# Patient Record
Sex: FEMALE | Race: WHITE | Employment: OTHER | URBAN - METROPOLITAN AREA
[De-identification: names, ages, dates, MRNs, and addresses within clinical notes are randomized per-mention and may not be internally consistent; named-entity substitution may affect disease eponyms.]

---

## 2017-01-06 DIAGNOSIS — M51.369 DDD (DEGENERATIVE DISC DISEASE), LUMBAR: ICD-10-CM

## 2017-01-06 DIAGNOSIS — M53.3 SI (SACROILIAC) JOINT DYSFUNCTION: ICD-10-CM

## 2017-01-06 DIAGNOSIS — G43.009 MIGRAINE WITHOUT AURA AND WITHOUT STATUS MIGRAINOSUS, NOT INTRACTABLE: ICD-10-CM

## 2017-01-06 DIAGNOSIS — M70.62 GREATER TROCHANTERIC BURSITIS OF BOTH HIPS: ICD-10-CM

## 2017-01-06 DIAGNOSIS — M79.7 FIBROMYALGIA: ICD-10-CM

## 2017-01-06 DIAGNOSIS — M70.61 GREATER TROCHANTERIC BURSITIS OF BOTH HIPS: ICD-10-CM

## 2017-01-06 DIAGNOSIS — M47.817 LUMBOSACRAL SPONDYLOSIS WITHOUT MYELOPATHY: Primary | ICD-10-CM

## 2017-01-06 DIAGNOSIS — M79.18 MYOFASCIAL PAIN: ICD-10-CM

## 2017-01-06 RX ORDER — SUMATRIPTAN 100 MG/1
100 TABLET, FILM COATED ORAL
Qty: 9 TABLET | Refills: 2 | Status: SHIPPED | OUTPATIENT
Start: 2017-01-06 | End: 2017-05-12

## 2017-01-06 RX ORDER — TOPIRAMATE 25 MG/1
TABLET, FILM COATED ORAL
Qty: 60 TABLET | Refills: 3 | Status: CANCELLED | OUTPATIENT
Start: 2017-01-06

## 2017-01-06 NOTE — TELEPHONE ENCOUNTER
Signed Prescriptions:                        Disp   Refills    SUMAtriptan (IMITREX) 100 MG tablet        9 tabl*2        Sig: Take 1 tablet (100 mg) by mouth at onset of headache           for migraine May repeat in 2 hours if needed. Max           of 200mg in 24 hours. Use sparingly.  Authorizing Provider: BRIANNA HANSON, RN CNP, FNP  [facility]

## 2017-01-06 NOTE — TELEPHONE ENCOUNTER
Received fax request from [facility] pharmacy requesting refill(s) for Sumatriptan    Last refilled on 07/31/16    Pt last seen on 11/01/16  Next appt scheduled for 01/17/16    Will facilitate refill.

## 2017-01-25 ENCOUNTER — TRANSFERRED RECORDS (OUTPATIENT)
Dept: HEALTH INFORMATION MANAGEMENT | Facility: CLINIC | Age: 57
End: 2017-01-25

## 2017-01-27 ENCOUNTER — MYC MEDICAL ADVICE (OUTPATIENT)
Dept: PALLIATIVE MEDICINE | Facility: CLINIC | Age: 57
End: 2017-01-27

## 2017-01-27 DIAGNOSIS — M79.645 BILATERAL THUMB PAIN: ICD-10-CM

## 2017-01-27 DIAGNOSIS — M70.62 GREATER TROCHANTERIC BURSITIS OF BOTH HIPS: Primary | ICD-10-CM

## 2017-01-27 DIAGNOSIS — M70.61 GREATER TROCHANTERIC BURSITIS OF BOTH HIPS: Primary | ICD-10-CM

## 2017-01-27 DIAGNOSIS — M79.644 BILATERAL THUMB PAIN: ICD-10-CM

## 2017-01-30 NOTE — TELEPHONE ENCOUNTER
My chart message from pt:    Ciaran Childers, could I get a bunch of Volteren gel for pain. See you on Feb. 10th. I use [facility] in [place], [phone] Thank you Jacquie      Last refilled on 7/12/2016 with 2 refills    Pt last seen on 11/1/2016  Next appt scheduled for 2/10/2017    Will facilitate refill.    Michell Ritchie RN, [facility]  Pain Clinic Care Coordinator

## 2017-01-31 NOTE — TELEPHONE ENCOUNTER
Signed Prescriptions:                        Disp   Refills    diclofenac (VOLTAREN) 1 % GEL topical gel  9 Tube 3        Sig: Apply 2 grams to hands and 4 grams to hips up to 4           times daily. T  Authorizing Provider: BRIANNA HANSON, RN CNP, FNP   [facility] [place]

## 2017-02-06 ENCOUNTER — MYC MEDICAL ADVICE (OUTPATIENT)
Dept: PALLIATIVE MEDICINE | Facility: CLINIC | Age: 57
End: 2017-02-06

## 2017-02-06 NOTE — TELEPHONE ENCOUNTER
Jorge message from pt at 1507:    Ciaran Childers,   I sent a message a week ago this past Friday for a refill on Volteren. I haven't gotten any for quite awhile and needed some. I never heard back from anyone. I will be in Friday the 10th but really could have used it last week.     Thanks   Jacquie   ----------  Message sent to pt:    Bhargav Dhillon,     I reviewed your chart and see that Liseth did process your refill request on 1/30/17 and it was sent to [facility] pharmacy in [place]. For refills that are sent directly to the pharmacy, we do not typically send a message back to the patient because the pharmacy usually notifies the patient when the prescription is ready.      I called [facility] pharmacy and they received the electronic prescription on 1/30. They told me that they needed to order the medication because they did not have any in stock and it is now ready to be picked up.      Take care,     Laisha Sanchez, MARVAN, RN-BC  Patient Care Supervisor/Care Coordinator  [facility]

## 2017-02-07 ENCOUNTER — MYC MEDICAL ADVICE (OUTPATIENT)
Dept: FAMILY MEDICINE | Facility: CLINIC | Age: 57
End: 2017-02-07

## 2017-02-10 ENCOUNTER — OFFICE VISIT (OUTPATIENT)
Dept: PALLIATIVE MEDICINE | Facility: CLINIC | Age: 57
End: 2017-02-10
Payer: COMMERCIAL

## 2017-02-10 VITALS — DIASTOLIC BLOOD PRESSURE: 88 MMHG | SYSTOLIC BLOOD PRESSURE: 131 MMHG | HEART RATE: 115 BPM

## 2017-02-10 DIAGNOSIS — M47.817 LUMBOSACRAL SPONDYLOSIS WITHOUT MYELOPATHY: ICD-10-CM

## 2017-02-10 DIAGNOSIS — M53.3 SI (SACROILIAC) JOINT DYSFUNCTION: ICD-10-CM

## 2017-02-10 DIAGNOSIS — M79.7 FIBROMYALGIA: ICD-10-CM

## 2017-02-10 DIAGNOSIS — M76.892 ENTHESOPATHY OF HIP REGION ON BOTH SIDES: ICD-10-CM

## 2017-02-10 DIAGNOSIS — G43.009 MIGRAINE WITHOUT AURA AND WITHOUT STATUS MIGRAINOSUS, NOT INTRACTABLE: Primary | ICD-10-CM

## 2017-02-10 DIAGNOSIS — M76.891 ENTHESOPATHY OF HIP REGION ON BOTH SIDES: ICD-10-CM

## 2017-02-10 DIAGNOSIS — M51.369 DDD (DEGENERATIVE DISC DISEASE), LUMBAR: ICD-10-CM

## 2017-02-10 DIAGNOSIS — M54.50 CHRONIC BILATERAL LOW BACK PAIN WITHOUT SCIATICA: ICD-10-CM

## 2017-02-10 DIAGNOSIS — M79.18 MYOFASCIAL PAIN: ICD-10-CM

## 2017-02-10 DIAGNOSIS — G89.29 CHRONIC BILATERAL LOW BACK PAIN WITHOUT SCIATICA: ICD-10-CM

## 2017-02-10 DIAGNOSIS — M70.62 GREATER TROCHANTERIC BURSITIS OF BOTH HIPS: ICD-10-CM

## 2017-02-10 DIAGNOSIS — M70.61 GREATER TROCHANTERIC BURSITIS OF BOTH HIPS: ICD-10-CM

## 2017-02-10 PROCEDURE — 99214 OFFICE O/P EST MOD 30 MIN: CPT | Performed by: NURSE PRACTITIONER

## 2017-02-10 RX ORDER — METOPROLOL SUCCINATE 25 MG/1
25 TABLET, EXTENDED RELEASE ORAL DAILY
Qty: 30 TABLET | Refills: 0 | Status: SHIPPED | OUTPATIENT
Start: 2017-02-10 | End: 2017-02-17

## 2017-02-10 RX ORDER — HYDROCODONE BITARTRATE AND ACETAMINOPHEN 5; 325 MG/1; MG/1
TABLET ORAL
Qty: 100 TABLET | Refills: 0 | Status: SHIPPED | OUTPATIENT
Start: 2017-02-10 | End: 2017-03-27

## 2017-02-10 ASSESSMENT — PAIN SCALES - GENERAL: PAINLEVEL: MODERATE PAIN (5)

## 2017-02-10 NOTE — NURSING NOTE
"    Chief Complaint   Patient presents with     Pain       Initial There were no vitals taken for this visit. Estimated body mass index is 33.54 kg/(m^2) as calculated from the following:    Height as of 12/23/16: 1.665 m (5' 5.55\").    Weight as of 12/22/16: 92.987 kg (205 lb).  Medication Reconciliation: denis Singleton CMA (AAMA)      "

## 2017-02-10 NOTE — PATIENT INSTRUCTIONS
PLAN:  1. I recommend seeing a counselor re: your recent trauma experience  2. Return to [facility] in PHYSICAL THERAPY   3. Medications:   1. Start metoprolol 25mg per day for headache prevention  2. Continue Norco  3. Agree with no Topamax due to side effects  4. Procedures: none  5. Follow-up with me in 8 weeks.           Nurse Triage line:  [phone]   Call this number with any questions or concerns. You may leave a detailed message anytime. Calls are typically returned Monday through Friday between 8 AM and 4:30 PM. We usually get back to you within 2 business days depending on the issue/request.       Medication refills:    For non-narcotic medications, call your pharmacy directly to request a refill. The pharmacy will contact the [facility] Management Center for authorization. Please allow 3-4 days for these refills to be processed.     For narcotic refills, call the nurse triage line or send a [employer] message. Please contact us 7-10 days before your refill is due. The message MUST include the name of the specific medication(s) requested and how you would like to receive the prescription(s). The options are as follows:    Pain Clinic staff can mail the prescription to your pharmacy. Please tell us the name of the pharmacy.    You may pick the prescription up at the Pain Clinic (tell us the location) or during a clinic visit with your pain provider    Pain Clinic staff can deliver the prescription to the [facility] pharmacy in the clinic building. Please tell us the location.      Scheduling number: [phone].  Call this number to schedule or change appointments.    We believe regular attendance is key to your success in our program.    Any time you are unable to keep your appointment we ask that you call us at least 24 hours in advance to let us know. This will allow us to offer the appointment time to another patient.

## 2017-02-10 NOTE — MR AVS SNAPSHOT
After Visit Summary   2/10/2017    Jacquie Amador    MRN: [id number]           Patient Information     Date Of Birth          1960        Visit Information        Provider Department      2/10/2017 2:00 PM Liseth Caldera APRN [facility]        Today's Diagnoses     Migraine without aura and without status migrainosus, not intractable    -  1     Enthesopathy of hip region on both sides         Chronic bilateral low back pain without sciatica         Myofascial pain           Care Instructions    PLAN:  1. I recommend seeing a counselor re: your recent trauma experience  2. Return to [facility] in PHYSICAL THERAPY   3. Medications:   1. Start metoprolol 25mg per day for headache prevention  2. Continue Norco  3. Agree with no Topamax due to side effects  4. Procedures: none  5. Follow-up with me in 8 weeks.           Nurse Triage line:  [phone]   Call this number with any questions or concerns. You may leave a detailed message anytime. Calls are typically returned Monday through Friday between 8 AM and 4:30 PM. We usually get back to you within 2 business days depending on the issue/request.       Medication refills:    For non-narcotic medications, call your pharmacy directly to request a refill. The pharmacy will contact the Pain Management Center for authorization. Please allow 3-4 days for these refills to be processed.     For narcotic refills, call the nurse triage line or send a RegisterPatient message. Please contact us 7-10 days before your refill is due. The message MUST include the name of the specific medication(s) requested and how you would like to receive the prescription(s). The options are as follows:    Pain Clinic staff can mail the prescription to your pharmacy. Please tell us the name of the pharmacy.    You may pick the prescription up at the Pain Clinic (tell us the location) or during a clinic visit with your pain provider    Pain Clinic staff can deliver  the prescription to the [facility] pharmacy in the clinic building. Please tell us the location.      Scheduling number: [phone].  Call this number to schedule or change appointments.    We believe regular attendance is key to your success in our program.    Any time you are unable to keep your appointment we ask that you call us at least 24 hours in advance to let us know. This will allow us to offer the appointment time to another patient.             Follow-ups after your visit        Who to contact     If you have questions or need follow up information about today's clinic visit or your schedule please contact [facility] directly at [phone].  Normal or non-critical lab and imaging results will be communicated to you by MyChart, letter or phone within 4 business days after the clinic has received the results. If you do not hear from us within 7 days, please contact the clinic through Tap[employer] or phone. If you have a critical or abnormal lab result, we will notify you by phone as soon as possible.  Submit refill requests through [employer] or call your pharmacy and they will forward the refill request to us. Please allow 3 business days for your refill to be completed.          Additional Information About Your Visit        [employer] Information     [employer] gives you secure access to your electronic health record. If you see a primary care provider, you can also send messages to your care team and make appointments. If you have questions, please call your primary care clinic.  If you do not have a primary care provider, please call [phone] and they will assist you.        Care EveryWhere ID     This is your Care EveryWhere ID. This could be used by other organizations to access your [facility] medical records  [id number]        Your Vitals Were     Pulse                   115            Blood Pressure from Last 3 Encounters:   02/10/17 131/88   12/23/16 122/84   12/19/16 118/88    Weight from  Last 3 Encounters:   12/22/16 92.987 kg (205 lb)   07/27/16 94.348 kg (208 lb)   06/22/16 94.394 kg (208 lb 1.6 oz)              Today, you had the following     No orders found for display         Today's Medication Changes          These changes are accurate as of: 2/10/17  2:44 PM.  If you have any questions, ask your nurse or doctor.               Start taking these medicines.        Dose/Directions    metoprolol 25 MG 24 hr tablet   Commonly known as:  TOPROL-XL   Used for:  Migraine without aura and without status migrainosus, not intractable        Dose:  25 mg   Take 1 tablet (25 mg) by mouth daily   Quantity:  30 tablet   Refills:  0         These medicines have changed or have updated prescriptions.        Dose/Directions    buPROPion 200 MG 12 hr tablet   Commonly known as:  WELLBUTRIN SR   This may have changed:  Another medication with the same name was removed. Continue taking this medication, and follow the directions you see here.   Used for:  MARIZOL (generalized anxiety disorder)        TAKE 1 TABLET (200 MG) BY ORAL ROUTE 2 TIMES PER DAY   Quantity:  120 tablet   Refills:  0         Stop taking these medicines if you haven't already. Please contact your care team if you have questions.     atorvastatin 20 MG tablet   Commonly known as:  LIPITOR           glycopyrrolate 1 MG tablet   Commonly known as:  ROBINUL           topiramate 25 MG tablet   Commonly known as:  TOPAMAX                Where to get your medicines      These medications were sent to [id number] - [place] - [address]  [address], [place] [address]     Phone:  [phone]    - metoprolol 25 MG 24 hr tablet             Primary Care Provider Office Phone # Fax #    Liz Peterson [phone]        [place] [facility] [address]  [place] [address]        Thank you!     Thank you for choosing [facility]  for your care. Our goal is always to provide you with excellent care. Hearing  back from our patients is one way we can continue to improve our services. Please take a few minutes to complete the written survey that you may receive in the mail after your visit with us. Thank you!             Your Updated Medication List - Protect others around you: Learn how to safely use, store and throw away your medicines at www.disposemymeds.org.          This list is accurate as of: 2/10/17  2:44 PM.  Always use your most recent med list.                   Brand Name Dispense Instructions for use    acidophilus Caps     60 capsule    Take 1 capsule by mouth daily Take two hours before or after antibiotic dose.  Continue for 1 week after antibiotic therapy completed       adapalene 0.1 % gel    DIFFERIN    45 g    Apply topically At Bedtime       albuterol 108 (90 BASE) MCG/ACT Inhaler    VENTOLIN HFA    54 g    INHALE 2 PUFFS INTO THE LUNGS EVERY 6 HOURS AS NEEDED FOR SHORTNESS OF BREATH / DYSPNEA       ALPRAZolam 0.25 MG tablet    XANAX    90 tablet    TAKE 1 TABLET EVERY DAY AS NEEDED FOR ANXIETY       buPROPion 200 MG 12 hr tablet    WELLBUTRIN SR    120 tablet    TAKE 1 TABLET (200 MG) BY ORAL ROUTE 2 TIMES PER DAY       calcium carbonate-vitamin D 500-400 MG-UNIT Tabs per tablet     180 tablet    Take 1 tablet by mouth 2 times daily       celecoxib 200 MG capsule    celeBREX    180 capsule    Take 1 capsule twice daily as needed for pain this is a 3 month script       clindamycin 1 % lotion    CLEOCIN T     Apply topically 2 times daily       clobetasol 0.05 % cream    TEMOVATE    30 g    APPLY SPARINGLY TO AFFECTED AREA TWICE DAILY FOR 14 DAYS.  DO NOT APPLY TO FACE.       diclofenac 1 % Gel topical gel    VOLTAREN    9 Tube    Apply 2 grams to hands and 4 grams to hips up to 4 times daily. T       DULoxetine 60 MG EC capsule    CYMBALTA    180 capsule    Take 2 capsules (120 mg) by mouth daily       fluconazole 150 MG tablet    DIFLUCAN    4 tablet    Take 1 tablet (150 mg) by mouth every 3 days        fluticasone-salmeterol 500-50 MCG/DOSE diskus inhaler    ADVAIR    3 Inhaler    Inhale 1 puff into the lungs 2 times daily       Glycopyrrolate Powd     5 g    Apply to the face where involved qdaily       HYDROcodone-acetaminophen 5-325 MG per tablet    NORCO    100 tablet    May take 1 tablet every 4-6 hours as needed for pain. Max of 4 tabs per day but cannot take 4 tablets every day. OK to fill 11/1/2016 and start tomorrow 11/2/2016 to last 30 days until 12/2/2016.       hydrocortisone 2.5 % cream     30 g    APPLY TOPICALLY 2 TIMES DAILY AS NEEDED       losartan-hydrochlorothiazide 100-12.5 MG per tablet    HYZAAR    90 tablet    TAKE 1 TABLET BY MOUTH DAILY       metoprolol 25 MG 24 hr tablet    TOPROL-XL    30 tablet    Take 1 tablet (25 mg) by mouth daily       montelukast 10 MG tablet    SINGULAIR    90 tablet    TAKE 1 TABLET (10 MG) BY ORAL ROUTE ONCE DAILY IN THE EVENING       mupirocin 2 % cream    BACTROBAN    30 g    Apply topically 3 times daily       nystatin 894350 UNIT/ML suspension    MYCOSTATIN    380 mL    Take by mouth 4 times daily       nystatin cream    MYCOSTATIN    45 g    Apply topically 3 times daily       ranitidine 150 MG tablet    ZANTAC    0    ONE TABLET TWICE DAILY       rOPINIRole 1 MG tablet    REQUIP    180 tablet    TAKE 2 TABLETS (2 MG) BY MOUTH AT BEDTIME       SUMAtriptan 100 MG tablet    IMITREX    9 tablet    Take 1 tablet (100 mg) by mouth at onset of headache for migraine May repeat in 2 hours if needed. Max of 200mg in 24 hours. Use sparingly.       traZODone 50 MG tablet    DESYREL    270 tablet    TAKE 2-3 TABLETS (100-150 MG) BY MOUTH AT BEDTIME       tretinoin 0.025 % cream    RETIN-A    45 g    Spread a pea size amount into affected area topically at bedtime.  Use sunscreen SPF>20.       ZYRTEC 10 MG tablet   Generic drug:  cetirizine     90    1 TABLET DAILY
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
## 2017-02-16 ENCOUNTER — MYC MEDICAL ADVICE (OUTPATIENT)
Dept: PALLIATIVE MEDICINE | Facility: CLINIC | Age: 57
End: 2017-02-16

## 2017-02-16 NOTE — TELEPHONE ENCOUNTER
Per patient Zo message:      Created: 2/16/2017 12:03 PM        Hi Liseth,   I am thinking that this new medication is not to good for me.  The last couple days I seem to be super tired (super). I am on the couch most of the day, I have felt really ill like the flu in the afternoons. My wheezing is worse and coughing more. I also have severe left eye pain at times. My headache has gotten a little better but not totally. I don't think I can take this one either. I need to kick this wheezing out of me. I have not been able to enjoy anything. My husbands birthday was yesterday and I did nothing which is totally not me. I am hoping there is something else but I can't handle how I feel.     Thank you,  Jacquie Amador        Routed to iLseth Caldera NP to review.    Denisse Maldonado RN-BSN  [facility]

## 2017-02-17 NOTE — TELEPHONE ENCOUNTER
Ciaran Dhillon-    Stop taking the metoprolol due to side effects. I think you need to fully kick this virus first before we try any more new medications.     Thanks.  Liseth LOPEZ RN CNP, SARAHP  [facility]    I sent the above Podcast Readyt message to the patient.   Liseth LOPEZ RN CNP, SARAHP  [facility]

## 2017-02-20 ENCOUNTER — MYC MEDICAL ADVICE (OUTPATIENT)
Dept: PALLIATIVE MEDICINE | Facility: CLINIC | Age: 57
End: 2017-02-20

## 2017-02-20 ENCOUNTER — MYC MEDICAL ADVICE (OUTPATIENT)
Dept: FAMILY MEDICINE | Facility: CLINIC | Age: 57
End: 2017-02-20

## 2017-02-20 DIAGNOSIS — G43.009 MIGRAINE WITHOUT AURA AND WITHOUT STATUS MIGRAINOSUS, NOT INTRACTABLE: Primary | ICD-10-CM

## 2017-02-20 NOTE — TELEPHONE ENCOUNTER
I called and spoke to Jacquie and she was on the way to the ER related to breathing and heart rate problems. Please see question below. She is wondering why she would stop taking her Adorvastatin when starting the metoprolol and wanted you to know she never did that. She is only on the Advorstatin and Losartan right now.     MARVA TrotterN, RN  Care Coordinator  [facility]

## 2017-02-20 NOTE — TELEPHONE ENCOUNTER
Orion from Patient 2/20/17    Hello,    I have a question regarding the Metoprolol 25 mg that I started and then stopped due to extreme fatigue. I was just reading on my summary sheet that I was supposed to have stopped my Adorvastatin to take Metoprolol. I didn't hear her say that so I am wondering if that effected the way I felt by taking both. I am only on Advorvastin right now. My pulse numbers have been bouncing from . I am also on Lorsartan for BP.    Thank you,  Jacquie Schmitz, MARVAN, RN  Care Coordinator  [facility]

## 2017-02-20 NOTE — TELEPHONE ENCOUNTER
"Jacquie Amador is a 56 year old female who calls with heart rate concerns.    NURSING ASSESSMENT:  Description:  States not feeling well. Blood pressure readings are \"fine\" per patient. Heart rate currently 84. Fluctuating from 84-120s. Feels her heart fluttering, skipping beats. \"feels like a butterfly in my heart\". Migraine present for a few days, pain behind eyes also. Cough, productive with clear sputum present for \"weeks\". Wheezing/sob present. Dizziness present, worse with laying down. Using lavender essential oil, brought heart rate down; tylenol for headache. Was on metoprolol starting 2/10, stopped on her own on 2/15 due to feeling tired while on it.    Onset/duration:  1-2 weeks  Precip. factors:  n/a  Associated symptoms:  See above  Improves/worsens symptoms:  n/a  Pain scale (0-10)   7/10  LMP/preg/breast feeding:  n/a  Last exam/Treatment:  12/23/2016  Allergies:   Allergies   Allergen Reactions     Cats      and rabbits/wheezing     Dogs      wheezing     Lyrica [Pregabalin] Other (See Comments)     Rash, mouth sores, itching and burning     No Known Drug Allergies      Seasonal Allergies      rhinits       MEDICATIONS:   Taking medication(s) as prescribed? N/A  Taking over the counter medication(s?) N/A  Any medication side effects? Not Applicable    Any barriers to taking medication(s) as prescribed?  N/A  Medication(s) improving/managing symptoms?  N/A  Medication reconciliation completed: N/A      NURSING PLAN: Nursing advice to patient ED    RECOMMENDED DISPOSITION:  To ED, another person to drive - due to symptoms and no current clinic openings today  Will comply with recommendation: Yes  If further questions/concerns or if symptoms do not improve, worsen or new symptoms develop, call your PCP or [facility] as soon as possible.      Guideline used:  Telephone Triage Protocols for Nurses, Fourth Edition, Charline Mackay  Heart rate problems  NOTES:  Disposition was determined by the " first positive assessment question, therefore all previous assessment questions were negative    Hanh Perez RN
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
## 2017-02-22 ENCOUNTER — MYC MEDICAL ADVICE (OUTPATIENT)
Dept: FAMILY MEDICINE | Facility: CLINIC | Age: 57
End: 2017-02-22

## 2017-02-22 RX ORDER — ATORVASTATIN CALCIUM 20 MG/1
20 TABLET, FILM COATED ORAL DAILY
Qty: 30 TABLET | Refills: 0 | COMMUNITY
Start: 2017-02-22 | End: 2017-02-27

## 2017-02-22 NOTE — TELEPHONE ENCOUNTER
Called and spoke to pt. Rvwd her AVS with her. Under the medication section the Lipitor and the Robinul were discontinued which should not have been. Pt stated that she is still taking her Lipitor so will place this back on her medication list.     Pt stated that she took the metoprolol for a few days but it made her extremely lethargic and she could not function (see encounter from 2/16/2017). She stated that she went to the ER for her migraine as it was very bad. She stated she was not sure what else she could try. She is using the medical marijuana. Pt asked if provider had any other ideas about her pain.     Will send to provider.     Michell Ritchie RN, [facility]  Pain Clinic Care Coordinator

## 2017-02-22 NOTE — TELEPHONE ENCOUNTER
I did n ot tell her to stop the Lipitor (atorvastatin). I think I updated her EMR medication list and I might have crossed it out but I did not tell her to stop taking it.     What did she find out at the hospital?    Liseth LOPEZ RN CNP, FNP  [facility]

## 2017-02-22 NOTE — PROGRESS NOTES
"  SUBJECTIVE:                                                    Jacquie Amador is a 56 year old female who presents to clinic today for the following health issues:      Hyperlipidemia Follow-Up      Rate your low fat/cholesterol diet?: not monitoring fat    Taking statin?  No    Other lipid medications/supplements?:  none       Depression and Anxiety Follow-Up    Status since last visit: Improved - Wellbutrin is making her \"not feel well\". She notices that she feels \"not good\" about 2 hours after her afternoon dose. She is taking the SR version 200 mg in the morning and 150 mg evening.  She has been on this for many years. She is wondering why this would change now.     Other associated symptoms:None    Complicating factors:     Significant life event: No     Current substance abuse: None    PHQ-9 SCORE 4/26/2016 6/22/2016 2/24/2017   Total Score - - -   Total Score MyChart 5 (Mild depression) - -   Total Score - - -   Total Score - 3 8     MARIZOL-7 SCORE 6/22/2016 12/19/2016 2/24/2017   Total Score - - -   Total Score - - -   Total Score 3 12 5        PHQ-9  English      PHQ-9   Any Language     GAD7     Asthma Follow-Up    Was ACT completed today?    Yes    She currently has a cold/sinus issue.   See below.   ACT Total Scores 2/24/2017   ACT TOTAL SCORE (Goal Greater than or Equal to 20) 19   In the past 12 months, how many times did you visit the emergency room for your asthma without being admitted to the hospital? 0   In the past 12 months, how many times were you hospitalized overnight because of your asthma? 0       Chronic Pain Follow-Up       Type / Location of Pain: In hands, thumb, hips, and back.   Analgesia/pain control:       Recent changes:  worse      Overall control: Comfortably manageable  Activity level/function:      Daily activities:  Able to do light housework, cooking    Work:  not applicable  Adverse effects:  No  Adherance    Taking medication as directed?  Yes    Participating in other " "treatments: yes  Risk Factors:    Sleep:  Good - wakes up tired    Mood/anxiety:  worsened    Recent family or social stressors:  none noted    Other aggravating factors: prolonged standing     She is under the care of pain management with [facility].   She has started on medical marijuana. She feels this helps control her pain better at night so that she can sleep.     PHQ-9 SCORE 4/26/2016 6/22/2016 2/24/2017   Total Score - - -   Total Score MyChart 5 (Mild depression) - -   Total Score - - -   Total Score - 3 8     MARIZOL-7 SCORE 6/22/2016 12/19/2016 2/24/2017   Total Score - - -   Total Score - - -   Total Score 3 12 5     Encounter-Level CSA - 09/06/2016:                 Controlled Substance Agreement - Scan on 9/29/2016  3:40 PM : CONTROLLED SUBSTANCE AGREEMENT (below)                 Amount of exercise or physical activity: None    Problems taking medications regularly: No    Medication side effects: none  Diet: regular (no restrictions)      ED/UC Followup:    Facility:  [facility]  Date of visit: Monday 2/20/17  Reason for visit: BP/Pulse  Current Status: Pt still feels the affects. Not as strong, but today she is because readings are elevated.      Continues to have palpitations - \"butterfly feeling\"  When first noted 5 days ago, pulse was 120.   Has been feeling dizzy on occasion. More when having the palpitations.    Has been having headaches - [facility].   She stopped the metoprolol due to her having significant fatigue. Metoprolol was started by Pain for headaches.   She denies any changes in medications otherwise - metoprolol which is stopped and the marijuana.   Notices feeling fatigued. History of iron deficiency.       URI  Headaches, productive cough and feeling wheezy.   Post nasal drainage.   Feels sometimes shortness of breath with going up stairs.   No fevers. No vomiting.   She is not taking any sinus medications.       CONSTIPATION  \"Even after " "trying everything\"   Metamucil, Beneful, Oat Bran, Raisins.   Chronic issue.   Has tried increasing fluids without much improvement.   No blood in the stools.           Problem list and histories reviewed & adjusted, as indicated.  Additional history: as documented    Problem list, Medication list, Allergies, and Medical/Social/Surgical histories reviewed in [facility] and updated as appropriate.    ROS:  C: NEGATIVE for fever, chills, change in weight  E: NEGATIVE for vision changes or irritation  ENT/MOUTH: as above  RESP:as above.   CV: as above. No chest pain.   GI: NEGATIVE for nausea, abdominal pain, heartburn, or change in bowel habits  : NEGATIVE for frequency, dysuria, or hematuria  MUSCULOSKELETAL:chronic pain as above. No new concerns.   NEURO: as above. No weakness.     OBJECTIVE:                                                    /88  Pulse 109  Temp 97  F (36.1  C) (Temporal)  Ht 5' 7\" (1.702 m)  SpO2 100%  There is no height or weight on file to calculate BMI.  GENERAL APPEARANCE: alert and no distress  HENT: ear canals and TM's normal, rhinorrhea yellow, oral mucous membranes moist, oropharynx clear and maxillary sinus tenderness bilateral  NECK: no adenopathy, no asymmetry, masses, or scars and thyroid normal to palpation  RESP: lungs clear to auscultation - no rales, rhonchi or wheezes  CV: regular rates and rhythm, normal S1 S2, no S3 or S4 and no murmur, click or rub  ABDOMEN: soft, nontender, without hepatosplenomegaly or masses and bowel sounds normal  MS: extremities normal- no gross deformities noted  SKIN: she has multiple healing areas noted on forearms and face due to picking.   MENTAL STATUS EXAM:  Appearance/Behavior: No apparent distress and Casually groomed  Speech: Normal  Mood/Affect: anxiety  Insight: Fair    Diagnostic Test Results:  pending     ASSESSMENT/PLAN:                                                          BMI:   Estimated body mass index is 32.11 kg/(m^2) as " "calculated from the following:    Height as of 12/22/16: 5' 7\" (1.702 m).    Weight as of 12/22/16: 205 lb (93 kg).   Weight management plan: Discussed healthy diet and exercise guidelines and patient will follow up in 6 months in clinic to re-evaluate.      1. Palpitations  Patient with palpitations.   Evaluated in the ED at [place].   Normal EKG and monitored there.  Her pulse has been periodically higher than 100 but without arrhythmia.  She had been given metoprolol for headaches but only took briefly and stopped it.   She has no chest pain or shortness of breath associated.   Recommend further evaluation with zio patch.   If patient experiences chest pain, worsening or concerns, should proceed to the ED.   - Zio Patch Holter; Future    2. Hypertension goal BP (blood pressure) < 140/90  Under control at this time. Check labs as noted.   Adjust therapy if needed.   - Basic metabolic panel  (Ca, Cl, CO2, Creat, Gluc, K, Na, BUN)    3. Moderate persistent asthma without complication  Patient has ACT < 20 but has sinusitis at this time.   No change in plan.   - Asthma Action Plan (AAP)    4. Moderate recurrent major depression (H)  Under fair control.   She reports concerns about the wellbutrin and would like to stop this.   Discussed trial of wellbutrin XL and then can wean that down if needed.   She is interested in trying this and will start with her on wellbutrin  mg daily.   Recheck in 4 weeks.   - DEPRESSION ACTION PLAN (DAP)  - buPROPion (WELLBUTRIN XL) 300 MG 24 hr tablet; Take 1 tablet (300 mg) by mouth every morning  Dispense: 90 tablet; Refill: 1    5. Acute sinusitis with symptoms > 10 days  Patient with sinusitis and history of this.   Place on augmentin as ordered.   Recheck if fails to improve or if worsening in any way.   - amoxicillin-clavulanate (AUGMENTIN) 875-125 MG per tablet; Take 1 tablet by mouth 2 times daily  Dispense: 20 tablet; Refill: 0    6. Hyperlipidemia LDL goal <130  Due " for labs. Will notify of results. Adjust as needed.   - Lipid Profile (Chol, Trig, HDL, LDL calc)    7. Other fatigue  Requesting vitamin D testing. Done.   - Vitamin D Deficiency    8. Iron deficiency anemia, unspecified iron deficiency anemia type  History of. Will notify of results.   - Iron and iron binding capacity  - Ferritin    9. Chronic pain syndrome  Under the care of Pain clinid.       10. Slow transit constipation  Patient reports continued issues.   Increase fluid intake. Recommend a trial of Miralax. Continue high fiber diet.     11. Generalized anxiety disorder  See above.       FUTURE APPOINTMENTS:       - Follow-up visit in 4 weeks.     All questions invited, asked and answered to the patient's apparent satisfaction.  Patient agrees to plan.      >50% of this 40 minute visit spent in counseling and plan of care for the above diagnoses     ARVIN MARIN MD, MD  [facility]

## 2017-02-22 NOTE — TELEPHONE ENCOUNTER
"Jacquie Amador is a 56 year old female    S-(situation): Jacquie is calling today with f/u from recent ED visit    B-(background): elevated blood pressures and pulse. Fluttering of heart. Dizziness. Cough/wheezing. Was sent to ED due to late in the day & symptoms presented. Went on Sunday. States all tests came back \"normal\".  BP was 101 diastolic, not sure of systolic. Pulse was 117 in ED.     A-(assessment): still dizzy. Headache, feeling run down, \"like something just isn't right\". Coughing, wheezing. Depression-states when takes afternoon dose of wellbutrin, she feels sick. Pulse today 99, /94    Pain scale (1-10): 6/10   Denies: Chest pain, sob, dehydration, urination or bowel problems   Meds/MeasuresTried: n/a   Improves/Worsens: N/a       R-(recommendations): See in 72 hours - schedule follow up from ED visit. Placed on schedule 2/24/17. Notified patient of reasons to return to ED prior to appointment.   Will comply with recommendation: YES     If further questions/concerns or if Sxs do not improve, worsen or new Sxs develop, call your PCP or [facility] as soon as possible.    Guideline used:  Telephone Triage Protocols for Nurses, Fourth Edition, Charline Mackay  Hypertension  Heart rate problems  Depression   Dizziness  Cough  Common cold symptoms    NOTES:  Disposition was determined by the first positive assessment question, therefore all previous assessment questions were negative      Hanh Perez, RN, BSN      "

## 2017-02-24 ENCOUNTER — OFFICE VISIT (OUTPATIENT)
Dept: FAMILY MEDICINE | Facility: CLINIC | Age: 57
End: 2017-02-24
Payer: COMMERCIAL

## 2017-02-24 ENCOUNTER — MYC MEDICAL ADVICE (OUTPATIENT)
Dept: FAMILY MEDICINE | Facility: CLINIC | Age: 57
End: 2017-02-24

## 2017-02-24 DIAGNOSIS — F41.1 GENERALIZED ANXIETY DISORDER: ICD-10-CM

## 2017-02-24 DIAGNOSIS — I10 HYPERTENSION GOAL BP (BLOOD PRESSURE) < 140/90: ICD-10-CM

## 2017-02-24 DIAGNOSIS — R00.2 PALPITATIONS: Primary | ICD-10-CM

## 2017-02-24 DIAGNOSIS — R53.83 OTHER FATIGUE: ICD-10-CM

## 2017-02-24 DIAGNOSIS — F33.1 MODERATE RECURRENT MAJOR DEPRESSION (H): ICD-10-CM

## 2017-02-24 DIAGNOSIS — G89.4 CHRONIC PAIN SYNDROME: ICD-10-CM

## 2017-02-24 DIAGNOSIS — E78.5 HYPERLIPIDEMIA LDL GOAL <130: ICD-10-CM

## 2017-02-24 DIAGNOSIS — D50.9 IRON DEFICIENCY ANEMIA, UNSPECIFIED IRON DEFICIENCY ANEMIA TYPE: ICD-10-CM

## 2017-02-24 DIAGNOSIS — J01.90 ACUTE SINUSITIS WITH SYMPTOMS > 10 DAYS: ICD-10-CM

## 2017-02-24 DIAGNOSIS — K59.01 SLOW TRANSIT CONSTIPATION: ICD-10-CM

## 2017-02-24 DIAGNOSIS — J45.40 MODERATE PERSISTENT ASTHMA WITHOUT COMPLICATION: ICD-10-CM

## 2017-02-24 LAB
ANION GAP SERPL CALCULATED.3IONS-SCNC: 11 MMOL/L (ref 3–14)
BUN SERPL-MCNC: 17 MG/DL (ref 7–30)
CALCIUM SERPL-MCNC: 9.4 MG/DL (ref 8.5–10.1)
CHLORIDE SERPL-SCNC: 104 MMOL/L (ref 94–109)
CHOLEST SERPL-MCNC: 191 MG/DL
CO2 SERPL-SCNC: 27 MMOL/L (ref 20–32)
CREAT SERPL-MCNC: 0.8 MG/DL (ref 0.52–1.04)
FERRITIN SERPL-MCNC: 21 NG/ML (ref 8–252)
GFR SERPL CREATININE-BSD FRML MDRD: 75 ML/MIN/1.7M2
GLUCOSE SERPL-MCNC: 112 MG/DL (ref 70–99)
HDLC SERPL-MCNC: 70 MG/DL
IRON SATN MFR SERPL: 20 % (ref 15–46)
IRON SERPL-MCNC: 71 UG/DL (ref 35–180)
LDLC SERPL CALC-MCNC: 100 MG/DL
NONHDLC SERPL-MCNC: 121 MG/DL
POTASSIUM SERPL-SCNC: 3.3 MMOL/L (ref 3.4–5.3)
SODIUM SERPL-SCNC: 142 MMOL/L (ref 133–144)
TIBC SERPL-MCNC: 363 UG/DL (ref 240–430)
TRIGL SERPL-MCNC: 103 MG/DL

## 2017-02-24 PROCEDURE — 80061 LIPID PANEL: CPT | Performed by: FAMILY MEDICINE

## 2017-02-24 PROCEDURE — 82306 VITAMIN D 25 HYDROXY: CPT | Performed by: FAMILY MEDICINE

## 2017-02-24 PROCEDURE — 80048 BASIC METABOLIC PNL TOTAL CA: CPT | Performed by: FAMILY MEDICINE

## 2017-02-24 PROCEDURE — 36415 COLL VENOUS BLD VENIPUNCTURE: CPT | Performed by: FAMILY MEDICINE

## 2017-02-24 PROCEDURE — 82728 ASSAY OF FERRITIN: CPT | Performed by: FAMILY MEDICINE

## 2017-02-24 PROCEDURE — 99215 OFFICE O/P EST HI 40 MIN: CPT | Performed by: FAMILY MEDICINE

## 2017-02-24 PROCEDURE — 83550 IRON BINDING TEST: CPT | Performed by: FAMILY MEDICINE

## 2017-02-24 PROCEDURE — 83540 ASSAY OF IRON: CPT | Performed by: FAMILY MEDICINE

## 2017-02-24 RX ORDER — BUPROPION HYDROCHLORIDE 300 MG/1
300 TABLET ORAL EVERY MORNING
Qty: 90 TABLET | Refills: 1 | Status: SHIPPED | OUTPATIENT
Start: 2017-02-24 | End: 2017-03-14 | Stop reason: ALTCHOICE

## 2017-02-24 ASSESSMENT — ANXIETY QUESTIONNAIRES
GAD7 TOTAL SCORE: 5
1. FEELING NERVOUS, ANXIOUS, OR ON EDGE: SEVERAL DAYS
IF YOU CHECKED OFF ANY PROBLEMS ON THIS QUESTIONNAIRE, HOW DIFFICULT HAVE THESE PROBLEMS MADE IT FOR YOU TO DO YOUR WORK, TAKE CARE OF THINGS AT HOME, OR GET ALONG WITH OTHER PEOPLE: SOMEWHAT DIFFICULT
6. BECOMING EASILY ANNOYED OR IRRITABLE: SEVERAL DAYS
2. NOT BEING ABLE TO STOP OR CONTROL WORRYING: SEVERAL DAYS
5. BEING SO RESTLESS THAT IT IS HARD TO SIT STILL: NOT AT ALL
3. WORRYING TOO MUCH ABOUT DIFFERENT THINGS: SEVERAL DAYS
7. FEELING AFRAID AS IF SOMETHING AWFUL MIGHT HAPPEN: SEVERAL DAYS

## 2017-02-24 ASSESSMENT — PATIENT HEALTH QUESTIONNAIRE - PHQ9: 5. POOR APPETITE OR OVEREATING: NOT AT ALL

## 2017-02-24 ASSESSMENT — PAIN SCALES - GENERAL: PAINLEVEL: SEVERE PAIN (7)

## 2017-02-24 NOTE — TELEPHONE ENCOUNTER
Provider sent 2 prescriptions to pharmacy today - Provider, please review if any more may be needed for patient.

## 2017-02-24 NOTE — MR AVS SNAPSHOT
After Visit Summary   2/24/2017    Jacquie Amador    MRN: [id number]           Patient Information     Date Of Birth          1960        Visit Information        Provider Department      2/24/2017 1:40 PM Liz Peterson MD [place] [facility]        Today's Diagnoses     Moderate persistent asthma without complication    -  1    Moderate recurrent major depression (H)        Hyperlipidemia LDL goal <130        Hypertension goal BP (blood pressure) < 140/90        Palpitations        Acute sinusitis with symptoms > 10 days        Other fatigue        Iron deficiency anemia, unspecified iron deficiency anemia type          Care Instructions        Miralax 1 capful with 6-8 ounce of liquid once daily for constipation.     Change Wellbutrin to  mg daily. Start tomorrow.     Zio patch for 3 days to check heart rhythm.         Follow-ups after your visit        Your next 10 appointments already scheduled     Apr 04, 2017  2:30 PM CDT   Return Visit with JESSICA Guillory CNP   [facility])    [address] [address] [phone]              Future tests that were ordered for you today     Open Future Orders        Priority Expected Expires Ordered    Zio Patch Holter Routine  4/10/2017 2/24/2017            Who to contact     If you have questions or need follow up information about today's clinic visit or your schedule please contact [facility] directly at [phone].  Normal or non-critical lab and imaging results will be communicated to you by MyChart, letter or phone within 4 business days after the clinic has received the results. If you do not hear from us within 7 days, please contact the clinic through MyChart or phone. If you have a critical or abnormal lab result, we will notify you by phone as soon as possible.  Submit refill requests through [employer] or call your pharmacy and they  "will forward the refill request to us. Please allow 3 business days for your refill to be completed.          Additional Information About Your Visit        [employer] Information     [employer] gives you secure access to your electronic health record. If you see a primary care provider, you can also send messages to your care team and make appointments. If you have questions, please call your primary care clinic.  If you do not have a primary care provider, please call [phone] and they will assist you.        Care EveryWhere ID     This is your Care EveryWhere ID. This could be used by other organizations to access your [facility] medical records  [id number]        Your Vitals Were     Pulse Temperature Height Pulse Oximetry          109 97  F (36.1  C) (Temporal) 5' 7\" (1.702 m) 100%         Blood Pressure from Last 3 Encounters:   02/24/17 120/90   02/10/17 131/88   12/23/16 122/84    Weight from Last 3 Encounters:   12/22/16 205 lb (93 kg)   07/27/16 208 lb (94.3 kg)   06/22/16 208 lb 1.6 oz (94.4 kg)              We Performed the Following     Asthma Action Plan (AAP)     Basic metabolic panel  (Ca, Cl, CO2, Creat, Gluc, K, Na, BUN)     DEPRESSION ACTION PLAN (DAP)     Ferritin     Iron and iron binding capacity     Lipid Profile (Chol, Trig, HDL, LDL calc)     Vitamin D Deficiency          Today's Medication Changes          These changes are accurate as of: 2/24/17  2:26 PM.  If you have any questions, ask your nurse or doctor.               Start taking these medicines.        Dose/Directions    amoxicillin-clavulanate 875-125 MG per tablet   Commonly known as:  AUGMENTIN   Used for:  Acute sinusitis with symptoms > 10 days   Started by:  Liz Peterson MD        Dose:  1 tablet   Take 1 tablet by mouth 2 times daily   Quantity:  20 tablet   Refills:  0       buPROPion 300 MG 24 hr tablet   Commonly known as:  WELLBUTRIN XL   Used for:  Moderate recurrent major depression (H)   Replaces:  buPROPion 200 MG " 12 hr tablet   Started by:  Liz Peterson MD        Dose:  300 mg   Take 1 tablet (300 mg) by mouth every morning   Quantity:  90 tablet   Refills:  1         Stop taking these medicines if you haven't already. Please contact your care team if you have questions.     buPROPion 200 MG 12 hr tablet   Commonly known as:  WELLBUTRIN SR   Replaced by:  buPROPion 300 MG 24 hr tablet   Stopped by:  Liz Peterson MD                Where to get your medicines      These medications were sent to [id number] - [place] - [address]  [address] [address]     Phone:  [phone]     amoxicillin-clavulanate 875-125 MG per tablet    buPROPion 300 MG 24 hr tablet                Primary Care Provider Office Phone # Fax #    Liz Peterson [phone]       [facility] [address] [place] [address]  [place] [address]        Thank you!     Thank you for choosing [facility]  for your care. Our goal is always to provide you with excellent care. Hearing back from our patients is one way we can continue to improve our services. Please take a few minutes to complete the written survey that you may receive in the mail after your visit with us. Thank you!             Your Updated Medication List - Protect others around you: Learn how to safely use, store and throw away your medicines at www.disposemymeds.org.          This list is accurate as of: 2/24/17  2:26 PM.  Always use your most recent med list.                   Brand Name Dispense Instructions for use    acidophilus Caps     60 capsule    Take 1 capsule by mouth daily Take two hours before or after antibiotic dose.  Continue for 1 week after antibiotic therapy completed       adapalene 0.1 % gel    DIFFERIN    45 g    Apply topically At Bedtime       albuterol 108 (90 BASE) MCG/ACT Inhaler    VENTOLIN HFA    54 g    INHALE 2 PUFFS INTO THE LUNGS EVERY 6 HOURS AS NEEDED FOR SHORTNESS OF BREATH / DYSPNEA        ALPRAZolam 0.25 MG tablet    XANAX    90 tablet    TAKE 1 TABLET EVERY DAY AS NEEDED FOR ANXIETY       amoxicillin-clavulanate 875-125 MG per tablet    AUGMENTIN    20 tablet    Take 1 tablet by mouth 2 times daily       buPROPion 300 MG 24 hr tablet    WELLBUTRIN XL    90 tablet    Take 1 tablet (300 mg) by mouth every morning       calcium carbonate-vitamin D 500-400 MG-UNIT Tabs per tablet     180 tablet    Take 1 tablet by mouth 2 times daily       celecoxib 200 MG capsule    celeBREX    180 capsule    Take 1 capsule twice daily as needed for pain this is a 3 month script       clindamycin 1 % lotion    CLEOCIN T     Apply topically 2 times daily       clobetasol 0.05 % cream    TEMOVATE    30 g    APPLY SPARINGLY TO AFFECTED AREA TWICE DAILY FOR 14 DAYS.  DO NOT APPLY TO FACE.       diclofenac 1 % Gel topical gel    VOLTAREN    9 Tube    Apply 2 grams to hands and 4 grams to hips up to 4 times daily. T       DULoxetine 60 MG EC capsule    CYMBALTA    180 capsule    Take 2 capsules (120 mg) by mouth daily       fluconazole 150 MG tablet    DIFLUCAN    4 tablet    Take 1 tablet (150 mg) by mouth every 3 days       fluticasone-salmeterol 500-50 MCG/DOSE diskus inhaler    ADVAIR    3 Inhaler    Inhale 1 puff into the lungs 2 times daily       Glycopyrrolate Powd     5 g    Apply to the face where involved qdaily       HYDROcodone-acetaminophen 5-325 MG per tablet    NORCO    100 tablet    May take 1 tablet every 4-6 hours as needed for pain. Max of 4 tabs per day but cannot take 4 tablets every day.  OK to fill on 2/10/2017 and to last 30 days until 3/12/2017       hydrocortisone 2.5 % cream     30 g    APPLY TOPICALLY 2 TIMES DAILY AS NEEDED       LIPITOR 20 MG tablet   Generic drug:  atorvastatin     30 tablet    Take 1 tablet (20 mg) by mouth daily       losartan-hydrochlorothiazide 100-12.5 MG per tablet    HYZAAR    90 tablet    TAKE 1 TABLET BY MOUTH DAILY       montelukast 10 MG tablet    SINGULAIR    90  tablet    TAKE 1 TABLET (10 MG) BY ORAL ROUTE ONCE DAILY IN THE EVENING       mupirocin 2 % cream    BACTROBAN    30 g    Apply topically 3 times daily       nystatin [id number] UNIT/ML suspension    MYCOSTATIN    380 mL    Take by mouth 4 times daily       nystatin cream    MYCOSTATIN    45 g    Apply topically 3 times daily       ranitidine 150 MG tablet    ZANTAC    0    ONE TABLET TWICE DAILY       rOPINIRole 1 MG tablet    REQUIP    180 tablet    TAKE 2 TABLETS (2 MG) BY MOUTH AT BEDTIME       SUMAtriptan 100 MG tablet    IMITREX    9 tablet    Take 1 tablet (100 mg) by mouth at onset of headache for migraine May repeat in 2 hours if needed. Max of 200mg in 24 hours. Use sparingly.       traZODone 50 MG tablet    DESYREL    270 tablet    TAKE 2-3 TABLETS (100-150 MG) BY MOUTH AT BEDTIME       tretinoin 0.025 % cream    RETIN-A    45 g    Spread a pea size amount into affected area topically at bedtime.  Use sunscreen SPF>20.       ZYRTEC 10 MG tablet   Generic drug:  cetirizine     90    1 TABLET DAILY

## 2017-02-24 NOTE — NURSING NOTE
"Chief Complaint   Patient presents with     Hospital F/U     Panel Management     Flu Shot, Hep C, Lipid, DAP, BMP, AAP, PHQ, MARIZOL        Initial /90 (BP Location: Left arm, Patient Position: Chair, Cuff Size: Adult Regular)  Pulse 109  Temp 97  F (36.1  C) (Temporal)  Ht 5' 7\" (1.702 m)  SpO2 100% Estimated body mass index is 32.11 kg/(m^2) as calculated from the following:    Height as of 12/22/16: 5' 7\" (1.702 m).    Weight as of 12/22/16: 205 lb (93 kg).  Medication Reconciliation: complete   Dario Clinton MA  February 24, 2017      "

## 2017-02-24 NOTE — LETTER
My Depression Action Plan  Name: Jacquie Amador   Date of Birth 1960  Date: 2/22/2017    My doctor: Liz Peterson   My clinic: [facility]  [address] [place] [address], [address]  [place] [id number] [phone]          GREEN    ZONE   Good Control    What it looks like:     Things are going generally well. You have normal up s and down s. You may even feel depressed from time to time, but bad moods usually last less than a day.   What you need to do:  1. Continue to care for yourself (see self care plan)  2. Check your depression survival kit and update it as needed  3. Follow your physician s recommendations including any medication.  4. Do not stop taking medication unless you consult with your physician first.           YELLOW         ZONE Getting Worse    What it looks like:     Depression is starting to interfere with your life.     It may be hard to get out of bed; you may be starting to isolate yourself from others.    Symptoms of depression are starting to last most all day and this has happened for several days.     You may have suicidal thoughts but they are not constant.   What you need to do:     1. Call your care team, your response to treatment will improve if you keep your care team informed of your progress. Yellow periods are signs an adjustment may need to be made.     2. Continue your self-care, even if you have to fake it!    3. Talk to someone in your support network    4. Open up your depression survival kit           RED    ZONE Medical Alert - Get Help    What it looks like:     Depression is seriously interfering with your life.     You may experience these or other symptoms: You can t get out of bed most days, can t work or engage in other necessary activities, you have trouble taking care of basic hygiene, or basic responsibilities, thoughts of suicide or death that will not go away, self-injurious behavior.     What you need to do:  1. Call your care  team and request a same-day appointment. If they are not available (weekends or after hours) call your local crisis line, emergency room or 911.      Electronically signed by: Jacqueline Clay, February 22, 2017    Depression Self Care Plan / Survival Kit    Self-Care for Depression  Here s the deal. Your body and mind are really not as separate as most people think.  What you do and think affects how you feel and how you feel influences what you do and think. This means if you do things that people who feel good do, it will help you feel better.  Sometimes this is all it takes.  There is also a place for medication and therapy depending on how severe your depression is, so be sure to consult with your medical provider and/ or Behavioral Health Consultant if your symptoms are worsening or not improving.     In order to better manage my stress, I will:    Exercise  Get some form of exercise, every day. This will help reduce pain and release endorphins, the  feel good  chemicals in your brain. This is almost as good as taking antidepressants!  This is not the same as joining a gym and then never going! (they count on that by the way ) It can be as simple as just going for a walk or doing some gardening, anything that will get you moving.      Hygiene   Maintain good hygiene (Get out of bed in the morning, Make your bed, Brush your teeth, Take a shower, and Get dressed like you were going to work, even if you are unemployed).  If your clothes don't fit try to get ones that do.    Diet  I will strive to eat foods that are good for me, drink plenty of water, and avoid excessive sugar, caffeine, alcohol, and other mood-altering substances.  Some foods that are helpful in depression are: complex carbohydrates, B vitamins, flaxseed, fish or fish oil, fresh fruits and vegetables.    Psychotherapy  I agree to participate in Individual Therapy (if recommended).    Medication  If prescribed medications, I agree to take them.   Missing doses can result in serious side effects.  I understand that drinking alcohol, or other illicit drug use, may cause potential side effects.  I will not stop my medication abruptly without first discussing it with my provider.    Staying Connected With Others  I will stay in touch with my friends, family members, and my primary care provider/team.    Use your imagination  Be creative.  We all have a creative side; it doesn t matter if it s oil painting, sand castles, or mud pies! This will also kick up the endorphins.    Witness Beauty  (AKA stop and smell the roses) Take a look outside, even in mid-winter. Notice colors, textures. Watch the squirrels and birds.     Service to others  Be of service to others.  There is always someone else in need.  By helping others we can  get out of ourselves  and remember the really important things.  This also provides opportunities for practicing all the other parts of the program.    Humor  Laugh and be silly!  Adjust your TV habits for less news and crime-drama and more comedy.    Control your stress  Try breathing deep, massage therapy, biofeedback, and meditation. Find time to relax each day.     My support system    Clinic Contact:  Phone number:    Contact 1:  Phone number:    Contact 2:  Phone number:    Christianity/:  Phone number:    Therapist:  Phone number:    Local crisis center:    Phone number:    Other community support:  Phone number:

## 2017-02-24 NOTE — PATIENT INSTRUCTIONS
Miralax 1 capful with 6-8 ounce of liquid once daily for constipation.     Change Wellbutrin to  mg daily. Start tomorrow.     Zio patch for 3 days to check heart rhythm.

## 2017-02-25 ENCOUNTER — MYC MEDICAL ADVICE (OUTPATIENT)
Dept: FAMILY MEDICINE | Facility: CLINIC | Age: 57
End: 2017-02-25

## 2017-02-25 VITALS
DIASTOLIC BLOOD PRESSURE: 88 MMHG | OXYGEN SATURATION: 100 % | HEART RATE: 109 BPM | SYSTOLIC BLOOD PRESSURE: 120 MMHG | HEIGHT: 67 IN | TEMPERATURE: 97 F

## 2017-02-25 LAB — DEPRECATED CALCIDIOL+CALCIFEROL SERPL-MC: 37 UG/L (ref 20–75)

## 2017-02-25 ASSESSMENT — PATIENT HEALTH QUESTIONNAIRE - PHQ9: SUM OF ALL RESPONSES TO PHQ QUESTIONS 1-9: 8

## 2017-02-25 ASSESSMENT — ASTHMA QUESTIONNAIRES: ACT_TOTALSCORE: 19

## 2017-02-25 ASSESSMENT — ANXIETY QUESTIONNAIRES: GAD7 TOTAL SCORE: 5

## 2017-02-27 ENCOUNTER — MYC MEDICAL ADVICE (OUTPATIENT)
Dept: FAMILY MEDICINE | Facility: CLINIC | Age: 57
End: 2017-02-27

## 2017-02-27 ENCOUNTER — ALLIED HEALTH/NURSE VISIT (OUTPATIENT)
Dept: CARDIOLOGY | Facility: CLINIC | Age: 57
End: 2017-02-27
Attending: FAMILY MEDICINE
Payer: COMMERCIAL

## 2017-02-27 DIAGNOSIS — G25.9 MOVEMENT DISORDER: ICD-10-CM

## 2017-02-27 DIAGNOSIS — R00.2 PALPITATIONS: ICD-10-CM

## 2017-02-27 DIAGNOSIS — L30.9 DERMATITIS: ICD-10-CM

## 2017-02-27 DIAGNOSIS — E87.6 HYPOKALEMIA: Primary | ICD-10-CM

## 2017-02-27 DIAGNOSIS — M25.50 MULTIPLE JOINT PAIN: ICD-10-CM

## 2017-02-27 DIAGNOSIS — E78.5 HYPERLIPIDEMIA LDL GOAL <130: ICD-10-CM

## 2017-02-27 DIAGNOSIS — G47.00 INSOMNIA, UNSPECIFIED: ICD-10-CM

## 2017-02-27 DIAGNOSIS — I10 ESSENTIAL HYPERTENSION WITH GOAL BLOOD PRESSURE LESS THAN 140/90: ICD-10-CM

## 2017-02-27 PROCEDURE — 0298T ZZC EXT ECG > 48HR TO 21 DAY REVIEW AND INTERPRETATN: CPT

## 2017-02-27 PROCEDURE — 0296T ZIO PATCH HOLTER: CPT

## 2017-02-27 RX ORDER — POTASSIUM CHLORIDE 1500 MG/1
20 TABLET, EXTENDED RELEASE ORAL DAILY
Qty: 4 TABLET | Refills: 0 | Status: SHIPPED | OUTPATIENT
Start: 2017-02-27 | End: 2017-03-03

## 2017-02-27 RX ORDER — ROPINIROLE 1 MG/1
TABLET, FILM COATED ORAL
Qty: 180 TABLET | Refills: 3 | Status: SHIPPED | OUTPATIENT
Start: 2017-02-27 | End: 2018-01-26

## 2017-02-27 RX ORDER — LOSARTAN POTASSIUM AND HYDROCHLOROTHIAZIDE 12.5; 1 MG/1; MG/1
1 TABLET ORAL DAILY
Qty: 90 TABLET | Refills: 3 | Status: CANCELLED | OUTPATIENT
Start: 2017-02-27

## 2017-02-27 RX ORDER — TRAZODONE HYDROCHLORIDE 50 MG/1
TABLET, FILM COATED ORAL
Qty: 270 TABLET | Refills: 3 | Status: SHIPPED | OUTPATIENT
Start: 2017-02-27 | End: 2018-03-09

## 2017-02-27 RX ORDER — CLOBETASOL PROPIONATE 0.5 MG/G
CREAM TOPICAL
Qty: 30 G | Refills: 0 | Status: SHIPPED | OUTPATIENT
Start: 2017-02-27 | End: 2017-06-30

## 2017-02-27 RX ORDER — LOSARTAN POTASSIUM AND HYDROCHLOROTHIAZIDE 12.5; 1 MG/1; MG/1
1 TABLET ORAL DAILY
Qty: 90 TABLET | Refills: 3 | Status: SHIPPED | OUTPATIENT
Start: 2017-02-27 | End: 2018-02-24

## 2017-02-27 RX ORDER — ATORVASTATIN CALCIUM 20 MG/1
20 TABLET, FILM COATED ORAL DAILY
Qty: 90 TABLET | Refills: 3 | Status: SHIPPED | OUTPATIENT
Start: 2017-02-27 | End: 2017-05-12

## 2017-02-27 RX ORDER — DULOXETIN HYDROCHLORIDE 60 MG/1
120 CAPSULE, DELAYED RELEASE ORAL DAILY
Qty: 180 CAPSULE | Refills: 3 | Status: SHIPPED | OUTPATIENT
Start: 2017-02-27 | End: 2018-03-23

## 2017-02-27 NOTE — TELEPHONE ENCOUNTER
losartan-hydrochlorothiazide (HYZAAR) 100-12.5 MG per tablet      Last Written Prescription Date: 11/16/2016  Last Fill Quantity: 90, # refills: 0  Last Office Visit with [facility], [facility] or [facility] prescribing provider: 2/24/2017  Next 5 appointments (look out 90 days)     Apr 04, 2017  2:30 PM CDT   Return Visit with JESSICA Guillory [facility] [place] ([facility] [place])    [address]  [place] [address] [phone]                   Potassium   Date Value Ref Range Status   02/24/2017 3.3 (L) 3.4 - 5.3 mmol/L Final     Creatinine   Date Value Ref Range Status   02/24/2017 0.80 0.52 - 1.04 mg/dL Final     BP Readings from Last 3 Encounters:   02/24/17 120/88   02/10/17 131/88   12/23/16 122/84     \

## 2017-02-27 NOTE — TELEPHONE ENCOUNTER
clobetasol (TEMOVATE) 0.05 % cream      Last Written Prescription Date: 9/12/2016  Last Fill Quantity: 30g,  # refills: 0   Last Office Visit with [facility], [facility] or [facility] prescribing provider: 2/24/2017                                         Next 5 appointments (look out 90 days)     Apr 04, 2017  2:30 PM CDT   Return Visit with JESSICA Guillory CNP   [facility] [place] ([facility] [place])    [address]  [place] [address] [phone]

## 2017-02-27 NOTE — TELEPHONE ENCOUNTER
Routing refill request to provider for review/approval because:  Labs out of range:  K+    Hanh Perez RN, BSN

## 2017-03-01 ENCOUNTER — MYC MEDICAL ADVICE (OUTPATIENT)
Dept: FAMILY MEDICINE | Facility: CLINIC | Age: 57
End: 2017-03-01

## 2017-03-02 ENCOUNTER — MYC MEDICAL ADVICE (OUTPATIENT)
Dept: FAMILY MEDICINE | Facility: CLINIC | Age: 57
End: 2017-03-02

## 2017-03-03 ENCOUNTER — MYC MEDICAL ADVICE (OUTPATIENT)
Dept: PALLIATIVE MEDICINE | Facility: CLINIC | Age: 57
End: 2017-03-03

## 2017-03-06 DIAGNOSIS — F41.1 GENERALIZED ANXIETY DISORDER: ICD-10-CM

## 2017-03-06 NOTE — TELEPHONE ENCOUNTER
Xanax       Last Written Prescription Date: 06/30/2016  Last Fill Quantity: 90; # refills: 0  Last Office Visit with [facility], [facility] or  Health prescribing provider:  02/24/2017   Next 5 appointments (look out 90 days)     Apr 04, 2017  2:30 PM CDT   Return Visit with JESSICA Guillory [facility] ([facility])    [address] [id number] [phone]                   Last PHQ-9 score on record=   PHQ-9 SCORE 2/24/2017   Total Score MyChart -   Total Score 8       Lab Results   Component Value Date    AST 44 08/29/2014     Lab Results   Component Value Date    ALT 28 10/20/2015     Dario Clinotn MA  March 6, 2017

## 2017-03-07 ENCOUNTER — MYC MEDICAL ADVICE (OUTPATIENT)
Dept: FAMILY MEDICINE | Facility: CLINIC | Age: 57
End: 2017-03-07

## 2017-03-07 DIAGNOSIS — Z11.59 NEED FOR HEPATITIS C SCREENING TEST: ICD-10-CM

## 2017-03-07 DIAGNOSIS — R05.9 COUGH: Primary | ICD-10-CM

## 2017-03-07 RX ORDER — ALPRAZOLAM 0.25 MG
TABLET ORAL
Qty: 90 TABLET | Refills: 0 | Status: SHIPPED | OUTPATIENT
Start: 2017-03-07 | End: 2017-08-02

## 2017-03-07 RX ORDER — PREDNISONE 50 MG/1
TABLET ORAL
Qty: 5 TABLET | Refills: 0 | Status: SHIPPED | OUTPATIENT
Start: 2017-03-07 | End: 2017-03-14

## 2017-03-07 NOTE — TELEPHONE ENCOUNTER
Pt sent a my chart message on 3/3/2017:    Maybe I should start the Metopohl(?) and be tired    Sending to provider to determine next plan.     Michell Ritchie RN, [facility]  Pain Clinic Care Coordinator

## 2017-03-07 NOTE — TELEPHONE ENCOUNTER
Placed this question in already open encounter.     Michell Ritchie RN, [facility]  Pain Clinic Care Coordinator

## 2017-03-08 NOTE — TELEPHONE ENCOUNTER
Jacquie-    I do NOT think that you should begin the metoprolol again, given how fatigued you felt and you stated you had issues with breathing.     How have the headaches been? Migraines? Where are they located? How many days per month are you having a headache? How many days per month are you having a migraine? How long does each headache last? Any associated symptoms like nausea, vomiting, light or sound sensitivity?    Thanks.  Liseth LOPEZ RN CNP, SARAHP  [place] [facility]      I sent the above [employer]per[employer] message to the patient.     Liseth LOPEZ RN CNP, SARAHP  [place] [facility]

## 2017-03-09 ENCOUNTER — MYC MEDICAL ADVICE (OUTPATIENT)
Dept: ORTHOPEDICS | Facility: CLINIC | Age: 57
End: 2017-03-09

## 2017-03-09 ENCOUNTER — MYC MEDICAL ADVICE (OUTPATIENT)
Dept: FAMILY MEDICINE | Facility: CLINIC | Age: 57
End: 2017-03-09
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
## 2017-03-10 RX ORDER — RIZATRIPTAN BENZOATE 10 MG/1
5-10 TABLET ORAL
Qty: 15 TABLET | Refills: 1 | Status: SHIPPED
Start: 2017-03-10 | End: 2017-05-12
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
## 2017-03-13 ENCOUNTER — MYC MEDICAL ADVICE (OUTPATIENT)
Dept: FAMILY MEDICINE | Facility: CLINIC | Age: 57
End: 2017-03-13

## 2017-03-14 ENCOUNTER — MYC MEDICAL ADVICE (OUTPATIENT)
Dept: PALLIATIVE MEDICINE | Facility: CLINIC | Age: 57
End: 2017-03-14

## 2017-03-14 ENCOUNTER — OFFICE VISIT (OUTPATIENT)
Dept: FAMILY MEDICINE | Facility: CLINIC | Age: 57
End: 2017-03-14
Payer: COMMERCIAL

## 2017-03-14 ENCOUNTER — MYC MEDICAL ADVICE (OUTPATIENT)
Dept: FAMILY MEDICINE | Facility: CLINIC | Age: 57
End: 2017-03-14

## 2017-03-14 VITALS
HEART RATE: 125 BPM | TEMPERATURE: 96 F | DIASTOLIC BLOOD PRESSURE: 90 MMHG | OXYGEN SATURATION: 99 % | HEIGHT: 67 IN | RESPIRATION RATE: 16 BRPM | SYSTOLIC BLOOD PRESSURE: 128 MMHG

## 2017-03-14 DIAGNOSIS — M70.62 GREATER TROCHANTERIC BURSITIS OF BOTH HIPS: ICD-10-CM

## 2017-03-14 DIAGNOSIS — R00.0 SINUS TACHYCARDIA: ICD-10-CM

## 2017-03-14 DIAGNOSIS — F41.1 GENERALIZED ANXIETY DISORDER: ICD-10-CM

## 2017-03-14 DIAGNOSIS — M70.61 GREATER TROCHANTERIC BURSITIS OF BOTH HIPS: ICD-10-CM

## 2017-03-14 DIAGNOSIS — R00.2 PALPITATIONS: Primary | ICD-10-CM

## 2017-03-14 DIAGNOSIS — M79.7 FIBROMYALGIA: ICD-10-CM

## 2017-03-14 DIAGNOSIS — E87.6 HYPOKALEMIA: ICD-10-CM

## 2017-03-14 DIAGNOSIS — M51.369 DDD (DEGENERATIVE DISC DISEASE), LUMBAR: ICD-10-CM

## 2017-03-14 DIAGNOSIS — R53.83 FATIGUE, UNSPECIFIED TYPE: ICD-10-CM

## 2017-03-14 DIAGNOSIS — Z11.59 NEED FOR HEPATITIS C SCREENING TEST: ICD-10-CM

## 2017-03-14 DIAGNOSIS — M47.817 LUMBOSACRAL SPONDYLOSIS WITHOUT MYELOPATHY: ICD-10-CM

## 2017-03-14 DIAGNOSIS — M53.3 SI (SACROILIAC) JOINT DYSFUNCTION: ICD-10-CM

## 2017-03-14 DIAGNOSIS — M79.18 MYOFASCIAL PAIN: ICD-10-CM

## 2017-03-14 LAB
ALBUMIN SERPL-MCNC: 4 G/DL (ref 3.4–5)
ALP SERPL-CCNC: 125 U/L (ref 40–150)
ALT SERPL W P-5'-P-CCNC: 29 U/L (ref 0–50)
ANION GAP SERPL CALCULATED.3IONS-SCNC: 9 MMOL/L (ref 3–14)
AST SERPL W P-5'-P-CCNC: 15 U/L (ref 0–45)
BILIRUB SERPL-MCNC: 0.3 MG/DL (ref 0.2–1.3)
BUN SERPL-MCNC: 19 MG/DL (ref 7–30)
CALCIUM SERPL-MCNC: 9.3 MG/DL (ref 8.5–10.1)
CHLORIDE SERPL-SCNC: 102 MMOL/L (ref 94–109)
CO2 SERPL-SCNC: 28 MMOL/L (ref 20–32)
CREAT SERPL-MCNC: 0.95 MG/DL (ref 0.52–1.04)
GFR SERPL CREATININE-BSD FRML MDRD: 61 ML/MIN/1.7M2
GLUCOSE SERPL-MCNC: 111 MG/DL (ref 70–99)
POTASSIUM SERPL-SCNC: 3.3 MMOL/L (ref 3.4–5.3)
PROT SERPL-MCNC: 7.7 G/DL (ref 6.8–8.8)
SODIUM SERPL-SCNC: 139 MMOL/L (ref 133–144)

## 2017-03-14 PROCEDURE — 93000 ELECTROCARDIOGRAM COMPLETE: CPT | Performed by: FAMILY MEDICINE

## 2017-03-14 PROCEDURE — 80053 COMPREHEN METABOLIC PANEL: CPT | Performed by: FAMILY MEDICINE

## 2017-03-14 PROCEDURE — 36415 COLL VENOUS BLD VENIPUNCTURE: CPT | Performed by: FAMILY MEDICINE

## 2017-03-14 PROCEDURE — 99214 OFFICE O/P EST MOD 30 MIN: CPT | Performed by: FAMILY MEDICINE

## 2017-03-14 PROCEDURE — 86803 HEPATITIS C AB TEST: CPT | Performed by: FAMILY MEDICINE

## 2017-03-14 RX ORDER — BUPROPION HYDROCHLORIDE 200 MG/1
200 TABLET, EXTENDED RELEASE ORAL EVERY MORNING
Qty: 120 TABLET | Refills: 0 | Status: SHIPPED | OUTPATIENT
Start: 2017-03-14 | End: 2017-07-14

## 2017-03-14 RX ORDER — METOPROLOL SUCCINATE 25 MG/1
25 TABLET, EXTENDED RELEASE ORAL DAILY
Qty: 30 TABLET | Refills: 1 | Status: SHIPPED | OUTPATIENT
Start: 2017-03-14 | End: 2017-05-12

## 2017-03-14 RX ORDER — BUPROPION HYDROCHLORIDE 150 MG/1
150 TABLET, EXTENDED RELEASE ORAL EVERY EVENING
Qty: 90 TABLET | Refills: 1 | Status: SHIPPED | OUTPATIENT
Start: 2017-03-14 | End: 2017-07-18

## 2017-03-14 ASSESSMENT — ANXIETY QUESTIONNAIRES
1. FEELING NERVOUS, ANXIOUS, OR ON EDGE: NEARLY EVERY DAY
6. BECOMING EASILY ANNOYED OR IRRITABLE: NOT AT ALL
5. BEING SO RESTLESS THAT IT IS HARD TO SIT STILL: NOT AT ALL
3. WORRYING TOO MUCH ABOUT DIFFERENT THINGS: SEVERAL DAYS
GAD7 TOTAL SCORE: 6
2. NOT BEING ABLE TO STOP OR CONTROL WORRYING: SEVERAL DAYS
7. FEELING AFRAID AS IF SOMETHING AWFUL MIGHT HAPPEN: SEVERAL DAYS

## 2017-03-14 ASSESSMENT — PATIENT HEALTH QUESTIONNAIRE - PHQ9: 5. POOR APPETITE OR OVEREATING: NOT AT ALL

## 2017-03-14 ASSESSMENT — PAIN SCALES - GENERAL: PAINLEVEL: MODERATE PAIN (5)

## 2017-03-14 NOTE — PROGRESS NOTES
"  SUBJECTIVE:                                                    Jacquie Amador is a 56 year old female who presents to clinic today for the following health issues:      Hypertension Follow-up      Outpatient blood pressures are being checked at home.  Results are 115/90's.    Low Salt Diet: no added salt       Depression and Anxiety Follow-Up    Status since last visit: different    Other associated symptoms:wants to discuss    Complicating factors:     Significant life event: No     Current substance abuse: None    wellbutrin recently changed to 300 mg in XL version. She has not felt good with this either.   She would like to return to the SR version of the wellbutrin - 200 mg in the am and 150 mg in the afternoon.     PHQ-9 SCORE 4/26/2016 6/22/2016 2/24/2017   Total Score - - -   Total Score MyChart 5 (Mild depression) - -   Total Score - - -   Total Score - 3 8     MARIZOL-7 SCORE 6/22/2016 12/19/2016 2/24/2017   Total Score - - -   Total Score - - -   Total Score 3 12 5        PHQ-9  English      PHQ-9   Any Language     GAD7     Asthma Follow-Up    Was ACT completed today?  No      Respiratory symptoms:   Cough: Yes-  improved   Wheezing: Yes-  Seems to be better from the augmentin   Shortness of breath: Yes-  When going upstairs    Use of short- acting(rescue) inhaler: not so much    Taking controlled (daily) meds as prescribed: Yes    ER/UC visits or hospital admissions since last visit: none and ER - a month or so ago breathing issues     Recent asthma triggers that patient is dealing with: None       Amount of exercise or physical activity: None    Problems taking medications regularly: No    Medication side effects: none  Diet: regular (no restrictions)  Concern - Elevated Heart Rate     Onset: The end of January 2017    Description:   Been running 110-126bpm    Patient will have some dizziness/lightheadedness.   \"I don't feel good at all\".   She has no chest pain.     She had been started on metoprolol " "XL by pain management for headaches but couldn't tolerate - she felt fatigued.              Problem list and histories reviewed & adjusted, as indicated.  Additional history: as documented        Reviewed and updated as needed this visit by clinical staff       Reviewed and updated as needed this visit by Provider         ROS:  C: NEGATIVE for fever, chills, change in weight  E: NEGATIVE for vision changes or irritation  E/M: NEGATIVE for ear, mouth and throat problems  R: NEGATIVE for significant cough or SOB  CV: as above.   GI: NEGATIVE for nausea, abdominal pain, heartburn, or change in bowel habits  : NEGATIVE for frequency, dysuria, or hematuria  N: NEGATIVE for weakness, dizziness or paresthesias  PSYCHIATRIC: as above.     OBJECTIVE:                                                    /90 (BP Location: Left arm, Patient Position: Supine, Cuff Size: Adult Large)  Pulse 125  Temp 96  F (35.6  C) (Temporal)  Resp 16  Ht 5' 7.32\" (1.71 m)  SpO2 99%  There is no height or weight on file to calculate BMI.  GENERAL APPEARANCE: alert and no distress  HENT: throat is clear, Mucous membranes are moist.   NECK: no adenopathy, no asymmetry, masses, or scars and thyroid normal to palpation  RESP: lungs clear to auscultation - no rales, rhonchi or wheezes  CV: tachycardic and regular.   MS: extremities normal- no gross deformities noted  MENTAL STATUS EXAM:  Appearance/Behavior: Casually groomed  Speech: Normal  Mood/Affect: anxiety  Insight: Fair    Diagnostic Test Results:  Last Basic Metabolic Panel:  Lab Results   Component Value Date     03/14/2017      Lab Results   Component Value Date    POTASSIUM 3.3 03/14/2017     Lab Results   Component Value Date    CHLORIDE 102 03/14/2017     Lab Results   Component Value Date    JAIRO 9.3 03/14/2017     Lab Results   Component Value Date    CO2 28 03/14/2017     Lab Results   Component Value Date    BUN 19 03/14/2017     Lab Results   Component Value Date    CR " 0.95 03/14/2017     Lab Results   Component Value Date     03/14/2017              ASSESSMENT/PLAN:                                                            1. Palpitations  Patient with continued palpitations.   EKG shows sinus tach  She has Zio patch pending.   Recommend trial of toprol XL at 25 mg daily.   Discussed potential side effects and understands this may not help all her symptoms resolve.   She is willing to try this at this point.   Also recommend cardiology evaluation.   - EKG 12-lead complete w/read - Clinics  - metoprolol (TOPROL-XL) 25 MG 24 hr tablet; Take 1 tablet (25 mg) by mouth daily  Dispense: 30 tablet; Refill: 1  - CARDIOLOGY EVAL ADULT REFERRAL    2. Sinus tachycardia  As above.   - metoprolol (TOPROL-XL) 25 MG 24 hr tablet; Take 1 tablet (25 mg) by mouth daily  Dispense: 30 tablet; Refill: 1  - CARDIOLOGY EVAL ADULT REFERRAL    3. Generalized anxiety disorder  Patient is not doing well with the change in wellbutrin to the XL version.   Return to prior dosing of the  mg qam 150 mg qhs  - buPROPion (WELLBUTRIN SR) 150 MG 12 hr tablet; Take 1 tablet (150 mg) by mouth every evening  Dispense: 90 tablet; Refill: 1  - buPROPion (WELLBUTRIN SR) 200 MG 12 hr tablet; Take 1 tablet (200 mg) by mouth every morning  Dispense: 120 tablet; Refill: 0    4. Fatigue, unspecified type  Check cmp.   See above.   - CARDIOLOGY EVAL ADULT REFERRAL    5. Hypokalemia  Patient is noted to have low potassium  Replace with kdur  Recheck in 1-2 weeks.   - Comprehensive metabolic panel    6. Need for hepatitis C screening test  Discussed. Would like to proceed.   Will notify of results.   - Hepatitis C antibody        ARVIN MARIN MD, MD  [facility]

## 2017-03-14 NOTE — NURSING NOTE
"Chief Complaint   Patient presents with     Depression     Asthma     Palpitations     Panel Management     Flu Shot, Hep C Screening, PHQ-9/MARIZOL, ACT       Initial /90 (BP Location: Left arm, Patient Position: Supine, Cuff Size: Adult Large)  Pulse 125  Temp 96  F (35.6  C) (Temporal)  Resp 16  Ht 5' 7.32\" (1.71 m)  SpO2 99% Estimated body mass index is 32.11 kg/(m^2) as calculated from the following:    Height as of 12/22/16: 5' 7\" (1.702 m).    Weight as of 12/22/16: 205 lb (93 kg).  Medication Reconciliation: complete  Laila Nickerson CMA    "

## 2017-03-14 NOTE — TELEPHONE ENCOUNTER
Please contact Jacquie. I would like her to come in and see me for evaluation for continued elevated heart rate. Can use the RN spot or other opening today.

## 2017-03-14 NOTE — MR AVS SNAPSHOT
After Visit Summary   3/14/2017    Jacquie Amador    MRN: [id number]           Patient Information     Date Of Birth          1960        Visit Information        Provider Department      3/14/2017 1:40 PM Liz Peterson MD [facility]        Today's Diagnoses     Palpitations    -  1    Generalized anxiety disorder        MARIZOL (generalized anxiety disorder)        Need for hepatitis C screening test        Sinus tachycardia        Fatigue, unspecified type        Hypokalemia           Follow-ups after your visit        Additional Services     CARDIOLOGY EVAL ADULT REFERRAL       Your provider has referred you to:  [facility]: [facility] [phone]   [email or web address]    Please be aware that coverage of these services is subject to the terms and limitations of your health insurance plan.  Call member services at your health plan with any benefit or coverage questions.      Type of Referral:  New Cardiology Consult    Timeframe requested:  1-3 Days    Please bring the following to your appointment:  >>   Any x-rays, CTs or MRIs which have been performed.  Contact the facility where they were done to arrange for  prior to your scheduled appointment.    >>   List of current medications  >>   This referral request   >>   Any documents/labs given to you for this referral                  Your next 10 appointments already scheduled     Mar 16, 2017  9:30 AM CDT   Return Visit with Ishmael Cárdenas MD   [facility] ([facility])    [address]  [facility] [address]   [phone]            Apr 04, 2017  1:30 PM CDT   [facility]    [address] [place] [address]   [phone]            Apr 04, 2017   "2:30 PM CDT   Return Visit with JESSICA Guillory CNP   [facility] [place] [facility]    [address]  [place] [address] [phone]              Who to contact     If you have questions or need follow up information about today's clinic visit or your schedule please contact [facility] directly at [phone].  Normal or non-critical lab and imaging results will be communicated to you by [employer], letter or phone within 4 business days after the clinic has received the results. If you do not hear from us within 7 days, please contact the clinic through [employer] or phone. If you have a critical or abnormal lab result, we will notify you by phone as soon as possible.  Submit refill requests through [email or web address] or call your pharmacy and they will forward the refill request to us. Please allow 3 business days for your refill to be completed.          Additional Information About Your Visit        [email or web address] Information     [email or web address] gives you secure access to your electronic health record. If you see a primary care provider, you can also send messages to your care team and make appointments. If you have questions, please call your primary care clinic.  If you do not have a primary care provider, please call [phone] and they will assist you.        Care EveryWhere ID     This is your Care EveryWhere ID. This could be used by other organizations to access your [place] medical records  [id number]        Your Vitals Were     Pulse Temperature Respirations Height Pulse Oximetry       125 96  F (35.6  C) (Temporal) 16 5' 7.32\" (1.71 m) 99%        Blood Pressure from Last 3 Encounters:   03/14/17 128/90   02/24/17 120/88   02/10/17 131/88    Weight from Last 3 Encounters:   12/22/16 205 lb (93 kg)   07/27/16 208 lb (94.3 kg)   06/22/16 208 lb 1.6 oz (94.4 kg)              We Performed the Following     CARDIOLOGY EVAL ADULT REFERRAL     Comprehensive metabolic " panel     EKG 12-lead complete w/read - Clinics     Hepatitis C antibody          Today's Medication Changes          These changes are accurate as of: 3/14/17  2:52 PM.  If you have any questions, ask your nurse or doctor.               Start taking these medicines.        Dose/Directions    * buPROPion 150 MG 12 hr tablet   Commonly known as:  WELLBUTRIN SR   Used for:  Generalized anxiety disorder   Replaces:  buPROPion 300 MG 24 hr tablet   Started by:  Liz Peterson MD        Dose:  150 mg   Take 1 tablet (150 mg) by mouth every evening   Quantity:  90 tablet   Refills:  1       * buPROPion 200 MG 12 hr tablet   Commonly known as:  WELLBUTRIN SR   Used for:  MARIZOL (generalized anxiety disorder)   Started by:  Liz Peterson MD        Dose:  200 mg   Take 1 tablet (200 mg) by mouth every morning   Quantity:  120 tablet   Refills:  0       metoprolol 25 MG 24 hr tablet   Commonly known as:  TOPROL-XL   Used for:  Palpitations, Sinus tachycardia   Started by:  Liz Peterson MD        Dose:  25 mg   Take 1 tablet (25 mg) by mouth daily   Quantity:  30 tablet   Refills:  1       * Notice:  This list has 2 medication(s) that are the same as other medications prescribed for you. Read the directions carefully, and ask your doctor or other care provider to review them with you.      Stop taking these medicines if you haven't already. Please contact your care team if you have questions.     buPROPion 300 MG 24 hr tablet   Commonly known as:  WELLBUTRIN XL   Replaced by:  buPROPion 150 MG 12 hr tablet   Stopped by:  Liz Peterson MD           fluconazole 150 MG tablet   Commonly known as:  DIFLUCAN   Stopped by:  Liz Peterson MD                Where to get your medicines      These medications were sent to [id number] - [place] - [address]  [address] [place] [address]     Phone:  [phone]     buPROPion 150 MG 12 hr tablet    buPROPion 200 MG 12 hr tablet    metoprolol 25  MG 24 hr tablet                Primary Care Provider Office Phone # Fax #    Liz JEFF MD Nicholas [phone]       [facility] [address] [address]        Thank you!     Thank you for choosing [facility]  for your care. Our goal is always to provide you with excellent care. Hearing back from our patients is one way we can continue to improve our services. Please take a few minutes to complete the written survey that you may receive in the mail after your visit with us. Thank you!             Your Updated Medication List - Protect others around you: Learn how to safely use, store and throw away your medicines at www.disposemymeds.org.          This list is accurate as of: 3/14/17  2:52 PM.  Always use your most recent med list.                   Brand Name Dispense Instructions for use    acidophilus Caps     60 capsule    Take 1 capsule by mouth daily Take two hours before or after antibiotic dose.  Continue for 1 week after antibiotic therapy completed       adapalene 0.1 % gel    DIFFERIN    45 g    Apply topically At Bedtime       ALPRAZolam 0.25 MG tablet    XANAX    90 tablet    TAKE 1 TABLET DAILY AS NEEDED FOR ANXIETY       atorvastatin 20 MG tablet    LIPITOR    90 tablet    Take 1 tablet (20 mg) by mouth daily       * buPROPion 150 MG 12 hr tablet    WELLBUTRIN SR    90 tablet    Take 1 tablet (150 mg) by mouth every evening       * buPROPion 200 MG 12 hr tablet    WELLBUTRIN SR    120 tablet    Take 1 tablet (200 mg) by mouth every morning       calcium carbonate-vitamin D 500-400 MG-UNIT Tabs per tablet     180 tablet    Take 1 tablet by mouth 2 times daily       celecoxib 200 MG capsule    celeBREX    180 capsule    Take 1 capsule twice daily as needed for pain this is a 3 month script       clindamycin 1 % lotion    CLEOCIN T     Apply topically 2 times daily       clobetasol 0.05 % cream    TEMOVATE    30 g    APPLY SPARINGLY TO AFFECTED AREA TWICE  DAILY FOR 14 DAYS.  DO NOT APPLY TO FACE.       diclofenac 1 % Gel topical gel    VOLTAREN    9 Tube    Apply 2 grams to hands and 4 grams to hips up to 4 times daily. T       DULoxetine 60 MG EC capsule    CYMBALTA    180 capsule    Take 2 capsules (120 mg) by mouth daily       fluticasone-salmeterol 500-50 MCG/DOSE diskus inhaler    ADVAIR    3 Inhaler    Inhale 1 puff into the lungs 2 times daily       Glycopyrrolate Powd     5 g    Apply to the face where involved qdaily       HYDROcodone-acetaminophen 5-325 MG per tablet    NORCO    100 tablet    May take 1 tablet every 4-6 hours as needed for pain. Max of 4 tabs per day but cannot take 4 tablets every day.  OK to fill on 2/10/2017 and to last 30 days until 3/12/2017       hydrocortisone 2.5 % cream     30 g    APPLY TOPICALLY 2 TIMES DAILY AS NEEDED       losartan-hydrochlorothiazide 100-12.5 MG per tablet    HYZAAR    90 tablet    Take 1 tablet by mouth daily       metoprolol 25 MG 24 hr tablet    TOPROL-XL    30 tablet    Take 1 tablet (25 mg) by mouth daily       montelukast 10 MG tablet    SINGULAIR    90 tablet    TAKE 1 TABLET (10 MG) BY ORAL ROUTE ONCE DAILY IN THE EVENING       mupirocin 2 % cream    BACTROBAN    30 g    Apply topically 3 times daily       nystatin 732606 UNIT/ML suspension    MYCOSTATIN    380 mL    Take by mouth 4 times daily       nystatin cream    MYCOSTATIN    45 g    Apply topically 3 times daily       ranitidine 150 MG tablet    ZANTAC    0    ONE TABLET TWICE DAILY       rizatriptan 10 MG tablet    MAXALT    15 tablet    Take 0.5-1 tablets (5-10 mg) by mouth at onset of headache for migraine May repeat dose every 2 hours as needed up to max of 30mg in 24 hours. Do not take more than 3 times per week as overuse can contribute to rebound migraine headaches. Do NOT use with sumatriptan.       rOPINIRole 1 MG tablet    REQUIP    180 tablet    TAKE 2 TABLETS (2 MG) BY MOUTH AT BEDTIME       SUMAtriptan 100 MG tablet    IMITREX    9  tablet    Take 1 tablet (100 mg) by mouth at onset of headache for migraine May repeat in 2 hours if needed. Max of 200mg in 24 hours. Use sparingly.       traZODone 50 MG tablet    DESYREL    270 tablet    TAKE 2-3 TABLETS (100-150 MG) BY MOUTH AT BEDTIME       tretinoin 0.025 % cream    RETIN-A    45 g    Spread a pea size amount into affected area topically at bedtime.  Use sunscreen SPF>20.       VENTOLIN  (90 BASE) MCG/ACT Inhaler   Generic drug:  albuterol     54 g    INHALE 2 PUFFS INTO THE LUNGS EVERY 6 HOURS AS NEEDED FOR SHORTNESS OF BREATH / DYSPNEA       ZYRTEC 10 MG tablet   Generic drug:  cetirizine     90    1 TABLET DAILY       * Notice:  This list has 2 medication(s) that are the same as other medications prescribed for you. Read the directions carefully, and ask your doctor or other care provider to review them with you.

## 2017-03-15 ENCOUNTER — MYC MEDICAL ADVICE (OUTPATIENT)
Dept: FAMILY MEDICINE | Facility: CLINIC | Age: 57
End: 2017-03-15

## 2017-03-15 DIAGNOSIS — E87.6 HYPOKALEMIA: Primary | ICD-10-CM

## 2017-03-15 LAB — HCV AB SERPL QL IA: NORMAL

## 2017-03-15 RX ORDER — HYDROCODONE BITARTRATE AND ACETAMINOPHEN 5; 325 MG/1; MG/1
TABLET ORAL
Qty: 120 TABLET | Refills: 0 | Status: SHIPPED | OUTPATIENT
Start: 2017-03-15 | End: 2017-05-12

## 2017-03-15 RX ORDER — POTASSIUM CHLORIDE 1500 MG/1
20 TABLET, EXTENDED RELEASE ORAL DAILY
Qty: 5 TABLET | Refills: 0 | Status: SHIPPED | OUTPATIENT
Start: 2017-03-15 | End: 2017-03-20

## 2017-03-15 ASSESSMENT — ASTHMA QUESTIONNAIRES: ACT_TOTALSCORE: 14

## 2017-03-15 ASSESSMENT — ANXIETY QUESTIONNAIRES: GAD7 TOTAL SCORE: 6

## 2017-03-15 ASSESSMENT — PATIENT HEALTH QUESTIONNAIRE - PHQ9: SUM OF ALL RESPONSES TO PHQ QUESTIONS 1-9: 7

## 2017-03-15 NOTE — TELEPHONE ENCOUNTER
Routed to the nursing pool and to the MA pool to process refill(s).    Denisse Maldonado, RN-BSN  [facility]

## 2017-03-15 NOTE — TELEPHONE ENCOUNTER
Medication refill information reviewed. Last  date does not coincide with last Rx on file. Called [facility] pharmacy and verified that the last  was on 1/21 as listed below.     Called pt and asked where she got the last norco Rx filled that was dated 2/10.  Pt recalled that she still has that Rx in her possession and confirmed that her last fill was on 1/21/17.  Pt is not taking as much of this medication but is a little concerned that with increased activity when in [place] from 4/12 to 4/27, she may need to use the Norco a bit more frequently and is not sure if #100 tabs will last from next week (when she would fill the Rx) through the end of April (about 6 weeks).  Pt is wondering if Liseth would be willing to write for more tablets this time to help her get through until she returns from [place].  She would bring in the Rx she has and exchange it for a new one ([facility] location)    Reviewed directions of current norco Rx:  May take 1 tablet every 4-6 hours as needed for pain. Max of 4 tabs per day but cannot take 4 tablets every day., #100 tabs     Due date for Norco is anytime after February 20, 2017---pt has been able to use fewer pill over the last 4-6 weeks     Prescription prepped for review.  Pt reported that she will call to make another appointment for when she returns from [place]. Pt had to cancel the 4/4 appt because of a conflict with an appt with the cardiologist.     Will route to provider.     Laisha Sanchez, MARVAN, RN-BC  Patient Care Supervisor/Care Coordinator  [facility]

## 2017-03-15 NOTE — TELEPHONE ENCOUNTER
Received call from patient requesting refill(s) of HYDROcodone-acetaminophen (NORCO) 5-325 MG per tablet     Last picked up from pharmacy on 1/21/17     Pt last seen by prescribing provider on 2/10/17  Next appt scheduled for No future appointment.     Last urine drug screen date 9/6/16  Current opioid agreement on file (completed within the last year) Yes Date of opioid agreement: 9/29/16  Processing (pick one)     Please call patient when Rx is ready and ask where she wants to pick Rx?    Will route to nursing pool for review and preparation of prescription(s).

## 2017-03-15 NOTE — TELEPHONE ENCOUNTER
Signed Prescriptions:                        Disp   Refills    HYDROcodone-acetaminophen (NORCO) 5-325 MG*120 ta*0        Sig: May take 1 tablet every 4-6 hours as needed for pain.           Max of 4 tabs per day, next script will reduce           back to #100 per script. Allowing #120 tabs since           she is going on vacation and likely will be more           active. OK to fill on 3/17 and start on 3/20/2017           to last 30 days until 4/19/2017.  Authorizing Provider: LISETH HANSON    Signed script placed in locked in top drawer of trigger point cart at [facility]    Liseth LOPEZ, RN CNP, FNP  [facility]

## 2017-03-16 NOTE — TELEPHONE ENCOUNTER
[employer] sent to pt:  Ciaran Dhillon.  The norco script is ready for .  ti is for # 120 tabs.   A nurse appointment is needed in order for you to give us the other norco script in exchange for the new one.  Please call the appointment line to do this.    The norco script is in trigger point injection cart.    Keep encounter open.  Nurse appointment needed.    Denisse Maldonado, RN-BSN  [facility]

## 2017-03-22 ENCOUNTER — MYC MEDICAL ADVICE (OUTPATIENT)
Dept: FAMILY MEDICINE | Facility: CLINIC | Age: 57
End: 2017-03-22

## 2017-03-23 ENCOUNTER — TELEPHONE (OUTPATIENT)
Dept: SURGERY | Facility: CLINIC | Age: 57
End: 2017-03-23

## 2017-03-23 ENCOUNTER — TELEPHONE (OUTPATIENT)
Dept: ORTHOPEDICS | Facility: CLINIC | Age: 57
End: 2017-03-23

## 2017-03-23 NOTE — TELEPHONE ENCOUNTER
Patient scheduled for April 4, 2017 at 4:00pm with Dr. Morales for botox hyperhydrosis scalp.    Evy Lynn LPN

## 2017-03-23 NOTE — TELEPHONE ENCOUNTER
[facility] Call Center    Phone Message    Name of Caller: Jacquie    Phone Number:  Telephone Information:   Mobile [phone]       Best time to return call: Any    May a detailed message be left on voicemail: yes    Relation to patient: Self    Reason for Call: Other: Patient is calling requesting to set up an appointment for botox injections. Please call to advise.      Action Taken: Message routed to:  Adult Clinics: Dermatology p [phone]

## 2017-03-23 NOTE — TELEPHONE ENCOUNTER
Message sent to pt:    Bhargav Dhillon,     I see that you have not made a nurse appointment yet to exchange norco prescriptions.  Please call the main clinic number at [phone] to schedule this appointment.  As Denisse mentioned in her previous [employer] message, Liseth has prescribed a larger supply for your vacation.     Take Laisha bourgeois BSN, RN-BC  Patient Care Supervisor/Care Coordinator  [facility]

## 2017-03-24 ENCOUNTER — MYC MEDICAL ADVICE (OUTPATIENT)
Dept: PALLIATIVE MEDICINE | Facility: CLINIC | Age: 57
End: 2017-03-24

## 2017-03-27 ENCOUNTER — OFFICE VISIT (OUTPATIENT)
Dept: ORTHOPEDICS | Facility: CLINIC | Age: 57
End: 2017-03-27
Payer: COMMERCIAL

## 2017-03-27 ENCOUNTER — RADIANT APPOINTMENT (OUTPATIENT)
Dept: GENERAL RADIOLOGY | Facility: CLINIC | Age: 57
End: 2017-03-27
Attending: PEDIATRICS
Payer: COMMERCIAL

## 2017-03-27 ENCOUNTER — ALLIED HEALTH/NURSE VISIT (OUTPATIENT)
Dept: PALLIATIVE MEDICINE | Facility: CLINIC | Age: 57
End: 2017-03-27
Payer: COMMERCIAL

## 2017-03-27 VITALS — DIASTOLIC BLOOD PRESSURE: 82 MMHG | HEIGHT: 67 IN | SYSTOLIC BLOOD PRESSURE: 118 MMHG

## 2017-03-27 DIAGNOSIS — M70.61 TROCHANTERIC BURSITIS OF BOTH HIPS: ICD-10-CM

## 2017-03-27 DIAGNOSIS — M70.62 TROCHANTERIC BURSITIS OF BOTH HIPS: ICD-10-CM

## 2017-03-27 DIAGNOSIS — M25.552 BILATERAL HIP PAIN: ICD-10-CM

## 2017-03-27 DIAGNOSIS — Z79.891 ENCOUNTER FOR LONG-TERM OPIATE ANALGESIC USE: Primary | ICD-10-CM

## 2017-03-27 DIAGNOSIS — M25.551 BILATERAL HIP PAIN: ICD-10-CM

## 2017-03-27 DIAGNOSIS — M25.552 BILATERAL HIP PAIN: Primary | ICD-10-CM

## 2017-03-27 DIAGNOSIS — M25.551 BILATERAL HIP PAIN: Primary | ICD-10-CM

## 2017-03-27 PROCEDURE — 73523 X-RAY EXAM HIPS BI 5/> VIEWS: CPT

## 2017-03-27 PROCEDURE — 99207 ZZC NO CHARGE NURSE ONLY: CPT

## 2017-03-27 PROCEDURE — 99207 ZZC NO BILLABLE SERVICE THIS VISIT: CPT | Performed by: PEDIATRICS

## 2017-03-27 NOTE — MR AVS SNAPSHOT
After Visit Summary   3/27/2017    Jacquie Amador    MRN: [id number]           Patient Information     Date Of Birth          1960        Visit Information        Provider Department      3/27/2017 10:40 AM José Miguel Duncan, DO [facility] [place]        Today's Diagnoses     Bilateral hip pain    -  1    Trochanteric bursitis of both hips           Follow-ups after your visit        Follow-up notes from your care team     Return if symptoms worsen or fail to improve.      Your next 10 appointments already scheduled     Mar 30, 2017 10:30 AM CDT   Return Visit with Ishmael Cárdenas MD   [facility] ([facility]    [address]  [facility] [address] [phone]            Mar 31, 2017  1:40 PM CDT   Return Visit with José Miguel Linder DO   [facility] [place] ([facility] [place])    [address]  [place] [address]   [phone]            Apr 04, 2017  1:30 PM CDT   New Visit with Fabian Randall MD   [facility] ([facility])    [address] [place] [address] [phone]            Apr 04, 2017  4:00 PM CDT   PROCEDURE with SAMUEL Morales MD   [facility] ([facility])    [address] [place] [address]   [phone]            Apr 05, 2017 11:40 AM CDT   Return Visit with José Miguel Linder DO   [facility] [place] ([facility] [place])    [address]  [address]  [place] [address]   [phone]              Who to contact     If you have questions or need follow up information about today's clinic visit or your schedule please contact [facility] [place] directly at [phone].  Normal or non-critical lab and imaging results will be communicated to you by  "MyChart, letter or phone within 4 business days after the clinic has received the results. If you do not hear from us within 7 days, please contact the clinic through [facility] or phone. If you have a critical or abnormal lab result, we will notify you by phone as soon as possible.  Submit refill requests through [employer] or call your pharmacy and they will forward the refill request to us. Please allow 3 business days for your refill to be completed.          Additional Information About Your Visit        [facility] Information     [employer] gives you secure access to your electronic health record. If you see a primary care provider, you can also send messages to your care team and make appointments. If you have questions, please call your primary care clinic.  If you do not have a primary care provider, please call [phone] and they will assist you.        Care EveryWhere ID     This is your Care EveryWhere ID. This could be used by other organizations to access your [facility] medical records  [id number]        Your Vitals Were     Height                   5' 7\" (1.702 m)            Blood Pressure from Last 3 Encounters:   03/27/17 118/82   03/14/17 128/90   02/24/17 120/88    Weight from Last 3 Encounters:   12/22/16 205 lb (93 kg)   07/27/16 208 lb (94.3 kg)   06/22/16 208 lb 1.6 oz (94.4 kg)               Primary Care Provider Office Phone # Fax #    Lizmary grace Peterosn [phone]       [facility] [address] [address] [facility] [address]        Thank you!     Thank you for choosing [facility]  for your care. Our goal is always to provide you with excellent care. Hearing back from our patients is one way we can continue to improve our services. Please take a few minutes to complete the written survey that you may receive in the mail after your visit with us. Thank you!             Your Updated Medication List - Protect others around you: Learn how to safely use, " store and throw away your medicines at www.disposemymeds.org.          This list is accurate as of: 3/27/17 11:58 AM.  Always use your most recent med list.                   Brand Name Dispense Instructions for use    acidophilus Caps     60 capsule    Take 1 capsule by mouth daily Take two hours before or after antibiotic dose.  Continue for 1 week after antibiotic therapy completed       adapalene 0.1 % gel    DIFFERIN    45 g    Apply topically At Bedtime       ALPRAZolam 0.25 MG tablet    XANAX    90 tablet    TAKE 1 TABLET DAILY AS NEEDED FOR ANXIETY       atorvastatin 20 MG tablet    LIPITOR    90 tablet    Take 1 tablet (20 mg) by mouth daily       * buPROPion 150 MG 12 hr tablet    WELLBUTRIN SR    90 tablet    Take 1 tablet (150 mg) by mouth every evening       * buPROPion 200 MG 12 hr tablet    WELLBUTRIN SR    120 tablet    Take 1 tablet (200 mg) by mouth every morning       calcium carbonate-vitamin D 500-400 MG-UNIT Tabs per tablet     180 tablet    Take 1 tablet by mouth 2 times daily       celecoxib 200 MG capsule    celeBREX    180 capsule    Take 1 capsule twice daily as needed for pain this is a 3 month script       clindamycin 1 % lotion    CLEOCIN T     Apply topically 2 times daily       clobetasol 0.05 % cream    TEMOVATE    30 g    APPLY SPARINGLY TO AFFECTED AREA TWICE DAILY FOR 14 DAYS.  DO NOT APPLY TO FACE.       diclofenac 1 % Gel topical gel    VOLTAREN    9 Tube    Apply 2 grams to hands and 4 grams to hips up to 4 times daily. T       DULoxetine 60 MG EC capsule    CYMBALTA    180 capsule    Take 2 capsules (120 mg) by mouth daily       fluticasone-salmeterol 500-50 MCG/DOSE diskus inhaler    ADVAIR    3 Inhaler    Inhale 1 puff into the lungs 2 times daily       Glycopyrrolate Powd     5 g    Apply to the face where involved qdaily       HYDROcodone-acetaminophen 5-325 MG per tablet    NORCO    120 tablet    May take 1 tablet every 4-6 hours as needed for pain. Max of 4 tabs per day,  next script will reduce back to #100 per script. Allowing #120 tabs since she is going on vacation and likely will be more active. OK to fill on 3/17 and start on 3/20/2017 to last 30 days until 4/19/2017.       hydrocortisone 2.5 % cream     30 g    APPLY TOPICALLY 2 TIMES DAILY AS NEEDED       losartan-hydrochlorothiazide 100-12.5 MG per tablet    HYZAAR    90 tablet    Take 1 tablet by mouth daily       metoprolol 25 MG 24 hr tablet    TOPROL-XL    30 tablet    Take 1 tablet (25 mg) by mouth daily       montelukast 10 MG tablet    SINGULAIR    90 tablet    TAKE 1 TABLET (10 MG) BY ORAL ROUTE ONCE DAILY IN THE EVENING       mupirocin 2 % cream    BACTROBAN    30 g    Apply topically 3 times daily       nystatin 911638 UNIT/ML suspension    MYCOSTATIN    380 mL    Take by mouth 4 times daily       nystatin cream    MYCOSTATIN    45 g    Apply topically 3 times daily       ranitidine 150 MG tablet    ZANTAC    0    ONE TABLET TWICE DAILY       rizatriptan 10 MG tablet    MAXALT    15 tablet    Take 0.5-1 tablets (5-10 mg) by mouth at onset of headache for migraine May repeat dose every 2 hours as needed up to max of 30mg in 24 hours. Do not take more than 3 times per week as overuse can contribute to rebound migraine headaches. Do NOT use with sumatriptan.       rOPINIRole 1 MG tablet    REQUIP    180 tablet    TAKE 2 TABLETS (2 MG) BY MOUTH AT BEDTIME       SUMAtriptan 100 MG tablet    IMITREX    9 tablet    Take 1 tablet (100 mg) by mouth at onset of headache for migraine May repeat in 2 hours if needed. Max of 200mg in 24 hours. Use sparingly.       traZODone 50 MG tablet    DESYREL    270 tablet    TAKE 2-3 TABLETS (100-150 MG) BY MOUTH AT BEDTIME       tretinoin 0.025 % cream    RETIN-A    45 g    Spread a pea size amount into affected area topically at bedtime.  Use sunscreen SPF>20.       VENTOLIN  (90 BASE) MCG/ACT Inhaler   Generic drug:  albuterol     54 g    INHALE 2 PUFFS INTO THE LUNGS EVERY 6  HOURS AS NEEDED FOR SHORTNESS OF BREATH / DYSPNEA       ZYRTEC 10 MG tablet   Generic drug:  cetirizine     90    1 TABLET DAILY       * Notice:  This list has 2 medication(s) that are the same as other medications prescribed for you. Read the directions carefully, and ask your doctor or other care provider to review them with you.

## 2017-03-27 NOTE — Clinical Note
KATARINA, she will be seeing you end of the week. See my note about her total number of hip area injections.

## 2017-03-27 NOTE — PROGRESS NOTES
"Sports Medicine Clinic Visit    PCP: Liz Peterson    Jacquie Amador is a 56 year old female who is seen  as a self referral presenting with bilateral lateral hip pain.  Patient has had multiple injections for lateral hip pain, right done most recently by Dr. Cárdenas back in December.  She is not sure how many injections total she has had done, but states it is \"a lot\".  No known injury.    Has done PT in the past.   Believes injections last about 3-4 months.  Has been getting injections for about 10 years, [facility]  Denies any groin or radicular pain  **  Lateral hip area injection (all bilateral, except right on most recent date) on 12/22/16, 9/15/16, 6/9/16, 5/3/16, 1/25/16, 12/4/15, 8/27/15, 2/3/15, and 12/15/14, noted in chart.    Injury: gradual onset    Location of Pain: bilateral lateral hip   Duration of Pain: 10 year(s)  Rating of Pain at worst: 10/10  Rating of Pain Currently: 7/10  Symptoms are better with: steroid injection  Symptoms are worse with: prolonged walking, standing  Additional Features:   Positive:    Negative: swelling, bruising, popping, grinding, catching, locking, instability, paresthesias, numbness, weakness, pain in other joints and systemic symptoms  Other evaluation and/or treatments so far consists of: Nothing  Prior History of related problems: ongoing injections     Social History: stay at home     Review of Systems  Musculoskeletal: as above  Remainder of review of systems is negative including constitutional, CV, pulmonary, GI, Skin and Neurologic except as noted in HPI or medical history.      Past Medical History:   Diagnosis Date     ASTHMA - MODERATE PERSISTENT 9/21/2005     Cancer (H)      Chronic pain      Coronary artery disease      CVA (cerebral infarction)      Depressive disorder, not elsewhere classified      Diabetes (H)      Elevated serum alkaline phosphatase level     Liver source     Fibromyalgia      HYPERLIPIDEMIA NEC/NOS " 2006     Hypertriglyceridemia      OA (osteoarthritis)      Thyroid disease      Tobacco use disorder      Tobacco use disorder complicating pregnancy, childbirth, or the puerperium, antepartum condition or complication      Trochanteric bursitis      Unspecified essential hypertension      Past Surgical History:   Procedure Laterality Date     C  DELIVERY ONLY      , Low Cervical     INJECT EPIDURAL TRANSFORAMINAL  2014    [facility]     INJECT JOINT SACROILIAC  2014    [facility]     LAMINECTOMY LUMBAR ONE LEVEL Left 2014    [facility]     Family History   Problem Relation Age of Onset     Thyroid Disease Mother      Arthritis Mother      Colon Cancer Mother      Thyroid Disease Sister      Arthritis Sister      Lipids Sister      Hypertension Father      DIABETES Father      C.A.D. Father      MI age 75     Cardiovascular Father      heart attack     CEREBROVASCULAR DISEASE Father      CANCER Father      renal cancer     Arthritis Father      HEART DISEASE Father      heart attack     GASTROINTESTINAL DISEASE Father      liver     Genitourinary Problems Father      kidney     Asthma Daughter      GASTROINTESTINAL DISEASE Daughter       Other      Breast Cancer No family hx of      Cancer - colorectal No family hx of      Alzheimer Disease No family hx of      Blood Disease No family hx of      Circulatory No family hx of      Eye Disorder No family hx of      Musculoskeletal Disorder No family hx of      Neurologic Disorder No family hx of      Respiratory No family hx of      Social History     Social History     Marital status:      Spouse name: N/A     Number of children: N/A     Years of education: N/A     Occupational History     Not on file.     Social History Main Topics     Smoking status: Former Smoker     Packs/day: 1.00     Types: Cigarettes     Smokeless tobacco: Never Used      Comment: since       "Alcohol use No     Drug use: No     Sexual activity: No     Other Topics Concern     Parent/Sibling W/ Cabg, Mi Or Angioplasty Before 65f 55m? No      Service No     Blood Transfusions No     Caffeine Concern No     Sleep Concern No     Stress Concern No     Weight Concern Yes     Special Diet No     Exercise No     Bike Helmet No     Doesn't ride a bike     Seat Belt Yes     Self-Exams Yes     she does self breast exams every other month     Social History Narrative       Objective  /82  Ht 5' 7\" (1.702 m)      ** ** ** **  Above was populated by ATC rooming pt. Pt obtained plain films of the hips at my direction, after chart review, and before being seen.  Pt was roomed early, before her appointment. I was delayed entering the room, and Jacquie elected to leave after being roomed and having plain films, but before being seen by me.      Will close without pt being seen, no charge for patient being seen.  staff indicated pt would like to see Dr Linder again for this issue (she will be seeing him later this week).      José Miguel Duncan DO, [facility]        The information in this document, created by the medical scribe for me, accurately reflects the services I personally performed and the decisions made by me. I have reviewed and approved this document for accuracy.   José Miguel Duncan DO, RADHA    Disclaimer: This note consists of symbols derived from keyboarding, dictation and/or voice recognition software. As a result, there may be errors in the script that have gone undetected. Please consider this when interpreting information found in this chart.      "

## 2017-03-27 NOTE — TELEPHONE ENCOUNTER
Pt brought in the percocet script dated 3/15/17 for #100 tabs  for exchange for the updated 3/15/17 script for #120 tabs.     The #120 tabs script was given to pt.  The #100 tab script was destroyed.    Denisse Maldonado, RN-BSN  [facility]

## 2017-03-30 ENCOUNTER — MYC MEDICAL ADVICE (OUTPATIENT)
Dept: FAMILY MEDICINE | Facility: CLINIC | Age: 57
End: 2017-03-30

## 2017-03-31 ENCOUNTER — OFFICE VISIT (OUTPATIENT)
Dept: ORTHOPEDICS | Facility: CLINIC | Age: 57
End: 2017-03-31
Payer: COMMERCIAL

## 2017-03-31 VITALS
WEIGHT: 204 LBS | BODY MASS INDEX: 32.02 KG/M2 | DIASTOLIC BLOOD PRESSURE: 80 MMHG | HEIGHT: 67 IN | SYSTOLIC BLOOD PRESSURE: 121 MMHG

## 2017-03-31 DIAGNOSIS — M79.645 BILATERAL THUMB PAIN: ICD-10-CM

## 2017-03-31 DIAGNOSIS — M70.61 TROCHANTERIC BURSITIS OF BOTH HIPS: Primary | ICD-10-CM

## 2017-03-31 DIAGNOSIS — M18.0 PRIMARY OSTEOARTHRITIS OF BOTH FIRST CARPOMETACARPAL JOINTS: ICD-10-CM

## 2017-03-31 DIAGNOSIS — M70.62 TROCHANTERIC BURSITIS OF BOTH HIPS: Primary | ICD-10-CM

## 2017-03-31 DIAGNOSIS — M79.644 BILATERAL THUMB PAIN: ICD-10-CM

## 2017-03-31 DIAGNOSIS — M25.551 BILATERAL HIP PAIN: ICD-10-CM

## 2017-03-31 DIAGNOSIS — M25.552 BILATERAL HIP PAIN: ICD-10-CM

## 2017-03-31 PROCEDURE — 20610 DRAIN/INJ JOINT/BURSA W/O US: CPT | Mod: 50 | Performed by: FAMILY MEDICINE

## 2017-03-31 PROCEDURE — 99214 OFFICE O/P EST MOD 30 MIN: CPT | Mod: 25 | Performed by: FAMILY MEDICINE

## 2017-03-31 RX ORDER — TRIAMCINOLONE ACETONIDE 40 MG/ML
80 INJECTION, SUSPENSION INTRA-ARTICULAR; INTRAMUSCULAR ONCE
Qty: 2 ML | Refills: 0 | OUTPATIENT
Start: 2017-03-31 | End: 2017-03-31

## 2017-03-31 NOTE — NURSING NOTE
"Chief Complaint   Patient presents with     Musculoskeletal Problem     f/u bilateral hip pain       Initial /80  Ht 5' 7\" (1.702 m)  Wt 204 lb (92.5 kg)  BMI 31.95 kg/m2 Estimated body mass index is 31.95 kg/(m^2) as calculated from the following:    Height as of this encounter: 5' 7\" (1.702 m).    Weight as of this encounter: 204 lb (92.5 kg).  Medication Reconciliation: complete     Anoop Berman ATC  "

## 2017-03-31 NOTE — MR AVS SNAPSHOT
After Visit Summary   3/31/2017    Jacquie Amador    MRN: [id number]           Patient Information     Date Of Birth          1960        Visit Information        Provider Department      3/31/2017 1:40 PM José Miguel Linder DO [facility] [place]        Today's Diagnoses     Trochanteric bursitis of both hips    -  1    Primary osteoarthritis of both first carpometacarpal joints        Bilateral thumb pain        Bilateral hip pain           Follow-ups after your visit        Your next 10 appointments already scheduled     Apr 04, 2017  1:30 PM CDT   [facility] Visit with Fabian Randall MD   [facility]    [address] [place] [address]   [phone]            Apr 04, 2017  4:00 PM CDT   PROCEDURE with SAMUEL Morales MD   [facility]    [address] [place] [address]   [phone]            Apr 05, 2017 11:40 AM CDT   Return Visit with José Miguel Linder DO   [facility] [place] ([facility] [place])    [address]  [place] [address] [phone]              Who to contact     If you have questions or need follow up information about today's clinic visit or your schedule please contact [facility] [place] directly at [phone].  Normal or non-critical lab and imaging results will be communicated to you by MyChart, letter or phone within 4 business days after the clinic has received the results. If you do not hear from us within 7 days, please contact the clinic through MyChart or phone. If you have a critical or abnormal lab result, we will notify you by phone as soon as possible.  Submit refill requests through [employer] or call your pharmacy and they will forward the refill request to us. Please allow 3 business days for your refill to be  "completed.          Additional Information About Your Visit        [employer] Information     [facility] gives you secure access to your electronic health record. If you see a primary care provider, you can also send messages to your care team and make appointments. If you have questions, please call your primary care clinic.  If you do not have a primary care provider, please call [phone] and they will assist you.        Care EveryWhere ID     This is your Care EveryWhere ID. This could be used by other organizations to access your [facility] medical records  [id number]        Your Vitals Were     Height BMI (Body Mass Index)                5' 7\" (1.702 m) 31.95 kg/m2           Blood Pressure from Last 3 Encounters:   03/31/17 121/80   03/27/17 118/82   03/14/17 128/90    Weight from Last 3 Encounters:   03/31/17 204 lb (92.5 kg)   12/22/16 205 lb (93 kg)   07/27/16 208 lb (94.3 kg)              We Performed the Following     DRAIN/INJECT LARGE JOINT/BURSA     TRIAMCINOLONE ACET INJ NOS          Today's Medication Changes          These changes are accurate as of: 3/31/17  2:26 PM.  If you have any questions, ask your nurse or doctor.               Start taking these medicines.        Dose/Directions    triamcinolone acetonide 40 MG/ML injection   Commonly known as:  KENALOG-40   Used for:  Trochanteric bursitis of both hips   Started by:  José Miguel Linder DO        Dose:  80 mg   2 mLs (80 mg) by INTRA-ARTICULAR route once for 1 dose   Quantity:  2 mL   Refills:  0            Where to get your medicines      Some of these will need a paper prescription and others can be bought over the counter.  Ask your nurse if you have questions.     You don't need a prescription for these medications     triamcinolone acetonide 40 MG/ML injection                Primary Care Provider Office Phone # Fax #    Liz Peterson [phone]       [facility] [address] [address] [facility] [address]      "   Thank you!     Thank you for choosing [facility]  for your care. Our goal is always to provide you with excellent care. Hearing back from our patients is one way we can continue to improve our services. Please take a few minutes to complete the written survey that you may receive in the mail after your visit with us. Thank you!             Your Updated Medication List - Protect others around you: Learn how to safely use, store and throw away your medicines at www.disposemymeds.org.          This list is accurate as of: 3/31/17  2:26 PM.  Always use your most recent med list.                   Brand Name Dispense Instructions for use    acidophilus Caps     60 capsule    Take 1 capsule by mouth daily Take two hours before or after antibiotic dose.  Continue for 1 week after antibiotic therapy completed       adapalene 0.1 % gel    DIFFERIN    45 g    Apply topically At Bedtime       ALPRAZolam 0.25 MG tablet    XANAX    90 tablet    TAKE 1 TABLET DAILY AS NEEDED FOR ANXIETY       atorvastatin 20 MG tablet    LIPITOR    90 tablet    Take 1 tablet (20 mg) by mouth daily       * buPROPion 150 MG 12 hr tablet    WELLBUTRIN SR    90 tablet    Take 1 tablet (150 mg) by mouth every evening       * buPROPion 200 MG 12 hr tablet    WELLBUTRIN SR    120 tablet    Take 1 tablet (200 mg) by mouth every morning       calcium carbonate-vitamin D 500-400 MG-UNIT Tabs per tablet     180 tablet    Take 1 tablet by mouth 2 times daily       celecoxib 200 MG capsule    celeBREX    180 capsule    Take 1 capsule twice daily as needed for pain this is a 3 month script       clindamycin 1 % lotion    CLEOCIN T     Apply topically 2 times daily       clobetasol 0.05 % cream    TEMOVATE    30 g    APPLY SPARINGLY TO AFFECTED AREA TWICE DAILY FOR 14 DAYS.  DO NOT APPLY TO FACE.       diclofenac 1 % Gel topical gel    VOLTAREN    9 Tube    Apply 2 grams to hands and 4 grams to hips up to 4 times daily. T        DULoxetine 60 MG EC capsule    CYMBALTA    180 capsule    Take 2 capsules (120 mg) by mouth daily       fluticasone-salmeterol 500-50 MCG/DOSE diskus inhaler    ADVAIR    3 Inhaler    Inhale 1 puff into the lungs 2 times daily       Glycopyrrolate Powd     5 g    Apply to the face where involved qdaily       HYDROcodone-acetaminophen 5-325 MG per tablet    NORCO    120 tablet    May take 1 tablet every 4-6 hours as needed for pain. Max of 4 tabs per day, next script will reduce back to #100 per script. Allowing #120 tabs since she is going on vacation and likely will be more active. OK to fill on 3/17 and start on 3/20/2017 to last 30 days until 4/19/2017.       hydrocortisone 2.5 % cream     30 g    APPLY TOPICALLY 2 TIMES DAILY AS NEEDED       losartan-hydrochlorothiazide 100-12.5 MG per tablet    HYZAAR    90 tablet    Take 1 tablet by mouth daily       metoprolol 25 MG 24 hr tablet    TOPROL-XL    30 tablet    Take 1 tablet (25 mg) by mouth daily       montelukast 10 MG tablet    SINGULAIR    90 tablet    TAKE 1 TABLET (10 MG) BY ORAL ROUTE ONCE DAILY IN THE EVENING       mupirocin 2 % cream    BACTROBAN    30 g    Apply topically 3 times daily       nystatin [id number] UNIT/ML suspension    MYCOSTATIN    380 mL    Take by mouth 4 times daily       nystatin cream    MYCOSTATIN    45 g    Apply topically 3 times daily       ranitidine 150 MG tablet    ZANTAC    0    ONE TABLET TWICE DAILY       rizatriptan 10 MG tablet    MAXALT    15 tablet    Take 0.5-1 tablets (5-10 mg) by mouth at onset of headache for migraine May repeat dose every 2 hours as needed up to max of 30mg in 24 hours. Do not take more than 3 times per week as overuse can contribute to rebound migraine headaches. Do NOT use with sumatriptan.       rOPINIRole 1 MG tablet    REQUIP    180 tablet    TAKE 2 TABLETS (2 MG) BY MOUTH AT BEDTIME       SUMAtriptan 100 MG tablet    IMITREX    9 tablet    Take 1 tablet (100 mg) by mouth at onset of headache for  migraine May repeat in 2 hours if needed. Max of 200mg in 24 hours. Use sparingly.       traZODone 50 MG tablet    DESYREL    270 tablet    TAKE 2-3 TABLETS (100-150 MG) BY MOUTH AT BEDTIME       tretinoin 0.025 % cream    RETIN-A    45 g    Spread a pea size amount into affected area topically at bedtime.  Use sunscreen SPF>20.       triamcinolone acetonide 40 MG/ML injection    KENALOG-40    2 mL    2 mLs (80 mg) by INTRA-ARTICULAR route once for 1 dose       VENTOLIN  (90 BASE) MCG/ACT Inhaler   Generic drug:  albuterol     54 g    INHALE 2 PUFFS INTO THE LUNGS EVERY 6 HOURS AS NEEDED FOR SHORTNESS OF BREATH / DYSPNEA       ZYRTEC 10 MG tablet   Generic drug:  cetirizine     90    1 TABLET DAILY       * Notice:  This list has 2 medication(s) that are the same as other medications prescribed for you. Read the directions carefully, and ask your doctor or other care provider to review them with you.

## 2017-03-31 NOTE — PROGRESS NOTES
Jacquie Amador  :  1960  DOS: 17  MRN: [id number]    Sports Medicine Clinic Visit    PCP: Liz Peterson    Jacquie Amador is a 55 year old Right hand dominant female who is seen in consultation at the request of   presenting with bilateral thumb pain.    Injury: about 5  year(s).  Pain located over bilateral CMC joitns, nonradiating.  Reports constant and with intermittent increases radiating, weakness to pinching.  Additional Features:  Positive: swelling and clicking.  Symptoms are better with injections.  Symptoms are worse with: gripping.  Other evaluation and/or treatments so far consists of: injections, no physical therapy.  Recent imaging completed: X-rays completed 2014 Prior History of related problems: None    Social History: crafting    Interim History - 2016  Since last visit on 2015 patient has mild-moderate discomfort over last 2 weeks.  B/l CMC joint steroid injection completed on 10/12 provided good relief for 5.5 months.  No new injury in the interim.    Interim History - 2016  Since last visit on 3/11/2016 patient has moderate - severe discomfort, that has been worsening over last 1 month.  Pt reports that she had minimal discomfort until she fell on concrete ~ 1 month ago, catching herself with both hands out in front of her.  Denies any severe pain or new swelling immediately following fall, pain gradually worsened since that time.  B/l CMC joint steroid injection completed on 3/11 provided good relief for 3.5 months.  No new injury in the interim.    Interim History - 2017  Since last visit on 16 patient has moderate bilateral thumb pain.  Bilateral thumb CMC steroid injections completed on  provided excellent relief for ~ 3.5 months.  Desires repeat injections today prior to leaving for her trip.  No new injury in the interim.    Patient is also being seen in f/u for chronic bilateral lateral hip pain,  "right>>>left .  Patient was most recently treated for this condition by Dr Cárdenas in 2016.  Right trochanteric bursa injection completed at that time provided her with good relief for ~ 2.5 months.  Desires repeat injection today prior to leaving for vacation.    Review of Systems  Musculoskeletal: as above  Remainder of review of systems is negative including constitutional, CV, pulmonary, GI, Skin and Neurologic except as noted in HPI or medical history.    Past Medical History:   Diagnosis Date     ASTHMA - MODERATE PERSISTENT 2005     Cancer (H)      Chronic pain      Coronary artery disease      CVA (cerebral infarction)      Depressive disorder, not elsewhere classified      Diabetes (H)      Elevated serum alkaline phosphatase level     Liver source     Fibromyalgia      HYPERLIPIDEMIA NEC/NOS 2006     Hypertriglyceridemia      OA (osteoarthritis)      Thyroid disease      Tobacco use disorder      Tobacco use disorder complicating pregnancy, childbirth, or the puerperium, antepartum condition or complication      Trochanteric bursitis      Unspecified essential hypertension      Past Surgical History:   Procedure Laterality Date     C  DELIVERY ONLY      , Low Cervical     INJECT EPIDURAL TRANSFORAMINAL  2014    [facility]     INJECT JOINT SACROILIAC  2014    [facility]     LAMINECTOMY LUMBAR ONE LEVEL Left 2014    [facility]     Objective  /80  Ht 5' 7\" (1.702 m)  Wt 204 lb (92.5 kg)  BMI 31.95 kg/m2    GENERAL APPEARANCE: healthy, alert and no distress   GAIT: NORMAL  SKIN: no suspicious lesions or rashes  NEURO: Normal strength and tone, mentation intact and speech normal  PSYCH:  mentation appears normal and affect normal/bright    Bilateral hip exam    Inspection:        no edema or ecchymosis in hip area    ROM:       Full active and passive ROM       Pain with active adduction over lateral " hip and active ER    Strength:        flexion 5/5       extension 5/5       abduction 5/5       adduction 5/5    Tender:        greater trochanter       SI joint mild       Piriformis, external rotators mildly    Non Tender:        remainder of hip area       illiac crest       ASIS       pubis    Sensation:        grossly intact in hip and thigh    Skin:       well perfused       capillary refill brisk    Special Tests:        neg (-) LORI       neg (-) FADIR       neg (-) scour       positive (+) Brooks    Bilateral Hand Exam  Inspection:Carpals: normal, Thumb: normal  Tender: Carpals: triquetrum, Metacarpals: 1st metacarpal, Thumb: CMC, R>L  Non-tender: Remainder of hand  Range of Motion Thumb: opposition Decreased R>L, flexion MCP decreased, painful, extension MCP decreased, flexion IP decreased, extension IP decreased  Strength: mild decrease in thumb  strength bilaterally  Special tests: Positive scour of CMC, negative snuffbox tenderness     Full wrist and elbow range of motion without pain     Skin:  well perfused  capillary refill brisk    Procedure  After risks, benefits and complications of steroid injection were discussed with the patient, a consent form was signed and the patient elected to proceed.  Using sterile technique, the area was first prepped with betadyne and an alcohol swab.  A 25 gauge  needle was used to inject a mixture of 40 mg Kenalog, 2 ml of 0.5% marcaine and 2 ml of 1% lidocaine into the greater trochanteric bursa on the right and left.  The patient tolerated the procedure well without complications.      Radiology:  FINGER LEFT TWO OR MORE VIEWS 5/13/2014 11:29 AM   HISTORY: Chronic pain.  COMPARISON: None.   FINDINGS: No fracture or malalignment is identified. There is near  complete joint space loss of the first carpal metacarpal joint with  adjacent subchondral cyst formation in the base of the first  metacarpal. Small osteophytes are also noted. Mild degenerative  changes of  the first metacarpal phalangeal joint also noted.  IMPRESSION  IMPRESSION: Degenerative changes of the first carpal metacarpal and of  the first metacarpal phalangeal joints. No fracture.    Assessment:  1. Trochanteric bursitis of both hips    2. Primary osteoarthritis of both first carpometacarpal joints    3. Bilateral thumb pain    4. Bilateral hip pain        Plan:  Discussed the assessment with the patient.  Follow up: next week for CMC injections  Successful injection of bilateral trochanteric bursae today  Discussed consideration of orthopedic referral for possible discussion of arthroplasty based on effectiveness of conservative care for CMC issues, available any time  XR images independently visualized and reviewed with patient today in clinic  No significant radiographic or clinical hip joint irritation signs  Reviewed number of steroid injection in the last 2 yrs and importance of limiting overall number as much as possible  Risks, potential SE reviewed in detail  Expectations and goals of CSI reviewed  Often 2-3 days for steroid effect, and can take up to two weeks for maximum effect  We discussed modified progressive pain-free activity as tolerated  Do not overuse in first two weeks if feeling better due to concern for vulnerability while steroid is working  Supportive care reviewed  All questions were answered today  Contact us with additional questions or concerns  Signs and sx of concern reviewed      José Miguel Linder DO, RADHA  Primary Care Sports Medicine  [facility]         Disclaimer: This note consists of symbols derived from keyboarding, dictation and/or voice recognition software. As a result, there may be errors in the script that have gone undetected. Please consider this when interpreting information found in this chart.

## 2017-04-03 ENCOUNTER — PRE VISIT (OUTPATIENT)
Dept: NURSING | Facility: CLINIC | Age: 57
End: 2017-04-03

## 2017-04-04 ENCOUNTER — OFFICE VISIT (OUTPATIENT)
Dept: CARDIOLOGY | Facility: CLINIC | Age: 57
End: 2017-04-04
Attending: FAMILY MEDICINE
Payer: COMMERCIAL

## 2017-04-04 VITALS — DIASTOLIC BLOOD PRESSURE: 84 MMHG | SYSTOLIC BLOOD PRESSURE: 127 MMHG | OXYGEN SATURATION: 99 % | HEART RATE: 77 BPM

## 2017-04-04 DIAGNOSIS — R00.0 TACHYCARDIA: Primary | ICD-10-CM

## 2017-04-04 PROCEDURE — 99214 OFFICE O/P EST MOD 30 MIN: CPT | Mod: 25 | Performed by: INTERNAL MEDICINE

## 2017-04-04 PROCEDURE — 93000 ELECTROCARDIOGRAM COMPLETE: CPT | Performed by: INTERNAL MEDICINE

## 2017-04-04 ASSESSMENT — PAIN SCALES - GENERAL: PAINLEVEL: NO PAIN (0)

## 2017-04-04 NOTE — NURSING NOTE
"Jacquie Amador's goals for this visit include:   Chief Complaint   Patient presents with     Consult     Sinus tachycardia       She requests these members of her care team be copied on today's visit information: PCP    PCP: Liz Peterson    Referring Provider:  Liz Peterson MD  [facility]  [address] [place] [address]    Chief Complaint   Patient presents with     Consult     Sinus tachycardia       Initial /84 (BP Location: Left arm, Patient Position: Chair, Cuff Size: Adult Regular)  Pulse 77  SpO2 99% Estimated body mass index is 31.95 kg/(m^2) as calculated from the following:    Height as of 3/31/17: 1.702 m (5' 7\").    Weight as of 3/31/17: 92.5 kg (204 lb).  Medication Reconciliation: complete       Medication Refills: none      Vera Castaneda CMA      "

## 2017-04-04 NOTE — PROGRESS NOTES
"2017               Liz Peterson MD   [facility]   [address] [place] [address]         RE:  Jacquie Amador   MRN:  [id number]   :  1960      Dear Dr. Peterson:        It was a pleasure participating care of your patient, Ms. Jacquie Amador.  As you know, she is a 56-year-old lady who I see today for \"palpitations.\"      Her past medical history is significant for the followin.  Hypertension.   2.  Hyperlipidemia.   3.  Chronic pain.   4.  Lumbar disk problems.   5.  Depression/anxiety.   6.  Gastroesophageal reflux disease.   7.  Moderate asthma.   8.  Fibromyalgia.   9.  Osteoarthritis.      She denies having a significant history of cardiac disease.      In terms of her present symptom complex, she is severely limited by musculoskeletal discomfort and can walk about 100 yards before she has to stop.        She presents because since 2017 through 2017 she has noted higher heart rates on occasion.      Both she and her  note that these \"higher heart rates\" are noted to be associated with anxiety.  When she watches the news or she sees terrorist attacks or if she is worried about a shooting then her heart rate will be significantly elevated over a period of time.  She will feel tired after the episode but these heart rates are noted to be in the 110-120 beat per minute range.  She has not had a syncopal episode.      She otherwise denies gross shortness of breath, PND, orthopnea, edema, syncope or near-syncope.      In terms of her cardiac risk factors, no history of diabetes.  She does have a history of hypertension.  She quit smoking 4 years ago, smoked a half pack a day for 25 years.  Denies alcohol use.  Denies family history of heart disease.  She does have hyperlipidemia.        She is a homemaker.  Her  is an .      CURRENT MEDICATIONS:  Tylenol 3, bupropion, metoprolol succinate 25 mg a day, rizatriptan, alprazolam, " "inhalers, Losartan/hydrochlorothiazide 100/12.5 a day, Lipitor 20 mg a day, Trazodone.      PHYSICAL EXAMINATION:     VITAL SIGNS:  Blood pressure is 127/84 with a pulse 77.  Her weight was refused.   NECK:  Exam reveals no obvious jugular venous distention.   LUNGS:  Clear to auscultation.  Respiratory effort is normal.   CARDIAC:  Reveals a regular rate and rhythm.  No significant murmur or gallop is appreciated.   ABDOMEN:  Her belly is soft, nontender.   EXTREMITIES:  Without gross edema.      EKG today reveals normal sinus rhythm at a rate of 73 beats per minute, nonspecific T-wave changes.  03/14/2017 potassium 3.3, GFR normal.        IMPRESSION:      Jacquie is a 56-year-old lady without a prior documented history of cardiac disease who presents with \"increased heart rates\", possibly secondary to anxiety.  She does personally admit that anxiety is likely playing a role and her  confirms that she gets anxious when she watches TV or gets worried about terrorist attacks or shootings.  She has not noticed higher heart rates or symptoms when she is not anxious.      From a more objective standpoint, her physical exam today is normal and her EKG reveals normal sinus rhythm without gross abnormalities.  I do not believe that further invasive or noninvasive evaluation would currently be indicated.        PLAN:     1.  She will continue to monitor her clinical progress.  She was reassured regarding her current clinical status and feels much better regarding her overall health situation.      Obviously if symptoms should worsen, continue or change in character, we can also consider further noninvasive evaluation at that time with an echocardiogram, lab work and possible monitoring.      Once again, it was a pleasure participating in the care of your patient, Ms. Jacquie Amador.  Please feel free to contact me anytime if you have questions regarding her care in the future.         Sincerely,      CRISTOBAL OSEI, " MD             D: 2017 14:14   T: 2017 14:43   MT:       Name:     RISSA ZAYAS   MRN:      [id number]        Account:      [id number]   :      1960      Document: [id number]       cc: Liz Peterson MD

## 2017-04-04 NOTE — PATIENT INSTRUCTIONS
The following is a summary of your office visit:    Medications started today:none    Medications stopped today:none    Medication dose change: none    Nurse contact information: Jeaneth Muñoz RN  Cardiology Care Coordinator  [phone] Phone  [phone] Fax    Appointments made today: none    Patient instructions:none      If you have had any blood work, imaging or other testing completed we will be in touch within 1-2 weeks regarding the results. If you have any questions, concerns or need to schedule a follow up, please contact us at [phone]. If you are needing refills please contact your pharmacy. For urgent after hour care please call the [facility] at [phone] or the [facility] at [phone] and ask to speak to the cardiologist on call.    It was a pleasure meeting with you today. Please let us know if there is anything else we can do for you so that we can be sure you are leaving completely satisfied with your care experience.     Your Cardiology Team at [facility]  RN Care Coordinator: Jeaneth  CMA: Vera

## 2017-04-04 NOTE — MR AVS SNAPSHOT
After Visit Summary   4/4/2017    Jacquie Amador    MRN: [id number]           Patient Information     Date Of Birth          1960        Visit Information        Provider Department      4/4/2017 1:30 PM Fabian Randall MD [facility]        Care Instructions      The following is a summary of your office visit:    Medications started today:none    Medications stopped today:none    Medication dose change: none    Nurse contact information: Jeaneth Muñoz RN  Cardiology Care Coordinator  [phone] Phone  [phone] Fax    Appointments made today: none    Patient instructions:none      If you have had any blood work, imaging or other testing completed we will be in touch within 1-2 weeks regarding the results. If you have any questions, concerns or need to schedule a follow up, please contact us at [phone]. If you are needing refills please contact your pharmacy. For urgent after hour care please call the [facility] at [phone] or the [facility] at [phone] and ask to speak to the cardiologist on call.    It was a pleasure meeting with you today. Please let us know if there is anything else we can do for you so that we can be sure you are leaving completely satisfied with your care experience.     Your Cardiology Team at [facility]  RN Care Coordinator: Jeaneth Gamez                  Follow-ups after your visit        Your next 10 appointments already scheduled     Apr 05, 2017 11:40 AM CDT   Return Visit with José Miguel Linder,    [facility] [place] ([facility])    [address]  [place] [id number] [phone]              Who to contact     If you have questions or need follow up information about today's clinic visit or your schedule please contact [facility] directly at [phone].  Normal or  non-critical lab and imaging results will be communicated to you by [employer], letter or phone within 4 business days after the clinic has received the results. If you do not hear from us within 7 days, please contact the clinic through [employer] or phone. If you have a critical or abnormal lab result, we will notify you by phone as soon as possible.  Submit refill requests through [employer] or call your pharmacy and they will forward the refill request to us. Please allow 3 business days for your refill to be completed.          Additional Information About Your Visit        [employer] Information     [employer] gives you secure access to your electronic health record. If you see a primary care provider, you can also send messages to your care team and make appointments. If you have questions, please call your primary care clinic.  If you do not have a primary care provider, please call [phone] and they will assist you.      [employer] is an electronic gateway that provides easy, online access to your medical records. With [employer], you can request a clinic appointment, read your test results, renew a prescription or communicate with your care team.     To access your existing account, please contact your [facility] or call [phone] for assistance.        Care EveryWhere ID     This is your Care EveryWhere ID. This could be used by other organizations to access your [facility] medical records  [id number]        Your Vitals Were     Pulse Pulse Oximetry                77 99%           Blood Pressure from Last 3 Encounters:   04/04/17 127/84   03/31/17 121/80   03/27/17 118/82    Weight from Last 3 Encounters:   03/31/17 92.5 kg (204 lb)   12/22/16 93 kg (205 lb)   07/27/16 94.3 kg (208 lb)              Today, you had the following     No orders found for display       Primary Care Provider Office Phone # Fax #    Liz Peterson [phone]       [facility] [address]  [facility] [address]        Thank you!     Thank you for choosing [facility]  for your care. Our goal is always to provide you with excellent care. Hearing back from our patients is one way we can continue to improve our services. Please take a few minutes to complete the written survey that you may receive in the mail after your visit with us. Thank you!             Your Updated Medication List - Protect others around you: Learn how to safely use, store and throw away your medicines at www.disposemymeds.org.          This list is accurate as of: 4/4/17  2:07 PM.  Always use your most recent med list.                   Brand Name Dispense Instructions for use    acidophilus Caps     60 capsule    Take 1 capsule by mouth daily Take two hours before or after antibiotic dose.  Continue for 1 week after antibiotic therapy completed       adapalene 0.1 % gel    DIFFERIN    45 g    Apply topically At Bedtime       ALPRAZolam 0.25 MG tablet    XANAX    90 tablet    TAKE 1 TABLET DAILY AS NEEDED FOR ANXIETY       atorvastatin 20 MG tablet    LIPITOR    90 tablet    Take 1 tablet (20 mg) by mouth daily       * buPROPion 150 MG 12 hr tablet    WELLBUTRIN SR    90 tablet    Take 1 tablet (150 mg) by mouth every evening       * buPROPion 200 MG 12 hr tablet    WELLBUTRIN SR    120 tablet    Take 1 tablet (200 mg) by mouth every morning       calcium carbonate-vitamin D 500-400 MG-UNIT Tabs per tablet     180 tablet    Take 1 tablet by mouth 2 times daily       celecoxib 200 MG capsule    celeBREX    180 capsule    Take 1 capsule twice daily as needed for pain this is a 3 month script       clindamycin 1 % lotion    CLEOCIN T     Apply topically 2 times daily       clobetasol 0.05 % cream    TEMOVATE    30 g    APPLY SPARINGLY TO AFFECTED AREA TWICE DAILY FOR 14 DAYS.  DO NOT APPLY TO FACE.       diclofenac 1 % Gel topical gel    VOLTAREN    9 Tube    Apply 2 grams to hands and 4 grams to hips up to  4 times daily. T       DULoxetine 60 MG EC capsule    CYMBALTA    180 capsule    Take 2 capsules (120 mg) by mouth daily       fluticasone-salmeterol 500-50 MCG/DOSE diskus inhaler    ADVAIR    3 Inhaler    Inhale 1 puff into the lungs 2 times daily       Glycopyrrolate Powd     5 g    Apply to the face where involved qdaily       HYDROcodone-acetaminophen 5-325 MG per tablet    NORCO    120 tablet    May take 1 tablet every 4-6 hours as needed for pain. Max of 4 tabs per day, next script will reduce back to #100 per script. Allowing #120 tabs since she is going on vacation and likely will be more active. OK to fill on 3/17 and start on 3/20/2017 to last 30 days until 4/19/2017.       hydrocortisone 2.5 % cream     30 g    APPLY TOPICALLY 2 TIMES DAILY AS NEEDED       losartan-hydrochlorothiazide 100-12.5 MG per tablet    HYZAAR    90 tablet    Take 1 tablet by mouth daily       metoprolol 25 MG 24 hr tablet    TOPROL-XL    30 tablet    Take 1 tablet (25 mg) by mouth daily       montelukast 10 MG tablet    SINGULAIR    90 tablet    TAKE 1 TABLET (10 MG) BY ORAL ROUTE ONCE DAILY IN THE EVENING       mupirocin 2 % cream    BACTROBAN    30 g    Apply topically 3 times daily       nystatin 751011 UNIT/ML suspension    MYCOSTATIN    380 mL    Take by mouth 4 times daily       nystatin cream    MYCOSTATIN    45 g    Apply topically 3 times daily       ranitidine 150 MG tablet    ZANTAC    0    ONE TABLET TWICE DAILY       rizatriptan 10 MG tablet    MAXALT    15 tablet    Take 0.5-1 tablets (5-10 mg) by mouth at onset of headache for migraine May repeat dose every 2 hours as needed up to max of 30mg in 24 hours. Do not take more than 3 times per week as overuse can contribute to rebound migraine headaches. Do NOT use with sumatriptan.       rOPINIRole 1 MG tablet    REQUIP    180 tablet    TAKE 2 TABLETS (2 MG) BY MOUTH AT BEDTIME       SUMAtriptan 100 MG tablet    IMITREX    9 tablet    Take 1 tablet (100 mg) by mouth at  onset of headache for migraine May repeat in 2 hours if needed. Max of 200mg in 24 hours. Use sparingly.       traZODone 50 MG tablet    DESYREL    270 tablet    TAKE 2-3 TABLETS (100-150 MG) BY MOUTH AT BEDTIME       tretinoin 0.025 % cream    RETIN-A    45 g    Spread a pea size amount into affected area topically at bedtime.  Use sunscreen SPF>20.       VENTOLIN  (90 BASE) MCG/ACT Inhaler   Generic drug:  albuterol     54 g    INHALE 2 PUFFS INTO THE LUNGS EVERY 6 HOURS AS NEEDED FOR SHORTNESS OF BREATH / DYSPNEA       ZYRTEC 10 MG tablet   Generic drug:  cetirizine     90    1 TABLET DAILY       * Notice:  This list has 2 medication(s) that are the same as other medications prescribed for you. Read the directions carefully, and ask your doctor or other care provider to review them with you.

## 2017-04-05 ENCOUNTER — TELEPHONE (OUTPATIENT)
Dept: FAMILY MEDICINE | Facility: CLINIC | Age: 57
End: 2017-04-05

## 2017-04-05 DIAGNOSIS — Z12.11 SPECIAL SCREENING FOR MALIGNANT NEOPLASMS, COLON: Primary | ICD-10-CM

## 2017-04-05 NOTE — TELEPHONE ENCOUNTER
Summary:    Patient is due/failing the following:   Potassium, BP CHECK and FIT    Action needed:   Patient needs non-fasting lab only appointment and Patient needs nurse only appointment. Complete FIT test     Type of outreach:    Phone, spoke to patient.  patient will stop in for a BP check, Lab and  FIT test     Questions for provider review:    None                                                                                                                                    Funmi Stahl       Chart routed to Care Team .        Panel Management Review      Patient has the following on her problem list:     Depression / Dysthymia review  PHQ-9 SCORE 6/22/2016 2/24/2017 3/14/2017   Total Score - - -   Total Score MyChart - - -   Total Score - - -   Total Score 3 8 7      Patient is due for:  None    Asthma review     ACT Total Scores 3/14/2017   ACT TOTAL SCORE (Goal Greater than or Equal to 20) 14   In the past 12 months, how many times did you visit the emergency room for your asthma without being admitted to the hospital? 0   In the past 12 months, how many times were you hospitalized overnight because of your asthma? 0      1. Is Asthma diagnosis on the Problem List? Yes    2. Is Asthma listed on Health Maintenance? Yes    3. Patient is due for:  ACT    Hypertension   Last three blood pressure readings:  BP Readings from Last 3 Encounters:   04/04/17 127/84   03/31/17 121/80   03/27/17 118/82     Blood pressure: FAILED    HTN Guidelines:  Age 18-59 BP range:  Less than 140/90  Age 60-85 with Diabetes:  Less than 140/90  Age 60-85 without Diabetes:  less than 150/90      Composite cancer screening  Chart review shows that this patient is due/due soon for the following Fecal Colorectal (FIT)

## 2017-04-06 ENCOUNTER — MYC MEDICAL ADVICE (OUTPATIENT)
Dept: PALLIATIVE MEDICINE | Facility: CLINIC | Age: 57
End: 2017-04-06

## 2017-04-07 ENCOUNTER — MYC MEDICAL ADVICE (OUTPATIENT)
Dept: FAMILY MEDICINE | Facility: CLINIC | Age: 57
End: 2017-04-07

## 2017-04-07 ENCOUNTER — OFFICE VISIT (OUTPATIENT)
Dept: ORTHOPEDICS | Facility: CLINIC | Age: 57
End: 2017-04-07
Payer: COMMERCIAL

## 2017-04-07 DIAGNOSIS — M79.644 BILATERAL THUMB PAIN: ICD-10-CM

## 2017-04-07 DIAGNOSIS — M18.0 PRIMARY OSTEOARTHRITIS OF BOTH FIRST CARPOMETACARPAL JOINTS: Primary | ICD-10-CM

## 2017-04-07 DIAGNOSIS — M79.645 BILATERAL THUMB PAIN: ICD-10-CM

## 2017-04-07 PROCEDURE — 20606 DRAIN/INJ JOINT/BURSA W/US: CPT | Mod: 50 | Performed by: FAMILY MEDICINE

## 2017-04-07 RX ORDER — TRIAMCINOLONE ACETONIDE 40 MG/ML
80 INJECTION, SUSPENSION INTRA-ARTICULAR; INTRAMUSCULAR ONCE
Qty: 2 ML | Refills: 0 | OUTPATIENT
Start: 2017-04-07 | End: 2017-04-07

## 2017-04-07 NOTE — TELEPHONE ENCOUNTER
Per patient Zo message:    Created: 4/6/2017 4:22 PM      Hi Liseth,  I have been having more headaches again. Can I go up on the Metoprolol? I am worried about the [place] heat and sun causing them too.    Thanks,  Jacquie

## 2017-04-07 NOTE — TELEPHONE ENCOUNTER
Jorge bang pt:      Created: 4/7/2017 1:57 PM      Bebe Dhillon.   My mistake here.  Liseth will not be back in clinic until Tuesday.  A covering provider isn't going to make this decision.  I will make this urgent so she sees it when she comes back on Tuesday.      Denisse Maldonado, RN-BSN  [facility]

## 2017-04-07 NOTE — MR AVS SNAPSHOT
After Visit Summary   4/7/2017    Jacquie Amador    MRN: [id number]           Patient Information     Date Of Birth          1960        Visit Information        Provider Department      4/7/2017 2:40 PM José Miguel Linder DO [facility]        Today's Diagnoses     Primary osteoarthritis of both first carpometacarpal joints    -  1    Bilateral thumb pain           Follow-ups after your visit        Who to contact     If you have questions or need follow up information about today's clinic visit or your schedule please contact [facility] directly at [phone].  Normal or non-critical lab and imaging results will be communicated to you by Clctinhart, letter or phone within 4 business days after the clinic has received the results. If you do not hear from us within 7 days, please contact the clinic through Clctinhart or phone. If you have a critical or abnormal lab result, we will notify you by phone as soon as possible.  Submit refill requests through Tellybean or call your pharmacy and they will forward the refill request to us. Please allow 3 business days for your refill to be completed.          Additional Information About Your Visit        MyChart Information     Tellybean gives you secure access to your electronic health record. If you see a primary care provider, you can also send messages to your care team and make appointments. If you have questions, please call your primary care clinic.  If you do not have a primary care provider, please call [phone] and they will assist you.        Care EveryWhere ID     This is your Care EveryWhere ID. This could be used by other organizations to access your [facility] medical records  [id number]         Blood Pressure from Last 3 Encounters:   04/04/17 127/84   03/31/17 121/80   03/27/17 118/82    Weight from Last 3 Encounters:   03/31/17 204 lb (92.5 kg)   12/22/16 205 lb (93 kg)    07/27/16 208 lb (94.3 kg)              We Performed the Following     HC ARTHROCENTESIS ASPIR&/INJ INTERM JT/BURS W/US     TRIAMCINOLONE ACET INJ NOS          Today's Medication Changes          These changes are accurate as of: 4/7/17  3:52 PM.  If you have any questions, ask your nurse or doctor.               Start taking these medicines.        Dose/Directions    triamcinolone acetonide 40 MG/ML injection   Commonly known as:  KENALOG   Used for:  Primary osteoarthritis of both first carpometacarpal joints, Bilateral thumb pain   Started by:  José Miguel Linder DO        Dose:  80 mg   2 mLs (80 mg) by INTRA-ARTICULAR route once for 1 dose   Quantity:  2 mL   Refills:  0            Where to get your medicines      Some of these will need a paper prescription and others can be bought over the counter.  Ask your nurse if you have questions.     You don't need a prescription for these medications     triamcinolone acetonide 40 MG/ML injection                Primary Care Provider Office Phone # Fax #    Liz Peterson [phone]       [facility] [id number] [place] [id number]        Thank you!     Thank you for choosing [facility]  for your care. Our goal is always to provide you with excellent care. Hearing back from our patients is one way we can continue to improve our services. Please take a few minutes to complete the written survey that you may receive in the mail after your visit with us. Thank you!             Your Updated Medication List - Protect others around you: Learn how to safely use, store and throw away your medicines at www.disposemymeds.org.          This list is accurate as of: 4/7/17  3:52 PM.  Always use your most recent med list.                   Brand Name Dispense Instructions for use    acidophilus Caps     60 capsule    Take 1 capsule by mouth daily Take two hours before or after antibiotic dose.  Continue for 1  week after antibiotic therapy completed       adapalene 0.1 % gel    DIFFERIN    45 g    Apply topically At Bedtime       ALPRAZolam 0.25 MG tablet    XANAX    90 tablet    TAKE 1 TABLET DAILY AS NEEDED FOR ANXIETY       atorvastatin 20 MG tablet    LIPITOR    90 tablet    Take 1 tablet (20 mg) by mouth daily       * buPROPion 150 MG 12 hr tablet    WELLBUTRIN SR    90 tablet    Take 1 tablet (150 mg) by mouth every evening       * buPROPion 200 MG 12 hr tablet    WELLBUTRIN SR    120 tablet    Take 1 tablet (200 mg) by mouth every morning       calcium carbonate-vitamin D 500-400 MG-UNIT Tabs per tablet     180 tablet    Take 1 tablet by mouth 2 times daily       celecoxib 200 MG capsule    celeBREX    180 capsule    Take 1 capsule twice daily as needed for pain this is a 3 month script       clindamycin 1 % lotion    CLEOCIN T     Apply topically 2 times daily       clobetasol 0.05 % cream    TEMOVATE    30 g    APPLY SPARINGLY TO AFFECTED AREA TWICE DAILY FOR 14 DAYS.  DO NOT APPLY TO FACE.       diclofenac 1 % Gel topical gel    VOLTAREN    9 Tube    Apply 2 grams to hands and 4 grams to hips up to 4 times daily. T       DULoxetine 60 MG EC capsule    CYMBALTA    180 capsule    Take 2 capsules (120 mg) by mouth daily       fluticasone-salmeterol 500-50 MCG/DOSE diskus inhaler    ADVAIR    3 Inhaler    Inhale 1 puff into the lungs 2 times daily       Glycopyrrolate Powd     5 g    Apply to the face where involved qdaily       HYDROcodone-acetaminophen 5-325 MG per tablet    NORCO    120 tablet    May take 1 tablet every 4-6 hours as needed for pain. Max of 4 tabs per day, next script will reduce back to #100 per script. Allowing #120 tabs since she is going on vacation and likely will be more active. OK to fill on 3/17 and start on 3/20/2017 to last 30 days until 4/19/2017.       hydrocortisone 2.5 % cream     30 g    APPLY TOPICALLY 2 TIMES DAILY AS NEEDED       losartan-hydrochlorothiazide 100-12.5 MG per  tablet    HYZAAR    90 tablet    Take 1 tablet by mouth daily       metoprolol 25 MG 24 hr tablet    TOPROL-XL    30 tablet    Take 1 tablet (25 mg) by mouth daily       montelukast 10 MG tablet    SINGULAIR    90 tablet    TAKE 1 TABLET (10 MG) BY ORAL ROUTE ONCE DAILY IN THE EVENING       mupirocin 2 % cream    BACTROBAN    30 g    Apply topically 3 times daily       nystatin 250408 UNIT/ML suspension    MYCOSTATIN    380 mL    Take by mouth 4 times daily       nystatin cream    MYCOSTATIN    45 g    Apply topically 3 times daily       ranitidine 150 MG tablet    ZANTAC    0    ONE TABLET TWICE DAILY       rizatriptan 10 MG tablet    MAXALT    15 tablet    Take 0.5-1 tablets (5-10 mg) by mouth at onset of headache for migraine May repeat dose every 2 hours as needed up to max of 30mg in 24 hours. Do not take more than 3 times per week as overuse can contribute to rebound migraine headaches. Do NOT use with sumatriptan.       rOPINIRole 1 MG tablet    REQUIP    180 tablet    TAKE 2 TABLETS (2 MG) BY MOUTH AT BEDTIME       SUMAtriptan 100 MG tablet    IMITREX    9 tablet    Take 1 tablet (100 mg) by mouth at onset of headache for migraine May repeat in 2 hours if needed. Max of 200mg in 24 hours. Use sparingly.       traZODone 50 MG tablet    DESYREL    270 tablet    TAKE 2-3 TABLETS (100-150 MG) BY MOUTH AT BEDTIME       tretinoin 0.025 % cream    RETIN-A    45 g    Spread a pea size amount into affected area topically at bedtime.  Use sunscreen SPF>20.       triamcinolone acetonide 40 MG/ML injection    KENALOG    2 mL    2 mLs (80 mg) by INTRA-ARTICULAR route once for 1 dose       VENTOLIN  (90 BASE) MCG/ACT Inhaler   Generic drug:  albuterol     54 g    INHALE 2 PUFFS INTO THE LUNGS EVERY 6 HOURS AS NEEDED FOR SHORTNESS OF BREATH / DYSPNEA       ZYRTEC 10 MG tablet   Generic drug:  cetirizine     90    1 TABLET DAILY       * Notice:  This list has 2 medication(s) that are the same as other medications  prescribed for you. Read the directions carefully, and ask your doctor or other care provider to review them with you.

## 2017-04-07 NOTE — TELEPHONE ENCOUNTER
Per patient Zo message:  Sent: 4/7/2017 12:50 PM CDT       To: Liseth Caldera, JESSICA CNP  Subject: RE: Headaches    She knows I am not coming in until after our return from [place] on April 27. I really needed an answer as we leave Tuesday morning    Thanks anyways   Jacquie

## 2017-04-07 NOTE — PROGRESS NOTES
Jacquie Amador  :  1960  DOS: 17  MRN: [id number]    Sports Medicine Clinic Visit    PCP: Liz Peterson    Jacquie Amador is a 55 year old Right hand dominant female who is seen in consultation at the request of   presenting with bilateral thumb pain.    Injury: about 5  year(s).  Pain located over bilateral CMC joitns, nonradiating.  Reports constant and with intermittent increases radiating, weakness to pinching.  Additional Features:  Positive: swelling and clicking.  Symptoms are better with injections.  Symptoms are worse with: gripping.  Other evaluation and/or treatments so far consists of: injections, no physical therapy.  Recent imaging completed: X-rays completed 2014 Prior History of related problems: None    Social History: crafting    Interim History - 2016  Since last visit on 2015 patient has mild-moderate discomfort over last 2 weeks.  B/l CMC joint steroid injection completed on 10/12 provided good relief for 5.5 months.  No new injury in the interim.    Interim History - 2016  Since last visit on 3/11/2016 patient has moderate - severe discomfort, that has been worsening over last 1 month.  Pt reports that she had minimal discomfort until she fell on concrete ~ 1 month ago, catching herself with both hands out in front of her.  Denies any severe pain or new swelling immediately following fall, pain gradually worsened since that time.  B/l CMC joint steroid injection completed on 3/11 provided good relief for 3.5 months.  No new injury in the interim.    Interim History - 2017  Since last visit on 16 patient has moderate bilateral thumb pain.  Bilateral thumb CMC steroid injections completed on  provided excellent relief for ~ 3.5 months.  Desires repeat injections today prior to leaving for her trip.  No new injury in the interim.    Patient is also being seen in f/u for chronic bilateral lateral hip pain, right>>>left  .  Patient was most recently treated for this condition by Dr Cárdenas in 2016.  Right trochanteric bursa injection completed at that time provided her with good relief for ~ 2.5 months.  Desires repeat injection today prior to leaving for vacation.    Interim History - 2017  Patient presents today for bilateral thumb CMC joint injections.  She notes second complaint of left forefoot pain over last 1 week.  Pain located in base of 1st and 2nd toe, metatarsal arch region.  Pain worse with prolonged walking, bending toes.  Currently treating with supportive footwear.  She was previously seen for this condition by Dr Cárdenas on16.  Great toe injection completed at that time provided her with good relief for ~ 3 months.  She is desiring repeat injection today d/t leaving on vacation on 4/10.      Review of Systems  Musculoskeletal: as above  Remainder of review of systems is negative including constitutional, CV, pulmonary, GI, Skin and Neurologic except as noted in HPI or medical history.    Past Medical History:   Diagnosis Date     ASTHMA - MODERATE PERSISTENT 2005     Cancer (H)      Chronic pain      Coronary artery disease      CVA (cerebral infarction)      Depressive disorder, not elsewhere classified      Diabetes (H)      Elevated serum alkaline phosphatase level     Liver source     Fibromyalgia      HYPERLIPIDEMIA NEC/NOS 2006     Hypertriglyceridemia      OA (osteoarthritis)      Thyroid disease      Tobacco use disorder      Tobacco use disorder complicating pregnancy, childbirth, or the puerperium, antepartum condition or complication      Trochanteric bursitis      Unspecified essential hypertension      Past Surgical History:   Procedure Laterality Date     C  DELIVERY ONLY      , Low Cervical     INJECT EPIDURAL TRANSFORAMINAL  2014    [facility]     INJECT JOINT SACROILIAC  2014    [facility]      LAMINECTOMY LUMBAR ONE LEVEL Left 04/08/2014    [facility]     Objective  There were no vitals taken for this visit.    GENERAL APPEARANCE: healthy, alert and no distress   GAIT: NORMAL  SKIN: no suspicious lesions or rashes  NEURO: Normal strength and tone, mentation intact and speech normal  PSYCH:  mentation appears normal and affect normal/bright    Bilateral Hand Exam  Inspection:Carpals: normal, Thumb: normal  Tender: Carpals: triquetrum, Metacarpals: 1st metacarpal, Thumb: CMC, R>L  Non-tender: Remainder of hand  Range of Motion Thumb: opposition Decreased R>L, flexion MCP decreased, painful, extension MCP decreased, flexion IP decreased, extension IP decreased  Strength: mild decrease in thumb  strength bilaterally  Special tests: Positive scour of CMC, negative snuffbox tenderness     Full wrist and elbow range of motion without pain     Skin:  well perfused  capillary refill brisk    Sports Medicine Clinic Procedure  Ultrasound Guided Bilateral Intra-Articular Thumb CMC Joint Injection  Technique: The risks of the procedure were explained to the patient.  A consent was signed for the CMC joint injection.  The patient was evaluated with a Thrill ultrasound machine using a 12 MHz linear probe.     The Bilateral CMC area was prepped and draped in a sterile manner.  Ultrasound identification of the CMC joint in both long and short axis. The probe was placed in long axis to the first metacarpal.  A 1.5 inch 25 gauge needle was placed under ultrasound guidance into the CMC joint.  A mixture of 1 ml's 1% lidocaine and 1 ml's kenalog (40mg/ml) was injected without difficulty.  The needle was removed and there was good hemostasis without complications.  There was ultrasound documentation of needle placement and injection. Pre-procedural pain 7/10 on R, 6/10 on L.  Post-procedural pain 2/10 b/l.     Radiology:  FINGER LEFT TWO OR MORE VIEWS 5/13/2014 11:29 AM   HISTORY: Chronic pain.  COMPARISON:  None.   FINDINGS: No fracture or malalignment is identified. There is near  complete joint space loss of the first carpal metacarpal joint with  adjacent subchondral cyst formation in the base of the first  metacarpal. Small osteophytes are also noted. Mild degenerative  changes of the first metacarpal phalangeal joint also noted.  IMPRESSION  IMPRESSION: Degenerative changes of the first carpal metacarpal and of  the first metacarpal phalangeal joints. No fracture.    Assessment:  1. Primary osteoarthritis of both first carpometacarpal joints    2. Bilateral thumb pain        Plan:  Discussed the assessment with the patient.  Follow up: prn  Successful injection of bilateral 1st CMC joints today  Discussed consideration of orthopedic referral for possible discussion of arthroplasty based on effectiveness of conservative care for CMC issues, available any time  XR images independently visualized and reviewed with patient today in clinic  Reviewed number of steroid injection in the last 2 yrs and importance of limiting overall number as much as possible  Defer foot injection for now, at least one month before considering again  Risks, potential SE reviewed in detail  Expectations and goals of CSI reviewed  Often 2-3 days for steroid effect, and can take up to two weeks for maximum effect  We discussed modified progressive pain-free activity as tolerated  Do not overuse in first two weeks if feeling better due to concern for vulnerability while steroid is working  Supportive care reviewed  All questions were answered today  Contact us with additional questions or concerns  Signs and sx of concern reviewed      José Miguel Linder DO, RADHA  Primary Care Sports Medicine  [facility]         Disclaimer: This note consists of symbols derived from keyboarding, dictation and/or voice recognition software. As a result, there may be errors in the script that have gone undetected. Please consider this when  interpreting information found in this chart.

## 2017-04-07 NOTE — TELEPHONE ENCOUNTER
Per the 2/10/17 clinic notes:  2. Start metoprolol for migraine prevention    She needs to schedule a clinic visit also.    mychart sent to Liseth Caldera NP to review.    Denisse Maldonado RN-BSN  [facility]

## 2017-04-12 NOTE — TELEPHONE ENCOUNTER
As of the mychart and telephone encounter on 2/16/2017, I had recommended that she cease use of the metoprolol. I did not know she was using it.  Furthermore, Dr. Liz Peterson has taken over this script as of 3/14/2017, so she needs to follow up with Dr. Peterson with this question.    Thanks.  Liseth LOPEZ, RN CNP, FNP  [facility]

## 2017-04-12 NOTE — TELEPHONE ENCOUNTER
Pt's message on 4/6:    Ciaran Childers,  I have been having more headaches again. Can I go up on the Metoprolol? I am worried about the [place] heat and sun causing them too.    Thanks,  Jacquie  -------------  Based on another message below, pt was leaving for [place] on Tuesday 4/11 and needed an answer re: the metoprolol dose.  Since pt uses Mychart, she will likely be able to check any further correspondence but it is unclear if she will need another prescription. Will have Liseth review.     Laisha Sanchez, MARVAN, RN-BC  Patient Care Supervisor/Care Coordinator  [facility]

## 2017-04-13 NOTE — TELEPHONE ENCOUNTER
LABOMAR sent to patient 4/13/17        Liseth Santos reminded us that on 2/16/17 she recommended that you cease using the Metoprolol related to some significant side effects you reported to her via Dovetailt. She has been since under the impression that you have not been using that medication. We see now that Dr. Liz Peterson has taken over this script as of 3/14/2017. It would be best for you to discuss the dose and usage with her at this point but please keep us in the loop. I hope you have a nice trip and safe travels and we will see you for an office visit when you return from [place]. I recommend making your appt with Liseth as soon as possible as her schedule tends to fill up quickly.Our scheduling phone number is [phone].    Thank You,  MARVA TrotterN, RN  Care Coordinator  [facility]

## 2017-04-14 NOTE — TELEPHONE ENCOUNTER
Patient has addressed medication request with PCP.    MARVA DyerN, RN-BC  Patient Care Supervisor/Care Coordinator  [facility]

## 2017-05-03 DIAGNOSIS — E87.6 HYPOKALEMIA: ICD-10-CM

## 2017-05-03 PROCEDURE — 36415 COLL VENOUS BLD VENIPUNCTURE: CPT | Performed by: FAMILY MEDICINE

## 2017-05-03 PROCEDURE — 84132 ASSAY OF SERUM POTASSIUM: CPT | Performed by: FAMILY MEDICINE

## 2017-05-04 LAB — POTASSIUM SERPL-SCNC: 3.8 MMOL/L (ref 3.4–5.3)

## 2017-05-12 ENCOUNTER — OFFICE VISIT (OUTPATIENT)
Dept: PALLIATIVE MEDICINE | Facility: CLINIC | Age: 57
End: 2017-05-12
Payer: COMMERCIAL

## 2017-05-12 VITALS — HEART RATE: 80 BPM | SYSTOLIC BLOOD PRESSURE: 123 MMHG | DIASTOLIC BLOOD PRESSURE: 85 MMHG

## 2017-05-12 DIAGNOSIS — M53.3 SI (SACROILIAC) JOINT DYSFUNCTION: ICD-10-CM

## 2017-05-12 DIAGNOSIS — M79.645 BILATERAL THUMB PAIN: ICD-10-CM

## 2017-05-12 DIAGNOSIS — M51.369 DDD (DEGENERATIVE DISC DISEASE), LUMBAR: ICD-10-CM

## 2017-05-12 DIAGNOSIS — M47.817 LUMBOSACRAL SPONDYLOSIS WITHOUT MYELOPATHY: ICD-10-CM

## 2017-05-12 DIAGNOSIS — M70.61 GREATER TROCHANTERIC BURSITIS OF BOTH HIPS: ICD-10-CM

## 2017-05-12 DIAGNOSIS — R00.0 SINUS TACHYCARDIA: ICD-10-CM

## 2017-05-12 DIAGNOSIS — M70.62 GREATER TROCHANTERIC BURSITIS OF BOTH HIPS: ICD-10-CM

## 2017-05-12 DIAGNOSIS — M70.60 GREATER TROCHANTERIC BURSITIS, UNSPECIFIED LATERALITY: ICD-10-CM

## 2017-05-12 DIAGNOSIS — R00.2 PALPITATIONS: ICD-10-CM

## 2017-05-12 DIAGNOSIS — M19.049 HAND ARTHRITIS: ICD-10-CM

## 2017-05-12 DIAGNOSIS — M79.7 FIBROMYALGIA: ICD-10-CM

## 2017-05-12 DIAGNOSIS — M79.644 BILATERAL THUMB PAIN: ICD-10-CM

## 2017-05-12 DIAGNOSIS — G43.009 MIGRAINE WITHOUT AURA AND WITHOUT STATUS MIGRAINOSUS, NOT INTRACTABLE: ICD-10-CM

## 2017-05-12 DIAGNOSIS — M79.18 MYOFASCIAL PAIN: ICD-10-CM

## 2017-05-12 PROCEDURE — 99214 OFFICE O/P EST MOD 30 MIN: CPT | Performed by: NURSE PRACTITIONER

## 2017-05-12 RX ORDER — CELECOXIB 200 MG/1
CAPSULE ORAL
Qty: 180 CAPSULE | Refills: 1 | Status: SHIPPED | OUTPATIENT
Start: 2017-05-12 | End: 2017-11-07

## 2017-05-12 RX ORDER — HYDROCODONE BITARTRATE AND ACETAMINOPHEN 5; 325 MG/1; MG/1
TABLET ORAL
Qty: 100 TABLET | Refills: 0 | Status: SHIPPED | OUTPATIENT
Start: 2017-05-12 | End: 2017-06-22

## 2017-05-12 RX ORDER — RIZATRIPTAN BENZOATE 10 MG/1
5-10 TABLET ORAL
Qty: 15 TABLET | Refills: 1 | Status: SHIPPED | OUTPATIENT
Start: 2017-05-12 | End: 2017-07-26

## 2017-05-12 RX ORDER — METOPROLOL SUCCINATE 25 MG/1
TABLET, EXTENDED RELEASE ORAL
Qty: 45 TABLET | Refills: 1 | Status: SHIPPED | OUTPATIENT
Start: 2017-05-12 | End: 2017-07-10

## 2017-05-12 ASSESSMENT — PAIN SCALES - GENERAL: PAINLEVEL: SEVERE PAIN (6)

## 2017-05-12 NOTE — MR AVS SNAPSHOT
After Visit Summary   5/12/2017    Jacquie Amador    MRN: [id number]           Patient Information     Date Of Birth          1960        Visit Information        Provider Department      5/12/2017 1:30 PM Liseth Caldera APRN [facility]        Today's Diagnoses     Lumbosacral spondylosis without myelopathy        DDD (degenerative disc disease), lumbar        SI (sacroiliac) joint dysfunction        Greater trochanteric bursitis of both hips        Myofascial pain        Fibromyalgia        Greater trochanteric bursitis, unspecified laterality        Hand arthritis        Palpitations        Sinus tachycardia        Migraine without aura and without status migrainosus, not intractable        Bilateral thumb pain          Care Instructions    PLAN:.  1. Schedule with Lorena Gillespie in PHYSICAL THERAPY, let me know once you have finished 3-4 visits and I can then begin the PA process for repeat Lumbar RFA  2. Medications:   1. No changes  3. Procedures: none at present  4. Follow-up with me in 8-10 weeks.    Nurse Triage line: [phone]   Call this number with any questions or concerns. You may leave a detailed message anytime. Calls are typically returned Monday through Friday between 8 AM and 4:30 PM. We usually get back to you within 2 business days depending on the issue/request.   Medication refills:     For non-narcotic medications, call your pharmacy directly to request a refill. The pharmacy will contact the [facility] for authorization. Please allow 3-4 days for these refills to be processed.     For narcotic refills, call the nurse triage line or send a [employer] message. Please contact us 7-10 days before your refill is due. The message MUST include the name of the specific medication(s) requested and how you would like to receive the prescription(s). The options are as follows:     Pain Clinic staff can mail the prescription to your pharmacy. Please  tell us the name of the pharmacy.     You may pick the prescription up at the Pain Clinic (tell us the location) or during a clinic visit with your pain provider     Pain Clinic staff can deliver the prescription to the [facility] pharmacy in the clinic building. Please tell us the location.   Scheduling number: [phone]. Call this number to schedule or change appointments.   We believe regular attendance is key to your success in our program.   Any time you are unable to keep your appointment we ask that you call us at least 24 hours in advance to let us know. This will allow us to offer the appointment time to another patient.               Follow-ups after your visit        Additional Services     PAIN PT EVAL AND TREAT                 Who to contact     If you have questions or need follow up information about today's clinic visit or your schedule please contact [facility] directly at [phone].  Normal or non-critical lab and imaging results will be communicated to you by [email or web address], letter or phone within 4 business days after the clinic has received the results. If you do not hear from us within 7 days, please contact the clinic through [email or web address] or phone. If you have a critical or abnormal lab result, we will notify you by phone as soon as possible.  Submit refill requests through [employer] or call your pharmacy and they will forward the refill request to us. Please allow 3 business days for your refill to be completed.          Additional Information About Your Visit        [employer] Information     [employer] gives you secure access to your electronic health record. If you see a primary care provider, you can also send messages to your care team and make appointments. If you have questions, please call your primary care clinic.  If you do not have a primary care provider, please call [phone] and they will assist you.        Care EveryWhere ID     This is your Care EveryWhere ID. This could be  used by other organizations to access your [place] medical records  [id number]        Your Vitals Were     Pulse                   80            Blood Pressure from Last 3 Encounters:   05/12/17 123/85   04/04/17 127/84   03/31/17 121/80    Weight from Last 3 Encounters:   03/31/17 92.5 kg (204 lb)   12/22/16 93 kg (205 lb)   07/27/16 94.3 kg (208 lb)              We Performed the Following     PAIN PT EVAL AND TREAT          Today's Medication Changes          These changes are accurate as of: 5/12/17  2:23 PM.  If you have any questions, ask your nurse or doctor.               These medicines have changed or have updated prescriptions.        Dose/Directions    HYDROcodone-acetaminophen 5-325 MG per tablet   Commonly known as:  NORCO   This may have changed:  additional instructions   Used for:  Lumbosacral spondylosis without myelopathy, DDD (degenerative disc disease), lumbar, SI (sacroiliac) joint dysfunction, Greater trochanteric bursitis of both hips, Myofascial pain, Fibromyalgia        May take 1 tablet every 4-6 hours as needed for pain. Max of 4 tabs per day, next script will reduce back to #100 per script. Allowing #120 tabs since she is going on vacation and likely will be more active. OK to fill and start on 5/12/2017 to last until 6/11/2017   Quantity:  100 tablet   Refills:  0       metoprolol 25 MG 24 hr tablet   Commonly known as:  TOPROL-XL   This may have changed:    - how much to take  - how to take this  - when to take this  - additional instructions   Used for:  Palpitations, Sinus tachycardia        Take 1.5 tablets per day   Quantity:  45 tablet   Refills:  1            Where to get your medicines      These medications were sent to [id number] - [place] - [address]  [address], [place] [address]     Phone:  [phone]     celecoxib 200 MG capsule    diclofenac 1 % Gel topical gel    metoprolol 25 MG 24 hr tablet         Some of these will need a paper prescription  and others can be bought over the counter.  Ask your nurse if you have questions.     Bring a paper prescription for each of these medications     HYDROcodone-acetaminophen 5-325 MG per tablet    rizatriptan 10 MG tablet                Primary Care Provider Office Phone # Fax #    Liz Peterson [phone]       [facility] [address] [facility] [address]        Thank you!     Thank you for choosing [facility]  for your care. Our goal is always to provide you with excellent care. Hearing back from our patients is one way we can continue to improve our services. Please take a few minutes to complete the written survey that you may receive in the mail after your visit with us. Thank you!             Your Updated Medication List - Protect others around you: Learn how to safely use, store and throw away your medicines at www.disposemymeds.org.          This list is accurate as of: 5/12/17  2:23 PM.  Always use your most recent med list.                   Brand Name Dispense Instructions for use    acidophilus Caps     60 capsule    Take 1 capsule by mouth daily Take two hours before or after antibiotic dose.  Continue for 1 week after antibiotic therapy completed       adapalene 0.1 % gel    DIFFERIN    45 g    Apply topically At Bedtime       ALPRAZolam 0.25 MG tablet    XANAX    90 tablet    TAKE 1 TABLET DAILY AS NEEDED FOR ANXIETY       * buPROPion 150 MG 12 hr tablet    WELLBUTRIN SR    90 tablet    Take 1 tablet (150 mg) by mouth every evening       * buPROPion 200 MG 12 hr tablet    WELLBUTRIN SR    120 tablet    Take 1 tablet (200 mg) by mouth every morning       calcium carbonate-vitamin D 500-400 MG-UNIT Tabs per tablet     180 tablet    Take 1 tablet by mouth 2 times daily       celecoxib 200 MG capsule    celeBREX    180 capsule    Take 1 capsule twice daily as needed for pain this is a 3 month script       clindamycin 1 % lotion    CLEOCIN T     Apply topically 2  times daily       clobetasol 0.05 % cream    TEMOVATE    30 g    APPLY SPARINGLY TO AFFECTED AREA TWICE DAILY FOR 14 DAYS.  DO NOT APPLY TO FACE.       diclofenac 1 % Gel topical gel    VOLTAREN    9 Tube    Apply 2 grams to hands and 4 grams to hips up to 4 times daily. T       DULoxetine 60 MG EC capsule    CYMBALTA    180 capsule    Take 2 capsules (120 mg) by mouth daily       fluticasone-salmeterol 500-50 MCG/DOSE diskus inhaler    ADVAIR    3 Inhaler    Inhale 1 puff into the lungs 2 times daily       HYDROcodone-acetaminophen 5-325 MG per tablet    NORCO    100 tablet    May take 1 tablet every 4-6 hours as needed for pain. Max of 4 tabs per day, next script will reduce back to #100 per script. Allowing #120 tabs since she is going on vacation and likely will be more active. OK to fill and start on 5/12/2017 to last until 6/11/2017       hydrocortisone 2.5 % cream     30 g    APPLY TOPICALLY 2 TIMES DAILY AS NEEDED       losartan-hydrochlorothiazide 100-12.5 MG per tablet    HYZAAR    90 tablet    Take 1 tablet by mouth daily       metoprolol 25 MG 24 hr tablet    TOPROL-XL    45 tablet    Take 1.5 tablets per day       montelukast 10 MG tablet    SINGULAIR    90 tablet    TAKE 1 TABLET (10 MG) BY ORAL ROUTE ONCE DAILY IN THE EVENING       mupirocin 2 % cream    BACTROBAN    30 g    Apply topically 3 times daily       nystatin 137801 UNIT/ML suspension    MYCOSTATIN    380 mL    Take by mouth 4 times daily       nystatin cream    MYCOSTATIN    45 g    Apply topically 3 times daily       ranitidine 150 MG tablet    ZANTAC    0    ONE TABLET TWICE DAILY       rizatriptan 10 MG tablet    MAXALT    15 tablet    Take 0.5-1 tablets (5-10 mg) by mouth at onset of headache for migraine May repeat dose every 2 hours as needed up to max of 30mg in 24 hours. Do not take more than 3 times per week as overuse can contribute to rebound migraine headaches. Do NOT use with sumatriptan.       rOPINIRole 1 MG tablet    REQUIP     180 tablet    TAKE 2 TABLETS (2 MG) BY MOUTH AT BEDTIME       traZODone 50 MG tablet    DESYREL    270 tablet    TAKE 2-3 TABLETS (100-150 MG) BY MOUTH AT BEDTIME       tretinoin 0.025 % cream    RETIN-A    45 g    Spread a pea size amount into affected area topically at bedtime.  Use sunscreen SPF>20.       VENTOLIN  (90 BASE) MCG/ACT Inhaler   Generic drug:  albuterol     54 g    INHALE 2 PUFFS INTO THE LUNGS EVERY 6 HOURS AS NEEDED FOR SHORTNESS OF BREATH / DYSPNEA       ZYRTEC 10 MG tablet   Generic drug:  cetirizine     90    1 TABLET DAILY       * Notice:  This list has 2 medication(s) that are the same as other medications prescribed for you. Read the directions carefully, and ask your doctor or other care provider to review them with you.
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
## 2017-05-12 NOTE — NURSING NOTE
"Chief Complaint   Patient presents with     Pain       Initial /85  Pulse 80 Estimated body mass index is 31.95 kg/(m^2) as calculated from the following:    Height as of 3/31/17: 1.702 m (5' 7\").    Weight as of 3/31/17: 92.5 kg (204 lb).  Medication Reconciliation: complete     Mike Carreno MA      "

## 2017-05-12 NOTE — PATIENT INSTRUCTIONS
PLAN:.  1. Schedule with Lorena Gillespie in PHYSICAL THERAPY, let me know once you have finished 3-4 visits and I can then begin the PA process for repeat Lumbar RFA  2. Medications:   1. No changes  3. Procedures: none at present  4. Follow-up with me in 8-10 weeks.    Nurse Triage line: [phone]   Call this number with any questions or concerns. You may leave a detailed message anytime. Calls are typically returned Monday through Friday between 8 AM and 4:30 PM. We usually get back to you within 2 business days depending on the issue/request.   Medication refills:     For non-narcotic medications, call your pharmacy directly to request a refill. The pharmacy will contact the [facility] for authorization. Please allow 3-4 days for these refills to be processed.     For narcotic refills, call the nurse triage line or send a [employer] message. Please contact us 7-10 days before your refill is due. The message MUST include the name of the specific medication(s) requested and how you would like to receive the prescription(s). The options are as follows:     Pain Clinic staff can mail the prescription to your pharmacy. Please tell us the name of the pharmacy.     You may pick the prescription up at the Pain Clinic (tell us the location) or during a clinic visit with your pain provider     Pain Clinic staff can deliver the prescription to the [facility] pharmacy in the clinic building. Please tell us the location.   Scheduling number: [phone]. Call this number to schedule or change appointments.   We believe regular attendance is key to your success in our program.   Any time you are unable to keep your appointment we ask that you call us at least 24 hours in advance to let us know. This will allow us to offer the appointment time to another patient.

## 2017-05-23 ENCOUNTER — OFFICE VISIT (OUTPATIENT)
Dept: PALLIATIVE MEDICINE | Facility: CLINIC | Age: 57
End: 2017-05-23
Payer: COMMERCIAL

## 2017-05-23 DIAGNOSIS — M70.62 TROCHANTERIC BURSITIS OF BOTH HIPS: ICD-10-CM

## 2017-05-23 DIAGNOSIS — M70.61 TROCHANTERIC BURSITIS OF BOTH HIPS: ICD-10-CM

## 2017-05-23 DIAGNOSIS — M51.369 DDD (DEGENERATIVE DISC DISEASE), LUMBAR: ICD-10-CM

## 2017-05-23 DIAGNOSIS — M79.18 MYOFASCIAL PAIN: ICD-10-CM

## 2017-05-23 DIAGNOSIS — M79.7 FIBROMYALGIA: Primary | ICD-10-CM

## 2017-05-23 PROCEDURE — 97110 THERAPEUTIC EXERCISES: CPT | Mod: GP | Performed by: PHYSICAL THERAPIST

## 2017-05-23 PROCEDURE — 97164 PT RE-EVAL EST PLAN CARE: CPT | Mod: GP | Performed by: PHYSICAL THERAPIST

## 2017-05-23 NOTE — MR AVS SNAPSHOT
After Visit Summary   5/23/2017    Jacquie Amador    MRN: [id number]           Patient Information     Date Of Birth          1960        Visit Information        Provider Department      5/23/2017 10:15 AM Lauren Gillespie, PT [facility]        Today's Diagnoses     Fibromyalgia    -  1    DDD (degenerative disc disease), lumbar        Myofascial pain        Trochanteric bursitis of both hips           Follow-ups after your visit        Your next 10 appointments already scheduled     May 31, 2017  1:30 PM CDT   Return Visit with Lauren Gillespie PT   [facility] ([facility])    [address] [address]   [phone]            Jun 07, 2017 10:30 AM CDT   Return Visit with Lauren Gillespie PT   [facility] ([facility])    [address] [address]   [phone]            Jul 28, 2017 11:00 AM CDT   Return Visit with JESSICA Guillory CNP   [facility] ([facility])    [address] [address]   [phone]              Who to contact     If you have questions or need follow up information about today's clinic visit or your schedule please contact [facility] directly at [phone].  Normal or non-critical lab and imaging results will be communicated to you by MyChart, letter or phone within 4 business days after the clinic has received the results. If you do not hear from us within 7 days, please contact the clinic through MyChart or phone. If you have a critical or abnormal lab result, we will notify you by phone as soon as possible.  Submit refill requests through [employer] or call your pharmacy and they will forward the refill request to us. Please allow 3 business days for your refill to be completed.          Additional Information About Your Visit        MyChart Information      [email or web address] gives you secure access to your electronic health record. If you see a primary care provider, you can also send messages to your care team and make appointments. If you have questions, please call your primary care clinic.  If you do not have a primary care provider, please call [phone] and they will assist you.        Care EveryWhere ID     This is your Care EveryWhere ID. This could be used by other organizations to access your [facility] medical records  [id number]         Blood Pressure from Last 3 Encounters:   05/12/17 123/85   04/04/17 127/84   03/31/17 121/80    Weight from Last 3 Encounters:   03/31/17 92.5 kg (204 lb)   12/22/16 93 kg (205 lb)   07/27/16 94.3 kg (208 lb)              We Performed the Following     HC PT RE-EVAL     THERAPEUTIC EXERCISES        Primary Care Provider Office Phone # Fax #    Liz Peterson [phone]       [facility] [address] [address] [address]        Thank you!     Thank you for choosing [facility]  for your care. Our goal is always to provide you with excellent care. Hearing back from our patients is one way we can continue to improve our services. Please take a few minutes to complete the written survey that you may receive in the mail after your visit with us. Thank you!             Your Updated Medication List - Protect others around you: Learn how to safely use, store and throw away your medicines at www.disposemymeds.org.          This list is accurate as of: 5/23/17 11:35 AM.  Always use your most recent med list.                   Brand Name Dispense Instructions for use    acidophilus Caps     60 capsule    Take 1 capsule by mouth daily Take two hours before or after antibiotic dose.  Continue for 1 week after antibiotic therapy completed       adapalene 0.1 % gel    DIFFERIN    45 g    Apply topically At Bedtime       ALPRAZolam 0.25 MG tablet    XANAX    90 tablet    TAKE 1 TABLET DAILY AS NEEDED FOR  ANXIETY       * buPROPion 150 MG 12 hr tablet    WELLBUTRIN SR    90 tablet    Take 1 tablet (150 mg) by mouth every evening       * buPROPion 200 MG 12 hr tablet    WELLBUTRIN SR    120 tablet    Take 1 tablet (200 mg) by mouth every morning       calcium carbonate-vitamin D 500-400 MG-UNIT Tabs per tablet     180 tablet    Take 1 tablet by mouth 2 times daily       celecoxib 200 MG capsule    celeBREX    180 capsule    Take 1 capsule twice daily as needed for pain this is a 3 month script       clindamycin 1 % lotion    CLEOCIN T     Apply topically 2 times daily       clobetasol 0.05 % cream    TEMOVATE    30 g    APPLY SPARINGLY TO AFFECTED AREA TWICE DAILY FOR 14 DAYS.  DO NOT APPLY TO FACE.       diclofenac 1 % Gel topical gel    VOLTAREN    9 Tube    Apply 2 grams to hands and 4 grams to hips up to 4 times daily. T       DULoxetine 60 MG EC capsule    CYMBALTA    180 capsule    Take 2 capsules (120 mg) by mouth daily       fluticasone-salmeterol 500-50 MCG/DOSE diskus inhaler    ADVAIR    3 Inhaler    Inhale 1 puff into the lungs 2 times daily       HYDROcodone-acetaminophen 5-325 MG per tablet    NORCO    100 tablet    May take 1 tablet every 4-6 hours as needed for pain. Max of 4 tabs per day, next script will reduce back to #100 per script. Allowing #120 tabs since she is going on vacation and likely will be more active. OK to fill and start on 5/12/2017 to last until 6/11/2017       hydrocortisone 2.5 % cream     30 g    APPLY TOPICALLY 2 TIMES DAILY AS NEEDED       losartan-hydrochlorothiazide 100-12.5 MG per tablet    HYZAAR    90 tablet    Take 1 tablet by mouth daily       metoprolol 25 MG 24 hr tablet    TOPROL-XL    45 tablet    Take 1.5 tablets per day       montelukast 10 MG tablet    SINGULAIR    90 tablet    TAKE 1 TABLET (10 MG) BY ORAL ROUTE ONCE DAILY IN THE EVENING       mupirocin 2 % cream    BACTROBAN    30 g    Apply topically 3 times daily       nystatin 154582 UNIT/ML suspension     MYCOSTATIN    380 mL    Take by mouth 4 times daily       nystatin cream    MYCOSTATIN    45 g    Apply topically 3 times daily       ranitidine 150 MG tablet    ZANTAC    0    ONE TABLET TWICE DAILY       rizatriptan 10 MG tablet    MAXALT    15 tablet    Take 0.5-1 tablets (5-10 mg) by mouth at onset of headache for migraine May repeat dose every 2 hours as needed up to max of 30mg in 24 hours. Do not take more than 3 times per week as overuse can contribute to rebound migraine headaches. Do NOT use with sumatriptan.       rOPINIRole 1 MG tablet    REQUIP    180 tablet    TAKE 2 TABLETS (2 MG) BY MOUTH AT BEDTIME       traZODone 50 MG tablet    DESYREL    270 tablet    TAKE 2-3 TABLETS (100-150 MG) BY MOUTH AT BEDTIME       tretinoin 0.025 % cream    RETIN-A    45 g    Spread a pea size amount into affected area topically at bedtime.  Use sunscreen SPF>20.       VENTOLIN  (90 BASE) MCG/ACT Inhaler   Generic drug:  albuterol     54 g    INHALE 2 PUFFS INTO THE LUNGS EVERY 6 HOURS AS NEEDED FOR SHORTNESS OF BREATH / DYSPNEA       ZYRTEC 10 MG tablet   Generic drug:  cetirizine     90    1 TABLET DAILY       * Notice:  This list has 2 medication(s) that are the same as other medications prescribed for you. Read the directions carefully, and ask your doctor or other care provider to review them with you.
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
## 2017-06-01 ENCOUNTER — MYC MEDICAL ADVICE (OUTPATIENT)
Dept: PALLIATIVE MEDICINE | Facility: CLINIC | Age: 57
End: 2017-06-01

## 2017-06-02 NOTE — TELEPHONE ENCOUNTER
Jacquie-    I am sorry that you are struggling with your pain. Using the Voltaren gel consistently and trying ice and heat can be beneficial. Gentle stretching and a warm bath or shower can also be beneficial. You could alternate using the Voltaren gel with Aspercreme with 4% lidocaine cream; this is found over-the-counter and may be used according to package instructions for topical pain relief.   I could offer you a referral to hand therapy, but this can't be done until you are done with seeing Lorena for several visits so that we can get going on the prior authorization for the repeat lumbar RFA.   Looks like you have been seeing Dr. Linder for steroidal injections into the thumbs and the lateral hips (hip bursitis) but that he stressed we need to limit the number of steroidal injections per year due to issues with possible bone thinning with repeating these too often.   I could have you work with Thanh Mcfarland in our HEALTH PSYCHOLOGY department on relaxation, meditation and other techniques to help you with having more coping skills at your disposal when you are experiencing pain and especially pain flares.   Let me know your thoughts.     Thanks-  Liseth LOPEZ RN CNP, P  [facility]    I sent the above [employer] message to the patient.  Liseth LOPEZ RN CNP, [facility]

## 2017-06-02 NOTE — TELEPHONE ENCOUNTER
Per patient Zo message:  Created: 6/1/2017 2:35 PM      Hi Liseth,    My pain has been out of control since [place], my hips and thighs are super painful. My butt, back, knees and feet on top and big toe. My hands are worse than normal too, I really couldn't even think about driving to therapy this week. I don't know why all are so painful my hips and thighs (outside) wake me every morning about 2 and 4:00 am. I use the Volteran but that wears off by then also. I am having a big problem with the amount of stairs we have going up. I am hoping you can suggest something.    Thank you,  Jacquie Amador        Routed to the nursing pool to review.    Denisse Maldonado, RN-BSN  [facility]

## 2017-06-02 NOTE — TELEPHONE ENCOUNTER
Pt was last seen on 5/12/2017:    PLAN:.  1. Schedule with Lorena Gillespie in PHYSICAL THERAPY, let me know once you have finished 3-4 visits and I can then begin the PA process for repeat Lumbar RFA  2. Medications:   1. No changes  3. Procedures: none at present  4. Follow-up with me in 8-10 weeks.    Ciaran Dhillon    I will send your message to Liseth. Can you please call to schedule another appointment with Lorena in PT ASAP as well. Since you missed the one on 5/31/2017 that will push out the time frame that we can begin the process for the repeat lumbar RFA. I will have her respond to you regarding any other suggestions.     Michell Ritchie RN, [facility]  Pain Clinic Care Coordinator

## 2017-06-02 NOTE — TELEPHONE ENCOUNTER
"My chart response from pt:    I already had an appt. jeanine Middleton that I made last time.  I am concerned  cause the RFA isn't going to cover some of the pain I have. I also thought the message would have been sent to Liseth before now.     Thanks,   Jacquie Dhillon    Per your last instructions with liseth on 5/12/2017: \"Schedule with Lorena Gillespie in PHYSICAL THERAPY, let me know once you have finished 3-4 visits and I can then begin the PA process for repeat Lumbar RFA\"    So I was simply stating that your next visit would only give us 2 visits and we need 3-4. As I indicated Liseth will be the one to direct you regarding further suggestions.     Michell Ritchie RN, [facility]  Pain Clinic Care Coordinator      "

## 2017-06-05 NOTE — TELEPHONE ENCOUNTER
My chart message from pt:    Okay thanks. Michell keeps telling me I need to get in with Emi. I have one and had one but I wasn't really feeling comfortable driving as my hands were so sore and I had nasty cough still. I had already made an appt for next week two weeks ago. So I am seeing Emi I understand I need 3-4 more per Michell, I asked Emi if I could come twice a week but she said no and I would like if possible to get done by July 31. It will be another month to get all in doing once a week.      Honestly I didn't need Michell to quote you about PT I am totally aware. I will be in next week.     Thanks for replying it's just frustrating. I wasn't going before due to many issues and doubt Michell knew that before her note to me. She could have looked at the appts and saw I already had one so I didn't need to make one ASAP per Michell.   Thanks     See you soon.   Jacquie    Sending to provider to respond.     Michell Ritchie, RN, [facility]  Pain Clinic Care Coordinator

## 2017-06-06 ENCOUNTER — MYC MEDICAL ADVICE (OUTPATIENT)
Dept: PALLIATIVE MEDICINE | Facility: CLINIC | Age: 57
End: 2017-06-06

## 2017-06-06 NOTE — TELEPHONE ENCOUNTER
See the 6/1/17 mychart encounter for more information.    Denisse Maldonado RN-BSN  [facility]

## 2017-06-06 NOTE — TELEPHONE ENCOUNTER
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 I am bone on bone it clicks I went 5 months last time for injections in hands. The injections are my life saver which is saving me from two big surgeries . My hands get awful and painful on days where I use them more than usual and therapy would make them worse. I think I am having major pain breakthrough or my Fibromyalgia is helping w pain. I just was writing to you because I feel that nobody totally listens to the pain I am. It's not just my hands and hips it's everywhere now (well almost) . I was wondering about switching Vicodin to ??. I tried another pain med a little no time ago.  The Vicodin helps when things are settled down and takes the edge off. However I feel my body is slowly getting worse and I have noticed it a ton this year.    Dr. Linder did mention I should try Ativan as it lasts longer than Xanax and my DrRohini Didn't seem to want to change it even though I don't take Xanax often.  I have therapy tomorrow I will make an appt for the next week and that is 3 or 4.    Thanks Liseth Amador

## 2017-06-07 ENCOUNTER — OFFICE VISIT (OUTPATIENT)
Dept: PALLIATIVE MEDICINE | Facility: CLINIC | Age: 57
End: 2017-06-07
Payer: COMMERCIAL

## 2017-06-07 DIAGNOSIS — M79.7 FIBROMYALGIA: Primary | ICD-10-CM

## 2017-06-07 DIAGNOSIS — M70.61 TROCHANTERIC BURSITIS OF BOTH HIPS: ICD-10-CM

## 2017-06-07 DIAGNOSIS — M79.18 MYOFASCIAL PAIN: ICD-10-CM

## 2017-06-07 DIAGNOSIS — M51.369 DDD (DEGENERATIVE DISC DISEASE), LUMBAR: ICD-10-CM

## 2017-06-07 DIAGNOSIS — M70.62 TROCHANTERIC BURSITIS OF BOTH HIPS: ICD-10-CM

## 2017-06-07 PROCEDURE — 97112 NEUROMUSCULAR REEDUCATION: CPT | Mod: GP | Performed by: PHYSICAL THERAPIST

## 2017-06-07 PROCEDURE — 97110 THERAPEUTIC EXERCISES: CPT | Mod: GP | Performed by: PHYSICAL THERAPIST

## 2017-06-07 NOTE — PROGRESS NOTES
"Pain Physical Therapy Progress Note    Patient Name: Jacquie Amador     YOB: 1960     Medical Record Number: [id number]    Visits: 4/8    Diagnosis:    Fibromyalgia  DDD (degenerative disc disease), lumbar  Myofascial pain  Trochanteric bursitis of both hips    Subjective: Patient reports that she has been painful recently, issues with pain all over.  Feet more painful today, having issues with arthritis.    Discussed hand therapy; may benefit from order    Pain with stairs    Self Care  HEP: helps \"a tad\"  Walking/Aerobic Activity: none  Heat/Ice: ice sometimes  Posture: NA  Mini Breaks: NA  Breathing/Relaxation: NA  Pacing: NA      Objective Findings:   OBSERVATION: Rounded shoulders, forward flexed in sitting, slightly in standing, slight forward head, sitting with legs/arms crossed--uncrosses with cues about posture, poor core engagement in sitting; demo upper chest breathing  GAIT & LOCOMOTION: antalgic gait; decreased heel to toe bilaterally; wide base of support, moves slowly  MUSCLE PERFORMANCE: needs cues for relaxation of upper chest/shoulder with diaphragmatic breathing    Treatment Interventions:  Therapeutic Procedures/Exercises: reviewed stretches of trunk rotation and IT band, working on crossing at ankles still; instructed in SKTC and piriformis stretch; gave handouts.  discussed basic walking/exercise program, working on increasing activity slowly, starting with goal of 5 mins a day of focused movement, can break up into small parts to begin with; discussed that if feet/hips sore--may not want to do walking--can try marching in place--sitting/standing, etc; can do arm movements; talked about walking in neighborhood-limited by motivation; recommend walking in yard with gardens--doing something more enjoyable     Neuromuscular Reeducation:   Therapeutic Neuroscience Education: When Lions Attack: Sympathetic Nervous System: Patient was educated regarding sympathetic nervous system " topics as they pertain to pain, including stress biology, fight or flight response, the role of adrenaline and cortisol in pain, the body's immune response and multiple output mechanisms.  Metaphors were used to promote deep learning, and the role of self-care techniques useful in calming the sympathetic nervous system were addressed. Gave handout.  Diaphragmatic breathing exercise: Patient instructed in diaphragmatic breathing in order to help calm the nervous system and reduce stress.  This will also help to decrease use of accessory muscles, promote control of the diaphragm muscle, increase trunk mobility, and to increase oxygen/gas intake. Gave Handout.  Supine decompression x 10 mins with focus on breathing  __________________________________________________________________    Instruction on home program: HEP, breathing    Assessment:  Ongoing Functional Limitations Include:  Patient tolerated/responded well to treatment  Impression: slowly working on self care/relaxation    Recommendations: continue    Intensity Level: 2 (1=low intensity; 5=high intensity)    Demonstrates/Verbalizes Technique: 2 (1= poor technique-difficulty performing exercises,significant cues required; 5= good technique-performs exercises without cues)    Body Awareness: 2 (1=low awareness; 5=high awareness)    Posture/Stability: 2 (1= poor posture, stability; 5= good posture, stability)    Motivational Level: Cooperative and Distracted, in pain    Response to Teaching: cooperative    Factors that affect learning: None    _______________________________________________________________________  Plan of Care   Continue PT to support reactivation and integration of self regulation pain management skills;  Focus of next session will be on: HEP, self care, stairs?     Present:  DUSTY     Total Visit Time:  45 minutes   Neuro-Re Ed: 20 mins; TE: 25 mins      Therapist: Lauren Gillespie PT             Date: 6/7/2017

## 2017-06-07 NOTE — MR AVS SNAPSHOT
After Visit Summary   6/7/2017    Jacquie Amador    MRN: [id number]           Patient Information     Date Of Birth          1960        Visit Information        Provider Department      6/7/2017 10:30 AM Lauren Gillespie, PT [facility]        Today's Diagnoses     Fibromyalgia    -  1    DDD (degenerative disc disease), lumbar        Myofascial pain        Trochanteric bursitis of both hips           Follow-ups after your visit        Your next 10 appointments already scheduled     Jun 12, 2017 11:00 AM CDT   Return Visit with Lauren Gillespie PT   [facility] ([facility])    [address] [address]   [phone]            Mannie 15, 2017 11:00 AM CDT   Return Visit with Lauren Gillespie PT   [facility] ([facility])    [address] [address]   [phone]            Jul 28, 2017 11:00 AM CDT   Return Visit with JESSICA Guillory CNP   [facility] ([facility])    [address] [id number] [phone]              Who to contact     If you have questions or need follow up information about today's clinic visit or your schedule please contact [facility] directly at [phone].  Normal or non-critical lab and imaging results will be communicated to you by MyChart, letter or phone within 4 business days after the clinic has received the results. If you do not hear from us within 7 days, please contact the clinic through MyChart or phone. If you have a critical or abnormal lab result, we will notify you by phone as soon as possible.  Submit refill requests through [employer] or call your pharmacy and they will forward the refill request to us. Please allow 3 business days for your refill to be completed.          Additional Information About Your Visit        MyChart Information      [employer] gives you secure access to your electronic health record. If you see a primary care provider, you can also send messages to your care team and make appointments. If you have questions, please call your primary care clinic.  If you do not have a primary care provider, please call [phone] and they will assist you.        Care EveryWhere ID     This is your Care EveryWhere ID. This could be used by other organizations to access your [place] medical records  [id number]         Blood Pressure from Last 3 Encounters:   05/12/17 123/85   04/04/17 127/84   03/31/17 121/80    Weight from Last 3 Encounters:   03/31/17 92.5 kg (204 lb)   12/22/16 93 kg (205 lb)   07/27/16 94.3 kg (208 lb)              We Performed the Following     NEUROMUSCULAR RE-EDUCATION     THERAPEUTIC EXERCISES        Primary Care Provider Office Phone # Fax #    Liz Peterson [phone]       [facility] [address] [address] [address]        Thank you!     Thank you for choosing [facility]  for your care. Our goal is always to provide you with excellent care. Hearing back from our patients is one way we can continue to improve our services. Please take a few minutes to complete the written survey that you may receive in the mail after your visit with us. Thank you!             Your Updated Medication List - Protect others around you: Learn how to safely use, store and throw away your medicines at www.disposemymeds.org.          This list is accurate as of: 6/7/17 12:26 PM.  Always use your most recent med list.                   Brand Name Dispense Instructions for use    acidophilus Caps     60 capsule    Take 1 capsule by mouth daily Take two hours before or after antibiotic dose.  Continue for 1 week after antibiotic therapy completed       adapalene 0.1 % gel    DIFFERIN    45 g    Apply topically At Bedtime       ADVAIR DISKUS 500-50 MCG/DOSE diskus inhaler   Generic drug:   fluticasone-salmeterol     1 Inhaler    INHALE 1 PUFF BY INHALATION ROUTE 2 TIMES PER DAY IN THE MORNING AND EVENING APPROXIMATELY 12 HOURS APART       ALPRAZolam 0.25 MG tablet    XANAX    90 tablet    TAKE 1 TABLET DAILY AS NEEDED FOR ANXIETY       * buPROPion 150 MG 12 hr tablet    WELLBUTRIN SR    90 tablet    Take 1 tablet (150 mg) by mouth every evening       * buPROPion 200 MG 12 hr tablet    WELLBUTRIN SR    120 tablet    Take 1 tablet (200 mg) by mouth every morning       calcium carbonate-vitamin D 500-400 MG-UNIT Tabs per tablet     180 tablet    Take 1 tablet by mouth 2 times daily       celecoxib 200 MG capsule    celeBREX    180 capsule    Take 1 capsule twice daily as needed for pain this is a 3 month script       clindamycin 1 % lotion    CLEOCIN T     Apply topically 2 times daily       clobetasol 0.05 % cream    TEMOVATE    30 g    APPLY SPARINGLY TO AFFECTED AREA TWICE DAILY FOR 14 DAYS.  DO NOT APPLY TO FACE.       diclofenac 1 % Gel topical gel    VOLTAREN    9 Tube    Apply 2 grams to hands and 4 grams to hips up to 4 times daily. T       DULoxetine 60 MG EC capsule    CYMBALTA    180 capsule    Take 2 capsules (120 mg) by mouth daily       HYDROcodone-acetaminophen 5-325 MG per tablet    NORCO    100 tablet    May take 1 tablet every 4-6 hours as needed for pain. Max of 4 tabs per day, next script will reduce back to #100 per script. Allowing #120 tabs since she is going on vacation and likely will be more active. OK to fill and start on 5/12/2017 to last until 6/11/2017       hydrocortisone 2.5 % cream     30 g    APPLY TOPICALLY 2 TIMES DAILY AS NEEDED       losartan-hydrochlorothiazide 100-12.5 MG per tablet    HYZAAR    90 tablet    Take 1 tablet by mouth daily       metoprolol 25 MG 24 hr tablet    TOPROL-XL    45 tablet    Take 1.5 tablets per day       montelukast 10 MG tablet    SINGULAIR    90 tablet    TAKE 1 TABLET (10 MG) BY ORAL ROUTE ONCE DAILY IN THE EVENING       mupirocin 2 %  cream    BACTROBAN    30 g    Apply topically 3 times daily       nystatin 851255 UNIT/ML suspension    MYCOSTATIN    380 mL    Take by mouth 4 times daily       nystatin cream    MYCOSTATIN    45 g    Apply topically 3 times daily       ranitidine 150 MG tablet    ZANTAC    0    ONE TABLET TWICE DAILY       rizatriptan 10 MG tablet    MAXALT    15 tablet    Take 0.5-1 tablets (5-10 mg) by mouth at onset of headache for migraine May repeat dose every 2 hours as needed up to max of 30mg in 24 hours. Do not take more than 3 times per week as overuse can contribute to rebound migraine headaches. Do NOT use with sumatriptan.       rOPINIRole 1 MG tablet    REQUIP    180 tablet    TAKE 2 TABLETS (2 MG) BY MOUTH AT BEDTIME       traZODone 50 MG tablet    DESYREL    270 tablet    TAKE 2-3 TABLETS (100-150 MG) BY MOUTH AT BEDTIME       tretinoin 0.025 % cream    RETIN-A    45 g    Spread a pea size amount into affected area topically at bedtime.  Use sunscreen SPF>20.       VENTOLIN  (90 BASE) MCG/ACT Inhaler   Generic drug:  albuterol     54 g    INHALE 2 PUFFS INTO THE LUNGS EVERY 6 HOURS AS NEEDED FOR SHORTNESS OF BREATH / DYSPNEA       ZYRTEC 10 MG tablet   Generic drug:  cetirizine     90    1 TABLET DAILY       * Notice:  This list has 2 medication(s) that are the same as other medications prescribed for you. Read the directions carefully, and ask your doctor or other care provider to review them with you.

## 2017-06-09 ENCOUNTER — MYC MEDICAL ADVICE (OUTPATIENT)
Dept: FAMILY MEDICINE | Facility: CLINIC | Age: 57
End: 2017-06-09
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
## 2017-06-12 ENCOUNTER — OFFICE VISIT (OUTPATIENT)
Dept: PALLIATIVE MEDICINE | Facility: CLINIC | Age: 57
End: 2017-06-12
Payer: COMMERCIAL

## 2017-06-12 DIAGNOSIS — M51.369 DDD (DEGENERATIVE DISC DISEASE), LUMBAR: ICD-10-CM

## 2017-06-12 DIAGNOSIS — M79.18 MYOFASCIAL PAIN: ICD-10-CM

## 2017-06-12 DIAGNOSIS — M70.62 TROCHANTERIC BURSITIS OF BOTH HIPS: ICD-10-CM

## 2017-06-12 DIAGNOSIS — M79.7 FIBROMYALGIA: Primary | ICD-10-CM

## 2017-06-12 DIAGNOSIS — M70.61 TROCHANTERIC BURSITIS OF BOTH HIPS: ICD-10-CM

## 2017-06-12 PROCEDURE — 97530 THERAPEUTIC ACTIVITIES: CPT | Mod: GP | Performed by: PHYSICAL THERAPIST

## 2017-06-12 PROCEDURE — 97112 NEUROMUSCULAR REEDUCATION: CPT | Mod: GP | Performed by: PHYSICAL THERAPIST

## 2017-06-12 PROCEDURE — 97110 THERAPEUTIC EXERCISES: CPT | Mod: GP | Performed by: PHYSICAL THERAPIST
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
## 2017-06-12 NOTE — MR AVS SNAPSHOT
After Visit Summary   6/12/2017    Jacquie Amador    MRN: [id number]           Patient Information     Date Of Birth          1960        Visit Information        Provider Department      6/12/2017 11:00 AM Lauren Gillespie, PT [facility]        Today's Diagnoses     Fibromyalgia    -  1    DDD (degenerative disc disease), lumbar        Myofascial pain        Trochanteric bursitis of both hips           Follow-ups after your visit        Your next 10 appointments already scheduled     Mannie 15, 2017 11:00 AM CDT   Return Visit with Lauren Gillespie PT   [facility] ([facility])    [address] [address]   [phone]            Jul 28, 2017 11:00 AM CDT   Return Visit with JESSICA Guillory CNP   [facility] ([facility])    [address] [id number] [phone]              Who to contact     If you have questions or need follow up information about today's clinic visit or your schedule please contact [facility] directly at [phone].  Normal or non-critical lab and imaging results will be communicated to you by MyChart, letter or phone within 4 business days after the clinic has received the results. If you do not hear from us within 7 days, please contact the clinic through [employer] or phone. If you have a critical or abnormal lab result, we will notify you by phone as soon as possible.  Submit refill requests through [employer] or call your pharmacy and they will forward the refill request to us. Please allow 3 business days for your refill to be completed.          Additional Information About Your Visit        MyChart Information     [employer] gives you secure access to your electronic health record. If you see a primary care provider, you can also send messages to your care team and make appointments. If you have questions, please call your  primary care clinic.  If you do not have a primary care provider, please call [phone] and they will assist you.        Care EveryWhere ID     This is your Care EveryWhere ID. This could be used by other organizations to access your [place] medical records  [id number]         Blood Pressure from Last 3 Encounters:   05/12/17 123/85   04/04/17 127/84   03/31/17 121/80    Weight from Last 3 Encounters:   03/31/17 92.5 kg (204 lb)   12/22/16 93 kg (205 lb)   07/27/16 94.3 kg (208 lb)              We Performed the Following     NEUROMUSCULAR RE-EDUCATION     THERAPEUTIC ACTIVITIES     THERAPEUTIC EXERCISES        Primary Care Provider Office Phone # Fax #    Liz Peterson [phone]       [facility] [address] [address] [address]        Thank you!     Thank you for choosing [facility]  for your care. Our goal is always to provide you with excellent care. Hearing back from our patients is one way we can continue to improve our services. Please take a few minutes to complete the written survey that you may receive in the mail after your visit with us. Thank you!             Your Updated Medication List - Protect others around you: Learn how to safely use, store and throw away your medicines at www.disposemymeds.org.          This list is accurate as of: 6/12/17 11:59 PM.  Always use your most recent med list.                   Brand Name Dispense Instructions for use    acidophilus Caps     60 capsule    Take 1 capsule by mouth daily Take two hours before or after antibiotic dose.  Continue for 1 week after antibiotic therapy completed       adapalene 0.1 % gel    DIFFERIN    45 g    Apply topically At Bedtime       ADVAIR DISKUS 500-50 MCG/DOSE diskus inhaler   Generic drug:  fluticasone-salmeterol     1 Inhaler    INHALE 1 PUFF BY INHALATION ROUTE 2 TIMES PER DAY IN THE MORNING AND EVENING APPROXIMATELY 12 HOURS APART       ALPRAZolam 0.25 MG tablet    XANAX    90  tablet    TAKE 1 TABLET DAILY AS NEEDED FOR ANXIETY       * buPROPion 150 MG 12 hr tablet    WELLBUTRIN SR    90 tablet    Take 1 tablet (150 mg) by mouth every evening       * buPROPion 200 MG 12 hr tablet    WELLBUTRIN SR    120 tablet    Take 1 tablet (200 mg) by mouth every morning       calcium carbonate-vitamin D 500-400 MG-UNIT Tabs per tablet     180 tablet    Take 1 tablet by mouth 2 times daily       celecoxib 200 MG capsule    celeBREX    180 capsule    Take 1 capsule twice daily as needed for pain this is a 3 month script       clindamycin 1 % lotion    CLEOCIN T     Apply topically 2 times daily       clobetasol 0.05 % cream    TEMOVATE    30 g    APPLY SPARINGLY TO AFFECTED AREA TWICE DAILY FOR 14 DAYS.  DO NOT APPLY TO FACE.       diclofenac 1 % Gel topical gel    VOLTAREN    9 Tube    Apply 2 grams to hands and 4 grams to hips up to 4 times daily. T       DULoxetine 60 MG EC capsule    CYMBALTA    180 capsule    Take 2 capsules (120 mg) by mouth daily       HYDROcodone-acetaminophen 5-325 MG per tablet    NORCO    100 tablet    May take 1 tablet every 4-6 hours as needed for pain. Max of 4 tabs per day, next script will reduce back to #100 per script. Allowing #120 tabs since she is going on vacation and likely will be more active. OK to fill and start on 5/12/2017 to last until 6/11/2017       hydrocortisone 2.5 % cream     30 g    APPLY TOPICALLY 2 TIMES DAILY AS NEEDED       losartan-hydrochlorothiazide 100-12.5 MG per tablet    HYZAAR    90 tablet    Take 1 tablet by mouth daily       metoprolol 25 MG 24 hr tablet    TOPROL-XL    45 tablet    Take 1.5 tablets per day       montelukast 10 MG tablet    SINGULAIR    90 tablet    TAKE 1 TABLET (10 MG) BY ORAL ROUTE ONCE DAILY IN THE EVENING       mupirocin 2 % cream    BACTROBAN    30 g    Apply topically 3 times daily       nystatin [id number] UNIT/ML suspension    MYCOSTATIN    380 mL    Take by mouth 4 times daily       nystatin cream    MYCOSTATIN     45 g    Apply topically 3 times daily       ranitidine 150 MG tablet    ZANTAC    0    ONE TABLET TWICE DAILY       rizatriptan 10 MG tablet    MAXALT    15 tablet    Take 0.5-1 tablets (5-10 mg) by mouth at onset of headache for migraine May repeat dose every 2 hours as needed up to max of 30mg in 24 hours. Do not take more than 3 times per week as overuse can contribute to rebound migraine headaches. Do NOT use with sumatriptan.       rOPINIRole 1 MG tablet    REQUIP    180 tablet    TAKE 2 TABLETS (2 MG) BY MOUTH AT BEDTIME       traZODone 50 MG tablet    DESYREL    270 tablet    TAKE 2-3 TABLETS (100-150 MG) BY MOUTH AT BEDTIME       tretinoin 0.025 % cream    RETIN-A    45 g    Spread a pea size amount into affected area topically at bedtime.  Use sunscreen SPF>20.       VENTOLIN  (90 BASE) MCG/ACT Inhaler   Generic drug:  albuterol     54 g    INHALE 2 PUFFS INTO THE LUNGS EVERY 6 HOURS AS NEEDED FOR SHORTNESS OF BREATH / DYSPNEA       ZYRTEC 10 MG tablet   Generic drug:  cetirizine     90    1 TABLET DAILY       * Notice:  This list has 2 medication(s) that are the same as other medications prescribed for you. Read the directions carefully, and ask your doctor or other care provider to review them with you.

## 2017-06-13 DIAGNOSIS — M79.641 BILATERAL HAND PAIN: ICD-10-CM

## 2017-06-13 DIAGNOSIS — M19.042 PRIMARY OSTEOARTHRITIS OF BOTH HANDS: Primary | ICD-10-CM

## 2017-06-13 DIAGNOSIS — M19.041 PRIMARY OSTEOARTHRITIS OF BOTH HANDS: Primary | ICD-10-CM

## 2017-06-13 DIAGNOSIS — M79.642 BILATERAL HAND PAIN: ICD-10-CM
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
## 2017-06-13 NOTE — PROGRESS NOTES
Signed order for hand therapy.  Liseth LOPEZ RN CNP, FNP  [facility] [place]

## 2017-06-15 ENCOUNTER — OFFICE VISIT (OUTPATIENT)
Dept: PALLIATIVE MEDICINE | Facility: CLINIC | Age: 57
End: 2017-06-15
Payer: COMMERCIAL

## 2017-06-15 DIAGNOSIS — M51.369 DDD (DEGENERATIVE DISC DISEASE), LUMBAR: ICD-10-CM

## 2017-06-15 DIAGNOSIS — M79.18 MYOFASCIAL PAIN: ICD-10-CM

## 2017-06-15 DIAGNOSIS — M79.7 FIBROMYALGIA: Primary | ICD-10-CM

## 2017-06-15 DIAGNOSIS — M70.61 TROCHANTERIC BURSITIS OF BOTH HIPS: ICD-10-CM

## 2017-06-15 DIAGNOSIS — M70.62 TROCHANTERIC BURSITIS OF BOTH HIPS: ICD-10-CM

## 2017-06-15 PROCEDURE — 97112 NEUROMUSCULAR REEDUCATION: CPT | Mod: GP | Performed by: PHYSICAL THERAPIST

## 2017-06-15 NOTE — PROGRESS NOTES
"Pain Physical Therapy Progress Note    Patient Name: Jacquie Amador     YOB: 1960     Medical Record Number: [id number]    Visits: 6/8    Diagnosis:    Fibromyalgia  DDD (degenerative disc disease), lumbar  Myofascial pain  Trochanteric bursitis of both hips    Subjective: Patient reports that tops of feet are sore.    Thinks that I did too much with the tennis ball--felt good at the start, sore later.    Feels like feet got worse after doing balance things; feels swollen; hasn't tried icing    Feels like having more memory issues; since starting HR meds    States that hands feel a little better; using creams; wondering if \"flare is starting to go down\"  Does have appointment set up with Hand therapy in [place]--wants to change to here with PAVEL Zuniga    Self Care  HEP: doing better with the couple stretches, thinking the balance one is increasing foot pain  Walking/Aerobic Activity: working on doing a little in the garden  Heat/Ice: forgets about the ice; likes hot tub--daughter has one  Posture: thinks about it at times  Mini Breaks: NA  Breathing/Relaxation: needs to do more of, think more of  Pacing: has a hard time doing    Objective Findings:    OBSERVATION: slightly rounded shoulders, forward flexed in sitting, slightly in standing, slight forward head, sitting with legs/arms crossed--uncrosses with cues about posture, poor core engagement in sitting; demo upper chest breathing  GAIT & LOCOMOTION: antalgic gait; decreased heel to toe bilaterally; wide base of support, slight forward flexed  MUSCLE PERFORMANCE: needs cues for small movements with ab sets and chin retraction    Treatment Interventions:  Neuromuscular Reeducation:   Posture :  went over basic posture cues in sitting and standing, and walking, used mirror to assist with visual feedback and body awareness.  Discussed with patient that it does take time to learn; it is important to start working on it and gradually will become new " habit; patient may have some muscles that are more sore as they are starting to be used in different ways, and that is OK; gave handout.  Discussed how different chairs can affect posture; working on basic ears/shoulders/hips lining up; discussed that may not be able to do all at once, will not be perfect--that's OK.  __________________________________________________________________    Instruction on home program: posture    Assessment:  Ongoing Functional Limitations Include:  Patient tolerated/responded well to treatment  Impression: slowly working on pain PT principles    Recommendations: continue, follow up with hand therapy    Intensity Level: 2 (1=low intensity; 5=high intensity)    Demonstrates/Verbalizes Technique: 3 (1= poor technique-difficulty performing exercises,significant cues required; 5= good technique-performs exercises without cues)    Body Awareness: 2, 3 (1=low awareness; 5=high awareness)    Posture/Stability: 2 (1= poor posture, stability; 5= good posture, stability)    Motivational Level: Cooperative    Response to Teaching: cooperative    Factors that affect learning: None    _______________________________________________________________________  Plan of Care   Continue PT to support reactivation and integration of self regulation pain management skills;  Focus of next session will be on: pacing handouts; HEP as able     Present:  NA     Total Visit Time:  40 minutes   Neuro-Re Ed: 40 mins;      Therapist: Lauren Gillespie PT             Date: 6/15/2017

## 2017-06-15 NOTE — MR AVS SNAPSHOT
After Visit Summary   6/15/2017    Jacquie Amador    MRN: [id number]           Patient Information     Date Of Birth          1960        Visit Information        Provider Department      6/15/2017 11:00 AM Lauren Gillespie, PT [facility]        Today's Diagnoses     Fibromyalgia    -  1    DDD (degenerative disc disease), lumbar        Myofascial pain        Trochanteric bursitis of both hips           Follow-ups after your visit        Your next 10 appointments already scheduled     Jun 26, 2017  9:30 AM CDT   [facility] Hand with Nadia Dong   [facility] [place] ([facility] [place] [facility])    [address]  [place] [address]   [phone]            Jun 26, 2017 10:15 AM CDT   Return Visit with Lauren Gillespie PT   [facility] ([facility] [place])    [address]  [place] [address]   [phone]            Jul 28, 2017 11:00 AM CDT   Return Visit with JESSICA Guillory CNP   [facility] ([facility])    [address] [address]   [phone]              Who to contact     If you have questions or need follow up information about today's clinic visit or your schedule please contact [facility] directly at [phone].  Normal or non-critical lab and imaging results will be communicated to you by MyChart, letter or phone within 4 business days after the clinic has received the results. If you do not hear from us within 7 days, please contact the clinic through [employer] or phone. If you have a critical or abnormal lab result, we will notify you by phone as soon as possible.  Submit refill requests through [employer] or call your pharmacy and they will forward the refill request to us. Please allow 3 business days for your refill to be completed.          Additional Information About Your Visit        [facility]  Information     [employer] gives you secure access to your electronic health record. If you see a primary care provider, you can also send messages to your care team and make appointments. If you have questions, please call your primary care clinic.  If you do not have a primary care provider, please call [phone] and they will assist you.        Care EveryWhere ID     This is your Care EveryWhere ID. This could be used by other organizations to access your [place] medical records  [id number]         Blood Pressure from Last 3 Encounters:   05/12/17 123/85   04/04/17 127/84   03/31/17 121/80    Weight from Last 3 Encounters:   03/31/17 92.5 kg (204 lb)   12/22/16 93 kg (205 lb)   07/27/16 94.3 kg (208 lb)              We Performed the Following     NEUROMUSCULAR RE-EDUCATION        Primary Care Provider Office Phone # Fax #    Liz Peterson [phone]       [facility] [address] [address]        Thank you!     Thank you for choosing [facility]  for your care. Our goal is always to provide you with excellent care. Hearing back from our patients is one way we can continue to improve our services. Please take a few minutes to complete the written survey that you may receive in the mail after your visit with us. Thank you!             Your Updated Medication List - Protect others around you: Learn how to safely use, store and throw away your medicines at www.disposemymeds.org.          This list is accurate as of: 6/15/17 12:07 PM.  Always use your most recent med list.                   Brand Name Dispense Instructions for use    acidophilus Caps     60 capsule    Take 1 capsule by mouth daily Take two hours before or after antibiotic dose.  Continue for 1 week after antibiotic therapy completed       adapalene 0.1 % gel    DIFFERIN    45 g    Apply topically At Bedtime       ADVAIR DISKUS 500-50 MCG/DOSE diskus inhaler   Generic drug:   fluticasone-salmeterol     1 Inhaler    INHALE 1 PUFF BY INHALATION ROUTE 2 TIMES PER DAY IN THE MORNING AND EVENING APPROXIMATELY 12 HOURS APART       ALPRAZolam 0.25 MG tablet    XANAX    90 tablet    TAKE 1 TABLET DAILY AS NEEDED FOR ANXIETY       * buPROPion 150 MG 12 hr tablet    WELLBUTRIN SR    90 tablet    Take 1 tablet (150 mg) by mouth every evening       * buPROPion 200 MG 12 hr tablet    WELLBUTRIN SR    120 tablet    Take 1 tablet (200 mg) by mouth every morning       calcium carbonate-vitamin D 500-400 MG-UNIT Tabs per tablet     180 tablet    Take 1 tablet by mouth 2 times daily       celecoxib 200 MG capsule    celeBREX    180 capsule    Take 1 capsule twice daily as needed for pain this is a 3 month script       clindamycin 1 % lotion    CLEOCIN T     Apply topically 2 times daily       clobetasol 0.05 % cream    TEMOVATE    30 g    APPLY SPARINGLY TO AFFECTED AREA TWICE DAILY FOR 14 DAYS.  DO NOT APPLY TO FACE.       diclofenac 1 % Gel topical gel    VOLTAREN    9 Tube    Apply 2 grams to hands and 4 grams to hips up to 4 times daily. T       DULoxetine 60 MG EC capsule    CYMBALTA    180 capsule    Take 2 capsules (120 mg) by mouth daily       HYDROcodone-acetaminophen 5-325 MG per tablet    NORCO    100 tablet    May take 1 tablet every 4-6 hours as needed for pain. Max of 4 tabs per day, next script will reduce back to #100 per script. Allowing #120 tabs since she is going on vacation and likely will be more active. OK to fill and start on 5/12/2017 to last until 6/11/2017       hydrocortisone 2.5 % cream     30 g    APPLY TOPICALLY 2 TIMES DAILY AS NEEDED       losartan-hydrochlorothiazide 100-12.5 MG per tablet    HYZAAR    90 tablet    Take 1 tablet by mouth daily       metoprolol 25 MG 24 hr tablet    TOPROL-XL    45 tablet    Take 1.5 tablets per day       montelukast 10 MG tablet    SINGULAIR    90 tablet    TAKE 1 TABLET (10 MG) BY ORAL ROUTE ONCE DAILY IN THE EVENING       mupirocin 2 %  cream    BACTROBAN    30 g    Apply topically 3 times daily       nystatin 169516 UNIT/ML suspension    MYCOSTATIN    380 mL    Take by mouth 4 times daily       nystatin cream    MYCOSTATIN    45 g    Apply topically 3 times daily       ranitidine 150 MG tablet    ZANTAC    0    ONE TABLET TWICE DAILY       rizatriptan 10 MG tablet    MAXALT    15 tablet    Take 0.5-1 tablets (5-10 mg) by mouth at onset of headache for migraine May repeat dose every 2 hours as needed up to max of 30mg in 24 hours. Do not take more than 3 times per week as overuse can contribute to rebound migraine headaches. Do NOT use with sumatriptan.       rOPINIRole 1 MG tablet    REQUIP    180 tablet    TAKE 2 TABLETS (2 MG) BY MOUTH AT BEDTIME       traZODone 50 MG tablet    DESYREL    270 tablet    TAKE 2-3 TABLETS (100-150 MG) BY MOUTH AT BEDTIME       tretinoin 0.025 % cream    RETIN-A    45 g    Spread a pea size amount into affected area topically at bedtime.  Use sunscreen SPF>20.       VENTOLIN  (90 BASE) MCG/ACT Inhaler   Generic drug:  albuterol     54 g    INHALE 2 PUFFS INTO THE LUNGS EVERY 6 HOURS AS NEEDED FOR SHORTNESS OF BREATH / DYSPNEA       ZYRTEC 10 MG tablet   Generic drug:  cetirizine     90    1 TABLET DAILY       * Notice:  This list has 2 medication(s) that are the same as other medications prescribed for you. Read the directions carefully, and ask your doctor or other care provider to review them with you.

## 2017-06-19 ENCOUNTER — MYC MEDICAL ADVICE (OUTPATIENT)
Dept: PALLIATIVE MEDICINE | Facility: CLINIC | Age: 57
End: 2017-06-19

## 2017-06-20 ENCOUNTER — MYC MEDICAL ADVICE (OUTPATIENT)
Dept: PALLIATIVE MEDICINE | Facility: CLINIC | Age: 57
End: 2017-06-20

## 2017-06-20 DIAGNOSIS — M70.62 GREATER TROCHANTERIC BURSITIS OF BOTH HIPS: ICD-10-CM

## 2017-06-20 DIAGNOSIS — M51.369 DDD (DEGENERATIVE DISC DISEASE), LUMBAR: ICD-10-CM

## 2017-06-20 DIAGNOSIS — M70.61 GREATER TROCHANTERIC BURSITIS OF BOTH HIPS: ICD-10-CM

## 2017-06-20 DIAGNOSIS — M79.7 FIBROMYALGIA: ICD-10-CM

## 2017-06-20 DIAGNOSIS — M53.3 SI (SACROILIAC) JOINT DYSFUNCTION: ICD-10-CM

## 2017-06-20 DIAGNOSIS — M79.18 MYOFASCIAL PAIN: ICD-10-CM

## 2017-06-20 DIAGNOSIS — M47.817 LUMBOSACRAL SPONDYLOSIS WITHOUT MYELOPATHY: ICD-10-CM

## 2017-06-20 NOTE — TELEPHONE ENCOUNTER
Per patient Hansjuliolashawn message:  Created: 6/20/2017 12:46 PM      Hi again please pass this on to Liseth too.  Regarding the top of my feet I have bad arthritis from X-ray. Painful constantly so I that the pain clinic would help. I assume then I should see my Dr for the pain on the side of the thigh. I can do that. I also forget to let you know since I have been on Metoprolol my short term memory has been horrible. My  is concerned and also extreme fatigue. I mean really fatigued and not able to do a lot.    Thanks  Jacquie Maldonado, RN-BSN  [facility]

## 2017-06-20 NOTE — TELEPHONE ENCOUNTER
Per patient Zo message:  Created: 6/19/2017 6:02 PM      Ciaran Childers,    I will be needing a refill of Vicodin this week please.  I will be over there on June 26 to see Emi [facility]. I would rather run over there this week. I'm sure Emi told you I have done 4 PT's . I'm debating if I should come see you before July 28 to discuss my new pain. My top of my feet are constantly swollen. Emi saw that I even have a dark spot on one foot. My left outer thigh is my worst when I sleep on it and wake up the pain and it's excruciating! So what do think?     Thanks,  Jacquie Amador

## 2017-06-20 NOTE — TELEPHONE ENCOUNTER
See the mychart encounter from today for more information on refill.  ---------  mychart sent to pt:  Ciaran Dhillon.   I will have Liseth Caldera NP review your message and we will process your refill.  I recommend connecting w/ your primary care provider about your swollen foot.  Since this is new we would not be able to manage this for you.  Liseth's next available appointment is 9/5/17.  Looks like you see her on 7/28/17 which is appropriate.    Take care,    Denisse Maldonado, RN-BSN  [facility]

## 2017-06-20 NOTE — TELEPHONE ENCOUNTER
Pt needs a refill of norco.   in [place]    Routed to the nursing pool and to the MA pool to process refill(s).    Denisse Maldonado, RN-BSN  [facility]

## 2017-06-22 ENCOUNTER — MYC MEDICAL ADVICE (OUTPATIENT)
Dept: PALLIATIVE MEDICINE | Facility: CLINIC | Age: 57
End: 2017-06-22

## 2017-06-22 DIAGNOSIS — M47.819 FACET ARTHROPATHY: Primary | ICD-10-CM

## 2017-06-22 NOTE — TELEPHONE ENCOUNTER
Received call from patient requesting refill(s) of norco     Last picked up from pharmacy on 5/15/17  Last due:  OK to fill and start on 5/12/2017 to last until 6/11/2017  Due date anytime  Weaning instructions:  Max of 4 tabs per day, next script will reduce back to #100 per script.     Pt last seen on 5/12/17  Next appt scheduled for 7/28/17    Last urine drug screen 9/6/16  Current opioid agreement on file Yes Date: 9/2016    Processing (pick one):      Pt will  in clinic at [place] location    Denisse Maldonado, RN-BSN  [facility]

## 2017-06-23 DIAGNOSIS — L70.0 ACNE VULGARIS: ICD-10-CM

## 2017-06-23 RX ORDER — HYDROCODONE BITARTRATE AND ACETAMINOPHEN 5; 325 MG/1; MG/1
TABLET ORAL
Qty: 100 TABLET | Refills: 0 | Status: SHIPPED | OUTPATIENT
Start: 2017-06-23 | End: 2017-07-17

## 2017-06-23 NOTE — TELEPHONE ENCOUNTER
Jacquie-    Regarding the new pain and swelling in your feet, I think that this should first go to your primary care provider since it is a pain that I have not dealt with before with you. You may or may not need to see podiatry for this.     Regarding the pain when you lie on your left side in the thigh area may or may not be bursitis pain. I would begin by trying ice or heat application and trial Aspercreme with 4% lidocaine cream; this is found over-the-counter and may be used according to package instructions for topical pain relief    Also, has your memory loss gotten worse as you have gone up on the metoprolol? Are you currently taking 1.5 tablets of the 25mg strength?    Have you seen a therapist re: the stress you have been under [place]? Extreme stress can also effect our memory as well.     Thanks.  Liseth LOPEZ RN CNP, SARAHP  [place] [facility]    I sent the above [employer] message to the patient.    Liseth LOPEZ RN CNP, SARAHP  [place] [facility]

## 2017-06-23 NOTE — TELEPHONE ENCOUNTER
Please begin process to prior authorize repeat lumbar RFA. Patient has completed 4 visits of PHYSICAL THERAPY with Lorena lainez and has a 5th visit scheduled next week.    Sending this to Chantal Gan to begin PA process.   It can take up to 30 business days for insurance companies to authorize repeat RFA.    Thanks.  Liseth LOPEZ, RN CNP, FNP  [facility] [place]

## 2017-06-23 NOTE — TELEPHONE ENCOUNTER
In regard to pt's request about the ablation, reviewed chart and see the following plan made at the 5/12 visit:    1. Procedures: none at present, will do repeat lumbar RFA once has done some more PHYSICAL THERAPY  -----------------  Patient has had 4 visit with Vilma Gillespie, PT and another scheduled for 6/26.   ----------------  Message sent to pt:    Bhargav Dhillon,     I see that your prescription was processed this morning and I believe that you received a message letting you know that it was ready to be picked up. In regard to the ablation, I see that you needed to do some PT before doing another lumbar RFA. I will have Liseth review the physical therapy visits to determine next steps.     Take care,     Laisha Sanchez, MARVAN, RN-BC  Patient Care Supervisor/Care Coordinator  [facility]

## 2017-06-23 NOTE — TELEPHONE ENCOUNTER
Message from pt on 6/22 at 1816:    I had requested refill on Vicodin on Monday and am wondering if it was taken care of since I haven't heard anything.  I also am wondering the status of back ablation I am so in need of it. I have an appt jeanine Middleton on Monday,     Thanks,   Jacquie Amador   -------------  Reviewed chart. rx was processed this morning. Per JORGE Singleton.     Signed Rx placed in  folder, , 2nd floor, [place]. Patient was informed.    Laisha Sanchez, MARVAN, RN-BC  Patient Care Supervisor/Care Coordinator  [facility]

## 2017-06-23 NOTE — TELEPHONE ENCOUNTER
Signed Prescriptions:                        Disp   Refills    HYDROcodone-acetaminophen (NORCO) 5-325 MG*100 ta*0        Sig: May take 1 tablet every 4-6 hours as needed for pain.           Max of 4 tabs per day.  Must last 30 days. OK to           fill and begin on 6/23/2017  Authorizing Provider: LISETH HANSON    Signed script placed in touchdown bin at [facility].    Liseth Hanson APRN CNP FNP RN  [facility]

## 2017-06-24 ENCOUNTER — MYC MEDICAL ADVICE (OUTPATIENT)
Dept: FAMILY MEDICINE | Facility: CLINIC | Age: 57
End: 2017-06-24

## 2017-06-26 ENCOUNTER — MYC MEDICAL ADVICE (OUTPATIENT)
Dept: FAMILY MEDICINE | Facility: CLINIC | Age: 57
End: 2017-06-26

## 2017-06-26 ENCOUNTER — THERAPY VISIT (OUTPATIENT)
Dept: OCCUPATIONAL THERAPY | Facility: CLINIC | Age: 57
End: 2017-06-26
Payer: COMMERCIAL

## 2017-06-26 ENCOUNTER — OFFICE VISIT (OUTPATIENT)
Dept: PALLIATIVE MEDICINE | Facility: CLINIC | Age: 57
End: 2017-06-26
Payer: COMMERCIAL

## 2017-06-26 DIAGNOSIS — M51.369 DDD (DEGENERATIVE DISC DISEASE), LUMBAR: ICD-10-CM

## 2017-06-26 DIAGNOSIS — M70.62 TROCHANTERIC BURSITIS OF BOTH HIPS: ICD-10-CM

## 2017-06-26 DIAGNOSIS — M79.641 BILATERAL HAND PAIN: Primary | ICD-10-CM

## 2017-06-26 DIAGNOSIS — M70.61 TROCHANTERIC BURSITIS OF BOTH HIPS: ICD-10-CM

## 2017-06-26 DIAGNOSIS — M79.18 MYOFASCIAL PAIN: ICD-10-CM

## 2017-06-26 DIAGNOSIS — M79.7 FIBROMYALGIA: Primary | ICD-10-CM

## 2017-06-26 DIAGNOSIS — M79.642 BILATERAL HAND PAIN: Primary | ICD-10-CM

## 2017-06-26 PROCEDURE — 97535 SELF CARE MNGMENT TRAINING: CPT | Mod: GO | Performed by: OCCUPATIONAL THERAPIST

## 2017-06-26 PROCEDURE — 97112 NEUROMUSCULAR REEDUCATION: CPT | Mod: GP | Performed by: PHYSICAL THERAPIST

## 2017-06-26 PROCEDURE — 97140 MANUAL THERAPY 1/> REGIONS: CPT | Mod: GO | Performed by: OCCUPATIONAL THERAPIST

## 2017-06-26 PROCEDURE — 97165 OT EVAL LOW COMPLEX 30 MIN: CPT | Mod: GO | Performed by: OCCUPATIONAL THERAPIST

## 2017-06-26 PROCEDURE — 97530 THERAPEUTIC ACTIVITIES: CPT | Mod: GP | Performed by: PHYSICAL THERAPIST

## 2017-06-26 PROCEDURE — 29130 APPL FINGER SPLINT STATIC: CPT | Mod: GO | Performed by: OCCUPATIONAL THERAPIST

## 2017-06-26 RX ORDER — ADAPALENE 45 G/G
GEL TOPICAL
Qty: 45 G | Refills: 10 | Status: SHIPPED | OUTPATIENT
Start: 2017-06-26 | End: 2018-04-19

## 2017-06-26 NOTE — MR AVS SNAPSHOT
After Visit Summary   6/26/2017    Jacquie Amador    MRN: [id number]           Patient Information     Date Of Birth          1960        Visit Information        Provider Department      6/26/2017 9:30 AM Nadia Dong [facility]        Today's Diagnoses     Bilateral hand pain    -  1       Follow-ups after your visit        Your next 10 appointments already scheduled     Jul 11, 2017  9:30 AM CDT   DEE DEE Hand with Nadia Dong   [facility] ([facility] [place] [facility])    [address]  [place] [address]   [phone]            Jul 11, 2017 10:15 AM CDT   Return Visit with Lauren Gillespie, PT   [facility] ([facility])    [address] [place] [address]   [phone]            Jul 28, 2017 11:00 AM CDT   Return Visit with JESSICA Guillory CNP   [facility] ([facility])    [address] [address]   [phone]              Who to contact     If you have questions or need follow up information about today's clinic visit or your schedule please contact [facility] directly at [phone].  Normal or non-critical lab and imaging results will be communicated to you by MyChart, letter or phone within 4 business days after the clinic has received the results. If you do not hear from us within 7 days, please contact the clinic through MyChart or phone. If you have a critical or abnormal lab result, we will notify you by phone as soon as possible.  Submit refill requests through [employer] or call your pharmacy and they will forward the refill request to us. Please allow 3 business days for your refill to be completed.          Additional Information About Your Visit        [employer] Information     [employer] gives you secure access to your  electronic health record. If you see a primary care provider, you can also send messages to your care team and make appointments. If you have questions, please call your primary care clinic.  If you do not have a primary care provider, please call [phone] and they will assist you.        Care EveryWhere ID     This is your Care EveryWhere ID. This could be used by other organizations to access your [place] medical records  [id number]         Blood Pressure from Last 3 Encounters:   05/12/17 123/85   04/04/17 127/84   03/31/17 121/80    Weight from Last 3 Encounters:   03/31/17 92.5 kg (204 lb)   12/22/16 93 kg (205 lb)   07/27/16 94.3 kg (208 lb)              We Performed the Following     APPLY FINGER SPLINT STATIC     HC OT EVAL, LOW COMPLEXITY     DEE DEE INITIAL EVAL REPORT     MANUAL THER TECH,1+REGIONS,EA 15 MIN     SELF CARE MNGMENT TRAINING        Primary Care Provider Office Phone # Fax #    Liz Peterson [phone]       [facility] [address] [facility] [address]        Equal Access to Services     JAMARI SUMMERS : Hadii aad ku hadasho Soomaali, waaxda luqadaha, qaybta kaalmada adeegyada, yao fuller haycheln louis chandler . So [facility] [phone].    ATENCIÓN: Si habla español, tiene a kimball disposición servicios gratuitos de asistencia lingüística. Llame al [phone].    We comply with applicable federal civil rights laws and [place] laws. We do not discriminate on the basis of race, color, national origin, age, disability sex, sexual orientation or gender identity.            Thank you!     Thank you for choosing [facility] [facility]  for your care. Our goal is always to provide you with excellent care. Hearing back from our patients is one way we can continue to improve our services. Please take a few minutes to complete the written survey that you may receive in the mail after your visit with us. Thank you!             Your  Updated Medication List - Protect others around you: Learn how to safely use, store and throw away your medicines at www.disposemymeds.org.          This list is accurate as of: 6/26/17 11:24 AM.  Always use your most recent med list.                   Brand Name Dispense Instructions for use Diagnosis    acidophilus Caps     60 capsule    Take 1 capsule by mouth daily Take two hours before or after antibiotic dose.  Continue for 1 week after antibiotic therapy completed    Diarrhea       adapalene 0.1 % gel    DIFFERIN    45 g    Apply topically At Bedtime    Acne vulgaris       ADVAIR DISKUS 500-50 MCG/DOSE diskus inhaler   Generic drug:  fluticasone-salmeterol     1 Inhaler    INHALE 1 PUFF BY INHALATION ROUTE 2 TIMES PER DAY IN THE MORNING AND EVENING APPROXIMATELY 12 HOURS APART    Moderate persistent asthma without complication       ALPRAZolam 0.25 MG tablet    XANAX    90 tablet    TAKE 1 TABLET DAILY AS NEEDED FOR ANXIETY    Generalized anxiety disorder       * buPROPion 150 MG 12 hr tablet    WELLBUTRIN SR    90 tablet    Take 1 tablet (150 mg) by mouth every evening    Generalized anxiety disorder       * buPROPion 200 MG 12 hr tablet    WELLBUTRIN SR    120 tablet    Take 1 tablet (200 mg) by mouth every morning    Generalized anxiety disorder       calcium carbonate-vitamin D 500-400 MG-UNIT Tabs per tablet     180 tablet    Take 1 tablet by mouth 2 times daily    Absence of menstruation       celecoxib 200 MG capsule    celeBREX    180 capsule    Take 1 capsule twice daily as needed for pain this is a 3 month script    Greater trochanteric bursitis, unspecified laterality, Hand arthritis       clindamycin 1 % lotion    CLEOCIN T     Apply topically 2 times daily        clobetasol 0.05 % cream    TEMOVATE    30 g    APPLY SPARINGLY TO AFFECTED AREA TWICE DAILY FOR 14 DAYS.  DO NOT APPLY TO FACE.    Dermatitis       diclofenac 1 % Gel topical gel    VOLTAREN    9 Tube    Apply 2 grams to hands and 4  grams to hips up to 4 times daily. T    Greater trochanteric bursitis of both hips, Bilateral thumb pain       DULoxetine 60 MG EC capsule    CYMBALTA    180 capsule    Take 2 capsules (120 mg) by mouth daily    Multiple joint pain       HYDROcodone-acetaminophen 5-325 MG per tablet    NORCO    100 tablet    May take 1 tablet every 4-6 hours as needed for pain. Max of 4 tabs per day.  Must last 30 days. OK to fill and begin on 6/23/2017    Lumbosacral spondylosis without myelopathy, DDD (degenerative disc disease), lumbar, SI (sacroiliac) joint dysfunction, Greater trochanteric bursitis of both hips, Myofascial pain, Fibromyalgia       hydrocortisone 2.5 % cream     30 g    APPLY TOPICALLY 2 TIMES DAILY AS NEEDED    Dermatitis       losartan-hydrochlorothiazide 100-12.5 MG per tablet    HYZAAR    90 tablet    Take 1 tablet by mouth daily    Essential hypertension with goal blood pressure less than 140/90       metoprolol 25 MG 24 hr tablet    TOPROL-XL    45 tablet    Take 1.5 tablets per day    Palpitations, Sinus tachycardia       montelukast 10 MG tablet    SINGULAIR    90 tablet    TAKE 1 TABLET (10 MG) BY ORAL ROUTE ONCE DAILY IN THE EVENING    Moderate persistent asthma without complication       mupirocin 2 % cream    BACTROBAN    30 g    Apply topically 3 times daily        nystatin 505761 UNIT/ML suspension    MYCOSTATIN    380 mL    Take by mouth 4 times daily        nystatin cream    MYCOSTATIN    45 g    Apply topically 3 times daily    Dermatitis       ranitidine 150 MG tablet    ZANTAC    0    ONE TABLET TWICE DAILY        rizatriptan 10 MG tablet    MAXALT    15 tablet    Take 0.5-1 tablets (5-10 mg) by mouth at onset of headache for migraine May repeat dose every 2 hours as needed up to max of 30mg in 24 hours. Do not take more than 3 times per week as overuse can contribute to rebound migraine headaches. Do NOT use with sumatriptan.    Migraine without aura and without status migrainosus, not  intractable       rOPINIRole 1 MG tablet    REQUIP    180 tablet    TAKE 2 TABLETS (2 MG) BY MOUTH AT BEDTIME    Movement disorder       traZODone 50 MG tablet    DESYREL    270 tablet    TAKE 2-3 TABLETS (100-150 MG) BY MOUTH AT BEDTIME    Insomnia, unspecified       tretinoin 0.025 % cream    RETIN-A    45 g    Spread a pea size amount into affected area topically at bedtime.  Use sunscreen SPF>20.    Age-related facial wrinkles       VENTOLIN  (90 BASE) MCG/ACT Inhaler   Generic drug:  albuterol     54 g    INHALE 2 PUFFS INTO THE LUNGS EVERY 6 HOURS AS NEEDED FOR SHORTNESS OF BREATH / DYSPNEA    Moderate persistent asthma without complication       ZYRTEC 10 MG tablet   Generic drug:  cetirizine     90    1 TABLET DAILY    Allergic rhinitis, cause unspecified       * Notice:  This list has 2 medication(s) that are the same as other medications prescribed for you. Read the directions carefully, and ask your doctor or other care provider to review them with you.

## 2017-06-26 NOTE — MR AVS SNAPSHOT
After Visit Summary   6/26/2017    Jacquie Amador    MRN: [id number]           Patient Information     Date Of Birth          1960        Visit Information        Provider Department      6/26/2017 10:15 AM Lauren Gillespie, [facility]        Today's Diagnoses     Fibromyalgia    -  1    DDD (degenerative disc disease), lumbar        Myofascial pain        Trochanteric bursitis of both hips           Follow-ups after your visit        Your next 10 appointments already scheduled     Jul 11, 2017  9:30 AM CDT   DEE DEE Hand with Nadia Dong   [facility] [place] ([facility] [place] [facility])    [address]  [place] [address]   [phone]            Jul 11, 2017 10:15 AM CDT   Return Visit with Lauren Gillespie PT   [facility] ([facility] [place])    [address]  [place] [address]   [phone]            Jul 28, 2017 11:00 AM CDT   Return Visit with JESSICA Guillory CNP   [facility] ([facility])    [address] [address]   [phone]              Who to contact     If you have questions or need follow up information about today's clinic visit or your schedule please contact [facility] directly at [phone].  Normal or non-critical lab and imaging results will be communicated to you by MyChart, letter or phone within 4 business days after the clinic has received the results. If you do not hear from us within 7 days, please contact the clinic through [employer] or phone. If you have a critical or abnormal lab result, we will notify you by phone as soon as possible.  Submit refill requests through [employer] or call your pharmacy and they will forward the refill request to us. Please allow 3 business days for your refill to be completed.          Additional Information About Your Visit        [facility]  Information     [employer] gives you secure access to your electronic health record. If you see a primary care provider, you can also send messages to your care team and make appointments. If you have questions, please call your primary care clinic.  If you do not have a primary care provider, please call [phone] and they will assist you.        Care EveryWhere ID     This is your Care EveryWhere ID. This could be used by other organizations to access your [place] medical records  [id number]         Blood Pressure from Last 3 Encounters:   05/12/17 123/85   04/04/17 127/84   03/31/17 121/80    Weight from Last 3 Encounters:   03/31/17 92.5 kg (204 lb)   12/22/16 93 kg (205 lb)   07/27/16 94.3 kg (208 lb)              We Performed the Following     NEUROMUSCULAR RE-EDUCATION     THERAPEUTIC ACTIVITIES        Primary Care Provider Office Phone # Fax #    Liz Peterson [phone]       [facility] [address] [address]        Equal Access to Services     JAMARI SUMMERS : Hadii aad ku hadasho Soomaali, waaxda luqadaha, qaybta kaalmada adeegyada, waxay idiin hayaan louis chandler . So [facility] [phone].    ATENCIÓN: Si habla español, tiene a kimball disposición servicios gratuitos de asistencia lingüística. Llame al [phone].    We comply with applicable federal civil rights laws and [place] laws. We do not discriminate on the basis of race, color, national origin, age, disability sex, sexual orientation or gender identity.            Thank you!     Thank you for choosing [facility]  for your care. Our goal is always to provide you with excellent care. Hearing back from our patients is one way we can continue to improve our services. Please take a few minutes to complete the written survey that you may receive in the mail after your visit with us. Thank you!             Your Updated Medication List - Protect others around you: Learn how to safely use,  store and throw away your medicines at www.disposemymeds.org.          This list is accurate as of: 6/26/17 12:07 PM.  Always use your most recent med list.                   Brand Name Dispense Instructions for use Diagnosis    acidophilus Caps     60 capsule    Take 1 capsule by mouth daily Take two hours before or after antibiotic dose.  Continue for 1 week after antibiotic therapy completed    Diarrhea       adapalene 0.1 % gel    DIFFERIN    45 g    Apply topically At Bedtime    Acne vulgaris       ADVAIR DISKUS 500-50 MCG/DOSE diskus inhaler   Generic drug:  fluticasone-salmeterol     1 Inhaler    INHALE 1 PUFF BY INHALATION ROUTE 2 TIMES PER DAY IN THE MORNING AND EVENING APPROXIMATELY 12 HOURS APART    Moderate persistent asthma without complication       ALPRAZolam 0.25 MG tablet    XANAX    90 tablet    TAKE 1 TABLET DAILY AS NEEDED FOR ANXIETY    Generalized anxiety disorder       * buPROPion 150 MG 12 hr tablet    WELLBUTRIN SR    90 tablet    Take 1 tablet (150 mg) by mouth every evening    Generalized anxiety disorder       * buPROPion 200 MG 12 hr tablet    WELLBUTRIN SR    120 tablet    Take 1 tablet (200 mg) by mouth every morning    Generalized anxiety disorder       calcium carbonate-vitamin D 500-400 MG-UNIT Tabs per tablet     180 tablet    Take 1 tablet by mouth 2 times daily    Absence of menstruation       celecoxib 200 MG capsule    celeBREX    180 capsule    Take 1 capsule twice daily as needed for pain this is a 3 month script    Greater trochanteric bursitis, unspecified laterality, Hand arthritis       clindamycin 1 % lotion    CLEOCIN T     Apply topically 2 times daily        clobetasol 0.05 % cream    TEMOVATE    30 g    APPLY SPARINGLY TO AFFECTED AREA TWICE DAILY FOR 14 DAYS.  DO NOT APPLY TO FACE.    Dermatitis       diclofenac 1 % Gel topical gel    VOLTAREN    9 Tube    Apply 2 grams to hands and 4 grams to hips up to 4 times daily. T    Greater trochanteric bursitis of both  hips, Bilateral thumb pain       DULoxetine 60 MG EC capsule    CYMBALTA    180 capsule    Take 2 capsules (120 mg) by mouth daily    Multiple joint pain       HYDROcodone-acetaminophen 5-325 MG per tablet    NORCO    100 tablet    May take 1 tablet every 4-6 hours as needed for pain. Max of 4 tabs per day.  Must last 30 days. OK to fill and begin on 6/23/2017    Lumbosacral spondylosis without myelopathy, DDD (degenerative disc disease), lumbar, SI (sacroiliac) joint dysfunction, Greater trochanteric bursitis of both hips, Myofascial pain, Fibromyalgia       hydrocortisone 2.5 % cream     30 g    APPLY TOPICALLY 2 TIMES DAILY AS NEEDED    Dermatitis       losartan-hydrochlorothiazide 100-12.5 MG per tablet    HYZAAR    90 tablet    Take 1 tablet by mouth daily    Essential hypertension with goal blood pressure less than 140/90       metoprolol 25 MG 24 hr tablet    TOPROL-XL    45 tablet    Take 1.5 tablets per day    Palpitations, Sinus tachycardia       montelukast 10 MG tablet    SINGULAIR    90 tablet    TAKE 1 TABLET (10 MG) BY ORAL ROUTE ONCE DAILY IN THE EVENING    Moderate persistent asthma without complication       mupirocin 2 % cream    BACTROBAN    30 g    Apply topically 3 times daily        nystatin [id number] UNIT/ML suspension    MYCOSTATIN    380 mL    Take by mouth 4 times daily        nystatin cream    MYCOSTATIN    45 g    Apply topically 3 times daily    Dermatitis       ranitidine 150 MG tablet    ZANTAC    0    ONE TABLET TWICE DAILY        rizatriptan 10 MG tablet    MAXALT    15 tablet    Take 0.5-1 tablets (5-10 mg) by mouth at onset of headache for migraine May repeat dose every 2 hours as needed up to max of 30mg in 24 hours. Do not take more than 3 times per week as overuse can contribute to rebound migraine headaches. Do NOT use with sumatriptan.    Migraine without aura and without status migrainosus, not intractable       rOPINIRole 1 MG tablet    REQUIP    180 tablet    TAKE 2 TABLETS  (2 MG) BY MOUTH AT BEDTIME    Movement disorder       traZODone 50 MG tablet    DESYREL    270 tablet    TAKE 2-3 TABLETS (100-150 MG) BY MOUTH AT BEDTIME    Insomnia, unspecified       tretinoin 0.025 % cream    RETIN-A    45 g    Spread a pea size amount into affected area topically at bedtime.  Use sunscreen SPF>20.    Age-related facial wrinkles       VENTOLIN  (90 BASE) MCG/ACT Inhaler   Generic drug:  albuterol     54 g    INHALE 2 PUFFS INTO THE LUNGS EVERY 6 HOURS AS NEEDED FOR SHORTNESS OF BREATH / DYSPNEA    Moderate persistent asthma without complication       ZYRTEC 10 MG tablet   Generic drug:  cetirizine     90    1 TABLET DAILY    Allergic rhinitis, cause unspecified       * Notice:  This list has 2 medication(s) that are the same as other medications prescribed for you. Read the directions carefully, and ask your doctor or other care provider to review them with you.

## 2017-06-26 NOTE — TELEPHONE ENCOUNTER
Adapalene      Last Written Prescription Date: 3-15-16  Last Fill Quantity: 45g,  # refills: 11   Last Office Visit with [facility], [facility] or [facility] prescribing provider: 3-14-17                                         Next 5 appointments (look out 90 days)     Jul 28, 2017 11:00 AM CDT   Return Visit with JESSICA Guillory CNP   [facility] [place] ([facility])    [address]  [place] [address] [phone]

## 2017-06-26 NOTE — Clinical Note
"Hello,   Just a little FYI...  Jacquie has been having concerns with memory since being on the metoprolol.  I am not sure who is prescribing it.    We did talk about how stress/pain can affect memory as well, and she thinks that has some affect; but she feels like her memory loss increased after starting this.  She would like some clarification with the medication.  Overall, she feels like she is doing better with the feet and hips; back pain \"still there\".  If you have any questions or concerns, let me know.  Thanks,  Lorena, PT"

## 2017-06-26 NOTE — PROGRESS NOTES
Hand Therapy Initial Evaluation    Current Date:  6/26/2017    Subjective:  Jacquie Amador is a 56 year old right hand dominant female.    Diagnosis:  Bilateral hand/thumb pain (right > left)  DOI:  6/13/17 (therapy referral)    Patient reports symptoms of pain, stiffness/loss of motion, weakness/loss of strength and edema of bilateral thumbs and hands which occurred due to gradual onset with unknown etiology over the past 10 years. Since onset symptoms are gradually getting worse in the past year.  Special tests:  x-ray.  Previous treatment: Prefab splints but no longer using due to being uncomfortable also cortisone injections to bilateral thumbs several times over the past few years, last injections in April 2017.  General health as reported by patient is good.  Pertinent medical history includes:osteoarthritis, overweight, high blood pressure, depression , fibromyalgia, asthma, migraines/headaches, smoking (quit 5 years ago), pain at rest/night, dizziness, anxiety, RLS, DDD, heart rate.  Medical allergies:see list in EMR.  Surgical history: orthopedic: bulging disc surgery, other: c-sections x 2.  Medication history: sleep, anti-inflammatory, pain, anti-depressants, high blood pressure, RLS, anxiety, heart rate.    Current occupation is None   Barriers include:none  Prior functional level:  no limitations    Additional Occupational Profile Information (patterns of daily living, interests, values and needs): enjoys gardening    Functional Outcome Measure:  Upper Extremity Functional Index  SCORE:   Column Totals: 34/80  (A lower score indicates greater disability.)    ROM:  Thumb 6/26/17   AROM(PROM) Right   PAbd 35   RAbd 25     Thumb 6/26/17   AROM(PROM) Left   PAbd 35   RAbd 35     Strength:   (Measured in pounds)   6/26/17    Trials Right Left   1 44+ 27-     Lat Pinch 6/26/17    Trials Right Left   1 5+ 7-     3 Pt Pinch 6/26/17    Trials Right Left   1 4+ 4-     Tenderness:   6/26/17   CMC joint of  Thumb R:++  L:+   FCR R:-  L:-   1st DC R:-  L:-   Thumb web space R:++  L:+     Appearance:   6/26/17   Shoulder deformity at CMC R:+++  L:++   Edema over the CMC joint R:-  L:-   Noted collapse of MP into hyperextension during pinch R:+  L:+     Special Tests:   6/26/17   Grind R:+  L:+   Passive Retroposition R:+++  L:++     Pain Report:  VAS(0-10) 6/26/17   At Rest: 0/10   With Use: 7-8/10   Location:  Thumb CMC joints bilaterally  Pain Quality:  Aching and Throbbing  Frequency: intermittent    Pain is worst:  End of day, after use  Exacerbated by:  Holding Ipad, gardening, grasping, pulling, pinching  Relieved by:  Pain meds  Progression:  Gradually worsening  Assessment:  Patient presents with symptoms consistent with diagnosis of bilateral CMC thumb arthritis, with conservative intervention.    Patient s limitations or Problem List includes: Pain, Decreased ROM/motion, weakness, decreased stability of the CMC joint, decreased  and pinch strength of the thumb which interferes with patients ability to perform  Self Care Tasks (dressing, eating, bathing), Work Tasks, Sleep Patterns, Recreational Activities, Household Chores and Driving  as compared to previous level of function.    Patient will benefit from skilled Occupational Therapy to increase ROM, flexibility, pinch strength, and stability of the thumb and decrease pain to return to previous activity level and resume normal daily tasks and to reach their rehab potential.    Rehab Potential:  Good - Return to full activity, some limitations    Barriers to Learning:  No barrier    Communication Issues:  Patient appears to be able to clearly communicate and understand verbal and written communication and follow directions correctly.    Assessment of Occupational Performance:  5 or more Performance Deficits  Identified Performance Deficits: bathing/showering, dressing, feeding, communication management, driving and community mobility, home establishment and  management, meal preparation and cleanup, shopping, sleep and leisure activities      Clinical Decision Making (Complexity): Low complexity    Treatment Explanation:  The following has been discussed with the patient:  RX ordered/plan of care  Anticipated outcomes  Possible risks and side effects    Plan:  Frequency:  2 X a month, once daily  Duration:  for 2 months    Treatment Plan:  Modalities:  Paraffin  Therapeutic Exercise: AROM, Isometrics, and Stabilization exercises of the Thumb CMC, including active and resisted abduction, 1st DI strengthening  Manual Techniques: Joint Mobilization or reseating of the trapezium, self MFR to thumb adductor with clip  Orthotic Fabrication:  Hand based Thumb Spica orthosis, Custom neoprene orthosis  Education: Anatomy of CMC, joint protection principles, adaptive equipment as needed    Discharge Plan:  Achieve all LTG  [place] in home treatment program.  Reach maximal therapeutic benefit.    Home Program:  Warmth for stiffness  Ice/cold after activity for pain or swelling  1st web release with clip or marble/ball massage  Self CMC mobilization on chest  Right CMC neoprene cool comfort orthosis night/sleeping and per symptoms during the day  Incorporate joint protection into daily functional activities    Next Visit:  See in 2 weeks  Trial Paraffin  Add Thumb Stabilization Program with hard  C  and index abd  Fit with left CMC neoprene cool comfort orthosis and bilateral orthoplast hand based Thumb Spica orthosis  Issue resources for adaptive equipment
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
## 2017-06-26 NOTE — TELEPHONE ENCOUNTER
Jorge sent to pt:   Ciaran Dhillon.   I will have Liseth Caldera NP review your message.  The information for the radiofrequency ablation has been sent on to Chantal to check coverage.  When we hear you will be called.  Sometimes it can take up to 30 days to hear but most time it is sooner.  Liseth doesn't make any medication changes over the phone.  She can address your oxycontin question when you see her next in clinic.    ----  Routed to Liseth Caldera NP to review.    Denisse Maldonado RN-BSN  [facility]

## 2017-06-26 NOTE — PROGRESS NOTES
"Pain Physical Therapy Progress Note    Patient Name: Jacquie Amador     YOB: 1960     Medical Record Number: [id number]    Visits: 7/8    Diagnosis:    Fibromyalgia  DDD (degenerative disc disease), lumbar  Myofascial pain  Trochanteric bursitis of both hips    Subjective: Patient reports that she is doing OK, feels like things are starting to do a little bit better.    Hips are settling down; feet are doing better    Back still bothering    Saw Nadia, OT for hand therapy prior to PT session--felt like she got some good information; has new thumb splint    Did see a therapist; didn't feel like it was a good fit, plans to try someone else    Hoping to go up to [place] for 4th of July weekend, hasn't been for a couple years, looking forward to it    Self Care  HEP: stretches going good; need to work on consistency--not quite daily, working it  Walking/Aerobic Activity: hasn't happened, has been raining; hasn't been out in the garden  Heat/Ice: has been using hot tub and ice  Posture: has been thinking about it; takes practice   Mini Breaks: has been using mini breaks, more aware  Breathing/Relaxation: need to do more of  Pacing: Need to work on    Objective Findings:  OBSERVATION: slightly rounded shoulders; slight forward flexed in sitting/standing; slight forward head, crossing legs in sitting, able to self correct with moderate cues; \"it feels good to cross legs--doesn't hurt now\"  GAIT & LOCOMOTION: slight antalgic gait and forward flexed, wide base of support    Treatment Interventions:  Therapeutic Activity:    Pacing and Energy Conservation: instructed patient in basics of pacing activity and importance of pacing and energy conservation; discussed basic EC principles.  Discussed looking at priorities, looking at doing activities that are \"needs vs wants\"; discussed how overdoing things can cause increase in pain; how spacing out activities may be more helpful, less painful in the long run.  " Sometimes need to change old habits or look back on why one is doing things a certain way. Gave Handout.   Discussed planning ahead with activities at cabin--may need to take breaks on dock, careful with boat rides; in and out of boat; someone assist with bringing things down to dock--long set of steps; slowly packing; taking breaks with drive up    Neuromuscular Reeducation:   Reviewed posture basics; reviewed working on slow breaths, how can help to slowly decrease pain/body tension; Reviewed how stress, anxiety, and pain are intertwined with each other--encouraged patient to follow up with therapist that she feels comfortable with.    __________________________________________________________________    Instruction on home program: pacing    Assessment:  Ongoing Functional Limitations Include:  Patient tolerated/responded well to treatment  Impression: slowly working on pain PT principles    Recommendations: continue, may need to have additional visits added, will follow up with Pain Provider    Intensity Level: 2 (1=low intensity; 5=high intensity)    Demonstrates/Verbalizes Technique: 2 (1= poor technique-difficulty performing exercises,significant cues required; 5= good technique-performs exercises without cues)    Body Awareness: 2, 3 (1=low awareness; 5=high awareness)    Posture/Stability: 2, 3 (1= poor posture, stability; 5= good posture, stability)    Motivational Level: Cooperative    Response to Teaching: cooperative    Factors that affect learning: None    _______________________________________________________________________  Plan of Care   Continue PT to support reactivation and integration of self regulation pain management skills;  Focus of next session will be on: HEP as able, flare management plan     Present:  NA     Total Visit Time:  35 minutes; with other provider  TA: 20 mins; Neuro-Re Ed: 15 mins;      Therapist: Lauren Gillespie PT             Date: 6/26/2017

## 2017-06-26 NOTE — TELEPHONE ENCOUNTER
Prescription approved per [facility] Refill Protocol.  Dedrick Noe, RN, BSN

## 2017-06-26 NOTE — TELEPHONE ENCOUNTER
Jorge sent to pt:  Created: 6/26/2017 2:16 PM        Unfortunately our injection schedule is really booked out-next available for a single injection is the end of July-at this time.  Due to this the radiofrequency ablation we be scheduled for August/September depending on when we hear from your insurance.  We will let you know when we hear from your insurance.      Denisse Maldonado RN-BSN  [facility]

## 2017-06-27 NOTE — TELEPHONE ENCOUNTER
Per the 6/16/17 mychart encounter:     Patient experienced 100% relief for about 4 months and lesser relief until this spring, so received 6-9 months of relief.    Denisse Maldonado RN-BSN  [facility]
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
## 2017-06-28 DIAGNOSIS — J45.40 MODERATE PERSISTENT ASTHMA WITHOUT COMPLICATION: ICD-10-CM

## 2017-06-28 ASSESSMENT — ANXIETY QUESTIONNAIRES
GAD7 TOTAL SCORE: 3
1. FEELING NERVOUS, ANXIOUS, OR ON EDGE: SEVERAL DAYS
2. NOT BEING ABLE TO STOP OR CONTROL WORRYING: SEVERAL DAYS
6. BECOMING EASILY ANNOYED OR IRRITABLE: NOT AT ALL
5. BEING SO RESTLESS THAT IT IS HARD TO SIT STILL: NOT AT ALL
7. FEELING AFRAID AS IF SOMETHING AWFUL MIGHT HAPPEN: NOT AT ALL
GAD7 TOTAL SCORE: 3
3. WORRYING TOO MUCH ABOUT DIFFERENT THINGS: SEVERAL DAYS
4. TROUBLE RELAXING: NOT AT ALL
GAD7 TOTAL SCORE: 3
7. FEELING AFRAID AS IF SOMETHING AWFUL MIGHT HAPPEN: NOT AT ALL

## 2017-06-28 ASSESSMENT — PATIENT HEALTH QUESTIONNAIRE - PHQ9
SUM OF ALL RESPONSES TO PHQ QUESTIONS 1-9: 6
SUM OF ALL RESPONSES TO PHQ QUESTIONS 1-9: 6

## 2017-06-28 NOTE — TELEPHONE ENCOUNTER
singulair      Last Written Prescription Date: 06/30/16  Last Fill Quantity: 90,  # refills: 3   Last Office Visit with [facility], [facility] or  Health prescribing provider: 03/14/17                                         Next 5 appointments (look out 90 days)     Jun 30, 2017  4:00 PM CDT   Office Visit with Liz Peterson MD   [facility] ([facility])    [address], [address]  [place] [address]   [phone]            Jul 11, 2017 10:15 AM CDT   Return Visit with Lauren Gillespie, PT   [facility] ([facility])    [address] [address]   [phone]            Jul 28, 2017 11:00 AM CDT   [facility] ([facility])    [address] [address]   [phone]
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
## 2017-06-28 NOTE — TELEPHONE ENCOUNTER
Faxed completed PA form and supporting documentation for HP to repeat LRFA. Right fax confirmation 6/28 at 8:54 am.    Did call patient and informed her that the PA was submitted        Chantal RIGGS    [facility]

## 2017-06-29 ASSESSMENT — ANXIETY QUESTIONNAIRES: GAD7 TOTAL SCORE: 3

## 2017-06-29 ASSESSMENT — PATIENT HEALTH QUESTIONNAIRE - PHQ9: SUM OF ALL RESPONSES TO PHQ QUESTIONS 1-9: 6

## 2017-06-30 ENCOUNTER — OFFICE VISIT (OUTPATIENT)
Dept: FAMILY MEDICINE | Facility: CLINIC | Age: 57
End: 2017-06-30
Payer: COMMERCIAL

## 2017-06-30 DIAGNOSIS — R53.83 FATIGUE, UNSPECIFIED TYPE: Primary | ICD-10-CM

## 2017-06-30 DIAGNOSIS — G89.4 CHRONIC PAIN SYNDROME: ICD-10-CM

## 2017-06-30 DIAGNOSIS — F33.1 MODERATE RECURRENT MAJOR DEPRESSION (H): ICD-10-CM

## 2017-06-30 DIAGNOSIS — Z79.899 MEDICAL MARIJUANA USE: ICD-10-CM

## 2017-06-30 DIAGNOSIS — M25.50 MULTIPLE JOINT PAIN: ICD-10-CM

## 2017-06-30 DIAGNOSIS — B37.0 THRUSH: ICD-10-CM

## 2017-06-30 DIAGNOSIS — J01.01 ACUTE RECURRENT MAXILLARY SINUSITIS: ICD-10-CM

## 2017-06-30 DIAGNOSIS — I10 HYPERTENSION GOAL BP (BLOOD PRESSURE) < 140/90: ICD-10-CM

## 2017-06-30 DIAGNOSIS — F41.1 GENERALIZED ANXIETY DISORDER: ICD-10-CM

## 2017-06-30 LAB
BASOPHILS # BLD AUTO: 0.1 10E9/L (ref 0–0.2)
BASOPHILS NFR BLD AUTO: 0.8 %
DIFFERENTIAL METHOD BLD: NORMAL
EOSINOPHIL # BLD AUTO: 0.4 10E9/L (ref 0–0.7)
EOSINOPHIL NFR BLD AUTO: 4 %
ERYTHROCYTE [DISTWIDTH] IN BLOOD BY AUTOMATED COUNT: 13.8 % (ref 10–15)
HCT VFR BLD AUTO: 41.1 % (ref 35–47)
HGB BLD-MCNC: 13.6 G/DL (ref 11.7–15.7)
LYMPHOCYTES # BLD AUTO: 2.3 10E9/L (ref 0.8–5.3)
LYMPHOCYTES NFR BLD AUTO: 23.2 %
MCH RBC QN AUTO: 29.6 PG (ref 26.5–33)
MCHC RBC AUTO-ENTMCNC: 33.1 G/DL (ref 31.5–36.5)
MCV RBC AUTO: 90 FL (ref 78–100)
MONOCYTES # BLD AUTO: 0.7 10E9/L (ref 0–1.3)
MONOCYTES NFR BLD AUTO: 7.4 %
NEUTROPHILS # BLD AUTO: 6.3 10E9/L (ref 1.6–8.3)
NEUTROPHILS NFR BLD AUTO: 64.6 %
PLATELET # BLD AUTO: 320 10E9/L (ref 150–450)
RBC # BLD AUTO: 4.59 10E12/L (ref 3.8–5.2)
WBC # BLD AUTO: 9.8 10E9/L (ref 4–11)

## 2017-06-30 PROCEDURE — 80050 GENERAL HEALTH PANEL: CPT | Performed by: FAMILY MEDICINE

## 2017-06-30 PROCEDURE — 36415 COLL VENOUS BLD VENIPUNCTURE: CPT | Performed by: FAMILY MEDICINE

## 2017-06-30 PROCEDURE — 99215 OFFICE O/P EST HI 40 MIN: CPT | Performed by: FAMILY MEDICINE

## 2017-06-30 RX ORDER — ATORVASTATIN CALCIUM 20 MG/1
TABLET, FILM COATED ORAL
Refills: 3 | COMMUNITY
Start: 2017-05-29 | End: 2019-07-19

## 2017-06-30 RX ORDER — NYSTATIN 100000/ML
SUSPENSION, ORAL (FINAL DOSE FORM) ORAL 4 TIMES DAILY
Qty: 380 ML | Refills: 1 | Status: SHIPPED | OUTPATIENT
Start: 2017-06-30 | End: 2019-04-26

## 2017-06-30 ASSESSMENT — PAIN SCALES - GENERAL: PAINLEVEL: MODERATE PAIN (5)

## 2017-06-30 NOTE — MR AVS SNAPSHOT
After Visit Summary   6/30/2017    Jacquie Amador    MRN: [id number]           Patient Information     Date Of Birth          1960        Visit Information        Provider Department      6/30/2017 4:00 PM Liz Peterson MD [facility]        Today's Diagnoses     Fatigue, unspecified type    -  1    Moderate recurrent major depression (H)        Multiple joint pain        Hypertension goal BP (blood pressure) < 140/90        Generalized anxiety disorder        Medical marijuana use        Chronic pain syndrome        Acute recurrent maxillary sinusitis        Thrush          Care Instructions      Decrease metoprolol XL to 12.5 mg daily for 2 weeks. Then stop.     Augmentin prescription to pharmacy.           Follow-ups after your visit        Your next 10 appointments already scheduled     Jul 11, 2017  9:30 AM CDT   [facility] Hand with Nadia Dong   [facility] [place] ([facility] [place] [facility])    [address]  [place] [address]   [phone]            Jul 11, 2017 10:15 AM CDT   Return Visit with Lauren Gillespie PT   [facility] ([facility] [place])    [address]  [place] [address]   [phone]            Jul 28, 2017 11:00 AM CDT   Return Visit with JESSICA Guillory CNP   [facility] ([facility] [place])    [address] [address]   [phone]              Who to contact     If you have questions or need follow up information about today's clinic visit or your schedule please contact [facility] [place] directly at [phone].  Normal or non-critical lab and imaging results will be communicated to you by MyChart, letter or phone within 4 business days after the clinic has received the results. If you do not hear from us within 7 days, please contact the clinic through MyChart or phone. If you have a critical  "or abnormal lab result, we will notify you by phone as soon as possible.  Submit refill requests through [employer] or call your pharmacy and they will forward the refill request to us. Please allow 3 business days for your refill to be completed.          Additional Information About Your Visit        Lieferheldhart Information     [employer] gives you secure access to your electronic health record. If you see a primary care provider, you can also send messages to your care team and make appointments. If you have questions, please call your primary care clinic.  If you do not have a primary care provider, please call [phone] and they will assist you.        Care EveryWhere ID     This is your Care EveryWhere ID. This could be used by other organizations to access your [facility] medical records  [id number]        Your Vitals Were     Pulse Temperature Respirations Height Last Period       96 96.1  F (35.6  C) (Temporal) 16 5' 6\" (1.676 m) 07/17/2009        Blood Pressure from Last 3 Encounters:   06/30/17 (!) 140/100   05/12/17 123/85   04/04/17 127/84    Weight from Last 3 Encounters:   03/31/17 204 lb (92.5 kg)   12/22/16 205 lb (93 kg)   07/27/16 208 lb (94.3 kg)              We Performed the Following     CBC with platelets and differential     Comprehensive metabolic panel (BMP + Alb, Alk Phos, ALT, AST, Total. Bili, TP)     TSH with free T4 reflex          Today's Medication Changes          These changes are accurate as of: 6/30/17  5:21 PM.  If you have any questions, ask your nurse or doctor.               Start taking these medicines.        Dose/Directions    amoxicillin-clavulanate 875-125 MG per tablet   Commonly known as:  AUGMENTIN   Used for:  Acute recurrent maxillary sinusitis   Started by:  Liz Peterson MD        Dose:  1 tablet   Take 1 tablet by mouth 2 times daily   Quantity:  20 tablet   Refills:  0         Stop taking these medicines if you haven't already. Please contact your care team if you " have questions.     clobetasol 0.05 % cream   Commonly known as:  TEMOVATE   Stopped by:  Liz Peterson MD           mupirocin 2 % cream   Commonly known as:  BACTROBAN   Stopped by:  Liz Peterson MD                Where to get your medicines      These medications were sent to [id number] - [place] - [address]  [address], [place] [address]     Phone:  [phone]     amoxicillin-clavulanate 875-125 MG per tablet    nystatin 187329 UNIT/ML suspension                Primary Care Provider Office Phone # Fax #    Liz Peterson [phone]       [facility] [id number] [place] [address]  [place] [address]        Equal Access to Services     [facility]: Hadii aad ku hadasho Soomaali, waaxda luqadaha, qaybta kaalmada adeegyada, waxay idiin hayaan louis chandler . So [facility] [phone].    ATENCIÓN: Si habla español, tiene a kimball disposición servicios gratuitos de asistencia lingüística. Llame al [phone].    We comply with applicable federal civil rights laws and [place] laws. We do not discriminate on the basis of race, color, national origin, age, disability sex, sexual orientation or gender identity.            Thank you!     Thank you for choosing [facility]  for your care. Our goal is always to provide you with excellent care. Hearing back from our patients is one way we can continue to improve our services. Please take a few minutes to complete the written survey that you may receive in the mail after your visit with us. Thank you!             Your Updated Medication List - Protect others around you: Learn how to safely use, store and throw away your medicines at www.disposemymeds.org.          This list is accurate as of: 6/30/17  5:21 PM.  Always use your most recent med list.                   Brand Name Dispense Instructions for use Diagnosis    acidophilus Caps     60 capsule    Take 1 capsule by mouth daily Take two hours before or  after antibiotic dose.  Continue for 1 week after antibiotic therapy completed    Diarrhea       adapalene 0.1 % gel    DIFFERIN    45 g    APPLY A THIN LAYER TO THE AFFECTED AREA(S) BY TOPICAL ROUTE ONCE DAILY BEFORE BEDTIME    Acne vulgaris       ADVAIR DISKUS 500-50 MCG/DOSE diskus inhaler   Generic drug:  fluticasone-salmeterol     1 Inhaler    INHALE 1 PUFF BY INHALATION ROUTE 2 TIMES PER DAY IN THE MORNING AND EVENING APPROXIMATELY 12 HOURS APART    Moderate persistent asthma without complication       ALPRAZolam 0.25 MG tablet    XANAX    90 tablet    TAKE 1 TABLET DAILY AS NEEDED FOR ANXIETY    Generalized anxiety disorder       amoxicillin-clavulanate 875-125 MG per tablet    AUGMENTIN    20 tablet    Take 1 tablet by mouth 2 times daily    Acute recurrent maxillary sinusitis       atorvastatin 20 MG tablet    LIPITOR          * buPROPion 150 MG 12 hr tablet    WELLBUTRIN SR    90 tablet    Take 1 tablet (150 mg) by mouth every evening    Generalized anxiety disorder       * buPROPion 200 MG 12 hr tablet    WELLBUTRIN SR    120 tablet    Take 1 tablet (200 mg) by mouth every morning    Generalized anxiety disorder       calcium carbonate-vitamin D 500-400 MG-UNIT Tabs per tablet     180 tablet    Take 1 tablet by mouth 2 times daily    Absence of menstruation       celecoxib 200 MG capsule    celeBREX    180 capsule    Take 1 capsule twice daily as needed for pain this is a 3 month script    Greater trochanteric bursitis, unspecified laterality, Hand arthritis       clindamycin 1 % lotion    CLEOCIN T     Apply topically 2 times daily        diclofenac 1 % Gel topical gel    VOLTAREN    9 Tube    Apply 2 grams to hands and 4 grams to hips up to 4 times daily. T    Greater trochanteric bursitis of both hips, Bilateral thumb pain       DULoxetine 60 MG EC capsule    CYMBALTA    180 capsule    Take 2 capsules (120 mg) by mouth daily    Multiple joint pain       HYDROcodone-acetaminophen 5-325 MG per tablet     NORCO    100 tablet    May take 1 tablet every 4-6 hours as needed for pain. Max of 4 tabs per day.  Must last 30 days. OK to fill and begin on 6/23/2017    Lumbosacral spondylosis without myelopathy, DDD (degenerative disc disease), lumbar, SI (sacroiliac) joint dysfunction, Greater trochanteric bursitis of both hips, Myofascial pain, Fibromyalgia       hydrocortisone 2.5 % cream     30 g    APPLY TOPICALLY 2 TIMES DAILY AS NEEDED    Dermatitis       losartan-hydrochlorothiazide 100-12.5 MG per tablet    HYZAAR    90 tablet    Take 1 tablet by mouth daily    Essential hypertension with goal blood pressure less than 140/90       metoprolol 25 MG 24 hr tablet    TOPROL-XL    45 tablet    Take 1.5 tablets per day    Palpitations, Sinus tachycardia       montelukast 10 MG tablet    SINGULAIR    90 tablet    TAKE 1 TABLET (10 MG) BY ORAL ROUTE ONCE DAILY IN THE EVENING    Moderate persistent asthma without complication       nystatin 726887 UNIT/ML suspension    MYCOSTATIN    380 mL    Take by mouth 4 times daily    Thrush       nystatin cream    MYCOSTATIN    45 g    Apply topically 3 times daily    Dermatitis       ranitidine 150 MG tablet    ZANTAC    0    ONE TABLET TWICE DAILY        rizatriptan 10 MG tablet    MAXALT    15 tablet    Take 0.5-1 tablets (5-10 mg) by mouth at onset of headache for migraine May repeat dose every 2 hours as needed up to max of 30mg in 24 hours. Do not take more than 3 times per week as overuse can contribute to rebound migraine headaches. Do NOT use with sumatriptan.    Migraine without aura and without status migrainosus, not intractable       rOPINIRole 1 MG tablet    REQUIP    180 tablet    TAKE 2 TABLETS (2 MG) BY MOUTH AT BEDTIME    Movement disorder       traZODone 50 MG tablet    DESYREL    270 tablet    TAKE 2-3 TABLETS (100-150 MG) BY MOUTH AT BEDTIME    Insomnia, unspecified       tretinoin 0.025 % cream    RETIN-A    45 g    Spread a pea size amount into affected area  topically at bedtime.  Use sunscreen SPF>20.    Age-related facial wrinkles       VENTOLIN  (90 BASE) MCG/ACT Inhaler   Generic drug:  albuterol     54 g    INHALE 2 PUFFS INTO THE LUNGS EVERY 6 HOURS AS NEEDED FOR SHORTNESS OF BREATH / DYSPNEA    Moderate persistent asthma without complication       ZYRTEC 10 MG tablet   Generic drug:  cetirizine     90    1 TABLET DAILY    Allergic rhinitis, cause unspecified       * Notice:  This list has 2 medication(s) that are the same as other medications prescribed for you. Read the directions carefully, and ask your doctor or other care provider to review them with you.

## 2017-06-30 NOTE — NURSING NOTE
"Chief Complaint   Patient presents with     other     Panel Management     FIT, PHQ-9/MARIZOL, ACT       Initial BP (!) 140/100  Pulse 96  Temp 96.1  F (35.6  C) (Temporal)  Resp 16  Ht 5' 6\" (1.676 m)  LMP 07/17/2009 Estimated body mass index is 31.95 kg/(m^2) as calculated from the following:    Height as of 3/31/17: 5' 7\" (1.702 m).    Weight as of 3/31/17: 204 lb (92.5 kg).  Medication Reconciliation: complete     Xiang Solano MA    "

## 2017-06-30 NOTE — PATIENT INSTRUCTIONS
Decrease metoprolol XL to 12.5 mg daily for 2 weeks. Then stop.     Augmentin prescription to pharmacy.

## 2017-07-01 VITALS
TEMPERATURE: 96.1 F | HEART RATE: 96 BPM | SYSTOLIC BLOOD PRESSURE: 138 MMHG | RESPIRATION RATE: 16 BRPM | DIASTOLIC BLOOD PRESSURE: 88 MMHG | HEIGHT: 66 IN

## 2017-07-01 ASSESSMENT — ASTHMA QUESTIONNAIRES: ACT_TOTALSCORE: 15

## 2017-07-03 LAB
ALBUMIN SERPL-MCNC: 4 G/DL (ref 3.4–5)
ALP SERPL-CCNC: 138 U/L (ref 40–150)
ALT SERPL W P-5'-P-CCNC: 25 U/L (ref 0–50)
ANION GAP SERPL CALCULATED.3IONS-SCNC: 10 MMOL/L (ref 3–14)
AST SERPL W P-5'-P-CCNC: 19 U/L (ref 0–45)
BILIRUB SERPL-MCNC: 0.4 MG/DL (ref 0.2–1.3)
BUN SERPL-MCNC: 16 MG/DL (ref 7–30)
CALCIUM SERPL-MCNC: 9.3 MG/DL (ref 8.5–10.1)
CHLORIDE SERPL-SCNC: 105 MMOL/L (ref 94–109)
CO2 SERPL-SCNC: 24 MMOL/L (ref 20–32)
CREAT SERPL-MCNC: 0.84 MG/DL (ref 0.52–1.04)
GFR SERPL CREATININE-BSD FRML MDRD: 69 ML/MIN/1.7M2
GLUCOSE SERPL-MCNC: 100 MG/DL (ref 70–99)
POTASSIUM SERPL-SCNC: 4 MMOL/L (ref 3.4–5.3)
PROT SERPL-MCNC: 8 G/DL (ref 6.8–8.8)
SODIUM SERPL-SCNC: 139 MMOL/L (ref 133–144)
TSH SERPL DL<=0.005 MIU/L-ACNC: 1.57 MU/L (ref 0.4–4)

## 2017-07-03 RX ORDER — MONTELUKAST SODIUM 10 MG/1
TABLET ORAL
Qty: 90 TABLET | Refills: 1 | Status: SHIPPED | OUTPATIENT
Start: 2017-07-03 | End: 2017-12-28

## 2017-07-03 NOTE — TELEPHONE ENCOUNTER
Prescription approved per [facility] Refill Protocol.  Dedrick Noe, RN, BSN

## 2017-07-06 ENCOUNTER — MYC MEDICAL ADVICE (OUTPATIENT)
Dept: PALLIATIVE MEDICINE | Facility: CLINIC | Age: 57
End: 2017-07-06

## 2017-07-06 DIAGNOSIS — M70.61 GREATER TROCHANTERIC BURSITIS OF BOTH HIPS: Primary | ICD-10-CM

## 2017-07-06 DIAGNOSIS — M70.62 GREATER TROCHANTERIC BURSITIS OF BOTH HIPS: Primary | ICD-10-CM

## 2017-07-06 NOTE — TELEPHONE ENCOUNTER
The below was last read by Jacquie Amador at 6:49 PM on 6/28/2017. She has an appointment scheduled with Liseth Caldera on 7/28/17.

## 2017-07-06 NOTE — TELEPHONE ENCOUNTER
My chart message from pt:    Hi Liseth,   I wanted to let you know that I have gone down to 1/2 tablet of Metoprolol. I have been sooo fatiqued and my memory was awful!! Those are two side effects that really got worse on Metoprolol. Per Dr. Peterson I have gone to 1/2 tab, I seem to have some more headaches but not as tired. My pain in my thigh is better I think that my Fibromyalgia was full blown and causing a lot of issues. If I can't get in for back ablation til August or Sept I am going to need hip injections they are bad right now.     I also am not going to pursue Counseling regarding [place] I have moved on and can handle talking about it now without crying. Time heals. Well I guess I will see you July 28 and was really hoping to get back ablation before August or September. My lower back is awful and has gotten worse.     Jacquie Amador     Sending to provider to respond.     Michell Ritchie, RN, [facility]  Pain Clinic Care Coordinator

## 2017-07-06 NOTE — TELEPHONE ENCOUNTER
Closing this encounter as pt has sent a new my chart message to provider.     Michell Ritchie RN, [facility]  Pain Clinic Care Coordinator

## 2017-07-07 ENCOUNTER — MYC MEDICAL ADVICE (OUTPATIENT)
Dept: FAMILY MEDICINE | Facility: CLINIC | Age: 57
End: 2017-07-07

## 2017-07-07 NOTE — TELEPHONE ENCOUNTER
Patient sending an update from LOV 06/30/2017. Provider to review. RN responded via Face++.. Eva Pace RN, BSN

## 2017-07-08 ENCOUNTER — MYC MEDICAL ADVICE (OUTPATIENT)
Dept: PALLIATIVE MEDICINE | Facility: CLINIC | Age: 57
End: 2017-07-08

## 2017-07-10 DIAGNOSIS — R00.2 PALPITATIONS: ICD-10-CM

## 2017-07-10 DIAGNOSIS — R00.0 SINUS TACHYCARDIA: ICD-10-CM

## 2017-07-10 NOTE — TELEPHONE ENCOUNTER
PA approved.  Effective date: 6/28/17-12/27/17  PA reference #: [id number]  Pt. notified:   Yes   Pt. informed directly.        Chantal RIGGS    [facility]

## 2017-07-10 NOTE — TELEPHONE ENCOUNTER
Flagging for RN for follow prior to closing UGOBE message. Message was read but pt never called to speak with nurse.    Marie Laura CMA ([facility])

## 2017-07-10 NOTE — TELEPHONE ENCOUNTER
Pre-screening Questions for RFA Procedure      Procedure ordered? Lumbar RFA    What insurance are we billing for this procedure?  HP    Has patient had this injection before? Yes:   Any chance of pregnancy? NO   If YES, do NOT schedule and route to RN pool    Is  Needed?: No  Will patient have a ?  Yes   If pt is given sedation meds, no driving for 24 hours.  Is pt taking a cab or transportation service? NO        If so will need to be accompanied by an adult too (friend/family member) in order for IV sedation to be given.      Per [place] [facility] Policy:  Outpatients are to have responsible adult or family member to accompany them at discharge and drive them home. A service providing medically trained drivers or attendants would be acceptable. Public transportation would not be acceptable unless the patient is accompanied by a responsible adult or family member.    Is patient taking any blood thinners (plavix, coumadin, jantoven, warfarin, heparin, pradaxa or dabigatran )? No   If YES, do NOT schedule, and route to RN pool    Is patient taking any aspirin products? No     If more than 325mg/day do NOT schedule; route to RN pool     For CERVICAL procedures, hold all aspirin products for 6 days.     Does the patient have a bleeding or clotting disorder? No     If YES, it it OKAY to schedule AND route to RN nurse pool    **For any patients with platelet count <100, must be forwarded to provider**    Does patient have an active infection or treated for one within the past week? No   If YES, do NOT schedule and route to RN     Is patient currently taking any antibiotics?  Yes - Augmentin - finish date 7/11    For patients on chronic, preventative, or prophylactic antibiotics, procedures may be scheduled.     For patients on antibiotics for active or recent infection:    Chema Gibson [place], [place]-antibiotic course must have been completed for 4 days    Rosanne Montero-antibiotic course must  have been completed for 7 days    Is patient currently taking any steroid medications? (i.e. Prednisone, Medrol)  No     For patients on steroid medications:    Chema Gibson [place], [place]-steroid course must have been completed for 4 days    Rosanne Montero-steroid course must have been completed for 7 days    Reviewed with patient:  If you are started on any steroids or antibiotics between now and your appointment, you must contact us because it may affect our ability to perform your procedure.  Yes    Is patient actively being treated for cancer or immunocompromised, including the spleen having been removed? No    If YES, do NOT schedule and route to RN pool     **For Dr. Dobbs patients without spleens should have the chart sent to her**    Any history of complications with sedation medications?  NO   If YES, OK to schedule AND route to RN pool     Any history of sleep apnea?  NO   If YES, OK to schedule AND route to RN pool     Any cardiac history?  NO   If YES, OK to schedule AND route to RN pool     Does patient have an allergy to contrast dye, iodine or shellfish?  No   If YES, OK to schedule. Route to RN pool AND add allergy information to appointment notes    Are you able to get on and off an exam table with minimal or no assistance? YES   If NO, do NOT schedule and route to RN pool    Are you able to roll over and lay on your stomach with minimal or no assistance? YES   If NO, do NOT schedule and route to RN pool    Informed patient that s/he needs to be NPO for 6 hours before procedure?  YES   Informed patient that it is OK to take normal medications with sips of water, especially blood pressure medications, before the procedure and must hold blood thinners as instructed.  Yes  Informed patient to arrive 30 minutes before procedure time to have an IV inserted.  reviewed   Do NOT schedule at 0745, 0815 or 1245.  reviewed   All radiofrequency ablations are in a 90 minute time slot except  genicular radiofrequency ablation those are 60 minute time slot.  reviewed     When you schedule an RFA for [place], e-mail Jason Tony the date, time, and who they are seeing. Also Cc Raya Garces, Cierra Blake, Funmi Marino, and the provider that they are scheduled with. N/A    In [place] there are only 6 probes for each area (lumbar and cervical) with a 72 hour autoclave. Make sure that there are at least 3 days between LRFA s and 3 days between CRFA s. N/A    Wendy Bailey    [facility]

## 2017-07-10 NOTE — TELEPHONE ENCOUNTER
Received fax request from [facility] pharmacy requesting refill(s) for metoprolol (TOPROL-XL) 25 MG 24 hr tablet    Last refilled on 06/10/17    Pt last seen on 05/12/17  Next appt scheduled for 07/28/17    Will facilitate refill.

## 2017-07-10 NOTE — TELEPHONE ENCOUNTER
My chart message for pt.     Ablation approved!! If possible I could get before July 31 as our deductible starts over Aug. 1   $6,000 deductible.     Thanks,   Jacquie Dhillon    Please call scheduling at [phone]. They will need to ensure we received our approval as well.     Michell Ritchie RN, [facility]  Pain Clinic Care Coordinator

## 2017-07-11 ENCOUNTER — MYC MEDICAL ADVICE (OUTPATIENT)
Dept: PALLIATIVE MEDICINE | Facility: CLINIC | Age: 57
End: 2017-07-11

## 2017-07-11 RX ORDER — METOPROLOL SUCCINATE 25 MG/1
TABLET, EXTENDED RELEASE ORAL
Qty: 45 TABLET | Refills: 1 | Status: SHIPPED | OUTPATIENT
Start: 2017-07-11 | End: 2018-02-19

## 2017-07-11 NOTE — TELEPHONE ENCOUNTER
Ciaran Dhillon-    Thank you for the updates on your health. Sorry to hear that the headaches are a bit more frequent but that you feeling less fatigued is a good thing.     I have placed an order for you to have repeat hip bursal injections. Our office will contact you re: this.     I understand that you have decided against counseling for your traumatic [place] experience. I wish you would reconsider. If you do, I recommend reaching out to your 's EAP (Employee Assistance Program) at this is generally short term counseling and is not a part of your permanent medical record.     Thanks.  Liseth LOPEZ RN CNP, LYDIA  [place] [facility]    I sent the above [employer] message to the patient.   Liseth LOPEZ RN CNP, LYDIA  [facility]

## 2017-07-11 NOTE — TELEPHONE ENCOUNTER
[place] message read with no response.  Will close encounter at this time.    Dedrick Noe, RN, BSN

## 2017-07-11 NOTE — TELEPHONE ENCOUNTER
Signed Prescriptions:                        Disp   Refills    metoprolol (TOPROL-XL) 25 MG 24 hr tablet  45 tab*1        Sig: Take 1.5 tablets per day  Authorizing Provider: BRIANNA HANSON RN CNP, FNP  [place] [facility]

## 2017-07-11 NOTE — TELEPHONE ENCOUNTER
Pt calling requesting a RFA with [facility]. Pt would also like her med records tranferred there as well. Please adv

## 2017-07-11 NOTE — TELEPHONE ENCOUNTER
Routing to Liseth Caldera to review and comment on patient's concerns.    Cierra Blake, MSN, RN-BC  Care Coordinator  [facility] [place]

## 2017-07-11 NOTE — TELEPHONE ENCOUNTER
Jacquie-    I am sorry to hear that you are displeased with your care. If you would like, you can discuss your concerns with our clinic manager, Di Pace.     I also saw that you placed a call and would like to have an order to have the lumbar RFA done at [facility]. Which location would you like and I can place that order for you tomorrow.   Also, you will need to contact our medical records department to sign a release of information form for [place] to release your medical records from our clinic to [facility]. I don't know if [facility] has a centralized office to receive records or it they prefer to have them sent to the site you plan on using. It may make things smoother if you check in with [facility] re: their requirements/preferences.  I cannot use a request to release your records to [facility] from a [employer] message.     Thank you.  Liseth LOPEZ RN CNP, LYDIA  [facility]    I sent the patient the above [employer] message.    Liseth LOPEZ RN CNP, LYDIA  [facility] [place]

## 2017-07-12 ENCOUNTER — MYC MEDICAL ADVICE (OUTPATIENT)
Dept: PALLIATIVE MEDICINE | Facility: CLINIC | Age: 57
End: 2017-07-12

## 2017-07-12 NOTE — TELEPHONE ENCOUNTER
RFA scheduled with Dr. Richardson on 8/7. Pt had wanted to get in before 7/31 since her insurance will be changing on 8/1.  Please see the Mychart encounter dated 7/11 where pt requested to get records sent to [facility] so she can get the procedure done there prior to 7/31.  Procedure with Dr. Richardson has NOT been cancelled yet.     Laisha Sanchez, MARVAN, RN-BC  Patient Care Supervisor/Care Coordinator  [facility] [place]

## 2017-07-12 NOTE — TELEPHONE ENCOUNTER
My chart message from pt:    [facility] so what you are saying is I have to take a Morning to come and sign when there is a [facility] 1 mile up the road. Why can't I sign there and have them fax it.? Liseth I need the RFA done I'm tired of pain. No one is willing to work w me on cancellations and the  I talked to told me to go somewhere else for RFA.     This is why I get frustrated and think I Need  a change of care where I feel welcome and cared for. It is not happening at your clinic. [facility] was more than welcoming and called me back in 20 minutes. I don't know where I will go but Need someone that can help me more and make me more comfortable.     Please let me know about signing in [place].   Jacquie     Second my chart message from pt:    Do you have a number for records? I have no idea where to call. Also can I  Vicodin so I don't have to run back again. I have some but for convenience. Going thru My Chart is an all day process going back and forth. This is exactly what I mean I can't ever talk to a human. If so it's a week later. Anyways thanks.     Jacquie

## 2017-07-12 NOTE — TELEPHONE ENCOUNTER
This message was added to already open encounter.     Michell Ritchie RN, [facility]  Pain Clinic Care Coordinator

## 2017-07-12 NOTE — TELEPHONE ENCOUNTER
Pt is still on the schedule for an RFA with Dr. Richardson on 8/7.  If pt obtains an appt at [facility], will need to cancel this appointment.     MARVA DyerN, RN-BC  Patient Care Supervisor/Care Coordinator  [facility] [place]

## 2017-07-13 ENCOUNTER — MYC MEDICAL ADVICE (OUTPATIENT)
Dept: PALLIATIVE MEDICINE | Facility: CLINIC | Age: 57
End: 2017-07-13

## 2017-07-13 ENCOUNTER — MYC MEDICAL ADVICE (OUTPATIENT)
Dept: FAMILY MEDICINE | Facility: CLINIC | Age: 57
End: 2017-07-13

## 2017-07-13 NOTE — TELEPHONE ENCOUNTER
Received call from Eva at [facility] re: a procedure order for this patient. Explained the situation in that the patient is looking to complete an RFA prior to 8/1 due to insurance. Let her know that we could provide the order for the RFA but suspect that they would also need the notes about the test doses. Let her know that we told the patient that would would be happy to send those notes to [facility] but that she would have to come in and sign an REED. Let Eva know that the patient was not willing to come in to do that.  Eva said that she will connect with the person who has been speaking with the patient to verify what was said. Eva stated that they typically need to do the test dose before the RFA.  Eva said that she will call back tomorrow and let us know if we should still send over an order.     Laisha Sanchez, MARVAN, RN-BC  Patient Care Supervisor/Care Coordinator  [facility]

## 2017-07-13 NOTE — TELEPHONE ENCOUNTER
Received call from Eva at [facility] re: a procedure order for this patient. Explained the situation in that the patient is looking to complete an RFA prior to 8/1 due to insurance. Let her know that we could provide the order for the RFA but suspect that they would also need the notes about the test doses. Let her know that we told the patient that would would be happy to send those notes to [facility] but that she would have to come in and sign an REED. Let Eva know that the patient was not willing to come in to do that.  Eva said that she will connect with the person who has been speaking with the patient to verify what was said. Eva stated that they typically need to do the test dose before the RFA.  Eva said that she will call back tomorrow and let us know if we should still send over an order.     Laisha Sanchez, MARVAN, RN-BC  Patient Care Supervisor/Care Coordinator  [facility]
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
## 2017-07-13 NOTE — TELEPHONE ENCOUNTER
Jacquie,  I'm sorry about your frustrations.  I will make sure to forward this to the supervising staff for the .  They may be better able to discuss with your the setting for which our schedules have to review the chart (for example, they do need to review the chart in order to determine where insurance approval is for an radiofrequency ablation).    I wish you luck with your radiofrequency ablation.    It isn't clear from your note if you are planning on sending Liseth a separate Mychart that will be routed to her.  That would probably be best, and include information on what you are experiencing and what changes you want to make.  That may help her with decisions.    MD [facility]      I have sent a separate note to Di.  I will send this to Liseth Caldera NP-- not sure if she will send a new mychart vs expecting an answer with this one.    MD [facility]

## 2017-07-14 ENCOUNTER — MYC MEDICAL ADVICE (OUTPATIENT)
Dept: PALLIATIVE MEDICINE | Facility: CLINIC | Age: 57
End: 2017-07-14

## 2017-07-14 DIAGNOSIS — M70.62 GREATER TROCHANTERIC BURSITIS OF BOTH HIPS: ICD-10-CM

## 2017-07-14 DIAGNOSIS — M79.18 MYOFASCIAL PAIN: ICD-10-CM

## 2017-07-14 DIAGNOSIS — M47.817 LUMBOSACRAL SPONDYLOSIS WITHOUT MYELOPATHY: ICD-10-CM

## 2017-07-14 DIAGNOSIS — M53.3 SI (SACROILIAC) JOINT DYSFUNCTION: ICD-10-CM

## 2017-07-14 DIAGNOSIS — F41.1 GENERALIZED ANXIETY DISORDER: ICD-10-CM

## 2017-07-14 DIAGNOSIS — M70.61 GREATER TROCHANTERIC BURSITIS OF BOTH HIPS: ICD-10-CM

## 2017-07-14 DIAGNOSIS — M51.369 DDD (DEGENERATIVE DISC DISEASE), LUMBAR: ICD-10-CM

## 2017-07-14 DIAGNOSIS — M79.7 FIBROMYALGIA: ICD-10-CM

## 2017-07-14 NOTE — TELEPHONE ENCOUNTER
Information added to previous message related to the same topic. Encounter dated 7/11/17    BADNAR Dyer, RN-BC  Patient Care Supervisor/Care Coordinator  [facility] [place]

## 2017-07-14 NOTE — TELEPHONE ENCOUNTER
My chart message from pt:    Also can I  Vicodin so I don't have to run back again.    MA's please prep a refill for [place].     Michell Ritchie RN, [facility]  Pain Clinic Care Coordinator

## 2017-07-14 NOTE — TELEPHONE ENCOUNTER
Ciaran Moeller    I have signed the order for RFA to be done at [facility]. Michell is faxing the order to [facility] now.    Good luck with your radiofrequency.     Thanks.  Liseth LOPEZ RN CNP, SARAHP  [facility]      I sent the above myChart message to the patient.    Lsieth LOPEZ RN CNP, FNP  [facility]

## 2017-07-14 NOTE — TELEPHONE ENCOUNTER
This issue is being taken care of by Di Pace and writer is samm breaux.     Michell Ritchie RN, [facility]  Pain Clinic Care Coordinator

## 2017-07-14 NOTE — TELEPHONE ENCOUNTER
Phone patient and discussed concerns.  Patient is not sure if she will continue care with our clinic, but at this time will keep upcoming appointments.  Patient will most likely look to have her RFA completed at [facility] due to wanting it done before the end of the month for insurance reasons.  She would prefer to have Dr. Richardson do the procedure, so do NOT cancel appointment in August.  Michell Ritchie RN, is assisting with getting the proper order signed by Liseth barahona for [facility] and I am looking to assist with records.  [facility] needs an REED which Jacquie signed yesterday at her [facility].  REED is not yet scanned into chart.  There may be an option for the patient to  records requested by [facility] as they are small in number.  She would need to  from a [facility] location.  Will reach out to REED group to confirm patient can physically  her needed reports.  I will follow up with patient by Tuesday of next week.

## 2017-07-14 NOTE — TELEPHONE ENCOUNTER
[employer] message from pt:    Ciaran Childers,     Would you please send a order for MBB/Rhizo to [facility] today if possible. Fax [phone]     Thank you,   Jacquie Amador

## 2017-07-14 NOTE — TELEPHONE ENCOUNTER
Closing this encounter as all issues resolved and starting a new encounter for a opiod refill.     Michell Ritchie RN, [facility]  Pain Clinic Care Coordinator

## 2017-07-17 ENCOUNTER — MYC MEDICAL ADVICE (OUTPATIENT)
Dept: PALLIATIVE MEDICINE | Facility: CLINIC | Age: 57
End: 2017-07-17

## 2017-07-17 NOTE — TELEPHONE ENCOUNTER
My chart message from pt:    Ciaran Childers,   I just spoke with [facility] regarding my migraines. I am weaning off Metoprolol because of the bad side effects I have with it for Migraines. I also have two medications I have tried and they don't work so well. I questioned them about the six month record of migraines you mentioned I would need. [facility] told me I only need a pre-authorization from my doctor sent to them. I am really hoping you could do this for me of course to see if I can get authorization before July 31. I do see Dr. Morales ([place]) for my hypohydrosis Botox. I'm not sure if he would be the one . My migraines go way back and assuming you have documentation.     The form is on [place] [employer] and fax is [phone]     Thank you so much   Jacquie     Sending to provider to respond.     Michell Ritchie RN, [facility]  Pain Clinic Care Coordinator

## 2017-07-17 NOTE — TELEPHONE ENCOUNTER
Received call from patient requesting refill(s) of HYDROcodone-acetaminophen (NORCO) 5-325 MG per tablet    Last picked up from pharmacy on 06/30/17    Pt last seen by prescribing provider on 05/12/17  Next appt scheduled for 07/28/17    Last urine drug screen date 09/06/16  Current opioid agreement on file (completed within the last year) Yes Date of opioid agreement: 09/06/16    Processing (pick one and delete the others):      Mail to [facility] pharmacy ATTN: GUEVARA  [address] [place] [address]    Will route to nursing Roby for review and preparation of prescription(s).

## 2017-07-17 NOTE — TELEPHONE ENCOUNTER
buPROPion (WELLBUTRIN SR) 200 MG 12 hr tablet     Last Written Prescription Date: 3/14/17  Last Fill Quantity: 120, # refills: 0  Last Office Visit with [facility], [facility] or [facility] prescribing provider: 6/30/17 TOMAS   Next 5 appointments (look out 90 days)     Jul 28, 2017 11:00 AM CDT   Return Visit with JESSICA Guillory CNP   [facility] [place] ([facility] [place])    [address]  [place] [address] [phone]                   BP Readings from Last 3 Encounters:   06/30/17 138/88   05/12/17 123/85   04/04/17 127/84

## 2017-07-17 NOTE — TELEPHONE ENCOUNTER
Medication refill information reviewed.     Due date for Norco is 7/30/17 based on last  date    Prescription prepped for review.     Will route to provider.     MARVA DyerN, RN-BC  Patient Care Supervisor/Care Coordinator  [facility] [place]

## 2017-07-18 ENCOUNTER — MYC MEDICAL ADVICE (OUTPATIENT)
Dept: FAMILY MEDICINE | Facility: CLINIC | Age: 57
End: 2017-07-18

## 2017-07-18 ENCOUNTER — MYC MEDICAL ADVICE (OUTPATIENT)
Dept: PALLIATIVE MEDICINE | Facility: CLINIC | Age: 57
End: 2017-07-18

## 2017-07-18 DIAGNOSIS — F41.1 GENERALIZED ANXIETY DISORDER: ICD-10-CM

## 2017-07-18 RX ORDER — BUPROPION HYDROCHLORIDE 150 MG/1
150 TABLET, EXTENDED RELEASE ORAL EVERY EVENING
Qty: 90 TABLET | Refills: 1 | Status: SHIPPED | OUTPATIENT
Start: 2017-07-18 | End: 2017-07-19 | Stop reason: DRUGHIGH

## 2017-07-18 NOTE — TELEPHONE ENCOUNTER
Signed Prescriptions:                        Disp   Refills    buPROPion (WELLBUTRIN SR) 150 MG 12 hr tab*90 tab*1        Sig: Take 1 tablet (150 mg) by mouth every evening  Authorizing Provider: ARVIN MARIN  Ordering User: TOBIN ESQUIVEL    Last ov 06/30/2017    PHQ-9 score:    PHQ-9 SCORE 6/28/2017   Total Score MyChart 6 (Mild depression)   Total Score 6           Prescription approved per [facility] Refill Protocol.        Tobin Esquivel, RN, BSN

## 2017-07-18 NOTE — TELEPHONE ENCOUNTER
Orion from patient Created: 7/18/2017 2:23 PM    Liseth Wagoner I will be at July 28 appointment with you. Also, Di said to keep my Aug 7, 2017 RFA appt when I last spoke to her and I did. I am going to have my RFA done with you (well not you) at Pain Clinic. Just let me know what to do for it.    Thank you  Jacquie Amador

## 2017-07-19 ENCOUNTER — MYC MEDICAL ADVICE (OUTPATIENT)
Dept: FAMILY MEDICINE | Facility: CLINIC | Age: 57
End: 2017-07-19

## 2017-07-19 ENCOUNTER — MYC MEDICAL ADVICE (OUTPATIENT)
Dept: PALLIATIVE MEDICINE | Facility: CLINIC | Age: 57
End: 2017-07-19

## 2017-07-19 DIAGNOSIS — F41.1 GENERALIZED ANXIETY DISORDER: ICD-10-CM

## 2017-07-19 RX ORDER — HYDROCODONE BITARTRATE AND ACETAMINOPHEN 5; 325 MG/1; MG/1
TABLET ORAL
Qty: 100 TABLET | Refills: 0 | Status: SHIPPED | OUTPATIENT
Start: 2017-07-19 | End: 2017-08-01

## 2017-07-19 RX ORDER — BUPROPION HYDROCHLORIDE 200 MG/1
TABLET, EXTENDED RELEASE ORAL
Qty: 30 TABLET | Refills: 4 | Status: SHIPPED | OUTPATIENT
Start: 2017-07-19 | End: 2017-07-20

## 2017-07-19 NOTE — TELEPHONE ENCOUNTER
Prescription approved per [facility] Refill Protocol.    Madison Anderson RN

## 2017-07-19 NOTE — TELEPHONE ENCOUNTER
My chart response from pt:    Hi, sorry about the back and forth but only way to contact you. I received a note from [place] office saying Dr. Richardson should be able to see Dr. Morales's information from my last visit in June 2016 on whatever you two look at assuming my notes from appointments. If she can't see let me know and I will go to [place] to sign REED.     Thank you,   Jacquie Amador     Sending to provider.     Michell Ritchie RN, [facility]  Pain Clinic Care Coordinator

## 2017-07-19 NOTE — TELEPHONE ENCOUNTER
Jacquie Maderaa wanted me to let you know she needs to discuss the Botox with Dr. Richardson who does the injections and then she will get back to you.       Michell Ritchie RN, [facility]  Pain Clinic Care Coordinator

## 2017-07-19 NOTE — TELEPHONE ENCOUNTER
Placed this in already open encounter.     Michell Ritchie RN, [facility]  Pain Clinic Care Coordinator

## 2017-07-19 NOTE — TELEPHONE ENCOUNTER
Ciaran Dhillon-    I have spoken with Dr. Richardson. There are a few things she and I need clarification on.    First, I need to know if you meet the following criteria.   1. Do you have 15 more more headache days per month for at least 3 months?   2. Do at least 8 of those headaches meet criteria for migraines including headache MUST last at least 4 hours, you have severe pain associated with light sensitivity, sound sensitivity, nausea, vomiting?     Please indicate your responses to these above. If you do not meet these criteria, then the Botox is not likely to work well for you.     ALSO, Dr. Richardson needs verification about the number of units you receive for the hyperhydrosis from Dr. Morales. Could you sign a REED to have his procedure notes released to us for Dr. Richardson to review? Then she needs to look into whether or not Botox for migraines will be an option for you. We have to look into this for safety reasons.     Thanks.  Liseth LOPEZ RN CNP, SARAHP  [facility]          I sent the above Managed by Q message to the patient.    Liseth LOPEZ RN CNP, SARAHP  [facility]

## 2017-07-19 NOTE — TELEPHONE ENCOUNTER
Ciaran Dhillon-    Thank you for the update. Our office will contact you prior to the appt with any specific instructions.    Liseth LOPEZ RN CNP, SARAHP  [place] [facility]    I sent the above [employer] message.   Liseth LOPEZ RN CNP, SARAHP  [place] [facility] [place]

## 2017-07-19 NOTE — TELEPHONE ENCOUNTER
Signed Prescriptions:                        Disp   Refills    HYDROcodone-acetaminophen (NORCO) 5-325 MG*100 ta*0        Sig: May take 1 tablet every 4-6 hours as needed for pain.           Max of 4 tabs per day.  Must last 30 days. OK to           fill on/after 7/28 and begin on 7/30/2017  Authorizing Provider: LISETH HANSON    Signed script placed in touchdown bin at [facility].    Liseth Hanson APRN CNP FNP RN  [facility]

## 2017-07-19 NOTE — TELEPHONE ENCOUNTER
Script for Norco was signed and will be mailing out on 7/20/17 from [facility]. Patient notified.      [facility] pharmacy ATTN: GUEVARA  [address] [place] [address]      Mike Carreno MA

## 2017-07-19 NOTE — TELEPHONE ENCOUNTER
Ciaran Dhillon-    Thanks. Yes, I did not realize that Dr. Morales was with [facility]. We are able to see his notes. Just to clarify, you have not had any Botox since June of 2016, correct?    Also, I need you to answer the following questions about your headaches for me to proceed.    First, I need to know if you meet the following criteria.   1. Do you have 15 more more headache days per month for at least 3 months?   2. Do at least 8 of those headaches meet criteria for migraines including headache MUST last at least 4 hours, you have severe pain associated with light sensitivity, sound sensitivity, nausea, vomiting?      Please indicate your responses to these above. If you do not meet these criteria, then the Botox is not likely to work well for you.     Thanks.  Liseth LOPEZ RN CNP, LYDIA  [facility]    I sent the above [employer] message to the patient.  Liseth LOPEZ RN CNP, LYDIA  [facility]

## 2017-07-20 RX ORDER — BUPROPION HYDROCHLORIDE 200 MG/1
TABLET, EXTENDED RELEASE ORAL
Qty: 90 TABLET | Refills: 3 | Status: SHIPPED | OUTPATIENT
Start: 2017-07-20 | End: 2017-08-17

## 2017-07-20 RX ORDER — BUPROPION HYDROCHLORIDE 150 MG/1
150 TABLET, EXTENDED RELEASE ORAL EVERY EVENING
Qty: 90 TABLET | Refills: 1
Start: 2017-07-20 | End: 2017-08-17

## 2017-07-20 NOTE — TELEPHONE ENCOUNTER
My chart response from pt:    Yes I have migraines for at least 3 months but more like many years. When I have a migraine I have major light sensitivity, sound sensitivity I need to go to a dark room and lay still. The pain is very extreme there are many times I am nauseated and sometimes I get the Saran Wrap feeling on my eyes. It feels like I'm looking out of Saran Wrap. My migraines last all day without much relief. Usually get late morning and still have going to bed . So yes they last more than 4 hrs. and have at least 20 headaches a month w approx 15 days that are migraines.     I hope I answered everything. Yes I have not had Hypohydrosis Botox since June of 2016.     Thank you,   Jacquie     Sending to provider.     Mcihell Ritchie RN, [facility]  Pain Clinic Care Coordinator

## 2017-07-20 NOTE — TELEPHONE ENCOUNTER
Placed response in already open encounter.     Michell Ritchie RN, [facility]  Pain Clinic Care Coordinator

## 2017-07-20 NOTE — TELEPHONE ENCOUNTER
Closing this encounter as pt is getting procedure done with [facility].     Michell Ritchie RN, [facility]  Pain Clinic Care Coordinator

## 2017-07-21 ENCOUNTER — MYC MEDICAL ADVICE (OUTPATIENT)
Dept: FAMILY MEDICINE | Facility: CLINIC | Age: 57
End: 2017-07-21

## 2017-07-21 NOTE — TELEPHONE ENCOUNTER
Routing encounter to Chantal Gan for review.     Karina ZEPEDA    [facility] [place]

## 2017-07-21 NOTE — TELEPHONE ENCOUNTER
Sunni    I have requested that our  begin the process for insurance coverage for Botox for migraines.     Thanks.  Liseth LOPEZ RN CNP, SARAHP  [place] [facility]    I sent the above [employer] message to the patient  Liseth LOPEZ RN CNP, SARAHP  [place] [facility]

## 2017-07-21 NOTE — TELEPHONE ENCOUNTER
Please check insurance coverage for botox.    Diagnosis: chronic migraines  How many days per month does the patient have migraine headaches? 15 migraine days/month and 20 headache days per month  Severe light sensitivity, sound sensitivity, nausea and vision distortion.   How many hours per day do the migraines last? More than 4 hours, usually most of the day  What other treatments have been tried?   Trazodone, aleve, ibuprofen, Celebrex, Sumatriptan, gabapentin, metoprolol, Tylenol    Liseth LOPEZ RN CNP, FNP  [place] [facility] [facility] [place]

## 2017-07-21 NOTE — TELEPHONE ENCOUNTER
SF: Patient would like to know if her lingering symptoms (cough, wheezing, headaches, nausea) could be attributed to Metoprolol withdrawals. Patient would like to know if she needs to change inhalers, or consider a replacement medication for Metoprolol. Feels like albuterol is not working as well as it use to. Discussed following up in clinic today if she felt like symptoms were worsening and would prefer to hear SF thoughts. Did schedule OV for 07/25/2017 to follow up with SF. Please review and advise.     Jacquie Amador is a 57 year old female who calls with breathing concerns after stopping her metoprolol.    NURSING ASSESSMENT:  Description: Still having nausea, feels winded like unable to get a deep breath, and having headaches. Ongoing wheezing and coughing. Heart rate has been over 100 around 111-113. Headaches are a 9-10/10, taking medication for it that sometimes works. Having a productive cough, mucous that is thick and clear for a few months - worse in the morning. Stopped Metoprolol 3-4 days ago. Has known asthma. Has been staying inside in a controled environment. Taking albuterol, Advair, singular and zyrtec daily. Denies sore throat, difficulty breathing, congestion, ear ache, fevers.  Onset/duration:  Within the last couple of days  Pain scale (0-10)   9-10/10 headaches decrease with medication   LMP/preg/breast feeding:  Patient's last menstrual period was 07/17/2009.  Last exam/Treatment:  06/30/2017  Allergies:   Allergies   Allergen Reactions     Cats      and rabbits/wheezing     Dogs      wheezing     Lyrica [Pregabalin] Other (See Comments)     Rash, mouth sores, itching and burning     No Known Drug Allergies      Seasonal Allergies      rhinits     NURSING PLAN: Routed to provider Yes and Nursing advice to patient to follow up in clinic for symptoms - declined. Advised ED over the weekend if symptoms worsen    RECOMMENDED DISPOSITION:  See in 4 hours, another person to drive - for ongoing  symptoms that are not improving   Will comply with recommendation: No- Barriers to comply with plan of care would like to hear SF thoughts, scheduled appointment for 07/25/2017 with SF.  If further questions/concerns or if symptoms do not improve, worsen or new symptoms develop, call your PCP or [facility] as soon as possible.    NOTES:  Disposition was determined by the first positive assessment question, therefore all previous assessment questions were negative    Guideline used:  Telephone Triage Protocols for Nurses, Fifth Edition, Charline Mackay  Cough  Headache  Clinical References UpToDate  Nursing Judgment  Routing to SF    Eva Pace, RN, BSN

## 2017-07-24 ENCOUNTER — TELEPHONE (OUTPATIENT)
Dept: ORTHOPEDICS | Facility: CLINIC | Age: 57
End: 2017-07-24

## 2017-07-24 NOTE — TELEPHONE ENCOUNTER
Patient LVM requesting to get injections in her hands on Friday. She will be in the area around 11a. Hoping she can be seen since her deductible starts over the first of August. Would like a call back.

## 2017-07-24 NOTE — TELEPHONE ENCOUNTER
Faxed completed PA form and supporting documentation for Botox to [facility]. Right fax confirmation 7/24 at  9:06 am.        Chantal RIGGS    [facility]

## 2017-07-25 ENCOUNTER — TELEPHONE (OUTPATIENT)
Dept: FAMILY MEDICINE | Facility: CLINIC | Age: 57
End: 2017-07-25
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
## 2017-07-26 ENCOUNTER — MYC MEDICAL ADVICE (OUTPATIENT)
Dept: FAMILY MEDICINE | Facility: CLINIC | Age: 57
End: 2017-07-26

## 2017-07-26 ENCOUNTER — MYC MEDICAL ADVICE (OUTPATIENT)
Dept: PALLIATIVE MEDICINE | Facility: CLINIC | Age: 57
End: 2017-07-26

## 2017-07-26 ENCOUNTER — VIRTUAL VISIT (OUTPATIENT)
Dept: FAMILY MEDICINE | Facility: CLINIC | Age: 57
End: 2017-07-26
Payer: COMMERCIAL

## 2017-07-26 DIAGNOSIS — J45.41 MODERATE PERSISTENT ASTHMA WITH ACUTE EXACERBATION: ICD-10-CM

## 2017-07-26 DIAGNOSIS — J45.41 MODERATE PERSISTENT ASTHMA WITH ACUTE EXACERBATION: Primary | ICD-10-CM

## 2017-07-26 DIAGNOSIS — J45.40 MODERATE PERSISTENT ASTHMA WITHOUT COMPLICATION: ICD-10-CM

## 2017-07-26 DIAGNOSIS — G43.009 MIGRAINE WITHOUT AURA AND WITHOUT STATUS MIGRAINOSUS, NOT INTRACTABLE: ICD-10-CM

## 2017-07-26 DIAGNOSIS — R53.83 FATIGUE, UNSPECIFIED TYPE: ICD-10-CM

## 2017-07-26 DIAGNOSIS — L30.9 DERMATITIS: ICD-10-CM

## 2017-07-26 DIAGNOSIS — B37.0 THRUSH: ICD-10-CM

## 2017-07-26 PROCEDURE — 99442 ZZC PHYSICIAN TELEPHONE EVALUATION 11-20 MIN: CPT | Performed by: FAMILY MEDICINE

## 2017-07-26 RX ORDER — ALBUTEROL SULFATE 90 UG/1
AEROSOL, METERED RESPIRATORY (INHALATION)
Qty: 54 G | Refills: 1 | Status: SHIPPED | OUTPATIENT
Start: 2017-07-26 | End: 2018-05-29

## 2017-07-26 RX ORDER — SUMATRIPTAN 100 MG/1
100 TABLET, FILM COATED ORAL
Qty: 18 TABLET | Refills: 1 | Status: SHIPPED | OUTPATIENT
Start: 2017-07-26 | End: 2018-07-31

## 2017-07-26 RX ORDER — PREDNISONE 20 MG/1
40 TABLET ORAL DAILY
Qty: 10 TABLET | Refills: 0 | Status: SHIPPED | OUTPATIENT
Start: 2017-07-26 | End: 2017-08-24

## 2017-07-26 NOTE — TELEPHONE ENCOUNTER
nystatin (MYCOSTATIN) cream      Last Written Prescription Date: 6/22/2017  Last Fill Quantity: 45g,  # refills: 1   Last Office Visit with [facility], [facility] or [facility] prescribing provider: 6/30/2017                                         Next 5 appointments (look out 90 days)     Jul 27, 2017 11:00 AM CDT   [facility]    [address]  [address]  [place] [address]   [phone]            Jul 28, 2017 11:00 AM CDT   Return Visit with JESSICA Guillory CNP   [facility] [place])    [address] [id number] [phone]                  mupirocin (BACTROBAN) 2 % cream (Discontinued)      Last Written Prescription Date: 12/23/2016  Last Fill Quantity: 30g,  # refills: 1   Last Office Visit with [facility], [facility] or  [facility] prescribing provider: 6/30/2017                                         Next 5 appointments (look out 90 days)     Jul 27, 2017 11:00 AM CDT   Return Visit with Ishmael Cárdenas MD   [facility] [place] ([facility]    [address]  [address]  [place] [address]   [phone]            Jul 28, 2017 11:00 AM CDT   [facility] [place])    [address] [id number] [phone]

## 2017-07-26 NOTE — MR AVS SNAPSHOT
After Visit Summary   7/26/2017    Jacquie Amador    MRN: [id number]           Patient Information     Date Of Birth          1960        Visit Information        Provider Department      7/26/2017 12:20 PM Liz [facility] MD [facility]        Today's Diagnoses     Moderate persistent asthma with acute exacerbation    -  1    Migraine without aura and without status migrainosus, not intractable        Fatigue, unspecified type           Follow-ups after your visit        Your next 10 appointments already scheduled     Jul 27, 2017 11:00 AM CDT   Return Visit with Ishmael Cárdenas MD   [facility] ([facility]    [address]  [address]  [place] [address]   [phone]            Jul 28, 2017 11:00 AM CDT   Return Visit with JESSICA Guillory CNP   [facility] ([facility])    [address] [address]   [phone]            Jul 31, 2017  9:30 AM CDT   PROCEDURE with Tammi Richardson MD   [facility] ([facility])    [address] [address]   [phone]            Aug 07, 2017  1:15 PM CDT   Radiology Injections with Tammi Richardson MD   [facility] ([facility])    [address] [address]   [phone]              Who to contact     If you have questions or need follow up information about today's clinic visit or your schedule please contact [facility] directly at [phone].  Normal or non-critical lab and imaging results will be communicated to you by MyChart, letter or phone within 4 business days after the clinic has received the results. If you do not hear from us within 7 days, please contact the clinic through MyChart or phone. If you have a critical or abnormal lab result, we will notify you by phone as soon as  possible.  Submit refill requests through Your.MD or call your pharmacy and they will forward the refill request to us. Please allow 3 business days for your refill to be completed.          Additional Information About Your Visit        SecondbrainharFarmainstant Information     Your.MD gives you secure access to your electronic health record. If you see a primary care provider, you can also send messages to your care team and make appointments. If you have questions, please call your primary care clinic.  If you do not have a primary care provider, please call [phone] and they will assist you.        Care EveryWhere ID     This is your Care EveryWhere ID. This could be used by other organizations to access your [place] medical records  [id number]        Your Vitals Were     Last Period                   07/17/2009            Blood Pressure from Last 3 Encounters:   06/30/17 138/88   05/12/17 123/85   04/04/17 127/84    Weight from Last 3 Encounters:   03/31/17 204 lb (92.5 kg)   12/22/16 205 lb (93 kg)   07/27/16 208 lb (94.3 kg)              Today, you had the following     No orders found for display         Today's Medication Changes          These changes are accurate as of: 7/26/17 11:59 PM.  If you have any questions, ask your nurse or doctor.               Start taking these medicines.        Dose/Directions    predniSONE 20 MG tablet   Commonly known as:  DELTASONE   Used for:  Moderate persistent asthma with acute exacerbation   Started by:  Liz Peterson MD        Dose:  40 mg   Take 2 tablets (40 mg) by mouth daily for 5 days   Quantity:  10 tablet   Refills:  0       SUMAtriptan 100 MG tablet   Commonly known as:  IMITREX   Used for:  Migraine without aura and without status migrainosus, not intractable   Started by:  Liz Peterson MD        Dose:  100 mg   Take 1 tablet (100 mg) by mouth at onset of headache for migraine May repeat in 2 hours. Max 2 tablets/24 hours.   Quantity:  18 tablet   Refills:   1         These medicines have changed or have updated prescriptions.        Dose/Directions    albuterol 108 (90 BASE) MCG/ACT Inhaler   Commonly known as:  VENTOLIN HFA   This may have changed:  See the new instructions.   Used for:  Moderate persistent asthma without complication   Changed by:  Liz Peterson MD        INHALE 2 PUFFS INTO THE LUNGS EVERY 6 HOURS AS NEEDED FOR SHORTNESS OF BREATH / DYSPNEA   Quantity:  54 g   Refills:  1       metoprolol 25 MG 24 hr tablet   Commonly known as:  TOPROL-XL   This may have changed:  additional instructions   Used for:  Palpitations, Sinus tachycardia        Take 1.5 tablets per day   Quantity:  45 tablet   Refills:  1         Stop taking these medicines if you haven't already. Please contact your care team if you have questions.     rizatriptan 10 MG tablet   Commonly known as:  MAXALT   Stopped by:  Liz Peterson MD                Where to get your medicines      These medications were sent to [id number] - [place] - [address]  [address], [place] [address]     Phone:  [phone]     albuterol 108 (90 BASE) MCG/ACT Inhaler    predniSONE 20 MG tablet    SUMAtriptan 100 MG tablet                Primary Care Provider Office Phone # Fax #    Liz Peterson [phone]        [facility] [id number] [place] [address]  [place] [address]        Equal Access to Services     [facility] AH: Hadii aad ku hadasho Soomaali, waaxda luqadaha, qaybta kaalmada adeegyada, waxay idiin hayaan adesarah kharasalena labeba . So [facility] [phone].    ATENCIÓN: Si habla español, tiene a kimball disposición servicios gratuitos de asistencia lingüística. Llame al [phone].    We comply with applicable federal civil rights laws and [place] laws. We do not discriminate on the basis of race, color, national origin, age, disability sex, sexual orientation or gender identity.            Thank you!     Thank you for choosing [facility] [place]  for your  care. Our goal is always to provide you with excellent care. Hearing back from our patients is one way we can continue to improve our services. Please take a few minutes to complete the written survey that you may receive in the mail after your visit with us. Thank you!             Your Updated Medication List - Protect others around you: Learn how to safely use, store and throw away your medicines at www.disposemymeds.org.          This list is accurate as of: 7/26/17 11:59 PM.  Always use your most recent med list.                   Brand Name Dispense Instructions for use Diagnosis    acidophilus Caps     60 capsule    Take 1 capsule by mouth daily Take two hours before or after antibiotic dose.  Continue for 1 week after antibiotic therapy completed    Diarrhea       adapalene 0.1 % gel    DIFFERIN    45 g    APPLY A THIN LAYER TO THE AFFECTED AREA(S) BY TOPICAL ROUTE ONCE DAILY BEFORE BEDTIME    Acne vulgaris       ADVAIR DISKUS 500-50 MCG/DOSE diskus inhaler   Generic drug:  fluticasone-salmeterol     1 Inhaler    INHALE 1 PUFF BY INHALATION ROUTE 2 TIMES PER DAY IN THE MORNING AND EVENING APPROXIMATELY 12 HOURS APART    Moderate persistent asthma without complication       albuterol 108 (90 BASE) MCG/ACT Inhaler    VENTOLIN HFA    54 g    INHALE 2 PUFFS INTO THE LUNGS EVERY 6 HOURS AS NEEDED FOR SHORTNESS OF BREATH / DYSPNEA    Moderate persistent asthma without complication       ALPRAZolam 0.25 MG tablet    XANAX    90 tablet    TAKE 1 TABLET DAILY AS NEEDED FOR ANXIETY    Generalized anxiety disorder       atorvastatin 20 MG tablet    LIPITOR          * buPROPion 150 MG 12 hr tablet    WELLBUTRIN SR    90 tablet    Take 1 tablet (150 mg) by mouth every evening    Generalized anxiety disorder       * buPROPion 200 MG 12 hr tablet    WELLBUTRIN SR    90 tablet    TAKE ONE TABLET BY MOUTH IN THE MORNING    Generalized anxiety disorder       celecoxib 200 MG capsule    celeBREX    180 capsule    Take 1 capsule  twice daily as needed for pain this is a 3 month script    Greater trochanteric bursitis, unspecified laterality, Hand arthritis       clindamycin 1 % lotion    CLEOCIN T     Apply topically 2 times daily        diclofenac 1 % Gel topical gel    VOLTAREN    9 Tube    Apply 2 grams to hands and 4 grams to hips up to 4 times daily. T    Greater trochanteric bursitis of both hips, Bilateral thumb pain       DULoxetine 60 MG EC capsule    CYMBALTA    180 capsule    Take 2 capsules (120 mg) by mouth daily    Multiple joint pain       HYDROcodone-acetaminophen 5-325 MG per tablet    NORCO    100 tablet    May take 1 tablet every 4-6 hours as needed for pain. Max of 4 tabs per day.  Must last 30 days. OK to fill on/after 7/28 and begin on 7/30/2017    Lumbosacral spondylosis without myelopathy, DDD (degenerative disc disease), lumbar, SI (sacroiliac) joint dysfunction, Greater trochanteric bursitis of both hips, Myofascial pain, Fibromyalgia       hydrocortisone 2.5 % cream     30 g    APPLY TOPICALLY 2 TIMES DAILY AS NEEDED    Dermatitis       losartan-hydrochlorothiazide 100-12.5 MG per tablet    HYZAAR    90 tablet    Take 1 tablet by mouth daily    Essential hypertension with goal blood pressure less than 140/90       medical cannabis (Patient's own supply.  Not a prescription)     0 Information only    2 mg morning and noon. 7 mg at bedtime. (This is NOT a prescription, and does not certify that the patient has a qualifying medical condition for medical cannabis.  The purpose of this order is  to document that the patient reports taking medical cannabis.)        metoprolol 25 MG 24 hr tablet    TOPROL-XL    45 tablet    Take 1.5 tablets per day    Palpitations, Sinus tachycardia       montelukast 10 MG tablet    SINGULAIR    90 tablet    TAKE 1 TABLET (10 MG) BY ORAL ROUTE ONCE DAILY IN THE EVENING    Moderate persistent asthma without complication       nystatin 227441 UNIT/ML suspension    MYCOSTATIN    380 mL     Take by mouth 4 times daily    Thrush       nystatin cream    MYCOSTATIN    45 g    Apply topically 3 times daily    Dermatitis       predniSONE 20 MG tablet    DELTASONE    10 tablet    Take 2 tablets (40 mg) by mouth daily for 5 days    Moderate persistent asthma with acute exacerbation       ranitidine 150 MG tablet    ZANTAC    0    ONE TABLET TWICE DAILY        rOPINIRole 1 MG tablet    REQUIP    180 tablet    TAKE 2 TABLETS (2 MG) BY MOUTH AT BEDTIME    Movement disorder       SUMAtriptan 100 MG tablet    IMITREX    18 tablet    Take 1 tablet (100 mg) by mouth at onset of headache for migraine May repeat in 2 hours. Max 2 tablets/24 hours.    Migraine without aura and without status migrainosus, not intractable       traZODone 50 MG tablet    DESYREL    270 tablet    TAKE 2-3 TABLETS (100-150 MG) BY MOUTH AT BEDTIME    Insomnia, unspecified       tretinoin 0.025 % cream    RETIN-A    45 g    Spread a pea size amount into affected area topically at bedtime.  Use sunscreen SPF>20.    Age-related facial wrinkles       ZYRTEC 10 MG tablet   Generic drug:  cetirizine     90    1 TABLET DAILY    Allergic rhinitis, cause unspecified       * Notice:  This list has 2 medication(s) that are the same as other medications prescribed for you. Read the directions carefully, and ask your doctor or other care provider to review them with you.

## 2017-07-26 NOTE — TELEPHONE ENCOUNTER
Pt is scheduled for b/l thumb injections tomorrow with Dr. Cárdenas and bursa injections with Dr. Richardson on 7/31/17.  Dr. Richardson is this okay due the amount of steroid given?    Denisse Maldonado RN-BSN  [facility]

## 2017-07-26 NOTE — TELEPHONE ENCOUNTER
If using 80mg of kenalog (which I think they will with bilateral), then she should wait 2 weeks.    Renetta Richardson MD  [facility]

## 2017-07-26 NOTE — PROGRESS NOTES
"Jacquie Amador is a 57 year old female who is being evaluated via a billable telephone visit.      The patient has been notified of following:     \"This telephone visit will be conducted via a call between you and your physician/provider. We have found that certain health care needs can be provided without the need for a physical exam.  This service lets us provide the care you need with a short phone conversation.  If a prescription is necessary we can send it directly to your pharmacy.  If lab work is needed we can place an order for that and you can then stop by our lab to have the test done at a later time.    We will bill your insurance company for this service.  Please check with your medical insurance if this type of visit is covered. You may be responsible for the cost of this type of visit if insurance coverage is denied.  The typical cost is $30 (10min), $59 (11-20min) and $85 (21-30min).  Most often these visits are shorter than 10 minutes.    If during the course of the call the physician/provider feels a telephone visit is not appropriate, you will not be charged for this service.\"       Consent has been obtained for this service by 2 care team members: yes. See the scanned image in the medical record.    Jacquie Amador complains of    Chief Complaint   Patient presents with     Recheck Medication     metoprolol     Cough     and wheezing for a month         I have reviewed and updated the patient's Past Medical History, Social History, Family History and Medication List.    ALLERGIES  Cats; Dogs; Lyrica [pregabalin]; No known drug allergies; and Seasonal allergies    Renetta Disla CMA (AAMA)       Additional provider notes: she is having issues since being off the metoprolol. So she restarted the metoprolol.  She is taking 1/2 tablet of the 25 mg of the metoprolol XL.  She had headaches without it, felt dizzy and nauseated. Since she has been back on this, the headaches are better.     She is still " having cough since last visit. Noticing wheezing - improved but not resolved. Coughing up scant green mucous. Very rare. Feels the augmentin helped significantly.   No fevers. Feels better.     Energy level is improved. She is feels like the reduction of metoprolol has helped. Has reduced her trazodone as well.     Assessment/Plan:  (J45.41) Moderate persistent asthma with acute exacerbation  (primary encounter diagnosis)  Comment: recommend a trial of prednisone. May continue with albuterol.   Recommend recheck for fever, shortness of breath or any worsening.   Plan: predniSONE (DELTASONE) 20 MG tablet    (G43.009) Migraine without aura and without status migrainosus, not intractable  Comment: reports that she is doing fair with the migraines. Did better when on metoprolol.   Refill of imitrex. Restart on metoprolol 25 mg 1/2 daily  Plan: SUMAtriptan (IMITREX) 100 MG tablet        Recheck if fails to improve or if worsening in any way.     (R53.83) Fatigue, unspecified type  Comment: doing better. Will watch with the resumption of metoprolol if fatigue will increase.       Total time of call between patient and provider was 13 minutes     ARVIN MARIN MD, MD  [facility]

## 2017-07-26 NOTE — TELEPHONE ENCOUNTER
Provider, do you need me to clarify with the patient which medication she is requesting or is it clear to you based on the telephone visit conversation earlier?

## 2017-07-26 NOTE — TELEPHONE ENCOUNTER
Per patient Hanshart message:    Created: 7/26/2017 2:05 PM      Ciaran Childers,    I am checking if I get injections in thumbs tomorrow can I please still get hip injections on Monday w Dr. Richardson?  I had planned on hips but my hands are awful and painful so made an appt for tomorrow in [place]. I am just filled w pain almost everywhere. I am also hoping to do by end of month. I know it's frowned upon but would appreciate if I can. Just let me know.     I do still have both appts. Thank you.    Jacquie Amador

## 2017-07-27 ENCOUNTER — TELEPHONE (OUTPATIENT)
Dept: ORTHOPEDICS | Facility: CLINIC | Age: 57
End: 2017-07-27

## 2017-07-27 RX ORDER — ALBUTEROL SULFATE 0.83 MG/ML
1 SOLUTION RESPIRATORY (INHALATION) EVERY 6 HOURS PRN
Qty: 1 BOX | Refills: 1 | Status: SHIPPED | OUTPATIENT
Start: 2017-07-27 | End: 2018-05-29

## 2017-07-27 NOTE — TELEPHONE ENCOUNTER
Called pt on cell phone and left message informing her that she needs to wait 2 weeks in between injections so she needs to decide whether to have the thumbs today or the scheduled bursa injections.  Advised her to call the nurse line or "InvierteMe,SL"hart w/ her decision.    A wiMAN message was also sent to her.    Keep encounter open. Waiting for pt response.    Denisse Maldonado RN-BSN  [facility]

## 2017-07-27 NOTE — TELEPHONE ENCOUNTER
Reason for Call:  Other     Detailed comments: Patient needs to cancel her 10 or 11 appointment per call from Dr Richardson this morning. Patient needs to decide hands or hips so she is doing hips on Monday with Perla. Patient will call to reschedule in two weeks.   Phone Number Patient can be reached at: Cell number on file:    Telephone Information:   Mobile [phone]       Best Time: Anytime     Can we leave a detailed message on this number? YES    Call taken on 7/27/2017 at 9:30 AM by Caren Caldera

## 2017-07-27 NOTE — TELEPHONE ENCOUNTER
Per patient Zo message:  Created: 7/27/2017 9:37 AM      I cancelled my hand injections today. I will be there on Monday for hip injections and tomorrow for Liseth also.  Thank you  Jacquie Amador

## 2017-07-27 NOTE — TELEPHONE ENCOUNTER
Routing to provider.  Albuterol for nebulizer is no longer on pt's med list.    [place] Andreson RN

## 2017-07-28 ENCOUNTER — MYC MEDICAL ADVICE (OUTPATIENT)
Dept: PALLIATIVE MEDICINE | Facility: CLINIC | Age: 57
End: 2017-07-28

## 2017-07-28 RX ORDER — NYSTATIN 100000 U/G
CREAM TOPICAL 3 TIMES DAILY
Qty: 60 G | Refills: 1 | Status: SHIPPED | OUTPATIENT
Start: 2017-07-28 | End: 2018-04-20

## 2017-07-28 RX ORDER — MUPIROCIN CALCIUM 20 MG/G
CREAM TOPICAL 3 TIMES DAILY
Qty: 60 G | Refills: 1 | Status: SHIPPED | OUTPATIENT
Start: 2017-07-28 | End: 2018-07-09

## 2017-07-28 NOTE — TELEPHONE ENCOUNTER
Routing refill request to provider for review/approval because:  Drug not active on patient's medication list (bactroban cream)  Script from 6/2016 (nystatin cream)    Renetta Massey, RN, BSN

## 2017-07-28 NOTE — TELEPHONE ENCOUNTER
Jacquie-  So sorry to hear you are feeling under the weather. If that cough continues, you should be seen in urgent care it sounds like.  Keep calling every few days to see if there is a cancellation.    Thanks for the update.    Liseth LOPEZ RN CNP, SARAHP  [facility]    I sent the above SoftoCouponhart message to the patient    Liseth LOPEZ RN CNP, SARAHP  [facility] [place]

## 2017-07-28 NOTE — TELEPHONE ENCOUNTER
PA approved.  Effective date: 7/24/17-7/24/18  PA reference #: [id number]  Pt. notified:   Yes   Pt. informed directly.    Max dosage 300 units per visit.      Chantal RIGGS    [facility]

## 2017-07-28 NOTE — TELEPHONE ENCOUNTER
Per patient Zo message:  Created: 7/28/2017 9:22 AM      Hi Liseth,    I had to cancel this morning I'm really sorry but I have a terrible cough and almost had to call back when talking to  due to coughing and coughing so hard I see stars. I had to schedule out to Oct 20 which is forever away. I told them to put me on a waiting list. So sorry again I really needed to talk to you. I am still planning on Monday.    Thank you  Jacquie connor sent to pt:    Created: 7/28/2017 10:39 AM          Hi Jacquie.  I am sorry you are not feeling well.  I will make sure Liseth gets your message.            Routed to Liseth Caldera NP as an FYI.    Denisse Maldonado, RN-BSN  [facility]

## 2017-07-31 ENCOUNTER — OFFICE VISIT (OUTPATIENT)
Dept: PALLIATIVE MEDICINE | Facility: CLINIC | Age: 57
End: 2017-07-31
Payer: COMMERCIAL

## 2017-07-31 VITALS — DIASTOLIC BLOOD PRESSURE: 76 MMHG | SYSTOLIC BLOOD PRESSURE: 140 MMHG | HEART RATE: 92 BPM

## 2017-07-31 DIAGNOSIS — M76.892 ENTHESOPATHY OF HIP REGION ON BOTH SIDES: Primary | ICD-10-CM

## 2017-07-31 DIAGNOSIS — M76.891 ENTHESOPATHY OF HIP REGION ON BOTH SIDES: Primary | ICD-10-CM

## 2017-07-31 PROCEDURE — 20610 DRAIN/INJ JOINT/BURSA W/O US: CPT | Mod: 50 | Performed by: PSYCHIATRY & NEUROLOGY

## 2017-07-31 ASSESSMENT — PAIN SCALES - GENERAL: PAINLEVEL: EXTREME PAIN (8)

## 2017-07-31 NOTE — MR AVS SNAPSHOT
After Visit Summary   7/31/2017    Jacquie Amador    MRN: [id number]           Patient Information     Date Of Birth          1960        Visit Information        Provider Department      7/31/2017 9:30 AM Tammi Richardson MD [facility]        Care Instructions    [facility]   Post Procedure Instructions    Today you had:   bursa injection      Medications used:  lidocaine   bupivicaine   kenolog        Go to the emergency room if you develop any shortness of breath    Monitor the injection sites for signs and symptoms of infection-fever, chills, redness, swelling, warmth, or drainage to areas.    You may have soreness at injection sites for up to 24 hours.    You may apply ice to the painful areas to help minimize the discomfort of the needle pokes.    Do not apply heat to sites for at least 12 hours.    You may use anti-inflammatory medications or Tylenol for pain control if necessary  Nurse line: [phone]  After hours provider line: [phone]  Appointment line: [phone]            Follow-ups after your visit        Your next 10 appointments already scheduled     Aug 01, 2017  1:00 PM CDT   Return Visit with JESSICA Guillory CNP   [facility] ([facility] [place])    [address]  [place] [address]   [phone]            Aug 07, 2017  1:15 PM CDT   Radiology Injections with Tammi Richardson MD   [facility] [place] ([facility] [place])    [address]  [place] [address]   [phone]            Aug 17, 2017  1:30 PM CDT   PROCEDURE with Tammi Richardson MD   [facility] ([facility] [place])    [address]  [place] [address]   [phone]              Who to contact     If you have questions or need follow up information about today's clinic visit or your schedule please contact [facility]  [facility] directly at [phone].  Normal or non-critical lab and imaging results will be communicated to you by F[employer]io[employer], letter or phone within 4 business days after the clinic has received the results. If you do not hear from us within 7 days, please contact the clinic through Frodiohart or phone. If you have a critical or abnormal lab result, we will notify you by phone as soon as possible.  Submit refill requests through [employer] or call your pharmacy and they will forward the refill request to us. Please allow 3 business days for your refill to be completed.          Additional Information About Your Visit        Frodiohart Information     [employer] gives you secure access to your electronic health record. If you see a primary care provider, you can also send messages to your care team and make appointments. If you have questions, please call your primary care clinic.  If you do not have a primary care provider, please call [phone] and they will assist you.        Care EveryWhere ID     This is your Care EveryWhere ID. This could be used by other organizations to access your [facility] medical records  [id number]        Your Vitals Were     Pulse Last Period                92 07/17/2009           Blood Pressure from Last 3 Encounters:   07/31/17 140/76   06/30/17 138/88   05/12/17 123/85    Weight from Last 3 Encounters:   03/31/17 92.5 kg (204 lb)   12/22/16 93 kg (205 lb)   07/27/16 94.3 kg (208 lb)              Today, you had the following     No orders found for display         Today's Medication Changes          These changes are accurate as of: 7/31/17  9:45 AM.  If you have any questions, ask your nurse or doctor.               These medicines have changed or have updated prescriptions.        Dose/Directions    metoprolol 25 MG 24 hr tablet   Commonly known as:  TOPROL-XL   This may have changed:  additional instructions   Used for:  Palpitations, Sinus tachycardia        Take 1.5 tablets per day   Quantity:   45 tablet   Refills:  1                Primary Care Provider Office Phone # Fax #    Liz RANDEE Peterson [phone]       [facility] [id number] [place] [address]        Equal Access to Services     JAMARI SUMMERS : Hadii rivera ku haddeandreo Soomaali, waaxda luqadaha, qaybta kaalmada adeegyada, yao gonzalesn roxanasarah ford laAlyshajac hernandez. So [facility] [phone].    ATENCIÓN: Si habla español, tiene a kimball disposición servicios gratuitos de asistencia lingüística. Llame al [phone].    We comply with applicable federal civil rights laws and [place] laws. We do not discriminate on the basis of race, color, national origin, age, disability sex, sexual orientation or gender identity.            Thank you!     Thank you for choosing [facility]  for your care. Our goal is always to provide you with excellent care. Hearing back from our patients is one way we can continue to improve our services. Please take a few minutes to complete the written survey that you may receive in the mail after your visit with us. Thank you!             Your Updated Medication List - Protect others around you: Learn how to safely use, store and throw away your medicines at www.disposemymeds.org.          This list is accurate as of: 7/31/17  9:45 AM.  Always use your most recent med list.                   Brand Name Dispense Instructions for use Diagnosis    acidophilus Caps     60 capsule    Take 1 capsule by mouth daily Take two hours before or after antibiotic dose.  Continue for 1 week after antibiotic therapy completed    Diarrhea       adapalene 0.1 % gel    DIFFERIN    45 g    APPLY A THIN LAYER TO THE AFFECTED AREA(S) BY TOPICAL ROUTE ONCE DAILY BEFORE BEDTIME    Acne vulgaris       ADVAIR DISKUS 500-50 MCG/DOSE diskus inhaler   Generic drug:  fluticasone-salmeterol     1 Inhaler    INHALE 1 PUFF BY INHALATION ROUTE 2 TIMES PER DAY IN THE MORNING AND EVENING APPROXIMATELY 12 HOURS APART    Moderate  persistent asthma without complication       * albuterol 108 (90 BASE) MCG/ACT Inhaler    VENTOLIN HFA    54 g    INHALE 2 PUFFS INTO THE LUNGS EVERY 6 HOURS AS NEEDED FOR SHORTNESS OF BREATH / DYSPNEA    Moderate persistent asthma without complication       * albuterol (2.5 MG/3ML) 0.083% neb solution     1 Box    Take 1 vial (2.5 mg) by nebulization every 6 hours as needed    Moderate persistent asthma with acute exacerbation       ALPRAZolam 0.25 MG tablet    XANAX    90 tablet    TAKE 1 TABLET DAILY AS NEEDED FOR ANXIETY    Generalized anxiety disorder       atorvastatin 20 MG tablet    LIPITOR          * buPROPion 150 MG 12 hr tablet    WELLBUTRIN SR    90 tablet    Take 1 tablet (150 mg) by mouth every evening    Generalized anxiety disorder       * buPROPion 200 MG 12 hr tablet    WELLBUTRIN SR    90 tablet    TAKE ONE TABLET BY MOUTH IN THE MORNING    Generalized anxiety disorder       celecoxib 200 MG capsule    celeBREX    180 capsule    Take 1 capsule twice daily as needed for pain this is a 3 month script    Greater trochanteric bursitis, unspecified laterality, Hand arthritis       clindamycin 1 % lotion    CLEOCIN T     Apply topically 2 times daily        diclofenac 1 % Gel topical gel    VOLTAREN    9 Tube    Apply 2 grams to hands and 4 grams to hips up to 4 times daily. T    Greater trochanteric bursitis of both hips, Bilateral thumb pain       DULoxetine 60 MG EC capsule    CYMBALTA    180 capsule    Take 2 capsules (120 mg) by mouth daily    Multiple joint pain       HYDROcodone-acetaminophen 5-325 MG per tablet    NORCO    100 tablet    May take 1 tablet every 4-6 hours as needed for pain. Max of 4 tabs per day.  Must last 30 days. OK to fill on/after 7/28 and begin on 7/30/2017    Lumbosacral spondylosis without myelopathy, DDD (degenerative disc disease), lumbar, SI (sacroiliac) joint dysfunction, Greater trochanteric bursitis of both hips, Myofascial pain, Fibromyalgia       hydrocortisone  2.5 % cream     30 g    APPLY TOPICALLY 2 TIMES DAILY AS NEEDED    Dermatitis       losartan-hydrochlorothiazide 100-12.5 MG per tablet    HYZAAR    90 tablet    Take 1 tablet by mouth daily    Essential hypertension with goal blood pressure less than 140/90       medical cannabis (Patient's own supply.  Not a prescription)     0 Information only    2 mg morning and noon. 7 mg at bedtime. (This is NOT a prescription, and does not certify that the patient has a qualifying medical condition for medical cannabis.  The purpose of this order is  to document that the patient reports taking medical cannabis.)        metoprolol 25 MG 24 hr tablet    TOPROL-XL    45 tablet    Take 1.5 tablets per day    Palpitations, Sinus tachycardia       montelukast 10 MG tablet    SINGULAIR    90 tablet    TAKE 1 TABLET (10 MG) BY ORAL ROUTE ONCE DAILY IN THE EVENING    Moderate persistent asthma without complication       mupirocin 2 % cream    BACTROBAN    60 g    Apply topically 3 times daily    Dermatitis       nystatin 261128 UNIT/ML suspension    MYCOSTATIN    380 mL    Take by mouth 4 times daily    Thrush       nystatin cream    MYCOSTATIN    60 g    Apply topically 3 times daily    Dermatitis       predniSONE 20 MG tablet    DELTASONE    10 tablet    Take 2 tablets (40 mg) by mouth daily for 5 days    Moderate persistent asthma with acute exacerbation       ranitidine 150 MG tablet    ZANTAC    0    ONE TABLET TWICE DAILY        rOPINIRole 1 MG tablet    REQUIP    180 tablet    TAKE 2 TABLETS (2 MG) BY MOUTH AT BEDTIME    Movement disorder       SUMAtriptan 100 MG tablet    IMITREX    18 tablet    Take 1 tablet (100 mg) by mouth at onset of headache for migraine May repeat in 2 hours. Max 2 tablets/24 hours.    Migraine without aura and without status migrainosus, not intractable       traZODone 50 MG tablet    DESYREL    270 tablet    TAKE 2-3 TABLETS (100-150 MG) BY MOUTH AT BEDTIME    Insomnia, unspecified       tretinoin  0.025 % cream    RETIN-A    45 g    Spread a pea size amount into affected area topically at bedtime.  Use sunscreen SPF>20.    Age-related facial wrinkles       ZYRTEC 10 MG tablet   Generic drug:  cetirizine     90    1 TABLET DAILY    Allergic rhinitis, cause unspecified       * Notice:  This list has 4 medication(s) that are the same as other medications prescribed for you. Read the directions carefully, and ask your doctor or other care provider to review them with you.

## 2017-07-31 NOTE — PROGRESS NOTES
Pre procedure Diagnosis: bilateral trochanteric bursitis  Post procedure Diagnosis: Same  Procedure performed: bilateral trochanteric bursa injections  Anesthesia: none  Complications: none  Operators: Renetta Richardson MD     Indications:   Jacquie Amador is a 57 year old female was sent by Liseth Caldera NP for trochanteric bursa injection.  They have a history of bilateral hip pain, previous injections helpful.  Exam shows bilateral trochanteric bursa tenderness and they have tried conservative treatment including medications, PT.    Options/alternatives, benefits and risks were discussed with the patient including bleeding, infection, tissue trauma including tendons and muscles, and weakness.  Questions were answered to her satisfaction and she agrees to proceed. Voluntary informed consent was obtained and signed.     Vitals were reviewed: Yes  Allergies were reviewed:  Yes   Medications were reviewed:  Yes   Pre-procedure pain score: 8/10    Procedure:  After getting informed consent, patient was brought into the procedure suite and was placed in a prone position on the procedure table.   A Pause for the Cause was performed.  Patient was prepped and draped in sterile fashion.     The area over the right trochanter was palpated, and the tender area was identified, corresponding to the area of the trochanteric bursa.  The area was cleaned with Chloroprep.   A 25 G 3.5 inch needle was inserted, aiming towards the trochanter.  When bone was encountered, the needle was slightly drawn.  A solution containing local anesthesic and steroid was injected.  The needle was removed.  Hemostasis was achieved. Bandaids were placed as appropriate.    The procedure was repeated on the opposite side.    In total, 4ml of 0.5% bupivacaine, 4ml of 1% lidocaine, and 40mg of kenalog was injected.    Hemostasis was achieved, the area was cleaned, and bandaids were placed when appropriate.  The patient tolerated the procedure  well.    Post-procedure pain score: not asked  Follow-up includes:   -f/u with referring provider    Renetta Richardosn MD  [facility]

## 2017-07-31 NOTE — NURSING NOTE
"Chief Complaint   Patient presents with     Pain       Initial /76  Pulse 92  LMP 07/17/2009 Estimated body mass index is 31.95 kg/(m^2) as calculated from the following:    Height as of 3/31/17: 1.702 m (5' 7\").    Weight as of 3/31/17: 92.5 kg (204 lb).  Medication Reconciliation: complete     Mike Carreno MA      "

## 2017-07-31 NOTE — PATIENT INSTRUCTIONS
[facility]   Post Procedure Instructions    Today you had:   bursa injection      Medications used:  lidocaine   bupivicaine   kenolog        Go to the emergency room if you develop any shortness of breath    Monitor the injection sites for signs and symptoms of infection-fever, chills, redness, swelling, warmth, or drainage to areas.    You may have soreness at injection sites for up to 24 hours.    You may apply ice to the painful areas to help minimize the discomfort of the needle pokes.    Do not apply heat to sites for at least 12 hours.    You may use anti-inflammatory medications or Tylenol for pain control if necessary  Nurse line: [phone]  After hours provider line: [phone]  Appointment line: [phone]

## 2017-08-01 ENCOUNTER — OFFICE VISIT (OUTPATIENT)
Dept: PALLIATIVE MEDICINE | Facility: CLINIC | Age: 57
End: 2017-08-01
Payer: COMMERCIAL

## 2017-08-01 ENCOUNTER — MYC MEDICAL ADVICE (OUTPATIENT)
Dept: FAMILY MEDICINE | Facility: CLINIC | Age: 57
End: 2017-08-01

## 2017-08-01 VITALS — HEART RATE: 98 BPM | SYSTOLIC BLOOD PRESSURE: 125 MMHG | DIASTOLIC BLOOD PRESSURE: 85 MMHG

## 2017-08-01 DIAGNOSIS — M79.18 MYOFASCIAL PAIN: ICD-10-CM

## 2017-08-01 DIAGNOSIS — M79.7 FIBROMYALGIA: ICD-10-CM

## 2017-08-01 DIAGNOSIS — M70.62 GREATER TROCHANTERIC BURSITIS OF BOTH HIPS: ICD-10-CM

## 2017-08-01 DIAGNOSIS — G43.719 INTRACTABLE CHRONIC MIGRAINE WITHOUT AURA AND WITHOUT STATUS MIGRAINOSUS: ICD-10-CM

## 2017-08-01 DIAGNOSIS — M53.3 SI (SACROILIAC) JOINT DYSFUNCTION: ICD-10-CM

## 2017-08-01 DIAGNOSIS — M70.61 GREATER TROCHANTERIC BURSITIS OF BOTH HIPS: ICD-10-CM

## 2017-08-01 DIAGNOSIS — M47.817 LUMBOSACRAL SPONDYLOSIS WITHOUT MYELOPATHY: Primary | ICD-10-CM

## 2017-08-01 DIAGNOSIS — M51.369 DDD (DEGENERATIVE DISC DISEASE), LUMBAR: ICD-10-CM

## 2017-08-01 PROCEDURE — 99214 OFFICE O/P EST MOD 30 MIN: CPT | Performed by: NURSE PRACTITIONER

## 2017-08-01 RX ORDER — HYDROCODONE BITARTRATE AND ACETAMINOPHEN 5; 325 MG/1; MG/1
TABLET ORAL
Qty: 100 TABLET | Refills: 0 | Status: SHIPPED | OUTPATIENT
Start: 2017-08-01 | End: 2017-10-19

## 2017-08-01 RX ORDER — PROCHLORPERAZINE MALEATE 10 MG
5-10 TABLET ORAL EVERY 8 HOURS PRN
Qty: 15 TABLET | Refills: 1 | Status: SHIPPED | OUTPATIENT
Start: 2017-08-01 | End: 2017-12-11

## 2017-08-01 ASSESSMENT — PAIN SCALES - GENERAL: PAINLEVEL: NO PAIN (1)

## 2017-08-01 NOTE — NURSING NOTE
"Chief Complaint   Patient presents with     Pain       Initial /85  Pulse 98  LMP 07/17/2009 Estimated body mass index is 31.95 kg/(m^2) as calculated from the following:    Height as of 3/31/17: 1.702 m (5' 7\").    Weight as of 3/31/17: 92.5 kg (204 lb).  Medication Reconciliation: complete     Mike Carreno MA      "

## 2017-08-01 NOTE — PATIENT INSTRUCTIONS
PLAN:  1. Continue home activities  2. Medications:   1. Continue Norco  2. Start compazine as directed for migraine headaches, caution sedation, start with lower dose and don't drive until you know how you feel  3. Procedures: keep upcoming RFA devante on 8/7/2017  4. Follow-up with me in 10-12 weeks.       ----------------------------------------------------------------  Nurse Triage line:  [phone]   Call this number with any questions or concerns. You may leave a detailed message anytime. Calls are typically returned Monday through Friday between 8 AM and 4:30 PM. We usually get back to you within 2 business days depending on the issue/request.       Medication refills:    For non-narcotic medications, call your pharmacy directly to request a refill. The pharmacy will contact the [facility] for authorization. Please allow 3-4 days for these refills to be processed.     For narcotic refills, call the nurse triage line or send a [employer] message. Please contact us 7-10 days before your refill is due. The message MUST include the name of the specific medication(s) requested and how you would like to receive the prescription(s). The options are as follows:    Pain Clinic staff can mail the prescription to your pharmacy. Please tell us the name of the pharmacy.    You may pick the prescription up at the Pain Clinic (tell us the location) or during a clinic visit with your pain provider    Pain Clinic staff can deliver the prescription to the [facility] pharmacy in the clinic building. Please tell us the location.      Scheduling number: [phone].  Call this number to schedule or change appointments.    We believe regular attendance is key to your success in our program.    Any time you are unable to keep your appointment we ask that you call us at least 24 hours in advance to let us know. This will allow us to offer the appointment time to another patient.

## 2017-08-01 NOTE — MR AVS SNAPSHOT
After Visit Summary   8/1/2017    Jacquie Amador    MRN: [id number]           Patient Information     Date Of Birth          1960        Visit Information        Provider Department      8/1/2017 1:00 PM Liseth Caldera APRN [facility]        Today's Diagnoses     Lumbosacral spondylosis without myelopathy    -  1    Intractable chronic migraine without aura and without status migrainosus        DDD (degenerative disc disease), lumbar        SI (sacroiliac) joint dysfunction        Greater trochanteric bursitis of both hips        Myofascial pain        Fibromyalgia          Care Instructions    PLAN:  1. Continue home activities  2. Medications:   1. Continue Norco  2. Start compazine as directed for migraine headaches, caution sedation, start with lower dose and don't drive until you know how you feel  3. Procedures: keep upcoming RFA devante on 8/7/2017  4. Follow-up with me in 10-12 weeks.       ----------------------------------------------------------------  Nurse Triage line:  [phone]   Call this number with any questions or concerns. You may leave a detailed message anytime. Calls are typically returned Monday through Friday between 8 AM and 4:30 PM. We usually get back to you within 2 business days depending on the issue/request.       Medication refills:    For non-narcotic medications, call your pharmacy directly to request a refill. The pharmacy will contact the [facility] for authorization. Please allow 3-4 days for these refills to be processed.     For narcotic refills, call the nurse triage line or send a [employer] message. Please contact us 7-10 days before your refill is due. The message MUST include the name of the specific medication(s) requested and how you would like to receive the prescription(s). The options are as follows:    Pain Clinic staff can mail the prescription to your pharmacy. Please tell us the name of the pharmacy.    You may  pick the prescription up at the Pain Clinic (tell us the location) or during a clinic visit with your pain provider    Pain Clinic staff can deliver the prescription to the [place] pharmacy in the clinic building. Please tell us the location.      Scheduling number: [phone].  Call this number to schedule or change appointments.    We believe regular attendance is key to your success in our program.    Any time you are unable to keep your appointment we ask that you call us at least 24 hours in advance to let us know. This will allow us to offer the appointment time to another patient.               Follow-ups after your visit        Your next 10 appointments already scheduled     Aug 07, 2017  1:15 PM CDT   Radiology Injections with Tammi Richardson MD   [facility] ([facility])    [address]  [place] [address]   [phone]            Aug 17, 2017  1:30 PM CDT   PROCEDURE with Tammi Richardson MD   [facility] ([facility])    [address]  [place] [address]   [phone]              Who to contact     If you have questions or need follow up information about today's clinic visit or your schedule please contact [facility] directly at [phone].  Normal or non-critical lab and imaging results will be communicated to you by MyChart, letter or phone within 4 business days after the clinic has received the results. If you do not hear from us within 7 days, please contact the clinic through MyChart or phone. If you have a critical or abnormal lab result, we will notify you by phone as soon as possible.  Submit refill requests through [employer] or call your pharmacy and they will forward the refill request to us. Please allow 3 business days for your refill to be completed.          Additional Information About Your Visit        [employer] Information     [employer] gives you secure access  to your electronic health record. If you see a primary care provider, you can also send messages to your care team and make appointments. If you have questions, please call your primary care clinic.  If you do not have a primary care provider, please call [phone] and they will assist you.        Care EveryWhere ID     This is your Care EveryWhere ID. This could be used by other organizations to access your [place] medical records  [id number]        Your Vitals Were     Pulse Last Period                98 07/17/2009           Blood Pressure from Last 3 Encounters:   08/01/17 125/85   07/31/17 140/76   06/30/17 138/88    Weight from Last 3 Encounters:   03/31/17 92.5 kg (204 lb)   12/22/16 93 kg (205 lb)   07/27/16 94.3 kg (208 lb)              Today, you had the following     No orders found for display         Today's Medication Changes          These changes are accurate as of: 8/1/17  1:31 PM.  If you have any questions, ask your nurse or doctor.               Start taking these medicines.        Dose/Directions    prochlorperazine 10 MG tablet   Commonly known as:  COMPAZINE   Used for:  Intractable chronic migraine without aura and without status migrainosus   Started by:  Liseth Caldera APRN CNP        Dose:  5-10 mg   Take 0.5-1 tablets (5-10 mg) by mouth every 8 hours as needed for nausea or vomiting Or migraine headache onset. Caution sedation   Quantity:  15 tablet   Refills:  1         These medicines have changed or have updated prescriptions.        Dose/Directions    HYDROcodone-acetaminophen 5-325 MG per tablet   Commonly known as:  NORCO   This may have changed:  additional instructions   Used for:  Lumbosacral spondylosis without myelopathy, DDD (degenerative disc disease), lumbar, SI (sacroiliac) joint dysfunction, Greater trochanteric bursitis of both hips, Myofascial pain, Fibromyalgia   Changed by:  Liseth Caldera APRN CNP        May take 1 tablet every 4-6 hours as needed for  pain. Max of 4 tabs per day.  Must last 30 days. OK to fill on/after 8/28 and begin on 8/29/2017   Quantity:  100 tablet   Refills:  0       metoprolol 25 MG 24 hr tablet   Commonly known as:  TOPROL-XL   This may have changed:  additional instructions   Used for:  Palpitations, Sinus tachycardia        Take 1.5 tablets per day   Quantity:  45 tablet   Refills:  1            Where to get your medicines      These medications were sent to [id number] - [place] - [address]  [address], [place] [address]     Phone:  [phone]     prochlorperazine 10 MG tablet         Some of these will need a paper prescription and others can be bought over the counter.  Ask your nurse if you have questions.     Bring a paper prescription for each of these medications     HYDROcodone-acetaminophen 5-325 MG per tablet                Primary Care Provider Office Phone # Fax #    Liz Peterson [phone]        [place] [facility] [id number] [place] [address]  [place] [address]        Equal Access to Services     EDGARDO SUMMERS AH: Hadii aad ku hadasho Soomaali, waaxda luqadaha, qaybta kaalmada adeegyada, waxay idiin hayaan louis chandler . So [facility] [phone].    ATENCIÓN: Si habla español, tiene a kimball disposición servicios gratuitos de asistencia lingüística. [facility] [phone].    We comply with applicable federal civil rights laws and [place] laws. We do not discriminate on the basis of race, color, national origin, age, disability sex, sexual orientation or gender identity.            Thank you!     Thank you for choosing [facility]  for your care. Our goal is always to provide you with excellent care. Hearing back from our patients is one way we can continue to improve our services. Please take a few minutes to complete the written survey that you may receive in the mail after your visit with us. Thank you!             Your Updated Medication List - Protect others around you:  Learn how to safely use, store and throw away your medicines at www.disposemymeds.org.          This list is accurate as of: 8/1/17  1:31 PM.  Always use your most recent med list.                   Brand Name Dispense Instructions for use Diagnosis    acidophilus Caps     60 capsule    Take 1 capsule by mouth daily Take two hours before or after antibiotic dose.  Continue for 1 week after antibiotic therapy completed    Diarrhea       adapalene 0.1 % gel    DIFFERIN    45 g    APPLY A THIN LAYER TO THE AFFECTED AREA(S) BY TOPICAL ROUTE ONCE DAILY BEFORE BEDTIME    Acne vulgaris       ADVAIR DISKUS 500-50 MCG/DOSE diskus inhaler   Generic drug:  fluticasone-salmeterol     1 Inhaler    INHALE 1 PUFF BY INHALATION ROUTE 2 TIMES PER DAY IN THE MORNING AND EVENING APPROXIMATELY 12 HOURS APART    Moderate persistent asthma without complication       * albuterol 108 (90 BASE) MCG/ACT Inhaler    VENTOLIN HFA    54 g    INHALE 2 PUFFS INTO THE LUNGS EVERY 6 HOURS AS NEEDED FOR SHORTNESS OF BREATH / DYSPNEA    Moderate persistent asthma without complication       * albuterol (2.5 MG/3ML) 0.083% neb solution     1 Box    Take 1 vial (2.5 mg) by nebulization every 6 hours as needed    Moderate persistent asthma with acute exacerbation       ALPRAZolam 0.25 MG tablet    XANAX    90 tablet    TAKE 1 TABLET DAILY AS NEEDED FOR ANXIETY    Generalized anxiety disorder       atorvastatin 20 MG tablet    LIPITOR          * buPROPion 150 MG 12 hr tablet    WELLBUTRIN SR    90 tablet    Take 1 tablet (150 mg) by mouth every evening    Generalized anxiety disorder       * buPROPion 200 MG 12 hr tablet    WELLBUTRIN SR    90 tablet    TAKE ONE TABLET BY MOUTH IN THE MORNING    Generalized anxiety disorder       celecoxib 200 MG capsule    celeBREX    180 capsule    Take 1 capsule twice daily as needed for pain this is a 3 month script    Greater trochanteric bursitis, unspecified laterality, Hand arthritis       clindamycin 1 % lotion     CLEOCIN T     Apply topically 2 times daily        diclofenac 1 % Gel topical gel    VOLTAREN    9 Tube    Apply 2 grams to hands and 4 grams to hips up to 4 times daily. T    Greater trochanteric bursitis of both hips, Bilateral thumb pain       DULoxetine 60 MG EC capsule    CYMBALTA    180 capsule    Take 2 capsules (120 mg) by mouth daily    Multiple joint pain       HYDROcodone-acetaminophen 5-325 MG per tablet    NORCO    100 tablet    May take 1 tablet every 4-6 hours as needed for pain. Max of 4 tabs per day.  Must last 30 days. OK to fill on/after 8/28 and begin on 8/29/2017    Lumbosacral spondylosis without myelopathy, DDD (degenerative disc disease), lumbar, SI (sacroiliac) joint dysfunction, Greater trochanteric bursitis of both hips, Myofascial pain, Fibromyalgia       hydrocortisone 2.5 % cream     30 g    APPLY TOPICALLY 2 TIMES DAILY AS NEEDED    Dermatitis       losartan-hydrochlorothiazide 100-12.5 MG per tablet    HYZAAR    90 tablet    Take 1 tablet by mouth daily    Essential hypertension with goal blood pressure less than 140/90       medical cannabis (Patient's own supply.  Not a prescription)     0 Information only    2 mg morning and noon. 7 mg at bedtime. (This is NOT a prescription, and does not certify that the patient has a qualifying medical condition for medical cannabis.  The purpose of this order is  to document that the patient reports taking medical cannabis.)        metoprolol 25 MG 24 hr tablet    TOPROL-XL    45 tablet    Take 1.5 tablets per day    Palpitations, Sinus tachycardia       montelukast 10 MG tablet    SINGULAIR    90 tablet    TAKE 1 TABLET (10 MG) BY ORAL ROUTE ONCE DAILY IN THE EVENING    Moderate persistent asthma without complication       mupirocin 2 % cream    BACTROBAN    60 g    Apply topically 3 times daily    Dermatitis       nystatin [id number] UNIT/ML suspension    MYCOSTATIN    380 mL    Take by mouth 4 times daily    Thrush       nystatin cream     MYCOSTATIN    60 g    Apply topically 3 times daily    Dermatitis       prochlorperazine 10 MG tablet    COMPAZINE    15 tablet    Take 0.5-1 tablets (5-10 mg) by mouth every 8 hours as needed for nausea or vomiting Or migraine headache onset. Caution sedation    Intractable chronic migraine without aura and without status migrainosus       ranitidine 150 MG tablet    ZANTAC    0    ONE TABLET TWICE DAILY        rOPINIRole 1 MG tablet    REQUIP    180 tablet    TAKE 2 TABLETS (2 MG) BY MOUTH AT BEDTIME    Movement disorder       SUMAtriptan 100 MG tablet    IMITREX    18 tablet    Take 1 tablet (100 mg) by mouth at onset of headache for migraine May repeat in 2 hours. Max 2 tablets/24 hours.    Migraine without aura and without status migrainosus, not intractable       traZODone 50 MG tablet    DESYREL    270 tablet    TAKE 2-3 TABLETS (100-150 MG) BY MOUTH AT BEDTIME    Insomnia, unspecified       tretinoin 0.025 % cream    RETIN-A    45 g    Spread a pea size amount into affected area topically at bedtime.  Use sunscreen SPF>20.    Age-related facial wrinkles       ZYRTEC 10 MG tablet   Generic drug:  cetirizine     90    1 TABLET DAILY    Allergic rhinitis, cause unspecified       * Notice:  This list has 4 medication(s) that are the same as other medications prescribed for you. Read the directions carefully, and ask your doctor or other care provider to review them with you.
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
## 2017-08-02 DIAGNOSIS — F41.1 GENERALIZED ANXIETY DISORDER: ICD-10-CM

## 2017-08-02 RX ORDER — ALPRAZOLAM 0.25 MG
TABLET ORAL
Qty: 90 TABLET | Refills: 0 | Status: SHIPPED | OUTPATIENT
Start: 2017-08-02 | End: 2018-02-15

## 2017-08-02 NOTE — TELEPHONE ENCOUNTER
ALPRAZolam (XANAX) 0.25 MG tablet      Last Written Prescription Date:  3/7/2017  Last Fill Quantity: 90,   # refills: 0  Last Office Visit with [facility], [facility] or [facility] prescribing provider: 6/30/2017  Future Office visit:    Next 5 appointments (look out 90 days)     Oct 27, 2017 11:00 AM CDT   Return Visit with JESSICA Guillory CNP   [facility] [place] ([facility] [place])    [address]  [place] [id number] [phone]                   Routing refill request to provider for review/approval because:  Drug not on the [facility], [facility] or [facility] refill protocol or controlled substance

## 2017-08-07 ENCOUNTER — RADIOLOGY INJECTION OFFICE VISIT (OUTPATIENT)
Dept: PALLIATIVE MEDICINE | Facility: CLINIC | Age: 57
End: 2017-08-07
Payer: COMMERCIAL

## 2017-08-07 ENCOUNTER — RADIANT APPOINTMENT (OUTPATIENT)
Dept: RADIOLOGY | Facility: CLINIC | Age: 57
End: 2017-08-07
Attending: PSYCHIATRY & NEUROLOGY

## 2017-08-07 VITALS
SYSTOLIC BLOOD PRESSURE: 130 MMHG | HEART RATE: 86 BPM | RESPIRATION RATE: 12 BRPM | OXYGEN SATURATION: 99 % | DIASTOLIC BLOOD PRESSURE: 79 MMHG

## 2017-08-07 DIAGNOSIS — M47.819 FACET ARTHROPATHY: ICD-10-CM

## 2017-08-07 PROCEDURE — 64636 DESTROY L/S FACET JNT ADDL: CPT | Performed by: PSYCHIATRY & NEUROLOGY

## 2017-08-07 PROCEDURE — 64635 DESTROY LUMB/SAC FACET JNT: CPT | Performed by: PSYCHIATRY & NEUROLOGY

## 2017-08-07 ASSESSMENT — PAIN SCALES - GENERAL
PAINLEVEL: EXTREME PAIN (9)
PAINLEVEL: SEVERE PAIN (7)

## 2017-08-07 NOTE — NURSING NOTE
20 gauge Peripheral IV inserted into left anticubital - attempts: 1    Michell Ritchie RN, [facility]  Pain Clinic Care Coordinator

## 2017-08-07 NOTE — PATIENT INSTRUCTIONS
Anticipate pain for up to 2 weeks after this procedure.  Pain medication has been prescribed to you for this.  You will use your Norco 1-2 tabs every 6 hours as needed.  This means your prescription will run out early.  We expect you to use the higher dose for a week or so, and then go back to your normal dose.  Keep track of how much medication you have available, as you will need to call us 7-10 days in advance.  When you call, make sure to say how many tabs you have remaining.  Limit xanax while using the higher doses of opioids.  It may take up to 4-6 weeks to receive relief from the radiofrequency ablation .  Follow up with provider in Oct- [facility] in 1 month to let us know how you are doing.      [facility] Procedure Discharge Instructions   Nurse line: [phone]   Appt line: [phone]   If you have received sedation before/during/after your procedure, for the next 24 hours you shall not:   -Drive   -Operate machinery   -Drink alcohol   -Sign any legal documents   You may resume your normal diet   Avoid strenuous activity for the first 24 hours   Be cautious with walking as numbness and/or weakness in the lower extremities up to 6-8 hours may occur due to effect of local anesthetic   You may resume your regular activities after 24 hours   You may resume your regular medications after the procedure   You may shower, however no swimming or tub baths or hot tubs for 24 hours following your procedure   You may have discomfort for up to 2 weeks after this injection.  You may use ice packs 10-15 minutes three to four times a day at the injection site for comfort   You may use anti-inflammatory medications (such as Ibuprofen or Aleve or Advil) or Tylenol for pain control if necessary   If you experience any of the following, call the pain center nursing line during work hours at [phone] or on call physician after hours at [phone]:  -Fever over 100 degree F   -Swelling, bleeding, redness,  drainage, warmth at the injection site   -Progressive weakness or numbness on your legs  -Loss of bowel or bladder function   -Unusual new onset of pain that is not improving

## 2017-08-07 NOTE — NURSING NOTE
"Chief Complaint   Patient presents with     Pain       Initial /85  Pulse 87  LMP 07/17/2009 Estimated body mass index is 31.95 kg/(m^2) as calculated from the following:    Height as of 3/31/17: 1.702 m (5' 7\").    Weight as of 3/31/17: 92.5 kg (204 lb).  Medication Reconciliation: complete     Injection intake:    If this procedure is requiring IV sedation has patient been NPO for 6  Hours? NA    Is patient on coumadin, plavix or other prescribed blood thinner?   No    If patient is on coumadin was it held for 5 days?   NA    If patient is on plavix was it held for 7 days?    NA     Does patient take aspirin?  No    If this is for a cervical procedure and patient is on aspirin has it been held for 6 days?   Yes    Any allergies to contrast dye, iodine, steroid and/or numbing medications?  NO    Is patient currently taking antibiotics or have an active infection?  NO    Does patient have a ? Yes       Is patient pregnant or breastfeeding?  NO    Are the vital signs normal?  Yes    Ev Singleton CMA ([facility])        "

## 2017-08-07 NOTE — MR AVS SNAPSHOT
After Visit Summary   8/7/2017    Jacquie Amador    MRN: [id number]           Patient Information     Date Of Birth          1960        Visit Information        Provider Department      8/7/2017 1:15 PM Tammi Richardson MD [facility]        Care Instructions    Anticipate pain for up to 2 weeks after this procedure.  Pain medication has been prescribed to you for this.  You will use your Norco 1-2 tabs every 6 hours as needed.  This means your prescription will run out early.  We expect you to use the higher dose for a week or so, and then go back to your normal dose.  Keep track of how much medication you have available, as you will need to call us 7-10 days in advance.  When you call, make sure to say how many tabs you have remaining.  Limit xanax while using the higher doses of opioids.  It may take up to 4-6 weeks to receive relief from the radiofrequency ablation .  Follow up with provider [facility] in 1 month to let us know how you are doing.      [facility] Procedure Discharge Instructions   Nurse line: [phone]   Appt line: [phone]   If you have received sedation before/during/after your procedure, for the next 24 hours you shall not:   -Drive   -Operate machinery   -Drink alcohol   -Sign any legal documents   You may resume your normal diet   Avoid strenuous activity for the first 24 hours   Be cautious with walking as numbness and/or weakness in the lower extremities up to 6-8 hours may occur due to effect of local anesthetic   You may resume your regular activities after 24 hours   You may resume your regular medications after the procedure   You may shower, however no swimming or tub baths or hot tubs for 24 hours following your procedure   You may have discomfort for up to 2 weeks after this injection.  You may use ice packs 10-15 minutes three to four times a day at the injection site for comfort   You may use anti-inflammatory  medications (such as Ibuprofen or Aleve or Advil) or Tylenol for pain control if necessary   If you experience any of the following, call the pain center nursing line during work hours at [phone] or on call physician after hours at [phone]:  -Fever over 100 degree F   -Swelling, bleeding, redness, drainage, warmth at the injection site   -Progressive weakness or numbness on your legs  -Loss of bowel or bladder function   -Unusual new onset of pain that is not improving            Follow-ups after your visit        Your next 10 appointments already scheduled     Aug 17, 2017  1:30 PM CDT   PROCEDURE with Tammi Richardson MD   [facility] ([facility])    [address] [address] [address] [phone]            Oct 27, 2017 11:00 AM CDT   Return Visit with JESSICA Guillory CNP   [facility] ([facility])    [address] [address] [address] [phone]              Who to contact     If you have questions or need follow up information about today's clinic visit or your schedule please contact [facility] directly at [phone].  Normal or non-critical lab and imaging results will be communicated to you by [employer], letter or phone within 4 business days after the clinic has received the results. If you do not hear from us within 7 days, please contact the clinic through [employer] or phone. If you have a critical or abnormal lab result, we will notify you by phone as soon as possible.  Submit refill requests through [employer] or call your pharmacy and they will forward the refill request to us. Please allow 3 business days for your refill to be completed.          Additional Information About Your Visit        [employer] Information     [employer] gives you secure access to your electronic health record. If you see a primary care provider, you can also send messages to your care team and make  appointments. If you have questions, please call your primary care clinic.  If you do not have a primary care provider, please call [phone] and they will assist you.        Care EveryWhere ID     This is your Care EveryWhere ID. This could be used by other organizations to access your [facility] medical records  [id number]        Your Vitals Were     Pulse Respirations Last Period Pulse Oximetry          86 12 07/17/2009 99%         Blood Pressure from Last 3 Encounters:   08/07/17 130/79   08/01/17 125/85   07/31/17 140/76    Weight from Last 3 Encounters:   03/31/17 92.5 kg (204 lb)   12/22/16 93 kg (205 lb)   07/27/16 94.3 kg (208 lb)              Today, you had the following     No orders found for display         Today's Medication Changes          These changes are accurate as of: 8/7/17  2:20 PM.  If you have any questions, ask your nurse or doctor.               These medicines have changed or have updated prescriptions.        Dose/Directions    metoprolol 25 MG 24 hr tablet   Commonly known as:  TOPROL-XL   This may have changed:  additional instructions   Used for:  Palpitations, Sinus tachycardia        Take 1.5 tablets per day   Quantity:  45 tablet   Refills:  1                Primary Care Provider Office Phone # Fax #    Liz Peterson [phone]       [facility] [address] [address] [address]        Equal Access to Services     JAMARI SUMMERS AH: Hadii rivera ku hadasho Soomaali, waaxda luqadaha, qaybta kaalmada louisyada, yao hernandez. So [facility] [phone].    ATENCIÓN: Si habla español, tiene a kimball disposición servicios gratuitos de asistencia lingüística. Terence al [phone].    We comply with applicable federal civil rights laws and [place] laws. We do not discriminate on the basis of race, color, national origin, age, disability sex, sexual orientation or gender identity.            Thank you!     Thank you for choosing [facility]  for your care. Our goal is always to provide you with excellent care. Hearing back from our patients is one way we can continue to improve our services. Please take a few minutes to complete the written survey that you may receive in the mail after your visit with us. Thank you!             Your Updated Medication List - Protect others around you: Learn how to safely use, store and throw away your medicines at www.disposemymeds.org.          This list is accurate as of: 8/7/17  2:20 PM.  Always use your most recent med list.                   Brand Name Dispense Instructions for use Diagnosis    acidophilus Caps     60 capsule    Take 1 capsule by mouth daily Take two hours before or after antibiotic dose.  Continue for 1 week after antibiotic therapy completed    Diarrhea       adapalene 0.1 % gel    DIFFERIN    45 g    APPLY A THIN LAYER TO THE AFFECTED AREA(S) BY TOPICAL ROUTE ONCE DAILY BEFORE BEDTIME    Acne vulgaris       ADVAIR DISKUS 500-50 MCG/DOSE diskus inhaler   Generic drug:  fluticasone-salmeterol     1 Inhaler    INHALE 1 PUFF BY INHALATION ROUTE 2 TIMES PER DAY IN THE MORNING AND EVENING APPROXIMATELY 12 HOURS APART    Moderate persistent asthma without complication       * albuterol 108 (90 BASE) MCG/ACT Inhaler    VENTOLIN HFA    54 g    INHALE 2 PUFFS INTO THE LUNGS EVERY 6 HOURS AS NEEDED FOR SHORTNESS OF BREATH / DYSPNEA    Moderate persistent asthma without complication       * albuterol (2.5 MG/3ML) 0.083% neb solution     1 Box    Take 1 vial (2.5 mg) by nebulization every 6 hours as needed    Moderate persistent asthma with acute exacerbation       ALPRAZolam 0.25 MG tablet    XANAX    90 tablet    TAKE 1 TABLET DAILY AS NEEDED FOR ANXIETY    Generalized anxiety disorder       atorvastatin 20 MG tablet    LIPITOR          * buPROPion 150 MG 12 hr tablet    WELLBUTRIN SR    90 tablet    Take 1 tablet (150 mg) by mouth every evening    Generalized anxiety disorder       *  buPROPion 200 MG 12 hr tablet    WELLBUTRIN SR    90 tablet    TAKE ONE TABLET BY MOUTH IN THE MORNING    Generalized anxiety disorder       celecoxib 200 MG capsule    celeBREX    180 capsule    Take 1 capsule twice daily as needed for pain this is a 3 month script    Greater trochanteric bursitis, unspecified laterality, Hand arthritis       clindamycin 1 % lotion    CLEOCIN T     Apply topically 2 times daily        diclofenac 1 % Gel topical gel    VOLTAREN    9 Tube    Apply 2 grams to hands and 4 grams to hips up to 4 times daily. T    Greater trochanteric bursitis of both hips, Bilateral thumb pain       DULoxetine 60 MG EC capsule    CYMBALTA    180 capsule    Take 2 capsules (120 mg) by mouth daily    Multiple joint pain       HYDROcodone-acetaminophen 5-325 MG per tablet    NORCO    100 tablet    May take 1 tablet every 4-6 hours as needed for pain. Max of 4 tabs per day.  Must last 30 days. OK to fill on/after 8/28 and begin on 8/29/2017    Lumbosacral spondylosis without myelopathy, DDD (degenerative disc disease), lumbar, SI (sacroiliac) joint dysfunction, Greater trochanteric bursitis of both hips, Myofascial pain, Fibromyalgia       hydrocortisone 2.5 % cream     30 g    APPLY TOPICALLY 2 TIMES DAILY AS NEEDED    Dermatitis       losartan-hydrochlorothiazide 100-12.5 MG per tablet    HYZAAR    90 tablet    Take 1 tablet by mouth daily    Essential hypertension with goal blood pressure less than 140/90       medical cannabis (Patient's own supply.  Not a prescription)     0 Information only    2 mg morning and noon. 7 mg at bedtime. (This is NOT a prescription, and does not certify that the patient has a qualifying medical condition for medical cannabis.  The purpose of this order is  to document that the patient reports taking medical cannabis.)        metoprolol 25 MG 24 hr tablet    TOPROL-XL    45 tablet    Take 1.5 tablets per day    Palpitations, Sinus tachycardia       montelukast 10 MG tablet     SINGULAIR    90 tablet    TAKE 1 TABLET (10 MG) BY ORAL ROUTE ONCE DAILY IN THE EVENING    Moderate persistent asthma without complication       mupirocin 2 % cream    BACTROBAN    60 g    Apply topically 3 times daily    Dermatitis       nystatin [id number] UNIT/ML suspension    MYCOSTATIN    380 mL    Take by mouth 4 times daily    Thrush       nystatin cream    MYCOSTATIN    60 g    Apply topically 3 times daily    Dermatitis       prochlorperazine 10 MG tablet    COMPAZINE    15 tablet    Take 0.5-1 tablets (5-10 mg) by mouth every 8 hours as needed for nausea or vomiting Or migraine headache onset. Caution sedation    Intractable chronic migraine without aura and without status migrainosus       ranitidine 150 MG tablet    ZANTAC    0    ONE TABLET TWICE DAILY        rOPINIRole 1 MG tablet    REQUIP    180 tablet    TAKE 2 TABLETS (2 MG) BY MOUTH AT BEDTIME    Movement disorder       SUMAtriptan 100 MG tablet    IMITREX    18 tablet    Take 1 tablet (100 mg) by mouth at onset of headache for migraine May repeat in 2 hours. Max 2 tablets/24 hours.    Migraine without aura and without status migrainosus, not intractable       traZODone 50 MG tablet    DESYREL    270 tablet    TAKE 2-3 TABLETS (100-150 MG) BY MOUTH AT BEDTIME    Insomnia, unspecified       tretinoin 0.025 % cream    RETIN-A    45 g    Spread a pea size amount into affected area topically at bedtime.  Use sunscreen SPF>20.    Age-related facial wrinkles       ZYRTEC 10 MG tablet   Generic drug:  cetirizine     90    1 TABLET DAILY    Allergic rhinitis, cause unspecified       * Notice:  This list has 4 medication(s) that are the same as other medications prescribed for you. Read the directions carefully, and ask your doctor or other care provider to review them with you.

## 2017-08-07 NOTE — NURSING NOTE
MD Time IN: 1333  Sedation start time:  1339  MD Time OUT:  1420    Medications given: fentanyl 37.5 mcg IV; versed 1 mg IV  Intravenous fluids were administered, normal saline 250 cc's.  Sedation Level Achieved:  Moderate (conscious) sedation    Michell Ritchie RN, [facility]  Pain Clinic Care Coordinator

## 2017-08-07 NOTE — PROGRESS NOTES
Pre procedure Diagnosis: facet arthropathy   Post procedure Diagnosis: Same  Procedure performed: lumbar radiofrequency ablation   Anesthesia: moderate sedation with fentanyl 37.5mcg and versed 1mg  Complications: none  Operators: Renetta Richardson MD and Aaron Torres DO    Indications:   Jacquie Amador is a 55 year old female was sent by Liseth Caldera NP for lumbar arthropathy.  They have a history of low back pain. She has had a good response to bilateral L3,4,5 RFA in December of 2015.  Exam shows pain with extension/rotation bilaterally, normal heart and lung exame and they have tried conservative treatment including medications, PT (discharged due to not good candidate), and pain clinic.    She has had a good response to bilateral L3,4,5 RFA in December of 2015 with benefit lasting for more than a year. Initial relief was 100%.  Therefore a repeat radiofrequency ablation will be done.     Options/alternatives, benefits and risks were discussed with the patient including bleeding, infection, no pain relief, tissue trauma, exposure to radiation, reaction to medications including seizure, spinal cord injury,increased pain after the procedure, weakness, numbness or sensory changes and headache. We also discussed risks of sedation, including reaction to medications and cardiovascular collapse. Questions were answered to her satisfaction and she agrees to proceed. Voluntary informed consent was obtained and signed.     Vitals were reviewed: Yes  Allergies were reviewed:  Yes   Medications were reviewed:  Yes   Pre-procedure pain score: 7-8/10    Procedure:  After getting informed consent, patient was brought into the procedure suite and was placed in a prone position on the procedure table. A Pause for the Cause was performed.  Patient was prepped and draped in sterile fashion.     Jacquie Amador had an IV line placed prior the procedure.  The C-arm was positioned in the right oblique view to afford optimal  view of the L4-L5 vertebral bodies. Lidocaine 1% was used to anesthetize the skin at each level.  At each level, a 150cm, 20G curved radiofrequency cannula with a 10mm active tip was positioned, overlying the intersection of the transverse process and pedicle at L4 & L5, and was advanced under intermittent fluoroscopy until it contacted the transverse process and notch, and the tip slightly overran that process, just lateral to the mamillary process.  The position of each cannula was verified and optimized in the oblique view and AP views.    In the AP view, another cannula was placed at the sacral alar notch.      Each position was tested for motor and sensory stimulation, and was positioned so that stimulation was negative for stimuli outside the immediate area of the desired lesion.  Sensory stimulation was completed at 50 Hz, with max stimulation up to 0.8V.  Motor stimulation was completed at 2Hz, up to 2.0V.    Bupivacaine 0.5% mixed 1:1 with lidocaine 1% 1 ml was injected, and a 90 second, 80 degree Centigrade lesion was generated.    The needles were then rotated within the pathway of the medial branch, and locations were evaluated with repeat imaging.  Motor testing was again completed, and showed appropriate stimulation.  A second lesion was then generated at each location.    The procedure was then repeated on the left side, using the same technique as described above.    The needles were flushed with lidocaine as they were withdrawn.  Hemostasis was achieved.      Hemostasis was achieved, the area was cleaned, and bandaids were placed when appropriate.  The patient tolerated the procedure well, and was taken to the recovery room.    Images were saved to PACS.    MD Time IN: 1333   Sedation start time: 1339  MD Time OUT: 1420      Post-procedure pain score: 9/10  Follow-up includes:   -f/u phone call in one week  -post-procedure pain medications: Norco 5-325mg- 1-2 tabs q6hr prn  -f/u with Liseth Caldera  NP in 4 weeks    Renetta Richardson MD  [facility]          [facility] Procedure Note        Sedation:      Performed by: Tammi Richardson MD  Authorized by: Tammi Richardson MD    Pre-Procedure Assessment done at 1300.    Expected Level:  Moderate Sedation    Indication:  Sedation is required to allow for RFA    Consent obtained from patient after discussing the risks, benefits and alternatives.    PO Intake:  Appropriately NPO for procedure    ASA Class:  Class 2 - MILD SYSTEMIC DISEASE, NO ACUTE PROBLEMS, NO FUNCTIONAL LIMITATIONS.    Mallampati:  Grade 2:  Soft palate, base of uvula, tonsillar pillars, and portion of posterior pharyngeal wall visible    Lungs: Lungs Clear with good breath sounds bilaterally.     Heart: Normal heart sounds and rate    History and physical reviewed and no updates needed. I have reviewed the lab findings, diagnostic data, medications, and the plan for sedation. I have determined this patient to be an appropriate candidate for the planned sedation and procedure and have reassessed the patient IMMEDIATELY PRIOR to sedation and procedure.      Sedation Post Procedure Summary:    Prior to the start of the procedure and with procedural staff participation, I verbally confirmed the patient s identity using two indicators, relevant allergies, that the procedure was appropriate and matched the consent or emergent situation, and that the correct equipment/implants were available. Immediately prior to starting the procedure I conducted the Time Out with the procedural staff and re-confirmed the patient s name, procedure, and site/side. (The Joint Commission universal protocol was followed.)  Yes      Sedatives: Fentanyl and Midazolam (Versed)    Vital signs, airway and pulse oximetry were monitored and remained stable throughout the procedure and sedation was maintained until the procedure was complete.  The patient was monitored by staff until sedation discharge  criteria were met.    Patient tolerance: Patient tolerated the procedure well with no immediate complications.    Time of sedation in minutes: 41 minutes from beginning to end of physician one to one monitoring.

## 2017-08-07 NOTE — NURSING NOTE
Discharge Information    IV Discontiued Time:  1428 catheter intact    Amount of Fluid Infused:  NA    Discharge Criteria = When patient returns to baseline or as per MD order    Consciousness:  Pt is fully awake    Circulation:  BP +/- 20% of pre-procedure level    Respiration:  Patient is able to breathe deeply    O2 Sat:  Patient is able to maintain O2 Sat >92% on room air    Activity:  Moves 4 extremities on command    Ambulation:  Patient is able to stand and walk or stand and pivot into wheelchair    Dressing:  Clean/dry or No Dressing    Notes:   Discharge instructions and AVS given to patient    Patient meets criteria for discharge?  YES    Admitted to PCU?  No    Responsible adult present to accompany patient home?  Yes    Signature/Title:    Marta Ritchie RN Care Coordinator  [facility]

## 2017-08-08 ENCOUNTER — MYC MEDICAL ADVICE (OUTPATIENT)
Dept: PALLIATIVE MEDICINE | Facility: CLINIC | Age: 57
End: 2017-08-08

## 2017-08-08 NOTE — TELEPHONE ENCOUNTER
Per patient oZ message:  Created: 8/8/2017 8:21 AM      Hi Liseth,  I completely forgot that I need re-certification of my medical cannabis. I need that done then I will pay my $200 when that is done. It expires Sept 9. Regarding the ablation I am not having any pain right now or during the night so far so good. &#[id number];  Thank you.  Jacquie Amador        Routed to Liseth Caldera NP to review.    Denisse Maldonado RN-BSN  [facility]

## 2017-08-08 NOTE — TELEPHONE ENCOUNTER
Ciaran Dhillon-    Any change in the email or phone number for the re-cert for medical cannabis?    I need answers to the following questions in whole numbers, use a scale from 0-10 with 0 as none and 10 as a great deal    1. In the past 7 days, what is the average number of your pain?  2. In the past 7 days, how much did pain interfere with your general activity?  3. In the past 7 days, how much did pain interfere with your enjoyment of life?    Thanks. Once I have answers to the above, I can recertify you online.   Also, glad to hear you are starting to feel better after the RFA     Liseth LOPEZ RN CNP, SARAHP  [facility]    I sent the patient the above [employer] message.     Liseth LOPEZ RN CNP, SARAHP  [facility]

## 2017-08-09 NOTE — TELEPHONE ENCOUNTER
Jacquie  So are the answers to questions 2 and 3 a 10/10? I don't want to guess.     Thanks.  Liseth    I sent the above [employer] message.   Liseth LOPEZ, RN CNP, FNP  [facility] [place]

## 2017-08-09 NOTE — TELEPHONE ENCOUNTER
Per patient myChart message:  From: Jacquie Amador      Created: 8/8/2017 5:53 PM      Pain is 8 last 7 days  Pain interferes with all my daily activities every day and the last 7 days.  Pain has totally taken out of enjoyment of life.    Hopefully might not need this in the future however still have pain elsewhere.    Thanks,  Jacquie Amador        Routed to Liseth Caldera NP to review.    Denisse Maldonado RN-BSN  [facility]

## 2017-08-10 NOTE — TELEPHONE ENCOUNTER
Per patient myChart message:  From: Jacquie Amador      Created: 8/9/2017 7:22 PM      Sorry I goofed. Both 2 & 3 are at least a 9.    Thank you again,  Jacquie Amador

## 2017-08-15 ENCOUNTER — TELEPHONE (OUTPATIENT)
Dept: FAMILY MEDICINE | Facility: CLINIC | Age: 57
End: 2017-08-15

## 2017-08-15 ENCOUNTER — TELEPHONE (OUTPATIENT)
Dept: PALLIATIVE MEDICINE | Facility: CLINIC | Age: 57
End: 2017-08-15

## 2017-08-15 DIAGNOSIS — F33.1 MODERATE RECURRENT MAJOR DEPRESSION (H): ICD-10-CM

## 2017-08-15 DIAGNOSIS — F41.1 GENERALIZED ANXIETY DISORDER: ICD-10-CM

## 2017-08-15 NOTE — TELEPHONE ENCOUNTER
Bupropion 300 mg   Med is not on        Last Written Prescription Date:   Last Fill Quantity: ; # refills:   Last Office Visit with [facility], [facility] or  Health prescribing provider:  6/30/2017   Next 5 appointments (look out 90 days)     Oct 27, 2017 11:00 AM CDT   Return Visit with JESSICA Guillory CNP   [facility] ([facility])    [address] [address] [id number] [phone]                   Last PHQ-9 score on record=   PHQ-9 SCORE 6/28/2017   Total Score -   Total Score MyChart 6 (Mild depression)   Total Score -   Total Score 6       Lab Results   Component Value Date    AST 19 06/30/2017     Lab Results   Component Value Date    ALT 25 06/30/2017

## 2017-08-15 NOTE — TELEPHONE ENCOUNTER
Patient had a  lumbar radiofrequency ablation on 8/7/17.  Called patient for an update.        Left message that we were calling for an update about how s/he was doing after the procedure and that if she has any questions or concerns to call the nurse line at [phone].

## 2017-08-16 NOTE — TELEPHONE ENCOUNTER
Sunni    I have done the re-certification with the [place] Office of Medical Cannabis. Just an FYI.    Liseth LOPEZ RN CNP, SARAHP  [facility]    I sent the above [employer] message.  Liseth LOPEZ RN CNP, SARAHP  [facility]

## 2017-08-17 ENCOUNTER — OFFICE VISIT (OUTPATIENT)
Dept: PALLIATIVE MEDICINE | Facility: CLINIC | Age: 57
End: 2017-08-17
Payer: COMMERCIAL

## 2017-08-17 VITALS — HEART RATE: 102 BPM | DIASTOLIC BLOOD PRESSURE: 92 MMHG | SYSTOLIC BLOOD PRESSURE: 135 MMHG

## 2017-08-17 DIAGNOSIS — G43.719 INTRACTABLE CHRONIC MIGRAINE WITHOUT AURA AND WITHOUT STATUS MIGRAINOSUS: Primary | ICD-10-CM

## 2017-08-17 PROCEDURE — 64615 CHEMODENERV MUSC MIGRAINE: CPT | Performed by: PSYCHIATRY & NEUROLOGY

## 2017-08-17 RX ORDER — BUPROPION HYDROCHLORIDE 200 MG/1
TABLET, EXTENDED RELEASE ORAL
Qty: 90 TABLET | Refills: 0 | Status: SHIPPED | OUTPATIENT
Start: 2017-08-17 | End: 2017-08-23

## 2017-08-17 RX ORDER — BUPROPION HYDROCHLORIDE 300 MG/1
TABLET ORAL
Qty: 90 TABLET | Refills: 0 | OUTPATIENT
Start: 2017-08-17

## 2017-08-17 RX ORDER — BUPROPION HYDROCHLORIDE 150 MG/1
150 TABLET, EXTENDED RELEASE ORAL EVERY EVENING
Qty: 90 TABLET | Refills: 0 | Status: SHIPPED | OUTPATIENT
Start: 2017-08-17 | End: 2017-08-23 | Stop reason: DRUGHIGH

## 2017-08-17 ASSESSMENT — PAIN SCALES - GENERAL: PAINLEVEL: MILD PAIN (3)

## 2017-08-17 NOTE — PROGRESS NOTES
Pre procedure Diagnosis: chronic migraine    Post procedure Diagnosis: Same  Procedure performed: botox injections  Anesthesia: none  Complications: none  Operators: Renetta Richardson MD     Indications:   Jacquie Amador is a 57 year old female.  They have a history of chronic migraine headaches.  Exam shows increased myofascial pain in shoulder and they have tried conservative treatment including medications (Trazodone, aleve, ibuprofen, Celebrex, Sumatriptan, gabapentin, metoprolol, Tylenol), PT, pain clinic.    This is the first botox.    Options/alternatives, benefits and risks were discussed with the patient including bleeding, infection, pneumothorax, weakness, and headache flare.   Questions were answered to her satisfaction and she agrees to proceed. Voluntary informed consent was obtained and signed.     Vitals were reviewed: Yes  Allergies were reviewed:  Yes   Medications were reviewed:  Yes   Pre-procedure pain score: 3/10    Procedure:  After getting informed consent, a Pause for the Cause was performed.  Patient was prepped and draped with chloroprep.    A 27 gauge needle was used to make the injections.  After negative aspiration, botox was injected bilaterally into the following locations:    Procerus- 5 units (1 site)  Frontals- 20 units (4 sites)   -10 units (2 sites)  Temporalis- 40 units (8 sites)  Occipitis- 30 units (6 sites)  Cervical paraspinals- 20 units (4 sites).  Upper Trapezius- 30 units (6 sites)    Hemostasis was achieved.    Total units used: 155  Total units wasted: 45  Botox lot number: [id number]  Expiration date:  2/2020    Bandaids were placed when appropriate.  The patient tolerated the procedure well.      Post-procedure pain score: 3/10  Follow-up includes:   -f/u phone call in one week  -can be repeated after 3 full months have passed.    Renetta Richardson MD  [facility]

## 2017-08-17 NOTE — TELEPHONE ENCOUNTER
Pt is not taking the 300 mg any more.  She is taking 200 and the 150 mg and she does need some of this called to [facility] in [place].  She needs the 90 supply.

## 2017-08-17 NOTE — MR AVS SNAPSHOT
After Visit Summary   8/17/2017    Jacquie Amador    MRN: [id number]           Patient Information     Date Of Birth          1960        Visit Information        Provider Department      8/17/2017 1:30 PM Tammi Richardson MD [facility] [place]        Care Instructions    [facility]   Botox Injection Discharge Instructions    Monitor the injection sites for signs and symptoms of infection-fever, chills, redness, swelling, warmth, or drainage to areas.    You may have soreness at injection sites for up to 24 hours.    It can take up to 1 month to notice benefit from the 1st treatment    If you do notice relief botox can be done every 3 months.  It may require insurance authorization everytime.      Any questions about insurance coverage, please call the main clinic number and ask to speak with someone about botox coverage.     If you are able to use anti-inflammatory medications or Tylenol for pain control, you can take these as directed.  Nurse line:  [phone]  After hours doctor line:  [phone]  Appointment line:  [phone]              Follow-ups after your visit        Your next 10 appointments already scheduled     Aug 30, 2017 12:00 PM CDT   PROCEDURE with José Miguel Linder DO   [facility] [place] ([facility] [place])    [address] [address]  [place] [address]   [phone]            Oct 27, 2017 11:00 AM CDT   Return Visit with JESSICA Guillory CNP   [facility] [place] ([facility] [place])    [address]  [place] [id number] [phone]              Who to contact     If you have questions or need follow up information about today's clinic visit or your schedule please contact [facility] directly at [phone].  Normal or non-critical lab and imaging results will be communicated to you by MyChart, letter or phone within 4  business days after the clinic has received the results. If you do not hear from us within 7 days, please contact the clinic through [employer] or phone. If you have a critical or abnormal lab result, we will notify you by phone as soon as possible.  Submit refill requests through [employer] or call your pharmacy and they will forward the refill request to us. Please allow 3 business days for your refill to be completed.          Additional Information About Your Visit        [employer] Information     [employer] gives you secure access to your electronic health record. If you see a primary care provider, you can also send messages to your care team and make appointments. If you have questions, please call your primary care clinic.  If you do not have a primary care provider, please call [phone] and they will assist you.        Care EveryWhere ID     This is your Care EveryWhere ID. This could be used by other organizations to access your [place] medical records  [id number]        Your Vitals Were     Pulse Last Period                102 07/17/2009           Blood Pressure from Last 3 Encounters:   08/17/17 (!) 135/92   08/07/17 130/79   08/01/17 125/85    Weight from Last 3 Encounters:   03/31/17 92.5 kg (204 lb)   12/22/16 93 kg (205 lb)   07/27/16 94.3 kg (208 lb)              Today, you had the following     No orders found for display         Today's Medication Changes          These changes are accurate as of: 8/17/17  1:43 PM.  If you have any questions, ask your nurse or doctor.               These medicines have changed or have updated prescriptions.        Dose/Directions    metoprolol 25 MG 24 hr tablet   Commonly known as:  TOPROL-XL   This may have changed:  additional instructions   Used for:  Palpitations, Sinus tachycardia        Take 1.5 tablets per day   Quantity:  45 tablet   Refills:  1                Primary Care Provider Office Phone # Fax #    Liz Peterson [phone]        [address] [address] [address]        Equal Access to Services     KRYSEDGARDO KRISTOPHER : Hadii rivera ku johnny Sokris, waaxda luqadaha, qaybta kaalmada roxanarj, waxdelphine jonny christianalberot myers demetrasalena hernandez. So [facility] [phone].    ATENCIÓN: Si habla español, tiene a kimball disposición servicios gratuitos de asistencia lingüística. [facility] [phone].    We comply with applicable federal civil rights laws and [place] laws. We do not discriminate on the basis of race, color, national origin, age, disability sex, sexual orientation or gender identity.            Thank you!     Thank you for choosing [facility]  for your care. Our goal is always to provide you with excellent care. Hearing back from our patients is one way we can continue to improve our services. Please take a few minutes to complete the written survey that you may receive in the mail after your visit with us. Thank you!             Your Updated Medication List - Protect others around you: Learn how to safely use, store and throw away your medicines at www.disposemymeds.org.          This list is accurate as of: 8/17/17  1:43 PM.  Always use your most recent med list.                   Brand Name Dispense Instructions for use Diagnosis    acidophilus Caps     60 capsule    Take 1 capsule by mouth daily Take two hours before or after antibiotic dose.  Continue for 1 week after antibiotic therapy completed    Diarrhea       adapalene 0.1 % gel    DIFFERIN    45 g    APPLY A THIN LAYER TO THE AFFECTED AREA(S) BY TOPICAL ROUTE ONCE DAILY BEFORE BEDTIME    Acne vulgaris       ADVAIR DISKUS 500-50 MCG/DOSE diskus inhaler   Generic drug:  fluticasone-salmeterol     1 Inhaler    INHALE 1 PUFF BY INHALATION ROUTE 2 TIMES PER DAY IN THE MORNING AND EVENING APPROXIMATELY 12 HOURS APART    Moderate persistent asthma without complication       * albuterol 108 (90 BASE) MCG/ACT Inhaler    VENTOLIN HFA    54 g    INHALE 2 PUFFS INTO THE LUNGS EVERY 6  HOURS AS NEEDED FOR SHORTNESS OF BREATH / DYSPNEA    Moderate persistent asthma without complication       * albuterol (2.5 MG/3ML) 0.083% neb solution     1 Box    Take 1 vial (2.5 mg) by nebulization every 6 hours as needed    Moderate persistent asthma with acute exacerbation       ALPRAZolam 0.25 MG tablet    XANAX    90 tablet    TAKE 1 TABLET DAILY AS NEEDED FOR ANXIETY    Generalized anxiety disorder       atorvastatin 20 MG tablet    LIPITOR          * buPROPion 150 MG 12 hr tablet    WELLBUTRIN SR    90 tablet    Take 1 tablet (150 mg) by mouth every evening    Generalized anxiety disorder       * buPROPion 200 MG 12 hr tablet    WELLBUTRIN SR    90 tablet    TAKE ONE TABLET BY MOUTH IN THE MORNING    Generalized anxiety disorder       celecoxib 200 MG capsule    celeBREX    180 capsule    Take 1 capsule twice daily as needed for pain this is a 3 month script    Greater trochanteric bursitis, unspecified laterality, Hand arthritis       clindamycin 1 % lotion    CLEOCIN T     Apply topically 2 times daily        diclofenac 1 % Gel topical gel    VOLTAREN    9 Tube    Apply 2 grams to hands and 4 grams to hips up to 4 times daily. T    Greater trochanteric bursitis of both hips, Bilateral thumb pain       DULoxetine 60 MG EC capsule    CYMBALTA    180 capsule    Take 2 capsules (120 mg) by mouth daily    Multiple joint pain       HYDROcodone-acetaminophen 5-325 MG per tablet    NORCO    100 tablet    May take 1 tablet every 4-6 hours as needed for pain. Max of 4 tabs per day.  Must last 30 days. OK to fill on/after 8/28 and begin on 8/29/2017    Lumbosacral spondylosis without myelopathy, DDD (degenerative disc disease), lumbar, SI (sacroiliac) joint dysfunction, Greater trochanteric bursitis of both hips, Myofascial pain, Fibromyalgia       hydrocortisone 2.5 % cream     30 g    APPLY TOPICALLY 2 TIMES DAILY AS NEEDED    Dermatitis       losartan-hydrochlorothiazide 100-12.5 MG per tablet    HYZAAR    90  tablet    Take 1 tablet by mouth daily    Essential hypertension with goal blood pressure less than 140/90       medical cannabis (Patient's own supply.  Not a prescription)     0 Information only    2 mg morning and noon. 7 mg at bedtime. (This is NOT a prescription, and does not certify that the patient has a qualifying medical condition for medical cannabis.  The purpose of this order is  to document that the patient reports taking medical cannabis.)        metoprolol 25 MG 24 hr tablet    TOPROL-XL    45 tablet    Take 1.5 tablets per day    Palpitations, Sinus tachycardia       montelukast 10 MG tablet    SINGULAIR    90 tablet    TAKE 1 TABLET (10 MG) BY ORAL ROUTE ONCE DAILY IN THE EVENING    Moderate persistent asthma without complication       mupirocin 2 % cream    BACTROBAN    60 g    Apply topically 3 times daily    Dermatitis       nystatin 192335 UNIT/ML suspension    MYCOSTATIN    380 mL    Take by mouth 4 times daily    Thrush       nystatin cream    MYCOSTATIN    60 g    Apply topically 3 times daily    Dermatitis       prochlorperazine 10 MG tablet    COMPAZINE    15 tablet    Take 0.5-1 tablets (5-10 mg) by mouth every 8 hours as needed for nausea or vomiting Or migraine headache onset. Caution sedation    Intractable chronic migraine without aura and without status migrainosus       ranitidine 150 MG tablet    ZANTAC    0    ONE TABLET TWICE DAILY        rOPINIRole 1 MG tablet    REQUIP    180 tablet    TAKE 2 TABLETS (2 MG) BY MOUTH AT BEDTIME    Movement disorder       SUMAtriptan 100 MG tablet    IMITREX    18 tablet    Take 1 tablet (100 mg) by mouth at onset of headache for migraine May repeat in 2 hours. Max 2 tablets/24 hours.    Migraine without aura and without status migrainosus, not intractable       traZODone 50 MG tablet    DESYREL    270 tablet    TAKE 2-3 TABLETS (100-150 MG) BY MOUTH AT BEDTIME    Insomnia, unspecified       tretinoin 0.025 % cream    RETIN-A    45 g    Spread a  pea size amount into affected area topically at bedtime.  Use sunscreen SPF>20.    Age-related facial wrinkles       ZYRTEC 10 MG tablet   Generic drug:  cetirizine     90    1 TABLET DAILY    Allergic rhinitis, cause unspecified       * Notice:  This list has 4 medication(s) that are the same as other medications prescribed for you. Read the directions carefully, and ask your doctor or other care provider to review them with you.

## 2017-08-17 NOTE — TELEPHONE ENCOUNTER
Routing refill request to provider for review/approval because:  Dose requested (300mg) is not on current med list - appears pt is taking a 200 mg dose and a 150 mg dose.     Renetta Massey, RN, BSN

## 2017-08-17 NOTE — PATIENT INSTRUCTIONS
[facility]   Botox Injection Discharge Instructions    Monitor the injection sites for signs and symptoms of infection-fever, chills, redness, swelling, warmth, or drainage to areas.    You may have soreness at injection sites for up to 24 hours.    It can take up to 1 month to notice benefit from the 1st treatment    If you do notice relief botox can be done every 3 months.  It may require insurance authorization everytime.      Any questions about insurance coverage, please call the main clinic number and ask to speak with someone about botox coverage.     If you are able to use anti-inflammatory medications or Tylenol for pain control, you can take these as directed.  Nurse line:  [phone]  After hours doctor line:  [phone]  Appointment line:  [phone]

## 2017-08-22 ENCOUNTER — MYC MEDICAL ADVICE (OUTPATIENT)
Dept: FAMILY MEDICINE | Facility: CLINIC | Age: 57
End: 2017-08-22

## 2017-08-23 ENCOUNTER — MYC MEDICAL ADVICE (OUTPATIENT)
Dept: FAMILY MEDICINE | Facility: CLINIC | Age: 57
End: 2017-08-23

## 2017-08-23 ENCOUNTER — VIRTUAL VISIT (OUTPATIENT)
Dept: FAMILY MEDICINE | Facility: CLINIC | Age: 57
End: 2017-08-23
Payer: COMMERCIAL

## 2017-08-23 DIAGNOSIS — F41.1 GENERALIZED ANXIETY DISORDER: ICD-10-CM

## 2017-08-23 DIAGNOSIS — J45.41 MODERATE PERSISTENT ASTHMA WITH ACUTE EXACERBATION: ICD-10-CM

## 2017-08-23 PROCEDURE — 99441 ZZC PHYSICIAN TELEPHONE EVALUATION 5-10 MIN: CPT | Performed by: FAMILY MEDICINE

## 2017-08-23 RX ORDER — BUPROPION HYDROCHLORIDE 200 MG/1
200 TABLET, EXTENDED RELEASE ORAL 2 TIMES DAILY
Qty: 180 TABLET | Refills: 0
Start: 2017-08-23 | End: 2018-01-02

## 2017-08-23 ASSESSMENT — ANXIETY QUESTIONNAIRES
5. BEING SO RESTLESS THAT IT IS HARD TO SIT STILL: NOT AT ALL
GAD7 TOTAL SCORE: 14
GAD7 TOTAL SCORE: 14
2. NOT BEING ABLE TO STOP OR CONTROL WORRYING: NEARLY EVERY DAY
7. FEELING AFRAID AS IF SOMETHING AWFUL MIGHT HAPPEN: SEVERAL DAYS
7. FEELING AFRAID AS IF SOMETHING AWFUL MIGHT HAPPEN: SEVERAL DAYS
3. WORRYING TOO MUCH ABOUT DIFFERENT THINGS: MORE THAN HALF THE DAYS
GAD7 TOTAL SCORE: 14
1. FEELING NERVOUS, ANXIOUS, OR ON EDGE: NEARLY EVERY DAY
6. BECOMING EASILY ANNOYED OR IRRITABLE: NEARLY EVERY DAY
4. TROUBLE RELAXING: MORE THAN HALF THE DAYS

## 2017-08-23 ASSESSMENT — PATIENT HEALTH QUESTIONNAIRE - PHQ9
SUM OF ALL RESPONSES TO PHQ QUESTIONS 1-9: 17
SUM OF ALL RESPONSES TO PHQ QUESTIONS 1-9: 17
10. IF YOU CHECKED OFF ANY PROBLEMS, HOW DIFFICULT HAVE THESE PROBLEMS MADE IT FOR YOU TO DO YOUR WORK, TAKE CARE OF THINGS AT HOME, OR GET ALONG WITH OTHER PEOPLE: VERY DIFFICULT

## 2017-08-23 NOTE — PROGRESS NOTES
"Jacquie Amador is a 57 year old female who is being evaluated via a telephone visit.      The patient has been notified of following:     \"This telephone visit will be conducted via a call between you and your physician/provider. We have found that certain health care needs can be provided without the need for a physical exam.  This service lets us provide the care you need with a short phone conversation.  If a prescription is necessary we can send it directly to your pharmacy.  If lab work is needed we can place an order for that and you can then stop by our lab to have the test done at a later time.    We will bill your insurance company for this service.  Please check with your medical insurance if this type of visit is covered. You may be responsible for the cost of this type of visit if insurance coverage is denied.  The typical cost is $30 (10min), $59 (11-20min) and $85 (21-30min).  Most often these visits are shorter than 10 minutes.    If during the course of the call the physician/provider feels a telephone visit is not appropriate, you will not be charged for this service.\"       Consent has been obtained for this service by 2 care team members: yes. See the scanned image in the medical record.    Jacquie Amador complains of  No chief complaint on file.      I have reviewed and updated the patient's Past Medical History, Social History, Family History and Medication List.    ALLERGIES  Cats; Dogs; Lyrica [pregabalin]; and Seasonal allergies    April Adrian, [facility]      Additional provider notes: has noticed worsening depression and anxiety. For the last 3-5 weeks. Tearful. Feels like it is worse with the home remodeling. Feels stressed over that. Had nerve ablation recently. Also had botox for headaches.     Feels that she is hungry constantly.     She is taking wellbutrin  mg in the morning and 150 mg in the evening.   She is also taking Cymbalta 120 mg daily.     She is on medical marijuana for " "chronic pain but reports she doesn't take \"the extra medicine that is supposed to help with anxiety\"    She denies any suicidal ideation.     Assessment/Plan:  (F41.1) Generalized anxiety disorder  Comment: patient with worsening anxiety.   Recommend increase of wellbutrin SR to 200 mg twice daily.   Remain on Cymbalta.  She is instructed to take her medical marijuana as instructed and do take \"the medicine that goes with it for anxiety.\"  She is to recheck in 2-3 weeks with phone visit or office visit. Sooner as needed.  Plan: buPROPion (WELLBUTRIN SR) 200 MG 12 hr tablet            All questions invited, asked and answered to the patient's apparent satisfaction.  Patient agrees to plan.          Total time of call between patient and provider was 10 minutes     "

## 2017-08-23 NOTE — MR AVS SNAPSHOT
After Visit Summary   8/23/2017    Jacquie Amador    MRN: [id number]           Patient Information     Date Of Birth          1960        Visit Information        Provider Department      8/23/2017 12:00 PM Liz Peterson MD [facility]        Today's Diagnoses     Generalized anxiety disorder           Follow-ups after your visit        Your next 10 appointments already scheduled     Aug 30, 2017 12:00 PM CDT   PROCEDURE with José Miguel Linder DO   [facility] [place] ([facility] [place])    [address]  [place] [address]   [phone]            Oct 27, 2017 11:00 AM CDT   Return Visit with JESSICA Guillory CNP   [facility] [place] ([facility] [place])    [address]  [place] [address]   [phone]            Nov 13, 2017 10:30 AM CST   PROCEDURE with Tammi Richardson MD   [facility] [place] ([facility] [place])    [address]  [place] [address]   [phone]              Who to contact     If you have questions or need follow up information about today's clinic visit or your schedule please contact [facility] directly at [phone].  Normal or non-critical lab and imaging results will be communicated to you by Aircrmhart, letter or phone within 4 business days after the clinic has received the results. If you do not hear from us within 7 days, please contact the clinic through MyChart or phone. If you have a critical or abnormal lab result, we will notify you by phone as soon as possible.  Submit refill requests through Karaz or call your pharmacy and they will forward the refill request to us. Please allow 3 business days for your refill to be completed.          Additional Information About Your Visit        Aircrmhart Information     Karaz gives you secure access to your electronic health record. If you  see a primary care provider, you can also send messages to your care team and make appointments. If you have questions, please call your primary care clinic.  If you do not have a primary care provider, please call [phone] and they will assist you.        Care EveryWhere ID     This is your Care EveryWhere ID. This could be used by other organizations to access your [facility] medical records  [id number]        Your Vitals Were     Last Period                   07/17/2009            Blood Pressure from Last 3 Encounters:   08/17/17 (!) 135/92   08/07/17 130/79   08/01/17 125/85    Weight from Last 3 Encounters:   03/31/17 204 lb (92.5 kg)   12/22/16 205 lb (93 kg)   07/27/16 208 lb (94.3 kg)              Today, you had the following     No orders found for display         Today's Medication Changes          These changes are accurate as of: 8/23/17  3:10 PM.  If you have any questions, ask your nurse or doctor.               These medicines have changed or have updated prescriptions.        Dose/Directions    buPROPion 200 MG 12 hr tablet   Commonly known as:  WELLBUTRIN SR   This may have changed:    - how much to take  - how to take this  - when to take this  - additional instructions  - Another medication with the same name was removed. Continue taking this medication, and follow the directions you see here.   Used for:  Generalized anxiety disorder   Changed by:  Liz Peterson MD        Dose:  200 mg   Take 1 tablet (200 mg) by mouth 2 times daily   Quantity:  180 tablet   Refills:  0       metoprolol 25 MG 24 hr tablet   Commonly known as:  TOPROL-XL   This may have changed:  additional instructions   Used for:  Palpitations, Sinus tachycardia        Take 1.5 tablets per day   Quantity:  45 tablet   Refills:  1            Where to get your medicines      Some of these will need a paper prescription and others can be bought over the counter.  Ask your nurse if you have questions.     You don't need a  prescription for these medications     buPROPion 200 MG 12 hr tablet                Primary Care Provider Office Phone # Fax #    Liz JEFF MD Nicholas [phone] [address] [address]        Equal Access to Services     JAMARI SUMMERS : Hadscott rivera guevara haddeandreo Soomaali, waaxda luqadaha, qaybta kaalmada adeegyada, yao gonzalesn adesarah ford laAlyshajac hernandez. So [facility] [phone].    ATENCIÓN: Si habla español, tiene a kimball disposición servicios gratuitos de asistencia lingüística. Llame al [phone].    We comply with applicable federal civil rights laws and [place] laws. We do not discriminate on the basis of race, color, national origin, age, disability sex, sexual orientation or gender identity.            Thank you!     Thank you for choosing [facility]  for your care. Our goal is always to provide you with excellent care. Hearing back from our patients is one way we can continue to improve our services. Please take a few minutes to complete the written survey that you may receive in the mail after your visit with us. Thank you!             Your Updated Medication List - Protect others around you: Learn how to safely use, store and throw away your medicines at www.disposemymeds.org.          This list is accurate as of: 8/23/17  3:10 PM.  Always use your most recent med list.                   Brand Name Dispense Instructions for use Diagnosis    acidophilus Caps     60 capsule    Take 1 capsule by mouth daily Take two hours before or after antibiotic dose.  Continue for 1 week after antibiotic therapy completed    Diarrhea       adapalene 0.1 % gel    DIFFERIN    45 g    APPLY A THIN LAYER TO THE AFFECTED AREA(S) BY TOPICAL ROUTE ONCE DAILY BEFORE BEDTIME    Acne vulgaris       ADVAIR DISKUS 500-50 MCG/DOSE diskus inhaler   Generic drug:  fluticasone-salmeterol     1 Inhaler    INHALE 1 PUFF BY INHALATION ROUTE 2 TIMES PER DAY IN THE MORNING AND EVENING APPROXIMATELY  12 HOURS APART    Moderate persistent asthma without complication       * albuterol 108 (90 BASE) MCG/ACT Inhaler    VENTOLIN HFA    54 g    INHALE 2 PUFFS INTO THE LUNGS EVERY 6 HOURS AS NEEDED FOR SHORTNESS OF BREATH / DYSPNEA    Moderate persistent asthma without complication       * albuterol (2.5 MG/3ML) 0.083% neb solution     1 Box    Take 1 vial (2.5 mg) by nebulization every 6 hours as needed    Moderate persistent asthma with acute exacerbation       ALPRAZolam 0.25 MG tablet    XANAX    90 tablet    TAKE 1 TABLET DAILY AS NEEDED FOR ANXIETY    Generalized anxiety disorder       atorvastatin 20 MG tablet    LIPITOR          buPROPion 200 MG 12 hr tablet    WELLBUTRIN SR    180 tablet    Take 1 tablet (200 mg) by mouth 2 times daily    Generalized anxiety disorder       celecoxib 200 MG capsule    celeBREX    180 capsule    Take 1 capsule twice daily as needed for pain this is a 3 month script    Greater trochanteric bursitis, unspecified laterality, Hand arthritis       clindamycin 1 % lotion    CLEOCIN T     Apply topically 2 times daily        diclofenac 1 % Gel topical gel    VOLTAREN    9 Tube    Apply 2 grams to hands and 4 grams to hips up to 4 times daily. T    Greater trochanteric bursitis of both hips, Bilateral thumb pain       DULoxetine 60 MG EC capsule    CYMBALTA    180 capsule    Take 2 capsules (120 mg) by mouth daily    Multiple joint pain       HYDROcodone-acetaminophen 5-325 MG per tablet    NORCO    100 tablet    May take 1 tablet every 4-6 hours as needed for pain. Max of 4 tabs per day.  Must last 30 days. OK to fill on/after 8/28 and begin on 8/29/2017    Lumbosacral spondylosis without myelopathy, DDD (degenerative disc disease), lumbar, SI (sacroiliac) joint dysfunction, Greater trochanteric bursitis of both hips, Myofascial pain, Fibromyalgia       hydrocortisone 2.5 % cream     30 g    APPLY TOPICALLY 2 TIMES DAILY AS NEEDED    Dermatitis       losartan-hydrochlorothiazide  100-12.5 MG per tablet    HYZAAR    90 tablet    Take 1 tablet by mouth daily    Essential hypertension with goal blood pressure less than 140/90       medical cannabis (Patient's own supply.  Not a prescription)     0 Information only    2 mg morning and noon. 7 mg at bedtime. (This is NOT a prescription, and does not certify that the patient has a qualifying medical condition for medical cannabis.  The purpose of this order is  to document that the patient reports taking medical cannabis.)        metoprolol 25 MG 24 hr tablet    TOPROL-XL    45 tablet    Take 1.5 tablets per day    Palpitations, Sinus tachycardia       montelukast 10 MG tablet    SINGULAIR    90 tablet    TAKE 1 TABLET (10 MG) BY ORAL ROUTE ONCE DAILY IN THE EVENING    Moderate persistent asthma without complication       mupirocin 2 % cream    BACTROBAN    60 g    Apply topically 3 times daily    Dermatitis       nystatin [id number] UNIT/ML suspension    MYCOSTATIN    380 mL    Take by mouth 4 times daily    Thrush       nystatin cream    MYCOSTATIN    60 g    Apply topically 3 times daily    Dermatitis       prochlorperazine 10 MG tablet    COMPAZINE    15 tablet    Take 0.5-1 tablets (5-10 mg) by mouth every 8 hours as needed for nausea or vomiting Or migraine headache onset. Caution sedation    Intractable chronic migraine without aura and without status migrainosus       ranitidine 150 MG tablet    ZANTAC    0    ONE TABLET TWICE DAILY        rOPINIRole 1 MG tablet    REQUIP    180 tablet    TAKE 2 TABLETS (2 MG) BY MOUTH AT BEDTIME    Movement disorder       SUMAtriptan 100 MG tablet    IMITREX    18 tablet    Take 1 tablet (100 mg) by mouth at onset of headache for migraine May repeat in 2 hours. Max 2 tablets/24 hours.    Migraine without aura and without status migrainosus, not intractable       traZODone 50 MG tablet    DESYREL    270 tablet    TAKE 2-3 TABLETS (100-150 MG) BY MOUTH AT BEDTIME    Insomnia, unspecified       tretinoin 0.025 %  cream    RETIN-A    45 g    Spread a pea size amount into affected area topically at bedtime.  Use sunscreen SPF>20.    Age-related facial wrinkles       ZYRTEC 10 MG tablet   Generic drug:  cetirizine     90    1 TABLET DAILY    Allergic rhinitis, cause unspecified       * Notice:  This list has 2 medication(s) that are the same as other medications prescribed for you. Read the directions carefully, and ask your doctor or other care provider to review them with you.

## 2017-08-24 ENCOUNTER — MYC MEDICAL ADVICE (OUTPATIENT)
Dept: FAMILY MEDICINE | Facility: CLINIC | Age: 57
End: 2017-08-24

## 2017-08-24 ENCOUNTER — MYC MEDICAL ADVICE (OUTPATIENT)
Dept: PALLIATIVE MEDICINE | Facility: CLINIC | Age: 57
End: 2017-08-24

## 2017-08-24 RX ORDER — PREDNISONE 20 MG/1
40 TABLET ORAL DAILY
Qty: 10 TABLET | Refills: 0 | Status: SHIPPED | OUTPATIENT
Start: 2017-08-24 | End: 2017-11-29

## 2017-08-24 ASSESSMENT — ANXIETY QUESTIONNAIRES: GAD7 TOTAL SCORE: 14

## 2017-08-24 NOTE — TELEPHONE ENCOUNTER
E-Prescribing Status: Transmission to pharmacy failed (8/24/2017 12:23 PM CDT)    Called pharmacy - gave verbal prescription to pharmacy for prednisone.

## 2017-08-25 NOTE — TELEPHONE ENCOUNTER
Jacquie,    I have not had many patients with severe or concerning symptoms to botox.  I have had a few with some flu-like symptoms.  Given you had mlutiple needles under your skin, we can expect there to be a flare.      I recommend you continue your normal treatments of imitrex, compazine, and Norco.  I typically don't use opioids for headaches, but as that was part of your plan you can use as directed.    It is likely that your prednisone you just got for asthma may help with headache as well.  I often have used steroids to break a prolonged migraine.    Renetta Richardson MD  [facility]

## 2017-08-25 NOTE — TELEPHONE ENCOUNTER
Per patient myChart message:  From: Jacquie Amador      Created: 8/24/2017 3:36 PM      Hi Liseth,   I had Botox for headaches last Thursday. The last couple days I have had migraines that are horrible and make it difficult to do anything. Is that normal? I tried two Vicodin and that is too much at a time. Just wondering if this will hopefully go away it's like in the middle of upper forehead feels like it is pulling and pain. Is there anything I can take temporarily til this passes, however I would need my Vicodin after that.    Thanks,  Jacquie Amador

## 2017-08-29 NOTE — TELEPHONE ENCOUNTER
Agree with plan as laid out by Dr. Richardson.     Liseth LOPEZ, RN CNP, FNP  [facility] [place]

## 2017-08-29 NOTE — TELEPHONE ENCOUNTER
Last read by Jacquie Amador at 7:52 PM on 8/25/2017.     MARVA TrotterN, RN  Care Coordinator  [facility] [place]

## 2017-09-05 ENCOUNTER — TELEPHONE (OUTPATIENT)
Dept: FAMILY MEDICINE | Facility: CLINIC | Age: 57
End: 2017-09-05

## 2017-09-05 NOTE — TELEPHONE ENCOUNTER
Summary:    Patient is due/failing the following:   FIT test     Action needed:   Complete a FIT test     Type of outreach:    Sent letter.    Questions for provider review:    None                                                                                                                                    Funmimiguel Stahl       Chart routed to Care Team .      Panel Management Review      Patient has the following on her problem list:     Depression / Dysthymia review  PHQ-9 SCORE 3/14/2017 6/28/2017 8/23/2017   Total Score - - -   Total Score MyChart - 6 (Mild depression) 17 (Moderately severe depression)   Total Score - - -   Total Score 7 6 17      Patient is due for:  None    Asthma review     ACT Total Scores 6/30/2017   ACT TOTAL SCORE -   ASTHMA ER VISITS -   ASTHMA HOSPITALIZATIONS -   ACT TOTAL SCORE (Goal Greater than or Equal to 20) 15   In the past 12 months, how many times did you visit the emergency room for your asthma without being admitted to the hospital? 0   In the past 12 months, how many times were you hospitalized overnight because of your asthma? 0      1. Is Asthma diagnosis on the Problem List? Yes    2. Is Asthma listed on Health Maintenance? Yes    3. Patient is due for:  none          Composite cancer screening  Chart review shows that this patient is due/due soon for the following Fecal Colorectal (FIT)

## 2017-09-05 NOTE — LETTER
[facility] ([facility]  [address]  [place] [address]  Phone: [phone]  Fax: [phone]  September 5, 2017      Jacquie Amador  [address] [address] [place] [address]      Dear Jacquie,    We care about your health and have reviewed your health plan including your medical conditions, medications, and lab results.  Based on this review, it is recommended that you follow up regarding the following health topic(s):  -Colon Cancer Screening    We recommend you take the following action(s):  -schedule a COLONOSCOPY to look for colon cancer (due every 10 years or 5 years in higher risk situations.)  Colonoscopies can prevent 90-95% of colon cancer deaths.  Problem lesions can be removed before they ever become cancer.  If you do not wish to do a colonoscopy or cannot afford to do one at this time, there is another option called a Fecal Immunochemical Occult Blood Test (FIT) a take home stool sample kit.  It does not replace the colonoscopy for colorectal cancer screening, but it can detect hidden bleeding in the lower colon.  It does need to be repeated every year and if a positive result is obtained, you would be referred for a colonoscopy.  If you have completed either one of these tests at another facility, please have the records sent to our clinic for our records.     Please call us at the [facility] - [phone] (or use [facility]) to address the above recommendations.     Thank you for trusting [facility] and we appreciate the opportunity to serve you.  We look forward to supporting your healthcare needs in the future.    Healthy Regards,    Your Health Care Team  [facility]

## 2017-09-14 ENCOUNTER — MYC MEDICAL ADVICE (OUTPATIENT)
Dept: PALLIATIVE MEDICINE | Facility: CLINIC | Age: 57
End: 2017-09-14

## 2017-09-14 ENCOUNTER — OFFICE VISIT (OUTPATIENT)
Dept: ORTHOPEDICS | Facility: CLINIC | Age: 57
End: 2017-09-14
Payer: COMMERCIAL

## 2017-09-14 VITALS — TEMPERATURE: 98.5 F | RESPIRATION RATE: 18 BRPM | WEIGHT: 204.1 LBS | HEIGHT: 66 IN | BODY MASS INDEX: 32.8 KG/M2

## 2017-09-14 DIAGNOSIS — M18.0 ARTHRITIS OF CARPOMETACARPAL (CMC) JOINTS OF BOTH THUMBS: Primary | ICD-10-CM

## 2017-09-14 PROCEDURE — 20600 DRAIN/INJ JOINT/BURSA W/O US: CPT | Mod: 50 | Performed by: ORTHOPAEDIC SURGERY

## 2017-09-14 RX ORDER — TRIAMCINOLONE ACETONIDE 40 MG/ML
40 INJECTION, SUSPENSION INTRA-ARTICULAR; INTRAMUSCULAR ONCE
Qty: 1 ML | Refills: 0 | OUTPATIENT
Start: 2017-09-14 | End: 2017-09-14

## 2017-09-14 NOTE — NURSING NOTE
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       Initial Temp 98.5  F (36.9  C)  Resp 18  Ht 1.676 m (5' 6\")  Wt 92.6 kg (204 lb 1.6 oz)  LMP 07/17/2009  BMI 32.94 kg/m2 Estimated body mass index is 32.94 kg/(m^2) as calculated from the following:    Height as of this encounter: 1.676 m (5' 6\").    Weight as of this encounter: 92.6 kg (204 lb 1.6 oz).  Medication Reconciliation: complete   Misti Pantoja MA      "

## 2017-09-14 NOTE — MR AVS SNAPSHOT
After Visit Summary   9/14/2017    Jacquie Amador    MRN: [id number]           Patient Information     Date Of Birth          1960        Visit Information        Provider Department      9/14/2017 9:45 AM Ishmael Cárdenas MD [facility]        Today's Diagnoses     Arthritis of carpometacarpal (CMC) joints of both thumbs    -  1      Care Instructions    You have had a steroid injection today.  For the first 2 hours there will likely be some numbing in the joint from the lidocaine.  This is a good sign, indicating that the injection is in the right place.  In 2 hours the lidocaine will wear off, and the joint will hurt like you had a shot.  Each day the cortisone makes it feel better.  It reaches peak effect in 2 weeks.  We expect it to last for 3 months.  You may resume regular activity when you feel ready.  If you are diabetic, your glucoses will be quite high for several days.            Follow-ups after your visit        Your next 10 appointments already scheduled     Sep 20, 2017 11:00 AM CDT   PROCEDURE with José Miguel Linder DO   [facility] [place] ([facility] [place])    [address] [address]  [place] [address]   [phone]            Oct 27, 2017 11:00 AM CDT   Return Visit with JESSICA Guillory CNP   [facility] [place] ([facility] [place])    [address]  [place] [address]   [phone]            Nov 13, 2017 10:30 AM CST   PROCEDURE with Tammi Richardson MD   [facility] ([facility] [place])    [address]  [place] [address]   [phone]              Who to contact     If you have questions or need follow up information about today's clinic visit or your schedule please contact [facility] [place] directly at [phone].  Normal or non-critical lab and imaging results will be communicated to you by  "MyChart, letter or phone within 4 business days after the clinic has received the results. If you do not hear from us within 7 days, please contact the clinic through [employer] or phone. If you have a critical or abnormal lab result, we will notify you by phone as soon as possible.  Submit refill requests through [employer] or call your pharmacy and they will forward the refill request to us. Please allow 3 business days for your refill to be completed.          Additional Information About Your Visit        [employer] Information     [employer] gives you secure access to your electronic health record. If you see a primary care provider, you can also send messages to your care team and make appointments. If you have questions, please call your primary care clinic.  If you do not have a primary care provider, please call [phone] and they will assist you.        Care EveryWhere ID     This is your Care EveryWhere ID. This could be used by other organizations to access your [facility] medical records  [id number]        Your Vitals Were     Temperature Respirations Height Last Period BMI (Body Mass Index)       98.5  F (36.9  C) 18 1.676 m (5' 6\") 07/17/2009 32.94 kg/m2        Blood Pressure from Last 3 Encounters:   08/17/17 (!) 135/92   08/07/17 130/79   08/01/17 125/85    Weight from Last 3 Encounters:   09/14/17 92.6 kg (204 lb 1.6 oz)   03/31/17 92.5 kg (204 lb)   12/22/16 93 kg (205 lb)              We Performed the Following     DRAIN/INJECT SMALL JOINT/BURSA     TRIAMCINOLONE ACET INJ NOS          Today's Medication Changes          These changes are accurate as of: 9/14/17  9:56 AM.  If you have any questions, ask your nurse or doctor.               Start taking these medicines.        Dose/Directions    triamcinolone acetonide 40 MG/ML injection   Commonly known as:  KENALOG   Used for:  Arthritis of carpometacarpal (CMC) joints of both thumbs   Started by:  Ishmael Cárdenas MD        Dose:  40 mg   1 mL (40 " mg) by INTRA-ARTICULAR route once for 1 dose   Quantity:  1 mL   Refills:  0         These medicines have changed or have updated prescriptions.        Dose/Directions    metoprolol 25 MG 24 hr tablet   Commonly known as:  TOPROL-XL   This may have changed:  additional instructions   Used for:  Palpitations, Sinus tachycardia        Take 1.5 tablets per day   Quantity:  45 tablet   Refills:  1            Where to get your medicines      Some of these will need a paper prescription and others can be bought over the counter.  Ask your nurse if you have questions.     You don't need a prescription for these medications     triamcinolone acetonide 40 MG/ML injection                Primary Care Provider Office Phone # Fax #    Lizmary grace Peterson [phone] [address] [address]        Equal Access to Services     JAMARI SUMMERS : Zeke bayo Sokris, waaxda luqadaha, qaybta kaalmada adeegyada, yao hernandez. So [facility] [phone].    ATENCIÓN: Si habla español, tiene a kimball disposición servicios gratuitos de asistencia lingüística. [facility] [phone].    We comply with applicable federal civil rights laws and [place] laws. We do not discriminate on the basis of race, color, national origin, age, disability sex, sexual orientation or gender identity.            Thank you!     Thank you for choosing [facility]  for your care. Our goal is always to provide you with excellent care. Hearing back from our patients is one way we can continue to improve our services. Please take a few minutes to complete the written survey that you may receive in the mail after your visit with us. Thank you!             Your Updated Medication List - Protect others around you: Learn how to safely use, store and throw away your medicines at www.disposemymeds.org.          This list is accurate as of: 9/14/17  9:56 AM.  Always use your most recent med list.                    Brand Name Dispense Instructions for use Diagnosis    acidophilus Caps     60 capsule    Take 1 capsule by mouth daily Take two hours before or after antibiotic dose.  Continue for 1 week after antibiotic therapy completed    Diarrhea       adapalene 0.1 % gel    DIFFERIN    45 g    APPLY A THIN LAYER TO THE AFFECTED AREA(S) BY TOPICAL ROUTE ONCE DAILY BEFORE BEDTIME    Acne vulgaris       ADVAIR DISKUS 500-50 MCG/DOSE diskus inhaler   Generic drug:  fluticasone-salmeterol     1 Inhaler    INHALE 1 PUFF BY INHALATION ROUTE 2 TIMES PER DAY IN THE MORNING AND EVENING APPROXIMATELY 12 HOURS APART    Moderate persistent asthma without complication       * albuterol 108 (90 BASE) MCG/ACT Inhaler    VENTOLIN HFA    54 g    INHALE 2 PUFFS INTO THE LUNGS EVERY 6 HOURS AS NEEDED FOR SHORTNESS OF BREATH / DYSPNEA    Moderate persistent asthma without complication       * albuterol (2.5 MG/3ML) 0.083% neb solution     1 Box    Take 1 vial (2.5 mg) by nebulization every 6 hours as needed    Moderate persistent asthma with acute exacerbation       ALPRAZolam 0.25 MG tablet    XANAX    90 tablet    TAKE 1 TABLET DAILY AS NEEDED FOR ANXIETY    Generalized anxiety disorder       atorvastatin 20 MG tablet    LIPITOR          buPROPion 200 MG 12 hr tablet    WELLBUTRIN SR    180 tablet    Take 1 tablet (200 mg) by mouth 2 times daily    Generalized anxiety disorder       celecoxib 200 MG capsule    celeBREX    180 capsule    Take 1 capsule twice daily as needed for pain this is a 3 month script    Greater trochanteric bursitis, unspecified laterality, Hand arthritis       clindamycin 1 % lotion    CLEOCIN T     Apply topically 2 times daily        diclofenac 1 % Gel topical gel    VOLTAREN    9 Tube    Apply 2 grams to hands and 4 grams to hips up to 4 times daily. T    Greater trochanteric bursitis of both hips, Bilateral thumb pain       DULoxetine 60 MG EC capsule    CYMBALTA    180 capsule    Take 2 capsules  (120 mg) by mouth daily    Multiple joint pain       HYDROcodone-acetaminophen 5-325 MG per tablet    NORCO    100 tablet    May take 1 tablet every 4-6 hours as needed for pain. Max of 4 tabs per day.  Must last 30 days. OK to fill on/after 8/28 and begin on 8/29/2017    Lumbosacral spondylosis without myelopathy, DDD (degenerative disc disease), lumbar, SI (sacroiliac) joint dysfunction, Greater trochanteric bursitis of both hips, Myofascial pain, Fibromyalgia       hydrocortisone 2.5 % cream     30 g    APPLY TOPICALLY 2 TIMES DAILY AS NEEDED    Dermatitis       losartan-hydrochlorothiazide 100-12.5 MG per tablet    HYZAAR    90 tablet    Take 1 tablet by mouth daily    Essential hypertension with goal blood pressure less than 140/90       medical cannabis (Patient's own supply.  Not a prescription)     0 Information only    2 mg morning and noon. 7 mg at bedtime. (This is NOT a prescription, and does not certify that the patient has a qualifying medical condition for medical cannabis.  The purpose of this order is  to document that the patient reports taking medical cannabis.)        metoprolol 25 MG 24 hr tablet    TOPROL-XL    45 tablet    Take 1.5 tablets per day    Palpitations, Sinus tachycardia       montelukast 10 MG tablet    SINGULAIR    90 tablet    TAKE 1 TABLET (10 MG) BY ORAL ROUTE ONCE DAILY IN THE EVENING    Moderate persistent asthma without complication       mupirocin 2 % cream    BACTROBAN    60 g    Apply topically 3 times daily    Dermatitis       nystatin 123715 UNIT/ML suspension    MYCOSTATIN    380 mL    Take by mouth 4 times daily    Thrush       nystatin cream    MYCOSTATIN    60 g    Apply topically 3 times daily    Dermatitis       predniSONE 20 MG tablet    DELTASONE    10 tablet    Take 2 tablets (40 mg) by mouth daily    Moderate persistent asthma with acute exacerbation       prochlorperazine 10 MG tablet    COMPAZINE    15 tablet    Take 0.5-1 tablets (5-10 mg) by mouth every 8  hours as needed for nausea or vomiting Or migraine headache onset. Caution sedation    Intractable chronic migraine without aura and without status migrainosus       ranitidine 150 MG tablet    ZANTAC    0    ONE TABLET TWICE DAILY        rOPINIRole 1 MG tablet    REQUIP    180 tablet    TAKE 2 TABLETS (2 MG) BY MOUTH AT BEDTIME    Movement disorder       SUMAtriptan 100 MG tablet    IMITREX    18 tablet    Take 1 tablet (100 mg) by mouth at onset of headache for migraine May repeat in 2 hours. Max 2 tablets/24 hours.    Migraine without aura and without status migrainosus, not intractable       traZODone 50 MG tablet    DESYREL    270 tablet    TAKE 2-3 TABLETS (100-150 MG) BY MOUTH AT BEDTIME    Insomnia, unspecified       tretinoin 0.025 % cream    RETIN-A    45 g    Spread a pea size amount into affected area topically at bedtime.  Use sunscreen SPF>20.    Age-related facial wrinkles       triamcinolone acetonide 40 MG/ML injection    KENALOG    1 mL    1 mL (40 mg) by INTRA-ARTICULAR route once for 1 dose    Arthritis of carpometacarpal (CMC) joints of both thumbs       ZYRTEC 10 MG tablet   Generic drug:  cetirizine     90    1 TABLET DAILY    Allergic rhinitis, cause unspecified       * Notice:  This list has 2 medication(s) that are the same as other medications prescribed for you. Read the directions carefully, and ask your doctor or other care provider to review them with you.

## 2017-09-14 NOTE — LETTER
9/14/2017         RE: Jacquie Amador  [address]  [place] [address]        Dear Colleague,    Thank you for referring your patient, [facility]quie Amador[facility]. Please see a copy of my visit note below.    The patient's left and right thumb CMC joints were prepped with betadine solution after verification of allergies. Area approximately 10 cm x 10 cm prepped in a sterile fashion. After injection, betadine removed with soap and water and band-aids applied.    0.5ml kenlaog with 1% lidocaine plain injected into each thumb CMC joint (for a total of 1 mL kenalog) by Dr. Ishmael Cárdenas  LOT# [id number]  Exp. 02/2019    Karan Ramos PA-C  Supervising physician: Ishmael Cárdenas MD  Dept. of Orthopedics  [facility]          Follow up bilateral thumb carpometacarpal osteoarthritis.  Injections have helped.  Last injection 9/10/15.  She would like repeat injections.  Risks, benefits, potential complications and alternatives were discussed.  With the patient's consent, we injected the bilateral thumb carpometacarpal joints  with Kenalog 20 mg and lidocaine  after sterile prep.            Again, thank you for allowing me to participate in the care of your patient.        Sincerely,        Ishmael Cárdenas MD

## 2017-09-14 NOTE — PROGRESS NOTES
Follow up bilateral thumb carpometacarpal osteoarthritis.  Injections have helped.  Last injection 9/10/15.  She would like repeat injections.  Risks, benefits, potential complications and alternatives were discussed.  With the patient's consent, we injected the bilateral thumb carpometacarpal joints  with Kenalog 20 mg and lidocaine  after sterile prep.

## 2017-09-14 NOTE — PROGRESS NOTES
The patient's left and right thumb CMC joints were prepped with betadine solution after verification of allergies. Area approximately 10 cm x 10 cm prepped in a sterile fashion. After injection, betadine removed with soap and water and band-aids applied.    0.5ml kenlaog with 1% lidocaine plain injected into each thumb CMC joint (for a total of 1 mL kenalog) by Dr. Ishmael Cárdenas  LOT# [id number]  Exp. 02/2019    ADILIA BeattyC  Supervising physician: Ishmael Cárdenas MD  Dept. of Orthopedics  [facility]

## 2017-09-15 NOTE — TELEPHONE ENCOUNTER
Information noted. Agree with response.   Liseth LPOEZ, MICHELLE CNP, FNP  [facility]

## 2017-09-15 NOTE — TELEPHONE ENCOUNTER
Good morning Jacquie,    I'm sorry you're not having any current relief. Sometimes with the ablation procedure it can take up to 2 months for patients to experience relief. You do have an upcoming appointment with Liseth Caldera on 10/27/17. That will be a good time to assess the effectiveness of the ablation and to reassess your plan of care. Thank you for the update. It has been passed along to both Dr. Richardson and to Liseth Caldera.     BANDAR Trotter, RN  Care Coordinator  [facility]    ===View-only below this line===      ----- Message -----     From: Jacquie Amador     Sent: 9/14/2017  5:41 PM CDT       To: Tammi Richardson MD  Subject: Updates about my health    Hi Dr. Richardson,  It has been over a month since my ablation and I really didn't have success. It was better at first and I was excited but it is back to before. My whole right side is worse my thigh is painful, hip and right side lumbar, my arthritis in both big toes has been bad also. It makes it difficult to walk It never ends.  I really was hoping it would work perfect.    Thank you,  Jacquie Amador

## 2017-09-22 ENCOUNTER — MYC MEDICAL ADVICE (OUTPATIENT)
Dept: PALLIATIVE MEDICINE | Facility: CLINIC | Age: 57
End: 2017-09-22

## 2017-09-22 DIAGNOSIS — Z12.11 SPECIAL SCREENING FOR MALIGNANT NEOPLASMS, COLON: ICD-10-CM

## 2017-09-22 PROCEDURE — 82274 ASSAY TEST FOR BLOOD FECAL: CPT | Performed by: FAMILY MEDICINE

## 2017-09-24 LAB — HEMOCCULT STL QL IA: NEGATIVE

## 2017-09-26 ENCOUNTER — RADIANT APPOINTMENT (OUTPATIENT)
Dept: GENERAL RADIOLOGY | Facility: CLINIC | Age: 57
End: 2017-09-26
Attending: PHYSICIAN ASSISTANT
Payer: COMMERCIAL

## 2017-09-26 ENCOUNTER — OFFICE VISIT (OUTPATIENT)
Dept: ORTHOPEDICS | Facility: CLINIC | Age: 57
End: 2017-09-26
Payer: COMMERCIAL

## 2017-09-26 VITALS — HEIGHT: 66 IN | RESPIRATION RATE: 18 BRPM | BODY MASS INDEX: 32.14 KG/M2 | WEIGHT: 200 LBS | TEMPERATURE: 98.8 F

## 2017-09-26 DIAGNOSIS — M25.571 RIGHT ANKLE PAIN, UNSPECIFIED CHRONICITY: ICD-10-CM

## 2017-09-26 DIAGNOSIS — M18.0 PRIMARY OSTEOARTHRITIS OF BOTH FIRST CARPOMETACARPAL JOINTS: Primary | ICD-10-CM

## 2017-09-26 DIAGNOSIS — M19.079 ARTHRITIS OF METATARSOPHALANGEAL JOINT: ICD-10-CM

## 2017-09-26 PROBLEM — M79.642 BILATERAL HAND PAIN: Status: RESOLVED | Noted: 2017-06-26 | Resolved: 2017-09-26

## 2017-09-26 PROBLEM — M79.641 BILATERAL HAND PAIN: Status: RESOLVED | Noted: 2017-06-26 | Resolved: 2017-09-26

## 2017-09-26 PROCEDURE — 20605 DRAIN/INJ JOINT/BURSA W/O US: CPT | Mod: RT | Performed by: ORTHOPAEDIC SURGERY

## 2017-09-26 PROCEDURE — 20600 DRAIN/INJ JOINT/BURSA W/O US: CPT | Mod: 59 | Performed by: ORTHOPAEDIC SURGERY

## 2017-09-26 PROCEDURE — 99213 OFFICE O/P EST LOW 20 MIN: CPT | Mod: 25 | Performed by: ORTHOPAEDIC SURGERY

## 2017-09-26 PROCEDURE — 73130 X-RAY EXAM OF HAND: CPT | Mod: RT

## 2017-09-26 NOTE — PROGRESS NOTES
The patient's left first metatarsophalangeal joint was prepped with betadine solution after verification of allergies. Area approximately 10 cm x 10 cm prepped in a sterile fashion. After injection, betadine removed with soap and water and band-aids applied.    0.5 ml kenalog with 1% lidocaine plain injected into patient's left first metatarsophalangeal joint  by Dr. Ishmael Cárdenas  LOT# [id number]  Exp. 12/2017

## 2017-09-26 NOTE — PROGRESS NOTES
Discharge Summary - Hand Therapy  Pt cancelled appointment 7/11/17 and has not returned for therapy.  Assume all goals met to pt satisfaction.  D/C Hand Therapy.

## 2017-09-26 NOTE — NURSING NOTE
"Chief Complaint   Patient presents with     RECHECK     Bilateral thumb CMC on 9/14/17.       Initial Temp 98.8  F (37.1  C)  Resp 18  Ht 1.676 m (5' 6\")  Wt 90.7 kg (200 lb)  LMP 07/17/2009  BMI 32.28 kg/m2 Estimated body mass index is 32.28 kg/(m^2) as calculated from the following:    Height as of this encounter: 1.676 m (5' 6\").    Weight as of this encounter: 90.7 kg (200 lb).  Medication Reconciliation: complete   Misti Pantoja MA      "

## 2017-09-26 NOTE — MR AVS SNAPSHOT
After Visit Summary   9/26/2017    Jacquie Amador    MRN: [id number]           Patient Information     Date Of Birth          1960        Visit Information        Provider Department      9/26/2017 10:00 AM Ishmael Cárdenas MD [facility]        Today's Diagnoses     Primary osteoarthritis of both first carpometacarpal joints    -  1       Follow-ups after your visit        Additional Services     ORTHO  REFERRAL       [facility] is referring you to the Orthopedic  Services at [facility].       The  Representative will assist you in the coordination of your Orthopedic and Musculoskeletal Care as prescribed by your physician.    The  Representative will call you within 1 business day to help schedule your appointment, or you may contact the  Representative at:    All areas ~ [phone]     Type of Referral : Surgical / Specialist Hand specialist      Timeframe requested: 1 - 2 days    Coverage of these services is subject to the terms and limitations of your health insurance plan.  Please call member services at your health plan with any benefit or coverage questions.      If X-rays, CT or MRI's have been performed, please contact the facility where they were done to arrange for , prior to your scheduled appointment.  Please bring this referral request to your appointment and present it to your specialist.                  Your next 10 appointments already scheduled     Oct 27, 2017 11:00 AM CDT   Return Visit with JESSICA Guillory CNP   [facility] ([facility])    [address] [address]   [phone]            Nov 13, 2017 10:30 AM CST   PROCEDURE with Tammi Richardson MD   [facility] ([facility])    [address] [address]   [phone]         "      Who to contact     If you have questions or need follow up information about today's clinic visit or your schedule please contact [facility] directly at [phone].  Normal or non-critical lab and imaging results will be communicated to you by MyChart, letter or phone within 4 business days after the clinic has received the results. If you do not hear from us within 7 days, please contact the clinic through [employer] or phone. If you have a critical or abnormal lab result, we will notify you by phone as soon as possible.  Submit refill requests through NXTM or call your pharmacy and they will forward the refill request to us. Please allow 3 business days for your refill to be completed.          Additional Information About Your Visit        [employer] media Information     NXTM gives you secure access to your electronic health record. If you see a primary care provider, you can also send messages to your care team and make appointments. If you have questions, please call your primary care clinic.  If you do not have a primary care provider, please call [phone] and they will assist you.        Care EveryWhere ID     This is your Care EveryWhere ID. This could be used by other organizations to access your [place] medical records  [id number]        Your Vitals Were     Temperature Respirations Height Last Period BMI (Body Mass Index)       98.8  F (37.1  C) 18 1.676 m (5' 6\") 07/17/2009 32.28 kg/m2        Blood Pressure from Last 3 Encounters:   08/17/17 (!) 135/92   08/07/17 130/79   08/01/17 125/85    Weight from Last 3 Encounters:   09/26/17 90.7 kg (200 lb)   09/14/17 92.6 kg (204 lb 1.6 oz)   03/31/17 92.5 kg (204 lb)              We Performed the Following     ORTHO  REFERRAL     XR Hand Bilateral G/E 3 Views          Today's Medication Changes          These changes are accurate as of: 9/26/17 11:04 AM.  If you have any questions, ask your nurse or doctor.             "   These medicines have changed or have updated prescriptions.        Dose/Directions    metoprolol 25 MG 24 hr tablet   Commonly known as:  TOPROL-XL   This may have changed:  additional instructions   Used for:  Palpitations, Sinus tachycardia        Take 1.5 tablets per day   Quantity:  45 tablet   Refills:  1                Primary Care Provider Office Phone # Fax #    Liz JEFF MD Nicholas [phone] [address] [address] [address]        Equal Access to Services     [facility] : Hadii aad ku hadasho Soomaali, waaxda luqadaha, qaybta kaalmada adeegyada, waxay idiin hayaan adeeg kharash la'jac . [facility] [phone].    ATENCIÓN: Si habla español, tiene a kimball disposición servicios gratuitos de asistencia lingüística. [facility] [phone].    We comply with applicable federal civil rights laws and [place] laws. We do not discriminate on the basis of race, color, national origin, age, disability sex, sexual orientation or gender identity.            Thank you!     Thank you for choosing [facility]  for your care. Our goal is always to provide you with excellent care. Hearing back from our patients is one way we can continue to improve our services. Please take a few minutes to complete the written survey that you may receive in the mail after your visit with us. Thank you!             Your Updated Medication List - Protect others around you: Learn how to safely use, store and throw away your medicines at www.disposemymeds.org.          This list is accurate as of: 9/26/17 11:04 AM.  Always use your most recent med list.                   Brand Name Dispense Instructions for use Diagnosis    acidophilus Caps     60 capsule    Take 1 capsule by mouth daily Take two hours before or after antibiotic dose.  Continue for 1 week after antibiotic therapy completed    Diarrhea       adapalene 0.1 % gel    DIFFERIN    45 g    APPLY A THIN LAYER TO THE AFFECTED AREA(S) BY  TOPICAL ROUTE ONCE DAILY BEFORE BEDTIME    Acne vulgaris       ADVAIR DISKUS 500-50 MCG/DOSE diskus inhaler   Generic drug:  fluticasone-salmeterol     1 Inhaler    INHALE 1 PUFF BY INHALATION ROUTE 2 TIMES PER DAY IN THE MORNING AND EVENING APPROXIMATELY 12 HOURS APART    Moderate persistent asthma without complication       * albuterol 108 (90 BASE) MCG/ACT Inhaler    VENTOLIN HFA    54 g    INHALE 2 PUFFS INTO THE LUNGS EVERY 6 HOURS AS NEEDED FOR SHORTNESS OF BREATH / DYSPNEA    Moderate persistent asthma without complication       * albuterol (2.5 MG/3ML) 0.083% neb solution     1 Box    Take 1 vial (2.5 mg) by nebulization every 6 hours as needed    Moderate persistent asthma with acute exacerbation       ALPRAZolam 0.25 MG tablet    XANAX    90 tablet    TAKE 1 TABLET DAILY AS NEEDED FOR ANXIETY    Generalized anxiety disorder       atorvastatin 20 MG tablet    LIPITOR          buPROPion 200 MG 12 hr tablet    WELLBUTRIN SR    180 tablet    Take 1 tablet (200 mg) by mouth 2 times daily    Generalized anxiety disorder       celecoxib 200 MG capsule    celeBREX    180 capsule    Take 1 capsule twice daily as needed for pain this is a 3 month script    Greater trochanteric bursitis, unspecified laterality, Hand arthritis       clindamycin 1 % lotion    CLEOCIN T     Apply topically 2 times daily        diclofenac 1 % Gel topical gel    VOLTAREN    9 Tube    Apply 2 grams to hands and 4 grams to hips up to 4 times daily. T    Greater trochanteric bursitis of both hips, Bilateral thumb pain       DULoxetine 60 MG EC capsule    CYMBALTA    180 capsule    Take 2 capsules (120 mg) by mouth daily    Multiple joint pain       HYDROcodone-acetaminophen 5-325 MG per tablet    NORCO    100 tablet    May take 1 tablet every 4-6 hours as needed for pain. Max of 4 tabs per day.  Must last 30 days. OK to fill on/after 8/28 and begin on 8/29/2017    Lumbosacral spondylosis without myelopathy, DDD (degenerative disc disease),  lumbar, SI (sacroiliac) joint dysfunction, Greater trochanteric bursitis of both hips, Myofascial pain, Fibromyalgia       hydrocortisone 2.5 % cream     30 g    APPLY TOPICALLY 2 TIMES DAILY AS NEEDED    Dermatitis       losartan-hydrochlorothiazide 100-12.5 MG per tablet    HYZAAR    90 tablet    Take 1 tablet by mouth daily    Essential hypertension with goal blood pressure less than 140/90       medical cannabis (Patient's own supply.  Not a prescription)     0 Information only    2 mg morning and noon. 7 mg at bedtime. (This is NOT a prescription, and does not certify that the patient has a qualifying medical condition for medical cannabis.  The purpose of this order is  to document that the patient reports taking medical cannabis.)        metoprolol 25 MG 24 hr tablet    TOPROL-XL    45 tablet    Take 1.5 tablets per day    Palpitations, Sinus tachycardia       montelukast 10 MG tablet    SINGULAIR    90 tablet    TAKE 1 TABLET (10 MG) BY ORAL ROUTE ONCE DAILY IN THE EVENING    Moderate persistent asthma without complication       mupirocin 2 % cream    BACTROBAN    60 g    Apply topically 3 times daily    Dermatitis       nystatin 530131 UNIT/ML suspension    MYCOSTATIN    380 mL    Take by mouth 4 times daily    Thrush       nystatin cream    MYCOSTATIN    60 g    Apply topically 3 times daily    Dermatitis       predniSONE 20 MG tablet    DELTASONE    10 tablet    Take 2 tablets (40 mg) by mouth daily    Moderate persistent asthma with acute exacerbation       prochlorperazine 10 MG tablet    COMPAZINE    15 tablet    Take 0.5-1 tablets (5-10 mg) by mouth every 8 hours as needed for nausea or vomiting Or migraine headache onset. Caution sedation    Intractable chronic migraine without aura and without status migrainosus       ranitidine 150 MG tablet    ZANTAC    0    ONE TABLET TWICE DAILY        rOPINIRole 1 MG tablet    REQUIP    180 tablet    TAKE 2 TABLETS (2 MG) BY MOUTH AT BEDTIME    Movement  disorder       SUMAtriptan 100 MG tablet    IMITREX    18 tablet    Take 1 tablet (100 mg) by mouth at onset of headache for migraine May repeat in 2 hours. Max 2 tablets/24 hours.    Migraine without aura and without status migrainosus, not intractable       traZODone 50 MG tablet    DESYREL    270 tablet    TAKE 2-3 TABLETS (100-150 MG) BY MOUTH AT BEDTIME    Insomnia, unspecified       tretinoin 0.025 % cream    RETIN-A    45 g    Spread a pea size amount into affected area topically at bedtime.  Use sunscreen SPF>20.    Age-related facial wrinkles       ZYRTEC 10 MG tablet   Generic drug:  cetirizine     90    1 TABLET DAILY    Allergic rhinitis, cause unspecified       * Notice:  This list has 2 medication(s) that are the same as other medications prescribed for you. Read the directions carefully, and ask your doctor or other care provider to review them with you.

## 2017-09-26 NOTE — LETTER
2017         RE: Jacquie Amador  [address] [address] [address]        Dear Colleague,    Thank you for referring your patient, Jacquie Amador, to the [facility]. Please see a copy of my visit note below.    The patient's left first metatarsophalangeal joint was prepped with betadine solution after verification of allergies. Area approximately 10 cm x 10 cm prepped in a sterile fashion. After injection, betadine removed with soap and water and band-aids applied.    0.5 ml kenalog with 1% lidocaine plain injected into patient's left first metatarsophalangeal joint  by Dr. Ishmael Cárdenas  LOT# [id number]  Exp. 2017    Jacquie Amador is a 57 year old female who is seen in follow-up for right ankle pain, left great toe pain, bilateral thumb pain.    Past Medical History:   Diagnosis Date     ASTHMA - MODERATE PERSISTENT 2005     Cancer (H)      Chronic pain      Coronary artery disease      CVA (cerebral infarction)      Depressive disorder, not elsewhere classified      Diabetes (H)      Elevated serum alkaline phosphatase level     Liver source     Fibromyalgia      HYPERLIPIDEMIA NEC/NOS 2006     Hypertriglyceridemia      OA (osteoarthritis)      Thyroid disease      Tobacco use disorder      Tobacco use disorder complicating pregnancy, childbirth, or the puerperium, antepartum condition or complication      Trochanteric bursitis      Unspecified essential hypertension        Past Surgical History:   Procedure Laterality Date     C  DELIVERY ONLY      , Low Cervical     INJECT EPIDURAL TRANSFORAMINAL  2014    [facility] [place]     INJECT JOINT SACROILIAC  2014    [facility] [place] [facility]     LAMINECTOMY LUMBAR ONE LEVEL Left 2014    [facility]       Family History   Problem Relation Age of Onset     Thyroid Disease Mother      Arthritis Mother      Colon Cancer Mother      Thyroid Disease Sister       Arthritis Sister      Lipids Sister      Hypertension Father      DIABETES Father      C.A.D. Father      MI age 75     Cardiovascular Father      heart attack     CEREBROVASCULAR DISEASE Father      CANCER Father      renal cancer     Arthritis Father      HEART DISEASE Father      heart attack     GASTROINTESTINAL DISEASE Father      liver     Genitourinary Problems Father      kidney     Asthma Daughter      GASTROINTESTINAL DISEASE Daughter       Other      Breast Cancer No family hx of      Cancer - colorectal No family hx of      Alzheimer Disease No family hx of      Blood Disease No family hx of      Circulatory No family hx of      Eye Disorder No family hx of      Musculoskeletal Disorder No family hx of      Neurologic Disorder No family hx of      Respiratory No family hx of        Social History     Social History     Marital status:      Spouse name: N/A     Number of children: N/A     Years of education: N/A     Occupational History     Not on file.     Social History Main Topics     Smoking status: Former Smoker     Packs/day: 1.00     Types: Cigarettes     Smokeless tobacco: Never Used      Comment: since 1981     Alcohol use No     Drug use: No     Sexual activity: No     Other Topics Concern     Parent/Sibling W/ Cabg, Mi Or Angioplasty Before 65f 55m? No      Service No     Blood Transfusions No     Caffeine Concern No     Sleep Concern No     Stress Concern No     Weight Concern Yes     Special Diet No     Exercise No     Bike Helmet No     Doesn't ride a bike     Seat Belt Yes     Self-Exams Yes     she does self breast exams every other month     Social History Narrative       Current Outpatient Prescriptions   Medication Sig Dispense Refill     methylPREDNISolone acetate (DEPO-MEDROL) 80 MG/ML injection 1 mL (80 mg) by INTRA-ARTICULAR route once for 1 dose 1 mL 0     triamcinolone acetonide (KENALOG) 40 MG/ML injection 0.5 mLs (20 mg) by INTRA-ARTICULAR route once for 1  dose 0.5 mL 0     predniSONE (DELTASONE) 20 MG tablet Take 2 tablets (40 mg) by mouth daily 10 tablet 0     buPROPion (WELLBUTRIN SR) 200 MG 12 hr tablet Take 1 tablet (200 mg) by mouth 2 times daily 180 tablet 0     ALPRAZolam (XANAX) 0.25 MG tablet TAKE 1 TABLET DAILY AS NEEDED FOR ANXIETY 90 tablet 0     prochlorperazine (COMPAZINE) 10 MG tablet Take 0.5-1 tablets (5-10 mg) by mouth every 8 hours as needed for nausea or vomiting Or migraine headache onset. Caution sedation 15 tablet 1     HYDROcodone-acetaminophen (NORCO) 5-325 MG per tablet May take 1 tablet every 4-6 hours as needed for pain. Max of 4 tabs per day.  Must last 30 days. OK to fill on/after 8/28 and begin on 8/29/2017 100 tablet 0     nystatin (MYCOSTATIN) cream Apply topically 3 times daily 60 g 1     mupirocin (BACTROBAN) 2 % cream Apply topically 3 times daily 60 g 1     albuterol (2.5 MG/3ML) 0.083% neb solution Take 1 vial (2.5 mg) by nebulization every 6 hours as needed 1 Box 1     SUMAtriptan (IMITREX) 100 MG tablet Take 1 tablet (100 mg) by mouth at onset of headache for migraine May repeat in 2 hours. Max 2 tablets/24 hours. 18 tablet 1     albuterol (VENTOLIN HFA) 108 (90 BASE) MCG/ACT Inhaler INHALE 2 PUFFS INTO THE LUNGS EVERY 6 HOURS AS NEEDED FOR SHORTNESS OF BREATH / DYSPNEA 54 g 1     metoprolol (TOPROL-XL) 25 MG 24 hr tablet Take 1.5 tablets per day (Patient taking differently: Take 0.5 tablets per day) 45 tablet 1     montelukast (SINGULAIR) 10 MG tablet TAKE 1 TABLET (10 MG) BY ORAL ROUTE ONCE DAILY IN THE EVENING 90 tablet 1     medical cannabis (Patient's own supply.  Not a prescription) 2 mg morning and noon. 7 mg at bedtime. (This is NOT a prescription, and does not certify that the patient has a qualifying medical condition for medical cannabis.  The purpose of this order is  to document that the patient reports taking medical cannabis.) 0 Information only 0     atorvastatin (LIPITOR) 20 MG tablet   3     nystatin  (MYCOSTATIN) [id number] UNIT/ML suspension Take by mouth 4 times daily 380 mL 1     adapalene (DIFFERIN) 0.1 % gel APPLY A THIN LAYER TO THE AFFECTED AREA(S) BY TOPICAL ROUTE ONCE DAILY BEFORE BEDTIME 45 g 10     ADVAIR DISKUS 500-50 MCG/DOSE diskus inhaler INHALE 1 PUFF BY INHALATION ROUTE 2 TIMES PER DAY IN THE MORNING AND EVENING APPROXIMATELY 12 HOURS APART 1 Inhaler 3     celecoxib (CELEBREX) 200 MG capsule Take 1 capsule twice daily as needed for pain this is a 3 month script 180 capsule 1     diclofenac (VOLTAREN) 1 % GEL topical gel Apply 2 grams to hands and 4 grams to hips up to 4 times daily. T 9 Tube 11     losartan-hydrochlorothiazide (HYZAAR) 100-12.5 MG per tablet Take 1 tablet by mouth daily 90 tablet 3     traZODone (DESYREL) 50 MG tablet TAKE 2-3 TABLETS (100-150 MG) BY MOUTH AT BEDTIME 270 tablet 3     DULoxetine (CYMBALTA) 60 MG EC capsule Take 2 capsules (120 mg) by mouth daily 180 capsule 3     rOPINIRole (REQUIP) 1 MG tablet TAKE 2 TABLETS (2 MG) BY MOUTH AT BEDTIME 180 tablet 3     clindamycin (CLEOCIN T) 1 % lotion Apply topically 2 times daily       hydrocortisone 2.5 % cream APPLY TOPICALLY 2 TIMES DAILY AS NEEDED 30 g 3     tretinoin (RETIN-A) 0.025 % cream Spread a pea size amount into affected area topically at bedtime.  Use sunscreen SPF>20. 45 g 3     Lactobacillus (ACIDOPHILUS) CAPS Take 1 capsule by mouth daily Take two hours before or after antibiotic dose.  Continue for 1 week after antibiotic therapy completed 60 capsule 0     RANITIDINE  MG OR TABS ONE TABLET TWICE DAILY 0 0     ZYRTEC 10 MG OR TABS 1 TABLET DAILY 90 3       Allergies   Allergen Reactions     Cats      and rabbits/wheezing     Dogs      wheezing     Lyrica [Pregabalin] Other (See Comments)     Rash, mouth sores, itching and burning     Seasonal Allergies      rhinits       REVIEW OF SYSTEMS:  CONSTITUTIONAL:  NEGATIVE for fever, chills, change in weight, not feeling tired  SKIN:  NEGATIVE for worrisome  "rashes, no skin lumps, no skin ulcers and no non-healing wounds  EYES:  NEGATIVE for vision changes or irritation.  ENT/MOUTH:  NEGATIVE.  No hearing loss, no hoarseness, no difficulty swallowing.  RESP:  NEGATIVE. No cough or shortness of breath.  CV:  NEGATIVE for chest pain, palpitations or peripheral edema  GI:  NEGATIVE for nausea, abdominal pain, heartburn, or change in bowel habits  :  Negative. No dysuria, no hematuria  MUSCULOSKELETAL:  See HPI above  NEURO:  NEGATIVE . No headaches, no dizziness,  no numbness  ENDOCRINE:  NEGATIVE for temperature intolerance, skin/hair changes  HEME/ALLERGY/IMMUNE:  NEGATIVE for bleeding problems  PSYCHIATRIC:  NEGATIVE. no anxiety, no depression.     Exam:  Vitals: Temp 98.8  F (37.1  C)  Resp 18  Ht 1.676 m (5' 6\")  Wt 90.7 kg (200 lb)  LMP 07/17/2009  BMI 32.28 kg/m2  BMI= Body mass index is 32.28 kg/(m^2).  Constitutional:  healthy, alert and no distress  Neuro: Alert and Oriented x 3, Gait normal. Sensation grossly WNL.  Psych: Affect normal   Respiratory: Breathing not labored.  Cardiovascular: normal peripheral pulses  Lymph: no adenopathy  Skin: No rashes,worrisome lesions or skin problems      Again, thank you for allowing me to participate in the care of your patient.        Sincerely,        Ishmael Cárdenas MD    "

## 2017-09-27 ENCOUNTER — MYC MEDICAL ADVICE (OUTPATIENT)
Dept: ORTHOPEDICS | Facility: CLINIC | Age: 57
End: 2017-09-27

## 2017-09-27 NOTE — TELEPHONE ENCOUNTER
Spoke to Jacquie, will give this 2 weeks and she will update me again.  Consider f/u appt at that time based on progress.

## 2017-09-28 PROBLEM — M19.079 ARTHRITIS OF METATARSOPHALANGEAL JOINT: Status: ACTIVE | Noted: 2017-09-28

## 2017-09-28 PROBLEM — M18.0 PRIMARY OSTEOARTHRITIS OF BOTH FIRST CARPOMETACARPAL JOINTS: Status: ACTIVE | Noted: 2017-09-28

## 2017-09-28 RX ORDER — TRIAMCINOLONE ACETONIDE 40 MG/ML
20 INJECTION, SUSPENSION INTRA-ARTICULAR; INTRAMUSCULAR ONCE
Qty: 0.5 ML | Refills: 0 | OUTPATIENT
Start: 2017-09-28 | End: 2017-09-28

## 2017-09-28 RX ORDER — METHYLPREDNISOLONE ACETATE 80 MG/ML
80 INJECTION, SUSPENSION INTRA-ARTICULAR; INTRALESIONAL; INTRAMUSCULAR; SOFT TISSUE ONCE
Qty: 1 ML | Refills: 0 | OUTPATIENT
Start: 2017-09-28 | End: 2017-09-28

## 2017-09-28 NOTE — PATIENT INSTRUCTIONS
You have had a steroid injection today.  For the first 2 hours there will likely be some numbing in the joint from the lidocaine.  This is a good sign, indicating that the injection is in the right place.  In 2 hours the lidocaine will wear off, and the joint will hurt like you had a shot.  Each day the cortisone makes it feel better.  It reaches peak effect in 2 weeks.  We expect it to last for 3 months.  You may resume regular activity when you feel ready.  If you are diabetic, your glucoses will be quite high for several days.    Refer to [facility] hand surgery for thumb treatment.

## 2017-09-28 NOTE — PROGRESS NOTES
DATE OF VISIT:  09/26/2017.      HISTORY OF PRESENT ILLNESS:  Ms. Jacquie Amador is seen in followup of bilateral thumb pain, right ankle pain and left great toe pain.  She has had previous problems with thumb carpometacarpal arthritis.  Injections have helped in the past, but last injection on 09/14/2017 did not significantly help.  She has significant prominence of the CMC joints and has apparent subluxation there.  She also is being bothered by right ankle pain and left great toe pain, has had steroid injections here before and desires this again.      We obtained x-ray of the hands showing subluxation of the thumb CMC joints.  There is significant subluxation here.  I feel the joints are more significantly unstable than I could deal with surgically just with trapezial excision.  It is likely to need some sort of stabilization procedure if she desires surgery.  Clinically, her thumbs do have definite subluxation of the joint.  I can feel them slip out of place with traction applied and released.  She has significant pain with this.      Ankle has pain primarily at the anterolateral aspect of the ankle.  We have previously done steroid injection here for possible impingement syndrome.  She would like this done again.  She also has arthritis of the left great toe and would like to have steroid injection of that.      IMPRESSION:     1.  Bilateral thumb CMC arthritis and subluxation.  Will refer to Hand Surgery for this as I feel a stabilization procedure may be necessary.   2.  Right ankle pain with impingement syndrome.  We will perform steroid injection of this with 80 mg Depo-Medrol and lidocaine today.  It was injected as an anterolateral portal.   3.  Left great toe arthritis.  We have injected this as well with 20 mg Kenalog and lidocaine into the left great toe MTP joint.  We will see back robbin LANDIS MD             D: 09/28/2017 10:32   T: 09/28/2017 10:56   MT: ANKITA      Name:      XENIARISSA RALPH   MRN:      [id number]        Account:      [id number]   :      1960           Visit Date:   2017      Document: [id number]

## 2017-09-28 NOTE — PROGRESS NOTES
Jacquie Amador is a 57 year old female who is seen in follow-up for right ankle pain, left great toe pain, bilateral thumb pain.    Past Medical History:   Diagnosis Date     ASTHMA - MODERATE PERSISTENT 2005     Cancer (H)      Chronic pain      Coronary artery disease      CVA (cerebral infarction)      Depressive disorder, not elsewhere classified      Diabetes (H)      Elevated serum alkaline phosphatase level     Liver source     Fibromyalgia      HYPERLIPIDEMIA NEC/NOS 2006     Hypertriglyceridemia      OA (osteoarthritis)      Thyroid disease      Tobacco use disorder      Tobacco use disorder complicating pregnancy, childbirth, or the puerperium, antepartum condition or complication      Trochanteric bursitis      Unspecified essential hypertension        Past Surgical History:   Procedure Laterality Date     C  DELIVERY ONLY      , Low Cervical     INJECT EPIDURAL TRANSFORAMINAL  2014    [facility]     INJECT JOINT SACROILIAC  2014    [facility]     LAMINECTOMY LUMBAR ONE LEVEL Left 2014    [facility]       Family History   Problem Relation Age of Onset     Thyroid Disease Mother      Arthritis Mother      Colon Cancer Mother      Thyroid Disease Sister      Arthritis Sister      Lipids Sister      Hypertension Father      DIABETES Father      C.A.D. Father      MI age 75     Cardiovascular Father      heart attack     CEREBROVASCULAR DISEASE Father      CANCER Father      renal cancer     Arthritis Father      HEART DISEASE Father      heart attack     GASTROINTESTINAL DISEASE Father      liver     Genitourinary Problems Father      kidney     Asthma Daughter      GASTROINTESTINAL DISEASE Daughter       Other      Breast Cancer No family hx of      Cancer - colorectal No family hx of      Alzheimer Disease No family hx of      Blood Disease No family hx of      Circulatory No family hx of      Eye Disorder No  family hx of      Musculoskeletal Disorder No family hx of      Neurologic Disorder No family hx of      Respiratory No family hx of        Social History     Social History     Marital status:      Spouse name: N/A     Number of children: N/A     Years of education: N/A     Occupational History     Not on file.     Social History Main Topics     Smoking status: Former Smoker     Packs/day: 1.00     Types: Cigarettes     Smokeless tobacco: Never Used      Comment: since 1981     Alcohol use No     Drug use: No     Sexual activity: No     Other Topics Concern     Parent/Sibling W/ Cabg, Mi Or Angioplasty Before 65f 55m? No      Service No     Blood Transfusions No     Caffeine Concern No     Sleep Concern No     Stress Concern No     Weight Concern Yes     Special Diet No     Exercise No     Bike Helmet No     Doesn't ride a bike     Seat Belt Yes     Self-Exams Yes     she does self breast exams every other month     Social History Narrative       Current Outpatient Prescriptions   Medication Sig Dispense Refill     methylPREDNISolone acetate (DEPO-MEDROL) 80 MG/ML injection 1 mL (80 mg) by INTRA-ARTICULAR route once for 1 dose 1 mL 0     triamcinolone acetonide (KENALOG) 40 MG/ML injection 0.5 mLs (20 mg) by INTRA-ARTICULAR route once for 1 dose 0.5 mL 0     predniSONE (DELTASONE) 20 MG tablet Take 2 tablets (40 mg) by mouth daily 10 tablet 0     buPROPion (WELLBUTRIN SR) 200 MG 12 hr tablet Take 1 tablet (200 mg) by mouth 2 times daily 180 tablet 0     ALPRAZolam (XANAX) 0.25 MG tablet TAKE 1 TABLET DAILY AS NEEDED FOR ANXIETY 90 tablet 0     prochlorperazine (COMPAZINE) 10 MG tablet Take 0.5-1 tablets (5-10 mg) by mouth every 8 hours as needed for nausea or vomiting Or migraine headache onset. Caution sedation 15 tablet 1     HYDROcodone-acetaminophen (NORCO) 5-325 MG per tablet May take 1 tablet every 4-6 hours as needed for pain. Max of 4 tabs per day.  Must last 30 days. OK to fill on/after 8/28  and begin on 8/29/2017 100 tablet 0     nystatin (MYCOSTATIN) cream Apply topically 3 times daily 60 g 1     mupirocin (BACTROBAN) 2 % cream Apply topically 3 times daily 60 g 1     albuterol (2.5 MG/3ML) 0.083% neb solution Take 1 vial (2.5 mg) by nebulization every 6 hours as needed 1 Box 1     SUMAtriptan (IMITREX) 100 MG tablet Take 1 tablet (100 mg) by mouth at onset of headache for migraine May repeat in 2 hours. Max 2 tablets/24 hours. 18 tablet 1     albuterol (VENTOLIN HFA) 108 (90 BASE) MCG/ACT Inhaler INHALE 2 PUFFS INTO THE LUNGS EVERY 6 HOURS AS NEEDED FOR SHORTNESS OF BREATH / DYSPNEA 54 g 1     metoprolol (TOPROL-XL) 25 MG 24 hr tablet Take 1.5 tablets per day (Patient taking differently: Take 0.5 tablets per day) 45 tablet 1     montelukast (SINGULAIR) 10 MG tablet TAKE 1 TABLET (10 MG) BY ORAL ROUTE ONCE DAILY IN THE EVENING 90 tablet 1     medical cannabis (Patient's own supply.  Not a prescription) 2 mg morning and noon. 7 mg at bedtime. (This is NOT a prescription, and does not certify that the patient has a qualifying medical condition for medical cannabis.  The purpose of this order is  to document that the patient reports taking medical cannabis.) 0 Information only 0     atorvastatin (LIPITOR) 20 MG tablet   3     nystatin (MYCOSTATIN) 257691 UNIT/ML suspension Take by mouth 4 times daily 380 mL 1     adapalene (DIFFERIN) 0.1 % gel APPLY A THIN LAYER TO THE AFFECTED AREA(S) BY TOPICAL ROUTE ONCE DAILY BEFORE BEDTIME 45 g 10     ADVAIR DISKUS 500-50 MCG/DOSE diskus inhaler INHALE 1 PUFF BY INHALATION ROUTE 2 TIMES PER DAY IN THE MORNING AND EVENING APPROXIMATELY 12 HOURS APART 1 Inhaler 3     celecoxib (CELEBREX) 200 MG capsule Take 1 capsule twice daily as needed for pain this is a 3 month script 180 capsule 1     diclofenac (VOLTAREN) 1 % GEL topical gel Apply 2 grams to hands and 4 grams to hips up to 4 times daily. T 9 Tube 11     losartan-hydrochlorothiazide (HYZAAR) 100-12.5 MG per  tablet Take 1 tablet by mouth daily 90 tablet 3     traZODone (DESYREL) 50 MG tablet TAKE 2-3 TABLETS (100-150 MG) BY MOUTH AT BEDTIME 270 tablet 3     DULoxetine (CYMBALTA) 60 MG EC capsule Take 2 capsules (120 mg) by mouth daily 180 capsule 3     rOPINIRole (REQUIP) 1 MG tablet TAKE 2 TABLETS (2 MG) BY MOUTH AT BEDTIME 180 tablet 3     clindamycin (CLEOCIN T) 1 % lotion Apply topically 2 times daily       hydrocortisone 2.5 % cream APPLY TOPICALLY 2 TIMES DAILY AS NEEDED 30 g 3     tretinoin (RETIN-A) 0.025 % cream Spread a pea size amount into affected area topically at bedtime.  Use sunscreen SPF>20. 45 g 3     Lactobacillus (ACIDOPHILUS) CAPS Take 1 capsule by mouth daily Take two hours before or after antibiotic dose.  Continue for 1 week after antibiotic therapy completed 60 capsule 0     RANITIDINE  MG OR TABS ONE TABLET TWICE DAILY 0 0     ZYRTEC 10 MG OR TABS 1 TABLET DAILY 90 3       Allergies   Allergen Reactions     Cats      and rabbits/wheezing     Dogs      wheezing     Lyrica [Pregabalin] Other (See Comments)     Rash, mouth sores, itching and burning     Seasonal Allergies      rhinits       REVIEW OF SYSTEMS:  CONSTITUTIONAL:  NEGATIVE for fever, chills, change in weight, not feeling tired  SKIN:  NEGATIVE for worrisome rashes, no skin lumps, no skin ulcers and no non-healing wounds  EYES:  NEGATIVE for vision changes or irritation.  ENT/MOUTH:  NEGATIVE.  No hearing loss, no hoarseness, no difficulty swallowing.  RESP:  NEGATIVE. No cough or shortness of breath.  CV:  NEGATIVE for chest pain, palpitations or peripheral edema  GI:  NEGATIVE for nausea, abdominal pain, heartburn, or change in bowel habits  :  Negative. No dysuria, no hematuria  MUSCULOSKELETAL:  See HPI above  NEURO:  NEGATIVE . No headaches, no dizziness,  no numbness  ENDOCRINE:  NEGATIVE for temperature intolerance, skin/hair changes  HEME/ALLERGY/IMMUNE:  NEGATIVE for bleeding problems  PSYCHIATRIC:  NEGATIVE. no  "anxiety, no depression.     Exam:  Vitals: Temp 98.8  F (37.1  C)  Resp 18  Ht 1.676 m (5' 6\")  Wt 90.7 kg (200 lb)  LMP 07/17/2009  BMI 32.28 kg/m2  BMI= Body mass index is 32.28 kg/(m^2).  Constitutional:  healthy, alert and no distress  Neuro: Alert and Oriented x 3, Gait normal. Sensation grossly WNL.  Psych: Affect normal   Respiratory: Breathing not labored.  Cardiovascular: normal peripheral pulses  Lymph: no adenopathy  Skin: No rashes,worrisome lesions or skin problems    "

## 2017-10-17 ENCOUNTER — MYC MEDICAL ADVICE (OUTPATIENT)
Dept: PALLIATIVE MEDICINE | Facility: CLINIC | Age: 57
End: 2017-10-17

## 2017-10-17 DIAGNOSIS — M79.7 FIBROMYALGIA: ICD-10-CM

## 2017-10-17 DIAGNOSIS — M51.369 DDD (DEGENERATIVE DISC DISEASE), LUMBAR: ICD-10-CM

## 2017-10-17 DIAGNOSIS — M47.817 LUMBOSACRAL SPONDYLOSIS WITHOUT MYELOPATHY: ICD-10-CM

## 2017-10-17 DIAGNOSIS — M53.3 SI (SACROILIAC) JOINT DYSFUNCTION: ICD-10-CM

## 2017-10-17 DIAGNOSIS — M70.61 GREATER TROCHANTERIC BURSITIS OF BOTH HIPS: ICD-10-CM

## 2017-10-17 DIAGNOSIS — M79.18 MYOFASCIAL PAIN: ICD-10-CM

## 2017-10-17 DIAGNOSIS — M70.62 GREATER TROCHANTERIC BURSITIS OF BOTH HIPS: ICD-10-CM

## 2017-10-17 NOTE — TELEPHONE ENCOUNTER
Jorge sent to pt:  From: Denisse Maldonado RN        Created: 10/17/2017 2:13 PM       Hi Jacquie.  I am sorry to hear about your mom.  When you call to cancel just go ahead and schedule the next available appointment with Liseth. You can also be added to the waitlist.  We can process your vicodin refill for you.  We will call you when it is ready.  Do you want it mailed to your [facility] or do you want to pick it up her at the clinic?    Denisse Maldonado RN-BSN  [facility]          Will await pt response back.    CRISTA WayBSN  [facility]

## 2017-10-17 NOTE — TELEPHONE ENCOUNTER
Per patient myChart message:  From: Jacquie Amador      Created: 10/17/2017 2:44 PM      Prefer mailing it.  Thank you,  Jacquie        Routed to the nursing pool and to the MA pool to process refill(s).    Denisse Maldonado, RN-BSN  [facility]

## 2017-10-17 NOTE — TELEPHONE ENCOUNTER
Per patient TeachScapehart message:  From: Jacquie Amador      Created: 10/17/2017 12:11 PM      Hi Liseth,  I wanted to let you know I need to change my appt with you on Oct. 27.  My mom s cancer is back and that day is when she is having surgery at [facility] in [place]. Luckily same as before located in one place like before.   Things have been going good although my youngest is moving into an Apartment the end of this month. I m sure that will be sad for me.   I had my cortisone shots in [place] this time at [facility]. The dr pulled so hard on my thumbs it made some bones move out of place. It was extremely painful! I talked to Dr. Linder and he said we could redo if I needed. They are feeling better right now so try to hold off. I haven t cancelled yet but will and Im not sure when I will be able to get in,  I am seeing Dr. Richardson on Nov 13th. Is there anyway I could touch base as I will need a Vicodin refill soon.     Thank You.,  Jacquie Amador

## 2017-10-19 NOTE — TELEPHONE ENCOUNTER
Received call from patient requesting refill(s) of Norco     Last picked up from pharmacy on 10/9/17; the Rx was dated 8/1, #100 tabs from Liseth Caldera CNP   Due date 11/8/17    Pt last seen on 8/1/17  Next appt scheduled for 10/2717    Last urine drug screen 9/6/16  Current opioid agreement on file No Date: 9/6/16    Processing (pick one):   the RX since opioid protocols need to be updated.     Message sent to pt:    Bhargav Dhillon,     In preparation of your prescription, it was noted that you are overdue for an update of the opioid medication protocols for our clinic. Rather than mailing your prescription, we will need for you to come in for a brief nurse visit to complete these requirements which include a review of the controlled substance agreement and urine drug screen. Your next prescription is not due to start until 11/8/17 which gives you some time to coordinate this appointment.  I see in your messages that you are not able to make it to your scheduled appointment with Liseth on 10/27.  As Denisse mentioned, you may call the  at [phone] to reschedule that appointment and to make a nurse visit.     We will give you the prescription at the nurse visit.     Thank you,   -----------------    Will facilitate refill.    Laisha Sanchez, MARVAN, RN-BC  Patient Care Supervisor/Care Coordinator  [facility]

## 2017-10-20 RX ORDER — HYDROCODONE BITARTRATE AND ACETAMINOPHEN 5; 325 MG/1; MG/1
TABLET ORAL
Qty: 100 TABLET | Refills: 0 | Status: SHIPPED | OUTPATIENT
Start: 2017-10-20 | End: 2018-01-09

## 2017-10-20 NOTE — TELEPHONE ENCOUNTER
Signed Prescriptions:                        Disp   Refills    HYDROcodone-acetaminophen (NORCO) 5-325 MG*100 ta*0        Sig: May take 1 tablet every 4-6 hours as needed for pain.           Max of 4 tabs per day. OK to fill on/after 11/6           and begin on 11/8/2017; Must last 30 days.  Authorizing Provider: LISETH HANSON    Signed script placed in touchdown bin at [facility].    Liseth Hanson APRN CNP FNP RN  [facility]

## 2017-10-20 NOTE — TELEPHONE ENCOUNTER
Spoke with patient to schedule a nurse visit at the [facility] to renew her paperwork with us and obtain her prescription. Patient undrstood.  UDS and OA is due. Signed script for Norco kept in [facility] cart. Thanks!      Mike Carreno MA

## 2017-10-31 ENCOUNTER — MYC MEDICAL ADVICE (OUTPATIENT)
Dept: PALLIATIVE MEDICINE | Facility: CLINIC | Age: 57
End: 2017-10-31

## 2017-11-01 NOTE — TELEPHONE ENCOUNTER
From: Jacquie Amador  To: Liseth Caldera, JESSICA CNP  Sent: 10/31/2017 3:00 PM CDT  Subject: Updates about my health    Hi Liseth,  I need to make an appt w you and a nurse apparently before . For Vicodin. I have a procedure w Dr. Richardson on ? Can I do it then? It is going to be hard to get over to [place]. I am spending most days in [place] at [facility]. My mom had surgery last Friday everything went great til  where she had a separate incident. She almost  but the great Drs. Saved her and now has an issue w valve . She is still in hospital not sure how long. I will call desk for appt.jeanine Childers and my life is turned upside down right now.     Thanks,  Jacquie

## 2017-11-01 NOTE — TELEPHONE ENCOUNTER
Sending to provider.     Michell Ritchie RN, [facility]  Pain Clinic Care Coordinator

## 2017-11-03 NOTE — TELEPHONE ENCOUNTER
Jacquie-    Your script is due to begin on 11/8/2017. If you need the script before 11/13/2017, you will need to come to [place] to pick it up and then can just schedule the nurse visit then. IF you can wait to fill your script on the 13th, then let the nurses know and schedule a nurse visit on that day.  Sorry to hear about your mother.     Thanks.  Liseth LOPEZ RN CNP, SARAHP  [facility]    I sent the above [employer] message to the patient.     Liseth LOPEZ RN CNP, SARAHP  [facility] [place]

## 2017-11-07 DIAGNOSIS — M70.60 GREATER TROCHANTERIC BURSITIS, UNSPECIFIED LATERALITY: ICD-10-CM

## 2017-11-07 DIAGNOSIS — M19.049 HAND ARTHRITIS: ICD-10-CM

## 2017-11-07 RX ORDER — CELECOXIB 200 MG/1
CAPSULE ORAL
Qty: 180 CAPSULE | Refills: 1 | Status: SHIPPED | OUTPATIENT
Start: 2017-11-07 | End: 2018-06-29

## 2017-11-07 NOTE — TELEPHONE ENCOUNTER
Routed to [facility] to sign the script. Fax it to [facility] @ [phone] when is ready. Thanks!     Mike Carreno MA

## 2017-11-07 NOTE — TELEPHONE ENCOUNTER
Signed Prescriptions:                        Disp   Refills    celecoxib (CELEBREX) 200 MG capsule        180 ca*1        Sig: Take 1 capsule twice daily as needed for pain this is           a 3 month script  Authorizing Provider: BRIANNA HANSON, RN CNP, FNP  [facility] [place]

## 2017-11-07 NOTE — TELEPHONE ENCOUNTER
Received fax request from [facility] pharmacy requesting refill(s) for celecoxib (CELEBREX) 200 MG capsule    Last refilled on 8/9/17    Pt last seen on 8/1 (WITH PROVIDER)  Next appt scheduled for 11/13 (W/ ZAKIA HINTON)    Will route to [facility] to facilitate refill.    Karina ZEPEDA    [facility] [place]

## 2017-11-13 ENCOUNTER — MYC MEDICAL ADVICE (OUTPATIENT)
Dept: PALLIATIVE MEDICINE | Facility: CLINIC | Age: 57
End: 2017-11-13

## 2017-11-13 NOTE — TELEPHONE ENCOUNTER
My chart message from pt:    Hi,   I had to cancel my appt w Dr. Richardson and nurse this morning. I have a really bad cough and wheezing. I m on an Antibiotic but hasn t kicked in totally. I will need to change my Nurse appt also. I cancelled my nurse appt also and reschedule this week. I also have the Ulcer back on my forehead and mouth. I know I need to sign something for Vicodin and can come do that tomorrow if needed. Or can you fax to [facility]? I just always seem to have an issue on my face and didn t want to put Botox in right now,     Thanks   Jacquie Dhillon    You will need to come in for the nurse visit before we can give you the prescription for your narcotic. You also really need to make a follow up appointment with Liseth as she is booking into January.     Michell Ritchie RN, [facility]  Pain Clinic Care Coordinator

## 2017-11-16 ENCOUNTER — MYC MEDICAL ADVICE (OUTPATIENT)
Dept: PALLIATIVE MEDICINE | Facility: CLINIC | Age: 57
End: 2017-11-16

## 2017-11-17 NOTE — TELEPHONE ENCOUNTER
My chart message from pt:    Hi, I have a nurse appt for 11:30 on Monday the 20th.     Jacquie Laureano, see you then.     Michell Ritchie RN, [facility]  Pain Clinic Care Coordinator

## 2017-11-21 ENCOUNTER — TELEPHONE (OUTPATIENT)
Dept: ORTHOPEDICS | Facility: CLINIC | Age: 57
End: 2017-11-21

## 2017-11-21 ENCOUNTER — ALLIED HEALTH/NURSE VISIT (OUTPATIENT)
Dept: PALLIATIVE MEDICINE | Facility: CLINIC | Age: 57
End: 2017-11-21

## 2017-11-21 DIAGNOSIS — Z79.891 ENCOUNTER FOR LONG-TERM OPIATE ANALGESIC USE: ICD-10-CM

## 2017-11-21 DIAGNOSIS — Z79.891 ENCOUNTER FOR LONG-TERM OPIATE ANALGESIC USE: Primary | ICD-10-CM

## 2017-11-21 PROCEDURE — 80307 DRUG TEST PRSMV CHEM ANLYZR: CPT | Mod: 90 | Performed by: NURSE PRACTITIONER

## 2017-11-21 PROCEDURE — 99000 SPECIMEN HANDLING OFFICE-LAB: CPT | Performed by: NURSE PRACTITIONER

## 2017-11-21 NOTE — TELEPHONE ENCOUNTER
Patient scheduled for f/u appt on 11/24 @ time noted below.  She may contact clinic with further questions or concerns.    Anoop Berman ATC

## 2017-11-21 NOTE — MR AVS SNAPSHOT
After Visit Summary   11/21/2017    Jacquie Amador    MRN: [id number]           Patient Information     Date Of Birth          1960        Visit Information        Provider Department      11/21/2017 11:30 AM NurseBg [facility]        Today's Diagnoses     Encounter for long-term opiate analgesic use    -  1       Follow-ups after your visit        Your next 10 appointments already scheduled     Jan 09, 2018  2:00 PM CST   Return Visit with JESSICA Guillory CNP   [facility] ([facility])    [id number] [facility] [address] [id number] [phone]              Future tests that were ordered for you today     Open Future Orders        Priority Expected Expires Ordered    Drug Screen Comprehensive , Urine with Reported Meds (Pain Care Package) Routine  11/21/2018 11/21/2017            Who to contact     If you have questions or need follow up information about today's clinic visit or your schedule please contact [facility] directly at [phone].  Normal or non-critical lab and imaging results will be communicated to you by [employer], letter or phone within 4 business days after the clinic has received the results. If you do not hear from us within 7 days, please contact the clinic through [employer] or phone. If you have a critical or abnormal lab result, we will notify you by phone as soon as possible.  Submit refill requests through [employer] or call your pharmacy and they will forward the refill request to us. Please allow 3 business days for your refill to be completed.          Additional Information About Your Visit        [employer] Information     [employer] gives you secure access to your electronic health record. If you see a primary care provider, you can also send messages to your care team and make appointments. If you have questions, please call your primary care clinic.  If you do not have a primary care  provider, please call [phone] and they will assist you.        Care EveryWhere ID     This is your Care EveryWhere ID. This could be used by other organizations to access your [facility] medical records  [id number]        Your Vitals Were     Last Period                   07/17/2009            Blood Pressure from Last 3 Encounters:   08/17/17 (!) 135/92   08/07/17 130/79   08/01/17 125/85    Weight from Last 3 Encounters:   09/26/17 90.7 kg (200 lb)   09/14/17 92.6 kg (204 lb 1.6 oz)   03/31/17 92.5 kg (204 lb)                 Today's Medication Changes          These changes are accurate as of: 11/21/17 11:37 AM.  If you have any questions, ask your nurse or doctor.               These medicines have changed or have updated prescriptions.        Dose/Directions    metoprolol 25 MG 24 hr tablet   Commonly known as:  TOPROL-XL   This may have changed:  additional instructions   Used for:  Palpitations, Sinus tachycardia        Take 1.5 tablets per day   Quantity:  45 tablet   Refills:  1                Primary Care Provider Office Phone # Fax #    Liz Peterson [phone] [address] [facility] [address]        Equal Access to Services     JAMARI SUMMERS AH: Hadii rivera guevara hadasho Soomaali, waaxda luqadaha, qaybta kaalmada adeegyada, yao hernandez. So [facility] [phone].    ATENCIÓN: Si habla español, tiene a kimball disposición servicios gratuitos de asistencia lingüística. Llame al [phone].    We comply with applicable federal civil rights laws and [place] laws. We do not discriminate on the basis of race, color, national origin, age, disability, sex, sexual orientation, or gender identity.            Thank you!     Thank you for choosing [facility]  for your care. Our goal is always to provide you with excellent care. Hearing back from our patients is one way we can continue to improve our services. Please take a few minutes to complete the  written survey that you may receive in the mail after your visit with us. Thank you!             Your Updated Medication List - Protect others around you: Learn how to safely use, store and throw away your medicines at www.disposemymeds.org.          This list is accurate as of: 11/21/17 11:37 AM.  Always use your most recent med list.                   Brand Name Dispense Instructions for use Diagnosis    acidophilus Caps     60 capsule    Take 1 capsule by mouth daily Take two hours before or after antibiotic dose.  Continue for 1 week after antibiotic therapy completed    Diarrhea       adapalene 0.1 % gel    DIFFERIN    45 g    APPLY A THIN LAYER TO THE AFFECTED AREA(S) BY TOPICAL ROUTE ONCE DAILY BEFORE BEDTIME    Acne vulgaris       ADVAIR DISKUS 500-50 MCG/DOSE diskus inhaler   Generic drug:  fluticasone-salmeterol     1 Inhaler    INHALE 1 PUFF BY INHALATION ROUTE 2 TIMES PER DAY IN THE MORNING AND EVENING APPROXIMATELY 12 HOURS APART    Moderate persistent asthma without complication       * albuterol 108 (90 BASE) MCG/ACT Inhaler    VENTOLIN HFA    54 g    INHALE 2 PUFFS INTO THE LUNGS EVERY 6 HOURS AS NEEDED FOR SHORTNESS OF BREATH / DYSPNEA    Moderate persistent asthma without complication       * albuterol (2.5 MG/3ML) 0.083% neb solution     1 Box    Take 1 vial (2.5 mg) by nebulization every 6 hours as needed    Moderate persistent asthma with acute exacerbation       ALPRAZolam 0.25 MG tablet    XANAX    90 tablet    TAKE 1 TABLET DAILY AS NEEDED FOR ANXIETY    Generalized anxiety disorder       atorvastatin 20 MG tablet    LIPITOR          buPROPion 200 MG 12 hr tablet    WELLBUTRIN SR    180 tablet    Take 1 tablet (200 mg) by mouth 2 times daily    Generalized anxiety disorder       celecoxib 200 MG capsule    celeBREX    180 capsule    Take 1 capsule twice daily as needed for pain this is a 3 month script    Greater trochanteric bursitis, unspecified laterality, Hand arthritis       clindamycin  1 % lotion    CLEOCIN T     Apply topically 2 times daily        diclofenac 1 % Gel topical gel    VOLTAREN    9 Tube    Apply 2 grams to hands and 4 grams to hips up to 4 times daily. T    Greater trochanteric bursitis of both hips, Bilateral thumb pain       DULoxetine 60 MG EC capsule    CYMBALTA    180 capsule    Take 2 capsules (120 mg) by mouth daily    Multiple joint pain       HYDROcodone-acetaminophen 5-325 MG per tablet    NORCO    100 tablet    May take 1 tablet every 4-6 hours as needed for pain. Max of 4 tabs per day. OK to fill on/after 11/6 and begin on 11/8/2017; Must last 30 days.    Lumbosacral spondylosis without myelopathy, DDD (degenerative disc disease), lumbar, SI (sacroiliac) joint dysfunction, Greater trochanteric bursitis of both hips, Myofascial pain, Fibromyalgia       hydrocortisone 2.5 % cream     30 g    APPLY TOPICALLY 2 TIMES DAILY AS NEEDED    Dermatitis       losartan-hydrochlorothiazide 100-12.5 MG per tablet    HYZAAR    90 tablet    Take 1 tablet by mouth daily    Essential hypertension with goal blood pressure less than 140/90       medical cannabis (Patient's own supply.  Not a prescription)     0 Information only    2 mg morning and noon. 7 mg at bedtime. (This is NOT a prescription, and does not certify that the patient has a qualifying medical condition for medical cannabis.  The purpose of this order is  to document that the patient reports taking medical cannabis.)        metoprolol 25 MG 24 hr tablet    TOPROL-XL    45 tablet    Take 1.5 tablets per day    Palpitations, Sinus tachycardia       montelukast 10 MG tablet    SINGULAIR    90 tablet    TAKE 1 TABLET (10 MG) BY ORAL ROUTE ONCE DAILY IN THE EVENING    Moderate persistent asthma without complication       mupirocin 2 % cream    BACTROBAN    60 g    Apply topically 3 times daily    Dermatitis       nystatin 380595 UNIT/ML suspension    MYCOSTATIN    380 mL    Take by mouth 4 times daily    Thrush       nystatin  cream    MYCOSTATIN    60 g    Apply topically 3 times daily    Dermatitis       predniSONE 20 MG tablet    DELTASONE    10 tablet    Take 2 tablets (40 mg) by mouth daily    Moderate persistent asthma with acute exacerbation       prochlorperazine 10 MG tablet    COMPAZINE    15 tablet    Take 0.5-1 tablets (5-10 mg) by mouth every 8 hours as needed for nausea or vomiting Or migraine headache onset. Caution sedation    Intractable chronic migraine without aura and without status migrainosus       ranitidine 150 MG tablet    ZANTAC    0    ONE TABLET TWICE DAILY        rOPINIRole 1 MG tablet    REQUIP    180 tablet    TAKE 2 TABLETS (2 MG) BY MOUTH AT BEDTIME    Movement disorder       SUMAtriptan 100 MG tablet    IMITREX    18 tablet    Take 1 tablet (100 mg) by mouth at onset of headache for migraine May repeat in 2 hours. Max 2 tablets/24 hours.    Migraine without aura and without status migrainosus, not intractable       traZODone 50 MG tablet    DESYREL    270 tablet    TAKE 2-3 TABLETS (100-150 MG) BY MOUTH AT BEDTIME    Insomnia, unspecified       tretinoin 0.025 % cream    RETIN-A    45 g    Spread a pea size amount into affected area topically at bedtime.  Use sunscreen SPF>20.    Age-related facial wrinkles       ZYRTEC 10 MG tablet   Generic drug:  cetirizine     90    1 TABLET DAILY    Allergic rhinitis, cause unspecified       * Notice:  This list has 2 medication(s) that are the same as other medications prescribed for you. Read the directions carefully, and ask your doctor or other care provider to review them with you.

## 2017-11-21 NOTE — NURSING NOTE
Patient provided urine sample at the lab. Patient reviewed and signed Narcotics agreement. Signed Rx for Frohna handed to patient.

## 2017-11-21 NOTE — LETTER
[facility]    11/21/17    Patient: Jacquie Amador  YOB: 1960  Medical Record Number: [id number]                                                                  Controlled Substance Agreement  I understand that my care provider has prescribed controlled substances (narcotics, tranquilizers, and/or stimulants) to help manage my condition(s).  I am taking this medicine to help me function or work.  I know that this is strong medicine.  It could have serious side effects and even cause a dependency on the drug.  If I stop these medicines suddenly, I could have severe withdrawal symptoms.    The risks, benefits, and side effects of these medication(s) were explained to me.  I agree that:  1. I will take part in other treatments as advised by my provider.  This may be psychiatry or counseling, physical therapy, behavioral therapy, group treatment, or a referral to a pain clinic.  I will reduce or stop my medicine when my provider tells me to do so.   2. I will take my medicines as prescribed.  I will not change the dose or schedule unless my provider tells me to.  There will be no refills if I  run out early.   I may be contacted at any time without warning and asked to complete a drug test or pill count.   3. I will keep all my appointments at the clinic.  If I miss appointments or fail to follow instructions, my provider may stop my medicine.  4. I will not ask other providers to prescribe controlled substances. And I will not accept controlled substances from other people. If I need another prescribed controlled substance for a new reason, I will notify my provider within one business day.  5. If I enroll in the [place] Medical Marijuana program, I will tell my provider.  I will also sign an agreement to share my medical records with my provider.  6. I will use one pharmacy to fill all of my controlled substance prescriptions.  If my prescription is mailed to my pharmacy,  it may take 5 to 7 days for my medicine to be ready.  7. I understand that my provider, clinic care team, and pharmacy can track controlled substance prescriptions from other providers through a central database (prescription monitoring program).  8. I will bring in my list of medications (or my medicine bottles) each time I come to the clinic.  REV- 04/2016                                                                                                                                            Page 1 of 2      [facility]    11/21/17    Patient: Jacquie Amador  YOB: 1960  Medical Record Number: [id number]    9. Refills of controlled substances will be made only during office hours.  It is up to me to make sure that I do not run out of my medicines on weekends or holidays.    10. I am responsible for my prescriptions.  If the medicine is lost or stolen, it will not be replaced.   I also agree not to share these medicines with anyone.  11. I agree to not use ANY illegal or recreational drugs.  This includes marijuana, cocaine, bath salts or other drugs.  I agree not to use alcohol unless my provider says I may.  I agree to give urine samples whenever asked.  If I fail to give a urine sample, the provider may stop my medicine.     12. I will tell my nurse or provider right away if I become pregnant or have a new medical problem treated outside of [facility].  13. I understand that this medicine can affect my thinking and judgment.  It may be unsafe for me to drive, use machinery and do dangerous tasks.  I will not do any of these things until I know how the medicine affects me.  If my dose changes, I will wait to see how it affects me.  I will contact my provider if I have concerns about medicine side effects.  I understand that if I do not follow any of the conditions above, my prescriptions or treatment may be stopped.    I agree that my provider, clinic care team, and  pharmacy may work with any city, state or federal law enforcement agency that investigates the misuse, sale, or other diversion of my controlled medicine. I will allow my provider to discuss my care with or share a copy of this agreement with any other treating provider, pharmacy or emergency room where I receive care.  I agree to give up (waive) any right of privacy or confidentiality with respect to these authorizations.   I have read this agreement and have asked questions about anything I did not understand.   ___________________________________    ___________________________  Patient Signature                                                           Date and Time  ___________________________________     ____________________________  Witness                                                                            Date and Time  ___________________________________  [facility] PA[facility] NURSE  REV-  04/2016                                                                                                                                                                 Page 2 of 2

## 2017-11-21 NOTE — TELEPHONE ENCOUNTER
Pt LVM asking if Dr. Linder could fit her in for hip injection on Friday. Said she is having a lot of hip pain and trouble getting around.

## 2017-11-21 NOTE — TELEPHONE ENCOUNTER
LVM with detailed information regarding that would be able to see patient at 11:00am on Friday 11/24.  Patient may schedule this appt through [facility] .     Anoop Berman ATC

## 2017-11-22 NOTE — TELEPHONE ENCOUNTER
See the nurse visit from yesterday for more information.    Denisse Maldonado RN-BSN  [facility]

## 2017-11-27 LAB — PAIN DRUG SCR UR W RPTD MEDS: NORMAL

## 2017-11-29 ENCOUNTER — OFFICE VISIT (OUTPATIENT)
Dept: ORTHOPEDICS | Facility: CLINIC | Age: 57
End: 2017-11-29
Payer: COMMERCIAL

## 2017-11-29 ENCOUNTER — RADIANT APPOINTMENT (OUTPATIENT)
Dept: GENERAL RADIOLOGY | Facility: CLINIC | Age: 57
End: 2017-11-29
Attending: FAMILY MEDICINE
Payer: COMMERCIAL

## 2017-11-29 VITALS
DIASTOLIC BLOOD PRESSURE: 85 MMHG | SYSTOLIC BLOOD PRESSURE: 125 MMHG | BODY MASS INDEX: 32.3 KG/M2 | HEIGHT: 66 IN | WEIGHT: 201 LBS

## 2017-11-29 DIAGNOSIS — M17.11 PRIMARY OSTEOARTHRITIS OF RIGHT KNEE: ICD-10-CM

## 2017-11-29 DIAGNOSIS — M25.561 ACUTE PAIN OF RIGHT KNEE: Primary | ICD-10-CM

## 2017-11-29 DIAGNOSIS — M70.61 TROCHANTERIC BURSITIS OF BOTH HIPS: ICD-10-CM

## 2017-11-29 DIAGNOSIS — M25.561 RIGHT KNEE PAIN: ICD-10-CM

## 2017-11-29 DIAGNOSIS — M70.62 TROCHANTERIC BURSITIS OF BOTH HIPS: ICD-10-CM

## 2017-11-29 DIAGNOSIS — M18.0 PRIMARY OSTEOARTHRITIS OF BOTH FIRST CARPOMETACARPAL JOINTS: ICD-10-CM

## 2017-11-29 PROCEDURE — 73562 X-RAY EXAM OF KNEE 3: CPT

## 2017-11-29 PROCEDURE — 20610 DRAIN/INJ JOINT/BURSA W/O US: CPT | Mod: 59 | Performed by: FAMILY MEDICINE

## 2017-11-29 PROCEDURE — 20611 DRAIN/INJ JOINT/BURSA W/US: CPT | Mod: RT | Performed by: FAMILY MEDICINE

## 2017-11-29 PROCEDURE — 99213 OFFICE O/P EST LOW 20 MIN: CPT | Mod: 25 | Performed by: FAMILY MEDICINE

## 2017-11-29 RX ORDER — TRIAMCINOLONE ACETONIDE 40 MG/ML
80 INJECTION, SUSPENSION INTRA-ARTICULAR; INTRAMUSCULAR ONCE
Qty: 2 ML | Refills: 0 | OUTPATIENT
Start: 2017-11-29 | End: 2017-11-29

## 2017-11-29 NOTE — PROGRESS NOTES
Jacquie Amador  :  1960  DOS: 17  MRN: [id number]    Sports Medicine Clinic Visit    PCP: Liz Peterson    Jacquie Amador is a 55 year old Right hand dominant female who is seen in consultation at the request of   presenting with bilateral thumb pain.    Injury: about 5  year(s).  Pain located over bilateral CMC joitns, nonradiating.  Reports constant and with intermittent increases radiating, weakness to pinching.  Additional Features:  Positive: swelling and clicking.  Symptoms are better with injections.  Symptoms are worse with: gripping.  Other evaluation and/or treatments so far consists of: injections, no physical therapy.  Recent imaging completed: X-rays completed 2014 Prior History of related problems: None    Social History: crafting    Interim History - 2016  Since last visit on 2015 patient has mild-moderate discomfort over last 2 weeks.  B/l CMC joint steroid injection completed on 10/12 provided good relief for 5.5 months.  No new injury in the interim.    Interim History - 2016  Since last visit on 3/11/2016 patient has moderate - severe discomfort, that has been worsening over last 1 month.  Pt reports that she had minimal discomfort until she fell on concrete ~ 1 month ago, catching herself with both hands out in front of her.  Denies any severe pain or new swelling immediately following fall, pain gradually worsened since that time.  B/l CMC joint steroid injection completed on 3/11 provided good relief for 3.5 months.  No new injury in the interim.    Interim History - 2017  Since last visit on 16 patient has moderate bilateral thumb pain.  Bilateral thumb CMC steroid injections completed on  provided excellent relief for ~ 3.5 months.  Desires repeat injections today prior to leaving for her trip.  No new injury in the interim.    Patient is also being seen in f/u for chronic bilateral lateral hip pain,  right>>>left .  Patient was most recently treated for this condition by Dr Cárdenas in 2016.  Right trochanteric bursa injection completed at that time provided her with good relief for ~ 2.5 months.  Desires repeat injection today prior to leaving for vacation.    Interim History - 2017  Since last visit on 17 patient has moderate bilateral lateral hip pain, right>>>left.  Bilateral hip trochanteric bursa injection completed on 3/31 provided good relief for ~ 5 - 6 months.  Patient reports significantly worsening pain over the last ~ 3 weeks.  No new injury in the interim.    Patient also has secondary complaint of right knee pain over the last ~ 3 months.  Pain located over deep medial knee joint line.  Pain worse with going from sit to stand, walking, and ascending stairs.  Previous h/o similar pain several years ago - cannot recall treatment.    Review of Systems  Musculoskeletal: as above  Remainder of review of systems is negative including constitutional, CV, pulmonary, GI, Skin and Neurologic except as noted in HPI or medical history.    Past Medical History:   Diagnosis Date     ASTHMA - MODERATE PERSISTENT 2005     Cancer (H)      Chronic pain      Coronary artery disease      CVA (cerebral infarction)      Depressive disorder, not elsewhere classified      Diabetes (H)      Elevated serum alkaline phosphatase level     Liver source     Fibromyalgia      HYPERLIPIDEMIA NEC/NOS 2006     Hypertriglyceridemia      OA (osteoarthritis)      Thyroid disease      Tobacco use disorder      Tobacco use disorder complicating pregnancy, childbirth, or the puerperium, antepartum condition or complication      Trochanteric bursitis      Unspecified essential hypertension      Past Surgical History:   Procedure Laterality Date     C  DELIVERY ONLY      , Low Cervical     INJECT EPIDURAL TRANSFORAMINAL  2014    [facility]     INJECT JOINT  "SACROILIAC  09/23/2014    [facility]     LAMINECTOMY LUMBAR ONE LEVEL Left 04/08/2014    [facility]     Objective  /85  Ht 5' 6\" (1.676 m)  Wt 201 lb (91.2 kg)  LMP 07/17/2009  BMI 32.44 kg/m2    GENERAL APPEARANCE: healthy, alert and no distress   GAIT: NORMAL  SKIN: no suspicious lesions or rashes  NEURO: Normal strength and tone, mentation intact and speech normal  PSYCH:  mentation appears normal and affect normal/bright    Bilateral hip exam    Inspection:        no edema or ecchymosis in hip area    ROM:       Full active and passive ROM       Pain with active adduction over lateral hip and active ER    Strength:        flexion 5/5       extension 5/5       abduction 5/5       adduction 5/5    Tender:        greater trochanter r>>L today       SI joint mild       Piriformis, external rotators mildly    Non Tender:        remainder of hip area       illiac crest       ASIS       pubis    Sensation:        grossly intact in hip and thigh    Skin:       well perfused       capillary refill brisk    Special Tests:        neg (-) LORI       neg (-) FADIR       neg (-) scour       positive (+) Brooks    Right Knee exam    ROM:        Flexion 140 degrees       Extension 0 degrees       Range of motion limited by mild pain in terminal flexion    Inspection:       no visible ecchymosis        effusion noted trace    Skin:       no visible deformities       well perfused       capillary refill brisk    Patellar Motion:        Lateral tilt noted in patella       Crepitus noted in the patellofemoral joint    Tender:        lateral patellar border <       medial joint line    Non Tender:         remainder of knee area        medial patellar border        lateral joint line        along MCL        distal IT Band        infrapatellar tendon        tibial tubercle       pes anserine bursa    Special Tests:        neg (-) Adeola       neg (-) Lachmans       neg (-) anterior drawer       neg " (-) posterior drawer       neg (-) pivot shift       neg (-) varus at 0 deg and 30 deg       neg (-) valgus at 0 deg and 30 deg    Evaluation of ipsilateral kinetic chain       normal strength with hip extension and abduction, but somewhat deconditioned b/l       Decreased strength with single leg squat b/l, R>L    Bilateral Hand Exam  Inspection:Carpals: normal, Thumb: normal  Tender: Carpals: triquetrum, Metacarpals: 1st metacarpal, Thumb: CMC, R>L, but still milder overall than we have seen in the past  Non-tender: Remainder of hand  Range of Motion Thumb: opposition Decreased R>L, flexion MCP decreased, painful, extension MCP decreased, flexion IP decreased, extension IP decreased  Strength: mild decrease in thumb  strength bilaterally  Special tests: Positive scour of CMC, negative snuffbox tenderness   Skin:  well perfused  capillary refill brisk    Procedure  After risks, benefits and complications of steroid injection were discussed with the patient, a consent form was signed and the patient elected to proceed.  Using sterile technique, the area was first prepped with betadyne and an alcohol swab.  A 25 gauge  needle was used to inject a mixture of 40 mg Kenalog, 2 ml of 0.5% marcaine and 2 ml of 1% lidocaine into the greater trochanteric bursa on the right.  The patient tolerated the procedure well without complications.    Sports Medicine Clinic Procedure  Ultrasound Guided Right Intra-Articular Knee Aspiration & Injection    Technique: The risks of the procedure were explained to the patient.  A consent was signed for the intra-articular knee procedure.  The patient was evaluated with a Conzoom ultrasound machine using a 12 MHz linear probe.   The Right knee was prepped and draped in a sterile manner.  Ultrasound identification of the patella, suprapatellar pouch, quadriceps tendon and femur in both long and short axis. The probe was placed in short axis to the Right femur.  A 1.5 inch 22 gauge  needle was placed under ultrasound guidance into the superior knee joint.  No fluid was aspirated.   A mixture of 3 ml's of 0.2% ropivacaine and 1 ml kenalog (40mg/ml) was injected without difficulty. The needle was removed and there was good hemostasis without complications.  There was ultrasound documentation of needle placement and injection.  Pre-procedural pain 6/10.  Post procedural pain 3-4/10.    Radiology:  FINGER LEFT TWO OR MORE VIEWS 5/13/2014 11:29 AM   HISTORY: Chronic pain.  COMPARISON: None.   FINDINGS: No fracture or malalignment is identified. There is near  complete joint space loss of the first carpal metacarpal joint with  adjacent subchondral cyst formation in the base of the first  metacarpal. Small osteophytes are also noted. Mild degenerative  changes of the first metacarpal phalangeal joint also noted.  IMPRESSION  IMPRESSION: Degenerative changes of the first carpal metacarpal and of  the first metacarpal phalangeal joints. No fracture.    Assessment:  1. Right knee pain    2. Primary osteoarthritis of right knee    3. Trochanteric bursitis of both hips    4. Primary osteoarthritis of both first carpometacarpal joints        Plan:  Discussed the assessment with the patient.  Follow up: prn  XR images independently visualized and reviewed with patient today in clinic  Small increase in predominantly medial compartment DJD, still mild overall   Reviewed prior hand and hip films today  Defer CMC injections for as long as possible  R troch bursa CSI repeated today  Water therapy and low impact activity options reviewed today  Reviewed again number of steroid injection in the last 2 yrs and importance of limiting overall number as much as possible  Risks, potential SE reviewed in detail  Expectations and goals of CSI reviewed  Often 2-3 days for steroid effect, and can take up to two weeks for maximum effect  We discussed modified progressive pain-free activity as tolerated  Do not overuse in  first two weeks if feeling better due to concern for vulnerability while steroid is working  Supportive care reviewed  All questions were answered today  Contact us with additional questions or concerns  Signs and sx of concern reviewed      José Miguel Linder DO, RADHA  Primary Care Sports Medicine  [facility]         Disclaimer: This note consists of symbols derived from keyboarding, dictation and/or voice recognition software. As a result, there may be errors in the script that have gone undetected. Please consider this when interpreting information found in this chart.

## 2017-11-29 NOTE — MR AVS SNAPSHOT
After Visit Summary   11/29/2017    Jacquie Amador    MRN: [id number]           Patient Information     Date Of Birth          1960        Visit Information        Provider Department      11/29/2017 9:20 AM José Miguel Linder DO [facility]        Today's Diagnoses     Acute pain of right knee    -  1    Primary osteoarthritis of right knee        Trochanteric bursitis of both hips        Primary osteoarthritis of both first carpometacarpal joints           Follow-ups after your visit        Your next 10 appointments already scheduled     Jan 09, 2018  2:00 PM CST   Return Visit with JESSICA Guillory CNP   [facility] ([facility])    [address] [address] [address] [phone]              Who to contact     If you have questions or need follow up information about today's clinic visit or your schedule please contact [facility] directly at [phone].  Normal or non-critical lab and imaging results will be communicated to you by Vivinthart, letter or phone within 4 business days after the clinic has received the results. If you do not hear from us within 7 days, please contact the clinic through [employer] or phone. If you have a critical or abnormal lab result, we will notify you by phone as soon as possible.  Submit refill requests through Brainrack or call your pharmacy and they will forward the refill request to us. Please allow 3 business days for your refill to be completed.          Additional Information About Your Visit        Vivinthart Information     Brainrack gives you secure access to your electronic health record. If you see a primary care provider, you can also send messages to your care team and make appointments. If you have questions, please call your primary care clinic.  If you do not have a primary care provider, please call [phone] and they will  "assist you.        Care EveryWhere ID     This is your Care EveryWhere ID. This could be used by other organizations to access your [place] medical records  [id number]        Your Vitals Were     Height Last Period BMI (Body Mass Index)             5' 6\" (1.676 m) 07/17/2009 32.44 kg/m2          Blood Pressure from Last 3 Encounters:   11/29/17 125/85   08/17/17 (!) 135/92   08/07/17 130/79    Weight from Last 3 Encounters:   11/29/17 201 lb (91.2 kg)   09/26/17 200 lb (90.7 kg)   09/14/17 204 lb 1.6 oz (92.6 kg)              We Performed the Following     DRAIN/INJECT LARGE JOINT/BURSA     HC ARTHROCENTESIS ASPIR&/INJ MAJOR JT/BURSA W/US     TRIAMCINOLONE ACET INJ NOS          Today's Medication Changes          These changes are accurate as of: 11/29/17  1:26 PM.  If you have any questions, ask your nurse or doctor.               Start taking these medicines.        Dose/Directions    triamcinolone acetonide 40 MG/ML injection   Commonly known as:  KENALOG-40   Used for:  Acute pain of right knee, Primary osteoarthritis of right knee, Trochanteric bursitis of both hips   Started by:  José Miguel Linder DO        Dose:  80 mg   2 mLs (80 mg) by INTRA-ARTICULAR route once for 1 dose   Quantity:  2 mL   Refills:  0         These medicines have changed or have updated prescriptions.        Dose/Directions    metoprolol 25 MG 24 hr tablet   Commonly known as:  TOPROL-XL   This may have changed:  additional instructions   Used for:  Palpitations, Sinus tachycardia        Take 1.5 tablets per day   Quantity:  45 tablet   Refills:  1            Where to get your medicines      Some of these will need a paper prescription and others can be bought over the counter.  Ask your nurse if you have questions.     You don't need a prescription for these medications     triamcinolone acetonide 40 MG/ML injection                Primary Care Provider Office Phone # Fax #    Liz Peterson [phone]    "    [address] [facility] [address]        Equal Access to Services     KRYSEDGARDO KRISTOPHER : Hadii rivera ku hadmeryl Sokris, waaxda luqadaha, qaybta kaalmada roxanalaurenbalwinder, waxdelphine jonny christianalberto myers demetrasalena hernandez. So [facility] [phone].    ATENCIÓN: Si habla español, tiene a kimball disposición servicios gratuitos de asistencia lingüística. Ll[facility] [phone].    We comply with applicable federal civil rights laws and [place] laws. We do not discriminate on the basis of race, color, national origin, age, disability, sex, sexual orientation, or gender identity.            Thank you!     Thank you for choosing [facility]  for your care. Our goal is always to provide you with excellent care. Hearing back from our patients is one way we can continue to improve our services. Please take a few minutes to complete the written survey that you may receive in the mail after your visit with us. Thank you!             Your Updated Medication List - Protect others around you: Learn how to safely use, store and throw away your medicines at www.disposemymeds.org.          This list is accurate as of: 11/29/17  1:26 PM.  Always use your most recent med list.                   Brand Name Dispense Instructions for use Diagnosis    acidophilus Caps     60 capsule    Take 1 capsule by mouth daily Take two hours before or after antibiotic dose.  Continue for 1 week after antibiotic therapy completed    Diarrhea       adapalene 0.1 % gel    DIFFERIN    45 g    APPLY A THIN LAYER TO THE AFFECTED AREA(S) BY TOPICAL ROUTE ONCE DAILY BEFORE BEDTIME    Acne vulgaris       ADVAIR DISKUS 500-50 MCG/DOSE diskus inhaler   Generic drug:  fluticasone-salmeterol     1 Inhaler    INHALE 1 PUFF BY INHALATION ROUTE 2 TIMES PER DAY IN THE MORNING AND EVENING APPROXIMATELY 12 HOURS APART    Moderate persistent asthma without complication       * albuterol 108 (90 BASE) MCG/ACT Inhaler    VENTOLIN HFA    54 g    INHALE 2 PUFFS  INTO THE LUNGS EVERY 6 HOURS AS NEEDED FOR SHORTNESS OF BREATH / DYSPNEA    Moderate persistent asthma without complication       * albuterol (2.5 MG/3ML) 0.083% neb solution     1 Box    Take 1 vial (2.5 mg) by nebulization every 6 hours as needed    Moderate persistent asthma with acute exacerbation       ALPRAZolam 0.25 MG tablet    XANAX    90 tablet    TAKE 1 TABLET DAILY AS NEEDED FOR ANXIETY    Generalized anxiety disorder       atorvastatin 20 MG tablet    LIPITOR          buPROPion 200 MG 12 hr tablet    WELLBUTRIN SR    180 tablet    Take 1 tablet (200 mg) by mouth 2 times daily    Generalized anxiety disorder       celecoxib 200 MG capsule    celeBREX    180 capsule    Take 1 capsule twice daily as needed for pain this is a 3 month script    Greater trochanteric bursitis, unspecified laterality, Hand arthritis       clindamycin 1 % lotion    CLEOCIN T     Apply topically 2 times daily        diclofenac 1 % Gel topical gel    VOLTAREN    9 Tube    Apply 2 grams to hands and 4 grams to hips up to 4 times daily. T    Greater trochanteric bursitis of both hips, Bilateral thumb pain       DULoxetine 60 MG EC capsule    CYMBALTA    180 capsule    Take 2 capsules (120 mg) by mouth daily    Multiple joint pain       HYDROcodone-acetaminophen 5-325 MG per tablet    NORCO    100 tablet    May take 1 tablet every 4-6 hours as needed for pain. Max of 4 tabs per day. OK to fill on/after 11/6 and begin on 11/8/2017; Must last 30 days.    Lumbosacral spondylosis without myelopathy, DDD (degenerative disc disease), lumbar, SI (sacroiliac) joint dysfunction, Greater trochanteric bursitis of both hips, Myofascial pain, Fibromyalgia       hydrocortisone 2.5 % cream     30 g    APPLY TOPICALLY 2 TIMES DAILY AS NEEDED    Dermatitis       losartan-hydrochlorothiazide 100-12.5 MG per tablet    HYZAAR    90 tablet    Take 1 tablet by mouth daily    Essential hypertension with goal blood pressure less than 140/90       medical  cannabis (Patient's own supply.  Not a prescription)     0 Information only    2 mg morning and noon. 7 mg at bedtime. (This is NOT a prescription, and does not certify that the patient has a qualifying medical condition for medical cannabis.  The purpose of this order is  to document that the patient reports taking medical cannabis.)        metoprolol 25 MG 24 hr tablet    TOPROL-XL    45 tablet    Take 1.5 tablets per day    Palpitations, Sinus tachycardia       montelukast 10 MG tablet    SINGULAIR    90 tablet    TAKE 1 TABLET (10 MG) BY ORAL ROUTE ONCE DAILY IN THE EVENING    Moderate persistent asthma without complication       mupirocin 2 % cream    BACTROBAN    60 g    Apply topically 3 times daily    Dermatitis       nystatin 650710 UNIT/ML suspension    MYCOSTATIN    380 mL    Take by mouth 4 times daily    Thrush       nystatin cream    MYCOSTATIN    60 g    Apply topically 3 times daily    Dermatitis       prochlorperazine 10 MG tablet    COMPAZINE    15 tablet    Take 0.5-1 tablets (5-10 mg) by mouth every 8 hours as needed for nausea or vomiting Or migraine headache onset. Caution sedation    Intractable chronic migraine without aura and without status migrainosus       ranitidine 150 MG tablet    ZANTAC    0    ONE TABLET TWICE DAILY        rOPINIRole 1 MG tablet    REQUIP    180 tablet    TAKE 2 TABLETS (2 MG) BY MOUTH AT BEDTIME    Movement disorder       SUMAtriptan 100 MG tablet    IMITREX    18 tablet    Take 1 tablet (100 mg) by mouth at onset of headache for migraine May repeat in 2 hours. Max 2 tablets/24 hours.    Migraine without aura and without status migrainosus, not intractable       traZODone 50 MG tablet    DESYREL    270 tablet    TAKE 2-3 TABLETS (100-150 MG) BY MOUTH AT BEDTIME    Insomnia, unspecified       tretinoin 0.025 % cream    RETIN-A    45 g    Spread a pea size amount into affected area topically at bedtime.  Use sunscreen SPF>20.    Age-related facial wrinkles        triamcinolone acetonide 40 MG/ML injection    KENALOG-40    2 mL    2 mLs (80 mg) by INTRA-ARTICULAR route once for 1 dose    Acute pain of right knee, Primary osteoarthritis of right knee, Trochanteric bursitis of both hips       ZYRTEC 10 MG tablet   Generic drug:  cetirizine     90    1 TABLET DAILY    Allergic rhinitis, cause unspecified       * Notice:  This list has 2 medication(s) that are the same as other medications prescribed for you. Read the directions carefully, and ask your doctor or other care provider to review them with you.

## 2017-12-06 NOTE — PROGRESS NOTES
"  SUBJECTIVE:                                                    Jacquie Amador is a 57 year old female who presents to clinic today for the following health issues:      History of Present Illness   Frequency of exercise:  None  Taking medications regularly:  Yes  Medication side effects:  Other      Ulcer face and mouth   Onset: ongoing for at least 6 months   Location: face, lips, and mouth   Character: severely painful (especially when it's wet, such as after she showered), burning, red, and itchy   Itching (Pruritis): YES    Progression of Symptoms:  same    Accompanying Signs & Symptoms:  Fever: no   Body aches or joint pain: YES, joint pain   Sore throat symptoms: no   Recent cold symptoms: no     History:   Previous similar ulcer: YES    Precipitating factors:   New exposures: None   Recent travel: no     Therapies Tried and outcome:   Creams and ointments helping minimally. She has avoided picking at these lesions. Would like face lesion removed.     She never went to a dermatologist because of her busy schedule. Additionally, she is concerned that she would not be able to see a dermatologist before the holidays (she is attending a holiday party where she will see people from high school) and she would like something done about this issue before that.    She also has an ulcer in her mouth, which she has had before. Her dentist has not tried to treat this. She did see a periodontist as well and was put on clindamycin which resolved it (both a topical and oral pill). She ended up going to the ER for the mouth ulcer when no provider was able to give her a clear diagnosis. Her  and her have considered going to the [place] in early 2018.     The lesions on her arms are present but not as bad as they have been in the past.   Nystatin cream and polysporin has seemed to help. Doxycycline seemed to help a bit with her arm \"ulcers\", but not her face. However, she was recently on doxycycline for a sinus " "infection and that seemed to not change her derm symptoms.  She feels that this derm problem has prevented her from going places and she never wants to go out because people stare at her. Additionally, some of her other medical issues (headaches, hyperhidrosis) have been ignored because she does not want to go anywhere to seek treatment if her derm issues are not resolved.    She has history of picking with her anxiety. She denies any picking at this time and denies that any picking started any of the \"ulcers\" that she has at this time.     Hyperlipidemia Follow-Up      Rate your low fat/cholesterol diet?: not monitoring fat    Taking statin?  Yes, possible muscle aches from statin    Other lipid medications/supplements?:  none    Hypertension Follow-up      Outpatient blood pressures are being checked at home.  Results are  130/85     Low Salt Diet: low salt    Depression and Anxiety Follow-Up    Status since last visit: No change, anxiety has been increased. She infrequently takes Xanax, and it doesn't seem to be helping her as much as it used to. She no longer takes medical marijuana, as she thought it made her more anxious. She plans on meeting with a pharmacist soon to discuss strain, CBD, and whether she should switch to pills.     Other associated symptoms:None      Significant life event: Yes-     Current substance abuse: None    PHQ-9 Score and MyChart F/U Questions 6/28/2017 8/23/2017 12/8/2017   Total Score 6 17 6   Q9: Suicide Ideation Not at all Not at all Not at all     MARIZOL-7 SCORE 6/28/2017 8/23/2017 12/8/2017   Total Score - - -   Total Score 3 (minimal anxiety) 14 (moderate anxiety) 6 (mild anxiety)   Total Score 3 14 6       PHQ-9  English  PHQ-9   Any Language  GAD7  Suicide Assessment Five-step Evaluation and Treatment (SAFE-T)      Problem list and histories reviewed & adjusted, as indicated.  Additional history: as documented      BP Readings from Last 3 Encounters:   12/08/17 110/80   11/29/17 " "125/85   08/17/17 (!) 135/92    Wt Readings from Last 3 Encounters:   11/29/17 91.2 kg (201 lb)   09/26/17 90.7 kg (200 lb)   09/14/17 92.6 kg (204 lb 1.6 oz)           ROS:  C: NEGATIVE for fever, chills, change in weight  INTEGUMENTARY/SKIN: See above.   E/M: NEGATIVE for ear, mouth and throat problems  R: NEGATIVE for significant cough or SOB  CV: She often feels her heart beating in her head and neck. She has seen cardiology for this before with a normal work up.   MS: Her hand issues are a problem for her, and she cannot drive because of this. She recently had injections done with Dr. Cárdenas and was not satisfied; the injections only worked a little bit.   NEURO: She feels that she is forgetful a lot because of her metoprolol.   PSYCH: Her anxiety has been increased recently, as above.    This document serves as a record of the services and decisions personally performed and made by Liz Peterson MD. It was created on her behalf by Autumn Irizarry, a trained medical scribe. The creation of this document is based the provider's statements to the medical scribe.  Autumn Irizarry December 8, 2017 3:33 PM    OBJECTIVE:   /80  Pulse 95  Temp 96.3  F (35.7  C) (Temporal)  Resp 14  Ht 1.676 m (5' 6\")  LMP 07/17/2009  SpO2 100%  There is no height or weight on file to calculate BMI.     GENERAL APPEARANCE: healthy, alert and no distress  EYES: Eyes grossly normal to inspection, PERRL and conjunctivae and sclerae normal  HENT: She had what appeared to be an oblong aphthous ulcer on the right upper gum line.  NECK: no adenopathy, no asymmetry, masses, or scars and thyroid normal to palpation  RESP: lungs clear to auscultation - no rales, rhonchi or wheezes  CV: regular rates and rhythm, normal S1 S2, no S3 or S4 and no murmur, click or rub  SKIN: shallow ulcer measuring approximately 2.5 cm x 2.5 cm on the forehead, the base is dry and no discharge or evidence of infection.   MENTAL STATUS " EXAM:  Appearance/Behavior: Casually groomed  Speech: Normal  Mood/Affect: normal affect  Insight: Adequate    Diagnostic Test Results:  none     ASSESSMENT/PLAN:         1. Skin ulcer, limited to breakdown of skin (H)  She has failed several topical and oral treatments.  She would like a new therapy today but it isn't clear what different should be tried at this time.   She denies any picking though this has been an issue in the past. Her facial lesion doesn't appear to have been manipulated.   Recommend dermatology referral and she agrees to proceed.   - DERMATOLOGY REFERRAL    2. Sinus tachycardia  She has seen cardiology twice and they had no concerns. Continue current treatment with her beta-blocker.    3. Chronic pain syndrome  She follows with the [facility] and has her next appointment on January 9th.    4. Hyperlipidemia  No change in plan.   Due for labs in February 2018    5. Hypertension  Doing well. Well controlled. Tolerating medication.  No change in plan.      6. Depression  Patient feels things are going well. Does not desire any changes in management.   No changes made.     All questions invited, asked and answered to the patient's apparent satisfaction.  Patient agrees to plan.     The information in this document, created by the medical scribe for me, accurately reflects the services I personally performed and the decisions made by me. I have reviewed and approved this document for accuracy prior to leaving the patient care area. December 8, 2017 3:47 PM        ARVIN MARIN MD, MD  [facility]    Answers for HPI/ROS submitted by the patient on 12/8/2017   If you checked off any problems, how difficult have these problems made it for you to do your work, take care of things at home, or get along with other people?: Somewhat difficult  PHQ9 TOTAL SCORE: 6  MARIZOL 7 TOTAL SCORE: 6  PHQ-2 Score: 2

## 2017-12-08 ENCOUNTER — OFFICE VISIT (OUTPATIENT)
Dept: FAMILY MEDICINE | Facility: CLINIC | Age: 57
End: 2017-12-08
Payer: COMMERCIAL

## 2017-12-08 VITALS
HEIGHT: 66 IN | DIASTOLIC BLOOD PRESSURE: 80 MMHG | RESPIRATION RATE: 14 BRPM | TEMPERATURE: 96.3 F | OXYGEN SATURATION: 100 % | SYSTOLIC BLOOD PRESSURE: 110 MMHG | HEART RATE: 95 BPM

## 2017-12-08 DIAGNOSIS — R00.0 SINUS TACHYCARDIA: ICD-10-CM

## 2017-12-08 DIAGNOSIS — G89.4 CHRONIC PAIN SYNDROME: ICD-10-CM

## 2017-12-08 DIAGNOSIS — F33.1 MODERATE RECURRENT MAJOR DEPRESSION (H): ICD-10-CM

## 2017-12-08 DIAGNOSIS — I10 HYPERTENSION GOAL BP (BLOOD PRESSURE) < 140/90: ICD-10-CM

## 2017-12-08 DIAGNOSIS — L98.491 SKIN ULCER, LIMITED TO BREAKDOWN OF SKIN (H): Primary | ICD-10-CM

## 2017-12-08 DIAGNOSIS — E78.5 HYPERLIPIDEMIA LDL GOAL <130: ICD-10-CM

## 2017-12-08 PROCEDURE — 99214 OFFICE O/P EST MOD 30 MIN: CPT | Performed by: FAMILY MEDICINE

## 2017-12-08 ASSESSMENT — ANXIETY QUESTIONNAIRES
6. BECOMING EASILY ANNOYED OR IRRITABLE: SEVERAL DAYS
2. NOT BEING ABLE TO STOP OR CONTROL WORRYING: SEVERAL DAYS
4. TROUBLE RELAXING: SEVERAL DAYS
7. FEELING AFRAID AS IF SOMETHING AWFUL MIGHT HAPPEN: SEVERAL DAYS
7. FEELING AFRAID AS IF SOMETHING AWFUL MIGHT HAPPEN: SEVERAL DAYS
GAD7 TOTAL SCORE: 6
GAD7 TOTAL SCORE: 6
3. WORRYING TOO MUCH ABOUT DIFFERENT THINGS: SEVERAL DAYS
5. BEING SO RESTLESS THAT IT IS HARD TO SIT STILL: NOT AT ALL
1. FEELING NERVOUS, ANXIOUS, OR ON EDGE: SEVERAL DAYS
GAD7 TOTAL SCORE: 6

## 2017-12-08 ASSESSMENT — PATIENT HEALTH QUESTIONNAIRE - PHQ9
SUM OF ALL RESPONSES TO PHQ QUESTIONS 1-9: 6
SUM OF ALL RESPONSES TO PHQ QUESTIONS 1-9: 6
10. IF YOU CHECKED OFF ANY PROBLEMS, HOW DIFFICULT HAVE THESE PROBLEMS MADE IT FOR YOU TO DO YOUR WORK, TAKE CARE OF THINGS AT HOME, OR GET ALONG WITH OTHER PEOPLE: SOMEWHAT DIFFICULT

## 2017-12-08 ASSESSMENT — PAIN SCALES - GENERAL: PAINLEVEL: NO PAIN (0)

## 2017-12-08 NOTE — NURSING NOTE
"Chief Complaint   Patient presents with     Ulcer     Panel Management     flu, honoring choices, phq9/veda, act       Initial /80  Pulse 95  Temp 96.3  F (35.7  C) (Temporal)  Resp 14  Ht 5' 6\" (1.676 m)  LMP 07/17/2009  SpO2 100% Estimated body mass index is 32.44 kg/(m^2) as calculated from the following:    Height as of 11/29/17: 5' 6\" (1.676 m).    Weight as of 11/29/17: 201 lb (91.2 kg).  Medication Reconciliation: complete     Karen Saldana MA       "

## 2017-12-08 NOTE — MR AVS SNAPSHOT
After Visit Summary   12/8/2017    Jacquie Amador    MRN: [id number]           Patient Information     Date Of Birth          1960        Visit Information        Provider Department      12/8/2017 3:20 PM Liz Peterson MD [facility]        Today's Diagnoses     Skin ulcer, limited to breakdown of skin (H)    -  1    Sinus tachycardia        Chronic pain syndrome           Follow-ups after your visit        Additional Services     DERMATOLOGY REFERRAL       Your provider has referred you to: [facility]: [facility] [phone]   [email or web address]  [facility] [phone]   [email or web address]    Please be aware that coverage of these services is subject to the terms and limitations of your health insurance plan.  Call member services at your health plan with any benefit or coverage questions.      Please bring the following with you to your appointment:    (1) Any X-Rays, CTs or MRIs which have been performed.  Contact the facility where they were done to arrange for  prior to your scheduled appointment.    (2) List of current medications  (3) This referral request   (4) Any documents/labs given to you for this referral                  Your next 10 appointments already scheduled     Jan 09, 2018  2:00 PM CST   Return Visit with JESSICA Guillory CNP   [facility] ([facility])    [address] [address] [phone]              Who to contact     If you have questions or need follow up information about today's clinic visit or your schedule please contact [facility] directly at [phone].  Normal or non-critical lab and imaging results will be communicated to you by MyChart, letter or phone within 4 business days after the clinic has received the results.  "If you do not hear from us within 7 days, please contact the clinic through [employer] or phone. If you have a critical or abnormal lab result, we will notify you by phone as soon as possible.  Submit refill requests through [employer] or call your pharmacy and they will forward the refill request to us. Please allow 3 business days for your refill to be completed.          Additional Information About Your Visit        [employer] Information     [employer] gives you secure access to your electronic health record. If you see a primary care provider, you can also send messages to your care team and make appointments. If you have questions, please call your primary care clinic.  If you do not have a primary care provider, please call [phone] and they will assist you.        Care EveryWhere ID     This is your Care EveryWhere ID. This could be used by other organizations to access your [place] medical records  [id number]        Your Vitals Were     Pulse Temperature Respirations Height Last Period Pulse Oximetry    95 96.3  F (35.7  C) (Temporal) 14 5' 6\" (1.676 m) 07/17/2009 100%       Blood Pressure from Last 3 Encounters:   12/08/17 110/80   11/29/17 125/85   08/17/17 (!) 135/92    Weight from Last 3 Encounters:   11/29/17 201 lb (91.2 kg)   09/26/17 200 lb (90.7 kg)   09/14/17 204 lb 1.6 oz (92.6 kg)              We Performed the Following     DERMATOLOGY REFERRAL          Today's Medication Changes          These changes are accurate as of: 12/8/17  4:08 PM.  If you have any questions, ask your nurse or doctor.               These medicines have changed or have updated prescriptions.        Dose/Directions    metoprolol 25 MG 24 hr tablet   Commonly known as:  TOPROL-XL   This may have changed:  additional instructions   Used for:  Palpitations, Sinus tachycardia        Take 1.5 tablets per day   Quantity:  45 tablet   Refills:  1                Primary Care Provider Office Phone # Fax #    Liz Peterson MD " [phone]       [address] [address] [address]        Equal Access to Services     JAMARI SUMMERS : Hadii aad ku hadmeryl Gramajo, wajordanda luqjimmie, qaybta kaalmada maryjane, yao mendoza roxanasarah ford laAlyshajac david. So [facility] [phone].    ATENCIÓN: Si habla español, tiene a kimball disposición servicios gratuitos de asistencia lingüística. Llame al [phone].    We comply with applicable federal civil rights laws and [place] laws. We do not discriminate on the basis of race, color, national origin, age, disability, sex, sexual orientation, or gender identity.            Thank you!     Thank you for choosing [facility]  for your care. Our goal is always to provide you with excellent care. Hearing back from our patients is one way we can continue to improve our services. Please take a few minutes to complete the written survey that you may receive in the mail after your visit with us. Thank you!             Your Updated Medication List - Protect others around you: Learn how to safely use, store and throw away your medicines at www.disposemymeds.org.          This list is accurate as of: 12/8/17  4:08 PM.  Always use your most recent med list.                   Brand Name Dispense Instructions for use Diagnosis    acidophilus Caps     60 capsule    Take 1 capsule by mouth daily Take two hours before or after antibiotic dose.  Continue for 1 week after antibiotic therapy completed    Diarrhea       adapalene 0.1 % gel    DIFFERIN    45 g    APPLY A THIN LAYER TO THE AFFECTED AREA(S) BY TOPICAL ROUTE ONCE DAILY BEFORE BEDTIME    Acne vulgaris       ADVAIR DISKUS 500-50 MCG/DOSE diskus inhaler   Generic drug:  fluticasone-salmeterol     1 Inhaler    INHALE 1 PUFF BY INHALATION ROUTE 2 TIMES PER DAY IN THE MORNING AND EVENING APPROXIMATELY 12 HOURS APART    Moderate persistent asthma without complication       * albuterol 108 (90 BASE) MCG/ACT Inhaler    VENTOLIN HFA    54 g    INHALE  2 PUFFS INTO THE LUNGS EVERY 6 HOURS AS NEEDED FOR SHORTNESS OF BREATH / DYSPNEA    Moderate persistent asthma without complication       * albuterol (2.5 MG/3ML) 0.083% neb solution     1 Box    Take 1 vial (2.5 mg) by nebulization every 6 hours as needed    Moderate persistent asthma with acute exacerbation       ALPRAZolam 0.25 MG tablet    XANAX    90 tablet    TAKE 1 TABLET DAILY AS NEEDED FOR ANXIETY    Generalized anxiety disorder       atorvastatin 20 MG tablet    LIPITOR          buPROPion 200 MG 12 hr tablet    WELLBUTRIN SR    180 tablet    Take 1 tablet (200 mg) by mouth 2 times daily    Generalized anxiety disorder       celecoxib 200 MG capsule    celeBREX    180 capsule    Take 1 capsule twice daily as needed for pain this is a 3 month script    Greater trochanteric bursitis, unspecified laterality, Hand arthritis       clindamycin 1 % lotion    CLEOCIN T     Apply topically 2 times daily        diclofenac 1 % Gel topical gel    VOLTAREN    9 Tube    Apply 2 grams to hands and 4 grams to hips up to 4 times daily. T    Greater trochanteric bursitis of both hips, Bilateral thumb pain       DULoxetine 60 MG EC capsule    CYMBALTA    180 capsule    Take 2 capsules (120 mg) by mouth daily    Multiple joint pain       HYDROcodone-acetaminophen 5-325 MG per tablet    NORCO    100 tablet    May take 1 tablet every 4-6 hours as needed for pain. Max of 4 tabs per day. OK to fill on/after 11/6 and begin on 11/8/2017; Must last 30 days.    Lumbosacral spondylosis without myelopathy, DDD (degenerative disc disease), lumbar, SI (sacroiliac) joint dysfunction, Greater trochanteric bursitis of both hips, Myofascial pain, Fibromyalgia       hydrocortisone 2.5 % cream     30 g    APPLY TOPICALLY 2 TIMES DAILY AS NEEDED    Dermatitis       losartan-hydrochlorothiazide 100-12.5 MG per tablet    HYZAAR    90 tablet    Take 1 tablet by mouth daily    Essential hypertension with goal blood pressure less than 140/90        medical cannabis (Patient's own supply.  Not a prescription)     0 Information only    2 mg morning and noon. 7 mg at bedtime. (This is NOT a prescription, and does not certify that the patient has a qualifying medical condition for medical cannabis.  The purpose of this order is  to document that the patient reports taking medical cannabis.)        metoprolol 25 MG 24 hr tablet    TOPROL-XL    45 tablet    Take 1.5 tablets per day    Palpitations, Sinus tachycardia       montelukast 10 MG tablet    SINGULAIR    90 tablet    TAKE 1 TABLET (10 MG) BY ORAL ROUTE ONCE DAILY IN THE EVENING    Moderate persistent asthma without complication       mupirocin 2 % cream    BACTROBAN    60 g    Apply topically 3 times daily    Dermatitis       nystatin [id number] UNIT/ML suspension    MYCOSTATIN    380 mL    Take by mouth 4 times daily    Thrush       nystatin cream    MYCOSTATIN    60 g    Apply topically 3 times daily    Dermatitis       prochlorperazine 10 MG tablet    COMPAZINE    15 tablet    Take 0.5-1 tablets (5-10 mg) by mouth every 8 hours as needed for nausea or vomiting Or migraine headache onset. Caution sedation    Intractable chronic migraine without aura and without status migrainosus       ranitidine 150 MG tablet    ZANTAC    0    ONE TABLET TWICE DAILY        rOPINIRole 1 MG tablet    REQUIP    180 tablet    TAKE 2 TABLETS (2 MG) BY MOUTH AT BEDTIME    Movement disorder       SUMAtriptan 100 MG tablet    IMITREX    18 tablet    Take 1 tablet (100 mg) by mouth at onset of headache for migraine May repeat in 2 hours. Max 2 tablets/24 hours.    Migraine without aura and without status migrainosus, not intractable       traZODone 50 MG tablet    DESYREL    270 tablet    TAKE 2-3 TABLETS (100-150 MG) BY MOUTH AT BEDTIME    Insomnia, unspecified       tretinoin 0.025 % cream    RETIN-A    45 g    Spread a pea size amount into affected area topically at bedtime.  Use sunscreen SPF>20.    Age-related facial wrinkles        ZYRTEC 10 MG tablet   Generic drug:  cetirizine     90    1 TABLET DAILY    Allergic rhinitis, cause unspecified       * Notice:  This list has 2 medication(s) that are the same as other medications prescribed for you. Read the directions carefully, and ask your doctor or other care provider to review them with you.

## 2017-12-09 ASSESSMENT — ANXIETY QUESTIONNAIRES: GAD7 TOTAL SCORE: 6

## 2017-12-09 ASSESSMENT — PATIENT HEALTH QUESTIONNAIRE - PHQ9: SUM OF ALL RESPONSES TO PHQ QUESTIONS 1-9: 6

## 2017-12-09 ASSESSMENT — ASTHMA QUESTIONNAIRES: ACT_TOTALSCORE: 14

## 2017-12-11 ENCOUNTER — TRANSFERRED RECORDS (OUTPATIENT)
Dept: HEALTH INFORMATION MANAGEMENT | Facility: CLINIC | Age: 57
End: 2017-12-11

## 2017-12-22 ENCOUNTER — MYC MEDICAL ADVICE (OUTPATIENT)
Dept: FAMILY MEDICINE | Facility: CLINIC | Age: 57
End: 2017-12-22

## 2017-12-27 ENCOUNTER — MYC MEDICAL ADVICE (OUTPATIENT)
Dept: FAMILY MEDICINE | Facility: CLINIC | Age: 57
End: 2017-12-27

## 2017-12-28 ENCOUNTER — MYC MEDICAL ADVICE (OUTPATIENT)
Dept: FAMILY MEDICINE | Facility: CLINIC | Age: 57
End: 2017-12-28

## 2017-12-28 DIAGNOSIS — F41.1 GENERALIZED ANXIETY DISORDER: ICD-10-CM

## 2017-12-28 DIAGNOSIS — J45.40 MODERATE PERSISTENT ASTHMA WITHOUT COMPLICATION: ICD-10-CM

## 2017-12-29 DIAGNOSIS — F41.1 GENERALIZED ANXIETY DISORDER: ICD-10-CM

## 2017-12-30 RX ORDER — MONTELUKAST SODIUM 10 MG/1
TABLET ORAL
Qty: 90 TABLET | Refills: 0 | Status: SHIPPED | OUTPATIENT
Start: 2017-12-30 | End: 2018-03-23

## 2017-12-30 NOTE — TELEPHONE ENCOUNTER
Requested Prescriptions   Pending Prescriptions Disp Refills     montelukast (SINGULAIR) 10 MG tablet [Pharmacy Med Name: Montelukast Sodium Oral Tablet 10 MG] 90 tablet 0     Sig: TAKE ONE TABLET BY MOUTH IN THE EVENING    Leukotriene Inhibitors Protocol Failed    12/28/2017  7:00 AM       Failed - Asthma control test 20 or greater in past 6 months    Please review ACT score.   ACT Total Scores 12/8/2017   ACT TOTAL SCORE -   ASTHMA ER VISITS -   ASTHMA HOSPITALIZATIONS -   ACT TOTAL SCORE (Goal Greater than or Equal to 20) 14   In the past 12 months, how many times did you visit the emergency room for your asthma without being admitted to the hospital? 0   In the past 12 months, how many times were you hospitalized overnight because of your asthma? 0            Passed - Patient is age 12 or older    If patient is under 16, ok to refill using age based dosing.          Passed - Recent (6 mo) or future visit with authorizing provider's specialty    Patient had office visit in the last 6 months or has a visit in the next 30 days with authorizing provider.  See chart review.     12/08/2017        Prescription approved per G Refill Protocol.  Dedrick Noe, RN, BSN

## 2018-01-02 ENCOUNTER — MYC MEDICAL ADVICE (OUTPATIENT)
Dept: FAMILY MEDICINE | Facility: CLINIC | Age: 58
End: 2018-01-02

## 2018-01-02 DIAGNOSIS — F41.1 GENERALIZED ANXIETY DISORDER: ICD-10-CM

## 2018-01-02 RX ORDER — BUPROPION HYDROCHLORIDE 200 MG/1
TABLET, EXTENDED RELEASE ORAL
Qty: 90 TABLET | Refills: 0 | OUTPATIENT
Start: 2018-01-02

## 2018-01-02 RX ORDER — BUPROPION HYDROCHLORIDE 150 MG/1
TABLET, EXTENDED RELEASE ORAL
Qty: 90 TABLET | Refills: 0 | Status: SHIPPED | OUTPATIENT
Start: 2018-01-02 | End: 2018-03-28

## 2018-01-02 RX ORDER — BUPROPION HYDROCHLORIDE 200 MG/1
200 TABLET, EXTENDED RELEASE ORAL DAILY
Qty: 90 TABLET | Refills: 0 | Status: SHIPPED | OUTPATIENT
Start: 2018-01-02 | End: 2018-03-23

## 2018-01-02 NOTE — TELEPHONE ENCOUNTER
Please clarify the Wellbutrin prescriptions--all that appears to be listed was the 200 mg SR twice daily.     Will be leaving the ordering for blood work to Dr. Peterson for tomorrow when she returns. Please advise concerning the lab orders.

## 2018-01-02 NOTE — TELEPHONE ENCOUNTER
Called patient, pt states that she take the Wellbutrin 200 mg in morning and 150 mg in evening( two separate prescriptions) . Provider please sent the Wellbutrin 200 mg to [facility] pharmacy in [place], pt wanted 90 days supply.     Pt will wait for Dr. Peterson regarding to labs.

## 2018-01-02 NOTE — TELEPHONE ENCOUNTER
Requested Prescriptions   Pending Prescriptions Disp Refills     buPROPion (WELLBUTRIN SR) 150 MG 12 hr tablet [Pharmacy Med Name: BuPROPion HCl ER (SR) Oral Tablet Extended Release 12 Hour 150 MG] 90 tablet 0     Sig: TAKE ONE TABLET BY MOUTH ONE TIME DAILY IN THE EVENING    SSRIs Protocol Failed    12/28/2017  2:25 PM       Failed - PHQ-9 score less than 5 in past 6 months    The PHQ-9 criteria is meant to fail. It requires a PHQ-9 score review         Passed - Recent or future visit with authorizing provider    Patient had office visit in the last year or has a visit in the next 30 days with authorizing provider.  See chart review.     PHQ-9 SCORE 6/28/2017 8/23/2017 12/8/2017   Total Score - - -   Total Score MyChart 6 (Mild depression) 17 (Moderately severe depression) 6 (Mild depression)   Total Score - - -   Total Score 6 17 6            Passed - Medication is Bupropion    If the medication is Bupropion (Wellbutrin), and the patient is taking for smoking cessation; OK to refill.         Passed - Patient is age 18 or older       Passed - No active pregnancy on record       Passed - No positive pregnancy test in last 12 months       Passed - Recent (6 mo) or future visit with authorizing provider's specialty    Patient had office visit in the last 6 months or has a visit in the next 30 days with authorizing provider.  See chart review.             buPROPion (WELLBUTRIN SR) 200 MG 12 hr tablet  Routing refill request to provider for review/approval because:  A break in medication, last Rx was a No Print Out and would have needed refills around 11/2017    Eva Pace, RN, BSN

## 2018-01-05 ENCOUNTER — MYC MEDICAL ADVICE (OUTPATIENT)
Dept: FAMILY MEDICINE | Facility: CLINIC | Age: 58
End: 2018-01-05

## 2018-01-05 DIAGNOSIS — K12.0 APHTHAE, ORAL: ICD-10-CM

## 2018-01-05 DIAGNOSIS — R53.83 FATIGUE, UNSPECIFIED TYPE: Primary | ICD-10-CM

## 2018-01-05 DIAGNOSIS — R23.9 SKIN CHANGE: Primary | ICD-10-CM

## 2018-01-05 NOTE — TELEPHONE ENCOUNTER
Please call Dr. Simpson's office, Dermatology [facility] [phone]      Looking for skin biopsy results.

## 2018-01-08 DIAGNOSIS — R53.83 FATIGUE, UNSPECIFIED TYPE: ICD-10-CM

## 2018-01-08 DIAGNOSIS — K12.0 APHTHAE, ORAL: ICD-10-CM

## 2018-01-08 DIAGNOSIS — R23.9 SKIN CHANGE: ICD-10-CM

## 2018-01-08 LAB
BASOPHILS # BLD AUTO: 0.1 10E9/L (ref 0–0.2)
BASOPHILS NFR BLD AUTO: 0.8 %
DIFFERENTIAL METHOD BLD: NORMAL
EOSINOPHIL # BLD AUTO: 0.3 10E9/L (ref 0–0.7)
EOSINOPHIL NFR BLD AUTO: 5 %
ERYTHROCYTE [DISTWIDTH] IN BLOOD BY AUTOMATED COUNT: 14.7 % (ref 10–15)
ERYTHROCYTE [SEDIMENTATION RATE] IN BLOOD BY WESTERGREN METHOD: 19 MM/H (ref 0–30)
HCT VFR BLD AUTO: 40.1 % (ref 35–47)
HGB BLD-MCNC: 13.5 G/DL (ref 11.7–15.7)
LYMPHOCYTES # BLD AUTO: 1.8 10E9/L (ref 0.8–5.3)
LYMPHOCYTES NFR BLD AUTO: 28.9 %
MCH RBC QN AUTO: 29.8 PG (ref 26.5–33)
MCHC RBC AUTO-ENTMCNC: 33.7 G/DL (ref 31.5–36.5)
MCV RBC AUTO: 89 FL (ref 78–100)
MONOCYTES # BLD AUTO: 0.7 10E9/L (ref 0–1.3)
MONOCYTES NFR BLD AUTO: 11.5 %
NEUTROPHILS # BLD AUTO: 3.3 10E9/L (ref 1.6–8.3)
NEUTROPHILS NFR BLD AUTO: 53.8 %
PLATELET # BLD AUTO: 365 10E9/L (ref 150–450)
RBC # BLD AUTO: 4.53 10E12/L (ref 3.8–5.2)
WBC # BLD AUTO: 6.2 10E9/L (ref 4–11)

## 2018-01-08 PROCEDURE — 86200 CCP ANTIBODY: CPT | Performed by: FAMILY MEDICINE

## 2018-01-08 PROCEDURE — 85652 RBC SED RATE AUTOMATED: CPT | Performed by: FAMILY MEDICINE

## 2018-01-08 PROCEDURE — 86431 RHEUMATOID FACTOR QUANT: CPT | Performed by: FAMILY MEDICINE

## 2018-01-08 PROCEDURE — 84443 ASSAY THYROID STIM HORMONE: CPT | Performed by: FAMILY MEDICINE

## 2018-01-08 PROCEDURE — 86140 C-REACTIVE PROTEIN: CPT | Performed by: FAMILY MEDICINE

## 2018-01-08 PROCEDURE — 86038 ANTINUCLEAR ANTIBODIES: CPT | Performed by: FAMILY MEDICINE

## 2018-01-08 PROCEDURE — 36415 COLL VENOUS BLD VENIPUNCTURE: CPT | Performed by: FAMILY MEDICINE

## 2018-01-08 PROCEDURE — 85025 COMPLETE CBC W/AUTO DIFF WBC: CPT | Performed by: FAMILY MEDICINE

## 2018-01-09 ENCOUNTER — OFFICE VISIT (OUTPATIENT)
Dept: PALLIATIVE MEDICINE | Facility: CLINIC | Age: 58
End: 2018-01-09
Payer: COMMERCIAL

## 2018-01-09 ENCOUNTER — TELEPHONE (OUTPATIENT)
Dept: PALLIATIVE MEDICINE | Facility: CLINIC | Age: 58
End: 2018-01-09

## 2018-01-09 VITALS
DIASTOLIC BLOOD PRESSURE: 82 MMHG | WEIGHT: 203 LBS | BODY MASS INDEX: 32.77 KG/M2 | HEART RATE: 114 BPM | SYSTOLIC BLOOD PRESSURE: 119 MMHG

## 2018-01-09 DIAGNOSIS — M79.7 FIBROMYALGIA: ICD-10-CM

## 2018-01-09 DIAGNOSIS — M70.61 GREATER TROCHANTERIC BURSITIS OF BOTH HIPS: ICD-10-CM

## 2018-01-09 DIAGNOSIS — M51.369 DDD (DEGENERATIVE DISC DISEASE), LUMBAR: ICD-10-CM

## 2018-01-09 DIAGNOSIS — M19.041 PRIMARY OSTEOARTHRITIS OF BOTH HANDS: ICD-10-CM

## 2018-01-09 DIAGNOSIS — M47.817 LUMBOSACRAL SPONDYLOSIS WITHOUT MYELOPATHY: ICD-10-CM

## 2018-01-09 DIAGNOSIS — G43.719 INTRACTABLE CHRONIC MIGRAINE WITHOUT AURA AND WITHOUT STATUS MIGRAINOSUS: Primary | ICD-10-CM

## 2018-01-09 DIAGNOSIS — M79.641 BILATERAL HAND PAIN: ICD-10-CM

## 2018-01-09 DIAGNOSIS — M79.18 MYOFASCIAL PAIN: ICD-10-CM

## 2018-01-09 DIAGNOSIS — M79.642 BILATERAL HAND PAIN: ICD-10-CM

## 2018-01-09 DIAGNOSIS — M53.3 SI (SACROILIAC) JOINT DYSFUNCTION: ICD-10-CM

## 2018-01-09 DIAGNOSIS — M70.62 GREATER TROCHANTERIC BURSITIS OF BOTH HIPS: ICD-10-CM

## 2018-01-09 DIAGNOSIS — M19.042 PRIMARY OSTEOARTHRITIS OF BOTH HANDS: ICD-10-CM

## 2018-01-09 LAB
CRP SERPL-MCNC: 4.7 MG/L (ref 0–8)
RHEUMATOID FACT SER NEPH-ACNC: <20 IU/ML (ref 0–20)
TSH SERPL DL<=0.005 MIU/L-ACNC: 1.97 MU/L (ref 0.4–4)

## 2018-01-09 PROCEDURE — 99214 OFFICE O/P EST MOD 30 MIN: CPT | Performed by: NURSE PRACTITIONER

## 2018-01-09 RX ORDER — HYDROCODONE BITARTRATE AND ACETAMINOPHEN 5; 325 MG/1; MG/1
TABLET ORAL
Qty: 100 TABLET | Refills: 0 | Status: SHIPPED | OUTPATIENT
Start: 2018-01-09 | End: 2018-04-02

## 2018-01-09 ASSESSMENT — PAIN SCALES - GENERAL: PAINLEVEL: SEVERE PAIN (6)

## 2018-01-09 NOTE — PATIENT INSTRUCTIONS
PLAN  1. Medications:   1. Continue Imitrex as prescribed, max of 9 times per month. This is managed by Dr. Peterson  2. Continue hydrocodone/acetaminophen, use sparingly as you are  3. Continue to use Voltaren Gel as needed  4. Start Compazine as prescribed, OK take with Imitrex or rizatriptan. Caution sedation. I would take these an hour apart  2. Purchase a Paraffin wax bath from [employer] for hand pain. Use the Paraffin wax bath 4 times per day until hand injections.  3. Procedures:   1. Schedule a Botox injection appointment with Dr. Renetta Richardson. The office will contact you with further details. We will schedule additional appointments every 3 months.  2. Schedule  hand injections appointment with Dr. Linder. The office will contact you with further details.   4. Follow-up with me in 8-10 weeks.    ----------------------------------------------------------------  Nurse Triage line:  [phone]   Call this number with any questions or concerns. You may leave a detailed message anytime. Calls are typically returned Monday through Friday between 8 AM and 4:30 PM. We usually get back to you within 2 business days depending on the issue/request.       Medication refills:    For non-narcotic medications, call your pharmacy directly to request a refill. The pharmacy will contact the [facility] for authorization. Please allow 3-4 days for these refills to be processed.     For narcotic refills, call the nurse triage line or send a [employer] message. Please contact us 7-10 days before your refill is due. The message MUST include the name of the specific medication(s) requested and how you would like to receive the prescription(s). The options are as follows:    Pain Clinic staff can mail the prescription to your pharmacy. Please tell us the name of the pharmacy.    You may pick the prescription up at the Pain Clinic (tell us the location) or during a clinic visit with your pain provider    Pain  Clinic staff can deliver the prescription to the [facility] in the clinic building. Please tell us the location.      Scheduling number: [phone].  Call this number to schedule or change appointments.    We believe regular attendance is key to your success in our program.    Any time you are unable to keep your appointment we ask that you call us at least 24 hours in advance to let us know. This will allow us to offer the appointment time to another patient.

## 2018-01-09 NOTE — PROGRESS NOTES
[facility]    1/9/2018      Chief complaint:  Hands, feet, knees, hips and back.   And  migraines      Interval history:  Jacquie Amador is a 57 year old female is known to me for chronic low back pain s/p L5-S1 hemilaminectomy and partial discectomy, multilevel lumbar DDD per imaging, extension based pain indicating likely facetogenic cause of chronic low back pain, lumbar spondylosis, SI joint dysfunction, greater trochanteric bursitis bilaterally, myofascial/fibromyalgia pain and chronic opiate use.     Recommendations/plan at the last visit on 8/1/2017 included:  1. Physical Therapy:  To follow up with Lorena Gillespie in physical therapy as scheduled.   2. Continue home activities  3. Clinical Health Psychologist: patient to let me know if she wants to see any of our HEALTH PSYCHOLOGY providers  4. Diagnostic Studies:  None  5. Medication:   1. Continue Norco  2. Start compazine as directed for migraine headaches. Caution sedation. Start with lower dose and don't drive until she knows how she feels on this medication  6. Procedures: keep lumbar RFA appt on 8/7/2017  7. Recommendations to PCP: see above  8. Follow up: in 10-12 weeks      Since her last visit, Jacquie Amador reports:  -She has a cold and has been sweating. The pt reports that her hands have been bothering her and her hands are the worst pain. The hand pain is aggravated by the cold.  -The pt reports frequent migraines over the past week. These migraines occur on a daily basis.  -She had one round of Botox that was not effective. She has not been able to return for a 2nd round as her mother was ill. She would like to repeat Botox for migraine prevention.       At this point, the patient's participation with our multidisciplinary team includes:  The patient has been compliant with the program.  Pain Group - none  PT - has seen Lorena Gillespie for at least 4 recent visits  Health Psych - none      Pain scores: this is  "describing her headache pain for the most part  Pain intensity on average is 6 on a scale of 0-10.  Range is 4-10/10.   Pain right now is 6/10.  Pain is described as \"aching, throbbing\".   Pain is continuous in nature, but is varies in intensity.     Current pain relevant medications:   -hydrocodone/acetaminophen 5/325mg take 1 every 6 hours as needed for pain max of 4/day #100 per month (helpful-using 3 per day, helpful for body pain but not for headaches)  -Voltaren gel 4 grams to lateral hips PRN  (helpful)  -Wellbutrin 200mg in AM and 150mg at night (helpful)  -Cymbalta 120mg QD (helpful)  -Xanax 0.25mg (uses 1/2 tablet very infrequently)  -Requip 2mg at bedtime (helpful)  -Celebrex 200mg BID (helpful)  -Trazodone 100-150mg at bedtime (helpful)  -Imitrex 100mg at migraine onset, may repeat x 1 in 2 hours if needed (helpful)      Previous Medications:  OPIATES: Tramadol (didn't like how she felt), hydrocodone (helpful)  NSAIDS: Aleve (not helpful), ibuprofen (somewhat helpful), Celebrex (helpful)  MUSCLE RELAXANTS: no  ANTI-MIGRAINE MEDS: Sumatriptan (didn't need it anymore)  ANTI-DEPRESSANTS: Zoloft (lost effectiveness), Wellbutrin (helpful), Cymbalta (helpful)  SLEEP AIDS: Xanax (helpful), Trazodone (helpful)  ANTI-CONVULSANTS: gabapentin (didn't like how she felt), Lyrica (somewhat helpful)  TOPICALS: Lidoderm (not helpful for hips), Voltaren gel (helpful for hands)  Other meds: tylenol     Past Pain Treatments:  Jacquie MARKOS Amador has been seen at a pain clinic in the past. [place] and [facility]  PT: tried, not helpful  Acupuncture: no  TENs Unit: yes, not helpful    Injections:  9/23/2014 Right SI joint injection at [facility] (not helpful)  10/22/2014 Bilateral greater trochanteric bursal injections at [facility] (helpful x 2-3 days)  11/25/2014 Left L5-S1 transforaminal LESI done at [facility] (not helpful)  12/31/2014 left L3-4, L4-5 and L5-S1 steroid facet joint injections at [facility] (not " helpful)  -6/24/2015 bilateral LMBBs done with Dr. Richardson  -7/1/2015 bilateral LMBBs done with Dr. Dobbs  -8/17/2015 lumbar RFA with Dr. Richardson  -12/30/2015 bilateral L4-L5 and L5-S1 facet joint injections with Dr. Renetta Richardson (helpful)  -1/25/2016 bilateral greater trochanteric bursal injections with Dr. Renetta Richardson (helpful)  -5/3/2016 bilateral greater trochanteric bursal injections with Dr. Renteta Richardson (helpful)  -9/15/2016 bilateral trochanteric bursal injections with Dr. Renetta Richardson (helpful)  -3/31/2017 bilateral greater trochanteric bursal injections with Dr. José Miguel Linder (helpful)  -7/31/2017 bilateral greater trochanteric bursal injections with Dr. Renetta Richardson (helpful)  -8/7/2017 lumbar RFA with Dr. Richardson (helpful)  -8/17/2017 Botox for migraines #1 with Dr. Renetta Richardson (not helpful)      THE 4 A's OF OPIOID MAINTENANCE ANALGESIA    Analgesia: helpful    Activity: walking,  activities are inhibited without the medication    Adverse effects: constipation, uses raisins and laxatives as needed    Adherence to Rx protocol: yes      Medications:  Current Outpatient Prescriptions   Medication Sig Dispense Refill     HYDROcodone-acetaminophen (NORCO) 5-325 MG per tablet May take 1 tablet every 4-6 hours as needed for pain. Max of 4 tabs per day. OK to fill and begin on/after 1/9/2018 Must last 30 days. 100 tablet 0     buPROPion (WELLBUTRIN SR) 150 MG 12 hr tablet TAKE ONE TABLET BY MOUTH ONE TIME DAILY IN THE EVENING 90 tablet 0     buPROPion (WELLBUTRIN SR) 200 MG 12 hr tablet Take 1 tablet (200 mg) by mouth daily 90 tablet 0     montelukast (SINGULAIR) 10 MG tablet TAKE ONE TABLET BY MOUTH IN THE EVENING  90 tablet 0     celecoxib (CELEBREX) 200 MG capsule Take 1 capsule twice daily as needed for pain this is a 3 month script 180 capsule 1     ALPRAZolam (XANAX) 0.25 MG tablet TAKE 1 TABLET DAILY AS NEEDED FOR ANXIETY 90 tablet 0     nystatin (MYCOSTATIN) cream Apply topically 3 times daily 60  g 1     mupirocin (BACTROBAN) 2 % cream Apply topically 3 times daily 60 g 1     SUMAtriptan (IMITREX) 100 MG tablet Take 1 tablet (100 mg) by mouth at onset of headache for migraine May repeat in 2 hours. Max 2 tablets/24 hours. 18 tablet 1     albuterol (VENTOLIN HFA) 108 (90 BASE) MCG/ACT Inhaler INHALE 2 PUFFS INTO THE LUNGS EVERY 6 HOURS AS NEEDED FOR SHORTNESS OF BREATH / DYSPNEA 54 g 1     metoprolol (TOPROL-XL) 25 MG 24 hr tablet Take 1.5 tablets per day (Patient taking differently: Take 0.5 tablets per day) 45 tablet 1     medical cannabis (Patient's own supply.  Not a prescription) 2 mg morning and noon. 7 mg at bedtime. (This is NOT a prescription, and does not certify that the patient has a qualifying medical condition for medical cannabis.  The purpose of this order is  to document that the patient reports taking medical cannabis.) 0 Information only 0     atorvastatin (LIPITOR) 20 MG tablet   3     nystatin (MYCOSTATIN) 763699 UNIT/ML suspension Take by mouth 4 times daily 380 mL 1     adapalene (DIFFERIN) 0.1 % gel APPLY A THIN LAYER TO THE AFFECTED AREA(S) BY TOPICAL ROUTE ONCE DAILY BEFORE BEDTIME 45 g 10     ADVAIR DISKUS 500-50 MCG/DOSE diskus inhaler INHALE 1 PUFF BY INHALATION ROUTE 2 TIMES PER DAY IN THE MORNING AND EVENING APPROXIMATELY 12 HOURS APART 1 Inhaler 3     diclofenac (VOLTAREN) 1 % GEL topical gel Apply 2 grams to hands and 4 grams to hips up to 4 times daily. T 9 Tube 11     losartan-hydrochlorothiazide (HYZAAR) 100-12.5 MG per tablet Take 1 tablet by mouth daily 90 tablet 3     traZODone (DESYREL) 50 MG tablet TAKE 2-3 TABLETS (100-150 MG) BY MOUTH AT BEDTIME 270 tablet 3     DULoxetine (CYMBALTA) 60 MG EC capsule Take 2 capsules (120 mg) by mouth daily 180 capsule 3     rOPINIRole (REQUIP) 1 MG tablet TAKE 2 TABLETS (2 MG) BY MOUTH AT BEDTIME 180 tablet 3     clindamycin (CLEOCIN T) 1 % lotion Apply topically 2 times daily       hydrocortisone 2.5 % cream APPLY TOPICALLY 2 TIMES  DAILY AS NEEDED 30 g 3     tretinoin (RETIN-A) 0.025 % cream Spread a pea size amount into affected area topically at bedtime.  Use sunscreen SPF>20. 45 g 3     Lactobacillus (ACIDOPHILUS) CAPS Take 1 capsule by mouth daily Take two hours before or after antibiotic dose.  Continue for 1 week after antibiotic therapy completed 60 capsule 0     RANITIDINE  MG OR TABS ONE TABLET TWICE DAILY 0 0     ZYRTEC 10 MG OR TABS 1 TABLET DAILY 90 3     albuterol (2.5 MG/3ML) 0.083% neb solution Take 1 vial (2.5 mg) by nebulization every 6 hours as needed (Patient not taking: Reported on 1/9/2018) 1 Box 1       Medical History: any changes in medical history since they were last seen? No    Social History:   Home situation: , grown children   Occupation/Schooling: does not work outside of the home  Tobacco use: no  Alcohol use: no  Drug use: no  History of chemical dependency treatment: no    Review of Systems:  ROS is positive as per HPI fatigue, weight loss, easy bruising, cough, wheezing, shortness of breath, constipation, joint pain, arthritis, back pain, neck pain, anxiety, depression, stress and is negative for fevers, chills, sweats, or diarrhea.    This document serves as a record of the services and decisions personally performed and made by Liseth LOPEZ. It was created on her behalf by Ishmael Cook, a trained medical scribe. The creation of this document is based on the provider's statements to the medical scribe.  Ishmael Cook 2:10 PM January 9, 2018    Physical Exam:  Vital signs: /82  Pulse 114  Wt 92.1 kg (203 lb)  LMP 07/17/2009  BMI 32.77 kg/m2     Behavioral observations:  Awake, alert, cooperative and pleasant.     Gait:  normal    Musculoskeletal exam:   Moving well today in the exam  Widespread myofascial tenderness      Neuro exam:  SILT all extremities    Skin/vascular/autonomic:  No suspicious lesions on exposed skin.     Other:  NA      [place] Prescription Monitoring  Program:  Reviewed, as expected    DIRE Score for selecting candidates for long term opioid analgesia for chronic pain:  Diagnosis  2  Intractablility  2  Risk    Psychological health  2    Chemical health  2    Reliability  2    Social support  2  Efficacy  2    Total DIRE Score = 14. Note that  7-13 predicts poor outcome (compliance and efficacy) from opioid prescribing; 14-21 predicts good outcome (compliance and efficacy)  from opioid prescribing.      Assessment:   1. Chronic migraine headaches  2. Bilateral hand pain  3. OA of both hands  4. Lumbar spondylosis, pain is predominantly extension and extension/rotation based indicating a facetogenic component for her pain  5. Lumbar DDD  6. Bilateral SI joint dysfunction  7. Greater trochanteric bursitis  8. Myofascial pain  9. Fibromyalgia  10. PMHx includes HTN, depression, asthma, hyperlipidemia, Osteoarthritis, fibromyalgia, trochanteric bursitis, CAD, diabetes, CVA, cancer, thyroid disease, chronic pain syndrome  11. PSHx includes , left lumbar hemilaminectomy ()        Plan:   1. Physical Therapy:  None at present  2. Continue home activities  3. Clinical Health Psychologist: patient to let me know if she wants to see any of our HEALTH PSYCHOLOGY providers  4. Diagnostic Studies:  None  5. Medication:   1. Continue Norco 5-325mg PRN, use sparingly  2. Start Compazine as prescribed, take 1 hour apart from Imitrex or Rizatriptan.  6. Procedures:  1. The patient will schedule her second botox injection with Dr. Renetta Richardson.  2. The patient will schedule hand injections with Dr. Linder.  7. Purchase a Paraffin Wax Bath to treat pain in hands. She will use the Paraffin up to four times daily.  8. Recommendations to PCP: see above  9. Follow up: in 8-10 weeks      Total time spent face to face was 30 minutes and more than 50% of face to face time was spent in counseling and/or coordination of care regarding the diagnosis and recommendations  above.    The information in this document, created by the medical scribe for me, accurately reflects the services I personally performed and the decisions made by me. I have reviewed and approved this document for accuracy prior to leaving the patient care area.  January 9, 2018 6:11 PM      Liseth LOPEZ, RN CNP, FNP  [facility] [place]

## 2018-01-09 NOTE — NURSING NOTE
"Chief Complaint   Patient presents with     Pain       Initial Wt 92.1 kg (203 lb)  LMP 07/17/2009  BMI 32.77 kg/m2 Estimated body mass index is 32.77 kg/(m^2) as calculated from the following:    Height as of 12/8/17: 1.676 m (5' 6\").    Weight as of this encounter: 92.1 kg (203 lb).  Medication Reconciliation: complete     Ev Singleton CMA (AAMA)'    "

## 2018-01-09 NOTE — MR AVS SNAPSHOT
After Visit Summary   1/9/2018    Jacquie Amador    MRN: [id number]           Patient Information     Date Of Birth          1960        Visit Information        Provider Department      1/9/2018 2:00 PM Liseth Caldera APRN [facility]        Today's Diagnoses     Intractable chronic migraine without aura and without status migrainosus    -  1    Bilateral hand pain        Primary osteoarthritis of both hands        Lumbosacral spondylosis without myelopathy        DDD (degenerative disc disease), lumbar        SI (sacroiliac) joint dysfunction        Greater trochanteric bursitis of both hips        Myofascial pain        Fibromyalgia          Care Instructions    PLAN  1. Medications:   1. Continue Imitrex as prescribed, max of 9 times per month. This is managed by Dr. Peterson  2. Continue hydrocodone/acetaminophen, use sparingly as you are  3. Continue to use Voltaren Gel as needed  4. Start Compazine as prescribed, OK take with Imitrex or rizatriptan. Caution sedation. I would take these an hour apart  2. Purchase a Paraffin wax bath from [employer] for hand pain. Use the Paraffin wax bath 4 times per day until hand injections.  3. Procedures:   1. Schedule a Botox injection appointment with Dr. Renetta Richardson. The office will contact you with further details. We will schedule additional appointments every 3 months.  2. Schedule  hand injections appointment with Dr. Linder. The office will contact you with further details.   4. Follow-up with me in 8-10 weeks.    ----------------------------------------------------------------  Nurse Triage line:  [phone]   Call this number with any questions or concerns. You may leave a detailed message anytime. Calls are typically returned Monday through Friday between 8 AM and 4:30 PM. We usually get back to you within 2 business days depending on the issue/request.       Medication refills:    For non-narcotic  medications, call your pharmacy directly to request a refill. The pharmacy will contact the [facility] for authorization. Please allow 3-4 days for these refills to be processed.     For narcotic refills, call the nurse triage line or send a [employer] message. Please contact us 7-10 days before your refill is due. The message MUST include the name of the specific medication(s) requested and how you would like to receive the prescription(s). The options are as follows:    Pain Clinic staff can mail the prescription to your pharmacy. Please tell us the name of the pharmacy.    You may pick the prescription up at the Pain Clinic (tell us the location) or during a clinic visit with your pain provider    Pain Clinic staff can deliver the prescription to the [place] pharmacy in the clinic building. Please tell us the location.      Scheduling number: [phone].  Call this number to schedule or change appointments.    We believe regular attendance is key to your success in our program.    Any time you are unable to keep your appointment we ask that you call us at least 24 hours in advance to let us know. This will allow us to offer the appointment time to another patient.               Follow-ups after your visit        Additional Services     PAIN INJECTION EVAL/TREAT/FOLLOW UP                 Who to contact     If you have questions or need follow up information about today's clinic visit or your schedule please contact [facility] directly at [phone].  Normal or non-critical lab and imaging results will be communicated to you by MyChart, letter or phone within 4 business days after the clinic has received the results. If you do not hear from us within 7 days, please contact the clinic through MyChart or phone. If you have a critical or abnormal lab result, we will notify you by phone as soon as possible.  Submit refill requests through [employer] or call your pharmacy and they will forward  the refill request to us. Please allow 3 business days for your refill to be completed.          Additional Information About Your Visit        [employer]asc[employer]cre[employer]arBloomspot Information     [employer] gives you secure access to your electronic health record. If you see a primary care provider, you can also send messages to your care team and make appointments. If you have questions, please call your primary care clinic.  If you do not have a primary care provider, please call [phone] and they will assist you.        Care EveryWhere ID     This is your Care EveryWhere ID. This could be used by other organizations to access your [place] medical records  [id number]        Your Vitals Were     Pulse Last Period BMI (Body Mass Index)             114 07/17/2009 32.77 kg/m2          Blood Pressure from Last 3 Encounters:   01/09/18 119/82   12/08/17 110/80   11/29/17 125/85    Weight from Last 3 Encounters:   01/09/18 92.1 kg (203 lb)   11/29/17 91.2 kg (201 lb)   09/26/17 90.7 kg (200 lb)              We Performed the Following     PAIN INJECTION EVAL/TREAT/FOLLOW UP          Today's Medication Changes          These changes are accurate as of: 1/9/18  2:31 PM.  If you have any questions, ask your nurse or doctor.               These medicines have changed or have updated prescriptions.        Dose/Directions    HYDROcodone-acetaminophen 5-325 MG per tablet   Commonly known as:  NORCO   This may have changed:  additional instructions   Used for:  Lumbosacral spondylosis without myelopathy, DDD (degenerative disc disease), lumbar, SI (sacroiliac) joint dysfunction, Greater trochanteric bursitis of both hips, Myofascial pain, Fibromyalgia   Changed by:  Liseth Caldera APRN CNP        May take 1 tablet every 4-6 hours as needed for pain. Max of 4 tabs per day. OK to fill and begin on/after 1/9/2018 Must last 30 days.   Quantity:  100 tablet   Refills:  0       metoprolol 25 MG 24 hr tablet   Commonly known as:  TOPROL-XL   This may  have changed:  additional instructions   Used for:  Palpitations, Sinus tachycardia        Take 1.5 tablets per day   Quantity:  45 tablet   Refills:  1            Where to get your medicines      Some of these will need a paper prescription and others can be bought over the counter.  Ask your nurse if you have questions.     Bring a paper prescription for each of these medications     HYDROcodone-acetaminophen 5-325 MG per tablet                Primary Care Provider Office Phone # Fax #    Liz Peterson [phone] [address] [facility] [address]        Equal Access to Services     [facility]: Hadii aad ku hadasho Soomaali, waaxda luqadaha, qaybta kaalmada adeegyada, waxay garciain haycheln louis chandler . So [facility] [phone].    ATENCIÓN: Si habla español, tiene a kimball disposición servicios gratuitos de asistencia lingüística. Ll[facility] [phone].    We comply with applicable federal civil rights laws and [place] laws. We do not discriminate on the basis of race, color, national origin, age, disability, sex, sexual orientation, or gender identity.            Thank you!     Thank you for choosing [facility]  for your care. Our goal is always to provide you with excellent care. Hearing back from our patients is one way we can continue to improve our services. Please take a few minutes to complete the written survey that you may receive in the mail after your visit with us. Thank you!             Your Updated Medication List - Protect others around you: Learn how to safely use, store and throw away your medicines at www.disposemymeds.org.          This list is accurate as of: 1/9/18  2:31 PM.  Always use your most recent med list.                   Brand Name Dispense Instructions for use Diagnosis    acidophilus Caps     60 capsule    Take 1 capsule by mouth daily Take two hours before or after antibiotic dose.  Continue for 1 week after antibiotic therapy  completed    Diarrhea       adapalene 0.1 % gel    DIFFERIN    45 g    APPLY A THIN LAYER TO THE AFFECTED AREA(S) BY TOPICAL ROUTE ONCE DAILY BEFORE BEDTIME    Acne vulgaris       ADVAIR DISKUS 500-50 MCG/DOSE diskus inhaler   Generic drug:  fluticasone-salmeterol     1 Inhaler    INHALE 1 PUFF BY INHALATION ROUTE 2 TIMES PER DAY IN THE MORNING AND EVENING APPROXIMATELY 12 HOURS APART    Moderate persistent asthma without complication       * albuterol 108 (90 BASE) MCG/ACT Inhaler    VENTOLIN HFA    54 g    INHALE 2 PUFFS INTO THE LUNGS EVERY 6 HOURS AS NEEDED FOR SHORTNESS OF BREATH / DYSPNEA    Moderate persistent asthma without complication       * albuterol (2.5 MG/3ML) 0.083% neb solution     1 Box    Take 1 vial (2.5 mg) by nebulization every 6 hours as needed    Moderate persistent asthma with acute exacerbation       ALPRAZolam 0.25 MG tablet    XANAX    90 tablet    TAKE 1 TABLET DAILY AS NEEDED FOR ANXIETY    Generalized anxiety disorder       atorvastatin 20 MG tablet    LIPITOR          * buPROPion 150 MG 12 hr tablet    WELLBUTRIN SR    90 tablet    TAKE ONE TABLET BY MOUTH ONE TIME DAILY IN THE EVENING    Generalized anxiety disorder       * buPROPion 200 MG 12 hr tablet    WELLBUTRIN SR    90 tablet    Take 1 tablet (200 mg) by mouth daily    Generalized anxiety disorder       celecoxib 200 MG capsule    celeBREX    180 capsule    Take 1 capsule twice daily as needed for pain this is a 3 month script    Greater trochanteric bursitis, unspecified laterality, Hand arthritis       clindamycin 1 % lotion    CLEOCIN T     Apply topically 2 times daily        diclofenac 1 % Gel topical gel    VOLTAREN    9 Tube    Apply 2 grams to hands and 4 grams to hips up to 4 times daily. T    Greater trochanteric bursitis of both hips, Bilateral thumb pain       DULoxetine 60 MG EC capsule    CYMBALTA    180 capsule    Take 2 capsules (120 mg) by mouth daily    Multiple joint pain       HYDROcodone-acetaminophen  5-325 MG per tablet    NORCO    100 tablet    May take 1 tablet every 4-6 hours as needed for pain. Max of 4 tabs per day. OK to fill and begin on/after 1/9/2018 Must last 30 days.    Lumbosacral spondylosis without myelopathy, DDD (degenerative disc disease), lumbar, SI (sacroiliac) joint dysfunction, Greater trochanteric bursitis of both hips, Myofascial pain, Fibromyalgia       hydrocortisone 2.5 % cream     30 g    APPLY TOPICALLY 2 TIMES DAILY AS NEEDED    Dermatitis       losartan-hydrochlorothiazide 100-12.5 MG per tablet    HYZAAR    90 tablet    Take 1 tablet by mouth daily    Essential hypertension with goal blood pressure less than 140/90       medical cannabis (Patient's own supply.  Not a prescription)     0 Information only    2 mg morning and noon. 7 mg at bedtime. (This is NOT a prescription, and does not certify that the patient has a qualifying medical condition for medical cannabis.  The purpose of this order is  to document that the patient reports taking medical cannabis.)        metoprolol 25 MG 24 hr tablet    TOPROL-XL    45 tablet    Take 1.5 tablets per day    Palpitations, Sinus tachycardia       montelukast 10 MG tablet    SINGULAIR    90 tablet    TAKE ONE TABLET BY MOUTH IN THE EVENING    Moderate persistent asthma without complication       mupirocin 2 % cream    BACTROBAN    60 g    Apply topically 3 times daily    Dermatitis       nystatin [id number] UNIT/ML suspension    MYCOSTATIN    380 mL    Take by mouth 4 times daily    Thrush       nystatin cream    MYCOSTATIN    60 g    Apply topically 3 times daily    Dermatitis       ranitidine 150 MG tablet    ZANTAC    0    ONE TABLET TWICE DAILY        rOPINIRole 1 MG tablet    REQUIP    180 tablet    TAKE 2 TABLETS (2 MG) BY MOUTH AT BEDTIME    Movement disorder       SUMAtriptan 100 MG tablet    IMITREX    18 tablet    Take 1 tablet (100 mg) by mouth at onset of headache for migraine May repeat in 2 hours. Max 2 tablets/24 hours.     Migraine without aura and without status migrainosus, not intractable       traZODone 50 MG tablet    DESYREL    270 tablet    TAKE 2-3 TABLETS (100-150 MG) BY MOUTH AT BEDTIME    Insomnia, unspecified       tretinoin 0.025 % cream    RETIN-A    45 g    Spread a pea size amount into affected area topically at bedtime.  Use sunscreen SPF>20.    Age-related facial wrinkles       ZYRTEC 10 MG tablet   Generic drug:  cetirizine     90    1 TABLET DAILY    Allergic rhinitis, cause unspecified       * Notice:  This list has 4 medication(s) that are the same as other medications prescribed for you. Read the directions carefully, and ask your doctor or other care provider to review them with you.

## 2018-01-09 NOTE — TELEPHONE ENCOUNTER
LVM for patient to schedule botox in [place] with Dr. Richardson.    PA approved.   Effective date: 7/24/17-7/24/18  PA reference #: [id number]      Karina ZEPEDA    [facility]

## 2018-01-10 LAB
ANA SER QL IF: NEGATIVE
CCP AB SER IA-ACNC: 1 U/ML

## 2018-01-11 ENCOUNTER — MYC MEDICAL ADVICE (OUTPATIENT)
Dept: FAMILY MEDICINE | Facility: CLINIC | Age: 58
End: 2018-01-11

## 2018-01-11 DIAGNOSIS — K12.0 APHTHOUS ULCER OF MOUTH: ICD-10-CM

## 2018-01-11 DIAGNOSIS — R23.9 SKIN CHANGE: Primary | ICD-10-CM

## 2018-01-25 ENCOUNTER — TELEPHONE (OUTPATIENT)
Dept: FAMILY MEDICINE | Facility: CLINIC | Age: 58
End: 2018-01-25

## 2018-01-25 NOTE — LETTER
[facility]  [address]  [place] [address]  [phone]      January 25, 2018    Jacquie Amador                                                                                                                     [address] [address] [place] [id number]        Dear Jacquie,    We received a notice that you are to be scheduled with a specialty clinic. The referral has been placed by your provider and you can call to schedule an appointment directly.     Enclosed, you will find the referral with the phone number to call to schedule an appointment.  If you have already scheduled this, you may disregard this letter.    Please call us if you have any questions or concerns.      Sincerely,     [facility] Support Staff / jarrod

## 2018-01-25 NOTE — TELEPHONE ENCOUNTER
You placed a referral for patient to rheumatology  on 1/11/18 .  Patient has not scheduled as of yet.      Please review and forward to team if follow up with the patient is needed.     Thank you!  Anitha/Clinic Referrals [facility]

## 2018-01-26 ENCOUNTER — MYC MEDICAL ADVICE (OUTPATIENT)
Dept: FAMILY MEDICINE | Facility: CLINIC | Age: 58
End: 2018-01-26

## 2018-01-26 DIAGNOSIS — G25.9 MOVEMENT DISORDER: ICD-10-CM

## 2018-01-26 RX ORDER — ROPINIROLE 1 MG/1
TABLET, FILM COATED ORAL
Qty: 180 TABLET | Refills: 3 | Status: SHIPPED | OUTPATIENT
Start: 2018-01-26 | End: 2018-03-27

## 2018-02-15 DIAGNOSIS — F41.1 GENERALIZED ANXIETY DISORDER: ICD-10-CM

## 2018-02-16 RX ORDER — ALPRAZOLAM 0.25 MG
TABLET ORAL
Qty: 60 TABLET | Refills: 0 | Status: SHIPPED | OUTPATIENT
Start: 2018-02-16 | End: 2018-07-27

## 2018-02-16 NOTE — TELEPHONE ENCOUNTER
ALPRAZolam (XANAX) 0.25 MG tablet      Last Written Prescription Date:  8/2/17  Last Fill Quantity: 90,   # refills: 0  Last Office Visit: 12/8/17  Future Office visit:    Next 5 appointments (look out 90 days)     Mar 27, 2018 11:30 AM CDT   Return Visit with JESSICA Guillory CNP   [facility] [place] ([facility])    [address]  [place] [address] [phone]                   Routing refill request to provider for review/approval because:  Drug not on the [facility], [facility] or  Health refill protocol or controlled substance    Madison Anderson RN

## 2018-02-19 ENCOUNTER — MYC MEDICAL ADVICE (OUTPATIENT)
Dept: PALLIATIVE MEDICINE | Facility: CLINIC | Age: 58
End: 2018-02-19

## 2018-02-19 ENCOUNTER — MYC REFILL (OUTPATIENT)
Dept: PALLIATIVE MEDICINE | Facility: CLINIC | Age: 58
End: 2018-02-19

## 2018-02-19 DIAGNOSIS — R00.2 PALPITATIONS: ICD-10-CM

## 2018-02-19 DIAGNOSIS — G43.009 MIGRAINE WITHOUT AURA AND WITHOUT STATUS MIGRAINOSUS, NOT INTRACTABLE: Primary | ICD-10-CM

## 2018-02-19 DIAGNOSIS — R00.0 SINUS TACHYCARDIA: ICD-10-CM

## 2018-02-19 NOTE — TELEPHONE ENCOUNTER
Received fax request from [facility] pharmacy requesting refill(s) for toprol XL 25 mg with directions of taking 1.5 tabs/day    Last refilled on 11/28/17; Rx date was 7/11/17, #45 with 1 refill    Pt last seen on 1/9/18  Next appt scheduled for 3/27/18    Current associated Dx for this medication is Palpitations and Sinus Tachycardia  -----------------  Called pt. Pt states that she is taking the metoprolol for 2 reasons: headaches and heart rate.     Taking 1/2 tab routinely; sometimes she will take another 1/2 in the evening if her heart rate is off.     Let her know that the directions are for 1.5 tabs daily. States that Liseth Caldera CNP had increased the dose for her headache but then she got too tired. Pt finds that low dose is enough to manage the headache and palpitations for the most part.     Let her know that we will need to update the directions.  Pt states that the higher quantity lasts longer. Let her know that it is important to have current directions on file to prevent confusion of her medication use. Pt stated understanding.     Called [facility] pharmacy to confirm that the XL tab of metoprolol can be split in half.     Updated directions and quantity of medication for review by Liseth Caldera CNP.    BANDAR Dyer, RN-BC  Patient Care Supervisor/Care Coordinator  [facility]

## 2018-02-19 NOTE — TELEPHONE ENCOUNTER
"Pt called to inform Liseth Caldera CNP that she will be doing a botox treatment with Dr. Campos rather than Dr. Richardson this time. States that she has hypohydrosis and gets her \"whole head done annually with botox.\"  Pt stated that she is going to see if this helps her headaches. Pt is going to [place] in a few weeks and doesn't want to be all sweaty.      States that the first botox session with Renetta Richardson MD didn't help and that Liseth let her know that it sometimes takes more than one session to know for sure.      Pt is also going to see about getting additional cortisone injections with Dr. Linder for her thumbs and hips. staes that she has tried to hold off as long as she could.      Will route to Liseth Caldera CNP as KATARINA.     Laisha Sanchez, MARVAN, RN-BC  Patient Care Supervisor/Care Coordinator  [facility]    "

## 2018-02-20 RX ORDER — METOPROLOL SUCCINATE 25 MG/1
TABLET, EXTENDED RELEASE ORAL
Qty: 30 TABLET | Refills: 1 | Status: SHIPPED | OUTPATIENT
Start: 2018-02-20 | End: 2018-05-07

## 2018-02-20 NOTE — TELEPHONE ENCOUNTER
It is important for Dr. Morales to know when and how much Botox she received from Dr. Renetta Richardson.     I will also forward this to Dr. Richardson so she is aware patient will get Botox for hyperhydrosis from another provider as this is also important information for us to have.    Liseth LOPEZ RN CNP, FNP  [facility] [place]

## 2018-02-20 NOTE — TELEPHONE ENCOUNTER
Thanks for your insight, Dr. Richardson.     Liseth LOPEZ, RN CNP, FNP  [facility] [place]

## 2018-02-20 NOTE — TELEPHONE ENCOUNTER
If she is going to schedule for me again, should review before scheduling to make sure 3 months has passed since getting it in the head region for hyperhydrosis.    Renetta Richardson MD  [facility]

## 2018-02-20 NOTE — TELEPHONE ENCOUNTER
Signed Prescriptions:                        Disp   Refills    metoprolol succinate (TOPROL-XL) 25 MG 24 *30 tab*1        Sig: Take 0.5 tablets per day. May add another 1/2 tab in           the evening as needed  Authorizing Provider: BRIANNA HANSON, RN CNP, FNP  [facility] [place]

## 2018-02-21 NOTE — TELEPHONE ENCOUNTER
Jorge sent to pt:  From: Denisse Maldonado RN        Created: 2/21/2018 1:25 PM       Ciaran Dhillon.  Just want to clarify.  Are planning on seeing Dr. Richardson in the future for botox?  If so you need to make sure your last botox and the scheduled botox are 3 weeks apart.    Denisse Maldonado RN-BSN  [facility]

## 2018-02-26 ENCOUNTER — MYC MEDICAL ADVICE (OUTPATIENT)
Dept: SURGERY | Facility: CLINIC | Age: 58
End: 2018-02-26

## 2018-02-28 ENCOUNTER — OFFICE VISIT (OUTPATIENT)
Dept: ORTHOPEDICS | Facility: CLINIC | Age: 58
End: 2018-02-28
Payer: COMMERCIAL

## 2018-02-28 VITALS
HEIGHT: 66 IN | WEIGHT: 203 LBS | DIASTOLIC BLOOD PRESSURE: 91 MMHG | BODY MASS INDEX: 32.62 KG/M2 | SYSTOLIC BLOOD PRESSURE: 130 MMHG

## 2018-02-28 DIAGNOSIS — M70.61 TROCHANTERIC BURSITIS OF BOTH HIPS: Primary | ICD-10-CM

## 2018-02-28 DIAGNOSIS — M18.0 PRIMARY OSTEOARTHRITIS OF BOTH FIRST CARPOMETACARPAL JOINTS: ICD-10-CM

## 2018-02-28 DIAGNOSIS — M70.62 TROCHANTERIC BURSITIS OF BOTH HIPS: Primary | ICD-10-CM

## 2018-02-28 PROCEDURE — 99214 OFFICE O/P EST MOD 30 MIN: CPT | Mod: 25 | Performed by: FAMILY MEDICINE

## 2018-02-28 PROCEDURE — 20610 DRAIN/INJ JOINT/BURSA W/O US: CPT | Mod: 50 | Performed by: FAMILY MEDICINE

## 2018-02-28 RX ORDER — TRIAMCINOLONE ACETONIDE 40 MG/ML
80 INJECTION, SUSPENSION INTRA-ARTICULAR; INTRAMUSCULAR ONCE
Qty: 2 ML | Refills: 0 | OUTPATIENT
Start: 2018-02-28 | End: 2018-02-28

## 2018-02-28 NOTE — MR AVS SNAPSHOT
After Visit Summary   2/28/2018    Jacquie Amador    MRN: [id number]           Patient Information     Date Of Birth          1960        Visit Information        Provider Department      2/28/2018 6:20 PM José Miguel Linder DO [facility] [place]        Today's Diagnoses     Trochanteric bursitis of both hips    -  1    Primary osteoarthritis of both first carpometacarpal joints           Follow-ups after your visit        Your next 10 appointments already scheduled     Mar 09, 2018  3:10 PM CST   Return Visit with SAMUEL Morales MD   [facility] ([facility])    [address] [place] [id number]   [phone]            Mar 14, 2018 12:00 PM CDT   PROCEDURE with José Miguel Linder DO   [facility] [place] ([facility] [place])    [address]  [place] [address]   [phone]            Mar 27, 2018 11:30 AM CDT   Return Visit with JESSICA Guillory CNP   [facility] [place] ([facility] [place])    [address]  [place] [address]   [phone]              Who to contact     If you have questions or need follow up information about today's clinic visit or your schedule please contact [facility] [place] directly at [phone].  Normal or non-critical lab and imaging results will be communicated to you by MyChart, letter or phone within 4 business days after the clinic has received the results. If you do not hear from us within 7 days, please contact the clinic through MyChart or phone. If you have a critical or abnormal lab result, we will notify you by phone as soon as possible.  Submit refill requests through Quid or call your pharmacy and they will forward the refill request to us. Please allow 3 business days for your refill to be completed.          Additional Information  "About Your Visit        Snuppshart Information     MyDocTime gives you secure access to your electronic health record. If you see a primary care provider, you can also send messages to your care team and make appointments. If you have questions, please call your primary care clinic.  If you do not have a primary care provider, please call [phone] and they will assist you.        Care EveryWhere ID     This is your Care EveryWhere ID. This could be used by other organizations to access your [facility] medical records  [id number]        Your Vitals Were     Height Last Period BMI (Body Mass Index)             5' 6\" (1.676 m) 07/17/2009 32.77 kg/m2          Blood Pressure from Last 3 Encounters:   02/28/18 (!) 130/91   01/09/18 119/82   12/08/17 110/80    Weight from Last 3 Encounters:   02/28/18 203 lb (92.1 kg)   01/09/18 203 lb (92.1 kg)   11/29/17 201 lb (91.2 kg)              We Performed the Following     DRAIN/INJECT LARGE JOINT/BURSA     TRIAMCINOLONE ACET INJ NOS          Today's Medication Changes          These changes are accurate as of 2/28/18  7:29 PM.  If you have any questions, ask your nurse or doctor.               Start taking these medicines.        Dose/Directions    triamcinolone acetonide 40 MG/ML injection   Commonly known as:  KENALOG-40   Used for:  Trochanteric bursitis of both hips   Started by:  José Miguel Linder DO        Dose:  80 mg   2 mLs (80 mg) by INTRA-ARTICULAR route once for 1 dose   Quantity:  2 mL   Refills:  0            Where to get your medicines      Some of these will need a paper prescription and others can be bought over the counter.  Ask your nurse if you have questions.     You don't need a prescription for these medications     triamcinolone acetonide 40 MG/ML injection                Primary Care Provider Office Phone # Fax #    Liz Peterson [phone] [address] [address]        Equal Access to Services     [facility] " GAAR : Hadii aad ku johnny Gramajo, waaxda luqadaha, qaybta kaalmada aderj, yao garciain hayaaalberto myers logan geraldine . So [facility] [phone].    ATENCIÓN: Si habla español, tiene a kimball disposición servicios gratuitos de asistencia lingüística. Llame al [phone].    We comply with applicable federal civil rights laws and [place] laws. We do not discriminate on the basis of race, color, national origin, age, disability, sex, sexual orientation, or gender identity.            Thank you!     Thank you for choosing [facility]  for your care. Our goal is always to provide you with excellent care. Hearing back from our patients is one way we can continue to improve our services. Please take a few minutes to complete the written survey that you may receive in the mail after your visit with us. Thank you!             Your Updated Medication List - Protect others around you: Learn how to safely use, store and throw away your medicines at www.disposemymeds.org.          This list is accurate as of 2/28/18  7:29 PM.  Always use your most recent med list.                   Brand Name Dispense Instructions for use Diagnosis    acidophilus Caps     60 capsule    Take 1 capsule by mouth daily Take two hours before or after antibiotic dose.  Continue for 1 week after antibiotic therapy completed    Diarrhea       adapalene 0.1 % gel    DIFFERIN    45 g    APPLY A THIN LAYER TO THE AFFECTED AREA(S) BY TOPICAL ROUTE ONCE DAILY BEFORE BEDTIME    Acne vulgaris       ADVAIR DISKUS 500-50 MCG/DOSE diskus inhaler   Generic drug:  fluticasone-salmeterol     1 Inhaler    INHALE 1 PUFF BY INHALATION ROUTE 2 TIMES PER DAY IN THE MORNING AND EVENING APPROXIMATELY 12 HOURS APART    Moderate persistent asthma without complication       * albuterol 108 (90 BASE) MCG/ACT Inhaler    VENTOLIN HFA    54 g    INHALE 2 PUFFS INTO THE LUNGS EVERY 6 HOURS AS NEEDED FOR SHORTNESS OF BREATH / DYSPNEA    Moderate  persistent asthma without complication       * albuterol (2.5 MG/3ML) 0.083% neb solution     1 Box    Take 1 vial (2.5 mg) by nebulization every 6 hours as needed    Moderate persistent asthma with acute exacerbation       ALPRAZolam 0.25 MG tablet    XANAX    60 tablet    TAKE ONE TABLET BY MOUTH DAILY AS NEEDED FOR ANXIETY    Generalized anxiety disorder       * atorvastatin 20 MG tablet    LIPITOR          * atorvastatin 20 MG tablet    LIPITOR    90 tablet    TAKE 1 TABLET BY MOUTH DAILY    Hyperlipidemia LDL goal <130       * buPROPion 150 MG 12 hr tablet    WELLBUTRIN SR    90 tablet    TAKE ONE TABLET BY MOUTH ONE TIME DAILY IN THE EVENING    Generalized anxiety disorder       * buPROPion 200 MG 12 hr tablet    WELLBUTRIN SR    90 tablet    Take 1 tablet (200 mg) by mouth daily    Generalized anxiety disorder       celecoxib 200 MG capsule    celeBREX    180 capsule    Take 1 capsule twice daily as needed for pain this is a 3 month script    Greater trochanteric bursitis, unspecified laterality, Hand arthritis       clindamycin 1 % lotion    CLEOCIN T     Apply topically 2 times daily        diclofenac 1 % Gel topical gel    VOLTAREN    9 Tube    Apply 2 grams to hands and 4 grams to hips up to 4 times daily. T    Greater trochanteric bursitis of both hips, Bilateral thumb pain       DULoxetine 60 MG EC capsule    CYMBALTA    180 capsule    Take 2 capsules (120 mg) by mouth daily    Multiple joint pain       HYDROcodone-acetaminophen 5-325 MG per tablet    NORCO    100 tablet    May take 1 tablet every 4-6 hours as needed for pain. Max of 4 tabs per day. OK to fill and begin on/after 1/9/2018 Must last 30 days.    Lumbosacral spondylosis without myelopathy, DDD (degenerative disc disease), lumbar, SI (sacroiliac) joint dysfunction, Greater trochanteric bursitis of both hips, Myofascial pain, Fibromyalgia       hydrocortisone 2.5 % cream     30 g    APPLY TOPICALLY 2 TIMES DAILY AS NEEDED    Dermatitis        losartan-hydrochlorothiazide 100-12.5 MG per tablet    HYZAAR    90 tablet    TAKE 1 TABLET BY MOUTH DAILY    Essential hypertension with goal blood pressure less than 140/90       medical cannabis (Patient's own supply.  Not a prescription)     0 Information only    2 mg morning and noon. 7 mg at bedtime. (This is NOT a prescription, and does not certify that the patient has a qualifying medical condition for medical cannabis.  The purpose of this order is  to document that the patient reports taking medical cannabis.)        metoprolol succinate 25 MG 24 hr tablet    TOPROL-XL    30 tablet    Take 0.5 tablets per day. May add another 1/2 tab in the evening as needed    Palpitations, Sinus tachycardia, Migraine without aura and without status migrainosus, not intractable       montelukast 10 MG tablet    SINGULAIR    90 tablet    TAKE ONE TABLET BY MOUTH IN THE EVENING    Moderate persistent asthma without complication       mupirocin 2 % cream    BACTROBAN    60 g    Apply topically 3 times daily    Dermatitis       nystatin [id number] UNIT/ML suspension    MYCOSTATIN    380 mL    Take by mouth 4 times daily    Thrush       nystatin cream    MYCOSTATIN    60 g    Apply topically 3 times daily    Dermatitis       ranitidine 150 MG tablet    ZANTAC    0    ONE TABLET TWICE DAILY        rOPINIRole 1 MG tablet    REQUIP    180 tablet    TAKE 2 TABLETS (2 MG) BY MOUTH AT BEDTIME    Movement disorder       SUMAtriptan 100 MG tablet    IMITREX    18 tablet    Take 1 tablet (100 mg) by mouth at onset of headache for migraine May repeat in 2 hours. Max 2 tablets/24 hours.    Migraine without aura and without status migrainosus, not intractable       traZODone 50 MG tablet    DESYREL    270 tablet    TAKE 2-3 TABLETS (100-150 MG) BY MOUTH AT BEDTIME    Insomnia, unspecified       tretinoin 0.025 % cream    RETIN-A    45 g    Spread a pea size amount into affected area topically at bedtime.  Use sunscreen SPF>20.    Age-related  facial wrinkles       triamcinolone acetonide 40 MG/ML injection    KENALOG-40    2 mL    2 mLs (80 mg) by INTRA-ARTICULAR route once for 1 dose    Trochanteric bursitis of both hips       ZYRTEC 10 MG tablet   Generic drug:  cetirizine     90    1 TABLET DAILY    Allergic rhinitis, cause unspecified       * Notice:  This list has 6 medication(s) that are the same as other medications prescribed for you. Read the directions carefully, and ask your doctor or other care provider to review them with you.

## 2018-02-28 NOTE — LETTER
2018         RE: Jacquie Amador  [address]  [place] [address]        Dear Colleague,    Thank you for referring your patient, Jacquie Amador, to the [facility]. Please see a copy of my visit note below.    Jacquie Amador  :  1960  DOS: 17  MRN: [id number]    Sports Medicine Clinic Visit    PCP: Liz Peterson    Jacquie Amador is a 55 year old Right hand dominant female who is seen in consultation at the request of   presenting with bilateral thumb pain.    Injury: about 5  year(s).  Pain located over bilateral CMC joitns, nonradiating.  Reports constant and with intermittent increases radiating, weakness to pinching.  Additional Features:  Positive: swelling and clicking.  Symptoms are better with injections.  Symptoms are worse with: gripping.  Other evaluation and/or treatments so far consists of: injections, no physical therapy.  Recent imaging completed: X-rays completed 2014 Prior History of related problems: None    Social History: crafting    Interim History - 2016  Since last visit on 2015 patient has mild-moderate discomfort over last 2 weeks.  B/l CMC joint steroid injection completed on 10/12 provided good relief for 5.5 months.  No new injury in the interim.    Interim History - 2016  Since last visit on 3/11/2016 patient has moderate - severe discomfort, that has been worsening over last 1 month.  Pt reports that she had minimal discomfort until she fell on concrete ~ 1 month ago, catching herself with both hands out in front of her.  Denies any severe pain or new swelling immediately following fall, pain gradually worsened since that time.  B/l CMC joint steroid injection completed on 3/11 provided good relief for 3.5 months.  No new injury in the interim.    Interim History - 2017  Since last visit on 16 patient has moderate bilateral thumb pain.  Bilateral thumb CMC  steroid injections completed on 11/11 provided excellent relief for ~ 3.5 months.  Desires repeat injections today prior to leaving for her trip.  No new injury in the interim.    Patient is also being seen in f/u for chronic bilateral lateral hip pain, right>>>left .  Patient was most recently treated for this condition by Dr Cárdenas in 12/2016.  Right trochanteric bursa injection completed at that time provided her with good relief for ~ 2.5 months.  Desires repeat injection today prior to leaving for vacation.    Interim History - November 29, 2017  Since last visit on 03/31/17 patient has moderate bilateral lateral hip pain, right>>>left.  Bilateral hip trochanteric bursa injection completed on 3/31 provided good relief for ~ 5 - 6 months.  Patient reports significantly worsening pain over the last ~ 3 weeks.  No new injury in the interim.    Patient also has secondary complaint of right knee pain over the last ~ 3 months.  Pain located over deep medial knee joint line.  Pain worse with going from sit to stand, walking, and ascending stairs.  Previous h/o similar pain several years ago - cannot recall treatment.    Interim History - February 28, 2018  Since last visit on 11/29/2017 patient has moderate-severe right hip pain.  Right hip trochanteric bursa injection completed on 11/29 provided good relief for ~ 2 - 2.5 months.  Pain located over lateral hip again today.  No new injury in the interim.    Patient is also requesting to be seen in follow up for her chronic bilateral thumb/CMC joint pain.  Bilateral CMC joint steroid injections last completed by Dr Cárdenas on 9/14/17 provided good relief for ~ 4.5 months.  She had significant discomfort for ~ 1 - 2 days following those injections.      Review of Systems  Musculoskeletal: as above  Remainder of review of systems is negative including constitutional, CV, pulmonary, GI, Skin and Neurologic except as noted in HPI or medical history.    Past Medical  "History:   Diagnosis Date     ASTHMA - MODERATE PERSISTENT 2005     Cancer (H)      Chronic pain      Coronary artery disease      CVA (cerebral infarction)      Depressive disorder, not elsewhere classified      Diabetes (H)      Elevated serum alkaline phosphatase level     Liver source     Fibromyalgia      HYPERLIPIDEMIA NEC/NOS 2006     Hypertriglyceridemia      OA (osteoarthritis)      Thyroid disease      Tobacco use disorder      Tobacco use disorder complicating pregnancy, childbirth, or the puerperium, antepartum condition or complication      Trochanteric bursitis      Unspecified essential hypertension      Past Surgical History:   Procedure Laterality Date     C  DELIVERY ONLY      , Low Cervical     INJECT EPIDURAL TRANSFORAMINAL  2014    [facility]     INJECT JOINT SACROILIAC  2014    [facility]     LAMINECTOMY LUMBAR ONE LEVEL Left 2014    [facility]     Objective  BP (!) 130/91  Ht 5' 6\" (1.676 m)  Wt 203 lb (92.1 kg)  LMP 2009  BMI 32.77 kg/m2    GENERAL APPEARANCE: healthy, alert and no distress   GAIT: NORMAL  SKIN: no suspicious lesions or rashes  NEURO: Normal strength and tone, mentation intact and speech normal  PSYCH:  mentation appears normal and affect normal/bright    Bilateral hip exam    Inspection:        no edema or ecchymosis in hip area    ROM:       Full active and passive ROM       Pain with active adduction over lateral hip and active ER    Strength:        flexion 5/5       extension 5/5       abduction 5/5       adduction 5/5    Tender:        greater trochanter r>>L today       SI joint mild       Piriformis, external rotators mildly    Non Tender:        remainder of hip area       illiac crest       ASIS       pubis    Sensation:        grossly intact in hip and thigh    Skin:       well perfused       capillary refill brisk    Special Tests:        neg (-) LORI       " neg (-) FADIR       neg (-) scour       positive (+) Brooks    Bilateral Hand Exam  Inspection:Carpals: normal, Thumb: normal  Tender: Carpals: triquetrum, Metacarpals: 1st metacarpal, Thumb: CMC, R>L, but still milder overall than we have seen in the past  Non-tender: Remainder of hand  Range of Motion Thumb: opposition Decreased R>L, flexion MCP decreased, painful, extension MCP decreased, flexion IP decreased, extension IP decreased  Strength: mild decrease in thumb  strength bilaterally  Special tests: Positive scour of CMC, negative snuffbox tenderness   Skin:  well perfused  capillary refill brisk    Procedure  After risks, benefits and complications of steroid injection were discussed with the patient, a consent form was signed and the patient elected to proceed.  Using sterile technique, the area was first prepped with betadyne and an alcohol swab.  A 25 gauge  needle was used to inject a mixture of 40 mg Kenalog, 2 ml of 0.5% marcaine and 2 ml of 1% lidocaine into the greater trochanteric bursa on the right and left.  The patient tolerated the procedure well without complications.    Radiology:  FINGER LEFT TWO OR MORE VIEWS 5/13/2014 11:29 AM   HISTORY: Chronic pain.  COMPARISON: None.   FINDINGS: No fracture or malalignment is identified. There is near  complete joint space loss of the first carpal metacarpal joint with  adjacent subchondral cyst formation in the base of the first  metacarpal. Small osteophytes are also noted. Mild degenerative  changes of the first metacarpal phalangeal joint also noted.  IMPRESSION  IMPRESSION: Degenerative changes of the first carpal metacarpal and of  the first metacarpal phalangeal joints. No fracture.    Assessment:  1. Trochanteric bursitis of both hips    2. Primary osteoarthritis of both first carpometacarpal joints        Plan:  Discussed the assessment with the patient.  Follow up: prn  XR images independently visualized and reviewed with patient again today  in clinic  Reviewed prior hand and hip films today  Defer CMC injections for as long as possible, reviewed PRP option in detail today  B/l troch bursa CSI repeated today  Water therapy and low impact activity options reviewed   Reviewed again number of steroid injection in the last 2 yrs and importance of limiting overall number as much as possible  Risks, potential SE reviewed in detail  F/u in minimum of 2 weeks for b/l CMC CSI  Expectations and goals of CSI reviewed  Often 2-3 days for steroid effect, and can take up to two weeks for maximum effect  We discussed modified progressive pain-free activity as tolerated  Do not overuse in first two weeks if feeling better due to concern for vulnerability while steroid is working  Supportive care reviewed  All questions were answered today  Contact us with additional questions or concerns  Signs and sx of concern reviewed      José Miguel Linder DO, RADHA  Primary Care Sports Medicine  [facility]         Disclaimer: This note consists of symbols derived from keyboarding, dictation and/or voice recognition software. As a result, there may be errors in the script that have gone undetected. Please consider this when interpreting information found in this chart.      Again, thank you for allowing me to participate in the care of your patient.        Sincerely,        José Miguel Linder DO

## 2018-03-01 NOTE — PROGRESS NOTES
Jacquie Amador  :  1960  DOS: 17  MRN: [id number]    Sports Medicine Clinic Visit    PCP: Liz Peterson    Jacquie Amador is a 55 year old Right hand dominant female who is seen in consultation at the request of   presenting with bilateral thumb pain.    Injury: about 5  year(s).  Pain located over bilateral CMC joitns, nonradiating.  Reports constant and with intermittent increases radiating, weakness to pinching.  Additional Features:  Positive: swelling and clicking.  Symptoms are better with injections.  Symptoms are worse with: gripping.  Other evaluation and/or treatments so far consists of: injections, no physical therapy.  Recent imaging completed: X-rays completed 2014 Prior History of related problems: None    Social History: crafting    Interim History - 2016  Since last visit on 2015 patient has mild-moderate discomfort over last 2 weeks.  B/l CMC joint steroid injection completed on 10/12 provided good relief for 5.5 months.  No new injury in the interim.    Interim History - 2016  Since last visit on 3/11/2016 patient has moderate - severe discomfort, that has been worsening over last 1 month.  Pt reports that she had minimal discomfort until she fell on concrete ~ 1 month ago, catching herself with both hands out in front of her.  Denies any severe pain or new swelling immediately following fall, pain gradually worsened since that time.  B/l CMC joint steroid injection completed on 3/11 provided good relief for 3.5 months.  No new injury in the interim.    Interim History - 2017  Since last visit on 16 patient has moderate bilateral thumb pain.  Bilateral thumb CMC steroid injections completed on  provided excellent relief for ~ 3.5 months.  Desires repeat injections today prior to leaving for her trip.  No new injury in the interim.    Patient is also being seen in f/u for chronic bilateral lateral hip pain,  right>>>left .  Patient was most recently treated for this condition by Dr Cárdenas in 12/2016.  Right trochanteric bursa injection completed at that time provided her with good relief for ~ 2.5 months.  Desires repeat injection today prior to leaving for vacation.    Interim History - November 29, 2017  Since last visit on 03/31/17 patient has moderate bilateral lateral hip pain, right>>>left.  Bilateral hip trochanteric bursa injection completed on 3/31 provided good relief for ~ 5 - 6 months.  Patient reports significantly worsening pain over the last ~ 3 weeks.  No new injury in the interim.    Patient also has secondary complaint of right knee pain over the last ~ 3 months.  Pain located over deep medial knee joint line.  Pain worse with going from sit to stand, walking, and ascending stairs.  Previous h/o similar pain several years ago - cannot recall treatment.    Interim History - February 28, 2018  Since last visit on 11/29/2017 patient has moderate-severe right hip pain.  Right hip trochanteric bursa injection completed on 11/29 provided good relief for ~ 2 - 2.5 months.  Pain located over lateral hip again today.  No new injury in the interim.    Patient is also requesting to be seen in follow up for her chronic bilateral thumb/CMC joint pain.  Bilateral CMC joint steroid injections last completed by Dr Cárdenas on 9/14/17 provided good relief for ~ 4.5 months.  She had significant discomfort for ~ 1 - 2 days following those injections.      Review of Systems  Musculoskeletal: as above  Remainder of review of systems is negative including constitutional, CV, pulmonary, GI, Skin and Neurologic except as noted in HPI or medical history.    Past Medical History:   Diagnosis Date     ASTHMA - MODERATE PERSISTENT 9/21/2005     Cancer (H)      Chronic pain      Coronary artery disease      CVA (cerebral infarction)      Depressive disorder, not elsewhere classified      Diabetes (H)      Elevated serum alkaline  "phosphatase level     Liver source     Fibromyalgia      HYPERLIPIDEMIA NEC/NOS 2006     Hypertriglyceridemia      OA (osteoarthritis)      Thyroid disease      Tobacco use disorder      Tobacco use disorder complicating pregnancy, childbirth, or the puerperium, antepartum condition or complication      Trochanteric bursitis      Unspecified essential hypertension      Past Surgical History:   Procedure Laterality Date     C  DELIVERY ONLY      , Low Cervical     INJECT EPIDURAL TRANSFORAMINAL  2014    [facility]     INJECT JOINT SACROILIAC  2014    [facility]     LAMINECTOMY LUMBAR ONE LEVEL Left 2014    [facility]     Objective  BP (!) 130/91  Ht 5' 6\" (1.676 m)  Wt 203 lb (92.1 kg)  LMP 2009  BMI 32.77 kg/m2    GENERAL APPEARANCE: healthy, alert and no distress   GAIT: NORMAL  SKIN: no suspicious lesions or rashes  NEURO: Normal strength and tone, mentation intact and speech normal  PSYCH:  mentation appears normal and affect normal/bright    Bilateral hip exam    Inspection:        no edema or ecchymosis in hip area    ROM:       Full active and passive ROM       Pain with active adduction over lateral hip and active ER    Strength:        flexion 5/5       extension 5/5       abduction 5/5       adduction 5/5    Tender:        greater trochanter r>>L today       SI joint mild       Piriformis, external rotators mildly    Non Tender:        remainder of hip area       illiac crest       ASIS       pubis    Sensation:        grossly intact in hip and thigh    Skin:       well perfused       capillary refill brisk    Special Tests:        neg (-) LORI       neg (-) FADIR       neg (-) scour       positive (+) Brooks    Bilateral Hand Exam  Inspection:Carpals: normal, Thumb: normal  Tender: Carpals: triquetrum, Metacarpals: 1st metacarpal, Thumb: CMC, R>L, but still milder overall than we have seen in the " past  Non-tender: Remainder of hand  Range of Motion Thumb: opposition Decreased R>L, flexion MCP decreased, painful, extension MCP decreased, flexion IP decreased, extension IP decreased  Strength: mild decrease in thumb  strength bilaterally  Special tests: Positive scour of CMC, negative snuffbox tenderness   Skin:  well perfused  capillary refill brisk    Procedure  After risks, benefits and complications of steroid injection were discussed with the patient, a consent form was signed and the patient elected to proceed.  Using sterile technique, the area was first prepped with betadyne and an alcohol swab.  A 25 gauge  needle was used to inject a mixture of 40 mg Kenalog, 2 ml of 0.5% marcaine and 2 ml of 1% lidocaine into the greater trochanteric bursa on the right and left.  The patient tolerated the procedure well without complications.    Radiology:  FINGER LEFT TWO OR MORE VIEWS 5/13/2014 11:29 AM   HISTORY: Chronic pain.  COMPARISON: None.   FINDINGS: No fracture or malalignment is identified. There is near  complete joint space loss of the first carpal metacarpal joint with  adjacent subchondral cyst formation in the base of the first  metacarpal. Small osteophytes are also noted. Mild degenerative  changes of the first metacarpal phalangeal joint also noted.  IMPRESSION  IMPRESSION: Degenerative changes of the first carpal metacarpal and of  the first metacarpal phalangeal joints. No fracture.    Assessment:  1. Trochanteric bursitis of both hips    2. Primary osteoarthritis of both first carpometacarpal joints        Plan:  Discussed the assessment with the patient.  Follow up: prn  XR images independently visualized and reviewed with patient again today in clinic  Reviewed prior hand and hip films today  Defer CMC injections for as long as possible, reviewed PRP option in detail today  B/l troch bursa CSI repeated today  Water therapy and low impact activity options reviewed   Reviewed again number  of steroid injection in the last 2 yrs and importance of limiting overall number as much as possible  Risks, potential SE reviewed in detail  F/u in minimum of 2 weeks for b/l CMC CSI  Expectations and goals of CSI reviewed  Often 2-3 days for steroid effect, and can take up to two weeks for maximum effect  We discussed modified progressive pain-free activity as tolerated  Do not overuse in first two weeks if feeling better due to concern for vulnerability while steroid is working  Supportive care reviewed  All questions were answered today  Contact us with additional questions or concerns  Signs and sx of concern reviewed      José Miguel Linder DO, RADHA  Primary Care Sports Medicine  [facility]         Disclaimer: This note consists of symbols derived from keyboarding, dictation and/or voice recognition software. As a result, there may be errors in the script that have gone undetected. Please consider this when interpreting information found in this chart.

## 2018-03-02 NOTE — TELEPHONE ENCOUNTER
Message sent to pt:    Bhargav Dhillon,     I noticed that the information last communicated about the timing between botox appointments was incorrect. You need to have 3 months between appointments, not 3 weeks.  Please let us know if you have any other questions.     Thank you,     Laisha Sanchez, MARVAN, RN-BC  Patient Care Supervisor/Care Coordinator  [facility]

## 2018-03-06 NOTE — TELEPHONE ENCOUNTER
"Bhargav Dhillon,     I am sorry for the delay in this response and for the confusion.  I do not always work directly with patients so when you send [employer] messages directly to me rather than to Liseth Caldera NP, it doesn't show up in the messages that are routinely viewed by the RNs so we were not aware that you had sent additional messages.  Here is the exact message that Dr. Richardson sent regarding the timing of your botox:     \"If she is going to schedule for me again, she should review before scheduling to make sure 3 months has passed since getting it (botox) in the head region for hyperhydrosis.\"    Sounds like your are good on the timing for now. Not many of our patients get botox for more than one reason from more than one provider which is why Dr. Richardson needs to be sure that you are keeping track of the appropriate intervals.     I hope this helps to clarify information for your upcoming appointment.     Thank you,     MARVA DyerN, RN-BC  Patient Care Supervisor/Care Coordinator  [facility]    "

## 2018-03-06 NOTE — TELEPHONE ENCOUNTER
Messages from 3/3:    I am very confused! I did not have any Botox within the last years. I m not sure what you see that I got 3 weeks ago. I do get cortisone injections in my hips and hand. Please check your records again.     Thank you   Jacquie Amador   -----------    The last Botox was March 9 2017.   ---------  Wrong picture sorry my last Botox was June 6 of 2016. Please let me know what s up.     Thanks   Jacquie   Message sent to pt:

## 2018-03-07 NOTE — TELEPHONE ENCOUNTER
Received patient message:    Laisha  My Botox is with Dr. Morales for my  hyperhydrosis at [facility].  Are you from Dr. Morales s office? Please give me a call today  [phone]. It has been quite awhile since I had them w Dr. Richardson.  --------------  Called pt. Explained the series of events that transpired since 2/19. Pt adamant that she did not call us about the botox treatment with Dr. Campos. Let her know that there is a message in her chart with an update about her plan to see Dr. Campos and going to [place]. Pt states that she asked Dr. Campos about this and is concerned that we are looking at her messages to other providers. Let her know that I was not aware of anyone specifically looking at messages to Dr. Campos but since it is part of her [place] chart, that would be OK to do. Pt very upset saying that she didn't give permission for Liseth to look at information she sends to Dr. Campos. Reiterated that written permission is not needed within the same system.     Reiterated the basics of the events that transpired and asked if she understood the interval between getting botox. Pt states that she knew about that interval back in August after talking with Dr. Richardson. States that she is 57 years old and can remember details. Let her know that after that length of time, people at any age may require a reminder of information related to medication treatments. Let her know that it appears that she has accurate information about the timing for future botox treatments. Pt continued to have issue with why this clinic got involved and wanted me to let Liseth Caldera CNP know she was upset.    -------------    Laisha Sanchez, MARVAN, RN-BC  Patient Care Supervisor/Care Coordinator  [facility]

## 2018-03-07 NOTE — TELEPHONE ENCOUNTER
"Further review of the chart brought clarification regarding the conversation with the pt on 2/19. This nurse had called the patient to clarify a medication dose and it was at that time that the patient shared information about her appointment with Dr. Campos and other details.     Called pt to let her know that she was right about not calling the clinic.  Reminded her that I had spoken with her about the metoprolol dosing and that's when she proceeded to share details about the botox with Dr. Campos and other information.  I let her know that since this was about her medical care, I had passed that on to Liseth Caldera CNP who wanted to make sure that Dr. Richardson knew it as well. Dr. Richardson's clarification was the impetus for the communication with her about the botox interval.     Pt states that she is very upset and asked \"do you have a recording of that call?\"  I said that I did not think there was a recording. Pt states that she recalled the conversation about the medication and that the rest was just small talk. Let her know that I felt it had been important to relay to her pain care team. Pt states that she has called her  who has a friend who is a  and he is going to look into all of this. She has the Mychart messages with the correspondence.     Will update providers of patient's frustration.     BANDAR Dyer, RN-BC  Patient Care Supervisor/Care Coordinator  [facility]    "

## 2018-03-07 NOTE — TELEPHONE ENCOUNTER
Information noted.   Thanks.   Our goal, as always, is to provide safe and appropriate care for our patients and we must take in to account their care with other providers.    Liseth LOPEZ RN CNP, FNP  [facility] [place]

## 2018-03-09 DIAGNOSIS — G47.00 INSOMNIA: ICD-10-CM

## 2018-03-09 RX ORDER — TRAZODONE HYDROCHLORIDE 50 MG/1
TABLET, FILM COATED ORAL
Qty: 270 TABLET | Refills: 2 | Status: SHIPPED | OUTPATIENT
Start: 2018-03-09 | End: 2018-05-29

## 2018-03-09 NOTE — TELEPHONE ENCOUNTER
Trazodone:  Prescription approved per [facility] Refill Protocol.  Renetta Massey, RN, BSN

## 2018-03-13 ENCOUNTER — MYC MEDICAL ADVICE (OUTPATIENT)
Dept: PALLIATIVE MEDICINE | Facility: CLINIC | Age: 58
End: 2018-03-13

## 2018-03-13 NOTE — TELEPHONE ENCOUNTER
My chart message from pt:    Liseth, if you could I would appreciate a short phone call.     Thank you,   Jacquie    Jacquie Childers is out of the office until 3/20/2018. I will have her review your message upon her return.     Michell Ritchie RN, [facility]  Pain Clinic Care Coordinator

## 2018-03-20 NOTE — TELEPHONE ENCOUNTER
Please call patient to get a feel for what we need to discuss. This is how we generally process patient calls.   Thanks.  Liseth LOPEZ, MICHELLE CNP, SARAHP  [facility] [place]

## 2018-03-21 ENCOUNTER — MYC MEDICAL ADVICE (OUTPATIENT)
Dept: ORTHOPEDICS | Facility: CLINIC | Age: 58
End: 2018-03-21

## 2018-03-21 NOTE — TELEPHONE ENCOUNTER
Discussed with Dr Linder, steroid injection not advisable while taking oral abx.  She will need to off of abx for minimum of 1 week prior to steroid injection.      Spoke to patient discussed above recommendation.  I discussed her concerns that would be best to get her skin lesions cleared up prior to steroid injection.  Patient was rescheduled for 4/18 after she returns from her vacation.  Patient expressed understanding and had no further questions.    Anoop Berman ATC

## 2018-03-23 ENCOUNTER — MYC MEDICAL ADVICE (OUTPATIENT)
Dept: FAMILY MEDICINE | Facility: CLINIC | Age: 58
End: 2018-03-23

## 2018-03-23 ENCOUNTER — MYC MEDICAL ADVICE (OUTPATIENT)
Dept: PALLIATIVE MEDICINE | Facility: CLINIC | Age: 58
End: 2018-03-23

## 2018-03-23 DIAGNOSIS — F41.1 GENERALIZED ANXIETY DISORDER: ICD-10-CM

## 2018-03-23 RX ORDER — BUPROPION HYDROCHLORIDE 200 MG/1
TABLET, EXTENDED RELEASE ORAL
Qty: 90 TABLET | Refills: 0 | Status: SHIPPED | OUTPATIENT
Start: 2018-03-23 | End: 2018-06-22

## 2018-03-23 NOTE — TELEPHONE ENCOUNTER
Bupropion    Prescription approved per [facility] Refill Protocol.    Laney Medrano, RN, BSN

## 2018-03-23 NOTE — TELEPHONE ENCOUNTER
Information noted.   Thanks  Liseth LOPEZ RN CNP, FNP  [facility]

## 2018-03-23 NOTE — TELEPHONE ENCOUNTER
Outreach to patient. Patient is upset re: interactions she has had with Laisha Sanchez RN at [facility]. She feels that Laisha intercepted a message the patient sent to Dr. Morales.   Please reference [facility] message on 2/19/2018 where it was documented that the patient contacted us to tell us she will be getting Botox from Dr. Morales vs Dr. Richardson.   Patient is adamant that she did not initiate contact with our clinic re: Botox. She feels Laisha treated her in a rude manner on the phone.   Also reference [facility] message dated 3/13/2018 for further information.   I discussed with patient that it is a patient safety issue re: knowing when she gets Botox re: of who is the clinician providing the Botox service.   Patient again reiterates that patient did not initiate contact with our office re: Botox.   I have asked the patient to contact Di Pace, our clinic manager to discuss her concerns, gave her our [facility] clinic number, [phone] to contact Di.   Also reference [facility] refill dated 2/19/2018 for further information.   Upon further review, it appears that patient told Laisha that she was getting Botox from Dr. Morales during a conversation re: metoprolol dosing. Patient did not initiate call re: botox, but it apparently came up in conversation. At any rate, I did try to explain that it is important that any providers involved with her Botox treatments be aware of others and dosing and timing of the Botox.     Liseth LOPEZ, RN CNP, FNP  [facility]

## 2018-03-23 NOTE — TELEPHONE ENCOUNTER
Jacquie    Do you want me to cancel your appointment on 3/27/2018 with Liseth?    Michell Ritchie RN, [facility]  Pain Clinic Care Coordinator

## 2018-03-23 NOTE — TELEPHONE ENCOUNTER
Per patient [facility] message:  From: Jacquie Amador        Created: 3/23/2018 1:06 PM       FYI I will be leaving your clinic as of today. I don t appreciate the way this last issue was handled. I have attached my incoming and outgoing phone calls on Feb 19, 2018. One incoming from Pain Clinic.     Thanks,  Jacquie        Routed to provider.    Denisse Maldonado RN-BSN  [facility]

## 2018-03-26 NOTE — TELEPHONE ENCOUNTER
Routed to Dr. Richardson.    Denisse Maldonado, RN-BSN  [facility]

## 2018-03-26 NOTE — TELEPHONE ENCOUNTER
Per patient myChart message:  From: Jacquie Amador      Created: 3/23/2018 4:49 PM      Hello,   I want you to be aware that I am leaving the Pain Clinic I m not happy with Liseth or some administrative employees.     Thanks for all you have done.  Jacquie

## 2018-03-27 ENCOUNTER — MYC MEDICAL ADVICE (OUTPATIENT)
Dept: FAMILY MEDICINE | Facility: CLINIC | Age: 58
End: 2018-03-27

## 2018-03-27 DIAGNOSIS — G25.9 MOVEMENT DISORDER: ICD-10-CM

## 2018-03-27 RX ORDER — ROPINIROLE 1 MG/1
TABLET, FILM COATED ORAL
Qty: 270 TABLET | Refills: 1 | Status: SHIPPED | OUTPATIENT
Start: 2018-03-27 | End: 2018-09-28

## 2018-03-28 DIAGNOSIS — F41.1 GENERALIZED ANXIETY DISORDER: ICD-10-CM

## 2018-03-30 ENCOUNTER — MYC MEDICAL ADVICE (OUTPATIENT)
Dept: FAMILY MEDICINE | Facility: CLINIC | Age: 58
End: 2018-03-30

## 2018-03-30 RX ORDER — BUPROPION HYDROCHLORIDE 150 MG/1
TABLET, EXTENDED RELEASE ORAL
Qty: 90 TABLET | Refills: 0 | Status: SHIPPED | OUTPATIENT
Start: 2018-03-30 | End: 2018-06-22

## 2018-03-30 NOTE — TELEPHONE ENCOUNTER
rOPINIRole (REQUIP) 1 MG tablet  Rx was sent 03/27/2018 for 270 tabs and 1 refills [facility] in [place].     buPROPion (WELLBUTRIN SR) 150 MG 12 hr tablet  Rx was sent 03/30/2018 for 90 tabs and 0 refills [facility] in [place].    RN called pharmacy and informed this was received.  Eva Pace RN, BSN

## 2018-04-02 DIAGNOSIS — M47.817 LUMBOSACRAL SPONDYLOSIS WITHOUT MYELOPATHY: ICD-10-CM

## 2018-04-02 DIAGNOSIS — M70.61 GREATER TROCHANTERIC BURSITIS OF BOTH HIPS: ICD-10-CM

## 2018-04-02 DIAGNOSIS — M79.18 MYOFASCIAL PAIN: ICD-10-CM

## 2018-04-02 DIAGNOSIS — M53.3 SI (SACROILIAC) JOINT DYSFUNCTION: ICD-10-CM

## 2018-04-02 DIAGNOSIS — M70.62 GREATER TROCHANTERIC BURSITIS OF BOTH HIPS: ICD-10-CM

## 2018-04-02 DIAGNOSIS — M79.7 FIBROMYALGIA: ICD-10-CM

## 2018-04-02 DIAGNOSIS — M51.369 DDD (DEGENERATIVE DISC DISEASE), LUMBAR: ICD-10-CM

## 2018-04-02 NOTE — TELEPHONE ENCOUNTER
Patient has worked with Madison Ni, my dyad lead and has requested to continue to be seen by myself.   I am fine with continuing care for Jacquie Amador.  Ms. Amador is going to [place] on Wednesday and is requesting a refill of her Norco.  Checked [place] , last script written by me on 1/9/2018 was filled on 2/24/2018. Due for refill now.     I have prepped script and will sign this when I am in [place] on 4/3/2018 and patient can  once signed in [facility].     Liseth LOPEZ, RN CNP, FNP  [facility]

## 2018-04-03 ENCOUNTER — MYC MEDICAL ADVICE (OUTPATIENT)
Dept: PALLIATIVE MEDICINE | Facility: CLINIC | Age: 58
End: 2018-04-03

## 2018-04-03 RX ORDER — HYDROCODONE BITARTRATE AND ACETAMINOPHEN 5; 325 MG/1; MG/1
TABLET ORAL
Qty: 100 TABLET | Refills: 0 | Status: SHIPPED | OUTPATIENT
Start: 2018-04-03 | End: 2018-05-29

## 2018-04-03 NOTE — TELEPHONE ENCOUNTER
Signed Prescriptions:                        Disp   Refills    HYDROcodone-acetaminophen (NORCO) 5-325 MG*100 ta*0        Sig: May take 1 tablet every 4-6 hours as needed for pain.           Max of 4 tabs per day. OK to fill and begin           on/after 4/3/2018.  Must last 30 days.  Authorizing Provider: LISETH HANSON    Signed script placed in touchdown bin at [facility].    Liseth Hanson APRN CNP FNP RN  [facility]

## 2018-04-03 NOTE — TELEPHONE ENCOUNTER
12:55 voice mail message left regarding incident listed below.    I have called the patient and discussed concerns.  Patient is schedule for an appointment with Liseth in May.  Patient is not requesting any further follow up.  Di Pace

## 2018-04-03 NOTE — TELEPHONE ENCOUNTER
Spoke with patient, and she would like signed Rx mailed to [facility], in [place]. Mailing from [facility] on 04/04/18.

## 2018-04-18 ENCOUNTER — MYC MEDICAL ADVICE (OUTPATIENT)
Dept: FAMILY MEDICINE | Facility: CLINIC | Age: 58
End: 2018-04-18

## 2018-04-18 DIAGNOSIS — L70.0 ACNE VULGARIS: ICD-10-CM

## 2018-04-18 DIAGNOSIS — L30.9 DERMATITIS: ICD-10-CM

## 2018-04-19 RX ORDER — CLOBETASOL PROPIONATE 0.5 MG/G
CREAM TOPICAL
Qty: 30 G | Refills: 3 | Status: SHIPPED | OUTPATIENT
Start: 2018-04-19 | End: 2018-05-29

## 2018-04-19 RX ORDER — ADAPALENE 45 G/G
GEL TOPICAL
Qty: 45 G | Refills: 10 | Status: SHIPPED | OUTPATIENT
Start: 2018-04-19 | End: 2022-04-20

## 2018-04-20 DIAGNOSIS — L30.9 DERMATITIS: ICD-10-CM

## 2018-04-20 RX ORDER — NYSTATIN 100000 U/G
CREAM TOPICAL
Qty: 60 G | Refills: 1 | Status: SHIPPED | OUTPATIENT
Start: 2018-04-20 | End: 2020-10-14

## 2018-04-20 NOTE — TELEPHONE ENCOUNTER
nystatin (MYCOSTATIN) [id number] UNIT/ML suspension  Prescription approved per [facility] Refill Protocol.    Eva Pace, RN, BSN

## 2018-05-03 ENCOUNTER — MYC MEDICAL ADVICE (OUTPATIENT)
Dept: FAMILY MEDICINE | Facility: CLINIC | Age: 58
End: 2018-05-03

## 2018-05-03 ENCOUNTER — E-VISIT (OUTPATIENT)
Dept: FAMILY MEDICINE | Facility: CLINIC | Age: 58
End: 2018-05-03
Payer: COMMERCIAL

## 2018-05-03 DIAGNOSIS — R53.83 OTHER FATIGUE: Primary | ICD-10-CM

## 2018-05-03 PROCEDURE — 99444 ZZC PHYSICIAN ONLINE EVALUATION & MANAGEMENT SERVICE: CPT | Performed by: FAMILY MEDICINE

## 2018-05-07 DIAGNOSIS — G43.009 MIGRAINE WITHOUT AURA AND WITHOUT STATUS MIGRAINOSUS, NOT INTRACTABLE: ICD-10-CM

## 2018-05-07 DIAGNOSIS — R00.2 PALPITATIONS: ICD-10-CM

## 2018-05-07 DIAGNOSIS — R00.0 SINUS TACHYCARDIA: ICD-10-CM

## 2018-05-08 ENCOUNTER — MYC MEDICAL ADVICE (OUTPATIENT)
Dept: FAMILY MEDICINE | Facility: CLINIC | Age: 58
End: 2018-05-08

## 2018-05-08 ENCOUNTER — MYC MEDICAL ADVICE (OUTPATIENT)
Dept: PALLIATIVE MEDICINE | Facility: CLINIC | Age: 58
End: 2018-05-08

## 2018-05-08 ENCOUNTER — TELEPHONE (OUTPATIENT)
Dept: PALLIATIVE MEDICINE | Facility: CLINIC | Age: 58
End: 2018-05-08

## 2018-05-08 DIAGNOSIS — E78.5 HYPERLIPIDEMIA LDL GOAL <130: ICD-10-CM

## 2018-05-08 DIAGNOSIS — R73.01 IMPAIRED FASTING GLUCOSE: ICD-10-CM

## 2018-05-08 DIAGNOSIS — R53.83 OTHER FATIGUE: ICD-10-CM

## 2018-05-08 LAB
ALBUMIN SERPL-MCNC: 3.9 G/DL (ref 3.4–5)
ALP SERPL-CCNC: 122 U/L (ref 40–150)
ALT SERPL W P-5'-P-CCNC: 26 U/L (ref 0–50)
ANION GAP SERPL CALCULATED.3IONS-SCNC: 8 MMOL/L (ref 3–14)
AST SERPL W P-5'-P-CCNC: 21 U/L (ref 0–45)
BILIRUB SERPL-MCNC: 0.3 MG/DL (ref 0.2–1.3)
BUN SERPL-MCNC: 17 MG/DL (ref 7–30)
CALCIUM SERPL-MCNC: 9.2 MG/DL (ref 8.5–10.1)
CHLORIDE SERPL-SCNC: 106 MMOL/L (ref 94–109)
CHOLEST SERPL-MCNC: 161 MG/DL
CO2 SERPL-SCNC: 27 MMOL/L (ref 20–32)
CREAT SERPL-MCNC: 0.98 MG/DL (ref 0.52–1.04)
ERYTHROCYTE [DISTWIDTH] IN BLOOD BY AUTOMATED COUNT: 14.8 % (ref 10–15)
GFR SERPL CREATININE-BSD FRML MDRD: 58 ML/MIN/1.7M2
GLUCOSE SERPL-MCNC: 158 MG/DL (ref 70–99)
HCT VFR BLD AUTO: 40.5 % (ref 35–47)
HDLC SERPL-MCNC: 56 MG/DL
HGB BLD-MCNC: 13.3 G/DL (ref 11.7–15.7)
LDLC SERPL CALC-MCNC: 73 MG/DL
MCH RBC QN AUTO: 28.5 PG (ref 26.5–33)
MCHC RBC AUTO-ENTMCNC: 32.8 G/DL (ref 31.5–36.5)
MCV RBC AUTO: 87 FL (ref 78–100)
NONHDLC SERPL-MCNC: 105 MG/DL
PLATELET # BLD AUTO: 423 10E9/L (ref 150–450)
POTASSIUM SERPL-SCNC: 3.6 MMOL/L (ref 3.4–5.3)
PROT SERPL-MCNC: 7.8 G/DL (ref 6.8–8.8)
RBC # BLD AUTO: 4.67 10E12/L (ref 3.8–5.2)
SODIUM SERPL-SCNC: 141 MMOL/L (ref 133–144)
TRIGL SERPL-MCNC: 162 MG/DL
TSH SERPL DL<=0.005 MIU/L-ACNC: 1.13 MU/L (ref 0.4–4)
WBC # BLD AUTO: 7.2 10E9/L (ref 4–11)

## 2018-05-08 PROCEDURE — 80061 LIPID PANEL: CPT | Performed by: FAMILY MEDICINE

## 2018-05-08 PROCEDURE — 83036 HEMOGLOBIN GLYCOSYLATED A1C: CPT | Performed by: FAMILY MEDICINE

## 2018-05-08 PROCEDURE — 84443 ASSAY THYROID STIM HORMONE: CPT | Performed by: FAMILY MEDICINE

## 2018-05-08 PROCEDURE — 80053 COMPREHEN METABOLIC PANEL: CPT | Performed by: FAMILY MEDICINE

## 2018-05-08 PROCEDURE — 85027 COMPLETE CBC AUTOMATED: CPT | Performed by: FAMILY MEDICINE

## 2018-05-08 PROCEDURE — 36415 COLL VENOUS BLD VENIPUNCTURE: CPT | Performed by: FAMILY MEDICINE

## 2018-05-08 NOTE — TELEPHONE ENCOUNTER
Pt has cancelled her upcoming appt and states she will not be returning to the clinic.     Routing as FYI.         .Karina ZEPEDA    [facility] [place]

## 2018-05-08 NOTE — TELEPHONE ENCOUNTER
I think so... Let's run it by Dr. Richardson, as the patient had contacted our clinic asking if she could return to work with me and I had agreed.     Thanks.  Liseth LOPEZ RN CNP, FNP  [facility] [place]

## 2018-05-08 NOTE — TELEPHONE ENCOUNTER
Liseth Caldera, APRN CNP can we add the 'dismissal' feature?    Denisse Maldonado RN-BSN  [facility]

## 2018-05-09 ENCOUNTER — MYC MEDICAL ADVICE (OUTPATIENT)
Dept: FAMILY MEDICINE | Facility: CLINIC | Age: 58
End: 2018-05-09

## 2018-05-09 DIAGNOSIS — R73.01 IMPAIRED FASTING GLUCOSE: Primary | ICD-10-CM

## 2018-05-09 LAB — HBA1C MFR BLD: 5.8 % (ref 0–5.6)

## 2018-05-09 RX ORDER — METOPROLOL SUCCINATE 25 MG/1
TABLET, EXTENDED RELEASE ORAL
Qty: 30 TABLET | Refills: 1 | Status: SHIPPED | OUTPATIENT
Start: 2018-05-09 | End: 2018-06-22

## 2018-05-09 NOTE — TELEPHONE ENCOUNTER
"Requested Prescriptions   Pending Prescriptions Disp Refills     metoprolol succinate (TOPROL-XL) 25 MG 24 hr tablet 30 tablet 1     Sig: Take 0.5 tablets per day. May add another 1/2 tab in the evening as needed    Beta-Blockers Protocol Failed    5/7/2018  1:25 PM       Failed - Blood pressure under 140/90 in past 12 months    BP Readings from Last 3 Encounters:   02/28/18 (!) 130/91   01/09/18 119/82   12/08/17 110/80                Passed - Patient is age 6 or older       Passed - Recent (12 mo) or future (30 days) visit within the authorizing provider's specialty    Patient had office visit in the last 12 months or has a visit in the next 30 days with authorizing provider or within the authorizing provider's specialty.  See \"Patient Info\" tab in inbasket, or \"Choose Columns\" in Meds & Orders section of the refill encounter.            Routing refill request to provider for review/approval because:  BP >140/90      Madison Anderson RN          "

## 2018-05-14 NOTE — TELEPHONE ENCOUNTER
Patient is discharged from the pain clinic due to Self-Discharge.  If the patient requests to return, the situation will be reviewed prior to scheduling and the patient may need a new referral. This patient requires a new referral before any appointment is made.    BANDAR Dyer, RN-BC  Patient Care Supervisor/Care Coordinator  [facility] [place]

## 2018-05-14 NOTE — TELEPHONE ENCOUNTER
Reviewed notes and discussed with staff.  Ok for dismissal feature.    Renetta Richardson MD  [facility]

## 2018-05-23 NOTE — PROGRESS NOTES
"  SUBJECTIVE:   Jacquie Amador is a 57 year old female who presents to clinic today for the following health issues:      HPI  Hyperlipidemia Follow-Up      Rate your low fat/cholesterol diet?: fair    Taking statin?  Yes, muscle aches, possibly from other cause    Other lipid medications/supplements?:  none    Hypertension Follow-up      Outpatient blood pressures are not being checked.    Low Salt Diet: low salt    Chronic Pain Follow-Up       Type / Location of Pain: feet , legs , hands , hips , right ankle   Analgesia/pain control:       Recent changes:  worse      Overall control: Inadequate pain control  Activity level/function:      Daily activities:  Able to do light housework, cooking    Work:  Patient does not work   Adverse effects:  Fatigue   Adherance    Taking medication as directed?  Yes    Participating in other treatments: [facility] in [place],  Does not go there anymore would like to seek out other options   Risk Factors:    Sleep:  \"okay\"     Mood/anxiety:  Anxiety worsened     Recent family or social stressors:   is finding new job     Other aggravating factors: prolonged standing  PHQ-9 SCORE 6/28/2017 8/23/2017 12/8/2017   Total Score - - -   Total Score MyChart 6 (Mild depression) 17 (Moderately severe depression) 6 (Mild depression)   Total Score - - -   Total Score 6 17 6     MARIZOL-7 SCORE 6/28/2017 8/23/2017 12/8/2017   Total Score - - -   Total Score 3 (minimal anxiety) 14 (moderate anxiety) 6 (mild anxiety)   Total Score 3 14 6     Encounter-Level CSA - 11/21/2017:          Controlled Substance Agreement - Scan on 12/8/2017  6:40 AM : CONTROLLED SUBSTANCE AGREEMENT - 11/21/2017 (below)            Encounter-Level CSA - 11/21/2017:          Controlled Substance Agreement - Scan on 9/29/2016  3:40 PM : CONTROLLED SUBSTANCE AGREEMENT (below)                 Patient reports that she discontinued the [facility]. She states she felt her privacy was \"breeched\". " "Over the last month, she has been having pains \"all over\" - knees, hips, and legs. She also states feeling unsteady. She had restarted medical cannabis \"in a different way\". She has not had any injections over the last 8 months.      Thrush:  Patient reports of possible thrush in her mouth. She states she has been taking Advair once in a while due to her asthma which she relates to what might be causing the thrush. She is washing her mouth regularly. Patient also states of dry mouth and was given a mouth wash from her dentist to use.     Cough:  Patient reports of coughing a lot especially in the morning and night. The cough is productive and she develops white phlegm afterward.      Problem list and histories reviewed & adjusted, as indicated.  Additional history: as documented  Patient Active Problem List   Diagnosis     Moderate persistent asthma     Allergic rhinitis due to other allergen     Esophageal reflux     Moderate recurrent major depression (H)     Multiple joint pain     HYPERLIPIDEMIA LDL GOAL <130     Hypertension goal BP (blood pressure) < 140/90     Generalized anxiety disorder     Myalgia     Chronic rhinitis     Constipation     Lumbar disc disease with radiculopathy     Iron deficiency anemia     Osteoarthritis of carpometacarpal joint of thumb - bilateral     Trochanteric bursitis     Right ankle instability     Primary focal hyperhidrosis     Trochanteric bursitis of both hips     Overweight     History of iron deficiency     Scratching     Advanced directives, counseling/discussion     Chronic pain syndrome     Medical marijuana use     Primary osteoarthritis of both first carpometacarpal joints     Arthritis of metatarsophalangeal joint     Sinus tachycardia     Past Surgical History:   Procedure Laterality Date     C  DELIVERY ONLY      , Low Cervical     INJECT EPIDURAL TRANSFORAMINAL  2014    [facility]     INJECT JOINT SACROILIAC  " 09/23/2014    [facility]     LAMINECTOMY LUMBAR ONE LEVEL Left 04/08/2014    [facility]       Social History   Substance Use Topics     Smoking status: Former Smoker     Packs/day: 1.00     Types: Cigarettes     Smokeless tobacco: Never Used      Comment: since 1981     Alcohol use No     Family History   Problem Relation Age of Onset     Thyroid Disease Mother      Arthritis Mother      Colon Cancer Mother      Thyroid Disease Sister      Arthritis Sister      Lipids Sister      Hypertension Father      DIABETES Father      C.A.D. Father      MI age 75     Cardiovascular Father      heart attack     CEREBROVASCULAR DISEASE Father      CANCER Father      renal cancer     Arthritis Father      HEART DISEASE Father      heart attack     GASTROINTESTINAL DISEASE Father      liver     Genitourinary Problems Father      kidney     Asthma Daughter      GASTROINTESTINAL DISEASE Daughter       Other      Breast Cancer No family hx of      Cancer - colorectal No family hx of      Alzheimer Disease No family hx of      Blood Disease No family hx of      Circulatory No family hx of      Eye Disorder No family hx of      Musculoskeletal Disorder No family hx of      Neurologic Disorder No family hx of      Respiratory No family hx of          Current Outpatient Prescriptions   Medication Sig Dispense Refill     adapalene (DIFFERIN) 0.1 % gel APPLY A THIN LAYER TO THE AFFECTED AREA(S) BY TOPICAL ROUTE ONCE DAILY BEFORE BEDTIME 45 g 10     ADVAIR DISKUS 500-50 MCG/DOSE diskus inhaler INHALE 1 PUFF BY INHALATION ROUTE 2 TIMES PER DAY IN THE MORNING AND EVENING APPROXIMATELY 12 HOURS APART 1 Inhaler 3     albuterol (2.5 MG/3ML) 0.083% neb solution Take 1 vial (2.5 mg) by nebulization every 6 hours as needed 1 Box 1     albuterol (VENTOLIN HFA) 108 (90 BASE) MCG/ACT Inhaler INHALE 2 PUFFS INTO THE LUNGS EVERY 6 HOURS AS NEEDED FOR SHORTNESS OF BREATH / DYSPNEA 54 g 1     ALPRAZolam (XANAX) 0.25 MG tablet  TAKE ONE TABLET BY MOUTH DAILY AS NEEDED FOR ANXIETY  60 tablet 0     atorvastatin (LIPITOR) 20 MG tablet TAKE 1 TABLET BY MOUTH DAILY 90 tablet 0     atorvastatin (LIPITOR) 20 MG tablet   3     buPROPion (WELLBUTRIN SR) 150 MG 12 hr tablet TAKE ONE TABLET BY MOUTH ONE TIME DAILY IN THE EVENING 90 tablet 0     buPROPion (WELLBUTRIN SR) 200 MG 12 hr tablet Take 1 tablet (200 mg) by mouth daily 90 tablet 0     celecoxib (CELEBREX) 200 MG capsule Take 1 capsule twice daily as needed for pain this is a 3 month script 180 capsule 1     clindamycin (CLEOCIN T) 1 % lotion Apply topically 2 times daily       clobetasol (TEMOVATE) 0.05 % cream APPLY SPARINGLY TO AFFECTED AREA TWICE DAILY FOR 14 DAYS.  DO NOT APPLY TO FACE. 30 g 3     diclofenac (VOLTAREN) 1 % GEL topical gel Apply 2 grams to hands and 4 grams to hips up to 4 times daily. T 9 Tube 11     DULoxetine (CYMBALTA) 60 MG EC capsule TAKE 2 CAPSULES (120 MG) BY MOUTH DAILY 180 capsule 2     HYDROcodone-acetaminophen (NORCO) 5-325 MG per tablet May take 1 tablet every 4-6 hours as needed for pain. Max of 4 tabs per day. OK to fill and begin on/after 4/3/2018.  Must last 30 days. 100 tablet 0     hydrocortisone 2.5 % cream APPLY TOPICALLY 2 TIMES DAILY AS NEEDED 30 g 3     Lactobacillus (ACIDOPHILUS) CAPS Take 1 capsule by mouth daily Take two hours before or after antibiotic dose.  Continue for 1 week after antibiotic therapy completed (Patient not taking: Reported on 5/29/2018) 60 capsule 0     losartan-hydrochlorothiazide (HYZAAR) 100-12.5 MG per tablet TAKE 1 TABLET BY MOUTH DAILY 90 tablet 2     medical cannabis (Patient's own supply.  Not a prescription) 2 mg morning and noon. 7 mg at bedtime. (This is NOT a prescription, and does not certify that the patient has a qualifying medical condition for medical cannabis.  The purpose of this order is  to document that the patient reports taking medical cannabis.) 0 Information only 0     metoprolol succinate  (TOPROL-XL) 25 MG 24 hr tablet Take 0.5 tablets per day. May add another 1/2 tab in the evening as needed 30 tablet 1     montelukast (SINGULAIR) 10 MG tablet TAKE ONE TABLET BY MOUTH IN THE EVENING  90 tablet 0     mupirocin (BACTROBAN) 2 % cream Apply topically 3 times daily (Patient not taking: Reported on 5/29/2018) 60 g 1     nystatin (MYCOSTATIN) 960633 UNIT/ML suspension Take by mouth 4 times daily 380 mL 1     nystatin (MYCOSTATIN) cream Apply to affected area three times daily 60 g 1     RANITIDINE  MG OR TABS ONE TABLET TWICE DAILY 0 0     rOPINIRole (REQUIP) 1 MG tablet TAKE 2-3 TABLETS (2-3 MG) BY MOUTH AT BEDTIME 270 tablet 1     SUMAtriptan (IMITREX) 100 MG tablet Take 1 tablet (100 mg) by mouth at onset of headache for migraine May repeat in 2 hours. Max 2 tablets/24 hours. 18 tablet 1     traZODone (DESYREL) 50 MG tablet TAKE 2-3 TABLETS BY MOUTH AT BEDTIME 270 tablet 2     tretinoin (RETIN-A) 0.025 % cream Spread a pea size amount into affected area topically at bedtime.  Use sunscreen SPF>20. 45 g 3     ZYRTEC 10 MG OR TABS 1 TABLET DAILY 90 3     Allergies   Allergen Reactions     Cats      and rabbits/wheezing     Dogs      wheezing     Lyrica [Pregabalin] Other (See Comments)     Rash, mouth sores, itching and burning     Seasonal Allergies      rhinits     Recent Labs   Lab Test  05/08/18   1400  05/08/18   1136  01/08/18   1536  06/30/17   1720   03/14/17   1454  02/24/17   1430  03/15/16   1516   A1C  5.8*   --    --    --    --    --    --    --    LDL   --   73   --    --    --    --   100*  78   HDL   --   56   --    --    --    --   70  63   TRIG   --   162*   --    --    --    --   103  188*   ALT   --   26   --   25   --   29   --    --    CR   --   0.98   --   0.84   --   0.95  0.80   --    GFRESTIMATED   --   58*   --   69   --   61  75   --    GFRESTBLACK   --   70   --   84   --   74  >90   GFR Calc     --    POTASSIUM   --   3.6   --   4.0   < >  3.3*  3.3*    "--    TSH   --   1.13  1.97  1.57   --    --    --   1.72    < > = values in this interval not displayed.      BP Readings from Last 3 Encounters:   05/29/18 108/70   02/28/18 (!) 130/91   01/09/18 119/82    Wt Readings from Last 3 Encounters:   02/28/18 203 lb (92.1 kg)   01/09/18 203 lb (92.1 kg)   11/29/17 201 lb (91.2 kg)           ROS:  CONSTITUTIONAL: NEGATIVE for fever, chills, change in weight  INTEGUMENTARY/SKIN: NEGATIVE for worrisome rashes, moles or lesions  ENT/MOUTH: As above.  RESP:As above.  CV: NEGATIVE for chest pain, palpitations or peripheral edema  GI: NEGATIVE for nausea, abdominal pain, heartburn, or change in bowel habits  MUSCULOSKELETAL: NEGATIVE for significant arthralgias or myalgia  NEURO: NEGATIVE for weakness, dizziness or paresthesias  PSYCHIATRIC: NEGATIVE for changes in mood or affect    This document serves as a record of the services and decisions personally performed and made by Liz Peterson MD. It was created on her behalf by Kenyatta Grant, a trained medical scribe. The creation of this document is based the provider's statements to the medical scribe.    Kenyatta Grant May 29, 2018 12:11 PM    OBJECTIVE:   /70  Pulse 114  Temp 97.1  F (36.2  C) (Temporal)  Resp 16  Ht 5' 6\" (1.676 m)  LMP 07/17/2009  SpO2 98%  There is no height or weight on file to calculate BMI.  GENERAL: healthy, alert and no distress  NECK: no adenopathy, no asymmetry, masses, or scars and thyroid normal to palpation  RESP: lungs clear to auscultation - no rales, rhonchi or wheezes  CV: regular rate and rhythm, normal S1 S2, no S3 or S4, no murmur, click or rub, no peripheral edema and peripheral pulses strong  MS: no gross musculoskeletal defects noted, no edema  SKIN: no suspicious lesions or rashes. 4x5cm skin breakdown on the forehead, improved from last exam, no evidence of infection     Diagnostic Test Results:  none     ASSESSMENT/PLAN:   1. Chronic pain syndrome  2. Multiple joint " "pain  Per patient's request, she would like a referral to a new pain clinic.   She \"quit\" her last pain clinic.   She would like to try [facility] in [place]  Referral provided today. She will schedule.   Will refill her pain medicine for the next 2 months to give her time to schedule.     - PAIN MANAGEMENT REFERRAL    3. Medical marijuana use  Patient continues with this and is certified elsewhere.       4. Hypertension goal BP (blood pressure) < 140/90  Doing well. Well controlled. Tolerating medication.  No change in plan.     5. Moderate persistent asthma without complication  Uncontrolled. Discussed restarting asthma medications   Discussed the importance of improving her control.   - fluticasone-salmeterol (ADVAIR DISKUS) 500-50 MCG/DOSE diskus inhaler; INHALE 1 PUFF BY INHALATION ROUTE 2 TIMES PER DAY IN THE MORNING AND EVENING APPROXIMATELY 12 HOURS APART  Dispense: 3 Inhaler; Refill: 3  - albuterol (VENTOLIN HFA) 108 (90 Base) MCG/ACT Inhaler; INHALE 2 PUFFS INTO THE LUNGS EVERY 6 HOURS AS NEEDED FOR SHORTNESS OF BREATH / DYSPNEA  Dispense: 54 g; Refill: 1    6. Impaired fasting glucose  Discussed starting on Metformin.   Reviewed medication side effects at length.  - metFORMIN (GLUCOPHAGE) 500 MG tablet; Take 1 tablet (500 mg) by mouth daily (with dinner)  Dispense: 90 tablet; Refill: 1    7. Sinus tachycardia  Stable. Patient is asymptomatic today.  Continue current treatments.    8. Skin ulcer, limited to breakdown of skin (H)  Healing some, no evidence of infection seen on exam today.  Continue cares and Doxycycline was refilled.   - doxycycline (VIBRA-TABS) 100 MG tablet; Take 1 tablet (100 mg) by mouth 2 times daily  Dispense: 20 tablet; Refill: 0    9. Lumbosacral spondylosis without myelopathy  - HYDROcodone-acetaminophen (NORCO) 5-325 MG per tablet; May take 1 tablet every 4-6 hours as needed for pain. Max of 4 tabs per day. OK to fill and begin on/after 4/3/2018.  Must last 30 days.  Dispense: 100 " tablet; Refill: 0    10. DDD (degenerative disc disease), lumbar  - HYDROcodone-acetaminophen (NORCO) 5-325 MG per tablet; May take 1 tablet every 4-6 hours as needed for pain. Max of 4 tabs per day. OK to fill and begin on/after 4/3/2018.  Must last 30 days.  Dispense: 100 tablet; Refill: 0    11. SI (sacroiliac) joint dysfunction  12. Greater trochanteric bursitis of both hips  13. Myofascial pain  See above under chronic pain.   - HYDROcodone-acetaminophen (NORCO) 5-325 MG per tablet; May take 1 tablet every 4-6 hours as needed for pain. Max of 4 tabs per day. OK to fill and begin on/after 4/3/2018.  Must last 30 days.  Dispense: 100 tablet; Refill: 0    14. Psychophysiological insomnia  Stable. Medication refills provided today.  - traZODone (DESYREL) 50 MG tablet; TAKE 2-3 TABLETS BY MOUTH AT BEDTIME  Dispense: 270 tablet; Refill: 2    15. Dermatitis  Stable. Clobetasol refill requested.   - clobetasol (TEMOVATE) 0.05 % cream; APPLY SPARINGLY TO AFFECTED AREA TWICE DAILY FOR 14 DAYS.  DO NOT APPLY TO FACE.  Dispense: 30 g; Refill: 3    16. MARIZOL (generalized anxiety disorder)  Doing well. Well controlled. Tolerating medication.  No change in plan.     - hydrOXYzine (ATARAX) 25 MG tablet; Take 1 tablet (25 mg) by mouth every 8 hours as needed for anxiety  Dispense: 40 tablet; Refill: 1    17. Chronic migraine without aura without status migrainosus, not intractable  Doing well. Well controlled. Tolerating medication.  No change in plan.     - rizatriptan (MAXALT) 10 MG tablet; Take 1 tablet (10 mg) by mouth at onset of headache for migraine May repeat in 2 hours. Max 3 tablets/24 hours.  Dispense: 18 tablet; Refill: 3    18. Visit for screening mammogram  Due. Order placed.     All questions invited, asked and answered to the patient's apparent satisfaction.  Patient agrees to plan.      >50% of this 40 minute visit spent in counseling and plan of care for the above diagnoses      The information in this document,  created by the medical scribe for me, accurately reflects the services I personally performed and the decisions made by me. I have reviewed and approved this document for accuracy prior to leaving the patient care area.    ARVIN MARIN MD  [facility]

## 2018-05-29 ENCOUNTER — TELEPHONE (OUTPATIENT)
Dept: FAMILY MEDICINE | Facility: CLINIC | Age: 58
End: 2018-05-29

## 2018-05-29 ENCOUNTER — OFFICE VISIT (OUTPATIENT)
Dept: FAMILY MEDICINE | Facility: CLINIC | Age: 58
End: 2018-05-29
Payer: COMMERCIAL

## 2018-05-29 VITALS
DIASTOLIC BLOOD PRESSURE: 70 MMHG | SYSTOLIC BLOOD PRESSURE: 108 MMHG | HEIGHT: 66 IN | RESPIRATION RATE: 16 BRPM | OXYGEN SATURATION: 98 % | TEMPERATURE: 97.1 F | HEART RATE: 114 BPM

## 2018-05-29 DIAGNOSIS — Z12.31 VISIT FOR SCREENING MAMMOGRAM: ICD-10-CM

## 2018-05-29 DIAGNOSIS — I10 HYPERTENSION GOAL BP (BLOOD PRESSURE) < 140/90: ICD-10-CM

## 2018-05-29 DIAGNOSIS — M25.50 MULTIPLE JOINT PAIN: ICD-10-CM

## 2018-05-29 DIAGNOSIS — L30.9 DERMATITIS: ICD-10-CM

## 2018-05-29 DIAGNOSIS — M70.61 GREATER TROCHANTERIC BURSITIS OF BOTH HIPS: ICD-10-CM

## 2018-05-29 DIAGNOSIS — F41.1 GAD (GENERALIZED ANXIETY DISORDER): ICD-10-CM

## 2018-05-29 DIAGNOSIS — M51.369 DDD (DEGENERATIVE DISC DISEASE), LUMBAR: ICD-10-CM

## 2018-05-29 DIAGNOSIS — F51.04 PSYCHOPHYSIOLOGICAL INSOMNIA: ICD-10-CM

## 2018-05-29 DIAGNOSIS — J45.40 MODERATE PERSISTENT ASTHMA WITHOUT COMPLICATION: ICD-10-CM

## 2018-05-29 DIAGNOSIS — M70.62 GREATER TROCHANTERIC BURSITIS OF BOTH HIPS: ICD-10-CM

## 2018-05-29 DIAGNOSIS — M53.3 SI (SACROILIAC) JOINT DYSFUNCTION: ICD-10-CM

## 2018-05-29 DIAGNOSIS — M79.18 MYOFASCIAL PAIN: ICD-10-CM

## 2018-05-29 DIAGNOSIS — M47.817 LUMBOSACRAL SPONDYLOSIS WITHOUT MYELOPATHY: ICD-10-CM

## 2018-05-29 DIAGNOSIS — L98.491 SKIN ULCER, LIMITED TO BREAKDOWN OF SKIN (H): ICD-10-CM

## 2018-05-29 DIAGNOSIS — R73.01 IMPAIRED FASTING GLUCOSE: ICD-10-CM

## 2018-05-29 DIAGNOSIS — G89.4 CHRONIC PAIN SYNDROME: Primary | ICD-10-CM

## 2018-05-29 DIAGNOSIS — G43.709 CHRONIC MIGRAINE WITHOUT AURA WITHOUT STATUS MIGRAINOSUS, NOT INTRACTABLE: ICD-10-CM

## 2018-05-29 DIAGNOSIS — R00.0 SINUS TACHYCARDIA: ICD-10-CM

## 2018-05-29 DIAGNOSIS — Z79.899 MEDICAL MARIJUANA USE: ICD-10-CM

## 2018-05-29 PROBLEM — G43.719 INTRACTABLE CHRONIC MIGRAINE WITHOUT AURA AND WITHOUT STATUS MIGRAINOSUS: Status: ACTIVE | Noted: 2018-05-29

## 2018-05-29 PROCEDURE — 99215 OFFICE O/P EST HI 40 MIN: CPT | Performed by: FAMILY MEDICINE

## 2018-05-29 RX ORDER — CLOBETASOL PROPIONATE 0.5 MG/G
CREAM TOPICAL
Qty: 30 G | Refills: 3 | Status: SHIPPED | OUTPATIENT
Start: 2018-05-29 | End: 2019-07-19

## 2018-05-29 RX ORDER — DOXYCYCLINE HYCLATE 100 MG
100 TABLET ORAL 2 TIMES DAILY
Qty: 20 TABLET | Refills: 0 | Status: SHIPPED | OUTPATIENT
Start: 2018-05-29 | End: 2018-06-06

## 2018-05-29 RX ORDER — HYDROXYZINE HYDROCHLORIDE 25 MG/1
25 TABLET, FILM COATED ORAL EVERY 8 HOURS PRN
Qty: 40 TABLET | Refills: 1 | Status: SHIPPED | OUTPATIENT
Start: 2018-05-29 | End: 2019-11-30

## 2018-05-29 RX ORDER — ALBUTEROL SULFATE 0.83 MG/ML
1 SOLUTION RESPIRATORY (INHALATION) EVERY 6 HOURS PRN
Qty: 1 BOX | Refills: 1 | Status: SHIPPED | OUTPATIENT
Start: 2018-05-29 | End: 2019-11-30

## 2018-05-29 RX ORDER — TRAZODONE HYDROCHLORIDE 50 MG/1
TABLET, FILM COATED ORAL
Qty: 270 TABLET | Refills: 2 | Status: SHIPPED | OUTPATIENT
Start: 2018-05-29 | End: 2019-03-05

## 2018-05-29 RX ORDER — RIZATRIPTAN BENZOATE 10 MG/1
10 TABLET ORAL
Qty: 18 TABLET | Refills: 3 | Status: SHIPPED | OUTPATIENT
Start: 2018-05-29 | End: 2019-07-19

## 2018-05-29 RX ORDER — HYDROCODONE BITARTRATE AND ACETAMINOPHEN 5; 325 MG/1; MG/1
TABLET ORAL
Qty: 100 TABLET | Refills: 0 | Status: SHIPPED | OUTPATIENT
Start: 2018-05-29 | End: 2018-07-30

## 2018-05-29 RX ORDER — ALBUTEROL SULFATE 90 UG/1
AEROSOL, METERED RESPIRATORY (INHALATION)
Qty: 54 G | Refills: 1 | Status: SHIPPED | OUTPATIENT
Start: 2018-05-29 | End: 2020-10-14

## 2018-05-29 ASSESSMENT — ANXIETY QUESTIONNAIRES
6. BECOMING EASILY ANNOYED OR IRRITABLE: NEARLY EVERY DAY
GAD7 TOTAL SCORE: 10
3. WORRYING TOO MUCH ABOUT DIFFERENT THINGS: MORE THAN HALF THE DAYS
IF YOU CHECKED OFF ANY PROBLEMS ON THIS QUESTIONNAIRE, HOW DIFFICULT HAVE THESE PROBLEMS MADE IT FOR YOU TO DO YOUR WORK, TAKE CARE OF THINGS AT HOME, OR GET ALONG WITH OTHER PEOPLE: SOMEWHAT DIFFICULT
1. FEELING NERVOUS, ANXIOUS, OR ON EDGE: MORE THAN HALF THE DAYS
7. FEELING AFRAID AS IF SOMETHING AWFUL MIGHT HAPPEN: SEVERAL DAYS
5. BEING SO RESTLESS THAT IT IS HARD TO SIT STILL: NOT AT ALL
2. NOT BEING ABLE TO STOP OR CONTROL WORRYING: MORE THAN HALF THE DAYS

## 2018-05-29 ASSESSMENT — PAIN SCALES - GENERAL: PAINLEVEL: SEVERE PAIN (7)

## 2018-05-29 ASSESSMENT — PATIENT HEALTH QUESTIONNAIRE - PHQ9: 5. POOR APPETITE OR OVEREATING: NOT AT ALL

## 2018-05-29 NOTE — LETTER
My Asthma Action Plan  Name: Jacquie Amador   YOB: 1960  Date: 5/29/2018   My doctor: ARVIN MARIN MD, MD   My clinic: [facility]        My Control Medicine: Fluticasone + salmeterol (Advair) -  Diskus 500/50 mcg 1 inhalation twice daily  My Rescue Medicine: Albuterol (Proair/Ventolin/Proventil) inhaler     My Asthma Severity: moderate persistent  Avoid your asthma triggers:    don't know, possible due sinus infection             GREEN ZONE   Good Control    I feel good    No cough or wheeze    Can work, sleep and play without asthma symptoms       Take your asthma control medicine every day.     1. If exercise triggers your asthma, take your rescue medication    15 minutes before exercise or sports, and    During exercise if you have asthma symptoms  2. Spacer to use with inhaler: If you have a spacer, make sure to use it with your inhaler             YELLOW ZONE Getting Worse  I have ANY of these:    I do not feel good    Cough or wheeze    Chest feels tight    Wake up at night   1. Keep taking your Green Zone medications  2. Start taking your rescue medicine:    every 20 minutes for up to 1 hour. Then every 4 hours for 24-48 hours.  3. If you stay in the Yellow Zone for more than 12-24 hours, contact your doctor.  4. If you do not return to the Green Zone in 12-24 hours or you get worse, start taking your oral steroid medicine if prescribed by your provider.           RED ZONE Medical Alert - Get Help  I have ANY of these:    I feel awful    Medicine is not helping    Breathing getting harder    Trouble walking or talking    Nose opens wide to breathe       1. Take your rescue medicine NOW  2. If your provider has prescribed an oral steroid medicine, start taking it NOW  3. Call your doctor NOW  4. If you are still in the Red Zone after 20 minutes and you have not reached your doctor:    Take your rescue medicine again and    Call 911 or go to the emergency room right  away    See your regular doctor within 2 weeks of an Emergency Room or Urgent Care visit for follow-up treatment.          Annual Reminders:  Meet with Asthma Educator,  Flu Shot in the Fall, consider Pneumonia Vaccination for patients with asthma (aged 19 and older).    Pharmacy:    [facility] - [place], [place] - [address] [id number] - [place] - [address] [facility] [place], [place] - [address] [facility]                      Asthma Triggers  How To Control Things That Make Your Asthma Worse    Triggers are things that make your asthma worse.  Look at the list below to help you find your triggers and what you can do about them.  You can help prevent asthma flare-ups by staying away from your triggers.      Trigger                                                          What you can do   Cigarette Smoke  Tobacco smoke can make asthma worse. Do not allow smoking in your home, car or around you.  Be sure no one smokes at a child s day care or school.  If you smoke, ask your health care provider for ways to help you quit.  Ask family members to quit too.  Ask your health care provider for a referral to Quit Plan to help you quit smoking, or call 6-499-939-PLAN.     Colds, Flu, Bronchitis  These are common triggers of asthma. Wash your hands often.  Don t touch your eyes, nose or mouth.  Get a flu shot every year.     Dust Mites  These are tiny bugs that live in cloth or carpet. They are too small to see. Wash sheets and blankets in hot water every week.   Encase pillows and mattress in dust mite proof covers.  Avoid having carpet if you can. If you have carpet, vacuum weekly.   Use a dust mask and HEPA vacuum.   Pollen and Outdoor Mold  Some people are allergic to trees, grass, or weed pollen, or molds. Try to keep your windows closed.  Limit time out doors when pollen count is high.   Ask you health care provider about taking medicine during  allergy season.     Animal Dander  Some people are allergic to skin flakes, urine or saliva from pets with fur or feathers. Keep pets with fur or feathers out of your home.    If you can t keep the pet outdoors, then keep the pet out of your bedroom.  Keep the bedroom door closed.  Keep pets off cloth furniture and away from stuffed toys.     Mice, Rats, and Cockroaches  Some people are allergic to the waste from these pests.   Cover food and garbage.  Clean up spills and food crumbs.  Store grease in the refrigerator.   Keep food out of the bedroom.   Indoor Mold  This can be a trigger if your home has high moisture. Fix leaking faucets, pipes, or other sources of water.   Clean moldy surfaces.  Dehumidify basement if it is damp and smelly.   Smoke, Strong Odors, and Sprays  These can reduce air quality. Stay away from strong odors and sprays, such as perfume, powder, hair spray, paints, smoke incense, paint, cleaning products, candles and new carpet.   Exercise or Sports  Some people with asthma have this trigger. Be active!  Ask your doctor about taking medicine before sports or exercise to prevent symptoms.    Warm up for 5-10 minutes before and after sports or exercise.     Other Triggers of Asthma  Cold air:  Cover your nose and mouth with a scarf.  Sometimes laughing or crying can be a trigger.  Some medicines and food can trigger asthma.

## 2018-05-29 NOTE — MR AVS SNAPSHOT
After Visit Summary   5/29/2018    Jacquie Amador    MRN: [id number]           Patient Information     Date Of Birth          1960        Visit Information        Provider Department      5/29/2018 11:40 AM Liz Peterson MD [facility]        Today's Diagnoses     Chronic pain syndrome    -  1    Visit for screening mammogram        Multiple joint pain        Medical marijuana use        Hypertension goal BP (blood pressure) < 140/90        Moderate persistent asthma without complication        Impaired fasting glucose        Sinus tachycardia        Skin ulcer, limited to breakdown of skin (H)        Lumbosacral spondylosis without myelopathy        DDD (degenerative disc disease), lumbar        SI (sacroiliac) joint dysfunction        Greater trochanteric bursitis of both hips        Myofascial pain        Fibromyalgia        Moderate persistent asthma with acute exacerbation        Psychophysiological insomnia        Greater trochanteric bursitis, unspecified laterality        Hand arthritis        Dermatitis        MARIZOL (generalized anxiety disorder)        Intractable chronic migraine without aura and without status migrainosus          Care Instructions    Take the Metformin once per day with dinner.          Follow-ups after your visit        Additional Services     PAIN MANAGEMENT REFERRAL       Your provider has referred you to: N: [facility] ([facility]) [facility] [phone]   [email or web address]      Please call clinic directly to schedule appointment.    **ANY DIAGNOSTIC TESTS THAT ARE NOT IN EPIC SHOULD BE SENT TO THE PAIN CENTER**    REGARDING OPIOID MEDICATIONS:  The discussion of opioids management, appropriateness of therapy, and dosing will be discussed in patients being seen for evaluation.  The pain management clinics are not long-term prescribing clinics,  with transition of prescribing of medications ultimately going back to the referring provider/PCP.  If prescribing is taken over at the pain clinic, it is in actively involved patients whom are appropriate for opioids, urine drug screening is completed, and long-term prescribing plan has been determined.  Therefore, we will not be automatically taking over prescribing at the patient's first visit.  Is this agreeable to you? agrees.     Please be aware that coverage of these services is subject to the terms and limitations of your health insurance plan.  Call member services at your health plan with any benefit or coverage questions.      Please bring the following with you to your appointment:    (1) Any X-Rays, CTs or MRIs which have been performed.  Contact the facility where they were done to arrange for  prior to your scheduled appointment.    (2) List of current medications   (3) This referral request   (4) Any documents/labs given to you for this referral                  Who to contact     If you have questions or need follow up information about today's clinic visit or your schedule please contact [facility] directly at [phone].  Normal or non-critical lab and imaging results will be communicated to you by MyChart, letter or phone within 4 business days after the clinic has received the results. If you do not hear from us within 7 days, please contact the clinic through [employer] or phone. If you have a critical or abnormal lab result, we will notify you by phone as soon as possible.  Submit refill requests through [employer] or call your pharmacy and they will forward the refill request to us. Please allow 3 business days for your refill to be completed.          Additional Information About Your Visit        [employer] Information     [employer] gives you secure access to your electronic health record. If you see a primary care provider, you can also send messages to your care team and make  "appointments. If you have questions, please call your primary care clinic.  If you do not have a primary care provider, please call [phone] and they will assist you.        Care EveryWhere ID     This is your Care EveryWhere ID. This could be used by other organizations to access your [facility] medical records  [id number]        Your Vitals Were     Pulse Temperature Respirations Height Last Period Pulse Oximetry    114 97.1  F (36.2  C) (Temporal) 16 5' 6\" (1.676 m) 07/17/2009 98%       Blood Pressure from Last 3 Encounters:   05/29/18 108/70   02/28/18 (!) 130/91   01/09/18 119/82    Weight from Last 3 Encounters:   02/28/18 203 lb (92.1 kg)   01/09/18 203 lb (92.1 kg)   11/29/17 201 lb (91.2 kg)              We Performed the Following     PAIN MANAGEMENT REFERRAL          Today's Medication Changes          These changes are accurate as of 5/29/18 12:46 PM.  If you have any questions, ask your nurse or doctor.               Start taking these medicines.        Dose/Directions    doxycycline 100 MG tablet   Commonly known as:  VIBRA-TABS   Used for:  Skin ulcer, limited to breakdown of skin (H)   Started by:  Liz Peterson MD        Dose:  100 mg   Take 1 tablet (100 mg) by mouth 2 times daily   Quantity:  20 tablet   Refills:  0       hydrOXYzine 25 MG tablet   Commonly known as:  ATARAX   Used for:  MARIZOL (generalized anxiety disorder)   Started by:  Liz Peterson MD        Dose:  25 mg   Take 1 tablet (25 mg) by mouth every 8 hours as needed for anxiety   Quantity:  40 tablet   Refills:  1       metFORMIN 500 MG tablet   Commonly known as:  GLUCOPHAGE   Used for:  Impaired fasting glucose   Started by:  Liz Peterson MD        Dose:  500 mg   Take 1 tablet (500 mg) by mouth daily (with dinner)   Quantity:  90 tablet   Refills:  1       rizatriptan 10 MG tablet   Commonly known as:  MAXALT   Used for:  Intractable chronic migraine without aura and without status migrainosus   Started by:  " Liz Peterson MD        Dose:  10 mg   Take 1 tablet (10 mg) by mouth at onset of headache for migraine May repeat in 2 hours. Max 3 tablets/24 hours.   Quantity:  18 tablet   Refills:  3            Where to get your medicines      These medications were sent to [id number] - [place], [place] - [address]  [address] [place] [address], [place] [address]     Phone:  [phone]     albuterol (2.5 MG/3ML) 0.083% neb solution    albuterol 108 (90 Base) MCG/ACT Inhaler    clobetasol 0.05 % cream    doxycycline 100 MG tablet    fluticasone-salmeterol 500-50 MCG/DOSE diskus inhaler    hydrOXYzine 25 MG tablet    metFORMIN 500 MG tablet    rizatriptan 10 MG tablet    traZODone 50 MG tablet         Some of these will need a paper prescription and others can be bought over the counter.  Ask your nurse if you have questions.     Bring a paper prescription for each of these medications     HYDROcodone-acetaminophen 5-325 MG per tablet               Information about OPIOIDS     PRESCRIPTION OPIOIDS: WHAT YOU NEED TO KNOW   You have a prescription for an opioid (narcotic) pain medicine. Opioids can cause addiction. If you have a history of chemical dependency of any type, you are at a higher risk of becoming addicted to opioids. Only take this medicine after all other options have been tried. Take it for as short a time and as few doses as possible.     Do not:    Drive. If you drive while taking these medicines, you could be arrested for driving under the influence (DUI).    Operate heavy machinery    Do any other dangerous activities while taking these medicines.     Drink any alcohol while taking these medicines.      Take with any other medicines that contain acetaminophen. Read all labels carefully. Look for the word  acetaminophen  or  Tylenol.  Ask your pharmacist if you have questions or are unsure.    Store your pills in a secure place, locked if possible. We will not replace any lost or stolen medicine. If you don t  finish your medicine, please throw away (dispose) as directed by your pharmacist. The [place] Pollution Control Agency has more information about safe disposal: https://www.pca.[facility].us/living-green/managing-unwanted-medications    All opioids tend to cause constipation. Drink plenty of water and eat foods that have a lot of fiber, such as fruits, vegetables, prune juice, apple juice and high-fiber cereal. Take a laxative (Miralax, milk of magnesia, Colace, Senna) if you don t move your bowels at least every other day.          Primary Care Provider Office Phone # Fax #    Liz Peterson [phone] [address] [facility] [address]        Equal Access to Services     JAMARI SUMMERS : Hadii aad ku hadasho Soomaali, waaxda luqadaha, qaybta kaalmada adeegyada, yao hernandez. So [facility] [phone].    ATENCIÓN: Si habla español, tiene a kimball disposición servicios gratuitos de asistencia lingüística. [facility] [phone].    We comply with applicable federal civil rights laws and [place] laws. We do not discriminate on the basis of race, color, national origin, age, disability, sex, sexual orientation, or gender identity.            Thank you!     Thank you for choosing [facility]  for your care. Our goal is always to provide you with excellent care. Hearing back from our patients is one way we can continue to improve our services. Please take a few minutes to complete the written survey that you may receive in the mail after your visit with us. Thank you!             Your Updated Medication List - Protect others around you: Learn how to safely use, store and throw away your medicines at www.disposemymeds.org.          This list is accurate as of 5/29/18 12:46 PM.  Always use your most recent med list.                   Brand Name Dispense Instructions for use Diagnosis    acidophilus Caps     60 capsule    Take 1 capsule by mouth daily Take two  hours before or after antibiotic dose.  Continue for 1 week after antibiotic therapy completed    Diarrhea       adapalene 0.1 % gel    DIFFERIN    45 g    APPLY A THIN LAYER TO THE AFFECTED AREA(S) BY TOPICAL ROUTE ONCE DAILY BEFORE BEDTIME    Acne vulgaris       * albuterol 108 (90 Base) MCG/ACT Inhaler    VENTOLIN HFA    54 g    INHALE 2 PUFFS INTO THE LUNGS EVERY 6 HOURS AS NEEDED FOR SHORTNESS OF BREATH / DYSPNEA    Moderate persistent asthma without complication       * albuterol (2.5 MG/3ML) 0.083% neb solution     1 Box    Take 1 vial (2.5 mg) by nebulization every 6 hours as needed    Moderate persistent asthma with acute exacerbation       ALPRAZolam 0.25 MG tablet    XANAX    60 tablet    TAKE ONE TABLET BY MOUTH DAILY AS NEEDED FOR ANXIETY    Generalized anxiety disorder       * atorvastatin 20 MG tablet    LIPITOR          * atorvastatin 20 MG tablet    LIPITOR    90 tablet    TAKE 1 TABLET BY MOUTH DAILY    Hyperlipidemia LDL goal <130       * buPROPion 200 MG 12 hr tablet    WELLBUTRIN SR    90 tablet    Take 1 tablet (200 mg) by mouth daily    Generalized anxiety disorder       * buPROPion 150 MG 12 hr tablet    WELLBUTRIN SR    90 tablet    TAKE ONE TABLET BY MOUTH ONE TIME DAILY IN THE EVENING    Generalized anxiety disorder       celecoxib 200 MG capsule    celeBREX    180 capsule    Take 1 capsule twice daily as needed for pain this is a 3 month script    Greater trochanteric bursitis, unspecified laterality, Hand arthritis       clindamycin 1 % lotion    CLEOCIN T     Apply topically 2 times daily        clobetasol 0.05 % cream    TEMOVATE    30 g    APPLY SPARINGLY TO AFFECTED AREA TWICE DAILY FOR 14 DAYS.  DO NOT APPLY TO FACE.    Dermatitis       diclofenac 1 % Gel topical gel    VOLTAREN    9 Tube    Apply 2 grams to hands and 4 grams to hips up to 4 times daily. T    Greater trochanteric bursitis of both hips, Bilateral thumb pain       doxycycline 100 MG tablet    VIBRA-TABS    20 tablet     Take 1 tablet (100 mg) by mouth 2 times daily    Skin ulcer, limited to breakdown of skin (H)       DULoxetine 60 MG EC capsule    CYMBALTA    180 capsule    TAKE 2 CAPSULES (120 MG) BY MOUTH DAILY    Multiple joint pain       fluticasone-salmeterol 500-50 MCG/DOSE diskus inhaler    ADVAIR DISKUS    3 Inhaler    INHALE 1 PUFF BY INHALATION ROUTE 2 TIMES PER DAY IN THE MORNING AND EVENING APPROXIMATELY 12 HOURS APART    Moderate persistent asthma without complication       HYDROcodone-acetaminophen 5-325 MG per tablet    NORCO    100 tablet    May take 1 tablet every 4-6 hours as needed for pain. Max of 4 tabs per day. OK to fill and begin on/after 4/3/2018.  Must last 30 days.    Lumbosacral spondylosis without myelopathy, DDD (degenerative disc disease), lumbar, SI (sacroiliac) joint dysfunction, Greater trochanteric bursitis of both hips, Myofascial pain, Fibromyalgia       hydrocortisone 2.5 % cream     30 g    APPLY TOPICALLY 2 TIMES DAILY AS NEEDED    Dermatitis       hydrOXYzine 25 MG tablet    ATARAX    40 tablet    Take 1 tablet (25 mg) by mouth every 8 hours as needed for anxiety    MARIZOL (generalized anxiety disorder)       losartan-hydrochlorothiazide 100-12.5 MG per tablet    HYZAAR    90 tablet    TAKE 1 TABLET BY MOUTH DAILY    Essential hypertension with goal blood pressure less than 140/90       medical cannabis (Patient's own supply.  Not a prescription)     0 Information only    2 mg morning and noon. 7 mg at bedtime. (This is NOT a prescription, and does not certify that the patient has a qualifying medical condition for medical cannabis.  The purpose of this order is  to document that the patient reports taking medical cannabis.)        metFORMIN 500 MG tablet    GLUCOPHAGE    90 tablet    Take 1 tablet (500 mg) by mouth daily (with dinner)    Impaired fasting glucose       metoprolol succinate 25 MG 24 hr tablet    TOPROL-XL    30 tablet    Take 0.5 tablets per day. May add another 1/2 tab in  the evening as needed    Palpitations, Sinus tachycardia, Migraine without aura and without status migrainosus, not intractable       montelukast 10 MG tablet    SINGULAIR    90 tablet    TAKE ONE TABLET BY MOUTH IN THE EVENING    Moderate persistent asthma without complication       mupirocin 2 % cream    BACTROBAN    60 g    Apply topically 3 times daily    Dermatitis       nystatin 114076 UNIT/ML suspension    MYCOSTATIN    380 mL    Take by mouth 4 times daily    Thrush       nystatin cream    MYCOSTATIN    60 g    Apply to affected area three times daily    Dermatitis       ranitidine 150 MG tablet    ZANTAC    0    ONE TABLET TWICE DAILY        rizatriptan 10 MG tablet    MAXALT    18 tablet    Take 1 tablet (10 mg) by mouth at onset of headache for migraine May repeat in 2 hours. Max 3 tablets/24 hours.    Intractable chronic migraine without aura and without status migrainosus       rOPINIRole 1 MG tablet    REQUIP    270 tablet    TAKE 2-3 TABLETS (2-3 MG) BY MOUTH AT BEDTIME    Movement disorder       SUMAtriptan 100 MG tablet    IMITREX    18 tablet    Take 1 tablet (100 mg) by mouth at onset of headache for migraine May repeat in 2 hours. Max 2 tablets/24 hours.    Migraine without aura and without status migrainosus, not intractable       traZODone 50 MG tablet    DESYREL    270 tablet    TAKE 2-3 TABLETS BY MOUTH AT BEDTIME    Psychophysiological insomnia       tretinoin 0.025 % cream    RETIN-A    45 g    Spread a pea size amount into affected area topically at bedtime.  Use sunscreen SPF>20.    Age-related facial wrinkles       ZYRTEC 10 MG tablet   Generic drug:  cetirizine     90    1 TABLET DAILY    Allergic rhinitis, cause unspecified       * Notice:  This list has 6 medication(s) that are the same as other medications prescribed for you. Read the directions carefully, and ask your doctor or other care provider to review them with you.

## 2018-05-29 NOTE — LETTER
My Asthma Action Plan  Name: Jacquie Amador   YOB: 1960  Date: 6/5/2018   My doctor: ARVIN MARIN MD, MD   My clinic: [facility]        My Control Medicine: Fluticasone + salmeterol (Advair) -  Diskus 500/50 mcg    Montelukast (Singulair) -  10 mg    My Rescue Medicine: Albuterol (Proair/Ventolin/Proventil) inhaler     My Asthma Severity: moderate persistent  Avoid your asthma triggers:   don't know, possible due sinus infection             GREEN ZONE   Good Control    I feel good    No cough or wheeze    Can work, sleep and play without asthma symptoms       Take your asthma control medicine every day.     1. If exercise triggers your asthma, take your rescue medication    15 minutes before exercise or sports, and    During exercise if you have asthma symptoms  2. Spacer to use with inhaler: If you have a spacer, make sure to use it with your inhaler             YELLOW ZONE Getting Worse  I have ANY of these:    I do not feel good    Cough or wheeze    Chest feels tight    Wake up at night   1. Keep taking your Green Zone medications  2. Start taking your rescue medicine:    every 20 minutes for up to 1 hour. Then every 4 hours for 24-48 hours.  3. If you stay in the Yellow Zone for more than 12-24 hours, contact your doctor.  4. If you do not return to the Green Zone in 12-24 hours or you get worse, start taking your oral steroid medicine if prescribed by your provider.           RED ZONE Medical Alert - Get Help  I have ANY of these:    I feel awful    Medicine is not helping    Breathing getting harder    Trouble walking or talking    Nose opens wide to breathe       1. Take your rescue medicine NOW  2. If your provider has prescribed an oral steroid medicine, start taking it NOW  3. Call your doctor NOW  4. If you are still in the Red Zone after 20 minutes and you have not reached your doctor:    Take your rescue medicine again and    Call 911 or go to the emergency room  right away    See your regular doctor within 2 weeks of an Emergency Room or Urgent Care visit for follow-up treatment.          Annual Reminders:  Meet with Asthma Educator,  Flu Shot in the Fall, consider Pneumonia Vaccination for patients with asthma (aged 19 and older).    Pharmacy:    [facility] - [place], [place] - [address] [id number] - [place] [address] [place] [facility] [place], [place] - [address] [facility]                      Asthma Triggers  How To Control Things That Make Your Asthma Worse    Triggers are things that make your asthma worse.  Look at the list below to help you find your triggers and what you can do about them.  You can help prevent asthma flare-ups by staying away from your triggers.      Trigger                                                          What you can do   Cigarette Smoke  Tobacco smoke can make asthma worse. Do not allow smoking in your home, car or around you.  Be sure no one smokes at a child s day care or school.  If you smoke, ask your health care provider for ways to help you quit.  Ask family members to quit too.  Ask your health care provider for a referral to Quit Plan to help you quit smoking, or call 5-018-728-PLAN.     Colds, Flu, Bronchitis  These are common triggers of asthma. Wash your hands often.  Don t touch your eyes, nose or mouth.  Get a flu shot every year.     Dust Mites  These are tiny bugs that live in cloth or carpet. They are too small to see. Wash sheets and blankets in hot water every week.   Encase pillows and mattress in dust mite proof covers.  Avoid having carpet if you can. If you have carpet, vacuum weekly.   Use a dust mask and HEPA vacuum.   Pollen and Outdoor Mold  Some people are allergic to trees, grass, or weed pollen, or molds. Try to keep your windows closed.  Limit time out doors when pollen count is high.   Ask you health care provider about taking medicine during  allergy season.     Animal Dander  Some people are allergic to skin flakes, urine or saliva from pets with fur or feathers. Keep pets with fur or feathers out of your home.    If you can t keep the pet outdoors, then keep the pet out of your bedroom.  Keep the bedroom door closed.  Keep pets off cloth furniture and away from stuffed toys.     Mice, Rats, and Cockroaches  Some people are allergic to the waste from these pests.   Cover food and garbage.  Clean up spills and food crumbs.  Store grease in the refrigerator.   Keep food out of the bedroom.   Indoor Mold  This can be a trigger if your home has high moisture. Fix leaking faucets, pipes, or other sources of water.   Clean moldy surfaces.  Dehumidify basement if it is damp and smelly.   Smoke, Strong Odors, and Sprays  These can reduce air quality. Stay away from strong odors and sprays, such as perfume, powder, hair spray, paints, smoke incense, paint, cleaning products, candles and new carpet.   Exercise or Sports  Some people with asthma have this trigger. Be active!  Ask your doctor about taking medicine before sports or exercise to prevent symptoms.    Warm up for 5-10 minutes before and after sports or exercise.     Other Triggers of Asthma  Cold air:  Cover your nose and mouth with a scarf.  Sometimes laughing or crying can be a trigger.  Some medicines and food can trigger asthma.

## 2018-05-30 ASSESSMENT — ANXIETY QUESTIONNAIRES: GAD7 TOTAL SCORE: 10

## 2018-05-30 ASSESSMENT — ASTHMA QUESTIONNAIRES: ACT_TOTALSCORE: 11

## 2018-05-30 ASSESSMENT — PATIENT HEALTH QUESTIONNAIRE - PHQ9: SUM OF ALL RESPONSES TO PHQ QUESTIONS 1-9: 9

## 2018-05-31 ENCOUNTER — MYC MEDICAL ADVICE (OUTPATIENT)
Dept: FAMILY MEDICINE | Facility: CLINIC | Age: 58
End: 2018-05-31

## 2018-05-31 DIAGNOSIS — M70.61 GREATER TROCHANTERIC BURSITIS OF BOTH HIPS: ICD-10-CM

## 2018-05-31 DIAGNOSIS — M79.645 BILATERAL THUMB PAIN: ICD-10-CM

## 2018-05-31 DIAGNOSIS — M79.644 BILATERAL THUMB PAIN: ICD-10-CM

## 2018-05-31 DIAGNOSIS — M70.62 GREATER TROCHANTERIC BURSITIS OF BOTH HIPS: ICD-10-CM

## 2018-06-01 ENCOUNTER — MYC MEDICAL ADVICE (OUTPATIENT)
Dept: FAMILY MEDICINE | Facility: CLINIC | Age: 58
End: 2018-06-01

## 2018-06-06 ENCOUNTER — MYC MEDICAL ADVICE (OUTPATIENT)
Dept: FAMILY MEDICINE | Facility: CLINIC | Age: 58
End: 2018-06-06

## 2018-06-06 DIAGNOSIS — L98.491 SKIN ULCER, LIMITED TO BREAKDOWN OF SKIN (H): ICD-10-CM

## 2018-06-06 RX ORDER — DOXYCYCLINE HYCLATE 100 MG
100 TABLET ORAL 2 TIMES DAILY
Qty: 20 TABLET | Refills: 0 | Status: SHIPPED | OUTPATIENT
Start: 2018-06-06 | End: 2018-06-14

## 2018-06-08 DIAGNOSIS — E78.5 HYPERLIPIDEMIA LDL GOAL <130: ICD-10-CM

## 2018-06-08 RX ORDER — ATORVASTATIN CALCIUM 20 MG/1
TABLET, FILM COATED ORAL
Qty: 90 TABLET | Refills: 1 | Status: SHIPPED | OUTPATIENT
Start: 2018-06-08 | End: 2019-01-11

## 2018-06-08 NOTE — TELEPHONE ENCOUNTER
"Requested Prescriptions   Pending Prescriptions Disp Refills     atorvastatin (LIPITOR) 20 MG tablet [Pharmacy Med Name: Atorvastatin Calcium Oral Tablet 20 MG] 90 tablet 0     Sig: TAKE 1 TABLET BY MOUTH DAILY    Statins Protocol Passed    6/8/2018  2:20 PM       Passed - LDL on file in past 12 months    Recent Labs   Lab Test  05/08/18   1136   LDL  73            Passed - No abnormal creatine kinase in past 12 months    No lab results found.            Passed - Recent (12 mo) or future (30 days) visit within the authorizing provider's specialty    Patient had office visit in the last 12 months or has a visit in the next 30 days with authorizing provider or within the authorizing provider's specialty.  See \"Patient Info\" tab in inbasket, or \"Choose Columns\" in Meds & Orders section of the refill encounter.           Passed - Patient is age 18 or older       Passed - No active pregnancy on record       Passed - No positive pregnancy test in past 12 months        Prescription approved per [facility] Refill Protocol.    Madison Anderson RN    "

## 2018-06-14 ENCOUNTER — MYC MEDICAL ADVICE (OUTPATIENT)
Dept: FAMILY MEDICINE | Facility: CLINIC | Age: 58
End: 2018-06-14

## 2018-06-14 DIAGNOSIS — L98.491 SKIN ULCER, LIMITED TO BREAKDOWN OF SKIN (H): ICD-10-CM

## 2018-06-15 RX ORDER — DOXYCYCLINE HYCLATE 100 MG
100 TABLET ORAL 2 TIMES DAILY
Qty: 20 TABLET | Refills: 0 | Status: SHIPPED | OUTPATIENT
Start: 2018-06-15 | End: 2018-07-16

## 2018-06-22 ENCOUNTER — MYC MEDICAL ADVICE (OUTPATIENT)
Dept: FAMILY MEDICINE | Facility: CLINIC | Age: 58
End: 2018-06-22

## 2018-06-22 DIAGNOSIS — G43.009 MIGRAINE WITHOUT AURA AND WITHOUT STATUS MIGRAINOSUS, NOT INTRACTABLE: ICD-10-CM

## 2018-06-22 DIAGNOSIS — R00.2 PALPITATIONS: ICD-10-CM

## 2018-06-22 DIAGNOSIS — F41.1 GENERALIZED ANXIETY DISORDER: ICD-10-CM

## 2018-06-22 DIAGNOSIS — L20.9 ATOPIC DERMATITIS, UNSPECIFIED TYPE: Primary | ICD-10-CM

## 2018-06-22 DIAGNOSIS — R00.0 SINUS TACHYCARDIA: ICD-10-CM

## 2018-06-22 RX ORDER — BUPROPION HYDROCHLORIDE 200 MG/1
TABLET, EXTENDED RELEASE ORAL
Qty: 90 TABLET | Refills: 0 | Status: SHIPPED | OUTPATIENT
Start: 2018-06-22 | End: 2018-09-18

## 2018-06-22 RX ORDER — METOPROLOL SUCCINATE 25 MG/1
TABLET, EXTENDED RELEASE ORAL
Qty: 90 TABLET | Refills: 0 | Status: SHIPPED | OUTPATIENT
Start: 2018-06-22 | End: 2019-03-26

## 2018-06-22 RX ORDER — BUPROPION HYDROCHLORIDE 150 MG/1
TABLET, EXTENDED RELEASE ORAL
Qty: 90 TABLET | Refills: 0 | Status: SHIPPED | OUTPATIENT
Start: 2018-06-22 | End: 2018-10-05

## 2018-06-22 NOTE — TELEPHONE ENCOUNTER
Should be able to provide the patient with the last 3 clinic encounters from the [facility].  Printout the last 3 office visits and the patient can  to the liver to [facility].    Patient can be informed.    Solitario Larios MD

## 2018-06-22 NOTE — TELEPHONE ENCOUNTER
Responded via [employer].  Dedrick Noe RN, BSN    Clindamycin is listed as historical.  Flag for provider to review refill.

## 2018-06-22 NOTE — TELEPHONE ENCOUNTER
Send message via [employer] inquiring about prescription dosages.    Tono Hurtado RN, BSN

## 2018-06-22 NOTE — TELEPHONE ENCOUNTER
"Requested Prescriptions   Pending Prescriptions Disp Refills     buPROPion (WELLBUTRIN SR) 150 MG 12 hr tablet [Pharmacy Med Name: BuPROPion HCl ER (SR) Oral Tablet Extended Release 12 Hour 150 MG] 90 tablet 0     Sig: TAKE ONE TABLET BY MOUTH ONE TIME DAILY IN THE EVENING    SSRIs Protocol Passed    6/22/2018  2:09 PM       Passed - Recent (12 mo) or future (30 days) visit within the authorizing provider's specialty    Patient had office visit in the last 12 months or has a visit in the next 30 days with authorizing provider or within the authorizing provider's specialty.  See \"Patient Info\" tab in inbasket, or \"Choose Columns\" in Meds & Orders section of the refill encounter.           Passed - Medication is Bupropion    If the medication is Bupropion (Wellbutrin), and the patient is taking for smoking cessation; OK to refill.         Passed - Patient is age 18 or older       Passed - No active pregnancy on record       Passed - No positive pregnancy test in last 12 months        buPROPion (WELLBUTRIN SR) 200 MG 12 hr tablet [Pharmacy Med Name: BuPROPion HCl ER (SR) Oral Tablet Extended Release 12 Hour 200 MG] 90 tablet 0     Sig: TAKE ONE TABLET BY MOUTH ONE TIME DAILY    SSRIs Protocol Passed    6/22/2018  2:09 PM       Passed - Recent (12 mo) or future (30 days) visit within the authorizing provider's specialty    Patient had office visit in the last 12 months or has a visit in the next 30 days with authorizing provider or within the authorizing provider's specialty.  See \"Patient Info\" tab in inbasket, or \"Choose Columns\" in Meds & Orders section of the refill encounter.           Passed - Medication is Bupropion    If the medication is Bupropion (Wellbutrin), and the patient is taking for smoking cessation; OK to refill.         Passed - Patient is age 18 or older       Passed - No active pregnancy on record       Passed - No positive pregnancy test in last 12 months        Prescription approved per [facility] " Refill Protocol.    Madison Anderson RN

## 2018-06-25 RX ORDER — CLINDAMYCIN PHOSPHATE 10 UG/ML
LOTION TOPICAL 2 TIMES DAILY
Qty: 60 ML | Refills: 1 | Status: SHIPPED | OUTPATIENT
Start: 2018-06-25 | End: 2018-12-19

## 2018-06-28 ENCOUNTER — MYC MEDICAL ADVICE (OUTPATIENT)
Dept: ORTHOPEDICS | Facility: CLINIC | Age: 58
End: 2018-06-28

## 2018-06-29 ENCOUNTER — MYC MEDICAL ADVICE (OUTPATIENT)
Dept: FAMILY MEDICINE | Facility: CLINIC | Age: 58
End: 2018-06-29

## 2018-06-29 DIAGNOSIS — M70.60 GREATER TROCHANTERIC BURSITIS, UNSPECIFIED LATERALITY: ICD-10-CM

## 2018-06-29 DIAGNOSIS — M19.049 HAND ARTHRITIS: ICD-10-CM

## 2018-06-29 RX ORDER — CELECOXIB 200 MG/1
CAPSULE ORAL
Qty: 180 CAPSULE | Refills: 1 | Status: SHIPPED | OUTPATIENT
Start: 2018-06-29 | End: 2019-07-19

## 2018-06-29 NOTE — TELEPHONE ENCOUNTER
Patient was dismissed from the [facility] pain clinic in May 2018.  It appears her primary care referred her to [facility] pain clinic in [place].  Patient was last seen on February 28, 2018 by Dr. Linder with bilateral greater troch cortisone injections completed at that time.  It appears patient is requesting new injections but does not specify which location.  Message sent to patient for clarification on what type of injections she is looking for.    Please advise if you are willing to see this patient back given the dismissal from [facility] pain [facility].    Lucho Larose ATC

## 2018-06-29 NOTE — TELEPHONE ENCOUNTER
Contacted patient and scheduled follow up appointments for her hip and thumb pain for possible repeat injections, as advisable.    Anoop Berman ATC

## 2018-07-11 ENCOUNTER — OFFICE VISIT (OUTPATIENT)
Dept: ORTHOPEDICS | Facility: CLINIC | Age: 58
End: 2018-07-11
Payer: COMMERCIAL

## 2018-07-11 VITALS
DIASTOLIC BLOOD PRESSURE: 87 MMHG | SYSTOLIC BLOOD PRESSURE: 124 MMHG | WEIGHT: 201 LBS | HEIGHT: 66 IN | BODY MASS INDEX: 32.3 KG/M2

## 2018-07-11 DIAGNOSIS — M18.0 PRIMARY OSTEOARTHRITIS OF BOTH FIRST CARPOMETACARPAL JOINTS: ICD-10-CM

## 2018-07-11 DIAGNOSIS — M70.61 TROCHANTERIC BURSITIS OF BOTH HIPS: Primary | ICD-10-CM

## 2018-07-11 DIAGNOSIS — M70.62 TROCHANTERIC BURSITIS OF BOTH HIPS: Primary | ICD-10-CM

## 2018-07-11 PROCEDURE — 99213 OFFICE O/P EST LOW 20 MIN: CPT | Mod: 25 | Performed by: FAMILY MEDICINE

## 2018-07-11 PROCEDURE — 20610 DRAIN/INJ JOINT/BURSA W/O US: CPT | Mod: 50 | Performed by: FAMILY MEDICINE

## 2018-07-11 RX ADMIN — TRIAMCINOLONE ACETONIDE 40 MG: 40 INJECTION, SUSPENSION INTRA-ARTICULAR; INTRAMUSCULAR at 18:39

## 2018-07-11 RX ADMIN — BUPIVACAINE HYDROCHLORIDE 2 ML: 5 INJECTION, SOLUTION EPIDURAL; INTRACAUDAL at 18:39

## 2018-07-11 RX ADMIN — LIDOCAINE HYDROCHLORIDE 2 ML: 10 INJECTION, SOLUTION INFILTRATION; PERINEURAL at 18:39

## 2018-07-11 NOTE — LETTER
2018         RE: Jacquie Amador  [address] [facility]  [place] [address]        Dear Colleague,    Thank you for referring your patient, Jacquie Amador, to the [facility]. Please see a copy of my visit note below.    Jacquie Amador  :  1960  DOS: 2018  MRN: [id number]    Sports Medicine Clinic Visit    PCP: Liz Peterson    Jacquie Amador is a 55 year old Right hand dominant female who is seen in follow up for her chronic bilateral hip and bilateral thumb CMC joint pain.      Interim History - 2018  Since last visit on 2018 patient has moderate-severe bilateral hip pain.  Bilateral hip trochanteric bursa injection completed on  provided good relief for ~ 2.5 - 3 months.  Patient is desiring repeat injections today.  No new injury in the interim.    She has continued to have pain in bilateral thumbs over last ~ 6 months.  Unable to repeat injections d/t her schedule, then developing secondary infection.  Recently discharged from Pain Mgmt clinic.    Review of Systems  Musculoskeletal: as above  Remainder of review of systems is negative including constitutional, CV, pulmonary, GI, Skin and Neurologic except as noted in HPI or medical history.    Past Medical History:   Diagnosis Date     ASTHMA - MODERATE PERSISTENT 2005     Cancer (H)      Chronic pain      Coronary artery disease      CVA (cerebral infarction)      Depressive disorder, not elsewhere classified      Diabetes (H)      Elevated serum alkaline phosphatase level     Liver source     Fibromyalgia      HYPERLIPIDEMIA NEC/NOS 2006     Hypertriglyceridemia      OA (osteoarthritis)      Thyroid disease      Tobacco use disorder      Tobacco use disorder complicating pregnancy, childbirth, or the puerperium, antepartum condition or complication      Trochanteric bursitis      Unspecified essential hypertension      Past Surgical History:   Procedure Laterality Date  "    C  DELIVERY ONLY      , Low Cervical     INJECT EPIDURAL TRANSFORAMINAL  2014    [facility]     INJECT JOINT SACROILIAC  2014    [facility]     LAMINECTOMY LUMBAR ONE LEVEL Left 2014    [facility]     Objective  /87  Ht 5' 6\" (1.676 m)  Wt 201 lb (91.2 kg)  LMP 2009  BMI 32.44 kg/m2    GENERAL APPEARANCE: healthy, alert and no distress   GAIT: NORMAL  SKIN: no suspicious lesions or rashes  NEURO: Normal strength and tone, mentation intact and speech normal  PSYCH:  mentation appears normal and affect normal/bright    Bilateral hip exam    Inspection:        no edema or ecchymosis in hip area    ROM:       Full active and passive ROM       Pain with active adduction over lateral hip and active ER    Strength:        flexion 5/5       extension 5/5       abduction 5/5       adduction 5/5    Tender:        greater trochanter r>>L today       SI joint mild       Piriformis, external rotators mildly    Non Tender:        remainder of hip area       illiac crest       ASIS       pubis    Sensation:        grossly intact in hip and thigh    Skin:       well perfused       capillary refill brisk    Special Tests:        neg (-) LORI       neg (-) FADIR       neg (-) scour       positive (+) Brooks    Bilateral Hand Exam  Inspection:Carpals: normal, Thumb: normal  Tender: Carpals: triquetrum, Metacarpals: 1st metacarpal, Thumb: CMC, R>L, but still milder overall than we have seen in the past  Non-tender: Remainder of hand  Range of Motion Thumb: opposition Decreased R>L, flexion MCP decreased, painful, extension MCP decreased, flexion IP decreased, extension IP decreased  Strength: mild decrease in thumb  strength bilaterally  Special tests: Positive scour of CMC, negative snuffbox tenderness   Skin:  well perfused  capillary refill brisk    Large Joint Injection/Arthocentesis  Date/Time: 2018 6:39 PM  Performed " by: QUINTEN COBIAN  Authorized by: QUINTEN COBIAN     Indications:  Pain  Needle Size:  25 G  Guidance: landmark guided    Approach:  Lateral  Location:  Hip  Laterality:  Bilateral  Site:  Bilateral greater trochanteric bursa  Medications (Right):  2 mL lidocaine 1 %; 2 mL bupivacaine (PF) 0.5 %; 40 mg triamcinolone acetonide 40 MG/ML  Medications (Left):  2 mL lidocaine 1 %; 2 mL bupivacaine (PF) 0.5 %; 40 mg triamcinolone acetonide 40 MG/ML  Outcome:  Tolerated well, no immediate complications  Prep: patient was prepped and draped in usual sterile fashion              Radiology:  FINGER LEFT TWO OR MORE VIEWS 5/13/2014 11:29 AM   HISTORY: Chronic pain.  COMPARISON: None.   FINDINGS: No fracture or malalignment is identified. There is near  complete joint space loss of the first carpal metacarpal joint with  adjacent subchondral cyst formation in the base of the first  metacarpal. Small osteophytes are also noted. Mild degenerative  changes of the first metacarpal phalangeal joint also noted.  IMPRESSION  IMPRESSION: Degenerative changes of the first carpal metacarpal and of  the first metacarpal phalangeal joints. No fracture.    Assessment:  1. Trochanteric bursitis of both hips    2. Primary osteoarthritis of both first carpometacarpal joints        Plan:  Discussed the assessment with the patient.  Follow up: prn  XR images independently visualized and reviewed with patient again today in clinic  Reviewed prior hand and hip films today  Defer CMC injections for as long as possible, reviewed PRP option in detail again today  B/l troch bursa CSI repeated today  Water therapy and low impact activity options reviewed   Reviewed again number of steroid injection over time and importance of limiting overall number as much as possible  Risks, potential SE reviewed in detail  F/u in minimum of 2 weeks for b/l CMC CSI  Expectations and goals of CSI reviewed  Often 2-3 days for steroid effect, and can  take up to two weeks for maximum effect  We discussed modified progressive pain-free activity as tolerated  Do not overuse in first two weeks if feeling better due to concern for vulnerability while steroid is working  Supportive care reviewed  All questions were answered today  Contact us with additional questions or concerns  Signs and sx of concern reviewed      José Miguel Linder DO, CAQ  Primary Care Sports Medicine  [facility]         Disclaimer: This note consists of symbols derived from keyboarding, dictation and/or voice recognition software. As a result, there may be errors in the script that have gone undetected. Please consider this when interpreting information found in this chart.      Again, thank you for allowing me to participate in the care of your patient.        Sincerely,        José Miguel Linder DO

## 2018-07-11 NOTE — PROGRESS NOTES
Jacquie Amador  :  1960  DOS: 2018  MRN: [id number]    Sports Medicine Clinic Visit    PCP: Liz Peterson    Jacquie Amador is a 55 year old Right hand dominant female who is seen in follow up for her chronic bilateral hip and bilateral thumb CMC joint pain.      Interim History - 2018  Since last visit on 2018 patient has moderate-severe bilateral hip pain.  Bilateral hip trochanteric bursa injection completed on  provided good relief for ~ 2.5 - 3 months.  Patient is desiring repeat injections today.  No new injury in the interim.    She has continued to have pain in bilateral thumbs over last ~ 6 months.  Unable to repeat injections d/t her schedule, then developing secondary infection.  Recently discharged from Pain Mgmt clinic.    Review of Systems  Musculoskeletal: as above  Remainder of review of systems is negative including constitutional, CV, pulmonary, GI, Skin and Neurologic except as noted in HPI or medical history.    Past Medical History:   Diagnosis Date     ASTHMA - MODERATE PERSISTENT 2005     Cancer (H)      Chronic pain      Coronary artery disease      CVA (cerebral infarction)      Depressive disorder, not elsewhere classified      Diabetes (H)      Elevated serum alkaline phosphatase level     Liver source     Fibromyalgia      HYPERLIPIDEMIA NEC/NOS 2006     Hypertriglyceridemia      OA (osteoarthritis)      Thyroid disease      Tobacco use disorder      Tobacco use disorder complicating pregnancy, childbirth, or the puerperium, antepartum condition or complication      Trochanteric bursitis      Unspecified essential hypertension      Past Surgical History:   Procedure Laterality Date     C  DELIVERY ONLY      , Low Cervical     INJECT EPIDURAL TRANSFORAMINAL  2014    [facility]     INJECT JOINT SACROILIAC  2014    [facility]     LAMINECTOMY LUMBAR ONE LEVEL Left 2014     "[facility]     Objective  /87  Ht 5' 6\" (1.676 m)  Wt 201 lb (91.2 kg)  LMP 07/17/2009  BMI 32.44 kg/m2    GENERAL APPEARANCE: healthy, alert and no distress   GAIT: NORMAL  SKIN: no suspicious lesions or rashes  NEURO: Normal strength and tone, mentation intact and speech normal  PSYCH:  mentation appears normal and affect normal/bright    Bilateral hip exam    Inspection:        no edema or ecchymosis in hip area    ROM:       Full active and passive ROM       Pain with active adduction over lateral hip and active ER    Strength:        flexion 5/5       extension 5/5       abduction 5/5       adduction 5/5    Tender:        greater trochanter r>>L today       SI joint mild       Piriformis, external rotators mildly    Non Tender:        remainder of hip area       illiac crest       ASIS       pubis    Sensation:        grossly intact in hip and thigh    Skin:       well perfused       capillary refill brisk    Special Tests:        neg (-) LORI       neg (-) FADIR       neg (-) scour       positive (+) Brooks    Bilateral Hand Exam  Inspection:Carpals: normal, Thumb: normal  Tender: Carpals: triquetrum, Metacarpals: 1st metacarpal, Thumb: CMC, R>L, but still milder overall than we have seen in the past  Non-tender: Remainder of hand  Range of Motion Thumb: opposition Decreased R>L, flexion MCP decreased, painful, extension MCP decreased, flexion IP decreased, extension IP decreased  Strength: mild decrease in thumb  strength bilaterally  Special tests: Positive scour of CMC, negative snuffbox tenderness   Skin:  well perfused  capillary refill brisk    Large Joint Injection/Arthocentesis  Date/Time: 7/11/2018 6:39 PM  Performed by: QUINTEN COIBAN  Authorized by: QUINTEN COBIAN     Indications:  Pain  Needle Size:  25 G  Guidance: landmark guided    Approach:  Lateral  Location:  Hip  Laterality:  Bilateral  Site:  Bilateral greater trochanteric bursa  Medications " (Right):  2 mL lidocaine 1 %; 2 mL bupivacaine (PF) 0.5 %; 40 mg triamcinolone acetonide 40 MG/ML  Medications (Left):  2 mL lidocaine 1 %; 2 mL bupivacaine (PF) 0.5 %; 40 mg triamcinolone acetonide 40 MG/ML  Outcome:  Tolerated well, no immediate complications  Prep: patient was prepped and draped in usual sterile fashion              Radiology:  FINGER LEFT TWO OR MORE VIEWS 5/13/2014 11:29 AM   HISTORY: Chronic pain.  COMPARISON: None.   FINDINGS: No fracture or malalignment is identified. There is near  complete joint space loss of the first carpal metacarpal joint with  adjacent subchondral cyst formation in the base of the first  metacarpal. Small osteophytes are also noted. Mild degenerative  changes of the first metacarpal phalangeal joint also noted.  IMPRESSION  IMPRESSION: Degenerative changes of the first carpal metacarpal and of  the first metacarpal phalangeal joints. No fracture.    Assessment:  1. Trochanteric bursitis of both hips    2. Primary osteoarthritis of both first carpometacarpal joints        Plan:  Discussed the assessment with the patient.  Follow up: prn  XR images independently visualized and reviewed with patient again today in clinic  Reviewed prior hand and hip films today  Defer CMC injections for as long as possible, reviewed PRP option in detail again today  B/l troch bursa CSI repeated today  Water therapy and low impact activity options reviewed   Reviewed again number of steroid injection over time and importance of limiting overall number as much as possible  Risks, potential SE reviewed in detail  F/u in minimum of 2 weeks for b/l CMC CSI  Expectations and goals of CSI reviewed  Often 2-3 days for steroid effect, and can take up to two weeks for maximum effect  We discussed modified progressive pain-free activity as tolerated  Do not overuse in first two weeks if feeling better due to concern for vulnerability while steroid is working  Supportive care reviewed  All questions  were answered today  Contact us with additional questions or concerns  Signs and sx of concern reviewed      José Miguel Linder DO, RADHA  Primary Care Sports Medicine  [facility]         Disclaimer: This note consists of symbols derived from keyboarding, dictation and/or voice recognition software. As a result, there may be errors in the script that have gone undetected. Please consider this when interpreting information found in this chart.

## 2018-07-14 ENCOUNTER — VIRTUAL VISIT (OUTPATIENT)
Dept: FAMILY MEDICINE | Facility: OTHER | Age: 58
End: 2018-07-14

## 2018-07-14 NOTE — PROGRESS NOTES
"Date:   Clinician: Lauren Broussard  Clinician NPI: [id number]  Patient: Jacquie Amador  Patient : 1960  Patient Address: [address] [place] [address], [place] [address]  Patient Phone: [phone]  Visit Protocol: URI  Patient Summary:  Jacquie is a 57 year old ( : 1960 ) female who initiated a Visit for cold, sinus infection, or influenza. When asked the question \"Please sign me up to receive news, health information and promotions from [employer].\", Jacquie responded \"No\".    Jacquie states her symptoms started gradually 1 month or more ago.   Her symptoms consist of myalgia, facial pain or pressure, a cough, rhinitis, malaise, nasal congestion, and a headache.   Symptom details     Nasal secretions: The color of her mucus is yellow and white.    Cough: Jacquie coughs a few times an hour and her cough is more bothersome at night. Phlegm comes into her throat when she coughs. She believes the phlegm causes the cough. The color of the phlegm is white.     Facial pain or pressure: She has not had the facial pain or pressure for 12 weeks or more. The facial pain or pressure feels worse when bending over or leaning forward.     Headache: Jacquie has not had the headache for 12 weeks or more. She states the headache is mild (between 1-3 on a 10 point pain scale).      Jacquie denies having wheezing, chills, enlarged lymph nodes, fever, dyspnea, ear pain, sore throat, and teeth pain. She also denies having recent facial or sinus surgery in the past 60 days, taking antibiotic medication for the symptoms, and double sickening (worsening symptoms after initial improvement).   She has not recently been exposed to someone with influenza. Jacquie has been in close contact with the following high risk individuals: pregnant women and adults 65 or older.   Jacquie had 1 sinus infection within the past year.   Weight: 192 lbs   Jacquie does not smoke or use smokeless tobacco.   She denies pregnancy and denies " breastfeeding. She does not menstruate.   Additional information as reported by the patient (free text): My mucus is so thick at night I can?t swallow it. I cough cough and sometimes throw up mucus. I am an extremely slow healer and need at least ten day prescription.    MEDICATIONS: trazodone oral, Vicodin oral, metformin (bulk), Celebrex oral, Wellbutrin SR oral, Cymbalta oral, ALLERGIES: NKDA  Clinician Response:  Dear Jacquie,  Based on the information provided, you have acute bacterial sinusitis, also known as a sinus infection. Sinus infections are caused by bacteria or a virus and symptoms are almost always identical. The difference between the 2 types of infections is timing.  Sinus infections start as viral infections and symptoms improve on their own in about 7 days. If symptoms have not improved after 7 days or have even worsened, a bacterial infection may have developed.  Medication information  I am prescribing:     Amoxicillin-pot clavulanate 875-125 mg oral tablet. Take 1 tablet by mouth every 12 hours for 10 days. There are no refills with this prescription.   Antibiotics can cause an allergic reaction even if you have taken them without a problem in the past. If you develop a new rash, swelling, or difficulty breathing, stop the medication and be seen in a clinic or urgent care immediately.  A yeast infection is a side effect of taking antibiotics in some women. Please use [facility] to get treatment if you have symptoms of a yeast infection.  If you become pregnant during this course of treatment, stop taking the medication and contact your primary care provider.  Unless you are allergic to the over-the-counter medication(s) below, I recommend using:       Saline nasal spray ([place] or store brand). Use 1-2 sprays in each nostril 3 times a day as needed for congestion.    A sinus irrigation kit such as Sinus Rinse, Neti Pot, SinuCleanse, or store brand. Be sure to use sterile or previously boiled water  to prevent unwanted infections.     Over-the-counter medications do not require a prescription. Ask the pharmacist if you have any questions.  Self care  The following tips will keep you as comfortable as possible while you recover:     Rest    Drink plenty of water and other liquids    Take a hot shower to loosen congestion    Take a spoonful of honey to reduce your cough     When to seek care  Please be seen in a clinic or urgent care if any of the following occur:     Symptoms do not start to improve after 3 days of treatment    New symptoms develop, or symptoms become worse      Diagnosis: Acute bacterial sinusitis  Diagnosis ICD: J01.90  Prescription: amoxicillin-pot clavulanate 875-125 mg oral tablet 20 tablet, 10 days supply. Take 1 tablet by mouth every 12 hours for 10 days. Refills: 0, Refill as needed: no, Allow substitutions: yes  Pharmacy: [id number] - [phone] - [address] [place] [address]

## 2018-07-23 RX ORDER — LIDOCAINE HYDROCHLORIDE 10 MG/ML
2 INJECTION, SOLUTION INFILTRATION; PERINEURAL
Status: DISCONTINUED | OUTPATIENT
Start: 2018-07-11 | End: 2020-02-12 | Stop reason: ALTCHOICE

## 2018-07-23 RX ORDER — BUPIVACAINE HYDROCHLORIDE 5 MG/ML
2 INJECTION, SOLUTION EPIDURAL; INTRACAUDAL
Status: DISCONTINUED | OUTPATIENT
Start: 2018-07-11 | End: 2020-02-12 | Stop reason: ALTCHOICE

## 2018-07-23 RX ORDER — TRIAMCINOLONE ACETONIDE 40 MG/ML
40 INJECTION, SUSPENSION INTRA-ARTICULAR; INTRAMUSCULAR
Status: DISCONTINUED | OUTPATIENT
Start: 2018-07-11 | End: 2020-02-12 | Stop reason: ALTCHOICE

## 2018-07-27 ENCOUNTER — MYC MEDICAL ADVICE (OUTPATIENT)
Dept: FAMILY MEDICINE | Facility: CLINIC | Age: 58
End: 2018-07-27

## 2018-07-27 ENCOUNTER — OFFICE VISIT (OUTPATIENT)
Dept: ORTHOPEDICS | Facility: CLINIC | Age: 58
End: 2018-07-27
Payer: COMMERCIAL

## 2018-07-27 VITALS
BODY MASS INDEX: 32.62 KG/M2 | HEIGHT: 66 IN | WEIGHT: 203 LBS | SYSTOLIC BLOOD PRESSURE: 132 MMHG | DIASTOLIC BLOOD PRESSURE: 80 MMHG

## 2018-07-27 DIAGNOSIS — M70.61 GREATER TROCHANTERIC BURSITIS OF BOTH HIPS: ICD-10-CM

## 2018-07-27 DIAGNOSIS — M18.0 PRIMARY OSTEOARTHRITIS OF BOTH FIRST CARPOMETACARPAL JOINTS: Primary | ICD-10-CM

## 2018-07-27 DIAGNOSIS — F41.1 GENERALIZED ANXIETY DISORDER: ICD-10-CM

## 2018-07-27 DIAGNOSIS — M53.3 SI (SACROILIAC) JOINT DYSFUNCTION: ICD-10-CM

## 2018-07-27 DIAGNOSIS — M70.62 GREATER TROCHANTERIC BURSITIS OF BOTH HIPS: ICD-10-CM

## 2018-07-27 DIAGNOSIS — M47.817 LUMBOSACRAL SPONDYLOSIS WITHOUT MYELOPATHY: ICD-10-CM

## 2018-07-27 DIAGNOSIS — M51.369 DDD (DEGENERATIVE DISC DISEASE), LUMBAR: ICD-10-CM

## 2018-07-27 DIAGNOSIS — M79.645 BILATERAL THUMB PAIN: ICD-10-CM

## 2018-07-27 DIAGNOSIS — M79.18 MYOFASCIAL PAIN: ICD-10-CM

## 2018-07-27 DIAGNOSIS — M79.644 BILATERAL THUMB PAIN: ICD-10-CM

## 2018-07-27 PROCEDURE — 20604 DRAIN/INJ JOINT/BURSA W/US: CPT | Mod: 50 | Performed by: FAMILY MEDICINE

## 2018-07-27 RX ADMIN — ROPIVACAINE HYDROCHLORIDE 1 ML: 5 INJECTION, SOLUTION EPIDURAL; INFILTRATION; PERINEURAL at 14:50

## 2018-07-27 RX ADMIN — TRIAMCINOLONE ACETONIDE 40 MG: 40 INJECTION, SUSPENSION INTRA-ARTICULAR; INTRAMUSCULAR at 14:50

## 2018-07-27 NOTE — MR AVS SNAPSHOT
"              After Visit Summary   7/27/2018    Jacquie Amador    MRN: [id number]           Patient Information     Date Of Birth          1960        Visit Information        Provider Department      7/27/2018 2:00 PM José Miguel Linder DO [facility]        Today's Diagnoses     Primary osteoarthritis of both first carpometacarpal joints    -  1    Bilateral thumb pain           Follow-ups after your visit        Who to contact     If you have questions or need follow up information about today's clinic visit or your schedule please contact [facility] directly at [phone].  Normal or non-critical lab and imaging results will be communicated to you by [employer], letter or phone within 4 business days after the clinic has received the results. If you do not hear from us within 7 days, please contact the clinic through [employer] or phone. If you have a critical or abnormal lab result, we will notify you by phone as soon as possible.  Submit refill requests through [employer] or call your pharmacy and they will forward the refill request to us. Please allow 3 business days for your refill to be completed.          Additional Information About Your Visit        MyChart Information     [employer] gives you secure access to your electronic health record. If you see a primary care provider, you can also send messages to your care team and make appointments. If you have questions, please call your primary care clinic.  If you do not have a primary care provider, please call [phone] and they will assist you.        Care EveryWhere ID     This is your Care EveryWhere ID. This could be used by other organizations to access your [facility] medical records  [id number]        Your Vitals Were     Height Last Period BMI (Body Mass Index)             5' 6\" (1.676 m) 07/17/2009 32.77 kg/m2          Blood Pressure from Last 3 Encounters:   07/27/18 132/80 "   07/11/18 124/87   05/29/18 108/70    Weight from Last 3 Encounters:   07/27/18 203 lb (92.1 kg)   07/11/18 201 lb (91.2 kg)   02/28/18 203 lb (92.1 kg)              We Performed the Following     Hand / Upper Extremity Injection/Arthrocentesis        Primary Care Provider Office Phone # Fax #    Liz JEFF MD Nicholas [phone] [address] [facility] [address]        Equal Access to Services     [facility] : Hadii aad ku hadasho Soomaali, waaxda luqadaha, qaybta kaalmada adeegyada, waxay idiin hayaan adeeg logan chandler . So [facility] [phone].    ATENCIÓN: Si habla español, tiene a kimball disposición servicios gratuitos de asistencia lingüística. [facility] [phone].    We comply with applicable federal civil rights laws and [place] laws. We do not discriminate on the basis of race, color, national origin, age, disability, sex, sexual orientation, or gender identity.            Thank you!     Thank you for choosing [facility]  for your care. Our goal is always to provide you with excellent care. Hearing back from our patients is one way we can continue to improve our services. Please take a few minutes to complete the written survey that you may receive in the mail after your visit with us. Thank you!             Your Updated Medication List - Protect others around you: Learn how to safely use, store and throw away your medicines at www.disposemymeds.org.          This list is accurate as of 7/27/18 11:59 PM.  Always use your most recent med list.                   Brand Name Dispense Instructions for use Diagnosis    acidophilus Caps     60 capsule    Take 1 capsule by mouth daily Take two hours before or after antibiotic dose.  Continue for 1 week after antibiotic therapy completed    Diarrhea       adapalene 0.1 % gel    DIFFERIN    45 g    APPLY A THIN LAYER TO THE AFFECTED AREA(S) BY TOPICAL ROUTE ONCE DAILY BEFORE BEDTIME    Acne vulgaris       *  albuterol 108 (90 Base) MCG/ACT Inhaler    VENTOLIN HFA    54 g    INHALE 2 PUFFS INTO THE LUNGS EVERY 6 HOURS AS NEEDED FOR SHORTNESS OF BREATH / DYSPNEA    Moderate persistent asthma without complication       * albuterol (2.5 MG/3ML) 0.083% neb solution     1 Box    Take 1 vial (2.5 mg) by nebulization every 6 hours as needed        ALPRAZolam 0.25 MG tablet    XANAX    60 tablet    TAKE ONE TABLET BY MOUTH DAILY AS NEEDED FOR ANXIETY    Generalized anxiety disorder       * atorvastatin 20 MG tablet    LIPITOR          * atorvastatin 20 MG tablet    LIPITOR    90 tablet    TAKE 1 TABLET BY MOUTH DAILY    Hyperlipidemia LDL goal <130       * buPROPion 150 MG 12 hr tablet    WELLBUTRIN SR    90 tablet    TAKE ONE TABLET BY MOUTH ONE TIME DAILY IN THE EVENING    Generalized anxiety disorder       * buPROPion 200 MG 12 hr tablet    WELLBUTRIN SR    90 tablet    TAKE ONE TABLET BY MOUTH ONE TIME DAILY    Generalized anxiety disorder       celecoxib 200 MG capsule    celeBREX    180 capsule    Take 1 capsule twice daily as needed for pain this is a 3 month script    Greater trochanteric bursitis, unspecified laterality, Hand arthritis       clindamycin 1 % lotion    CLEOCIN T    60 mL    Apply topically 2 times daily    Atopic dermatitis, unspecified type       clobetasol 0.05 % cream    TEMOVATE    30 g    APPLY SPARINGLY TO AFFECTED AREA TWICE DAILY FOR 14 DAYS.  DO NOT APPLY TO FACE.    Dermatitis       diclofenac 1 % Gel topical gel    VOLTAREN    9 Tube    Apply 2 grams to hands and 4 grams to hips up to 4 times daily. T    Greater trochanteric bursitis of both hips, Bilateral thumb pain       doxycycline 100 MG tablet    VIBRA-TABS    20 tablet    Take 1 tablet (100 mg) by mouth 2 times daily    Skin ulcer, limited to breakdown of skin (H)       DULoxetine 60 MG EC capsule    CYMBALTA    180 capsule    TAKE 2 CAPSULES (120 MG) BY MOUTH DAILY    Multiple joint pain       fluticasone-salmeterol 500-50 MCG/DOSE  diskus inhaler    ADVAIR DISKUS    3 Inhaler    INHALE 1 PUFF BY INHALATION ROUTE 2 TIMES PER DAY IN THE MORNING AND EVENING APPROXIMATELY 12 HOURS APART    Moderate persistent asthma without complication       HYDROcodone-acetaminophen 5-325 MG per tablet    NORCO    100 tablet    May take 1 tablet every 4-6 hours as needed for pain. Max of 4 tabs per day. OK to fill and begin on/after 4/3/2018.  Must last 30 days.    Lumbosacral spondylosis without myelopathy, DDD (degenerative disc disease), lumbar, SI (sacroiliac) joint dysfunction, Greater trochanteric bursitis of both hips, Myofascial pain       hydrocortisone 2.5 % cream     30 g    APPLY TOPICALLY 2 TIMES DAILY AS NEEDED    Dermatitis       hydrOXYzine 25 MG tablet    ATARAX    40 tablet    Take 1 tablet (25 mg) by mouth every 8 hours as needed for anxiety    MARIZOL (generalized anxiety disorder)       losartan-hydrochlorothiazide 100-12.5 MG per tablet    HYZAAR    90 tablet    TAKE 1 TABLET BY MOUTH DAILY    Essential hypertension with goal blood pressure less than 140/90       medical cannabis (Patient's own supply.  Not a prescription)     0 Information only    2 mg morning and noon. 7 mg at bedtime. (This is NOT a prescription, and does not certify that the patient has a qualifying medical condition for medical cannabis.  The purpose of this order is  to document that the patient reports taking medical cannabis.)        metFORMIN 500 MG tablet    GLUCOPHAGE    90 tablet    Take 1 tablet (500 mg) by mouth daily (with dinner)    Impaired fasting glucose       metoprolol succinate 25 MG 24 hr tablet    TOPROL-XL    90 tablet    Take 0.5 tablets per day. May add another 1/2 tab in the evening as needed    Palpitations, Sinus tachycardia, Migraine without aura and without status migrainosus, not intractable       montelukast 10 MG tablet    SINGULAIR    90 tablet    TAKE ONE TABLET BY MOUTH IN THE EVENING    Moderate persistent asthma without complication        mupirocin 2 % cream    BACTROBAN    60 g    Apply to affected area three times daily    Dermatitis       nystatin 337707 UNIT/ML suspension    MYCOSTATIN    380 mL    Take by mouth 4 times daily    Thrush       nystatin cream    MYCOSTATIN    60 g    Apply to affected area three times daily    Dermatitis       ranitidine 150 MG tablet    ZANTAC    0    ONE TABLET TWICE DAILY        rizatriptan 10 MG tablet    MAXALT    18 tablet    Take 1 tablet (10 mg) by mouth at onset of headache for migraine May repeat in 2 hours. Max 3 tablets/24 hours.    Chronic migraine without aura without status migrainosus, not intractable       rOPINIRole 1 MG tablet    REQUIP    270 tablet    TAKE 2-3 TABLETS (2-3 MG) BY MOUTH AT BEDTIME    Movement disorder       SUMAtriptan 100 MG tablet    IMITREX    18 tablet    Take 1 tablet (100 mg) by mouth at onset of headache for migraine May repeat in 2 hours. Max 2 tablets/24 hours.    Migraine without aura and without status migrainosus, not intractable       traZODone 50 MG tablet    DESYREL    270 tablet    TAKE 2-3 TABLETS BY MOUTH AT BEDTIME    Psychophysiological insomnia       tretinoin 0.025 % cream    RETIN-A    45 g    Spread a pea size amount into affected area topically at bedtime.  Use sunscreen SPF>20.    Age-related facial wrinkles       ZYRTEC 10 MG tablet   Generic drug:  cetirizine     90    1 TABLET DAILY    Allergic rhinitis, cause unspecified       * Notice:  This list has 6 medication(s) that are the same as other medications prescribed for you. Read the directions carefully, and ask your doctor or other care provider to review them with you.

## 2018-07-27 NOTE — PROGRESS NOTES
Jacquie BURROWS Lindaleliabyron  :  1960  DOS: 2018  MRN: [id number]    Sports Medicine Clinic Procedure    Ultrasound Guided Bilateral Intra-Articular Thumb CMC Joint Injection    Clinical History: F/u chronic bilateral thumb, CMC joint pain.  Previously injections completed by Dr Cárdenas in 2017 provided good relief for ~ 4+ months.      Diagnosis:   1. Primary osteoarthritis of both first carpometacarpal joints    2. Bilateral thumb pain         Hand / Upper Extremity Injection/Arthrocentesis  Date/Time: 2018 2:50 PM  Performed by: JOSÉ MIGUEL COBIAN  Authorized by: JOSÉ MIGUEL COBIAN     Indications:  Pain  Needle Size:  25 G  Guidance: ultrasound    Approach:  Radial  Condition: osteoarthritis    Laterality:  Bilateral  Location:  Thumb  Site:  Bilateral thumb CMC  Medications (Right):  40 mg triamcinolone acetonide 40 MG/ML; 1 mL ropivacaine 5 MG/ML  Medications (Left):  40 mg triamcinolone acetonide 40 MG/ML; 1 mL ropivacaine 5 MG/ML  Outcome:  Tolerated well, no immediate complications  Procedure discussed: discussed risks, benefits, and alternatives    Consent Given by:  Patient  Prep: patient was prepped and draped in usual sterile fashion                Impression:  Successful Bilateral CMC joint injection.    Plan:  Follow up prn based on relief, try to use CSI as little as possible, reviewed again today  Expectations and goals of CSI reviewed  Often 2-3 days for steroid effect, and can take up to two weeks for maximum effect  We discussed modified progressive pain-free activity as tolerated  Do not overuse in first two weeks if feeling better due to concern for vulnerability while steroid is working  Supportive care reviewed  All questions were answered today  Contact us with additional questions or concerns  Signs and sx of concern reviewed         José Miguel Cobian DO, CAQ  Primary Care Sports Medicine  [facility]

## 2018-07-27 NOTE — LETTER
2018         RE: Jacquie Amador  [address] [facility]  [place] [address]        Dear Colleague,    Thank you for referring your patient, Jacquie Amador, to the [facility]. Please see a copy of my visit note below.    Jacquie Amador  :  1960  DOS: 2018  MRN: [id number]    Sports Medicine Clinic Procedure    Ultrasound Guided Bilateral Intra-Articular Thumb CMC Joint Injection    Clinical History: F/u chronic bilateral thumb, CMC joint pain.  Previously injections completed by Dr Cárdenas in 2017 provided good relief for ~ 4+ months.      Diagnosis:   1. Primary osteoarthritis of both first carpometacarpal joints    2. Bilateral thumb pain         Hand / Upper Extremity Injection/Arthrocentesis  Date/Time: 2018 2:50 PM  Performed by: QUINTEN COBIAN  Authorized by: QUINTEN COBIAN     Indications:  Pain  Needle Size:  25 G  Guidance: ultrasound    Approach:  Radial  Condition: osteoarthritis    Laterality:  Bilateral  Location:  Thumb  Site:  Bilateral thumb CMC  Medications (Right):  40 mg triamcinolone acetonide 40 MG/ML; 1 mL ropivacaine 5 MG/ML  Medications (Left):  40 mg triamcinolone acetonide 40 MG/ML; 1 mL ropivacaine 5 MG/ML  Outcome:  Tolerated well, no immediate complications  Procedure discussed: discussed risks, benefits, and alternatives    Consent Given by:  Patient  Prep: patient was prepped and draped in usual sterile fashion                Impression:  Successful Bilateral CMC joint injection.    Plan:  Follow up prn based on relief, try to use CSI as little as possible, reviewed again today  Expectations and goals of CSI reviewed  Often 2-3 days for steroid effect, and can take up to two weeks for maximum effect  We discussed modified progressive pain-free activity as tolerated  Do not overuse in first two weeks if feeling better due to concern for vulnerability while steroid is working  Supportive care  reviewed  All questions were answered today  Contact us with additional questions or concerns  Signs and sx of concern reviewed         José Miguel Linder DO, CAQ  Primary Care Sports Medicine  [facility]           Again, thank you for allowing me to participate in the care of your patient.        Sincerely,        José Miguel Linder DO

## 2018-07-28 RX ORDER — TRIAMCINOLONE ACETONIDE 40 MG/ML
40 INJECTION, SUSPENSION INTRA-ARTICULAR; INTRAMUSCULAR
Status: DISCONTINUED | OUTPATIENT
Start: 2018-07-27 | End: 2020-02-12 | Stop reason: ALTCHOICE

## 2018-07-28 RX ORDER — ROPIVACAINE HYDROCHLORIDE 5 MG/ML
1 INJECTION, SOLUTION EPIDURAL; INFILTRATION; PERINEURAL
Status: DISCONTINUED | OUTPATIENT
Start: 2018-07-27 | End: 2020-02-12 | Stop reason: ALTCHOICE

## 2018-07-30 RX ORDER — HYDROCODONE BITARTRATE AND ACETAMINOPHEN 5; 325 MG/1; MG/1
TABLET ORAL
Qty: 100 TABLET | Refills: 0 | Status: SHIPPED | OUTPATIENT
Start: 2018-07-30 | End: 2018-09-27

## 2018-07-30 NOTE — TELEPHONE ENCOUNTER
HYDROcodone-acetaminophen (NORCO) 5-325 MG       Last Written Prescription Date:  5/29/2018  Last Fill Quantity: 100,   # refills: 0  Last Office Visit: 5/29/2018  Future Office visit:       Routing refill request to provider for review/approval because:  Drug not on the [facility], [facility] or [facility] refill protocol or controlled substance

## 2018-07-30 NOTE — TELEPHONE ENCOUNTER
Requested Prescriptions   Pending Prescriptions Disp Refills     ALPRAZolam (XANAX) 0.25 MG tablet [Pharmacy Med Name: ALPRAZolam Oral Tablet 0.25 MG] 60 tablet 0     Sig: TAKE 1 TABLET BY MOUTH DAILY AS NEEDED FOR ANXIETY    There is no refill protocol information for this order        ALPRAZolam (XANAX) 0.25 MG tablet      Last Written Prescription Date:  02/16/2018  Last Fill Quantity: 60,   # refills: 0  Last Office Visit: 07/27/2018  Future Office visit:       Routing refill request to provider for review/approval because:  Drug not on the [facility], [facility] or [facility] refill protocol or controlled substance    Eva Pace RN, BSN

## 2018-07-31 DIAGNOSIS — G43.009 MIGRAINE WITHOUT AURA AND WITHOUT STATUS MIGRAINOSUS, NOT INTRACTABLE: ICD-10-CM

## 2018-07-31 RX ORDER — SUMATRIPTAN 100 MG/1
TABLET, FILM COATED ORAL
Qty: 18 TABLET | Refills: 1 | Status: SHIPPED | OUTPATIENT
Start: 2018-07-31 | End: 2018-12-19

## 2018-07-31 RX ORDER — ALPRAZOLAM 0.25 MG
TABLET ORAL
Qty: 60 TABLET | Refills: 0 | Status: SHIPPED | OUTPATIENT
Start: 2018-07-31 | End: 2019-06-03

## 2018-07-31 NOTE — TELEPHONE ENCOUNTER
Imitrex    Prescription approved per [facility] Refill Protocol.    Laney Medrano, RN, BSN

## 2018-08-13 ENCOUNTER — VIRTUAL VISIT (OUTPATIENT)
Dept: FAMILY MEDICINE | Facility: OTHER | Age: 58
End: 2018-08-13

## 2018-08-13 NOTE — PROGRESS NOTES
"Date:   Clinician: Eebr Franklin  Clinician NPI: [id number]  Patient: Jacquie Amador  Patient : 1960  Patient Address: [address] [place] [address], [place] [address]  Patient Phone: [phone]  Visit Protocol: URI  Patient Summary:  Jacquie is a 58 year old ( : 1960 ) female who initiated a Visit for cold, sinus infection, or influenza. When asked the question \"Please sign me up to receive news, health information and promotions from [employer].\", Jacquie responded \"No\".    Jacquie states her symptoms started gradually 10-13 days ago.   Her symptoms consist of a cough, rhinitis, and a headache.   Symptom details     Nasal secretions: The color of her mucus is white.    Cough: Jacquie coughs a few times an hour and her cough is more bothersome at night. Phlegm comes into her throat when she coughs. She believes the phlegm causes the cough. The color of the phlegm is white.     Headache: She states the headache is moderate (between 4-6 on a 10 point pain scale).      Jacquie denies having wheezing, myalgias, facial pain or pressure, chills, enlarged lymph nodes, malaise, nasal congestion, fever, dyspnea, ear pain, sore throat, and teeth pain. She also denies having recent facial or sinus surgery in the past 60 days and double sickening (worsening symptoms after initial improvement).   She has not recently been exposed to someone with influenza. Jacquie has been in close contact with the following high risk individuals: pregnant women and adults 65 or older.   Jacquie has taken an antibiotic medication in the past month. Antibiotic details as reported by the patient (free text): Doxycycline 100 I don?t think it totally worked prob need something different to get rid of phlegm    Weight: 189 lbs   Jacquie does not smoke or use smokeless tobacco.   She denies pregnancy and denies breastfeeding. She does not menstruate.   Additional information as reported by the patient (free text): i have heavier " drainage with cough at night   MEDICATIONS: trazodone oral, Vicodin oral, metformin (bulk), Celebrex oral, Wellbutrin SR oral, Cymbalta oral, ALLERGIES: NKDA  Clinician Response:  Dear Jacquie,  Based on the information provided, you have acute bacterial sinusitis, also known as a sinus infection. Sinus infections are caused by bacteria or a virus and symptoms are almost always identical. The difference between the 2 types of infections is timing.  Sinus infections start as viral infections and symptoms improve on their own in about 7 days. If symptoms have not improved after 7 days or have even worsened, a bacterial infection may have developed.  Medication information  I am prescribing:     Amoxicillin-pot clavulanate 875-125 mg oral tablet. Take 1 tablet by mouth every 12 hours for 10 days. There are no refills with this prescription.   Antibiotics can cause an allergic reaction even if you have taken them without a problem in the past. If you develop a new rash, swelling, or difficulty breathing, stop the medication and be seen in a clinic or urgent care immediately.  A yeast infection is a side effect of taking antibiotics in some women. Please use [facility] to get treatment if you have symptoms of a yeast infection.  If you become pregnant during this course of treatment, stop taking the medication and contact your primary care provider.  Self care  The following tips will keep you as comfortable as possible while you recover:     Rest    Drink plenty of water and other liquids    Take a hot shower to loosen congestion    Take a spoonful of honey to reduce your cough     When to seek care  Please be seen in a clinic or urgent care if any of the following occur:     Symptoms do not start to improve after 3 days of treatment    New symptoms develop, or symptoms become worse      Diagnosis: Acute bacterial sinusitis  Diagnosis ICD: J01.90  Prescription: amoxicillin-pot clavulanate 875-125 mg oral tablet 20 tablet,  10 days supply. Take 1 tablet by mouth every 12 hours for 10 days. Refills: 0, Refill as needed: no, Allow substitutions: yes  Pharmacy: [id number] - [phone] - [phone], [place] [phone]

## 2018-08-23 ENCOUNTER — MYC MEDICAL ADVICE (OUTPATIENT)
Dept: FAMILY MEDICINE | Facility: CLINIC | Age: 58
End: 2018-08-23

## 2018-08-23 DIAGNOSIS — R53.83 OTHER FATIGUE: Primary | ICD-10-CM

## 2018-08-24 ENCOUNTER — MYC MEDICAL ADVICE (OUTPATIENT)
Dept: FAMILY MEDICINE | Facility: CLINIC | Age: 58
End: 2018-08-24

## 2018-09-04 ENCOUNTER — MYC MEDICAL ADVICE (OUTPATIENT)
Dept: FAMILY MEDICINE | Facility: CLINIC | Age: 58
End: 2018-09-04

## 2018-09-06 ENCOUNTER — TELEPHONE (OUTPATIENT)
Dept: FAMILY MEDICINE | Facility: CLINIC | Age: 58
End: 2018-09-06

## 2018-09-06 ENCOUNTER — MYC MEDICAL ADVICE (OUTPATIENT)
Dept: FAMILY MEDICINE | Facility: CLINIC | Age: 58
End: 2018-09-06

## 2018-09-06 NOTE — TELEPHONE ENCOUNTER
Summary:    Patient is due/failing the following:   FIT and MAMMOGRAM    Action needed:   Complete a FIT test and schedule a mammogram     Type of outreach:    Sent [employer] message.  Patient does not want HM calls   Questions for provider review:    None                                                                                                                                    Funmi Stahl       Chart routed to Care Team .          Panel Management Review      Patient has the following on her problem list:     Depression / Dysthymia review    Measure:  Needs PHQ-9 score of 4 or less during index window.  Administer PHQ-9 and if score is 5 or more, send encounter to provider for next steps.        PHQ-9 SCORE 8/23/2017 12/8/2017 5/29/2018   Total Score - - -   Total Score MyChart 17 (Moderately severe depression) 6 (Mild depression) -   Total Score - - -   Total Score 17 6 9       If PHQ-9 recheck is 5 or more, route to provider for next steps.    Patient is due for:  PHQ9    Asthma review     ACT Total Scores 5/29/2018   ACT TOTAL SCORE -   ASTHMA ER VISITS -   ASTHMA HOSPITALIZATIONS -   ACT TOTAL SCORE (Goal Greater than or Equal to 20) 11   In the past 12 months, how many times did you visit the emergency room for your asthma without being admitted to the hospital? 0   In the past 12 months, how many times were you hospitalized overnight because of your asthma? 0      1. Is Asthma diagnosis on the Problem List? Yes    2. Is Asthma listed on Health Maintenance? Yes    3. Patient is due for:  ACT      Composite cancer screening  Chart review shows that this patient is due/due soon for the following Mammogram and Fecal Colorectal (FIT)

## 2018-09-18 DIAGNOSIS — F41.1 GENERALIZED ANXIETY DISORDER: ICD-10-CM

## 2018-09-19 RX ORDER — BUPROPION HYDROCHLORIDE 200 MG/1
TABLET, EXTENDED RELEASE ORAL
Qty: 90 TABLET | Refills: 0 | Status: SHIPPED | OUTPATIENT
Start: 2018-09-19 | End: 2018-12-14

## 2018-09-19 NOTE — TELEPHONE ENCOUNTER
"Requested Prescriptions   Pending Prescriptions Disp Refills     buPROPion (WELLBUTRIN SR) 200 MG 12 hr tablet [Pharmacy Med Name: BuPROPion HCl ER (SR) Oral Tablet Extended Release 12 Hour 200 MG] 90 tablet 0     Sig: TAKE ONE TABLET BY MOUTH ONE TIME DAILY    [facility] Protocol Passed    9/18/2018  2:29 PM       Passed - Recent (12 mo) or future (30 days) visit within the authorizing provider's specialty    Patient had office visit in the last 12 months or has a visit in the next 30 days with authorizing provider or within the authorizing provider's specialty.  See \"Patient Info\" tab in inbasket, or \"Choose Columns\" in Meds & Orders section of the refill encounter.           Passed - Medication is Bupropion    If the medication is Bupropion (Wellbutrin), and the patient is taking for smoking cessation; OK to refill.         Passed - Patient is age 18 or older       Passed - No active pregnancy on record       Passed - No positive pregnancy test in last 12 months        Last OV 05/29/2018  Last filled 06/22/2018    Prescription approved per [facility] Refill Protocol.  Dedrick Noe, RN, BSN          "

## 2018-09-25 ENCOUNTER — TELEPHONE (OUTPATIENT)
Dept: FAMILY MEDICINE | Facility: CLINIC | Age: 58
End: 2018-09-25

## 2018-09-25 NOTE — TELEPHONE ENCOUNTER
Panel Management Review      Patient has the following on her problem list:     Depression / Dysthymia review    Measure:  Needs PHQ-9 score of 4 or less during index window.  Administer PHQ-9 and if score is 5 or more, send encounter to provider for next steps.    5 - 7 month window range: 9    PHQ-9 SCORE 8/23/2017 12/8/2017 5/29/2018   Total Score - - -   Total Score MyChart 17 (Moderately severe depression) 6 (Mild depression) -   Total Score - - -   Total Score 17 6 9       If PHQ-9 recheck is 5 or more, route to provider for next steps.    Patient is due for:  None    Hypertension   Last three blood pressure readings:  BP Readings from Last 3 Encounters:   07/27/18 132/80   07/11/18 124/87   05/29/18 108/70     Blood pressure: Passed    HTN Guidelines:  Age 18-59 BP range:  Less than 140/90  Age 60-85 with Diabetes:  Less than 140/90  Age 60-85 without Diabetes:  less than 150/90      Composite cancer screening  Chart review shows that this patient is due/due soon for the following Mammogram  Summary:    Patient is due/failing the following:   Iron, TSH, CBC, CMP and MAMMOGRAM    Action needed:   Patient needs non-fasting lab only appointment    Type of outreach:    Phone, left message for patient to call back.     Questions for provider review:    None                                                                                                                                    April DANA Campbell ([facility])       Chart routed to Care Team .

## 2018-09-27 ENCOUNTER — MYC MEDICAL ADVICE (OUTPATIENT)
Dept: FAMILY MEDICINE | Facility: CLINIC | Age: 58
End: 2018-09-27

## 2018-09-27 DIAGNOSIS — M47.817 LUMBOSACRAL SPONDYLOSIS WITHOUT MYELOPATHY: ICD-10-CM

## 2018-09-27 DIAGNOSIS — M70.62 GREATER TROCHANTERIC BURSITIS OF BOTH HIPS: ICD-10-CM

## 2018-09-27 DIAGNOSIS — M79.18 MYOFASCIAL PAIN: ICD-10-CM

## 2018-09-27 DIAGNOSIS — M53.3 SI (SACROILIAC) JOINT DYSFUNCTION: ICD-10-CM

## 2018-09-27 DIAGNOSIS — M70.61 GREATER TROCHANTERIC BURSITIS OF BOTH HIPS: ICD-10-CM

## 2018-09-27 DIAGNOSIS — M51.369 DDD (DEGENERATIVE DISC DISEASE), LUMBAR: ICD-10-CM

## 2018-09-27 NOTE — TELEPHONE ENCOUNTER
HYDROcodone-acetaminophen (NORCO) 5-325 MG per tablet      Last Written Prescription Date:  7/30/2018  Last Fill Quantity: 100,   # refills: 0  Last Office Visit: 5/29/2018  Future Office visit:       Routing refill request to provider for review/approval because:  Drug not on the [facility], [facility] or [facility] refill protocol or controlled substance

## 2018-09-28 DIAGNOSIS — G25.9 MOVEMENT DISORDER: ICD-10-CM

## 2018-09-28 RX ORDER — HYDROCODONE BITARTRATE AND ACETAMINOPHEN 5; 325 MG/1; MG/1
TABLET ORAL
Qty: 100 TABLET | Refills: 0 | Status: SHIPPED | OUTPATIENT
Start: 2018-09-28 | End: 2019-07-19

## 2018-10-01 RX ORDER — ROPINIROLE 1 MG/1
TABLET, FILM COATED ORAL
Qty: 270 TABLET | Refills: 1 | Status: SHIPPED | OUTPATIENT
Start: 2018-10-01 | End: 2018-12-19

## 2018-10-01 NOTE — TELEPHONE ENCOUNTER
Requip:  Prescription approved per [facility] Refill Protocol.    Renetta Massey, RN, BSN

## 2018-11-05 ENCOUNTER — MYC MEDICAL ADVICE (OUTPATIENT)
Dept: PALLIATIVE MEDICINE | Facility: CLINIC | Age: 58
End: 2018-11-05

## 2018-11-05 NOTE — TELEPHONE ENCOUNTER
Pt has self-discharged X2.  See notes.  This was her decision.    Pt has stated several times during her time w/ the pain clinic that she was unhappy with the staff and the way the clinic was run. She also threatened to leave clinic multiple times before she self-discharged X2.  She has been upset w/ mychart and that most pain clinic staff has access to it. Has had to speak with supervisor multiple times.      Please read all notes before making a decision.    Routed to Laisha Aranda and JESSICA Hicks CNP.    Denisse Maldonado, MICHELLE-BSN  [facility]

## 2018-11-05 NOTE — TELEPHONE ENCOUNTER
Please see encounter dated 3/23/2018 as well as 4/3/2018.   There have been multiple incidents like this. Ms. Amador typically only wants ME to see her chart or myChart messages and that is simply not how this clinic (or any clinic) works. If she is really only looking for someone to manage her medical cannabis certification, that is not what we do and there should be a provider within her primary care office who can handle this issue.   She has self-discharged from the clinic twice, unhappy with me and our staff.   I don't know that a third round will be any smoother.     Thanks.  Liseth LOPEZ, RN CNP, FNP  [facility]

## 2018-11-05 NOTE — TELEPHONE ENCOUNTER
Will ask that Liseth Caldera NP please speak with PCP about medical marijuana certification.  As a medical dyad lead, she has been present for these conversations about certification options at a local level.      Given previous discharges, I do think having PCP look into certification options locally would be best.    Renetta Richardson MD  [facility]

## 2018-11-13 ENCOUNTER — MYC MEDICAL ADVICE (OUTPATIENT)
Dept: FAMILY MEDICINE | Facility: CLINIC | Age: 58
End: 2018-11-13

## 2018-11-14 NOTE — TELEPHONE ENCOUNTER
I spoke to Dr. Peterson last week, Dr. Peterson does not certify for medical cannabis and there is not anyone at that clinic site that does.  If the patient is only looking for someone to certify her for medical cannabis, I can see her for that as I am the one who initially certified her to use medical cannabis. I do not plan on seeing her to manage any opiates as she has essentially been through our program and we don't do long term opiate-only management.    Liseth LOPEZ, RN CNP, FNP  [facility]

## 2018-11-21 ENCOUNTER — MYC MEDICAL ADVICE (OUTPATIENT)
Dept: FAMILY MEDICINE | Facility: CLINIC | Age: 58
End: 2018-11-21

## 2018-11-21 DIAGNOSIS — L70.9 ACNE, UNSPECIFIED ACNE TYPE: Primary | ICD-10-CM

## 2018-11-21 RX ORDER — CLINDAMYCIN PHOSPHATE 10 MG/G
GEL TOPICAL 2 TIMES DAILY
Qty: 30 G | Refills: 1 | Status: SHIPPED | OUTPATIENT
Start: 2018-11-21 | End: 2019-07-19

## 2018-11-24 DIAGNOSIS — R73.01 IMPAIRED FASTING GLUCOSE: ICD-10-CM

## 2018-11-26 NOTE — TELEPHONE ENCOUNTER
"Requested Prescriptions   Pending Prescriptions Disp Refills     metFORMIN (GLUCOPHAGE) 500 MG tablet [Pharmacy Med Name: MetFORMIN HCl Oral Tablet 500 MG] 90 tablet 0     Sig: Take 1 tablet (500 mg) by mouth daily (with dinner)    Biguanide Agents Failed    11/24/2018 11:20 AM       Failed - Patient has had a Microalbumin in the past 15 mos.    No lab results found.         Failed - Patient has documented A1c within the specified period of time.    If HgbA1C is 8 or greater, it needs to be on file within the past 3 months.  If less than 8, must be on file within the past 6 months.     Recent Labs   Lab Test  05/08/18   1400   A1C  5.8*          Passed - Blood pressure less than 140/90 in past 6 months    BP Readings from Last 3 Encounters:   07/27/18 132/80   07/11/18 124/87   05/29/18 108/70          Passed - Patient has documented LDL within the past 12 mos.    Recent Labs   Lab Test  05/08/18   1136   LDL  73          Passed - Patient is age 10 or older       Passed - Patient's CR is NOT>1.4 OR Patient's EGFR is NOT<45 within past 12 mos.    Recent Labs   Lab Test  05/08/18   1136   GFRESTIMATED  58*   GFRESTBLACK  70     Recent Labs   Lab Test  05/08/18   1136   CR  0.98          Passed - Patient does NOT have a diagnosis of CHF.       Passed - Patient is not pregnant       Passed - Patient has not had a positive pregnancy test within the past 12 mos.        Passed - Recent (6 mo) or future (30 days) visit within the authorizing provider's specialty    Patient had office visit in the last 6 months or has a visit in the next 30 days with authorizing provider or within the authorizing provider's specialty.  See \"Patient Info\" tab in inbasket, or \"Choose Columns\" in Meds & Orders section of the refill encounter.          metFORMIN (GLUCOPHAGE) 500 MG tablet  Medication is being filled for 1 time refill only due to:  Patient needs to be seen because due for 6 month OV with labs .     Please assist with " scheduling.    Eva Pace RN, BSN

## 2018-11-30 ENCOUNTER — TELEPHONE (OUTPATIENT)
Dept: FAMILY MEDICINE | Facility: CLINIC | Age: 58
End: 2018-11-30

## 2018-11-30 NOTE — TELEPHONE ENCOUNTER
Received an appointment request from pt to see SF the week of 12/10-12/14.  Provider does not have appts available.    Provider, are you willing/able to work in pt that week?  :    Appointment Request From: Jacquie Amador           With Provider: ARVIN MARIN MD, MD [-Primary Care Physician-]          Preferred Date Range: From 12/10/2018 To 12/14/2018          Preferred Times: Any          Reason for visit: Request an Appointment          Comments:     Need to go over a few medications

## 2018-12-03 NOTE — TELEPHONE ENCOUNTER
Scheduled for 5:40 on 05/11. Sent mychart to patient to see if this appointment works for her.     Srikanth Sky,

## 2018-12-04 DIAGNOSIS — R53.83 OTHER FATIGUE: ICD-10-CM

## 2018-12-04 DIAGNOSIS — R73.01 IMPAIRED FASTING GLUCOSE: ICD-10-CM

## 2018-12-04 DIAGNOSIS — Z12.11 SPECIAL SCREENING FOR MALIGNANT NEOPLASMS, COLON: Primary | ICD-10-CM

## 2018-12-04 LAB
ALBUMIN SERPL-MCNC: 3.8 G/DL (ref 3.4–5)
ALP SERPL-CCNC: 134 U/L (ref 40–150)
ALT SERPL W P-5'-P-CCNC: 26 U/L (ref 0–50)
ANION GAP SERPL CALCULATED.3IONS-SCNC: 6 MMOL/L (ref 3–14)
AST SERPL W P-5'-P-CCNC: 21 U/L (ref 0–45)
BASOPHILS # BLD AUTO: 0.1 10E9/L (ref 0–0.2)
BASOPHILS NFR BLD AUTO: 0.8 %
BILIRUB SERPL-MCNC: 0.3 MG/DL (ref 0.2–1.3)
BUN SERPL-MCNC: 11 MG/DL (ref 7–30)
CALCIUM SERPL-MCNC: 8.6 MG/DL (ref 8.5–10.1)
CHLORIDE SERPL-SCNC: 107 MMOL/L (ref 94–109)
CO2 SERPL-SCNC: 27 MMOL/L (ref 20–32)
CREAT SERPL-MCNC: 0.82 MG/DL (ref 0.52–1.04)
DIFFERENTIAL METHOD BLD: NORMAL
EOSINOPHIL # BLD AUTO: 0.3 10E9/L (ref 0–0.7)
EOSINOPHIL NFR BLD AUTO: 5.1 %
ERYTHROCYTE [DISTWIDTH] IN BLOOD BY AUTOMATED COUNT: 14.6 % (ref 10–15)
FERRITIN SERPL-MCNC: 8 NG/ML (ref 8–252)
GFR SERPL CREATININE-BSD FRML MDRD: 72 ML/MIN/1.7M2
GLUCOSE SERPL-MCNC: 76 MG/DL (ref 70–99)
HCT VFR BLD AUTO: 37 % (ref 35–47)
HGB BLD-MCNC: 12 G/DL (ref 11.7–15.7)
LYMPHOCYTES # BLD AUTO: 2.3 10E9/L (ref 0.8–5.3)
LYMPHOCYTES NFR BLD AUTO: 35.9 %
MCH RBC QN AUTO: 28.3 PG (ref 26.5–33)
MCHC RBC AUTO-ENTMCNC: 32.4 G/DL (ref 31.5–36.5)
MCV RBC AUTO: 87 FL (ref 78–100)
MONOCYTES # BLD AUTO: 0.5 10E9/L (ref 0–1.3)
MONOCYTES NFR BLD AUTO: 7.5 %
NEUTROPHILS # BLD AUTO: 3.3 10E9/L (ref 1.6–8.3)
NEUTROPHILS NFR BLD AUTO: 50.7 %
PLATELET # BLD AUTO: 390 10E9/L (ref 150–450)
POTASSIUM SERPL-SCNC: 3.8 MMOL/L (ref 3.4–5.3)
PROT SERPL-MCNC: 7.7 G/DL (ref 6.8–8.8)
RBC # BLD AUTO: 4.24 10E12/L (ref 3.8–5.2)
SODIUM SERPL-SCNC: 140 MMOL/L (ref 133–144)
TSH SERPL DL<=0.005 MIU/L-ACNC: 1.9 MU/L (ref 0.4–4)
WBC # BLD AUTO: 6.5 10E9/L (ref 4–11)

## 2018-12-04 PROCEDURE — 80053 COMPREHEN METABOLIC PANEL: CPT | Performed by: FAMILY MEDICINE

## 2018-12-04 PROCEDURE — 36415 COLL VENOUS BLD VENIPUNCTURE: CPT | Performed by: FAMILY MEDICINE

## 2018-12-04 PROCEDURE — 85025 COMPLETE CBC W/AUTO DIFF WBC: CPT | Performed by: FAMILY MEDICINE

## 2018-12-04 PROCEDURE — 84443 ASSAY THYROID STIM HORMONE: CPT | Performed by: FAMILY MEDICINE

## 2018-12-04 PROCEDURE — 82043 UR ALBUMIN QUANTITATIVE: CPT | Performed by: FAMILY MEDICINE

## 2018-12-04 PROCEDURE — 82728 ASSAY OF FERRITIN: CPT | Performed by: FAMILY MEDICINE

## 2018-12-04 PROCEDURE — 82306 VITAMIN D 25 HYDROXY: CPT | Performed by: FAMILY MEDICINE

## 2018-12-05 LAB
CREAT UR-MCNC: 97 MG/DL
DEPRECATED CALCIDIOL+CALCIFEROL SERPL-MC: 34 UG/L (ref 20–75)
MICROALBUMIN UR-MCNC: 6 MG/L
MICROALBUMIN/CREAT UR: 6.61 MG/G CR (ref 0–25)

## 2018-12-07 ENCOUNTER — MYC MEDICAL ADVICE (OUTPATIENT)
Dept: ORTHOPEDICS | Facility: CLINIC | Age: 58
End: 2018-12-07

## 2018-12-10 ENCOUNTER — MYC MEDICAL ADVICE (OUTPATIENT)
Dept: FAMILY MEDICINE | Facility: CLINIC | Age: 58
End: 2018-12-10

## 2018-12-10 NOTE — TELEPHONE ENCOUNTER
Patient wanting to reschedule for the week of the 18th. No openings. SF ok to use same day appt on 12/19 at 1:40?

## 2018-12-14 DIAGNOSIS — F41.1 GENERALIZED ANXIETY DISORDER: ICD-10-CM

## 2018-12-14 RX ORDER — BUPROPION HYDROCHLORIDE 200 MG/1
TABLET, EXTENDED RELEASE ORAL
Qty: 90 TABLET | Refills: 0 | Status: SHIPPED | OUTPATIENT
Start: 2018-12-14 | End: 2018-12-19

## 2018-12-14 NOTE — TELEPHONE ENCOUNTER
"Requested Prescriptions   Pending Prescriptions Disp Refills     buPROPion (WELLBUTRIN SR) 200 MG 12 hr tablet [Pharmacy Med Name: BuPROPion HCl ER (SR) Oral Tablet Extended Release 12 Hour 200 MG] 90 tablet 0     Sig: TAKE ONE TABLET BY MOUTH ONE TIME DAILY    [facility] Protocol Passed - 12/14/2018  3:54 PM       Passed - Recent (12 mo) or future (30 days) visit within the authorizing provider's specialty    Patient had office visit in the last 12 months or has a visit in the next 30 days with authorizing provider or within the authorizing provider's specialty.  See \"Patient Info\" tab in inbasket, or \"Choose Columns\" in Meds & Orders section of the refill encounter.           Passed - Medication is Bupropion    If the medication is Bupropion (Wellbutrin), and the patient is taking for smoking cessation; OK to refill.       Passed - Patient is age 18 or older       Passed - No active pregnancy on record       Passed - No positive pregnancy test in last 12 months        buPROPion (WELLBUTRIN SR) 150 MG 12 hr tablet  Routing refill request to provider for review/approval because:  SF to review and advise. RN unable to tell when dose changes occurred (from 200mg to 150mg) last refill was sent by provider for 150mg tablets    Also unable to determine when due for next OV  Eva Pace, RN, BSN           "

## 2018-12-14 NOTE — PROGRESS NOTES
"  SUBJECTIVE:   Jacquie Amador is a 58 year old female who presents to clinic today for the following health issues:    History of Present Illness     Diet:  Regular (no restrictions)  Frequency of exercise:  None  Taking medications regularly:  Yes  Additional concerns today:  No    Hyperlipidemia Follow-Up      Rate your low fat/cholesterol diet?: good    Taking statin?  Yes, no muscle aches from statin    Other lipid medications/supplements?:  none    Hypertension Follow-up      Outpatient blood pressures are being checked at home.  Results are \"good\".    Low Salt Diet: no added salt    Asthma Follow-Up    Was ACT completed today?  No      Respiratory symptoms:   Cough: Yes   Wheezing: Yes   Shortness of breath: Yes    Use of short- acting(rescue) inhaler: Yes    Taking controlled (daily) meds as prescribed: Yes    ER/UC visits or hospital admissions since last visit: none     Recent asthma triggers that patient is dealing with: None     Chronic Pain Follow-Up       Type / Location of Pain: all over  Analgesia/pain control:       Recent changes:  same      Overall control: Tolerable with discomfort, still needs to get injections  Activity level/function:      Daily activities:  Able to do daily activities, just slower     Work:  not applicable  Adverse effects:  No  Adherance    Taking medication as directed?  Yes    Participating in other treatments: no   Risk Factors:    Sleep:  Good    Mood/anxiety:  controlled    Recent family or social stressors:  death in family:  mother    Other aggravating factors: Moving  PHQ-9 SCORE 8/23/2017 12/8/2017 5/29/2018   PHQ-9 Total Score - - -   PHQ-9 Total Score MyChart 17 (Moderately severe depression) 6 (Mild depression) -   PHQ-9 Total Score - - -   PHQ-9 Total Score 17 6 9     MARIZOL-7 SCORE 8/23/2017 12/8/2017 5/29/2018   Total Score - - -   Total Score 14 (moderate anxiety) 6 (mild anxiety) -   Total Score 14 6 10     Encounter-Level CSA - 11/21/2017:    Controlled " Substance Agreement - Scan on 12/8/2017  6:40 AM: CONTROLLED SUBSTANCE AGREEMENT - 11/21/2017 (below)       Encounter-Level CSA - 09/06/2016:    Controlled Substance Agreement - Scan on 9/29/2016  3:40 PM: CONTROLLED SUBSTANCE AGREEMENT (below)       Patient-Level CSA:    There are no patient-level csa.        Facial lesion  The patient notes that her facial lesion is healing now. She states that there was a lot of dead skin on her facial lesion, but it is now cleared up. She states that it used to hurt, but now it is fine. She is wondering if Prednisone will help her lesion heal.     Cold  The patient states that she had a cough with a lot of mucus and draining. She states that there was so much drainage, that she threw up at night. The patient states that she had wheezing as well.   She states that her cough is gone, but she still has mucus in her mouth and will cough it back up. She notes that at times, her chest is still a little heavy.     Blood sugars  The patient states that she is monitoring her sugars closely. She states that she is down 20 pounds in weight.   The patient is wondering if she should increase her dosage of medication for her blood sugars.    Restless leg syndrome   The patient notes that she has quit cold turkey on Vicodin. She states that her withdrawal symptoms were really bad. The patient notes that since she went off the Vicodin, her restless legs have gotten worse. She states that she is taking her Requip to help with this. The patient also notes that her medical marijuana will be helping her restless legs.   She notes that she has increased her use of medical marijuana.     Mood  She notes that she has been really forgetful recently.   The patient states that her mood has gotten better.     Bump on neck  The patient notes that she felt a bump on her neck, but it disappeared. She states that she felt it when she was sick.     Constipation   The patient notes that she is really  constipated. She states that she is taking Miralax to help relieve her symptoms.     Problem list and histories reviewed & adjusted, as indicated.  Additional history: as documented    Patient Active Problem List   Diagnosis     Moderate persistent asthma     Allergic rhinitis due to other allergen     Esophageal reflux     Moderate recurrent major depression (H)     Multiple joint pain     HYPERLIPIDEMIA LDL GOAL <130     Hypertension goal BP (blood pressure) < 140/90     MARIZOL (generalized anxiety disorder)     Myalgia     Chronic rhinitis     Constipation     Lumbar disc disease with radiculopathy     Iron deficiency anemia     Osteoarthritis of carpometacarpal joint of thumb - bilateral     Trochanteric bursitis     Right ankle instability     Primary focal hyperhidrosis     Trochanteric bursitis of both hips     Overweight     History of iron deficiency     Scratching     Advanced directives, counseling/discussion     Chronic pain syndrome     Medical marijuana use     Primary osteoarthritis of both first carpometacarpal joints     Arthritis of metatarsophalangeal joint     Sinus tachycardia     Intractable chronic migraine without aura and without status migrainosus     Impaired fasting glucose     Past Surgical History:   Procedure Laterality Date     C  DELIVERY ONLY      , Low Cervical     INJECT EPIDURAL TRANSFORAMINAL  2014    [facility]     INJECT JOINT SACROILIAC  2014    [facility]     LAMINECTOMY LUMBAR ONE LEVEL Left 2014    [facility]       Social History     Tobacco Use     Smoking status: Former Smoker     Packs/day: 1.00     Types: Cigarettes     Smokeless tobacco: Never Used     Tobacco comment: since    Substance Use Topics     Alcohol use: No     Family History   Problem Relation Age of Onset     Thyroid Disease Mother      Arthritis Mother      Colon Cancer Mother      Thyroid Disease Sister      Arthritis  Sister      Lipids Sister      Hypertension Father      Diabetes Father      C.A.D. Father         MI age 75     Cardiovascular Father         heart attack     Cerebrovascular Disease Father      Cancer Father         renal cancer     Arthritis Father      Heart Disease Father         heart attack     Gastrointestinal Disease Father         liver     Genitourinary Problems Father         kidney     Asthma Daughter      Gastrointestinal Disease Daughter      Unknown Other      Breast Cancer No family hx of      Cancer - colorectal No family hx of      Alzheimer Disease No family hx of      Blood Disease No family hx of      Circulatory No family hx of      Eye Disorder No family hx of      Musculoskeletal Disorder No family hx of      Neurologic Disorder No family hx of      Respiratory No family hx of          Current Outpatient Medications   Medication Sig Dispense Refill     adapalene (DIFFERIN) 0.1 % gel APPLY A THIN LAYER TO THE AFFECTED AREA(S) BY TOPICAL ROUTE ONCE DAILY BEFORE BEDTIME 45 g 10     albuterol (2.5 MG/3ML) 0.083% neb solution Take 1 vial (2.5 mg) by nebulization every 6 hours as needed 1 Box 1     albuterol (VENTOLIN HFA) 108 (90 Base) MCG/ACT Inhaler INHALE 2 PUFFS INTO THE LUNGS EVERY 6 HOURS AS NEEDED FOR SHORTNESS OF BREATH / DYSPNEA 54 g 1     ALPRAZolam (XANAX) 0.25 MG tablet TAKE 1 TABLET BY MOUTH DAILY AS NEEDED FOR ANXIETY 60 tablet 0     atorvastatin (LIPITOR) 20 MG tablet TAKE 1 TABLET BY MOUTH DAILY 90 tablet 1     buPROPion (WELLBUTRIN SR) 150 MG 12 hr tablet TAKE ONE TABLET BY MOUTH ONE TIME DAILY IN THE EVENING 90 tablet 0     buPROPion (WELLBUTRIN SR) 200 MG 12 hr tablet TAKE ONE TABLET BY MOUTH ONE TIME DAILY  90 tablet 0     celecoxib (CELEBREX) 200 MG capsule Take 1 capsule twice daily as needed for pain this is a 3 month script 180 capsule 1     clindamycin (CLEOCIN T) 1 % lotion Apply topically 2 times daily 60 mL 1     clindamycin (CLINDAMAX) 1 % topical gel Apply topically  2 times daily 30 g 1     clobetasol (TEMOVATE) 0.05 % cream APPLY SPARINGLY TO AFFECTED AREA TWICE DAILY FOR 14 DAYS.  DO NOT APPLY TO FACE. 30 g 3     diclofenac (VOLTAREN) 1 % GEL topical gel Apply 2 grams to hands and 4 grams to hips up to 4 times daily. T 9 Tube 11     DULoxetine (CYMBALTA) 60 MG EC capsule TAKE 2 CAPSULES (120 MG) BY MOUTH DAILY 180 capsule 2     fluticasone-salmeterol (ADVAIR DISKUS) 500-50 MCG/DOSE diskus inhaler INHALE 1 PUFF BY INHALATION ROUTE 2 TIMES PER DAY IN THE MORNING AND EVENING APPROXIMATELY 12 HOURS APART 3 Inhaler 3     hydrocortisone 2.5 % cream APPLY TOPICALLY 2 TIMES DAILY AS NEEDED 30 g 3     hydrOXYzine (ATARAX) 25 MG tablet Take 1 tablet (25 mg) by mouth every 8 hours as needed for anxiety 40 tablet 1     losartan-hydrochlorothiazide (HYZAAR) 100-12.5 MG per tablet TAKE 1 TABLET BY MOUTH DAILY 90 tablet 2     medical cannabis (Patient's own supply.  Not a prescription) 2 mg morning and noon. 7 mg at bedtime. (This is NOT a prescription, and does not certify that the patient has a qualifying medical condition for medical cannabis.  The purpose of this order is  to document that the patient reports taking medical cannabis.) 0 Information only 0     metFORMIN (GLUCOPHAGE) 500 MG tablet Take 1 tablet (500 mg) by mouth daily (with dinner) 30 tablet 0     metoprolol succinate (TOPROL-XL) 25 MG 24 hr tablet Take 0.5 tablets per day. May add another 1/2 tab in the evening as needed 90 tablet 0     montelukast (SINGULAIR) 10 MG tablet TAKE ONE TABLET BY MOUTH IN THE EVENING  90 tablet 1     multivitamin w/minerals (MULTI-VITAMIN) tablet Take 1 tablet by mouth daily       mupirocin (BACTROBAN) 2 % cream Apply to affected area three times daily 60 g 0     nystatin (MYCOSTATIN) 062241 UNIT/ML suspension Take by mouth 4 times daily 380 mL 1     nystatin (MYCOSTATIN) cream Apply to affected area three times daily 60 g 1     RANITIDINE  MG OR TABS ONE TABLET TWICE DAILY 0 0      rizatriptan (MAXALT) 10 MG tablet Take 1 tablet (10 mg) by mouth at onset of headache for migraine May repeat in 2 hours. Max 3 tablets/24 hours. 18 tablet 3     rOPINIRole (REQUIP) 1 MG tablet TAKE 2-3 TABLETS (2-3 MG) BY MOUTH AT BEDTIME 270 tablet 1     SUMAtriptan (IMITREX) 100 MG tablet take 1 tablet at onset of headache may repeat in 2 hours max 2 tabs/day 18 tablet 1     traZODone (DESYREL) 50 MG tablet TAKE 2-3 TABLETS BY MOUTH AT BEDTIME 270 tablet 2     tretinoin (RETIN-A) 0.025 % cream Spread a pea size amount into affected area topically at bedtime.  Use sunscreen SPF>20. 45 g 3     ZYRTEC 10 MG OR TABS 1 TABLET DAILY 90 3     atorvastatin (LIPITOR) 20 MG tablet   3     doxycycline (VIBRA-TABS) 100 MG tablet Take 1 tablet (100 mg) by mouth 2 times daily (Patient not taking: Reported on 12/19/2018) 20 tablet 0     HYDROcodone-acetaminophen (NORCO) 5-325 MG per tablet May take 1 tablet every 4-6 hours as needed for pain. Max of 4 tabs per day. (Patient not taking: Reported on 12/19/2018) 100 tablet 0     Lactobacillus (ACIDOPHILUS) CAPS Take 1 capsule by mouth daily Take two hours before or after antibiotic dose.  Continue for 1 week after antibiotic therapy completed (Patient not taking: Reported on 5/29/2018) 60 capsule 0     Allergies   Allergen Reactions     Cats      and rabbits/wheezing     Dogs      wheezing     Lyrica [Pregabalin] Other (See Comments)     Rash, mouth sores, itching and burning     Seasonal Allergies      rhinits     Recent Labs   Lab Test 12/04/18  1157 05/08/18  1400 05/08/18  1136  06/30/17  1720  02/24/17  1430 03/15/16  1516   A1C  --  5.8*  --   --   --   --   --   --    LDL  --   --  73  --   --   --  100* 78   HDL  --   --  56  --   --   --  70 63   TRIG  --   --  162*  --   --   --  103 188*   ALT 26  --  26  --  25   < >  --   --    CR 0.82  --  0.98  --  0.84   < > 0.80  --    GFRESTIMATED 72  --  58*  --  69   < > 75  --    GFRESTBLACK 87  --  70  --  84   < >  >90   GFR Calc    --    POTASSIUM 3.8  --  3.6  --  4.0   < > 3.3*  --    TSH 1.90  --  1.13   < > 1.57  --   --  1.72    < > = values in this interval not displayed.      BP Readings from Last 3 Encounters:   12/19/18 114/80   07/27/18 132/80   07/11/18 124/87    Wt Readings from Last 3 Encounters:   12/19/18 89.1 kg (196 lb 6.4 oz)   07/27/18 92.1 kg (203 lb)   07/11/18 91.2 kg (201 lb)        ROS:  CONSTITUTIONAL: NEGATIVE for fever, chills; POSITIVE for weight loss  INTEGUMENTARY/SKIN: NEGATIVE for worrisome rashes or moles; POSITIVE for facial lesion  ENT/MOUTH: NEGATIVE for ear, mouth and throat problems; POSITIVE for nasal drainage   RESP: NEGATIVE for SOB; POSITIVE for cough and wheezing  CV: NEGATIVE for chest pain, palpitations or peripheral edema; POSITIVE for heavy chest  MUSCULOSKELETAL: NEGATIVE for significant arthralgias or myalgia; POSITIVE for bump on neck  NEURO: NEGATIVE for weakness, dizziness or paresthesias; POSITIVE for restless legs  GI: POSITIVE for constipation     This document serves as a record of the services and decisions personally performed and made by Liz Peterson MD. It was created on her behalf by Josie Franz, a trained medical scribe. The creation of this document is based the provider's statements to the medical scribe.    Josie Franz December 19, 2018 2:01 PM  OBJECTIVE:     /80   Pulse 98   Temp 97.2  F (36.2  C) (Temporal)   Resp 18   Wt 89.1 kg (196 lb 6.4 oz)   LMP 07/17/2009   SpO2 99%   BMI 31.70 kg/m    Body mass index is 31.7 kg/m .  GENERAL: healthy, alert and no distress  HENT: ear canals and TM's normal, nose and mouth without ulcers or lesions  NECK: no adenopathy, no asymmetry, masses, or scars and thyroid normal to palpation  RESP: lungs clear to auscultation - no rales, rhonchi or wheezes  CV: regular rate and rhythm, normal S1 S2, no S3 or S4, no murmur, click or rub, no peripheral edema and peripheral pulses strong  MS:  no gross musculoskeletal defects noted, no edema  SKIN: no suspicious lesions or rashes, 4 cm by 5 cm ulceration with base clean, no active bleeding, discharge or surrounding erythema noted  LYMPH: no cervical, supraclavicular, axillary, or inguinal adenopathy    Diagnostic Test Results:  No results found. However, due to the size of the patient record, not all encounters were searched. Please check Results Review for a complete set of results.    ASSESSMENT/PLAN:   1. Chronic pain syndrome  Doing well. Well controlled. Tolerating medication.  No change in plan.   Patient reports discontinuing use of Vicodin.   She states that she is using medical marijuana to help her symptoms.     2. Moderate recurrent major depression (H)  Doing well. Well controlled. Tolerating medication.  No change in plan.   Refills have been ordered.     3. Skin ulcer, limited to breakdown of skin (H)  Patient has skin ulcer that has not healed since May.   She reports some healing.  Exam showed 4 cm by 5 cm ulceration with base clean.  No active bleeding, discharge or surrounding erythema noted.   Discussed plan with patient.  Patient agreed.  Referral for plastic surgery was given.  Patient will schedule appointment.   - PLASTIC SURGERY REFERRAL    4. Movement disorder  Patient reports that after discontinuing Vicodin, restless leg syndrome has gotten worse.  She states Requip helps symptoms.  Patient states that she is using medical marijuana to help with symptoms.   Doing well. Well controlled. Tolerating medication.  No change in plan.   Refills have been ordered.   - rOPINIRole (REQUIP) 1 MG tablet; TAKE 2-3 TABLETS (2-3 MG) BY MOUTH AT BEDTIME  Dispense: 270 tablet; Refill: 1    5. Generalized anxiety disorder  Doing well. Well controlled. Tolerating medication.  No change in plan.   Refills have been ordered.   - buPROPion (WELLBUTRIN SR) 150 MG 12 hr tablet; TAKE ONE TABLET BY MOUTH ONE TIME DAILY IN THE EVENING  Dispense: 90  tablet; Refill: 0  - buPROPion (WELLBUTRIN SR) 200 MG 12 hr tablet; Take 1 tablet (200 mg) by mouth daily  Dispense: 90 tablet; Refill: 0    6. Migraine without aura and without status migrainosus, not intractable  Doing well. Well controlled. Tolerating medication.  No change in plan.   Refills have been ordered.   - SUMAtriptan (IMITREX) 100 MG tablet; take 1 tablet at onset of headache may repeat in 2 hours max 2 tabs/day  Dispense: 18 tablet; Refill: 1    7. Medical marijuana use  Patient continues with this and is certified elsewhere.     8. Visit for screening mammogram  Patient is due for mammogram screening.  Order for mammogram is placed.  Patient will schedule appointment.   - MA SCREENING DIGITAL BILAT - Future  (s+30); Future    9. Constipation  Discussed increased hydration.   She will try Miralax daily.   Recheck if fails to improve or if worsening in any way.       Follow up- Come back in 6 months for recheck.     The information in this document, created by the medical scribe for me, accurately reflects the services I personally performed and the decisions made by me. I have reviewed and approved this document for accuracy prior to leaving the patient care area.    ARVIN MARIN MD, MD  [facility]

## 2018-12-19 ENCOUNTER — OFFICE VISIT (OUTPATIENT)
Dept: FAMILY MEDICINE | Facility: CLINIC | Age: 58
End: 2018-12-19
Payer: COMMERCIAL

## 2018-12-19 VITALS
OXYGEN SATURATION: 99 % | DIASTOLIC BLOOD PRESSURE: 80 MMHG | SYSTOLIC BLOOD PRESSURE: 114 MMHG | WEIGHT: 196.4 LBS | BODY MASS INDEX: 31.7 KG/M2 | TEMPERATURE: 97.2 F | HEART RATE: 98 BPM | RESPIRATION RATE: 18 BRPM

## 2018-12-19 DIAGNOSIS — G43.009 MIGRAINE WITHOUT AURA AND WITHOUT STATUS MIGRAINOSUS, NOT INTRACTABLE: ICD-10-CM

## 2018-12-19 DIAGNOSIS — G89.4 CHRONIC PAIN SYNDROME: Primary | ICD-10-CM

## 2018-12-19 DIAGNOSIS — F33.1 MODERATE RECURRENT MAJOR DEPRESSION (H): ICD-10-CM

## 2018-12-19 DIAGNOSIS — F41.1 GENERALIZED ANXIETY DISORDER: ICD-10-CM

## 2018-12-19 DIAGNOSIS — L98.491 SKIN ULCER, LIMITED TO BREAKDOWN OF SKIN (H): ICD-10-CM

## 2018-12-19 DIAGNOSIS — Z79.899 MEDICAL MARIJUANA USE: ICD-10-CM

## 2018-12-19 DIAGNOSIS — K59.00 CONSTIPATION, UNSPECIFIED CONSTIPATION TYPE: ICD-10-CM

## 2018-12-19 DIAGNOSIS — Z12.31 VISIT FOR SCREENING MAMMOGRAM: ICD-10-CM

## 2018-12-19 DIAGNOSIS — G25.9 MOVEMENT DISORDER: ICD-10-CM

## 2018-12-19 PROCEDURE — 99214 OFFICE O/P EST MOD 30 MIN: CPT | Performed by: FAMILY MEDICINE

## 2018-12-19 RX ORDER — BUPROPION HYDROCHLORIDE 200 MG/1
200 TABLET, EXTENDED RELEASE ORAL DAILY
Qty: 90 TABLET | Refills: 0 | Status: SHIPPED | OUTPATIENT
Start: 2018-12-19 | End: 2019-06-03

## 2018-12-19 RX ORDER — BUPROPION HYDROCHLORIDE 150 MG/1
TABLET, EXTENDED RELEASE ORAL
Qty: 90 TABLET | Refills: 0 | Status: SHIPPED | OUTPATIENT
Start: 2018-12-19 | End: 2019-04-24

## 2018-12-19 RX ORDER — ROPINIROLE 1 MG/1
TABLET, FILM COATED ORAL
Qty: 270 TABLET | Refills: 1 | Status: SHIPPED | OUTPATIENT
Start: 2018-12-19 | End: 2019-06-03

## 2018-12-19 RX ORDER — SUMATRIPTAN 100 MG/1
TABLET, FILM COATED ORAL
Qty: 18 TABLET | Refills: 1 | Status: SHIPPED | OUTPATIENT
Start: 2018-12-19 | End: 2019-11-30

## 2018-12-19 RX ORDER — MULTIPLE VITAMINS W/ MINERALS TAB 9MG-400MCG
1 TAB ORAL DAILY
COMMUNITY
End: 2023-01-18

## 2018-12-19 ASSESSMENT — PAIN SCALES - GENERAL: PAINLEVEL: EXTREME PAIN (9)

## 2018-12-20 ENCOUNTER — MYC MEDICAL ADVICE (OUTPATIENT)
Dept: FAMILY MEDICINE | Facility: CLINIC | Age: 58
End: 2018-12-20

## 2018-12-20 DIAGNOSIS — L30.9 DERMATITIS: Primary | ICD-10-CM

## 2018-12-21 PROBLEM — G43.009 MIGRAINE WITHOUT AURA AND WITHOUT STATUS MIGRAINOSUS, NOT INTRACTABLE: Status: ACTIVE | Noted: 2018-12-21

## 2018-12-21 RX ORDER — TRIAMCINOLONE ACETONIDE 5 MG/G
CREAM TOPICAL 2 TIMES DAILY
Qty: 60 G | Refills: 0 | Status: SHIPPED | OUTPATIENT
Start: 2018-12-21 | End: 2019-07-19

## 2018-12-28 ENCOUNTER — MYC MEDICAL ADVICE (OUTPATIENT)
Dept: FAMILY MEDICINE | Facility: CLINIC | Age: 58
End: 2018-12-28

## 2018-12-28 ENCOUNTER — VIRTUAL VISIT (OUTPATIENT)
Dept: FAMILY MEDICINE | Facility: OTHER | Age: 58
End: 2018-12-28

## 2018-12-28 DIAGNOSIS — R73.01 IMPAIRED FASTING GLUCOSE: ICD-10-CM

## 2018-12-28 DIAGNOSIS — J45.40 MODERATE PERSISTENT ASTHMA WITHOUT COMPLICATION: ICD-10-CM

## 2018-12-28 RX ORDER — MONTELUKAST SODIUM 10 MG/1
TABLET ORAL
Qty: 90 TABLET | Refills: 1 | Status: CANCELLED | OUTPATIENT
Start: 2018-12-28

## 2018-12-28 NOTE — TELEPHONE ENCOUNTER
Metformin    Routing refill request to provider for review/approval because:  Labs out of range:  hgbA1C    Montelukast    ACT Total Scores 5/29/2018   ACT TOTAL SCORE -   ASTHMA ER VISITS -   ASTHMA HOSPITALIZATIONS -   ACT TOTAL SCORE (Goal Greater than or Equal to 20) 11   In the past 12 months, how many times did you visit the emergency room for your asthma without being admitted to the hospital? 0   In the past 12 months, how many times were you hospitalized overnight because of your asthma? 0     Routing refill request to provider for review/approval because:  Labs out of range:  ACT    LOV 12/19/2018    Laney Medrano, RN, BSN

## 2018-12-29 NOTE — PROGRESS NOTES
"Date:   Clinician: Danielito Zaragoza  Clinician NPI: [id number]  Patient: Jacquie Amador  Patient : 1960  Patient Address: [address] [place] [address], [place] [address]  Patient Phone: [phone]  Visit Protocol: URI  Patient Summary:  Jacquie is a 58 year old ( : 1960 ) female who initiated a Visit for cold, sinus infection, or influenza. When asked the question \"Please sign me up to receive news, health information and promotions from [employer].\", Jacquie responded \"No\".    Jacquie states her symptoms started gradually 10-13 days ago. After her symptoms started, they improved and then got worse again.   Her symptoms consist of myalgia, a headache, a sore throat, malaise, a cough, and rhinitis.   Symptom details     Nasal secretions: The color of her mucus is yellow and white.    Cough: Jacquie coughs a few times an hour and her cough is more bothersome at night. Phlegm comes into her throat when she coughs. She believes the phlegm causes the cough. The color of the phlegm is white and yellow.     Sore throat: Jacquie reports having mild throat pain (1-3 on a 10 point pain scale), does not have exudate on her tonsils, and can swallow liquids. The lymph nodes in her neck are not enlarged. A rash has not appeared on the skin since the sore throat started.     Headache: She states the headache is moderate (4-6 on a 10 point pain scale).      Jacquie denies having wheezing, teeth pain, enlarged lymph nodes, ear pain, fever, facial pain or pressure, chills, and nasal congestion. She also denies taking antibiotic medication for the symptoms and having recent facial or sinus surgery in the past 60 days. She is not experiencing dyspnea.   Jacquie is not sure if she has been exposed to someone with strep throat. She has not recently been exposed to someone with influenza. Jacquie has been in close contact with the following high risk individuals: adults 65 or older and pregnant women.   Jacquie had 1 sinus " infection within the past year.   Weight: 185 lbs   Jacquie does not smoke or use smokeless tobacco.   Additional information as reported by the patient (free text): My mucus just doesn?t want to go away. It?s so thick it?s hard to swallow. I was in last week and wasn?t bad enough I guess but is know. My daughter is pregnant and due in possibly 2-3 weeks. My mother in law is 99 and we see her a lot although I haven?t been going. 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 I need to kick this out of me. Thank you   MEDICATIONS: Advair Diskus inhalation, Zyrtec oral, Singulair oral, trazodone oral, metformin (bulk), Celebrex oral, Wellbutrin SR oral, Cymbalta oral, ALLERGIES: NKDA  Clinician Response:  Dear Jacquie,  Based on the information provided, you have acute bacterial sinusitis, also known as a sinus infection. Sinus infections are caused by bacteria or a virus and symptoms are almost always identical. The difference between the 2 types of infections is timing.  Sinus infections start as viral infections and symptoms improve on their own in about 7 days. If symptoms have not improved after 7 days or have even worsened, a bacterial infection may have developed.  Based on the information provided, you have viral bronchitis, also known as a chest cold. This is a cough that occurs when a cold or other virus settles into your chest. The cough may be dry or you could notice you are coughing up some phlegm.  It is not unusual for a cough to last 3 weeks or more. Treatment focuses on controlling your symptoms as much as possible while you recover.  Medication information  Because you have a viral infection, antibiotics will not help you get better. Treating a viral infection with antibiotics could actually make you feel worse.  I am prescribing:       Fluticasone 50 mcg/actuation nasal spray. Inhale 2 sprays in each nostril 1 time per day; after 1 week, may adjust to 1 - 2 sprays in each nostril 1 time per  day. This medication takes several days to start working, so keep taking it even if it doesn't help right away. There are no refills with this prescription.      Amoxicillin 500 mg oral tablet. Take 1 tablet by mouth every 8 hours for 10 days. There are no refills with this prescription.      Benzonatate (Tessalon Perles) 100 mg oral capsule. Take 1-2 capsules by mouth 3 times per day as needed for your cough. There are no refills with this prescription.     Antibiotics can cause an allergic reaction even if you have taken them without a problem in the past. If you develop a new rash, swelling, or difficulty breathing, stop the medication and be seen in a clinic or urgent care immediately.  A yeast infection is a side effect of taking antibiotics in some women. Please use [facility] to get treatment if you have symptoms of a yeast infection.  Unless you are allergic to the over-the-counter medication(s) below, I recommend using:     Guaifenesin + dextromethorphan (Robitussin DM, Mucinex DM, or store brand).   Over-the-counter medications do not require a prescription. Ask the pharmacist if you have any questions.  Self care  The following tips will keep you as comfortable as possible while you recover:     Rest    Drink plenty of water and other liquids    Take a hot shower to loosen congestion    Use throat lozenges    Gargle with warm salt water (1/4 teaspoon of salt per 8 ounce glass of water)    Suck on frozen items such as popsicles or ice cubes    Drink hot tea with lemon and honey    Take a spoonful of honey to reduce your cough     When to seek care  Please be seen in a clinic or urgent care if any of the following occur:     Symptoms do not start to improve after 3 days of treatment    New symptoms develop, or symptoms become worse     Call 911 or go to the emergency room if you feel that your throat is closing off, you suddenly develop a rash, you are unable to swallow fluids, you are drooling, or you are  having difficulty breathing.   Diagnosis: Acute bacterial sinusitis  Diagnosis ICD: J01.90  Prescription: fluticasone 50 mcg/actuation nasal spray,suspension 1 120 spray aerosol with adapter (grams), 30 days supply. Inhale 2 sprays in each nostril 1 time per day; after 1 week, may adjust to 1 - 2 sprays in each nostril 1 time per day.. Refills: 0, Refill as needed: no, Allow substitutions: yes  Prescription: amoxicillin 500 mg oral tablet 30 tablet, 10 days supply. Take 1 tablet by mouth every 8 hours for 10 days. Refills: 0, Refill as needed: no, Allow substitutions: yes  Prescription: benzonatate (Tessalon Perles) 100 mg oral capsule 30 capsule, 5 days supply. Take 1-2 capsules by mouth 3 times per day as needed. Refills: 0, Refill as needed: no, Allow substitutions: yes  Pharmacy: [id number] - [phone] - [id number], [place] [address]

## 2019-01-04 DIAGNOSIS — F41.1 GENERALIZED ANXIETY DISORDER: ICD-10-CM

## 2019-01-07 RX ORDER — BUPROPION HYDROCHLORIDE 150 MG/1
TABLET, EXTENDED RELEASE ORAL
Qty: 90 TABLET | Refills: 0 | OUTPATIENT
Start: 2019-01-07

## 2019-01-07 NOTE — TELEPHONE ENCOUNTER
Wellbutrin:  Sent 12/19/18 with 3 month supply. Refill not appropriate at this time.     Next 5 appointments (look out 90 days)    Jan 09, 2019 11:40 AM CST  Return Visit with José Miguel Linder,   [facility] [place] ([facility]) [address] [facility] [address]  [place] [address]  [phone]        Renetta Massey, RN, BSN

## 2019-01-11 DIAGNOSIS — M25.50 MULTIPLE JOINT PAIN: ICD-10-CM

## 2019-01-11 DIAGNOSIS — I10 ESSENTIAL HYPERTENSION WITH GOAL BLOOD PRESSURE LESS THAN 140/90: ICD-10-CM

## 2019-01-11 DIAGNOSIS — E78.5 HYPERLIPIDEMIA LDL GOAL <130: ICD-10-CM

## 2019-01-11 RX ORDER — DULOXETIN HYDROCHLORIDE 60 MG/1
CAPSULE, DELAYED RELEASE ORAL
Qty: 180 CAPSULE | Refills: 1 | Status: SHIPPED | OUTPATIENT
Start: 2019-01-11 | End: 2019-07-10

## 2019-01-11 RX ORDER — ATORVASTATIN CALCIUM 20 MG/1
TABLET, FILM COATED ORAL
Qty: 90 TABLET | Refills: 1 | Status: SHIPPED | OUTPATIENT
Start: 2019-01-11 | End: 2019-07-10

## 2019-01-11 RX ORDER — LOSARTAN POTASSIUM AND HYDROCHLOROTHIAZIDE 12.5; 1 MG/1; MG/1
TABLET ORAL
Qty: 90 TABLET | Refills: 1 | Status: SHIPPED | OUTPATIENT
Start: 2019-01-11 | End: 2019-07-10

## 2019-01-11 NOTE — TELEPHONE ENCOUNTER
Losartan-hydrochlorothiazide    Prescription approved per [facility] Refill Protocol.      Atorvastatin    Prescription approved per [facility] Refill Protocol.      Cymbalta    Prescription approved per [facility] Refill Protocol.    Laney Medrano, RN, BSN

## 2019-01-23 ENCOUNTER — VIRTUAL VISIT (OUTPATIENT)
Dept: FAMILY MEDICINE | Facility: OTHER | Age: 59
End: 2019-01-23

## 2019-01-23 NOTE — PROGRESS NOTES
"Date:   Clinician: Charline Hewitt  Clinician NPI: [id number]  Patient: Jacquie Amador  Patient : 1960  Patient Address: [address] [place], [place] [address] [address]  Patient Phone: [phone]  Visit Protocol: URI  Patient Summary:  Jacquie is a 58 year old ( : 1960 ) female who initiated a Visit for cold, sinus infection, or influenza. When asked the question \"Please sign me up to receive news, health information and promotions from [facility].\", Jacquie responded \"No\".    Jacquie states her symptoms started gradually 7-9 days ago. After her symptoms started, they improved and then got worse again.   Her symptoms consist of myalgia, a headache, malaise, facial pain or pressure, a cough, rhinitis, and nasal congestion.   Symptom details     Nasal secretions: The color of her mucus is white and green.    Cough: Jacquie coughs a few times an hour and her cough is more bothersome at night. Phlegm comes into her throat when she coughs. She does not believe the phlegm causes the cough. The color of the phlegm is white and green.     Facial pain or pressure: The facial pain or pressure feels worse when bending over or leaning forward.     Headache: She states the headache is moderate (4-6 on a 10 point pain scale).      Jacquie denies having enlarged lymph nodes, sore throat, ear pain, fever, chills, wheezing, and teeth pain. She also denies taking antibiotic medication for the symptoms and having recent facial or sinus surgery in the past 60 days. She is not experiencing dyspnea.   She has not recently been exposed to someone with influenza. Jacquie has been in close contact with the following high risk individuals: adults 65 or older, pregnant women, and children under the age of 5.   Jacquie had 1 sinus infection within the past year.   Weight: 180 lbs   Jacquie does not smoke or use smokeless tobacco.   Additional information as reported by the patient (free text): I had this in December " went away but not totally . I have a bad taste when I cough and have lots of phlegm. My daughter is due any day 8 months 3 weeks. I also have a MIL 99 yrs and try to visit often. I don't want to go near a drs office so I don't  something else. 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   Thank you   MEDICATIONS: Flonase Allergy Relief nasal, Advair Diskus inhalation, Zyrtec oral, Singulair oral, trazodone oral, metformin (bulk), Celebrex oral, Wellbutrin SR oral, Cymbalta oral, ALLERGIES: NKDA  Clinician Response:  Dear Jacquie,  I am sorry you are not feeling well. To determine the most appropriate care for you, I would like you to be seen in person to further discuss your health history and symptoms.  You will not be charged for this Visit. Thank you for trusting us with your care.   Diagnosis: Refer for additional evaluation  Diagnosis ICD: R69  Diagnosis ICD: 462.0

## 2019-02-04 ENCOUNTER — TELEPHONE (OUTPATIENT)
Dept: FAMILY MEDICINE | Facility: CLINIC | Age: 59
End: 2019-02-04

## 2019-02-04 NOTE — LETTER
[facility]  [address], [address]  [place] [address]  Phone: [phone]  Fax: [phone]  February 4, 2019      Jacquie Amador  [address] [address] [place] [address]  [place] [address]      Dear Jacquie,    We care about your health and have reviewed your health plan including your medical conditions, medications, and lab results.  Based on this review, it is recommended that you follow up regarding the following health topic(s):  -Breast Cancer Screening  -Colon Cancer Screening    We recommend you take the following action(s):  -schedule a MAMMOGRAM which is due. Please disregard this reminder if you have had this exam elsewhere within the last 1-2 years please let us know so we can update your records.  -schedule a COLONOSCOPY to look for colon cancer (due every 10 years or 5 years in higher risk situations.)  Colonoscopies can prevent 90-95% of colon cancer deaths.  Problem lesions can be removed before they ever become cancer.  If you do not wish to do a colonoscopy or cannot afford to do one at this time, there is another option called a Fecal Immunochemical Occult Blood Test (FIT) a take home stool sample kit.  It does not replace the colonoscopy for colorectal cancer screening, but it can detect hidden bleeding in the lower colon.  It does need to be repeated every year and if a positive result is obtained, you would be referred for a colonoscopy.  If you have completed either one of these tests at another facility, please have the records sent to our clinic for our records.     Please call us at the [facility] - [phone] (or use [employer]) to address the above recommendations.     Thank you for trusting [facility] and we appreciate the opportunity to serve you.  We look forward to supporting your healthcare needs in the future.    Healthy Regards,    Your Health Care Team  [facility]

## 2019-02-04 NOTE — TELEPHONE ENCOUNTER
Panel Management Review      Patient has the following on her problem list:     Depression / Dysthymia review    Measure:  Needs PHQ-9 score of 4 or less during index window.  Administer PHQ-9 and if score is 5 or more, send encounter to provider for next steps.    PHQ-9 SCORE 8/23/2017 12/8/2017 5/29/2018   PHQ-9 Total Score - - -   PHQ-9 Total Score MyChart 17 (Moderately severe depression) 6 (Mild depression) -   PHQ-9 Total Score - - -   PHQ-9 Total Score 17 6 9       If PHQ-9 recheck is 5 or more, route to provider for next steps.    Patient is due for:  PHQ9    Asthma review     ACT Total Scores 5/29/2018   ACT TOTAL SCORE -   ASTHMA ER VISITS -   ASTHMA HOSPITALIZATIONS -   ACT TOTAL SCORE (Goal Greater than or Equal to 20) 11   In the past 12 months, how many times did you visit the emergency room for your asthma without being admitted to the hospital? 0   In the past 12 months, how many times were you hospitalized overnight because of your asthma? 0      1. Is Asthma diagnosis on the Problem List? Yes    2. Is Asthma listed on Health Maintenance? Yes    3. Patient is due for:  ACT    Hypertension   Last three blood pressure readings:  BP Readings from Last 3 Encounters:   12/19/18 114/80   07/27/18 132/80   07/11/18 124/87     Blood pressure: Passed    HTN Guidelines:  Age 18-59 BP range:  Less than 140/90  Age 60-85 with Diabetes:  Less than 140/90  Age 60-85 without Diabetes:  less than 150/90      Composite cancer screening  Chart review shows that this patient is due/due soon for the following Mammogram and Fecal Colorectal (FIT)  Summary:    Patient is due/failing the following:   FIT and MAMMOGRAM    Action needed:   Complete a FIT test and schedule a mammogram     Type of outreach:    Sent letter.  Does not want HM calls   Questions for provider review:    None                                                                                                                                    Funmi  Maribeth       Chart routed to Care Team .

## 2019-03-05 DIAGNOSIS — F51.04 PSYCHOPHYSIOLOGICAL INSOMNIA: ICD-10-CM

## 2019-03-05 RX ORDER — TRAZODONE HYDROCHLORIDE 50 MG/1
TABLET, FILM COATED ORAL
Qty: 270 TABLET | Refills: 0 | Status: SHIPPED | OUTPATIENT
Start: 2019-03-05 | End: 2019-07-03

## 2019-03-05 NOTE — TELEPHONE ENCOUNTER
"Requested Prescriptions   Pending Prescriptions Disp Refills     traZODone (DESYREL) 50 MG tablet [Pharmacy Med Name: traZODone HCl Oral Tablet 50 MG] 270 tablet 1     Sig: TAKE 2-3 TABLETS BY MOUTH AT BEDTIME    Serotonin Modulators Passed - 3/5/2019  7:41 AM       Passed - Recent (12 mo) or future (30 days) visit within the authorizing provider's specialty    Patient had office visit in the last 12 months or has a visit in the next 30 days with authorizing provider or within the authorizing provider's specialty.  See \"Patient Info\" tab in inbasket, or \"Choose Columns\" in Meds & Orders section of the refill encounter.             Passed - Medication is active on med list       Passed - Patient is age 18 or older       Passed - No active pregnancy on record       Passed - No positive pregnancy test in past 12 months        traZODone (DESYREL) 50 MG tablet  Prescription approved per [facility] Refill Protocol.    Due for OV around 06/19/2019  Eva Pace RN, BSN     "

## 2019-04-09 NOTE — TELEPHONE ENCOUNTER
Anticipated Discharge Disposition: [facility] vs [facility] with [facility]    Action: LSW spoke with Janet from [facility] who said she will contact [facility] to see if the Pt is appropriate for possible placement. Janet stated Pt may be more appropriate in a private room. Janet stated once she has an update she will darion this LSW back. LSW confirmed with Ericka from [facility] that the Pt dtr does have access to the Pt finances.     Barriers to Discharge: placement    Plan: follow up with Nate Amezquita     Request for 300 mg dose noted--not on medication list.     Please call to check on the dose of Bupropion and on the request for the 300 mg dose. Appears to be taking 200 mg in the morning and 150 mg in the evening.    Solitario Larios MD

## 2019-04-24 DIAGNOSIS — F41.1 GENERALIZED ANXIETY DISORDER: ICD-10-CM

## 2019-04-25 RX ORDER — BUPROPION HYDROCHLORIDE 150 MG/1
TABLET, EXTENDED RELEASE ORAL
Qty: 90 TABLET | Refills: 0 | Status: SHIPPED | OUTPATIENT
Start: 2019-04-25 | End: 2019-07-19

## 2019-04-25 NOTE — TELEPHONE ENCOUNTER
"Requested Prescriptions   Pending Prescriptions Disp Refills     buPROPion (WELLBUTRIN SR) 150 MG 12 hr tablet [Pharmacy Med Name: buPROPion HCl ER (SR) Oral Tablet Extended Release 12 Hour 150 MG] 90 tablet 0     Sig: TAKE ONE TABLET BY MOUTH IN THE EVENING       SSRIs Protocol Passed - 4/24/2019  4:03 PM        Passed - Recent (12 mo) or future (30 days) visit within the authorizing provider's specialty     Patient had office visit in the last 12 months or has a visit in the next 30 days with authorizing provider or within the authorizing provider's specialty.  See \"Patient Info\" tab in inbasket, or \"Choose Columns\" in Meds & Orders section of the refill encounter.              Passed - Medication is Bupropion     If the medication is Bupropion (Wellbutrin), and the patient is taking for smoking cessation; OK to refill.          Passed - Medication is active on med list        Passed - Patient is age 18 or older        Passed - No active pregnancy on record        Passed - No positive pregnancy test in last 12 months        Medication is being filled for 1 time refill only due to:  Patient needs to be seen because due for mood OV in June.   No future office visit scheduled.   Please call and help schedule.  Thank you!  Dedrick Noe, RN, BSN      "

## 2019-04-28 LAB — HEMOCCULT STL QL IA: NORMAL

## 2019-04-29 DIAGNOSIS — Z12.11 SPECIAL SCREENING FOR MALIGNANT NEOPLASMS, COLON: ICD-10-CM

## 2019-05-29 ENCOUNTER — VIRTUAL VISIT (OUTPATIENT)
Dept: FAMILY MEDICINE | Facility: OTHER | Age: 59
End: 2019-05-29

## 2019-05-29 NOTE — PROGRESS NOTES
"Date: [id number]  Clinician: Keren Garcia  Clinician NPI: [id number]  Patient: Jacquie Amador  Patient : 1960  Patient Address: [address] [place] [address], [place] [address]  Patient Phone: [phone]  Visit Protocol: URI  Patient Summary:  Jacquie is a 58 year old ( : 1960 ) female who initiated a Visit for cold, sinus infection, or influenza. When asked the question \"Please sign me up to receive news, health information and promotions from [employer].\", Jacquie responded \"No\".    Jacquie states her symptoms started gradually 10-13 days ago.   Her symptoms consist of a headache, a sore throat, wheezing, tooth pain, nasal congestion, malaise, facial pain or pressure, myalgia, enlarged lymph nodes, a cough, and chills. She is experiencing mild difficulty breathing with activities but can speak normally in full sentences.   Symptom details     Nasal secretions: The color of her mucus is green and white.    Cough: Jacquie coughs every 5-10 minutes and her cough is more bothersome at night. Phlegm comes into her throat when she coughs. She does not believe the phlegm causes the cough. The color of the phlegm is white and green.     Sore throat: Jacquie reports having severe throat pain (7-9 on a 10 point pain scale), has exudate on her tonsils, and can swallow liquids. The lymph nodes in her neck are enlarged. A rash has not appeared on the skin since the sore throat started.     Wheezing: Jacquie has been diagnosed with asthma. The wheezing does not interfere with her normal daily activities.    Facial pain or pressure: The facial pain or pressure feels worse when bending over or leaning forward.     Headache: She states the headache is moderate (4-6 on a 10 point pain scale).     Tooth pain: The tooth pain is not caused by a cavity, recent dental work, or other mouth problems.      Jacquie denies having fever, rhinitis, and ear pain. She also denies having recent facial or sinus surgery in the past 60 " days, taking antibiotic medication for the symptoms, and double sickening (worsening symptoms after initial improvement).   Within the past week, Jacquie has been exposed to someone with strep throat. She has recently been exposed to someone with influenza. Jacquie has been in close contact with the following high risk individuals: adults 65 or older and children under the age of 5.   Jacquie had 2 sinus infections within the past year.   Weight: 180 lbs   Jacquie does not smoke or use smokeless tobacco.   Additional information as reported by the patient (free text): I feel like I was hit by a truck. I don't really have asthma to much anymore. I am also around daughters 3 month old alot   MEDICATIONS: Wellbutrin SR oral, Celebrex oral, trazodone oral, Cymbalta oral, metformin (bulk), Advair Diskus inhalation, Singulair oral, Zyrtec oral, Flonase Allergy Relief nasal, ALLERGIES: Lyrica  Clinician Response:  Dear Jacquie,  Based on the information provided, you have acute bacterial sinusitis, also known as a sinus infection. Sinus infections are caused by bacteria or a virus and symptoms are almost always identical. The difference between the 2 types of infections is timing.  Sinus infections start as viral infections and symptoms improve on their own in about 7 days. If symptoms have not improved after 7 days or have even worsened, a bacterial infection may have developed.  Medication information  I am prescribing:     Amoxicillin-pot clavulanate 875-125 mg oral tablet. Take 1 tablet by mouth every 12 hours for 7 days. There are no refills with this prescription.   Antibiotics can cause an allergic reaction even if you have taken them without a problem in the past. If you develop a new rash, swelling, or difficulty breathing, stop the medication and be seen in a clinic or urgent care immediately.  A yeast infection is a side effect of taking antibiotics in some women. Please use [facility] to get treatment if you have  symptoms of a yeast infection.  Self care  The following tips will keep you as comfortable as possible while you recover:     Rest    Drink plenty of water and other liquids    Take a hot shower to loosen congestion    Use throat lozenges    Gargle with warm salt water (1/4 teaspoon of salt per 8 ounce glass of water)    Suck on frozen items such as popsicles or ice cubes    Drink hot tea with lemon and honey    Take a spoonful of honey to reduce your cough     When to seek care  Please be seen in a clinic or urgent care if any of the following occur:     Symptoms do not start to improve after 3 days of treatment    New symptoms develop, or symptoms become worse     Call 911 or go to the emergency room if you feel that your throat is closing off, you suddenly develop a rash, you are unable to swallow fluids, you are drooling, or you are having difficulty breathing.   Diagnosis: Acute bacterial sinusitis  Diagnosis ICD: J01.90  Prescription: amoxicillin-pot clavulanate 875-125 mg oral tablet 14 tablet, 7 days supply. Take 1 tablet by mouth every 12 hours for 7 days. Refills: 0, Refill as needed: no, Allow substitutions: yes  Pharmacy: [facility] [id number] - [phone] - [address] [facility] [place] [address]  Addendum created: May 31 08:22:04, 2019 created by: Sariah Guardado body: Based on patient's symptoms and medications I did not recommend an additional medication.   I will extend her current prescription for three more days for a total of 10 days. Please advise patient if not improving she likely has a viral infection which the antibiotics will not help. If still worsening or not improving in the next 3 to 5 days she should be seen in clinic.   Rx: Amoxicillin -- pot clavulanate 875-125 MG oral tablet, # six tablets, 3 day supply. Take one tablet by mouth every 12 hours for 3 days. refills: 0, refill as needed:no. allow substitutions: yes

## 2019-06-07 ENCOUNTER — MYC MEDICAL ADVICE (OUTPATIENT)
Dept: FAMILY MEDICINE | Facility: CLINIC | Age: 59
End: 2019-06-07

## 2019-06-07 DIAGNOSIS — F41.1 GENERALIZED ANXIETY DISORDER: ICD-10-CM

## 2019-06-07 DIAGNOSIS — G25.9 MOVEMENT DISORDER: ICD-10-CM

## 2019-06-10 ENCOUNTER — MYC MEDICAL ADVICE (OUTPATIENT)
Dept: FAMILY MEDICINE | Facility: CLINIC | Age: 59
End: 2019-06-10

## 2019-06-10 RX ORDER — ROPINIROLE 1 MG/1
TABLET, FILM COATED ORAL
Qty: 270 TABLET | Refills: 0 | Status: SHIPPED | OUTPATIENT
Start: 2019-06-10 | End: 2019-09-23

## 2019-06-10 RX ORDER — BUPROPION HYDROCHLORIDE 200 MG/1
200 TABLET, EXTENDED RELEASE ORAL DAILY
Qty: 90 TABLET | Refills: 0 | Status: SHIPPED | OUTPATIENT
Start: 2019-06-10 | End: 2019-09-23

## 2019-06-21 ENCOUNTER — NURSE TRIAGE (OUTPATIENT)
Dept: FAMILY MEDICINE | Facility: CLINIC | Age: 59
End: 2019-06-21

## 2019-06-21 NOTE — TELEPHONE ENCOUNTER
Reason for Call:  Other Patient has some concerns about her low blood pressure 105/71  Pulse 75    Detailed comments: none    Phone Number Patient can be reached at: Cell number on file:    Telephone Information:   Mobile [phone]       Best Time: anytime    Can we leave a detailed message on this number? YES    Call taken on 6/21/2019 at 9:11 AM by Willi Foster

## 2019-06-21 NOTE — TELEPHONE ENCOUNTER
R/c to pt, she states 'I am feeling better!'  Denies dizziness, no n/v or diarrhea.   Last BP was 104/80 'a little while ago.'  Instructed her to continue scheduled medications as directed and push fluids; UCC this weekend if she starts to feel dizzy again or worse in any way.  Pt verbalizes agreement and agrees to this plan.    Tamia Meeks, RN, BSN

## 2019-06-21 NOTE — TELEPHONE ENCOUNTER
"Returned call to patient, triaged concern for hypotension. Sunday and Monday, vomited 7-8 times after 'food poisoning', 11 hours of vomiting. No abdominal pain.    Laying down currently and no dizziness/lightheadedness. Had 1 episode of 'diarrhea' in the middle of the night last night - but did not see color, not able to say if was watery or just loose.    1. BLOOD PRESSURE: \"What is the blood pressure?\" \"Did you take at least two measurements 5 minutes apart?\"      105/71, HR 75 about 30 min ago   She monitors BP/HR because she is on meds for HTN    2. ONSET: \"When did you take your blood pressure?\"      30 min ago    3. HOW: \"How did you obtain the blood pressure?\" (e.g., visiting nurse, automatic home BP monitor)      Auto home     4. HISTORY: \"Do you have a history of low blood pressure?\" \"What is your blood pressure normally?\"      No    5. MEDICATIONS: \"Are you taking any medications for blood pressure?\" If yes: \"Have they been changed recently?\"      Yes taking, no have not been changed   She did miss 1 dose of all meds on Monday d/t GI illness - vomiting    6. PULSE RATE: \"Do you know what your pulse rate is?\"       HR is still 75 - she does not feel this is too low     7. OTHER SYMPTOMS: \"Have you been sick recently?\" \"Have you had a recent injury?\"      Yes, 'food poisoning'    8. PREGNANCY: \"Is there any chance you are pregnant?\" \"When was your last menstrual period?\"      No    Discussed that SBP lower than 90 would be an emergency, however, 105 - while lower for her, is not dangerous. She is laying down flat in bed, and plans to continue resting like this because she is tired.   is home with her.    - Reviewed her medications, that Hyzaar is a bit of a diuretic, which turns fluid into urine to lower BP.   - She missed 1 dose of this 3 days ago, otherwise takes daily.  - She has already taken daily meds today.    - Reviewed how vomiting and diarrhea can cause dehydration, which she may need IV " "fluids to correct.  - She does not agree to come in or go to ER or clinic right now.    Disposition: See in office today  However, pt declines. Home care advice given:  - She will recheck her BP and c/b if SBP is any lower than 105  - Push fluids for 2-4 hours, then call back with update: OV this afternoon or UC/ED if no improvement    Pt agrees to this plan.    Additional Information    Negative: Systolic BP < 90 and feeling dizzy, lightheaded, or weak    Negative: Started suddenly after an allergic medicine, an allergic food, or bee sting    Negative: Shock suspected (e.g., cold/pale/clammy skin, too weak to stand, low BP, rapid pulse)    Negative: Difficult to awaken or acting confused  (e.g., disoriented, slurred speech)    Negative: Fainted    Negative: Chest pain    Negative: Bleeding (e.g., vomiting blood, rectal bleeding or tarry stools, severe vaginal bleeding)    Negative: Extra heart beats or heart is beating fast  (i.e., \"palpitations\")    Negative: Sounds like a life-threatening emergency to the triager    Negative: Systolic BP < 80 and NOT dizzy, lightheaded or weak (feels normal)    Negative: Abdominal pain    Negative: Major surgery in the past month    Negative: Fever > 100.5 F (38.1 C)    Negative: Drinking very little and has signs of dehydration (e.g., no urine > 12 hours, very dry mouth, very lightheaded)    Negative: Fall in systolic BP > 20 mm Hg from normal and feeling dizzy, lightheaded, or weak    Negative: Patient sounds very sick or weak to the triager    Negative: Systolic BP < 90 and NOT dizzy, lightheaded or weak    Negative: Systolic BP  while taking blood pressure medications and NOT dizzy, lightheaded or weak    Protocols used: LOW BLOOD PRESSURE-A-OH    Routed plan to .  Tamia Meeks, RN, BSN    "

## 2019-06-21 NOTE — TELEPHONE ENCOUNTER
Called pt to f/u. She has been pushing fluids since we spoke - drank about 2 full glasses of water.  Has been walking around the house and does not feel dizzy/lightheaded at all.  Denies N&V, no BM/diarrhea.    Her usual BP is around 130's, she thinks, but it varies.    Had her recheck BP now: 102/78, HR 84.   **She has not taken her BP meds today.    Plan:   Instructed her to hold off on taking BP meds until I check with her provider.  She will continue to push fluids.  I will call her right back if provider would like her to take BP meds or has other advice.    She will plan to call clinic back in a few hours with an update.    Huddling with SF.  Tamia Meeks, RN, BSN

## 2019-06-26 DIAGNOSIS — R00.2 PALPITATIONS: ICD-10-CM

## 2019-06-26 DIAGNOSIS — R00.0 SINUS TACHYCARDIA: ICD-10-CM

## 2019-06-26 DIAGNOSIS — J45.40 MODERATE PERSISTENT ASTHMA WITHOUT COMPLICATION: ICD-10-CM

## 2019-06-26 DIAGNOSIS — R73.01 IMPAIRED FASTING GLUCOSE: ICD-10-CM

## 2019-06-26 DIAGNOSIS — G43.009 MIGRAINE WITHOUT AURA AND WITHOUT STATUS MIGRAINOSUS, NOT INTRACTABLE: ICD-10-CM

## 2019-06-27 ENCOUNTER — MYC MEDICAL ADVICE (OUTPATIENT)
Dept: FAMILY MEDICINE | Facility: CLINIC | Age: 59
End: 2019-06-27

## 2019-06-27 RX ORDER — MONTELUKAST SODIUM 10 MG/1
TABLET ORAL
Qty: 30 TABLET | Refills: 0 | Status: SHIPPED | OUTPATIENT
Start: 2019-06-27 | End: 2019-07-19

## 2019-06-27 RX ORDER — METOPROLOL SUCCINATE 25 MG/1
TABLET, EXTENDED RELEASE ORAL
Qty: 30 TABLET | Refills: 0 | Status: SHIPPED | OUTPATIENT
Start: 2019-06-27 | End: 2019-07-19

## 2019-06-27 NOTE — TELEPHONE ENCOUNTER
Medication is being filled for 1 time refill only due to:  Patient needs to be seen because due for BP check with provider..   Next 5 appointments (look out 90 days)    Jul 19, 2019  1:00 PM CDT  Office Visit with Liz Peterson MD  [facility] ([facility]) [address], [address]  [facility] [address]  [phone]        Dedrick Noe RN, BSN

## 2019-06-28 NOTE — TELEPHONE ENCOUNTER
KL's [employer] message has been read by patient - instructing her to call nurse line if her symptoms are more urgent.  Encounter closed.    Tamia Meeks, RN, BSN

## 2019-06-28 NOTE — TELEPHONE ENCOUNTER
Pt is scheduled for an appointment with SF on 7/19.  Hanh Ledesma' note states to have RN follow up with pt today.  Thanks.

## 2019-07-03 DIAGNOSIS — F51.04 PSYCHOPHYSIOLOGICAL INSOMNIA: ICD-10-CM

## 2019-07-05 RX ORDER — TRAZODONE HYDROCHLORIDE 50 MG/1
TABLET, FILM COATED ORAL
Qty: 270 TABLET | Refills: 0 | Status: SHIPPED | OUTPATIENT
Start: 2019-07-05 | End: 2019-09-23

## 2019-07-05 NOTE — TELEPHONE ENCOUNTER
Pending Prescriptions:                       Disp   Refills    traZODone (DESYREL) 50 MG tablet [Pharmac*270 ta*0            Sig: TAKE 2-3 TABLETS BY MOUTH AT BEDTIME    Next 5 appointments (look out 90 days)    Jul 19, 2019  1:00 PM CDT  Office Visit with Liz Peterson MD  [facility] [address], [address]  [facility] [address] [phone]        Medication is being filled for 1 time refill only due to:  Patient needs to be seen because LOV 12/19/2018, return in 6 months.     Laney Medrano, RN, BSN

## 2019-07-08 ENCOUNTER — MYC MEDICAL ADVICE (OUTPATIENT)
Dept: FAMILY MEDICINE | Facility: CLINIC | Age: 59
End: 2019-07-08

## 2019-07-08 DIAGNOSIS — Z13.220 LIPID SCREENING: ICD-10-CM

## 2019-07-08 DIAGNOSIS — Z12.11 SPECIAL SCREENING FOR MALIGNANT NEOPLASMS, COLON: ICD-10-CM

## 2019-07-08 DIAGNOSIS — Z13.1 SCREENING FOR DIABETES MELLITUS: ICD-10-CM

## 2019-07-08 DIAGNOSIS — R53.83 FATIGUE, UNSPECIFIED TYPE: Primary | ICD-10-CM

## 2019-07-08 NOTE — TELEPHONE ENCOUNTER
Provider please review and place any labs order needs to be done prior appointment 7/19.   Thank you!

## 2019-07-10 DIAGNOSIS — I10 ESSENTIAL HYPERTENSION WITH GOAL BLOOD PRESSURE LESS THAN 140/90: ICD-10-CM

## 2019-07-10 DIAGNOSIS — M25.50 MULTIPLE JOINT PAIN: ICD-10-CM

## 2019-07-10 DIAGNOSIS — E78.5 HYPERLIPIDEMIA LDL GOAL <130: ICD-10-CM

## 2019-07-10 RX ORDER — DULOXETIN HYDROCHLORIDE 60 MG/1
CAPSULE, DELAYED RELEASE ORAL
Qty: 60 CAPSULE | Refills: 0 | Status: SHIPPED | OUTPATIENT
Start: 2019-07-10 | End: 2019-09-23

## 2019-07-10 RX ORDER — ATORVASTATIN CALCIUM 20 MG/1
TABLET, FILM COATED ORAL
Qty: 30 TABLET | Refills: 0 | Status: SHIPPED | OUTPATIENT
Start: 2019-07-10 | End: 2019-07-19

## 2019-07-10 RX ORDER — LOSARTAN POTASSIUM AND HYDROCHLOROTHIAZIDE 12.5; 1 MG/1; MG/1
TABLET ORAL
Qty: 30 TABLET | Refills: 0 | Status: SHIPPED | OUTPATIENT
Start: 2019-07-10 | End: 2019-07-19 | Stop reason: DRUGHIGH

## 2019-07-10 NOTE — TELEPHONE ENCOUNTER
Losartan-hydrochlorothiazide, Atorvastatin, Duloxetine    Next 5 appointments (look out 90 days)    Jul 19, 2019  1:00 PM CDT  [facility] Visit with [facility] MD  [facility] [address], [address]  [facility] [id number] [phone]          Medication is being filled for 1 time refill only due to:  Pt has upcoming appt     Laney Medrano RN, BSN

## 2019-07-12 ENCOUNTER — VIRTUAL VISIT (OUTPATIENT)
Dept: FAMILY MEDICINE | Facility: OTHER | Age: 59
End: 2019-07-12

## 2019-07-13 NOTE — PROGRESS NOTES
"Date:   Clinician: Douglas Hernandez  Clinician NPI: [id number]  Patient: Jacquie Amador  Patient : 1960  Patient Address: [address] [place] [address], [place] [address]  Patient Phone: [phone]  Visit Protocol: URI  Patient Summary:  Jacquie is a 58 year old ( : 1960 ) female who initiated a Visit for cold, sinus infection, or influenza. When asked the question \"Please sign me up to receive news, health information and promotions from [employer].\", Jacquie responded \"No\".    Jacquie states her symptoms started gradually 10-13 days ago. After her symptoms started, they improved and then got worse again.   Her symptoms consist of a headache, a sore throat, malaise, facial pain or pressure, myalgia, a cough, rhinitis, wheezing, tooth pain, and nasal congestion. She is experiencing difficulty breathing due to nasal congestion but she is not short of breath.   Symptom details     Nasal secretions: The color of her mucus is green.    Cough: Jacquie coughs every 5-10 minutes and her cough is more bothersome at night. Phlegm comes into her throat when she coughs. She believes the phlegm causes the cough. The color of the phlegm is green.     Sore throat: Jacquie reports having moderate throat pain (4-6 on a 10 point pain scale), does not have exudate on her tonsils, and can swallow liquids. She is not sure if the lymph nodes in her neck are enlarged. A rash has not appeared on the skin since the sore throat started.     Wheezing: Jacquie has been diagnosed with asthma. The wheezing does not interfere with her normal daily activities.    Facial pain or pressure: The facial pain or pressure feels worse when bending over or leaning forward.     Headache: She states the headache is moderate (4-6 on a 10 point pain scale).     Tooth pain: The tooth pain is not caused by a cavity, recent dental work, or other mouth problems.      Jacquie denies having chills, fever, and ear pain. She also denies having recent " facial or sinus surgery in the past 60 days and taking antibiotic medication for the symptoms.   Precipitating events  Within the past week, Jacquie has not been exposed to someone with strep throat. She has not recently been exposed to someone with influenza. Jacquie has been in close contact with the following high risk individuals: adults 65 or older and children under the age of 5.   Pertinent medical history  Jacquie had 2 sinus infections within the past year.   Jacquie typically gets a yeast infection when she takes antibiotics. She has used fluconazole (Diflucan) to treat previous yeast infections. 2 doses of fluconazole (Diflucan) has typically been needed for symptoms to resolve in the past.  Weight: 180 lbs   Jacquie does not smoke or use smokeless tobacco.   Additional information as reported by the patient (free text): I have really thick green mucus and cough up a lot. Up most of the nights coughing. My headache is bad when I get it. I have so much going down the back of throat hard to swallow. I am around my 5 month grandson when feeling ok as well as MIL who is 100.     MEDICATIONS: Wellbutrin SR oral, Celebrex oral, trazodone oral, Cymbalta oral, metformin (bulk), Advair Diskus inhalation, Singulair oral, Zyrtec oral, Flonase Allergy Relief nasal, ALLERGIES: Lyrica  Clinician Response:  Dear Jacquie,  Based on the information provided, you have acute bacterial sinusitis, also known as a sinus infection. Sinus infections are caused by bacteria or a virus and symptoms are almost always identical. The difference between the 2 types of infections is timing.  Sinus infections start as viral infections and symptoms improve on their own in about 7 days. If symptoms have not improved after 7 days or have even worsened, a bacterial infection may have developed.  Medication information  I am prescribing:       Fluticasone 50 mcg/actuation nasal spray. Inhale 2 sprays in each nostril 1 time per day; after 1  week, may adjust to 1 - 2 sprays in each nostril 1 time per day. This medication takes several days to start working, so keep taking it even if it doesn't help right away. There are no refills with this prescription.      Amoxicillin-pot clavulanate 875-125 mg oral tablet. Take 1 tablet by mouth every 12 hours for 10 days. There are no refills with this prescription.      Ventolin HFA 90 mcg/actuation aerosol inhaler. Inhale 2 puffs every 4-6 hours as needed for 5 days. There are no refills with this prescription.     Yeast infections can be a common side effect of antibiotics. The most common symptom of a yeast infection is itchiness in and around the vagina. Other signs and symptoms include burning, redness, or a thick, white vaginal discharge that looks like cottage cheese and does not have a bad smell.  Unless you are allergic to the over-the-counter medication(s) below, I recommend using:       Ibuprofen (Advil or store brand) 200 mg oral tablet. Take 1-3 tablets (200-600 mg) by mouth every 8 hours to help with the discomfort. Make sure to take the ibuprofen with food. Do not exceed 2400 mg in 24 hours.    A decongestant such as Sudafed PE or store brand.    A sinus irrigation kit such as Sinus Rinse, Neti Pot, SinuCleanse, or store brand. Be sure to use sterile or previously boiled water to prevent unwanted infections.     Over-the-counter medications do not require a prescription. Ask the pharmacist if you have any questions.  Self care  The following tips will keep you as comfortable as possible while you recover:     Rest    Drink plenty of water and other liquids    Take a hot shower to loosen congestion    Use throat lozenges    Gargle with warm salt water (1/4 teaspoon of salt per 8 ounce glass of water)    Suck on frozen items such as popsicles or ice cubes    Drink hot tea with lemon and honey    Take a spoonful of honey to reduce your cough     When to seek care  Please be seen in a clinic or urgent  care if any of the following occur:     Symptoms do not start to improve after 3 days of treatment    New symptoms develop, or symptoms become worse     It is possible to have an allergic reaction to an antibiotic even if you have not had one in the past. If you notice a new rash, significant swelling, or difficulty breathing, stop taking this medication immediately and go to a clinic or urgent care.  Call 911 or go to the emergency room if you feel that your throat is closing off, you suddenly develop a rash, you are unable to swallow fluids, you are drooling, or you are having difficulty breathing.   Diagnosis: Acute bacterial sinusitis  Diagnosis ICD: J01.90  Prescription: fluticasone 50 mcg/actuation nasal spray,suspension 1 120 spray aerosol with adapter (grams), 30 days supply. Inhale 2 sprays in each nostril 1 time per day; after 1 week, may adjust to 1 - 2 sprays in each nostril 1 time per day.. Refills: 0, Refill as needed: no, Allow substitutions: yes  Prescription: amoxicillin-pot clavulanate 875-125 mg oral tablet 20 tablet, 10 days supply. Take 1 tablet by mouth every 12 hours for 10 days. Refills: 0, Refill as needed: no, Allow substitutions: yes  Prescription: Ventolin HFA 90 mcg/actuation inhalation HFA aerosol inhaler 1 200 inhalation canister, 5 days supply. Inhale 2 puffs every 4-6 hours as needed for 5 days. Refills: 0, Refill as needed: no, Allow substitutions: yes  Pharmacy: [id number] - [phone] - [address] [place], [place] [address]

## 2019-07-15 NOTE — PROGRESS NOTES
"Subjective     Jacquie Amador is a 59 year old female who presents to clinic today for the following health issues:    History of Present Illness        She eats 0-1 servings of fruits and vegetables daily.She consumes 0 sweetened beverage(s) daily.  She is taking medications regularly.     Hyperlipidemia Follow-Up      Are you having any of the following symptoms? (Select all that apply)  Upper back pain, no chest pain(\"i was when I was sick, but they were not hard one\")     Are you regularly taking any medication or supplement to lower your cholesterol?   No    Are you having muscle aches or other side effects that you think could be caused by your cholesterol lowering medication?  No      Hypertension Follow-up      Do you check your blood pressure regularly outside of the clinic? Yes     Are you following a low salt diet? Yes    Are your blood pressures ever more than 140 on the top number (systolic) OR more   than 90 on the bottom number (diastolic), for example 140/90? No  Depression and Anxiety Follow-Up    How are you doing with your depression since your last visit? Worsened - \"all the sickness I have \"     How are you doing with your anxiety since your last visit?  Worsened     Are you having other symptoms that might be associated with depression or anxiety? No    Have you had a significant life event? Health Concerns     Do you have any concerns with your use of alcohol or other drugs? No    Social History     Tobacco Use     Smoking status: Former Smoker     Packs/day: 1.00     Types: Cigarettes     Smokeless tobacco: Never Used     Tobacco comment: since 1981   Substance Use Topics     Alcohol use: No     Drug use: Yes     Comment: medical marjuana      PHQ 12/8/2017 5/29/2018 7/18/2019   PHQ-9 Total Score 6 9 4   Q9: Thoughts of better off dead/self-harm past 2 weeks Not at all Not at all Not at all     MARIZOL-7 SCORE 12/8/2017 5/29/2018 7/18/2019   Total Score - - -   Total Score 6 (mild anxiety) - 3 " "(minimal anxiety)   Total Score 6 10 3     Suicide Assessment Five-step Evaluation and Treatment (SAFE-T)  Asthma Follow-Up    Was ACT completed today?  No      Do you have a cough?  YES -\"green thick mucus\" onset 1 month     Are you experiencing any wheezing in your chest?  YES    Do you have any shortness of breath?  YES     How often are you using a short-acting (rescue) inhaler or nebulizer, such as Albuterol?  2-3 times per day    How many days per week do you miss taking your asthma controller medication?  0    Please describe any recent triggers for your asthma: upper respiratory infections and humidity    Have you had any Emergency Room Visits, Urgent Care Visits, or Hospital Admissions since your last office visit?  No     Bronchitis  The patient states that she has been really sick over the Summer. She notes that on Father's Day is when she got really sick. She notes that it might have been food poisoning. She states that vomited 8 times on that day.   The patient states that the next week she had really low blood pressure and she was dizzy. She notes that she had an episode when she almost \"passed out\".   The patient states that she had coughing, wheezing and had a lot of mucus. She states that the mucus was dark green and thick. She notes that she is on an antibiotic currently.   She states that a chest x-ray came back normal, which is why Amoxicillin was prescribed.   She notes that the symptoms noted above have cleared up. She notes that she now has some mucus and vomiting.  She states that she is feeling better after the antibiotic. She notes that when the antibiotic is done, she will be sick again.   She notes using her inhaler and Advair daily. She states that she does not use her Advair twice daily due to \"forgetting\". She declines fevers or recent chest pains.     Multiple joint pain   The patient notes that she has had a lot of cortisone injections. She states that she has increased her medical " "marijuana use, which has helped with her pain.     Fatigue   The patient states that she always feels fatigued.     Palpitations   The patient notes that her heart racing has gotten better. She notes that her heart racing happens from time to time.     Migraines   The patient notes that she has gotten a lot of migraines recently. She states that this may be due to her being sick currently. She notes taking Tylenol for her headaches.   She notes that she may alternate migraine medication each day.     Right ankle pain  The patient states that her right ankle tends to swell a lot. She notes that it hurts as well. She states that even after Celebrex use, it still swells. She declines injury to ankle.     Blood pressure   The patient states that her blood pressure is really low, even after she has gotten better. She notes feeling lightheaded.     Foot pain   The patient notes that she has a lot of foot pain. She notes that her toes tend to \"go over each other\".     Family history of iron deficiency   The patient notes that her Mother had injections for her red blood cells.      Skin ulcer  The patient states that the skin lesion on her forehead has gotten bigger since her antibiotic use.     Mood  The patient states that she has had a hard time since her Mother passed away a year ago.   She notes having anxiety because she has been sick for so long.     Thrush   The patient notes that she is getting Thrush, but that may be due to her antibiotic use.     Labs  The patient states that her numbers for her labs tend to be towards \"one side\". She is wondering if this is normal for her.     Patient Active Problem List   Diagnosis     Moderate persistent asthma     Allergic rhinitis due to other allergen     Esophageal reflux     Moderate recurrent major depression (H)     Multiple joint pain     HYPERLIPIDEMIA LDL GOAL <130     Hypertension goal BP (blood pressure) < 140/90     MARIZOL (generalized anxiety disorder)     Myalgia "     Chronic rhinitis     Constipation     Lumbar disc disease with radiculopathy     Iron deficiency anemia     Osteoarthritis of carpometacarpal joint of thumb - bilateral     Trochanteric bursitis     Right ankle instability     Primary focal hyperhidrosis     Trochanteric bursitis of both hips     Overweight     History of iron deficiency     Scratching     Advanced directives, counseling/discussion     Chronic pain syndrome     Medical marijuana use     Primary osteoarthritis of both first carpometacarpal joints     Arthritis of metatarsophalangeal joint     Sinus tachycardia     Intractable chronic migraine without aura and without status migrainosus     Impaired fasting glucose     Migraine without aura and without status migrainosus, not intractable     Past Surgical History:   Procedure Laterality Date     C  DELIVERY ONLY      , Low Cervical     INJECT EPIDURAL TRANSFORAMINAL  2014    [facility]     INJECT JOINT SACROILIAC  2014    [facility]     LAMINECTOMY LUMBAR ONE LEVEL Left 2014    [facility]       Social History     Tobacco Use     Smoking status: Former Smoker     Packs/day: 1.00     Types: Cigarettes     Smokeless tobacco: Never Used     Tobacco comment: since    Substance Use Topics     Alcohol use: No     Family History   Problem Relation Age of Onset     Thyroid Disease Mother      Arthritis Mother      Colon Cancer Mother      Thyroid Disease Sister      Arthritis Sister      Lipids Sister      Hypertension Father      Diabetes Father      C.A.D. Father         MI age 75     Cardiovascular Father         heart attack     Cerebrovascular Disease Father      Cancer Father         renal cancer     Arthritis Father      Heart Disease Father         heart attack     Gastrointestinal Disease Father         liver     Genitourinary Problems Father         kidney     Asthma Daughter      Gastrointestinal Disease  Daughter      Unknown Other      Breast Cancer No family hx of      Cancer - colorectal No family hx of      Alzheimer Disease No family hx of      Blood Disease No family hx of      Circulatory No family hx of      Eye Disorder No family hx of      Musculoskeletal Disorder No family hx of      Neurologic Disorder No family hx of      Respiratory No family hx of          Current Outpatient Medications   Medication Sig Dispense Refill     adapalene (DIFFERIN) 0.1 % gel APPLY A THIN LAYER TO THE AFFECTED AREA(S) BY TOPICAL ROUTE ONCE DAILY BEFORE BEDTIME 45 g 10     albuterol (VENTOLIN HFA) 108 (90 Base) MCG/ACT Inhaler INHALE 2 PUFFS INTO THE LUNGS EVERY 6 HOURS AS NEEDED FOR SHORTNESS OF BREATH / DYSPNEA 54 g 1     ALPRAZolam (XANAX) 0.25 MG tablet TAKE ONE TABLET BY MOUTH DAILY AS NEEDED FOR ANXIETY  60 tablet 0     atorvastatin (LIPITOR) 20 MG tablet TAKE ONE TABLET BY MOUTH ONE TIME DAILY  30 tablet 0     buPROPion (WELLBUTRIN SR) 150 MG 12 hr tablet TAKE ONE TABLET BY MOUTH IN THE EVENING 90 tablet 0     buPROPion (WELLBUTRIN SR) 200 MG 12 hr tablet Take 1 tablet (200 mg) by mouth daily 90 tablet 0     celecoxib (CELEBREX) 200 MG capsule Take 1 capsule twice daily as needed for pain this is a 3 month script 180 capsule 1     clindamycin (CLINDAMAX) 1 % topical gel Apply topically 2 times daily 30 g 1     clobetasol (TEMOVATE) 0.05 % cream APPLY SPARINGLY TO AFFECTED AREA TWICE DAILY FOR 14 DAYS.  DO NOT APPLY TO FACE. 30 g 3     diclofenac (VOLTAREN) 1 % GEL topical gel Apply 2 grams to hands and 4 grams to hips up to 4 times daily. T 9 Tube 11     DULoxetine (CYMBALTA) 60 MG capsule TAKE TWO CAPSULES BY MOUTH DAILY  60 capsule 0     fluticasone-salmeterol (ADVAIR DISKUS) 500-50 MCG/DOSE diskus inhaler INHALE 1 PUFF BY INHALATION ROUTE 2 TIMES PER DAY IN THE MORNING AND EVENING APPROXIMATELY 12 HOURS APART 3 Inhaler 3     hydrocortisone 2.5 % cream APPLY TOPICALLY 2 TIMES DAILY AS NEEDED 30 g 3     Lactobacillus  (ACIDOPHILUS) CAPS Take 1 capsule by mouth daily Take two hours before or after antibiotic dose.  Continue for 1 week after antibiotic therapy completed 60 capsule 0     losartan-hydrochlorothiazide (HYZAAR) 100-12.5 MG tablet TAKE ONE TABLET BY MOUTH ONE TIME DAILY  30 tablet 0     medical cannabis (Patient's own supply.  Not a prescription) 2 mg morning and noon. 7 mg at bedtime. (This is NOT a prescription, and does not certify that the patient has a qualifying medical condition for medical cannabis.  The purpose of this order is  to document that the patient reports taking medical cannabis.) 0 Information only 0     metFORMIN (GLUCOPHAGE) 500 MG tablet TAKE ONE TABLET BY MOUTH EVERY DAY WITH DINNER 30 tablet 0     metoprolol succinate ER (TOPROL-XL) 25 MG 24 hr tablet Take 1/2 tablet per day. May add another 1/2 tab in the evening as needed 30 tablet 0     montelukast (SINGULAIR) 10 MG tablet TAKE ONE TABLET BY MOUTH IN THE EVENING 30 tablet 0     multivitamin w/minerals (MULTI-VITAMIN) tablet Take 1 tablet by mouth daily       mupirocin (BACTROBAN) 2 % external cream Apply to affected area three times daily 60 g 0     nystatin (MYCOSTATIN) 207807 UNIT/ML suspension Take by mouth 4 times daily 380 mL 1     nystatin (MYCOSTATIN) cream Apply to affected area three times daily 60 g 1     RANITIDINE  MG OR TABS ONE TABLET TWICE DAILY 0 0     rizatriptan (MAXALT) 10 MG tablet Take 1 tablet (10 mg) by mouth at onset of headache for migraine May repeat in 2 hours. Max 3 tablets/24 hours. 18 tablet 3     rOPINIRole (REQUIP) 1 MG tablet TAKE TWO OR THREE TABLETS BY MOUTH AT BEDTIME 270 tablet 0     SUMAtriptan (IMITREX) 100 MG tablet take 1 tablet at onset of headache may repeat in 2 hours max 2 tabs/day 18 tablet 1     traZODone (DESYREL) 50 MG tablet TAKE 2-3 TABLETS BY MOUTH AT BEDTIME 270 tablet 0     triamcinolone (KENALOG) 0.5 % external cream Apply topically 2 times daily 60 g 0     ZYRTEC 10 MG OR TABS 1  TABLET DAILY 90 3     albuterol (2.5 MG/3ML) 0.083% neb solution Take 1 vial (2.5 mg) by nebulization every 6 hours as needed (Patient not taking: Reported on 7/19/2019) 1 Box 1     hydrOXYzine (ATARAX) 25 MG tablet Take 1 tablet (25 mg) by mouth every 8 hours as needed for anxiety (Patient not taking: Reported on 7/19/2019) 40 tablet 1     tretinoin (RETIN-A) 0.025 % cream Spread a pea size amount into affected area topically at bedtime.  Use sunscreen SPF>20. (Patient not taking: Reported on 7/19/2019) 45 g 3     Allergies   Allergen Reactions     Cats      and rabbits/wheezing     Dogs      wheezing     Lyrica [Pregabalin] Other (See Comments)     Rash, mouth sores, itching and burning     Seasonal Allergies      rhinits     Recent Labs   Lab Test 12/04/18  1157 05/08/18  1400 05/08/18  1136  06/30/17  1720  02/24/17  1430 03/15/16  1516   A1C  --  5.8*  --   --   --   --   --   --    LDL  --   --  73  --   --   --  100* 78   HDL  --   --  56  --   --   --  70 63   TRIG  --   --  162*  --   --   --  103 188*   ALT 26  --  26  --  25   < >  --   --    CR 0.82  --  0.98  --  0.84   < > 0.80  --    GFRESTIMATED 72  --  58*  --  69   < > 75  --    GFRESTBLACK 87  --  70  --  84   < > >90   GFR Calc    --    POTASSIUM 3.8  --  3.6  --  4.0   < > 3.3*  --    TSH 1.90  --  1.13   < > 1.57  --   --  1.72    < > = values in this interval not displayed.      BP Readings from Last 3 Encounters:   07/19/19 100/70   12/19/18 114/80   07/27/18 132/80    Wt Readings from Last 3 Encounters:   12/19/18 89.1 kg (196 lb 6.4 oz)   07/27/18 92.1 kg (203 lb)   07/11/18 91.2 kg (201 lb)                    Reviewed and updated as needed this visit by Provider         Review of Systems   ROS COMP: CONSTITUTIONAL: NEGATIVE for fever, chills, change in weight; POSITIVE for fatigue   INTEGUMENTARY/SKIN: NEGATIVE for worrisome rashes or moles; POSITIVE for skin unlcer on forehead   ENT/MOUTH: NEGATIVE for ear and throat  problems; POSITIVE for Thrush and mucus   RESP: NEGATIVE for SOB; POSITIVE for coughing and wheezing   CV: NEGATIVE for chest pain or peripheral edema; POSITIVE for palpitations   GI: NEGATIVE for abdominal pain, heartburn, or change in bowel habits; POSITIVE for vomiting   MUSCULOSKELETAL: NEGATIVE for significant arthralgias or myalgia; POSITIVE for right ankle pain, right ankle swelling and foot pain   NEURO: NEGATIVE for weakness or paresthesias; POSITIVE for migraines and dizziness   PSYCHIATRIC: NEGATIVE for changes in mood or affect     This document serves as a record of the services and decisions personally performed and made by Liz Peterson MD. It was created on her behalf by Josie Franz, a trained medical scribe. The creation of this document is based the provider's statements to the medical scribe.    Josie Franz July 19, 2019 1:14 PM        Objective    /70   Pulse 115   Temp 97.2  F (36.2  C) (Temporal)   LMP 07/17/2009   SpO2 97%   There is no height or weight on file to calculate BMI.  Physical Exam   GENERAL: healthy, alert and no distress  EYES: Eyes grossly normal to inspection, PERRL and conjunctivae and sclerae normal  HENT: ear canals and TM's normal, nose and mouth without ulcers or lesions  NECK: no adenopathy, no asymmetry, masses, or scars and thyroid normal to palpation  RESP: lungs clear to auscultation - no rales, rhonchi or wheezes  CV: regular rate and rhythm, normal S1 S2, no S3 or S4, no murmur, click or rub, no peripheral edema and peripheral pulses strong  ABDOMEN: soft, nontender, no hepatosplenomegaly, no masses and bowel sounds normal  MS: no gross musculoskeletal defects noted, no edema  SKIN: no suspicious lesions or rashes, dime sized ulcer noted mid forehead, base is clean with no purulent drainage or surrounding erythema   PSYCH: mentation appears normal, affect normal/bright    Diagnostic Test Results:  No results found. However, due to the size of the  "patient record, not all encounters were searched. Please check Results Review for a complete set of results.        Assessment & Plan     1. Essential hypertension with goal blood pressure less than 140/90  Patient reports blood pressure readings have been \"really low\"- systolic was near 95 at one point.  She reports having lightheadedness.   Discussed plan with patient.  Patient agreed.   Patient will start on lower dosage of losartan-hydrochlorothiazide.  Refills have been ordered.   Patient will come back in 2 weeks for blood pressure check.   If patient notices side effects or blood pressure readings are elevated, she will contact me sooner.   - losartan-hydrochlorothiazide (HYZAAR) 50-12.5 MG tablet; Take 1 tablet by mouth daily  Dispense: 90 tablet; Refill: 1    2. Bronchitis  Patient reports getting \"really sick on Father's Day\"- vomited eight times that day which could have been due to food poisoning.    She reports having low blood pressure and dizziness the following week- systolic was in the 90's.  Patient reports \"almost passing out\" when symptoms noted above occurred.   Patient reports having coughing, wheezing and \"thick green mucus\"- she reports having some chest pain when she coughed and vomited.   She reports a chest x-ray was completed and returned normal- Amoxicillin was prescribed.   Patient reports after Amoxicillin use, symptoms noted above have gotten better.   She reports \"still having mucus and vomiting\". Vomiting is spitting up of mucous  Patient reports she will \"feel better with Amoxicillin use but will get sick again after Amoxicillin is finished\".   She reports using inhaler and Advair daily- \"does not use Advair twice daily due to forgetting\".   She declines fevers or recent chest pains.   Discussed plan with patient.  Patient agreed.   Advised patient that she should utilize her inhaler and Advair twice daily.   If symptoms persist or worsen after utilizing Advair twice daily, " "referral for Pulmonology may be given.     3. Moderate recurrent major depression (H)  Doing well. Well controlled. Tolerating medication.  No change in plan.   Refills have been ordered.     4. Generalized anxiety disorder  Doing well. Well controlled. Tolerating medication.  No change in plan.   Refills have been ordered.   - buPROPion (WELLBUTRIN SR) 150 MG 12 hr tablet; TAKE ONE TABLET BY MOUTH IN THE EVENING  Dispense: 90 tablet; Refill: 0    5. Multiple joint pain  Patient reports having a history of cortisone injections- most recent injection was a year ago.   She reports increasing intake of medical marijuana- has significantly helped pain.   Doing well. Well controlled. Tolerating medication.  No change in plan.     6. Migraine without aura and without status migrainosus, not intractable  Patient reports alternating migraine medication every other day- will take Tylenol on occasion.   Doing well. Well controlled. Tolerating medication.  No change in plan.   Refills have been ordered.   - metoprolol succinate ER (TOPROL-XL) 25 MG 24 hr tablet; Take 1/2 tablet per day. May add another 1/2 tab in the evening as needed  Dispense: 30 tablet; Refill: 0  - rizatriptan (MAXALT) 10 MG tablet; Take 1 tablet (10 mg) by mouth at onset of headache for migraine May repeat in 2 hours. Max 3 tablets/24 hours.  Dispense: 18 tablet; Refill: 3    7. Skin ulcer, limited to breakdown of skin (H)  Patient reports skin ulcer has increased in size since Amoxicillin use.   Discussed plan with patient.  Patient agreed.   Exam showed dime sized ulcer noted mid forehead.  Base is clean with no purulent drainage or surrounding erythema.  Offered patient plastic surgery referral- patient declined at this time and expressed interest to monitor as ulcer is \"healing by itself\".   Advised patient that if she needs plastic surgery referral in the future, she can call me and referral will be placed.     8. Fatigue, unspecified type  Patient " reports feeling fatigued recently.   Discussed plan with patient.  Patient agreed.   Labs have been ordered.   Awaiting results.   - **TSH with free T4 reflex FUTURE anytime  - **CBC with platelets FUTURE anytime  - **Comprehensive metabolic panel FUTURE anytime  - Vitamin D Deficiency    9. Moderate persistent asthma without complication  Doing well. Well controlled. Tolerating medication.  No change in plan.   Refills have been ordered.   Advised patient to use Advair twice daily- compared to once daily currently.   - fluticasone-salmeterol (ADVAIR DISKUS) 500-50 MCG/DOSE inhaler; INHALE 1 PUFF BY INHALATION ROUTE 2 TIMES PER DAY IN THE MORNING AND EVENING APPROXIMATELY 12 HOURS APART  Dispense: 3 Inhaler; Refill: 3  - montelukast (SINGULAIR) 10 MG tablet; TAKE ONE TABLET BY MOUTH IN THE EVENING  Dispense: 90 tablet; Refill: 3    10. Hyperlipidemia LDL goal <130  Doing well. Well controlled. Tolerating medication.  No change in plan.   Refills have been ordered.   - atorvastatin (LIPITOR) 20 MG tablet; Take 1 tablet (20 mg) by mouth daily  Dispense: 90 tablet; Refill: 3    11. Hand arthritis  Doing well. Well controlled. Tolerating medication.  No change in plan.   Refills have been ordered.   - celecoxib (CELEBREX) 200 MG capsule; Take 1 capsule twice daily as needed for pain this is a 3 month script  Dispense: 180 capsule; Refill: 1    12. Acne, unspecified acne type  Doing well. Well controlled. Tolerating medication.  No change in plan.   Refills have been ordered.   - clindamycin (CLINDAMAX) 1 % external gel; Apply topically 2 times daily  Dispense: 30 g; Refill: 6    13. Dermatitis  Doing well. Well controlled. Tolerating medication.  No change in plan.   Refills have been ordered.   - clobetasol (TEMOVATE) 0.05 % external cream; APPLY SPARINGLY TO AFFECTED AREA TWICE DAILY FOR 14 DAYS.  DO NOT APPLY TO FACE.  Dispense: 30 g; Refill: 3  - mupirocin (BACTROBAN) 2 % external cream; Apply to affected area three  times daily  Dispense: 60 g; Refill: 1  - triamcinolone (ARISTOCORT HP) 0.5 % external cream; Apply topically 2 times daily  Dispense: 60 g; Refill: 0    14. Impaired fasting glucose  Doing well. Well controlled. Tolerating medication.  No change in plan.   Refills have been ordered.   - metFORMIN (GLUCOPHAGE) 500 MG tablet; TAKE ONE TABLET BY MOUTH EVERY DAY WITH DINNER  Dispense: 90 tablet; Refill: 1    15. Palpitations  Patient reports heart palpitations have gotten significantly better.   If symptoms worsen or recur, patient will contact me immediately.   Doing well. Well controlled. Tolerating medication.  No change in plan.   Refills have been ordered.   - metoprolol succinate ER (TOPROL-XL) 25 MG 24 hr tablet; Take 1/2 tablet per day. May add another 1/2 tab in the evening as needed  Dispense: 30 tablet; Refill: 0    16. Sinus tachycardia  Doing well. Well controlled. Tolerating medication.  No change in plan.   Refills have been ordered.   - metoprolol succinate ER (TOPROL-XL) 25 MG 24 hr tablet; Take 1/2 tablet per day. May add another 1/2 tab in the evening as needed  Dispense: 30 tablet; Refill: 0    17. Screening for diabetes mellitus  Labs have been ordered.   Patient will come back to have labs completed.   Awaiting results.   - **Comprehensive metabolic panel FUTURE anytime  - **A1C FUTURE anytime    18. Encounter for screening mammogram for breast cancer  Patient is due for Mammogram.   Mammogram order has been placed.  Patient will schedule appointment.   - MA SCREENING DIGITAL BILAT - Future  (s+30); Future     Follow up- Come back in 2 weeks for blood pressure recheck. Then in 6 months.     >50% of this 40 minute visit spent in counseling and plan of care for the above diagnoses          The information in this document, created by the medical scribe for me, accurately reflects the services I personally performed and the decisions made by me. I have reviewed and approved this document for accuracy  prior to leaving the patient care area.    ARVIN MARIN MD, MD  [facility]

## 2019-07-18 ASSESSMENT — ANXIETY QUESTIONNAIRES
3. WORRYING TOO MUCH ABOUT DIFFERENT THINGS: SEVERAL DAYS
5. BEING SO RESTLESS THAT IT IS HARD TO SIT STILL: NOT AT ALL
7. FEELING AFRAID AS IF SOMETHING AWFUL MIGHT HAPPEN: NOT AT ALL
7. FEELING AFRAID AS IF SOMETHING AWFUL MIGHT HAPPEN: NOT AT ALL
6. BECOMING EASILY ANNOYED OR IRRITABLE: NOT AT ALL
GAD7 TOTAL SCORE: 3
2. NOT BEING ABLE TO STOP OR CONTROL WORRYING: SEVERAL DAYS
4. TROUBLE RELAXING: NOT AT ALL
GAD7 TOTAL SCORE: 3
1. FEELING NERVOUS, ANXIOUS, OR ON EDGE: SEVERAL DAYS
GAD7 TOTAL SCORE: 3

## 2019-07-18 ASSESSMENT — PATIENT HEALTH QUESTIONNAIRE - PHQ9
SUM OF ALL RESPONSES TO PHQ QUESTIONS 1-9: 4
10. IF YOU CHECKED OFF ANY PROBLEMS, HOW DIFFICULT HAVE THESE PROBLEMS MADE IT FOR YOU TO DO YOUR WORK, TAKE CARE OF THINGS AT HOME, OR GET ALONG WITH OTHER PEOPLE: SOMEWHAT DIFFICULT
SUM OF ALL RESPONSES TO PHQ QUESTIONS 1-9: 4

## 2019-07-19 ENCOUNTER — OFFICE VISIT (OUTPATIENT)
Dept: FAMILY MEDICINE | Facility: CLINIC | Age: 59
End: 2019-07-19
Payer: COMMERCIAL

## 2019-07-19 VITALS
DIASTOLIC BLOOD PRESSURE: 70 MMHG | OXYGEN SATURATION: 97 % | SYSTOLIC BLOOD PRESSURE: 100 MMHG | HEART RATE: 115 BPM | TEMPERATURE: 97.2 F

## 2019-07-19 DIAGNOSIS — F33.1 MODERATE RECURRENT MAJOR DEPRESSION (H): ICD-10-CM

## 2019-07-19 DIAGNOSIS — L70.9 ACNE, UNSPECIFIED ACNE TYPE: ICD-10-CM

## 2019-07-19 DIAGNOSIS — L98.491 SKIN ULCER, LIMITED TO BREAKDOWN OF SKIN (H): ICD-10-CM

## 2019-07-19 DIAGNOSIS — F41.1 GENERALIZED ANXIETY DISORDER: ICD-10-CM

## 2019-07-19 DIAGNOSIS — J45.40 MODERATE PERSISTENT ASTHMA WITHOUT COMPLICATION: ICD-10-CM

## 2019-07-19 DIAGNOSIS — L30.9 DERMATITIS: ICD-10-CM

## 2019-07-19 DIAGNOSIS — Z13.1 SCREENING FOR DIABETES MELLITUS: ICD-10-CM

## 2019-07-19 DIAGNOSIS — R73.01 IMPAIRED FASTING GLUCOSE: ICD-10-CM

## 2019-07-19 DIAGNOSIS — J40 BRONCHITIS: ICD-10-CM

## 2019-07-19 DIAGNOSIS — R00.0 SINUS TACHYCARDIA: ICD-10-CM

## 2019-07-19 DIAGNOSIS — G43.009 MIGRAINE WITHOUT AURA AND WITHOUT STATUS MIGRAINOSUS, NOT INTRACTABLE: ICD-10-CM

## 2019-07-19 DIAGNOSIS — E78.5 HYPERLIPIDEMIA LDL GOAL <130: ICD-10-CM

## 2019-07-19 DIAGNOSIS — I10 ESSENTIAL HYPERTENSION WITH GOAL BLOOD PRESSURE LESS THAN 140/90: Primary | ICD-10-CM

## 2019-07-19 DIAGNOSIS — R00.2 PALPITATIONS: ICD-10-CM

## 2019-07-19 DIAGNOSIS — M19.049 HAND ARTHRITIS: ICD-10-CM

## 2019-07-19 DIAGNOSIS — M25.50 MULTIPLE JOINT PAIN: ICD-10-CM

## 2019-07-19 DIAGNOSIS — Z12.31 ENCOUNTER FOR SCREENING MAMMOGRAM FOR BREAST CANCER: ICD-10-CM

## 2019-07-19 DIAGNOSIS — R53.83 FATIGUE, UNSPECIFIED TYPE: ICD-10-CM

## 2019-07-19 LAB
ALBUMIN SERPL-MCNC: 3.9 G/DL (ref 3.4–5)
ALP SERPL-CCNC: 146 U/L (ref 40–150)
ALT SERPL W P-5'-P-CCNC: 27 U/L (ref 0–50)
ANION GAP SERPL CALCULATED.3IONS-SCNC: 10 MMOL/L (ref 3–14)
AST SERPL W P-5'-P-CCNC: 26 U/L (ref 0–45)
BILIRUB SERPL-MCNC: 0.3 MG/DL (ref 0.2–1.3)
BUN SERPL-MCNC: 12 MG/DL (ref 7–30)
CALCIUM SERPL-MCNC: 9.2 MG/DL (ref 8.5–10.1)
CHLORIDE SERPL-SCNC: 106 MMOL/L (ref 94–109)
CO2 SERPL-SCNC: 23 MMOL/L (ref 20–32)
CREAT SERPL-MCNC: 0.94 MG/DL (ref 0.52–1.04)
ERYTHROCYTE [DISTWIDTH] IN BLOOD BY AUTOMATED COUNT: 14.1 % (ref 10–15)
GFR SERPL CREATININE-BSD FRML MDRD: 67 ML/MIN/{1.73_M2}
GLUCOSE SERPL-MCNC: 95 MG/DL (ref 70–99)
HBA1C MFR BLD: 5.5 % (ref 0–5.6)
HCT VFR BLD AUTO: 42.1 % (ref 35–47)
HGB BLD-MCNC: 14 G/DL (ref 11.7–15.7)
MCH RBC QN AUTO: 29.7 PG (ref 26.5–33)
MCHC RBC AUTO-ENTMCNC: 33.3 G/DL (ref 31.5–36.5)
MCV RBC AUTO: 89 FL (ref 78–100)
PLATELET # BLD AUTO: 372 10E9/L (ref 150–450)
POTASSIUM SERPL-SCNC: 3.8 MMOL/L (ref 3.4–5.3)
PROT SERPL-MCNC: 8 G/DL (ref 6.8–8.8)
RBC # BLD AUTO: 4.71 10E12/L (ref 3.8–5.2)
SODIUM SERPL-SCNC: 139 MMOL/L (ref 133–144)
TSH SERPL DL<=0.005 MIU/L-ACNC: 0.76 MU/L (ref 0.4–4)
WBC # BLD AUTO: 7.7 10E9/L (ref 4–11)

## 2019-07-19 PROCEDURE — 99215 OFFICE O/P EST HI 40 MIN: CPT | Performed by: FAMILY MEDICINE

## 2019-07-19 PROCEDURE — 36415 COLL VENOUS BLD VENIPUNCTURE: CPT | Performed by: FAMILY MEDICINE

## 2019-07-19 PROCEDURE — 82306 VITAMIN D 25 HYDROXY: CPT | Performed by: FAMILY MEDICINE

## 2019-07-19 PROCEDURE — 84443 ASSAY THYROID STIM HORMONE: CPT | Performed by: FAMILY MEDICINE

## 2019-07-19 PROCEDURE — 85027 COMPLETE CBC AUTOMATED: CPT | Performed by: FAMILY MEDICINE

## 2019-07-19 PROCEDURE — 83036 HEMOGLOBIN GLYCOSYLATED A1C: CPT | Performed by: FAMILY MEDICINE

## 2019-07-19 PROCEDURE — 80053 COMPREHEN METABOLIC PANEL: CPT | Performed by: FAMILY MEDICINE

## 2019-07-19 RX ORDER — BUPROPION HYDROCHLORIDE 200 MG/1
200 TABLET, EXTENDED RELEASE ORAL DAILY
Qty: 90 TABLET | Refills: 0 | Status: CANCELLED | OUTPATIENT
Start: 2019-07-19

## 2019-07-19 RX ORDER — CLINDAMYCIN PHOSPHATE 10 MG/G
GEL TOPICAL 2 TIMES DAILY
Qty: 30 G | Refills: 6 | Status: SHIPPED | OUTPATIENT
Start: 2019-07-19 | End: 2020-02-04

## 2019-07-19 RX ORDER — DULOXETIN HYDROCHLORIDE 60 MG/1
CAPSULE, DELAYED RELEASE ORAL
Qty: 60 CAPSULE | Refills: 0 | Status: CANCELLED | OUTPATIENT
Start: 2019-07-19

## 2019-07-19 RX ORDER — LOSARTAN POTASSIUM AND HYDROCHLOROTHIAZIDE 12.5; 5 MG/1; MG/1
1 TABLET ORAL DAILY
Qty: 90 TABLET | Refills: 1 | Status: SHIPPED | OUTPATIENT
Start: 2019-07-19 | End: 2020-01-21

## 2019-07-19 RX ORDER — MONTELUKAST SODIUM 10 MG/1
TABLET ORAL
Qty: 90 TABLET | Refills: 3 | Status: SHIPPED | OUTPATIENT
Start: 2019-07-19 | End: 2020-06-30

## 2019-07-19 RX ORDER — MUPIROCIN CALCIUM 20 MG/G
CREAM TOPICAL
Qty: 60 G | Refills: 1 | Status: SHIPPED | OUTPATIENT
Start: 2019-07-19 | End: 2019-10-10

## 2019-07-19 RX ORDER — BUPROPION HYDROCHLORIDE 150 MG/1
TABLET, EXTENDED RELEASE ORAL
Qty: 90 TABLET | Refills: 0 | Status: SHIPPED | OUTPATIENT
Start: 2019-07-19 | End: 2019-11-11

## 2019-07-19 RX ORDER — LOSARTAN POTASSIUM AND HYDROCHLOROTHIAZIDE 12.5; 1 MG/1; MG/1
1 TABLET ORAL DAILY
Qty: 30 TABLET | Refills: 0 | Status: CANCELLED | OUTPATIENT
Start: 2019-07-19

## 2019-07-19 RX ORDER — SUMATRIPTAN 100 MG/1
TABLET, FILM COATED ORAL
Qty: 18 TABLET | Refills: 1 | Status: CANCELLED | OUTPATIENT
Start: 2019-07-19

## 2019-07-19 RX ORDER — CELECOXIB 200 MG/1
CAPSULE ORAL
Qty: 180 CAPSULE | Refills: 1 | Status: SHIPPED | OUTPATIENT
Start: 2019-07-19 | End: 2020-02-04

## 2019-07-19 RX ORDER — ATORVASTATIN CALCIUM 20 MG/1
20 TABLET, FILM COATED ORAL DAILY
Qty: 90 TABLET | Refills: 3 | Status: SHIPPED | OUTPATIENT
Start: 2019-07-19 | End: 2020-08-13

## 2019-07-19 RX ORDER — METOPROLOL SUCCINATE 25 MG/1
TABLET, EXTENDED RELEASE ORAL
Qty: 30 TABLET | Refills: 0 | Status: SHIPPED | OUTPATIENT
Start: 2019-07-19 | End: 2019-09-23

## 2019-07-19 RX ORDER — CLOBETASOL PROPIONATE 0.5 MG/G
CREAM TOPICAL
Qty: 30 G | Refills: 3 | Status: SHIPPED | OUTPATIENT
Start: 2019-07-19 | End: 2020-10-14

## 2019-07-19 RX ORDER — RIZATRIPTAN BENZOATE 10 MG/1
10 TABLET ORAL
Qty: 18 TABLET | Refills: 3 | Status: SHIPPED | OUTPATIENT
Start: 2019-07-19 | End: 2020-08-13

## 2019-07-19 RX ORDER — TRIAMCINOLONE ACETONIDE 5 MG/G
CREAM TOPICAL 2 TIMES DAILY
Qty: 60 G | Refills: 0 | Status: SHIPPED | OUTPATIENT
Start: 2019-07-19 | End: 2020-10-14

## 2019-07-19 ASSESSMENT — ASTHMA QUESTIONNAIRES
QUESTION_2 LAST FOUR WEEKS HOW OFTEN HAVE YOU HAD SHORTNESS OF BREATH: ONCE A DAY
QUESTION_3 LAST FOUR WEEKS HOW OFTEN DID YOUR ASTHMA SYMPTOMS (WHEEZING, COUGHING, SHORTNESS OF BREATH, CHEST TIGHTNESS OR PAIN) WAKE YOU UP AT NIGHT OR EARLIER THAN USUAL IN THE MORNING: FOUR OR MORE NIGHTS A WEEK
QUESTION_4 LAST FOUR WEEKS HOW OFTEN HAVE YOU USED YOUR RESCUE INHALER OR NEBULIZER MEDICATION (SUCH AS ALBUTEROL): THREE OR MORE TIMES PER DAY
QUESTION_1 LAST FOUR WEEKS HOW MUCH OF THE TIME DID YOUR ASTHMA KEEP YOU FROM GETTING AS MUCH DONE AT WORK, SCHOOL OR AT HOME: SOME OF THE TIME
QUESTION_5 LAST FOUR WEEKS HOW WOULD YOU RATE YOUR ASTHMA CONTROL: NOT CONTROLLED AT ALL
ACT_TOTALSCORE: 8

## 2019-07-19 ASSESSMENT — PATIENT HEALTH QUESTIONNAIRE - PHQ9: SUM OF ALL RESPONSES TO PHQ QUESTIONS 1-9: 4

## 2019-07-19 ASSESSMENT — ANXIETY QUESTIONNAIRES: GAD7 TOTAL SCORE: 3

## 2019-07-19 ASSESSMENT — PAIN SCALES - GENERAL: PAINLEVEL: MODERATE PAIN (5)

## 2019-07-19 NOTE — LETTER
My Asthma Action Plan  Name: Jacquie Amador   YOB: 1960  Date: 7/19/2019   My doctor: ARVIN MARIN MD, MD   My clinic: [facility]        My Control Medicine: Fluticasone + salmeterol (Advair) -  Diskus 500/50 mcg .  My Rescue Medicine: Albuterol (Proair/Ventolin/Proventil) inhaler     My Asthma Severity: moderate persistent  Avoid your asthma triggers:   upper respiratory infections  humidity            GREEN ZONE   Good Control    I feel good    No cough or wheeze    Can work, sleep and play without asthma symptoms       Take your asthma control medicine every day.     1. If exercise triggers your asthma, take your rescue medication    15 minutes before exercise or sports, and    During exercise if you have asthma symptoms  2. Spacer to use with inhaler: If you have a spacer, make sure to use it with your inhaler             YELLOW ZONE Getting Worse  I have ANY of these:    I do not feel good    Cough or wheeze    Chest feels tight    Wake up at night   1. Keep taking your Green Zone medications  2. Start taking your rescue medicine:    every 20 minutes for up to 1 hour. Then every 4 hours for 24-48 hours.  3. If you stay in the Yellow Zone for more than 12-24 hours, contact your doctor.  4. If you do not return to the Green Zone in 12-24 hours or you get worse, start taking your oral steroid medicine if prescribed by your provider.           RED ZONE Medical Alert - Get Help  I have ANY of these:    I feel awful    Medicine is not helping    Breathing getting harder    Trouble walking or talking    Nose opens wide to breathe       1. Take your rescue medicine NOW  2. If your provider has prescribed an oral steroid medicine, start taking it NOW  3. Call your doctor NOW  4. If you are still in the Red Zone after 20 minutes and you have not reached your doctor:    Take your rescue medicine again and    Call 911 or go to the emergency room right away    See your regular doctor  within 2 weeks of an Emergency Room or Urgent Care visit for follow-up treatment.          Annual Reminders:  Meet with Asthma Educator,  Flu Shot in the Fall, consider Pneumonia Vaccination for patients with asthma (aged 19 and older).    Pharmacy:    [facility] - [place], [place] - [address] [id number] - [place] [address] [place] [facility] [place] [address] [facility] [place]                      Asthma Triggers  How To Control Things That Make Your Asthma Worse    Triggers are things that make your asthma worse.  Look at the list below to help you find your triggers and what you can do about them.  You can help prevent asthma flare-ups by staying away from your triggers.      Trigger                                                          What you can do   Cigarette Smoke  Tobacco smoke can make asthma worse. Do not allow smoking in your home, car or around you.  Be sure no one smokes at a child s day care or school.  If you smoke, ask your health care provider for ways to help you quit.  Ask family members to quit too.  Ask your health care provider for a referral to Quit Plan to help you quit smoking, or call 5-402-345-PLAN.     Colds, Flu, Bronchitis  These are common triggers of asthma. Wash your hands often.  Don t touch your eyes, nose or mouth.  Get a flu shot every year.     Dust Mites  These are tiny bugs that live in cloth or carpet. They are too small to see. Wash sheets and blankets in hot water every week.   Encase pillows and mattress in dust mite proof covers.  Avoid having carpet if you can. If you have carpet, vacuum weekly.   Use a dust mask and HEPA vacuum.   Pollen and Outdoor Mold  Some people are allergic to trees, grass, or weed pollen, or molds. Try to keep your windows closed.  Limit time out doors when pollen count is high.   Ask you health care provider about taking medicine during allergy season.     Animal  Dander  Some people are allergic to skin flakes, urine or saliva from pets with fur or feathers. Keep pets with fur or feathers out of your home.    If you can t keep the pet outdoors, then keep the pet out of your bedroom.  Keep the bedroom door closed.  Keep pets off cloth furniture and away from stuffed toys.     Mice, Rats, and Cockroaches  Some people are allergic to the waste from these pests.   Cover food and garbage.  Clean up spills and food crumbs.  Store grease in the refrigerator.   Keep food out of the bedroom.   Indoor Mold  This can be a trigger if your home has high moisture. Fix leaking faucets, pipes, or other sources of water.   Clean moldy surfaces.  Dehumidify basement if it is damp and smelly.   Smoke, Strong Odors, and Sprays  These can reduce air quality. Stay away from strong odors and sprays, such as perfume, powder, hair spray, paints, smoke incense, paint, cleaning products, candles and new carpet.   Exercise or Sports  Some people with asthma have this trigger. Be active!  Ask your doctor about taking medicine before sports or exercise to prevent symptoms.    Warm up for 5-10 minutes before and after sports or exercise.     Other Triggers of Asthma  Cold air:  Cover your nose and mouth with a scarf.  Sometimes laughing or crying can be a trigger.  Some medicines and food can trigger asthma.

## 2019-07-20 ASSESSMENT — ASTHMA QUESTIONNAIRES: ACT_TOTALSCORE: 8

## 2019-07-22 ENCOUNTER — MYC MEDICAL ADVICE (OUTPATIENT)
Dept: FAMILY MEDICINE | Facility: CLINIC | Age: 59
End: 2019-07-22

## 2019-07-22 LAB — DEPRECATED CALCIDIOL+CALCIFEROL SERPL-MC: 38 UG/L (ref 20–75)

## 2019-07-25 ENCOUNTER — MYC MEDICAL ADVICE (OUTPATIENT)
Dept: FAMILY MEDICINE | Facility: CLINIC | Age: 59
End: 2019-07-25

## 2019-07-26 ENCOUNTER — MYC MEDICAL ADVICE (OUTPATIENT)
Dept: FAMILY MEDICINE | Facility: CLINIC | Age: 59
End: 2019-07-26

## 2019-07-26 NOTE — TELEPHONE ENCOUNTER
"I spoke with the pt who states she is concerned with her blood pressures. She feels light headed. Feels \"off\".     BP Readings from Last 3 Encounters:   07/19/19 100/70   12/19/18 114/80   07/27/18 132/80     Was sick recently. Thinks she had food poisoning. Vomited 8-9 times. Didn't take medications that week.     Has mucus again, greenish in color    Pt will come to clinic today so we can check her blood pressure. Will also review TSH and thyroid information with her.    Laney Medrano, RN, BSN      "

## 2019-08-19 ENCOUNTER — VIRTUAL VISIT (OUTPATIENT)
Dept: FAMILY MEDICINE | Facility: OTHER | Age: 59
End: 2019-08-19

## 2019-08-19 NOTE — PROGRESS NOTES
"Date:   Clinician: Eber Franklin  Clinician NPI: [id number]  Patient: Jacquie Amador  Patient : 1960  Patient Address: [address] [place] [address], [place] [address]  Patient Phone: [phone]  Visit Protocol: UTI  Patient Summary:  Jacquie is a 59 year old ( : 1960 ) female who initiated a Visit for a presumed bladder infection. When asked the question \"Please sign me up to receive news, health information and promotions from [employer].\", Jacquie responded \"No\".   Her symptoms started 4-6 days ago and consist of urinary frequency, vaginal discharge, urgency, urinary incontinence, vaginal itching, dysuria, foul-smelling urine, and feeling as if the bladder is never empty.   Symptom details     Urine color: The color of her urine is yellow.     Vaginal discharge: The discharge is gray in color and is smooth. The discharge is typical for her.      Denied symptoms include chills, vomiting, nausea, flank pain, and abdominal pain. She does not feel feverish.   Over-the-counter medications or home remedies used to relieve the current symptoms as reported by the patient (free text): Cranberry juice   Precipitating events  Jacquie denies having a sexually transmitted disease.  Pertinent medical history  Jacquie has had a bladder infection before but has not had any in the past 12 months. Her current symptoms are similar to her previous bladder infection symptoms.   Cephalexin (Keflex) has been effective in treating her past bladder infections.   Jacquie typically gets a yeast infection when she takes antibiotics. She has used fluconazole (Diflucan) to treat previous yeast infections. 2 doses of fluconazole (Diflucan) has typically been needed for symptoms to resolve in the past.  Jacquie has not been prescribed antibiotics to prevent frequent or repeated bladder infections in the past. She has not experienced problems or side effects with any of the common antibiotics used to treat bladder infections.   " Jacquie does not have a history of kidney stones. She has not used a catheter or been a patient in a hospital or nursing home in the past 2 weeks.   Jacquie does not smoke or use smokeless tobacco.     MEDICATIONS: Wellbutrin SR oral, Celebrex oral, trazodone oral, Cymbalta oral, metformin (bulk), Advair Diskus inhalation, Singulair oral, Zyrtec oral, Flonase Allergy Relief nasal, ALLERGIES: Lyrica  Clinician Response:  Dear Jacquie,  Based on the information you have provided, you likely have an acute urinary tract infection, also called a bladder infection. Bladder infections occur when bacteria from the outside of the body enters the urinary tract. Any part of the urinary system can be infected, but the bladder is the most common.  Medication information  I am prescribing:     Cephalexin (Keflex) 500 mg oral capsule. Take 1 capsule by mouth every 12 hours for 7 days. There are no refills with this prescription.   The medication I prescribed for your bladder infection is an antibiotic. Continue taking the medication until it is gone even if you feel better. If you get an upset stomach while taking antibiotics, taking the medication with food can help.   Because you usually get a yeast infection when taking antibiotics, I am also prescribing:     Fluconazole (Diflucan) 150 mg oral tablet. Take 1 tablet by mouth in a single dose. Repeat dose in 3 days if symptoms are still present. There are no refills with this prescription.   Self care  Urination helps to flush bacteria from the urinary tract. For this reason, drinking water and urinating often helps relieve some urinary symptoms and can decrease your risk of getting bladder infections in the future.  Other steps you can take to prevent future bladder infections include:     Wipe front to back after using the bathroom    Urinate after sexual intercourse    Avoid using deodorant sprays, douches, or powders in the vaginal area     When to seek care  Please make an  appointment to be seen in a clinic or urgent care if any of the following occur:     You develop new symptoms or your symptoms become worse    You have medication side effects that make it difficult to take them as prescribed    Your symptoms do not improve within 1-2 days of starting treatment    You have symptoms of a bladder infection that return shortly after completing treatment     It is possible to have an allergic reaction to an antibiotic even if you have not had one in the past. If you notice a new rash, significant swelling, or difficulty breathing, stop taking this medication immediately and go to a clinic or urgent care.   Diagnosis: Acute uncomplicated bladder infection  Diagnosis ICD: N39.0  Prescription: cephalexin (Keflex) 500 mg oral capsule 14 capsule, 7 days supply. Take 1 capsule by mouth every 12 hours for 7 days. Refills: 0, Refill as needed: no, Allow substitutions: yes  Prescription: fluconazole (Diflucan) 150 mg oral tablet 2 tablet, 4 days supply. Take 1 tablet by mouth in a single dose, repeat dose in 3 days if symptoms are still present. Refills: 0, Refill as needed: no, Allow substitutions: yes  Pharmacy: [id number] - [phone] - [id number], [place] [phone]

## 2019-08-30 ENCOUNTER — MYC MEDICAL ADVICE (OUTPATIENT)
Dept: FAMILY MEDICINE | Facility: CLINIC | Age: 59
End: 2019-08-30

## 2019-09-17 ENCOUNTER — MYC MEDICAL ADVICE (OUTPATIENT)
Dept: FAMILY MEDICINE | Facility: CLINIC | Age: 59
End: 2019-09-17

## 2019-09-17 DIAGNOSIS — Z12.11 SPECIAL SCREENING FOR MALIGNANT NEOPLASMS, COLON: ICD-10-CM

## 2019-09-17 DIAGNOSIS — Z12.31 ENCOUNTER FOR SCREENING MAMMOGRAM FOR BREAST CANCER: Primary | ICD-10-CM

## 2019-09-23 DIAGNOSIS — M25.50 MULTIPLE JOINT PAIN: ICD-10-CM

## 2019-09-23 DIAGNOSIS — G43.009 MIGRAINE WITHOUT AURA AND WITHOUT STATUS MIGRAINOSUS, NOT INTRACTABLE: ICD-10-CM

## 2019-09-23 DIAGNOSIS — R00.0 SINUS TACHYCARDIA: ICD-10-CM

## 2019-09-23 DIAGNOSIS — F51.04 PSYCHOPHYSIOLOGICAL INSOMNIA: ICD-10-CM

## 2019-09-23 DIAGNOSIS — F41.1 GENERALIZED ANXIETY DISORDER: ICD-10-CM

## 2019-09-23 DIAGNOSIS — R00.2 PALPITATIONS: ICD-10-CM

## 2019-09-23 DIAGNOSIS — G25.9 MOVEMENT DISORDER: ICD-10-CM

## 2019-09-23 RX ORDER — DULOXETIN HYDROCHLORIDE 60 MG/1
CAPSULE, DELAYED RELEASE ORAL
Qty: 180 CAPSULE | Refills: 0 | Status: SHIPPED | OUTPATIENT
Start: 2019-09-23 | End: 2019-12-27

## 2019-09-23 RX ORDER — ROPINIROLE 1 MG/1
TABLET, FILM COATED ORAL
Qty: 270 TABLET | Refills: 0 | Status: SHIPPED | OUTPATIENT
Start: 2019-09-23 | End: 2019-12-11

## 2019-09-23 RX ORDER — BUPROPION HYDROCHLORIDE 200 MG/1
200 TABLET, EXTENDED RELEASE ORAL DAILY
Qty: 90 TABLET | Refills: 0 | Status: SHIPPED | OUTPATIENT
Start: 2019-09-23 | End: 2020-01-08

## 2019-09-23 RX ORDER — METOPROLOL SUCCINATE 25 MG/1
TABLET, EXTENDED RELEASE ORAL
Qty: 90 TABLET | Refills: 0 | Status: SHIPPED | OUTPATIENT
Start: 2019-09-23 | End: 2019-12-27

## 2019-09-23 RX ORDER — TRAZODONE HYDROCHLORIDE 50 MG/1
TABLET, FILM COATED ORAL
Qty: 270 TABLET | Refills: 0 | Status: SHIPPED | OUTPATIENT
Start: 2019-09-23 | End: 2019-12-27

## 2019-09-23 NOTE — TELEPHONE ENCOUNTER
Pending Prescriptions:                       Disp   Refills    buPROPion (WELLBUTRIN SR) 200 MG 12 hr ta*90 tab*0            Sig: Take 1 tablet (200 mg) by mouth daily    traZODone (DESYREL) 50 MG tablet [Pharmac*270 ta*0            Sig: TAKE 2-3 TABLETS BY MOUTH AT BEDTIME    rOPINIRole (REQUIP) 1 MG tablet [Pharmacy*270 ta*0            Sig: TAKE TWO OR THREE TABLETS BY MOUTH AT BEDTIME    DULoxetine (CYMBALTA) 60 MG capsule [Phar*60 cap*0            Sig: TAKE TWO CAPSULES BY MOUTH DAILY     metoprolol succinate ER (TOPROL-XL) 25 MG*30 tab*0            Sig: Take 1/2 tablet per day. May add another 1/2 tab           in the evening as needed    Prescription approved per [facility] Refill Protocol.    Laney Medrano, RN, BSN    BP Readings from Last 3 Encounters:   07/19/19 100/70   12/19/18 114/80   07/27/18 132/80

## 2019-09-27 ENCOUNTER — HEALTH MAINTENANCE LETTER (OUTPATIENT)
Age: 59
End: 2019-09-27

## 2019-10-09 ENCOUNTER — MYC MEDICAL ADVICE (OUTPATIENT)
Dept: FAMILY MEDICINE | Facility: CLINIC | Age: 59
End: 2019-10-09

## 2019-10-09 DIAGNOSIS — F41.1 GENERALIZED ANXIETY DISORDER: ICD-10-CM

## 2019-10-09 DIAGNOSIS — L30.9 DERMATITIS: ICD-10-CM

## 2019-10-09 RX ORDER — BUPROPION HYDROCHLORIDE 200 MG/1
200 TABLET, EXTENDED RELEASE ORAL DAILY
Qty: 90 TABLET | Refills: 0 | Status: CANCELLED | OUTPATIENT
Start: 2019-10-09

## 2019-10-09 RX ORDER — BUPROPION HYDROCHLORIDE 200 MG/1
200 TABLET, EXTENDED RELEASE ORAL DAILY
Qty: 90 TABLET | Refills: 0 | OUTPATIENT
Start: 2019-10-09

## 2019-10-09 NOTE — TELEPHONE ENCOUNTER
Pending Prescriptions:                       Disp   Refills    buPROPion (WELLBUTRIN SR) 200 MG 12 hr ta*90 tab*0            Sig: Take 1 tablet (200 mg) by mouth daily    Sent 9/23/2019 with 3 month supply. Refill not appropriate at this time.     Laney Medrano, RN, BSN

## 2019-10-09 NOTE — TELEPHONE ENCOUNTER
Pending Prescriptions:                       Disp   Refills    mupirocin (BACTROBAN) 2 % external cream  60 g   1            Sig: Apply to affected area three times daily    Routing refill request to provider for review/approval because:  Drug not on the [facility] refill protocol     Laney Medrano RN, BSN

## 2019-10-10 RX ORDER — MUPIROCIN CALCIUM 20 MG/G
CREAM TOPICAL
Qty: 60 G | Refills: 1 | Status: SHIPPED | OUTPATIENT
Start: 2019-10-10 | End: 2020-10-14

## 2019-10-22 DIAGNOSIS — Z12.11 SPECIAL SCREENING FOR MALIGNANT NEOPLASMS, COLON: ICD-10-CM

## 2019-10-22 PROCEDURE — 82274 ASSAY TEST FOR BLOOD FECAL: CPT | Performed by: FAMILY MEDICINE

## 2019-10-26 LAB — HEMOCCULT STL QL IA: NEGATIVE

## 2019-10-29 ENCOUNTER — VIRTUAL VISIT (OUTPATIENT)
Dept: FAMILY MEDICINE | Facility: OTHER | Age: 59
End: 2019-10-29

## 2019-10-29 ENCOUNTER — MYC MEDICAL ADVICE (OUTPATIENT)
Dept: FAMILY MEDICINE | Facility: CLINIC | Age: 59
End: 2019-10-29

## 2019-10-29 NOTE — TELEPHONE ENCOUNTER
Patient has not been controlled with Advair in combination with Singulair. She has been on Flovent in the past. Has also been on serevent.

## 2019-10-29 NOTE — TELEPHONE ENCOUNTER
Prior Authorization Retail Medication Request  Medication/Dose:  mometasone-formoterol (DULERA) 200-5 MCG/ACT inhaler    Provider, do you want to change the medication or start a PA?    Please outline justification why patient needs this medication?    What medications has patient previously Tried and Failed?      Insurance ID (if provided): [id number]  Insurance Phone (if provided): [phone]

## 2019-10-29 NOTE — PROGRESS NOTES
"Date: 10/29/2019 14:11:09  Clinician: Charline Hewitt  Clinician NPI: [id number]  Patient: Jacquie Amador  Patient : 1960  Patient Address: [address] [place], [place] [address] [address]  Patient Phone: [phone]  Visit Protocol: URI  Patient Summary:  Jacquie is a 59 year old ( : 1960 ) female who initiated a Visit for cold, sinus infection, or influenza. When asked the question \"Please sign me up to receive news, health information and promotions from [employer].\", Jacquie responded \"No\".    Jacquie states her symptoms started gradually 7-9 days ago.   Her symptoms consist of rhinitis, tooth pain, a headache, malaise, a cough, facial pain or pressure, myalgia, and nasal congestion.   Symptom details     Nasal secretions: The color of her mucus is green and white.    Cough: Jacquie coughs a few times an hour and her cough is more bothersome at night. Phlegm comes into her throat when she coughs. She believes the phlegm causes the cough. The color of the phlegm is green and white.     Facial pain or pressure: The facial pain or pressure feels worse when bending over or leaning forward.     Headache: She states the headache is moderate (4-6 on a 10 point pain scale).     Tooth pain: The tooth pain is not caused by a cavity, recent dental work, or other mouth problems.      Jacquie denies having wheezing, sore throat, fever, enlarged lymph nodes, chills, and ear pain. She also denies taking antibiotic medication for the symptoms, having recent facial or sinus surgery in the past 60 days, and double sickening (worsening symptoms after initial improvement). She is not experiencing dyspnea.   Precipitating events  She has not recently been exposed to someone with influenza. Jacquie has been in close contact with the following high risk individuals: adults 65 or older and children under the age of 5.   Pertinent medical history  Jacquie had 2 sinus infections within the past year.   Jacquie typically gets a " yeast infection when she takes antibiotics. She has used fluconazole (Diflucan) to treat previous yeast infections. 2 doses of fluconazole (Diflucan) has typically been needed for symptoms to resolve in the past.  Weight: 185 lbs   Jacquie does not smoke or use smokeless tobacco.   Additional information as reported by the patient (free text): I use my inhalers twice a day and Flonase daily already     MEDICATIONS: albuterol sulfate inhalation, Wellbutrin SR oral, Celebrex oral, trazodone oral, Cymbalta oral, metformin (bulk), Advair Diskus inhalation, Singulair oral, Zyrtec oral, Flonase Allergy Relief nasal, ALLERGIES: Lyrica  Clinician Response:  Dear Jacquie,  I am sorry you are not feeling well. To determine the most appropriate care for you, I would like you to be seen in person to further discuss your health history and symptoms.  You will not be charged for this Visit. Thank you for trusting us with your care.   Diagnosis: Refer for additional evaluation  Diagnosis ICD: R69

## 2019-10-30 ENCOUNTER — TELEPHONE (OUTPATIENT)
Dept: FAMILY MEDICINE | Facility: CLINIC | Age: 59
End: 2019-10-30

## 2019-10-30 NOTE — TELEPHONE ENCOUNTER
Central Prior Authorization Team   Phone: [phone]    PA Initiation    Medication: mometasone-formoterol (DULERA) 200-5 MCG/ACT inhaler  Insurance Company: [employer] - Phone [phone] Fax [phone]  Pharmacy Filling the Rx: [id number] - [place] - [address] [address]  Filling Pharmacy Phone: [phone]  Filling Pharmacy Fax: [phone]  Start Date: 10/30/2019

## 2019-10-31 NOTE — TELEPHONE ENCOUNTER
Prior Authorization Approval    Authorization Effective Date: 9/30/2019  Authorization Expiration Date: 10/30/2022  Medication: mometasone-formoterol (DULERA) 200-5 MCG/ACT inhaler-APPROVED  Approved Dose/Quantity:    Reference #:     Insurance Company: [employer] - Phone [phone] Fax [phone]  Expected CoPay:       CoPay Card Available:      Foundation Assistance Needed:    Which Pharmacy is filling the prescription (Not needed for infusion/clinic administered): [id number] - [place], [place] - [address]  Pharmacy Notified: Yes  Patient Notified: Yes  **Instructed pharmacy to notify patient when script is ready to /ship.**

## 2019-11-11 DIAGNOSIS — F41.1 GENERALIZED ANXIETY DISORDER: ICD-10-CM

## 2019-11-12 ENCOUNTER — TELEPHONE (OUTPATIENT)
Dept: FAMILY MEDICINE | Facility: CLINIC | Age: 59
End: 2019-11-12

## 2019-11-12 DIAGNOSIS — Z87.19 HISTORY OF ORAL LESIONS: Primary | ICD-10-CM

## 2019-11-12 DIAGNOSIS — F41.1 GENERALIZED ANXIETY DISORDER: ICD-10-CM

## 2019-11-12 RX ORDER — BUPROPION HYDROCHLORIDE 150 MG/1
TABLET, EXTENDED RELEASE ORAL
Qty: 90 TABLET | Refills: 0 | Status: SHIPPED | OUTPATIENT
Start: 2019-11-12 | End: 2020-02-04

## 2019-11-12 NOTE — TELEPHONE ENCOUNTER
Reason for Call:  Medication or medication refill:    Do you use a [facility]?  Name of the pharmacy and phone number for the current request:  [id number] - [place], [place] - [address] [address] [address]    Name of the medication requested: Welbutrin 150 mg    Other request: Needs provider OK    Can we leave a detailed message on this number? YES    Phone number patient can be reached at: Home number on file [phone] (home)    Best Time: any    Call taken on 11/12/2019 at 3:56 PM by Alyssia Storey

## 2019-11-12 NOTE — TELEPHONE ENCOUNTER
Reason for Call:  Other Referral    Detailed comments: Patient stated she gets blood blisters that dont heal, sores in her mouth and other symptoms that have not been able to be explained or diagnosed by a dermatologist or a dentist and would like to know if there is another specialty she could be referred to.     Phone Number Patient can be reached at: Home number on file [phone] (home)    Best Time: any    Can we leave a detailed message on this number? YES    Call taken on 11/12/2019 at 3:58 PM by Alyssia Storey

## 2019-11-13 RX ORDER — BUPROPION HYDROCHLORIDE 150 MG/1
TABLET, EXTENDED RELEASE ORAL
Qty: 90 TABLET | Refills: 0
Start: 2019-11-13

## 2019-11-13 NOTE — TELEPHONE ENCOUNTER
Pending Prescriptions:                       Disp   Refills    buPROPion (WELLBUTRIN SR) 150 MG 12 hr ta*90 tab*0          Sent 11/12/19 with 90 day supply. Refill not appropriate at this time.     Tamia Meeks, RN, BSN

## 2019-11-29 ENCOUNTER — MYC MEDICAL ADVICE (OUTPATIENT)
Dept: FAMILY MEDICINE | Facility: CLINIC | Age: 59
End: 2019-11-29

## 2019-11-29 RX ORDER — ALBUTEROL SULFATE 0.83 MG/ML
2.5 SOLUTION RESPIRATORY (INHALATION) EVERY 6 HOURS PRN
Qty: 1 BOX | Refills: 1 | Status: CANCELLED | OUTPATIENT
Start: 2019-11-29

## 2019-12-02 NOTE — TELEPHONE ENCOUNTER
Pending Prescriptions:                       Disp   Refills    albuterol (PROVENTIL) (2.5 MG/3ML) 0.083% *1 Box  1        Sig: Take 1 vial (2.5 mg) by nebulization every 6 hours as           needed    Routing refill request to provider for review/approval because:  ACT is not at goal  Break in medication

## 2019-12-11 DIAGNOSIS — G25.9 MOVEMENT DISORDER: ICD-10-CM

## 2019-12-11 RX ORDER — ROPINIROLE 1 MG/1
TABLET, FILM COATED ORAL
Qty: 270 TABLET | Refills: 0 | Status: SHIPPED | OUTPATIENT
Start: 2019-12-11 | End: 2020-02-04

## 2019-12-12 NOTE — TELEPHONE ENCOUNTER
Pending Prescriptions:                       Disp   Refills    rOPINIRole (REQUIP) 1 MG tablet [Pharmacy*270 ta*0            Sig: TAKE TWO OR THREE TABLETS BY MOUTH AT BEDTIME    Prescription approved per [facility] Refill Protocol.    Laney Medrano, RN, BSN

## 2019-12-30 DIAGNOSIS — M25.50 MULTIPLE JOINT PAIN: ICD-10-CM

## 2019-12-30 DIAGNOSIS — F41.1 GENERALIZED ANXIETY DISORDER: ICD-10-CM

## 2019-12-31 RX ORDER — DULOXETIN HYDROCHLORIDE 60 MG/1
CAPSULE, DELAYED RELEASE ORAL
Qty: 180 CAPSULE | Refills: 0 | OUTPATIENT
Start: 2019-12-31

## 2019-12-31 RX ORDER — BUPROPION HYDROCHLORIDE 200 MG/1
TABLET, EXTENDED RELEASE ORAL
Qty: 90 TABLET | Refills: 0 | OUTPATIENT
Start: 2019-12-31

## 2019-12-31 RX ORDER — ALPRAZOLAM 0.25 MG
TABLET ORAL
Qty: 60 TABLET | Refills: 0 | OUTPATIENT
Start: 2019-12-31

## 2019-12-31 NOTE — TELEPHONE ENCOUNTER
Pending Prescriptions:                       Disp   Refills    DULoxetine (CYMBALTA) 60 MG capsule [Phar*180 ca*0            Sig: TAKE TWO CAPSULES BY MOUTH DAILY       Sent 12/27/2019 with 3 month supply. Refill not appropriate at this time.       ALPRAZolam (XANAX) 0.25 MG tablet [Pharma*60 tab*0            Sig: TAKE ONE TABLET BY MOUTH DAILY AS NEEDED FOR           ANXIETY    Sent 12/29/2019 with #20. Refill not appropriate at this time.           buPROPion (WELLBUTRIN SR) 200 MG 12 hr ta*90 tab*0            Sig: TAKE ONE TABLET BY MOUTH ONE TIME DAILY     Sent 11/12/2019 with 3 month supply. Refill not appropriate at this time.     Laney Medrano, RN, BSN

## 2020-01-02 ENCOUNTER — MYC MEDICAL ADVICE (OUTPATIENT)
Dept: FAMILY MEDICINE | Facility: CLINIC | Age: 60
End: 2020-01-02

## 2020-01-03 NOTE — TELEPHONE ENCOUNTER
I spoke with the pt. After a chart review I could not find any medications that were not 90 days. Pt states she is taking some different then the bottle says. She did not have them with her at this time. Encouraged her to make sure her chart is accurate when she comes in for OV    Next 5 appointments (look out 90 days)    Jan 21, 2020 10:00 AM CST  [facility] Visit with [facility]  [facility] [address] [address], [address]  [facility] [id number] [phone]        Laney Medrano, MICHELLE, BSN

## 2020-01-07 NOTE — LETTER
2017         RE: Jacquie Amador  [id number] [facility]  [place] [address]        Dear Colleague,    Thank you for referring your patient, Jacquie Amador, to the [facility] [place]. Please see a copy of my visit note below.    Jacquie Amador  :  1960  DOS: 17  MRN: [id number]    Sports Medicine Clinic Visit    PCP: Liz Peterson    Jacquie Amador is a 55 year old Right hand dominant female who is seen in consultation at the request of   presenting with bilateral thumb pain.    Injury: about 5  year(s).  Pain located over bilateral CMC joitns, nonradiating.  Reports constant and with intermittent increases radiating, weakness to pinching.  Additional Features:  Positive: swelling and clicking.  Symptoms are better with injections.  Symptoms are worse with: gripping.  Other evaluation and/or treatments so far consists of: injections, no physical therapy.  Recent imaging completed: X-rays completed 2014 Prior History of related problems: None    Social History: crafting    Interim History - 2016  Since last visit on 2015 patient has mild-moderate discomfort over last 2 weeks.  B/l CMC joint steroid injection completed on 10/12 provided good relief for 5.5 months.  No new injury in the interim.    Interim History - 2016  Since last visit on 3/11/2016 patient has moderate - severe discomfort, that has been worsening over last 1 month.  Pt reports that she had minimal discomfort until she fell on concrete ~ 1 month ago, catching herself with both hands out in front of her.  Denies any severe pain or new swelling immediately following fall, pain gradually worsened since that time.  B/l CMC joint steroid injection completed on 3/11 provided good relief for 3.5 months.  No new injury in the interim.    Interim History - 2017  Since last visit on 16 patient has moderate bilateral thumb pain.  Bilateral thumb CMC  steroid injections completed on 11/11 provided excellent relief for ~ 3.5 months.  Desires repeat injections today prior to leaving for her trip.  No new injury in the interim.    Patient is also being seen in f/u for chronic bilateral lateral hip pain, right>>>left .  Patient was most recently treated for this condition by Dr Cárdenas in 12/2016.  Right trochanteric bursa injection completed at that time provided her with good relief for ~ 2.5 months.  Desires repeat injection today prior to leaving for vacation.    Interim History - November 29, 2017  Since last visit on 03/31/17 patient has moderate bilateral lateral hip pain, right>>>left.  Bilateral hip trochanteric bursa injection completed on 3/31 provided good relief for ~ 5 - 6 months.  Patient reports significantly worsening pain over the last ~ 3 weeks.  No new injury in the interim.    Patient also has secondary complaint of right knee pain over the last ~ 3 months.  Pain located over deep medial knee joint line.  Pain worse with going from sit to stand, walking, and ascending stairs.  Previous h/o similar pain several years ago - cannot recall treatment.    Review of Systems  Musculoskeletal: as above  Remainder of review of systems is negative including constitutional, CV, pulmonary, GI, Skin and Neurologic except as noted in HPI or medical history.    Past Medical History:   Diagnosis Date     ASTHMA - MODERATE PERSISTENT 9/21/2005     Cancer (H)      Chronic pain      Coronary artery disease      CVA (cerebral infarction)      Depressive disorder, not elsewhere classified      Diabetes (H)      Elevated serum alkaline phosphatase level     Liver source     Fibromyalgia      HYPERLIPIDEMIA NEC/NOS 12/29/2006     Hypertriglyceridemia      OA (osteoarthritis)      Thyroid disease      Tobacco use disorder      Tobacco use disorder complicating pregnancy, childbirth, or the puerperium, antepartum condition or complication      Trochanteric bursitis       "Unspecified essential hypertension      Past Surgical History:   Procedure Laterality Date     C  DELIVERY ONLY      , Low Cervical     INJECT EPIDURAL TRANSFORAMINAL  2014    [facility]     INJECT JOINT SACROILIAC  2014    [facility]     LAMINECTOMY LUMBAR ONE LEVEL Left 2014    [facility]     Objective  /85  Ht 5' 6\" (1.676 m)  Wt 201 lb (91.2 kg)  LMP 2009  BMI 32.44 kg/m2    GENERAL APPEARANCE: healthy, alert and no distress   GAIT: NORMAL  SKIN: no suspicious lesions or rashes  NEURO: Normal strength and tone, mentation intact and speech normal  PSYCH:  mentation appears normal and affect normal/bright    Bilateral hip exam    Inspection:        no edema or ecchymosis in hip area    ROM:       Full active and passive ROM       Pain with active adduction over lateral hip and active ER    Strength:        flexion 5/5       extension 5/5       abduction 5/5       adduction 5/5    Tender:        greater trochanter r>>L today       SI joint mild       Piriformis, external rotators mildly    Non Tender:        remainder of hip area       illiac crest       ASIS       pubis    Sensation:        grossly intact in hip and thigh    Skin:       well perfused       capillary refill brisk    Special Tests:        neg (-) LORI       neg (-) FADIR       neg (-) scour       positive (+) Brooks    Right Knee exam    ROM:        Flexion 140 degrees       Extension 0 degrees       Range of motion limited by mild pain in terminal flexion    Inspection:       no visible ecchymosis        effusion noted trace    Skin:       no visible deformities       well perfused       capillary refill brisk    Patellar Motion:        Lateral tilt noted in patella       Crepitus noted in the patellofemoral joint    Tender:        lateral patellar border <       medial joint line    Non Tender:         remainder of knee area        medial patellar " border        lateral joint line        along MCL        distal IT Band        infrapatellar tendon        tibial tubercle       pes anserine bursa    Special Tests:        neg (-) Adeola       neg (-) Lachmans       neg (-) anterior drawer       neg (-) posterior drawer       neg (-) pivot shift       neg (-) varus at 0 deg and 30 deg       neg (-) valgus at 0 deg and 30 deg    Evaluation of ipsilateral kinetic chain       normal strength with hip extension and abduction, but somewhat deconditioned b/l       Decreased strength with single leg squat b/l, R>L    Bilateral Hand Exam  Inspection:Carpals: normal, Thumb: normal  Tender: Carpals: triquetrum, Metacarpals: 1st metacarpal, Thumb: CMC, R>L, but still milder overall than we have seen in the past  Non-tender: Remainder of hand  Range of Motion Thumb: opposition Decreased R>L, flexion MCP decreased, painful, extension MCP decreased, flexion IP decreased, extension IP decreased  Strength: mild decrease in thumb  strength bilaterally  Special tests: Positive scour of CMC, negative snuffbox tenderness   Skin:  well perfused  capillary refill brisk    Procedure  After risks, benefits and complications of steroid injection were discussed with the patient, a consent form was signed and the patient elected to proceed.  Using sterile technique, the area was first prepped with betadyne and an alcohol swab.  A 25 gauge  needle was used to inject a mixture of 40 mg Kenalog, 2 ml of 0.5% marcaine and 2 ml of 1% lidocaine into the greater trochanteric bursa on the right.  The patient tolerated the procedure well without complications.    Sports Medicine Clinic Procedure  Ultrasound Guided Right Intra-Articular Knee Aspiration & Injection    Technique: The risks of the procedure were explained to the patient.  A consent was signed for the intra-articular knee procedure.  The patient was evaluated with a [employer] ultrasound machine using a 12 MHz linear probe.   The  Right knee was prepped and draped in a sterile manner.  Ultrasound identification of the patella, suprapatellar pouch, quadriceps tendon and femur in both long and short axis. The probe was placed in short axis to the Right femur.  A 1.5 inch 22 gauge needle was placed under ultrasound guidance into the superior knee joint.  No fluid was aspirated.   A mixture of 3 ml's of 0.2% ropivacaine and 1 ml kenalog (40mg/ml) was injected without difficulty. The needle was removed and there was good hemostasis without complications.  There was ultrasound documentation of needle placement and injection.  Pre-procedural pain 6/10.  Post procedural pain 3-4/10.    Radiology:  FINGER LEFT TWO OR MORE VIEWS 5/13/2014 11:29 AM   HISTORY: Chronic pain.  COMPARISON: None.   FINDINGS: No fracture or malalignment is identified. There is near  complete joint space loss of the first carpal metacarpal joint with  adjacent subchondral cyst formation in the base of the first  metacarpal. Small osteophytes are also noted. Mild degenerative  changes of the first metacarpal phalangeal joint also noted.  IMPRESSION  IMPRESSION: Degenerative changes of the first carpal metacarpal and of  the first metacarpal phalangeal joints. No fracture.    Assessment:  1. Right knee pain    2. Primary osteoarthritis of right knee    3. Trochanteric bursitis of both hips    4. Primary osteoarthritis of both first carpometacarpal joints        Plan:  Discussed the assessment with the patient.  Follow up: prn  XR images independently visualized and reviewed with patient today in clinic  Small increase in predominantly medial compartment DJD, still mild overall   Reviewed prior hand and hip films today  Defer CMC injections for as long as possible  R troch bursa CSI repeated today  Water therapy and low impact activity options reviewed today  Reviewed again number of steroid injection in the last 2 yrs and importance of limiting overall number as much as  possible  Risks, potential SE reviewed in detail  Expectations and goals of CSI reviewed  Often 2-3 days for steroid effect, and can take up to two weeks for maximum effect  We discussed modified progressive pain-free activity as tolerated  Do not overuse in first two weeks if feeling better due to concern for vulnerability while steroid is working  Supportive care reviewed  All questions were answered today  Contact us with additional questions or concerns  Signs and sx of concern reviewed      José Miguel Linder DO, CAQ  Primary Care Sports Medicine  [facility]         Disclaimer: This note consists of symbols derived from keyboarding, dictation and/or voice recognition software. As a result, there may be errors in the script that have gone undetected. Please consider this when interpreting information found in this chart.      Again, thank you for allowing me to participate in the care of your patient.        Sincerely,        José Miguel Linder DO     - - -

## 2020-01-08 ENCOUNTER — MYC MEDICAL ADVICE (OUTPATIENT)
Dept: FAMILY MEDICINE | Facility: CLINIC | Age: 60
End: 2020-01-08

## 2020-01-08 DIAGNOSIS — F41.1 GENERALIZED ANXIETY DISORDER: ICD-10-CM

## 2020-01-08 RX ORDER — BUPROPION HYDROCHLORIDE 150 MG/1
TABLET, EXTENDED RELEASE ORAL
Qty: 90 TABLET | Refills: 0 | OUTPATIENT
Start: 2020-01-08

## 2020-01-08 RX ORDER — ALPRAZOLAM 0.25 MG
TABLET ORAL
Qty: 20 TABLET | Refills: 0 | OUTPATIENT
Start: 2020-01-08

## 2020-01-08 RX ORDER — BUPROPION HYDROCHLORIDE 200 MG/1
200 TABLET, EXTENDED RELEASE ORAL DAILY
Qty: 90 TABLET | Refills: 0 | Status: SHIPPED | OUTPATIENT
Start: 2020-01-08 | End: 2020-01-09

## 2020-01-09 RX ORDER — BUPROPION HYDROCHLORIDE 200 MG/1
200 TABLET, EXTENDED RELEASE ORAL DAILY
Qty: 90 TABLET | Refills: 0 | Status: SHIPPED | OUTPATIENT
Start: 2020-01-09 | End: 2020-02-04

## 2020-01-09 NOTE — TELEPHONE ENCOUNTER
Responded via [employer], asked pt to callback to discuss increased depression and anxiety. Please triage      Called the pharmacy:    Xanax was picked up 12/31/2019  Wellbutrin 150mg 11/15/2019 evening  Wellbutrin 200mg 9/23/2019 morning    Next 5 appointments (look out 90 days)    Jan 21, 2020 10:00 AM CST  Office Visit with Liz Peterson MD  [facility] [address] [place] [address], [address]  [facility] [address] [phone]        Pending Prescriptions:                       Disp   Refills    buPROPion (WELLBUTRIN SR) 200 MG 12 hr ta*90 tab*0            Sig: Take 1 tablet (200 mg) by mouth daily    Prescription approved per [facility] Refill Protocol.    Laney Medrano, RN, BSN

## 2020-01-09 NOTE — TELEPHONE ENCOUNTER
Patient would like a call from [place]. She has been having trouble with the bupropion 200 mg. Declined another other information

## 2020-01-09 NOTE — TELEPHONE ENCOUNTER
Pending Prescriptions:                       Disp   Refills    ALPRAZolam (XANAX) 0.25 MG tablet [Pharma*20 tab*0            Sig: TAKE ONE TABLET BY MOUTH DAILY AS NEEDED FOR           ANXIETY     Last Written Prescription Date:  12/29/2019  Last Fill Quantity: 20,  # refills: 0   Last office visit: 7/19/2019 with prescribing provider:     Future Office Visit:   Next 5 appointments (look out 90 days)    Jan 21, 2020 10:00 AM CST  Office Visit with Liz Peterson MD  [facility] ([facility] [address] [facility], [address]  [facility] [address]  [phone]             buPROPion (WELLBUTRIN SR) 150 MG 12 hr ta*90 tab*0            Sig: TAKE ONE TABLET BY MOUTH IN THE EVENING     Sent 11/12/2019 with 3 month supply. Refill not appropriate at this time.     Laney Medrano, RN, BSN

## 2020-01-09 NOTE — TELEPHONE ENCOUNTER
Spoke with patient.  States they were having trouble getting medication from [facility].  Would like to sent to [facility] in [place].      Called [facility] and canceled RX. RX sent to [facility] instead.    No further questions or concerns at this time.  Will call back if having troubles getting RX.    Dedrick Noe, RN, BSN

## 2020-01-20 ENCOUNTER — MYC MEDICAL ADVICE (OUTPATIENT)
Dept: FAMILY MEDICINE | Facility: CLINIC | Age: 60
End: 2020-01-20

## 2020-01-20 DIAGNOSIS — R73.01 IMPAIRED FASTING GLUCOSE: ICD-10-CM

## 2020-01-20 DIAGNOSIS — I10 ESSENTIAL HYPERTENSION WITH GOAL BLOOD PRESSURE LESS THAN 140/90: ICD-10-CM

## 2020-01-21 RX ORDER — LOSARTAN POTASSIUM AND HYDROCHLOROTHIAZIDE 12.5; 5 MG/1; MG/1
1 TABLET ORAL DAILY
Qty: 90 TABLET | Refills: 0 | Status: SHIPPED | OUTPATIENT
Start: 2020-01-21 | End: 2020-02-04

## 2020-01-30 NOTE — PROGRESS NOTES
"Subjective     Jacquie Amador is a 59 year old female who presents to clinic today for the following health issues:    History of Present Illness        She eats 0-1 servings of fruits and vegetables daily.She consumes 0 sweetened beverage(s) daily.She exercises with enough effort to increase her heart rate 9 or less minutes per day.  She exercises with enough effort to increase her heart rate 3 or less days per week.   She is taking medications regularly.     Hyperlipidemia Follow-Up      Are you regularly taking any medication or supplement to lower your cholesterol?   Yes- LIPITOR 20 mg    Are you having muscle aches or other side effects that you think could be caused by your cholesterol lowering medication?  No    Hypertension Follow-up      Do you check your blood pressure regularly outside of the clinic? No     Are you following a low salt diet? No    Are your blood pressures ever more than 140 on the top number (systolic) OR more   than 90 on the bottom number (diastolic), for example 140/90? No    Mood  The patient states that she does not like to go anywhere still. She notes that her afternoon dose of Wellbutrin can make her \"feel bad\". She notes that she does not use Xanax daily, but she would like a 3 month supply if possible.   She notes that her mood can play a role in her energy levels.     Skin Ulcer   The patient states that her face is slowly healing. She notes that she does not like to go anywhere in public.   The patient states that she would like the Clindamycin refilled.   She notes that she had an ulcer in her mouth, which has since gotten better.     Asthma   The patient states that the Dulera helps her the most. She notes that she has some Advair on hand.     Cholesterol   The patient states that her Glucose will probably be high, as she ate a lot of sugary foods and gained weight.     Memory difficulty   The patient's  notes that her memory is not as good.  She has occasional " "issues with this. She sometimes has difficulty with word finding.     Fatigued   The patient states that she is extremely fatigued. She notes that she can feel herself \"being more tired than usual\". She states that she never feels good in the afternoon. She reports that \"this can't be normal\".     Multiple joint pain  She has also lowered her THC for her pain, and she has noticed a difference in her overall feeling, which makes her feel better. She states that she has not got a cortisone injection recently.  She states that she uses Celebrex and Voltaren as needed.   She states that she should consult Dr. Linder for her hands and hips.   The patient states Dr. Morales did her botox injections.     Excessive sweating   The patient states that she wants to follow up with someone for her sweating, as it has been a while.     Movement disorder   The patient states that she takes 3 Ropinirole if she is on her feet a lot. She notes that most of the time, this helps her RLS. She states that there are times when she cannot sleep.     Dermatitis   The patient states that she is breaking out with little bumps on her back and near her  scar. She states that the itching can get bad at times.   She reports applying hydrocortisone to area one night, with no relief.     Heart palpitations   The patient states that she takes 1.5 tablets of the Metoprolol. She notes that she feels her heart racing, but when she checks her pulse, it is normal. Reports resting pulse is about  and higher with activity. She has improvement when she takes the 1.5 tablets of metoprolol. Not completely resolved.       Constipation   The patient states that if she takes a probiotic, it will help her constipation.  She reports she is not consistent with this.     Patient Active Problem List   Diagnosis     Moderate persistent asthma     Allergic rhinitis due to other allergen     Esophageal reflux     Moderate recurrent major depression (H)     " Multiple joint pain     HYPERLIPIDEMIA LDL GOAL <130     Hypertension goal BP (blood pressure) < 140/90     MARIZOL (generalized anxiety disorder)     Myalgia     Chronic rhinitis     Constipation     Lumbar disc disease with radiculopathy     Iron deficiency anemia     Osteoarthritis of carpometacarpal joint of thumb - bilateral     Trochanteric bursitis     Right ankle instability     Primary focal hyperhidrosis     Trochanteric bursitis of both hips     Overweight     History of iron deficiency     Scratching     Advanced directives, counseling/discussion     Chronic pain syndrome     Medical marijuana use     Primary osteoarthritis of both first carpometacarpal joints     Arthritis of metatarsophalangeal joint     Sinus tachycardia     Intractable chronic migraine without aura and without status migrainosus     Impaired fasting glucose     Migraine without aura and without status migrainosus, not intractable     Skin ulcer, limited to breakdown of skin (H)     Past Surgical History:   Procedure Laterality Date     C  DELIVERY ONLY      , Low Cervical     INJECT EPIDURAL TRANSFORAMINAL  2014    [facility]     INJECT JOINT SACROILIAC  2014    [facility]     LAMINECTOMY LUMBAR ONE LEVEL Left 2014    [facility]       Social History     Tobacco Use     Smoking status: Former Smoker     Packs/day: 1.00     Types: Cigarettes     Smokeless tobacco: Never Used     Tobacco comment: since    Substance Use Topics     Alcohol use: No     Family History   Problem Relation Age of Onset     Thyroid Disease Mother      Arthritis Mother      Colon Cancer Mother      Thyroid Disease Sister      Arthritis Sister      Lipids Sister      Hypertension Father      Diabetes Father      C.A.D. Father         MI age 75     Cardiovascular Father         heart attack     Cerebrovascular Disease Father      Cancer Father         renal cancer     Arthritis  Father      Heart Disease Father         heart attack     Gastrointestinal Disease Father         liver     Genitourinary Problems Father         kidney     Asthma Daughter      Gastrointestinal Disease Daughter      Unknown Other      Breast Cancer No family hx of      Cancer - colorectal No family hx of      Alzheimer Disease No family hx of      Blood Disease No family hx of      Circulatory No family hx of      Eye Disorder No family hx of      Musculoskeletal Disorder No family hx of      Neurologic Disorder No family hx of      Respiratory No family hx of          Current Outpatient Medications   Medication Sig Dispense Refill     adapalene (DIFFERIN) 0.1 % gel APPLY A THIN LAYER TO THE AFFECTED AREA(S) BY TOPICAL ROUTE ONCE DAILY BEFORE BEDTIME 45 g 10     albuterol (PROVENTIL) (2.5 MG/3ML) 0.083% neb solution Take 1 vial (2.5 mg) by nebulization every 6 hours as needed for shortness of breath / dyspnea or wheezing 1 Box 1     albuterol (VENTOLIN HFA) 108 (90 Base) MCG/ACT Inhaler INHALE 2 PUFFS INTO THE LUNGS EVERY 6 HOURS AS NEEDED FOR SHORTNESS OF BREATH / DYSPNEA 54 g 1     ALPRAZolam (XANAX) 0.25 MG tablet TAKE ONE TABLET BY MOUTH DAILY AS NEEDED FOR ANXIETY 20 tablet 0     atorvastatin (LIPITOR) 20 MG tablet Take 1 tablet (20 mg) by mouth daily 90 tablet 3     buPROPion (WELLBUTRIN SR) 150 MG 12 hr tablet TAKE ONE TABLET BY MOUTH IN THE EVENING 90 tablet 0     buPROPion (WELLBUTRIN SR) 200 MG 12 hr tablet Take 1 tablet (200 mg) by mouth daily 90 tablet 0     celecoxib (CELEBREX) 200 MG capsule Take 1 capsule twice daily as needed for pain this is a 3 month script 180 capsule 1     clindamycin (CLINDAMAX) 1 % external gel Apply topically 2 times daily 30 g 6     clobetasol (TEMOVATE) 0.05 % external cream APPLY SPARINGLY TO AFFECTED AREA TWICE DAILY FOR 14 DAYS.  DO NOT APPLY TO FACE. 30 g 3     diclofenac (VOLTAREN) 1 % GEL topical gel Apply 2 grams to hands and 4 grams to hips up to 4 times daily. T 9  Tube 11     DULoxetine (CYMBALTA) 60 MG capsule TAKE TWO CAPSULES BY MOUTH DAILY  180 capsule 0     fluticasone-salmeterol (ADVAIR DISKUS) 500-50 MCG/DOSE inhaler INHALE 1 PUFF BY INHALATION ROUTE 2 TIMES PER DAY IN THE MORNING AND EVENING APPROXIMATELY 12 HOURS APART 3 Inhaler 3     hydrocortisone 2.5 % cream APPLY TOPICALLY 2 TIMES DAILY AS NEEDED 30 g 3     hydrOXYzine (ATARAX) 25 MG tablet Take 1 tablet (25 mg) by mouth every 8 hours as needed for anxiety 40 tablet 1     Lactobacillus (ACIDOPHILUS) CAPS Take 1 capsule by mouth daily Take two hours before or after antibiotic dose.  Continue for 1 week after antibiotic therapy completed 60 capsule 0     losartan-hydrochlorothiazide (HYZAAR) 50-12.5 MG tablet Take 1 tablet by mouth daily 90 tablet 0     medical cannabis (Patient's own supply.  Not a prescription) 2 mg morning and noon. 7 mg at bedtime. (This is NOT a prescription, and does not certify that the patient has a qualifying medical condition for medical cannabis.  The purpose of this order is  to document that the patient reports taking medical cannabis.) 0 Information only 0     metFORMIN (GLUCOPHAGE) 500 MG tablet TAKE ONE TABLET BY MOUTH EVERY DAY WITH DINNER 90 tablet 0     metoprolol succinate ER (TOPROL-XL) 25 MG 24 hr tablet Take 1/2 tablet per day. May add another 1/2 tab in the evening as needed 90 tablet 1     mometasone-formoterol (DULERA) 200-5 MCG/ACT inhaler Inhale 2 puffs into the lungs 2 times daily 3 Inhaler 1     montelukast (SINGULAIR) 10 MG tablet TAKE ONE TABLET BY MOUTH IN THE EVENING 90 tablet 3     multivitamin w/minerals (MULTI-VITAMIN) tablet Take 1 tablet by mouth daily       mupirocin (BACTROBAN) 2 % external cream Apply to affected area three times daily 60 g 1     nystatin (MYCOSTATIN) 129950 UNIT/ML suspension Take by mouth 4 times daily 380 mL 1     nystatin (MYCOSTATIN) cream Apply to affected area three times daily 60 g 1     RANITIDINE  MG OR TABS ONE TABLET  TWICE DAILY 0 0     rizatriptan (MAXALT) 10 MG tablet Take 1 tablet (10 mg) by mouth at onset of headache for migraine May repeat in 2 hours. Max 3 tablets/24 hours. 18 tablet 3     rOPINIRole (REQUIP) 1 MG tablet TAKE TWO OR THREE TABLETS BY MOUTH AT BEDTIME 270 tablet 0     SUMAtriptan (IMITREX) 100 MG tablet take 1 tablet at onset of headache may repeat in 2 hours max 2 tabs/day 18 tablet 1     traZODone (DESYREL) 50 MG tablet TAKE 2-3 TABLETS BY MOUTH AT BEDTIME 270 tablet 1     tretinoin (RETIN-A) 0.025 % cream Spread a pea size amount into affected area topically at bedtime.  Use sunscreen SPF>20. 45 g 3     triamcinolone (ARISTOCORT HP) 0.5 % external cream Apply topically 2 times daily 60 g 0     ZYRTEC 10 MG OR TABS 1 TABLET DAILY 90 3     Allergies   Allergen Reactions     Cats      and rabbits/wheezing     Dogs      wheezing     Lyrica [Pregabalin] Other (See Comments)     Rash, mouth sores, itching and burning     Seasonal Allergies      rhinits     Recent Labs   Lab Test 07/19/19  1354 12/04/18  1157 05/08/18  1400 05/08/18  1136  02/24/17  1430 03/15/16  1516   A1C 5.5  --  5.8*  --   --   --   --    LDL  --   --   --  73  --  100* 78   HDL  --   --   --  56  --  70 63   TRIG  --   --   --  162*  --  103 188*   ALT 27 26  --  26   < >  --   --    CR 0.94 0.82  --  0.98   < > 0.80  --    GFRESTIMATED 67 72  --  58*   < > 75  --    GFRESTBLACK 77 87  --  70   < > >90   GFR Calc    --    POTASSIUM 3.8 3.8  --  3.6   < > 3.3*  --    TSH 0.76 1.90  --  1.13   < >  --  1.72    < > = values in this interval not displayed.      BP Readings from Last 3 Encounters:   02/04/20 126/88   07/19/19 100/70   12/19/18 114/80    Wt Readings from Last 3 Encounters:   12/19/18 89.1 kg (196 lb 6.4 oz)   07/27/18 92.1 kg (203 lb)   07/11/18 91.2 kg (201 lb)        Reviewed and updated as needed this visit by Provider         Review of Systems   ROS COMP: CONSTITUTIONAL: NEGATIVE for fever, chills, change in  "weight; POSITIVE for fatigue   INTEGUMENTARY/SKIN: NEGATIVE for worrisome moles or lesions; POSITIVE for facial bumps and bumps on back/stomach  ENT/MOUTH: NEGATIVE for ear, mouth and throat problems  RESP: NEGATIVE for significant cough or SOB  CV: NEGATIVE for chest pain, palpitations or peripheral edema  NEURO: NEGATIVE for weakness, dizziness or paresthesias; POSITIVE for memory difficulty   ENDOCRINE: NEGATIVE for temperature intolerance, skin/hair changes; POSITIVE for excessive sweating   PSYCHIATRIC: NEGATIVE for changes in mood or affect    This document serves as a record of the services and decisions personally performed and made by Liz Peterson MD. It was created on her behalf by Josie Franz, a trained medical scribe. The creation of this document is based the provider's statements to the medical scribe.    Josie Franz February 4, 2020 1:20 PM        Objective    /88   Pulse 100   Temp 97.8  F (36.6  C) (Temporal)   Resp 16   Ht 1.676 m (5' 6\")   LMP 07/17/2009 (Exact Date)   SpO2 94%   Breastfeeding No   BMI 31.70 kg/m    Body mass index is 31.7 kg/m .  Physical Exam   GENERAL: healthy, alert and no distress  HENT: ear canals and TM's normal, mouth without ulcers or lesions  NECK: no adenopathy, no asymmetry, masses, or scars and thyroid normal to palpation  RESP: lungs clear to auscultation - no rales, rhonchi or wheezes  CV: regular rate and rhythm, normal S1 S2, no S3 or S4, no murmur, click or rub, no peripheral edema and peripheral pulses strong  ABDOMEN: soft, nontender, no hepatosplenomegaly, no masses and bowel sounds normal  MS: no gross musculoskeletal defects noted, no edema  SKIN: no suspicious lesions or rashes, Multiple shallow ulcers on face, forehead, right cheek times two, and left cheek times 1  PSYCH: mentation appears normal, affect normal/bright    Diagnostic Test Results:  No results found. However, due to the size of the patient record, not all encounters " "were searched. Please check Results Review for a complete set of results.        Assessment & Plan     1. Skin ulcer, limited to breakdown of skin (H)  Patient reports her face is slowly healing- \"does not like to go in public or anywhere\".  She expressed interest as to if there is more that can be done.  Exam showed Multiple shallow ulcers on face, forehead, right cheek times two, and left cheek times 1  Advised patient that multiple routes have been tried, along with extending certain medications with minimal relief.  Patient was advised to make an appointment with Specialty as previously planned.  She reports she is not interested at this time.     2. Moderate recurrent major depression (H)  Doing well. Well controlled. Tolerating medication.  No change in plan.     3. Multiple joint pain  Patient reports not having a cortisone injection recently- would like to have one before trip to [place].   Has been seen by Dr. Linder in the past and reports a good experience.   She would like to return to have help with her MCP arthritis.   Refills have been ordered.   - DULoxetine (CYMBALTA) 60 MG capsule; TAKE TWO CAPSULES BY MOUTH DAILY  Dispense: 180 capsule; Refill: 3    4. Fatigue, unspecified type  Patient reports feeling extremely fatigued- \"can feel herself being more tired than usual\". Noted that her mood can affect her energy levels.  She reports \"never feeling good in afternoon\".  Patient reports lowering dose of THC- has noticed \"feeling slightly better after doing so\".   Discussed plan with patient. Recommend check of labs as noted to evaluate with thyroid issues, check for anemia and iron supplements, check kidney and liver function, and vitamin D deficiency.   Patient agreed.  Labs ordered.  Awaiting results.   - CBC with platelets differential  - Iron and iron binding capacity  - Ferritin  - Comprehensive metabolic panel  - TSH with free T4 reflex  - Vitamin D Deficiency    5. Medical marijuana use  Patient " "has history of- certified elsewhere.   Patient reports lowering dose of THC- noticed improvement in energy levels.     6. Primary osteoarthritis of both first carpometacarpal joints  Patient reports not having a cortisone injection recently- would like to have one before trip to [place].   Orthopedic and Spine referral placed.  Patient will schedule appointment.   - Orthopedic & Spine  Referral; Future    7. Primary focal hyperhidrosis  Per patient request.  Referral has been placed for botox. She has previously had these injections.   Patient will schedule appointment.   - PLASTIC SURGERY REFERRAL    8. Movement disorder  Patient states that she takes 3 Ropinirole daily- does this especially if she is on her feet excessively.   She reports there are times that she cannot sleep due to RLS.   Will check iron level. If iron level low could be causing worsening.   Doing well. Well controlled. Tolerating medication.  No change in plan.   Refills have been ordered.   - rOPINIRole (REQUIP) 1 MG tablet; Take 3 tablets (3 mg) by mouth At Bedtime  Dispense: 270 tablet; Refill: 3    9. Hypertension goal BP (blood pressure) < 140/90  Doing well. Well controlled. Tolerating medication.  No change in plan.     10. Palpitations  Patient reports taking 1.5 tablets of Metoprolol- \"feels like her heart racing but pulse is normal when she checks\".   Discussed plan with patient.  Patient agreed.  Patient will start on higher dose of Metoprolol Succinate ER at 50 MG daily. Patient will contact me with pulse readings from home. Refills will be sent once patient contacts me. Dose adjustment as needed.   - metoprolol succinate ER (TOPROL-XL) 25 MG 24 hr tablet; Take 2 tablets (50 mg) by mouth daily  Dispense: 90 tablet; Refill: 1    11. Sinus tachycardia  See above.  Patient will start on higher dose of Metoprolol Succinate ER at 50 MG daily. Patient will contact me with pulse readings from home. Refills will be sent once patient " "contacts me. Dose adjustment as needed.   - metoprolol succinate ER (TOPROL-XL) 25 MG 24 hr tablet; Take 2 tablets (50 mg) by mouth daily  Dispense: 90 tablet; Refill: 1    12. Migraine without aura and without status migrainosus, not intractable  As above.   Patient will start on higher dose of Metoprolol Succinate ER at 50 MG daily. Patient will contact me with pulse readings from home. Refills will be sent once patient contacts me. Dose adjustment as needed.   - metoprolol succinate ER (TOPROL-XL) 25 MG 24 hr tablet; Take 2 tablets (50 mg) by mouth daily  Dispense: 90 tablet; Refill: 1    13. Impaired fasting glucose  Doing well. Well controlled. Tolerating medication.  No change in plan.   Labs have been ordered.   Awaiting results.   Dose adjustment as needed.   - metFORMIN (GLUCOPHAGE) 500 MG tablet; TAKE ONE TABLET BY MOUTH EVERY DAY WITH DINNER  Dispense: 90 tablet; Refill: 3  - Hemoglobin A1c    14. Dermatitis  Patient reports breaking out with \"little bumps on her back and near  scar\"- \"itches really bad\".  See exam findings noted above.   Hydrocortisone and Clindamycin has been prescribed.   Advised patient that she can apply Hydrocortisone and lotion to area of concern- should do this for one week to see if results are experienced.   If symptoms worsen or persist, patient will contact me.   - hydrocortisone 2.5 % cream; APPLY TOPICALLY 2 TIMES DAILY AS NEEDED  Dispense: 30 g; Refill: 3  - clindamycin (CLINDAMAX) 1 % external gel; Apply topically 2 times daily  Dispense: 30 g; Refill: 4    15. Generalized anxiety disorder  Doing well. Well controlled. Tolerating medication.  No change in plan.   Refills have been ordered.   - buPROPion (WELLBUTRIN SR) 150 MG 12 hr tablet; Take 1 tablet (150 mg) by mouth daily  Dispense: 90 tablet; Refill: 3  - buPROPion (WELLBUTRIN SR) 200 MG 12 hr tablet; Take 1 tablet (200 mg) by mouth daily  Dispense: 90 tablet; Refill: 3    16. Hand arthritis  See above.   - " celecoxib (CELEBREX) 200 MG capsule; Take 1 capsule twice daily as needed for pain this is a 3 month script  Dispense: 180 capsule; Refill: 1      17. Moderate persistent asthma without complication  Doing well. Well controlled. Tolerating medication.  No change in plan.   Refills have been ordered.   - mometasone-formoterol (DULERA) 200-5 MCG/ACT inhaler; Inhale 2 puffs into the lungs 2 times daily  Dispense: 3 Inhaler; Refill: 1    18. Hyperlipidemia LDL goal <130  Labs ordered.  Awaiting results.  Dose adjustment as needed. Refills will be sent once results return.   - Lipid panel reflex to direct LDL Fasting    19. Visit for screening mammogram  Patient is due.   Mammogram order has been placed.  Patient will schedule appointment.   - MA SCREENING DIGITAL BILAT - Future  (s+30); Future     Follow up- Come back in 6 months for physical exam and recheck.     The information in this document, created by the medical scribe for me, accurately reflects the services I personally performed and the decisions made by me. I have reviewed and approved this document for accuracy prior to leaving the patient care area.    >50% of this 49 minute visit spent in counseling and plan of care for the above diagnoses      Liz Peterson MD  [facility]      Answers for HPI/ROS submitted by the patient on 2/4/2020   Chronic problems general questions HPI Form  If you checked off any problems, how difficult have these problems made it for you to do your work, take care of things at home, or get along with other people?: Very difficult  PHQ9 TOTAL SCORE: 10  MARIZOL 7 TOTAL SCORE: 6

## 2020-01-31 ENCOUNTER — VIRTUAL VISIT (OUTPATIENT)
Dept: FAMILY MEDICINE | Facility: OTHER | Age: 60
End: 2020-01-31

## 2020-02-01 NOTE — PROGRESS NOTES
"Date: 2020 18:08:04  Clinician: Jojo Jose  Clinician NPI: [id number]  Patient: Jacquie Amador  Patient : 1960  Patient Address: [address] [place], [place] [address] [address]  Patient Phone: [phone]  Visit Protocol: URI  Patient Summary:  Jacquie is a 59 year old ( : 1960 ) female who initiated a Visit for cold, sinus infection, or influenza. When asked the question \"Please sign me up to receive news, health information and promotions. \", Jacquie responded \"No\".    Jacquie states her symptoms started gradually 7-9 days ago. After her symptoms started, they improved and then got worse again.   Her symptoms consist of a headache, nasal congestion, malaise, rhinitis, tooth pain, a cough, and facial pain or pressure. She is experiencing mild difficulty breathing with activities but can speak normally in full sentences.   Symptom details     Nasal secretions: The color of her mucus is green.    Cough: Jacquie coughs every 5-10 minutes and her cough is more bothersome at night. Phlegm comes into her throat when she coughs. She believes the phlegm causes the cough. The color of the phlegm is green.     Facial pain or pressure: The facial pain or pressure feels worse when bending over or leaning forward.     Headache: She states the headache is moderate (4-6 on a 10 point pain scale).     Tooth pain: The tooth pain is not caused by a cavity, recent dental work, or other mouth problems.      Jacquie denies having sore throat, ear pain, enlarged lymph nodes, chills, fever, wheezing, and myalgias. She also denies taking antibiotic medication for the symptoms and having recent facial or sinus surgery in the past 60 days.   Precipitating events  She has not recently been exposed to someone with influenza. Jacquie has been in close contact with the following high risk individuals: adults 65 or older and children under the age of 5.   Pertinent medical history  Jacquie had 2 sinus infections within the past " year.   Jacquie typically gets a yeast infection when she takes antibiotics. She has used fluconazole (Diflucan) to treat previous yeast infections. 2 doses of fluconazole (Diflucan) has typically been needed for symptoms to resolve in the past.  Weight: 180 lbs   Jacquie does not smoke or use smokeless tobacco.   Additional information as reported by the patient (free text): I don't want this to turn into something worse which I tend to do. I am around 11 month grandson a lot.   Weight: 180 lbs    MEDICATIONS: albuterol sulfate inhalation, Wellbutrin SR oral, Celebrex oral, trazodone oral, Cymbalta oral, metformin (bulk), Advair Diskus inhalation, Singulair oral, Zyrtec oral, Flonase Allergy Relief nasal, ALLERGIES: Lyrica  Clinician Response:  Dear Jacquie,  I am sorry you are not feeling well. To determine the most appropriate care for you, I would like you to be seen in person to further discuss your health history and symptoms.  You will not be charged for this Visit. Thank you for trusting us with your care.   Diagnosis: Refer for additional evaluation  Diagnosis ICD: R69

## 2020-02-03 ENCOUNTER — MYC MEDICAL ADVICE (OUTPATIENT)
Dept: FAMILY MEDICINE | Facility: CLINIC | Age: 60
End: 2020-02-03

## 2020-02-04 ENCOUNTER — MYC MEDICAL ADVICE (OUTPATIENT)
Dept: FAMILY MEDICINE | Facility: CLINIC | Age: 60
End: 2020-02-04

## 2020-02-04 ENCOUNTER — OFFICE VISIT (OUTPATIENT)
Dept: FAMILY MEDICINE | Facility: CLINIC | Age: 60
End: 2020-02-04
Payer: COMMERCIAL

## 2020-02-04 VITALS
SYSTOLIC BLOOD PRESSURE: 126 MMHG | OXYGEN SATURATION: 94 % | RESPIRATION RATE: 16 BRPM | DIASTOLIC BLOOD PRESSURE: 88 MMHG | HEART RATE: 100 BPM | TEMPERATURE: 97.8 F | HEIGHT: 66 IN | BODY MASS INDEX: 31.7 KG/M2

## 2020-02-04 DIAGNOSIS — Z79.899 MEDICAL MARIJUANA USE: ICD-10-CM

## 2020-02-04 DIAGNOSIS — Z12.31 VISIT FOR SCREENING MAMMOGRAM: ICD-10-CM

## 2020-02-04 DIAGNOSIS — J45.40 MODERATE PERSISTENT ASTHMA WITHOUT COMPLICATION: ICD-10-CM

## 2020-02-04 DIAGNOSIS — F33.1 MODERATE RECURRENT MAJOR DEPRESSION (H): ICD-10-CM

## 2020-02-04 DIAGNOSIS — L30.9 DERMATITIS: ICD-10-CM

## 2020-02-04 DIAGNOSIS — L74.519 PRIMARY FOCAL HYPERHIDROSIS: ICD-10-CM

## 2020-02-04 DIAGNOSIS — G25.9 MOVEMENT DISORDER: ICD-10-CM

## 2020-02-04 DIAGNOSIS — R73.01 IMPAIRED FASTING GLUCOSE: ICD-10-CM

## 2020-02-04 DIAGNOSIS — R53.83 FATIGUE, UNSPECIFIED TYPE: ICD-10-CM

## 2020-02-04 DIAGNOSIS — I10 HYPERTENSION GOAL BP (BLOOD PRESSURE) < 140/90: ICD-10-CM

## 2020-02-04 DIAGNOSIS — L98.491 SKIN ULCER, LIMITED TO BREAKDOWN OF SKIN (H): Primary | ICD-10-CM

## 2020-02-04 DIAGNOSIS — I10 ESSENTIAL HYPERTENSION WITH GOAL BLOOD PRESSURE LESS THAN 140/90: ICD-10-CM

## 2020-02-04 DIAGNOSIS — E78.5 HYPERLIPIDEMIA LDL GOAL <130: ICD-10-CM

## 2020-02-04 DIAGNOSIS — M19.049 HAND ARTHRITIS: ICD-10-CM

## 2020-02-04 DIAGNOSIS — M18.0 PRIMARY OSTEOARTHRITIS OF BOTH FIRST CARPOMETACARPAL JOINTS: ICD-10-CM

## 2020-02-04 DIAGNOSIS — R00.0 SINUS TACHYCARDIA: ICD-10-CM

## 2020-02-04 DIAGNOSIS — F41.1 GENERALIZED ANXIETY DISORDER: ICD-10-CM

## 2020-02-04 DIAGNOSIS — G43.009 MIGRAINE WITHOUT AURA AND WITHOUT STATUS MIGRAINOSUS, NOT INTRACTABLE: ICD-10-CM

## 2020-02-04 DIAGNOSIS — R00.2 PALPITATIONS: ICD-10-CM

## 2020-02-04 DIAGNOSIS — M25.50 MULTIPLE JOINT PAIN: ICD-10-CM

## 2020-02-04 LAB
ALBUMIN SERPL-MCNC: 3.9 G/DL (ref 3.4–5)
ALP SERPL-CCNC: 129 U/L (ref 40–150)
ALT SERPL W P-5'-P-CCNC: 23 U/L (ref 0–50)
ANION GAP SERPL CALCULATED.3IONS-SCNC: 6 MMOL/L (ref 3–14)
AST SERPL W P-5'-P-CCNC: 18 U/L (ref 0–45)
BASOPHILS # BLD AUTO: 0 10E9/L (ref 0–0.2)
BASOPHILS NFR BLD AUTO: 0.5 %
BILIRUB SERPL-MCNC: 0.3 MG/DL (ref 0.2–1.3)
BUN SERPL-MCNC: 14 MG/DL (ref 7–30)
CALCIUM SERPL-MCNC: 9.3 MG/DL (ref 8.5–10.1)
CHLORIDE SERPL-SCNC: 108 MMOL/L (ref 94–109)
CHOLEST SERPL-MCNC: 166 MG/DL
CO2 SERPL-SCNC: 25 MMOL/L (ref 20–32)
CREAT SERPL-MCNC: 0.9 MG/DL (ref 0.52–1.04)
DIFFERENTIAL METHOD BLD: NORMAL
EOSINOPHIL # BLD AUTO: 0.4 10E9/L (ref 0–0.7)
EOSINOPHIL NFR BLD AUTO: 6.2 %
ERYTHROCYTE [DISTWIDTH] IN BLOOD BY AUTOMATED COUNT: 14.4 % (ref 10–15)
FERRITIN SERPL-MCNC: 12 NG/ML (ref 8–252)
GFR SERPL CREATININE-BSD FRML MDRD: 69 ML/MIN/{1.73_M2}
GLUCOSE SERPL-MCNC: 87 MG/DL (ref 70–99)
HBA1C MFR BLD: 5.5 % (ref 0–5.6)
HCT VFR BLD AUTO: 37.9 % (ref 35–47)
HDLC SERPL-MCNC: 66 MG/DL
HGB BLD-MCNC: 12.6 G/DL (ref 11.7–15.7)
IRON SATN MFR SERPL: 16 % (ref 15–46)
IRON SERPL-MCNC: 63 UG/DL (ref 35–180)
LDLC SERPL CALC-MCNC: 78 MG/DL
LYMPHOCYTES # BLD AUTO: 2.2 10E9/L (ref 0.8–5.3)
LYMPHOCYTES NFR BLD AUTO: 32.7 %
MCH RBC QN AUTO: 28.7 PG (ref 26.5–33)
MCHC RBC AUTO-ENTMCNC: 33.2 G/DL (ref 31.5–36.5)
MCV RBC AUTO: 86 FL (ref 78–100)
MONOCYTES # BLD AUTO: 0.5 10E9/L (ref 0–1.3)
MONOCYTES NFR BLD AUTO: 7.4 %
NEUTROPHILS # BLD AUTO: 3.5 10E9/L (ref 1.6–8.3)
NEUTROPHILS NFR BLD AUTO: 53.2 %
NONHDLC SERPL-MCNC: 100 MG/DL
PLATELET # BLD AUTO: 329 10E9/L (ref 150–450)
POTASSIUM SERPL-SCNC: 3.6 MMOL/L (ref 3.4–5.3)
PROT SERPL-MCNC: 7.9 G/DL (ref 6.8–8.8)
RBC # BLD AUTO: 4.39 10E12/L (ref 3.8–5.2)
SODIUM SERPL-SCNC: 139 MMOL/L (ref 133–144)
TIBC SERPL-MCNC: 388 UG/DL (ref 240–430)
TRIGL SERPL-MCNC: 108 MG/DL
TSH SERPL DL<=0.005 MIU/L-ACNC: 1.27 MU/L (ref 0.4–4)
WBC # BLD AUTO: 6.6 10E9/L (ref 4–11)

## 2020-02-04 PROCEDURE — 99215 OFFICE O/P EST HI 40 MIN: CPT | Performed by: FAMILY MEDICINE

## 2020-02-04 PROCEDURE — 83036 HEMOGLOBIN GLYCOSYLATED A1C: CPT | Performed by: FAMILY MEDICINE

## 2020-02-04 PROCEDURE — 80061 LIPID PANEL: CPT | Performed by: FAMILY MEDICINE

## 2020-02-04 PROCEDURE — 83540 ASSAY OF IRON: CPT | Performed by: FAMILY MEDICINE

## 2020-02-04 PROCEDURE — 96127 BRIEF EMOTIONAL/BEHAV ASSMT: CPT | Performed by: FAMILY MEDICINE

## 2020-02-04 PROCEDURE — 82728 ASSAY OF FERRITIN: CPT | Performed by: FAMILY MEDICINE

## 2020-02-04 PROCEDURE — 36415 COLL VENOUS BLD VENIPUNCTURE: CPT | Performed by: FAMILY MEDICINE

## 2020-02-04 PROCEDURE — 80050 GENERAL HEALTH PANEL: CPT | Performed by: FAMILY MEDICINE

## 2020-02-04 PROCEDURE — 83550 IRON BINDING TEST: CPT | Performed by: FAMILY MEDICINE

## 2020-02-04 PROCEDURE — 82306 VITAMIN D 25 HYDROXY: CPT | Performed by: FAMILY MEDICINE

## 2020-02-04 RX ORDER — DULOXETIN HYDROCHLORIDE 60 MG/1
CAPSULE, DELAYED RELEASE ORAL
Qty: 180 CAPSULE | Refills: 3 | Status: SHIPPED | OUTPATIENT
Start: 2020-02-04 | End: 2020-12-02

## 2020-02-04 RX ORDER — BUPROPION HYDROCHLORIDE 200 MG/1
200 TABLET, EXTENDED RELEASE ORAL DAILY
Qty: 90 TABLET | Refills: 3 | Status: SHIPPED | OUTPATIENT
Start: 2020-02-04 | End: 2020-12-02

## 2020-02-04 RX ORDER — CLINDAMYCIN PHOSPHATE 10 MG/G
GEL TOPICAL 2 TIMES DAILY
Qty: 30 G | Refills: 4 | Status: SHIPPED | OUTPATIENT
Start: 2020-02-04 | End: 2020-12-01

## 2020-02-04 RX ORDER — CELECOXIB 200 MG/1
CAPSULE ORAL
Qty: 180 CAPSULE | Refills: 1 | Status: SHIPPED | OUTPATIENT
Start: 2020-02-04 | End: 2020-12-02

## 2020-02-04 RX ORDER — METOPROLOL SUCCINATE 25 MG/1
50 TABLET, EXTENDED RELEASE ORAL DAILY
Qty: 90 TABLET | Refills: 1
Start: 2020-02-04 | End: 2020-02-14

## 2020-02-04 RX ORDER — HYDROCORTISONE 2.5 %
CREAM (GRAM) TOPICAL
Qty: 30 G | Refills: 3 | Status: SHIPPED | OUTPATIENT
Start: 2020-02-04 | End: 2020-10-14

## 2020-02-04 RX ORDER — LOSARTAN POTASSIUM AND HYDROCHLOROTHIAZIDE 12.5; 5 MG/1; MG/1
1 TABLET ORAL DAILY
Qty: 90 TABLET | Refills: 3 | Status: SHIPPED | OUTPATIENT
Start: 2020-02-04 | End: 2020-12-02

## 2020-02-04 RX ORDER — BUPROPION HYDROCHLORIDE 150 MG/1
150 TABLET, EXTENDED RELEASE ORAL DAILY
Qty: 90 TABLET | Refills: 3 | Status: SHIPPED | OUTPATIENT
Start: 2020-02-04 | End: 2020-10-15

## 2020-02-04 RX ORDER — ROPINIROLE 1 MG/1
3 TABLET, FILM COATED ORAL AT BEDTIME
Qty: 270 TABLET | Refills: 3 | Status: SHIPPED | OUTPATIENT
Start: 2020-02-04 | End: 2020-12-01

## 2020-02-04 ASSESSMENT — ANXIETY QUESTIONNAIRES
GAD7 TOTAL SCORE: 6
4. TROUBLE RELAXING: NOT AT ALL
GAD7 TOTAL SCORE: 6
7. FEELING AFRAID AS IF SOMETHING AWFUL MIGHT HAPPEN: NOT AT ALL
1. FEELING NERVOUS, ANXIOUS, OR ON EDGE: SEVERAL DAYS
5. BEING SO RESTLESS THAT IT IS HARD TO SIT STILL: NOT AT ALL
GAD7 TOTAL SCORE: 6
6. BECOMING EASILY ANNOYED OR IRRITABLE: NEARLY EVERY DAY
2. NOT BEING ABLE TO STOP OR CONTROL WORRYING: SEVERAL DAYS
3. WORRYING TOO MUCH ABOUT DIFFERENT THINGS: SEVERAL DAYS
7. FEELING AFRAID AS IF SOMETHING AWFUL MIGHT HAPPEN: NOT AT ALL

## 2020-02-04 ASSESSMENT — PATIENT HEALTH QUESTIONNAIRE - PHQ9
10. IF YOU CHECKED OFF ANY PROBLEMS, HOW DIFFICULT HAVE THESE PROBLEMS MADE IT FOR YOU TO DO YOUR WORK, TAKE CARE OF THINGS AT HOME, OR GET ALONG WITH OTHER PEOPLE: VERY DIFFICULT
SUM OF ALL RESPONSES TO PHQ QUESTIONS 1-9: 10
SUM OF ALL RESPONSES TO PHQ QUESTIONS 1-9: 10

## 2020-02-04 NOTE — PATIENT INSTRUCTIONS
You can start taking 50 MG of Metoprolol daily. Contact me in regards to your pulse readings. I will send refills once I hear back from you.    You can apply Hydrocortisone and lotion to the area of concern on your abdomen- do this for one week to see if symptoms relieve.

## 2020-02-05 LAB — DEPRECATED CALCIDIOL+CALCIFEROL SERPL-MC: 31 UG/L (ref 20–75)

## 2020-02-05 ASSESSMENT — ANXIETY QUESTIONNAIRES: GAD7 TOTAL SCORE: 6

## 2020-02-05 ASSESSMENT — ASTHMA QUESTIONNAIRES: ACT_TOTALSCORE: 19

## 2020-02-12 ENCOUNTER — OFFICE VISIT (OUTPATIENT)
Dept: ORTHOPEDICS | Facility: CLINIC | Age: 60
End: 2020-02-12
Payer: COMMERCIAL

## 2020-02-12 VITALS — HEIGHT: 66 IN | BODY MASS INDEX: 31.7 KG/M2 | SYSTOLIC BLOOD PRESSURE: 126 MMHG | DIASTOLIC BLOOD PRESSURE: 84 MMHG

## 2020-02-12 DIAGNOSIS — M18.0 PRIMARY OSTEOARTHRITIS OF BOTH FIRST CARPOMETACARPAL JOINTS: Primary | ICD-10-CM

## 2020-02-12 DIAGNOSIS — M70.61 TROCHANTERIC BURSITIS OF BOTH HIPS: ICD-10-CM

## 2020-02-12 DIAGNOSIS — M70.62 TROCHANTERIC BURSITIS OF BOTH HIPS: ICD-10-CM

## 2020-02-12 PROCEDURE — 99213 OFFICE O/P EST LOW 20 MIN: CPT | Mod: 25 | Performed by: FAMILY MEDICINE

## 2020-02-12 PROCEDURE — 20610 DRAIN/INJ JOINT/BURSA W/O US: CPT | Mod: 50 | Performed by: FAMILY MEDICINE

## 2020-02-12 RX ORDER — TRIAMCINOLONE ACETONIDE 40 MG/ML
40 INJECTION, SUSPENSION INTRA-ARTICULAR; INTRAMUSCULAR
Status: DISCONTINUED | OUTPATIENT
Start: 2020-02-12 | End: 2020-05-06 | Stop reason: ALTCHOICE

## 2020-02-12 RX ORDER — LIDOCAINE HYDROCHLORIDE 10 MG/ML
2 INJECTION, SOLUTION INFILTRATION; PERINEURAL
Status: DISCONTINUED | OUTPATIENT
Start: 2020-02-12 | End: 2020-05-06 | Stop reason: ALTCHOICE

## 2020-02-12 RX ORDER — BUPIVACAINE HYDROCHLORIDE 5 MG/ML
2 INJECTION, SOLUTION EPIDURAL; INTRACAUDAL
Status: DISCONTINUED | OUTPATIENT
Start: 2020-02-12 | End: 2020-05-06 | Stop reason: ALTCHOICE

## 2020-02-12 RX ADMIN — LIDOCAINE HYDROCHLORIDE 2 ML: 10 INJECTION, SOLUTION INFILTRATION; PERINEURAL at 17:30

## 2020-02-12 RX ADMIN — TRIAMCINOLONE ACETONIDE 40 MG: 40 INJECTION, SUSPENSION INTRA-ARTICULAR; INTRAMUSCULAR at 17:30

## 2020-02-12 RX ADMIN — BUPIVACAINE HYDROCHLORIDE 2 ML: 5 INJECTION, SOLUTION EPIDURAL; INTRACAUDAL at 17:30

## 2020-02-12 NOTE — PROGRESS NOTES
Jacquie Amador  :  1960  DOS: 20  MRN: [id number]    Sports Medicine Clinic Visit    PCP: Liz Peterson    Jacquie Amador is a 55 year old Right hand dominant female who is seen in follow up for her chronic bilateral hip and bilateral thumb CMC joint pain.      Interim History - 2018  Since last visit on 2018 patient has moderate-severe bilateral hip pain.  Bilateral hip trochanteric bursa injection completed on  provided good relief for ~ 2.5 - 3 months.  Patient is desiring repeat injections today.  No new injury in the interim.    She has continued to have pain in bilateral thumbs over last ~ 6 months.  Unable to repeat injections d/t her schedule, then developing secondary infection.  Recently discharged from Pain Mgmt clinic.    Interim History - 2020  Since last visit on 18 patient has moderate - severe bilateral lateral hip and bilateral thumb/CMC joint pain.  Bilateral hip trochanteric bursa injections completed on 18 provided good relief for ~ one year.  Patient notes that pain has been worsening over the past ~ 6+ months.  Bilateral thumb CMC joint steroid injections last completed 18 provided good relief for ~ 6 months.  Patient is now unable to grasp objects without significant pain and weakness.  No new injury in the interim.      Review of Systems  Musculoskeletal: as above  Remainder of review of systems is negative including constitutional, CV, pulmonary, GI, Skin and Neurologic except as noted in HPI or medical history.    Past Medical History:   Diagnosis Date     ASTHMA - MODERATE PERSISTENT 2005     Cancer (H)      Chronic pain      Coronary artery disease      CVA (cerebral infarction)      Depressive disorder, not elsewhere classified      Diabetes (H)      Elevated serum alkaline phosphatase level     Liver source     Fibromyalgia      HYPERLIPIDEMIA NEC/NOS 2006     Hypertriglyceridemia      OA (osteoarthritis)   "    Thyroid disease      Tobacco use disorder      Tobacco use disorder complicating pregnancy, childbirth, or the puerperium, antepartum condition or complication      Trochanteric bursitis      Unspecified essential hypertension      Past Surgical History:   Procedure Laterality Date     C  DELIVERY ONLY      , Low Cervical     INJECT EPIDURAL TRANSFORAMINAL  2014    [facility]     INJECT JOINT SACROILIAC  2014    [facility]     LAMINECTOMY LUMBAR ONE LEVEL Left 2014    [facility]     Objective  /84   Ht 1.676 m (5' 6\")   LMP 2009 (Exact Date)   BMI 31.70 kg/m      GENERAL APPEARANCE: healthy, alert and no distress   GAIT: NORMAL  SKIN: no suspicious lesions or rashes  NEURO: Normal strength and tone, mentation intact and speech normal  PSYCH:  mentation appears normal and affect normal/bright    Bilateral hip exam    Inspection:        no edema or ecchymosis in hip area    ROM:       Full active and passive ROM       Pain with active adduction over lateral hip and active ER    Strength:        flexion 5/5       extension 5/5       abduction 5/5       adduction 5/5    Tender:        greater trochanter r>>L today       SI joint mild       Piriformis, external rotators mildly    Non Tender:        remainder of hip area       illiac crest       ASIS       pubis    Sensation:        grossly intact in hip and thigh    Skin:       well perfused       capillary refill brisk    Special Tests:        neg (-) LORI       neg (-) FADIR       neg (-) scour       positive (+) Brooks    Bilateral Hand Exam  Inspection:Carpals: normal, Thumb: normal  Tender: Carpals: triquetrum, Metacarpals: 1st metacarpal, Thumb: CMC, R>L, but still milder overall than we have seen in the past  Non-tender: Remainder of hand  Range of Motion Thumb: opposition Decreased R>L, flexion MCP decreased, painful, extension MCP decreased, flexion IP " decreased, extension IP decreased  Strength: mild decrease in thumb  strength bilaterally  Special tests: Positive scour of CMC, negative snuffbox tenderness   Skin:  well perfused  capillary refill brisk    Large Joint Injection/Arthocentesis: bilateral greater trochanteric bursa  Date/Time: 2/12/2020 5:30 PM  Performed by: José Miguel Linder DO  Authorized by: José Miguel Linder DO     Indications:  Pain  Needle Size:  25 G  Guidance: landmark guided    Approach:  Posterolateral  Location:  Hip  Laterality:  Bilateral      Site:  Bilateral greater trochanteric bursa  Medications (Right):  2 mL lidocaine 1 %; 2 mL bupivacaine (PF) 0.5 %; 40 mg triamcinolone 40 MG/ML  Medications (Left):  2 mL lidocaine 1 %; 2 mL bupivacaine (PF) 0.5 %; 40 mg triamcinolone 40 MG/ML  Outcome:  Tolerated well, no immediate complications  Procedure discussed: discussed risks, benefits, and alternatives    Consent Given by:  Patient  Timeout: timeout called immediately prior to procedure    Prep: patient was prepped and draped in usual sterile fashion          Radiology:  FINGER LEFT TWO OR MORE VIEWS 5/13/2014 11:29 AM   HISTORY: Chronic pain.  COMPARISON: None.   FINDINGS: No fracture or malalignment is identified. There is near  complete joint space loss of the first carpal metacarpal joint with  adjacent subchondral cyst formation in the base of the first  metacarpal. Small osteophytes are also noted. Mild degenerative  changes of the first metacarpal phalangeal joint also noted.  IMPRESSION  IMPRESSION: Degenerative changes of the first carpal metacarpal and of  the first metacarpal phalangeal joints. No fracture.    Assessment:  1. Primary osteoarthritis of both first carpometacarpal joints    2. Trochanteric bursitis of both hips        Plan:  Discussed the assessment with the patient.  Follow up: prn  XR images independently visualized and reviewed with patient again today in clinic  Reviewed prior hand and hip  films today  Defer CMC injections for as long as possible, reviewed PRP option in detail again today  B/l troch bursa CSI repeated today  Water therapy and low impact activity options reviewed   Reviewed again number of steroid injection over time and importance of limiting overall number as much as possible  Risks, potential SE reviewed in detail  F/u in minimum of 2 weeks for b/l CMC CSI  Expectations and goals of CSI reviewed  Often 2-3 days for steroid effect, and can take up to two weeks for maximum effect  We discussed modified progressive pain-free activity as tolerated  Do not overuse in first two weeks if feeling better due to concern for vulnerability while steroid is working  Supportive care reviewed  All questions were answered today  Contact us with additional questions or concerns  Signs and sx of concern reviewed      José Miguel Linder DO, CAQ  Primary Care Sports Medicine  [facility]         Disclaimer: This note consists of symbols derived from keyboarding, dictation and/or voice recognition software. As a result, there may be errors in the script that have gone undetected. Please consider this when interpreting information found in this chart.

## 2020-02-20 ENCOUNTER — TELEPHONE (OUTPATIENT)
Dept: SURGERY | Facility: CLINIC | Age: 60
End: 2020-02-20

## 2020-02-20 NOTE — TELEPHONE ENCOUNTER
[facility] Call Center    Phone Message    May a detailed message be left on voicemail: yes     Reason for Call: Other: patient would like to know if pt can have an appointment before 3/11/20 for Botox hyperhydrosis, pt has seen Dr. Morales for this in the past. pt is leaving out of town 3/11/20. please advise     Action Taken: Message routed to:  Adult Clinics: Surgery, General p [id number]

## 2020-02-21 NOTE — TELEPHONE ENCOUNTER
Pt called and notified that pt would need to schedule an appt with  and after that appt if  advises further Botox treatments are needed than a message would be sent to the FC to begin a benefit investigation. Once the results are in for the BI pt notified that pt would be called to schedule Botox appt. Pt asked if she wanted to pay out of pocket what the cost would be. RN advised that based on previous units used at last tx in 2016 pt could possibly pay $2400 but the total units used  is determined by . Pt verbalized understanding and states that she will call back if she would like to schedule an appt. Contact number [phone] given to pt....Karla Tapia RN

## 2020-03-04 ENCOUNTER — OFFICE VISIT (OUTPATIENT)
Dept: ORTHOPEDICS | Facility: CLINIC | Age: 60
End: 2020-03-04
Payer: COMMERCIAL

## 2020-03-04 VITALS — DIASTOLIC BLOOD PRESSURE: 76 MMHG | HEIGHT: 66 IN | BODY MASS INDEX: 31.7 KG/M2 | SYSTOLIC BLOOD PRESSURE: 118 MMHG

## 2020-03-04 DIAGNOSIS — G89.29 CHRONIC PAIN OF RIGHT ANKLE: ICD-10-CM

## 2020-03-04 DIAGNOSIS — M18.0 PRIMARY OSTEOARTHRITIS OF BOTH FIRST CARPOMETACARPAL JOINTS: Primary | ICD-10-CM

## 2020-03-04 DIAGNOSIS — M25.571 CHRONIC PAIN OF RIGHT ANKLE: ICD-10-CM

## 2020-03-04 PROCEDURE — 20606 DRAIN/INJ JOINT/BURSA W/US: CPT | Mod: RT | Performed by: FAMILY MEDICINE

## 2020-03-04 PROCEDURE — 99214 OFFICE O/P EST MOD 30 MIN: CPT | Mod: 25 | Performed by: FAMILY MEDICINE

## 2020-03-04 PROCEDURE — 20604 DRAIN/INJ JOINT/BURSA W/US: CPT | Mod: RT | Performed by: FAMILY MEDICINE

## 2020-03-04 RX ORDER — TRIAMCINOLONE ACETONIDE 40 MG/ML
20 INJECTION, SUSPENSION INTRA-ARTICULAR; INTRAMUSCULAR
Status: DISCONTINUED | OUTPATIENT
Start: 2020-03-04 | End: 2021-02-12 | Stop reason: ALTCHOICE

## 2020-03-04 RX ORDER — TRIAMCINOLONE ACETONIDE 40 MG/ML
40 INJECTION, SUSPENSION INTRA-ARTICULAR; INTRAMUSCULAR
Status: DISCONTINUED | OUTPATIENT
Start: 2020-03-04 | End: 2021-02-12 | Stop reason: ALTCHOICE

## 2020-03-04 RX ORDER — ROPIVACAINE HYDROCHLORIDE 5 MG/ML
0.5 INJECTION, SOLUTION EPIDURAL; INFILTRATION; PERINEURAL
Status: DISCONTINUED | OUTPATIENT
Start: 2020-03-04 | End: 2020-05-06 | Stop reason: ALTCHOICE

## 2020-03-04 RX ADMIN — ROPIVACAINE HYDROCHLORIDE 0.5 ML: 5 INJECTION, SOLUTION EPIDURAL; INFILTRATION; PERINEURAL at 12:00

## 2020-03-04 RX ADMIN — TRIAMCINOLONE ACETONIDE 20 MG: 40 INJECTION, SUSPENSION INTRA-ARTICULAR; INTRAMUSCULAR at 12:00

## 2020-03-04 RX ADMIN — TRIAMCINOLONE ACETONIDE 40 MG: 40 INJECTION, SUSPENSION INTRA-ARTICULAR; INTRAMUSCULAR at 11:45

## 2020-03-04 NOTE — LETTER
3/4/2020         RE: Jacquie Amador  [address] [facility]  [place] [address]        Dear Colleague,    Thank you for referring your patient, Jacquie Amador, to the [facility]. Please see a copy of my visit note below.    Jacquie Amador  :  1960  DOS: 20  MRN: [id number]    Sports Medicine Clinic Visit    PCP: Liz Peterson    Jacquie Amador is a 55 year old Right hand dominant female who is seen in follow up for her chronic bilateral hip and bilateral thumb CMC joint pain.      Interim History - 2018  Since last visit on 2018 patient has moderate-severe bilateral hip pain.  Bilateral hip trochanteric bursa injection completed on  provided good relief for ~ 2.5 - 3 months.  Patient is desiring repeat injections today.  No new injury in the interim.    She has continued to have pain in bilateral thumbs over last ~ 6 months.  Unable to repeat injections d/t her schedule, then developing secondary infection.  Recently discharged from Pain Mgmt clinic.    Interim History - 2020  Since last visit on 18 patient has moderate - severe bilateral lateral hip and bilateral thumb/CMC joint pain.  Bilateral hip trochanteric bursa injections completed on 18 provided good relief for ~ one year.  Patient notes that pain has been worsening over the past ~ 6+ months.  Bilateral thumb CMC joint steroid injections last completed 18 provided good relief for ~ 6 months.  Patient is now unable to grasp objects without significant pain and weakness.  No new injury in the interim.    Interim History - 2020  Since last visit on 2020 patient has moderate bilateral thumb, CMC joint pain.  Patient presents to bilateral injections as previously discussed.  No interim injury.       Second complaint of chronic right posterior lateral ankle pain and swelling over past 5+ years, significantly worse over the past ~ 3+ months.   "Patient notes generalized right ankle weakness and feels like she is going to \"roll\" her ankle frequently.  Pain is worse with walking and lateral movements.  No current treatment completed at this time.  Previously treated with Dr Cárdenas with steroid injection in  that provided moderate relief.    Review of Systems  Musculoskeletal: as above  Remainder of review of systems is negative including constitutional, CV, pulmonary, GI, Skin and Neurologic except as noted in HPI or medical history.    Past Medical History:   Diagnosis Date     ASTHMA - MODERATE PERSISTENT 2005     Cancer (H)      Chronic pain      Coronary artery disease      CVA (cerebral infarction)      Depressive disorder, not elsewhere classified      Diabetes (H)      Elevated serum alkaline phosphatase level     Liver source     Fibromyalgia      HYPERLIPIDEMIA NEC/NOS 2006     Hypertriglyceridemia      OA (osteoarthritis)      Thyroid disease      Tobacco use disorder      Tobacco use disorder complicating pregnancy, childbirth, or the puerperium, antepartum condition or complication      Trochanteric bursitis      Unspecified essential hypertension      Past Surgical History:   Procedure Laterality Date     C  DELIVERY ONLY      , Low Cervical     INJECT EPIDURAL TRANSFORAMINAL  2014    [facility]     INJECT JOINT SACROILIAC  2014    [facility]     LAMINECTOMY LUMBAR ONE LEVEL Left 2014    [facility]     Objective  /76   Ht 1.676 m (5' 6\")   LMP 2009 (Exact Date)   BMI 31.70 kg/m       GENERAL APPEARANCE: healthy, alert and no distress   GAIT: NORMAL  SKIN: no suspicious lesions or rashes  NEURO: Normal strength and tone, mentation intact and speech normal  PSYCH:  mentation appears normal and affect normal/bright    Right Ankle Exam    TTP of the anterolateral tibiotalar joint, anterior tibiotalar joint  Signs of L>R functional " pes planus, neg talar tilt, anterior drawer, joint compression, no TTP of surrounding tendons or soft tissues  Mild lateral ankle swelling    Bilateral Hand Exam  Inspection:Carpals: normal, Thumb: normal  Tender: Carpals: triquetrum, Metacarpals: 1st metacarpal, Thumb: CMC, R>L, but still milder overall than we have seen in the past  Non-tender: Remainder of hand  Range of Motion Thumb: opposition Decreased R>L, flexion MCP decreased, painful, extension MCP decreased, flexion IP decreased, extension IP decreased  Strength: mild decrease in thumb  strength bilaterally  Special tests: Positive scour of CMC, negative snuffbox tenderness   Skin:  well perfused  capillary refill brisk    Medium Joint Injection/Arthrocentesis: R ankle  Date/Time: 3/4/2020 11:45 AM  Performed by: José Miguel Linder DO  Authorized by: José Miguel Linder DO     Indications:  Pain and joint swelling  Needle Size:  25 G  Guidance: ultrasound    Approach:  Anterolateral  Location:  Ankle  Site:  R ankle  Medications:  40 mg triamcinolone 40 MG/ML  Outcome:  Tolerated well, no immediate complications  Procedure discussed: discussed risks, benefits, and alternatives    Consent Given by:  Patient  Timeout: timeout called immediately prior to procedure    Prep: patient was prepped and draped in usual sterile fashion    Hand / Upper Extremity Injection/Arthrocentesis: R thumb CMC  Date/Time: 3/4/2020 12:00 PM  Performed by: José Miguel Linder DO  Authorized by: José Miguel Linder DO     Indications:  Pain  Needle Size:  25 G  Guidance: ultrasound    Approach:  Radial  Condition: osteoarthritis    Location:  Thumb  Site:  R thumb CMC    Medications:  20 mg triamcinolone 40 MG/ML; 0.5 mL ropivacaine 5 MG/ML  Outcome:  Tolerated well, no immediate complications  Procedure discussed: discussed risks, benefits, and alternatives    Consent Given by:  Patient  Timeout: timeout called immediately prior to procedure    Prep:  patient was prepped and draped in usual sterile fashion        Radiology:  XR images independently visualized and reviewed with patient today in clinic  Mild ankle degenerative changes, nothing clearly acute, will follow final radiology read    FINGER LEFT TWO OR MORE VIEWS 5/13/2014 11:29 AM   HISTORY: Chronic pain.  COMPARISON: None.   FINDINGS: No fracture or malalignment is identified. There is near  complete joint space loss of the first carpal metacarpal joint with  adjacent subchondral cyst formation in the base of the first  metacarpal. Small osteophytes are also noted. Mild degenerative  changes of the first metacarpal phalangeal joint also noted.  IMPRESSION  IMPRESSION: Degenerative changes of the first carpal metacarpal and of  the first metacarpal phalangeal joints. No fracture.    Assessment:  1. Primary osteoarthritis of both first carpometacarpal joints    2. Chronic pain of right ankle        Plan:  Discussed the assessment with the patient.  Follow up: prn  XR images independently visualized and reviewed with patient again today in clinic  Reviewed prior hand and hip films today  Defer CMC injections for as long as possible, reviewed PRP option in detail again today  Repeat right CMC injection today, with discussion about ongoing potential risk/benefits  Water therapy and low impact activity options reviewed again  Reviewed again number of steroid injection over time and importance of limiting overall number as much as possible  Right ankle pain chronically  Reviewed options for PT, HEP, bracing, injections  Trial of right ankle tibiotalar CSI today, good initial relief, will try to minimize as much as posisble  Expectations and goals of CSI reviewed  Often 2-3 days for steroid effect, and can take up to two weeks for maximum effect  We discussed modified progressive pain-free activity as tolerated  Do not overuse in first two weeks if feeling better due to concern for vulnerability while steroid  is working  Supportive care reviewed  All questions were answered today  Contact us with additional questions or concerns  Signs and sx of concern reviewed      José Miguel Linder DO, CAQ  Primary Care Sports Medicine  [facility]         Disclaimer: This note consists of symbols derived from keyboarding, dictation and/or voice recognition software. As a result, there may be errors in the script that have gone undetected. Please consider this when interpreting information found in this chart.      Again, thank you for allowing me to participate in the care of your patient.        Sincerely,        José Miguel Linder DO

## 2020-03-04 NOTE — PROGRESS NOTES
"Jacquie Amador  :  1960  DOS: 20  MRN: [id number]    Sports Medicine Clinic Visit    PCP: Liz Peterson    Jacquie Amador is a 55 year old Right hand dominant female who is seen in follow up for her chronic bilateral hip and bilateral thumb CMC joint pain.      Interim History - 2018  Since last visit on 2018 patient has moderate-severe bilateral hip pain.  Bilateral hip trochanteric bursa injection completed on  provided good relief for ~ 2.5 - 3 months.  Patient is desiring repeat injections today.  No new injury in the interim.    She has continued to have pain in bilateral thumbs over last ~ 6 months.  Unable to repeat injections d/t her schedule, then developing secondary infection.  Recently discharged from Pain Mgmt clinic.    Interim History - 2020  Since last visit on 18 patient has moderate - severe bilateral lateral hip and bilateral thumb/CMC joint pain.  Bilateral hip trochanteric bursa injections completed on 18 provided good relief for ~ one year.  Patient notes that pain has been worsening over the past ~ 6+ months.  Bilateral thumb CMC joint steroid injections last completed 18 provided good relief for ~ 6 months.  Patient is now unable to grasp objects without significant pain and weakness.  No new injury in the interim.    Interim History - 2020  Since last visit on 2020 patient has moderate bilateral thumb, CMC joint pain.  Patient presents to bilateral injections as previously discussed.  No interim injury.       Second complaint of chronic right posterior lateral ankle pain and swelling over past 5+ years, significantly worse over the past ~ 3+ months.  Patient notes generalized right ankle weakness and feels like she is going to \"roll\" her ankle frequently.  Pain is worse with walking and lateral movements.  No current treatment completed at this time.  Previously treated with Dr Cárdenas with steroid injection " "in 2015 that provided moderate relief.    Review of Systems  Musculoskeletal: as above  Remainder of review of systems is negative including constitutional, CV, pulmonary, GI, Skin and Neurologic except as noted in HPI or medical history.    Past Medical History:   Diagnosis Date     ASTHMA - MODERATE PERSISTENT 2005     Cancer (H)      Chronic pain      Coronary artery disease      CVA (cerebral infarction)      Depressive disorder, not elsewhere classified      Diabetes (H)      Elevated serum alkaline phosphatase level     Liver source     Fibromyalgia      HYPERLIPIDEMIA NEC/NOS 2006     Hypertriglyceridemia      OA (osteoarthritis)      Thyroid disease      Tobacco use disorder      Tobacco use disorder complicating pregnancy, childbirth, or the puerperium, antepartum condition or complication      Trochanteric bursitis      Unspecified essential hypertension      Past Surgical History:   Procedure Laterality Date     C  DELIVERY ONLY      , Low Cervical     INJECT EPIDURAL TRANSFORAMINAL  2014    [facility] [place]     INJECT JOINT SACROILIAC  2014    [facility] [place]     LAMINECTOMY LUMBAR ONE LEVEL Left 2014    [facility]     Objective  /76   Ht 1.676 m (5' 6\")   LMP 2009 (Exact Date)   BMI 31.70 kg/m      GENERAL APPEARANCE: healthy, alert and no distress   GAIT: NORMAL  SKIN: no suspicious lesions or rashes  NEURO: Normal strength and tone, mentation intact and speech normal  PSYCH:  mentation appears normal and affect normal/bright    Right Ankle Exam    TTP of the anterolateral tibiotalar joint, anterior tibiotalar joint  Signs of L>R functional pes planus, neg talar tilt, anterior drawer, joint compression, no TTP of surrounding tendons or soft tissues  Mild lateral ankle swelling    Bilateral Hand Exam  Inspection:Carpals: normal, Thumb: normal  Tender: Carpals: triquetrum, Metacarpals: 1st metacarpal, " Thumb: CMC, R>L, but still milder overall than we have seen in the past  Non-tender: Remainder of hand  Range of Motion Thumb: opposition Decreased R>L, flexion MCP decreased, painful, extension MCP decreased, flexion IP decreased, extension IP decreased  Strength: mild decrease in thumb  strength bilaterally  Special tests: Positive scour of CMC, negative snuffbox tenderness   Skin:  well perfused  capillary refill brisk    Medium Joint Injection/Arthrocentesis: R ankle  Date/Time: 3/4/2020 11:45 AM  Performed by: José Miguel Linder DO  Authorized by: José Miguel Linder DO     Indications:  Pain and joint swelling  Needle Size:  25 G  Guidance: ultrasound    Approach:  Anterolateral  Location:  Ankle  Site:  R ankle  Medications:  40 mg triamcinolone 40 MG/ML  Outcome:  Tolerated well, no immediate complications  Procedure discussed: discussed risks, benefits, and alternatives    Consent Given by:  Patient  Timeout: timeout called immediately prior to procedure    Prep: patient was prepped and draped in usual sterile fashion    Hand / Upper Extremity Injection/Arthrocentesis: R thumb CMC  Date/Time: 3/4/2020 12:00 PM  Performed by: José Miguel Linder DO  Authorized by: José Miguel Linder DO     Indications:  Pain  Needle Size:  25 G  Guidance: ultrasound    Approach:  Radial  Condition: osteoarthritis    Location:  Thumb  Site:  R thumb CMC    Medications:  20 mg triamcinolone 40 MG/ML; 0.5 mL ropivacaine 5 MG/ML  Outcome:  Tolerated well, no immediate complications  Procedure discussed: discussed risks, benefits, and alternatives    Consent Given by:  Patient  Timeout: timeout called immediately prior to procedure    Prep: patient was prepped and draped in usual sterile fashion        Radiology:  XR images independently visualized and reviewed with patient today in clinic  Mild ankle degenerative changes, nothing clearly acute, will follow final radiology read    FINGER LEFT TWO OR MORE  VIEWS 5/13/2014 11:29 AM   HISTORY: Chronic pain.  COMPARISON: None.   FINDINGS: No fracture or malalignment is identified. There is near  complete joint space loss of the first carpal metacarpal joint with  adjacent subchondral cyst formation in the base of the first  metacarpal. Small osteophytes are also noted. Mild degenerative  changes of the first metacarpal phalangeal joint also noted.  IMPRESSION  IMPRESSION: Degenerative changes of the first carpal metacarpal and of  the first metacarpal phalangeal joints. No fracture.    Assessment:  1. Primary osteoarthritis of both first carpometacarpal joints    2. Chronic pain of right ankle        Plan:  Discussed the assessment with the patient.  Follow up: prn  XR images independently visualized and reviewed with patient again today in clinic  Reviewed prior hand and hip films today  Defer CMC injections for as long as possible, reviewed PRP option in detail again today  Repeat right CMC injection today, with discussion about ongoing potential risk/benefits  Water therapy and low impact activity options reviewed again  Reviewed again number of steroid injection over time and importance of limiting overall number as much as possible  Right ankle pain chronically  Reviewed options for PT, HEP, bracing, injections  Trial of right ankle tibiotalar CSI today, good initial relief, will try to minimize as much as posisble  Expectations and goals of CSI reviewed  Often 2-3 days for steroid effect, and can take up to two weeks for maximum effect  We discussed modified progressive pain-free activity as tolerated  Do not overuse in first two weeks if feeling better due to concern for vulnerability while steroid is working  Supportive care reviewed  All questions were answered today  Contact us with additional questions or concerns  Signs and sx of concern reviewed      José Miguel Linder DO, RADHA  Primary Care Sports Medicine  [facility]         Disclaimer:  This note consists of symbols derived from keyboarding, dictation and/or voice recognition software. As a result, there may be errors in the script that have gone undetected. Please consider this when interpreting information found in this chart.

## 2020-03-15 ENCOUNTER — HEALTH MAINTENANCE LETTER (OUTPATIENT)
Age: 60
End: 2020-03-15

## 2020-03-16 ENCOUNTER — VIRTUAL VISIT (OUTPATIENT)
Dept: FAMILY MEDICINE | Facility: CLINIC | Age: 60
End: 2020-03-16
Payer: COMMERCIAL

## 2020-03-16 ENCOUNTER — MYC MEDICAL ADVICE (OUTPATIENT)
Dept: FAMILY MEDICINE | Facility: CLINIC | Age: 60
End: 2020-03-16

## 2020-03-16 DIAGNOSIS — I10 HYPERTENSION GOAL BP (BLOOD PRESSURE) < 140/90: ICD-10-CM

## 2020-03-16 DIAGNOSIS — J45.40 MODERATE PERSISTENT ASTHMA WITHOUT COMPLICATION: ICD-10-CM

## 2020-03-16 DIAGNOSIS — R00.0 SINUS TACHYCARDIA: Primary | ICD-10-CM

## 2020-03-16 PROCEDURE — 99442 ZZC PHYSICIAN TELEPHONE EVALUATION 11-20 MIN: CPT | Performed by: PHYSICIAN ASSISTANT

## 2020-03-16 RX ORDER — METOPROLOL SUCCINATE 25 MG/1
12.5 TABLET, EXTENDED RELEASE ORAL EVERY EVENING
Qty: 30 TABLET | Refills: 0
Start: 2020-03-16 | End: 2020-04-16

## 2020-03-16 NOTE — TELEPHONE ENCOUNTER
Got patient scheduled for a phone visit today.     Next 5 appointments (look out 90 days)    Mar 16, 2020  2:15 PM CDT  Telephone Visit with Renetta Manley PA-C  [facility] ([facility]) [id number], [address]  [facility] [address]  [phone]        Danny Liu MA on 3/16/2020 at 1:21 PM

## 2020-03-16 NOTE — PROGRESS NOTES
"Jacquie Amador is a 59 year old female who is being evaluated via a billable telephone visit.      The patient has been notified of following:     Patient was unsure what pharmacy she wanted to use due to being in [place] right now. She will figure that before her phone visit.     See mymxlog message for more information.     Jacquie Amador complains of    Chief Complaint   Patient presents with     Medication Problem       I have reviewed and updated the patient's Past Medical History, Social History, Family History and Medication List.    ALLERGIES  Cats; Dogs; Lyrica [pregabalin]; and Seasonal allergies    Danny Liu MA on 3/16/2020 at 1:18 PM      Additional provider notes:     Pt saw her PCP 02/04/2020 (6 weeks ago) for routine visit for HTN, palpitations/sinus tachycardia, hyperlipidemia, asthma, IFG, depression, and pain/OA. (Visit notes reviewed prior to pt call).     In [place] currently on vacation for a few weeks. She notes since being in [place] her HR running 100-110 and her blood pressure has been up for a few days -  systolic 150s and Diastolic 90s on her readings. Better this morning- HR 93, /90.   Yesterday late afternoon:  145/85 w/108 late afternoon.   Other BP readings : 149/106, 120/83.    She states at her last visit they increased her Metoprolol from 25mg to 50mg daily.   Since being in [place] she \"can feel heartbeat\" and then that can feel like racing but she states she does not check her pulse. No associated CP. \"Kind of SOB in general in [place]\". She states maybe because she worries, and also she does have asthma and humidlty is a trigger for her. She is using her inhalers. She doesn't use the albuterol because she worries it will make her heart beat faster. Denies swelling in her legs or pain. Denies pleuritic pain. She has been swimming and doing pool exercises- states feels good with those exercises and does not have palpitations or racing heart with that. Notices sx more at rest. " "  Staying hydrated- \"drinks a ton of water\".    No hx of fib. Dx sinus tachycardia in her chart. She has worn monitor in the past \"cardiologist wasn't worried\". Nonsmoker. Etoh- none    She had labs 02/04/2020- normal tsh, normal vit D, normal comp panel, normal cbc/ironferritin.     Assessment/Plan:  1. Sinus tachycardia  2. Hypertension goal BP (blood pressure) < 140/90  Reviewed chart and visit from 02/04/2020 and labs prior to phone call.  Flowsheets show BP at last 2 visits in clinic to be controlled and at goal (118/76 and 126/84).  Flowsheets also show resting HR in clinic running 100-115.   Her sx are consistent with prior sx pattern.   BP and HR she is gathering on readings while on vacation for HR similar to her pattern. BP trending up some.   Advised continuing present dose on losartan-hydrochlorothiazide 50-12.5mg   Continue metoprolol XL 50 in the morning.   ADD metoprolol XL 12.5mg in the evening. She brought on vacation her old metoprolol XL 25mg tabs and will split these tabs for the evening dose.   Advised good hydration and low sodium in the diet.  Discussed s/sx for which she would need to seek ER care in [place]- CP, SOB, presyncope, syncope, etc.   She states understanding.  Follow up with readings in 1-2 weeks.   - metoprolol succinate ER (TOPROL-XL) 25 MG 24 hr tablet; Take 0.5 tablets (12.5 mg) by mouth every evening In addition to the 50mg XL morning dose.  Dispense: 30 tablet; Refill: 0    3. Moderate persistent asthma without complication  Continue w/inhalers.   If humidity is bothersome, ok to use albuterol.       I have reviewed the note as documented above.  This accurately captures the substance of my conversation with the patient.  Renetta Manley PA-C      Phone call contact time: 16minutes 45 sec      Renetta Manley PA-C        "

## 2020-03-17 ENCOUNTER — NURSE TRIAGE (OUTPATIENT)
Dept: FAMILY MEDICINE | Facility: CLINIC | Age: 60
End: 2020-03-17

## 2020-03-17 NOTE — TELEPHONE ENCOUNTER
Spoke with patient    Spoke with someone yesterday for a phone visit.. Heart rate is being high.  They are in still in [place].  Did increase to metoprolol to 75mg and took a xanax.   BP this am 117/84 pulse 124  this afternoon was 128 /91    No cough no fever.     They are not out in public as they have a house with a pool there, but she is very worried about her pulse.    Will route the message back to vitrual visit provider for review.    If symptoms develop, SOB or chest pain she is to go to ED.    RN unable to give further advise do to patient out of state,    Dedrick Noe, RN, BSN

## 2020-03-18 NOTE — TELEPHONE ENCOUNTER
Spoke with pt- her reported HR are higher than when we talked Monday. She is concerned about her sx. I advised she be seen in person for evaluation in the ER. She stated understanding.   ADILIA CabreraC

## 2020-04-03 ENCOUNTER — MYC MEDICAL ADVICE (OUTPATIENT)
Dept: FAMILY MEDICINE | Facility: CLINIC | Age: 60
End: 2020-04-03

## 2020-04-15 ENCOUNTER — MYC MEDICAL ADVICE (OUTPATIENT)
Dept: FAMILY MEDICINE | Facility: CLINIC | Age: 60
End: 2020-04-15

## 2020-04-15 NOTE — PROGRESS NOTES
"Jacquie Amador is a 59 year old female who is being evaluated via a billable video visit.      The patient has been notified of following:     \"This video visit will be conducted via a call between you and your physician/provider. We have found that certain health care needs can be provided without the need for an in-person physical exam.  This service lets us provide the care you need with a video conversation.  If a prescription is necessary we can send it directly to your pharmacy.  If lab work is needed we can place an order for that and you can then stop by our lab to have the test done at a later time.    Video visits are billed at different rates depending on your insurance coverage.  Please reach out to your insurance provider with any questions.    If during the course of the call the physician/provider feels a video visit is not appropriate, you will not be charged for this service.\"    Patient has given verbal consent for Video visit? Yes    How would you like to obtain your AVS? Orion    Patient would like the video invitation sent by: Text to cell phone: [phone]     Subjective     Jacquie Amador is a 59 year old female who presents to clinic today for the following health issues:    Concern - Fatigue   Onset: Last Nov.     Description:   Was placed on Vit D 50,000 for 8 weeks. Also was placed on an Iron supplement that she is still taking. She feels that this has not helped and is still feeling really fatigued    Intensity: moderate    Progression of Symptoms:  same    Accompanying Signs & Symptoms:  Patient states that she is taking naps ans she doesn't usually take naps.     Previous history of similar problem:   no    Precipitating factors:   Worsened by: no    Alleviating factors:  Improved by: no    Therapies Tried and outcome: Iron and vitamin D    Patient reports sensation of fatigue since a respiratory illness in November around Thanksgiving of 2019.  By fatigue she means more " deconditioning and shortness of breath on exertion.  She denies any type of chest pain or pressure.  She also notes feeling more sleepy and needing naps throughout the day.  She states her mood is good.  She is currently on duloxetine, metoprolol, medical marijuana, Xanax as needed, Wellbutrin, Requip, and trazodone.    She states she is working on this with her primary care provider who recommended she start vitamin D and iron supplementation to see if this would help with her symptoms after lab results in February.  These show a low normal ferritin and iron levels as well as vitamin D.    She also had a normal normal thyroid at that time.  She denies any other new noticeable symptoms.    She denies any extremity numbness, tingling, pain.  She denies any significant sleep or appetite changes.  She denies any fevers or night sweats.    She does state that she snores; however, her bed partner never noticed any apneic episodes.    Video Start Time: 11:12 am    Patient Active Problem List   Diagnosis     Moderate persistent asthma     Allergic rhinitis due to other allergen     Esophageal reflux     Moderate recurrent major depression (H)     Multiple joint pain     HYPERLIPIDEMIA LDL GOAL <130     Hypertension goal BP (blood pressure) < 140/90     MARIZOL (generalized anxiety disorder)     Myalgia     Chronic rhinitis     Constipation     Lumbar disc disease with radiculopathy     Iron deficiency anemia     Osteoarthritis of carpometacarpal joint of thumb - bilateral     Trochanteric bursitis     Right ankle instability     Primary focal hyperhidrosis     Trochanteric bursitis of both hips     Overweight     History of iron deficiency     Scratching     Advanced directives, counseling/discussion     Chronic pain syndrome     Medical marijuana use     Primary osteoarthritis of both first carpometacarpal joints     Arthritis of metatarsophalangeal joint     Sinus tachycardia     Intractable chronic migraine without aura and  without status migrainosus     Impaired fasting glucose     Migraine without aura and without status migrainosus, not intractable     Skin ulcer, limited to breakdown of skin (H)     Past Surgical History:   Procedure Laterality Date     C  DELIVERY ONLY      , Low Cervical     INJECT EPIDURAL TRANSFORAMINAL  2014    [facility]     INJECT JOINT SACROILIAC  2014    [facility]     LAMINECTOMY LUMBAR ONE LEVEL Left 2014    [facility]       Social History     Tobacco Use     Smoking status: Former Smoker     Packs/day: 1.00     Types: Cigarettes     Smokeless tobacco: Never Used     Tobacco comment: since    Substance Use Topics     Alcohol use: No     Family History   Problem Relation Age of Onset     Thyroid Disease Mother      Arthritis Mother      Colon Cancer Mother      Thyroid Disease Sister      Arthritis Sister      Lipids Sister      Hypertension Father      Diabetes Father      C.A.D. Father         MI age 75     Cardiovascular Father         heart attack     Cerebrovascular Disease Father      Cancer Father         renal cancer     Arthritis Father      Heart Disease Father         heart attack     Gastrointestinal Disease Father         liver     Genitourinary Problems Father         kidney     Asthma Daughter      Gastrointestinal Disease Daughter      Unknown Other      Breast Cancer No family hx of      Cancer - colorectal No family hx of      Alzheimer Disease No family hx of      Blood Disease No family hx of      Circulatory No family hx of      Eye Disorder No family hx of      Musculoskeletal Disorder No family hx of      Neurologic Disorder No family hx of      Respiratory No family hx of          Current Outpatient Medications   Medication Sig Dispense Refill     adapalene (DIFFERIN) 0.1 % gel APPLY A THIN LAYER TO THE AFFECTED AREA(S) BY TOPICAL ROUTE ONCE DAILY BEFORE BEDTIME 45 g 10     albuterol  (PROVENTIL) (2.5 MG/3ML) 0.083% neb solution Take 1 vial (2.5 mg) by nebulization every 6 hours as needed for shortness of breath / dyspnea or wheezing 1 Box 1     albuterol (VENTOLIN HFA) 108 (90 Base) MCG/ACT Inhaler INHALE 2 PUFFS INTO THE LUNGS EVERY 6 HOURS AS NEEDED FOR SHORTNESS OF BREATH / DYSPNEA 54 g 1     ALPRAZolam (XANAX) 0.25 MG tablet Take 1 tablet (0.25 mg) by mouth daily as needed for anxiety 30 tablet 2     atorvastatin (LIPITOR) 20 MG tablet Take 1 tablet (20 mg) by mouth daily 90 tablet 3     buPROPion (WELLBUTRIN SR) 150 MG 12 hr tablet Take 1 tablet (150 mg) by mouth daily 90 tablet 3     buPROPion (WELLBUTRIN SR) 200 MG 12 hr tablet Take 1 tablet (200 mg) by mouth daily 90 tablet 3     celecoxib (CELEBREX) 200 MG capsule Take 1 capsule twice daily as needed for pain this is a 3 month script 180 capsule 1     clindamycin (CLINDAMAX) 1 % external gel Apply topically 2 times daily 30 g 4     clobetasol (TEMOVATE) 0.05 % external cream APPLY SPARINGLY TO AFFECTED AREA TWICE DAILY FOR 14 DAYS.  DO NOT APPLY TO FACE. 30 g 3     diclofenac (VOLTAREN) 1 % GEL topical gel Apply 2 grams to hands and 4 grams to hips up to 4 times daily. T 9 Tube 11     DULoxetine (CYMBALTA) 60 MG capsule TAKE TWO CAPSULES BY MOUTH DAILY 180 capsule 3     ferrous sulfate (FE TABS) 325 (65 Fe) MG EC tablet Take 1 tablet (325 mg) by mouth daily 90 tablet 0     hydrocortisone 2.5 % cream APPLY TOPICALLY 2 TIMES DAILY AS NEEDED 30 g 3     Lactobacillus (ACIDOPHILUS) CAPS Take 1 capsule by mouth daily Take two hours before or after antibiotic dose.  Continue for 1 week after antibiotic therapy completed 60 capsule 0     losartan-hydrochlorothiazide (HYZAAR) 50-12.5 MG tablet Take 1 tablet by mouth daily 90 tablet 3     medical cannabis (Patient's own supply.  Not a prescription) 2 mg morning and noon. 7 mg at bedtime. (This is NOT a prescription, and does not certify that the patient has a qualifying medical condition for  medical cannabis.  The purpose of this order is  to document that the patient reports taking medical cannabis.) 0 Information only 0     metFORMIN (GLUCOPHAGE) 500 MG tablet TAKE ONE TABLET BY MOUTH EVERY DAY WITH DINNER 90 tablet 3     metoprolol succinate ER (TOPROL-XL) 50 MG 24 hr tablet Take 1 tablet (50 mg) by mouth daily 90 tablet 1     mometasone-formoterol (DULERA) 200-5 MCG/ACT inhaler Inhale 2 puffs into the lungs 2 times daily 3 Inhaler 1     montelukast (SINGULAIR) 10 MG tablet TAKE ONE TABLET BY MOUTH IN THE EVENING 90 tablet 3     multivitamin w/minerals (MULTI-VITAMIN) tablet Take 1 tablet by mouth daily       mupirocin (BACTROBAN) 2 % external cream Apply to affected area three times daily 60 g 1     nystatin (MYCOSTATIN) 745006 UNIT/ML suspension Take by mouth 4 times daily 380 mL 1     nystatin (MYCOSTATIN) cream Apply to affected area three times daily 60 g 1     rizatriptan (MAXALT) 10 MG tablet Take 1 tablet (10 mg) by mouth at onset of headache for migraine May repeat in 2 hours. Max 3 tablets/24 hours. 18 tablet 3     rOPINIRole (REQUIP) 1 MG tablet Take 3 tablets (3 mg) by mouth At Bedtime 270 tablet 3     SUMAtriptan (IMITREX) 100 MG tablet take 1 tablet at onset of headache may repeat in 2 hours max 2 tabs/day 18 tablet 1     traZODone (DESYREL) 50 MG tablet TAKE 2-3 TABLETS BY MOUTH AT BEDTIME 270 tablet 1     tretinoin (RETIN-A) 0.025 % cream Spread a pea size amount into affected area topically at bedtime.  Use sunscreen SPF>20. 45 g 3     triamcinolone (ARISTOCORT HP) 0.5 % external cream Apply topically 2 times daily 60 g 0     ZYRTEC 10 MG OR TABS 1 TABLET DAILY 90 3     Allergies   Allergen Reactions     Cats      and rabbits/wheezing     Dogs      wheezing     Lyrica [Pregabalin] Other (See Comments)     Rash, mouth sores, itching and burning     Seasonal Allergies      rhinits     Reviewed and updated as needed this visit by Provider  Tobacco  Allergies  Meds  Problems  Med Hx  " Surg Hx  Fam Hx         Review of Systems   ROS COMP: Constitutional, HEENT, lymph, derm, msk, neuro, psych, cardiovascular, pulmonary, gi and gu systems are negative, except as otherwise noted.      Objective    LMP 07/17/2009 (Exact Date)   Estimated body mass index is 31.7 kg/m  as calculated from the following:    Height as of 3/4/20: 1.676 m (5' 6\").    Weight as of 12/19/18: 89.1 kg (196 lb 6.4 oz).  Physical Exam   healthy, alert and no distress  Psych: Alert and oriented times 3; coherent speech, normal   rate and volume, able to articulate logical thoughts, able   to abstract reason.  Respiratory: No audible wheezing. Normal speech without having to stop and take a breath.    Diagnostic Test Results:  None, reviewed labs from February 2020, new labs ordered and sleep referral for sleep study.        Assessment & Plan    1. Fatigue, unspecified type: Unclear cause of her symptoms.  She was not anemic and had normal laboratory values though they were low normal.  Can recheck these after supplementation to see what can affect they have but unlikely to be the cause of her symptoms.  I also encouraged her to have a sleep referral to look for sleep apnea.  She states her asthma has been under good control and she is not missing her inhaler use.  Could consider deconditioning after respiratory illness; however, it has been 5 months and I would have expected some improvement in this by now.  Additionally, could consider stress test in the future as well as many of her medications which can be sedating or cause fatigue.  She states she has had issues with sleepiness with the medical marijuana but still takes this for chronic pain.  She has reduced her dose in the past because of the side effects.  A sleep study does not elucidate a cause would recommending switching her metoprolol back to 25 mg twice daily versus once daily.  She states she is not really willing to go down on her medical marijuana due to pain.  " Could also consider alternative medications that could cause sedation as well.  Mood appears to be good overall but could also be playing a role.  Patient agreeable plan.  - Ferritin; Future  - CBC with platelets; Future  - Vitamin D Deficiency; Future  - SLEEP EVALUATION & MANAGEMENT REFERRAL - ADULT -[facility] [place] - [phone]; Future      Return in about 2 weeks (around 4/30/2020), or if symptoms worsen or fail to improve.    Earnest Noel MD  [facility]    This chart is completed utilizing dictation software; typos and/or incorrect word substitutions may unintentionally occur.      Video-Visit Details    Type of service:  Video Visit    Video End Time (time video stopped): 1:44 am    Originating Location (pt. Location): Home    Distant Location (provider location):  [facility]     Mode of Communication:  Video Conference via [employer]    Return in about 2 weeks (around 4/30/2020), or if symptoms worsen or fail to improve.

## 2020-04-15 NOTE — TELEPHONE ENCOUNTER
Received another mychart message from pt.  Called her and helped her schedule video appt for tomorrow

## 2020-04-15 NOTE — TELEPHONE ENCOUNTER
Please advise patient that her primary care provider will be out of the office for the foreseeable future.  Please schedule her for a video visit if possible to discuss potential alternative plans.    Jonny Gardiner MD, FAAFP  Family Medicine Physician  [facility] [place]  [address] [place] [address]

## 2020-04-16 ENCOUNTER — VIRTUAL VISIT (OUTPATIENT)
Dept: FAMILY MEDICINE | Facility: OTHER | Age: 60
End: 2020-04-16
Payer: COMMERCIAL

## 2020-04-16 DIAGNOSIS — R53.83 FATIGUE, UNSPECIFIED TYPE: Primary | ICD-10-CM

## 2020-04-16 PROCEDURE — 99214 OFFICE O/P EST MOD 30 MIN: CPT | Mod: 95 | Performed by: FAMILY MEDICINE

## 2020-04-16 NOTE — PATIENT INSTRUCTIONS
Important Takeaway Points From This Visit:    I would like you to have repeat blood work done. My staff will help schedule this.    I would also like you to have a sleep study done.    If all of these are normal, then I would recommend we start looking at changes to your medications as a possible cause, or have you do a stress test.      As always, please call with any questions or concerns. I look forward to seeing you again soon!    Take care,  Dr. Noel    Your current medication list is printed. Please keep this with you - it is helpful to bring this current list to any other medical appointments. It can also be helpful if you ever go to the emergency room or hospital.    If you had lab testing today we will call you with the results. The phone number we will call with your results is # [phone] (home) . If this is not the best number please call our clinic and change the number.    If you need any refills, please call your pharmacy and they will contact us.    If you have any further concerns or wish to schedule another appointment, please call our office at [phone].    If you have a medical emergency, please call [phone].    Thank you for coming to [facility]!

## 2020-04-20 ENCOUNTER — MYC MEDICAL ADVICE (OUTPATIENT)
Dept: FAMILY MEDICINE | Facility: CLINIC | Age: 60
End: 2020-04-20

## 2020-04-20 ENCOUNTER — TELEPHONE (OUTPATIENT)
Dept: FAMILY MEDICINE | Facility: CLINIC | Age: 60
End: 2020-04-20

## 2020-04-20 NOTE — TELEPHONE ENCOUNTER
Panel Management Review      Patient has the following on her problem list:     Depression / Dysthymia review    Measure:  Needs PHQ-9 score of 4 or less during index window.  Administer PHQ-9 and if score is 5 or more, send encounter to provider for next steps.    5 - 7 month window range: Pass    PHQ-9 SCORE 5/29/2018 7/18/2019 2/4/2020   PHQ-9 Total Score - - -   PHQ-9 Total Score MyChart - 4 (Minimal depression) 10 (Moderate depression)   PHQ-9 Total Score - - -   PHQ-9 Total Score 9 4 10       If PHQ-9 recheck is 5 or more, route to provider for next steps.    Patient is due for:  None    Asthma review     ACT Total Scores 2/4/2020   ACT TOTAL SCORE -   ASTHMA ER VISITS -   ASTHMA HOSPITALIZATIONS -   ACT TOTAL SCORE (Goal Greater than or Equal to 20) 19   In the past 12 months, how many times did you visit the emergency room for your asthma without being admitted to the hospital? 0   In the past 12 months, how many times were you hospitalized overnight because of your asthma? 0      1. Is Asthma diagnosis on the Problem List? Yes    2. Is Asthma listed on Health Maintenance? No   3. Patient is due for:  none    Hypertension   Last three blood pressure readings:  BP Readings from Last 3 Encounters:   03/04/20 118/76   02/12/20 126/84   02/04/20 126/88     Blood pressure: Passed    HTN Guidelines:  Less than 140/90      Composite cancer screening  Chart review shows that this patient is due/due soon for the following Pap Smear and Mammogram  Summary:    Patient is due/failing the following:   Vitamin D     Action needed:   Patient needs fasting lab only appointment    Type of outreach:    None, routed to provider for review.    Questions for provider review:    Please advise                                                                                                                                    Jennifer Granado CMA       Chart routed to Provider .

## 2020-04-22 ENCOUNTER — MYC MEDICAL ADVICE (OUTPATIENT)
Dept: FAMILY MEDICINE | Facility: CLINIC | Age: 60
End: 2020-04-22

## 2020-04-23 NOTE — TELEPHONE ENCOUNTER
I called the patient to clear up to Dr. Peterson is still her physician; however, she is 1 of our lead physicians within the [facility] system and is taking on a more managerial role during this crisis.  She will return to seeing patients in clinic and this is only short-term.  I have established a relationship with this patient and said she can contact me as long as Dr. Wong week is out.  I did let her know I will be on furlough next week however, in basket will be covered by my colleagues.    Patient was appreciative of the phone call.    Earnest Noel MD  [facility]

## 2020-05-06 ENCOUNTER — OFFICE VISIT (OUTPATIENT)
Dept: ORTHOPEDICS | Facility: CLINIC | Age: 60
End: 2020-05-06
Payer: COMMERCIAL

## 2020-05-06 VITALS — BODY MASS INDEX: 31.7 KG/M2 | HEIGHT: 66 IN | SYSTOLIC BLOOD PRESSURE: 135 MMHG | DIASTOLIC BLOOD PRESSURE: 87 MMHG

## 2020-05-06 DIAGNOSIS — M70.62 TROCHANTERIC BURSITIS OF BOTH HIPS: ICD-10-CM

## 2020-05-06 DIAGNOSIS — M70.61 TROCHANTERIC BURSITIS OF BOTH HIPS: ICD-10-CM

## 2020-05-06 DIAGNOSIS — M18.0 PRIMARY OSTEOARTHRITIS OF BOTH FIRST CARPOMETACARPAL JOINTS: Primary | ICD-10-CM

## 2020-05-06 PROCEDURE — 20604 DRAIN/INJ JOINT/BURSA W/US: CPT | Mod: LT | Performed by: FAMILY MEDICINE

## 2020-05-06 PROCEDURE — 99213 OFFICE O/P EST LOW 20 MIN: CPT | Mod: 25 | Performed by: FAMILY MEDICINE

## 2020-05-06 PROCEDURE — 20610 DRAIN/INJ JOINT/BURSA W/O US: CPT | Mod: LT | Performed by: FAMILY MEDICINE

## 2020-05-06 RX ORDER — TRIAMCINOLONE ACETONIDE 40 MG/ML
20 INJECTION, SUSPENSION INTRA-ARTICULAR; INTRAMUSCULAR
Status: DISCONTINUED | OUTPATIENT
Start: 2020-05-06 | End: 2021-02-12 | Stop reason: ALTCHOICE

## 2020-05-06 RX ORDER — ROPIVACAINE HYDROCHLORIDE 5 MG/ML
0.5 INJECTION, SOLUTION EPIDURAL; INFILTRATION; PERINEURAL
Status: DISCONTINUED | OUTPATIENT
Start: 2020-05-06 | End: 2021-02-12 | Stop reason: ALTCHOICE

## 2020-05-06 RX ORDER — TRIAMCINOLONE ACETONIDE 40 MG/ML
40 INJECTION, SUSPENSION INTRA-ARTICULAR; INTRAMUSCULAR
Status: DISCONTINUED | OUTPATIENT
Start: 2020-05-06 | End: 2021-02-12 | Stop reason: ALTCHOICE

## 2020-05-06 RX ORDER — BUPIVACAINE HYDROCHLORIDE 5 MG/ML
2 INJECTION, SOLUTION EPIDURAL; INTRACAUDAL
Status: DISCONTINUED | OUTPATIENT
Start: 2020-05-06 | End: 2021-02-12 | Stop reason: ALTCHOICE

## 2020-05-06 RX ORDER — LIDOCAINE HYDROCHLORIDE 10 MG/ML
2 INJECTION, SOLUTION INFILTRATION; PERINEURAL
Status: DISCONTINUED | OUTPATIENT
Start: 2020-05-06 | End: 2021-02-12 | Stop reason: ALTCHOICE

## 2020-05-06 RX ADMIN — TRIAMCINOLONE ACETONIDE 40 MG: 40 INJECTION, SUSPENSION INTRA-ARTICULAR; INTRAMUSCULAR at 13:50

## 2020-05-06 RX ADMIN — ROPIVACAINE HYDROCHLORIDE 0.5 ML: 5 INJECTION, SOLUTION EPIDURAL; INFILTRATION; PERINEURAL at 13:55

## 2020-05-06 RX ADMIN — TRIAMCINOLONE ACETONIDE 20 MG: 40 INJECTION, SUSPENSION INTRA-ARTICULAR; INTRAMUSCULAR at 13:55

## 2020-05-06 RX ADMIN — LIDOCAINE HYDROCHLORIDE 2 ML: 10 INJECTION, SOLUTION INFILTRATION; PERINEURAL at 13:50

## 2020-05-06 RX ADMIN — BUPIVACAINE HYDROCHLORIDE 2 ML: 5 INJECTION, SOLUTION EPIDURAL; INTRACAUDAL at 13:50

## 2020-05-06 NOTE — LETTER
2020         RE: Jacquie Amador  [address] [facility]  [place] [address]        Dear Colleague,    Thank you for referring your patient, Jacquie Amador, to the [facility]. Please see a copy of my visit note below.    Jacquie Amador  :  1960  DOS: 20  MRN: [id number]    Sports Medicine Clinic Visit    PCP: Liz Peterson    Jacquie Amador is a 55 year old Right hand dominant female who is seen in follow up for her chronic bilateral hip and bilateral thumb CMC joint pain.      Interim History - 2018  Since last visit on 2018 patient has moderate-severe bilateral hip pain.  Bilateral hip trochanteric bursa injection completed on  provided good relief for ~ 2.5 - 3 months.  Patient is desiring repeat injections today.  No new injury in the interim.    She has continued to have pain in bilateral thumbs over last ~ 6 months.  Unable to repeat injections d/t her schedule, then developing secondary infection.  Recently discharged from Pain Mgmt clinic.    Interim History - 2020  Since last visit on 18 patient has moderate - severe bilateral lateral hip and bilateral thumb/CMC joint pain.  Bilateral hip trochanteric bursa injections completed on 18 provided good relief for ~ one year.  Patient notes that pain has been worsening over the past ~ 6+ months.  Bilateral thumb CMC joint steroid injections last completed 18 provided good relief for ~ 6 months.  Patient is now unable to grasp objects without significant pain and weakness.  No new injury in the interim.    Interim History - 2020  Since last visit on 2020 patient has moderate bilateral thumb, CMC joint pain.  Patient presents to bilateral injections as previously discussed.  No interim injury.       Second complaint of chronic right posterior lateral ankle pain and swelling over past 5+ years, significantly worse over the past ~ 3+ months.   "Patient notes generalized right ankle weakness and feels like she is going to \"roll\" her ankle frequently.  Pain is worse with walking and lateral movements.  No current treatment completed at this time.  Previously treated with Dr Cárdenas with steroid injection in  that provided moderate relief.    Interim History - May 6, 2020  Since last visit on 3/4/2020 patient has moderate-severe radiating left lateral hip and buttocks pain over the past 3 - 4 months.  Patient does not recall completing Left hip bursa injection completed on 20.  Pain is worse with any pressure to her lateral anterior thigh and with lying on left side.  No new injury in the interim.    Second complaint of worsening left thumb CMC joint pain.  She would like to pursue repeat steroid injection as previously discussed on 3/4/20.      Review of Systems  Musculoskeletal: as above  Remainder of review of systems is negative including constitutional, CV, pulmonary, GI, Skin and Neurologic except as noted in HPI or medical history.    Past Medical History:   Diagnosis Date     ASTHMA - MODERATE PERSISTENT 2005     Cancer (H)      Chronic pain      Coronary artery disease      CVA (cerebral infarction)      Depressive disorder, not elsewhere classified      Diabetes (H)      Elevated serum alkaline phosphatase level     Liver source     Fibromyalgia      HYPERLIPIDEMIA NEC/NOS 2006     Hypertriglyceridemia      OA (osteoarthritis)      Thyroid disease      Tobacco use disorder      Tobacco use disorder complicating pregnancy, childbirth, or the puerperium, antepartum condition or complication      Trochanteric bursitis      Unspecified essential hypertension      Past Surgical History:   Procedure Laterality Date     C  DELIVERY ONLY      , Low Cervical     INJECT EPIDURAL TRANSFORAMINAL  2014    [facility]     INJECT JOINT SACROILIAC  2014    [facility]     " "LAMINECTOMY LUMBAR ONE LEVEL Left 04/08/2014    [facility]     Objective  /87   Ht 1.676 m (5' 6\")   LMP 07/17/2009 (Exact Date)   BMI 31.70 kg/m      GENERAL APPEARANCE: healthy, alert and no distress   GAIT: NORMAL  SKIN: no suspicious lesions or rashes  NEURO: Normal strength and tone, mentation intact and speech normal  PSYCH:  mentation appears normal and affect normal/bright      Bilateral Hand Exam  Inspection:Carpals: normal, Thumb: normal  Tender: Carpals: triquetrum, Metacarpals: 1st metacarpal, Thumb: CMC, L>>R  Non-tender: Remainder of hand  Range of Motion Thumb: opposition Decreased R>L, flexion MCP decreased, painful, extension MCP decreased, flexion IP decreased, extension IP decreased  Strength: mild decrease in thumb  strength bilaterally  Special tests: Positive scour of CMC, negative snuffbox tenderness   Skin:  well perfused  capillary refill brisk    Left hip exam    Inspection:        no edema or ecchymosis in hip area    ROM:       Full active and passive ROM       Pain with active adduction over lateral hip and active ER    Strength:        flexion 5/5       extension 5/5       abduction 5/5       adduction 5/5    Tender:        greater trochanter       SI joint mild       Piriformis, external rotators mildly    Non Tender:        remainder of hip area       illiac crest       ASIS       pubis    Sensation:        grossly intact in hip and thigh    Skin:       well perfused       capillary refill brisk    Special Tests:        neg (-) LORI       neg (-) FADIR       neg (-) scour       positive (+) Brooks      Large Joint Injection/Arthocentesis: L greater trochanteric bursa    Date/Time: 5/6/2020 1:50 PM  Performed by: José Miguel Linder DO  Authorized by: José Miguel Linder DO     Indications:  Pain  Needle Size:  25 G  Guidance: landmark guided    Approach:  Posterolateral  Location:  Hip      Site:  L greater trochanteric bursa  Medications:  2 mL " bupivacaine (PF) 0.5 %; 2 mL lidocaine 1 %; 40 mg triamcinolone 40 MG/ML  Outcome:  Tolerated well, no immediate complications  Procedure discussed: discussed risks, benefits, and alternatives    Consent Given by:  Patient  Timeout: timeout called immediately prior to procedure    Prep: patient was prepped and draped in usual sterile fashion    Hand / Upper Extremity Injection/Arthrocentesis: L thumb CMC    Date/Time: 5/6/2020 1:55 PM  Performed by: José Miguel Linder DO  Authorized by: José Miguel Linder DO     Indications:  Pain  Needle Size:  25 G  Guidance: ultrasound    Approach:  Radial  Condition: osteoarthritis    Location:  Thumb  Site:  L thumb CMC    Medications:  20 mg triamcinolone 40 MG/ML; 0.5 mL ropivacaine 5 MG/ML  Outcome:  Tolerated well, no immediate complications  Procedure discussed: discussed risks, benefits, and alternatives    Consent Given by:  Patient  Timeout: timeout called immediately prior to procedure    Prep: patient was prepped and draped in usual sterile fashion        Radiology:  XR images independently visualized and reviewed with patient today in clinic  Mild ankle degenerative changes, nothing clearly acute, will follow final radiology read    FINGER LEFT TWO OR MORE VIEWS 5/13/2014 11:29 AM   HISTORY: Chronic pain.  COMPARISON: None.   FINDINGS: No fracture or malalignment is identified. There is near  complete joint space loss of the first carpal metacarpal joint with  adjacent subchondral cyst formation in the base of the first  metacarpal. Small osteophytes are also noted. Mild degenerative  changes of the first metacarpal phalangeal joint also noted.  IMPRESSION  IMPRESSION: Degenerative changes of the first carpal metacarpal and of  the first metacarpal phalangeal joints. No fracture.    Assessment:  1. Primary osteoarthritis of both first carpometacarpal joints    2. Trochanteric bursitis of both hips        Plan:  Discussed the assessment with the  patient.  Follow up: prn  XR images independently visualized and reviewed with patient again today in clinic  Reviewed prior hand and hip films today  Repeat left CMC injection today, reviewed PRP option in again today  Left trochanteric bursa injection today  Water therapy and low impact activity options reviewed again  Reviewed again number of steroid injection over time and importance of limiting overall number as much as possible  Right ankle pain chronically  Reviewed options for PT, HEP, bracing, injections  Trial of right ankle tibiotalar CSI was helpful  Expectations and goals of CSI reviewed, risks reviewed including during current viral pandemic  Often 2-3 days for steroid effect, and can take up to two weeks for maximum effect  We discussed modified progressive pain-free activity as tolerated  Do not overuse in first two weeks if feeling better due to concern for vulnerability while steroid is working  Supportive care reviewed  All questions were answered today  Contact us with additional questions or concerns  Signs and sx of concern reviewed      José Miguel Linder DO, CAQ  Primary Care Sports Medicine  [facility]         Disclaimer: This note consists of symbols derived from keyboarding, dictation and/or voice recognition software. As a result, there may be errors in the script that have gone undetected. Please consider this when interpreting information found in this chart.      Again, thank you for allowing me to participate in the care of your patient.        Sincerely,        José Miguel Linder DO

## 2020-05-06 NOTE — PROGRESS NOTES
"Jacquie Amador  :  1960  DOS: 20  MRN: [id number]    Sports Medicine Clinic Visit    PCP: Liz Peterson    Jacquie Amador is a 55 year old Right hand dominant female who is seen in follow up for her chronic bilateral hip and bilateral thumb CMC joint pain.      Interim History - 2018  Since last visit on 2018 patient has moderate-severe bilateral hip pain.  Bilateral hip trochanteric bursa injection completed on  provided good relief for ~ 2.5 - 3 months.  Patient is desiring repeat injections today.  No new injury in the interim.    She has continued to have pain in bilateral thumbs over last ~ 6 months.  Unable to repeat injections d/t her schedule, then developing secondary infection.  Recently discharged from Pain Mgmt clinic.    Interim History - 2020  Since last visit on 18 patient has moderate - severe bilateral lateral hip and bilateral thumb/CMC joint pain.  Bilateral hip trochanteric bursa injections completed on 18 provided good relief for ~ one year.  Patient notes that pain has been worsening over the past ~ 6+ months.  Bilateral thumb CMC joint steroid injections last completed 18 provided good relief for ~ 6 months.  Patient is now unable to grasp objects without significant pain and weakness.  No new injury in the interim.    Interim History - 2020  Since last visit on 2020 patient has moderate bilateral thumb, CMC joint pain.  Patient presents to bilateral injections as previously discussed.  No interim injury.       Second complaint of chronic right posterior lateral ankle pain and swelling over past 5+ years, significantly worse over the past ~ 3+ months.  Patient notes generalized right ankle weakness and feels like she is going to \"roll\" her ankle frequently.  Pain is worse with walking and lateral movements.  No current treatment completed at this time.  Previously treated with Dr Cárdenas with steroid injection " "in 2015 that provided moderate relief.    Interim History - May 6, 2020  Since last visit on 3/4/2020 patient has moderate-severe radiating left lateral hip and buttocks pain over the past 3 - 4 months.  Patient does not recall completing Left hip bursa injection completed on 20.  Pain is worse with any pressure to her lateral anterior thigh and with lying on left side.  No new injury in the interim.    Second complaint of worsening left thumb CMC joint pain.  She would like to pursue repeat steroid injection as previously discussed on 3/4/20.      Review of Systems  Musculoskeletal: as above  Remainder of review of systems is negative including constitutional, CV, pulmonary, GI, Skin and Neurologic except as noted in HPI or medical history.    Past Medical History:   Diagnosis Date     ASTHMA - MODERATE PERSISTENT 2005     Cancer (H)      Chronic pain      Coronary artery disease      CVA (cerebral infarction)      Depressive disorder, not elsewhere classified      Diabetes (H)      Elevated serum alkaline phosphatase level     Liver source     Fibromyalgia      HYPERLIPIDEMIA NEC/NOS 2006     Hypertriglyceridemia      OA (osteoarthritis)      Thyroid disease      Tobacco use disorder      Tobacco use disorder complicating pregnancy, childbirth, or the puerperium, antepartum condition or complication      Trochanteric bursitis      Unspecified essential hypertension      Past Surgical History:   Procedure Laterality Date     C  DELIVERY ONLY      , Low Cervical     INJECT EPIDURAL TRANSFORAMINAL  2014    [facility] [place]     INJECT JOINT SACROILIAC  2014    [facility]     LAMINECTOMY LUMBAR ONE LEVEL Left 2014    [facility]     Objective  /87   Ht 1.676 m (5' 6\")   LMP 2009 (Exact Date)   BMI 31.70 kg/m      GENERAL APPEARANCE: healthy, alert and no distress   GAIT: NORMAL  SKIN: no suspicious lesions or " rashes  NEURO: Normal strength and tone, mentation intact and speech normal  PSYCH:  mentation appears normal and affect normal/bright      Bilateral Hand Exam  Inspection:Carpals: normal, Thumb: normal  Tender: Carpals: triquetrum, Metacarpals: 1st metacarpal, Thumb: CMC, L>>R  Non-tender: Remainder of hand  Range of Motion Thumb: opposition Decreased R>L, flexion MCP decreased, painful, extension MCP decreased, flexion IP decreased, extension IP decreased  Strength: mild decrease in thumb  strength bilaterally  Special tests: Positive scour of CMC, negative snuffbox tenderness   Skin:  well perfused  capillary refill brisk    Left hip exam    Inspection:        no edema or ecchymosis in hip area    ROM:       Full active and passive ROM       Pain with active adduction over lateral hip and active ER    Strength:        flexion 5/5       extension 5/5       abduction 5/5       adduction 5/5    Tender:        greater trochanter       SI joint mild       Piriformis, external rotators mildly    Non Tender:        remainder of hip area       illiac crest       ASIS       pubis    Sensation:        grossly intact in hip and thigh    Skin:       well perfused       capillary refill brisk    Special Tests:        neg (-) LORI       neg (-) FADIR       neg (-) scour       positive (+) Brooks      Large Joint Injection/Arthocentesis: L greater trochanteric bursa    Date/Time: 5/6/2020 1:50 PM  Performed by: José Miguel Linder DO  Authorized by: José Miguel Linder DO     Indications:  Pain  Needle Size:  25 G  Guidance: landmark guided    Approach:  Posterolateral  Location:  Hip      Site:  L greater trochanteric bursa  Medications:  2 mL bupivacaine (PF) 0.5 %; 2 mL lidocaine 1 %; 40 mg triamcinolone 40 MG/ML  Outcome:  Tolerated well, no immediate complications  Procedure discussed: discussed risks, benefits, and alternatives    Consent Given by:  Patient  Timeout: timeout called immediately prior to  procedure    Prep: patient was prepped and draped in usual sterile fashion    Hand / Upper Extremity Injection/Arthrocentesis: L thumb CMC    Date/Time: 5/6/2020 1:55 PM  Performed by: José Miguel Linder DO  Authorized by: José Miguel Linder DO     Indications:  Pain  Needle Size:  25 G  Guidance: ultrasound    Approach:  Radial  Condition: osteoarthritis    Location:  Thumb  Site:  L thumb CMC    Medications:  20 mg triamcinolone 40 MG/ML; 0.5 mL ropivacaine 5 MG/ML  Outcome:  Tolerated well, no immediate complications  Procedure discussed: discussed risks, benefits, and alternatives    Consent Given by:  Patient  Timeout: timeout called immediately prior to procedure    Prep: patient was prepped and draped in usual sterile fashion        Radiology:  XR images independently visualized and reviewed with patient today in clinic  Mild ankle degenerative changes, nothing clearly acute, will follow final radiology read    FINGER LEFT TWO OR MORE VIEWS 5/13/2014 11:29 AM   HISTORY: Chronic pain.  COMPARISON: None.   FINDINGS: No fracture or malalignment is identified. There is near  complete joint space loss of the first carpal metacarpal joint with  adjacent subchondral cyst formation in the base of the first  metacarpal. Small osteophytes are also noted. Mild degenerative  changes of the first metacarpal phalangeal joint also noted.  IMPRESSION  IMPRESSION: Degenerative changes of the first carpal metacarpal and of  the first metacarpal phalangeal joints. No fracture.    Assessment:  1. Primary osteoarthritis of both first carpometacarpal joints    2. Trochanteric bursitis of both hips        Plan:  Discussed the assessment with the patient.  Follow up: prn  XR images independently visualized and reviewed with patient again today in clinic  Reviewed prior hand and hip films today  Repeat left CMC injection today, reviewed PRP option in again today  Left trochanteric bursa injection today  Water therapy and  low impact activity options reviewed again  Reviewed again number of steroid injection over time and importance of limiting overall number as much as possible  Right ankle pain chronically  Reviewed options for PT, HEP, bracing, injections  Trial of right ankle tibiotalar CSI was helpful  Expectations and goals of CSI reviewed, risks reviewed including during current viral pandemic  Often 2-3 days for steroid effect, and can take up to two weeks for maximum effect  We discussed modified progressive pain-free activity as tolerated  Do not overuse in first two weeks if feeling better due to concern for vulnerability while steroid is working  Supportive care reviewed  All questions were answered today  Contact us with additional questions or concerns  Signs and sx of concern reviewed      José Miguel Linder DO, RADHA  Primary Care Sports Medicine  [facility]         Disclaimer: This note consists of symbols derived from keyboarding, dictation and/or voice recognition software. As a result, there may be errors in the script that have gone undetected. Please consider this when interpreting information found in this chart.

## 2020-05-31 ENCOUNTER — VIRTUAL VISIT (OUTPATIENT)
Dept: FAMILY MEDICINE | Facility: OTHER | Age: 60
End: 2020-05-31

## 2020-05-31 NOTE — PROGRESS NOTES
"Date: 2020 17:37:13  Clinician: Gopi Bradford  Clinician NPI: [id number]  Patient: Jacquie Amador  Patient : 1960  Patient Address: [address] [place], [place] [address] [address]  Patient Phone: [phone]  Visit Protocol: Oral mouth sores  Patient Summary:  Jacquie is a 59 year old ( : 1960 ) female who initiated a Visit for canker sores. When asked the question \"Please sign me up to receive news, health information and promotions. \", Jacquie responded \"Yes\".    Images of her skin condition were uploaded.   Symptom details  Her symptoms started 4 to 6 days ago. The symptoms consist of pain.   The sores are located inside the mouth on both sides of the face. The sores are located together, in a cluster.   Jacquie denies having tingling and burning around the affected area. She also denies feeling feverish. Jacquie is not experiencing similar rash or sores on other parts of the body.   Precipitating events  Jacquie did not experience pain or unusual sensations (such as itching, burning, or tingling) in the location of sores/rash before it appeared.   Pertinent medical history  Her last outbreak was over 3 months ago. Her sores are typically non-recurrent.    Jacquie does not need a return to work/school note.   Jacquie does not smoke or use smokeless tobacco.   Additional information as reported by the patient (free text): There wasn't a place to check for Thrush. I have thrush which I get every now and then from steroid inhalers. I have been using Liquid Nystatin it's not doing the job. It is painful now. I have had thrush many times and still get it with rinsing really well.     MEDICATIONS: Dulera inhalation, albuterol sulfate inhalation, Wellbutrin SR oral, Celebrex oral, trazodone oral, Cymbalta oral, metformin (bulk), Advair Diskus inhalation, Singulair oral, Zyrtec oral, Flonase Allergy Relief nasal, ALLERGIES: Lyrica  Clinician Response:  Dear Jacquie,  Based on the information provided, the " lesions or sores you have are most likely cold sores, also known as fever blisters.  The technical term is 'herpes simplex virus type 1'. Common symptoms include a tingling sensation on the mouth or lips, fluid filled blisters, or crusting on the skin.  A person who has this condition can transmit the infection to others through direct contact. After you have had cold sores, the virus remains dormant in your skin cells and can come back at a later time causing another cold sore.   Medication information  I am prescribing:     Valacyclovir 1 gram oral tablet. Take 2 tablets by mouth every 12 hours for 1 day. If you think your oral sores are returning, refill the medication and use it according to the instructions. Your prescription includes 1 refill.   Unless you are allergic to the over-the-counter medication(s) below, I recommend using:       Acetaminophen (Tylenol or store brand). Take 1-2 tablets every 4-6 hours as needed to help with the pain.      Ibuprofen (Advil or store brand) 200 mg oral tablet. Take 1-3 tablets (200-600 mg) by mouth every 8 hours to control pain. Make sure to take the ibuprofen with food. Do not exceed 2400 mg in 24 hours.      Benzocaine (Orajel or store brand) 20% topical gel. Apply a small amount of product to the affected area as needed. Do not use more than 4 times a day.      Over-the-counter medications do not require a prescription. Ask the pharmacist if you have any questions.  Self care  To reduce the spread of the sores to other people and to other parts of your body, please take the following precautions:      Try not to itch or scratch the sores    If the lesions are draining, keep them covered    Be sure to wash your hands with soap and water often and after touching the sores    Wash towels and bed linens in hot water and dry them on high heat    When you have active sores:    Avoid direct contact such as kissing    Do not share silverware, cups, or towels           When to  seek care  Please make an appointment to be seen in a clinic or urgent care if any of the following occur:     Your sores do not heal within 2 weeks    You develop new symptoms or your symptoms become worse      Diagnosis: Herpes Simplex (Cold Sores)  Diagnosis ICD: B00.9  Prescription: valacyclovir (Valtrex) 1 gram oral tablet 4 tablet, 1 days supply. Take 2 tablets by mouth every 12 hours for 1 day. Refills: 1, Refill as needed: no, Allow substitutions: yes  Pharmacy: [id number] - [phone] - [phone] [facility] [place] [address]

## 2020-06-01 ENCOUNTER — VIRTUAL VISIT (OUTPATIENT)
Dept: FAMILY MEDICINE | Facility: OTHER | Age: 60
End: 2020-06-01

## 2020-06-01 NOTE — PROGRESS NOTES
"Date: 2020 18:26:09  Clinician: Eber Franklin  Clinician NPI: [id number]  Patient: Jacquie Amador  Patient : 1960  Patient Address: [address] [place], [place] [address] [address]  Patient Phone: [phone]  Visit Protocol: URI  Patient Summary:  Jacquie is a 59 year old ( : 1960 ) female who initiated a Visit for COVID-19 (Coronavirus) evaluation and screening. When asked the question \"Please sign me up to receive news, health information and promotions. \", Jacquie responded \"No\".    Jacquie states her symptoms started gradually 2-3 weeks ago. After her symptoms started, they improved and then got worse again.   Her symptoms consist of nausea, a sore throat, malaise, myalgia, facial pain or pressure, a cough, nasal congestion, and a headache. She is experiencing mild difficulty breathing with activities but can speak normally in full sentences.   Symptom details     Nasal secretions: The color of her mucus is white and green.    Cough: Jacquie coughs a few times an hour and her cough is more bothersome at night. Phlegm comes into her throat when she coughs. She believes her cough is caused by post-nasal drip. The color of the phlegm is white and green.     Sore throat: Jacquie reports having mild throat pain (1-3 on a 10 point pain scale), does not have exudate on her tonsils, and can swallow liquids. The lymph nodes in her neck are not enlarged. A rash has not appeared on the skin since the sore throat started.     Facial pain or pressure: The facial pain or pressure feels worse when bending over or leaning forward.     Headache: She states the headache is severe (7-9 on a 10 point pain scale).      Jacquie denies having wheezing, teeth pain, ageusia, diarrhea, enlarged lymph nodes, anosmia, fever, vomiting, rhinitis, ear pain, and chills. She also denies having recent facial or sinus surgery in the past 60 days and taking antibiotic medication for the symptoms.   Precipitating events  Within the past " week, Jacquie has not been exposed to someone with strep throat. She has not recently been exposed to someone with influenza. Jacquie has been in close contact with the following high risk individuals: adults 65 or older and children under the age of 5.   Pertinent COVID-19 (Coronavirus) information  In the past 14 days, Jacquie has not worked in a congregate living setting.   She does not work or volunteer as healthcare worker or a  and does not work or volunteer in a healthcare facility.   Jacquie also has not lived in a congregate living setting in the past 14 days. She does not live with a healthcare worker.   Jacquie has not had a close contact with a laboratory-confirmed COVID-19 patient within 14 days of symptom onset.   Pertinent medical history  Jacquie had 2 sinus infections within the past year.   Jacquie typically gets a yeast infection when she takes antibiotics. She has used fluconazole (Diflucan) to treat previous yeast infections. 2 doses of fluconazole (Diflucan) has typically been needed for symptoms to resolve in the past.  Jacquie does not need a return to work/school note.   Weight: 180 lbs   Jacquie does not smoke or use smokeless tobacco.   Additional information as reported by the patient (free text): I have felt like it's harder to breath. I have controlled asthma however feel like it is flaring worse than usual. I have migraines meds don't make it go away as usual. Cough is a tight cough even after dulera/Albuterol. Sometimes cause alot of phlegm . I feel like I need something to loosen chest/cough/headache. I haven't hardly gone out of house since March when we were in [place]. Sarina tired too. I can't go outside right now when really humid. Dizzy a lot .   Weight: 180 lbs    MEDICATIONS: atorvastatin oral, metoprolol tartrate-hydrochlorothiazide oral, Dulera inhalation, albuterol sulfate inhalation, Wellbutrin SR oral, Celebrex oral, trazodone oral, Cymbalta oral, metformin  "(bulk), Advair Diskus inhalation, Singulair oral, Zyrtec oral, Flonase Allergy Relief nasal, ALLERGIES: Lyrica  Clinician Response:  Dear Jacquie,      Your symptoms show that you may have coronavirus (COVID-19). This illness can cause fever, cough and trouble breathing. Many people get a mild case and get better on their own. Some people can get very sick.  What should I do?  We would like to test you for this virus. This will be a curbside test done outside the clinic.  Please call [phone] to schedule your visit. Explain that you were referred by [facility] to have a COVID-19 test. Be ready to share your [facility] visit ID number.  Starting now:  Stay at least 6 feet away from others. (If someone will drive you to your test, stay in the backseat, as far away from the  as you can.)   Don't go to work, school or anywhere else. When it's time for your test, go straight to the testing site. Don't make any stops on the way there or back.   Wash your hands and face often. Use soap and water.   Cover your mouth and nose with a mask, tissue or washcloth.   Don't touch anyone. No hugging, kissing or handshakes.  While at home   Stay home and away from others (self-isolate) until:  You've had no fever---and no medicine that reduces fever---for 3 full days (72 hours). And...  Your other symptoms have gotten better. For example, your cough or breathing has improved. And...  At least 10 days have passed since your symptoms started.  During this time:  Stay in your own room (and use your own bathroom), if you can.  Don't go to work, school or anywhere else.  Stay away from others in your home. No hugging, kissing or shaking hands.  Don't let anyone visit.  Cover your mouth and nose with a mask, tissue or washcloth to avoid spreading germs.  Clean \"high touch\" surfaces often (doorknobs, counters, handles, etc.). Use a household cleaning spray or wipes.  Wash your hands and face often. Use soap and water.  How can I take care " of myself?  1. Get lots of rest. Drink extra fluids (unless your doctor has told you not to).  2. Take Tylenol (acetaminophen) for fever or pain. If you have liver or kidney problems, ask your family doctor if it's okay to take Tylenol.  Adults can take either:   650 mg (two 325 mg pills) every 4 to 6 hours, or...  1,000 mg (two 500 mg pills) every 8 hours as needed.   Note: Don't take more than 3,000 mg in one day.   Acetaminophen is found in many medicines (both prescribed and over-the-counter medicines). Read all labels to be sure you don't take too much.   For children, check the Tylenol bottle for the right dose. The dose is based on the child's age or weight.  3. If you have other health problems (like cancer, heart failure, an organ transplant or severe kidney disease): Call your specialty clinic if you don't feel better in the next 2 days.  4. Know when to call 911: If your breathing is so bad that it keeps you from doing normal activities, call 911 or go to the emergency room. Tell them that you've been staying home and may have COVID-19.  5. Sign up for [employer]. We know it's scary to hear that you might have COVID-19. We want to track your symptoms to make sure you're okay over the next 2 weeks. Please look for an email from [employer]---this is a free, online program that we'll use to keep in touch. To sign up, follow the link in the email. Learn more at [email or web address].  6. The following will serve as your written order for this Covid Test ordered by me for the indication of suspected Covid [Z20.828]: The test will be ordered in [employer], our electronic health record after you are scheduled and will show as ordered and authorized by Perez Patrick MD   Order: Covid-19 (Coronavirus) PCR for SYMPTOMATIC testing from [facility]  Where can I get more information?  To learn more about COVID-19 and how to care for yourself at home, please visit the CDC website at  https://www.cdc.gov/coronavirus/2019-ncov/about/steps-when-sick.html.  For more about your care at [facility], please visit [email or web address]  If you'd like to be part of a COVID-19 clinical trial (research study) at the [facility], go to [email or web address] for details.    Diagnosis: Unspecified asthma with (acute) exacerbation  Diagnosis ICD: J45.901  Prescription: amoxicillin-pot clavulanate (Augmentin) 875-125 mg oral tablet 20 tablet, 10 days supply. Take 1 tablet by mouth every 12 hours for 10 days. Refills: 0, Refill as needed: no, Allow substitutions: yes  Prescription: prednisone 20 mg oral tablet 5 tablet, 5 days supply. Take 1 tablet by mouth 1 time per day for 5 days. Refills: 0, Refill as needed: no, Allow substitutions: yes  [id number] - [phone] - [id number] [facility] [address] [place] [address]  Addendum created: June 09 14:49:10, 2020 created by: Olivier Rascon body: Jacquie, stop the Amoxicillin and start the Doxycycline. I reviewed Nystatin and it has to be used  4x daily to work. I ordered a fresh amount, still the liquid,  and should be taken for several days after the antibiotics are completed.

## 2020-06-03 DIAGNOSIS — U07.1 2019 NOVEL CORONAVIRUS DISEASE (COVID-19): Primary | ICD-10-CM

## 2020-06-03 PROCEDURE — U0003 INFECTIOUS AGENT DETECTION BY NUCLEIC ACID (DNA OR RNA); SEVERE ACUTE RESPIRATORY SYNDROME CORONAVIRUS 2 (SARS-COV-2) (CORONAVIRUS DISEASE [COVID-19]), AMPLIFIED PROBE TECHNIQUE, MAKING USE OF HIGH THROUGHPUT TECHNOLOGIES AS DESCRIBED BY CMS-2020-01-R: HCPCS | Mod: 90 | Performed by: NURSE PRACTITIONER

## 2020-06-03 PROCEDURE — 99000 SPECIMEN HANDLING OFFICE-LAB: CPT | Performed by: NURSE PRACTITIONER

## 2020-06-03 NOTE — LETTER
June 5, 2020        Jacquie Amador  [address] [address]  [place] [address]    This letter provides a written record that you were tested for COVID-19 on 6/3/20.   Your result was negative.    This means that we didn t find the virus that causes COVID-19 in your sample. A test may show negative when you do actually have the virus. This can happen when the virus is in the early stages of infection, before you feel illness symptoms.    Even if you don t have symptoms, they may still appear. For safety, it s very important to follow these rules.    Keep yourself away from others (self-isolation):      Stay home. Don t go to work, school or anywhere else.     Stay in your own room (and use your own bathroom), if you can.    Stay away from others in your home. No hugging, kissing or shaking hands. No visitors.    Clean  high touch  surfaces often (doorknobs, counters, handles, etc.). Use a household cleaning spray or wipes.    Cover your mouth and nose with a mask, tissue or washcloth to avoid spreading germs.    Wash your hands and face often with soap and water.    Stay in self-isolation until you meet ALL of the guidelines below:    1. You have had no fever for at least 72 hours (that is 3 full days of no fever without the use of medicine that reduces fevers), AND  2. other symptoms (such as cough, shortness of breath) have gotten better, AND  3. at least 10 days have passed since your symptoms first appeared.    Going back to work  Check with your employer for any guidelines to follow for going back to work.    Employers: This document serves as formal notice that your employee tested negative for COVID-19, as of the testing date shown above.    For questions regarding this letter or your Negative COVID-19 result, call [phone] between 8A to 6:30P (M-F) and 10A to 6:30P (weekends).

## 2020-06-04 ENCOUNTER — MYC MEDICAL ADVICE (OUTPATIENT)
Dept: FAMILY MEDICINE | Facility: OTHER | Age: 60
End: 2020-06-04

## 2020-06-04 LAB
SARS-COV-2 RNA SPEC QL NAA+PROBE: NOT DETECTED
SPECIMEN SOURCE: NORMAL

## 2020-06-05 ENCOUNTER — VIRTUAL VISIT (OUTPATIENT)
Dept: FAMILY MEDICINE | Facility: OTHER | Age: 60
End: 2020-06-05
Payer: COMMERCIAL

## 2020-06-05 DIAGNOSIS — L74.511 PRIMARY FOCAL HYPERHIDROSIS OF FACE: Primary | ICD-10-CM

## 2020-06-05 PROCEDURE — 99214 OFFICE O/P EST MOD 30 MIN: CPT | Mod: 95 | Performed by: FAMILY MEDICINE

## 2020-06-05 RX ORDER — OXYBUTYNIN CHLORIDE 5 MG/1
5 TABLET, EXTENDED RELEASE ORAL DAILY
Qty: 30 TABLET | Refills: 0 | Status: SHIPPED | OUTPATIENT
Start: 2020-06-05 | End: 2020-07-01

## 2020-06-05 NOTE — PATIENT INSTRUCTIONS
Patient Education     Understanding Hyperhidrosis  Hyperhidrosis is excessive sweating. It s often an ongoing (chronic) condition. Sweating is a normal process. It helps manage body temperature and other processes of the body. But excessive sweating is more than is needed to do this. The symptoms can start when you re a child and continue into adulthood.  How to say it  hy-per-hy-DROH-sis   What causes hyperhidrosis?  In most cases, the cause isn t known. It may be because of a problem with how the nervous system responds to stress. In some cases, it may be caused by another health condition or a medicine. This is known as secondary hyperhidrosis.  Symptoms of hyperhidrosis  The main symptom of hyperhidrosis is heavy sweating that:    Can cause problems with daily activities and social events    Happens during the day but not at night    May happen with no physical activity  The sweating occurs most often in any or all of these areas:    Bottoms of the feet    Palms    Underarms  In some cases, it may also occur in these areas:    Face    Groin    Scalp    Under the breasts  Treatment for hyperhidrosis  Treatment choices include:    Antiperspirant. An antiperspirant that has 10% to 15% aluminum chloride can block sweat glands and help stop sweating. It comes in creams, sticks, gels, and sprays. You can buy antiperspirant at a drugstore. Or your healthcare provider may give you a stronger prescription antiperspirant that has 20% aluminum chloride. Antiperspirant should be applied to dry skin at night before bed. It needs to be applied every night for a week or two, and then once or twice a week or as needed. This can irritate the skin for some people.    Botulinum toxin. This is a type of medicine. The medicine is injected into the areas with sweat glands. This can help prevent sweat glands from working normally for a few months. You ll need to get injections every few months.    Iontophoresis. This treatment uses  electricity to block sweat glands. Moist pads are put on the skin, or your hands or feet are placed in shallow water. Chemicals may be added to the water. An electrical current is sent through fluid. The process is done several times a week until sweating is reduced, and then once a week or as advised.    Surgery. In severe cases, surgery can be done to remove your sweat glands. Or surgery can be done to cut the nerves that send signals to the sweat glands. Either of these types of surgery can stop sweat permanently.  But this can lead to compensatory hyperhidrosis. This means you will start sweating from another part of your body.    Treating another health condition, or changing a medicine. A health condition or a medicine can cause secondary hyperhidrosis. This can be managed by treating the health condition, or by a change in medicine. Your healthcare provider will talk with you about these options if you have secondary hyperhidrosis.    Oral medicines. There are medicines that can be taken by mouth that will help reduce sweating.  Possible complications of hyperhidrosis  You may have skin problems in the areas where you sweat. The skin may become moist, pale, swollen, and soft enough to rub away easily. This is known as skin maceration. It can lead to loss of skin, pain, and skin infection. You can help prevent this problem by treating your hyperhidrosis and keeping your skin dry as much as possible.  Living with hyperhidrosis  Hyperhidrosis may be caused by or made worse by emotional stress and heat. It can also cause problems with work and social life. You may have stains on your clothes, and not want to shake hands with people. It can be upsetting to cope with the problems of excess sweat. Talk with your healthcare provider about the following:    Support groups    How to prevent skin maceration    Other ways to manage your condition long-term  When to call your healthcare provider  Call your healthcare  provider right away if you have any of these:    Symptoms that don t get better, or get worse    New symptoms   Date Last Reviewed: 5/1/2016 2000-2019 [employer]. [address] [place] [address], [place] [address]. All rights reserved. This information is not intended as a substitute for professional medical care. Always follow your healthcare professional's instructions.    Visit the following website for more information  [email or web address]

## 2020-06-05 NOTE — PROGRESS NOTES
"Jacquie Amador is a 59 year old female who is being evaluated via a billable telephone visit.      The patient has been notified of following:     \"This telephone visit will be conducted via a call between you and your physician/provider. We have found that certain health care needs can be provided without the need for a physical exam.  This service lets us provide the care you need with a short phone conversation.  If a prescription is necessary we can send it directly to your pharmacy.  If lab work is needed we can place an order for that and you can then stop by our lab to have the test done at a later time.    Telephone visits are billed at different rates depending on your insurance coverage. During this emergency period, for some insurers they may be billed the same as an in-person visit.  Please reach out to your insurance provider with any questions.    If during the course of the call the physician/provider feels a telephone visit is not appropriate, you will not be charged for this service.\"    Patient has given verbal consent for Telephone visit?  Yes    What phone number would you like to be contacted at    [phone]    How would you like to obtain your AVS? Cathart    Subjective     Jacquie Amador is a 59 year old female who presents via phone visit today for the following health issues:    HPI   Patient wants to discuss going back on med.     Patient reports longstanding history of hyperhidrosis.  Was previously on   glycopyrrolate 1 mg 3 times daily without much improvement.  She has tried Botox in the past as well but this is been very expensive.  She is wondering what other alternative options could be.  She is never tried a antiperspirant for this.    She does have history of sinus tachycardia.  She does also endorse an overactive bladder urinary frequency.  She has no other questions or concerns at this time.  She does not remember any previous side effects with the medication.    Patient " Active Problem List   Diagnosis     Moderate persistent asthma     Allergic rhinitis due to other allergen     Esophageal reflux     Moderate recurrent major depression (H)     Multiple joint pain     HYPERLIPIDEMIA LDL GOAL <130     Hypertension goal BP (blood pressure) < 140/90     MARIZOL (generalized anxiety disorder)     Myalgia     Chronic rhinitis     Constipation     Lumbar disc disease with radiculopathy     Iron deficiency anemia     Osteoarthritis of carpometacarpal joint of thumb - bilateral     Trochanteric bursitis     Right ankle instability     Primary focal hyperhidrosis     Trochanteric bursitis of both hips     Overweight     History of iron deficiency     Scratching     Advanced directives, counseling/discussion     Chronic pain syndrome     Medical marijuana use     Primary osteoarthritis of both first carpometacarpal joints     Arthritis of metatarsophalangeal joint     Sinus tachycardia     Intractable chronic migraine without aura and without status migrainosus     Impaired fasting glucose     Migraine without aura and without status migrainosus, not intractable     Skin ulcer, limited to breakdown of skin (H)     Past Surgical History:   Procedure Laterality Date     C  DELIVERY ONLY      , Low Cervical     INJECT EPIDURAL TRANSFORAMINAL  2014    [facility]     INJECT JOINT SACROILIAC  2014    [facility]     LAMINECTOMY LUMBAR ONE LEVEL Left 2014    [facility]       Social History     Tobacco Use     Smoking status: Former Smoker     Packs/day: 1.00     Types: Cigarettes     Smokeless tobacco: Never Used     Tobacco comment: since    Substance Use Topics     Alcohol use: No     Family History   Problem Relation Age of Onset     Thyroid Disease Mother      Arthritis Mother      Colon Cancer Mother      Thyroid Disease Sister      Arthritis Sister      Lipids Sister      Hypertension Father      Diabetes  Father      C.A.D. Father         MI age 75     Cardiovascular Father         heart attack     Cerebrovascular Disease Father      Cancer Father         renal cancer     Arthritis Father      Heart Disease Father         heart attack     Gastrointestinal Disease Father         liver     Genitourinary Problems Father         kidney     Asthma Daughter      Gastrointestinal Disease Daughter      Unknown Other      Breast Cancer No family hx of      Cancer - colorectal No family hx of      Alzheimer Disease No family hx of      Blood Disease No family hx of      Circulatory No family hx of      Eye Disorder No family hx of      Musculoskeletal Disorder No family hx of      Neurologic Disorder No family hx of      Respiratory No family hx of          Current Outpatient Medications   Medication Sig Dispense Refill     adapalene (DIFFERIN) 0.1 % gel APPLY A THIN LAYER TO THE AFFECTED AREA(S) BY TOPICAL ROUTE ONCE DAILY BEFORE BEDTIME 45 g 10     albuterol (VENTOLIN HFA) 108 (90 Base) MCG/ACT Inhaler INHALE 2 PUFFS INTO THE LUNGS EVERY 6 HOURS AS NEEDED FOR SHORTNESS OF BREATH / DYSPNEA 54 g 1     ALPRAZolam (XANAX) 0.25 MG tablet Take 1 tablet (0.25 mg) by mouth daily as needed for anxiety 30 tablet 2     atorvastatin (LIPITOR) 20 MG tablet Take 1 tablet (20 mg) by mouth daily 90 tablet 3     buPROPion (WELLBUTRIN SR) 150 MG 12 hr tablet Take 1 tablet (150 mg) by mouth daily 90 tablet 3     buPROPion (WELLBUTRIN SR) 200 MG 12 hr tablet Take 1 tablet (200 mg) by mouth daily 90 tablet 3     celecoxib (CELEBREX) 200 MG capsule Take 1 capsule twice daily as needed for pain this is a 3 month script 180 capsule 1     clindamycin (CLINDAMAX) 1 % external gel Apply topically 2 times daily 30 g 4     clobetasol (TEMOVATE) 0.05 % external cream APPLY SPARINGLY TO AFFECTED AREA TWICE DAILY FOR 14 DAYS.  DO NOT APPLY TO FACE. 30 g 3     diclofenac (VOLTAREN) 1 % GEL topical gel Apply 2 grams to hands and 4 grams to hips up to 4 times  daily. T 9 Tube 11     DULoxetine (CYMBALTA) 60 MG capsule TAKE TWO CAPSULES BY MOUTH DAILY 180 capsule 3     ferrous sulfate (FE TABS) 325 (65 Fe) MG EC tablet Take 1 tablet (325 mg) by mouth daily 90 tablet 0     Lactobacillus (ACIDOPHILUS) CAPS Take 1 capsule by mouth daily Take two hours before or after antibiotic dose.  Continue for 1 week after antibiotic therapy completed 60 capsule 0     losartan-hydrochlorothiazide (HYZAAR) 50-12.5 MG tablet Take 1 tablet by mouth daily 90 tablet 3     medical cannabis (Patient's own supply.  Not a prescription) 2 mg morning and noon. 7 mg at bedtime. (This is NOT a prescription, and does not certify that the patient has a qualifying medical condition for medical cannabis.  The purpose of this order is  to document that the patient reports taking medical cannabis.) 0 Information only 0     metFORMIN (GLUCOPHAGE) 500 MG tablet TAKE ONE TABLET BY MOUTH EVERY DAY WITH DINNER 90 tablet 3     metoprolol succinate ER (TOPROL-XL) 50 MG 24 hr tablet Take 1 tablet (50 mg) by mouth daily 90 tablet 1     mometasone-formoterol (DULERA) 200-5 MCG/ACT inhaler Inhale 2 puffs into the lungs 2 times daily 3 Inhaler 1     montelukast (SINGULAIR) 10 MG tablet TAKE ONE TABLET BY MOUTH IN THE EVENING 90 tablet 3     multivitamin w/minerals (MULTI-VITAMIN) tablet Take 1 tablet by mouth daily       nystatin (MYCOSTATIN) 837588 UNIT/ML suspension Take by mouth 4 times daily 380 mL 1     oxybutynin ER (DITROPAN-XL) 5 MG 24 hr tablet Take 1 tablet (5 mg) by mouth daily 30 tablet 0     rOPINIRole (REQUIP) 1 MG tablet Take 3 tablets (3 mg) by mouth At Bedtime 270 tablet 3     SUMAtriptan (IMITREX) 100 MG tablet take 1 tablet at onset of headache may repeat in 2 hours max 2 tabs/day 18 tablet 1     traZODone (DESYREL) 50 MG tablet TAKE 2-3 TABLETS BY MOUTH AT BEDTIME 270 tablet 1     triamcinolone (ARISTOCORT HP) 0.5 % external cream Apply topically 2 times daily 60 g 0     ZYRTEC 10 MG OR TABS 1  TABLET DAILY 90 3     albuterol (PROVENTIL) (2.5 MG/3ML) 0.083% neb solution Take 1 vial (2.5 mg) by nebulization every 6 hours as needed for shortness of breath / dyspnea or wheezing (Patient not taking: Reported on 6/5/2020) 1 Box 1     hydrocortisone 2.5 % cream APPLY TOPICALLY 2 TIMES DAILY AS NEEDED (Patient not taking: Reported on 6/5/2020) 30 g 3     mupirocin (BACTROBAN) 2 % external cream Apply to affected area three times daily (Patient not taking: Reported on 6/5/2020) 60 g 1     nystatin (MYCOSTATIN) cream Apply to affected area three times daily 60 g 1     rizatriptan (MAXALT) 10 MG tablet Take 1 tablet (10 mg) by mouth at onset of headache for migraine May repeat in 2 hours. Max 3 tablets/24 hours. 18 tablet 3     tretinoin (RETIN-A) 0.025 % cream Spread a pea size amount into affected area topically at bedtime.  Use sunscreen SPF>20. (Patient not taking: Reported on 6/5/2020) 45 g 3     Allergies   Allergen Reactions     Cats      and rabbits/wheezing     Dogs      wheezing     Lyrica [Pregabalin] Other (See Comments)     Rash, mouth sores, itching and burning     Seasonal Allergies      rhinits     Reviewed and updated as needed this visit by Provider  Tobacco  Allergies  Meds  Problems  Med Hx  Surg Hx  Fam Hx         Review of Systems   Constitutional, HEENT, derm, neuro, cardiovascular, pulmonary, gi and gu systems are negative, except as otherwise noted.       Objective   Reported vitals:  LMP 07/17/2009 (Exact Date)    healthy, alert and no distress  PSYCH: Alert and oriented times 3; coherent speech, normal   rate and volume, able to articulate logical thoughts, able   to abstract reason, no tangential thoughts, no hallucinations   or delusions  Her affect is normal  RESP: No cough, no audible wheezing, able to talk in full sentences  Remainder of exam unable to be completed due to telephone visits    Diagnostic Test Results:  none       Assessment/Plan:  1. Primary focal hyperhidrosis  of face: Patient with symptoms of hyperhidrosis.  Recommend an antiperspirant as well as oxybutynin.  Oxybutynin may help with urinary symptoms as well.  Will start at 5 mg and increase to 10 mg if needed.  Watch for side effects.  Additionally if this is unsuccessful in resolving her symptoms will refer to dermatology for further recommendations.  I did call her pharmacy and they do have Drysol 20% available.  I did discuss with the patient that this may be irritating to her skin and only to use around the hairline at night and then wash it off in the morning.  She should use this daily.  If she develops skin irritation she should stop using it and we will see if we can find a weaker strength.  Patient agreeable plan.  - oxybutynin ER (DITROPAN-XL) 5 MG 24 hr tablet; Take 1 tablet (5 mg) by mouth daily  Dispense: 30 tablet; Refill: 0      Return in about 2 weeks (around 6/19/2020), or if symptoms worsen or fail to improve.    Phone call duration:  9 minutes    Earnest Noel MD  [facility]    This chart is completed utilizing dictation software; typos and/or incorrect word substitutions may unintentionally occur.

## 2020-06-05 NOTE — TELEPHONE ENCOUNTER
Please schedule for video or phone visit today with me at my 2:40 slot or another provider later today.    Earnest Noel MD  [facility]

## 2020-06-09 ENCOUNTER — NON-VISIT BILLABLE ENCOUNTER (OUTPATIENT)
Dept: URGENT CARE | Facility: URGENT CARE | Age: 60
End: 2020-06-09

## 2020-06-09 DIAGNOSIS — B37.0 THRUSH: ICD-10-CM

## 2020-06-09 DIAGNOSIS — R05.9 COUGH: Primary | ICD-10-CM

## 2020-06-09 RX ORDER — DOXYCYCLINE HYCLATE 100 MG
100 TABLET ORAL 2 TIMES DAILY
Qty: 20 TABLET | Refills: 0 | Status: SHIPPED | OUTPATIENT
Start: 2020-06-09 | End: 2020-12-03

## 2020-06-09 RX ORDER — NYSTATIN 100000/ML
500000 SUSPENSION, ORAL (FINAL DOSE FORM) ORAL 4 TIMES DAILY
Qty: 280 ML | Refills: 1 | Status: SHIPPED | OUTPATIENT
Start: 2020-06-09 | End: 2021-04-27

## 2020-06-28 DIAGNOSIS — J45.40 MODERATE PERSISTENT ASTHMA WITHOUT COMPLICATION: ICD-10-CM

## 2020-06-29 NOTE — TELEPHONE ENCOUNTER
Routing refill request to provider for review/approval because:  Labs out of range:  ACT    Renetta Massey, RN, BSN

## 2020-06-30 RX ORDER — MONTELUKAST SODIUM 10 MG/1
TABLET ORAL
Qty: 90 TABLET | Refills: 0 | Status: SHIPPED | OUTPATIENT
Start: 2020-06-30 | End: 2020-09-29

## 2020-07-01 ENCOUNTER — MYC MEDICAL ADVICE (OUTPATIENT)
Dept: FAMILY MEDICINE | Facility: OTHER | Age: 60
End: 2020-07-01

## 2020-07-01 DIAGNOSIS — F51.04 PSYCHOPHYSIOLOGICAL INSOMNIA: ICD-10-CM

## 2020-07-01 DIAGNOSIS — B37.0 THRUSH: Primary | ICD-10-CM

## 2020-07-01 DIAGNOSIS — L74.511 PRIMARY FOCAL HYPERHIDROSIS OF FACE: ICD-10-CM

## 2020-07-01 DIAGNOSIS — J45.40 MODERATE PERSISTENT ASTHMA WITHOUT COMPLICATION: ICD-10-CM

## 2020-07-01 RX ORDER — TRAZODONE HYDROCHLORIDE 50 MG/1
150 TABLET, FILM COATED ORAL AT BEDTIME
Qty: 270 TABLET | Refills: 1 | Status: SHIPPED | OUTPATIENT
Start: 2020-07-01 | End: 2020-12-02

## 2020-07-01 RX ORDER — FLUCONAZOLE 100 MG/1
TABLET ORAL
Qty: 15 TABLET | Refills: 0 | Status: SHIPPED | OUTPATIENT
Start: 2020-07-01 | End: 2020-07-15

## 2020-07-01 RX ORDER — TRAZODONE HYDROCHLORIDE 50 MG/1
TABLET, FILM COATED ORAL
Qty: 270 TABLET | Refills: 0 | OUTPATIENT
Start: 2020-07-01

## 2020-07-01 RX ORDER — OXYBUTYNIN CHLORIDE 5 MG/1
10 TABLET, EXTENDED RELEASE ORAL DAILY
Qty: 60 TABLET | Refills: 0 | Status: CANCELLED | OUTPATIENT
Start: 2020-07-01

## 2020-07-01 RX ORDER — OXYBUTYNIN CHLORIDE 5 MG/1
10 TABLET, EXTENDED RELEASE ORAL DAILY
Qty: 60 TABLET | Refills: 0 | Status: SHIPPED | OUTPATIENT
Start: 2020-07-01 | End: 2020-08-13

## 2020-07-01 RX ORDER — TRAZODONE HYDROCHLORIDE 50 MG/1
150 TABLET, FILM COATED ORAL AT BEDTIME
Qty: 270 TABLET | Refills: 1 | Status: CANCELLED | OUTPATIENT
Start: 2020-07-01

## 2020-07-01 NOTE — TELEPHONE ENCOUNTER
Pending Prescriptions:                       Disp   Refills    traZODone (DESYREL) 50 MG tablet [Pharmac*270 ta*0            Sig: TAKE 2-3 TABLETS BY MOUTH AT BEDTIME    Arpan Medrano, MSN, RN     Yes

## 2020-07-01 NOTE — PROGRESS NOTES
Refilled requested medications. Patient also wondering if there is something else she can do for her thrush other than swish and swallow nystatin. Will add fluconazole for 2 weeks.    Earnest Noel MD  [facility]

## 2020-07-08 ENCOUNTER — MYC MEDICAL ADVICE (OUTPATIENT)
Dept: FAMILY MEDICINE | Facility: OTHER | Age: 60
End: 2020-07-08

## 2020-08-12 DIAGNOSIS — E78.5 HYPERLIPIDEMIA LDL GOAL <130: ICD-10-CM

## 2020-08-12 DIAGNOSIS — R00.2 PALPITATIONS: ICD-10-CM

## 2020-08-12 DIAGNOSIS — R00.0 SINUS TACHYCARDIA: ICD-10-CM

## 2020-08-13 ENCOUNTER — MYC MEDICAL ADVICE (OUTPATIENT)
Dept: FAMILY MEDICINE | Facility: CLINIC | Age: 60
End: 2020-08-13

## 2020-08-13 DIAGNOSIS — R61 CRANIOFACIAL HYPERHIDROSIS: Primary | ICD-10-CM

## 2020-08-13 DIAGNOSIS — G43.009 MIGRAINE WITHOUT AURA AND WITHOUT STATUS MIGRAINOSUS, NOT INTRACTABLE: ICD-10-CM

## 2020-08-13 RX ORDER — METOPROLOL SUCCINATE 50 MG/1
50 TABLET, EXTENDED RELEASE ORAL DAILY
Qty: 90 TABLET | Refills: 1 | Status: SHIPPED | OUTPATIENT
Start: 2020-08-13 | End: 2020-12-02

## 2020-08-13 RX ORDER — ATORVASTATIN CALCIUM 20 MG/1
20 TABLET, FILM COATED ORAL DAILY
Qty: 90 TABLET | Refills: 1 | Status: SHIPPED | OUTPATIENT
Start: 2020-08-13 | End: 2020-12-01

## 2020-08-13 NOTE — TELEPHONE ENCOUNTER
Prescription approved per [facility] [place].  Dedrick Noe, MARVAN, RN, PHN

## 2020-08-14 RX ORDER — RIZATRIPTAN BENZOATE 10 MG/1
10 TABLET ORAL
Qty: 18 TABLET | Refills: 0 | Status: SHIPPED | OUTPATIENT
Start: 2020-08-14 | End: 2020-12-31

## 2020-08-14 NOTE — TELEPHONE ENCOUNTER
Pt is calling back and she requested the SUMAtriptan (IMITREX) 100 MG tablet to be refilled for her headaches. She jsut got home from work and she is not able to set something up until Monday, but she will be needing a refill. Please advise.

## 2020-08-14 NOTE — TELEPHONE ENCOUNTER
"Requested Prescriptions   Pending Prescriptions Disp Refills     rizatriptan (MAXALT) 10 MG tablet [Pharmacy Med Name: Rizatriptan Benzoate Oral Tablet 10 MG] 18 tablet 3     Sig: Take 1 tablet (10 mg) by mouth at onset of headache for migraine May repeat in 2 hours. Max 3 tablets/24 hours.       Serotonin Agonists Failed - 8/14/2020  4:28 PM        Failed - Serotonin Agonist request needs review.     Please review patient's record. If patient has had 8 or more treatments in the past month, please forward to provider.          Passed - Blood pressure under 140/90 in past 12 months     BP Readings from Last 3 Encounters:   05/06/20 135/87   03/04/20 118/76   02/12/20 126/84                 Passed - Recent (12 mo) or future (30 days) visit within the authorizing provider's specialty     Patient has had an office visit with the authorizing provider or a provider within the authorizing providers department within the previous 12 mos or has a future within next 30 days. See \"Patient Info\" tab in inbasket, or \"Choose Columns\" in Meds & Orders section of the refill encounter.              Passed - Medication is active on med list        Passed - Patient is age 18 or older        Passed - No active pregnancy on record        Passed - No positive pregnancy test in past 12 months             Prescription approved per [facility] Refill Protocol.    Tono Hurtado RN, BSN    "

## 2020-08-14 NOTE — TELEPHONE ENCOUNTER
Pending Prescriptions:                       Disp   Refills    rizatriptan (MAXALT) 10 MG tablet [Pharma*18 tab*3            Sig: Take 1 tablet (10 mg) by mouth at onset of           headache for migraine May repeat in 2 hours. Max           3 tablets/24 hours.    Needs physical with PAP  Routed to registration.  Tamia Meeks, MARVAN, RN, PHN

## 2020-08-16 ENCOUNTER — MYC MEDICAL ADVICE (OUTPATIENT)
Dept: FAMILY MEDICINE | Facility: CLINIC | Age: 60
End: 2020-08-16

## 2020-08-21 ENCOUNTER — TELEPHONE (OUTPATIENT)
Dept: FAMILY MEDICINE | Facility: CLINIC | Age: 60
End: 2020-08-21

## 2020-08-21 NOTE — TELEPHONE ENCOUNTER
Summary:    Patient is due/failing the following:   ACT, MAMMOGRAM, PAP, PHQ9 and PHYSICAL    Action needed:   Patient needs office visit for Physical and PAP., Patient needs to do ACT., Patient needs to do PHQ9. and schedule a Mammogram     Type of outreach:    Sent [employer] message.    Questions for provider review:    None                                                                                                                                    Funmi Putnam [facility]       Chart routed to Care Team .          Panel Management Review      Patient has the following on her problem list:     Depression / Dysthymia review    Measure:  Needs PHQ-9 score of 4 or less during index window.  Administer PHQ-9 and if score is 5 or more, send encounter to provider for next steps.      PHQ-9 SCORE 5/29/2018 7/18/2019 2/4/2020   PHQ-9 Total Score - - -   PHQ-9 Total Score MyChart - 4 (Minimal depression) 10 (Moderate depression)   PHQ-9 Total Score - - -   PHQ-9 Total Score 9 4 10       If PHQ-9 recheck is 5 or more, route to provider for next steps.    Patient is due for:  PHQ9    Asthma review     ACT Total Scores 2/4/2020   ACT TOTAL SCORE -   ASTHMA ER VISITS -   ASTHMA HOSPITALIZATIONS -   ACT TOTAL SCORE (Goal Greater than or Equal to 20) 19   In the past 12 months, how many times did you visit the emergency room for your asthma without being admitted to the hospital? 0   In the past 12 months, how many times were you hospitalized overnight because of your asthma? 0      1. Is Asthma diagnosis on the Problem List? Yes    2. Is Asthma listed on Health Maintenance? Yes    3. Patient is due for:  ACT    Hypertension   Last three blood pressure readings:  BP Readings from Last 3 Encounters:   05/06/20 135/87   03/04/20 118/76   02/12/20 126/84     Blood pressure: Passed    HTN Guidelines:  Less than 140/90      Composite cancer screening  Chart review shows that this patient is due/due soon for the following Pap Smear and  Mammogram

## 2020-09-04 ENCOUNTER — MYC MEDICAL ADVICE (OUTPATIENT)
Dept: FAMILY MEDICINE | Facility: CLINIC | Age: 60
End: 2020-09-04

## 2020-09-04 DIAGNOSIS — K12.0 APHTHOUS ULCER OF MOUTH: Primary | ICD-10-CM

## 2020-09-04 NOTE — TELEPHONE ENCOUNTER
Responded to patient's [employer] message. See below. Will await patient's response prior to sending to provider for review.    Lauren Kaur RN on 9/4/2020 at 4:29 PM

## 2020-09-08 NOTE — TELEPHONE ENCOUNTER
Replied via [employer] requesting photos.    Earnest Noel MD  [facility]    Replied via [employer].    MD SAMUEL Bey [facility]

## 2020-09-09 RX ORDER — CHLORHEXIDINE GLUCONATE ORAL RINSE 1.2 MG/ML
15 SOLUTION DENTAL 2 TIMES DAILY
Qty: 150 ML | Refills: 0 | Status: SHIPPED | OUTPATIENT
Start: 2020-09-09 | End: 2020-09-14

## 2020-09-21 ENCOUNTER — MYC MEDICAL ADVICE (OUTPATIENT)
Dept: FAMILY MEDICINE | Facility: CLINIC | Age: 60
End: 2020-09-21

## 2020-09-21 DIAGNOSIS — Z87.898 HISTORY OF DELAYED WOUND HEALING: Primary | ICD-10-CM

## 2020-09-21 NOTE — TELEPHONE ENCOUNTER
Labs placed. Many have been normal in the past. Check CBC, CMP, pre-albumin, ferritin, zinc for deficiencies. Check CRP, ESR, and ALEXA for inflammatory disorders.    Earnest Noel MD  [facility]

## 2020-09-22 DIAGNOSIS — E87.6 HYPOKALEMIA: Primary | ICD-10-CM

## 2020-09-22 DIAGNOSIS — Z87.898 HISTORY OF DELAYED WOUND HEALING: ICD-10-CM

## 2020-09-22 LAB
ALBUMIN SERPL-MCNC: 3.7 G/DL (ref 3.4–5)
ALP SERPL-CCNC: 108 U/L (ref 40–150)
ALT SERPL W P-5'-P-CCNC: 22 U/L (ref 0–50)
ANION GAP SERPL CALCULATED.3IONS-SCNC: 6 MMOL/L (ref 3–14)
AST SERPL W P-5'-P-CCNC: 12 U/L (ref 0–45)
BILIRUB SERPL-MCNC: 0.5 MG/DL (ref 0.2–1.3)
BUN SERPL-MCNC: 12 MG/DL (ref 7–30)
CALCIUM SERPL-MCNC: 9.3 MG/DL (ref 8.5–10.1)
CHLORIDE SERPL-SCNC: 106 MMOL/L (ref 94–109)
CO2 SERPL-SCNC: 28 MMOL/L (ref 20–32)
CREAT SERPL-MCNC: 0.99 MG/DL (ref 0.52–1.04)
CRP SERPL-MCNC: <2.9 MG/L (ref 0–8)
ERYTHROCYTE [DISTWIDTH] IN BLOOD BY AUTOMATED COUNT: 14.8 % (ref 10–15)
ERYTHROCYTE [SEDIMENTATION RATE] IN BLOOD BY WESTERGREN METHOD: 25 MM/H (ref 0–30)
FERRITIN SERPL-MCNC: 12 NG/ML (ref 8–252)
GFR SERPL CREATININE-BSD FRML MDRD: 62 ML/MIN/{1.73_M2}
GLUCOSE SERPL-MCNC: 103 MG/DL (ref 70–99)
HBA1C MFR BLD: 5.4 % (ref 0–5.6)
HCT VFR BLD AUTO: 38.7 % (ref 35–47)
HGB BLD-MCNC: 12.4 G/DL (ref 11.7–15.7)
MCH RBC QN AUTO: 28.8 PG (ref 26.5–33)
MCHC RBC AUTO-ENTMCNC: 32 G/DL (ref 31.5–36.5)
MCV RBC AUTO: 90 FL (ref 78–100)
PLATELET # BLD AUTO: 375 10E9/L (ref 150–450)
POTASSIUM SERPL-SCNC: 3.2 MMOL/L (ref 3.4–5.3)
PROT SERPL-MCNC: 7.6 G/DL (ref 6.8–8.8)
RBC # BLD AUTO: 4.3 10E12/L (ref 3.8–5.2)
SODIUM SERPL-SCNC: 140 MMOL/L (ref 133–144)
WBC # BLD AUTO: 6 10E9/L (ref 4–11)

## 2020-09-22 PROCEDURE — 86038 ANTINUCLEAR ANTIBODIES: CPT | Performed by: FAMILY MEDICINE

## 2020-09-22 PROCEDURE — 82728 ASSAY OF FERRITIN: CPT | Performed by: FAMILY MEDICINE

## 2020-09-22 PROCEDURE — 36415 COLL VENOUS BLD VENIPUNCTURE: CPT | Performed by: FAMILY MEDICINE

## 2020-09-22 PROCEDURE — 83036 HEMOGLOBIN GLYCOSYLATED A1C: CPT | Performed by: FAMILY MEDICINE

## 2020-09-22 PROCEDURE — 80053 COMPREHEN METABOLIC PANEL: CPT | Performed by: FAMILY MEDICINE

## 2020-09-22 PROCEDURE — 85652 RBC SED RATE AUTOMATED: CPT | Performed by: FAMILY MEDICINE

## 2020-09-22 PROCEDURE — 84630 ASSAY OF ZINC: CPT | Mod: 90 | Performed by: FAMILY MEDICINE

## 2020-09-22 PROCEDURE — 84134 ASSAY OF PREALBUMIN: CPT | Performed by: FAMILY MEDICINE

## 2020-09-22 PROCEDURE — 99000 SPECIMEN HANDLING OFFICE-LAB: CPT | Performed by: FAMILY MEDICINE

## 2020-09-22 PROCEDURE — 86140 C-REACTIVE PROTEIN: CPT | Performed by: FAMILY MEDICINE

## 2020-09-22 PROCEDURE — 85027 COMPLETE CBC AUTOMATED: CPT | Performed by: FAMILY MEDICINE

## 2020-09-22 RX ORDER — POTASSIUM CHLORIDE 1500 MG/1
20 TABLET, EXTENDED RELEASE ORAL DAILY
Qty: 5 TABLET | Refills: 0 | Status: SHIPPED | OUTPATIENT
Start: 2020-09-22 | End: 2020-09-27

## 2020-09-23 ENCOUNTER — MYC MEDICAL ADVICE (OUTPATIENT)
Dept: FAMILY MEDICINE | Facility: CLINIC | Age: 60
End: 2020-09-23

## 2020-09-23 LAB
ANA SER QL IF: NEGATIVE
PREALB SERPL IA-MCNC: 24 MG/DL (ref 15–45)

## 2020-09-24 ENCOUNTER — MYC MEDICAL ADVICE (OUTPATIENT)
Dept: FAMILY MEDICINE | Facility: CLINIC | Age: 60
End: 2020-09-24

## 2020-09-24 DIAGNOSIS — Z13.29 SCREENING FOR THYROID DISORDER: Primary | ICD-10-CM

## 2020-09-24 NOTE — TELEPHONE ENCOUNTER
Patient does have future orders for a vitamin D but, not for thyroid labs. Please advise.  Antonia Bush CMA ([facility])

## 2020-09-25 LAB — ZINC SERPL-MCNC: 62.8 UG/DL (ref 60–120)

## 2020-09-29 DIAGNOSIS — J45.40 MODERATE PERSISTENT ASTHMA WITHOUT COMPLICATION: ICD-10-CM

## 2020-09-29 RX ORDER — MONTELUKAST SODIUM 10 MG/1
TABLET ORAL
Qty: 90 TABLET | Refills: 0 | Status: SHIPPED | OUTPATIENT
Start: 2020-09-29 | End: 2020-12-01

## 2020-09-29 NOTE — TELEPHONE ENCOUNTER
Pending Prescriptions:                       Disp   Refills    montelukast (SINGULAIR) 10 MG tablet [Pha*90 tab*0            Sig: TAKE ONE TABLET BY MOUTH IN THE EVENING    Medication is being filled for 1 time bela refill only due to:  Patient is due for ACT. Please have patient complete.     Please call and help schedule.  Thank you!    Dario Clinton RN  September 29, 2020

## 2020-09-29 NOTE — LETTER
[facility]  [address]  [place] [address]  [phone]        September 30, 2020    Jacquie Amador  [address] [address] [place] [address]  [place] [address]          Dear Jacquie,    We are following up to see how your asthma is doing.  Please complete the attached questionnaire and send back in the enclosed envelope.  Let us know if you have any questions or concerns.     Thank you!        Sincerely,      Your [facility] Care Team with   Liz Peterson MD/pk

## 2020-09-30 ENCOUNTER — IMMUNIZATION (OUTPATIENT)
Dept: FAMILY MEDICINE | Facility: CLINIC | Age: 60
End: 2020-09-30
Payer: COMMERCIAL

## 2020-09-30 DIAGNOSIS — Z13.29 SCREENING FOR THYROID DISORDER: ICD-10-CM

## 2020-09-30 DIAGNOSIS — Z23 NEED FOR PROPHYLACTIC VACCINATION AND INOCULATION AGAINST INFLUENZA: Primary | ICD-10-CM

## 2020-09-30 DIAGNOSIS — R53.83 FATIGUE, UNSPECIFIED TYPE: ICD-10-CM

## 2020-09-30 DIAGNOSIS — E87.6 HYPOKALEMIA: ICD-10-CM

## 2020-09-30 LAB
POTASSIUM SERPL-SCNC: 3.7 MMOL/L (ref 3.4–5.3)
TSH SERPL DL<=0.005 MIU/L-ACNC: 1.36 MU/L (ref 0.4–4)

## 2020-09-30 PROCEDURE — 82306 VITAMIN D 25 HYDROXY: CPT | Performed by: FAMILY MEDICINE

## 2020-09-30 PROCEDURE — 90682 RIV4 VACC RECOMBINANT DNA IM: CPT

## 2020-09-30 PROCEDURE — 90471 IMMUNIZATION ADMIN: CPT

## 2020-09-30 PROCEDURE — 36415 COLL VENOUS BLD VENIPUNCTURE: CPT | Performed by: FAMILY MEDICINE

## 2020-09-30 PROCEDURE — 84132 ASSAY OF SERUM POTASSIUM: CPT | Performed by: FAMILY MEDICINE

## 2020-09-30 PROCEDURE — 99207 ZZC NO CHARGE NURSE ONLY: CPT

## 2020-09-30 PROCEDURE — 84443 ASSAY THYROID STIM HORMONE: CPT | Performed by: FAMILY MEDICINE

## 2020-10-01 LAB — DEPRECATED CALCIDIOL+CALCIFEROL SERPL-MC: 38 UG/L (ref 20–75)

## 2020-10-02 ENCOUNTER — MYC MEDICAL ADVICE (OUTPATIENT)
Dept: FAMILY MEDICINE | Facility: CLINIC | Age: 60
End: 2020-10-02

## 2020-10-02 NOTE — TELEPHONE ENCOUNTER
Please help schedule appointment.    Earnest Noel MD  [facility]

## 2020-10-05 ENCOUNTER — VIRTUAL VISIT (OUTPATIENT)
Dept: FAMILY MEDICINE | Facility: OTHER | Age: 60
End: 2020-10-05

## 2020-10-06 NOTE — PROGRESS NOTES
"Date: 10/05/2020 19:33:19  Clinician: Obey Smith  Clinician NPI: [id number]  Patient: Jacquie Amador  Patient : 1960  Patient Address: [address] [place], [place] [address] [address]  Patient Phone: [phone]  Visit Protocol: URI  Patient Summary:  Jacquie is a 60 year old ( : 1960 ) female who initiated a [facility] Visit for cold, sinus infection, or influenza. When asked the question \"Please sign me up to receive news, health information and promotions. \", Jacquie responded \"No\".    Jacquie states her symptoms started gradually 7-9 days ago. After her symptoms started, they improved and then got worse again.   Her symptoms consist of ear pain, a headache, a cough, nasal congestion, facial pain or pressure, myalgia, malaise, a sore throat, and tooth pain.   Symptom details     Nasal secretions: The color of her mucus is white and green.    Cough: Jacquie coughs a few times an hour and her cough is more bothersome at night. Phlegm comes into her throat when she coughs. She believes her cough is caused by post-nasal drip. The color of the phlegm is white and green.     Sore throat: Jacquie reports having mild throat pain (1-3 on a 10 point pain scale), does not have exudate on her tonsils, and can swallow liquids. The lymph nodes in her neck are not enlarged. A rash has not appeared on the skin since the sore throat started.     Facial pain or pressure: The facial pain or pressure feels worse when bending over or leaning forward.     Headache: She states the headache is severe (7-9 on a 10 point pain scale).     Tooth pain: The tooth pain is not caused by a cavity, recent dental work, or other mouth problems.      Jacquie denies having wheezing, fever, enlarged lymph nodes, anosmia, vomiting, rhinitis, nausea, chills, ageusia, and diarrhea. She also denies taking antibiotic medication in the past month and having recent facial or sinus surgery in the past 60 days. She is not experiencing dyspnea.   " Precipitating events  Within the past week, Jacquie has not been exposed to someone with strep throat. She has not recently been exposed to someone with influenza. Jacquie has been in close contact with the following high risk individuals: adults 65 or older and children under the age of 5.   Pertinent COVID-19 (Coronavirus) information  In the past 14 days, Jacquie has not worked in a congregate living setting.   She does not work or volunteer as healthcare worker or a  and does not work or volunteer in a healthcare facility.   Jacquie also has not lived in a congregate living setting in the past 14 days. She does not live with a healthcare worker.   Jacquie has not had a close contact with a laboratory-confirmed COVID-19 patient within 14 days of symptom onset.   Since December 2019, Jacquie and has had upper respiratory infection (URI) or influenza-like illness. Has not been diagnosed with lab-confirmed COVID-19 test      Date(s) of previous URI or influenza-like illness (free-text): 11/24/2019     Symptoms Jacquie experienced during previous URI or influenza-like illness as reported by the patient (free-text): I ended up with a severe headache, severe cough difficulty breathing major drainage, wheezing and severe fatigue.  I let a sinus infection go and ended up in chest.        Pertinent medical history  Jacquie had 2 sinus infections within the past year.   Jacquie typically gets a yeast infection when she takes antibiotics. She has used fluconazole (Diflucan) to treat previous yeast infections. 2 doses of fluconazole (Diflucan) has typically been needed for symptoms to resolve in the past.  Jacquie does not need a return to work/school note.   Weight: 190 lbs   Jacquie does not smoke or use smokeless tobacco.   Additional information as reported by the patient (free text): I started out over a week ago felt better then started to feel really crappy Thursday. I have a migraine type headache in  different pain levels   Weight: 190 lbs    MEDICATIONS: atorvastatin oral, metoprolol tartrate-hydrochlorothiazide oral, Dulera inhalation, metformin (bulk), Cymbalta oral, trazodone oral, Celebrex oral, Wellbutrin SR oral, albuterol sulfate inhalation, Flonase Allergy Relief nasal, Zyrtec oral, Singulair oral, Advair Diskus inhalation, ALLERGIES: Lyrica, Penicillins  Clinician Response:  Dear Jacquie,   Your symptoms show that you may have coronavirus (COVID-19). This illness can cause fever, cough and trouble breathing. Many people get a mild case and get better on their own. Some people can get very sick.  What should I do?  We would like to test you for this virus.   1. Please call [phone] to schedule your visit. Explain that you were referred by [facility] to have a COVID-19 test. Be ready to share your [facility] visit ID number.  The following will serve as your written order for this COVID Test, ordered by me, for the indication of suspected COVID [Z20.828]: The test will be ordered in [employer], our electronic health record, after you are scheduled. It will show as ordered and authorized by Earnest Patrick MD.  Order: COVID-19 (Coronavirus) PCR for SYMPTOMATIC testing from [facility].      2. When it's time for your COVID test:  Stay at least 6 feet away from others. (If someone will drive you to your test, stay in the backseat, as far away from the  as you can.)   Cover your mouth and nose with a mask, tissue or washcloth.  Go straight to the testing site. Don't make any stops on the way there or back.      3.Starting now: Stay home and away from others (self-isolate) until:   You've had no fever---and no medicine that reduces fever---for one full day (24 hours). And...   Your other symptoms have gotten better. For example, your cough or breathing has improved. And...   At least 10 days have passed since your symptoms started.       During this time, don't leave the house except for testing or medical care.   Stay  "in your own room, even for meals. Use your own bathroom if you can.   Stay away from others in your home. No hugging, kissing or shaking hands. No visitors.  Don't go to work, school or anywhere else.    Clean \"high touch\" surfaces often (doorknobs, counters, handles, etc.). Use a household cleaning spray or wipes. You'll find a full list of  on the EPA website: www.epa.gov/pesticide-registration/list-n-disinfectants-use-against-sars-cov-2.   Cover your mouth and nose with a mask, tissue or washcloth to avoid spreading germs.  Wash your hands and face often. Use soap and water.  Caregivers in these groups are at risk for severe illness due to COVID-19:  o People 65 years and older  o People who live in a nursing home or long-term care facility  o People with chronic disease (lung, heart, cancer, diabetes, kidney, liver, immunologic)  o People who have a weakened immune system, including those who:   Are in cancer treatment  Take medicine that weakens the immune system, such as corticosteroids  Had a bone marrow or organ transplant  Have an immune deficiency  Have poorly controlled HIV or AIDS  Are obese (body mass index of 40 or higher)  Smoke regularly   o Caregivers should wear gloves while washing dishes, handling laundry and cleaning bedrooms and bathrooms.  o Use caution when washing and drying laundry: Don't shake dirty laundry, and use the warmest water setting that you can.  o For more tips, go to www.cdc.gov/coronavirus/2019-ncov/downloads/10Things.pdf.    4.Sign up for [employer]. We know it's scary to hear that you might have COVID-19. We want to track your symptoms to make sure you're okay over the next 2 weeks. Please look for an email from [employer]---this is a free, online program that we'll use to keep in touch. To sign up, follow the link in the email. Learn more at [email or web address]  How can I take care of myself?   Get lots of rest. Drink extra fluids (unless a doctor " has told you not to).   Take Tylenol (acetaminophen) for fever or pain. If you have liver or kidney problems, ask your family doctor if it's okay to take Tylenol.   Adults can take either:    650 mg (two 325 mg pills) every 4 to 6 hours, or...   1,000 mg (two 500 mg pills) every 8 hours as needed.    Note: Don't take more than 3,000 mg in one day. Acetaminophen is found in many medicines (both prescribed and over-the-counter medicines). Read all labels to be sure you don't take too much.   For children, check the Tylenol bottle for the right dose. The dose is based on the child's age or weight.    If you have other health problems (like cancer, heart failure, an organ transplant or severe kidney disease): Call your specialty clinic if you don't feel better in the next 2 days.       Know when to call 911. Emergency warning signs include:    Trouble breathing or shortness of breath Pain or pressure in the chest that doesn't go away Feeling confused like you haven't felt before, or not being able to wake up Bluish-colored lips or face.  Where can I get more information?   [facility] -- About COVID-19: [email or web address]   CDC -- What to Do If You're Sick: www.cdc.gov/coronavirus/2019-ncov/about/steps-when-sick.html   CDC -- Ending Home Isolation: www.cdc.gov/coronavirus/2019-ncov/hcp/disposition-in-home-patients.html   CDC -- Caring for Someone: www.cdc.gov/coronavirus/2019-ncov/if-you-are-sick/care-for-someone.html   [facility] -- Interim Guidance for Hospital Discharge to Home: www.health.[facility].[place]/diseases/coronavirus/hcp/hospdischarge.pdf   [facility] clinical trials (COVID-19 research studies): clinicalaffairs.[facility]/umn-clinical-trials    Below are the COVID-19 hotlines at the [place] Department of Health ([facility]). Interpreters are available.    For health questions: Call 107-309-6557 or 1-538.981.7388 (7 a.m. to 7 p.m.) For questions about schools and childcare: Call 616-428-5089 or  6-479-582-4538 (7 a.m. to 7 p.m.)    Diagnosis: Acute upper respiratory infection, unspecified  Diagnosis ICD: J06.9  Prescription: doxycycline monohydrate (Monodox) 100 mg oral capsule 20 capsule, 10 days supply. Take 1 capsule by mouth 2 times per day for 10 days. Refills: 0, Refill as needed: no, Allow substitutions: yes  Pharmacy: [facility] #[id number] - [phone] - [address] [place] [address]  Addendum created: October 12 12:00:09, 2020 created by: MARTHA Alonzo body: Patient should be seen by her PCP or in urgent care.  Addendum created: October 11 18:00:04, 2020 created by: Eber Franklin PA-C body: patient needs to be seen in an urgent care.

## 2020-10-14 ENCOUNTER — MYC REFILL (OUTPATIENT)
Dept: FAMILY MEDICINE | Facility: CLINIC | Age: 60
End: 2020-10-14

## 2020-10-14 ENCOUNTER — MYC MEDICAL ADVICE (OUTPATIENT)
Dept: FAMILY MEDICINE | Facility: CLINIC | Age: 60
End: 2020-10-14

## 2020-10-14 DIAGNOSIS — J45.40 MODERATE PERSISTENT ASTHMA WITHOUT COMPLICATION: ICD-10-CM

## 2020-10-14 DIAGNOSIS — L30.9 DERMATITIS: ICD-10-CM

## 2020-10-14 DIAGNOSIS — B37.0 THRUSH: ICD-10-CM

## 2020-10-14 RX ORDER — HYDROCORTISONE 2.5 %
CREAM (GRAM) TOPICAL
Qty: 30 G | Refills: 0 | Status: SHIPPED | OUTPATIENT
Start: 2020-10-14 | End: 2021-11-19

## 2020-10-14 RX ORDER — TRIAMCINOLONE ACETONIDE 5 MG/G
CREAM TOPICAL 2 TIMES DAILY
Qty: 60 G | Refills: 0 | Status: SHIPPED | OUTPATIENT
Start: 2020-10-14 | End: 2021-09-10

## 2020-10-14 RX ORDER — NYSTATIN 100000/ML
SUSPENSION, ORAL (FINAL DOSE FORM) ORAL 4 TIMES DAILY
Qty: 380 ML | Refills: 1 | Status: SHIPPED | OUTPATIENT
Start: 2020-10-14 | End: 2020-10-16

## 2020-10-14 RX ORDER — ALBUTEROL SULFATE 0.83 MG/ML
2.5 SOLUTION RESPIRATORY (INHALATION) EVERY 6 HOURS PRN
Qty: 1 BOX | Refills: 1 | Status: SHIPPED | OUTPATIENT
Start: 2020-10-14 | End: 2020-12-02

## 2020-10-14 RX ORDER — NYSTATIN 100000 U/G
CREAM TOPICAL DAILY PRN
Qty: 60 G | Refills: 1 | Status: SHIPPED | OUTPATIENT
Start: 2020-10-14 | End: 2020-10-16

## 2020-10-14 RX ORDER — ALBUTEROL SULFATE 90 UG/1
AEROSOL, METERED RESPIRATORY (INHALATION)
Qty: 54 G | Refills: 1 | Status: SHIPPED | OUTPATIENT
Start: 2020-10-14 | End: 2020-12-02

## 2020-10-14 RX ORDER — MUPIROCIN CALCIUM 20 MG/G
CREAM TOPICAL
Qty: 60 G | Refills: 1 | Status: SHIPPED | OUTPATIENT
Start: 2020-10-14 | End: 2020-12-01

## 2020-10-14 RX ORDER — CLOBETASOL PROPIONATE 0.5 MG/G
CREAM TOPICAL
Qty: 30 G | Refills: 3 | Status: SHIPPED | OUTPATIENT
Start: 2020-10-14 | End: 2022-04-06

## 2020-10-14 NOTE — TELEPHONE ENCOUNTER
Pending Prescriptions:                       Disp   Refills    nystatin (MYCOSTATIN) 990969 UNIT/GM exter*60 g   1          albuterol (VENTOLIN HFA) 108 (90 Base) MCG*54 g   1        Sig: INHALE 2 PUFFS INTO THE LUNGS EVERY 6 HOURS AS NEEDED           FOR SHORTNESS OF BREATH / DYSPNEA    clobetasol (TEMOVATE) 0.05 % external cream30 g   3        Sig: APPLY SPARINGLY TO AFFECTED AREA TWICE DAILY FOR 14           DAYS.  DO NOT APPLY TO FACE.    triamcinolone (ARISTOCORT HP) 0.5 % extern*60 g   0        Sig: Apply topically 2 times daily    mupirocin (BACTROBAN) 2 % external cream   60 g   1        Sig: Apply to affected area three times daily    albuterol (PROVENTIL) (2.5 MG/3ML) 0.083% *1 Box  1        Sig: Take 1 vial (2.5 mg) by nebulization every 6 hours as           needed for shortness of breath / dyspnea or           wheezing    nystatin (MYCOSTATIN) 960450 UNIT/ML suspe*380 mL 1        Sig: Take by mouth 4 times daily    hydrocortisone 2.5 % cream                 30 g   3        Sig: APPLY TOPICALLY 2 TIMES DAILY AS NEEDED        Routing refill request to provider for review/approval because:  A break in medication  Last Seen: 06/05/20  Last Refilled: Varies between 1-2 years  Next Visit: 3 weeks  Dario Clinton RN  October 14, 2020           Underweight

## 2020-10-14 NOTE — PROGRESS NOTES
"Jacquie Amador is a 60 year old female who is being evaluated via a billable phone visit.      The patient has been notified of following:     \"This video visit will be conducted via a call between you and your physician/provider. We have found that certain health care needs can be provided without the need for an in-person physical exam.  This service lets us provide the care you need with a video conversation.  If a prescription is necessary we can send it directly to your pharmacy.  If lab work is needed we can place an order for that and you can then stop by our lab to have the test done at a later time.    Video visits are billed at different rates depending on your insurance coverage.  Please reach out to your insurance provider with any questions.    If during the course of the call the physician/provider feels a video visit is not appropriate, you will not be charged for this service.\"    Patient has given verbal consent for Video visit? Yes  How would you like to obtain your AVS? MyChart{  Unable to connect with patient via video.  Converted to phone visit.    Subjective     Jacquie Amador is a 60 year old female who presents today via video visit for the following health issues:    HPI     Hypertension Follow-up      Do you check your blood pressure regularly outside of the clinic? Yes     Are you following a low salt diet? Yes    Are your blood pressures ever more than 140 on the top number (systolic) OR more   than 90 on the bottom number (diastolic), for example 140/90? Yes    Patient would also to discuss getting a COVID antibody test.   She reports not feeling well since last November when she was very ill.   She has been extremely fatigued, has a severe headache, and pulse drops below 60 and she gets lightheaded. Also has ulcers in mouth and on face.      Last several month reports she has not been feeling good.   Last couple weeks with bad headaches. History of migraines but this was \"so " "bad\"  Tight over forehead. Gone now.     Fatigued - hard to get off the couch.   lightheadedness and reports pulse is \"bouncing around\"  Can be less than 60.  When it does \"I feel weird and jumbled\".  Lowest is 57. Today is normal at this time.   blood pressure readings have been 141/88, 163/90, 152/87, 131/87.     Has a low ferritin at 12. Has not been remembering to take her iron supplement.     Reports that her tongue is sore and has some canker sores too.     Weaned off Wellbutrin 150 mg in the afternoon. Felt like it made her worse. Stopping has made things a little better.     Has also had a swollen right ankle for \"forever\". Has tried topical Voltaren which helps some.     Gets blood blisters on arms easily which take a long time to heal.     Would like to verify that she can take Cymbalta and Celebrex together.    Has read that Requip and Metformin have been recalled.        Review of Systems   CONSTITUTIONAL: As above  INTEGUMENTARY/SKIN: As above  EYES: NEGATIVE for vision changes or irritation  ENT/MOUTH: NEGATIVE for ear, mouth and throat problems  RESP: NEGATIVE for significant cough or SOB  CV: NEGATIVE for chest pain, palpitations or peripheral edema  NEURO: NEGATIVE for weakness, dizziness or paresthesias  ENDOCRINE: NEGATIVE for temperature intolerance, skin/hair changes  PSYCHIATRIC: NEGATIVE for changes in mood or affect      Objective           Vitals:  No vitals were obtained today due to virtual visit.    Physical Exam     GENERAL: alert and no distress  RESP: No audible wheeze, cough.    PSYCH:affect normal/bright, judgement and insight intact, normal speech               Assessment & Plan     Fatigue, unspecified type  Patient with fatigue.  She has had recent work-up with labs.  These were reviewed.  She had low ferritin.  She had borderline low vitamin D level.  She has no other significant abnormalities noted.  Discussed that her fatigue could be related to the iron/ferritin being " low.  She has not been consistently taking her iron supplement and recommend that she does begin to take this regularly.  She should take at least every other day.  She understands that this can take some time to build back up.  She has physical coming up in a month and can consider rechecking at that time.    Iron deficiency  See above.  Refills given.  - ferrous sulfate (FE TABS) 325 (65 Fe) MG EC tablet; Take 1 tablet (325 mg) by mouth daily    Hypertension goal BP (blood pressure) < 140/90  Patient reports good control at home.  Reviewed blood pressure readings and they do seem a bit elevated.  She is not sure if she feels these are accurate.  She has a follow-up in a month and will consider at that time if medication adjustment needs to be made.  If so we will increase her losartan hydrochlorothiazide at that time.  She will continue at her current doses for now.  Because of occasional lower pulses will have her split her metoprolol XL 50 mg to half tablet twice a day.    Episodic tension-type headache, not intractable  Unclear cause of her headaches.  Possibly related to low ferritin.  Consider tension headache.  Seems to be resolved at this time.  She will monitor and make sure she is well-hydrated.  If continues to be an issue or if new symptoms such as vomiting or other concerns develop, she will call back.  Consider imaging at that time.    Screening for viral disease  Patient reports an illness earlier in the year she would like to be tested for COVID antibodies.  Order placed.  - COVID-19 Virus (Coronavirus) Antibody & Titer Reflex; Future    Aphthae, oral  Patient has had issues with oral aphthae off and on for some time.  She would like something to have to help with the discomfort and help them heal.  Prescription was given for Kenalog and Orabase.  We will follow-up next month to see how she is doing or sooner as needed.  - triamcinolone (KENALOG) 0.1 % paste; Take by mouth 2 times daily             Return in about 3 months (around 1/15/2021) for Recheck.    Liz Peterson MD  [facility]              Phone visit: 31 minutes

## 2020-10-15 ENCOUNTER — VIRTUAL VISIT (OUTPATIENT)
Dept: FAMILY MEDICINE | Facility: CLINIC | Age: 60
End: 2020-10-15
Payer: COMMERCIAL

## 2020-10-15 DIAGNOSIS — R53.83 FATIGUE, UNSPECIFIED TYPE: Primary | ICD-10-CM

## 2020-10-15 DIAGNOSIS — G44.219 EPISODIC TENSION-TYPE HEADACHE, NOT INTRACTABLE: ICD-10-CM

## 2020-10-15 DIAGNOSIS — K12.0 APHTHAE, ORAL: ICD-10-CM

## 2020-10-15 DIAGNOSIS — E61.1 IRON DEFICIENCY: ICD-10-CM

## 2020-10-15 DIAGNOSIS — Z11.59 SCREENING FOR VIRAL DISEASE: ICD-10-CM

## 2020-10-15 DIAGNOSIS — I10 HYPERTENSION GOAL BP (BLOOD PRESSURE) < 140/90: ICD-10-CM

## 2020-10-15 PROCEDURE — 99214 OFFICE O/P EST MOD 30 MIN: CPT | Mod: 95 | Performed by: FAMILY MEDICINE

## 2020-10-15 RX ORDER — FERROUS SULFATE 325(65) MG
325 TABLET, DELAYED RELEASE (ENTERIC COATED) ORAL DAILY
Qty: 90 TABLET | Refills: 1 | Status: SHIPPED | OUTPATIENT
Start: 2020-10-15 | End: 2021-02-22

## 2020-10-15 RX ORDER — TRIAMCINOLONE ACETONIDE 0.1 %
PASTE (GRAM) DENTAL 2 TIMES DAILY
Qty: 5 G | Refills: 1 | Status: SHIPPED | OUTPATIENT
Start: 2020-10-15 | End: 2021-03-02

## 2020-10-16 ENCOUNTER — TELEPHONE (OUTPATIENT)
Dept: FAMILY MEDICINE | Facility: CLINIC | Age: 60
End: 2020-10-16

## 2020-10-16 DIAGNOSIS — B37.0 THRUSH: ICD-10-CM

## 2020-10-16 DIAGNOSIS — L30.9 DERMATITIS: ICD-10-CM

## 2020-10-16 RX ORDER — NYSTATIN 100000/ML
SUSPENSION, ORAL (FINAL DOSE FORM) ORAL 4 TIMES DAILY
Qty: 380 ML | Refills: 1 | Status: SHIPPED | OUTPATIENT
Start: 2020-10-16 | End: 2021-04-27

## 2020-10-16 RX ORDER — NYSTATIN 100000 U/G
CREAM TOPICAL DAILY PRN
Qty: 60 G | Refills: 1 | Status: SHIPPED | OUTPATIENT
Start: 2020-10-16 | End: 2021-09-15

## 2020-10-16 NOTE — TELEPHONE ENCOUNTER
Reason for Call:  Other returning call    Detailed comments: Pharmacy called and would like calcification on this medication. Please Advise thank you    Phone Number Patient can be reached at: Other phone number:  [phone] dial 0 to get thorugh    Best Time: anytime    Can we leave a detailed message on this number? YES    Call taken on 10/16/2020 at 9:31 AM by Deanna Bravo

## 2020-10-16 NOTE — TELEPHONE ENCOUNTER
I called pharmacy to get more information.  They have topical and suspension Rx's and need clarification on both:     nystatin (MYCOSTATIN) 360275 UNIT/ML suspension 380 mL 1 10/14/2020  No   Sig - Route: Take by mouth 4 times daily - Oral     They need to know dosing on suspension, no ml listed as how to take.     nystatin (MYCOSTATIN) 486576 UNIT/GM external cream 60 g 1 10/14/2020  No   Sig - Route: Apply topically daily as needed for dry skin - Topical     They need to know where on the body this is to be applied and what the day supply is to last?

## 2020-10-19 DIAGNOSIS — Z11.59 SCREENING FOR VIRAL DISEASE: ICD-10-CM

## 2020-10-19 PROCEDURE — 36415 COLL VENOUS BLD VENIPUNCTURE: CPT | Performed by: FAMILY MEDICINE

## 2020-10-19 PROCEDURE — 86769 SARS-COV-2 COVID-19 ANTIBODY: CPT | Performed by: FAMILY MEDICINE

## 2020-10-21 LAB
COVID-19 SPIKE RBD ABY TITER: NORMAL
COVID-19 SPIKE RBD ABY: NEGATIVE

## 2020-10-22 ENCOUNTER — MYC MEDICAL ADVICE (OUTPATIENT)
Dept: FAMILY MEDICINE | Facility: CLINIC | Age: 60
End: 2020-10-22

## 2020-10-22 NOTE — PROGRESS NOTES
Patient is due for a physical with pap based on our records, please call and schedule with PCP.    Earnest Noel MD  [facility]    Addendum:    Scheduled 11/5/20 with Renetta blanco for this.    Earnest Noel MD  [facility]

## 2020-10-23 NOTE — TELEPHONE ENCOUNTER
Responded to [employer].   Patient read yesterday's message. Sent follow up to see how patient is doing today.   Dario Clinton RN  October 23, 2020

## 2020-11-02 ENCOUNTER — MYC MEDICAL ADVICE (OUTPATIENT)
Dept: FAMILY MEDICINE | Facility: CLINIC | Age: 60
End: 2020-11-02

## 2020-11-05 NOTE — PROGRESS NOTES
"   SUBJECTIVE:   CC: Jacquie Amador is an 60 year old woman who presents for preventive health visit.       Patient has been advised of split billing requirements and indicates understanding: Yes  Healthy Habits:     Getting at least 3 servings of Calcium per day:  Yes    Bi-annual eye exam:  Yes    Dental care twice a year:  NO    Sleep apnea or symptoms of sleep apnea:  None    Diet:  Low salt    Frequency of exercise:  1 day/week    Duration of exercise:  Less than 15 minutes    Taking medications regularly:  Yes    Medication side effects:  Lightheadedness and Other    PHQ-2 Total Score: 4    Additional concerns today:  Yes      Concerned about blood pressure and pulse. Feels \"faint\" some times. Headaches have gotten much better. Feels always out of breath for at least a year. Breathlessness is later in the day and sometimes improved with inhalers. Sometimes coughs stuff up - whitish.     Taking metoprolol xl 50 mg daily. She has tried to split but did not do better so back to taking it all at once. Reports that the bottom number is running high.     Continues with sores on her face and \"I need to help them along\" with picking them to help them heal. They have not healed.  She has had improvement over her forehead where the larger ulceration is now closed.  She has been left with \"indentation\" in its place.  She notes scarring on her arms and scabbing over her legs that \"never healed\".    She has also continued to have oral aphthae.  She has utilized the steroid in [place] for the sores.  It does help.  However she continues to have new canker sores.    She continues to have fatigue.  She is here for recheck of her vitamin D and her ferritin as well.    She reports that she has cut back on her medical marijuana.  She is only taking it at night.  This has helped with some of her difficulty with concentrating and fatigue.    She has noticed improvement of her headaches that we have discussed recently.    Today's " PHQ-2 Score:   PHQ-2 ( 1999 Pfizer) 11/11/2020   Q1: Little interest or pleasure in doing things 2   Q2: Feeling down, depressed or hopeless 2   PHQ-2 Score 4   Q1: Little interest or pleasure in doing things More than half the days   Q2: Feeling down, depressed or hopeless More than half the days   PHQ-2 Score 4       Abuse: Current or Past (Physical, Sexual or Emotional) - No  Do you feel safe in your environment? Yes        Social History     Tobacco Use     Smoking status: Former Smoker     Packs/day: 1.00     Types: Cigarettes     Smokeless tobacco: Never Used     Tobacco comment: since 1981   Substance Use Topics     Alcohol use: No     If you drink alcohol do you typically have >3 drinks per day or >7 drinks per week? No    Alcohol Use 11/11/2020   Prescreen: >3 drinks/day or >7 drinks/week? Not Applicable   Prescreen: >3 drinks/day or >7 drinks/week? -       Reviewed orders with patient.  Reviewed health maintenance and updated orders accordingly - Yes  BP Readings from Last 3 Encounters:   11/11/20 138/86   05/06/20 135/87   03/04/20 118/76    Wt Readings from Last 3 Encounters:   11/11/20 90 kg (198 lb 8 oz)   12/19/18 89.1 kg (196 lb 6.4 oz)   07/27/18 92.1 kg (203 lb)                    Mammogram Screening: Patient over age 50, mutual decision to screen reflected in health maintenance.    Pertinent mammograms are reviewed under the imaging tab.  History of abnormal Pap smear: NO - age 30-65 PAP every 5 years with negative HPV co-testing recommended  PAP / HPV 6/22/2016 2/26/2013 5/10/2011   PAP NIL NIL NIL     Reviewed and updated as needed this visit by clinical staff  Tobacco  Allergies  Meds  Problems  Med Hx  Surg Hx  Fam Hx  Soc Hx          Reviewed and updated as needed this visit by Provider  Tobacco  Allergies  Meds  Problems  Med Hx  Surg Hx  Fam Hx             Review of Systems   Constitutional: Negative for chills and fever.   HENT: Positive for congestion. Negative for ear pain,  "hearing loss and sore throat.    Eyes: Positive for pain. Negative for visual disturbance.   Respiratory: Positive for cough and shortness of breath.    Cardiovascular: Positive for palpitations. Negative for chest pain and peripheral edema.   Gastrointestinal: Positive for constipation, heartburn and nausea. Negative for abdominal pain, diarrhea and hematochezia.   Breasts:  Negative for tenderness, breast mass and discharge.   Genitourinary: Positive for vaginal discharge. Negative for dysuria, frequency, genital sores, hematuria, pelvic pain, urgency and vaginal bleeding.   Musculoskeletal: Positive for arthralgias and joint swelling. Negative for myalgias.   Skin: Negative for rash.   Neurological: Positive for dizziness, headaches and paresthesias. Negative for weakness.   Psychiatric/Behavioral: Positive for mood changes. The patient is nervous/anxious.           OBJECTIVE:   /86   Pulse 99   Temp 98.3  F (36.8  C) (Temporal)   Resp 18   Ht 1.681 m (5' 6.18\")   Wt 90 kg (198 lb 8 oz)   LMP 07/17/2009 (Exact Date)   SpO2 100%   BMI 31.86 kg/m    Physical Exam  GENERAL APPEARANCE: healthy, alert and no distress  EYES: Eyes grossly normal to inspection, PERRL and conjunctivae and sclerae normal  HENT: ear canals and TM's normal, nose and mouth without ulcers or lesions, oropharynx clear and oral mucous membranes moist  NECK: no adenopathy, no asymmetry, masses, or scars and thyroid normal to palpation  RESP: lungs clear to auscultation - no rales, rhonchi or wheezes  BREAST: normal without masses, tenderness or nipple discharge and no palpable axillary masses or adenopathy  CV: regular rate and rhythm, normal S1 S2, no S3 or S4, no murmur, click or rub, no peripheral edema and peripheral pulses strong  ABDOMEN: soft, nontender, no hepatosplenomegaly, no masses and bowel sounds normal   (female): normal female external genitalia, normal urethral meatus, vaginal mucosal atrophy noted, normal cervix, " adnexae, and uterus without masses or abnormal discharge  MS: no musculoskeletal defects are noted and gait is age appropriate without ataxia  SKIN: Linear scarring down near midline of her forehead.  No open sores present any longer.  She does have some shallow ulcerations over her nose and cheeks.  No evidence of infection.  Arms with multiple excoriations and healed scarring.  She has several excoriations over her lower legs as well.  There is none noted over her trunk.  NEURO: Normal strength and tone, sensory exam grossly normal, mentation intact and speech normal  PSYCH: mentation appears normal, fatigued and worried    Diagnostic Test Results:  Labs reviewed in Epic    ASSESSMENT/PLAN:   1. Routine general medical examination at a health care facility  See counseling messages.  Recommend mammogram and patient will schedule.  Pap done today.  Will notify results.    2. Sinus tachycardia  Jacquie has continued issues with sinus tachycardia.  This is not controlled with her current dose of 50 mg of metoprolol XL daily.  She did not have improvement of her symptoms with a change to 25 mg twice a day.  She will now take 50 mg metoprolol XL in the morning and 25 mg metoprolol XL in the afternoon/evening.  Recommend recheck her blood pressure at home once a day and sooner if concerns about palpitations or increased fatigue.  Consider referral back to cardiology depending on how she does with changes and tolerating the increased dose.  Patient agrees to plan.  - metoprolol succinate ER (TOPROL-XL) 50 MG 24 hr tablet; Take 1 tablet (50 mg) by mouth every morning  Dispense: 30 tablet; Refill: 0  - metoprolol succinate ER (TOPROL-XL) 25 MG 24 hr tablet; Take 1 tablet (25 mg) by mouth every evening  Dispense: 30 tablet; Refill: 0    3. Skin ulcer, limited to breakdown of skin (H)  Jacquie has continued to have skin ulcerations.  She denies picking behaviors.  She has previously been sent to dermatology without clear  "cause of her ulcerations noted.  She has not evidence of infection with these.   Discussed referral back to dermatology for another look. She agrees.   Discussed potential referral to rheumatology due to this and the recurrent oral ulcers.   She is interested in that consideration as well.   - DERMATOLOGY ADULT REFERRAL; Future    4. Aphthae, oral  As above.     5. Vitamin D deficiency  Will recheck lab today.   Will notify of results. Will adjust as needed.   - Vitamin D Deficiency    6. Fatigue, unspecified type  She is due for recheck of her ferritin. She is taking her iron supplement.   Will notify of results. Adjust therapy as needed.   - Ferritin    7. Screening for malignant neoplasm of cervix  Will notify of results.   - Pap imaged thin layer screen with HPV - recommended age 30 - 65 years (select HPV order below)  - HPV High Risk Types DNA Cervical    8.  Hypertension  Jacquie has been having elevated blood pressures at home.  We will be increasing metoprolol XL as above.  This should assist with better control of her blood pressure.  She will monitor blood pressure at home and notify if continued concerns.      Patient has been advised of split billing requirements and indicates understanding: Yes  COUNSELING:  Reviewed preventive health counseling, as reflected in patient instructions       Regular exercise       Healthy diet/nutrition    Estimated body mass index is 31.86 kg/m  as calculated from the following:    Height as of this encounter: 1.681 m (5' 6.18\").    Weight as of this encounter: 90 kg (198 lb 8 oz).    Weight management plan: Discussed healthy diet and exercise guidelines    She reports that she has quit smoking. Her smoking use included cigarettes. She smoked 1.00 pack per day. She has never used smokeless tobacco.      Counseling Resources:  ATP IV Guidelines  Pooled Cohorts Equation Calculator  Breast Cancer Risk Calculator  BRCA-Related Cancer Risk Assessment: FHS-7 Tool  FRAX Risk " Assessment  ICSI Preventive Guidelines  Dietary Guidelines for Americans, 2010  markedup's MyPlate  ASA Prophylaxis  Lung CA Screening    Liz Peterson MD  [facility]  Answers for HPI/ROS submitted by the patient on 11/11/2020   Annual Exam:  If you checked off any problems, how difficult have these problems made it for you to do your work, take care of things at home, or get along with other people?: Somewhat difficult  PHQ9 TOTAL SCORE: 8

## 2020-11-05 NOTE — PATIENT INSTRUCTIONS
Increase metoprolol xl to 50 mg in the morning and 25 mg in the afternoon/evening.     Preventive Health Recommendations  Female Ages 50 - 64    Yearly exam: See your health care provider every year in order to  o Review health changes.   o Discuss preventive care.    o Review your medicines if your doctor has prescribed any.      Get a Pap test every three years (unless you have an abnormal result and your provider advises testing more often).    If you get Pap tests with HPV test, you only need to test every 5 years, unless you have an abnormal result.     You do not need a Pap test if your uterus was removed (hysterectomy) and you have not had cancer.    You should be tested each year for STDs (sexually transmitted diseases) if you're at risk.     Have a mammogram every 1 to 2 years.    Have a colonoscopy at age 50, or have a yearly FIT test (stool test). These exams screen for colon cancer.      Have a cholesterol test every 5 years, or more often if advised.    Have a diabetes test (fasting glucose) every three years. If you are at risk for diabetes, you should have this test more often.     If you are at risk for osteoporosis (brittle bone disease), think about having a bone density scan (DEXA).    Shots: Get a flu shot each year. Get a tetanus shot every 10 years.    Nutrition:     Eat at least 5 servings of fruits and vegetables each day.    Eat whole-grain bread, whole-wheat pasta and brown rice instead of white grains and rice.    Get adequate Calcium and Vitamin D.     Lifestyle    Exercise at least 150 minutes a week (30 minutes a day, 5 days a week). This will help you control your weight and prevent disease.    Limit alcohol to one drink per day.    No smoking.     Wear sunscreen to prevent skin cancer.     See your dentist every six months for an exam and cleaning.    See your eye doctor every 1 to 2 years.

## 2020-11-11 ENCOUNTER — OFFICE VISIT (OUTPATIENT)
Dept: FAMILY MEDICINE | Facility: CLINIC | Age: 60
End: 2020-11-11
Payer: COMMERCIAL

## 2020-11-11 ENCOUNTER — MYC MEDICAL ADVICE (OUTPATIENT)
Dept: FAMILY MEDICINE | Facility: CLINIC | Age: 60
End: 2020-11-11

## 2020-11-11 VITALS
HEART RATE: 99 BPM | RESPIRATION RATE: 18 BRPM | BODY MASS INDEX: 31.9 KG/M2 | TEMPERATURE: 98.3 F | HEIGHT: 66 IN | SYSTOLIC BLOOD PRESSURE: 138 MMHG | WEIGHT: 198.5 LBS | DIASTOLIC BLOOD PRESSURE: 86 MMHG | OXYGEN SATURATION: 100 %

## 2020-11-11 DIAGNOSIS — Z00.00 ROUTINE GENERAL MEDICAL EXAMINATION AT A HEALTH CARE FACILITY: Primary | ICD-10-CM

## 2020-11-11 DIAGNOSIS — K12.0 APHTHAE, ORAL: ICD-10-CM

## 2020-11-11 DIAGNOSIS — R53.83 FATIGUE, UNSPECIFIED TYPE: ICD-10-CM

## 2020-11-11 DIAGNOSIS — E55.9 VITAMIN D DEFICIENCY: ICD-10-CM

## 2020-11-11 DIAGNOSIS — R00.0 SINUS TACHYCARDIA: ICD-10-CM

## 2020-11-11 DIAGNOSIS — L98.491 SKIN ULCER, LIMITED TO BREAKDOWN OF SKIN (H): ICD-10-CM

## 2020-11-11 DIAGNOSIS — I10 HYPERTENSION GOAL BP (BLOOD PRESSURE) < 140/90: ICD-10-CM

## 2020-11-11 DIAGNOSIS — Z12.4 SCREENING FOR MALIGNANT NEOPLASM OF CERVIX: ICD-10-CM

## 2020-11-11 PROCEDURE — 36415 COLL VENOUS BLD VENIPUNCTURE: CPT | Performed by: FAMILY MEDICINE

## 2020-11-11 PROCEDURE — 82728 ASSAY OF FERRITIN: CPT | Performed by: FAMILY MEDICINE

## 2020-11-11 PROCEDURE — G0145 SCR C/V CYTO,THINLAYER,RESCR: HCPCS | Performed by: FAMILY MEDICINE

## 2020-11-11 PROCEDURE — 82306 VITAMIN D 25 HYDROXY: CPT | Performed by: FAMILY MEDICINE

## 2020-11-11 PROCEDURE — 99214 OFFICE O/P EST MOD 30 MIN: CPT | Mod: 25 | Performed by: FAMILY MEDICINE

## 2020-11-11 PROCEDURE — 87624 HPV HI-RISK TYP POOLED RSLT: CPT | Performed by: FAMILY MEDICINE

## 2020-11-11 PROCEDURE — 99396 PREV VISIT EST AGE 40-64: CPT | Performed by: FAMILY MEDICINE

## 2020-11-11 RX ORDER — METOPROLOL SUCCINATE 25 MG/1
25 TABLET, EXTENDED RELEASE ORAL EVERY EVENING
Qty: 30 TABLET | Refills: 0 | Status: SHIPPED | OUTPATIENT
Start: 2020-11-11 | End: 2020-12-02

## 2020-11-11 RX ORDER — MUPIROCIN 20 MG/G
OINTMENT TOPICAL
COMMUNITY
Start: 2020-11-09 | End: 2022-04-06

## 2020-11-11 RX ORDER — HYDROCODONE BITARTRATE AND ACETAMINOPHEN 5; 325 MG/1; MG/1
TABLET ORAL
COMMUNITY
Start: 2020-11-06 | End: 2021-04-27

## 2020-11-11 RX ORDER — PENICILLIN V POTASSIUM 500 MG/1
TABLET, FILM COATED ORAL
COMMUNITY
Start: 2020-11-04 | End: 2021-01-20

## 2020-11-11 RX ORDER — METOPROLOL SUCCINATE 50 MG/1
50 TABLET, EXTENDED RELEASE ORAL EVERY MORNING
Qty: 30 TABLET | Refills: 0 | Status: SHIPPED | OUTPATIENT
Start: 2020-11-11 | End: 2021-03-02

## 2020-11-11 ASSESSMENT — ENCOUNTER SYMPTOMS
PALPITATIONS: 1
JOINT SWELLING: 1
COUGH: 1
CHILLS: 0
DIZZINESS: 1
MYALGIAS: 0
BREAST MASS: 0
NAUSEA: 1
ABDOMINAL PAIN: 0
WEAKNESS: 0
HEADACHES: 1
FREQUENCY: 0
HEARTBURN: 1
HEMATOCHEZIA: 0
ARTHRALGIAS: 1
PARESTHESIAS: 1
CONSTIPATION: 1
DIARRHEA: 0
NERVOUS/ANXIOUS: 1
HEMATURIA: 0
DYSURIA: 0
SORE THROAT: 0
EYE PAIN: 1
SHORTNESS OF BREATH: 1
FEVER: 0

## 2020-11-11 ASSESSMENT — PAIN SCALES - GENERAL: PAINLEVEL: NO PAIN (0)

## 2020-11-11 ASSESSMENT — MIFFLIN-ST. JEOR: SCORE: 1490.01

## 2020-11-11 ASSESSMENT — PATIENT HEALTH QUESTIONNAIRE - PHQ9
SUM OF ALL RESPONSES TO PHQ QUESTIONS 1-9: 8
SUM OF ALL RESPONSES TO PHQ QUESTIONS 1-9: 8
10. IF YOU CHECKED OFF ANY PROBLEMS, HOW DIFFICULT HAVE THESE PROBLEMS MADE IT FOR YOU TO DO YOUR WORK, TAKE CARE OF THINGS AT HOME, OR GET ALONG WITH OTHER PEOPLE: SOMEWHAT DIFFICULT

## 2020-11-11 NOTE — TELEPHONE ENCOUNTER
Spoke with patient regarding MyChart. Patient's daughter's neighbor tested positive today. Patient's daughter was outside talking with her neighbor. Patient's  and daughter went to the zoo with grandson on Sunday. Patient's  was last tested 2 weeks ago for COVID when he started a cold, test came back negative. Patient's  does not have any other symptoms besides a cough. Patient does not have any symptoms and has not been in contact with anyone who has tested positive. Advised patient she can still come in for her appointment today at 4:40 with Dr. Peterson.    Mandy Mullins RN BSN

## 2020-11-12 LAB
DEPRECATED CALCIDIOL+CALCIFEROL SERPL-MC: 51 UG/L (ref 20–75)
FERRITIN SERPL-MCNC: 16 NG/ML (ref 8–252)

## 2020-11-12 ASSESSMENT — ASTHMA QUESTIONNAIRES: ACT_TOTALSCORE: 18

## 2020-11-12 ASSESSMENT — PATIENT HEALTH QUESTIONNAIRE - PHQ9: SUM OF ALL RESPONSES TO PHQ QUESTIONS 1-9: 8

## 2020-11-16 LAB
COPATH REPORT: NORMAL
PAP: NORMAL

## 2020-11-18 LAB
FINAL DIAGNOSIS: NORMAL
HPV HR 12 DNA CVX QL NAA+PROBE: NEGATIVE
HPV16 DNA SPEC QL NAA+PROBE: NEGATIVE
HPV18 DNA SPEC QL NAA+PROBE: NEGATIVE
SPECIMEN DESCRIPTION: NORMAL
SPECIMEN SOURCE CVX/VAG CYTO: NORMAL

## 2020-11-21 ENCOUNTER — VIRTUAL VISIT (OUTPATIENT)
Dept: FAMILY MEDICINE | Facility: OTHER | Age: 60
End: 2020-11-21

## 2020-11-21 NOTE — PROGRESS NOTES
"Date: 2020 10:46:19  Clinician: Eber Franklin  Clinician NPI: [id number]  Patient: Jacquie Amador  Patient : 1960  Patient Address: [address] [place], [place] [address] [address]  Patient Phone: [phone]  Visit Protocol: URI  Patient Summary:  Jacquie is a 60 year old ( : 1960 ) female who initiated a [facility] Visit for COVID-19 (Coronavirus) evaluation and screening. When asked the question \"Please sign me up to receive news, health information and promotions. \", Jacquie responded \"No\".    Jacquie states her symptoms started 1-2 days ago.   Her symptoms consist of facial pain or pressure, myalgia, chills, malaise, a headache, and nasal congestion.   Symptom details     Nasal secretions: The color of her mucus is green and white.    Facial pain or pressure: The facial pain or pressure feels worse when bending over or leaning forward.     Headache: She states the headache is severe (7-9 on a 10 point pain scale).      Jacquie denies having vomiting, rhinitis, sore throat, teeth pain, ageusia, diarrhea, ear pain, wheezing, fever, cough, nausea, and anosmia. She also denies taking antibiotic medication in the past month and having recent facial or sinus surgery in the past 60 days. She is not experiencing dyspnea.   Precipitating events  She has not recently been exposed to someone with influenza. Jacquie has been in close contact with the following high risk individuals: children under the age of 5 and people with asthma, heart disease or diabetes.   Pertinent COVID-19 (Coronavirus) information  Jacquie does not work or volunteer as healthcare worker or a . In the past 14 days, Jacquie has not worked or volunteered at a healthcare facility or group living setting.   In the past 14 days, she also has not lived in a congregate living setting.   Jacquie has not had a close contact with a laboratory-confirmed COVID-19 patient within 14 days of symptom onset.    Since 2019, Jacquie has " been tested for COVID-19 and has had upper respiratory infection (URI) or influenza-like illness.      Result of COVID-19 test: Negative     Date of her COVID-19 test: 09/01/2020     Date(s) of previous URI or influenza-like illness (free-text): Before my test it's in records     Symptoms Jacquie experienced during previous URI or influenza-like illness as reported by the patient (free-text): Cough, shortness of breath and headaches wasn't covid        Pertinent medical history  Jacquie had 1 sinus infection within the past year.   Jacquie typically gets a yeast infection when she takes antibiotics. She has used fluconazole (Diflucan) to treat previous yeast infections. 2 doses of fluconazole (Diflucan) has typically been needed for symptoms to resolve in the past.  Jacquie does not need a return to work/school note.   Weight: 190 lbs   Jacquie does not smoke or use smokeless tobacco.   Additional information as reported by the patient (free text): I do have a history of migraines   Weight: 190 lbs    MEDICATIONS: valacyclovir oral, atorvastatin oral, metoprolol tartrate-hydrochlorothiazide oral, Dulera inhalation, metformin (bulk), Cymbalta oral, trazodone oral, Celebrex oral, Wellbutrin SR oral, albuterol sulfate inhalation, Flonase Allergy Relief nasal, Zyrtec oral, Singulair oral, Advair Diskus inhalation, ALLERGIES: Lyrica  Clinician Response:  Dear Jacquie,   Your symptoms show that you may have coronavirus (COVID-19). This illness can cause fever, cough and trouble breathing. Many people get a mild case and get better on their own. Some people can get very sick.  What should I do?  We would like to test you for this virus.   1. Please call [phone] to schedule your visit. Explain that you were referred by [facility] to have a COVID-19 test. Be ready to share your [facility] visit ID number.  * If you need to schedule in [facility] please call [phone] or for [employer] employees please call [phone].   "* If you need to schedule in the [place] area please call [phone]. [place] employees call [phone].  The following will serve as your written order for this COVID Test, ordered by me, for the indication of suspected COVID [Z20.828]: The test will be ordered in [facility], our electronic health record, after you are scheduled. It will show as ordered and authorized by Earnest Patrick MD.  Order: COVID-19 (Coronavirus) PCR for SYMPTOMATIC testing from [facility].   2. When it's time for your COVID test:  Stay at least 6 feet away from others. (If someone will drive you to your test, stay in the backseat, as far away from the  as you can.)   Cover your mouth and nose with a mask, tissue or washcloth.  Go straight to the testing site. Don't make any stops on the way there or back.      3.Starting now: Stay home and away from others (self-isolate) until:   You've had no fever---and no medicine that reduces fever---for one full day (24 hours). And...   Your other symptoms have gotten better. For example, your cough or breathing has improved. And...   At least 10 days have passed since your symptoms started.       During this time, don't leave the house except for testing or medical care.   Stay in your own room, even for meals. Use your own bathroom if you can.   Stay away from others in your home. No hugging, kissing or shaking hands. No visitors.  Don't go to work, school or anywhere else.    Clean \"high touch\" surfaces often (doorknobs, counters, handles, etc.). Use a household cleaning spray or wipes. You'll find a full list of  on the EPA website: www.epa.gov/pesticide-registration/list-n-disinfectants-use-against-sars-cov-2.   Cover your mouth and nose with a mask, tissue or washcloth to avoid spreading germs.  Wash your hands and face often. Use soap and water.  Caregivers in these groups are at risk for severe illness due to COVID-19:  o People 65 years and older  o People who live in a nursing home or " long-term care facility  o People with chronic disease (lung, heart, cancer, diabetes, kidney, liver, immunologic)  o People who have a weakened immune system, including those who:   Are in cancer treatment  Take medicine that weakens the immune system, such as corticosteroids  Had a bone marrow or organ transplant  Have an immune deficiency  Have poorly controlled HIV or AIDS  Are obese (body mass index of 40 or higher)  Smoke regularly   o Caregivers should wear gloves while washing dishes, handling laundry and cleaning bedrooms and bathrooms.  o Use caution when washing and drying laundry: Don't shake dirty laundry, and use the warmest water setting that you can.  o For more tips, go to www.cdc.gov/coronavirus/2019-ncov/downloads/10Things.pdf.    How can I take care of myself?    Get lots of rest. Drink extra fluids (unless a doctor has told you not to).   Take Tylenol (acetaminophen) for fever or pain. If you have liver or kidney problems, ask your family doctor if it's okay to take Tylenol.   Adults can take either:    650 mg (two 325 mg pills) every 4 to 6 hours, or...   1,000 mg (two 500 mg pills) every 8 hours as needed.    Note: Don't take more than 3,000 mg in one day. Acetaminophen is found in many medicines (both prescribed and over-the-counter medicines). Read all labels to be sure you don't take too much.   For children, check the Tylenol bottle for the right dose. The dose is based on the child's age or weight.    If you have other health problems (like cancer, heart failure, an organ transplant or severe kidney disease): Call your specialty clinic if you don't feel better in the next 2 days.       Know when to call 911. Emergency warning signs include:    Trouble breathing or shortness of breath Pain or pressure in the chest that doesn't go away Feeling confused like you haven't felt before, or not being able to wake up Bluish-colored lips or face.  Where can I get more information?   [facility] -- About COVID-19: [email or web address]   CDC -- What to Do If You're Sick: www.cdc.gov/coronavirus/2019-ncov/about/steps-when-sick.html   CDC -- Ending Home Isolation: www.cdc.gov/coronavirus/2019-ncov/hcp/disposition-in-home-patients.html   [facility] -- Caring for Someone: www.cdc.gov/coronavirus/2019-ncov/if-you-are-sick/care-for-someone.html   [facility] -- Interim Guidance for Hospital Discharge to Home: www.[facility].[place]./diseases/coronavirus/hcp/hospdischarge.pdf   [facility] clinical trials (COVID-19 research studies): clinicalaffairs.[facility]-clinical-trials    Below are the COVID-19 hotlines at the [place] [facility] ([facility]). Interpreters are available.    For health questions: Call 755-315-7866 or 1-798.212.4141 (7 a.m. to 7 p.m.) For questions about schools and childcare: Call 354-848-2506 or 1-492.836.8552 (7 a.m. to 7 p.m.)    Diagnosis: Contact with and (suspected) exposure to other viral communicable diseases  Diagnosis ICD: Z20.828

## 2020-11-22 DIAGNOSIS — Z20.822 SUSPECTED COVID-19 VIRUS INFECTION: Primary | ICD-10-CM

## 2020-11-23 DIAGNOSIS — Z20.822 SUSPECTED 2019 NOVEL CORONAVIRUS INFECTION: Primary | ICD-10-CM

## 2020-11-23 DIAGNOSIS — Z20.822 SUSPECTED COVID-19 VIRUS INFECTION: ICD-10-CM

## 2020-11-23 PROCEDURE — U0003 INFECTIOUS AGENT DETECTION BY NUCLEIC ACID (DNA OR RNA); SEVERE ACUTE RESPIRATORY SYNDROME CORONAVIRUS 2 (SARS-COV-2) (CORONAVIRUS DISEASE [COVID-19]), AMPLIFIED PROBE TECHNIQUE, MAKING USE OF HIGH THROUGHPUT TECHNOLOGIES AS DESCRIBED BY CMS-2020-01-R: HCPCS | Performed by: PHYSICIAN ASSISTANT

## 2020-11-24 ENCOUNTER — MYC MEDICAL ADVICE (OUTPATIENT)
Dept: FAMILY MEDICINE | Facility: CLINIC | Age: 60
End: 2020-11-24

## 2020-11-24 LAB
SARS-COV-2 RNA SPEC QL NAA+PROBE: NOT DETECTED
SPECIMEN SOURCE: NORMAL

## 2020-11-25 ENCOUNTER — VIRTUAL VISIT (OUTPATIENT)
Dept: FAMILY MEDICINE | Facility: OTHER | Age: 60
End: 2020-11-25

## 2020-11-25 ENCOUNTER — MYC MEDICAL ADVICE (OUTPATIENT)
Dept: FAMILY MEDICINE | Facility: CLINIC | Age: 60
End: 2020-11-25

## 2020-11-26 NOTE — PROGRESS NOTES
"Date: 2020 22:13:05  Clinician: Olivier Rascon  Clinician NPI: [id number]  Patient: Jacquie Amador  Patient : 1960  Patient Address: [address] [place], [place] [address] [address]  Patient Phone: [phone]  Visit Protocol: URI  Patient Summary:  Jacquie is a 60 year old ( : 1960 ) female who initiated a [facility] Visit for cold, sinus infection, or influenza. When asked the question \"Please sign me up to receive news, health information and promotions. \", Jacquie responded \"No\".    Jacquie states her symptoms started gradually 10-13 days ago.   Her symptoms consist of a headache, a cough, nasal congestion, facial pain or pressure, and malaise.   Symptom details     Nasal secretions: The color of her mucus is green and white.    Cough: Jacquie coughs a few times an hour and her cough is more bothersome at night. Phlegm comes into her throat when she coughs. She believes her cough is caused by post-nasal drip. The color of the phlegm is white and green.     Facial pain or pressure: The facial pain or pressure feels worse when bending over or leaning forward.     Headache: She states the headache is moderate (4-6 on a 10 point pain scale).      Jacquie denies having ear pain, wheezing, fever, nausea, vomiting, rhinitis, myalgias, chills, sore throat, teeth pain, ageusia, diarrhea, and anosmia. She also denies taking antibiotic medication in the past month, having recent facial or sinus surgery in the past 60 days, double sickening (worsening symptoms after initial improvement), and having a sinus infection within the past year. She is not experiencing dyspnea.   Precipitating events  She has not recently been exposed to someone with influenza. Jacquie has been in close contact with the following high risk individuals: children under the age of 5.   Pertinent COVID-19 (Coronavirus) information  Jacquie does not work or volunteer as healthcare worker or a . In the past 14 days, Jacquie has not " worked or volunteered at a healthcare facility or group living setting.   In the past 14 days, she also has not lived in a congregate living setting.   Jacquie has not had a close contact with a laboratory-confirmed COVID-19 patient within 14 days of symptom onset.    Since December 2019, Jacquie has been tested for COVID-19 and has not had upper respiratory infection or influenza-like illness.      Result of COVID-19 test: Negative     Date of her COVID-19 test: 11/23/2020      Pertinent medical history  Jacquie has asthma. She uses quick-relief inhaler more than two times per week. She refills her quick-relief inhaler more than two times per year. She wakes up at night with asthma symptoms less than two times per month.   Jacquie typically gets a yeast infection when she takes antibiotics. She has used fluconazole (Diflucan) to treat previous yeast infections. 2 doses of fluconazole (Diflucan) has typically been needed for symptoms to resolve in the past.  She has been told by her provider to avoid NSAIDs.   Jacquie does not have diabetes. She denies having immunosuppressive conditions (e.g., chemotherapy, HIV, organ transplant, long-term use of steroids or other immunosuppressive medications, splenectomy). She does not have severe COPD and congestive heart failure.   Jacquie does not need a return to work/school note.   Weight: 190 lbs   Jacquie does not smoke or use smokeless tobacco.   Additional information as reported by the patient (free text): Now I know it's not Covid which I knew I probably have a sinus infection. So much Mucus draining hard to swallow sometime. Amoxicillin and Penicillin seems to give me ulcers In Mouth. Doxycycline seems to work. I really want to get rid of this before it settles in my chest like last Thanksgiving and was seriously ill for 2 weeks. I need help with long weekend and having to wait til after long Thanksgiving weekend.   Weight: 190 lbs    MEDICATIONS: valacyclovir oral,  atorvastatin oral, metoprolol tartrate-hydrochlorothiazide oral, Dulera inhalation, metformin (bulk), Cymbalta oral, trazodone oral, Celebrex oral, Wellbutrin SR oral, albuterol sulfate inhalation, Flonase Allergy Relief nasal, Zyrtec oral, Singulair oral, Advair Diskus inhalation, ALLERGIES: Lyrica  Clinician Response:  Dear Jacquie,  Based on the information provided, you have acute bacterial sinusitis, also known as a sinus infection. Sinus infections are caused by bacteria or a virus and symptoms are almost always identical. The difference between the 2 types of infections is timing.  Sinus infections start as viral infections and symptoms improve on their own in about 7 days. If symptoms have not improved after 7 days or have even worsened, a bacterial infection may have developed.  Medication information  I am prescribing:     Doxycycline hyclate 100 mg oral tablet. Take 1 tablet by mouth 2 times per day for 7 days. There are no refills with this prescription.   Certain antibiotics such as Doxycycline, levofloxacin, and moxifloxacin can cause your skin to be more sensitive to the sun. Exposure to the sun when taking these antibiotics may cause skin rash, itching, redness, or a severe sunburn. Be sure to avoid prolonged or direct exposure to the sun, especially between 10 am and 3 pm, if possible. Wear protective clothing and use sunscreen of SPF 30 or higher when you are outdoors.  Yeast infections can be a common side effect of antibiotics. The most common symptom of a yeast infection is itchiness in and around the vagina. Other signs and symptoms include burning, redness, or a thick, white vaginal discharge that looks like cottage cheese and does not have a bad smell.  Unless you are allergic to the over-the-counter medication(s) below, I recommend using:       Acetaminophen (Tylenol or store brand) oral tablet. Take 1-2 tablets by mouth every 4-6 hours to help with the discomfort.      Saline nasal spray or  drops(Ocean or store brand). Use 1-2 drops or sprays in each nostril as needed for congestion.    A sinus irrigation kit such as Sinus Rinse, Neti Pot, SinuCleanse, or store brand. Be sure to use sterile or previously boiled water to prevent unwanted infections.     Over-the-counter medications do not require a prescription. Ask the pharmacist if you have any questions.  Self care  Steps you can take to be as comfortable as possible:     Rest.    Drink plenty of fluids.    Take a warm shower to loosen congestion    Use a cool-mist humidifier.    Take a spoonful of honey to reduce your cough.     When to seek care  Please be seen in a clinic or urgent care if any of the following occur:     New symptoms develop, or symptoms become worse    Symptoms do not start to improve after 3 days of treatment     Call ahead before going to the clinic or urgent care.  It is possible to have an allergic reaction to an antibiotic even if you have not had one in the past. If you notice a new rash, significant swelling, or difficulty breathing, stop taking this medication immediately and go to a clinic or urgent care.  COVID-19 (Coronavirus) General Information  Because there is currently no vaccine to prevent infection, the best way to protect yourself is to avoid being exposed to this virus. Common symptoms of COVID-19 include but are not limited to fever, cough, and shortness of breath. These symptoms appear 2-14 days after you are exposed to the virus that causes COVID-19. Click here for more information from the CDC on how to protect yourself.  If you are sick with COVID-19 or suspect you are infected with the virus that causes COVID-19, follow the steps here from the CDC to help prevent the disease from spreading to people in your home and community.  Click here for general information from the CDC on testing.  If you develop any of these emergency warning signs for COVID-19, get medical attention immediately:     Trouble  breathing    Persistent pain or pressure in the chest    New confusion or inability to arouse    Bluish lips or face      Call your doctor or clinic before going in. Call 911 if you have a medical emergency and notify the  you have or think you may have COVID-19.  For more detailed and up to date information on COVID-19 (Coronavirus), please visit the CDC website.   Diagnosis: Acute bacterial sinusitis  Diagnosis ICD: J01.90  Prescription: doxycycline hyclate 100 mg oral tablet 14 tablet, 7 days supply. Take 1 tablet by mouth 2 times per day for 7 days. Refills: 0, Refill as needed: no, Allow substitutions: yes  Pharmacy: [id number] - [phone] - [phone],  [place] [address]

## 2020-12-01 ENCOUNTER — MYC REFILL (OUTPATIENT)
Dept: FAMILY MEDICINE | Facility: CLINIC | Age: 60
End: 2020-12-01

## 2020-12-01 DIAGNOSIS — E78.5 HYPERLIPIDEMIA LDL GOAL <130: ICD-10-CM

## 2020-12-01 DIAGNOSIS — F41.1 GENERALIZED ANXIETY DISORDER: ICD-10-CM

## 2020-12-01 DIAGNOSIS — M79.645 BILATERAL THUMB PAIN: ICD-10-CM

## 2020-12-01 DIAGNOSIS — M70.61 GREATER TROCHANTERIC BURSITIS OF BOTH HIPS: ICD-10-CM

## 2020-12-01 DIAGNOSIS — L30.9 DERMATITIS: ICD-10-CM

## 2020-12-01 DIAGNOSIS — M79.644 BILATERAL THUMB PAIN: ICD-10-CM

## 2020-12-01 DIAGNOSIS — M70.62 GREATER TROCHANTERIC BURSITIS OF BOTH HIPS: ICD-10-CM

## 2020-12-01 DIAGNOSIS — G25.9 MOVEMENT DISORDER: ICD-10-CM

## 2020-12-01 RX ORDER — ATORVASTATIN CALCIUM 20 MG/1
20 TABLET, FILM COATED ORAL DAILY
Qty: 90 TABLET | Refills: 1 | Status: SHIPPED | OUTPATIENT
Start: 2020-12-01 | End: 2021-06-07

## 2020-12-01 RX ORDER — ALPRAZOLAM 0.25 MG
0.25 TABLET ORAL DAILY PRN
Qty: 30 TABLET | Refills: 2 | Status: SHIPPED | OUTPATIENT
Start: 2020-12-01 | End: 2021-11-19

## 2020-12-01 RX ORDER — ROPINIROLE 1 MG/1
3 TABLET, FILM COATED ORAL AT BEDTIME
Qty: 270 TABLET | Refills: 3 | Status: SHIPPED | OUTPATIENT
Start: 2020-12-01 | End: 2021-11-18

## 2020-12-01 RX ORDER — CLINDAMYCIN PHOSPHATE 10 MG/G
GEL TOPICAL 2 TIMES DAILY
Qty: 30 G | Refills: 4 | Status: SHIPPED | OUTPATIENT
Start: 2020-12-01 | End: 2021-12-29

## 2020-12-01 RX ORDER — MUPIROCIN CALCIUM 20 MG/G
CREAM TOPICAL
Qty: 60 G | Refills: 1 | Status: SHIPPED | OUTPATIENT
Start: 2020-12-01 | End: 2021-02-25

## 2020-12-01 NOTE — TELEPHONE ENCOUNTER
Xanax, Voltaren, Bactroban      Last Seen: 11/11/20  Voltaren has not been prescribed since 2018.   Bactroban is not on the medication list.   Xanax last prescribed on 02/14/20 30 pills  2 refills.    Routing refill request to provider for review/approval because:  Drug not on the [facility] or [facility] refill protocol or controlled substance  Drug not active on patient's medication list  Ordered 2018    Dario Clinton RN  December 1, 2020

## 2020-12-02 ENCOUNTER — TELEPHONE (OUTPATIENT)
Dept: FAMILY MEDICINE | Facility: CLINIC | Age: 60
End: 2020-12-02

## 2020-12-02 NOTE — TELEPHONE ENCOUNTER
Central Prior Authorization Team  Phone: [phone]    PA Initiation    Medication: mupirocin (BACTROBAN) 2 % external cream  Insurance Company: [employer] - Phone [phone] Fax [phone]  Pharmacy Filling the Rx: [id number] - [place], [place] - [address]  Filling Pharmacy Phone: [phone]  Filling Pharmacy Fax:    Start Date: 12/2/2020

## 2020-12-02 NOTE — TELEPHONE ENCOUNTER
Routing to PA Pool please start a PRIOR AUTHORIZATION for mupirocin (BACTROBAN) 2 % external cream    [employer]   #[phone]  ID#[id number]    Thanks  Emi LOPES (R)

## 2020-12-03 NOTE — TELEPHONE ENCOUNTER
Prior Authorization Approval    Authorization Effective Date: 11/2/2020  Authorization Expiration Date: 12/3/2021  Medication: mupirocin (BACTROBAN) 2 % external cream- APPROVED   Approved Dose/Quantity:   Reference #:     Insurance Company: [employer] - Phone [phone] Fax [phone]  Expected CoPay:       CoPay Card Available:      Foundation Assistance Needed:    Which Pharmacy is filling the prescription (Not needed for infusion/clinic administered): [id number] - [place], [place] - [address] [address]  Pharmacy Notified: Yes- left message   Patient Notified:  Yes- informed pt of pa approval.

## 2020-12-08 ENCOUNTER — MYC REFILL (OUTPATIENT)
Dept: FAMILY MEDICINE | Facility: CLINIC | Age: 60
End: 2020-12-08

## 2020-12-08 ENCOUNTER — MYC REFILL (OUTPATIENT)
Dept: FAMILY MEDICINE | Facility: OTHER | Age: 60
End: 2020-12-08

## 2020-12-08 DIAGNOSIS — J45.40 MODERATE PERSISTENT ASTHMA WITHOUT COMPLICATION: ICD-10-CM

## 2020-12-08 DIAGNOSIS — G25.9 MOVEMENT DISORDER: ICD-10-CM

## 2020-12-08 DIAGNOSIS — M19.049 HAND ARTHRITIS: ICD-10-CM

## 2020-12-09 RX ORDER — CELECOXIB 200 MG/1
CAPSULE ORAL
Qty: 180 CAPSULE | Refills: 3 | OUTPATIENT
Start: 2020-12-09

## 2020-12-09 RX ORDER — ALBUTEROL SULFATE 90 UG/1
AEROSOL, METERED RESPIRATORY (INHALATION)
Qty: 54 G | Refills: 1 | OUTPATIENT
Start: 2020-12-09

## 2020-12-09 RX ORDER — ROPINIROLE 1 MG/1
3 TABLET, FILM COATED ORAL AT BEDTIME
Qty: 270 TABLET | Refills: 3 | OUTPATIENT
Start: 2020-12-09

## 2020-12-09 NOTE — TELEPHONE ENCOUNTER
Pending Prescriptions:                       Disp   Refills    mometasone-formoterol (DULERA) 200-5 MCG/A*3 Inha*1        Sig: Inhale 2 puffs into the lungs 2 times daily      Routing refill request to provider for review/approval because:  Labs out of range:    ACT Total Scores 7/19/2019 2/4/2020 11/11/2020   ACT TOTAL SCORE - - -   ASTHMA ER VISITS - - -   ASTHMA HOSPITALIZATIONS - - -   ACT TOTAL SCORE (Goal Greater than or Equal to 20) 8 19 18   In the past 12 months, how many times did you visit the emergency room for your asthma without being admitted to the hospital? 0 0 0   In the past 12 months, how many times were you hospitalized overnight because of your asthma? 0 0 0         Sejal Iraheta RN

## 2020-12-09 NOTE — TELEPHONE ENCOUNTER
Sent 12/02/2020, with 3-6 month supply on meds requested. Should not need refills at this time    Sejal Iraheta RN

## 2020-12-11 ENCOUNTER — MYC MEDICAL ADVICE (OUTPATIENT)
Dept: FAMILY MEDICINE | Facility: CLINIC | Age: 60
End: 2020-12-11

## 2020-12-14 ENCOUNTER — MYC MEDICAL ADVICE (OUTPATIENT)
Dept: FAMILY MEDICINE | Facility: CLINIC | Age: 60
End: 2020-12-14

## 2020-12-14 DIAGNOSIS — F41.1 GENERALIZED ANXIETY DISORDER: Primary | ICD-10-CM

## 2020-12-15 RX ORDER — BUPROPION HYDROCHLORIDE 100 MG/1
TABLET, EXTENDED RELEASE ORAL
Qty: 90 TABLET | Refills: 0 | Status: SHIPPED | OUTPATIENT
Start: 2020-12-15 | End: 2021-02-22

## 2020-12-23 ENCOUNTER — TRANSFERRED RECORDS (OUTPATIENT)
Dept: HEALTH INFORMATION MANAGEMENT | Facility: CLINIC | Age: 60
End: 2020-12-23

## 2020-12-30 ENCOUNTER — MYC MEDICAL ADVICE (OUTPATIENT)
Dept: FAMILY MEDICINE | Facility: CLINIC | Age: 60
End: 2020-12-30

## 2020-12-30 DIAGNOSIS — R05.9 COUGH: ICD-10-CM

## 2020-12-31 ENCOUNTER — MYC MEDICAL ADVICE (OUTPATIENT)
Dept: FAMILY MEDICINE | Facility: CLINIC | Age: 60
End: 2020-12-31

## 2020-12-31 DIAGNOSIS — G43.009 MIGRAINE WITHOUT AURA AND WITHOUT STATUS MIGRAINOSUS, NOT INTRACTABLE: ICD-10-CM

## 2020-12-31 RX ORDER — DOXYCYCLINE HYCLATE 100 MG
100 TABLET ORAL 2 TIMES DAILY
Qty: 28 TABLET | Refills: 0 | Status: SHIPPED | OUTPATIENT
Start: 2020-12-31 | End: 2021-01-20

## 2020-12-31 RX ORDER — RIZATRIPTAN BENZOATE 10 MG/1
10 TABLET ORAL
Qty: 18 TABLET | Refills: 0 | Status: SHIPPED | OUTPATIENT
Start: 2020-12-31 | End: 2021-01-20

## 2021-01-04 RX ORDER — RIZATRIPTAN BENZOATE 10 MG/1
10 TABLET ORAL
Qty: 18 TABLET | Refills: 0 | Status: SHIPPED | OUTPATIENT
Start: 2021-01-04 | End: 2021-02-22

## 2021-01-05 ENCOUNTER — MYC MEDICAL ADVICE (OUTPATIENT)
Dept: FAMILY MEDICINE | Facility: CLINIC | Age: 61
End: 2021-01-05

## 2021-01-07 ENCOUNTER — MYC MEDICAL ADVICE (OUTPATIENT)
Dept: FAMILY MEDICINE | Facility: CLINIC | Age: 61
End: 2021-01-07

## 2021-01-08 ENCOUNTER — APPOINTMENT (OUTPATIENT)
Dept: LAB | Facility: CLINIC | Age: 61
End: 2021-01-08
Payer: COMMERCIAL

## 2021-01-08 ENCOUNTER — MYC MEDICAL ADVICE (OUTPATIENT)
Dept: FAMILY MEDICINE | Facility: CLINIC | Age: 61
End: 2021-01-08

## 2021-01-08 DIAGNOSIS — E61.1 IRON DEFICIENCY: Primary | ICD-10-CM

## 2021-01-08 NOTE — TELEPHONE ENCOUNTER
Patient came in for her appointment with lab and is in discussion with lab and her eye clinic, lab will add iron if needed  Closing encounter  Emi Vanessa RT (R)

## 2021-01-08 NOTE — TELEPHONE ENCOUNTER
Dr Peterson please review lab orders, are you able to order, (orders are scanned in [employer] message   Deya in lab reached out to [facility] and was told that patient had to have them done there, but patient wants to confirm   Thanks  Emi Vanessa RT (R)

## 2021-01-11 NOTE — TELEPHONE ENCOUNTER
Flagging for provider to place orders for Iron  Patient is aware that we are unable to perform other labs that are ordered through [facility]    Thanks  Emi Vanessa RT (R)

## 2021-01-19 ENCOUNTER — MYC MEDICAL ADVICE (OUTPATIENT)
Dept: FAMILY MEDICINE | Facility: CLINIC | Age: 61
End: 2021-01-19

## 2021-01-19 DIAGNOSIS — R53.83 FATIGUE, UNSPECIFIED TYPE: Primary | ICD-10-CM

## 2021-01-19 NOTE — TELEPHONE ENCOUNTER
Flagging for provider to review, patient is scheduled for labs on 1/20/2021 MyChart sent to patient   Current lab orders: Ferritin, Iron and Iron Binding    Thanks  Emi Vanessa RT (R)

## 2021-01-20 ENCOUNTER — MYC MEDICAL ADVICE (OUTPATIENT)
Dept: FAMILY MEDICINE | Facility: CLINIC | Age: 61
End: 2021-01-20

## 2021-01-20 ENCOUNTER — VIRTUAL VISIT (OUTPATIENT)
Dept: FAMILY MEDICINE | Facility: CLINIC | Age: 61
End: 2021-01-20
Payer: COMMERCIAL

## 2021-01-20 DIAGNOSIS — R53.83 FATIGUE, UNSPECIFIED TYPE: ICD-10-CM

## 2021-01-20 DIAGNOSIS — L98.499 SKIN ULCER, UNSPECIFIED ULCER STAGE (H): ICD-10-CM

## 2021-01-20 DIAGNOSIS — E61.1 IRON DEFICIENCY: ICD-10-CM

## 2021-01-20 LAB
ERYTHROCYTE [DISTWIDTH] IN BLOOD BY AUTOMATED COUNT: 15.9 % (ref 10–15)
HCT VFR BLD AUTO: 41 % (ref 35–47)
HGB BLD-MCNC: 13.1 G/DL (ref 11.7–15.7)
MCH RBC QN AUTO: 28.9 PG (ref 26.5–33)
MCHC RBC AUTO-ENTMCNC: 32 G/DL (ref 31.5–36.5)
MCV RBC AUTO: 91 FL (ref 78–100)
PLATELET # BLD AUTO: 355 10E9/L (ref 150–450)
RBC # BLD AUTO: 4.53 10E12/L (ref 3.8–5.2)
WBC # BLD AUTO: 8.2 10E9/L (ref 4–11)

## 2021-01-20 PROCEDURE — 85027 COMPLETE CBC AUTOMATED: CPT | Performed by: FAMILY MEDICINE

## 2021-01-20 PROCEDURE — 83540 ASSAY OF IRON: CPT | Performed by: FAMILY MEDICINE

## 2021-01-20 PROCEDURE — 80053 COMPREHEN METABOLIC PANEL: CPT | Performed by: FAMILY MEDICINE

## 2021-01-20 PROCEDURE — 99214 OFFICE O/P EST MOD 30 MIN: CPT | Mod: 95 | Performed by: FAMILY MEDICINE

## 2021-01-20 PROCEDURE — 82728 ASSAY OF FERRITIN: CPT | Performed by: FAMILY MEDICINE

## 2021-01-20 PROCEDURE — 36415 COLL VENOUS BLD VENIPUNCTURE: CPT | Performed by: FAMILY MEDICINE

## 2021-01-20 PROCEDURE — 83550 IRON BINDING TEST: CPT | Performed by: FAMILY MEDICINE

## 2021-01-20 RX ORDER — DOXYCYCLINE HYCLATE 100 MG
100 TABLET ORAL 2 TIMES DAILY
Qty: 28 TABLET | Refills: 0 | Status: SHIPPED | OUTPATIENT
Start: 2021-01-20 | End: 2021-04-27

## 2021-01-20 NOTE — PROGRESS NOTES
Jacquie is a 60 year old who is being evaluated via a billable video visit.      How would you like to obtain your AVS? MyChart  If the video visit is dropped, the invitation should be resent by:   Will anyone else be joining your video visit? No- possibly her       Video Start Time: 6:00 PM        Assessment & Plan     Skin ulcer, unspecified ulcer stage (H)  Patient with longstanding history of ulcerations of her skin both on her face and also on her extremities.  She has previously had a large ulceration over her forehead which took quite a long time for that to heal.  She currently is concerned about 1 over the length of her nose that she feels is very deep.  She is not had any drainage or erythema that spreading.  She has no fevers or chills.  She has been seen by dermatology in the past which the patient said has been unhelpful.  She has been slightly resistant to referral is being placed again for further evaluation.  She denies that she does any picking at all of these lesions.  She reports that the doxycycline seems to help things heal.  Did repeat a course of doxycycline at this point but do recommend further evaluation.  She will consider.  Discussed referral to [facility] versus [facility] as she has been at [facility] previously.  This was for other concerns.  Patient will consider and will get back to me regarding next steps.  - doxycycline hyclate (VIBRA-TABS) 100 MG tablet; Take 1 tablet (100 mg) by mouth 2 times daily                             Return in about 10 months (around 11/20/2021) for Routine preventive.    Liz Peterson MD  [facility] [place] is a 60 year old who presents to clinic today for the following health issues     HPI       Patient states that she has had a spot on her nose that she wants looked at. She states that she has had this before and talked to her provider aboiut it before. States that is open for, and has been for a  "few months.     Has had the sore on her nose for \"quite awhile\" maybe 7 months. Seems to open up. Reports it has gotten a lot smaller.   Denies picking at it. Denies any rubbing it.     Feels like it is on the bone. Not painful. No discharge. No fevers.     Better over the last 2 months. Smaller. But seems deeper.     Rest of face seems better.     Feels the doxycycline has helped.     Daughter's wedding is 5 months.     She has been evaluated with dermatology in the past outside of the system.  She reports that she was not given any answers.  Reports she was told to stop picking.          Review of Systems   CONSTITUTIONAL: Notes fatigue.  No fevers.  ENT/MOUTH: NEGATIVE for ear, mouth and throat problems  RESP: NEGATIVE for significant cough or SOB  CV: NEGATIVE for chest pain, palpitations or peripheral edema      Objective           Vitals:  No vitals were obtained today due to virtual visit.    Physical Exam   GENERAL: Healthy, alert and no distress  EYES: Eyes grossly normal to inspection.  No discharge or erythema, or obvious scleral/conjunctival abnormalities.  RESP: No audible wheeze, cough, or visible cyanosis.  No visible retractions or increased work of breathing.    SKIN: Along the length of nose, patient with oval ulceration at the skin.  There is no surrounding erythema there is no obvious drainage.  There are no other areas of ulceration.  Prior area of ulceration on forehead appears healed.  NEURO: Cranial nerves grossly intact.  Mentation and speech appropriate for age.  PSYCH: Mentation appears normal, affect normal/bright, judgement and insight intact, normal speech and appearance well-groomed.                Video-Visit Details     Type of service:  Video Visit    Video End Time:6:17 PM    Originating Location (pt. Location): Home    Distant Location (provider location):  [facility]     Platform used for Video Visit: William  "

## 2021-01-21 ENCOUNTER — MYC MEDICAL ADVICE (OUTPATIENT)
Dept: FAMILY MEDICINE | Facility: CLINIC | Age: 61
End: 2021-01-21

## 2021-01-21 LAB
ALBUMIN SERPL-MCNC: 3.8 G/DL (ref 3.4–5)
ALP SERPL-CCNC: 132 U/L (ref 40–150)
ALT SERPL W P-5'-P-CCNC: 26 U/L (ref 0–50)
ANION GAP SERPL CALCULATED.3IONS-SCNC: 8 MMOL/L (ref 3–14)
AST SERPL W P-5'-P-CCNC: 16 U/L (ref 0–45)
BILIRUB SERPL-MCNC: 0.2 MG/DL (ref 0.2–1.3)
BUN SERPL-MCNC: 16 MG/DL (ref 7–30)
CALCIUM SERPL-MCNC: 9.3 MG/DL (ref 8.5–10.1)
CHLORIDE SERPL-SCNC: 107 MMOL/L (ref 94–109)
CO2 SERPL-SCNC: 28 MMOL/L (ref 20–32)
CREAT SERPL-MCNC: 1.04 MG/DL (ref 0.52–1.04)
FERRITIN SERPL-MCNC: 11 NG/ML (ref 8–252)
GFR SERPL CREATININE-BSD FRML MDRD: 58 ML/MIN/{1.73_M2}
GLUCOSE SERPL-MCNC: 67 MG/DL (ref 70–99)
IRON SATN MFR SERPL: 14 % (ref 15–46)
IRON SERPL-MCNC: 50 UG/DL (ref 35–180)
POTASSIUM SERPL-SCNC: 3.6 MMOL/L (ref 3.4–5.3)
PROT SERPL-MCNC: 7.5 G/DL (ref 6.8–8.8)
SODIUM SERPL-SCNC: 142 MMOL/L (ref 133–144)
TIBC SERPL-MCNC: 350 UG/DL (ref 240–430)

## 2021-01-22 ENCOUNTER — MYC MEDICAL ADVICE (OUTPATIENT)
Dept: FAMILY MEDICINE | Facility: CLINIC | Age: 61
End: 2021-01-22

## 2021-01-23 ENCOUNTER — MYC MEDICAL ADVICE (OUTPATIENT)
Dept: FAMILY MEDICINE | Facility: CLINIC | Age: 61
End: 2021-01-23

## 2021-01-24 NOTE — TELEPHONE ENCOUNTER
To: MG TREVIÑO TRIAGE      From: Jacquie Amador      Created: 1/20/2021 6:37 PM        *-*-*This message has not been handled.*-*-*    Dr Peterson, I got off the phone and forgot I wanted to tell you about the issues that run in family.  My nephew (Sisters son) has System Mast Cell disease and Hyper eosinophilia. My moms sister has MS . My mom had Myelodysplastic Syndrome (MDS). I  know she had to get super expensive shots to boost blood cells (I think that s what it was for).  It s probably good to have in my chart.      Thanks for the call.   Jacquie Amador        Family history added.

## 2021-01-25 ENCOUNTER — MYC MEDICAL ADVICE (OUTPATIENT)
Dept: FAMILY MEDICINE | Facility: CLINIC | Age: 61
End: 2021-01-25

## 2021-01-25 DIAGNOSIS — E61.1 IRON DEFICIENCY: Primary | ICD-10-CM

## 2021-01-27 DIAGNOSIS — E61.1 IRON DEFICIENCY: ICD-10-CM

## 2021-01-27 PROCEDURE — 36415 COLL VENOUS BLD VENIPUNCTURE: CPT | Performed by: FAMILY MEDICINE

## 2021-01-27 PROCEDURE — 82784 ASSAY IGA/IGD/IGG/IGM EACH: CPT | Performed by: FAMILY MEDICINE

## 2021-01-27 PROCEDURE — 83516 IMMUNOASSAY NONANTIBODY: CPT | Performed by: FAMILY MEDICINE

## 2021-01-28 LAB
IGA SERPL-MCNC: 146 MG/DL (ref 84–499)
TTG IGA SER-ACNC: <1 U/ML
TTG IGG SER-ACNC: <1 U/ML

## 2021-02-04 ENCOUNTER — MYC MEDICAL ADVICE (OUTPATIENT)
Dept: FAMILY MEDICINE | Facility: CLINIC | Age: 61
End: 2021-02-04

## 2021-02-05 ENCOUNTER — TRANSFERRED RECORDS (OUTPATIENT)
Dept: HEALTH INFORMATION MANAGEMENT | Facility: CLINIC | Age: 61
End: 2021-02-05

## 2021-02-05 LAB — RETINOPATHY: NEGATIVE

## 2021-02-08 ENCOUNTER — MYC MEDICAL ADVICE (OUTPATIENT)
Dept: FAMILY MEDICINE | Facility: CLINIC | Age: 61
End: 2021-02-08

## 2021-02-08 NOTE — TELEPHONE ENCOUNTER
"Called and scheduled patient for a telephone visit with PCP. Was upset that it was \"so far away\" is upset because she doesn't know where she stands with \"everything\" and is worried about it. Wants to know if PCP can \"fit her in\" that it \"wont be a long conversation\".         Provider please review.     Danny Liu MA on 2/8/2021 at 5:11 PM    "

## 2021-02-09 ENCOUNTER — MYC MEDICAL ADVICE (OUTPATIENT)
Dept: FAMILY MEDICINE | Facility: CLINIC | Age: 61
End: 2021-02-09

## 2021-02-10 ENCOUNTER — VIRTUAL VISIT (OUTPATIENT)
Dept: FAMILY MEDICINE | Facility: CLINIC | Age: 61
End: 2021-02-10
Payer: COMMERCIAL

## 2021-02-10 DIAGNOSIS — L98.491 SKIN ULCER, LIMITED TO BREAKDOWN OF SKIN (H): ICD-10-CM

## 2021-02-10 DIAGNOSIS — F41.1 GENERALIZED ANXIETY DISORDER: ICD-10-CM

## 2021-02-10 DIAGNOSIS — E61.1 IRON DEFICIENCY: ICD-10-CM

## 2021-02-10 DIAGNOSIS — H04.123 DRY EYES: ICD-10-CM

## 2021-02-10 DIAGNOSIS — R53.83 FATIGUE, UNSPECIFIED TYPE: Primary | ICD-10-CM

## 2021-02-10 PROCEDURE — 99214 OFFICE O/P EST MOD 30 MIN: CPT | Mod: 95 | Performed by: FAMILY MEDICINE

## 2021-02-10 NOTE — PROGRESS NOTES
Jacquie is a 60 year old who is being evaluated via a billable telephone visit.      What phone number would you like to be contacted at? [phone]  How would you like to obtain your AVS? Catharlashawn    Assessment & Plan     Fatigue, unspecified type  Skin ulcer, limited to breakdown of skin (H)  Iron deficiency  Dry eyes  Generalized anxiety disorder  Jacquie has had a couple year history of skin ulcerations that come and go.  Currently she has an issue on her nose.  She has been seen by dermatology in the past and was not satisfied with these visits.  Have reached out to dermatology at the [facility] and recommend that she proceed with a visit with them.  Report that they will contact her for a visit.  Patient is agreeable to this plan.  No changes made in the meantime.    Patient has had iron deficiency for the last couple years as well.  She has been consistent now with taking her iron supplements 1-2 times per day for the last several months.  Her ferritin remains low.  She has no anemia present.  We discussed possible upper endoscopy and colonoscopy for further evaluation.  Also discussed consideration of consultation with hematology.  She was a little hesitant with this as she has many visits with providers coming up due to her eyes and other issues.  Will reach out first to hematology to see if a visit is appropriate.    Dry eyes have been an issue and she will be having a procedure coming up next week.  She will reach out to me as needed.    Fatigue that she has noticed may be related to iron deficiency but unclear.  Rest of laboratory evaluation recently was within normal limits.  Recommend that we follow through with discussion with hematology and then consider further work-up as needed.    She had a low blood sugar recently on labs in follow-up.  She is worried about being on the Metformin for the impaired fasting glucose.  She does have fatigue and will have her hold the Metformin for now.  We  "can recheck A1c and fasting blood glucose again in the future.    Patient agrees to these plans and will contact me for any further concerns.                   No follow-ups on file.    Liz Peterson MD  [facility] [place] is a 60 year old who presents for the following health issues     HPI     Multiple Concerns    Pt has been speaking with provider over mychart and did not want to recap her concerns.     Just got off the phone with the eye specialist. She is still waiting on some results. Has 2 of the 3 labs. No evidence of Sjogren's at this time. Still awaiting one further result. She is having a procedure next week to help. Will have plugs in place. This will be next Tuesday.     Skin issues. She has an open lesion on her nose. She feels it is shrinking. \"my skin is just so bad\". Has sores \"under the skin\" that are so painful. Feels that she has inflammation.     Has continued sores that come and go in her mouth.     Fatigue  Feels like things are \"falling inside\". She is worried about the low blood sugar    Low iron.  Patient has history of low iron that has been for the last few years.  She has noted low ferritin.  No anemia.  Been taking iron 1-2 times per day for the last 4 months plus.  She still remains with low ferritin.  She denies any nasal bleeding, rectal bleeding or vaginal bleeding.  Does report bruising on her arms at times.  She has complaints of headaches and fatigue she is wondering is related to this.            Review of Systems   CONSTITUTIONAL: NEGATIVE for fever, chills, change in weight  INTEGUMENTARY/SKIN: See above  ENT/MOUTH: No difficulty with swallowing.  She has some mouth sores as above.  No significant issues currently.  CV: Reports that her pulse feels high at times.  Not irregular.  Pulse generally 9200  NEURO: See above regarding headaches occasional dizziness  HEME/ALLERGY/IMMUNE: As above      Objective           Vitals:  No vitals " were obtained today due to virtual visit.    Physical Exam   alert and no distress  PSYCH: Alert and oriented times 3; coherent speech, normal   rate and volume, able to articulate logical thoughts, able   to abstract reason, no tangential thoughts, no hallucinations   or delusions  Her affect is normal and pleasant  RESP: No cough, no audible wheezing, able to talk in full sentences  Remainder of exam unable to be completed due to telephone visits                Phone call duration: 32 minutes

## 2021-02-10 NOTE — TELEPHONE ENCOUNTER
Flagging for provider review. Patient has an appointment today.     Dario Clinton RN, BSN  [place] [facility]  February 10, 2021

## 2021-02-11 ENCOUNTER — TELEPHONE (OUTPATIENT)
Dept: DERMATOLOGY | Facility: CLINIC | Age: 61
End: 2021-02-11

## 2021-02-11 NOTE — TELEPHONE ENCOUNTER
Pt called and scheduled for a phone visit with photos on 2/25 at 1:45pm.[employer] previsit message sent to pt. Pt notified of current photos needed 72 hours prior to appointment. Pt verbalized understanding...Liz Bauman RN, MD Farah, Ronda, MD; P [facility] [place]             She would be happy to have your team reach out to schedule.     Thanks!     Liz Peterson MD      Previous Messages    ----- Message -----   From: Natty Diez MD   Sent: 2/9/2021   7:17 PM CST   To: Liz Peterson MD, *     Sure. I can have [facility] schedule her in a live or virtual appt slot. I love tarting with virtual now to get the history.     Do you want to ask her If the team can reach out?   ----- Message -----   From: Liz Peterson MD   Sent: 2/8/2021   3:25 PM CST   To: Natty Diez MD     Hello,     I'm looking for a little help in direction with this patient.  This patient has been seeing me for a skin issue that I have been trying to help her with off and on for a couple years.     Currently she has an issue on her nose. Previously has been on her forehead and arms. She has these ulcer/skin breakdown areas that occur. There are pictures in media in her chart.     I have previously tried to refer and she has not been very willing. She has seen derm but not in our system and patient came back saying they just tell her to stop picking. She flatly denies doing any picking.     She has been on doxycycline in the past when I had concerns about potential infection with this and she feels that  improves things. I don't feel comfortable with just continuing that though I have done a couple courses when no evidence of infection.     I am wondering if it would be appropriate to see you or if there is a different specialty that would be helpful.     Thanks!   Liz Peterson MD   [facility]

## 2021-02-12 ENCOUNTER — OFFICE VISIT (OUTPATIENT)
Dept: ORTHOPEDICS | Facility: CLINIC | Age: 61
End: 2021-02-12
Payer: COMMERCIAL

## 2021-02-12 VITALS
WEIGHT: 216 LBS | SYSTOLIC BLOOD PRESSURE: 157 MMHG | HEIGHT: 66 IN | BODY MASS INDEX: 34.72 KG/M2 | DIASTOLIC BLOOD PRESSURE: 90 MMHG

## 2021-02-12 DIAGNOSIS — M25.551 CHRONIC HIP PAIN, BILATERAL: ICD-10-CM

## 2021-02-12 DIAGNOSIS — M70.62 TROCHANTERIC BURSITIS OF BOTH HIPS: ICD-10-CM

## 2021-02-12 DIAGNOSIS — M25.552 CHRONIC HIP PAIN, BILATERAL: ICD-10-CM

## 2021-02-12 DIAGNOSIS — M70.61 TROCHANTERIC BURSITIS OF BOTH HIPS: ICD-10-CM

## 2021-02-12 DIAGNOSIS — G89.29 CHRONIC HIP PAIN, BILATERAL: ICD-10-CM

## 2021-02-12 DIAGNOSIS — M18.0 PRIMARY OSTEOARTHRITIS OF BOTH FIRST CARPOMETACARPAL JOINTS: Primary | ICD-10-CM

## 2021-02-12 PROCEDURE — 20611 DRAIN/INJ JOINT/BURSA W/US: CPT | Mod: RT | Performed by: FAMILY MEDICINE

## 2021-02-12 PROCEDURE — 99213 OFFICE O/P EST LOW 20 MIN: CPT | Mod: 25 | Performed by: FAMILY MEDICINE

## 2021-02-12 PROCEDURE — 20604 DRAIN/INJ JOINT/BURSA W/US: CPT | Mod: RT | Performed by: FAMILY MEDICINE

## 2021-02-12 RX ORDER — BUPIVACAINE HYDROCHLORIDE 5 MG/ML
2 INJECTION, SOLUTION EPIDURAL; INTRACAUDAL
Status: DISCONTINUED | OUTPATIENT
Start: 2021-02-12 | End: 2021-04-27

## 2021-02-12 RX ORDER — TRIAMCINOLONE ACETONIDE 40 MG/ML
40 INJECTION, SUSPENSION INTRA-ARTICULAR; INTRAMUSCULAR
Status: DISCONTINUED | OUTPATIENT
Start: 2021-02-12 | End: 2021-04-27

## 2021-02-12 RX ORDER — ROPIVACAINE HYDROCHLORIDE 5 MG/ML
0.5 INJECTION, SOLUTION EPIDURAL; INFILTRATION; PERINEURAL
Status: DISCONTINUED | OUTPATIENT
Start: 2021-02-12 | End: 2021-04-27

## 2021-02-12 RX ORDER — LIDOCAINE HYDROCHLORIDE 10 MG/ML
2 INJECTION, SOLUTION INFILTRATION; PERINEURAL
Status: DISCONTINUED | OUTPATIENT
Start: 2021-02-12 | End: 2021-04-27

## 2021-02-12 RX ORDER — HYDROCODONE BITARTRATE AND ACETAMINOPHEN 5; 325 MG/1; MG/1
1 TABLET ORAL EVERY 6 HOURS PRN
Qty: 8 TABLET | Refills: 0 | Status: SHIPPED | OUTPATIENT
Start: 2021-02-12 | End: 2021-04-27

## 2021-02-12 RX ORDER — TRIAMCINOLONE ACETONIDE 40 MG/ML
20 INJECTION, SUSPENSION INTRA-ARTICULAR; INTRAMUSCULAR
Status: DISCONTINUED | OUTPATIENT
Start: 2021-02-12 | End: 2021-04-27

## 2021-02-12 RX ADMIN — TRIAMCINOLONE ACETONIDE 20 MG: 40 INJECTION, SUSPENSION INTRA-ARTICULAR; INTRAMUSCULAR at 14:00

## 2021-02-12 RX ADMIN — BUPIVACAINE HYDROCHLORIDE 2 ML: 5 INJECTION, SOLUTION EPIDURAL; INTRACAUDAL at 14:00

## 2021-02-12 RX ADMIN — TRIAMCINOLONE ACETONIDE 40 MG: 40 INJECTION, SUSPENSION INTRA-ARTICULAR; INTRAMUSCULAR at 14:00

## 2021-02-12 RX ADMIN — ROPIVACAINE HYDROCHLORIDE 0.5 ML: 5 INJECTION, SOLUTION EPIDURAL; INFILTRATION; PERINEURAL at 14:00

## 2021-02-12 RX ADMIN — LIDOCAINE HYDROCHLORIDE 2 ML: 10 INJECTION, SOLUTION INFILTRATION; PERINEURAL at 14:00

## 2021-02-12 ASSESSMENT — MIFFLIN-ST. JEOR: SCORE: 1570.49

## 2021-02-12 NOTE — PROGRESS NOTES
"Jacquie Amador  :  1960  DOS: 21  MRN: [id number]    Sports Medicine Clinic Visit    PCP: Liz Peterson    Jacuqie Amador is a 55 year old Right hand dominant female who is seen in follow up for her chronic bilateral hip and bilateral thumb CMC joint pain.      Interim History - 2018  Since last visit on 2018 patient has moderate-severe bilateral hip pain.  Bilateral hip trochanteric bursa injection completed on  provided good relief for ~ 2.5 - 3 months.  Patient is desiring repeat injections today.  No new injury in the interim.    She has continued to have pain in bilateral thumbs over last ~ 6 months.  Unable to repeat injections d/t her schedule, then developing secondary infection.  Recently discharged from Pain Mgmt clinic.    Interim History - 2020  Since last visit on 18 patient has moderate - severe bilateral lateral hip and bilateral thumb/CMC joint pain.  Bilateral hip trochanteric bursa injections completed on 18 provided good relief for ~ one year.  Patient notes that pain has been worsening over the past ~ 6+ months.  Bilateral thumb CMC joint steroid injections last completed 18 provided good relief for ~ 6 months.  Patient is now unable to grasp objects without significant pain and weakness.  No new injury in the interim.    Interim History - 2020  Since last visit on 2020 patient has moderate bilateral thumb, CMC joint pain.  Patient presents to bilateral injections as previously discussed.  No interim injury.       Second complaint of chronic right posterior lateral ankle pain and swelling over past 5+ years, significantly worse over the past ~ 3+ months.  Patient notes generalized right ankle weakness and feels like she is going to \"roll\" her ankle frequently.  Pain is worse with walking and lateral movements.  No current treatment completed at this time.  Previously treated with Dr Cárdenas with steroid injection " in 2015 that provided moderate relief.    Interim History - May 6, 2020  Since last visit on 3/4/2020 patient has moderate-severe radiating left lateral hip and buttocks pain over the past 3 - 4 months.  Patient does not recall completing Left hip bursa injection completed on 2/12/20.  Pain is worse with any pressure to her lateral anterior thigh and with lying on left side.  No new injury in the interim.    Second complaint of worsening left thumb CMC joint pain.  She would like to pursue repeat steroid injection as previously discussed on 3/4/20.    Interim History - February 12, 2021  Since last visit on 5/6/20 patient has moderate-severe bilateral thumb pain.  Right thumb CMC joint injection completed on 3/4/20 provided good relief for ~ 10 months, Left thumb CMC injection completed 5/6/20 provided good relief for ~ 8 months.  Pain is located over the base of both thumbs again.  No new injury in the interim.    Patient is also being seen in follow up with chronic bilateral lateral hip pain over the past ~ 2 - 3 months.  Left hip trochanteric bursa injection completed 5/6/20 provided good relief for ~ 6 months.      Review of Systems  Musculoskeletal: as above  Remainder of review of systems is negative including constitutional, CV, pulmonary, GI, Skin and Neurologic except as noted in HPI or medical history.    Past Medical History:   Diagnosis Date     ASTHMA - MODERATE PERSISTENT 9/21/2005     Cancer (H)      Chronic pain      Coronary artery disease      CVA (cerebral infarction)      Depressive disorder, not elsewhere classified      Diabetes (H)      Elevated serum alkaline phosphatase level     Liver source     Fibromyalgia      HYPERLIPIDEMIA NEC/NOS 12/29/2006     Hypertriglyceridemia      OA (osteoarthritis)      Thyroid disease      Tobacco use disorder      Tobacco use disorder complicating pregnancy, childbirth, or the puerperium, antepartum condition or complication      Trochanteric bursitis       "Unspecified essential hypertension      Past Surgical History:   Procedure Laterality Date     3 teeth pulled       C  DELIVERY ONLY      , Low Cervical     INJECT EPIDURAL TRANSFORAMINAL  2014    [facility]     INJECT JOINT SACROILIAC  2014    [facility]     LAMINECTOMY LUMBAR ONE LEVEL Left 2014    [facility]     Objective  BP (!) 157/90   Ht 1.683 m (5' 6.25\")   Wt 98 kg (216 lb)   LMP 2009 (Exact Date)   BMI 34.60 kg/m      GENERAL APPEARANCE: healthy, alert and no distress   GAIT: NORMAL  SKIN: no suspicious lesions or rashes  NEURO: Normal strength and tone, mentation intact and speech normal  PSYCH:  mentation appears normal and affect normal/bright      Bilateral Hand Exam  Inspection:Carpals: normal, Thumb: normal  Tender:  Thumb: CMC, R>L  Non-tender: Remainder of hand  Range of Motion Thumb: opposition Decreased R>L, otherwise relatively symmetric with some mild terminal ROM limitations b/l  Strength: mild decrease in thumb  strength bilaterally  Special tests: Positive scour of CMC, negative snuffbox tenderness   Skin:  well perfused  capillary refill brisk    Bilateral hip exam    Inspection:        no edema or ecchymosis in hip area    ROM:       Full active and passive ROM       Pain with active adduction over lateral hip and active ER, R>L    Strength:        flexion 5/5       extension 5/5       abduction 5/5       adduction 5/5    Tender:        greater trochanter R>L       SI joint mild    Non Tender:        remainder of hip area       illiac crest       ASIS       pubis    Sensation:        grossly intact in hip and thigh    Skin:       well perfused       capillary refill brisk    Special Tests:        neg (-) LORI       neg (-) FADIR       neg (-) scour       positive (+) Brooks      Large Joint Injection/Arthocentesis: R greater trochanteric bursa    Date/Time: 2021 2:00 PM  Performed by: " José Miguel Linder DO  Authorized by: José Miguel Linder DO     Indications:  Pain  Needle Size:  25 G  Guidance: ultrasound    Approach:  Posterolateral  Location:  Hip      Site:  R greater trochanteric bursa  Medications:  40 mg triamcinolone 40 MG/ML; 2 mL bupivacaine (PF) 0.5 %; 2 mL lidocaine 1 %  Outcome:  Tolerated well, no immediate complications  Procedure discussed: discussed risks, benefits, and alternatives    Consent Given by:  Patient  Timeout: timeout called immediately prior to procedure    Prep: patient was prepped and draped in usual sterile fashion    Small Joint Injection/Arthrocentesis: R thumb CMC    Date/Time: 2/12/2021 2:00 PM  Performed by: José Miguel Linder DO  Authorized by: José Miguel Linder DO   Indications:  Pain  Needle Size:  25 G  Guidance: ultrasound     Approach:  Radial  Location:  Thumb    Site:  R thumb CMC                    Medications:  0.5 mL ropivacaine 5 MG/ML; 20 mg triamcinolone 40 MG/ML        Outcome:  Tolerated well, no immediate complications  Procedure discussed: discussed risks, benefits, and alternatives    Consent Given by:  Patient  Timeout: timeout called immediately prior to procedure    Prep: patient was prepped and draped in usual sterile fashion          Radiology:  Prior XR images independently visualized and reviewed with patient today in clinic  XR PELVIS AND HIP BILATERAL 2 VIEWS 3/27/2017 10:49 AM      HISTORY: Pain in right hip, Pain in left hip                                                                      IMPRESSION: Negative exam.    FINGER LEFT TWO OR MORE VIEWS 5/13/2014 11:29 AM   HISTORY: Chronic pain.  COMPARISON: None.   FINDINGS: No fracture or malalignment is identified. There is near  complete joint space loss of the first carpal metacarpal joint with  adjacent subchondral cyst formation in the base of the first  metacarpal. Small osteophytes are also noted. Mild degenerative  changes of the first  metacarpal phalangeal joint also noted.  IMPRESSION  IMPRESSION: Degenerative changes of the first carpal metacarpal and of  the first metacarpal phalangeal joints. No fracture.    HAND BILATERAL THREE OR MORE VIEWS September 26, 2017 10:32 AM      HISTORY: Bilateral primary osteoarthritis of first carpometacarpal  joints.     COMPARISON: None.                                                                      IMPRESSION: There are mild degenerative changes of both first  carpometacarpal joints. The other joint spaces of the hands and wrists  are preserved. No evidence of fracture or osseous lesion.    Assessment:  1. Primary osteoarthritis of both first carpometacarpal joints    2. Trochanteric bursitis of both hips    3. Chronic hip pain, bilateral        Plan:  Discussed the assessment with the patient.  Follow up: prn, planning for left 1st CMC and left troch bursa next visit  Right-sided issues targeted today given more severe pain on the right  Reviewed prior hand and hip films reviewed again today  Repeat right 1st CMC injection today, as well as right trochanteric bursa CSI, both US guided  Water therapy and low impact activity options reviewed again  Reviewed again number of steroid injection over time and importance of limiting overall number as much as possible  Expectations and goals of CSI reviewed, risks reviewed including during current viral pandemic  Often 2-3 days for steroid effect, and can take up to two weeks for maximum effect  We discussed modified progressive pain-free activity as tolerated  Do not overuse in first two weeks if feeling better due to concern for vulnerability while steroid is working  Supportive care reviewed  All questions were answered today  Contact us with additional questions or concerns  Signs and sx of concern reviewed      José Miguel Linder DO, RADHA  Primary Care Sports Medicine  [facility]         Disclaimer: This note consists of symbols derived  from keyboarding, dictation and/or voice recognition software. As a result, there may be errors in the script that have gone undetected. Please consider this when interpreting information found in this chart.

## 2021-02-12 NOTE — LETTER
2021         RE: Jacquie Amador  [id number] [facility]  [place] [address]        Dear Colleague,    Thank you for referring your patient, Jacquie Amador, to the [facility]. Please see a copy of my visit note below.    Jacquie Amador  :  1960  DOS: 21  MRN: [id number]    Sports Medicine Clinic Visit    PCP: Liz Peterson    Jacquie Amador is a 55 year old Right hand dominant female who is seen in follow up for her chronic bilateral hip and bilateral thumb CMC joint pain.      Interim History - 2018  Since last visit on 2018 patient has moderate-severe bilateral hip pain.  Bilateral hip trochanteric bursa injection completed on  provided good relief for ~ 2.5 - 3 months.  Patient is desiring repeat injections today.  No new injury in the interim.    She has continued to have pain in bilateral thumbs over last ~ 6 months.  Unable to repeat injections d/t her schedule, then developing secondary infection.  Recently discharged from Pain Mgmt clinic.    Interim History - 2020  Since last visit on 18 patient has moderate - severe bilateral lateral hip and bilateral thumb/CMC joint pain.  Bilateral hip trochanteric bursa injections completed on 18 provided good relief for ~ one year.  Patient notes that pain has been worsening over the past ~ 6+ months.  Bilateral thumb CMC joint steroid injections last completed 18 provided good relief for ~ 6 months.  Patient is now unable to grasp objects without significant pain and weakness.  No new injury in the interim.    Interim History - 2020  Since last visit on 2020 patient has moderate bilateral thumb, CMC joint pain.  Patient presents to bilateral injections as previously discussed.  No interim injury.       Second complaint of chronic right posterior lateral ankle pain and swelling over past 5+ years, significantly worse over the past ~ 3+ months.   "Patient notes generalized right ankle weakness and feels like she is going to \"roll\" her ankle frequently.  Pain is worse with walking and lateral movements.  No current treatment completed at this time.  Previously treated with Dr Cárdenas with steroid injection in 2015 that provided moderate relief.    Interim History - May 6, 2020  Since last visit on 3/4/2020 patient has moderate-severe radiating left lateral hip and buttocks pain over the past 3 - 4 months.  Patient does not recall completing Left hip bursa injection completed on 2/12/20.  Pain is worse with any pressure to her lateral anterior thigh and with lying on left side.  No new injury in the interim.    Second complaint of worsening left thumb CMC joint pain.  She would like to pursue repeat steroid injection as previously discussed on 3/4/20.    Interim History - February 12, 2021  Since last visit on 5/6/20 patient has moderate-severe bilateral thumb pain.  Right thumb CMC joint injection completed on 3/4/20 provided good relief for ~ 10 months, Left thumb CMC injection completed 5/6/20 provided good relief for ~ 8 months.  Pain is located over the base of both thumbs again.  No new injury in the interim.    Patient is also being seen in follow up with chronic bilateral lateral hip pain over the past ~ 2 - 3 months.  Left hip trochanteric bursa injection completed 5/6/20 provided good relief for ~ 6 months.      Review of Systems  Musculoskeletal: as above  Remainder of review of systems is negative including constitutional, CV, pulmonary, GI, Skin and Neurologic except as noted in HPI or medical history.    Past Medical History:   Diagnosis Date     ASTHMA - MODERATE PERSISTENT 9/21/2005     Cancer (H)      Chronic pain      Coronary artery disease      CVA (cerebral infarction)      Depressive disorder, not elsewhere classified      Diabetes (H)      Elevated serum alkaline phosphatase level     Liver source     Fibromyalgia      HYPERLIPIDEMIA " "NEC/NOS 2006     Hypertriglyceridemia      OA (osteoarthritis)      Thyroid disease      Tobacco use disorder      Tobacco use disorder complicating pregnancy, childbirth, or the puerperium, antepartum condition or complication      Trochanteric bursitis      Unspecified essential hypertension      Past Surgical History:   Procedure Laterality Date     3 teeth pulled       C  DELIVERY ONLY      , Low Cervical     INJECT EPIDURAL TRANSFORAMINAL  2014    [facility]     INJECT JOINT SACROILIAC  2014    [facility]     LAMINECTOMY LUMBAR ONE LEVEL Left 2014    [facility]     Objective  BP (!) 157/90   Ht 1.683 m (5' 6.25\")   Wt 98 kg (216 lb)   LMP 2009 (Exact Date)   BMI 34.60 kg/m      GENERAL APPEARANCE: healthy, alert and no distress   GAIT: NORMAL  SKIN: no suspicious lesions or rashes  NEURO: Normal strength and tone, mentation intact and speech normal  PSYCH:  mentation appears normal and affect normal/bright      Bilateral Hand Exam  Inspection:Carpals: normal, Thumb: normal  Tender:  Thumb: CMC, R>L  Non-tender: Remainder of hand  Range of Motion Thumb: opposition Decreased R>L, otherwise relatively symmetric with some mild terminal ROM limitations b/l  Strength: mild decrease in thumb  strength bilaterally  Special tests: Positive scour of CMC, negative snuffbox tenderness   Skin:  well perfused  capillary refill brisk    Bilateral hip exam    Inspection:        no edema or ecchymosis in hip area    ROM:       Full active and passive ROM       Pain with active adduction over lateral hip and active ER, R>L    Strength:        flexion 5/5       extension 5/5       abduction 5/5       adduction 5/5    Tender:        greater trochanter R>L       SI joint mild    Non Tender:        remainder of hip area       illiac crest       ASIS       pubis    Sensation:        grossly intact in hip and thigh    Skin:       " well perfused       capillary refill brisk    Special Tests:        neg (-) LORI       neg (-) FADIR       neg (-) scour       positive (+) Brooks      Procedures  Radiology:  Prior XR images independently visualized and reviewed with patient today in clinic  XR PELVIS AND HIP BILATERAL 2 VIEWS 3/27/2017 10:49 AM      HISTORY: Pain in right hip, Pain in left hip                                                                      IMPRESSION: Negative exam.    FINGER LEFT TWO OR MORE VIEWS 5/13/2014 11:29 AM   HISTORY: Chronic pain.  COMPARISON: None.   FINDINGS: No fracture or malalignment is identified. There is near  complete joint space loss of the first carpal metacarpal joint with  adjacent subchondral cyst formation in the base of the first  metacarpal. Small osteophytes are also noted. Mild degenerative  changes of the first metacarpal phalangeal joint also noted.  IMPRESSION  IMPRESSION: Degenerative changes of the first carpal metacarpal and of  the first metacarpal phalangeal joints. No fracture.    HAND BILATERAL THREE OR MORE VIEWS September 26, 2017 10:32 AM      HISTORY: Bilateral primary osteoarthritis of first carpometacarpal  joints.     COMPARISON: None.                                                                      IMPRESSION: There are mild degenerative changes of both first  carpometacarpal joints. The other joint spaces of the hands and wrists  are preserved. No evidence of fracture or osseous lesion.    Assessment:  1. Primary osteoarthritis of both first carpometacarpal joints    2. Trochanteric bursitis of both hips    3. Chronic hip pain, bilateral        Plan:  Discussed the assessment with the patient.  Follow up: prn, planning for left 1st CMC and left troch bursa next visit  Right-sided issues targeted today given more severe pain on the right  Reviewed prior hand and hip films reviewed again today  Repeat right 1st CMC injection today, as well as right trochanteric bursa CSI, both  US guided  Water therapy and low impact activity options reviewed again  Reviewed again number of steroid injection over time and importance of limiting overall number as much as possible  Expectations and goals of CSI reviewed, risks reviewed including during current viral pandemic  Often 2-3 days for steroid effect, and can take up to two weeks for maximum effect  We discussed modified progressive pain-free activity as tolerated  Do not overuse in first two weeks if feeling better due to concern for vulnerability while steroid is working  Supportive care reviewed  All questions were answered today  Contact us with additional questions or concerns  Signs and sx of concern reviewed      José Miguel Linder DO, RADHA  Primary Care Sports Medicine  [facility]         Disclaimer: This note consists of symbols derived from keyboarding, dictation and/or voice recognition software. As a result, there may be errors in the script that have gone undetected. Please consider this when interpreting information found in this chart.        Again, thank you for allowing me to participate in the care of your patient.        Sincerely,        José Miguel Linder DO

## 2021-02-15 ENCOUNTER — TELEPHONE (OUTPATIENT)
Dept: FAMILY MEDICINE | Facility: CLINIC | Age: 61
End: 2021-02-15

## 2021-02-15 ENCOUNTER — MYC MEDICAL ADVICE (OUTPATIENT)
Dept: FAMILY MEDICINE | Facility: CLINIC | Age: 61
End: 2021-02-15

## 2021-02-15 DIAGNOSIS — E61.1 IRON DEFICIENCY: Primary | ICD-10-CM

## 2021-02-15 NOTE — TELEPHONE ENCOUNTER
Patient was called to schedule appointment for consult in [place] for iron deficiency. Patient was told she most likely will need infusion. Jacquie is leaving in about two weeks to [place] and is requesting consultation this week in hopes of getting infusion fairly soon after the initial consult. Message was routed to team for consideration of this overbook request.

## 2021-02-16 ENCOUNTER — PRE VISIT (OUTPATIENT)
Dept: ONCOLOGY | Facility: CLINIC | Age: 61
End: 2021-02-16

## 2021-02-16 ENCOUNTER — MYC MEDICAL ADVICE (OUTPATIENT)
Dept: FAMILY MEDICINE | Facility: CLINIC | Age: 61
End: 2021-02-16

## 2021-02-17 ENCOUNTER — MYC MEDICAL ADVICE (OUTPATIENT)
Dept: FAMILY MEDICINE | Facility: CLINIC | Age: 61
End: 2021-02-17

## 2021-02-17 DIAGNOSIS — B37.0 THRUSH: Primary | ICD-10-CM

## 2021-02-17 RX ORDER — CLOTRIMAZOLE 10 MG/1
10 LOZENGE ORAL 4 TIMES DAILY
Qty: 40 TROCHE | Refills: 1 | Status: SHIPPED | OUTPATIENT
Start: 2021-02-17 | End: 2022-04-06

## 2021-02-17 NOTE — TELEPHONE ENCOUNTER
RECORDS STATUS - ALL OTHER DIAGNOSIS      RECORDS RECEIVED FROM: [facility]   DATE RECEIVED: 4/8/2021   NOTES STATUS DETAILS   OFFICE NOTE from referring provider Liz Barillas MD   OFFICE NOTE from medical oncologist N/A [facility] 2/10/2021 Virtual Visit with Dr. Peterson    DISCHARGE SUMMARY from hospital N/A    DISCHARGE REPORT from the ER     OPERATIVE REPORT N/A    MEDICATION LIST Complete [facility]   CLINICAL TRIAL TREATMENTS TO DATE     LABS     PATHOLOGY REPORTS     ANYTHING RELATED TO DIAGNOSIS Complete [facility] last updated on 1/27/2021   GENONOMIC TESTING     TYPE:     IMAGING (NEED IMAGES & REPORT)     CT SCANS     MRI     MAMMO     ULTRASOUND     PET

## 2021-02-18 ENCOUNTER — VIRTUAL VISIT (OUTPATIENT)
Dept: ONCOLOGY | Facility: CLINIC | Age: 61
End: 2021-02-18
Attending: FAMILY MEDICINE
Payer: COMMERCIAL

## 2021-02-18 DIAGNOSIS — E61.1 IRON DEFICIENCY: ICD-10-CM

## 2021-02-18 DIAGNOSIS — R58 ECCHYMOSIS: ICD-10-CM

## 2021-02-18 DIAGNOSIS — R23.3 EASY BRUISABILITY: Primary | ICD-10-CM

## 2021-02-18 PROCEDURE — 99205 OFFICE O/P NEW HI 60 MIN: CPT | Mod: 95 | Performed by: INTERNAL MEDICINE

## 2021-02-18 RX ORDER — HEPARIN SODIUM (PORCINE) LOCK FLUSH IV SOLN 100 UNIT/ML 100 UNIT/ML
5 SOLUTION INTRAVENOUS
Status: CANCELLED | OUTPATIENT
Start: 2021-02-19

## 2021-02-18 RX ORDER — HEPARIN SODIUM,PORCINE 10 UNIT/ML
5 VIAL (ML) INTRAVENOUS
Status: CANCELLED | OUTPATIENT
Start: 2021-02-19

## 2021-02-18 ASSESSMENT — PAIN SCALES - GENERAL: PAINLEVEL: NO PAIN (0)

## 2021-02-18 NOTE — PROGRESS NOTES
[facility] CANCER CLINIC    NEW PATIENT VISIT NOTE    PATIENT NAME: Jacquie Amador MRN # [id number]  DATE OF VISIT: February 18, 2021 YOB: 1960    REFERRING PROVIDER: Liz Peterson MD  [id number] [id number]    CANCER TYPE:  STAGE:      TREATMENT SUMMARY:    CURRENT INTERVENTIONS:     HISTORY OF PRESENT ILLNESS       Jacquie Patient was reached over video for this telemedicine visit due to restrictions posed by COVID 19. She has not been feeling good for aover a year. She has a lot of issues with heart rate, dizziness. She has major issues with her skin. She bruises every easy. If she even taps her skin she gets blood blisters and they raise. She does not heal very well. Her glucose is on very low end. She has a rapid heart beat for which she has been started on medication.      She extreme tired/fatigue, anxiety. She has fair amount of pain from fibromyalgia. She has pain in her joints due to loss of cartilage - hips. She has no energy to do anything. She has trouble sleeping. She has discontinued metformin and is not sure if is related. She bleeds a lot whenever she bleeds. She has ulcers on her face and her mouth.     She is light headed / dizzy a lot. Her mother had myelodysplastic syndrome - MDS. Her nephew has mast cell disease and hyper eosinophilia. She has been tested for Sjogrens and celiac disease. She has dry mouth and dry eyes which is very painful. She has steroids eye drops and will have things put in her eye ducts to increase tears. She would get red eye where every vein would be seen.     She always has low ferritin. She gets every single side effect listed from medication.     She has osteoarthritis in multiple joints. She gets injections in both her hips and both thumbs once a year. She has depression and anxiety.     She denies ever having a stroke. She had been metformin for couple of years but she is off now.     She denies losing  weight - being home all the time.     Her skin is very thin and very easily rips. She has blood blisters that sit forever. She has noticed bruising for last couple of years. She denies ASA, plavix. She has been started on vitamin E. She had been on oral steroids for long time. She would take it for chronic bronchitis. She has quit smoking about 10 yrs ago. Since then her coughing, shortness of breath and bronchitis have been a lot less. She takes cymbalta and buproprion.     She had regular periods. She denies any heavy periods. She denies any blood in urine. She denies any epistaxis and denies any blood in stools      PAST MEDICAL HISTORY     Past Medical History:   Diagnosis Date     ASTHMA - MODERATE PERSISTENT 9/21/2005     Cancer (H)      Chronic pain      Coronary artery disease      CVA (cerebral infarction)      Depressive disorder, not elsewhere classified      Diabetes (H)      Elevated serum alkaline phosphatase level     Liver source     Fibromyalgia      HYPERLIPIDEMIA NEC/NOS 12/29/2006     Hypertriglyceridemia      OA (osteoarthritis)      Thyroid disease      Tobacco use disorder      Tobacco use disorder complicating pregnancy, childbirth, or the puerperium, antepartum condition or complication      Trochanteric bursitis      Unspecified essential hypertension           CURRENT OUTPATIENT MEDICATIONS     Current Outpatient Medications   Medication Sig     adapalene (DIFFERIN) 0.1 % gel APPLY A THIN LAYER TO THE AFFECTED AREA(S) BY TOPICAL ROUTE ONCE DAILY BEFORE BEDTIME     albuterol (PROVENTIL) (2.5 MG/3ML) 0.083% neb solution Take 1 vial (2.5 mg) by nebulization every 6 hours as needed for shortness of breath / dyspnea or wheezing     albuterol (VENTOLIN HFA) 108 (90 Base) MCG/ACT inhaler INHALE 2 PUFFS INTO THE LUNGS EVERY 6 HOURS AS NEEDED FOR SHORTNESS OF BREATH / DYSPNEA     ALPRAZolam (XANAX) 0.25 MG tablet Take 1 tablet (0.25 mg) by mouth daily as needed for anxiety     aluminum chloride  (DRYSOL) 20 % external solution Apply topically At Bedtime     atorvastatin (LIPITOR) 20 MG tablet Take 1 tablet (20 mg) by mouth daily     buPROPion (WELLBUTRIN SR) 100 MG 12 hr tablet 100 mg each afternoon     buPROPion (WELLBUTRIN SR) 200 MG 12 hr tablet Take 1 tablet (200 mg) by mouth daily     celecoxib (CELEBREX) 200 MG capsule Take 1 capsule twice daily as needed for pain this is a 3 month script     chlorhexidine (PERIDEX) 0.12 % solution Swish and spit 15 mLs in mouth 2 times daily     clindamycin (CLINDAMAX) 1 % external gel Apply topically 2 times daily     clobetasol (TEMOVATE) 0.05 % external cream APPLY SPARINGLY TO AFFECTED AREA TWICE DAILY FOR 14 DAYS.  DO NOT APPLY TO FACE.     clotrimazole (MYCELEX) 10 MG lozenge Place 1 lozenge (10 mg) inside cheek 4 times daily     diclofenac (VOLTAREN) 1 % topical gel Apply 2-4 g topically 4 times daily     doxycycline hyclate (VIBRA-TABS) 100 MG tablet Take 1 tablet (100 mg) by mouth 2 times daily     DULoxetine (CYMBALTA) 60 MG capsule TAKE TWO CAPSULES BY MOUTH DAILY     ferrous sulfate (FE TABS) 325 (65 Fe) MG EC tablet Take 1 tablet (325 mg) by mouth daily     HYDROcodone-acetaminophen (NORCO) 5-325 MG tablet Take 1 tablet by mouth every 6 hours as needed for severe pain (use as little as posisble, do not drive while taking this medication)     HYDROcodone-acetaminophen (NORCO) 5-325 MG tablet TAKE ONE TABLET BY MOUTH EVERY FOUR HOURS AS NEEDED     hydrocortisone 2.5 % cream APPLY TOPICALLY 2 TIMES DAILY AS NEEDED     Lactobacillus (ACIDOPHILUS) CAPS Take 1 capsule by mouth daily Take two hours before or after antibiotic dose.  Continue for 1 week after antibiotic therapy completed     losartan-hydrochlorothiazide (HYZAAR) 50-12.5 MG tablet Take 1 tablet by mouth daily     medical cannabis (Patient's own supply.  Not a prescription) 2 mg morning and noon. 7 mg at bedtime. (This is NOT a prescription, and does not certify that the patient has a qualifying  medical condition for medical cannabis.  The purpose of this order is  to document that the patient reports taking medical cannabis.)     metoprolol succinate ER (TOPROL-XL) 25 MG 24 hr tablet Take 1 tablet (25 mg) by mouth every evening     metoprolol succinate ER (TOPROL-XL) 50 MG 24 hr tablet Take 1 tablet (50 mg) by mouth daily     metoprolol succinate ER (TOPROL-XL) 50 MG 24 hr tablet Take 1 tablet (50 mg) by mouth every morning     mometasone-formoterol (DULERA) 200-5 MCG/ACT inhaler Inhale 2 puffs into the lungs 2 times daily     montelukast (SINGULAIR) 10 MG tablet Take 1 tablet (10 mg) by mouth At Bedtime     multivitamin w/minerals (MULTI-VITAMIN) tablet Take 1 tablet by mouth daily     mupirocin (BACTROBAN) 2 % external cream Apply to affected area three times daily     mupirocin (BACTROBAN) 2 % external ointment      nystatin (MYCOSTATIN) 016712 UNIT/GM external cream Apply topically daily as needed for other (rash skin folds)     rizatriptan (MAXALT) 10 MG tablet Take 1 tablet (10 mg) by mouth at onset of headache for migraine May repeat in 2 hours. Max 3 tablets/24 hours.     rOPINIRole (REQUIP) 1 MG tablet Take 3 tablets (3 mg) by mouth At Bedtime     SUMAtriptan (IMITREX) 100 MG tablet take 1 tablet at onset of headache may repeat in 2 hours max 2 tabs/day     traZODone (DESYREL) 50 MG tablet Take 3 tablets (150 mg) by mouth At Bedtime     triamcinolone (ARISTOCORT HP) 0.5 % external cream Apply topically 2 times daily     triamcinolone (KENALOG) 0.1 % paste Take by mouth 2 times daily     ZYRTEC 10 MG OR TABS 1 TABLET DAILY     nystatin (MYCOSTATIN) 460853 UNIT/ML suspension Take by mouth 4 times daily for 14 days Has recurrent thrush. Uses for 2 weeks at a time as needed.     nystatin (MYCOSTATIN) 624630 UNIT/ML suspension Take 5 mLs (500,000 Units) by mouth 4 times daily for 14 days     Current Facility-Administered Medications   Medication     2 mL bupivacaine (MARCAINE) preservative free  injection 0.5% (20 mL vial)     lidocaine 1 % injection 2 mL     ropivacaine (NAROPIN) injection 0.5 mL     triamcinolone (KENALOG-40) injection 20 mg     triamcinolone (KENALOG-40) injection 40 mg        ALLERGIES      Allergies   Allergen Reactions     Cats      and rabbits/wheezing     Dogs      wheezing     Lyrica [Pregabalin] Other (See Comments)     Rash, mouth sores, itching and burning     Seasonal Allergies      rhinits        SOCIAL HISTORY   She is  and lives with her . She raised her kids and his .     She quit smoking 10 yrs ago. A pack would last her 3 days. She smoked about 30 yrs. She does not drink alcohol. She was using medical marijuana but has quit. She denies any recreational drug use.     FAMILY HISTORY   Mother had MDS.   Mother had colon cancer twice and father  of renal cell cancer.      REVIEW OF SYSTEMS   As above in the HPI, o/w complete 12-point ROS was negative.     PHYSICAL EXAM   B/P: Data Unavailable, T: Data Unavailable, P: Data Unavailable, R: Data Unavailable  @[email or web address](4)@  Wt Readings from Last 3 Encounters:   21 98 kg (216 lb)   20 90 kg (198 lb 8 oz)   18 89.1 kg (196 lb 6.4 oz)     Limited physical exam during video visit due to COVID19 restrictions  Middle aged female in no acute distress  Breathing comfortably, no tachypnea  Speech and hearing normal  Pleasant mood and congruent affect  Moving all extremities, no focal neurologic deficits apparent       LABORATORY AND IMAGING STUDIES     Recent Labs   Lab Test 21  1530 20  0955 20  1411 19  1354 18  1157    140 139 139 140   POTASSIUM 3.6 3.2* 3.6 3.8 3.8   CHLORIDE 107 106 108 106 107   CO2 28 28 25 23 27   ANIONGAP 8 6 6 10 6   BUN 16 12 14 12 11   CR 1.04 0.99 0.90 0.94 0.82   GLC 67* 103* 87 95 76   JAIRO 9.3 9.3 9.3 9.2 8.6     Recent Labs   Lab Test 21  1530 20  0955 20  1411 19  1354 18  1157 18  1536  06/30/17  1720 11/14/14  1458   WBC 8.2 6.0 6.6 7.7 6.5 6.2 9.8 6.9   HGB 13.1 12.4 12.6 14.0 12.0 13.5 13.6 12.5    375 329 372 390 365 320 280   MCV 91 90 86 89 87 89 90 88   NEUTROPHIL  --   --  53.2  --  50.7 53.8 64.6 49.1    < > = values in this interval not displayed.     Recent Labs   Lab Test 01/20/21  1530 09/22/20  0955 02/04/20  1411   BILITOTAL 0.2 0.5 0.3   ALKPHOS 132 108 129   ALT 26 22 23   AST 16 12 18   ALBUMIN 3.8 3.7 3.9     TSH   Date Value Ref Range Status   09/30/2020 1.36 0.40 - 4.00 mU/L Final   02/04/2020 1.27 0.40 - 4.00 mU/L Final   07/19/2019 0.76 0.40 - 4.00 mU/L Final     Recent Labs   Lab Test 01/20/21  1530 09/22/20  0955 02/04/20  1411 07/19/19  1354 12/04/18  1157   HGB 13.1 12.4 12.6 14.0 12.0     Recent Labs   Lab Test 01/20/21  1530 11/11/20  1758 09/22/20  0955 02/04/20  1411 12/04/18  1157 02/24/17  1430 06/09/16  1031 11/14/14  1458 09/09/14  1146   ALEISHA 11 16 12 12 8 21 19 28 13   IRON 50  --   --  63  --  71 78 74 35     --   --  388  --  363 357  --  432*   IRONSAT 14*  --   --  16  --  20 22  --  8*         ASSESSMENT    1. Persistent iron deficiency without anemia  2. Marked fatigue  3. Easy bruisability, ecchymoses and bleeding    DISCUSSION   I had a lengthy discussion with Jacquie who was joined by her  at this video visit. She has marked fatigue for the last couple of years. She is perplexed why she is so iron deficient despite taking oral iron supplements. I explained her that iron is tightly regulated in our body. Its intake is fairly limited. It can be affected by use of antacids in medications, malabsorption and dietary deficiency. More commonly in [place] iron deficiency is as a result of blood loss. The only place to loose a large amount of blood enough to cause iron deficiency and not realize is the gut. This can be due to vascular malformations or colon cancer. She has not had a colonoscopy as yet. She has been checking the FIT test  - fecal stool test. I would recommend that we get EGD and colonoscopy for her.     She has marked fatigue which she attributes to her iron deficiency. She has felt better with iron in the past and would like parenteral iron transfusions. I would try to arrange for iron transfusions.     She has a range of other symptoms. Her next primary complain is her dry skin, easy bruisability, ecchymoses and bleeds. Dry skin is more likely to have problems with bruises and bleeds. I have encouraged her use moisturizing lotions and creams like the Eucerin cream. I would get work up done for her bleeding concerns but doubt that she has primary hematologic problem. She always had normal menstrual periods. She never had bleeding with procedures or surgeries. She is not on anticoagulants or anti-platelet drugs like ASA / Plavix. She is not currently on steroids but has received this in the past for her bronchitis. She remains on selective serotonin reuptake inhibitor that can affect the platelet function.      PLAN   - I will refer her to GI for EGD and colonoscopy  - I will try to see if we can get iron approved for her iron deficiency without anemia  - I will work up for her bleeding concerns  - Recommend moisturizing topical cream / lotion for dry skin. Consider formal dermatology evaluation  - ASA, Plavix, NSAIDS, selective serotonin reuptake inhibitor, corticosteroids, fish oil, vitamin E have anti-platelet function    Follow up in 4-6 months in hematology clinic.       Video-Visit Details    Type of service:  Video Visit  Originating Location (pt. Location): Home  Distant Location (provider location): [facility] [employer]  Platform used for Video Visit: Geogoer    60 minutes spent on the date of the encounter doing chart review, history and exam, documentation and further activities as noted above      Otf Douglas    Hematologist and Medical Oncologist  [facility]

## 2021-02-18 NOTE — LETTER
2/18/2021         RE: Jacquie Amador  [address] [facility]  [place] [address]        Dear Colleague,    Thank you for referring your patient, Jacquie Amador, to the [facility] CANCER [facility]. Please see a copy of my visit note below.    [facility]    NEW PATIENT VISIT NOTE    PATIENT NAME: Jacquie Amador MRN # [id number]  DATE OF VISIT: February 18, 2021 YOB: 1960    REFERRING PROVIDER: Liz Peterson MD  [address] [facility]  [place] [address]    CANCER TYPE:  STAGE:      TREATMENT SUMMARY:    CURRENT INTERVENTIONS:     HISTORY OF PRESENT ILLNESS       Jacquie Patient was reached over video for this telemedicine visit due to restrictions posed by COVID 19. She has not been feeling good for aover a year. She has a lot of issues with heart rate, dizziness. She has major issues with her skin. She bruises every easy. If she even taps her skin she gets blood blisters and they raise. She does not heal very well. Her glucose is on very low end. She has a rapid heart beat for which she has been started on medication.      She extreme tired/fatigue, anxiety. She has fair amount of pain from fibromyalgia. She has pain in her joints due to loss of cartilage - hips. She has no energy to do anything. She has trouble sleeping. She has discontinued metformin and is not sure if is related. She bleeds a lot whenever she bleeds. She has ulcers on her face and her mouth.     She is light headed / dizzy a lot. Her mother had myelodysplastic syndrome - MDS. Her nephew has mast cell disease and hyper eosinophilia. She has been tested for Sjogrens and celiac disease. She has dry mouth and dry eyes which is very painful. She has steroids eye drops and will have things put in her eye ducts to increase tears. She would get red eye where every vein would be seen.     She always has low ferritin. She gets every single side effect listed from medication.     She  has osteoarthritis in multiple joints. She gets injections in both her hips and both thumbs once a year. She has depression and anxiety.     She denies ever having a stroke. She had been metformin for couple of years but she is off now.     She denies losing weight - being home all the time.     Her skin is very thin and very easily rips. She has blood blisters that sit forever. She has noticed bruising for last couple of years. She denies ASA, plavix. She has been started on vitamin E. She had been on oral steroids for long time. She would take it for chronic bronchitis. She has quit smoking about 10 yrs ago. Since then her coughing, shortness of breath and bronchitis have been a lot less. She takes cymbalta and buproprion.     She had regular periods. She denies any heavy periods. She denies any blood in urine. She denies any epistaxis and denies any blood in stools      PAST MEDICAL HISTORY     Past Medical History:   Diagnosis Date     ASTHMA - MODERATE PERSISTENT 9/21/2005     Cancer (H)      Chronic pain      Coronary artery disease      CVA (cerebral infarction)      Depressive disorder, not elsewhere classified      Diabetes (H)      Elevated serum alkaline phosphatase level     Liver source     Fibromyalgia      HYPERLIPIDEMIA NEC/NOS 12/29/2006     Hypertriglyceridemia      OA (osteoarthritis)      Thyroid disease      Tobacco use disorder      Tobacco use disorder complicating pregnancy, childbirth, or the puerperium, antepartum condition or complication      Trochanteric bursitis      Unspecified essential hypertension           CURRENT OUTPATIENT MEDICATIONS     Current Outpatient Medications   Medication Sig     adapalene (DIFFERIN) 0.1 % gel APPLY A THIN LAYER TO THE AFFECTED AREA(S) BY TOPICAL ROUTE ONCE DAILY BEFORE BEDTIME     albuterol (PROVENTIL) (2.5 MG/3ML) 0.083% neb solution Take 1 vial (2.5 mg) by nebulization every 6 hours as needed for shortness of breath / dyspnea or wheezing      albuterol (VENTOLIN HFA) 108 (90 Base) MCG/ACT inhaler INHALE 2 PUFFS INTO THE LUNGS EVERY 6 HOURS AS NEEDED FOR SHORTNESS OF BREATH / DYSPNEA     ALPRAZolam (XANAX) 0.25 MG tablet Take 1 tablet (0.25 mg) by mouth daily as needed for anxiety     aluminum chloride (DRYSOL) 20 % external solution Apply topically At Bedtime     atorvastatin (LIPITOR) 20 MG tablet Take 1 tablet (20 mg) by mouth daily     buPROPion (WELLBUTRIN SR) 100 MG 12 hr tablet 100 mg each afternoon     buPROPion (WELLBUTRIN SR) 200 MG 12 hr tablet Take 1 tablet (200 mg) by mouth daily     celecoxib (CELEBREX) 200 MG capsule Take 1 capsule twice daily as needed for pain this is a 3 month script     chlorhexidine (PERIDEX) 0.12 % solution Swish and spit 15 mLs in mouth 2 times daily     clindamycin (CLINDAMAX) 1 % external gel Apply topically 2 times daily     clobetasol (TEMOVATE) 0.05 % external cream APPLY SPARINGLY TO AFFECTED AREA TWICE DAILY FOR 14 DAYS.  DO NOT APPLY TO FACE.     clotrimazole (MYCELEX) 10 MG lozenge Place 1 lozenge (10 mg) inside cheek 4 times daily     diclofenac (VOLTAREN) 1 % topical gel Apply 2-4 g topically 4 times daily     doxycycline hyclate (VIBRA-TABS) 100 MG tablet Take 1 tablet (100 mg) by mouth 2 times daily     DULoxetine (CYMBALTA) 60 MG capsule TAKE TWO CAPSULES BY MOUTH DAILY     ferrous sulfate (FE TABS) 325 (65 Fe) MG EC tablet Take 1 tablet (325 mg) by mouth daily     HYDROcodone-acetaminophen (NORCO) 5-325 MG tablet Take 1 tablet by mouth every 6 hours as needed for severe pain (use as little as posisble, do not drive while taking this medication)     HYDROcodone-acetaminophen (NORCO) 5-325 MG tablet TAKE ONE TABLET BY MOUTH EVERY FOUR HOURS AS NEEDED     hydrocortisone 2.5 % cream APPLY TOPICALLY 2 TIMES DAILY AS NEEDED     Lactobacillus (ACIDOPHILUS) CAPS Take 1 capsule by mouth daily Take two hours before or after antibiotic dose.  Continue for 1 week after antibiotic therapy completed      losartan-hydrochlorothiazide (HYZAAR) 50-12.5 MG tablet Take 1 tablet by mouth daily     medical cannabis (Patient's own supply.  Not a prescription) 2 mg morning and noon. 7 mg at bedtime. (This is NOT a prescription, and does not certify that the patient has a qualifying medical condition for medical cannabis.  The purpose of this order is  to document that the patient reports taking medical cannabis.)     metoprolol succinate ER (TOPROL-XL) 25 MG 24 hr tablet Take 1 tablet (25 mg) by mouth every evening     metoprolol succinate ER (TOPROL-XL) 50 MG 24 hr tablet Take 1 tablet (50 mg) by mouth daily     metoprolol succinate ER (TOPROL-XL) 50 MG 24 hr tablet Take 1 tablet (50 mg) by mouth every morning     mometasone-formoterol (DULERA) 200-5 MCG/ACT inhaler Inhale 2 puffs into the lungs 2 times daily     montelukast (SINGULAIR) 10 MG tablet Take 1 tablet (10 mg) by mouth At Bedtime     multivitamin w/minerals (MULTI-VITAMIN) tablet Take 1 tablet by mouth daily     mupirocin (BACTROBAN) 2 % external cream Apply to affected area three times daily     mupirocin (BACTROBAN) 2 % external ointment      nystatin (MYCOSTATIN) 848285 UNIT/GM external cream Apply topically daily as needed for other (rash skin folds)     rizatriptan (MAXALT) 10 MG tablet Take 1 tablet (10 mg) by mouth at onset of headache for migraine May repeat in 2 hours. Max 3 tablets/24 hours.     rOPINIRole (REQUIP) 1 MG tablet Take 3 tablets (3 mg) by mouth At Bedtime     SUMAtriptan (IMITREX) 100 MG tablet take 1 tablet at onset of headache may repeat in 2 hours max 2 tabs/day     traZODone (DESYREL) 50 MG tablet Take 3 tablets (150 mg) by mouth At Bedtime     triamcinolone (ARISTOCORT HP) 0.5 % external cream Apply topically 2 times daily     triamcinolone (KENALOG) 0.1 % paste Take by mouth 2 times daily     ZYRTEC 10 MG OR TABS 1 TABLET DAILY     nystatin (MYCOSTATIN) 081488 UNIT/ML suspension Take by mouth 4 times daily for 14 days Has recurrent  thrush. Uses for 2 weeks at a time as needed.     nystatin (MYCOSTATIN) 237339 UNIT/ML suspension Take 5 mLs (500,000 Units) by mouth 4 times daily for 14 days     Current Facility-Administered Medications   Medication     2 mL bupivacaine (MARCAINE) preservative free injection 0.5% (20 mL vial)     lidocaine 1 % injection 2 mL     ropivacaine (NAROPIN) injection 0.5 mL     triamcinolone (KENALOG-40) injection 20 mg     triamcinolone (KENALOG-40) injection 40 mg        ALLERGIES      Allergies   Allergen Reactions     Cats      and rabbits/wheezing     Dogs      wheezing     Lyrica [Pregabalin] Other (See Comments)     Rash, mouth sores, itching and burning     Seasonal Allergies      rhinits        SOCIAL HISTORY   She is  and lives with her . She raised her kids and his .     She quit smoking 10 yrs ago. A pack would last her 3 days. She smoked about 30 yrs. She does not drink alcohol. She was using medical marijuana but has quit. She denies any recreational drug use.     FAMILY HISTORY   Mother had MDS.   Mother had colon cancer twice and father  of renal cell cancer.      REVIEW OF SYSTEMS   As above in the HPI, o/w complete 12-point ROS was negative.     PHYSICAL EXAM   B/P: Data Unavailable, T: Data Unavailable, P: Data Unavailable, R: Data Unavailable  @[email or web address](4)@  Wt Readings from Last 3 Encounters:   21 98 kg (216 lb)   20 90 kg (198 lb 8 oz)   18 89.1 kg (196 lb 6.4 oz)     Limited physical exam during video visit due to COVID19 restrictions  Middle aged female in no acute distress  Breathing comfortably, no tachypnea  Speech and hearing normal  Pleasant mood and congruent affect  Moving all extremities, no focal neurologic deficits apparent       LABORATORY AND IMAGING STUDIES     Recent Labs   Lab Test 21  1530 20  0955 20  1411 19  1354 18  1157    140 139 139 140   POTASSIUM 3.6 3.2* 3.6 3.8 3.8   CHLORIDE 107 106 108  106 107   CO2 28 28 25 23 27   ANIONGAP 8 6 6 10 6   BUN 16 12 14 12 11   CR 1.04 0.99 0.90 0.94 0.82   GLC 67* 103* 87 95 76   JAIRO 9.3 9.3 9.3 9.2 8.6     Recent Labs   Lab Test 01/20/21  1530 09/22/20  0955 02/04/20  1411 07/19/19  1354 12/04/18  1157 01/08/18  1536 06/30/17  1720 11/14/14  1458   WBC 8.2 6.0 6.6 7.7 6.5 6.2 9.8 6.9   HGB 13.1 12.4 12.6 14.0 12.0 13.5 13.6 12.5    375 329 372 390 365 320 280   MCV 91 90 86 89 87 89 90 88   NEUTROPHIL  --   --  53.2  --  50.7 53.8 64.6 49.1    < > = values in this interval not displayed.     Recent Labs   Lab Test 01/20/21  1530 09/22/20  0955 02/04/20  1411   BILITOTAL 0.2 0.5 0.3   ALKPHOS 132 108 129   ALT 26 22 23   AST 16 12 18   ALBUMIN 3.8 3.7 3.9     TSH   Date Value Ref Range Status   09/30/2020 1.36 0.40 - 4.00 mU/L Final   02/04/2020 1.27 0.40 - 4.00 mU/L Final   07/19/2019 0.76 0.40 - 4.00 mU/L Final     Recent Labs   Lab Test 01/20/21  1530 09/22/20  0955 02/04/20  1411 07/19/19  1354 12/04/18  1157   HGB 13.1 12.4 12.6 14.0 12.0     Recent Labs   Lab Test 01/20/21  1530 11/11/20  1758 09/22/20  0955 02/04/20  1411 12/04/18  1157 02/24/17  1430 06/09/16  1031 11/14/14  1458 09/09/14  1146   ALEISHA 11 16 12 12 8 21 19 28 13   IRON 50  --   --  63  --  71 78 74 35     --   --  388  --  363 357  --  432*   IRONSAT 14*  --   --  16  --  20 22  --  8*         ASSESSMENT    1. Persistent iron deficiency without anemia  2. Marked fatigue  3. Easy bruisability, ecchymoses and bleeding    DISCUSSION   I had a lengthy discussion with Jacquie who was joined by her  at this video visit. She has marked fatigue for the last couple of years. She is perplexed why she is so iron deficient despite taking oral iron supplements. I explained her that iron is tightly regulated in our body. Its intake is fairly limited. It can be affected by use of antacids in medications, malabsorption and dietary deficiency. More commonly in [place] iron deficiency  is as a result of blood loss. The only place to loose a large amount of blood enough to cause iron deficiency and not realize is the gut. This can be due to vascular malformations or colon cancer. She has not had a colonoscopy as yet. She has been checking the FIT test - fecal stool test. I would recommend that we get EGD and colonoscopy for her.     She has marked fatigue which she attributes to her iron deficiency. She has felt better with iron in the past and would like parenteral iron transfusions. I would try to arrange for iron transfusions.     She has a range of other symptoms. Her next primary complain is her dry skin, easy bruisability, ecchymoses and bleeds. Dry skin is more likely to have problems with bruises and bleeds. I have encouraged her use moisturizing lotions and creams like the Eucerin cream. I would get work up done for her bleeding concerns but doubt that she has primary hematologic problem. She always had normal menstrual periods. She never had bleeding with procedures or surgeries. She is not on anticoagulants or anti-platelet drugs like ASA / Plavix. She is not currently on steroids but has received this in the past for her bronchitis. She remains on selective serotonin reuptake inhibitor that can affect the platelet function.      PLAN   - I will refer her to GI for EGD and colonoscopy  - I will try to see if we can get iron approved for her iron deficiency without anemia  - I will work up for her bleeding concerns  - Recommend moisturizing topical cream / lotion for dry skin. Consider formal dermatology evaluation  - ASA, Plavix, NSAIDS, selective serotonin reuptake inhibitor, corticosteroids, fish oil, vitamin E have anti-platelet function    Follow up in 4-6 months in hematology clinic.       Video-Visit Details    Type of service:  Video Visit  Originating Location (pt. Location): Home  Distant Location (provider location): [facility] [employer]  Platform used for  Video Visit: William    60 minutes spent on the date of the encounter doing chart review, history and exam, documentation and further activities as noted above      Otf Douglas    Hematologist and Medical Oncologist  [facility]        Again, thank you for allowing me to participate in the care of your patient.        Sincerely,        Otf Douglas MD

## 2021-02-18 NOTE — NURSING NOTE
Jacquie is a 60 year old who is being evaluated via a billable video visit.      How would you like to obtain your AVS? MyChart  If the video visit is dropped, the invitation should be resent by: Text to cell phone: [phone]  Will anyone else be joining your video visit? No    Video-Visit Details    Type of service:  Video Visit    Originating Location (pt. Location): Home    Distant Location (provider location):  [facility]     Platform used for Video Visit: William Aguayo CMA

## 2021-02-18 NOTE — PROGRESS NOTES
Jacquie BURROWS Marjorie  :  1960  DOS: 2021  MRN: [id number]    Sports Medicine Clinic Procedure    Ultrasound Guided Left Thumb CMC joint & Left Hip Greater Trochanteric Bursa Injections    Clinical History:     Diagnosis:   1. Trochanteric bursitis of both hips    2. Primary osteoarthritis of both first carpometacarpal joints      Large Joint Injection/Arthocentesis: L greater trochanteric bursa    Date/Time: 2021 3:13 PM  Performed by: José Miguel Linder DO  Authorized by: José Miguel Linder DO     Indications:  Pain  Needle Size:  25 G  Guidance: ultrasound    Location:  Hip      Site:  L greater trochanteric bursa  Medications:  40 mg triamcinolone 40 MG/ML; 2 mL ropivacaine 5 MG/ML  Medications comment:  2 mL 0.5% bupivacaine, 50 mL vial  NDC 05309-006-78  Lot [id number]  Exp 21  Outcome:  Tolerated well, no immediate complications  Procedure discussed: discussed risks, benefits, and alternatives    Consent Given by:  Patient  Timeout: timeout called immediately prior to procedure    Prep: patient was prepped and draped in usual sterile fashion      Hand / Upper Extremity Injection/Arthrocentesis: L thumb CMC    Date/Time: 2021 3:17 PM  Performed by: José Miguel Linder DO  Authorized by: José Miguel Linder DO     Indications:  Pain  Needle Size:  25 G  Guidance: ultrasound    Condition: osteoarthritis    Location:  Thumb  Site:  L thumb CMC    Medications:  40 mg triamcinolone 40 MG/ML; 1 mL ropivacaine 5 MG/ML  Outcome:  Tolerated well, no immediate complications  Procedure discussed: discussed risks, benefits, and alternatives    Consent Given by:  Patient  Timeout: timeout called immediately prior to procedure    Prep: patient was prepped and draped in usual sterile fashion          Impression:  Successful Left 1st CMC joint and left greater trochanteric bursa and gluteal tendon sheath CSI    Plan:  Follow up prn based on clinical progress  Expectations  and goals of CSI reviewed  Often 2-3 days for steroid effect, and can take up to two weeks for maximum effect  We discussed modified progressive pain-free activity as tolerated  Do not overuse in first two weeks if feeling better due to concern for vulnerability while steroid is working  Supportive care reviewed  All questions were answered today  Contact us with additional questions or concerns  Signs and sx of concern reviewed      José Miguel Linder DO, CABRADLEY  Primary Care Sports Medicine  [facility]

## 2021-02-19 ENCOUNTER — TELEPHONE (OUTPATIENT)
Dept: ONCOLOGY | Facility: CLINIC | Age: 61
End: 2021-02-19

## 2021-02-19 ENCOUNTER — TELEPHONE (OUTPATIENT)
Dept: ONCOLOGY | Facility: CLINIC | Age: 61
End: 2021-02-19
Payer: COMMERCIAL

## 2021-02-19 DIAGNOSIS — F41.1 GENERALIZED ANXIETY DISORDER: ICD-10-CM

## 2021-02-19 DIAGNOSIS — E61.1 IRON DEFICIENCY: ICD-10-CM

## 2021-02-19 DIAGNOSIS — R23.3 EASY BRUISABILITY: ICD-10-CM

## 2021-02-19 DIAGNOSIS — Z11.59 ENCOUNTER FOR SCREENING FOR OTHER VIRAL DISEASES: Primary | ICD-10-CM

## 2021-02-19 NOTE — TELEPHONE ENCOUNTER
Per Order on 02/18/2021 this patient needs Injectofer Iron Infusions. I called and left a voice mail for the patient to schedule.-cap

## 2021-02-20 DIAGNOSIS — Z11.59 ENCOUNTER FOR SCREENING FOR OTHER VIRAL DISEASES: ICD-10-CM

## 2021-02-20 DIAGNOSIS — G43.009 MIGRAINE WITHOUT AURA AND WITHOUT STATUS MIGRAINOSUS, NOT INTRACTABLE: ICD-10-CM

## 2021-02-20 LAB
SARS-COV-2 RNA RESP QL NAA+PROBE: NORMAL
SPECIMEN SOURCE: NORMAL

## 2021-02-20 PROCEDURE — U0005 INFEC AGEN DETEC AMPLI PROBE: HCPCS | Performed by: INTERNAL MEDICINE

## 2021-02-20 PROCEDURE — U0003 INFECTIOUS AGENT DETECTION BY NUCLEIC ACID (DNA OR RNA); SEVERE ACUTE RESPIRATORY SYNDROME CORONAVIRUS 2 (SARS-COV-2) (CORONAVIRUS DISEASE [COVID-19]), AMPLIFIED PROBE TECHNIQUE, MAKING USE OF HIGH THROUGHPUT TECHNOLOGIES AS DESCRIBED BY CMS-2020-01-R: HCPCS | Performed by: INTERNAL MEDICINE

## 2021-02-21 LAB
LABORATORY COMMENT REPORT: NORMAL
SARS-COV-2 RNA RESP QL NAA+PROBE: NEGATIVE
SPECIMEN SOURCE: NORMAL

## 2021-02-22 RX ORDER — RIZATRIPTAN BENZOATE 10 MG/1
10 TABLET ORAL
Qty: 18 TABLET | Refills: 0 | Status: SHIPPED | OUTPATIENT
Start: 2021-02-22 | End: 2021-12-21

## 2021-02-22 RX ORDER — BUPROPION HYDROCHLORIDE 100 MG/1
TABLET, EXTENDED RELEASE ORAL
Qty: 90 TABLET | Refills: 0 | Status: SHIPPED | OUTPATIENT
Start: 2021-02-22 | End: 2021-05-25

## 2021-02-22 RX ORDER — BUPROPION HYDROCHLORIDE 150 MG/1
TABLET, EXTENDED RELEASE ORAL
Qty: 90 TABLET | Refills: 0 | OUTPATIENT
Start: 2021-02-22

## 2021-02-22 RX ORDER — FERROUS SULFATE 325(65) MG
TABLET, DELAYED RELEASE (ENTERIC COATED) ORAL
Qty: 90 TABLET | Refills: 0 | Status: SHIPPED | OUTPATIENT
Start: 2021-02-22 | End: 2021-05-25

## 2021-02-22 NOTE — TELEPHONE ENCOUNTER
Per KUSH Prescott: there should be no interaction between the 2- the steroid injections are very local and the iron is systemic and will not bother eachother    Writer spoke with patient and informed of above comments. Patient verbalized understanding and has no further questions at this time.

## 2021-02-22 NOTE — TELEPHONE ENCOUNTER
Prescription approved per [facility] Refill Protocol.  Denied.   Medication change.   Dario Clinton RN  February 22, 2021

## 2021-02-22 NOTE — TELEPHONE ENCOUNTER
Pending Prescriptions:                       Disp   Refills    rizatriptan (MAXALT) 10 MG tablet [Pharmac*18 tab*0        Sig: Take 1 tablet (10 mg) by mouth at onset of headache           for migraine May repeat in 2 hours. Max 3           tablets/24 hours.      Routing refill request to provider for review/approval because:  Labs out of range:  bp    Emi Gomez RN on 2/22/2021 at 11:30 AM

## 2021-02-24 ENCOUNTER — INFUSION THERAPY VISIT (OUTPATIENT)
Dept: ONCOLOGY | Facility: CLINIC | Age: 61
End: 2021-02-24
Attending: INTERNAL MEDICINE
Payer: COMMERCIAL

## 2021-02-24 VITALS
TEMPERATURE: 97.8 F | OXYGEN SATURATION: 98 % | HEART RATE: 71 BPM | SYSTOLIC BLOOD PRESSURE: 122 MMHG | WEIGHT: 210.32 LBS | BODY MASS INDEX: 33.69 KG/M2 | RESPIRATION RATE: 18 BRPM | DIASTOLIC BLOOD PRESSURE: 61 MMHG

## 2021-02-24 DIAGNOSIS — R23.3 EASY BRUISABILITY: ICD-10-CM

## 2021-02-24 DIAGNOSIS — E61.1 IRON DEFICIENCY: Primary | ICD-10-CM

## 2021-02-24 DIAGNOSIS — D50.9 IRON DEFICIENCY ANEMIA, UNSPECIFIED IRON DEFICIENCY ANEMIA TYPE: ICD-10-CM

## 2021-02-24 DIAGNOSIS — R58 ECCHYMOSIS: ICD-10-CM

## 2021-02-24 LAB
APTT PPP: 38 SEC (ref 22–37)
FACT IX ACT/NOR PPP: 142 % (ref 65–150)
FACT XI ACT/NOR PPP: 109 % (ref 65–150)
FACT XIII AG ACT/NOR PPP IA: 137 % (ref 75–155)
FIBRINOGEN PPP-MCNC: 500 MG/DL (ref 200–420)
INR PPP: 1.05 (ref 0.86–1.14)

## 2021-02-24 PROCEDURE — 85290 CLOT FACTOR XIII FIBRIN STAB: CPT | Performed by: INTERNAL MEDICINE

## 2021-02-24 PROCEDURE — 999N001037 HC STATISTIC VON WILLEBRAND MULTIMERS: Performed by: INTERNAL MEDICINE

## 2021-02-24 PROCEDURE — 96365 THER/PROPH/DIAG IV INF INIT: CPT

## 2021-02-24 PROCEDURE — 85250 CLOT FACTOR IX PTC/CHRSTMAS: CPT | Performed by: INTERNAL MEDICINE

## 2021-02-24 PROCEDURE — 85280 CLOT FACTOR XII HAGEMAN: CPT | Performed by: INTERNAL MEDICINE

## 2021-02-24 PROCEDURE — 85385 FIBRINOGEN ANTIGEN: CPT | Performed by: INTERNAL MEDICINE

## 2021-02-24 PROCEDURE — 85384 FIBRINOGEN ACTIVITY: CPT | Performed by: INTERNAL MEDICINE

## 2021-02-24 PROCEDURE — 85610 PROTHROMBIN TIME: CPT | Performed by: INTERNAL MEDICINE

## 2021-02-24 PROCEDURE — 85245 CLOT FACTOR VIII VW RISTOCTN: CPT | Performed by: INTERNAL MEDICINE

## 2021-02-24 PROCEDURE — 85730 THROMBOPLASTIN TIME PARTIAL: CPT | Performed by: INTERNAL MEDICINE

## 2021-02-24 PROCEDURE — 999N001035 HC STATISTIC THROMBIN TIME NC: Performed by: INTERNAL MEDICINE

## 2021-02-24 PROCEDURE — 85390 FIBRINOLYSINS SCREEN I&R: CPT | Mod: 26 | Performed by: PATHOLOGY

## 2021-02-24 PROCEDURE — 250N000011 HC RX IP 250 OP 636: Performed by: INTERNAL MEDICINE

## 2021-02-24 PROCEDURE — 85240 CLOT FACTOR VIII AHG 1 STAGE: CPT | Performed by: INTERNAL MEDICINE

## 2021-02-24 PROCEDURE — 85270 CLOT FACTOR XI PTA: CPT | Performed by: INTERNAL MEDICINE

## 2021-02-24 PROCEDURE — 85246 CLOT FACTOR VIII VW ANTIGEN: CPT | Performed by: INTERNAL MEDICINE

## 2021-02-24 PROCEDURE — 258N000003 HC RX IP 258 OP 636: Performed by: INTERNAL MEDICINE

## 2021-02-24 RX ORDER — HEPARIN SODIUM,PORCINE 10 UNIT/ML
5 VIAL (ML) INTRAVENOUS
Status: CANCELLED | OUTPATIENT
Start: 2021-03-03

## 2021-02-24 RX ORDER — HEPARIN SODIUM (PORCINE) LOCK FLUSH IV SOLN 100 UNIT/ML 100 UNIT/ML
5 SOLUTION INTRAVENOUS
Status: CANCELLED | OUTPATIENT
Start: 2021-03-03

## 2021-02-24 RX ADMIN — SODIUM CHLORIDE 250 ML: 9 INJECTION, SOLUTION INTRAVENOUS at 09:58

## 2021-02-24 RX ADMIN — FERRIC CARBOXYMALTOSE INJECTION 750 MG: 50 INJECTION, SOLUTION INTRAVENOUS at 09:58

## 2021-02-24 NOTE — TELEPHONE ENCOUNTER
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 ENT called to r/o sinus infection causing fever as well as nocardia component in nose. Pt denies any h/a, recent URI, congestion, facial pain, facial tenderness, rhinorrhea, PND or changes in vision. Pt seen 2 weeks ago as outpt by Dr Amaro for indirect laryngoscopy and work up neg. No evidence of sinusitis.          PAST MEDICAL & SURGICAL HISTORY:  West Nile encephalomyelitis    Lung nodule    GERD (gastroesophageal reflux disease)    HLD (hyperlipidemia)    Viral encephalitis  3 yrs ago due to west nile virus    Seizure    Hyperlipidemia    Diverticulitis    Kidney stones    Chronic kidney disease (CKD)    Hyperlipemia    Hemolytic anemia    S/P tonsillectomy    S/P percutaneous endoscopic gastrostomy (PEG) tube placement      Allergies    No Known Allergies    Intolerances      MEDICATIONS  (STANDING):  aMIOdarone    Tablet 200 milliGRAM(s) Oral daily  atorvastatin 10 milliGRAM(s) Oral at bedtime  cyanocobalamin 1000 MICROGram(s) Oral daily  danazol 200 milliGRAM(s) Oral <User Schedule>  folic acid 1 milliGRAM(s) Oral daily  levETIRAcetam 500 milliGRAM(s) Oral two times a day  meropenem  IVPB 1000 milliGRAM(s) IV Intermittent every 12 hours  metoprolol succinate ER 25 milliGRAM(s) Oral daily  midodrine. 5 milliGRAM(s) Oral every 8 hours    MEDICATIONS  (PRN):  acetaminophen   Tablet .. 650 milliGRAM(s) Oral every 6 hours PRN Temp greater or equal to 38C (100.4F), Mild Pain (1 - 3)      Social History: No tobacco use  Family history: no pertinent FHx  ROS:   ENT: all negative except as noted in HPI   CV: denies palpitations  Pulm: denies SOB, cough, hemoptysis  GI: denies change in apetite, indigestion, n/v  : denies pertinent urinary symptoms, urgency  Neuro: denies numbness/tingling, loss of sensation  Psych: denies anxiety  MS: denies muscle weakness, instability  Heme: denies easy bruising or bleeding  Endo: denies heat/cold intolerance, excessive sweating  Vascular: denies LE edema    Vital Signs Last 24 Hrs  T(C): 37.2 (24 Feb 2021 17:15), Max: 37.3 (24 Feb 2021 16:15)  T(F): 99 (24 Feb 2021 17:15), Max: 99.2 (24 Feb 2021 16:15)  HR: 78 (24 Feb 2021 17:15) (64 - 82)  BP: 133/78 (24 Feb 2021 17:15) (107/69 - 155/73)  BP(mean): --  RR: 18 (24 Feb 2021 17:15) (18 - 22)  SpO2: 95% (24 Feb 2021 17:15) (91% - 100%)                          7.5    3.79  )-----------( 154      ( 24 Feb 2021 13:54 )             20.9    02-24    132<L>  |  105  |  27<H>  ----------------------------<  220<H>  3.9   |  18<L>  |  1.55<H>    Ca    7.9<L>      24 Feb 2021 13:54  Phos  2.4     02-24  Mg     1.6     02-24    TPro  6.9  /  Alb  2.6<L>  /  TBili  0.3  /  DBili  x   /  AST  47<H>  /  ALT  38  /  AlkPhos  48  02-24       PHYSICAL EXAM:  Gen: NAD  Skin: No rashes, bruises, or lesions  Head: Normocephalic, Atraumatic  Face: no edema, erythema, or fluctuance. Parotid glands soft without mass  Eyes: no scleral injection  Nose: Nares bilaterally patent, no discharge  Mouth: No Stridor / Drooling / Trismus.  Mucosa moist, tongue/uvula midline, oropharynx clear  Neck: Flat, supple, no lymphadenopathy, trachea midline, no masses  Lymphatic: No lymphadenopathy  Resp: breathing easily, no stridor  CV: no peripheral edema/cyanosis  GI: nondistended   Peripheral vascular: no JVD or edema  Neuro: facial nerve intact, no facial droop        Diagnostic Nasal Endoscopy: (Scope #2 used)    Diagnostic Nasal Endoscopy  Indication for procedure: nasal congestion    "Anterior rhinoscopy insufficient to account for symptoms"    Verbal and/or written consent obtained from patient    Scope #3: flexible fiber optic telescope used with surgilube     No sigmoidal septal deviation noted. Mucosa moist with scant mucus, normal inferior/middle/superior turbinates, normal inferior/middle/superior meatus, normal fontanelles, maxillary ostia clear, sphenoethmoidal recess clear bilaterally. No polyps noted.           IMAGING/ADDITIONAL STUDIES: CT PARANASAL SINUSES:  No acute fracture or traumatic subluxation.  Paranasal sinuses clear.   My chart message from pt:    Hi again,   Please let Di Pace that [employer] records contacted me and are sending the information needed to [facility] in [place].     Thanks   Jacquie Sevilla said thank you for letting her know.     Michell Ritchie RN, [facility]  Pain Clinic Care Coordinator

## 2021-02-24 NOTE — PROGRESS NOTES
Infusion Nursing Note:  Jacquie BURROWS Lindaraisa presents today for Injectafer #1.    Patient seen by provider today: No   present during visit today: Not Applicable.    Note: Patient is new to the infusion room today and is receiving Inejctafer for the first time.  Patient oriented to infusion room, location of bathrooms and nutrition stations, and call light. Verbally reviewed teaching, side effects and follow-up schedule with patient.     Patient was last assessed by Dr. Douglas on 2/18. Pt denies any changes or new complaints since then. Denies fevers, chills.     Intravenous Access:  Peripheral IV placed.    Treatment Conditions:  Not Applicable.    Post Infusion Assessment:  Patient tolerated infusion without incident.  Patient observed for 30 minutes post Injectafer per protocol.  Blood return noted pre and post infusion.  Site patent and intact, free from redness, edema or discomfort.  No evidence of extravasations.  Access discontinued per protocol.     Discharge Plan:   Patient declined prescription refills.  AVS to patient via [facility].  Patient will return 3/3 for next appointment.   Patient discharged in stable condition accompanied by: self.  Departure Mode: Ambulatory.  Face to Face time: 5.    Aarti Petersen RN

## 2021-02-25 ENCOUNTER — VIRTUAL VISIT (OUTPATIENT)
Dept: DERMATOLOGY | Facility: CLINIC | Age: 61
End: 2021-02-25
Payer: COMMERCIAL

## 2021-02-25 DIAGNOSIS — L30.9 DERMATITIS: ICD-10-CM

## 2021-02-25 DIAGNOSIS — L29.9 PRURITUS: Primary | ICD-10-CM

## 2021-02-25 PROCEDURE — 99204 OFFICE O/P NEW MOD 45 MIN: CPT | Mod: 95 | Performed by: DERMATOLOGY

## 2021-02-25 RX ORDER — MUPIROCIN CALCIUM 20 MG/G
CREAM TOPICAL
Qty: 60 G | Refills: 1 | Status: SHIPPED | OUTPATIENT
Start: 2021-02-25 | End: 2022-11-01

## 2021-02-25 ASSESSMENT — PAIN SCALES - GENERAL: PAINLEVEL: SEVERE PAIN (6)

## 2021-02-25 NOTE — Clinical Note
Need dermpath/surigcal path report from [facility] BEFORE next visit    In person with Dr. Mason to see if wound on nose can be healed more quickly. NEEDS EXTENDED visit time. NEEDs SKIN CHECK at same day.

## 2021-02-25 NOTE — PATIENT INSTRUCTIONS
[facility] Dermatology Visit    Thank you for allowing us to participate in your care. Your findings, instructions and follow-up plan are as follows:         When should I call my doctor?    If you are worsening or not improving, please, contact us or seek urgent care as noted below.     Who should I call with questions (adults)?    [facility] (adult and pediatric): [phone]     [facility] (adult): [phone]    For urgent needs outside of business hours call the [facility] at [phone] and ask for the dermatology resident on call    If this is a medical emergency and you are unable to reach an ER, Call 911      Who should I call with questions (pediatric)?  [facility]- Pediatric Dermatology  Dr. Cecy Alvarez, Dr. Raphael Lauren, Dr. Susana Mcallister, Rosalina Galarza, PA  Dr. Mireya Maharaj, Dr. Staci Nathan & Dr. Ishmael Fuentes  Non Urgent  Nurse Triage Line; [phone]- Michelle and Susie RN Care Coordinators   Fara (/Complex ) [phone]    If you need a prescription refill, please contact your pharmacy. Refills are approved or denied by our Physicians during normal business hours, Monday through Fridays  Per office policy, refills will not be granted if you have not been seen within the past year (or sooner depending on your child's condition)    Scheduling Information:  Pediatric Appointment Scheduling and Call Center [phone]  Radiology Scheduling- [phone]  Sedation Unit Scheduling- [phone]  [facility] Scheduling- General [phone]; Pediatric Dermatology [phone]  Main  Services: [phone]  Pashto: [phone]  Bermudian: [phone]  Hmong/Portuguese/Icelandic: [phone]  Preadmission Nursing Department Fax Number: [phone] (Fax all pre-operative paperwork to this number)    For urgent matters arising during evenings,  weekends, or holidays that cannot wait for normal business hours please call [phone] and ask for the Dermatology Resident On-Call to be paged.

## 2021-02-25 NOTE — LETTER
2/25/2021         RE: Jacquie Amador  [address] [facility]  [place] [address]        Dear Colleague,    Thank you for referring your patient, Jacquie Amador, to the [facility]. Please see a copy of my visit note below.    [facility] Dermatology Note  Encounter Date: Feb 25, 2021  Store-and-Forward and Telephone ([phone] ). Location of teledermatologist: [facility].  Start time: 2:36pm. End time: 3:00pm.    Dermatology Problem List:  1. Skin lesions consistent with excoriations, erosions  -termed pickers nodules by [facility]  2. Ecchymosis  3. Biopsy, [facility], pending  4/. No fam of skin cancer  ____________________________________________    Assessment & Plan:     1. Skin lesions consistent with excoriations, erosions-[facility]  -obtain dermpath records  -okay for mupirocin twice daily as needed    -Pruritus work up to be obtained or reviewed and as follows: CBC with diff, LFTs, alk phosph, bili, LDH,  TSH with reflex T4, ferritin, SPEP, BUN/CR, and CXR (hx of smoking).   -hepatitis C and B testing, HIV declined testing  -SSRIS and Acetylcysteine has been reportedly effective. Will review with Dr. Lara  -mupirocin twice daily  -consider phototherapy-  to check these areas (arms for skin cancer)  -mupirocin    2. Ecchymosis- at least 2 of these ecchymosis is over 3 weeks old. Given, age, reviewed with patient if she is safe at home. She reports no danger to her health at this time (she is safe at home). One of these bruises has a peculiar shape, possibly geometric on an extremity. Bruising on the arm when not repetitive or extensive is not worrisome.She is seeing Dickson Douglas in oncology and I see plt studies and clotting studies are pending.  Shin bruise with hx of trauma  -recommend she track number of bruises occurring per week and at which locations  -continue with Dr. Douglas    3.  Biopsy, associated [facility] care, pending send feedback     4. Ulceration on nose, due to above- patient prepping for wedding  -much improved compared to prior photos  -mupirocin ointment twice daily until seeing Dr. Mason    Procedures Performed:    None    Follow-up: within 4 week(s) to check nose, Dr. Mason to also check skin at that visit so that we can start phototherapy. IF time for skin exam please schedule.   Staff:     Natty Diez MD    Department of Dermatology  [facility] Phone: [phone], Fax:[phone]  [facility]: Phone: [phone], Fax: [phone]      ____________________________________________    CC: Derm Problem (Skin ulcer, limited to breakdown of skin ) and New Patient (referred by Dr. Peterson [facility] )    HPI:  Ms. Jacquie Amador is a(n) 60 year old female who presents today as a new patient for skin ulcers on arms, legs, mouth and face for 5 years. Worsened over last year. Nothing makes it better or worse. Non healing. Not picking per her report but does itch. Can be painful. Tried mupirocin and nystatin ointment.     These are not itchy throughout the day.          Has seen derm  No hx of skin cancer    Patient is otherwise feeling well, without additional concerns.    Labs:  Component      Latest Ref Rng & Units 1/20/2021   Sodium      133 - 144 mmol/L 142   Potassium      3.4 - 5.3 mmol/L 3.6   Chloride      94 - 109 mmol/L 107   Carbon Dioxide      20 - 32 mmol/L 28   Anion Gap      3 - 14 mmol/L 8   Glucose      70 - 99 mg/dL 67 (L)   Urea Nitrogen      7 - 30 mg/dL 16   Creatinine      0.52 - 1.04 mg/dL 1.04   GFR Estimate      >60 mL/min/1.73:m2 58 (L)   GFR Estimate If Black      >60 mL/min/1.73:m2 67   Calcium      8.5 - 10.1 mg/dL 9.3   Bilirubin Total      0.2 - 1.3 mg/dL 0.2   Albumin      3.4 - 5.0 g/dL 3.8   Protein Total      6.8 - 8.8 g/dL  7.5   Alkaline Phosphatase      40 - 150 U/L 132   ALT      0 - 50 U/L 26   AST      0 - 45 U/L 16   WBC      4.0 - 11.0 10e9/L 8.2   RBC Count      3.8 - 5.2 10e12/L 4.53   Hemoglobin      11.7 - 15.7 g/dL 13.1   Hematocrit      35.0 - 47.0 % 41.0   MCV      78 - 100 fl 91   MCH      26.5 - 33.0 pg 28.9   MCHC      31.5 - 36.5 g/dL 32.0   RDW      10.0 - 15.0 % 15.9 (H)   Platelet Count      150 - 450 10e9/L 355   COVID-19 Virus PCR to [facility] - Source          COVID-19 Virus PCR to [facility] - Result          TSH      0.40 - 4.00 mU/L    Ferritin      8 - 252 ng/mL 11       Physical Exam:  Vitals: LMP 07/17/2009 (Exact Date)   SKIN: Teledermatology photos were reviewed; image quality and interpretability: acceptable. Image date: upload dates vary, dates of images taken not clear  - unfocused first image with 2 unfocused black hemorrhagic crust like areas and numerous linear scar  ecchymotic patch consistent with bruise on extremity (old image as noted it was sent also on 21/2021), second one with circular like bruising(geometric)  -crust with surrounding macular erythema left cheek and temple  -right lower leck with ecchymosis right medial lower leg  -2/1/ old image with erosion and yellow crust on nose  - No other lesions of concern on areas examined.     Medications:  Current Outpatient Medications   Medication     adapalene (DIFFERIN) 0.1 % gel     albuterol (PROVENTIL) (2.5 MG/3ML) 0.083% neb solution     albuterol (VENTOLIN HFA) 108 (90 Base) MCG/ACT inhaler     ALPRAZolam (XANAX) 0.25 MG tablet     aluminum chloride (DRYSOL) 20 % external solution     atorvastatin (LIPITOR) 20 MG tablet     buPROPion (WELLBUTRIN SR) 100 MG 12 hr tablet     buPROPion (WELLBUTRIN SR) 200 MG 12 hr tablet     celecoxib (CELEBREX) 200 MG capsule     chlorhexidine (PERIDEX) 0.12 % solution     clindamycin (CLINDAMAX) 1 % external gel     clobetasol (TEMOVATE) 0.05 % external cream     clotrimazole (MYCELEX) 10 MG lozenge      diclofenac (VOLTAREN) 1 % topical gel     doxycycline hyclate (VIBRA-TABS) 100 MG tablet     DULoxetine (CYMBALTA) 60 MG capsule     ferrous sulfate (FE TABS) 325 (65 Fe) MG EC tablet     HYDROcodone-acetaminophen (NORCO) 5-325 MG tablet     HYDROcodone-acetaminophen (NORCO) 5-325 MG tablet     hydrocortisone 2.5 % cream     Lactobacillus (ACIDOPHILUS) CAPS     losartan-hydrochlorothiazide (HYZAAR) 50-12.5 MG tablet     medical cannabis (Patient's own supply.  Not a prescription)     metoprolol succinate ER (TOPROL-XL) 25 MG 24 hr tablet     metoprolol succinate ER (TOPROL-XL) 50 MG 24 hr tablet     metoprolol succinate ER (TOPROL-XL) 50 MG 24 hr tablet     mometasone-formoterol (DULERA) 200-5 MCG/ACT inhaler     montelukast (SINGULAIR) 10 MG tablet     multivitamin w/minerals (MULTI-VITAMIN) tablet     mupirocin (BACTROBAN) 2 % external cream     mupirocin (BACTROBAN) 2 % external ointment     nystatin (MYCOSTATIN) 836187 UNIT/GM external cream     nystatin (MYCOSTATIN) 054686 UNIT/ML suspension     nystatin (MYCOSTATIN) 533711 UNIT/ML suspension     rizatriptan (MAXALT) 10 MG tablet     rOPINIRole (REQUIP) 1 MG tablet     SUMAtriptan (IMITREX) 100 MG tablet     traZODone (DESYREL) 50 MG tablet     triamcinolone (ARISTOCORT HP) 0.5 % external cream     triamcinolone (KENALOG) 0.1 % paste     ZYRTEC 10 MG OR TABS     Current Facility-Administered Medications   Medication     2 mL bupivacaine (MARCAINE) preservative free injection 0.5% (20 mL vial)     lidocaine 1 % injection 2 mL     ropivacaine (NAROPIN) injection 0.5 mL     triamcinolone (KENALOG-40) injection 20 mg     triamcinolone (KENALOG-40) injection 40 mg      Past Medical/Surgical History:   Patient Active Problem List   Diagnosis     Moderate persistent asthma     Allergic rhinitis due to other allergen     Esophageal reflux     Moderate recurrent major depression (H)     Multiple joint pain     HYPERLIPIDEMIA LDL GOAL <130     Hypertension goal BP (blood  pressure) < 140/90     MARIZOL (generalized anxiety disorder)     Myalgia     Chronic rhinitis     Constipation     Lumbar disc disease with radiculopathy     Iron deficiency anemia     Osteoarthritis of carpometacarpal joint of thumb - bilateral     Trochanteric bursitis     Right ankle instability     Primary focal hyperhidrosis     Trochanteric bursitis of both hips     Overweight     Iron deficiency     Scratching     Advanced directives, counseling/discussion     Chronic pain syndrome     Medical marijuana use     Primary osteoarthritis of both first carpometacarpal joints     Arthritis of metatarsophalangeal joint     Sinus tachycardia     Intractable chronic migraine without aura and without status migrainosus     Impaired fasting glucose     Migraine without aura and without status migrainosus, not intractable     Skin ulcer, limited to breakdown of skin (H)     Past Medical History:   Diagnosis Date     ASTHMA - MODERATE PERSISTENT 9/21/2005     Cancer (H)      Chronic pain      Coronary artery disease      CVA (cerebral infarction)      Depressive disorder, not elsewhere classified      Diabetes (H)      Elevated serum alkaline phosphatase level     Liver source     Fibromyalgia      HYPERLIPIDEMIA NEC/NOS 12/29/2006     Hypertriglyceridemia      OA (osteoarthritis)      Thyroid disease      Tobacco use disorder      Tobacco use disorder complicating pregnancy, childbirth, or the puerperium, antepartum condition or complication      Trochanteric bursitis      Unspecified essential hypertension        CC No referring provider defined for this encounter. on close of this encounter.        Again, thank you for allowing me to participate in the care of your patient.        Sincerely,        Natty Diez MD

## 2021-02-25 NOTE — PROGRESS NOTES
[facility] Dermatology Note  Encounter Date: Feb 25, 2021  Store-and-Forward and Telephone ([phone] ). Location of teledermatologist: [facility].  Start time: 2:36pm. End time: 3:00pm.    Dermatology Problem List:  1. Skin lesions consistent with excoriations, erosions  -termed pickers nodules by associated [facility]  2. Ecchymosis  3. Biopsy, associated skin care, pending  4/. No fam of skin cancer  ____________________________________________    Assessment & Plan:     1. Skin lesions consistent with excoriations, erosions-[facility]  -obtain [facility] records  -okay for mupirocin twice daily as needed    -Pruritus work up to be obtained or reviewed and as follows: CBC with diff, LFTs, alk phosph, bili, LDH,  TSH with reflex T4, ferritin, SPEP, BUN/CR, and CXR (hx of smoking).   -hepatitis C and B testing, HIV declined testing  -SSRIS and Acetylcysteine has been reportedly effective. Will review with Dr. Lara  -mupirocin twice daily  -consider phototherapy-  to check these areas (arms for skin cancer)  -mupirocin    2. Ecchymosis- at least 2 of these ecchymosis is over 3 weeks old. Given, age, reviewed with patient if she is safe at home. She reports no danger to her health at this time (she is safe at home). One of these bruises has a peculiar shape, possibly geometric on an extremity. Bruising on the arm when not repetitive or extensive is not worrisome.She is seeing Dickson Douglas in oncology and I see plt studies and clotting studies are pending.  Shin bruise with hx of trauma  -recommend she track number of bruises occurring per week and at which locations  -continue with Dr. Douglas    3. Biopsy, associated skin care, pending send feedback     4. Ulceration on nose, due to above- patient prepping for wedding  -much improved compared to prior photos  -mupirocin ointment twice daily until seeing Dr. Mason    Procedures Performed:     None    Follow-up: within 4 week(s) to check nose, Dr. Mason to also check skin at that visit so that we can start phototherapy. IF time for skin exam please schedule.   Staff:     Natty Diez MD    Department of Dermatology  [facility]: Phone: [phone], Fax:[phone]  [facility]: Phone: [phone], Fax: [phone]      ____________________________________________    CC: Derm Problem (Skin ulcer, limited to breakdown of skin ) and New Patient (referred by Liz Mckee )    HPI:  Ms. Jacquie Amador is a(n) 60 year old female who presents today as a new patient for skin ulcers on arms, legs, mouth and face for 5 years. Worsened over last year. Nothing makes it better or worse. Non healing. Not picking per her report but does itch. Can be painful. Tried mupirocin and nystatin ointment.     These are not itchy throughout the day.          Has seen derm  No hx of skin cancer    Patient is otherwise feeling well, without additional concerns.    Labs:  Component      Latest Ref Rng & Units 1/20/2021   Sodium      133 - 144 mmol/L 142   Potassium      3.4 - 5.3 mmol/L 3.6   Chloride      94 - 109 mmol/L 107   Carbon Dioxide      20 - 32 mmol/L 28   Anion Gap      3 - 14 mmol/L 8   Glucose      70 - 99 mg/dL 67 (L)   Urea Nitrogen      7 - 30 mg/dL 16   Creatinine      0.52 - 1.04 mg/dL 1.04   GFR Estimate      >60 mL/min/1.73:m2 58 (L)   GFR Estimate If Black      >60 mL/min/1.73:m2 67   Calcium      8.5 - 10.1 mg/dL 9.3   Bilirubin Total      0.2 - 1.3 mg/dL 0.2   Albumin      3.4 - 5.0 g/dL 3.8   Protein Total      6.8 - 8.8 g/dL 7.5   Alkaline Phosphatase      40 - 150 U/L 132   ALT      0 - 50 U/L 26   AST      0 - 45 U/L 16   WBC      4.0 - 11.0 10e9/L 8.2   RBC Count      3.8 - 5.2 10e12/L 4.53   Hemoglobin      11.7 - 15.7 g/dL 13.1   Hematocrit      35.0 - 47.0 %  41.0   MCV      78 - 100 fl 91   MCH      26.5 - 33.0 pg 28.9   MCHC      31.5 - 36.5 g/dL 32.0   RDW      10.0 - 15.0 % 15.9 (H)   Platelet Count      150 - 450 10e9/L 355   COVID-19 Virus PCR to [facility] - Source          COVID-19 Virus PCR to [facility] - Result          TSH      0.40 - 4.00 mU/L    Ferritin      8 - 252 ng/mL 11       Physical Exam:  Vitals: LMP 07/17/2009 (Exact Date)   SKIN: Teledermatology photos were reviewed; image quality and interpretability: acceptable. Image date: upload dates vary, dates of images taken not clear  - unfocused first image with 2 unfocused black hemorrhagic crust like areas and numerous linear scar  ecchymotic patch consistent with bruise on extremity (old image as noted it was sent also on 21/2021), second one with circular like bruising(geometric)  -crust with surrounding macular erythema left cheek and temple  -right lower leck with ecchymosis right medial lower leg  -2/1/ old image with erosion and yellow crust on nose  - No other lesions of concern on areas examined.     Medications:  Current Outpatient Medications   Medication     adapalene (DIFFERIN) 0.1 % gel     albuterol (PROVENTIL) (2.5 MG/3ML) 0.083% neb solution     albuterol (VENTOLIN HFA) 108 (90 Base) MCG/ACT inhaler     ALPRAZolam (XANAX) 0.25 MG tablet     aluminum chloride (DRYSOL) 20 % external solution     atorvastatin (LIPITOR) 20 MG tablet     buPROPion (WELLBUTRIN SR) 100 MG 12 hr tablet     buPROPion (WELLBUTRIN SR) 200 MG 12 hr tablet     celecoxib (CELEBREX) 200 MG capsule     chlorhexidine (PERIDEX) 0.12 % solution     clindamycin (CLINDAMAX) 1 % external gel     clobetasol (TEMOVATE) 0.05 % external cream     clotrimazole (MYCELEX) 10 MG lozenge     diclofenac (VOLTAREN) 1 % topical gel     doxycycline hyclate (VIBRA-TABS) 100 MG tablet     DULoxetine (CYMBALTA) 60 MG capsule     ferrous sulfate (FE TABS) 325 (65 Fe) MG EC tablet     HYDROcodone-acetaminophen (NORCO) 5-325 MG tablet      HYDROcodone-acetaminophen (NORCO) 5-325 MG tablet     hydrocortisone 2.5 % cream     Lactobacillus (ACIDOPHILUS) CAPS     losartan-hydrochlorothiazide (HYZAAR) 50-12.5 MG tablet     medical cannabis (Patient's own supply.  Not a prescription)     metoprolol succinate ER (TOPROL-XL) 25 MG 24 hr tablet     metoprolol succinate ER (TOPROL-XL) 50 MG 24 hr tablet     metoprolol succinate ER (TOPROL-XL) 50 MG 24 hr tablet     mometasone-formoterol (DULERA) 200-5 MCG/ACT inhaler     montelukast (SINGULAIR) 10 MG tablet     multivitamin w/minerals (MULTI-VITAMIN) tablet     mupirocin (BACTROBAN) 2 % external cream     mupirocin (BACTROBAN) 2 % external ointment     nystatin (MYCOSTATIN) 630761 UNIT/GM external cream     nystatin (MYCOSTATIN) 819644 UNIT/ML suspension     nystatin (MYCOSTATIN) 113907 UNIT/ML suspension     rizatriptan (MAXALT) 10 MG tablet     rOPINIRole (REQUIP) 1 MG tablet     SUMAtriptan (IMITREX) 100 MG tablet     traZODone (DESYREL) 50 MG tablet     triamcinolone (ARISTOCORT HP) 0.5 % external cream     triamcinolone (KENALOG) 0.1 % paste     ZYRTEC 10 MG OR TABS     Current Facility-Administered Medications   Medication     2 mL bupivacaine (MARCAINE) preservative free injection 0.5% (20 mL vial)     lidocaine 1 % injection 2 mL     ropivacaine (NAROPIN) injection 0.5 mL     triamcinolone (KENALOG-40) injection 20 mg     triamcinolone (KENALOG-40) injection 40 mg      Past Medical/Surgical History:   Patient Active Problem List   Diagnosis     Moderate persistent asthma     Allergic rhinitis due to other allergen     Esophageal reflux     Moderate recurrent major depression (H)     Multiple joint pain     HYPERLIPIDEMIA LDL GOAL <130     Hypertension goal BP (blood pressure) < 140/90     MARIZOL (generalized anxiety disorder)     Myalgia     Chronic rhinitis     Constipation     Lumbar disc disease with radiculopathy     Iron deficiency anemia     Osteoarthritis of carpometacarpal joint of thumb -  bilateral     Trochanteric bursitis     Right ankle instability     Primary focal hyperhidrosis     Trochanteric bursitis of both hips     Overweight     Iron deficiency     Scratching     Advanced directives, counseling/discussion     Chronic pain syndrome     Medical marijuana use     Primary osteoarthritis of both first carpometacarpal joints     Arthritis of metatarsophalangeal joint     Sinus tachycardia     Intractable chronic migraine without aura and without status migrainosus     Impaired fasting glucose     Migraine without aura and without status migrainosus, not intractable     Skin ulcer, limited to breakdown of skin (H)     Past Medical History:   Diagnosis Date     ASTHMA - MODERATE PERSISTENT 9/21/2005     Cancer (H)      Chronic pain      Coronary artery disease      CVA (cerebral infarction)      Depressive disorder, not elsewhere classified      Diabetes (H)      Elevated serum alkaline phosphatase level     Liver source     Fibromyalgia      HYPERLIPIDEMIA NEC/NOS 12/29/2006     Hypertriglyceridemia      OA (osteoarthritis)      Thyroid disease      Tobacco use disorder      Tobacco use disorder complicating pregnancy, childbirth, or the puerperium, antepartum condition or complication      Trochanteric bursitis      Unspecified essential hypertension        CC No referring provider defined for this encounter. on close of this encounter.

## 2021-02-25 NOTE — NURSING NOTE
Teledermatology Nurse Call Patients:     Are you  in the [facility] at the time of the encounter? yes    Today's visit will be billed to you and your insurance.    A teledermatology visit is not as thorough as an in-person visit and the quality of the photograph sent may not be of the same quality as that taken by the dermatology clinic.      Patient summary of issue: Skin ulcers   Location of problem on body: Arms, legs, mouth and face.   How long has area/symptoms been present: At least 5 years now; worsened within the last year.   What makes it better?:N/A   What makes it worse?:N/A   Other symptoms include the following: She reported that her skin is not healing. Scabs break open and repeats the cycle. Patient is not picking at scabs, but carefully tries to itch it at times. When active, skin gets painful.   Which medications have been tried, for how long, and did they make it better or worse (ex. Triamcinolone, used twice daily for 2 weeks, not improved): Alternate the topicals that she is using; soaking in baths.   The patient has seen a dermatologist.   The patient hasno past medical history of skin cancer  ROS: The patient is generally feeling well.     [id number], MEDICAL ASSISTANT

## 2021-02-26 ENCOUNTER — OFFICE VISIT (OUTPATIENT)
Dept: ORTHOPEDICS | Facility: CLINIC | Age: 61
End: 2021-02-26
Payer: COMMERCIAL

## 2021-02-26 ENCOUNTER — TELEPHONE (OUTPATIENT)
Dept: DERMATOLOGY | Facility: CLINIC | Age: 61
End: 2021-02-26

## 2021-02-26 VITALS — HEIGHT: 66 IN | BODY MASS INDEX: 34.72 KG/M2 | RESPIRATION RATE: 12 BRPM | WEIGHT: 216 LBS

## 2021-02-26 DIAGNOSIS — M70.61 TROCHANTERIC BURSITIS OF BOTH HIPS: Primary | ICD-10-CM

## 2021-02-26 DIAGNOSIS — M18.0 PRIMARY OSTEOARTHRITIS OF BOTH FIRST CARPOMETACARPAL JOINTS: ICD-10-CM

## 2021-02-26 DIAGNOSIS — M70.62 TROCHANTERIC BURSITIS OF BOTH HIPS: Primary | ICD-10-CM

## 2021-02-26 LAB
FACT VIII ACT/NOR PPP: 133 % (ref 55–200)
VWF CBA/VWF AG PPP IA-RTO: 115 % (ref 50–200)
VWF:AC ACT/NOR PPP IA: 109 % (ref 50–180)

## 2021-02-26 PROCEDURE — 20611 DRAIN/INJ JOINT/BURSA W/US: CPT | Mod: LT | Performed by: FAMILY MEDICINE

## 2021-02-26 PROCEDURE — 20604 DRAIN/INJ JOINT/BURSA W/US: CPT | Mod: LT | Performed by: FAMILY MEDICINE

## 2021-02-26 RX ADMIN — ROPIVACAINE HYDROCHLORIDE 2 ML: 5 INJECTION, SOLUTION EPIDURAL; INFILTRATION; PERINEURAL at 15:13

## 2021-02-26 RX ADMIN — TRIAMCINOLONE ACETONIDE 40 MG: 40 INJECTION, SUSPENSION INTRA-ARTICULAR; INTRAMUSCULAR at 15:17

## 2021-02-26 RX ADMIN — ROPIVACAINE HYDROCHLORIDE 1 ML: 5 INJECTION, SOLUTION EPIDURAL; INFILTRATION; PERINEURAL at 15:17

## 2021-02-26 RX ADMIN — TRIAMCINOLONE ACETONIDE 40 MG: 40 INJECTION, SUSPENSION INTRA-ARTICULAR; INTRAMUSCULAR at 15:13

## 2021-02-26 ASSESSMENT — MIFFLIN-ST. JEOR: SCORE: 1570.49

## 2021-02-26 NOTE — TELEPHONE ENCOUNTER
Per Dr. Diez:        Need dermpath/surigcal path report from [facility] BEFORE next visit     In person with Dr. Mason to see if wound on nose can be healed more quickly. NEEDS EXTENDED visit time. NEEDs SKIN CHECK at same day.          I spoke with Jacquie to obtain history of which dermatologists she has seen.  Patient states she was seen at [facility] in [place] and [facility] in [place].    I spoke with Evy at [facility].  She confirmed patients last visit with them was 2/14/17.  Patient believes she had something bx'd on her arm there.  Evy transferred me to the nurse line and I left a message requesting path reports be faxed to my attn at [phone].    I called [facility] in [place].  There office was closed, unable to leave a message. Will call on Monday.  Bria Hamilton RN

## 2021-02-26 NOTE — LETTER
2021         RE: Jacquie Amador  [address] [facility]  [place] [address]        Dear Colleague,    Thank you for referring your patient, Jacquie Amador, to the [facility]. Please see a copy of my visit note below.    Jacquie Amador  :  1960  DOS: 2021  MRN: [id number]    Sports Medicine Clinic Procedure    Ultrasound Guided Left Thumb CMC joint & Left Hip Greater Trochanteric Bursa Injections    Clinical History:     Diagnosis:   1. Trochanteric bursitis of both hips    2. Primary osteoarthritis of both first carpometacarpal joints      Large Joint Injection/Arthocentesis: L greater trochanteric bursa    Date/Time: 2021 3:13 PM  Performed by: José Miguel Linder DO  Authorized by: José Miguel Linder DO     Indications:  Pain  Needle Size:  25 G  Guidance: ultrasound    Location:  Hip      Site:  L greater trochanteric bursa  Medications:  40 mg triamcinolone 40 MG/ML; 2 mL ropivacaine 5 MG/ML  Medications comment:  2 mL 0.5% bupivacaine, 50 mL vial  NDC 44542-999-29  Lot [id number]  Exp 21  Outcome:  Tolerated well, no immediate complications  Procedure discussed: discussed risks, benefits, and alternatives    Consent Given by:  Patient  Timeout: timeout called immediately prior to procedure    Prep: patient was prepped and draped in usual sterile fashion      Hand / Upper Extremity Injection/Arthrocentesis: L thumb CMC    Date/Time: 2021 3:17 PM  Performed by: José Miguel Linder DO  Authorized by: José Miguel Linder DO     Indications:  Pain  Needle Size:  25 G  Guidance: ultrasound    Condition: osteoarthritis    Location:  Thumb  Site:  L thumb CMC    Medications:  40 mg triamcinolone 40 MG/ML; 1 mL ropivacaine 5 MG/ML  Outcome:  Tolerated well, no immediate complications  Procedure discussed: discussed risks, benefits, and alternatives    Consent Given by:  Patient  Timeout: timeout called immediately  prior to procedure    Prep: patient was prepped and draped in usual sterile fashion          Impression:  Successful Left 1st CMC joint and left greater trochanteric bursa and gluteal tendon sheath CSI    Plan:  Follow up prn based on clinical progress  Expectations and goals of CSI reviewed  Often 2-3 days for steroid effect, and can take up to two weeks for maximum effect  We discussed modified progressive pain-free activity as tolerated  Do not overuse in first two weeks if feeling better due to concern for vulnerability while steroid is working  Supportive care reviewed  All questions were answered today  Contact us with additional questions or concerns  Signs and sx of concern reviewed      José Miguel Linder DO, CABRADLEY  Primary Care Sports Medicine  [facility]             Again, thank you for allowing me to participate in the care of your patient.        Sincerely,        José Miguel Linder DO

## 2021-02-27 LAB
FACT XII ACT/NOR PPP: 72 % (ref 50–186)
FIBRINOGEN AG PPP IA-MCNC: 428 MG/DL (ref 149–353)
VON WILLEBRAND INTERPRETATION: NORMAL
VWF MULTIMERS PPP QL: NORMAL

## 2021-03-01 RX ORDER — TRIAMCINOLONE ACETONIDE 40 MG/ML
40 INJECTION, SUSPENSION INTRA-ARTICULAR; INTRAMUSCULAR
Status: DISCONTINUED | OUTPATIENT
Start: 2021-02-26 | End: 2021-04-27

## 2021-03-01 RX ORDER — ROPIVACAINE HYDROCHLORIDE 5 MG/ML
2 INJECTION, SOLUTION EPIDURAL; INFILTRATION; PERINEURAL
Status: DISCONTINUED | OUTPATIENT
Start: 2021-02-26 | End: 2021-04-27

## 2021-03-01 RX ORDER — ROPIVACAINE HYDROCHLORIDE 5 MG/ML
1 INJECTION, SOLUTION EPIDURAL; INFILTRATION; PERINEURAL
Status: DISCONTINUED | OUTPATIENT
Start: 2021-02-26 | End: 2021-04-27

## 2021-03-01 NOTE — TELEPHONE ENCOUNTER
Records received.  [facility]. Biopsy 12/11/17 on forehead.  Diagnosis: scale-crust, epidermal erosion, and fibrosis with chronic inflammation.  Consistent with an excoriation.    [facility].  Biopsy 1/25/17 on right arm.  Diagnosis: focal epidermal ulcer and intradermal fibrosis with chronic inflammation consistent with scar tissue.    I spoke with patient and notified her that the records were received.  I offered to schedule her for a wound check with Dr. Mason and skin exam.  Patient declined.  She stated she was going to be gone for a month and will call back to schedule when she returns.    Pathology reports sent to [facility] to be scanned.    Bria Hamilton RN

## 2021-03-01 NOTE — TELEPHONE ENCOUNTER
I left a message with Monica in Medical Records at [facility] requested pathology reports.  Phone and fax number provided.    Bria Hamilton RN

## 2021-03-02 ENCOUNTER — MYC MEDICAL ADVICE (OUTPATIENT)
Dept: ONCOLOGY | Facility: CLINIC | Age: 61
End: 2021-03-02

## 2021-03-03 ENCOUNTER — INFUSION THERAPY VISIT (OUTPATIENT)
Dept: INFUSION THERAPY | Facility: CLINIC | Age: 61
End: 2021-03-03
Payer: COMMERCIAL

## 2021-03-03 ENCOUNTER — MYC MEDICAL ADVICE (OUTPATIENT)
Dept: FAMILY MEDICINE | Facility: CLINIC | Age: 61
End: 2021-03-03

## 2021-03-03 VITALS
HEART RATE: 71 BPM | SYSTOLIC BLOOD PRESSURE: 144 MMHG | TEMPERATURE: 98.3 F | BODY MASS INDEX: 33.64 KG/M2 | RESPIRATION RATE: 16 BRPM | OXYGEN SATURATION: 99 % | WEIGHT: 210 LBS | DIASTOLIC BLOOD PRESSURE: 85 MMHG

## 2021-03-03 DIAGNOSIS — E61.1 IRON DEFICIENCY: Primary | ICD-10-CM

## 2021-03-03 DIAGNOSIS — J01.90 ACUTE NON-RECURRENT SINUSITIS, UNSPECIFIED LOCATION: Primary | ICD-10-CM

## 2021-03-03 DIAGNOSIS — D50.9 IRON DEFICIENCY ANEMIA, UNSPECIFIED IRON DEFICIENCY ANEMIA TYPE: ICD-10-CM

## 2021-03-03 DIAGNOSIS — R58 ECCHYMOSIS: ICD-10-CM

## 2021-03-03 DIAGNOSIS — R23.3 EASY BRUISABILITY: ICD-10-CM

## 2021-03-03 DIAGNOSIS — R23.3 EASY BRUISABILITY: Primary | ICD-10-CM

## 2021-03-03 DIAGNOSIS — E61.1 IRON DEFICIENCY: ICD-10-CM

## 2021-03-03 LAB
CLOSURE TME COLL+ADP BLD: 77 SEC
CLOSURE TME COLL+EPINEP BLD: >300 SEC
TSH SERPL DL<=0.005 MIU/L-ACNC: 1.89 MU/L (ref 0.4–4)
VIT B12 SERPL-MCNC: 341 PG/ML (ref 193–986)

## 2021-03-03 PROCEDURE — 82607 VITAMIN B-12: CPT | Performed by: INTERNAL MEDICINE

## 2021-03-03 PROCEDURE — 82747 ASSAY OF FOLIC ACID RBC: CPT | Mod: 90 | Performed by: INTERNAL MEDICINE

## 2021-03-03 PROCEDURE — 86022 PLATELET ANTIBODIES: CPT | Mod: 90 | Performed by: INTERNAL MEDICINE

## 2021-03-03 PROCEDURE — 99000 SPECIMEN HANDLING OFFICE-LAB: CPT | Performed by: INTERNAL MEDICINE

## 2021-03-03 PROCEDURE — 96365 THER/PROPH/DIAG IV INF INIT: CPT | Performed by: NURSE PRACTITIONER

## 2021-03-03 PROCEDURE — 85410 FIBRINOLYTIC ANTIPLASMIN: CPT | Mod: 90 | Performed by: INTERNAL MEDICINE

## 2021-03-03 PROCEDURE — 99207 PR NO CHARGE LOS: CPT

## 2021-03-03 PROCEDURE — 84443 ASSAY THYROID STIM HORMONE: CPT | Performed by: INTERNAL MEDICINE

## 2021-03-03 PROCEDURE — 36415 COLL VENOUS BLD VENIPUNCTURE: CPT | Performed by: INTERNAL MEDICINE

## 2021-03-03 RX ORDER — HEPARIN SODIUM,PORCINE 10 UNIT/ML
5 VIAL (ML) INTRAVENOUS
Status: CANCELLED | OUTPATIENT
Start: 2021-03-03

## 2021-03-03 RX ORDER — HEPARIN SODIUM (PORCINE) LOCK FLUSH IV SOLN 100 UNIT/ML 100 UNIT/ML
5 SOLUTION INTRAVENOUS
Status: CANCELLED | OUTPATIENT
Start: 2021-03-03

## 2021-03-03 RX ORDER — DOXYCYCLINE 100 MG/1
100 CAPSULE ORAL 2 TIMES DAILY
Qty: 20 CAPSULE | Refills: 0 | Status: SHIPPED | OUTPATIENT
Start: 2021-03-03 | End: 2021-04-27

## 2021-03-03 RX ADMIN — Medication 250 ML: at 07:41

## 2021-03-03 ASSESSMENT — PAIN SCALES - GENERAL: PAINLEVEL: NO PAIN (0)

## 2021-03-03 NOTE — PROGRESS NOTES
Infusion Nursing Note:  Jacquei BURROWS Lindaraisa presents today for Injectafer 2/2.    Patient seen by provider today: No   present during visit today: Not Applicable.    Note: Patient reports tolerating first dose of Injectafer with no concerns.    Intravenous Access:  Peripheral IV placed.    Treatment Conditions:  Not Applicable.      Post Infusion Assessment:  Patient tolerated infusion without incident.  Patient observed for 30 minutes post Injectafer per protocol.  Site patent and intact, free from redness, edema or discomfort.  No evidence of extravasations.  Access discontinued per protocol.       Discharge Plan:   Patient will return as needed for next appointment.   Patient discharged in stable condition accompanied by: self.  Departure Mode: Ambulatory.    Deya Schmidt RN

## 2021-03-05 LAB
FOLATE RBC-MCNC: 728 NG/ML
HCT VFR BLD CALC: 40.1 %

## 2021-03-08 LAB — PLASM INHIB ACT/NOR PPP CHRO: 140 % (ref 82–133)

## 2021-03-08 NOTE — TELEPHONE ENCOUNTER
I spoke to Rissa over phone and reviewed her labs with her.     Results for RISSA ZAYAS (MRN [id number]) as of 3/8/2021 13:19   Ref. Range 2/24/2021 09:45 3/3/2021 07:06   INR Latest Ref Range: 0.86 - 1.14  1.05    PTT Latest Ref Range: 22 - 37 sec 38 (H)    Fibrinogen Latest Ref Range: 200 - 420 mg/dL 500 (H)    Fibrinogen Antigen Ciep Latest Ref Range: 149 - 353 mg/dL 428 (H)    PLT Funct COL/EPI Latest Ref Range: <170 sec  >300 (H)   Platelet Function Closure Time Col/ADP Latest Ref Range: <120 sec  77   Factor 8 Assay Latest Ref Range: 55 - 200 % 133    von Willebrand Antigen Latest Ref Range: 50 - 200 % 115    von Willebrand Factor Activity Latest Ref Range: 50 - 180 % 109    Von Willebrand Multimers Unknown Multimer analysis is not indicated.    Factor 9 Assay Latest Ref Range: 65 - 150 % 142    Factor 11 Assay Latest Ref Range: 65 - 150 % 109    Factor 12 Assay Latest Ref Range: 50 - 186 % 72    Factor 13 AGN SUBUNIT A Latest Ref Range: 75 - 155 % 137      She has mildly elevated PTT with a normal INR.  Her factor VIII, 9, 11, 12 and 13 levels of normal.  Fibrinogen levels mildly elevated.  This is usually reactive to some inflammatory condition.  Her platelet function assay was abnormal.  I do not have a good explanation for this.  This is likely secondary to medications.  I again reviewed medications associated with impaired platelet function including ASA, Plavix, NSAIDS, selective serotonin reuptake inhibitor, corticosteroids, fish oil, vitamin E.    I will reach out to my colleagues in hematology and see if they have any additional suggestions for her.    We will follow her as currently scheduled.    Otf Douglas    Hematologist and Medical Oncologist  [facility]

## 2021-03-09 ENCOUNTER — TELEPHONE (OUTPATIENT)
Dept: ONCOLOGY | Facility: CLINIC | Age: 61
End: 2021-03-09

## 2021-03-09 LAB — LAB SCANNED RESULT: ABNORMAL

## 2021-03-09 NOTE — TELEPHONE ENCOUNTER
Call placed to patient. Let her know that Dr Douglas would like her to see a provider, Dr Emely Grimes, from the Bleeding and Clot[facility] clinic at the [facility] due to her platelet function abnormality. Someone from that clinic will be calling to schedule an appointment for sometime in May.  Patient expressed understanding and denies further questions.  Charline rGady  RN, BSN, OCN

## 2021-03-31 ENCOUNTER — TELEPHONE (OUTPATIENT)
Dept: FAMILY MEDICINE | Facility: CLINIC | Age: 61
End: 2021-03-31

## 2021-03-31 NOTE — TELEPHONE ENCOUNTER
Patient Quality Outreach Summary      Summary:    Patient is due/failing the following:   Colonoscopy and Breast Cancer Screening - Mammogram    Type of outreach:    Sent Education Everytime message.    Questions for provider review:    None                                                                                                                    Funmi Putnam CMA       Chart routed to Care Team.

## 2021-04-04 DIAGNOSIS — T14.8XXA BRUISING: ICD-10-CM

## 2021-04-04 DIAGNOSIS — R79.1 ABNORMAL COAGULATION PROFILE: Primary | ICD-10-CM

## 2021-04-08 ENCOUNTER — PRE VISIT (OUTPATIENT)
Dept: ONCOLOGY | Facility: CLINIC | Age: 61
End: 2021-04-08

## 2021-04-16 ENCOUNTER — MYC MEDICAL ADVICE (OUTPATIENT)
Dept: FAMILY MEDICINE | Facility: CLINIC | Age: 61
End: 2021-04-16

## 2021-04-16 DIAGNOSIS — E61.1 IRON DEFICIENCY: ICD-10-CM

## 2021-04-16 DIAGNOSIS — R53.83 FATIGUE, UNSPECIFIED TYPE: ICD-10-CM

## 2021-04-16 DIAGNOSIS — R79.1 ABNORMAL COAGULATION PROFILE: ICD-10-CM

## 2021-04-16 DIAGNOSIS — L29.9 PRURITUS: ICD-10-CM

## 2021-04-16 DIAGNOSIS — T14.8XXA BRUISING: ICD-10-CM

## 2021-04-16 DIAGNOSIS — R58 ECCHYMOSIS: ICD-10-CM

## 2021-04-16 DIAGNOSIS — R23.3 EASY BRUISABILITY: ICD-10-CM

## 2021-04-16 LAB
APTT IMM NP PPP: NORMAL SEC (ref 31–45)
APTT IMM NP PPP: NORMAL SEC (ref 31–45)
CARDIOLIPIN ANTIBODY IGG: <1.6 GPL-U/ML (ref 0–19.9)
CARDIOLIPIN ANTIBODY IGM: 0.4 MPL-U/ML (ref 0–19.9)
ERYTHROCYTE [DISTWIDTH] IN BLOOD BY AUTOMATED COUNT: 15 % (ref 10–15)
HCT VFR BLD AUTO: 41.5 % (ref 35–47)
HGB BLD-MCNC: 13.7 G/DL (ref 11.7–15.7)
INR PPP: 1.01 (ref 0.86–1.14)
MCH RBC QN AUTO: 31.4 PG (ref 26.5–33)
MCHC RBC AUTO-ENTMCNC: 33 G/DL (ref 31.5–36.5)
MCV RBC AUTO: 95 FL (ref 78–100)
PLATELET # BLD AUTO: 296 10E9/L (ref 150–450)
PT IMM NP PPP: 12.9 S
PT IMM NP PPP: NORMAL S
PT IMM NP PPP: NORMAL S (ref 0.86–1.14)
RBC # BLD AUTO: 4.37 10E12/L (ref 3.8–5.2)
WBC # BLD AUTO: 6.9 10E9/L (ref 4–11)

## 2021-04-16 PROCEDURE — 85027 COMPLETE CBC AUTOMATED: CPT | Performed by: PATHOLOGY

## 2021-04-16 PROCEDURE — 85730 THROMBOPLASTIN TIME PARTIAL: CPT | Mod: 90 | Performed by: PATHOLOGY

## 2021-04-16 PROCEDURE — 86146 BETA-2 GLYCOPROTEIN ANTIBODY: CPT | Mod: 90 | Performed by: PATHOLOGY

## 2021-04-16 PROCEDURE — 36415 COLL VENOUS BLD VENIPUNCTURE: CPT | Performed by: PATHOLOGY

## 2021-04-16 PROCEDURE — 85597 PHOSPHOLIPID PLTLT NEUTRALIZ: CPT | Mod: 90 | Performed by: PATHOLOGY

## 2021-04-16 PROCEDURE — 85576 BLOOD PLATELET AGGREGATION: CPT | Mod: 26 | Performed by: PATHOLOGY

## 2021-04-16 PROCEDURE — 84165 PROTEIN E-PHORESIS SERUM: CPT | Mod: 26 | Performed by: PATHOLOGY

## 2021-04-16 PROCEDURE — 85732 THROMBOPLASTIN TIME PARTIAL: CPT | Mod: 90 | Performed by: PATHOLOGY

## 2021-04-16 PROCEDURE — 85390 FIBRINOLYSINS SCREEN I&R: CPT | Mod: 26 | Performed by: PATHOLOGY

## 2021-04-16 PROCEDURE — 83520 IMMUNOASSAY QUANT NOS NONAB: CPT | Mod: 90 | Performed by: PATHOLOGY

## 2021-04-16 PROCEDURE — 85610 PROTHROMBIN TIME: CPT | Mod: 90 | Performed by: PATHOLOGY

## 2021-04-16 PROCEDURE — 82728 ASSAY OF FERRITIN: CPT | Performed by: PATHOLOGY

## 2021-04-16 PROCEDURE — 85613 RUSSELL VIPER VENOM DILUTED: CPT | Mod: 90 | Performed by: PATHOLOGY

## 2021-04-16 PROCEDURE — 99000 SPECIMEN HANDLING OFFICE-LAB: CPT | Performed by: PATHOLOGY

## 2021-04-16 PROCEDURE — 86147 CARDIOLIPIN ANTIBODY EA IG: CPT | Mod: 90 | Performed by: PATHOLOGY

## 2021-04-19 LAB
ALBUMIN SERPL ELPH-MCNC: 4 G/DL (ref 3.7–5.1)
ALPHA1 GLOB SERPL ELPH-MCNC: 0.4 G/DL (ref 0.2–0.4)
ALPHA2 GLOB SERPL ELPH-MCNC: 0.9 G/DL (ref 0.5–0.9)
B-GLOBULIN SERPL ELPH-MCNC: 0.8 G/DL (ref 0.6–1)
FERRITIN SERPL-MCNC: 376 NG/ML (ref 8–252)
GAMMA GLOB SERPL ELPH-MCNC: 0.9 G/DL (ref 0.7–1.6)
LA PPP-IMP: NEGATIVE
M PROTEIN SERPL ELPH-MCNC: 0 G/DL
PROT PATTERN SERPL ELPH-IMP: NORMAL

## 2021-04-20 ENCOUNTER — TELEPHONE (OUTPATIENT)
Dept: HEMATOLOGY | Facility: CLINIC | Age: 61
End: 2021-04-20

## 2021-04-20 DIAGNOSIS — D69.1 PLATELET GRANULE DEFECT (H): Primary | ICD-10-CM

## 2021-04-20 LAB
PA AA BLD-ACNC: NORMAL
PA ADP BLD-ACNC: NORMAL
PA COLL PRP QL: NORMAL
PA EPINEPH PRP QL: NORMAL
PA RIST PRP QL: NORMAL
PA THROMBIN PRP: NORMAL

## 2021-04-20 NOTE — TELEPHONE ENCOUNTER
After review abnormal of platelet aggregation studies RN spoke to  about medications she was taking prior to the study. She reported no NSAID use but that she was taking Tylenol daily for her migraines. I had her review the ingredients and this Tylenol product contained 265 mg of aspirin. After updating Dr. Grimes I spoke again to Mrs. Amador and the plan is to repeat the testing after 10 days holding that Tylenol product and the Celebrex. Special coagulation laboratory notified.

## 2021-04-21 LAB
B2 GLYCOPROT1 IGG SERPL IA-ACNC: 0.7 U/ML
B2 GLYCOPROT1 IGM SERPL IA-ACNC: <2.9 U/ML

## 2021-04-22 ENCOUNTER — MYC MEDICAL ADVICE (OUTPATIENT)
Dept: FAMILY MEDICINE | Facility: CLINIC | Age: 61
End: 2021-04-22

## 2021-04-26 ENCOUNTER — MYC MEDICAL ADVICE (OUTPATIENT)
Dept: FAMILY MEDICINE | Facility: CLINIC | Age: 61
End: 2021-04-26

## 2021-04-26 NOTE — PROGRESS NOTES
Jacquie is a 60 year old who is being evaluated via a billable video visit.      How would you like to obtain your AVS? MyChart  If the video visit is dropped, the invitation should be resent by: Text to cell phone: [phone]  Will anyone else be joining your video visit? No      Video Start Time: 8:46 AM    Assessment & Plan     Cellulitis of left upper extremity  Patient reports multiple blood draws to the left arm.  Starting after that she has noticed a lump as well as some redness.  She is not having any fevers or chills.  No warmth noted to her.  Reviewed the pictures and can see some slight redness surrounding the antecubital fossa.  Recommend treating with doxycycline for potential cellulitis.  She will watch for fevers or chills, nausea or vomiting, or any other concerns.  Discussed she may have a hematoma or small clot that is causing her issues.  She can use heat over this area.  If she notices increasing size or increasing tenderness she will need a in person visit.  - doxycycline hyclate (VIBRAMYCIN) 100 MG capsule; Take 1 capsule (100 mg) by mouth 2 times daily for 7 days    Pain in joint, ankle and foot, right  Discussed with patient that this is a difficult exam to do by video.  She has previously been seen for this right ankle pain and problems with swelling.  It has been worsened since she has been off her Celebrex.  She is off the Celebrex to obtain platelet labs with hematology.  Discussed seeing podiatry for further evaluation as we have not proceeded with this in the past.  She would like to proceed.  Referral placed.  - Orthopedic [facility] Spine  Referral; Future    Multiple joint pains  Patient is concerned about her receipt of [employer].  She has osteoarthritis which makes it difficult for her to stand for long periods or sit for long periods as well as walking even moderate distances.  Especially as were evaluating her current other concerns and needing to be off the Celebrex it has  "become more acutely an issue.  Letter is written for excuse from jury duty.             No follow-ups on file.    Liz Peterson MD  [facility] Dhillon is a 60 year old who presents for the following health issues     HPI     Letter or note to excuse her from jury summons  Would like this to say forever  Reports \"no way I can do jury duty physically or mentally\".   Has significant arthritis that makes it difficult for her to sit for more than 30-40 minutes without a break.   Has difficulty with walking long distances and stairs.   Has eye issue that she is currently working through with an eye doctor. Has difficulty with lights at this time.   Has active medical concerns that are currently being worked.      Some swelling in inside of left elbow and did go up arm but has been going down and feels lumpy   Stated she has had blood taken a lot in the last month or so in that arm  No warmth, or bruising. Has a lump that she can move around and is slightly tender.   Took benadryl and iced it          Review of Systems         Objective           Vitals:  No vitals were obtained today due to virtual visit.    Physical Exam   GENERAL: Healthy, alert and no distress  EYES: Eyes grossly normal to inspection.  No discharge or erythema, or obvious scleral/conjunctival abnormalities.  RESP: No audible wheeze, cough, or visible cyanosis.  No visible retractions or increased work of breathing.    SKIN: Reviewed picture of her left antecubital fossa.  She has erythema that is surrounding that she denies any warmth to the area  NEURO: Cranial nerves grossly intact.  Mentation and speech appropriate for age.  PSYCH: Mentation appears normal, affect normal/bright, judgement and insight intact, normal speech and appearance well-groomed.  Picture right ankle noted.  Difficult to assess but patient reports continued pain and swelling laterally.  This has been evaluated in the clinic " previously              Video-Visit Details    Type of service:  Video Visit    Video End Time:9:02 AM    Originating Location (pt. Location): Home    Distant Location (provider location):  [facility]     Platform used for Video Visit: William

## 2021-04-26 NOTE — TELEPHONE ENCOUNTER
Sent mychart.   Provider please review and advise if you can work her in.    Marie Laura CMA ([facility])

## 2021-04-27 ENCOUNTER — MYC MEDICAL ADVICE (OUTPATIENT)
Dept: FAMILY MEDICINE | Facility: CLINIC | Age: 61
End: 2021-04-27

## 2021-04-27 ENCOUNTER — VIRTUAL VISIT (OUTPATIENT)
Dept: FAMILY MEDICINE | Facility: CLINIC | Age: 61
End: 2021-04-27
Payer: COMMERCIAL

## 2021-04-27 DIAGNOSIS — M25.50 MULTIPLE JOINT PAIN: ICD-10-CM

## 2021-04-27 DIAGNOSIS — L03.114 CELLULITIS OF LEFT UPPER EXTREMITY: Primary | ICD-10-CM

## 2021-04-27 DIAGNOSIS — M25.571 PAIN IN JOINT, ANKLE AND FOOT, RIGHT: ICD-10-CM

## 2021-04-27 PROCEDURE — 99214 OFFICE O/P EST MOD 30 MIN: CPT | Mod: 95 | Performed by: FAMILY MEDICINE

## 2021-04-27 RX ORDER — DOXYCYCLINE 100 MG/1
100 CAPSULE ORAL 2 TIMES DAILY
Qty: 14 CAPSULE | Refills: 0 | Status: SHIPPED | OUTPATIENT
Start: 2021-04-27 | End: 2021-05-05

## 2021-04-27 NOTE — LETTER
April 27, 2021      Jacquie Amador  [address] [address] [address]        To Whom It May Concern,         Jacquie Amador is currently under my general medical care and has been for many years.     She is unsuitable for jury duty at this time due to chronic medical conditions which cause difficulty for her with prolonged sitting, and standing. These conditions also make it difficult for her to walk moderate distances. She also has a medical condition which causes her problems with lights.     I do not expect any change in her conditions in the near future.       Sincerely,        Liz Peterson MD

## 2021-04-29 ENCOUNTER — VIRTUAL VISIT (OUTPATIENT)
Dept: DERMATOLOGY | Facility: CLINIC | Age: 61
End: 2021-04-29
Payer: COMMERCIAL

## 2021-04-29 ENCOUNTER — TELEPHONE (OUTPATIENT)
Dept: HEMATOLOGY | Facility: CLINIC | Age: 61
End: 2021-04-29

## 2021-04-29 ENCOUNTER — MYC MEDICAL ADVICE (OUTPATIENT)
Dept: DERMATOLOGY | Facility: CLINIC | Age: 61
End: 2021-04-29

## 2021-04-29 DIAGNOSIS — D69.1 PLATELET GRANULE DEFECT (H): ICD-10-CM

## 2021-04-29 DIAGNOSIS — L85.3 XEROSIS OF SKIN: Primary | ICD-10-CM

## 2021-04-29 LAB — LAB SCANNED RESULT: NORMAL

## 2021-04-29 PROCEDURE — 99000 SPECIMEN HANDLING OFFICE-LAB: CPT | Performed by: PATHOLOGY

## 2021-04-29 PROCEDURE — 99213 OFFICE O/P EST LOW 20 MIN: CPT | Mod: 95 | Performed by: DERMATOLOGY

## 2021-04-29 PROCEDURE — 36415 COLL VENOUS BLD VENIPUNCTURE: CPT | Mod: 90 | Performed by: PATHOLOGY

## 2021-04-29 PROCEDURE — 85390 FIBRINOLYSINS SCREEN I&R: CPT | Mod: 26 | Performed by: PATHOLOGY

## 2021-04-29 PROCEDURE — 85260 CLOT FACTOR X STUART-POWER: CPT | Mod: 90 | Performed by: PATHOLOGY

## 2021-04-29 PROCEDURE — 85220 BLOOC CLOT FACTOR V TEST: CPT | Mod: 90 | Performed by: PATHOLOGY

## 2021-04-29 PROCEDURE — 85576 BLOOD PLATELET AGGREGATION: CPT | Mod: 26 | Performed by: PATHOLOGY

## 2021-04-29 PROCEDURE — 83520 IMMUNOASSAY QUANT NOS NONAB: CPT | Mod: 90 | Performed by: PATHOLOGY

## 2021-04-29 PROCEDURE — 85250 CLOT FACTOR IX PTC/CHRSTMAS: CPT | Mod: 90 | Performed by: PATHOLOGY

## 2021-04-29 PROCEDURE — 88348 ELECTRON MICROSCOPY DX: CPT | Mod: 90 | Performed by: PATHOLOGY

## 2021-04-29 NOTE — LETTER
4/29/2021         RE: Jacquie Amador  [address]  [place] [address]        Dear Colleague,    Thank you for referring your patient, Jacquie Amador, to the [facility]. Please see a copy of my visit note below.    Teledermatology Nurse Call Patients:     Are you  in the [facility] at the time of the encounter? yes    Today's visit will be billed to you and your insurance.    A teledermatology visit is not as thorough as an in-person visit and the quality of the photograph sent may not be of the same quality as that taken by the dermatology clinic.      Patient follow up to discuss labs.     Giovanna Foster LPN    [facility] Dermatology Note  Encounter Date: Apr 29, 2021  Telephone ([phone]  ). Location of teledermatologist: [facility]. Start time: 4:58pm. End time: 5:!3pm.    Dermatology Problem List:  1. Skin lesions consistent with excoriations, erosions  -termed pickers nodules by [facility]  2. Ecchymosis  3. Biopsy, [facility], pending  4/. No fam of skin cancer  ____________________________________________     Assessment & Plan:      1. Skin lesions consistent with excoriations, erosions-[facility] PH[facility] OF FACE. Reports face is improved with nose healed but scar there. Also 2 lesions are left on arms.   -derm path reviewed and consistent with excoriation, see 3/5/2021 upload  -Pruritus work up to be obtained or reviewed and as follows: CBC with diff, LFTs, alk phosph, bili, LDH,  TSH with reflex T4, ferritin, SPEP, BUN/CR, and CXR (hx of smoking).   -hepatitis C and B testing, HIV declined testing  -we have notified Dr. Lara regarding option of SSRIS and Acetylcysteine   -mupirocin twice daily may be continued  -consider phototherapy-  to check these areas (arms for skin cancer)  -mupirocin  -neutrogena hydroboost is okay trial  -Dr. Carlos Silva  [facility] referral, consider evaluation there for hydracial        2. Ulceration on nose, due to above- reports resolved  -much improved compared to prior photos  -mupirocin ointment twice daily until seeing Dr. Mason    3. Scarring  -consider fractionated 1550nm laser treatments    4. Make up referral to [facility]    Procedures Performed:    None    Follow-up: 3 months photos and phone, call if worsening, refuses in person exam at this time    Staff:     Provider Disclosure:   The documentation recorded by the scribe accurately reflects the services I personally performed and the decisions made by me.    Natty Diez MD    Department of Dermatology  [facility]: Phone: [phone], Fax:[phone]  [facility]: Phone: [phone], Fax: [phone]      ____________________________________________    CC: No chief complaint on file.  arms  HPI:  Ms. Jacquie Amador is a(n) 60 year old female who presents today as a return patient for arms. She feels skin is better. She has just a couple active spots on the face. She did not send photos of that.     Patient is otherwise feeling well, without additional skin concerns.    Labs Reviewed:  NA    Physical Exam:  Vitals: LMP 07/17/2009 (Exact Date)   SKIN: Teledermatology photos were reviewed; image quality and interpretability: acceptable. Image date: 4/27.  -xerosis on extremities, no facial photo is availble  - No other lesions of concern on areas examined.     Medications:  Current Outpatient Medications   Medication     adapalene (DIFFERIN) 0.1 % gel     albuterol (PROVENTIL) (2.5 MG/3ML) 0.083% neb solution     albuterol (VENTOLIN HFA) 108 (90 Base) MCG/ACT inhaler     ALPRAZolam (XANAX) 0.25 MG tablet     aluminum chloride (DRYSOL) 20 % external solution     atorvastatin (LIPITOR) 20 MG tablet     buPROPion (WELLBUTRIN SR) 100 MG 12 hr  tablet     buPROPion (WELLBUTRIN SR) 200 MG 12 hr tablet     chlorhexidine (PERIDEX) 0.12 % solution     clindamycin (CLINDAMAX) 1 % external gel     clobetasol (TEMOVATE) 0.05 % external cream     clotrimazole (MYCELEX) 10 MG lozenge     diclofenac (VOLTAREN) 1 % topical gel     doxycycline hyclate (VIBRAMYCIN) 100 MG capsule     DULoxetine (CYMBALTA) 60 MG capsule     ferrous sulfate (FE TABS) 325 (65 Fe) MG EC tablet     hydrocortisone 2.5 % cream     Lactobacillus (ACIDOPHILUS) CAPS     losartan-hydrochlorothiazide (HYZAAR) 50-12.5 MG tablet     medical cannabis (Patient's own supply.  Not a prescription)     metoprolol succinate ER (TOPROL-XL) 50 MG 24 hr tablet     montelukast (SINGULAIR) 10 MG tablet     multivitamin w/minerals (MULTI-VITAMIN) tablet     mupirocin (BACTROBAN) 2 % external cream     mupirocin (BACTROBAN) 2 % external ointment     nystatin (MYCOSTATIN) 737734 UNIT/GM external cream     rizatriptan (MAXALT) 10 MG tablet     rOPINIRole (REQUIP) 1 MG tablet     SUMAtriptan (IMITREX) 100 MG tablet     traZODone (DESYREL) 50 MG tablet     triamcinolone (ARISTOCORT HP) 0.5 % external cream     triamcinolone (KENALOG) 0.1 % paste     ZYRTEC 10 MG OR TABS     mometasone-formoterol (DULERA) 200-5 MCG/ACT inhaler     No current facility-administered medications for this visit.       Past Medical/Surgical History:   Patient Active Problem List   Diagnosis     Moderate persistent asthma     Allergic rhinitis due to other allergen     Esophageal reflux     Moderate recurrent major depression (H)     Multiple joint pain     HYPERLIPIDEMIA LDL GOAL <130     Hypertension goal BP (blood pressure) < 140/90     MARIZOL (generalized anxiety disorder)     Myalgia     Chronic rhinitis     Constipation     Lumbar disc disease with radiculopathy     Iron deficiency anemia     Osteoarthritis of carpometacarpal joint of thumb - bilateral     Trochanteric bursitis     Right ankle instability     Primary focal hyperhidrosis      Trochanteric bursitis of both hips     Overweight     Iron deficiency     Scratching     Advanced directives, counseling/discussion     Chronic pain syndrome     Medical marijuana use     Primary osteoarthritis of both first carpometacarpal joints     Arthritis of metatarsophalangeal joint     Sinus tachycardia     Intractable chronic migraine without aura and without status migrainosus     Impaired fasting glucose     Migraine without aura and without status migrainosus, not intractable     Skin ulcer, limited to breakdown of skin (H)     Past Medical History:   Diagnosis Date     ASTHMA - MODERATE PERSISTENT 9/21/2005     Cancer (H)      Chronic pain      Coronary artery disease      CVA (cerebral infarction)      Depressive disorder, not elsewhere classified      Diabetes (H)      Elevated serum alkaline phosphatase level     Liver source     Fibromyalgia      HYPERLIPIDEMIA NEC/NOS 12/29/2006     Hypertriglyceridemia      OA (osteoarthritis)      Thyroid disease      Tobacco use disorder      Tobacco use disorder complicating pregnancy, childbirth, or the puerperium, antepartum condition or complication      Trochanteric bursitis      Unspecified essential hypertension        CC No referring provider defined for this encounter. on close of this encounter.                                                                                                                                                                                                                                    Again, thank you for allowing me to participate in the care of your patient.        Sincerely,        Natty Diez MD

## 2021-04-29 NOTE — PROGRESS NOTES
Teledermatology Nurse Call Patients:     Are you  in the state [facility] at the time of the encounter? yes    Today's visit will be billed to you and your insurance.    A teledermatology visit is not as thorough as an in-person visit and the quality of the photograph sent may not be of the same quality as that taken by the dermatology clinic.      Patient follow up to discuss labs.     Giovanna Foster LPN    [facility] Dermatology Note  Encounter Date: Apr 29, 2021  Telephone ([phone]  ). Location of teledermatologist: [facility]. Start time: 4:58pm. End time: 5:!3pm.    Dermatology Problem List:  1. Skin lesions consistent with excoriations, erosions  -termed pickers nodules by [facility]  2. Ecchymosis  3. Biopsy, [facility], pending  4/. No fam of skin cancer  ____________________________________________     Assessment & Plan:      1. Skin lesions consistent with excoriations, erosions-[facility]- NO PHOTO OF FACE. Reports face is improved with nose healed but scar there. Also 2 lesions are left on arms.   -derm path reviewed and consistent with excoriation, see 3/5/2021 upload  -Pruritus work up to be obtained or reviewed and as follows: CBC with diff, LFTs, alk phosph, bili, LDH,  TSH with reflex T4, ferritin, SPEP, BUN/CR, and CXR (hx of smoking).   -hepatitis C and B testing, HIV declined testing  -we have notified Dr. Lara regarding option of SSRIS and Acetylcysteine   -mupirocin twice daily may be continued  -consider phototherapy-  to check these areas (arms for skin cancer)  -mupirocin  -neutrogena hydroboost is okay trial  -Dr. Carlos Silva Eagen referral, consider evaluation there for hydracial   ADDENDUM 9/16/2021: The patient may have Porphyria per rheum. If she returns to clinic do not start phototherapy, consider punch biopsy     2. Ulceration on nose, due to above- reports  resolved  -much improved compared to prior photos  -mupirocin ointment twice daily until seeing Dr. Mason    3. Scarring  -consider fractionated 1550nm laser treatments    4. Make up referral to dahlia [facility]    Procedures Performed:    None    Follow-up: 3 months photos and phone, call if worsening, refuses in person exam at this time    Staff:     Provider Disclosure:   The documentation recorded by the scribe accurately reflects the services I personally performed and the decisions made by me.    Natty Diez MD    Department of Dermatology  [facility]: Phone: [phone], Fax:[phone]  [facility]: Phone: [phone], Fax: [phone]      ____________________________________________    CC: No chief complaint on file.  arms  HPI:  Ms. Jacquie Amador is a(n) 60 year old female who presents today as a return patient for arms. She feels skin is better. She has just a couple active spots on the face. She did not send photos of that.     Patient is otherwise feeling well, without additional skin concerns.    Labs Reviewed:  NA    Physical Exam:  Vitals: LMP 07/17/2009 (Exact Date)   SKIN: Teledermatology photos were reviewed; image quality and interpretability: acceptable. Image date: 4/27.  -xerosis on extremities, no facial photo is availble  - No other lesions of concern on areas examined.     Medications:  Current Outpatient Medications   Medication     adapalene (DIFFERIN) 0.1 % gel     albuterol (PROVENTIL) (2.5 MG/3ML) 0.083% neb solution     albuterol (VENTOLIN HFA) 108 (90 Base) MCG/ACT inhaler     ALPRAZolam (XANAX) 0.25 MG tablet     aluminum chloride (DRYSOL) 20 % external solution     atorvastatin (LIPITOR) 20 MG tablet     buPROPion (WELLBUTRIN SR) 100 MG 12 hr tablet     buPROPion (WELLBUTRIN SR) 200 MG 12 hr tablet     chlorhexidine (PERIDEX) 0.12 % solution      clindamycin (CLINDAMAX) 1 % external gel     clobetasol (TEMOVATE) 0.05 % external cream     clotrimazole (MYCELEX) 10 MG lozenge     diclofenac (VOLTAREN) 1 % topical gel     doxycycline hyclate (VIBRAMYCIN) 100 MG capsule     DULoxetine (CYMBALTA) 60 MG capsule     ferrous sulfate (FE TABS) 325 (65 Fe) MG EC tablet     hydrocortisone 2.5 % cream     Lactobacillus (ACIDOPHILUS) CAPS     losartan-hydrochlorothiazide (HYZAAR) 50-12.5 MG tablet     medical cannabis (Patient's own supply.  Not a prescription)     metoprolol succinate ER (TOPROL-XL) 50 MG 24 hr tablet     montelukast (SINGULAIR) 10 MG tablet     multivitamin w/minerals (MULTI-VITAMIN) tablet     mupirocin (BACTROBAN) 2 % external cream     mupirocin (BACTROBAN) 2 % external ointment     nystatin (MYCOSTATIN) 278781 UNIT/GM external cream     rizatriptan (MAXALT) 10 MG tablet     rOPINIRole (REQUIP) 1 MG tablet     SUMAtriptan (IMITREX) 100 MG tablet     traZODone (DESYREL) 50 MG tablet     triamcinolone (ARISTOCORT HP) 0.5 % external cream     triamcinolone (KENALOG) 0.1 % paste     ZYRTEC 10 MG OR TABS     mometasone-formoterol (DULERA) 200-5 MCG/ACT inhaler     No current facility-administered medications for this visit.       Past Medical/Surgical History:   Patient Active Problem List   Diagnosis     Moderate persistent asthma     Allergic rhinitis due to other allergen     Esophageal reflux     Moderate recurrent major depression (H)     Multiple joint pain     HYPERLIPIDEMIA LDL GOAL <130     Hypertension goal BP (blood pressure) < 140/90     MARIZOL (generalized anxiety disorder)     Myalgia     Chronic rhinitis     Constipation     Lumbar disc disease with radiculopathy     Iron deficiency anemia     Osteoarthritis of carpometacarpal joint of thumb - bilateral     Trochanteric bursitis     Right ankle instability     Primary focal hyperhidrosis     Trochanteric bursitis of both hips     Overweight     Iron deficiency     Scratching     Advanced  directives, counseling/discussion     Chronic pain syndrome     Medical marijuana use     Primary osteoarthritis of both first carpometacarpal joints     Arthritis of metatarsophalangeal joint     Sinus tachycardia     Intractable chronic migraine without aura and without status migrainosus     Impaired fasting glucose     Migraine without aura and without status migrainosus, not intractable     Skin ulcer, limited to breakdown of skin (H)     Past Medical History:   Diagnosis Date     ASTHMA - MODERATE PERSISTENT 9/21/2005     Cancer (H)      Chronic pain      Coronary artery disease      CVA (cerebral infarction)      Depressive disorder, not elsewhere classified      Diabetes (H)      Elevated serum alkaline phosphatase level     Liver source     Fibromyalgia      HYPERLIPIDEMIA NEC/NOS 12/29/2006     Hypertriglyceridemia      OA (osteoarthritis)      Thyroid disease      Tobacco use disorder      Tobacco use disorder complicating pregnancy, childbirth, or the puerperium, antepartum condition or complication      Trochanteric bursitis      Unspecified essential hypertension        CC No referring provider defined for this encounter. on close of this encounter.

## 2021-04-29 NOTE — PATIENT INSTRUCTIONS
[facility] Dermatology Visit    Thank you for allowing us to participate in your care. Your findings, instructions and follow-up plan are as follows:         When should I call my doctor?    If you are worsening or not improving, please, contact us or seek urgent care as noted below.     Who should I call with questions (adults)?    [facility] (adult and pediatric): [phone]     [facility] (adult): [phone]    For urgent needs outside of business hours call the [facility] at [phone] and ask for the dermatology resident on call    If this is a medical emergency and you are unable to reach an ER, Call 911      Who should I call with questions (pediatric)?  [facility]- Pediatric Dermatology  Dr. Cecy Alvarez, Dr. Raphael Lauren, Dr. Susana Mcallister, Rosalina Galarza, PA  Dr. Mireya Maharaj, Dr. Staci Nathan & Dr. Ishmael Fuentes  Non Urgent  Nurse Triage Line; [phone]- Michelle and Susie RN Care Coordinators   Fara (/Complex ) [phone]    If you need a prescription refill, please contact your pharmacy. Refills are approved or denied by our Physicians during normal business hours, Monday through Fridays  Per office policy, refills will not be granted if you have not been seen within the past year (or sooner depending on your child's condition)    Scheduling Information:  Pediatric Appointment Scheduling and Call Center [phone]  Radiology Scheduling- [phone]  Sedation Unit Scheduling- [phone]  [place] Scheduling- General [phone]; Pediatric Dermatology [phone]  Main  Services: [phone]  Portuguese: [phone]  Nigerian: [phone]  Hmong/Pashto/Yakut: [phone]  Preadmission Nursing Department Fax Number: [phone] (Fax all pre-operative paperwork to this number)    For urgent matters arising during evenings,  weekends, or holidays that cannot wait for normal business hours please call [phone] and ask for the Dermatology Resident On-Call to be paged.

## 2021-04-29 NOTE — TELEPHONE ENCOUNTER
RN spoke to Mrs. 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 She will have repeat platelet aggregation studies today a 9:30 am at the [facility]. Patient aware that she can resume previous medications after her appointment this morning.

## 2021-04-30 LAB
FACT IX ACT/NOR PPP: 166 % (ref 65–150)
FACT V ACT/NOR PPP: 78 % (ref 60–140)
FACT X ACT/NOR PPP: 128 % (ref 60–140)
MISCELLANEOUS TEST: NORMAL
PA AA BLD-ACNC: NORMAL
PA ADP BLD-ACNC: NORMAL
PA COLL PRP QL: NORMAL
PA EPINEPH PRP QL: NORMAL
PA RIST PRP QL: NORMAL
PA THROMBIN PRP: NORMAL

## 2021-05-04 ENCOUNTER — MYC MEDICAL ADVICE (OUTPATIENT)
Dept: FAMILY MEDICINE | Facility: CLINIC | Age: 61
End: 2021-05-04

## 2021-05-04 DIAGNOSIS — L03.114 CELLULITIS OF LEFT UPPER EXTREMITY: ICD-10-CM

## 2021-05-04 LAB
RESULT: NORMAL
SEND OUTS MISC TEST CODE: NORMAL
SEND OUTS MISC TEST SPECIMEN: NORMAL
TEST NAME: NORMAL

## 2021-05-05 RX ORDER — DOXYCYCLINE 100 MG/1
100 CAPSULE ORAL 2 TIMES DAILY
Qty: 14 CAPSULE | Refills: 0 | Status: SHIPPED | OUTPATIENT
Start: 2021-05-05 | End: 2021-06-16

## 2021-05-12 LAB — LAB SCANNED RESULT: NORMAL

## 2021-05-17 ENCOUNTER — TELEPHONE (OUTPATIENT)
Dept: HEMATOLOGY | Facility: CLINIC | Age: 61
End: 2021-05-17

## 2021-05-17 NOTE — TELEPHONE ENCOUNTER
RN called Mrs. Amador to request that her 6/2/2021 8:30 am visit with Dr. Grimes be in person rather then video. She agreed to come to the clinic. I also asked that she bring with her all her medications and supplements (not a list, but the actual bottles and boxes ) so that they could be evaluated.

## 2021-05-28 ENCOUNTER — MYC MEDICAL ADVICE (OUTPATIENT)
Dept: FAMILY MEDICINE | Facility: CLINIC | Age: 61
End: 2021-05-28

## 2021-05-28 DIAGNOSIS — K21.9 GASTROESOPHAGEAL REFLUX DISEASE, UNSPECIFIED WHETHER ESOPHAGITIS PRESENT: Primary | ICD-10-CM

## 2021-05-28 RX ORDER — FAMOTIDINE 40 MG/1
40 TABLET, FILM COATED ORAL DAILY
Qty: 90 TABLET | Refills: 1 | Status: SHIPPED | OUTPATIENT
Start: 2021-05-28 | End: 2021-11-11

## 2021-06-03 ENCOUNTER — MYC MEDICAL ADVICE (OUTPATIENT)
Dept: FAMILY MEDICINE | Facility: CLINIC | Age: 61
End: 2021-06-03

## 2021-06-03 NOTE — TELEPHONE ENCOUNTER
"Her wheezing has been getting better.  She has had symptoms of sinus congestion- this has not gotten any better.    Her blood pressure has been lower and she felt \"off\".    She has been laying down since she last took it and it was 107/64 pulse 88 at 4:18 pm.    She does not feel faint, no fever, she does not feel dizzy. She does not feel weak right now.    Appointment made to follow up on her sinus congestion tommorrow.    Emi Gomez RN on 6/3/2021 at 4:42 PM    "

## 2021-06-04 ENCOUNTER — TELEPHONE (OUTPATIENT)
Dept: FAMILY MEDICINE | Facility: CLINIC | Age: 61
End: 2021-06-04

## 2021-06-04 NOTE — TELEPHONE ENCOUNTER
Huddled with provider:    Cut back on Metoprolol just over the weekend and see how it goes. Monitor blood pressure and if it goes to high or heart rate elevate further then go back to regular dose.     Start taking Metoprolol:    25 mg in morning and 25 mg in evening.     Only take third 25 mg dose if BP or HR are increasing too high.    [facility] for you. Please make a nurse visit only to have your blood pressure checked in clinic and bring your machine from home with you to compare to clinic reading.     MICHELLE Arreguin/[place] [facility]

## 2021-06-04 NOTE — TELEPHONE ENCOUNTER
RN Triage    Patient Contact    Attempt # 1    Was call answered?  No.  Left message on voicemail with information to call me back.    Please transfer call to triage. Give message below to patient.    MICHELLE Arreguin/[place] [facility]

## 2021-06-04 NOTE — TELEPHONE ENCOUNTER
"Patient calling about blood pressure. Blood pressure being off for the past week or so. Last night \"felt really weird.\" BP was 100/69 last night and she repeated it with the same. 115/91 and 91 pulse and that was today. COVID test week ago due to not feeling so hot. BP there was 160/108, which is odd due to lower ones patient is getting at home. Taking Metoprolol and does not seem to be lowering heart rate. Taking 50 mg in morning, 25 mg in evening. Has her daughter's wedding coming up and cannot be feeling like this.    Has been having feelings of SOB and \"I just don't feel normal.\" Thinks she may have had dizziness. States she just feels off and does not know how to explain it. This has happened in past when BP dropped really low. BP medication was changed at that time. Has things    In the last week BP's and HR's have been:    94/64  105/68  100/71  100/75  115/80  94/69    93  94  92  91  88  90    PLAN: huddle with provider    MICHELLE Arreguin/[place] [facility]    "

## 2021-06-05 DIAGNOSIS — E78.5 HYPERLIPIDEMIA LDL GOAL <130: ICD-10-CM

## 2021-06-07 ENCOUNTER — ALLIED HEALTH/NURSE VISIT (OUTPATIENT)
Dept: FAMILY MEDICINE | Facility: CLINIC | Age: 61
End: 2021-06-07
Payer: COMMERCIAL

## 2021-06-07 VITALS — HEART RATE: 99 BPM | SYSTOLIC BLOOD PRESSURE: 126 MMHG | DIASTOLIC BLOOD PRESSURE: 76 MMHG

## 2021-06-07 DIAGNOSIS — I10 ESSENTIAL HYPERTENSION WITH GOAL BLOOD PRESSURE LESS THAN 140/90: Primary | ICD-10-CM

## 2021-06-07 PROCEDURE — 99207 PR NO CHARGE NURSE ONLY: CPT

## 2021-06-07 RX ORDER — ATORVASTATIN CALCIUM 20 MG/1
20 TABLET, FILM COATED ORAL DAILY
Qty: 90 TABLET | Refills: 0 | Status: SHIPPED | OUTPATIENT
Start: 2021-06-07 | End: 2021-09-10

## 2021-06-07 NOTE — PROGRESS NOTES
Chief Complaint   Patient presents with     Allied Health Visit     BP Check     Jacquie Amador is a 60 year old patient who comes in today for a Blood Pressure check.  Initial BP:  /76   Pulse 99   LMP 07/17/2009 (Exact Date)      99  Disposition: follow-up as previously indicated by provider. Pt is wondering if she needs to stop her metoprolol or losartan.     Marie Laura CMA ([facility])

## 2021-06-07 NOTE — PROGRESS NOTES
{PROVIDER CHARTING PREFERENCE:977950}    Subjective   Jacquie is a 60 year old who presents for the following health issues {ACCOMPANIED BY STATEMENT (Optional):195357}    HPI     {SUPERLIST (Optional):584805}  {additonal problems for provider to add (Optional):753406}    Review of Systems   {ROS COMP (Optional):967885}      Objective    LMP 07/17/2009 (Exact Date)   There is no height or weight on file to calculate BMI.  Physical Exam   {Exam List (Optional):979303}    {Diagnostic Test Results (Optional):938141}    {AMBULATORY ATTESTATION (Optional):937750}

## 2021-06-07 NOTE — Clinical Note
"Pt is wondering if her dose is too much since she feels lightheaded from time to time and can tell she \"doesn't feel right\" since the lowered metoprolol. Please review and advise.    Marie Laura CMA ([facility])  "

## 2021-06-07 NOTE — TELEPHONE ENCOUNTER
My chart message sent  
Pending Prescriptions:                       Disp   Refills    atorvastatin (LIPITOR) 20 MG tablet [Phar*90 tab*0            Sig: Take 1 tablet (20 mg) by mouth daily    Medication is being filled for 1 time bela refill only due to:  Patient is due for fasting labs    Please call and help schedule.  Thank you!          
Quality 431: Preventive Care And Screening: Unhealthy Alcohol Use - Screening: Patient screened for unhealthy alcohol use using a single question and scores less than 2 times per year
Quality 128: Preventive Care And Screening: Body Mass Index (Bmi) Screening And Follow-Up Plan: BMI is documented within normal parameters and no follow-up plan is required.
Detail Level: Detailed
Quality 226: Preventive Care And Screening: Tobacco Use: Screening And Cessation Intervention: Patient screened for tobacco and never smoked
Quality 130: Documentation Of Current Medications In The Medical Record: Current Medications Documented

## 2021-06-11 ENCOUNTER — MYC MEDICAL ADVICE (OUTPATIENT)
Dept: FAMILY MEDICINE | Facility: CLINIC | Age: 61
End: 2021-06-11

## 2021-06-11 NOTE — TELEPHONE ENCOUNTER
Will flag for provider.  Please review and advise.  Can this wait for follow up on Monday or should she go to an [facility] still?    Dedrick Noe, MARVAN, RN, PHN  [facility] ~ Registered Nurse  Clinic Triage ~ [place] [facility] [place]  June 11, 2021

## 2021-06-11 NOTE — TELEPHONE ENCOUNTER
Spoke with patient informed her of message below she states that she will go to urgent care  Closing encounter  Emi Vanessa RT (R)

## 2021-06-16 ENCOUNTER — E-VISIT (OUTPATIENT)
Dept: URGENT CARE | Facility: URGENT CARE | Age: 61
End: 2021-06-16
Payer: COMMERCIAL

## 2021-06-16 DIAGNOSIS — J01.90 ACUTE SINUSITIS WITH SYMPTOMS > 10 DAYS: Primary | ICD-10-CM

## 2021-06-16 PROCEDURE — 99421 OL DIG E/M SVC 5-10 MIN: CPT | Performed by: PHYSICIAN ASSISTANT

## 2021-06-17 NOTE — PATIENT INSTRUCTIONS
Dear Jacquie Amador    After reviewing your responses, I've been able to diagnose you with?a sinus infection caused by bacteria.?     Based on your responses and diagnosis, I have prescribed an antibiotic to treat your symptoms. I have sent this to your pharmacy.?     It is also important to stay well hydrated, get lots of rest and take over-the-counter decongestants,?tylenol?or ibuprofen if you?are able to?take those medications per your primary care provider to help relieve discomfort.?     It is important that you take?all of?your prescribed medication even if your symptoms are improving after a few doses.? Taking?all of?your medicine helps prevent the symptoms from returning.?     If your symptoms worsen, you develop severe headache, vomiting, high fever (>102), or are not improving in 7 days, please contact your primary care provider for an appointment or visit any of our convenient Walk-in Care or Urgent Care Centers to be seen which can be found on our website?here.?     Thanks again for choosing?us?as your health care partner,?   ?  Olivier Shepherd PA-C?   You may want to try a nasal lavage (also known as nasal irrigation). You can find over-the-counter products, such as Neti-Pot, at retail locations or make your own at home. Instructions for homemade nasal lavage and more information on the process are available online at http://www.aafp.org/afp/2009/1115/p1121.html.      Sinusitis (Antibiotic Treatment)    The sinuses are air-filled spaces within the bones of the face. They connect to the inside of the nose. Sinusitis is an inflammation of the tissue that lines the sinuses. Sinusitis can occur during a cold. It can also happen due to allergies to pollens and other particles in the air. Sinusitis can cause symptoms of sinus congestion and a feeling of fullness. A sinus infection causes fever, headache, and facial pain. There is often green or yellow fluid draining from the nose or into the back of the  throat (post-nasal drip). You have been given antibiotics to treat this condition.   Home care    Take the full course of antibiotics as instructed. Don't stop taking them, even when you feel better.    Drink plenty of water, hot tea, and other liquids as directed by the healthcare provider. This may help thin nasal mucus. It also may help your sinuses drain fluids.    Heat may help soothe painful areas of your face. Use a towel soaked in hot water. Or,  the shower and direct the warm spray onto your face. Using a vaporizer along with a menthol rub at night may also help soothe symptoms.     An expectorant with guaifenesin may help thin nasal mucus and help your sinuses drain fluids. Talk with your provider or pharmacists before taking an over-the-counter (OTC) medicine if you have any questions about it or its side effects..    You can use an OTC decongestant, unless a similar medicine was prescribed to you. Nasal sprays work the fastest. Use one that contains phenylephrine or oxymetazoline. First blow your nose gently. Then use the spray. Don't use these medicines more often than directed on the label. If you do, your symptoms may get worse. You may also take pills that contain pseudoephedrine. Don t use products that combine multiple medicines. This is because side effects may be increased. Read labels. You can also ask the pharmacist for help. (People with high blood pressure should not use decongestants. They can raise blood pressure.) Talk with your provider or pharmacist if you have any questions about the medicine..    OTC antihistamines may help if allergies contributed to your sinusitis. Talk with your provider or pharmacist if you have any questions about the medicine..    Don't use nasal rinses or irrigation during an acute sinus infection, unless your healthcare provider tells you to. Rinsing may spread the infection to other areas in your sinuses.    Use acetaminophen or ibuprofen to control  pain, unless another pain medicine was prescribed to you. If you have chronic liver or kidney disease or ever had a stomach ulcer, talk with your healthcare provider before using these medicines. Never give aspirin to anyone under age 18 who is ill with a fever. It may cause severe liver damage.    Don't smoke. This can make symptoms worse.    Follow-up care  Follow up with your healthcare provider, or as advised.   When to seek medical advice  Call your healthcare provider if any of these occur:     Facial pain or headache that gets worse    Stiff neck    Unusual drowsiness or confusion    Swelling of your forehead or eyelids    Symptoms don't go away in 10 days    Vision problems, such as blurred or double vision    Fever of 100.4 F (38 C) or higher, or as directed by your healthcare provider  Call 911  Call 911 if any of these occur:     Seizure    Trouble breathing    Feeling dizzy or faint    Fingernails, skin or lips look blue, purple , or gray  Prevention  Here are steps you can take to help prevent an infection:     Keep good hand washing habits.    Don t have close contact with people who have sore throats, colds, or other upper respiratory infections.    Don t smoke, and stay away from secondhand smoke.    Stay up to date with of your vaccines.  [employer] last reviewed this educational content on 12/1/2019 2000-2021 [employer]. All rights reserved. This information is not intended as a substitute for professional medical care. Always follow your healthcare professional's instructions.

## 2021-07-01 ENCOUNTER — E-VISIT (OUTPATIENT)
Dept: FAMILY MEDICINE | Facility: CLINIC | Age: 61
End: 2021-07-01
Payer: COMMERCIAL

## 2021-07-01 DIAGNOSIS — J01.90 ACUTE SINUSITIS WITH SYMPTOMS > 10 DAYS: Primary | ICD-10-CM

## 2021-07-01 PROCEDURE — 99421 OL DIG E/M SVC 5-10 MIN: CPT | Performed by: FAMILY MEDICINE

## 2021-07-02 ENCOUNTER — MYC MEDICAL ADVICE (OUTPATIENT)
Dept: FAMILY MEDICINE | Facility: CLINIC | Age: 61
End: 2021-07-02

## 2021-07-02 DIAGNOSIS — Z12.31 ENCOUNTER FOR SCREENING MAMMOGRAM FOR BREAST CANCER: Primary | ICD-10-CM

## 2021-07-02 NOTE — PATIENT INSTRUCTIONS
Thank you for choosing us for your care. I have placed an order for a prescription so that you can start treatment. View your full visit summary for details by clicking on the link below. Your pharmacist will able to address any questions you may have about the medication.     If you're not feeling better within 5-7 days, please schedule an appointment.  You can schedule an appointment right here in [facility], or call [phone]  If the visit is for the same symptoms as your eVisit, we'll refund the cost of your eVisit if seen within seven days.

## 2021-07-02 NOTE — TELEPHONE ENCOUNTER
Provider E-Visit time total (minutes): 5    Start visit time: 12:52 PM 7/2/2021     End: 12:57 PM     Treat for sinus infection. If not improved needs follow-up in person visit.    Earnest Noel MD  [facility]

## 2021-07-05 ENCOUNTER — TELEPHONE (OUTPATIENT)
Dept: HEMATOLOGY | Facility: CLINIC | Age: 61
End: 2021-07-05

## 2021-07-05 NOTE — TELEPHONE ENCOUNTER
RN spoke to Mrs. Amador and she would like to be scheduled for a clinic visit to see Dr. Grimes regarding bruising issues and laboratory evaluation. She is scheduled for 9/15/2021 at 9 am.

## 2021-07-13 ENCOUNTER — MYC MEDICAL ADVICE (OUTPATIENT)
Dept: FAMILY MEDICINE | Facility: CLINIC | Age: 61
End: 2021-07-13

## 2021-07-23 DIAGNOSIS — M19.049 HAND ARTHRITIS: ICD-10-CM

## 2021-07-23 RX ORDER — CELECOXIB 200 MG/1
CAPSULE ORAL
Qty: 180 CAPSULE | Refills: 0 | OUTPATIENT
Start: 2021-07-23

## 2021-07-23 NOTE — TELEPHONE ENCOUNTER
Pending Prescriptions:                       Disp   Refills    celecoxib (CELEBREX) 200 MG capsule [Pharm*180 ca*0        Sig: TAKE ONE CAPSULE BY MOUTH TWICE A DAY AS NEEDED FOR           PAIN    Routing refill request to provider for review/approval because:  Drug not active on patient's medication list

## 2021-07-30 ENCOUNTER — MYC MEDICAL ADVICE (OUTPATIENT)
Dept: FAMILY MEDICINE | Facility: CLINIC | Age: 61
End: 2021-07-30

## 2021-08-03 ENCOUNTER — MYC MEDICAL ADVICE (OUTPATIENT)
Dept: FAMILY MEDICINE | Facility: CLINIC | Age: 61
End: 2021-08-03

## 2021-08-03 DIAGNOSIS — Z13.220 LIPID SCREENING: Primary | ICD-10-CM

## 2021-08-03 NOTE — TELEPHONE ENCOUNTER
"Patient scheduled to see . Patient would like labs ordered:   \"Thyroid, cholesterol, glucose, red blood cells, ferritin etc I suppose just the labs I usually get. \"     Provider, please review and sign pended labs if appropriate.  "

## 2021-08-04 NOTE — PROGRESS NOTES
"    Assessment & Plan     Acute pain of left shoulder  Left shoulder pain x3 weeks w/o injury  Sx and exam consistent w/impingement or rotator cuff tendinitis   Advised steroid injection for pain and starting PT  She sees  of sports med- referral for injection w/him   If not improving to orthopedics   - XR Shoulder Left G/E 3 Views; Future  - DEE DEE PT and Hand Referral; Future  - Orthopedic  Referral; Future    Essential hypertension with goal blood pressure less than 140/90  BP at goal today  Updating labs   Continue on current med dosing per her PCP recent adjustments  - Comprehensive metabolic panel (BMP + Alb, Alk Phos, ALT, AST, Total. Bili, TP); Future  - Comprehensive metabolic panel (BMP + Alb, Alk Phos, ALT, AST, Total. Bili, TP)    Morbid obesity (H)  Advised continued efforts at regular exercise, active lifestyle, and healthy eating for long term weight management.     Iron deficiency  Hx of iron deficiency with infusion spring 2021.   Has not been on iron supplementation recently  Her hgb , iron and ferritin levels are normal. She does not need to be on iron at this time.   - CBC with platelets; Future  - Iron and iron binding capacity; Future  - Ferritin; Future  - CBC with platelets  - Iron and iron binding capacity  - Ferritin    Vitamin D deficiency  Lab obtained   - Vitamin D Deficiency; Future  - Vitamin D Deficiency    Lipid screening  - Lipid panel reflex to direct LDL Fasting  [id number]}     BMI:   Estimated body mass index is 35.72 kg/m  as calculated from the following:    Height as of this encounter: 1.683 m (5' 6.25\").    Weight as of this encounter: 101.2 kg (223 lb).   Weight management plan: Discussed healthy diet and exercise guidelines    Follow Up: The patient was instructed to contact clinic for worsening symptoms, non-improvement in time frame as expected/discussed, and for questions regarding medications or treatment plan. For virtual visits, the patient was advised to be " "seen for in person evaluation if symptoms or condition are worsening or non-improvement as expected.       Return in about 4 weeks (around 9/2/2021) for Recheck, With PCP.    ALYSA Cabrera [facility] ANISH Dhillon is a 61 year old who presents for the following health issues  accompanied by her self:    HPI     Musculoskeletal problem/pain- LEFT shoulder   Was seen at [facility] 3 weeks ago for left shoulder pain. Dx as frozen shoulder - no xray, no specific treatment plan.   Prior to onset of pain no specific injury or new activity. She did fall a month prior tripped on her deck and struck the shoulder but was not painful immediately after.   She holds the shoulder holds over deltoid as area of pain with radiating down arm  Cannot raise past shoulder height  Throbbing  No pain w/moving neck. No N/T.   States has chronic pain all over and fibromyalgia. She had pain specialist in [place] but told her she had tried all options and per pt states there was a breach of my chart and didn't want to go back.  Sees  - sports orthopedics- hand steroid injections and hip injection. She likes him and would like to see him if needed    Onset/Duration:   Description  Location: shoulder/arm - left  Joint Swelling: YES  Redness: YES  Pain: YES  Warmth: YES  Intensity:  7/10  Progression of Symptoms:  same  Accompanying signs and symptoms:   Fevers: no  Numbness/tingling/weakness: no  History  Trauma to the area: YES- fell  Recent illness:  no  Previous similar problem: no  Previous evaluation:  YES. Seen in urgent care, no imaging done  Precipitating or alleviating factors:  Aggravating factors include: overuse and putting weight on arm and reaching  Therapies tried and outcome: acetaminophen and Ibuprofen    Labs \"Want a full panel of labs to be sure everything is fine\"  Recent BP med adjustment  Seeing hematology in sept- \"something wrong w/my platelets and " "I don't heal\"  Has had iron infusions - spring 2021. Not on oral iron. Not on vegetarian diet.   Elevated ferritin at one point (Dr.Fahey mcmahon) told to recheck    HTN  BP med- losartan-hydrochlorothiazide dose was cut in half. Here today 122/82. Home readings diastolic running ~ 90 at times intermittently.  Watches her pulse. Worries when it is around 60 . Is on metoprolol.       Review of Systems   Constitutional, HEENT, cardiovascular, pulmonary, gi and gu systems are negative, except as otherwise noted.      Objective    /82 (BP Location: Right arm, Patient Position: Chair, Cuff Size: Adult Large)   Pulse 68   Temp 97.4  F (36.3  C) (Temporal)   Resp 19   Ht 1.683 m (5' 6.25\")   Wt 101.2 kg (223 lb)   LMP 07/17/2009 (Exact Date)   SpO2 99%   Breastfeeding No   BMI 35.72 kg/m    Body mass index is 35.72 kg/m .  Physical Exam   GENERAL: healthy, alert and no distress  EYES: Eyes grossly normal to inspection, PERRL and conjunctivae and sclerae normal  RESP: lungs clear to auscultation - no rales, rhonchi or wheezes  CV: regular rate and rhythm, normal S1 S2, no S3 or S4, no murmur, click or rub, no peripheral edema and peripheral pulses strong  MS: Shoulder: LEFT: no obvious deformity, bruising, skin change. Tender to palpation anterior shoulder, over deltoid and down ant arm. No focal pain of clavicle, AC joint, biceps tendon, scapula. ROM abnormal - forward flex and abduction just below shoulder heigh. Limited back scratch to waist band. Discomfort with Neers and Martínez. Normal empty can testing.  5/5. Sensation intact to light touch distally. Radial pulses symmetric.   C-spine: no midline or paracervical tenderness. normal ROM.  SKIN: no suspicious lesions or rashes    Results for orders placed or performed in visit on 08/05/21 (from the past 24 hour(s))   Lipid panel reflex to direct LDL Fasting   Result Value Ref Range    Cholesterol 181 <200 mg/dL    Triglycerides 134 <150 mg/dL    Direct " Measure HDL 59 >=50 mg/dL    LDL Cholesterol Calculated 95 <=100 mg/dL    Non HDL Cholesterol 122 <130 mg/dL    Patient Fasting > 8hrs? Yes    Comprehensive metabolic panel (BMP + Alb, Alk Phos, ALT, AST, Total. Bili, TP)   Result Value Ref Range    Sodium 139 133 - 144 mmol/L    Potassium 4.2 3.4 - 5.3 mmol/L    Chloride 108 94 - 109 mmol/L    Carbon Dioxide (CO2) 28 20 - 32 mmol/L    Anion Gap 3 3 - 14 mmol/L    Urea Nitrogen 13 7 - 30 mg/dL    Creatinine 0.98 0.52 - 1.04 mg/dL    Calcium 9.1 8.5 - 10.1 mg/dL    Glucose 92 70 - 99 mg/dL    Alkaline Phosphatase 128 40 - 150 U/L    AST 16 0 - 45 U/L    ALT 27 0 - 50 U/L    Protein Total 7.7 6.8 - 8.8 g/dL    Albumin 3.7 3.4 - 5.0 g/dL    Bilirubin Total 0.4 0.2 - 1.3 mg/dL    GFR Estimate 62 >60 mL/min/1.73m2   CBC with platelets   Result Value Ref Range    WBC Count 7.6 4.0 - 11.0 10e3/uL    RBC Count 4.49 3.80 - 5.20 10e6/uL    Hemoglobin 13.8 11.7 - 15.7 g/dL    Hematocrit 41.5 35.0 - 47.0 %    MCV 92 78 - 100 fL    MCH 30.7 26.5 - 33.0 pg    MCHC 33.3 31.5 - 36.5 g/dL    RDW 13.9 10.0 - 15.0 %    Platelet Count 277 150 - 450 10e3/uL   Iron and iron binding capacity   Result Value Ref Range    Iron 96 35 - 180 ug/dL    Iron Binding Capacity 289 240 - 430 ug/dL    Iron Sat Index 33 15 - 46 %   Ferritin   Result Value Ref Range    Ferritin 201 8 - 252 ng/mL     *Note: Due to a large number of results and/or encounters for the requested time period, some results have not been displayed. A complete set of results can be found in Results Review.       XR Shoulder Left G/E 3 Views    Result Date: 8/5/2021  LEFT SHOULDER THREE OR MORE VIEWS   8/5/2021 11:33 AM HISTORY: Rotator cuff impingement symptoms, no trauma/fall. Acute pain of left shoulder. COMPARISON: None.     IMPRESSION: Mild glenohumeral degenerative changes with inferior marginal osteophyte formation off the humerus. Acromioclavicular joint is unremarkable. No acute fracture or dislocation. Healed left rib  fractures. Mild left base atelectasis or fibrosis. MANOLO MALIK MD   SYSTEM ID:  [id number]

## 2021-08-04 NOTE — TELEPHONE ENCOUNTER
KP: would you like labs first? Please sign or remove and close if wanted them with OV    Next 5 appointments (look out 90 days)    Aug 05, 2021 10:20 AM  Office Visit with Renetta Manley PA-C  [facility] [place] ([facility] [place] ) [address], [address]  [place] [id number] [phone]

## 2021-08-05 ENCOUNTER — TELEPHONE (OUTPATIENT)
Dept: LAB | Facility: CLINIC | Age: 61
End: 2021-08-05

## 2021-08-05 ENCOUNTER — MYC MEDICAL ADVICE (OUTPATIENT)
Dept: FAMILY MEDICINE | Facility: CLINIC | Age: 61
End: 2021-08-05

## 2021-08-05 ENCOUNTER — APPOINTMENT (OUTPATIENT)
Dept: LAB | Facility: CLINIC | Age: 61
End: 2021-08-05
Payer: COMMERCIAL

## 2021-08-05 ENCOUNTER — OFFICE VISIT (OUTPATIENT)
Dept: FAMILY MEDICINE | Facility: CLINIC | Age: 61
End: 2021-08-05
Payer: COMMERCIAL

## 2021-08-05 ENCOUNTER — ANCILLARY PROCEDURE (OUTPATIENT)
Dept: GENERAL RADIOLOGY | Facility: CLINIC | Age: 61
End: 2021-08-05
Attending: PHYSICIAN ASSISTANT
Payer: COMMERCIAL

## 2021-08-05 VITALS
HEIGHT: 66 IN | WEIGHT: 223 LBS | TEMPERATURE: 97.4 F | SYSTOLIC BLOOD PRESSURE: 122 MMHG | RESPIRATION RATE: 19 BRPM | OXYGEN SATURATION: 99 % | HEART RATE: 68 BPM | BODY MASS INDEX: 35.84 KG/M2 | DIASTOLIC BLOOD PRESSURE: 82 MMHG

## 2021-08-05 DIAGNOSIS — M25.512 ACUTE PAIN OF LEFT SHOULDER: ICD-10-CM

## 2021-08-05 DIAGNOSIS — E55.9 VITAMIN D DEFICIENCY: ICD-10-CM

## 2021-08-05 DIAGNOSIS — M25.512 ACUTE PAIN OF LEFT SHOULDER: Primary | ICD-10-CM

## 2021-08-05 DIAGNOSIS — Z13.220 LIPID SCREENING: ICD-10-CM

## 2021-08-05 DIAGNOSIS — E61.1 IRON DEFICIENCY: ICD-10-CM

## 2021-08-05 DIAGNOSIS — I10 ESSENTIAL HYPERTENSION WITH GOAL BLOOD PRESSURE LESS THAN 140/90: ICD-10-CM

## 2021-08-05 DIAGNOSIS — R53.83 FATIGUE, UNSPECIFIED TYPE: ICD-10-CM

## 2021-08-05 DIAGNOSIS — E66.01 MORBID OBESITY (H): ICD-10-CM

## 2021-08-05 LAB
ALBUMIN SERPL-MCNC: 3.7 G/DL (ref 3.4–5)
ALP SERPL-CCNC: 128 U/L (ref 40–150)
ALT SERPL W P-5'-P-CCNC: 27 U/L (ref 0–50)
ANION GAP SERPL CALCULATED.3IONS-SCNC: 3 MMOL/L (ref 3–14)
AST SERPL W P-5'-P-CCNC: 16 U/L (ref 0–45)
BILIRUB SERPL-MCNC: 0.4 MG/DL (ref 0.2–1.3)
BUN SERPL-MCNC: 13 MG/DL (ref 7–30)
CALCIUM SERPL-MCNC: 9.1 MG/DL (ref 8.5–10.1)
CHLORIDE BLD-SCNC: 108 MMOL/L (ref 94–109)
CHOLEST SERPL-MCNC: 181 MG/DL
CO2 SERPL-SCNC: 28 MMOL/L (ref 20–32)
CREAT SERPL-MCNC: 0.98 MG/DL (ref 0.52–1.04)
ERYTHROCYTE [DISTWIDTH] IN BLOOD BY AUTOMATED COUNT: 13.9 % (ref 10–15)
FASTING STATUS PATIENT QL REPORTED: YES
FERRITIN SERPL-MCNC: 201 NG/ML (ref 8–252)
GFR SERPL CREATININE-BSD FRML MDRD: 62 ML/MIN/1.73M2
GLUCOSE BLD-MCNC: 92 MG/DL (ref 70–99)
HCT VFR BLD AUTO: 41.5 % (ref 35–47)
HDLC SERPL-MCNC: 59 MG/DL
HGB BLD-MCNC: 13.8 G/DL (ref 11.7–15.7)
IRON SATN MFR SERPL: 33 % (ref 15–46)
IRON SERPL-MCNC: 96 UG/DL (ref 35–180)
LDLC SERPL CALC-MCNC: 95 MG/DL
MCH RBC QN AUTO: 30.7 PG (ref 26.5–33)
MCHC RBC AUTO-ENTMCNC: 33.3 G/DL (ref 31.5–36.5)
MCV RBC AUTO: 92 FL (ref 78–100)
NONHDLC SERPL-MCNC: 122 MG/DL
PLATELET # BLD AUTO: 277 10E3/UL (ref 150–450)
POTASSIUM BLD-SCNC: 4.2 MMOL/L (ref 3.4–5.3)
PROT SERPL-MCNC: 7.7 G/DL (ref 6.8–8.8)
RBC # BLD AUTO: 4.49 10E6/UL (ref 3.8–5.2)
SODIUM SERPL-SCNC: 139 MMOL/L (ref 133–144)
TIBC SERPL-MCNC: 289 UG/DL (ref 240–430)
TRIGL SERPL-MCNC: 134 MG/DL
WBC # BLD AUTO: 7.6 10E3/UL (ref 4–11)

## 2021-08-05 PROCEDURE — 85027 COMPLETE CBC AUTOMATED: CPT | Performed by: PHYSICIAN ASSISTANT

## 2021-08-05 PROCEDURE — 84443 ASSAY THYROID STIM HORMONE: CPT | Performed by: PHYSICIAN ASSISTANT

## 2021-08-05 PROCEDURE — 36415 COLL VENOUS BLD VENIPUNCTURE: CPT | Performed by: PHYSICIAN ASSISTANT

## 2021-08-05 PROCEDURE — 82306 VITAMIN D 25 HYDROXY: CPT | Performed by: PHYSICIAN ASSISTANT

## 2021-08-05 PROCEDURE — 82728 ASSAY OF FERRITIN: CPT | Performed by: PHYSICIAN ASSISTANT

## 2021-08-05 PROCEDURE — 80061 LIPID PANEL: CPT | Performed by: PHYSICIAN ASSISTANT

## 2021-08-05 PROCEDURE — 99214 OFFICE O/P EST MOD 30 MIN: CPT | Performed by: PHYSICIAN ASSISTANT

## 2021-08-05 PROCEDURE — 80053 COMPREHEN METABOLIC PANEL: CPT | Performed by: PHYSICIAN ASSISTANT

## 2021-08-05 PROCEDURE — 73030 X-RAY EXAM OF SHOULDER: CPT | Mod: LT | Performed by: RADIOLOGY

## 2021-08-05 PROCEDURE — 83550 IRON BINDING TEST: CPT | Performed by: PHYSICIAN ASSISTANT

## 2021-08-05 ASSESSMENT — ANXIETY QUESTIONNAIRES
1. FEELING NERVOUS, ANXIOUS, OR ON EDGE: MORE THAN HALF THE DAYS
2. NOT BEING ABLE TO STOP OR CONTROL WORRYING: SEVERAL DAYS
6. BECOMING EASILY ANNOYED OR IRRITABLE: MORE THAN HALF THE DAYS
3. WORRYING TOO MUCH ABOUT DIFFERENT THINGS: SEVERAL DAYS
GAD7 TOTAL SCORE: 6
IF YOU CHECKED OFF ANY PROBLEMS ON THIS QUESTIONNAIRE, HOW DIFFICULT HAVE THESE PROBLEMS MADE IT FOR YOU TO DO YOUR WORK, TAKE CARE OF THINGS AT HOME, OR GET ALONG WITH OTHER PEOPLE: SOMEWHAT DIFFICULT
5. BEING SO RESTLESS THAT IT IS HARD TO SIT STILL: NOT AT ALL
7. FEELING AFRAID AS IF SOMETHING AWFUL MIGHT HAPPEN: NOT AT ALL

## 2021-08-05 ASSESSMENT — PATIENT HEALTH QUESTIONNAIRE - PHQ9
5. POOR APPETITE OR OVEREATING: NOT AT ALL
SUM OF ALL RESPONSES TO PHQ QUESTIONS 1-9: 8

## 2021-08-05 ASSESSMENT — MIFFLIN-ST. JEOR: SCORE: 1597.24

## 2021-08-05 ASSESSMENT — PAIN SCALES - GENERAL: PAINLEVEL: SEVERE PAIN (7)

## 2021-08-05 NOTE — PROGRESS NOTES
Patient coming in for lab appointment today. There is a pending future order for a lipid panel. Please sign order or notify patient.     Thank you,  SAM Marlow

## 2021-08-05 NOTE — PATIENT INSTRUCTIONS
Physical Therapy:  [facility]  [phone]    Ice, heat  Gentle stretching      Patient Education     Shoulder Impingement Syndrome   The rotator cuff is a group of muscles and tendons that surround the shoulder joint. These muscles and tendons hold the arm in its joint. They help the shoulder move. The rotator cuff muscles and tendons can become irritated from repeated rubbing against the shoulder bone. This is called shoulder impingement syndrome or rotator cuff tendonitis.  If your case is mild, you may only need to rest the shoulder and then do certain exercises to strengthen the muscles. You can also take anti-inflammatory medicines. Steroid injections into the shoulder can ease inflammation. But you can have only a limited number of these. If the condition gets worse, your shoulder muscles may become thin and weak. This can lead to a rotator cuff tear.  Symptoms of shoulder impingement syndrome may include:    Shoulder pain that gets worse when you raise your arm overhead    Weakness of the shoulder muscles when you use your arm overhead    Popping and clicking when you move your shoulder    Shoulder pain that wakes you up at night, especially when you sleep on the affected shoulder    Sudden pain in your shoulder when you lift or reach    Pain that spreads from the front of the shoulder to the side of the arm  Home care  Follow these tips to take care of yourself at home:    Don't do activities that make your pain worse. These include raising your arms overhead, repeating the same motion over and over, or lifting heavy objects.    Don t hold your arm in one position for a long time. Keep it moving.    Put an ice pack on the sore area for 20 minutes every 1 to 2 hours for the first day. You can make an ice pack by putting ice cubes in a plastic bag. Wrap the bag in a thin towel before putting it on your shoulder. A frozen bag of peas or something similar can also be used as an ice pack.  Don't place the ice pack directly on your skin. Place a towel between it and your skin. Use the ice packs 3 to 4 times a day for the next 2 days. Continue using the ice to relieve the pain and swelling as needed.    You may take acetaminophen or ibuprofen to control pain, unless another medicine was prescribed. If prednisone was prescribed, don t take anti-inflammatory medicines. If you have chronic liver or kidney disease or ever had a stomach ulcer or gastrointestinal bleeding, talk with your doctor before using these medicines.    After your symptoms ease, you may get physical therapy or start a home exercise program. This can strengthen your shoulder muscles and help your range of motion. Talk with your doctor about what is best for your condition.  Follow-up care  Follow up with your healthcare provider, or as advised.  When to seek medical advice  Call your healthcare provider right away if any of these occur:    Shoulder pain that gets worse and wakes you up at night    Pain and weakness that gets worse when you reach up or raise your arms over your shoulders    Your shoulder or arm swells    Numbness, tingling, or pain that travels down the arm to the hand    Loss of shoulder strength    Fever or chills  Gene last reviewed this educational content on 11/1/2019 2000-2021 [employer]. All rights reserved. This information is not intended as a substitute for professional medical care. Always follow your healthcare professional's instructions.           Patient Education     Understanding Rotator Cuff Tendonitis    The rotator cuff is a group of 4 muscles and tendons in the shoulder. Tendons are tough tissues that connect muscles to bone. The 4 muscles and their tendons form a  cuff  around the head of the upper arm bone. The rotator cuff connects the upper arm to the shoulder blade. It keeps the shoulder joint stable and gives it strength. It also helps the shoulder joint with certain  movements. These include reaching the arm over the head and rotating the arm.  If tendons are injured or strained, they may get irritated and swollen (inflamed). This is called tendonitis. Rotator cuff tendonitis may cause shoulder pain. It may make it hard to move your shoulder.  What causes rotator cuff tendonitis?  When the rotator cuff tendons are injured or overworked it causes tendonitis. The most common cause of injury is repetitive overhead activities. These can be work-related activities such as reaching, pushing, or lifting. Or they can be sports-related activities such as throwing, swimming, or lifting weights.  Symptoms of rotator cuff tendonitis  Pain on the side of the upper arm at the shoulder is the most common symptom. Pain may get worse with overhead movements. Or it can get worse when you raise the arm above shoulder level. It may also hurt to lie on the shoulder at night.  Treatment for rotator cuff tendonitis  Treatment may include:    Active rest. This lets the rotator cuff heal. Active rest means using your arm and shoulder, but not doing activities that cause pain. These might be reaching overhead or sleeping on the shoulder.    Cold packs. Putting ice packs on the shoulder helps reduce swelling and ease pain.    Medicines. Prescription or over-the-counter medicines can help ease pain and swelling. NSAIDs (nonsteroidal anti-inflammatory drugs) are the most common medicines used. They may be taken as pills. Or they may be put on the skin as a gel, cream, or patch.    Arm and shoulder exercises. These help keep the shoulder joint moving as it heals. They also help improve the strength of muscles around the joint.  Possible complications  You may be tempted to stop using your shoulder completely to prevent pain. But doing so may lead to a condition called frozen shoulder. To help prevent this, follow instructions you are given for active rest and for doing exercises to help your shoulder  heal.  When to call your healthcare provider  Call your healthcare provider right away if you have any of these:    Fever of 100.4 F (38 C) or higher, or as advised by your healthcare provider    Chills    Symptoms that don t get better, or get worse    New symptoms  Gene last reviewed this educational content on 6/1/2019 2000-2021 [employer]. All rights reserved. This information is not intended as a substitute for professional medical care. Always follow your healthcare professional's instructions.

## 2021-08-06 ENCOUNTER — MYC MEDICAL ADVICE (OUTPATIENT)
Dept: FAMILY MEDICINE | Facility: CLINIC | Age: 61
End: 2021-08-06

## 2021-08-06 DIAGNOSIS — R53.83 FATIGUE, UNSPECIFIED TYPE: Primary | ICD-10-CM

## 2021-08-06 LAB
DEPRECATED CALCIDIOL+CALCIFEROL SERPL-MC: 42 UG/L (ref 20–75)
TSH SERPL DL<=0.005 MIU/L-ACNC: 1.52 MU/L (ref 0.4–4)

## 2021-08-06 ASSESSMENT — ANXIETY QUESTIONNAIRES: GAD7 TOTAL SCORE: 6

## 2021-08-06 ASSESSMENT — ASTHMA QUESTIONNAIRES: ACT_TOTALSCORE: 15

## 2021-08-24 ENCOUNTER — MYC MEDICAL ADVICE (OUTPATIENT)
Dept: FAMILY MEDICINE | Facility: CLINIC | Age: 61
End: 2021-08-24

## 2021-08-24 NOTE — TELEPHONE ENCOUNTER
Dr Peterson please review and advise on booster. Sent mychart about scheduling appt.    Marie Laura CMA ([facility])

## 2021-08-31 ENCOUNTER — E-VISIT (OUTPATIENT)
Dept: FAMILY MEDICINE | Facility: CLINIC | Age: 61
End: 2021-08-31
Payer: COMMERCIAL

## 2021-08-31 DIAGNOSIS — J20.9 ACUTE BRONCHITIS WITH SYMPTOMS > 10 DAYS: Primary | ICD-10-CM

## 2021-08-31 DIAGNOSIS — N76.0 VAGINITIS AND VULVOVAGINITIS: ICD-10-CM

## 2021-08-31 PROCEDURE — 99421 OL DIG E/M SVC 5-10 MIN: CPT | Performed by: FAMILY MEDICINE

## 2021-08-31 RX ORDER — DOXYCYCLINE 100 MG/1
100 CAPSULE ORAL 2 TIMES DAILY
Qty: 28 CAPSULE | Refills: 0 | Status: SHIPPED | OUTPATIENT
Start: 2021-08-31 | End: 2021-09-15

## 2021-08-31 RX ORDER — FLUCONAZOLE 150 MG/1
150 TABLET ORAL ONCE
Qty: 1 TABLET | Refills: 0 | Status: SHIPPED | OUTPATIENT
Start: 2021-08-31 | End: 2021-08-31

## 2021-08-31 NOTE — PATIENT INSTRUCTIONS
"    Dear Jacquie Amador    After reviewing your responses, I've been able to diagnose you with \"Bronchitis\" which is a common infection of your lungs. While this is most commonly caused by a virus, the symptoms you have given suggest you should be treated with antibiotics.     I have sent doxycycline to your pharmacy to treat this infection.     It is important that you take all of your prescribed medication even if your symptoms are improving after a few doses. Taking all of your medicine helps prevent the symptoms from returning.     If your symptoms worsen, you develop chest pain or shortness of breath, fevers over 101, or are not improving in 5 days, please contact your primary care provider for an appointment or visit any of our convenient Walk-in Care or Urgent Care Centers to be seen which can be found on our website here.    Thanks again for choosing us as your health care partner,    Liz Peterson MD    "

## 2021-09-09 DIAGNOSIS — J45.40 MODERATE PERSISTENT ASTHMA WITHOUT COMPLICATION: ICD-10-CM

## 2021-09-10 NOTE — TELEPHONE ENCOUNTER
Pending Prescriptions:                       Disp   Refills    mometasone-formoterol (DULERA) 200-5 MCG/A*                Sig: Inhale 2 puffs into the lungs 2 times daily    Routing refill request to provider for review/approval because:  Labs out of range:    ACT Total Scores 8/5/2021   ACT TOTAL SCORE -   ASTHMA ER VISITS -   ASTHMA HOSPITALIZATIONS -   ACT TOTAL SCORE (Goal Greater than or Equal to 20) 15   In the past 12 months, how many times did you visit the emergency room for your asthma without being admitted to the hospital? 0   In the past 12 months, how many times were you hospitalized overnight because of your asthma? 0

## 2021-09-15 ENCOUNTER — MYC MEDICAL ADVICE (OUTPATIENT)
Dept: FAMILY MEDICINE | Facility: CLINIC | Age: 61
End: 2021-09-15

## 2021-09-15 ENCOUNTER — OFFICE VISIT (OUTPATIENT)
Dept: HEMATOLOGY | Facility: CLINIC | Age: 61
End: 2021-09-15
Attending: INTERNAL MEDICINE
Payer: COMMERCIAL

## 2021-09-15 VITALS
BODY MASS INDEX: 35.31 KG/M2 | HEART RATE: 77 BPM | DIASTOLIC BLOOD PRESSURE: 86 MMHG | HEIGHT: 67 IN | WEIGHT: 225 LBS | TEMPERATURE: 98 F | RESPIRATION RATE: 14 BRPM | OXYGEN SATURATION: 100 % | SYSTOLIC BLOOD PRESSURE: 137 MMHG

## 2021-09-15 DIAGNOSIS — T14.8XXA BRUISING: ICD-10-CM

## 2021-09-15 DIAGNOSIS — D69.1 PLATELET GRANULE DEFECT (H): Primary | ICD-10-CM

## 2021-09-15 DIAGNOSIS — J45.40 MODERATE PERSISTENT ASTHMA WITHOUT COMPLICATION: ICD-10-CM

## 2021-09-15 LAB
HOLD SPECIMEN: NORMAL
HOLD SPECIMEN: NORMAL

## 2021-09-15 PROCEDURE — 82728 ASSAY OF FERRITIN: CPT | Performed by: INTERNAL MEDICINE

## 2021-09-15 PROCEDURE — 99215 OFFICE O/P EST HI 40 MIN: CPT | Performed by: INTERNAL MEDICINE

## 2021-09-15 PROCEDURE — 36415 COLL VENOUS BLD VENIPUNCTURE: CPT | Performed by: INTERNAL MEDICINE

## 2021-09-15 PROCEDURE — 36415 COLL VENOUS BLD VENIPUNCTURE: CPT

## 2021-09-15 PROCEDURE — G0463 HOSPITAL OUTPT CLINIC VISIT: HCPCS

## 2021-09-15 PROCEDURE — 83550 IRON BINDING TEST: CPT | Performed by: INTERNAL MEDICINE

## 2021-09-15 PROCEDURE — 99417 PROLNG OP E/M EACH 15 MIN: CPT | Performed by: INTERNAL MEDICINE

## 2021-09-15 RX ORDER — METHYLPREDNISOLONE 4 MG
TABLET, DOSE PACK ORAL
Qty: 21 TABLET | Refills: 0 | Status: SHIPPED | OUTPATIENT
Start: 2021-09-15 | End: 2021-10-06

## 2021-09-15 ASSESSMENT — MIFFLIN-ST. JEOR: SCORE: 1613.45

## 2021-09-15 ASSESSMENT — PAIN SCALES - GENERAL: PAINLEVEL: NO PAIN (0)

## 2021-09-15 NOTE — PATIENT INSTRUCTIONS
We will be checking for porphyrins to see if that is contributing to your skin lesions.       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 This can be similar to taking medications like Plavix or aspirin- but you are not. You SHOULD avoid as much as possible aspirin or aspirin like products    We will repeat your platelet function test (PFA) today to see if this is still long.    We will do a trial of steriod (short) to see if this helps sores on neck and left elbow.     I will contact my dermatology collegues to look at your imaging as well as your prior biopsy results.     I will have you follow up with me on 10/06/21 at 12:30 PM      Emely Grimes MD/PhD   of Medicine  Division of Hematology, Oncology and Transplantation    [facility]  [address] [place] [id number]  Main: [phone], Fax: [phone]

## 2021-09-16 ENCOUNTER — TELEPHONE (OUTPATIENT)
Dept: HEMATOLOGY | Facility: CLINIC | Age: 61
End: 2021-09-16

## 2021-09-16 LAB
FERRITIN SERPL-MCNC: 184 NG/ML (ref 8–252)
IRON SATN MFR SERPL: 35 % (ref 15–46)
IRON SERPL-MCNC: 100 UG/DL (ref 35–180)
TIBC SERPL-MCNC: 284 UG/DL (ref 240–430)

## 2021-09-16 NOTE — TELEPHONE ENCOUNTER
RN spoke to Mrs. Amador about the need to repeat  blood porphyrins testing. She will schedule the lab draw and deliver the 24 hour urine when she returns from vacation in a week.

## 2021-09-20 ENCOUNTER — MYC MEDICAL ADVICE (OUTPATIENT)
Dept: HEMATOLOGY | Facility: CLINIC | Age: 61
End: 2021-09-20

## 2021-09-21 ENCOUNTER — TELEPHONE (OUTPATIENT)
Dept: DERMATOLOGY | Facility: CLINIC | Age: 61
End: 2021-09-21

## 2021-09-21 NOTE — TELEPHONE ENCOUNTER
I spoke with Jacquie to offer her an appointment with Dr. Diez for further evaluation and possible repeat skin biopsies.  I reviewed that Dr. Grimes had been in contact with Dr. Diez regarding her skin lesions and they were in agreement with this plan.    Patient verbalized understanding, but stated she would like to wait to schedule until she speaks with Dr. Grimes at her follow up appointment in October.  Jacquie will reach out to us if she decides to proceed with an appt.    Bria Hamilton RN

## 2021-09-27 ENCOUNTER — TELEPHONE (OUTPATIENT)
Dept: HEMATOLOGY | Facility: CLINIC | Age: 61
End: 2021-09-27

## 2021-09-27 ENCOUNTER — MYC MEDICAL ADVICE (OUTPATIENT)
Dept: HEMATOLOGY | Facility: CLINIC | Age: 61
End: 2021-09-27

## 2021-09-27 DIAGNOSIS — R23.3 EASY BRUISABILITY: Primary | ICD-10-CM

## 2021-09-27 RX ORDER — PREDNISONE 5 MG/1
5 TABLET ORAL DAILY
Qty: 30 TABLET | Refills: 0 | Status: SHIPPED | OUTPATIENT
Start: 2021-09-27 | End: 2021-10-06

## 2021-09-27 NOTE — TELEPHONE ENCOUNTER
Last dose of prednisone 9/20. Since off the prednisone, pain has increased in hands,legs, ankles, lower back. Left arm. Pain gets better then gets worse again. Headaches are back(Migraines). Dry eye pain was better when on the prednisone.   Really notices pain with going up and down the stairs.   Had some scabbed over spots that do not go away, and now that is off the prednisone they are no longer getting any better.     Wants to go back on prednisone since she felt so much better on it.     Went over directions for collecting 24 hour urine specimen, they do not have a hat to collect urine, her  will go to [employer] to get a hat.   [employer] was called and will go over directions for collecting the 24 hour urine specimen with him when he comes to  the hat.

## 2021-09-27 NOTE — TELEPHONE ENCOUNTER
RN called Jacquie regarding her symptoms being off of Prednisone. Dr. Grimes was prescribed her 5 mg daily to see if this will help with symptoms. We can discuss further at her upcoming appointment with Dr. Grimes on 10/6.    Jacquie did not answer, but a detailed voicemail was left including call back number for any further questions.     Yuliya Bae  MSN, RN, PHN  [facility]  Office: [phone]  Fax: [phone]

## 2021-09-28 ENCOUNTER — LAB (OUTPATIENT)
Dept: LAB | Facility: CLINIC | Age: 61
End: 2021-09-28
Payer: COMMERCIAL

## 2021-09-28 DIAGNOSIS — T14.8XXA BRUISING: ICD-10-CM

## 2021-09-28 DIAGNOSIS — D69.1 PLATELET GRANULE DEFECT (H): ICD-10-CM

## 2021-09-28 LAB
CLOSURE TME COLL+ADP BLD: 88 SECONDS
CLOSURE TME COLL+EPINEP BLD: 156 SECONDS
HOLD SPECIMEN: NORMAL
Lab: NORMAL
PERFORMING LABORATORY: NORMAL
SPECIMEN STATUS: NORMAL
TEST NAME: NORMAL
TRANSFERRIN SERPL-MCNC: 233 MG/DL (ref 210–360)

## 2021-09-28 PROCEDURE — 84110 ASSAY OF PORPHOBILINOGEN: CPT | Mod: 90

## 2021-09-28 PROCEDURE — 82657 ENZYME CELL ACTIVITY: CPT | Mod: 90

## 2021-09-28 PROCEDURE — 85576 BLOOD PLATELET AGGREGATION: CPT

## 2021-09-28 PROCEDURE — 36415 COLL VENOUS BLD VENIPUNCTURE: CPT

## 2021-09-28 PROCEDURE — 84311 SPECTROPHOTOMETRY: CPT | Mod: 90

## 2021-09-28 PROCEDURE — 84466 ASSAY OF TRANSFERRIN: CPT

## 2021-09-28 PROCEDURE — 84120 ASSAY OF URINE PORPHYRINS: CPT | Mod: 90

## 2021-09-28 PROCEDURE — 84202 ASSAY RBC PROTOPORPHYRIN: CPT | Mod: 90

## 2021-10-01 LAB
COPRO1/CREAT UR-SRTO: 3 UMOL/MOL CRT
COPRO3/CREAT UR-SRTO: 10 UMOL/MOL CRT
CREAT 24H UR-MRATE: 722 MG/D
CREAT UR-MCNC: 76 MG/DL
HEPTACARBOXYLATE/CREAT UR-SRTO: 2 UMOL/MOL CRT
PBG 24H UR-SRATE: 6.7 UMOL/D
PBG UR-SCNC: 7.1 UMOL/L
PORPHYRIN FRACT 24H UR-IMP: NEGATIVE
PROTOPOR RBC-MCNC: 28 UG/DL
UROPOR/CREAT 24H UR-SRTO: 3 UMOL/MOL CRT

## 2021-10-04 LAB
PATH INTERP SPEC-IMP: NORMAL
PATH REV: NORMAL
PORPHYRINS SERPL-MCNC: <1 MCG/DL

## 2021-10-06 ENCOUNTER — VIRTUAL VISIT (OUTPATIENT)
Dept: HEMATOLOGY | Facility: CLINIC | Age: 61
End: 2021-10-06
Attending: INTERNAL MEDICINE
Payer: COMMERCIAL

## 2021-10-06 VITALS — BODY MASS INDEX: 35.56 KG/M2 | WEIGHT: 225 LBS

## 2021-10-06 DIAGNOSIS — D69.1 PLATELET GRANULE DEFECT (H): Primary | ICD-10-CM

## 2021-10-06 PROCEDURE — 99215 OFFICE O/P EST HI 40 MIN: CPT | Mod: 95 | Performed by: INTERNAL MEDICINE

## 2021-10-06 RX ORDER — HYDROXYCHLOROQUINE SULFATE 200 MG/1
200 TABLET, FILM COATED ORAL DAILY
Qty: 30 TABLET | Refills: 1 | Status: SHIPPED | OUTPATIENT
Start: 2021-10-06 | End: 2021-11-22

## 2021-10-06 RX ORDER — PREDNISONE 5 MG/1
TABLET ORAL
Qty: 184 TABLET | Refills: 0 | Status: SHIPPED | OUTPATIENT
Start: 2021-10-06 | End: 2021-11-09

## 2021-10-06 NOTE — PROGRESS NOTES
Patient was contacted to complete the pre-visit call prior to their video visit with the provider.      Allergies and medications were reviewed.    I thanked them for their time to cover this information     Ac Stewart CMA

## 2021-10-06 NOTE — PROGRESS NOTES
[facility]  [address] [place] [address]  Phone: [phone], Fax: [phone]    Outpatient Visit Note:    Patient: Jacquie Amador  MRN: [id number]  : 1960  ISIDRO: Oct 6, 2021    Reason for Consultation:  Bruising    Assessment:  In summary, Jacquie Amador is a 61 year old woman presenting to clinic due to bruising/bleeding with abnormal platelet studies.     1.  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  Easy bruising and prolonged healing secondary to #1  3.  Eruptive rash/lesions on sun exposed surfaces, porphyria cutanea tarda ruled out  4.  Personal history of antiphospholipid antibody syndrome in pregnancy, no records  5.  Osteoarthritis requiring anti-inflammatory medications  6.  Depression requiring use of SSRI medication  7.  Acute on chronic migraines requiring use of triptans and aspirin      Ms. Amador presents with greater than 2 years of increased bruising/presence of lesions on her skin that exhibit prolonged healing.  Prior to her consultation in our clinic she had been evaluated by Dr. Douglas who had sent off PTT.  PTT was prolonged prompting further evaluation as an outpatient.  Evaluation was conducted in 2021.  At this time Jacquie was found to have negative antiphospholipid antibodies.  Jacquie reports a history of 2 miscarriages over 30 years ago that prompted the diagnosis of antiphospholipid antibody syndrome.  She recalls that she was on a steroid pill during her subsequent 2 successful pregnancies.  She does not recall having been on a blood thinner for these pregnancies.  She has no personal thrombosis history.    Evaluation for von Willebrand's, including von Willebrand factor antigen, activity, ristocetin cofactor, and collagen binding did not find any evidence of acquired or congenital VWF.  Evaluation of antiplatelet antibodies revealed that Jenny has no antibodies against platelet  glycoprotein receptors.  However she did have the presence of anti-HLA antibodies.  She has no personal history of receiving red cell or platelet transfusions.    Jenny had one platelet aggregation study that was done when she was taking Excedrin Migraine which contains aspirin.  1 week later this was study was repeated. The study continued to be abnormal. She continued to have decreased arachidonic acid  As well ad deaggregation with ADP and arachidonic acid. And decreased ATP release with thrombin and ADP. This is consistent with diagnosis of acquired qualitative platelet disorder.     In clinic today I discussed with Jacquie and her , that her condition is likely an autoimmune-mediated condition in which antibodies are leading to decreased function of her platelets. In these disorders, avoidance of medications that further inhibit platelet function is critical. This includes aspirin, NSAID and potentially SSRIs. Jacquie has worked toward avoiding aspirin- only uses sparingly if other migraine medications fail to alleviate her symptoms. She is taking Celebrex, which is considered safe for her condition. She continues to be on SSRIs--- this is a mild effector on platelets and given that untreated depression is worse that her condition would not stop. IF Jacquie needs MAJOR SURGERY---  She should receive platelet transfusion (1-2 units). She is ok to receive injections.     For treatment(s)--- we did a Medrol dose pack and she had improvement in her skin lesions. Her PFA study was also NORMAL after dose pack. Therefore I would like to start a pulse of steriods, with goal of removing them quickly. Jacquie does NOT have PCT based on negative urine and plasma porhyrin testing. However given her autoantibodies, history of antiphospholipids a trial of hydroxychloroquine (Plaquenil) is reasonable as a first line steroid sparring agent. We discussed getting a eye exam prior to starting.     EDS could still be on  differential, however platelet-type defects are seen with this disorder. Will discuss with genetics and consider further analysis of skin biopies    Plan:  1. Majority of today's visit was spent counseling the patient regarding diagnosis, natural history, management and treatment options and further diagnostic testing for acquired platelet disorders and porphyria cutanea tarda.  2.   Orders Placed This Encounter   Procedures     Platelet function closure with reflex     Platelet function closure ADP     Partial thromboplastin time     INR     Adult Eye Referral     3. Prednisone: 20 mg x 7 days, 15 mg x 7 days, 10 mg x 14 days, 5 mg x14 days, 5 mg every other day x 14 days  4. Plaquenil 200 mg daily  5. Confirm with Dermatology (Dr. Diez evaluated 4/29/21)    The patient is given our center's contact information and is instructed to call if she should have any further questions or concerns.  Otherwise, we will plan on seeing her back Follow up with Provider - 12/1/21 with labs in [place] the Monday prior.      Yuliya Bae, nurse clinician was involved with the care, education and coordination of patient care.     Patient understands and agrees with the above plan and recommendation.      Emely Grimes MD/PhD   of Medicine  Division of Hematology, Oncology and Transplantation    Video Start: 12:35 PM  Video End: 1:16 PM      ----------------------------------------------------------------------------------------------------------------------  Interval History:  Jacquie Amador is a 61 year old woman with PMHx of fibromyalgia, anxiety/depression, hypertension and hyperlipidemia. She presented to clinic on 9/15/21 for her first in person evaluation due to bleeding and prolonged wound healing. Please refer to my consult note.       Jacquie reports that has been about the same. Energy improved a little. Had a migraine this morning. Continues to itch and scratch and gets the red bumps  underneath.   Medrol dose pack helped everything clear up more. Cleared up on the face but still there. On Medrol pack was almost cleared up. The one the back of her neck is improving. Skin continues to be fragile- ripped off the skin.     Jacquie started doing some walking and noting some swelling and pain. Notices that she can not walk straight and she is leaning toward her left side more. Right hip is better.     Hips- bursitis. Pain is outside of hip. No issues with crossing leg. Issues with lying on the side due to pain. Lifting leg to go up stairs hurt.     No insomnia with steroids. Has noted some heartburn.     Past Medical History:  Past Medical History:   Diagnosis Date     ASTHMA - MODERATE PERSISTENT 2005     Chronic pain      Coronary artery disease      CVA (cerebral infarction)      Depressive disorder, not elsewhere classified      Diabetes (H)      Elevated serum alkaline phosphatase level     Liver source     Fibromyalgia      HYPERLIPIDEMIA NEC/NOS 2006     Hypertriglyceridemia      OA (osteoarthritis)      Thyroid disease      Trochanteric bursitis      Unspecified essential hypertension        Past Surgical History:  Past Surgical History:   Procedure Laterality Date     3 teeth pulled       C  DELIVERY ONLY      , Low Cervical     INJECT EPIDURAL TRANSFORAMINAL  2014    [facility]     INJECT JOINT SACROILIAC  2014    [facility]     LAMINECTOMY LUMBAR ONE LEVEL Left 2014    [facility]       Medications:  Current Outpatient Medications   Medication Sig Dispense Refill     adapalene (DIFFERIN) 0.1 % gel APPLY A THIN LAYER TO THE AFFECTED AREA(S) BY TOPICAL ROUTE ONCE DAILY BEFORE BEDTIME 45 g 10     albuterol (PROVENTIL) (2.5 MG/3ML) 0.083% neb solution Take 1 vial (2.5 mg) by nebulization every 6 hours as needed for shortness of breath / dyspnea or wheezing 1 Box 1     albuterol (VENTOLIN HFA) 108 (90  Base) MCG/ACT inhaler INHALE 2 PUFFS INTO THE LUNGS EVERY 6 HOURS AS NEEDED FOR SHORTNESS OF BREATH / DYSPNEA 54 g 0     ALPRAZolam (XANAX) 0.25 MG tablet Take 1 tablet (0.25 mg) by mouth daily as needed for anxiety 30 tablet 2     aluminum chloride (DRYSOL) 20 % external solution Apply topically At Bedtime 60 mL 3     atorvastatin (LIPITOR) 20 MG tablet Take 1 tablet (20 mg) by mouth daily 90 tablet 2     buPROPion (WELLBUTRIN SR) 100 MG 12 hr tablet TAKE ONE TABLET BY MOUTH EVERY AFTERNOON 90 tablet 1     buPROPion (WELLBUTRIN SR) 200 MG 12 hr tablet Take 1 tablet (200 mg) by mouth daily 90 tablet 3     celecoxib (CELEBREX) 200 MG capsule TAKE ONE CAPSULE BY MOUTH TWICE A DAY AS NEEDED FOR PAIN 180 capsule 0     chlorhexidine (PERIDEX) 0.12 % solution Swish and spit 15 mLs in mouth 2 times daily 1893 mL 4     clindamycin (CLINDAMAX) 1 % external gel Apply topically 2 times daily 30 g 4     clobetasol (TEMOVATE) 0.05 % external cream APPLY SPARINGLY TO AFFECTED AREA TWICE DAILY FOR 14 DAYS.  DO NOT APPLY TO FACE. 30 g 3     clotrimazole (MYCELEX) 10 MG lozenge Place 1 lozenge (10 mg) inside cheek 4 times daily 40 Trinh 1     diclofenac (VOLTAREN) 1 % topical gel Apply 2-4 g topically 4 times daily 100 g 1     DULoxetine (CYMBALTA) 60 MG capsule TAKE TWO CAPSULES BY MOUTH DAILY 180 capsule 3     famotidine (PEPCID) 40 MG tablet Take 1 tablet (40 mg) by mouth daily 90 tablet 1     hydrocortisone 2.5 % cream APPLY TOPICALLY 2 TIMES DAILY AS NEEDED 30 g 0     Lactobacillus (ACIDOPHILUS) CAPS Take 1 capsule by mouth daily Take two hours before or after antibiotic dose.  Continue for 1 week after antibiotic therapy completed 60 capsule 0     losartan-hydrochlorothiazide (HYZAAR) 50-12.5 MG tablet Take 1 tablet by mouth daily (Patient taking differently: Take by mouth daily 1/2 TABLET DAILY) 90 tablet 3     medical cannabis (Patient's own supply.  Not a prescription) 2 mg morning and noon. 7 mg at bedtime. (This is NOT a  prescription, and does not certify that the patient has a qualifying medical condition for medical cannabis.  The purpose of this order is  to document that the patient reports taking medical cannabis.) 0 Information only 0     methylPREDNISolone (MEDROL DOSEPAK) 4 MG tablet therapy pack Follow Package Directions 21 tablet 0     metoprolol succinate ER (TOPROL-XL) 50 MG 24 hr tablet Take 1 tablet (50 mg) by mouth daily 90 tablet 3     mometasone-formoterol (DULERA) 200-5 MCG/ACT inhaler Inhale 2 puffs into the lungs 2 times daily 39 g 0     montelukast (SINGULAIR) 10 MG tablet Take 1 tablet (10 mg) by mouth At Bedtime 90 tablet 3     multivitamin w/minerals (MULTI-VITAMIN) tablet Take 1 tablet by mouth daily       mupirocin (BACTROBAN) 2 % external cream Apply to affected area three times daily 60 g 1     mupirocin (BACTROBAN) 2 % external ointment        predniSONE (DELTASONE) 5 MG tablet Take 1 tablet (5 mg) by mouth daily 30 tablet 0     rizatriptan (MAXALT) 10 MG tablet Take 1 tablet (10 mg) by mouth at onset of headache for migraine May repeat in 2 hours. Max 3 tablets/24 hours. 18 tablet 0     rOPINIRole (REQUIP) 1 MG tablet Take 3 tablets (3 mg) by mouth At Bedtime 270 tablet 3     SUMAtriptan (IMITREX) 100 MG tablet take 1 tablet at onset of headache may repeat in 2 hours max 2 tabs/day 18 tablet 1     traZODone (DESYREL) 50 MG tablet Take 3 tablets (150 mg) by mouth At Bedtime 270 tablet 1     triamcinolone (ARISTOCORT HP) 0.5 % external cream Apply topically 2 times daily 60 g 0     triamcinolone (KENALOG) 0.1 % paste Take by mouth 2 times daily 5 g 1     ZYRTEC 10 MG OR TABS 1 TABLET DAILY 90 3        Allergies:  Allergies   Allergen Reactions     Cats      and rabbits/wheezing     Dogs      wheezing     Lyrica [Pregabalin] Other (See Comments)     Rash, mouth sores, itching and burning     Seasonal Allergies      rhinits       ROS:  Remainder of a comprehensive 10 point ROS is negative unless noted  above.    Social History:  Denies any tobacco use. No significant alcohol use. Denies any illicit drug use.     Family History:  Two daughter  29 Yo daughter  26 yo daughter  1 grandson    1 sister- older- high blood glucose, arthritis  1 brother- older- no idea    Mother- - heart valve. Had MDS. Needed a shot every month  Father- - cancer    Objectives:  Wt 102.1 kg (225 lb)   LMP 2009 (Exact Date)   BMI 35.56 kg/m     Unavailable, Wt: 225 lbs 0 oz  GENERAL: Healthy, alert and no distress  EYES: Eyes grossly normal to inspection.  No discharge or erythema, or obvious scleral/conjunctival abnormalities.  RESP: No audible wheeze, cough, or visible cyanosis.  No visible retractions or increased work of breathing.    SKIN: no suspicious lesions or rashes, hypopigmentation - bilateral neck and face, petechiae - arms, ecchymoses - arms and scar - face  NEURO: Cranial nerves grossly intact.  Mentation and speech appropriate for age.  PSYCH: Mentation appears normal, affect normal/bright, judgement and insight intact, normal speech and appearance well-groomed.  The rest of a comprehensive physical examination is deferred due to [facility] video/telephone visit restrictions    Labs:  Ferritin   Date Value Ref Range Status   09/15/2021 184 8 - 252 ng/mL Final   2021 376 (H) 8 - 252 ng/mL Final     Iron   Date Value Ref Range Status   09/15/2021 100 35 - 180 ug/dL Final   2021 50 35 - 180 ug/dL Final     Iron Binding Cap   Date Value Ref Range Status   2021 350 240 - 430 ug/dL Final     Iron Binding Capacity   Date Value Ref Range Status   09/15/2021 284 240 - 430 ug/dL Final           21  The INR is normal.   APTT ratio is elevated.   Platelet Neutralization is negative.   APTT 1:2 Mix is normal.   DRVVT Screen ratio is normal.   Thrombin time is normal.   Lupus anticoagulant negative  Anticardiolipin negative    VWF collagen binding normal (send out)  The von  Willebrand factor antigen (VWF:Ag), von Willebrand factor   activity (VWF:ACT), and Factor 8 levels are within normal limits.   The Factor 8 to VWF:Ag ratio and the VWF:ACT to VWF:Ag ratio are   within normal limits.      Platelet function COL/EPI >300  Platelet function ADP 77 (normal)    FXIII antigen 137 (norma1)  Alpha-2 antiplasmin 140 (normal  Factor V 78 (normal)  Factor 10 128 (normal)  Factor  (elevated)    4/29/21  Abnormal platelet aggregation study. Maximal platelet aggregation is   decreased with Arachidonic acid and within normal limits with 5   micromolar and 10 micromolar ADP, Epinephrine, Collagen, and low and   high concentrations of Ristocetin.  Deaggregation is present with 5   micromolar ADP and Arachidonic acid.  The 1.00 mg/mL Ristocetin   reactions have a slight lag in secondary aggregation.  ATP release   is decreased with Thrombin and 5 micromolar and 10 micromolar ADP.   These findings are consistent with a congenital or acquired platelet   disorders.    Platelet Function Closure Time Col/ADP   Date Value Ref Range Status   03/03/2021 77 <120 sec Final     Comment:     Typical aspirin effect is characterized by prolonged Col/Epi and normal   Col/ADP.       PFA-Col/ADP   Date Value Ref Range Status   09/28/2021 88 <120 Seconds Final     Comment:     Typical aspirin effect is characterized by prolonged Col/Epi and normal Col/ADP     PLT Funct COL/EPI   Date Value Ref Range Status   03/03/2021 >300 (H) <170 sec Final     Comment:     Effective 12/01/2015, the reference range for this assay has changed.  Causes of a prolonged closure time include platelet dysfunction,vonWillebrand   disease, decreased hematocrit (<35%) and decreased platelet count (<150   x10e9/L).       PFA-Col/Epi   Date Value Ref Range Status   09/28/2021 156 <170 Seconds Final     Erythrocyte Porphyrin   Date Value Ref Range Status   09/28/2021 28 0 - 35 ug/dL Final     Comment:       This test was developed and its  performance characteristics   determined by [facility] [facility]. It has not been cleared or   approved by the US Food and Drug Administration. This test   was performed in a CLIA certified laboratory and is   intended for clinical purposes.       Imaging:  Not applicable

## 2021-10-13 ENCOUNTER — TRANSFERRED RECORDS (OUTPATIENT)
Dept: HEALTH INFORMATION MANAGEMENT | Facility: CLINIC | Age: 61
End: 2021-10-13

## 2021-10-13 ENCOUNTER — ANCILLARY PROCEDURE (OUTPATIENT)
Dept: MAMMOGRAPHY | Facility: CLINIC | Age: 61
End: 2021-10-13
Attending: FAMILY MEDICINE
Payer: COMMERCIAL

## 2021-10-13 DIAGNOSIS — Z12.31 ENCOUNTER FOR SCREENING MAMMOGRAM FOR BREAST CANCER: ICD-10-CM

## 2021-10-13 PROCEDURE — 77067 SCR MAMMO BI INCL CAD: CPT | Mod: GC | Performed by: RADIOLOGY

## 2021-10-13 PROCEDURE — 77063 BREAST TOMOSYNTHESIS BI: CPT | Mod: GC | Performed by: RADIOLOGY

## 2021-10-14 ENCOUNTER — E-VISIT (OUTPATIENT)
Dept: FAMILY MEDICINE | Facility: CLINIC | Age: 61
End: 2021-10-14
Payer: COMMERCIAL

## 2021-10-14 DIAGNOSIS — J20.9 ACUTE BRONCHITIS WITH SYMPTOMS > 10 DAYS: Primary | ICD-10-CM

## 2021-10-14 DIAGNOSIS — N76.0 VAGINITIS AND VULVOVAGINITIS: ICD-10-CM

## 2021-10-14 DIAGNOSIS — R05.9 COUGH: ICD-10-CM

## 2021-10-14 PROCEDURE — 99421 OL DIG E/M SVC 5-10 MIN: CPT | Performed by: FAMILY MEDICINE

## 2021-10-15 RX ORDER — DOXYCYCLINE HYCLATE 100 MG
100 TABLET ORAL 2 TIMES DAILY
Qty: 20 TABLET | Refills: 0 | Status: SHIPPED | OUTPATIENT
Start: 2021-10-15 | End: 2021-10-25

## 2021-10-15 RX ORDER — FLUCONAZOLE 150 MG/1
150 TABLET ORAL ONCE
Qty: 1 TABLET | Refills: 0 | Status: SHIPPED | OUTPATIENT
Start: 2021-10-15 | End: 2021-10-15

## 2021-10-18 ENCOUNTER — E-VISIT (OUTPATIENT)
Dept: FAMILY MEDICINE | Facility: CLINIC | Age: 61
End: 2021-10-18
Payer: COMMERCIAL

## 2021-10-18 DIAGNOSIS — J20.9 ACUTE BRONCHITIS WITH SYMPTOMS > 10 DAYS: Primary | ICD-10-CM

## 2021-10-18 PROCEDURE — 99421 OL DIG E/M SVC 5-10 MIN: CPT | Performed by: FAMILY MEDICINE

## 2021-10-19 LAB — MAYO MISC RESULT: NORMAL

## 2021-10-19 RX ORDER — PREDNISONE 20 MG/1
TABLET ORAL
Qty: 9 TABLET | Refills: 0 | Status: SHIPPED | OUTPATIENT
Start: 2021-10-19 | End: 2021-11-09

## 2021-10-22 ENCOUNTER — MYC MEDICAL ADVICE (OUTPATIENT)
Dept: FAMILY MEDICINE | Facility: CLINIC | Age: 61
End: 2021-10-22

## 2021-10-22 ENCOUNTER — VIRTUAL VISIT (OUTPATIENT)
Dept: FAMILY MEDICINE | Facility: CLINIC | Age: 61
End: 2021-10-22
Payer: COMMERCIAL

## 2021-10-22 DIAGNOSIS — J20.9 ACUTE BRONCHITIS WITH SYMPTOMS > 10 DAYS: Primary | ICD-10-CM

## 2021-10-22 PROCEDURE — 99213 OFFICE O/P EST LOW 20 MIN: CPT | Mod: 95 | Performed by: FAMILY MEDICINE

## 2021-10-22 RX ORDER — ALBUTEROL SULFATE 0.83 MG/ML
2.5 SOLUTION RESPIRATORY (INHALATION) EVERY 6 HOURS PRN
Qty: 3 ML | Refills: 4 | Status: SHIPPED | OUTPATIENT
Start: 2021-10-22 | End: 2023-05-05

## 2021-10-22 RX ORDER — AZITHROMYCIN 250 MG/1
TABLET, FILM COATED ORAL
Qty: 6 TABLET | Refills: 0 | Status: SHIPPED | OUTPATIENT
Start: 2021-10-22 | End: 2021-10-22

## 2021-10-22 RX ORDER — ALBUTEROL SULFATE 0.83 MG/ML
2.5 SOLUTION RESPIRATORY (INHALATION) EVERY 6 HOURS PRN
Qty: 3 ML | Refills: 1 | Status: CANCELLED | OUTPATIENT
Start: 2021-10-22

## 2021-10-22 RX ORDER — DOXYCYCLINE 100 MG/1
100 CAPSULE ORAL 2 TIMES DAILY
Qty: 28 CAPSULE | Refills: 0 | Status: SHIPPED | OUTPATIENT
Start: 2021-10-22 | End: 2021-11-05

## 2021-10-22 NOTE — TELEPHONE ENCOUNTER
Patient was scheduled with  today.     Acute Illness  Acute illness concerns: Cough  Onset/Duration: Ongoing 2-3 weeks, worsen this past week   Symptoms:  Fever: no  Chills/Sweats: YES- sweating feels it is from prednisone.   Headache (location?): YES- tension   Sinus Pressure: YES- drainage/mucus is whitish  Conjunctivitis:  no  Ear Pain: Yes- tiny bit; both ears  Rhinorrhea: YES- white mucus  Congestion: YES  Sore Throat: no  Cough: YES-productive of clear sputum, with shortness of breath, worsening over time  Wheeze: YES  Decreased Appetite: YES  Nausea: YES  Vomiting: YES  Diarrhea: YES- last two night, normal in color, middle of night   Dysuria/Freq.: no  Dysuria or Hematuria: no  Fatigue/Achiness: YES  Sick/Strep Exposure: no      MARVA BaileyN, RN, PHN  [place]/[facility]  October 22, 2021

## 2021-10-22 NOTE — PROGRESS NOTES
Jacquie is a 61 year old who is being evaluated via a billable telephone visit.      What phone number would you like to be contacted at? [phone]  How would you like to obtain your AVS? MyChart    Assessment & Plan     Acute bronchitis with symptoms > 10 days  Patient with continued cough and not improving after prednisone burst and addition of doxycycline.  Patient has some continued wheezing and cough.  On exam by phone she has some continued cough.  No audible wheezing.  Speaking in full sentences.  Recommend extending the doxycycline.  We will add Augmentin 875 twice daily.  Have sent a prescription for albuterol for her nebulizer and she will  a nebulizer machine here at the clinic as well.  If not improving through the weekend she will give us a call early next week to set up some time for a an in person visit.  If worsening over the weekend may need evaluation in urgent care or emergency department.  Patient agrees to this plan.  - Nebulizer and Supplies Order for DME - ONLY FOR DME  - albuterol (PROVENTIL) (2.5 MG/3ML) 0.083% neb solution; Take 1 vial (2.5 mg) by nebulization every 6 hours as needed for shortness of breath / dyspnea or wheezing  - amoxicillin-clavulanate (AUGMENTIN) 875-125 MG tablet; Take 1 tablet by mouth 2 times daily for 10 days  - doxycycline hyclate (VIBRAMYCIN) 100 MG capsule; Take 1 capsule (100 mg) by mouth 2 times daily for 14 days                 Return in about 1 week (around 10/29/2021) for if not improving.    Liz Peterson MD  [facility] ANISH Dhillon is a 61 year old who presents for the following health issues:    HPI     Acute Illness  Acute illness concerns: Cough  Onset/Duration: Ongoing 2-3 weeks, worsen this past week   Symptoms:  Fever: no  Chills/Sweats: YES- sweating feels it is from prednisone.   Headache (location?): YES- tension   Sinus Pressure: YES- drainage/mucus is whitish  Conjunctivitis:  no  Ear Pain: Yes-  tiny bit; both ears  Rhinorrhea: YES- white mucus  Congestion: YES  Sore Throat: no  Cough: YES-productive of clear sputum, with shortness of breath, worsening over time  Wheeze: YES  Decreased Appetite: YES  Nausea: YES  Vomiting: YES  Diarrhea: YES- last two night, normal in color, middle of night   Dysuria/Freq.: no  Dysuria or Hematuria: no  Fatigue/Achiness: YES  Sick/Strep Exposure: no  Therapies tried and outcome: Doxycyline & Prednisone    Cough is keeping her up at night.   Wet cough. Loose. Cough is productive of mostly white sputum.   Has taken 3 days of 40 mg of prednisone. Has taken 20 mg today. Has two more days of the 20 mg.   No help with doxycycline.   Wondering about using her nebulizer.   No sick contacts.   Reports she was tested for covid yesterday. Negative.         Review of Systems   CONSTITUTIONAL: As above  ENT/MOUTH: NEGATIVE for ear, mouth and throat problems  RESP: As above  CV: NEGATIVE for chest pain, palpitations or peripheral edema      Objective           Vitals:  No vitals were obtained today due to virtual visit.    Physical Exam   alert, no distress and coughing occasionally  PSYCH: Alert and oriented times 3; coherent speech, normal   rate and volume, able to articulate logical thoughts, able   to abstract reason, no tangential thoughts, no hallucinations   or delusions  Her affect is normal and pleasant  RESP: Coughing occasionally, no audible wheezing, able to talk in full sentences  Remainder of exam unable to be completed due to telephone visits                Phone call duration: 10 minutes

## 2021-10-23 ENCOUNTER — HEALTH MAINTENANCE LETTER (OUTPATIENT)
Age: 61
End: 2021-10-23

## 2021-11-02 ENCOUNTER — MYC MEDICAL ADVICE (OUTPATIENT)
Dept: FAMILY MEDICINE | Facility: CLINIC | Age: 61
End: 2021-11-02

## 2021-11-02 NOTE — TELEPHONE ENCOUNTER
Patient stated she is taking a whole pill- she should continue the higher dose Losartan as she is taking a whole pill. JESSICA Donahue CNP

## 2021-11-02 NOTE — TELEPHONE ENCOUNTER
Call pt. Can increase Toprol to 100 mg if no symptoms of high BP. Schedule in person visit with SF when patient returns.   JESSICA Donahue CNP

## 2021-11-02 NOTE — TELEPHONE ENCOUNTER
Patient was informed of the message below.   THe patient is wondering if she should still take the half tablet or full tablet of the Losartan-Hydrochlorothiazide.     Next 5 appointments (look out 90 days)    Dec 21, 2021  8:40 AM  Office Visit with Liz Peterson MD  [facility] ([facility] ) [address], [address]  [place] [address] [phone]            Per patient please mychart the answer to the question above.     MARVA BaileyN, RN, PHN  [place]/[facility]  November 2, 2021

## 2021-11-02 NOTE — TELEPHONE ENCOUNTER
Please see patient's BP's from [facility] Message. Would you like to adjust her medications, or have her come in to be evaluated?     Marivel Gross RN on 11/2/2021 at 12:23 PM

## 2021-11-04 ENCOUNTER — MYC MEDICAL ADVICE (OUTPATIENT)
Dept: HEMATOLOGY | Facility: CLINIC | Age: 61
End: 2021-11-04

## 2021-11-05 NOTE — TELEPHONE ENCOUNTER
BP today is 146/93.   Going down slowly after she has increased her Metoprolol to 100mg.   Was on 75mg of metoprolol until 2 days ago when she started taking 100mg of metoprolol. Is on Lorsartan 50/12.5 mg tabs takes 1/2 tablet.   Is also on Prednisone daily.   States that her BP's are going up and down but she is uable to get the bottom number below 90.   States   No headaches that just stay and do not go away. Has history of migraines.  No dizziness or lightheaded when she gets up to fast from one position to another.   No vision changes.   Wondering hydrochloroquin is causing her elevated BP's ?  Advised to reach out to primary care MD regarding BP's

## 2021-11-08 ENCOUNTER — E-VISIT (OUTPATIENT)
Dept: FAMILY MEDICINE | Facility: CLINIC | Age: 61
End: 2021-11-08
Payer: COMMERCIAL

## 2021-11-08 ENCOUNTER — MYC MEDICAL ADVICE (OUTPATIENT)
Dept: FAMILY MEDICINE | Facility: CLINIC | Age: 61
End: 2021-11-08
Payer: COMMERCIAL

## 2021-11-08 ENCOUNTER — MYC MEDICAL ADVICE (OUTPATIENT)
Dept: FAMILY MEDICINE | Facility: CLINIC | Age: 61
End: 2021-11-08

## 2021-11-08 DIAGNOSIS — J20.9 ACUTE BRONCHITIS WITH SYMPTOMS > 10 DAYS: Primary | ICD-10-CM

## 2021-11-08 PROCEDURE — 99421 OL DIG E/M SVC 5-10 MIN: CPT | Performed by: FAMILY MEDICINE

## 2021-11-09 ENCOUNTER — E-VISIT (OUTPATIENT)
Dept: FAMILY MEDICINE | Facility: CLINIC | Age: 61
End: 2021-11-09

## 2021-11-09 ENCOUNTER — MYC MEDICAL ADVICE (OUTPATIENT)
Dept: FAMILY MEDICINE | Facility: CLINIC | Age: 61
End: 2021-11-09

## 2021-11-09 ENCOUNTER — E-VISIT (OUTPATIENT)
Dept: FAMILY MEDICINE | Facility: CLINIC | Age: 61
End: 2021-11-09
Payer: COMMERCIAL

## 2021-11-09 DIAGNOSIS — Z53.9 ERRONEOUS ENCOUNTER--DISREGARD: Primary | ICD-10-CM

## 2021-11-09 DIAGNOSIS — I10 BENIGN ESSENTIAL HYPERTENSION: Primary | ICD-10-CM

## 2021-11-09 DIAGNOSIS — K21.9 GASTROESOPHAGEAL REFLUX DISEASE, UNSPECIFIED WHETHER ESOPHAGITIS PRESENT: ICD-10-CM

## 2021-11-09 PROCEDURE — 99207 PR NON-BILLABLE SERV PER CHARTING: CPT | Performed by: FAMILY MEDICINE

## 2021-11-09 RX ORDER — DOXYCYCLINE 100 MG/1
100 CAPSULE ORAL 2 TIMES DAILY
Qty: 14 CAPSULE | Refills: 0 | Status: SHIPPED | OUTPATIENT
Start: 2021-11-09 | End: 2021-11-30

## 2021-11-10 ENCOUNTER — MYC MEDICAL ADVICE (OUTPATIENT)
Dept: HEMATOLOGY | Facility: CLINIC | Age: 61
End: 2021-11-10
Payer: COMMERCIAL

## 2021-11-11 ENCOUNTER — MYC MEDICAL ADVICE (OUTPATIENT)
Dept: FAMILY MEDICINE | Facility: CLINIC | Age: 61
End: 2021-11-11
Payer: COMMERCIAL

## 2021-11-11 RX ORDER — FAMOTIDINE 40 MG/1
TABLET, FILM COATED ORAL
Qty: 90 TABLET | Refills: 1 | Status: SHIPPED | OUTPATIENT
Start: 2021-11-11 | End: 2022-05-19

## 2021-11-11 NOTE — TELEPHONE ENCOUNTER
[facility] Triage    [facility] message:  BP issues since plaquenil    Hydroxychloroquine (Plaquenil)    Saw Dr. Peterson 11/9 and adjusted meds Losartan 1/2 to full and increased toprol - XL from 50 every day to 50 BID    High -180s/   SOB - winded upon exertion, needs to catch her breath  Headaches - Every morning and late afternoon (has HX of migraines) so this has increased  No Chest pain  Not Dizzy  No Confusion    Advised if SOB, HA, increased BP, chest pain, dizzy, confusion, call 911.    Nicholas is following her for the BP.    Routing to Nicholas and Dr. Cornelio Grimes  Questions is: Could Plaquenil be causing her BP to rise this high?

## 2021-11-19 ENCOUNTER — MYC MEDICAL ADVICE (OUTPATIENT)
Dept: FAMILY MEDICINE | Facility: CLINIC | Age: 61
End: 2021-11-19
Payer: COMMERCIAL

## 2021-11-19 DIAGNOSIS — R00.2 PALPITATIONS: ICD-10-CM

## 2021-11-19 DIAGNOSIS — F41.1 GENERALIZED ANXIETY DISORDER: ICD-10-CM

## 2021-11-19 DIAGNOSIS — L74.511 PRIMARY FOCAL HYPERHIDROSIS OF FACE: ICD-10-CM

## 2021-11-19 DIAGNOSIS — L30.9 DERMATITIS: ICD-10-CM

## 2021-11-19 DIAGNOSIS — R00.0 SINUS TACHYCARDIA: ICD-10-CM

## 2021-11-19 RX ORDER — ALPRAZOLAM 0.25 MG
0.25 TABLET ORAL DAILY PRN
Qty: 30 TABLET | Refills: 0 | Status: SHIPPED | OUTPATIENT
Start: 2021-11-19 | End: 2022-06-20

## 2021-11-19 RX ORDER — HYDROCORTISONE 2.5 %
CREAM (GRAM) TOPICAL
Qty: 30 G | Refills: 3 | Status: SHIPPED | OUTPATIENT
Start: 2021-11-19 | End: 2022-04-06

## 2021-11-19 RX ORDER — OXYBUTYNIN CHLORIDE 5 MG/1
10 TABLET, EXTENDED RELEASE ORAL DAILY
Qty: 60 TABLET | Refills: 0 | Status: SHIPPED | OUTPATIENT
Start: 2021-11-19 | End: 2021-12-20

## 2021-11-19 NOTE — TELEPHONE ENCOUNTER
Pending Prescriptions:                       Disp   Refills    hydrocortisone 2.5 % cream [Pharmacy Med N*30 g   0        Sig: APPLY TOPICALLY 2 TIMES DAILY AS NEEDED    ALPRAZolam (XANAX) 0.25 MG tablet [Pharmac*30 tab*0        Sig: Take 1 tablet (0.25 mg) by mouth daily as needed for           anxiety    Routing refill request to provider for review/approval because:  Drug not on the [facility] refill protocol

## 2021-11-22 ENCOUNTER — TELEPHONE (OUTPATIENT)
Dept: FAMILY MEDICINE | Facility: CLINIC | Age: 61
End: 2021-11-22
Payer: COMMERCIAL

## 2021-11-22 ENCOUNTER — TELEPHONE (OUTPATIENT)
Dept: HEMATOLOGY | Facility: CLINIC | Age: 61
End: 2021-11-22
Payer: COMMERCIAL

## 2021-11-22 DIAGNOSIS — L74.511 PRIMARY FOCAL HYPERHIDROSIS OF FACE: Primary | ICD-10-CM

## 2021-11-22 DIAGNOSIS — D69.1 PLATELET GRANULE DEFECT (H): Primary | ICD-10-CM

## 2021-11-22 RX ORDER — HYDROXYCHLOROQUINE SULFATE 200 MG/1
200 TABLET, FILM COATED ORAL DAILY
Qty: 90 TABLET | Refills: 1 | Status: SHIPPED | OUTPATIENT
Start: 2021-11-22 | End: 2021-12-01

## 2021-11-22 NOTE — TELEPHONE ENCOUNTER
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    Jacquie had been having issues with a nagging cough and elevated BP which she was concerned was due to the Plaquenil.  It was recommended (on 11/4) that she stop her Plaquenil. She was also on a Prenisone taper, which was was completed on 11/9/2021.    RN reached out to Jacquie by phone to see how she is  feeling since she stopped her plaquenil and to make a lab appt prior to her appointment with Dr. Grimes on 12/1.      She did say that she never stopped the plaquenil.  She said that the cough has improved after a course of antibiotics from her primary and that they have adjusted her antihypertensives and her BP is in better control.    Worked with Jacquie to get a lab appt for 11/23 at [facility].  Confirmed her appt with Dr. Grimes on 12/1.      Jacquie had a few requests:  1. She would like the 12/1 visit to be virtual (video).    2. She would like to stay on the plaquenil and would like 90 days at a time.  3. She would like to be back on prednisone if possible as her generalized pain and swelling was much better on the medication.    I told patient that I would review per Dr. Grimes and perhaps the prednisone issue might best be discussed at the visit.    Danna Anaya, RN - Nurse Clinician - [facility] - [phone]

## 2021-11-22 NOTE — TELEPHONE ENCOUNTER
Did not see on medlist    Drysol External Solution 20%  Sig: apply topically at bedtime  Qty=60    [facility]    Thanks  Emi Vanessa RT (R)

## 2021-11-23 ENCOUNTER — LAB (OUTPATIENT)
Dept: LAB | Facility: CLINIC | Age: 61
End: 2021-11-23
Payer: COMMERCIAL

## 2021-11-23 DIAGNOSIS — D69.1 PLATELET GRANULE DEFECT (H): ICD-10-CM

## 2021-11-23 LAB
ALBUMIN SERPL-MCNC: 3.8 G/DL (ref 3.4–5)
ALP SERPL-CCNC: 134 U/L (ref 40–150)
ALT SERPL W P-5'-P-CCNC: 27 U/L (ref 0–50)
ANION GAP SERPL CALCULATED.3IONS-SCNC: 8 MMOL/L (ref 3–14)
AST SERPL W P-5'-P-CCNC: 18 U/L (ref 0–45)
BASOPHILS # BLD AUTO: 0.1 10E3/UL (ref 0–0.2)
BASOPHILS NFR BLD AUTO: 1 %
BILIRUB SERPL-MCNC: 0.4 MG/DL (ref 0.2–1.3)
BUN SERPL-MCNC: 19 MG/DL (ref 7–30)
CALCIUM SERPL-MCNC: 9.3 MG/DL (ref 8.5–10.1)
CF REDUC 30M P MA P HEP LENFR BLD TEG: 0.4 % (ref 0–8)
CF REDUC 60M P MA P HEPASE LENFR BLD TEG: 3 % (ref 0–15)
CFT P HPASE BLD TEG: 1.2 MINUTE (ref 1–3)
CHLORIDE BLD-SCNC: 107 MMOL/L (ref 94–109)
CI (COAGULATION INDEX)(Z): 1.6 (ref -3–3)
CLOSURE TME COLL+ADP BLD: 79 SECONDS
CLOSURE TME COLL+EPINEP BLD: 188 SECONDS
CLOT ANGLE P HPASE BLD TEG: 69.4 DEGREES (ref 53–72)
CLOT INIT P HPASE BLD TEG: 6.2 MINUTE (ref 5–10)
CLOT STRENGTH P HPASE BLD TEG: 11.9 KD/SC (ref 4.5–11)
CO2 SERPL-SCNC: 26 MMOL/L (ref 20–32)
CREAT SERPL-MCNC: 1.01 MG/DL (ref 0.52–1.04)
CRP SERPL-MCNC: 5.3 MG/L (ref 0–8)
EOSINOPHIL # BLD AUTO: 0.3 10E3/UL (ref 0–0.7)
EOSINOPHIL NFR BLD AUTO: 4 %
ERYTHROCYTE [DISTWIDTH] IN BLOOD BY AUTOMATED COUNT: 12.6 % (ref 10–15)
ERYTHROCYTE [SEDIMENTATION RATE] IN BLOOD BY WESTERGREN METHOD: 29 MM/HR (ref 0–30)
FIBRINOGEN PPP-MCNC: 534 MG/DL (ref 170–490)
GFR SERPL CREATININE-BSD FRML MDRD: 60 ML/MIN/1.73M2
GLUCOSE BLD-MCNC: 95 MG/DL (ref 70–99)
HCT VFR BLD AUTO: 43.2 % (ref 35–47)
HGB BLD-MCNC: 14.5 G/DL (ref 11.7–15.7)
IMM GRANULOCYTES # BLD: 0 10E3/UL
IMM GRANULOCYTES NFR BLD: 1 %
INTERPRETATION TEGPIA: NORMAL
LYMPHOCYTES # BLD AUTO: 2.2 10E3/UL (ref 0.8–5.3)
LYMPHOCYTES NFR BLD AUTO: 31 %
MCF P HPASE BLD TEG: 70.5 MM (ref 50–70)
MCH RBC QN AUTO: 31 PG (ref 26.5–33)
MCHC RBC AUTO-ENTMCNC: 33.6 G/DL (ref 31.5–36.5)
MCV RBC AUTO: 92 FL (ref 78–100)
MONOCYTES # BLD AUTO: 0.5 10E3/UL (ref 0–1.3)
MONOCYTES NFR BLD AUTO: 6 %
NEUTROPHILS # BLD AUTO: 4.1 10E3/UL (ref 1.6–8.3)
NEUTROPHILS NFR BLD AUTO: 57 %
NRBC # BLD AUTO: 0 10E3/UL
NRBC BLD AUTO-RTO: 0 /100
PA AA BLD-ACNC: 34 %
PA ADP BLD-ACNC: 10 %
PLATELET # BLD AUTO: 338 10E3/UL (ref 150–450)
POTASSIUM BLD-SCNC: 3.4 MMOL/L (ref 3.4–5.3)
PROT SERPL-MCNC: 8.3 G/DL (ref 6.8–8.8)
RBC # BLD AUTO: 4.68 10E6/UL (ref 3.8–5.2)
RETICS # AUTO: 0.08 10E6/UL (ref 0.03–0.1)
RETICS/RBC NFR AUTO: 1.7 % (ref 0.5–2)
SODIUM SERPL-SCNC: 141 MMOL/L (ref 133–144)
WBC # BLD AUTO: 7.2 10E3/UL (ref 4–11)

## 2021-11-23 PROCEDURE — 85060 BLOOD SMEAR INTERPRETATION: CPT | Performed by: PATHOLOGY

## 2021-11-23 PROCEDURE — 85396 CLOTTING ASSAY WHOLE BLOOD: CPT

## 2021-11-23 PROCEDURE — 85025 COMPLETE CBC W/AUTO DIFF WBC: CPT

## 2021-11-23 PROCEDURE — 85385 FIBRINOGEN ANTIGEN: CPT | Mod: 90

## 2021-11-23 PROCEDURE — 85576 BLOOD PLATELET AGGREGATION: CPT

## 2021-11-23 PROCEDURE — 82595 ASSAY OF CRYOGLOBULIN: CPT

## 2021-11-23 PROCEDURE — 36415 COLL VENOUS BLD VENIPUNCTURE: CPT

## 2021-11-23 PROCEDURE — 86140 C-REACTIVE PROTEIN: CPT

## 2021-11-23 PROCEDURE — 82585 ASSAY OF CRYOFIBRINOGEN: CPT | Mod: 90

## 2021-11-23 PROCEDURE — 85045 AUTOMATED RETICULOCYTE COUNT: CPT

## 2021-11-23 PROCEDURE — 85384 FIBRINOGEN ACTIVITY: CPT

## 2021-11-23 PROCEDURE — 80053 COMPREHEN METABOLIC PANEL: CPT

## 2021-11-23 PROCEDURE — 85652 RBC SED RATE AUTOMATED: CPT

## 2021-11-24 ENCOUNTER — MYC MEDICAL ADVICE (OUTPATIENT)
Dept: FAMILY MEDICINE | Facility: CLINIC | Age: 61
End: 2021-11-24
Payer: COMMERCIAL

## 2021-11-24 LAB
PATH REPORT.COMMENTS IMP SPEC: NORMAL
PATH REPORT.FINAL DX SPEC: NORMAL
PATH REPORT.MICROSCOPIC SPEC OTHER STN: NORMAL
PATH REPORT.MICROSCOPIC SPEC OTHER STN: NORMAL
PATH REPORT.RELEVANT HX SPEC: NORMAL

## 2021-11-24 RX ORDER — METOPROLOL SUCCINATE 50 MG/1
50 TABLET, EXTENDED RELEASE ORAL 2 TIMES DAILY
Qty: 180 TABLET | Refills: 1 | Status: SHIPPED | OUTPATIENT
Start: 2021-11-24 | End: 2021-12-21

## 2021-11-26 LAB — FIBRINOGEN AG PPP IA-MCNC: 570 MG/DL

## 2021-11-26 NOTE — TELEPHONE ENCOUNTER
Spoke with patient appointment scheduled   Next 5 appointments (look out 90 days)    Nov 30, 2021  2:00 PM  (Arrive by 1:40 PM)  Office Visit with Liz Peterson MD  [facility] [address], [address]  [place] [address]  [phone]   Dec 21, 2021  8:40 AM  Office Visit with Liz Peterson MD  [facility] [address], [address]  [place] [address]  [phone]        Closing encounter  Emi Vanessa RT (R)

## 2021-11-28 LAB — CRYOFIB PLAS QL 3D INC: NORMAL

## 2021-11-29 DIAGNOSIS — R23.3 EASY BRUISABILITY: Primary | ICD-10-CM

## 2021-11-29 DIAGNOSIS — R61 CRANIOFACIAL HYPERHIDROSIS: ICD-10-CM

## 2021-11-29 LAB — CRYOGLOB SER QL: ABNORMAL

## 2021-11-29 NOTE — PROGRESS NOTES
"Assessment & Plan     Acute left-sided low back pain without sciatica  Patient with left sided low back pain.  History of low back pain.  She also is describing diffuse body pains.  Patient has history of chronic pain.  Jacquie has been packing up her current home to move.  She is doing a lot of activity that she went normally otherwise and has found this has exacerbated her pain.  She has previously been to pain clinic.  She reports a \"falling out\" and has not returned.  She has therefore been off of her Vicodin since then time.  She remains on her Celebrex and she is also on Cymbalta.  Discussed her pain exacerbation at this point.  Willing to try tramadol for the time to see if this helps allow for some improvement of function.  Discussed that she will likely still have pain but the hope is to reduce it some.  She understands that this is not a long-term solution and that if she continues to have pain we will consider having her return to pain management.  Patient agrees to this plan.  - traMADol (ULTRAM) 50 MG tablet; Take 1 tablet (50 mg) by mouth 2 times daily as needed for severe pain    Thrush  No current symptoms.  She would like to have the nystatin on hand for if this returns.  That seems reasonable.  - nystatin (MYCOSTATIN) 528837 UNIT/ML suspension; Take by mouth 4 times daily Has recurrent thrush. Uses for 2 weeks at a time as needed.    Screen for colon cancer  Patient is due for colon cancer screening.  She generally does the FIT screening.  Cards are given to patient here today.  - Fecal colorectal cancer screen (FIT); Future                 Return in about 3 months (around 2/28/2022) for Routine preventive.    Liz Peterson MD  [facility]    Subjective     Jacquie Amador is a 61 year old female who presents to clinic today for the following health issues:    lower extremity pain  The symptoms are aggravated by movement, palpation and weight bearing.   Packing up her " "house and having a lot of pain.  Closing December 20th.   Noticing that she is having a lot of swelling in her ankles.   Pain in lower back again too.   She is wondering if she can have something for pain for the next month until done with packing.   Has used vicodin in the past.   Used to go to the Pain Clinic. Reports she left due to \"a falling out\".     Unable to take NSAIDs due to her platelet dysfunction but does take Celebrex.   Used voltaren gel occasionally.   She has tried Tylenol but doesn't find it helpful.   She is hoping for some pain medication.  She remembers trying tramadol in the past but has not used it regularly.  She does not remember why that was stopped and other alternatives were given.        Answers for HPI/ROS submitted by the patient on 11/30/2021  If you checked off any problems, how difficult have these problems made it for you to do your work, take care of things at home, or get along with other people?: Somewhat difficult  PHQ9 TOTAL SCORE: 7  MARIZOL 7 TOTAL SCORE: 5  Injury mechanism: unknown  Pain location: left hip, right hip, right knee, right ankle, right foot, right toes  Pain quality: aching, shooting  Pain - numeric: 8/10  Pain course: fluctuating  loss of motion: Yes  Foreign body present: no foreign bodies    Patient requests refills of her nystatin today as well.  She has no current issue with thrush but then will have that on occasion.  She likes to have it on hand.      Review of Systems   CONSTITUTIONAL: Weight gain.  Patient reports it is related to stress levels.  She also thinks it may be due to some prednisone that she has been on related to platelet dysfunction.  ENT/MOUTH: NEGATIVE for ear, mouth and throat problems  RESP: NEGATIVE for significant cough or SOB  CV: NEGATIVE for chest pain, palpitations or peripheral edema      Objective    /88   Pulse 68   Temp 97.8  F (36.6  C) (Temporal)   Resp 18   Ht 1.694 m (5' 6.7\")   Wt 105.5 kg (232 lb 8 oz)   LMP " 07/17/2009 (Exact Date)   SpO2 98%   Breastfeeding No   BMI 36.74 kg/m    Body mass index is 36.74 kg/m .  Physical Exam   GENERAL: alert, no distress and over weight  NECK: no adenopathy, no asymmetry, masses, or scars and thyroid normal to palpation  RESP: lungs clear to auscultation - no rales, rhonchi or wheezes  CV: regular rate and rhythm, normal S1 S2, no S3 or S4, no murmur, click or rub, no peripheral edema and peripheral pulses strong  MS: Patient with some tenderness of low back especially over the left SI joint.  She has negative leg lifts bilaterally and she has normal and equal strength in the lower extremities.  No sensation changes noted.  No lower extremity edema at this time.

## 2021-11-30 ENCOUNTER — OFFICE VISIT (OUTPATIENT)
Dept: FAMILY MEDICINE | Facility: CLINIC | Age: 61
End: 2021-11-30
Payer: COMMERCIAL

## 2021-11-30 VITALS
TEMPERATURE: 97.8 F | WEIGHT: 232.5 LBS | DIASTOLIC BLOOD PRESSURE: 88 MMHG | HEART RATE: 68 BPM | SYSTOLIC BLOOD PRESSURE: 132 MMHG | HEIGHT: 67 IN | OXYGEN SATURATION: 98 % | BODY MASS INDEX: 36.49 KG/M2 | RESPIRATION RATE: 18 BRPM

## 2021-11-30 DIAGNOSIS — Z12.11 SCREEN FOR COLON CANCER: ICD-10-CM

## 2021-11-30 DIAGNOSIS — B37.0 THRUSH: ICD-10-CM

## 2021-11-30 DIAGNOSIS — M54.50 ACUTE LEFT-SIDED LOW BACK PAIN WITHOUT SCIATICA: Primary | ICD-10-CM

## 2021-11-30 PROCEDURE — 99214 OFFICE O/P EST MOD 30 MIN: CPT | Mod: 25 | Performed by: FAMILY MEDICINE

## 2021-11-30 PROCEDURE — 90715 TDAP VACCINE 7 YRS/> IM: CPT | Performed by: FAMILY MEDICINE

## 2021-11-30 PROCEDURE — 90471 IMMUNIZATION ADMIN: CPT | Performed by: FAMILY MEDICINE

## 2021-11-30 RX ORDER — TRAMADOL HYDROCHLORIDE 50 MG/1
50 TABLET ORAL 2 TIMES DAILY PRN
Qty: 60 TABLET | Refills: 0 | Status: SHIPPED | OUTPATIENT
Start: 2021-11-30 | End: 2021-12-03 | Stop reason: SINTOL

## 2021-11-30 RX ORDER — NYSTATIN 100000/ML
SUSPENSION, ORAL (FINAL DOSE FORM) ORAL 4 TIMES DAILY
Qty: 380 ML | Refills: 1 | Status: SHIPPED | OUTPATIENT
Start: 2021-11-30 | End: 2022-11-01

## 2021-11-30 ASSESSMENT — MIFFLIN-ST. JEOR: SCORE: 1647.47

## 2021-11-30 ASSESSMENT — PAIN SCALES - GENERAL: PAINLEVEL: EXTREME PAIN (8)

## 2021-11-30 ASSESSMENT — ANXIETY QUESTIONNAIRES
3. WORRYING TOO MUCH ABOUT DIFFERENT THINGS: SEVERAL DAYS
6. BECOMING EASILY ANNOYED OR IRRITABLE: SEVERAL DAYS
GAD7 TOTAL SCORE: 5
7. FEELING AFRAID AS IF SOMETHING AWFUL MIGHT HAPPEN: NOT AT ALL
4. TROUBLE RELAXING: NOT AT ALL
7. FEELING AFRAID AS IF SOMETHING AWFUL MIGHT HAPPEN: NOT AT ALL
2. NOT BEING ABLE TO STOP OR CONTROL WORRYING: MORE THAN HALF THE DAYS
1. FEELING NERVOUS, ANXIOUS, OR ON EDGE: SEVERAL DAYS
5. BEING SO RESTLESS THAT IT IS HARD TO SIT STILL: NOT AT ALL
8. IF YOU CHECKED OFF ANY PROBLEMS, HOW DIFFICULT HAVE THESE MADE IT FOR YOU TO DO YOUR WORK, TAKE CARE OF THINGS AT HOME, OR GET ALONG WITH OTHER PEOPLE?: SOMEWHAT DIFFICULT

## 2021-11-30 ASSESSMENT — PATIENT HEALTH QUESTIONNAIRE - PHQ9
10. IF YOU CHECKED OFF ANY PROBLEMS, HOW DIFFICULT HAVE THESE PROBLEMS MADE IT FOR YOU TO DO YOUR WORK, TAKE CARE OF THINGS AT HOME, OR GET ALONG WITH OTHER PEOPLE: SOMEWHAT DIFFICULT
SUM OF ALL RESPONSES TO PHQ QUESTIONS 1-9: 7
SUM OF ALL RESPONSES TO PHQ QUESTIONS 1-9: 7

## 2021-11-30 NOTE — NURSING NOTE
Prior to immunization administration, verified patients identity using patient s name and date of birth. Please see Immunization Activity for additional information.     Screening Questionnaire for Adult Immunization    Are you sick today?   No   Do you have allergies to medications, food, a vaccine component or latex?   No   Have you ever had a serious reaction after receiving a vaccination?   No   Do you have a long-term health problem with heart, lung, kidney, or metabolic disease (e.g., diabetes), asthma, a blood disorder, no spleen, complement component deficiency, a cochlear implant, or a spinal fluid leak?  Are you on long-term aspirin therapy?   No   Do you have cancer, leukemia, HIV/AIDS, or any other immune system problem?   Yes   Do you have a parent, brother, or sister with an immune system problem?   No   In the past 3 months, have you taken medications that affect  your immune system, such as prednisone, other steroids, or anticancer drugs; drugs for the treatment of rheumatoid arthritis, Crohn s disease, or psoriasis; or have you had radiation treatments?   Yes   Have you had a seizure, or a brain or other nervous system problem?   No   During the past year, have you received a transfusion of blood or blood    products, or been given immune (gamma) globulin or antiviral drug?   No   For women: Are you pregnant or is there a chance you could become       pregnant during the next month?   No   Have you received any vaccinations in the past 4 weeks?   No     Immunization questionnaire was positive for at least one answer.  Notified Dr. Peterson.        Per orders of Dr. Peterson, injection of Tdap given by Pati Caesy. Patient instructed to remain in clinic for 15 minutes afterwards, and to report any adverse reaction to me immediately.       Screening performed by Pati Casey on 11/30/2021 at 2:52 PM.

## 2021-12-01 ENCOUNTER — VIRTUAL VISIT (OUTPATIENT)
Dept: HEMATOLOGY | Facility: CLINIC | Age: 61
End: 2021-12-01
Attending: INTERNAL MEDICINE
Payer: COMMERCIAL

## 2021-12-01 VITALS — BODY MASS INDEX: 35.56 KG/M2 | WEIGHT: 225 LBS

## 2021-12-01 DIAGNOSIS — D69.1 PLATELET GRANULE DEFECT (H): Primary | ICD-10-CM

## 2021-12-01 DIAGNOSIS — T14.8XXA BRUISING: ICD-10-CM

## 2021-12-01 PROCEDURE — 99215 OFFICE O/P EST HI 40 MIN: CPT | Mod: 95 | Performed by: INTERNAL MEDICINE

## 2021-12-01 ASSESSMENT — PATIENT HEALTH QUESTIONNAIRE - PHQ9: SUM OF ALL RESPONSES TO PHQ QUESTIONS 1-9: 7

## 2021-12-01 ASSESSMENT — ASTHMA QUESTIONNAIRES: ACT_TOTALSCORE: 11

## 2021-12-01 ASSESSMENT — ANXIETY QUESTIONNAIRES: GAD7 TOTAL SCORE: 5

## 2021-12-01 NOTE — PROGRESS NOTES
[facility]  [address] [place] [address]  Phone: [phone], Fax: [phone]    Outpatient Visit Note:    Patient: Jacquie Amador  MRN: [id number]  : 1960  ISIDRO: Dec 1, 2021    Reason for Consultation:  Bruising    Assessment:  In summary, Jacquie Amador is a 61 year old woman presenting to clinic due to bruising/bleeding with abnormal platelet studies.     1.  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  Easy bruising and prolonged healing secondary to #1  3.  Eruptive rash/lesions on sun exposed surfaces, porphyria cutanea tarda ruled out  4.  Personal history of antiphospholipid antibody syndrome in pregnancy, no records  5.  Osteoarthritis requiring anti-inflammatory medications  6.  Depression requiring use of SSRI medication  7.  Acute on chronic migraines requiring use of triptans and aspirin      Ms. Amador presents with greater than 2 years of increased bruising/presence of lesions on her skin that exhibit prolonged healing.  Prior to her consultation in our clinic she had been evaluated by Dr. Douglas who had sent off PTT.  PTT was prolonged prompting further evaluation as an outpatient.  Evaluation was conducted in 2021.  At this time Jacquie was found to have negative antiphospholipid antibodies.  Jacquie reports a history of 2 miscarriages over 30 years ago that prompted the diagnosis of antiphospholipid antibody syndrome.  She recalls that she was on a steroid pill during her subsequent 2 successful pregnancies.  She does not recall having been on a blood thinner for these pregnancies.  She has no other personal thrombosis history.    Evaluation for von Willebrand's, including von Willebrand factor antigen, activity, ristocetin cofactor, and collagen binding did not find any evidence of acquired or congenital VWF.  Evaluation of antiplatelet antibodies revealed that Jenny has no antibodies against  platelet glycoprotein receptors.  However she did have the presence of anti-HLA antibodies.  She has no personal history of receiving red cell or platelet transfusions.    Jenny had one platelet aggregation study that was done when she was taking Excedrin Migraine which contains aspirin.  1 week later this was study was repeated. The study continued to be abnormal. She continued to have decreased arachidonic acid  As well ad deaggregation with ADP and arachidonic acid. And decreased ATP release with thrombin and ADP. This is consistent with diagnosis of acquired qualitative platelet disorder.     Trial of steroids with initial improvement in skin lesions- however patient mostly noted improvement in arthralgias and fatigue more.     Started hydroxychloraquine with modest to no improvement.     Skin lesions more pronounced. Discussed with Dermatology- suspect non-vasculitic process such as Esquivel-Kaylee. However, platelets studies are not consistent.     Inflammatory markers remain wnl with mild elevation in cryoglobulins. Will send further monclonal evaluation. However, with platelet profile I would continue to avoid medications that further inhibit platelet function is critical. This includes aspirin, NSAID and potentially SSRIs. Jacquie has worked toward avoiding aspirin- only uses sparingly if other migraine medications fail to alleviate her symptoms. She is taking Celebrex, which is considered safe for her condition. She continues to be on SSRIs--- this is a mild effector on platelets and given that untreated depression is worse that her condition would not stop. IF Jacquie needs MAJOR SURGERY---  She should receive platelet transfusion (1-2 units). She is ok to receive injections.      Plan:  1. Majority of today's visit was spent counseling the patient regarding diagnosis, natural history, management and treatment options and further diagnostic testing for acquired platelet disorders and porphyria cutanea  tarda.  2.   Orders Placed This Encounter   Procedures     Other Laboratory; VERSITI; 5544 (Laboratory Miscellaneous Order)     Cryoglobulin identification     Cryoglobulin quantitative     Protein Immunofixation Serum     3. No further prednisone  4. Stop plaquenil  5. Follow up labs    The patient is given our center's contact information and is instructed to call if she should have any further questions or concerns.  Otherwise, we will plan on seeing her back Follow up with Provider - 12/22/21 in person with labs in [place] the Monday prior.      Yuliya Bae, nurse clinician was involved with the care, education and coordination of patient care.     Patient understands and agrees with the above plan and recommendation.      Emely Grimes MD/PhD   of Medicine  Division of Hematology, Oncology and Transplantation    Video Start: 12:35 PM  Video End: 1:16 PM      ----------------------------------------------------------------------------------------------------------------------  Interval History:  Jacquie Amador is a 61 year old woman with PMHx of fibromyalgia, anxiety/depression, hypertension and hyperlipidemia. She presented to clinic on 9/15/21 for her first in person evaluation due to bleeding and prolonged wound healing. Please refer to my consult note.       Jacquie reports that has been about the same. Energy improved a little. Had a migraine this morning. Continues to itch and scratch and gets the red bumps underneath.   Medrol dose pack helped everything clear up more. Cleared up on the face but still there. On Medrol pack was almost cleared up. The one the back of her neck is improving. Skin continues to be fragile- ripped off the skin.     Jacquie started doing some walking and noting some swelling and pain. Notices that she can not walk straight and she is leaning toward her left side more. Right hip is better.     Hips- bursitis. Pain is outside of hip. No issues with  crossing leg. Issues with lying on the side due to pain. Lifting leg to go up stairs hurt.     No insomnia with steroids. Has noted some heartburn.     Jacquie had several lesions on her face. Couple of them are the same. Has not seen dermatologist. On her leg she also still has a spot when she saw me the first time. Healing is not happening. Fill with blood. The ones on her face are painful with a burning sensation. After washing. Get red after washing face. No other discharge.     Arm is healing.     During steroid- skin was ok until down to 10 mg. Now seems worse.     On the steroid- she did not hurt and her ankle was not swollen. It is currently hard to walk. Currently moving at the last minute- found a new house as her current one is not idea.. Her back is not working.     Is mainly in the house. She has totally eliminated aspirin from her medications. Is also not taking advil. Is taking celebrex.       Reports that she was tested for Behet's by her eye doctor. Was negative per her report.       Has applied hydrocortisone 2.0 but sparingly as it may help with some areas but other areas (arms) got worse).       Past Medical History:  Past Medical History:   Diagnosis Date     ASTHMA - MODERATE PERSISTENT 2005     Chronic pain      Coronary artery disease      CVA (cerebral infarction)      Depressive disorder, not elsewhere classified      Diabetes (H)      Elevated serum alkaline phosphatase level     Liver source     Fibromyalgia      HYPERLIPIDEMIA NEC/NOS 2006     Hypertriglyceridemia      OA (osteoarthritis)      Thyroid disease      Trochanteric bursitis      Unspecified essential hypertension        Past Surgical History:  Past Surgical History:   Procedure Laterality Date     3 teeth pulled       C  DELIVERY ONLY      , Low Cervical     INJECT EPIDURAL TRANSFORAMINAL  2014    [facility] [place]     INJECT JOINT SACROILIAC  2014    [facility]  [place]     LAMINECTOMY LUMBAR ONE LEVEL Left 04/08/2014    [facility]       Medications:  Current Outpatient Medications   Medication Sig Dispense Refill     adapalene (DIFFERIN) 0.1 % gel APPLY A THIN LAYER TO THE AFFECTED AREA(S) BY TOPICAL ROUTE ONCE DAILY BEFORE BEDTIME 45 g 10     albuterol (PROVENTIL) (2.5 MG/3ML) 0.083% neb solution Take 1 vial (2.5 mg) by nebulization every 6 hours as needed for shortness of breath / dyspnea or wheezing 3 mL 4     albuterol (PROVENTIL) (2.5 MG/3ML) 0.083% neb solution Take 1 vial (2.5 mg) by nebulization every 6 hours as needed for shortness of breath / dyspnea or wheezing 1 Box 1     albuterol (VENTOLIN HFA) 108 (90 Base) MCG/ACT inhaler INHALE 2 PUFFS INTO THE LUNGS EVERY 6 HOURS AS NEEDED FOR SHORTNESS OF BREATH / DYSPNEA 54 g 0     ALPRAZolam (XANAX) 0.25 MG tablet Take 1 tablet (0.25 mg) by mouth daily as needed for anxiety 30 tablet 0     aluminum chloride (DRYSOL) 20 % external solution Apply topically At Bedtime 60 mL 1     aluminum chloride (DRYSOL) 20 % external solution Apply topically At Bedtime 60 mL 3     atorvastatin (LIPITOR) 20 MG tablet Take 1 tablet (20 mg) by mouth daily 90 tablet 2     buPROPion (WELLBUTRIN SR) 100 MG 12 hr tablet TAKE ONE TABLET BY MOUTH EVERY AFTERNOON 90 tablet 1     buPROPion (WELLBUTRIN SR) 200 MG 12 hr tablet Take 1 tablet (200 mg) by mouth daily 90 tablet 3     celecoxib (CELEBREX) 200 MG capsule TAKE ONE CAPSULE BY MOUTH TWICE A DAY AS NEEDED FOR PAIN 180 capsule 0     chlorhexidine (PERIDEX) 0.12 % solution Swish and spit 15 mLs in mouth 2 times daily 1893 mL 4     clindamycin (CLINDAMAX) 1 % external gel Apply topically 2 times daily 30 g 4     clobetasol (TEMOVATE) 0.05 % external cream APPLY SPARINGLY TO AFFECTED AREA TWICE DAILY FOR 14 DAYS.  DO NOT APPLY TO FACE. 30 g 3     clotrimazole (MYCELEX) 10 MG lozenge Place 1 lozenge (10 mg) inside cheek 4 times daily 40 Trinh 1     diclofenac (VOLTAREN) 1 %  topical gel Apply 2-4 g topically 4 times daily 100 g 1     DULoxetine (CYMBALTA) 60 MG capsule TAKE TWO CAPSULES BY MOUTH DAILY 180 capsule 3     famotidine (PEPCID) 40 MG tablet TAKE ONE TABLET BY MOUTH ONE TIME DAILY 90 tablet 1     FEROSUL 325 (65 Fe) MG tablet TAKE ONE TABLET BY MOUTH ONE TIME DAILY 90 tablet 0     hydrocortisone 2.5 % cream APPLY TOPICALLY 2 TIMES DAILY AS NEEDED 30 g 3     hydroxychloroquine (PLAQUENIL) 200 MG tablet Take 1 tablet (200 mg) by mouth daily 90 tablet 1     Lactobacillus (ACIDOPHILUS) CAPS Take 1 capsule by mouth daily Take two hours before or after antibiotic dose.  Continue for 1 week after antibiotic therapy completed 60 capsule 0     losartan-hydrochlorothiazide (HYZAAR) 50-12.5 MG tablet Take 1 tablet by mouth daily (Patient taking differently: Take by mouth daily 1/2 TABLET DAILY) 90 tablet 3     metoprolol succinate ER (TOPROL-XL) 50 MG 24 hr tablet Take 1 tablet (50 mg) by mouth 2 times daily 180 tablet 1     mometasone-formoterol (DULERA) 200-5 MCG/ACT inhaler Inhale 2 puffs into the lungs 2 times daily 39 g 0     montelukast (SINGULAIR) 10 MG tablet Take 1 tablet (10 mg) by mouth At Bedtime 90 tablet 3     multivitamin w/minerals (MULTI-VITAMIN) tablet Take 1 tablet by mouth daily       mupirocin (BACTROBAN) 2 % external cream Apply to affected area three times daily 60 g 1     mupirocin (BACTROBAN) 2 % external ointment        nystatin (MYCOSTATIN) 892503 UNIT/ML suspension Take by mouth 4 times daily Has recurrent thrush. Uses for 2 weeks at a time as needed. 380 mL 1     oxybutynin ER (DITROPAN-XL) 5 MG 24 hr tablet Take 2 tablets (10 mg) by mouth daily 60 tablet 0     rizatriptan (MAXALT) 10 MG tablet Take 1 tablet (10 mg) by mouth at onset of headache for migraine May repeat in 2 hours. Max 3 tablets/24 hours. 18 tablet 0     rOPINIRole (REQUIP) 1 MG tablet Take 3 tablets (3 mg) by mouth At Bedtime 270 tablet 0     SUMAtriptan (IMITREX) 100 MG tablet take 1 tablet  at onset of headache may repeat in 2 hours max 2 tabs/day 18 tablet 1     traMADol (ULTRAM) 50 MG tablet Take 1 tablet (50 mg) by mouth 2 times daily as needed for severe pain 60 tablet 0     traZODone (DESYREL) 50 MG tablet Take 3 tablets (150 mg) by mouth At Bedtime 270 tablet 1     triamcinolone (ARISTOCORT HP) 0.5 % external cream Apply topically 2 times daily 60 g 0     triamcinolone (KENALOG) 0.1 % paste Take by mouth 2 times daily 5 g 1     ZYRTEC 10 MG OR TABS 1 TABLET DAILY 90 3        Allergies:  Allergies   Allergen Reactions     Cats      and rabbits/wheezing     Dogs      wheezing     Lyrica [Pregabalin] Other (See Comments)     Rash, mouth sores, itching and burning     Seasonal Allergies      rhinits       ROS:  Remainder of a comprehensive 10 point ROS is negative unless noted above.    Social History:  Denies any tobacco use. No significant alcohol use. Denies any illicit drug use.     Family History:  Two daughter  29 Yo daughter  26 yo daughter  1 grandson    1 sister- older- high blood glucose, arthritis  1 brother- older- no idea    Mother- - heart valve. Had MDS. Needed a shot every month  Father- - cancer    Objectives:  Wt 102.1 kg (225 lb)   LMP 2009 (Exact Date)   BMI 35.56 kg/m     Unavailable, Wt: 225 lbs 0 oz  GENERAL: Healthy, alert and no distress  EYES: Eyes grossly normal to inspection.  No discharge or erythema, or obvious scleral/conjunctival abnormalities.  RESP: No audible wheeze, cough, or visible cyanosis.  No visible retractions or increased work of breathing.    SKIN: no suspicious lesions or rashes, hypopigmentation - bilateral neck and face, petechiae - arms, ecchymoses - arms and scar - face  NEURO: Cranial nerves grossly intact.  Mentation and speech appropriate for age.  PSYCH: Mentation appears normal, affect normal/bright, judgement and insight intact, normal speech and appearance well-groomed.  The rest of a comprehensive physical examination  is deferred due to Public Health Emergency video/telephone visit restrictions    Labs:  Ferritin   Date Value Ref Range Status   09/15/2021 184 8 - 252 ng/mL Final   04/16/2021 376 (H) 8 - 252 ng/mL Final     Iron   Date Value Ref Range Status   09/15/2021 100 35 - 180 ug/dL Final   01/20/2021 50 35 - 180 ug/dL Final     Iron Binding Cap   Date Value Ref Range Status   01/20/2021 350 240 - 430 ug/dL Final     Iron Binding Capacity   Date Value Ref Range Status   09/15/2021 284 240 - 430 ug/dL Final           4/16/21  The INR is normal.   APTT ratio is elevated.   Platelet Neutralization is negative.   APTT 1:2 Mix is normal.   DRVVT Screen ratio is normal.   Thrombin time is normal.   Lupus anticoagulant negative  Anticardiolipin negative    VWF collagen binding normal (send out)  The von Willebrand factor antigen (VWF:Ag), von Willebrand factor   activity (VWF:ACT), and Factor 8 levels are within normal limits.   The Factor 8 to VWF:Ag ratio and the VWF:ACT to VWF:Ag ratio are   within normal limits.      Platelet function COL/EPI >300  Platelet function ADP 77 (normal)    FXIII antigen 137 (norma1)  Alpha-2 antiplasmin 140 (normal  Factor V 78 (normal)  Factor 10 128 (normal)  Factor  (elevated)    4/29/21  Abnormal platelet aggregation study. Maximal platelet aggregation is   decreased with Arachidonic acid and within normal limits with 5   micromolar and 10 micromolar ADP, Epinephrine, Collagen, and low and   high concentrations of Ristocetin.  Deaggregation is present with 5   micromolar ADP and Arachidonic acid.  The 1.00 mg/mL Ristocetin   reactions have a slight lag in secondary aggregation.  ATP release   is decreased with Thrombin and 5 micromolar and 10 micromolar ADP.   These findings are consistent with a congenital or acquired platelet   disorders.    Platelet Function Closure Time Col/ADP   Date Value Ref Range Status   03/03/2021 77 <120 sec Final     Comment:     Typical aspirin effect is  characterized by prolonged Col/Epi and normal   Col/ADP.       PFA-Col/ADP   Date Value Ref Range Status   11/23/2021 79 <120 Seconds Final     Comment:     Typical aspirin effect is characterized by prolonged Col/Epi and normal Col/ADP   09/28/2021 88 <120 Seconds Final     Comment:     Typical aspirin effect is characterized by prolonged Col/Epi and normal Col/ADP     PLT Funct COL/EPI   Date Value Ref Range Status   03/03/2021 >300 (H) <170 sec Final     Comment:     Effective 12/01/2015, the reference range for this assay has changed.  Causes of a prolonged closure time include platelet dysfunction,vonWillebrand   disease, decreased hematocrit (<35%) and decreased platelet count (<150   x10e9/L).       PFA-Col/Epi   Date Value Ref Range Status   11/23/2021 188 (H) <170 Seconds Final     Erythrocyte Porphyrin   Date Value Ref Range Status   09/28/2021 28 0 - 35 ug/dL Final     Comment:       This test was developed and its performance characteristics   determined by [facility]. It has not been cleared or   approved by the US Food and Drug Administration. This test   was performed in a CLIA certified laboratory and is   intended for clinical purposes.       Imaging:  Not applicable

## 2021-12-02 ENCOUNTER — MYC MEDICAL ADVICE (OUTPATIENT)
Dept: FAMILY MEDICINE | Facility: CLINIC | Age: 61
End: 2021-12-02
Payer: COMMERCIAL

## 2021-12-02 DIAGNOSIS — M54.50 ACUTE LEFT-SIDED LOW BACK PAIN WITHOUT SCIATICA: Primary | ICD-10-CM

## 2021-12-03 RX ORDER — HYDROCODONE BITARTRATE AND ACETAMINOPHEN 5; 325 MG/1; MG/1
1 TABLET ORAL 2 TIMES DAILY PRN
Qty: 30 TABLET | Refills: 0 | Status: SHIPPED | OUTPATIENT
Start: 2021-12-03 | End: 2022-04-06

## 2021-12-03 NOTE — TELEPHONE ENCOUNTER
Please see [employer] message.    Yaz Porter, BSN, RN, PHN  Registered Nurse-Clinic Triage  [facility]  12/3/2021 at 1:28 PM

## 2021-12-07 ENCOUNTER — MYC MEDICAL ADVICE (OUTPATIENT)
Dept: FAMILY MEDICINE | Facility: CLINIC | Age: 61
End: 2021-12-07
Payer: COMMERCIAL

## 2021-12-07 DIAGNOSIS — E61.1 IRON DEFICIENCY: Primary | ICD-10-CM

## 2021-12-08 ENCOUNTER — LAB (OUTPATIENT)
Dept: LAB | Facility: CLINIC | Age: 61
End: 2021-12-08
Payer: COMMERCIAL

## 2021-12-08 DIAGNOSIS — R23.3 EASY BRUISABILITY: ICD-10-CM

## 2021-12-08 DIAGNOSIS — T14.8XXA BRUISING: ICD-10-CM

## 2021-12-08 DIAGNOSIS — E61.1 IRON DEFICIENCY: ICD-10-CM

## 2021-12-08 DIAGNOSIS — D69.1 PLATELET GRANULE DEFECT (H): ICD-10-CM

## 2021-12-08 LAB
C3 SERPL-MCNC: 198 MG/DL (ref 81–157)
C4 SERPL-MCNC: 50 MG/DL (ref 13–39)
FERRITIN SERPL-MCNC: 136 NG/ML (ref 8–252)
RHEUMATOID FACT SER NEPH-ACNC: 8 IU/ML

## 2021-12-08 PROCEDURE — 86334 IMMUNOFIX E-PHORESIS SERUM: CPT | Performed by: PATHOLOGY

## 2021-12-08 PROCEDURE — 36415 COLL VENOUS BLD VENIPUNCTURE: CPT

## 2021-12-08 PROCEDURE — 82595 ASSAY OF CRYOGLOBULIN: CPT

## 2021-12-08 PROCEDURE — 86334 IMMUNOFIX E-PHORESIS SERUM: CPT | Mod: 26 | Performed by: PATHOLOGY

## 2021-12-08 PROCEDURE — 82728 ASSAY OF FERRITIN: CPT

## 2021-12-08 PROCEDURE — 86162 COMPLEMENT TOTAL (CH50): CPT | Mod: 90

## 2021-12-08 PROCEDURE — 86431 RHEUMATOID FACTOR QUANT: CPT

## 2021-12-08 PROCEDURE — 86160 COMPLEMENT ANTIGEN: CPT

## 2021-12-08 NOTE — TELEPHONE ENCOUNTER
please review and advise.     SITUATION:   Review [facility] message.     Patient stated that she was seen a week ago. Her diastolic number was elevated.Has been ongoing for 3 or more weeks.   Patient describes that she never feels good or normal. Patient feels more fatigued than usual and no energy. Patient feels lightheaded, dizzy when pulse drops too low. Light headed feeling intermittent. Patient feels like she is out of breathing during walking upstairs or with strenuous activity.   Sometimes the patient feels like she is going to pass out.     BACKGROUND:   HX of iron infusions.     HEALTH HISTORY:  HX of low platelets.      PATIENT REQUEST:   Ferritin level    PLAN:   Huddle with PCP.         BANDAR Bailey, RN, PHN  [facility]  December 8, 2021

## 2021-12-09 LAB — PROT PATTERN SERPL IFE-IMP: NORMAL

## 2021-12-10 LAB — CH50 SERPL-ACNC: 74 U/ML

## 2021-12-14 LAB — CRYOGLOB SER QL: ABNORMAL

## 2021-12-15 LAB
ALBUMIN SERPL ELPH-MCNC: NEGATIVE G/DL
CRYOGLOB IGA & IGG & IGM SER-IMP: NORMAL
CRYOGLOB TYP SER IFE: NEGATIVE

## 2021-12-16 DIAGNOSIS — B37.0 THRUSH: ICD-10-CM

## 2021-12-16 DIAGNOSIS — K12.0 APHTHAE, ORAL: ICD-10-CM

## 2021-12-16 DIAGNOSIS — R00.0 SINUS TACHYCARDIA: ICD-10-CM

## 2021-12-16 DIAGNOSIS — M25.50 MULTIPLE JOINT PAIN: ICD-10-CM

## 2021-12-16 DIAGNOSIS — M19.049 HAND ARTHRITIS: ICD-10-CM

## 2021-12-16 DIAGNOSIS — J45.40 MODERATE PERSISTENT ASTHMA WITHOUT COMPLICATION: ICD-10-CM

## 2021-12-16 DIAGNOSIS — I10 ESSENTIAL HYPERTENSION WITH GOAL BLOOD PRESSURE LESS THAN 140/90: ICD-10-CM

## 2021-12-16 DIAGNOSIS — L74.511 PRIMARY FOCAL HYPERHIDROSIS OF FACE: ICD-10-CM

## 2021-12-17 ENCOUNTER — E-VISIT (OUTPATIENT)
Dept: FAMILY MEDICINE | Facility: CLINIC | Age: 61
End: 2021-12-17
Payer: COMMERCIAL

## 2021-12-17 DIAGNOSIS — B37.0 THRUSH: Primary | ICD-10-CM

## 2021-12-17 LAB
Lab: 5544
PERFORMING LABORATORY: ABNORMAL
SPECIMEN STATUS: ABNORMAL
TEST NAME: ABNORMAL

## 2021-12-17 PROCEDURE — 99421 OL DIG E/M SVC 5-10 MIN: CPT | Performed by: FAMILY MEDICINE

## 2021-12-18 ENCOUNTER — HEALTH MAINTENANCE LETTER (OUTPATIENT)
Age: 61
End: 2021-12-18

## 2021-12-19 ENCOUNTER — MYC MEDICAL ADVICE (OUTPATIENT)
Dept: FAMILY MEDICINE | Facility: CLINIC | Age: 61
End: 2021-12-19
Payer: COMMERCIAL

## 2021-12-20 ENCOUNTER — MYC MEDICAL ADVICE (OUTPATIENT)
Dept: FAMILY MEDICINE | Facility: CLINIC | Age: 61
End: 2021-12-20
Payer: COMMERCIAL

## 2021-12-20 DIAGNOSIS — R00.2 PALPITATIONS: ICD-10-CM

## 2021-12-20 DIAGNOSIS — R00.0 SINUS TACHYCARDIA: ICD-10-CM

## 2021-12-20 RX ORDER — VALACYCLOVIR HYDROCHLORIDE 500 MG/1
TABLET, FILM COATED ORAL
Qty: 90 TABLET | Refills: 0 | Status: SHIPPED | OUTPATIENT
Start: 2021-12-20 | End: 2022-04-06

## 2021-12-20 RX ORDER — DULOXETIN HYDROCHLORIDE 60 MG/1
CAPSULE, DELAYED RELEASE ORAL
Qty: 180 CAPSULE | Refills: 1 | Status: SHIPPED | OUTPATIENT
Start: 2021-12-20 | End: 2022-04-06

## 2021-12-20 RX ORDER — CLOTRIMAZOLE 10 MG/1
10 LOZENGE ORAL 4 TIMES DAILY
Qty: 40 TROCHE | Refills: 0 | OUTPATIENT
Start: 2021-12-20

## 2021-12-20 RX ORDER — CELECOXIB 200 MG/1
CAPSULE ORAL
Qty: 180 CAPSULE | Refills: 1 | Status: SHIPPED | OUTPATIENT
Start: 2021-12-20 | End: 2022-07-26

## 2021-12-20 RX ORDER — CLOTRIMAZOLE 10 MG/1
10 LOZENGE ORAL
Qty: 25 LOZENGE | Refills: 0 | Status: SHIPPED | OUTPATIENT
Start: 2021-12-20 | End: 2021-12-25

## 2021-12-20 RX ORDER — LOSARTAN POTASSIUM AND HYDROCHLOROTHIAZIDE 12.5; 5 MG/1; MG/1
1 TABLET ORAL DAILY
Qty: 90 TABLET | Refills: 3 | Status: SHIPPED | OUTPATIENT
Start: 2021-12-20 | End: 2022-04-06

## 2021-12-20 RX ORDER — MOMETASONE FUROATE AND FORMOTEROL FUMARATE DIHYDRATE 200; 5 UG/1; UG/1
AEROSOL RESPIRATORY (INHALATION)
Qty: 39 G | Refills: 1 | Status: SHIPPED | OUTPATIENT
Start: 2021-12-20 | End: 2022-04-06

## 2021-12-20 RX ORDER — METOPROLOL SUCCINATE 25 MG/1
25 TABLET, EXTENDED RELEASE ORAL EVERY EVENING
Qty: 90 TABLET | Refills: 0 | Status: SHIPPED | OUTPATIENT
Start: 2021-12-20 | End: 2022-02-16

## 2021-12-20 RX ORDER — OXYBUTYNIN CHLORIDE 5 MG/1
10 TABLET, EXTENDED RELEASE ORAL DAILY
Qty: 180 TABLET | Refills: 1 | Status: SHIPPED | OUTPATIENT
Start: 2021-12-20 | End: 2022-04-06

## 2021-12-20 NOTE — TELEPHONE ENCOUNTER
Pending Prescriptions:                       Disp   Refills    valACYclovir (VALTREX) 500 MG tablet [Phar*90 tab*0        Sig: TAKE ONE TABLET BY MOUTH ONE TIME DAILY    metoprolol succinate ER (TOPROL-XL) 25 MG *90 tab*0        Sig: Take 1 tablet (25 mg) by mouth every evening    clotrimazole (MYCELEX) 10 MG lozenge [Phar*40 Tro*0        Sig: Place 1 lozenge (10 mg) inside cheek 4 times daily    DULERA 200-5 MCG/ACT inhaler [Pharmacy Med*39 g   0        Sig: Inhale 2 puffs into the lungs 2 times daily    Signed Prescriptions:                        Disp   Refills    celecoxib (CELEBREX) 200 MG capsule        180 ca*1        Sig: TAKE ONE CAPSULE BY MOUTH TWICE A DAY AS NEEDED FOR           PAIN  Authorizing Provider: ARVIN MARIN  Ordering User: TOBIN ESQUIVEL    oxybutynin ER (DITROPAN-XL) 5 MG 24 hr tab*180 ta*1        Sig: Take 2 tablets (10 mg) by mouth daily  Authorizing Provider: ARVIN MARIN  Ordering User: TOBIN ESQUIVEL    DULoxetine (CYMBALTA) 60 MG capsule        180 ca*1        Sig: TAKE TWO CAPSULES BY MOUTH DAILY  Authorizing Provider: ARVIN MARIN  Ordering User: TOBIN ESQUIVLE    losartan-hydrochlorothiazide (HYZAAR) 50-1*90 tab*3        Sig: Take 1 tablet by mouth daily  Authorizing Provider: ARVIN MARIN  Ordering User: TOBIN ESQUIVEL    Routing refill request to provider for review/approval because:  Labs out of range:    BP Readings from Last 3 Encounters:   11/30/21 132/88   09/15/21 137/86   08/05/21 122/82     ACT Total Scores 11/30/2021   ACT TOTAL SCORE -   ASTHMA ER VISITS -   ASTHMA HOSPITALIZATIONS -   ACT TOTAL SCORE (Goal Greater than or Equal to 20) 11   In the past 12 months, how many times did you visit the emergency room for your asthma without being admitted to the hospital? 0   In the past 12 months, how many times were you hospitalized overnight because of your asthma? 0       Not on protocol

## 2021-12-21 ENCOUNTER — MYC REFILL (OUTPATIENT)
Dept: FAMILY MEDICINE | Facility: CLINIC | Age: 61
End: 2021-12-21
Payer: COMMERCIAL

## 2021-12-21 DIAGNOSIS — G43.009 MIGRAINE WITHOUT AURA AND WITHOUT STATUS MIGRAINOSUS, NOT INTRACTABLE: ICD-10-CM

## 2021-12-21 RX ORDER — METOPROLOL SUCCINATE 100 MG/1
100 TABLET, EXTENDED RELEASE ORAL 2 TIMES DAILY
Qty: 180 TABLET | Refills: 1 | Status: SHIPPED | OUTPATIENT
Start: 2021-12-21 | End: 2022-04-06

## 2021-12-21 NOTE — TELEPHONE ENCOUNTER
Ok I see that you have metoprolol 25 that was sent on 12/20/2021 and 50 mg twice a day that was sent on 11/24/20214 for 180 and a refill.      When were you switched to 100mg?    How are your blood pressure running at home?  Are you having any symptoms?      BANDAR Gay, RN, [facility] ~ Registered Nurse  Clinic Triage ~ [facility]  December 21, 2021      Review of chart:.  Liz Peterson MD  to Jacquie Amador          5:48 PM  Hi Jacquie,     Your ferritin level was normal.      You can try to increase the metoprolol to 100 mg twice daily. Your pulse seem to be holding just fine and may help to bring your blood pressure down.      Liz Peterson MD       Can you send a new script of this?  Rx pending. Then remove the others from her med list?    BANDAR Gay, RN, [facility] ~ Registered Nurse  Clinic Triage ~ [facility]  December 21, 2021

## 2021-12-22 ENCOUNTER — OFFICE VISIT (OUTPATIENT)
Dept: HEMATOLOGY | Facility: CLINIC | Age: 61
End: 2021-12-22
Attending: INTERNAL MEDICINE
Payer: COMMERCIAL

## 2021-12-22 VITALS
OXYGEN SATURATION: 98 % | WEIGHT: 229.9 LBS | BODY MASS INDEX: 36.08 KG/M2 | TEMPERATURE: 98 F | DIASTOLIC BLOOD PRESSURE: 84 MMHG | HEIGHT: 67 IN | RESPIRATION RATE: 12 BRPM | SYSTOLIC BLOOD PRESSURE: 158 MMHG | HEART RATE: 66 BPM

## 2021-12-22 DIAGNOSIS — D69.3 IDIOPATHIC THROMBOCYTOPENIC PURPURA (H): ICD-10-CM

## 2021-12-22 DIAGNOSIS — D69.1 PLATELET GRANULE DEFECT (H): Primary | ICD-10-CM

## 2021-12-22 PROCEDURE — G0463 HOSPITAL OUTPT CLINIC VISIT: HCPCS

## 2021-12-22 PROCEDURE — 99215 OFFICE O/P EST HI 40 MIN: CPT | Performed by: INTERNAL MEDICINE

## 2021-12-22 RX ORDER — PREDNISONE 5 MG/1
5 TABLET ORAL DAILY
Qty: 90 TABLET | Refills: 0 | Status: SHIPPED | OUTPATIENT
Start: 2021-12-22 | End: 2022-02-16

## 2021-12-22 RX ORDER — PREDNISONE 5 MG/1
5 TABLET ORAL DAILY
Qty: 30 TABLET | Refills: 0 | Status: SHIPPED | OUTPATIENT
Start: 2021-12-22 | End: 2021-12-22

## 2021-12-22 RX ORDER — MYCOPHENOLATE MOFETIL 250 MG/1
500 CAPSULE ORAL DAILY
Qty: 60 CAPSULE | Refills: 0
Start: 2021-12-22 | End: 2021-12-29

## 2021-12-22 ASSESSMENT — PAIN SCALES - GENERAL: PAINLEVEL: NO PAIN (0)

## 2021-12-22 ASSESSMENT — MIFFLIN-ST. JEOR: SCORE: 1636.48

## 2021-12-22 NOTE — TELEPHONE ENCOUNTER
Patient notified.    Lexie Dominguez RN  [facility]  December 21, 2021

## 2021-12-22 NOTE — PATIENT INSTRUCTIONS
We will start a low dose prednisone 5 mg to help reduce your anti-GP1a antibody load.     We will get you pneumococcus vaccinations BEFORE starting MMF therapy. This will also let use run insurance claim for MMF.       Follow up in 1 month (virtual)    Follow up in February before you leave for [place].       Emely Grimes MD/PhD   of Medicine  Division of Hematology, Oncology and Transplantation    [facility]  [address] [place] [id number]  Main: [phone], Fax: [phone]

## 2021-12-22 NOTE — PROGRESS NOTES
[facility]  [address] [place] [id number]  Phone: [phone], Fax: [phone]    Outpatient Visit Note:    Patient: Jacquie Amador  MRN: [id number]  : 1960  ISIDRO: Dec 22, 2021    Reason for Consultation:  Bruising    Assessment:  In summary, Jacquie Amador is a 61 year old woman presenting to clinic due to bruising/bleeding with abnormal platelet studies. 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  Easy bruising and prolonged healing secondary to #1  3.  Eruptive rash/lesions on sun exposed surfaces, porphyria cutanea tarda ruled out  4.  Personal history of antiphospholipid antibody syndrome in pregnancy, no records  5.  Osteoarthritis requiring anti-inflammatory medications  6.  Depression requiring use of SSRI medication  7.  Acute on chronic migraines requiring use of triptans and aspirin      Ms. Amador presents with greater than 2 years of increased bruising/presence of lesions on her skin that exhibit prolonged healing.  Prior to her consultation in our clinic she had been evaluated by Dr. Douglas who had sent off PTT.  PTT was prolonged prompting further evaluation as an outpatient.  Evaluation was conducted in 2021.  At this time Jacquie was found to have negative antiphospholipid antibodies.  Jacquie reports a history of 2 miscarriages over 30 years ago that prompted the diagnosis of antiphospholipid antibody syndrome.  She recalls that she was on a steroid pill during her subsequent 2 successful pregnancies.  She does not recall having been on a blood thinner for these pregnancies.  She has no other personal thrombosis history.    Evaluation for von Willebrand's, including von Willebrand factor antigen, activity, ristocetin cofactor, and collagen binding did not find any  evidence of acquired or congenital VWF.  Evaluation of antiplatelet antibodies revealed that Jenny has no antibodies against platelet glycoprotein receptors.  However she did have the presence of anti-HLA and anti-GP1a antibodies.  She has no personal history of receiving red cell or platelet transfusions.    Jenny had one platelet aggregation study that was done when she was taking Excedrin Migraine which contains aspirin.  1 week later this was study was repeated. The study continued to be abnormal. She continued to have decreased arachidonic acid  As well ad deaggregation with ADP and arachidonic acid. And decreased ATP release with thrombin and ADP. This is consistent with diagnosis of acquired qualitative platelet disorder and consistent with her anti-GP1a antibodies.     Trial of steroids with initial improvement in skin lesions- however patient mostly noted improvement in arthralgias and fatigue more.     Started hydroxychloraquine with modest to no improvement.     Skin lesions more pronounced. Discussed with Dermatology- suspect non-vasculitic process such as Esquivel-Kaylee. However, platelets studies are not consistent.     Inflammatory markers remain wnl with mild elevation in cryoglobulins. Negative monoclonal evaluation and no identifiable subtype. Likely reactive. No cryofibrinogens. Does have elevated fibrinogen activity and antigen and TEG with appropriate increase in MA.     Today I discussed with Jacquie and her  that immunosuppression is how to reduce symptoms of her platelet disorder (and avoiding other medications that can worsen this, including aspirin, NSAID and potentially SSRIs). Right now IF Jacquie needs MAJOR SURGERY---  She should receive platelet transfusion (1-2 units). She is ok to receive injections.     Types of immunosuppression discussed included rituximab, which is used to treat ITP (of which anti-GP1a antibodies cause) or MMF (similar). The risks and benefits of  both of these were discussed. We discussed the need for prophylactic vaccination against encapsulated organisms. We also discussed the reversible nature of MMF (stop taking) v potentially irreversible nature of rituximab.     Pneumovax 23 administered today in clinic.     We will start prednisone and work to ensure she has insurance coverage for MMF.      Plan:  1. Majority of today's visit was spent counseling the patient regarding diagnosis, natural history, management and treatment options and further diagnostic testing for acquired platelet disorders and porphyria cutanea tarda.  2. Pneumonia vaccine  3. Prednisone 5 mg PO daily  4. Stop plaquenil  5. Follow up labs    The patient is given our center's contact information and is instructed to call if she should have any further questions or concerns.  Otherwise, we will plan on seeing her back Follow up with Provider - 1/12/22 nurse Foreign clinician was involved with the care, education and coordination of patient care.     Patient understands and agrees with the above plan and recommendation.    Total time  Total Time Spent:  I spent a total of 45) minutes face-to-face with Jacquie Amador during today's office visit.  Over 50% of this time was spent counseling the patient and/or coordinating care regarding diagnosis and side effects of potential treatments.      Emely Grimes MD/PhD   of Medicine  Division of Hematology, Oncology and Transplantation  ----------------------------------------------------------------------------------------------------------------------  Interval History:  Jacquie Amador is a 61 year old woman with PMHx of fibromyalgia, anxiety/depression, hypertension and hyperlipidemia. She presented to clinic on 9/15/21 for her first in person evaluation due to bleeding and prolonged wound healing. Please refer to my consult note.     Jacquie's ankle continues to have significant swelling. Her  blood pressure is better. Lesions on face are somewhat improving. Continues to have issues with arm lesions, has bandages on today.       Past Medical History:  Past Medical History:   Diagnosis Date     ASTHMA - MODERATE PERSISTENT 2005     Chronic pain      Coronary artery disease      CVA (cerebral infarction)      Depressive disorder, not elsewhere classified      Diabetes (H)      Elevated serum alkaline phosphatase level     Liver source     Fibromyalgia      HYPERLIPIDEMIA NEC/NOS 2006     Hypertriglyceridemia      OA (osteoarthritis)      Thyroid disease      Trochanteric bursitis      Unspecified essential hypertension        Past Surgical History:  Past Surgical History:   Procedure Laterality Date     3 teeth pulled       C  DELIVERY ONLY      , Low Cervical     INJECT EPIDURAL TRANSFORAMINAL  2014    [facility]     INJECT JOINT SACROILIAC  2014    [facility]     LAMINECTOMY LUMBAR ONE LEVEL Left 2014    [facility]       Medications:  Current Outpatient Medications   Medication Sig Dispense Refill     adapalene (DIFFERIN) 0.1 % gel APPLY A THIN LAYER TO THE AFFECTED AREA(S) BY TOPICAL ROUTE ONCE DAILY BEFORE BEDTIME 45 g 10     albuterol (PROVENTIL) (2.5 MG/3ML) 0.083% neb solution Take 1 vial (2.5 mg) by nebulization every 6 hours as needed for shortness of breath / dyspnea or wheezing 3 mL 4     albuterol (PROVENTIL) (2.5 MG/3ML) 0.083% neb solution Take 1 vial (2.5 mg) by nebulization every 6 hours as needed for shortness of breath / dyspnea or wheezing 1 Box 1     albuterol (VENTOLIN HFA) 108 (90 Base) MCG/ACT inhaler INHALE 2 PUFFS INTO THE LUNGS EVERY 6 HOURS AS NEEDED FOR SHORTNESS OF BREATH / DYSPNEA 54 g 0     ALPRAZolam (XANAX) 0.25 MG tablet Take 1 tablet (0.25 mg) by mouth daily as needed for anxiety 30 tablet 0     aluminum chloride (DRYSOL) 20 % external solution Apply topically At Bedtime 60 mL  1     aluminum chloride (DRYSOL) 20 % external solution Apply topically At Bedtime 60 mL 3     atorvastatin (LIPITOR) 20 MG tablet Take 1 tablet (20 mg) by mouth daily 90 tablet 2     buPROPion (WELLBUTRIN SR) 100 MG 12 hr tablet TAKE ONE TABLET BY MOUTH EVERY AFTERNOON 90 tablet 1     buPROPion (WELLBUTRIN SR) 200 MG 12 hr tablet Take 1 tablet (200 mg) by mouth daily 90 tablet 3     celecoxib (CELEBREX) 200 MG capsule TAKE ONE CAPSULE BY MOUTH TWICE A DAY AS NEEDED FOR PAIN 180 capsule 1     chlorhexidine (PERIDEX) 0.12 % solution Swish and spit 15 mLs in mouth 2 times daily 1893 mL 4     clindamycin (CLINDAMAX) 1 % external gel Apply topically 2 times daily 30 g 4     clobetasol (TEMOVATE) 0.05 % external cream APPLY SPARINGLY TO AFFECTED AREA TWICE DAILY FOR 14 DAYS.  DO NOT APPLY TO FACE. 30 g 3     clotrimazole (MYCELEX) 10 MG lozenge Place 1 lozenge (10 mg) inside cheek 5 times daily for 5 days 25 lozenge 0     clotrimazole (MYCELEX) 10 MG lozenge Place 1 lozenge (10 mg) inside cheek 4 times daily 40 Trinh 1     diclofenac (VOLTAREN) 1 % topical gel Apply 2-4 g topically 4 times daily 100 g 1     DULERA 200-5 MCG/ACT inhaler Inhale 2 puffs into the lungs 2 times daily 39 g 1     DULoxetine (CYMBALTA) 60 MG capsule TAKE TWO CAPSULES BY MOUTH DAILY 180 capsule 1     famotidine (PEPCID) 40 MG tablet TAKE ONE TABLET BY MOUTH ONE TIME DAILY 90 tablet 1     FEROSUL 325 (65 Fe) MG tablet TAKE ONE TABLET BY MOUTH ONE TIME DAILY 90 tablet 0     HYDROcodone-acetaminophen (NORCO) 5-325 MG tablet Take 1 tablet by mouth 2 times daily as needed for pain 30 tablet 0     hydrocortisone 2.5 % cream APPLY TOPICALLY 2 TIMES DAILY AS NEEDED 30 g 3     Lactobacillus (ACIDOPHILUS) CAPS Take 1 capsule by mouth daily Take two hours before or after antibiotic dose.  Continue for 1 week after antibiotic therapy completed 60 capsule 0     losartan-hydrochlorothiazide (HYZAAR) 50-12.5 MG tablet Take 1 tablet by mouth daily 90 tablet 3      metoprolol succinate ER (TOPROL-XL) 100 MG 24 hr tablet Take 1 tablet (100 mg) by mouth 2 times daily 180 tablet 1     metoprolol succinate ER (TOPROL-XL) 25 MG 24 hr tablet Take 1 tablet (25 mg) by mouth every evening 90 tablet 0     montelukast (SINGULAIR) 10 MG tablet Take 1 tablet (10 mg) by mouth At Bedtime 90 tablet 3     multivitamin w/minerals (MULTI-VITAMIN) tablet Take 1 tablet by mouth daily       mupirocin (BACTROBAN) 2 % external cream Apply to affected area three times daily 60 g 1     mupirocin (BACTROBAN) 2 % external ointment        nystatin (MYCOSTATIN) 667990 UNIT/ML suspension Take by mouth 4 times daily Has recurrent thrush. Uses for 2 weeks at a time as needed. 380 mL 1     oxybutynin ER (DITROPAN-XL) 5 MG 24 hr tablet Take 2 tablets (10 mg) by mouth daily 180 tablet 1     rizatriptan (MAXALT) 10 MG tablet Take 1 tablet (10 mg) by mouth at onset of headache for migraine May repeat in 2 hours. Max 3 tablets/24 hours. 18 tablet 0     rOPINIRole (REQUIP) 1 MG tablet Take 3 tablets (3 mg) by mouth At Bedtime 270 tablet 0     SUMAtriptan (IMITREX) 100 MG tablet take 1 tablet at onset of headache may repeat in 2 hours max 2 tabs/day 18 tablet 1     traZODone (DESYREL) 50 MG tablet Take 3 tablets (150 mg) by mouth At Bedtime 270 tablet 1     triamcinolone (ARISTOCORT HP) 0.5 % external cream Apply topically 2 times daily 60 g 0     triamcinolone (KENALOG) 0.1 % paste Take by mouth 2 times daily 5 g 1     valACYclovir (VALTREX) 500 MG tablet TAKE ONE TABLET BY MOUTH ONE TIME DAILY 90 tablet 0     ZYRTEC 10 MG OR TABS 1 TABLET DAILY 90 3        Allergies:  Allergies   Allergen Reactions     Cats      and rabbits/wheezing     Dogs      wheezing     Lyrica [Pregabalin] Other (See Comments)     Rash, mouth sores, itching and burning     Seasonal Allergies      rhinits       ROS:  Remainder of a comprehensive 10 point ROS is negative unless noted above.    Social History:  Denies any tobacco use. No  "significant alcohol use. Denies any illicit drug use.     Family History:  Two daughter  31 Yo daughter  28 yo daughter  1 grandson    1 sister- older- high blood glucose, arthritis  1 brother- older- no idea    Mother- - heart valve. Had MDS. Needed a shot every month  Father- - cancer    Objectives:  LMP 2009 (Exact Date)    Unavailable, Wt: 0 lbs 0 oz   BP (!) 158/84 (BP Location: Right arm, Patient Position: Sitting, Cuff Size: Adult Regular)   Pulse 66   Temp 98  F (36.7  C) (Oral)   Resp 12   Ht 1.695 m (5' 6.75\")   Wt 104.3 kg (229 lb 14.4 oz)   LMP 2009 (Exact Date)   SpO2 98%   BMI 36.28 kg/m      GENERAL: Healthy, alert and no distress  EYES: Eyes grossly normal to inspection.  No discharge or erythema, or obvious scleral/conjunctival abnormalities.  RESP: No audible wheeze, cough, or visible cyanosis.  No visible retractions or increased work of breathing.    SKIN: scar tissue and hypopigmentation - bilateral neck and face, petechiae - arms, ecchymoses - arms and scar - face  NEURO: Cranial nerves grossly intact.  Mentation and speech appropriate for age.  PSYCH: Mentation appears normal, affect normal/bright, judgement and insight intact, normal speech and appearance well-groomed.      Labs:  Ferritin   Date Value Ref Range Status   2021 136 8 - 252 ng/mL Final   2021 376 (H) 8 - 252 ng/mL Final     Iron   Date Value Ref Range Status   09/15/2021 100 35 - 180 ug/dL Final   2021 50 35 - 180 ug/dL Final     Iron Binding Cap   Date Value Ref Range Status   2021 350 240 - 430 ug/dL Final     Iron Binding Capacity   Date Value Ref Range Status   09/15/2021 284 240 - 430 ug/dL Final           21  The INR is normal.   APTT ratio is elevated.   Platelet Neutralization is negative.   APTT 1:2 Mix is normal.   DRVVT Screen ratio is normal.   Thrombin time is normal.   Lupus anticoagulant negative  Anticardiolipin negative    VWF collagen binding " normal (send out)  The von Willebrand factor antigen (VWF:Ag), von Willebrand factor   activity (VWF:ACT), and Factor 8 levels are within normal limits.   The Factor 8 to VWF:Ag ratio and the VWF:ACT to VWF:Ag ratio are   within normal limits.      Platelet function COL/EPI >300  Platelet function ADP 77 (normal)    FXIII antigen 137 (norma1)  Alpha-2 antiplasmin 140 (normal  Factor V 78 (normal)  Factor 10 128 (normal)  Factor  (elevated)    4/29/21  Abnormal platelet aggregation study. Maximal platelet aggregation is   decreased with Arachidonic acid and within normal limits with 5   micromolar and 10 micromolar ADP, Epinephrine, Collagen, and low and   high concentrations of Ristocetin.  Deaggregation is present with 5   micromolar ADP and Arachidonic acid.  The 1.00 mg/mL Ristocetin   reactions have a slight lag in secondary aggregation.  ATP release   is decreased with Thrombin and 5 micromolar and 10 micromolar ADP.   These findings are consistent with a congenital or acquired platelet   disorders.    Platelet Function Closure Time Col/ADP   Date Value Ref Range Status   03/03/2021 77 <120 sec Final     Comment:     Typical aspirin effect is characterized by prolonged Col/Epi and normal   Col/ADP.       PFA-Col/ADP   Date Value Ref Range Status   11/23/2021 79 <120 Seconds Final     Comment:     Typical aspirin effect is characterized by prolonged Col/Epi and normal Col/ADP   09/28/2021 88 <120 Seconds Final     Comment:     Typical aspirin effect is characterized by prolonged Col/Epi and normal Col/ADP     PLT Funct COL/EPI   Date Value Ref Range Status   03/03/2021 >300 (H) <170 sec Final     Comment:     Effective 12/01/2015, the reference range for this assay has changed.  Causes of a prolonged closure time include platelet dysfunction,vonWillebrand   disease, decreased hematocrit (<35%) and decreased platelet count (<150   x10e9/L).       PFA-Col/Epi   Date Value Ref Range Status   11/23/2021 188  (H) <170 Seconds Final     Refer to [facility]- autoantibody platelet study    Imaging:  Not applicable

## 2021-12-23 RX ORDER — RIZATRIPTAN BENZOATE 10 MG/1
10 TABLET ORAL
Qty: 18 TABLET | Refills: 0 | Status: SHIPPED | OUTPATIENT
Start: 2021-12-23 | End: 2022-10-04

## 2021-12-23 NOTE — TELEPHONE ENCOUNTER
Pending Prescriptions:                       Disp   Refills    rizatriptan (MAXALT) 10 MG tablet          18 tab*0        Sig: Take 1 tablet (10 mg) by mouth at onset of headache           for migraine May repeat in 2 hours. Max 3           tablets/24 hours.    Routing refill request to provider for review/approval because:  Labs out of range:    BP Readings from Last 3 Encounters:   12/22/21 (!) 158/84   11/30/21 132/88   09/15/21 137/86

## 2021-12-28 ENCOUNTER — MYC MEDICAL ADVICE (OUTPATIENT)
Dept: FAMILY MEDICINE | Facility: CLINIC | Age: 61
End: 2021-12-28
Payer: COMMERCIAL

## 2021-12-28 DIAGNOSIS — F41.1 GENERALIZED ANXIETY DISORDER: ICD-10-CM

## 2021-12-28 DIAGNOSIS — L98.491 SKIN ULCER, LIMITED TO BREAKDOWN OF SKIN (H): Primary | ICD-10-CM

## 2021-12-28 DIAGNOSIS — L30.9 DERMATITIS: ICD-10-CM

## 2021-12-28 RX ORDER — DOXYCYCLINE 100 MG/1
100 CAPSULE ORAL 2 TIMES DAILY
Qty: 20 CAPSULE | Refills: 0 | Status: SHIPPED | OUTPATIENT
Start: 2021-12-28 | End: 2022-01-05

## 2021-12-29 ENCOUNTER — MYC MEDICAL ADVICE (OUTPATIENT)
Dept: FAMILY MEDICINE | Facility: OTHER | Age: 61
End: 2021-12-29
Payer: COMMERCIAL

## 2021-12-29 DIAGNOSIS — F41.1 GENERALIZED ANXIETY DISORDER: ICD-10-CM

## 2021-12-29 RX ORDER — CLINDAMYCIN PHOSPHATE 10 MG/G
GEL TOPICAL 2 TIMES DAILY
Qty: 30 G | Refills: 4 | Status: SHIPPED | OUTPATIENT
Start: 2021-12-29 | End: 2022-02-15

## 2021-12-29 RX ORDER — MYCOPHENOLATE MOFETIL 250 MG/1
500 CAPSULE ORAL DAILY
Qty: 180 CAPSULE | Refills: 0 | Status: SHIPPED | OUTPATIENT
Start: 2021-12-29 | End: 2022-07-20

## 2021-12-29 NOTE — TELEPHONE ENCOUNTER
Per dispensing report patient is on 100mg daily:        Will mychart patient to clarify.    Triamcinolone was given for dermatitis, please advise on long term use.    Dedrick Noe, MARVAN, RN, PHN  [facility] ~ Registered Nurse  Clinic Triage ~ [facility]  December 29, 2021

## 2021-12-30 RX ORDER — TRIAMCINOLONE ACETONIDE 5 MG/G
CREAM TOPICAL 2 TIMES DAILY
Qty: 60 G | Refills: 0 | Status: SHIPPED | OUTPATIENT
Start: 2021-12-30 | End: 2022-08-29

## 2021-12-30 RX ORDER — BUPROPION HYDROCHLORIDE 200 MG/1
200 TABLET, EXTENDED RELEASE ORAL DAILY
Qty: 90 TABLET | Refills: 0 | Status: SHIPPED | OUTPATIENT
Start: 2021-12-30 | End: 2022-02-16

## 2021-12-30 RX ORDER — BUPROPION HYDROCHLORIDE 100 MG/1
TABLET, EXTENDED RELEASE ORAL
Qty: 90 TABLET | Refills: 0 | Status: SHIPPED | OUTPATIENT
Start: 2021-12-30 | End: 2022-02-16

## 2021-12-30 NOTE — TELEPHONE ENCOUNTER
See mychart.  Patient is takingwellbutrin 200 in am and 100 in pm.  Error in med rec clean up.  Added back on to list and reordered per protocol.     Will leave triamcinolone cream for provider to review.    Dedrick Noe, MARVAN, RN, PHN  [facility] ~ Registered Nurse  Clinic Triage ~ [facility]  December 30, 2021

## 2022-01-05 DIAGNOSIS — B37.0 THRUSH: Primary | ICD-10-CM

## 2022-01-12 ENCOUNTER — VIRTUAL VISIT (OUTPATIENT)
Dept: HEMATOLOGY | Facility: CLINIC | Age: 62
End: 2022-01-12
Attending: INTERNAL MEDICINE
Payer: COMMERCIAL

## 2022-01-12 DIAGNOSIS — B37.0 THRUSH: Primary | ICD-10-CM

## 2022-01-12 PROCEDURE — 99214 OFFICE O/P EST MOD 30 MIN: CPT | Mod: 95 | Performed by: INTERNAL MEDICINE

## 2022-01-12 RX ORDER — CLOTRIMAZOLE 10 MG/1
10 LOZENGE ORAL
Qty: 70 LOZENGE | Refills: 1 | Status: SHIPPED | OUTPATIENT
Start: 2022-01-12 | End: 2022-12-07

## 2022-01-12 NOTE — PROGRESS NOTES
[facility]  [address] [place] [address]  Phone: [phone], Fax: [phone]    Outpatient Visit Note:    Patient: Jacquie Amador  MRN: [id number]  : 1960  ISIDRO: 22    Reason for Consultation:  Bruising    Assessment:  In summary, Jacquie Amador is a 61 year old woman presenting to clinic due to bruising/bleeding with abnormal platelet studies. 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  Easy bruising and prolonged healing secondary to #1  3.  Eruptive rash/lesions on sun exposed surfaces, porphyria cutanea tarda ruled out  4.  Personal history of antiphospholipid antibody syndrome in pregnancy, no records  5.  Osteoarthritis requiring anti-inflammatory medications  6.  Depression requiring use of SSRI medication  7.  Acute on chronic migraines requiring use of triptans and aspirin      Ms. Amador presents with greater than 2 years of increased bruising/presence of lesions on her skin that exhibit prolonged healing.  Prior to her consultation in our clinic she had been evaluated by Dr. Douglas who had sent off PTT.  PTT was prolonged prompting further evaluation as an outpatient.  Evaluation was conducted in 2021.  At this time Jacquie was found to have negative antiphospholipid antibodies.  Jacquie reports a history of 2 miscarriages over 30 years ago that prompted the diagnosis of antiphospholipid antibody syndrome.  She recalls that she was on a steroid pill during her subsequent 2 successful pregnancies.  She does not recall having been on a blood thinner for these pregnancies.  She has no other personal thrombosis history.    Evaluation for von Willebrand's, including von Willebrand factor antigen, activity, ristocetin cofactor, and collagen binding did not find any evidence  of acquired or congenital VWF.  Evaluation of antiplatelet antibodies revealed that Jenny has no antibodies against platelet glycoprotein receptors.  However she did have the presence of anti-HLA and anti-GP1a antibodies.  She has no personal history of receiving red cell or platelet transfusions.    Jenny had one platelet aggregation study that was done when she was taking Excedrin Migraine which contains aspirin.  1 week later this was study was repeated. The study continued to be abnormal. She continued to have decreased arachidonic acid  As well ad deaggregation with ADP and arachidonic acid. And decreased ATP release with thrombin and ADP. This is consistent with diagnosis of acquired qualitative platelet disorder and consistent with her anti-GP1a antibodies.     Trial of steroids with initial improvement in skin lesions- however patient mostly noted improvement in arthralgias and fatigue more.  Now on prednisone 5 mg daily but having thrush--- will decrease to 5 mg every other day.     Skin lesions more pronounced. Discussed with Dermatology- suspect non-vasculitic process such as Esquivel-Kaylee. However, platelets studies are not consistent. Will discuss potential Behcet's given oral lesions with Rheumatology. Inflammatory markers remain wnl with mild elevation in cryoglobulins. Negative monoclonal evaluation and no identifiable subtype. Likely reactive. No cryofibrinogens. Does have elevated fibrinogen activity and antigen and TEG with appropriate increase in MA.     WIll start MMF at some juncture--- waiting for vaccinations.     Previously discussed that avoiding other medications that can worsen this, including aspirin, NSAID and potentially SSRIs. Right now IF Jacquie needs MAJOR SURGERY---  She should receive platelet transfusion (1-2 units). She is ok to receive injections.        Plan:  1. Majority of today's visit was spent counseling the patient regarding diagnosis, natural history, management  and treatment options and further diagnostic testing for acquired platelet disorders and porphyria cutanea tarda.  2. Reduce prednisone to 5 mg every other day  3. Clometril lozenges      The patient is given our center's contact information and is instructed to call if she should have any further questions or concerns.  Otherwise, we will plan on seeing her back Follow up with Provider - 2/23/22 radha Bae, nurse clinician was involved with the care, education and coordination of patient care.     Patient understands and agrees with the above plan and recommendation.    Total time on telephone: 12 minutes    Emely Grimes MD/PhD   of Medicine  Division of Hematology, Oncology and Transplantation  ----------------------------------------------------------------------------------------------------------------------  Interval History:  Jacquie Amador is a 61 year old woman with PMHx of fibromyalgia, anxiety/depression, hypertension and hyperlipidemia. She presented to clinic on 9/15/21 for her first in person evaluation due to bleeding and prolonged wound healing. Please refer to my consult note.     Predinsone makes the bruising and lesions decrease. Her joint achiness is also decreased. She developed sores on her gums. Pockets of stuff in the gums. Not sure if it is an infection. Swollen and certain teeth hurt. The dentist did not help.      Past Medical History:  Past Medical History:   Diagnosis Date     ASTHMA - MODERATE PERSISTENT 9/21/2005     Chronic pain      Coronary artery disease      CVA (cerebral infarction)      Depressive disorder, not elsewhere classified      Diabetes (H)      Elevated serum alkaline phosphatase level     Liver source     Fibromyalgia      HYPERLIPIDEMIA NEC/NOS 12/29/2006     Hypertriglyceridemia      OA (osteoarthritis)      Thyroid disease      Trochanteric bursitis      Unspecified essential hypertension        Past Surgical History:  Past  Surgical History:   Procedure Laterality Date     3 teeth pulled       INJECT EPIDURAL TRANSFORAMINAL  2014    [facility]     INJECT JOINT SACROILIAC  2014    [facility]     LAMINECTOMY LUMBAR ONE LEVEL Left 2014    [facility]  DELIVERY ONLY      , Low Cervical       Medications:  Current Outpatient Medications   Medication Sig Dispense Refill     adapalene (DIFFERIN) 0.1 % gel APPLY A THIN LAYER TO THE AFFECTED AREA(S) BY TOPICAL ROUTE ONCE DAILY BEFORE BEDTIME 45 g 10     albuterol (PROVENTIL) (2.5 MG/3ML) 0.083% neb solution Take 1 vial (2.5 mg) by nebulization every 6 hours as needed for shortness of breath / dyspnea or wheezing 3 mL 4     albuterol (VENTOLIN HFA) 108 (90 Base) MCG/ACT inhaler INHALE 2 PUFFS INTO THE LUNGS EVERY 6 HOURS AS NEEDED FOR SHORTNESS OF BREATH / DYSPNEA 54 g 0     ALPRAZolam (XANAX) 0.25 MG tablet Take 1 tablet (0.25 mg) by mouth daily as needed for anxiety 30 tablet 0     aluminum chloride (DRYSOL) 20 % external solution Apply topically At Bedtime 60 mL 1     atorvastatin (LIPITOR) 20 MG tablet Take 1 tablet (20 mg) by mouth daily 90 tablet 2     buPROPion (WELLBUTRIN SR) 100 MG 12 hr tablet TAKE ONE TABLET BY MOUTH EVERY AFTERNOON 90 tablet 0     buPROPion (WELLBUTRIN SR) 200 MG 12 hr tablet Take 1 tablet (200 mg) by mouth daily 90 tablet 0     celecoxib (CELEBREX) 200 MG capsule TAKE ONE CAPSULE BY MOUTH TWICE A DAY AS NEEDED FOR PAIN 180 capsule 1     chlorhexidine (PERIDEX) 0.12 % solution Swish and spit 15 mLs in mouth 2 times daily 1893 mL 4     clindamycin (CLINDAMAX) 1 % external gel Apply topically 2 times daily 30 g 4     clobetasol (TEMOVATE) 0.05 % external cream APPLY SPARINGLY TO AFFECTED AREA TWICE DAILY FOR 14 DAYS.  DO NOT APPLY TO FACE. 30 g 3     clotrimazole (MYCELEX) 10 MG lozenge Place 1 lozenge (10 mg) inside cheek 4 times daily 40 Trinh 1     diclofenac (VOLTAREN) 1 %  topical gel Apply 2-4 g topically 4 times daily 100 g 1     doxycycline hyclate (VIBRAMYCIN) 100 MG capsule Take 1 capsule (100 mg) by mouth 2 times daily 20 capsule 0     DULERA 200-5 MCG/ACT inhaler Inhale 2 puffs into the lungs 2 times daily 39 g 1     DULoxetine (CYMBALTA) 60 MG capsule TAKE TWO CAPSULES BY MOUTH DAILY 180 capsule 1     famotidine (PEPCID) 40 MG tablet TAKE ONE TABLET BY MOUTH ONE TIME DAILY 90 tablet 1     FEROSUL 325 (65 Fe) MG tablet TAKE ONE TABLET BY MOUTH ONE TIME DAILY 90 tablet 0     HYDROcodone-acetaminophen (NORCO) 5-325 MG tablet Take 1 tablet by mouth 2 times daily as needed for pain 30 tablet 0     hydrocortisone 2.5 % cream APPLY TOPICALLY 2 TIMES DAILY AS NEEDED 30 g 3     Lactobacillus (ACIDOPHILUS) CAPS Take 1 capsule by mouth daily Take two hours before or after antibiotic dose.  Continue for 1 week after antibiotic therapy completed 60 capsule 0     losartan-hydrochlorothiazide (HYZAAR) 50-12.5 MG tablet Take 1 tablet by mouth daily 90 tablet 3     metoprolol succinate ER (TOPROL-XL) 100 MG 24 hr tablet Take 1 tablet (100 mg) by mouth 2 times daily 180 tablet 1     metoprolol succinate ER (TOPROL-XL) 25 MG 24 hr tablet Take 1 tablet (25 mg) by mouth every evening 90 tablet 0     montelukast (SINGULAIR) 10 MG tablet Take 1 tablet (10 mg) by mouth At Bedtime 90 tablet 3     multivitamin w/minerals (MULTI-VITAMIN) tablet Take 1 tablet by mouth daily       mupirocin (BACTROBAN) 2 % external cream Apply to affected area three times daily 60 g 1     mupirocin (BACTROBAN) 2 % external ointment        mycophenolate (GENERIC EQUIVALENT) 250 MG capsule Take 2 capsules (500 mg) by mouth daily 180 capsule 0     nystatin (MYCOSTATIN) 667309 UNIT/ML suspension Take by mouth 4 times daily Has recurrent thrush. Uses for 2 weeks at a time as needed. 380 mL 1     oxybutynin ER (DITROPAN-XL) 5 MG 24 hr tablet Take 2 tablets (10 mg) by mouth daily 180 tablet 1     predniSONE (DELTASONE) 5 MG  tablet Take 1 tablet (5 mg) by mouth daily 90 tablet 0     rizatriptan (MAXALT) 10 MG tablet Take 1 tablet (10 mg) by mouth at onset of headache for migraine May repeat in 2 hours. Max 3 tablets/24 hours. 18 tablet 0     rOPINIRole (REQUIP) 1 MG tablet Take 3 tablets (3 mg) by mouth At Bedtime 270 tablet 0     SUMAtriptan (IMITREX) 100 MG tablet take 1 tablet at onset of headache may repeat in 2 hours max 2 tabs/day 18 tablet 1     traZODone (DESYREL) 50 MG tablet Take 3 tablets (150 mg) by mouth At Bedtime 270 tablet 1     triamcinolone (ARISTOCORT HP) 0.5 % external cream Apply topically 2 times daily 60 g 0     triamcinolone (KENALOG) 0.1 % paste Take by mouth 2 times daily 5 g 1     valACYclovir (VALTREX) 500 MG tablet TAKE ONE TABLET BY MOUTH ONE TIME DAILY 90 tablet 0     ZYRTEC 10 MG OR TABS 1 TABLET DAILY 90 3        Allergies:  Allergies   Allergen Reactions     Cats      and rabbits/wheezing     Dogs      wheezing     Lyrica [Pregabalin] Other (See Comments)     Rash, mouth sores, itching and burning     Seasonal Allergies      rhinits       ROS:  Remainder of a comprehensive 10 point ROS is negative unless noted above.    Social History:  Denies any tobacco use. No significant alcohol use. Denies any illicit drug use.     Family History:  Two daughter  31 Yo daughter  28 yo daughter  1 grandson    1 sister- older- high blood glucose, arthritis  1 brother- older- no idea    Mother- - heart valve. Had MDS. Needed a shot every month  Father- - cancer    Objectives:  The rest of a comprehensive physical examination is deferred due to Public Health Emergency video/telephone visit restrictions      Labs:  Ferritin   Date Value Ref Range Status   2021 136 8 - 252 ng/mL Final   2021 376 (H) 8 - 252 ng/mL Final     Iron   Date Value Ref Range Status   09/15/2021 100 35 - 180 ug/dL Final   2021 50 35 - 180 ug/dL Final     Iron Binding Cap   Date Value Ref Range Status   2021  350 240 - 430 ug/dL Final     Iron Binding Capacity   Date Value Ref Range Status   09/15/2021 284 240 - 430 ug/dL Final           4/16/21  The INR is normal.   APTT ratio is elevated.   Platelet Neutralization is negative.   APTT 1:2 Mix is normal.   DRVVT Screen ratio is normal.   Thrombin time is normal.   Lupus anticoagulant negative  Anticardiolipin negative    VWF collagen binding normal (send out)  The von Willebrand factor antigen (VWF:Ag), von Willebrand factor   activity (VWF:ACT), and Factor 8 levels are within normal limits.   The Factor 8 to VWF:Ag ratio and the VWF:ACT to VWF:Ag ratio are   within normal limits.      Platelet function COL/EPI >300  Platelet function ADP 77 (normal)    FXIII antigen 137 (norma1)  Alpha-2 antiplasmin 140 (normal  Factor V 78 (normal)  Factor 10 128 (normal)  Factor  (elevated)    4/29/21  Abnormal platelet aggregation study. Maximal platelet aggregation is   decreased with Arachidonic acid and within normal limits with 5   micromolar and 10 micromolar ADP, Epinephrine, Collagen, and low and   high concentrations of Ristocetin.  Deaggregation is present with 5   micromolar ADP and Arachidonic acid.  The 1.00 mg/mL Ristocetin   reactions have a slight lag in secondary aggregation.  ATP release   is decreased with Thrombin and 5 micromolar and 10 micromolar ADP.   These findings are consistent with a congenital or acquired platelet   disorders.    Platelet Function Closure Time Col/ADP   Date Value Ref Range Status   03/03/2021 77 <120 sec Final     Comment:     Typical aspirin effect is characterized by prolonged Col/Epi and normal   Col/ADP.       PFA-Col/ADP   Date Value Ref Range Status   11/23/2021 79 <120 Seconds Final     Comment:     Typical aspirin effect is characterized by prolonged Col/Epi and normal Col/ADP   09/28/2021 88 <120 Seconds Final     Comment:     Typical aspirin effect is characterized by prolonged Col/Epi and normal Col/ADP     PLT Funct  COL/EPI   Date Value Ref Range Status   03/03/2021 >300 (H) <170 sec Final     Comment:     Effective 12/01/2015, the reference range for this assay has changed.  Causes of a prolonged closure time include platelet dysfunction,vonWillebrand   disease, decreased hematocrit (<35%) and decreased platelet count (<150   x10e9/L).       PFA-Col/Epi   Date Value Ref Range Status   11/23/2021 188 (H) <170 Seconds Final     Refer to Versiti- autoantibody platelet study    Imaging:  Not applicable

## 2022-02-09 ENCOUNTER — E-VISIT (OUTPATIENT)
Dept: FAMILY MEDICINE | Facility: CLINIC | Age: 62
End: 2022-02-09
Payer: COMMERCIAL

## 2022-02-09 DIAGNOSIS — B96.89 ACUTE BACTERIAL SINUSITIS: Primary | ICD-10-CM

## 2022-02-09 DIAGNOSIS — J01.90 ACUTE BACTERIAL SINUSITIS: Primary | ICD-10-CM

## 2022-02-09 PROCEDURE — 99421 OL DIG E/M SVC 5-10 MIN: CPT | Performed by: FAMILY MEDICINE

## 2022-02-09 RX ORDER — DOXYCYCLINE HYCLATE 100 MG
100 TABLET ORAL 2 TIMES DAILY
Qty: 14 TABLET | Refills: 0 | Status: SHIPPED | OUTPATIENT
Start: 2022-02-09 | End: 2022-02-15

## 2022-02-10 NOTE — PATIENT INSTRUCTIONS
Dear Jacquie Amador    After reviewing your responses, I've been able to diagnose you with?a sinus infection caused by bacteria.?     Based on your responses and diagnosis, I have prescribed doxycyclin3 to treat your symptoms. I have sent this to your pharmacy.?     It is also important to stay well hydrated, get lots of rest and take over-the-counter decongestants,?tylenol?or ibuprofen if you?are able to?take those medications per your primary care provider to help relieve discomfort.?     It is important that you take?all of?your prescribed medication even if your symptoms are improving after a few doses.? Taking?all of?your medicine helps prevent the symptoms from returning.?     If your symptoms worsen, you develop severe headache, vomiting, high fever (>102), or are not improving in 7 days, please contact your primary care provider for an appointment or visit any of our convenient Walk-in Care or Urgent Care Centers to be seen which can be found on our website?here.?     Thanks again for choosing?us?as your health care partner,?   ?  Liz Peterson MD?

## 2022-02-15 ENCOUNTER — MYC MEDICAL ADVICE (OUTPATIENT)
Dept: FAMILY MEDICINE | Facility: CLINIC | Age: 62
End: 2022-02-15
Payer: COMMERCIAL

## 2022-02-15 DIAGNOSIS — F41.1 GENERALIZED ANXIETY DISORDER: ICD-10-CM

## 2022-02-15 DIAGNOSIS — K12.0 APHTHAE, ORAL: ICD-10-CM

## 2022-02-15 DIAGNOSIS — D69.1 PLATELET GRANULE DEFECT (H): ICD-10-CM

## 2022-02-15 DIAGNOSIS — F51.04 PSYCHOPHYSIOLOGICAL INSOMNIA: ICD-10-CM

## 2022-02-15 DIAGNOSIS — B37.0 THRUSH: Primary | ICD-10-CM

## 2022-02-15 DIAGNOSIS — J45.40 MODERATE PERSISTENT ASTHMA WITHOUT COMPLICATION: ICD-10-CM

## 2022-02-15 DIAGNOSIS — J01.90 ACUTE BACTERIAL SINUSITIS: ICD-10-CM

## 2022-02-15 DIAGNOSIS — R00.0 SINUS TACHYCARDIA: ICD-10-CM

## 2022-02-15 DIAGNOSIS — L30.9 DERMATITIS: ICD-10-CM

## 2022-02-15 DIAGNOSIS — B96.89 ACUTE BACTERIAL SINUSITIS: ICD-10-CM

## 2022-02-15 DIAGNOSIS — L74.511 PRIMARY FOCAL HYPERHIDROSIS OF FACE: ICD-10-CM

## 2022-02-15 RX ORDER — DOXYCYCLINE HYCLATE 100 MG
100 TABLET ORAL 2 TIMES DAILY
Qty: 14 TABLET | Refills: 0 | Status: SHIPPED | OUTPATIENT
Start: 2022-02-15 | End: 2022-04-06

## 2022-02-15 RX ORDER — CLINDAMYCIN PHOSPHATE 10 MG/G
GEL TOPICAL 2 TIMES DAILY
Qty: 30 G | Refills: 4 | Status: SHIPPED | OUTPATIENT
Start: 2022-02-15 | End: 2023-12-29

## 2022-02-15 NOTE — TELEPHONE ENCOUNTER
RNs please assist in preparing refills and refill by protocol any you are able.     Then I will take the message back to complete her questions

## 2022-02-15 NOTE — TELEPHONE ENCOUNTER
Pending Prescriptions:                       Disp   Refills    doxycycline hyclate (VIBRA-TABS) 100 MG ta*14 tab*0        Sig: Take 1 tablet (100 mg) by mouth 2 times daily    Signed Prescriptions:                        Disp   Refills    clindamycin (CLINDAMAX) 1 % external gel   30 g   4        Sig: Apply topically 2 times daily  Authorizing Provider: ARVIN MARIN  Ordering User: DARIO CLINTON refill request to provider for review/approval because:  Drug not on the [facility] refill protocol (Doxycycline)    Dario Clinton, BSN, RN, PHN  [place]  February 15, 2022

## 2022-02-15 NOTE — TELEPHONE ENCOUNTER
Prescription approved per [facility] Refill Protocol.  (Clindamycin)    MARVA BaileyN, RN, PHN  [place]  February 15, 2022

## 2022-02-16 RX ORDER — MONTELUKAST SODIUM 10 MG/1
10 TABLET ORAL AT BEDTIME
Qty: 90 TABLET | Refills: 3 | Status: SHIPPED | OUTPATIENT
Start: 2022-02-16 | End: 2023-02-28

## 2022-02-16 RX ORDER — METOPROLOL SUCCINATE 25 MG/1
25 TABLET, EXTENDED RELEASE ORAL EVERY EVENING
Qty: 90 TABLET | Refills: 0 | Status: SHIPPED | OUTPATIENT
Start: 2022-02-16 | End: 2022-04-06

## 2022-02-16 RX ORDER — FLUCONAZOLE 200 MG/1
200 TABLET ORAL DAILY
Qty: 10 TABLET | Refills: 0 | Status: SHIPPED | OUTPATIENT
Start: 2022-02-16 | End: 2022-02-24

## 2022-02-16 RX ORDER — PREDNISONE 5 MG/1
5 TABLET ORAL DAILY
Qty: 90 TABLET | Refills: 0 | Status: SHIPPED | OUTPATIENT
Start: 2022-02-16 | End: 2022-04-06

## 2022-02-16 RX ORDER — BUPROPION HYDROCHLORIDE 200 MG/1
200 TABLET, EXTENDED RELEASE ORAL DAILY
Qty: 90 TABLET | Refills: 0 | Status: SHIPPED | OUTPATIENT
Start: 2022-02-16 | End: 2022-02-23

## 2022-02-16 RX ORDER — BUPROPION HYDROCHLORIDE 100 MG/1
TABLET, EXTENDED RELEASE ORAL
Qty: 90 TABLET | Refills: 0 | Status: SHIPPED | OUTPATIENT
Start: 2022-02-16 | End: 2022-02-23

## 2022-02-16 RX ORDER — TRAZODONE HYDROCHLORIDE 50 MG/1
150 TABLET, FILM COATED ORAL AT BEDTIME
Qty: 270 TABLET | Refills: 0 | Status: SHIPPED | OUTPATIENT
Start: 2022-02-16 | End: 2022-04-06

## 2022-02-16 RX ORDER — CHLORHEXIDINE GLUCONATE ORAL RINSE 1.2 MG/ML
15 SOLUTION DENTAL 2 TIMES DAILY
Qty: 1893 ML | Refills: 4 | Status: SHIPPED | OUTPATIENT
Start: 2022-02-16 | End: 2023-11-07

## 2022-02-16 NOTE — TELEPHONE ENCOUNTER
Pending Prescriptions:                       Disp   Refills    predniSONE (DELTASONE) 5 MG tablet         90 tab*0        Sig: Take 1 tablet (5 mg) by mouth daily    metoprolol succinate ER (TOPROL-XL) 25 MG *90 tab*0        Sig: Take 1 tablet (25 mg) by mouth every evening    montelukast (SINGULAIR) 10 MG tablet       90 tab*3        Sig: Take 1 tablet (10 mg) by mouth At Bedtime    chlorhexidine (PERIDEX) 0.12 % solution    1893 mL4        Sig: Swish and spit 15 mLs in mouth 2 times daily    Signed Prescriptions:                        Disp   Refills    clindamycin (CLINDAMAX) 1 % external gel   30 g   4        Sig: Apply topically 2 times daily  Authorizing Provider: ARVIN MARIN  Ordering User: BRADY CALVERT    doxycycline hyclate (VIBRA-TABS) 100 MG ta*14 tab*0        Sig: Take 1 tablet (100 mg) by mouth 2 times daily  Authorizing Provider: ARVIN MARIN    buPROPion (WELLBUTRIN SR) 100 MG 12 hr tab*90 tab*0        Sig: TAKE ONE TABLET BY MOUTH EVERY AFTERNOON  Authorizing Provider: ARVIN MARIN  Ordering User: TOBIN ESQUIVEL    buPROPion (WELLBUTRIN SR) 200 MG 12 hr tab*90 tab*0        Sig: Take 1 tablet (200 mg) by mouth daily  Authorizing Provider: ARVIN MARIN  Ordering User: TOBIN ESQUIVEL    aluminum chloride (DRYSOL) 20 % external s*60 mL  1        Sig: Apply topically At Bedtime  Authorizing Provider: ARVIN MARIN  Ordering User: TOBIN ESQUIVEL    traZODone (DESYREL) 50 MG tablet           270 ta*0        Sig: Take 3 tablets (150 mg) by mouth At Bedtime  Authorizing Provider: ARVIN MARIN  Ordering User: TOBIN ESQUIVEL    Routing refill request to provider for review/approval because:  BP Readings from Last 3 Encounters:   12/22/21 (!) 158/84   11/30/21 132/88   09/15/21 137/86     ACT Total Scores 11/30/2021   ACT TOTAL SCORE -   ASTHMA ER VISITS -   ASTHMA HOSPITALIZATIONS -   ACT TOTAL SCORE (Goal Greater than or Equal to 20) 11   In the past 12  months, how many times did you visit the emergency room for your asthma without being admitted to the hospital? 0   In the past 12 months, how many times were you hospitalized overnight because of your asthma? 0     Not on protocol

## 2022-02-16 NOTE — TELEPHONE ENCOUNTER
Rx's pended unless there were refills left and sent to refill pool for protocol to run.    Tramodol is not on current med list.     Dedrick Noe, MARVAN, RN, PHN  [facility] ~ Registered Nurse  Clinic Triage ~ [place] [facility] [place]  February 16, 2022

## 2022-02-21 NOTE — PROGRESS NOTES
Jacquie is a 61 year old who is being evaluated via a billable video visit.      How would you like to obtain your AVS? MyChart  If the video visit is dropped, the invitation should be resent by:   Will anyone else be joining your video visit? No      Video Start Time: 1:12 PM    Assessment & Plan     Fatigue, unspecified type  Excessive daytime sleepiness  Patient with continued fatigue and excessive daytime sleepiness.  She notes that it feels like this has worsened over the last several months.  She has undergone a move out of her home having packed up her home and then unpacked in her new place.  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  She feels more depressed and anxious.  She has had multiple labs performed over the last few months.  These were reviewed in the chart.  No specific cause of her fatigue can be pinpointed in the lab other than her known motility concerns.  In the past we have discussed evaluation for sleep study.  She did not go through with that.  In light of her significant daytime sleepiness and difficulty with sleep and poorly controlled hypertension at times would recommend that she go ahead and have the sleep study completed.  We discussed the reasons and patient Anjelica to proceed.  - Adult Sleep Eval & Management  Referral; Future    Moderate recurrent major depression (H)  Generalized anxiety disorder  Patient with worsening symptoms of depression.  She is currently on Wellbutrin  mg in the morning and 100 mg in the evening.  She is also taking Cymbalta 60 mg 2 daily.  She has Xanax but she reports she has not been taking this.  Discussed potentially increasing the Wellbutrin SR to 200 mg twice daily.  She agrees to this plan and will recheck in 4 to 6 weeks.  Sooner if any problems or concerns.  - buPROPion (WELLBUTRIN SR) 200 MG 12 hr tablet; Take 1 tablet (200 mg) by mouth 2 times daily    Hypertension goal BP (blood pressure) <  "140/90  Patient reports a rare elevated diastolic blood pressure in the nineties.  Most recently blood pressure was 130/84.  No changes made to her medication but did request a sleep study as above.    Lumbar disc disease with radiculopathy  Multiple joint pain  Myalgia  Patient with continued low back pain multiple joint pains and diffuse myalgias.  She is requesting to stay on the tramadol at least through her trip that is coming up.  A refill was given for her for the tramadol.  Did request that she see pain management to assist us with treatment of her chronic pain.  Previously she was with very pain management and would like to return.  Referral is placed.  - Pain Management Referral; Future    Platelet granule defect (H)  Patient remains under the care of hematology.  She has regular follow-up.  No changes made today.               BMI:   Estimated body mass index is 36.28 kg/m  as calculated from the following:    Height as of 12/22/21: 1.695 m (5' 6.75\").    Weight as of 12/22/21: 104.3 kg (229 lb 14.4 oz).   Weight management plan: Discussed healthy diet and exercise guidelines        Return in about 4 weeks (around 3/23/2022) for using a MyChart eVisit, follow up depression.    Liz Peterson MD  [facility] ANISH Dhillon is a 61 year old who presents for the following health issues     History of Present Illness       Back Pain:  She presents for follow up of back pain. Patient's back pain is a chronic problem.  Location of back pain:  Right lower back, left lower back, right middle of back, left middle of back, right hip and left hip  Description of back pain: burning, sharp and other  Back pain spreads: right buttocks and left buttocks    Since patient first noticed back pain, pain is: always present, but gets better and worse  Does back pain interfere with her job:  Yes      Hypertension: She presents for follow up of hypertension.  She does check blood pressure  " "regularly outside of the clinic. Outside blood pressures have been over 140/90. She follows a low salt diet.     She eats 0-1 servings of fruits and vegetables daily.She consumes 0 sweetened beverage(s) daily.She exercises with enough effort to increase her heart rate 9 or less minutes per day.  She exercises with enough effort to increase her heart rate 3 or less days per week.   She is taking medications regularly.     \"I've been unbelievably tired. I just don't feel good.\"   Feels this is different in the last couple of months.   \"I feel like I'm walking in a fog a lot of the time\".   Having headaches.     Had been referred to sleep management. Did not go through with appointment.   Reports she has a Sleep Number bed and tells her she is sleeping restfully.     Reports that her diastolic is still upper 80s to low 90s.   When asked about snoring, reports that \"just a tad\".     Spoke to hematology today and will be starting new medication. Mycophenolate.     Feels that the Tramadol has been helpful. Would like a refill. Taking it for her back and hands. Taking it 3-4 times per week. Has [facility] in the past.     Depression and Anxiety Follow-Up    How are you doing with your depression since your last visit? Worsened over the last several months    How are you doing with your anxiety since your last visit?  Worsened over the last several months    Are you having other symptoms that might be associated with depression or anxiety? No    Have you had a significant life event? OTHER: recent move. sold home     Do you have any concerns with your use of alcohol or other drugs? No    Social History     Tobacco Use     Smoking status: Former Smoker     Packs/day: 1.00     Types: Cigarettes     Smokeless tobacco: Never Used     Tobacco comment: since 1981   Vaping Use     Vaping Use: Never used   Substance Use Topics     Alcohol use: No     Drug use: Yes     Comment: medical marjuana      PHQ 8/5/2021 " 11/30/2021 2/22/2022   PHQ-9 Total Score 8 7 7   Q9: Thoughts of better off dead/self-harm past 2 weeks Not at all Not at all Not at all     MARIZOL-7 SCORE 8/5/2021 11/30/2021 2/22/2022   Total Score - - -   Total Score - 5 (mild anxiety) 9 (mild anxiety)   Total Score 6 5 9         Suicide Assessment Five-step Evaluation and Treatment (SAFE-T)    CHRONIC PAIN-patient with history of chronic pain previously seen within [facility].  She had been weaned off narcotics and had been on medical marijuana which she has discontinued as well.  She has had more discomfort and seemed to have a exacerbation of her low back pain while she is packing up from moving.  She has an upcoming car trip and would like refill on tramadol which was given for the low back pain exacerbation.  She also would like to be reevaluated by pain management because of her continued myalgias and other joint pains that she experiences.    Review of Systems   CONSTITUTIONAL: NEGATIVE for fever, chills, change in weight  ENT/MOUTH: NEGATIVE for ear, mouth and throat problems  RESP: NEGATIVE for significant cough or SOB  CV: NEGATIVE for chest pain, palpitations or peripheral edema      Objective    Vitals - Patient Reported  Systolic (Patient Reported): 134  Diastolic (Patient Reported): 86      Vitals:  No vitals were obtained today due to virtual visit.    Physical Exam   GENERAL: Healthy, alert and no distress  EYES: Eyes grossly normal to inspection.  No discharge or erythema, or obvious scleral/conjunctival abnormalities.  RESP: No audible wheeze, cough, or visible cyanosis.  No visible retractions or increased work of breathing.    NEURO: Cranial nerves grossly intact.  Mentation and speech appropriate for age.  PSYCH: Mentation appears normal, affect normal/bright, judgement and insight intact, normal speech and appearance well-groomed.                Video-Visit Details    Type of service:  Video Visit    Video End Time:1:45  pm    Originating Location (pt. Location): Home    Distant Location (provider location):  [facility] [employer]     Platform used for Video Visit: [employer]    Answers for HPI/ROS submitted by the patient on 2/23/2022  If you checked off any problems, how difficult have these problems made it for you to do your work, take care of things at home, or get along with other people?: Somewhat difficult  PHQ9 TOTAL SCORE: 7  MARIZOL 7 TOTAL SCORE: 9

## 2022-02-22 ASSESSMENT — PATIENT HEALTH QUESTIONNAIRE - PHQ9: SUM OF ALL RESPONSES TO PHQ QUESTIONS 1-9: 7

## 2022-02-22 ASSESSMENT — ANXIETY QUESTIONNAIRES
GAD7 TOTAL SCORE: 9
7. FEELING AFRAID AS IF SOMETHING AWFUL MIGHT HAPPEN: SEVERAL DAYS
2. NOT BEING ABLE TO STOP OR CONTROL WORRYING: NEARLY EVERY DAY
5. BEING SO RESTLESS THAT IT IS HARD TO SIT STILL: NOT AT ALL
GAD7 TOTAL SCORE: 9
4. TROUBLE RELAXING: NOT AT ALL
1. FEELING NERVOUS, ANXIOUS, OR ON EDGE: SEVERAL DAYS
6. BECOMING EASILY ANNOYED OR IRRITABLE: SEVERAL DAYS
3. WORRYING TOO MUCH ABOUT DIFFERENT THINGS: NEARLY EVERY DAY
7. FEELING AFRAID AS IF SOMETHING AWFUL MIGHT HAPPEN: SEVERAL DAYS

## 2022-02-23 ENCOUNTER — VIRTUAL VISIT (OUTPATIENT)
Dept: FAMILY MEDICINE | Facility: CLINIC | Age: 62
End: 2022-02-23
Payer: COMMERCIAL

## 2022-02-23 ENCOUNTER — VIRTUAL VISIT (OUTPATIENT)
Dept: HEMATOLOGY | Facility: CLINIC | Age: 62
End: 2022-02-23
Attending: INTERNAL MEDICINE
Payer: COMMERCIAL

## 2022-02-23 DIAGNOSIS — M25.50 MULTIPLE JOINT PAIN: ICD-10-CM

## 2022-02-23 DIAGNOSIS — R53.83 FATIGUE, UNSPECIFIED TYPE: Primary | ICD-10-CM

## 2022-02-23 DIAGNOSIS — D69.1 PLATELET GRANULE DEFECT (H): ICD-10-CM

## 2022-02-23 DIAGNOSIS — I10 HYPERTENSION GOAL BP (BLOOD PRESSURE) < 140/90: ICD-10-CM

## 2022-02-23 DIAGNOSIS — G47.19 EXCESSIVE DAYTIME SLEEPINESS: ICD-10-CM

## 2022-02-23 DIAGNOSIS — F41.1 GENERALIZED ANXIETY DISORDER: ICD-10-CM

## 2022-02-23 DIAGNOSIS — M51.16 LUMBAR DISC DISEASE WITH RADICULOPATHY: ICD-10-CM

## 2022-02-23 DIAGNOSIS — D69.3 IDIOPATHIC THROMBOCYTOPENIC PURPURA (H): Primary | ICD-10-CM

## 2022-02-23 DIAGNOSIS — M79.10 MYALGIA: ICD-10-CM

## 2022-02-23 DIAGNOSIS — F33.1 MODERATE RECURRENT MAJOR DEPRESSION (H): ICD-10-CM

## 2022-02-23 PROCEDURE — 99214 OFFICE O/P EST MOD 30 MIN: CPT | Mod: 95 | Performed by: INTERNAL MEDICINE

## 2022-02-23 PROCEDURE — 99214 OFFICE O/P EST MOD 30 MIN: CPT | Mod: 95 | Performed by: FAMILY MEDICINE

## 2022-02-23 RX ORDER — PREDNISONE 1 MG/1
2 TABLET ORAL EVERY OTHER DAY
Qty: 28 TABLET | Refills: 0 | Status: SHIPPED | OUTPATIENT
Start: 2022-02-23 | End: 2022-03-23

## 2022-02-23 RX ORDER — TRAMADOL HYDROCHLORIDE 50 MG/1
50 TABLET ORAL 2 TIMES DAILY PRN
Qty: 60 TABLET | Refills: 0 | Status: SHIPPED | OUTPATIENT
Start: 2022-02-23 | End: 2022-04-06

## 2022-02-23 RX ORDER — BUPROPION HYDROCHLORIDE 200 MG/1
200 TABLET, EXTENDED RELEASE ORAL 2 TIMES DAILY
Qty: 180 TABLET | Refills: 1 | Status: SHIPPED | OUTPATIENT
Start: 2022-02-23 | End: 2022-12-07

## 2022-02-23 ASSESSMENT — PATIENT HEALTH QUESTIONNAIRE - PHQ9
SUM OF ALL RESPONSES TO PHQ QUESTIONS 1-9: 7
10. IF YOU CHECKED OFF ANY PROBLEMS, HOW DIFFICULT HAVE THESE PROBLEMS MADE IT FOR YOU TO DO YOUR WORK, TAKE CARE OF THINGS AT HOME, OR GET ALONG WITH OTHER PEOPLE: SOMEWHAT DIFFICULT

## 2022-02-23 ASSESSMENT — ANXIETY QUESTIONNAIRES: GAD7 TOTAL SCORE: 9

## 2022-02-23 NOTE — LETTER
2022      RE: Jacquie Amador  [address] [facility] [address]           [facility]  [address] [place] [id number]  Phone: [phone], Fax: [phone]    Outpatient Visit Note:    Patient: Jacquie Amador  MRN: [id number]  : 1960  ISIDRO: 22      Reason for Consultation:  Bruising    Assessment:  In summary, Jacquie Amador is a 61 year old woman presenting to clinic due to bruising/bleeding with abnormal platelet studies. 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  Easy bruising and prolonged healing secondary to #1  3.  Eruptive rash/lesions on sun exposed surfaces, porphyria cutanea tarda ruled out  4.  Personal history of antiphospholipid antibody syndrome in pregnancy, no records  5.  Osteoarthritis requiring anti-inflammatory medications  6.  Depression requiring use of SSRI medication  7.  Acute on chronic migraines requiring use of triptans and aspirin      Ms. Amador presents with greater than 2 years of increased bruising/presence of lesions on her skin that exhibit prolonged healing.  Prior to her consultation in our clinic she had been evaluated by Dr. Douglas who had sent off PTT.  PTT was prolonged prompting further evaluation as an outpatient.  Evaluation was conducted in 2021.  At this time Jacquie was found to have negative antiphospholipid antibodies.  Jacquie reports a history of 2 miscarriages over 30 years ago that prompted the diagnosis of antiphospholipid antibody syndrome.  She recalls that she was on a steroid pill during her subsequent 2 successful pregnancies.  She does not recall having been on a blood thinner for these pregnancies.  She has no other personal thrombosis history.    Evaluation for von Willebrand's, including von Willebrand factor  antigen, activity, ristocetin cofactor, and collagen binding did not find any evidence of acquired or congenital VWF.  Evaluation of antiplatelet antibodies revealed that Jenny has no antibodies against platelet glycoprotein receptors.  However she did have the presence of anti-HLA and anti-GP1a antibodies.  She has no personal history of receiving red cell or platelet transfusions.    Jenny had one platelet aggregation study that was done when she was taking Excedrin Migraine which contains aspirin.  1 week later this was study was repeated. The study continued to be abnormal. She continued to have decreased arachidonic acid  As well ad deaggregation with ADP and arachidonic acid. And decreased ATP release with thrombin and ADP. This is consistent with diagnosis of acquired qualitative platelet disorder and consistent with her anti-GP1a antibodies.     Trial of steroids with initial improvement in skin lesions- however patient mostly noted improvement in arthralgias and fatigue more.  Now on prednisone 5 mg every other day but now having worsening thrush and oral lesions.     Previously have discussed with patient that immunsuppression may help with symptoms- as we have seen modest improvement with steroids--- however long-term steriods have not been helpful. Rituximab is one option- but is NOT reversible. Have considered MMF and patient has prescription. However is hesitant about side effects. Another option would be azathioprine (Imuran)- before undergoing any of these treatments would like her to complete vaccination steroids. I suspect another systemic process- as oral lesions and skin lesions are atypical for ITP. Discussed with Dermatology- suspect non-vasculitic process such as Esquivel-Kaylee. However, platelets studies are not consistent. Will discuss potential Behcet's given oral lesions with Rheumatology. Inflammatory markers remain wnl with mild elevation in cryoglobulins. Negative monoclonal  evaluation and no identifiable subtype. Likely reactive. No cryofibrinogens. Does have elevated fibrinogen activity and antigen and TEG with appropriate increase in MA.     Previously discussed that avoiding other medications that can worsen this, including aspirin, NSAID and potentially SSRIs. Right now IF Jacquie needs MAJOR SURGERY---  She should receive platelet transfusion (1-2 units). She is ok to receive injections.        Plan:  1. Majority of today's visit was spent counseling the patient regarding diagnosis, natural history, management and treatment options   2. Reduce prednisone to 2 mg every other day      The patient is given our center's contact information and is instructed to call if she should have any further questions or concerns.  Otherwise, we will plan on seeing her back Follow up with Provider - 4/20/22-- add on at 12:30 PM- video flo Bae, nurse clinician was involved with the care, education and coordination of patient care.     Patient understands and agrees with the above plan and recommendation.        Emely Grimes MD/PhD   of Medicine  Division of Hematology, Oncology and Transplantation  ----------------------------------------------------------------------------------------------------------------------  Interval History:  Jacquie Amador is a 61 year old woman with PMHx of fibromyalgia, anxiety/depression, hypertension and hyperlipidemia. She presented to clinic on 9/15/21 for her first in person evaluation due to bleeding and prolonged wound healing. Please refer to my consult note.     Jacquie is having sores in her mouth. Liquid comes out- sometimes. They are painless. She can see them coming up- but does not feel them.     Skin- the hand and wrist is clearing up. Still there with scaring.     Joints- pain is still terrible. Pain is the same throughout the day. The legs and lower back. Ankle is really bad the last couple days. Is swollen and will  give out. Has not done that for a while.     Energy levels- very low.     Sleep- has sleep number- per that she is sleeping better. Will wake up tired.     Weight- increased with prednisone    Past Medical History:  Past Medical History:   Diagnosis Date     ASTHMA - MODERATE PERSISTENT 2005     Chronic pain      Coronary artery disease      CVA (cerebral infarction)      Depressive disorder, not elsewhere classified      Diabetes (H)      Elevated serum alkaline phosphatase level     Liver source     Fibromyalgia      HYPERLIPIDEMIA NEC/NOS 2006     Hypertriglyceridemia      OA (osteoarthritis)      Thyroid disease      Trochanteric bursitis      Unspecified essential hypertension      Immunization  Immunization History   Administered Date(s) Administered     COVID-19,PF,Moderna 2021, 2021, 11/15/2021     FLU 6-35 months 2009     Influenza (IIV3) PF 2000, 2003, 2004, 2005, 2006, 2007, 2008, 2010, 10/26/2011, 10/26/2012     Influenza Quad, Recombinant, pf(RIV4) (Flublok) 2020     Influenza Vaccine IM > 6 months Valent IIV4 (Alfuria,Fluzone) 2014, 2015, 2019, 11/15/2021     Pneumococcal 23 valent 2000, 2021     TD (ADULT, 7+) 2000     TDAP Vaccine (Adacel) 2011     Tdap (Adacel,Boostrix) 2021     Zoster vaccine recombinant adjuvanted (SHINGRIX) 2019       Past Surgical History:  Past Surgical History:   Procedure Laterality Date     3 teeth pulled       INJECT EPIDURAL TRANSFORAMINAL  2014    [facility]     INJECT JOINT SACROILIAC  2014    [facility]     LAMINECTOMY LUMBAR ONE LEVEL Left 2014    [facility]  DELIVERY ONLY      , Low Cervical       Medications:  Current Outpatient Medications   Medication Sig Dispense Refill     adapalene (DIFFERIN) 0.1 % gel APPLY A THIN LAYER TO THE  AFFECTED AREA(S) BY TOPICAL ROUTE ONCE DAILY BEFORE BEDTIME 45 g 10     albuterol (PROVENTIL) (2.5 MG/3ML) 0.083% neb solution Take 1 vial (2.5 mg) by nebulization every 6 hours as needed for shortness of breath / dyspnea or wheezing 3 mL 4     albuterol (VENTOLIN HFA) 108 (90 Base) MCG/ACT inhaler INHALE 2 PUFFS INTO THE LUNGS EVERY 6 HOURS AS NEEDED FOR SHORTNESS OF BREATH / DYSPNEA 54 g 0     ALPRAZolam (XANAX) 0.25 MG tablet Take 1 tablet (0.25 mg) by mouth daily as needed for anxiety 30 tablet 0     aluminum chloride (DRYSOL) 20 % external solution Apply topically At Bedtime 60 mL 1     atorvastatin (LIPITOR) 20 MG tablet Take 1 tablet (20 mg) by mouth daily 90 tablet 2     buPROPion (WELLBUTRIN SR) 100 MG 12 hr tablet TAKE ONE TABLET BY MOUTH EVERY AFTERNOON 90 tablet 0     buPROPion (WELLBUTRIN SR) 200 MG 12 hr tablet Take 1 tablet (200 mg) by mouth daily 90 tablet 0     celecoxib (CELEBREX) 200 MG capsule TAKE ONE CAPSULE BY MOUTH TWICE A DAY AS NEEDED FOR PAIN 180 capsule 1     chlorhexidine (PERIDEX) 0.12 % solution Swish and spit 15 mLs in mouth 2 times daily 1893 mL 4     clindamycin (CLINDAMAX) 1 % external gel Apply topically 2 times daily 30 g 4     clobetasol (TEMOVATE) 0.05 % external cream APPLY SPARINGLY TO AFFECTED AREA TWICE DAILY FOR 14 DAYS.  DO NOT APPLY TO FACE. 30 g 3     clotrimazole (MYCELEX) 10 MG lozenge Place 1 lozenge (10 mg) inside cheek 5 times daily 70 lozenge 1     clotrimazole (MYCELEX) 10 MG lozenge Place 1 lozenge (10 mg) inside cheek 4 times daily 40 Trinh 1     diclofenac (VOLTAREN) 1 % topical gel Apply 2-4 g topically 4 times daily 100 g 1     doxycycline hyclate (VIBRA-TABS) 100 MG tablet Take 1 tablet (100 mg) by mouth 2 times daily 14 tablet 0     doxycycline hyclate (VIBRAMYCIN) 100 MG capsule Take 1 capsule (100 mg) by mouth 2 times daily 20 capsule 0     DULERA 200-5 MCG/ACT inhaler Inhale 2 puffs into the lungs 2 times daily 39 g 1     DULoxetine (CYMBALTA) 60 MG  capsule TAKE TWO CAPSULES BY MOUTH DAILY 180 capsule 1     famotidine (PEPCID) 40 MG tablet TAKE ONE TABLET BY MOUTH ONE TIME DAILY 90 tablet 1     FEROSUL 325 (65 Fe) MG tablet TAKE ONE TABLET BY MOUTH ONE TIME DAILY 90 tablet 0     fluconazole (DIFLUCAN) 200 MG tablet Take 1 tablet (200 mg) by mouth daily 10 tablet 0     HYDROcodone-acetaminophen (NORCO) 5-325 MG tablet Take 1 tablet by mouth 2 times daily as needed for pain 30 tablet 0     hydrocortisone 2.5 % cream APPLY TOPICALLY 2 TIMES DAILY AS NEEDED 30 g 3     Lactobacillus (ACIDOPHILUS) CAPS Take 1 capsule by mouth daily Take two hours before or after antibiotic dose.  Continue for 1 week after antibiotic therapy completed 60 capsule 0     losartan-hydrochlorothiazide (HYZAAR) 50-12.5 MG tablet Take 1 tablet by mouth daily 90 tablet 3     metoprolol succinate ER (TOPROL-XL) 100 MG 24 hr tablet Take 1 tablet (100 mg) by mouth 2 times daily 180 tablet 1     metoprolol succinate ER (TOPROL-XL) 25 MG 24 hr tablet Take 1 tablet (25 mg) by mouth every evening 90 tablet 0     montelukast (SINGULAIR) 10 MG tablet Take 1 tablet (10 mg) by mouth At Bedtime 90 tablet 3     multivitamin w/minerals (MULTI-VITAMIN) tablet Take 1 tablet by mouth daily       mupirocin (BACTROBAN) 2 % external cream Apply to affected area three times daily 60 g 1     mupirocin (BACTROBAN) 2 % external ointment        mycophenolate (GENERIC EQUIVALENT) 250 MG capsule Take 2 capsules (500 mg) by mouth daily 180 capsule 0     nystatin (MYCOSTATIN) 086879 UNIT/ML suspension Take by mouth 4 times daily Has recurrent thrush. Uses for 2 weeks at a time as needed. 380 mL 1     oxybutynin ER (DITROPAN-XL) 5 MG 24 hr tablet Take 2 tablets (10 mg) by mouth daily 180 tablet 1     predniSONE (DELTASONE) 5 MG tablet Take 1 tablet (5 mg) by mouth daily 90 tablet 0     rizatriptan (MAXALT) 10 MG tablet Take 1 tablet (10 mg) by mouth at onset of headache for migraine May repeat in 2 hours. Max 3 tablets/24  hours. 18 tablet 0     rOPINIRole (REQUIP) 1 MG tablet TAKE THREE TABLETS BY MOUTH AT BEDTIME 270 tablet 1     SUMAtriptan (IMITREX) 100 MG tablet take 1 tablet at onset of headache may repeat in 2 hours max 2 tabs/day 18 tablet 1     traZODone (DESYREL) 50 MG tablet Take 3 tablets (150 mg) by mouth At Bedtime 270 tablet 0     triamcinolone (ARISTOCORT HP) 0.5 % external cream Apply topically 2 times daily 60 g 0     triamcinolone (KENALOG) 0.1 % paste Take by mouth 2 times daily 5 g 1     valACYclovir (VALTREX) 500 MG tablet TAKE ONE TABLET BY MOUTH ONE TIME DAILY 90 tablet 0     ZYRTEC 10 MG OR TABS 1 TABLET DAILY 90 3        Allergies:  Allergies   Allergen Reactions     Cats      and rabbits/wheezing     Dogs      wheezing     Lyrica [Pregabalin] Other (See Comments)     Rash, mouth sores, itching and burning     Seasonal Allergies      rhinits       ROS:  Remainder of a comprehensive 10 point ROS is negative unless noted above.    Social History:  Denies any tobacco use. No significant alcohol use. Denies any illicit drug use.     Family History:  Two daughter  29 Yo daughter  28 yo daughter  1 grandson    1 sister- older- high blood glucose, arthritis  1 brother- older- no idea    Mother- - heart valve. Had MDS. Needed a shot every month  Father- - cancer    Objectives:  GENERAL: alert and no distress  EYES: Eyes grossly normal to inspection.  No discharge or erythema, or obvious scleral/conjunctival abnormalities.  RESP: No audible wheeze, cough, or visible cyanosis.  No visible retractions or increased work of breathing.    SKIN: hypopigmentation - hands, lips, neck and across face, scar - face and numerous scabs over face  NEURO: Cranial nerves grossly intact.  Mentation and speech appropriate for age.  PSYCH: Mentation appears normal, affect normal/bright, judgement and insight intact, normal speech and appearance well-groomed.        Labs:  Ferritin   Date Value Ref Range Status    12/08/2021 136 8 - 252 ng/mL Final   04/16/2021 376 (H) 8 - 252 ng/mL Final     Iron   Date Value Ref Range Status   09/15/2021 100 35 - 180 ug/dL Final   01/20/2021 50 35 - 180 ug/dL Final     Iron Binding Cap   Date Value Ref Range Status   01/20/2021 350 240 - 430 ug/dL Final     Iron Binding Capacity   Date Value Ref Range Status   09/15/2021 284 240 - 430 ug/dL Final           4/16/21  The INR is normal.   APTT ratio is elevated.   Platelet Neutralization is negative.   APTT 1:2 Mix is normal.   DRVVT Screen ratio is normal.   Thrombin time is normal.   Lupus anticoagulant negative  Anticardiolipin negative    VWF collagen binding normal (send out)  The von Willebrand factor antigen (VWF:Ag), von Willebrand factor   activity (VWF:ACT), and Factor 8 levels are within normal limits.   The Factor 8 to VWF:Ag ratio and the VWF:ACT to VWF:Ag ratio are   within normal limits.      Platelet function COL/EPI >300  Platelet function ADP 77 (normal)    FXIII antigen 137 (norma1)  Alpha-2 antiplasmin 140 (normal  Factor V 78 (normal)  Factor 10 128 (normal)  Factor  (elevated)    4/29/21  Abnormal platelet aggregation study. Maximal platelet aggregation is   decreased with Arachidonic acid and within normal limits with 5   micromolar and 10 micromolar ADP, Epinephrine, Collagen, and low and   high concentrations of Ristocetin.  Deaggregation is present with 5   micromolar ADP and Arachidonic acid.  The 1.00 mg/mL Ristocetin   reactions have a slight lag in secondary aggregation.  ATP release   is decreased with Thrombin and 5 micromolar and 10 micromolar ADP.   These findings are consistent with a congenital or acquired platelet   disorders.    Platelet Function Closure Time Col/ADP   Date Value Ref Range Status   03/03/2021 77 <120 sec Final     Comment:     Typical aspirin effect is characterized by prolonged Col/Epi and normal   Col/ADP.       PFA-Col/ADP   Date Value Ref Range Status   11/23/2021 79 <120  Seconds Final     Comment:     Typical aspirin effect is characterized by prolonged Col/Epi and normal Col/ADP   09/28/2021 88 <120 Seconds Final     Comment:     Typical aspirin effect is characterized by prolonged Col/Epi and normal Col/ADP     PLT Funct COL/EPI   Date Value Ref Range Status   03/03/2021 >300 (H) <170 sec Final     Comment:     Effective 12/01/2015, the reference range for this assay has changed.  Causes of a prolonged closure time include platelet dysfunction,vonWillebrand   disease, decreased hematocrit (<35%) and decreased platelet count (<150   x10e9/L).       PFA-Col/Epi   Date Value Ref Range Status   11/23/2021 188 (H) <170 Seconds Final     Refer to Versiti- autoantibody platelet study    Imaging:  Not applicable          Emely Grimes MD

## 2022-02-23 NOTE — PROGRESS NOTES
[facility]  [address] [place] [id number]  Phone: [phone], Fax: [phone]    Outpatient Visit Note:    Patient: Jacquie Amador  MRN: [id number]  : 1960  ISIDRO: 22      Reason for Consultation:  Bruising    Assessment:  In summary, Jacquie Amador is a 61 year old woman presenting to clinic due to bruising/bleeding with abnormal platelet studies. 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  Easy bruising and prolonged healing secondary to #1  3.  Eruptive rash/lesions on sun exposed surfaces, porphyria cutanea tarda ruled out  4.  Personal history of antiphospholipid antibody syndrome in pregnancy, no records  5.  Osteoarthritis requiring anti-inflammatory medications  6.  Depression requiring use of SSRI medication  7.  Acute on chronic migraines requiring use of triptans and aspirin      Ms. Amador presents with greater than 2 years of increased bruising/presence of lesions on her skin that exhibit prolonged healing.  Prior to her consultation in our clinic she had been evaluated by Dr. Douglas who had sent off PTT.  PTT was prolonged prompting further evaluation as an outpatient.  Evaluation was conducted in 2021.  At this time Jacquie was found to have negative antiphospholipid antibodies.  Jacquie reports a history of 2 miscarriages over 30 years ago that prompted the diagnosis of antiphospholipid antibody syndrome.  She recalls that she was on a steroid pill during her subsequent 2 successful pregnancies.  She does not recall having been on a blood thinner for these pregnancies.  She has no other personal thrombosis history.    Evaluation for von Willebrand's, including von Willebrand factor antigen, activity, ristocetin cofactor, and collagen binding did not find any  evidence of acquired or congenital VWF.  Evaluation of antiplatelet antibodies revealed that Jenny has no antibodies against platelet glycoprotein receptors.  However she did have the presence of anti-HLA and anti-GP1a antibodies.  She has no personal history of receiving red cell or platelet transfusions.    Jenny had one platelet aggregation study that was done when she was taking Excedrin Migraine which contains aspirin.  1 week later this was study was repeated. The study continued to be abnormal. She continued to have decreased arachidonic acid  As well ad deaggregation with ADP and arachidonic acid. And decreased ATP release with thrombin and ADP. This is consistent with diagnosis of acquired qualitative platelet disorder and consistent with her anti-GP1a antibodies.     Trial of steroids with initial improvement in skin lesions- however patient mostly noted improvement in arthralgias and fatigue more.  Now on prednisone 5 mg every other day but now having worsening thrush and oral lesions.     Previously have discussed with patient that immunsuppression may help with symptoms- as we have seen modest improvement with steroids--- however long-term steriods have not been helpful. Rituximab is one option- but is NOT reversible. Have considered MMF and patient has prescription. However is hesitant about side effects. Another option would be azathioprine (Imuran)- before undergoing any of these treatments would like her to complete vaccination steroids. I suspect another systemic process- as oral lesions and skin lesions are atypical for ITP. Discussed with Dermatology- suspect non-vasculitic process such as Esquivel-Kaylee. However, platelets studies are not consistent. Will discuss potential Behcet's given oral lesions with Rheumatology. Inflammatory markers remain wnl with mild elevation in cryoglobulins. Negative monoclonal evaluation and no identifiable subtype. Likely reactive. No cryofibrinogens. Does  have elevated fibrinogen activity and antigen and TEG with appropriate increase in MA.     Previously discussed that avoiding other medications that can worsen this, including aspirin, NSAID and potentially SSRIs. Right now IF Jacquie needs MAJOR SURGERY---  She should receive platelet transfusion (1-2 units). She is ok to receive injections.        Plan:  1. Majority of today's visit was spent counseling the patient regarding diagnosis, natural history, management and treatment options   2. Reduce prednisone to 2 mg every other day      The patient is given our center's contact information and is instructed to call if she should have any further questions or concerns.  Otherwise, we will plan on seeing her back Follow up with Provider - 4/20/22-- add on at 12:30 PM- video flo Bae, nurse clinician was involved with the care, education and coordination of patient care.     Patient understands and agrees with the above plan and recommendation.        Emely Grimes MD/PhD   of Medicine  Division of Hematology, Oncology and Transplantation  ----------------------------------------------------------------------------------------------------------------------  Interval History:  Jacquie Amador is a 61 year old woman with PMHx of fibromyalgia, anxiety/depression, hypertension and hyperlipidemia. She presented to clinic on 9/15/21 for her first in person evaluation due to bleeding and prolonged wound healing. Please refer to my consult note.     Jacquie is having sores in her mouth. Liquid comes out- sometimes. They are painless. She can see them coming up- but does not feel them.     Skin- the hand and wrist is clearing up. Still there with scaring.     Joints- pain is still terrible. Pain is the same throughout the day. The legs and lower back. Ankle is really bad the last couple days. Is swollen and will give out. Has not done that for a while.     Energy levels- very low.     Sleep-  has sleep number- per that she is sleeping better. Will wake up tired.     Weight- increased with prednisone    Past Medical History:  Past Medical History:   Diagnosis Date     ASTHMA - MODERATE PERSISTENT 2005     Chronic pain      Coronary artery disease      CVA (cerebral infarction)      Depressive disorder, not elsewhere classified      Diabetes (H)      Elevated serum alkaline phosphatase level     Liver source     Fibromyalgia      HYPERLIPIDEMIA NEC/NOS 2006     Hypertriglyceridemia      OA (osteoarthritis)      Thyroid disease      Trochanteric bursitis      Unspecified essential hypertension      Immunization  Immunization History   Administered Date(s) Administered     COVID-19,PF,Moderna 2021, 2021, 11/15/2021     FLU 6-35 months 2009     Influenza (IIV3) PF 2000, 2003, 2004, 2005, 2006, 2007, 2008, 2010, 10/26/2011, 10/26/2012     Influenza Quad, Recombinant, pf(RIV4) (Flublok) 2020     Influenza Vaccine IM > 6 months Valent IIV4 (Alfuria,Fluzone) 2014, 2015, 2019, 11/15/2021     Pneumococcal 23 valent 2000, 2021     TD (ADULT, 7+) 2000     TDAP Vaccine (Adacel) 2011     Tdap (Adacel,Boostrix) 2021     Zoster vaccine recombinant adjuvanted (SHINGRIX) 2019       Past Surgical History:  Past Surgical History:   Procedure Laterality Date     3 teeth pulled       INJECT EPIDURAL TRANSFORAMINAL  2014    [facility]     INJECT JOINT SACROILIAC  2014    [facility]     LAMINECTOMY LUMBAR ONE LEVEL Left 2014    [facility]  DELIVERY ONLY      , Low Cervical       Medications:  Current Outpatient Medications   Medication Sig Dispense Refill     adapalene (DIFFERIN) 0.1 % gel APPLY A THIN LAYER TO THE AFFECTED AREA(S) BY TOPICAL ROUTE ONCE DAILY BEFORE BEDTIME 45 g 10     albuterol  (PROVENTIL) (2.5 MG/3ML) 0.083% neb solution Take 1 vial (2.5 mg) by nebulization every 6 hours as needed for shortness of breath / dyspnea or wheezing 3 mL 4     albuterol (VENTOLIN HFA) 108 (90 Base) MCG/ACT inhaler INHALE 2 PUFFS INTO THE LUNGS EVERY 6 HOURS AS NEEDED FOR SHORTNESS OF BREATH / DYSPNEA 54 g 0     ALPRAZolam (XANAX) 0.25 MG tablet Take 1 tablet (0.25 mg) by mouth daily as needed for anxiety 30 tablet 0     aluminum chloride (DRYSOL) 20 % external solution Apply topically At Bedtime 60 mL 1     atorvastatin (LIPITOR) 20 MG tablet Take 1 tablet (20 mg) by mouth daily 90 tablet 2     buPROPion (WELLBUTRIN SR) 100 MG 12 hr tablet TAKE ONE TABLET BY MOUTH EVERY AFTERNOON 90 tablet 0     buPROPion (WELLBUTRIN SR) 200 MG 12 hr tablet Take 1 tablet (200 mg) by mouth daily 90 tablet 0     celecoxib (CELEBREX) 200 MG capsule TAKE ONE CAPSULE BY MOUTH TWICE A DAY AS NEEDED FOR PAIN 180 capsule 1     chlorhexidine (PERIDEX) 0.12 % solution Swish and spit 15 mLs in mouth 2 times daily 1893 mL 4     clindamycin (CLINDAMAX) 1 % external gel Apply topically 2 times daily 30 g 4     clobetasol (TEMOVATE) 0.05 % external cream APPLY SPARINGLY TO AFFECTED AREA TWICE DAILY FOR 14 DAYS.  DO NOT APPLY TO FACE. 30 g 3     clotrimazole (MYCELEX) 10 MG lozenge Place 1 lozenge (10 mg) inside cheek 5 times daily 70 lozenge 1     clotrimazole (MYCELEX) 10 MG lozenge Place 1 lozenge (10 mg) inside cheek 4 times daily 40 Trinh 1     diclofenac (VOLTAREN) 1 % topical gel Apply 2-4 g topically 4 times daily 100 g 1     doxycycline hyclate (VIBRA-TABS) 100 MG tablet Take 1 tablet (100 mg) by mouth 2 times daily 14 tablet 0     doxycycline hyclate (VIBRAMYCIN) 100 MG capsule Take 1 capsule (100 mg) by mouth 2 times daily 20 capsule 0     DULERA 200-5 MCG/ACT inhaler Inhale 2 puffs into the lungs 2 times daily 39 g 1     DULoxetine (CYMBALTA) 60 MG capsule TAKE TWO CAPSULES BY MOUTH DAILY 180 capsule 1     famotidine (PEPCID) 40 MG  tablet TAKE ONE TABLET BY MOUTH ONE TIME DAILY 90 tablet 1     FEROSUL 325 (65 Fe) MG tablet TAKE ONE TABLET BY MOUTH ONE TIME DAILY 90 tablet 0     fluconazole (DIFLUCAN) 200 MG tablet Take 1 tablet (200 mg) by mouth daily 10 tablet 0     HYDROcodone-acetaminophen (NORCO) 5-325 MG tablet Take 1 tablet by mouth 2 times daily as needed for pain 30 tablet 0     hydrocortisone 2.5 % cream APPLY TOPICALLY 2 TIMES DAILY AS NEEDED 30 g 3     Lactobacillus (ACIDOPHILUS) CAPS Take 1 capsule by mouth daily Take two hours before or after antibiotic dose.  Continue for 1 week after antibiotic therapy completed 60 capsule 0     losartan-hydrochlorothiazide (HYZAAR) 50-12.5 MG tablet Take 1 tablet by mouth daily 90 tablet 3     metoprolol succinate ER (TOPROL-XL) 100 MG 24 hr tablet Take 1 tablet (100 mg) by mouth 2 times daily 180 tablet 1     metoprolol succinate ER (TOPROL-XL) 25 MG 24 hr tablet Take 1 tablet (25 mg) by mouth every evening 90 tablet 0     montelukast (SINGULAIR) 10 MG tablet Take 1 tablet (10 mg) by mouth At Bedtime 90 tablet 3     multivitamin w/minerals (MULTI-VITAMIN) tablet Take 1 tablet by mouth daily       mupirocin (BACTROBAN) 2 % external cream Apply to affected area three times daily 60 g 1     mupirocin (BACTROBAN) 2 % external ointment        mycophenolate (GENERIC EQUIVALENT) 250 MG capsule Take 2 capsules (500 mg) by mouth daily 180 capsule 0     nystatin (MYCOSTATIN) 441969 UNIT/ML suspension Take by mouth 4 times daily Has recurrent thrush. Uses for 2 weeks at a time as needed. 380 mL 1     oxybutynin ER (DITROPAN-XL) 5 MG 24 hr tablet Take 2 tablets (10 mg) by mouth daily 180 tablet 1     predniSONE (DELTASONE) 5 MG tablet Take 1 tablet (5 mg) by mouth daily 90 tablet 0     rizatriptan (MAXALT) 10 MG tablet Take 1 tablet (10 mg) by mouth at onset of headache for migraine May repeat in 2 hours. Max 3 tablets/24 hours. 18 tablet 0     rOPINIRole (REQUIP) 1 MG tablet TAKE THREE TABLETS BY MOUTH  AT BEDTIME 270 tablet 1     SUMAtriptan (IMITREX) 100 MG tablet take 1 tablet at onset of headache may repeat in 2 hours max 2 tabs/day 18 tablet 1     traZODone (DESYREL) 50 MG tablet Take 3 tablets (150 mg) by mouth At Bedtime 270 tablet 0     triamcinolone (ARISTOCORT HP) 0.5 % external cream Apply topically 2 times daily 60 g 0     triamcinolone (KENALOG) 0.1 % paste Take by mouth 2 times daily 5 g 1     valACYclovir (VALTREX) 500 MG tablet TAKE ONE TABLET BY MOUTH ONE TIME DAILY 90 tablet 0     ZYRTEC 10 MG OR TABS 1 TABLET DAILY 90 3        Allergies:  Allergies   Allergen Reactions     Cats      and rabbits/wheezing     Dogs      wheezing     Lyrica [Pregabalin] Other (See Comments)     Rash, mouth sores, itching and burning     Seasonal Allergies      rhinits       ROS:  Remainder of a comprehensive 10 point ROS is negative unless noted above.    Social History:  Denies any tobacco use. No significant alcohol use. Denies any illicit drug use.     Family History:  Two daughter  31 Yo daughter  26 yo daughter  1 grandson    1 sister- older- high blood glucose, arthritis  1 brother- older- no idea    Mother- - heart valve. Had MDS. Needed a shot every month  Father- - cancer    Objectives:  GENERAL: alert and no distress  EYES: Eyes grossly normal to inspection.  No discharge or erythema, or obvious scleral/conjunctival abnormalities.  RESP: No audible wheeze, cough, or visible cyanosis.  No visible retractions or increased work of breathing.    SKIN: hypopigmentation - hands, lips, neck and across face, scar - face and numerous scabs over face  NEURO: Cranial nerves grossly intact.  Mentation and speech appropriate for age.  PSYCH: Mentation appears normal, affect normal/bright, judgement and insight intact, normal speech and appearance well-groomed.        Labs:  Ferritin   Date Value Ref Range Status   2021 136 8 - 252 ng/mL Final   2021 376 (H) 8 - 252 ng/mL Final     Iron    Date Value Ref Range Status   09/15/2021 100 35 - 180 ug/dL Final   01/20/2021 50 35 - 180 ug/dL Final     Iron Binding Cap   Date Value Ref Range Status   01/20/2021 350 240 - 430 ug/dL Final     Iron Binding Capacity   Date Value Ref Range Status   09/15/2021 284 240 - 430 ug/dL Final           4/16/21  The INR is normal.   APTT ratio is elevated.   Platelet Neutralization is negative.   APTT 1:2 Mix is normal.   DRVVT Screen ratio is normal.   Thrombin time is normal.   Lupus anticoagulant negative  Anticardiolipin negative    VWF collagen binding normal (send out)  The von Willebrand factor antigen (VWF:Ag), von Willebrand factor   activity (VWF:ACT), and Factor 8 levels are within normal limits.   The Factor 8 to VWF:Ag ratio and the VWF:ACT to VWF:Ag ratio are   within normal limits.      Platelet function COL/EPI >300  Platelet function ADP 77 (normal)    FXIII antigen 137 (norma1)  Alpha-2 antiplasmin 140 (normal  Factor V 78 (normal)  Factor 10 128 (normal)  Factor  (elevated)    4/29/21  Abnormal platelet aggregation study. Maximal platelet aggregation is   decreased with Arachidonic acid and within normal limits with 5   micromolar and 10 micromolar ADP, Epinephrine, Collagen, and low and   high concentrations of Ristocetin.  Deaggregation is present with 5   micromolar ADP and Arachidonic acid.  The 1.00 mg/mL Ristocetin   reactions have a slight lag in secondary aggregation.  ATP release   is decreased with Thrombin and 5 micromolar and 10 micromolar ADP.   These findings are consistent with a congenital or acquired platelet   disorders.    Platelet Function Closure Time Col/ADP   Date Value Ref Range Status   03/03/2021 77 <120 sec Final     Comment:     Typical aspirin effect is characterized by prolonged Col/Epi and normal   Col/ADP.       PFA-Col/ADP   Date Value Ref Range Status   11/23/2021 79 <120 Seconds Final     Comment:     Typical aspirin effect is characterized by prolonged Col/Epi  and normal Col/ADP   09/28/2021 88 <120 Seconds Final     Comment:     Typical aspirin effect is characterized by prolonged Col/Epi and normal Col/ADP     PLT Funct COL/EPI   Date Value Ref Range Status   03/03/2021 >300 (H) <170 sec Final     Comment:     Effective 12/01/2015, the reference range for this assay has changed.  Causes of a prolonged closure time include platelet dysfunction,vonWillebrand   disease, decreased hematocrit (<35%) and decreased platelet count (<150   x10e9/L).       PFA-Col/Epi   Date Value Ref Range Status   11/23/2021 188 (H) <170 Seconds Final     Refer to Versiti- autoantibody platelet study    Imaging:  Not applicable

## 2022-02-23 NOTE — PATIENT INSTRUCTIONS
Patient Education     Azathioprine Oral Tablet 50 mg  Uses  This medicine is used for the following purposes:    arthritis    autoimmune disorder    inflammatory bowel disease    inflammatory disease    prevent organ transplant rejection    skin wound  Instructions  Take the medicine with 250 mL (1 cup) of water.  You may take with food to prevent stomach upset.  It is very important that you take the medicine at about the same time every day. It will work best if you do this.  Store at room temperature in a dry place. Do not keep in the bathroom.  Keep the medicine away from heat and light.  This medicine may cause you to become more sensitive to the sun. Use sunscreen or wear protective clothing when you are exposed to the sun.  It may take several months for this medicine to fully work.  It is important that you keep taking each dose of this medicine on time even if you are feeling well.  If you forget to take a dose on time, take it as soon as you remember. If it is almost time for the next dose, do not take the missed dose. Return to your normal dosing schedule. Do not take 2 doses of this medicine at one time.  If you miss 2 or more doses in a row, contact your doctor for instructions.  Please tell your doctor and pharmacist about all the medicines you take. Include both prescription and over-the-counter medicines. Also tell them about any vitamins, herbal medicines, or anything else you take for your health.  Use effective birth control to avoid pregnancy.  It is very important that you keep all appointments for medical exams and tests while on this medicine.  Cautions  Tell your doctor and pharmacist if you ever had an allergic reaction to a medicine. Symptoms of an allergic reaction can include trouble breathing, skin rash, itching, swelling, or severe dizziness.  This medicine is associated with a rare but very serious medical condition. Please speak with your doctor about symptoms you should look out  for while on this medicine. Notify your doctor immediately if you develop those symptoms.  Some patients on this medicine have developed severe, life-threatening infections. Please speak with your doctor about the risks and benefits of using this medicine.  Some patients taking this medicine have experienced serious side effects. Please speak with your doctor to understand the risks and benefits associated with this medicine.  This medicine may increase the risk of some types of cancer. Please speak with your doctor about the risks and benefits of using this medicine.  Do not use the medication any more than instructed.  Please check with your doctor before drinking alcohol while on this medicine.  This medicine may reduce your body's ability to fight infections. Try to avoid contact with people with colds, flu or other infections.  Contact your doctor if you develop any signs of a new infection such as fever, cough, sore throat, or chills.  Wash your hands often and avoid close contact with people with infections such as colds and flu.  Speak with your health care provider before receiving any vaccinations.  Please tell your doctor if you have moderate to severe diarrhea while on this medicine. Do not treat the diarrhea with over-the-counter diarrhea medicine.  Tell the doctor or pharmacist if you are pregnant, planning to be pregnant, or breastfeeding.  Do not use this medicine if you are pregnant. If you become pregnant while on this medicine, contact your doctor immediately.  This medicine can cause birth defects. Speak with your doctor about birth control methods that should be used while on this medicine.  This medicine can hurt a new baby in the womb. If you become pregnant while on this medicine, tell your doctor immediately. Your doctor may switch you to a different medicine.  Women who are pregnant or in their childbearing years should not touch or handle this medicine. This medicine can be absorbed  through the woman's skin and harm the unborn baby.  Ask your pharmacist if this medicine can interact with any of your other medicines. Be sure to tell them about all the medicines you take.  Please tell all your doctors and dentists that you are on this medicine before they provide care.  Do not start or stop any other medicines without first speaking to your doctor or pharmacist.  Call your doctor right away if you notice any unusual bleeding or bruising.  Do not share this medicine with anyone who has not been prescribed this medicine.  Side Effects  The following is a list of some common side effects from this medicine. Please speak with your doctor about what you should do if you experience these or other side effects.    decreased appetite    changes in the number of cells in the blood    increased risk of cancer    diarrhea    nausea    vomiting  Call your doctor or get medical help right away if you notice any of these more serious side effects:    loss of balance    bleeding that is severe or takes longer to stop    unusual bruising or discoloration on skin    confusion    coughing up blood or vomit that looks like coffee grounds    fever or chills    high fever    flu-like symptoms    hair loss    cold hands or feet    severe or persistent headache    fast or irregular heart beats    symptoms of liver damage (such as yellowing of skin or eyes, dark urine, unusual tiredness or weakness; severe stomach or back pain)    mouth sores or irritation    tight or rigid muscles    pale or blue skin, lips or fingernails    skin irritation such as redness, itching, rash, or burning    seizures    skin changes - brown or black area with uneven edges or coloring    change in the size or color of a skin mole    slurred speech    stomach pain    light colored stool    dark, tarry stool    difficulty swallowing    unusual or unexplained tiredness or weakness    blurring or changes of vision    severe or persistent  vomiting    weakness  A few people may have an allergic reactions to this medicine. Symptoms can include difficulty breathing, skin rash, itching, swelling, or severe dizziness. If you notice any of these symptoms, seek medical help quickly.  Extra  Please speak with your doctor, nurse, or pharmacist if you have any questions about this medicine.  [email or web address]  IMPORTANT NOTE: This document tells you briefly how to take your medicine, but it does not tell you all there is to know about it.Your doctor or pharmacist may give you other documents about your medicine. Please talk to them if you have any questions.Always follow their advice. There is a more complete description of this medicine available in English.Scan this code on your smartphone or tablet or use the web address below. You can also ask your pharmacist for a printout. If you have any questions, please ask your pharmacist.     2021 [employer].

## 2022-02-23 NOTE — TELEPHONE ENCOUNTER
COVID-19 testing at [facility] is by appointment only. You'll need to schedule a time to get tested. If you have symptoms (signs) of COVID, please log in to [employer] to complete the symptom .     If you don't have COVID symptoms and want to get tested, you should also log in to [employer] to complete the symptom .   This includes people who:    have had close contact with a COVID-positive person    want to be tested before or after travel    have taken part in high-risk activities    have a school testing mandate, or     were told to get tested by their care team or the health department.    After the symptom  has been completed, you will be able to schedule your COVID test on [employer]. To learn more about our testing locations or for other details, please visit our COVID-19 Resource Hub.    [employer] is also the fastest way to get your test results. You'll get your results in [employer] within 3 days. If you don't use [employer], you'll get your results in the mail in 7 to 10 days. If your test is positive and you don't view your result in [employer] within 1 business day, you'll get a phone call with your result. A positive result means that you have COVID-19.    If you have an upcoming procedure at [facility], you'll need to be tested for COVID. The test needs to happen 2 to 4 days before your procedure. If you have an upcoming procedure, we will contact you to schedule a COVID test.    If you don't have a [employer] account, please call [phone] to complete the symptom  over the phone.     You can also find community testing sites in [place] at [place]get[place]tested/testing-locations. If you live in [place], please visit www.[place]hs.[place].gov/covid-19/community-testing.htm.

## 2022-02-24 ENCOUNTER — TELEPHONE (OUTPATIENT)
Dept: FAMILY MEDICINE | Facility: CLINIC | Age: 62
End: 2022-02-24

## 2022-02-24 DIAGNOSIS — B37.0 THRUSH: ICD-10-CM

## 2022-02-24 RX ORDER — FLUCONAZOLE 200 MG/1
200 TABLET ORAL DAILY
Qty: 7 TABLET | Refills: 0 | Status: SHIPPED | OUTPATIENT
Start: 2022-02-24 | End: 2022-04-06

## 2022-02-25 ENCOUNTER — MYC MEDICAL ADVICE (OUTPATIENT)
Dept: FAMILY MEDICINE | Facility: CLINIC | Age: 62
End: 2022-02-25

## 2022-02-25 ENCOUNTER — TELEPHONE (OUTPATIENT)
Dept: FAMILY MEDICINE | Facility: CLINIC | Age: 62
End: 2022-02-25

## 2022-02-25 ENCOUNTER — NURSE TRIAGE (OUTPATIENT)
Dept: FAMILY MEDICINE | Facility: CLINIC | Age: 62
End: 2022-02-25

## 2022-02-25 DIAGNOSIS — B96.89 ACUTE BACTERIAL SINUSITIS: Primary | ICD-10-CM

## 2022-02-25 DIAGNOSIS — J01.90 ACUTE BACTERIAL SINUSITIS: Primary | ICD-10-CM

## 2022-02-25 RX ORDER — DOXYCYCLINE 100 MG/1
100 CAPSULE ORAL 2 TIMES DAILY
Qty: 14 CAPSULE | Refills: 0 | Status: SHIPPED | OUTPATIENT
Start: 2022-02-25 | End: 2022-03-04

## 2022-02-25 NOTE — TELEPHONE ENCOUNTER
Pt has appointment at 11 am for pneumovax 23 vaccine.  I was wondering about the time frame with this vaccine as she just had one in December 2021.  If this is appropriate for pt to get vaccine, could you please pended the correct pneumonia vaccine? Thanks!

## 2022-02-25 NOTE — TELEPHONE ENCOUNTER
Left message for patient to return call. If patient can get to clinic by 2, we can add her to the float schedule today at 2pm. Otherwise there is a 9am spot on Monday. Also sent [employer] message.

## 2022-02-25 NOTE — TELEPHONE ENCOUNTER
See telephone encounter. Contacted patient and she is currently at the eye doctor. She requested we call her back in one hour.

## 2022-02-25 NOTE — TELEPHONE ENCOUNTER
"Nurse Triage SBAR  Is this a 2nd Level Triage? NO    SITUATION:                                                      Jacquie Amador is a 61 year old female with sinus congestion and headache    BACKGROUND:                                                      Current Medication related to illness: Recently completed Doxycycline for sinus infection    Hx: Patient reports headache and pain along her sinuses. States she felt better while on the antibiotic, finished it on Monday, and now feels worse. No fever, but still a lot of sinus drainage.     Patient has a hx of Migraines, states her headache pain is consistent with her migraines. Has tried Maxalt once yesterday but not today as it makes her feel \"funny\". Tried Tylenol with minor relief. Rates headache a 6/10, no vision changes, no nausea or vomiting.     Last seen: 2/23 & 2/9    NURSE ASSESSMENT:                                                      Provider review, since she was recently seen    (See information below for more triage details.)  RECOMMENDATION(S) and PLAN:                                                      Patient has concerns that the sinus infection is still present. Would you like the patient to complete another visit, or send in another antibiotic? *She is supposed to leave for [place] this Sunday.     NOTES: Disposition was determined by the first positive assessment question, therefore all previous assessment questions were negative.  Guideline used:Headache-A-OH    Marivel Gross RN on 2/25/2022 at 3:07 PM    Reason for Disposition    Unexplained headache that is present > 24 hours    Additional Information    Negative: Difficult to awaken or acting confused (e.g., disoriented, slurred speech)    Negative: Weakness of the face, arm or leg on one side of the body and new onset    Negative: Numbness of the face, arm or leg on one side of the body and new onset    Negative: Loss of speech or garbled speech and new onset    Negative: Passed " out (i.e., fainted, collapsed and was not responding)    Negative: Sounds like a life-threatening emergency to the triager    Negative: Followed a head injury within last 3 days    Negative: Traumatic Brain Injury (TBI) is suspected    Negative: Sinus pain of forehead and yellow or green nasal discharge    Negative: Pregnant    Negative: Unable to walk without falling    Negative: Stiff neck (can't touch chin to chest)    Negative: Possibility of carbon monoxide exposure    Negative: SEVERE headache, states 'worst headache' of life    Negative: SEVERE headache, sudden-onset (i.e., reaching maximum intensity within seconds to 1 hour)    Negative: Severe pain in one eye    Negative: Loss of vision or double vision (Exception: same as prior migraines)    Negative: Patient sounds very sick or weak to the triager    Negative: Fever > 103 F (39.4 C)    Negative: Fever > 100.0 F (37.8 C) and has diabetes mellitus or a weak immune system (e.g., HIV positive, cancer chemotherapy, organ transplant, splenectomy, chronic steroids)    Negative: SEVERE headache (e.g., excruciating) and has had severe headaches before    Negative: SEVERE headache and not relieved by pain meds    Negative: SEVERE headache and vomiting    Negative: SEVERE headache and fever    Negative: New headache and weak immune system (e.g., HIV positive, cancer chemo, splenectomy, organ transplant, chronic steroids)    Negative: Fever present > 3 days (72 hours)    Negative: Patient wants to be seen    Protocols used: HEADACHE-A-OH

## 2022-02-25 NOTE — TELEPHONE ENCOUNTER
Contacted pt and advised her of provider message. Gave her information regarding the medication that was sent, dosage, and directions for usage. Pt verbalizes understanding and denies further questions.     MARVA HernandezN, RN, PHN  Registered Nurse-Clinic Triage  [facility]  2/25/2022 at 3:43 PM

## 2022-02-25 NOTE — TELEPHONE ENCOUNTER
Response from Dr. Grimes is patient should have PCV 13 followed by PPSV23 8 weeks after.  PPSV23 every 5 years.

## 2022-02-25 NOTE — TELEPHONE ENCOUNTER
Contacted patient to follow up on My[employer] message symptoms. She is currently at the eye doctor and requested that we call her back in one hour.     Marivel Gross RN on 2/25/2022 at 1:20 PM

## 2022-02-25 NOTE — TELEPHONE ENCOUNTER
Patient calling to see if she is able to get into the [facility] this morning for a 2nd pneumovax?  Please call asap thank you.

## 2022-02-25 NOTE — TELEPHONE ENCOUNTER
I don't see that she is due for a pneumonia vaccine.  My understanding was this was recommended by her hematologist prior to treatment. Will forward encounter to her.

## 2022-03-01 ENCOUNTER — MYC MEDICAL ADVICE (OUTPATIENT)
Dept: FAMILY MEDICINE | Facility: CLINIC | Age: 62
End: 2022-03-01
Payer: COMMERCIAL

## 2022-03-11 ENCOUNTER — TELEPHONE (OUTPATIENT)
Dept: RHEUMATOLOGY | Facility: CLINIC | Age: 62
End: 2022-03-11
Payer: COMMERCIAL

## 2022-03-11 NOTE — TELEPHONE ENCOUNTER
----- Message from Dariana De La Cruz RN sent at 3/10/2022  4:37 PM CST -----  Regarding: FW: Bechet's?  Can someone please call and get her scheduled with one of the fellows?    Thanks  Dariana   ----- Message -----  From: Glenn Tapia MD  Sent: 3/1/2022   1:05 AM CST  To: Emely Grimes MD, Monica Vasquez [facility], #  Subject: RE: Bechet's?                                    Sure, Monica or Dariana could you schedule this pt with one of the fellows? Thank you  ----- Message -----  From: Emely Grimes MD  Sent: 2/28/2022   4:32 PM CST  To: Glenn Tapia MD, Danna Anaya RN  Subject: RE: Bechet's?                                    Hi Dr. Tapia,  Thanks! She is going to [place] for a month or so- I will direct her to her dermatologist and if you can work into fellows clinic once she returns that would be great.   Emely Grimes MD/PhD   of Medicine  Division of Hematology, Oncology and Transplantation  Pager [phone]  ----- Message -----  From: Glenn Tapia MD  Sent: 2/25/2022   7:52 PM CST  To: Emely Grimes MD  Subject: RE: Bechet's?                                    Sure. She was seen by derm for skin lesions, is she able to go back and be evaluated for oral lesions?Derm is going on diagnosing behcet's, they could even do the pathergy test. I could also arrange for her to be seen in our fellows' clinic as she could get an appointment fairly soon.    Glenn  ----- Message -----  From: Emely Grimes MD  Sent: 2/25/2022   1:37 PM CST  To: Glenn Tapia MD  Subject: Bechet's?                                        HI Dr. 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  Would you be willing/able to review can and let me know if referral is appropriate?    Thanks,  Emely Grimes MD/PhD   of Medicine  Division of Hematology, Oncology and  Transplantation  Pager [phone]

## 2022-03-28 ENCOUNTER — MYC MEDICAL ADVICE (OUTPATIENT)
Dept: FAMILY MEDICINE | Facility: CLINIC | Age: 62
End: 2022-03-28
Payer: COMMERCIAL

## 2022-03-28 NOTE — TELEPHONE ENCOUNTER
To MD to advise on [employer] message regarding his BP   Is currently in [place].  Falguni DELGADO RN

## 2022-04-04 ENCOUNTER — NURSE TRIAGE (OUTPATIENT)
Dept: FAMILY MEDICINE | Facility: CLINIC | Age: 62
End: 2022-04-04
Payer: COMMERCIAL

## 2022-04-04 NOTE — TELEPHONE ENCOUNTER
"Nurse Triage SBAR  Is this a 2nd Level Triage? NO    SITUATION:                                                      Jacquie Amador is a 61 year old female with ongoing HTN and new swelling to ankles     BACKGROUND:                                                      Current Medication related to illness: Metoprolol 125mg daily     Hx: Patient is currently driving home from [place]. She sent in a [employer] message regarding her BP. She is currently taking 125mg of Metoprolol a day. Todays BP was 144/92, SBP has been up to 150's and DBP 90's. Patient reports chronic headaches, otherwise not symptomatic with her BP.     Also reports bilateral ankle swelling. States both ankles to feet are swollen equally. She does get right ankle swelling at times, but now left is also swollen. No pain. She is driving 2-3 hours at a time, and trying to elevate legs as much as possible. Reports they are slightly red, \"speckled\". Not hot to the touch or tender, no itching present.     NURSE ASSESSMENT:                                                      Appointment within 3 days per protocol     (See information below for more triage details.)  RECOMMENDATION(S) and PLAN:                                                      Protocol Recommended Disposition: See in 72 hours - Virtual appointment scheduled for Thursday   Will comply with recommendation: yes    Instructed patient to go to nearest ER if ankle symptoms worsen prior to returning home, SBP >180 or DPB >110,   Encourage to return call clinic triage nurse if further questions/concerns that may come up or if symptoms do not improve, worsen, or new symptoms develop.    NOTES: Disposition was determined by the first positive assessment question, therefore all previous assessment questions were negative.  Guideline used:Ankle Swelling-A-AH & High Blood Pressure-A-OH    Marivel Gross RN on 4/4/2022 at 2:00 PM    Reason for Disposition    Swollen ankle joint  (Exception: area " of localized swelling which is itchy)    Systolic BP >= 130 OR Diastolic >= 80, and is taking BP medications    Additional Information    Negative: Difficulty breathing    Negative: Entire foot is cool or blue in comparison to other side    Negative: Fever    Negative: Patient sounds very sick or weak to the triager    Negative: [1] SEVERE pain (e.g., excruciating, unable to walk) AND [2] not improved after 2 hours of pain medicine    Negative: [1] Can't move swollen joint at all AND [2] no fever    Negative: [1] Redness AND [2] painful when touched AND [3] no fever    Negative: [1] Red area or streak [2] large (> 2 in. or 5 cm)    Negative: [1] Thigh or calf pain AND [2] only 1 side AND [3] present > 1 hour    Negative: [1] Thigh, calf, or ankle swelling AND [2] only 1 side    Negative: [1] Thigh, calf, or ankle swelling AND [2] bilateral AND [3] 1 side is more swollen    Negative: [1] Very swollen joint AND [2] no fever    Negative: Looks like a boil, infected sore, deep ulcer or other infected rash (spreading redness, pus)    Negative: Sounds like a life-threatening emergency to the triager    Negative: Pregnant > 20 weeks or postpartum (< 6 weeks after delivery) and new hand or face swelling    Negative: Pregnant > 20 weeks and BP > 140/90    Negative: Systolic BP >= 160 OR Diastolic >= 100, and any cardiac or neurologic symptoms (e.g., chest pain, difficulty breathing, unsteady gait, blurred vision)    Negative: Patient sounds very sick or weak to the triager    Negative: BP Systolic BP >= 140 OR Diastolic >= 90 and postpartum (from 0 to 6 weeks after delivery)    Negative: Systolic BP >= 180 OR Diastolic >= 110, and missed most recent dose of blood pressure medication    Negative: Systolic BP >= 180 OR Diastolic >= 110    Negative: Patient wants to be seen    Negative: Ran out of BP medications    Negative: Taking BP medications and feels is having side effects (e.g., impotence, cough, dizziness)    Negative:  Systolic BP >= 160 OR Diastolic >= 100    Negative: Systolic BP >= 130 OR Diastolic >= 80, and pregnant    Protocols used: ANKLE SWELLING-A-AH, HIGH BLOOD PRESSURE-A-OH

## 2022-04-05 ENCOUNTER — MYC MEDICAL ADVICE (OUTPATIENT)
Dept: FAMILY MEDICINE | Facility: CLINIC | Age: 62
End: 2022-04-05
Payer: COMMERCIAL

## 2022-04-06 ENCOUNTER — VIRTUAL VISIT (OUTPATIENT)
Dept: FAMILY MEDICINE | Facility: CLINIC | Age: 62
End: 2022-04-06
Payer: COMMERCIAL

## 2022-04-06 VITALS — SYSTOLIC BLOOD PRESSURE: 134 MMHG | DIASTOLIC BLOOD PRESSURE: 82 MMHG

## 2022-04-06 DIAGNOSIS — R00.0 SINUS TACHYCARDIA: ICD-10-CM

## 2022-04-06 DIAGNOSIS — I10 HYPERTENSION GOAL BP (BLOOD PRESSURE) < 140/90: Primary | ICD-10-CM

## 2022-04-06 DIAGNOSIS — Z12.11 COLON CANCER SCREENING: ICD-10-CM

## 2022-04-06 DIAGNOSIS — K12.0 APHTHAE, ORAL: ICD-10-CM

## 2022-04-06 DIAGNOSIS — M25.50 MULTIPLE JOINT PAIN: ICD-10-CM

## 2022-04-06 DIAGNOSIS — L74.511 PRIMARY FOCAL HYPERHIDROSIS OF FACE: ICD-10-CM

## 2022-04-06 DIAGNOSIS — R00.2 PALPITATIONS: ICD-10-CM

## 2022-04-06 DIAGNOSIS — F41.1 GENERALIZED ANXIETY DISORDER: ICD-10-CM

## 2022-04-06 DIAGNOSIS — E61.1 IRON DEFICIENCY: ICD-10-CM

## 2022-04-06 DIAGNOSIS — J45.40 MODERATE PERSISTENT ASTHMA WITHOUT COMPLICATION: ICD-10-CM

## 2022-04-06 DIAGNOSIS — D69.3 IDIOPATHIC THROMBOCYTOPENIC PURPURA (H): ICD-10-CM

## 2022-04-06 PROCEDURE — 99214 OFFICE O/P EST MOD 30 MIN: CPT | Mod: 95 | Performed by: FAMILY MEDICINE

## 2022-04-06 RX ORDER — HYDROCHLOROTHIAZIDE 25 MG/1
25 TABLET ORAL DAILY
Qty: 90 TABLET | Refills: 0 | Status: SHIPPED | OUTPATIENT
Start: 2022-04-06 | End: 2022-06-30

## 2022-04-06 RX ORDER — METOPROLOL SUCCINATE 100 MG/1
100 TABLET, EXTENDED RELEASE ORAL 2 TIMES DAILY
Qty: 180 TABLET | Refills: 0 | Status: SHIPPED | OUTPATIENT
Start: 2022-04-06 | End: 2022-07-08

## 2022-04-06 RX ORDER — METOPROLOL SUCCINATE 25 MG/1
25 TABLET, EXTENDED RELEASE ORAL 2 TIMES DAILY
Qty: 180 TABLET | Refills: 0 | Status: SHIPPED | OUTPATIENT
Start: 2022-04-06 | End: 2022-04-25

## 2022-04-06 RX ORDER — DULOXETIN HYDROCHLORIDE 60 MG/1
120 CAPSULE, DELAYED RELEASE ORAL DAILY
Qty: 180 CAPSULE | Refills: 1 | Status: SHIPPED | OUTPATIENT
Start: 2022-04-06 | End: 2022-12-16

## 2022-04-06 RX ORDER — OXYBUTYNIN CHLORIDE 5 MG/1
10 TABLET, EXTENDED RELEASE ORAL DAILY
Qty: 180 TABLET | Refills: 1 | Status: SHIPPED | OUTPATIENT
Start: 2022-04-06 | End: 2022-11-29

## 2022-04-06 RX ORDER — LOSARTAN POTASSIUM 50 MG/1
50 TABLET ORAL DAILY
Qty: 90 TABLET | Refills: 0 | Status: SHIPPED | OUTPATIENT
Start: 2022-04-06 | End: 2022-05-03 | Stop reason: DRUGHIGH

## 2022-04-06 RX ORDER — CLOTRIMAZOLE 10 MG/1
10 LOZENGE ORAL
Qty: 70 LOZENGE | Refills: 1 | Status: CANCELLED | OUTPATIENT
Start: 2022-04-06

## 2022-04-06 RX ORDER — ALPRAZOLAM 0.25 MG
0.25 TABLET ORAL DAILY PRN
Qty: 30 TABLET | Refills: 0 | Status: CANCELLED | OUTPATIENT
Start: 2022-04-06

## 2022-04-06 RX ORDER — OXYBUTYNIN CHLORIDE 5 MG/1
10 TABLET, EXTENDED RELEASE ORAL DAILY
Qty: 180 TABLET | Refills: 1 | Status: CANCELLED | OUTPATIENT
Start: 2022-04-06

## 2022-04-06 RX ORDER — VALACYCLOVIR HYDROCHLORIDE 500 MG/1
500 TABLET, FILM COATED ORAL DAILY
Qty: 90 TABLET | Refills: 0 | Status: SHIPPED | OUTPATIENT
Start: 2022-04-06 | End: 2022-07-08

## 2022-04-06 RX ORDER — MOMETASONE FUROATE AND FORMOTEROL FUMARATE DIHYDRATE 200; 5 UG/1; UG/1
2 AEROSOL RESPIRATORY (INHALATION) 2 TIMES DAILY
Qty: 39 G | Refills: 1 | Status: SHIPPED | OUTPATIENT
Start: 2022-04-06 | End: 2022-07-26

## 2022-04-06 ASSESSMENT — ASTHMA QUESTIONNAIRES
QUESTION_4 LAST FOUR WEEKS HOW OFTEN HAVE YOU USED YOUR RESCUE INHALER OR NEBULIZER MEDICATION (SUCH AS ALBUTEROL): NOT AT ALL
QUESTION_5 LAST FOUR WEEKS HOW WOULD YOU RATE YOUR ASTHMA CONTROL: COMPLETELY CONTROLLED
QUESTION_3 LAST FOUR WEEKS HOW OFTEN DID YOUR ASTHMA SYMPTOMS (WHEEZING, COUGHING, SHORTNESS OF BREATH, CHEST TIGHTNESS OR PAIN) WAKE YOU UP AT NIGHT OR EARLIER THAN USUAL IN THE MORNING: NOT AT ALL
ACT_TOTALSCORE: 22
QUESTION_2 LAST FOUR WEEKS HOW OFTEN HAVE YOU HAD SHORTNESS OF BREATH: ONCE A DAY
QUESTION_1 LAST FOUR WEEKS HOW MUCH OF THE TIME DID YOUR ASTHMA KEEP YOU FROM GETTING AS MUCH DONE AT WORK, SCHOOL OR AT HOME: NONE OF THE TIME
ACT_TOTALSCORE: 22
ACUTE_EXACERBATION_TODAY: NO

## 2022-04-06 NOTE — TELEPHONE ENCOUNTER
Called patient. Rescheduled patient with provider for today instead of tomorrow for hypertension follow up as well has hospital follow up.     Shelia DURHAMN, RN

## 2022-04-06 NOTE — PATIENT INSTRUCTIONS
Important Takeaway Points From This Visit:    Stay on 125 mg of metoprolol twice daily.    I want you to increase your hydrochlorothiazide to 25 mg. This will be in a new pill.    You will stay on 50 mg of Losartan, but this will also be in a new pill by itself.    You need labs and a blood pressure check in 2 weeks.      As always, please call with any questions or concerns. I look forward to seeing you again soon!    Take care,  Dr. Noel    Your current medication list is printed. Please keep this with you - it is helpful to bring this current list to any other medical appointments. It can also be helpful if you ever go to the emergency room or hospital.    If you had lab testing today we will call you with the results. The phone number we will call with your results is # [phone] (home) . If this is not the best number please call our clinic and change the number.    If you need any refills, please call your pharmacy and they will contact us.    If you have any further concerns or wish to schedule another appointment, please call our office at [phone].    If you have a medical emergency, please call [phone].    Thank you for coming to [facility]!

## 2022-04-06 NOTE — LETTER
My Asthma Action Plan    Name: Jacquie Amador   YOB: 1960  Date: 4/6/2022   My doctor: Earnest Noel MD   My clinic: [facility]        My Control Medicine: Mometasone furoate + formoterol (Dulera) -  200/5 mcg 2 puffs twice daily  My Rescue Medicine: Albuterol (Proair/Ventolin/Proventil HFA) 2-4 puffs EVERY 4 HOURS as needed. Use a spacer if recommended by your provider.   My Asthma Severity:   Moderate Persistent  Know your asthma triggers: animal dander  upper respiratory infections  humidity            GREEN ZONE   Good Control    I feel good    No cough or wheeze    Can work, sleep and play without asthma symptoms       Take your asthma control medicine every day.     1. If exercise triggers your asthma, take your rescue medication    15 minutes before exercise or sports, and    During exercise if you have asthma symptoms  2. Spacer to use with inhaler: If you have a spacer, make sure to use it with your inhaler             YELLOW ZONE Getting Worse  I have ANY of these:    I do not feel good    Cough or wheeze    Chest feels tight    Wake up at night   1. Keep taking your Green Zone medications  2. Start taking your rescue medicine:    every 20 minutes for up to 1 hour. Then every 4 hours for 24-48 hours.  3. If you stay in the Yellow Zone for more than 12-24 hours, contact your doctor.  4. If you do not return to the Green Zone in 12-24 hours or you get worse, start taking your oral steroid medicine if prescribed by your provider.           RED ZONE Medical Alert - Get Help  I have ANY of these:    I feel awful    Medicine is not helping    Breathing getting harder    Trouble walking or talking    Nose opens wide to breathe       1. Take your rescue medicine NOW  2. If your provider has prescribed an oral steroid medicine, start taking it NOW  3. Call your doctor NOW  4. If you are still in the Red Zone after 20 minutes and you have not reached your  doctor:    Take your rescue medicine again and    Call 911 or go to the emergency room right away    See your regular doctor within 2 weeks of an Emergency Room or Urgent Care visit for follow-up treatment.          Annual Reminders:  Meet with Asthma Educator,  Flu Shot in the Fall, consider Pneumonia Vaccination for patients with asthma (aged 19 and older).    Pharmacy:    [facility] - [place], [place] - [address] [facility] [id number] - [place], [place] - [address] [place] [facility] [place] [place] [address] [id number] - [place], [place] - [address]    Electronically signed by Earnest Noel MD   Date: 04/06/22                      Asthma Triggers  How To Control Things That Make Your Asthma Worse    Triggers are things that make your asthma worse.  Look at the list below to help you find your triggers and what you can do about them.  You can help prevent asthma flare-ups by staying away from your triggers.      Trigger                                                          What you can do   Cigarette Smoke  Tobacco smoke can make asthma worse. Do not allow smoking in your home, car or around you.  Be sure no one smokes at a child s day care or school.  If you smoke, ask your health care provider for ways to help you quit.  Ask family members to quit too.  Ask your health care provider for a referral to Quit Plan to help you quit smoking, or call 1-321-573-PLAN.     Colds, Flu, Bronchitis  These are common triggers of asthma. Wash your hands often.  Don t touch your eyes, nose or mouth.  Get a flu shot every year.     Dust Mites  These are tiny bugs that live in cloth or carpet. They are too small to see. Wash sheets and blankets in hot water every week.   Encase pillows and mattress in dust mite proof covers.  Avoid having carpet if you can. If you have carpet, vacuum weekly.   Use a dust mask and  HEPA vacuum.   Pollen and Outdoor Mold  Some people are allergic to trees, grass, or weed pollen, or molds. Try to keep your windows closed.  Limit time out doors when pollen count is high.   Ask you health care provider about taking medicine during allergy season.     Animal Dander  Some people are allergic to skin flakes, urine or saliva from pets with fur or feathers. Keep pets with fur or feathers out of your home.    If you can t keep the pet outdoors, then keep the pet out of your bedroom.  Keep the bedroom door closed.  Keep pets off cloth furniture and away from stuffed toys.     Mice, Rats, and Cockroaches   Some people are allergic to the waste from these pests.   Cover food and garbage.  Clean up spills and food crumbs.  Store grease in the refrigerator.   Keep food out of the bedroom.   Indoor Mold  This can be a trigger if your home has high moisture. Fix leaking faucets, pipes, or other sources of water.   Clean moldy surfaces.  Dehumidify basement if it is damp and smelly.   Smoke, Strong Odors, and Sprays  These can reduce air quality. Stay away from strong odors and sprays, such as perfume, powder, hair spray, paints, smoke incense, paint, cleaning products, candles and new carpet.   Exercise or Sports  Some people with asthma have this trigger. Be active!  Ask your doctor about taking medicine before sports or exercise to prevent symptoms.    Warm up for 5-10 minutes before and after sports or exercise.     Other Triggers of Asthma  Cold air:  Cover your nose and mouth with a scarf.  Sometimes laughing or crying can be a trigger.  Some medicines and food can trigger asthma.

## 2022-04-06 NOTE — PROGRESS NOTES
Jacquie is a 61 year old who is being evaluated via a billable video visit.      How would you like to obtain your AVS? MyChart  If the video visit is dropped, the invitation should be resent by: Text to cell phone: [phone]  Will anyone else be joining your video visit? No    Video Start Time: 3:27 pm    Assessment & Plan   1. Hypertension goal BP (blood pressure) < 140/90: Will increase hydrochlorothiazide, but unfortunately can't be in a combo pill with losartan at this dose. Check BP and BMP in 2 weeks.  - losartan (COZAAR) 50 MG tablet; Take 1 tablet (50 mg) by mouth daily  Dispense: 90 tablet; Refill: 0  - hydrochlorothiazide (HYDRODIURIL) 25 MG tablet; Take 1 tablet (25 mg) by mouth daily  Dispense: 90 tablet; Refill: 0  - Basic metabolic panel  (Ca, Cl, CO2, Creat, Gluc, K, Na, BUN); Future    2. Palpitations: As below.  - metoprolol succinate ER (TOPROL-XL) 100 MG 24 hr tablet; Take 1 tablet (100 mg) by mouth 2 times daily  Dispense: 180 tablet; Refill: 0    3. Sinus tachycardia: Recommend increasing hydrochlorothiazide with lower leg swelling compared to increasing metoprolol. Decrease back to 125 mg as she is having some heart rates reported in the low 50s.  - metoprolol succinate ER (TOPROL-XL) 100 MG 24 hr tablet; Take 1 tablet (100 mg) by mouth 2 times daily  Dispense: 180 tablet; Refill: 0  - metoprolol succinate ER (TOPROL-XL) 25 MG 24 hr tablet; Take 1 tablet (25 mg) by mouth 2 times daily  Dispense: 180 tablet; Refill: 0    4. Primary focal hyperhidrosis of face: Refilled. Can worsen HTN.  - oxybutynin ER (DITROPAN-XL) 5 MG 24 hr tablet; Take 2 tablets (10 mg) by mouth daily  Dispense: 180 tablet; Refill: 1    5. Idiopathic thrombocytopenic purpura (H): Encourage follow-up with hematology. Her mycophenolate may worsen HTN.  - CBC with platelets; Future    6. Iron deficiency: Off her iron. Check ferritin levels. Possibly causing worsening restless leg.  - Ferritin; Future    7. Moderate  "persistent asthma without complication: Controlled. ACT filled out. AAP on mychart. Refilled Dulera.  - mometasone-formoterol (DULERA) 200-5 MCG/ACT inhaler; Inhale 2 puffs into the lungs 2 times daily  Dispense: 39 g; Refill: 1    8. Multiple joint pain: Refilled for patient.  - DULoxetine (CYMBALTA) 60 MG capsule; Take 2 capsules (120 mg) by mouth daily  Dispense: 180 capsule; Refill: 1    9. Aphthae, oral: Refilled for patient.  - valACYclovir (VALTREX) 500 MG tablet; Take 1 tablet (500 mg) by mouth daily  Dispense: 90 tablet; Refill: 0      Return in about 2 weeks (around 4/20/2022) for BP Recheck, Lab Work.    Earnest Noel MD  [facility]    This chart is completed utilizing dictation software; typos and/or incorrect word substitutions may unintentionally occur.     Veronica Dhillon is a 61 year old who presents for the following health issues:    [facility]     ED/UC Followup:    Facility:  [facility]   Date of visit: 4/5/22  Reason for visit: HTN  Current Status: ankles and legs are swelling, top of feet and shins were really blotchy/\"polka dot like\" red color, ER doctor stated thinks its from new dx of ITP but has not started treatments yet, legs are better-not as swollen and some dots still there     Patient is here today because of concerns regarding her blood pressure.  She does notice increased ankle swelling.  Just returned from [place].  Blood pressures yesterday were in the 170s to 165 over high 100s.  Because of this she went to the ER.  She was never symptomatic and told increase her metoprolol to 150 mg twice daily.  She has done so with improvement in her symptoms.  She does note lower blood pressures, today being 134/82.  Additionally she has been noticing some lower heart rates however in the low 50s.    She has not yet started her mycophenolate prescribed through hematology.  She states she would start this after she returns " from her [place] trip.    She has been noticing worsening restless leg syndrome.  She stopped her iron supplement.    She stopped her trazodone as well and has not been taking this for sleep.    She would like refills on medications listed above.    She has no other questions or concerns today.    She denies chest pain, shortness of breath, claudication symptoms.    Review of Systems   Constitutional, HEENT, cardiovascular, pulmonary, gi and gu systems are negative, except as otherwise noted.      Objective       Vitals:  No vitals were obtained today due to virtual visit.    Physical Exam   GENERAL: Healthy, alert and no distress  EYES: Eyes grossly normal to inspection.  No discharge or erythema, or obvious scleral/conjunctival abnormalities.  RESP: No audible wheeze, cough, or visible cyanosis.  No visible retractions or increased work of breathing.    SKIN: Visible skin clear. No significant rash, abnormal pigmentation or lesions.  NEURO: Cranial nerves grossly intact.  Mentation and speech appropriate for age.  PSYCH: Mentation appears normal, affect normal/bright, judgement and insight intact, normal speech and appearance well-groomed.    Labs: Pending        Video-Visit Details    Type of service:  Video Visit    Video End Time:3:59 pm    Originating Location (pt. Location): Home    Distant Location (provider location):  [facility] [employer]     Platform used for Video Visit: Patients Know Best

## 2022-04-07 ENCOUNTER — MYC MEDICAL ADVICE (OUTPATIENT)
Dept: HEMATOLOGY | Facility: CLINIC | Age: 62
End: 2022-04-07
Payer: COMMERCIAL

## 2022-04-07 ENCOUNTER — MYC MEDICAL ADVICE (OUTPATIENT)
Dept: FAMILY MEDICINE | Facility: CLINIC | Age: 62
End: 2022-04-07
Payer: COMMERCIAL

## 2022-04-07 NOTE — TELEPHONE ENCOUNTER
Called and addressed bp meds. Patient didn't get meds till today.     Will start new meds tomorrow. Take 150 metoprolol tonight.    Tomorrow will take 50 mg losartan, 125 mg metoprolol, and new increased dose of hydrochlorothiazide 25 mg. Tomorrow if higher bp numbers sill can take an additional 25 mg metoprolol.    Tomorrow night will take 125 mg metoprolol. Then continue regiment as ordered:    50 mg losartan daily  125 mg metoprolol BID  25 mg hydrochlorothiazide daily.    Earnest Noel MD  [facility]

## 2022-04-08 ENCOUNTER — MYC MEDICAL ADVICE (OUTPATIENT)
Dept: FAMILY MEDICINE | Facility: CLINIC | Age: 62
End: 2022-04-08
Payer: COMMERCIAL

## 2022-04-10 ENCOUNTER — MYC MEDICAL ADVICE (OUTPATIENT)
Dept: FAMILY MEDICINE | Facility: CLINIC | Age: 62
End: 2022-04-10
Payer: COMMERCIAL

## 2022-04-11 ENCOUNTER — TELEPHONE (OUTPATIENT)
Dept: FAMILY MEDICINE | Facility: CLINIC | Age: 62
End: 2022-04-11
Payer: COMMERCIAL

## 2022-04-11 ENCOUNTER — MYC MEDICAL ADVICE (OUTPATIENT)
Dept: FAMILY MEDICINE | Facility: CLINIC | Age: 62
End: 2022-04-11
Payer: COMMERCIAL

## 2022-04-11 NOTE — TELEPHONE ENCOUNTER
hydrochlorothiazide was just started. I would wait a bit before adding additional medication.     Have patient send photos of the mouth sores or schedule visit for this.     Earnest Noel MD  [facility]

## 2022-04-11 NOTE — TELEPHONE ENCOUNTER
Contacted pt and advised her of provider message. She verbalized understanding. She will attempt to get pictures and send through [employer].     Yaz Porter, BSN, RN, PHN  Registered Nurse-Clinic Triage  [facility]  4/11/2022 at 2:00 PM

## 2022-04-11 NOTE — TELEPHONE ENCOUNTER
Spoke with patient. Completed virtual visit on 04/06/2022 Wants to talk with Dr. Samuel about her blood pressures.  Blood pressures today have been 135/96, yesterday her BP was 145/95, 149/94    166/95 over the weekend    No side effects seen with the new medication and doses yet.   Do you want her to tweak the meds just  A little bit more?    Was dx with ITP.  Has been having sores in her mouth and wondering if she can be sent an antibiotic?    Use [facility].      See nikolas as well.     Dedrick Noe, MARVAN, RN, PHN  [facility] ~ Registered Nurse  Clinic Triage ~ [facility]  April 11, 2022

## 2022-04-12 NOTE — TELEPHONE ENCOUNTER
Have patient schedule visit for better exam. Likely has aphthous ulcers. I don't think there is an infection.    Earnest Noel MD  [facility]

## 2022-04-12 NOTE — TELEPHONE ENCOUNTER
See telephone encounter from yesterday. Pt was advised to wait and monitor due to new medication being started.     Yaz Porter, MARVAN, RN, PHN  Registered Nurse-Clinic Triage  [facility]  4/12/2022 at 7:54 AM

## 2022-04-19 ENCOUNTER — MYC MEDICAL ADVICE (OUTPATIENT)
Dept: FAMILY MEDICINE | Facility: CLINIC | Age: 62
End: 2022-04-19
Payer: COMMERCIAL

## 2022-04-19 DIAGNOSIS — R00.0 SINUS TACHYCARDIA: ICD-10-CM

## 2022-04-22 ENCOUNTER — E-VISIT (OUTPATIENT)
Dept: FAMILY MEDICINE | Facility: CLINIC | Age: 62
End: 2022-04-22
Payer: COMMERCIAL

## 2022-04-22 DIAGNOSIS — L03.119 CELLULITIS OF UPPER EXTREMITY, UNSPECIFIED LATERALITY: Primary | ICD-10-CM

## 2022-04-22 PROCEDURE — 99421 OL DIG E/M SVC 5-10 MIN: CPT | Performed by: FAMILY MEDICINE

## 2022-04-22 RX ORDER — DOXYCYCLINE 100 MG/1
100 CAPSULE ORAL 2 TIMES DAILY
Qty: 14 CAPSULE | Refills: 0 | Status: SHIPPED | OUTPATIENT
Start: 2022-04-22 | End: 2022-05-03

## 2022-04-24 ENCOUNTER — MYC MEDICAL ADVICE (OUTPATIENT)
Dept: FAMILY MEDICINE | Facility: CLINIC | Age: 62
End: 2022-04-24
Payer: COMMERCIAL

## 2022-04-25 RX ORDER — METOPROLOL SUCCINATE 25 MG/1
25 TABLET, EXTENDED RELEASE ORAL 3 TIMES DAILY
Qty: 180 TABLET | Refills: 0
Start: 2022-04-25 | End: 2022-07-26

## 2022-04-26 NOTE — TELEPHONE ENCOUNTER
Appointment scheduled, double booked 11am slot since 1130 is held what time would you like patient to arrive?  Will send [place] message to patient.

## 2022-04-26 NOTE — TELEPHONE ENCOUNTER
ONCOLOGY INTAKE: Records Information      APPT INFORMATION:  Referring provider:  Dr. Malloy  Referring provider s clinic:  [facility]  Reason for visit/diagnosis:  iron def  Has patient been notified of appointment date and time?: yes    RECORDS INFORMATION:  Were the records received with the referral (via [employer])? no    Has patient been seen for any external appt for this diagnosis? no    If yes, where? n/a    Has patient had any imaging or procedures outside of [facility] for this condition? no      If Yes, where? n/a    ADDITIONAL INFORMATION:  none     Patient Education        Hernia: Care Instructions  Your Care Instructions     A hernia develops when tissue bulges through a weak spot in the wall of your belly. The groin area and the navel are common areas for a hernia. A hernia canalso develop near the area of a surgery you had before. Pressure from lifting, straining, or coughing can tear the weak area, causingthe hernia to bulge and be painful. If you cannot push a hernia back into place, the tissue may become trapped outside the belly wall. If the hernia gets twisted and loses its blood supply, it will swell and die. This is called a strangulated hernia. It usually causesa lot of pain. It needs treatment right away. Some hernias need to be repaired to prevent a strangulated hernia. If yourhernia causes symptoms or is large, you may need surgery. Follow-up care is a key part of your treatment and safety. Be sure to make and go to all appointments, and call your doctor if you are having problems. It's also a good idea to know your test results and keep alist of the medicines you take. How can you care for yourself at home?  Take care when lifting heavy objects.  Stay at a healthy weight.  Do not smoke. Smoking can cause coughing, which can cause your hernia to bulge. If you need help quitting, talk to your doctor about stop-smoking programs and medicines. These can increase your chances of quitting for good.  Talk with your doctor before wearing a corset or truss for a hernia. These devices are not recommended for treating hernias and sometimes can do more harm than good. There may be certain situations when your doctor thinks a truss would work, but these are rare. When should you call for help? Call your doctor now or seek immediate medical care if:     You have new or worse belly pain.      You are vomiting.      You cannot pass stools or gas.      You cannot push the hernia back into place with gentle pressure when you are lying down.    The area over the hernia turns red or becomes tender. Watch closely for changes in your health, and be sure to contact your doctor ifyou have any problems. Where can you learn more? Go to [email or web address] and sign in to your [employer] account. Enter C129 in the Roadmap box to learn more about \"Hernia: Care Instructions. \"     If you do not have an account, please click on the \"Sign Up Now\" link. Current as of: September 8, 2021               Content Version: 13.2  © 9966-8194 Healthwise, Incorporated. Care instructions adapted under license by [facility] ([facility]). If you have questions about a medical condition or this instruction, always ask your healthcare professional. [address] any warranty or liability for your use of this information.

## 2022-04-26 NOTE — PROGRESS NOTES
Jacquie is a 61 year old who is being evaluated via a billable telephone visit.      What phone number would you like to be contacted at? [phone]  How would you like to obtain your AVS? [place]    Assessment & Plan     Hypertension goal BP (blood pressure) < 140/90  Blood pressure controlled at this point.  She is tolerating medications well.  She will continue with hydrochlorothiazide 25 mg as well as losartan 100 mg and metoprolol as noted in medications.  Continue to check blood pressure at home on occasion.  Recheck in the clinic in 6 months.  - losartan (COZAAR) 100 MG tablet; Take 1 tablet (100 mg) by mouth daily    Cellulitis of upper extremity, unspecified laterality  Improved with use of doxycycline but some continued erythema.  Will extend doxycycline by 1 week.  If not improved or resolved she will let me know.  - doxycycline hyclate (VIBRAMYCIN) 100 MG capsule; Take 1 capsule (100 mg) by mouth 2 times daily    Dermatitis  Patient currently with dermatitis well controlled but would like refill of the clobetasol to have on hand.  Refill sent.  - clobetasol (TEMOVATE) 0.05 % external cream; APPLY SPARINGLY TO AFFECTED AREA TWICE DAILY FOR 14 DAYS.  DO NOT APPLY TO FACE.    Acute recurrent frontal sinusitis  Patient with some symptoms of sinusitis.  She has been on doxycycline and will continue on another week.  Continue Flonase and recheck if worsening or concerns.    Moderate persistent asthma without complication  Doing well. Well controlled. Tolerating medication.  No change in plan.      Moderate recurrent major depression (H)  MARIZOL (generalized anxiety disorder)  Some recent worsening.  Patient had stopped her afternoon dose of Wellbutrin.  Restarted this afternoon dose yesterday.  Recommend recheck in 3 to 4 weeks if not improving or if any further concerns.  Patient agrees to this plan.                   Return in about 6 months (around 11/3/2022) for Hypertension.    MD SAMUEL Castillo  "[facility] [place] Dhillon is a 61 year old who presents for the following health issues  accompanied by her:    HPI     History of Present Illness       Mental Health Follow-up:  Patient presents to follow-up on Depression & Anxiety.Patient's depression since last visit has been:  Worse  The patient is having other symptoms associated with depression.  Patient's anxiety since last visit has been:  Worse  The patient is having other symptoms associated with anxiety.  Any significant life events: other  Patient is feeling anxious or having panic attacks.  Patient has no concerns about alcohol or drug use.    Trouble with when there is no sun.   Added wellbutin 100 mg in the afternoon just yesterday.        Today's PHQ-9         PHQ-9 Total Score: 5  PHQ-9 Q9 Thoughts of better off dead/self-harm past 2 weeks :   (P) Not at all    How difficult have these problems made it for you to do your work, take care of things at home, or get along with other people: Somewhat difficult    Today's MARIZOL-7 Score: 5    Hypertension: She presents for follow up of hypertension.  She does check blood pressure  regularly outside of the clinic. Outside blood pressures have been over 140/90. She follows a low salt diet.     Currently has been 132/84 P67  136/80 P 66  128/80 P74  End of the day or early evening can notice dizziness. Feels like being lightheaded.  For the last week.   Having headaches daily. Taking tylenol. Sometimes     Headaches:   Since the patient's last clinic visit, headaches are: worsened  The patient is getting headaches:  Daily  She is not able to do normal daily activities when she has a migraine.  The patient is taking the following rescue/relief medications:  Tylenol and other   Patient states \"The relief is inconsistent\" from the rescue/relief medications.   The patient is taking the following medications to prevent migraines:  No medications to prevent migraines  In the past 4 weeks, " the patient has gone to an Urgent Care or Emergency Room 0 times times due to headaches.    She eats 0-1 servings of fruits and vegetables daily.She consumes 0 sweetened beverage(s) daily.She exercises with enough effort to increase her heart rate 9 or less minutes per day.  She exercises with enough effort to increase her heart rate 3 or less days per week.   She is taking medications regularly.    Cellulitis improved some with doxycycline but continued.  No fevers.    Reports that she had stopped the afternoon dose of the Wellbutrin but restarted it yesterday due to worsening anxiety and depression.  She felt when she had taken the afternoon dose as well as the morning dose she did well.    Social History     Tobacco Use     Smoking status: Former Smoker     Packs/day: 1.00     Types: Cigarettes     Smokeless tobacco: Never Used     Tobacco comment: since 1981   Vaping Use     Vaping Use: Never used   Substance Use Topics     Alcohol use: No     Drug use: Yes     Comment: medical emmy      PHQ 11/30/2021 2/22/2022 5/3/2022   PHQ-9 Total Score 7 7 5   Q9: Thoughts of better off dead/self-harm past 2 weeks Not at all Not at all Not at all     MARIZOL-7 SCORE 11/30/2021 2/22/2022 5/3/2022   Total Score - - -   Total Score 5 (mild anxiety) 9 (mild anxiety) 5 (mild anxiety)   Total Score 5 9 5         Suicide Assessment Five-step Evaluation and Treatment (SAFE-T)      Review of Systems   CONSTITUTIONAL: NEGATIVE for fever, chills, change in weight  ENT/MOUTH: As above as above  RESP: NEGATIVE for significant cough or SOB  CV: NEGATIVE for chest pain, palpitations or peripheral edema  MUSCULOSKELETAL: as above.       Objective           Vitals:  No vitals were obtained today due to virtual visit.    Physical Exam   healthy, alert and no distress  PSYCH: Alert and oriented times 3; coherent speech, normal   rate and volume, able to articulate logical thoughts, able   to abstract reason, no tangential thoughts, no  hallucinations   or delusions  Her affect is normal  RESP: No cough, no audible wheezing, able to talk in full sentences  Remainder of exam unable to be completed due to telephone visits                Phone call duration: 20 minutes

## 2022-05-03 ENCOUNTER — VIRTUAL VISIT (OUTPATIENT)
Dept: FAMILY MEDICINE | Facility: CLINIC | Age: 62
End: 2022-05-03
Payer: COMMERCIAL

## 2022-05-03 DIAGNOSIS — F41.1 GAD (GENERALIZED ANXIETY DISORDER): ICD-10-CM

## 2022-05-03 DIAGNOSIS — J01.11 ACUTE RECURRENT FRONTAL SINUSITIS: ICD-10-CM

## 2022-05-03 DIAGNOSIS — L30.9 DERMATITIS: ICD-10-CM

## 2022-05-03 DIAGNOSIS — I10 HYPERTENSION GOAL BP (BLOOD PRESSURE) < 140/90: Primary | ICD-10-CM

## 2022-05-03 DIAGNOSIS — L03.119 CELLULITIS OF UPPER EXTREMITY, UNSPECIFIED LATERALITY: ICD-10-CM

## 2022-05-03 DIAGNOSIS — J45.40 MODERATE PERSISTENT ASTHMA WITHOUT COMPLICATION: ICD-10-CM

## 2022-05-03 DIAGNOSIS — F33.1 MODERATE RECURRENT MAJOR DEPRESSION (H): ICD-10-CM

## 2022-05-03 PROCEDURE — 99214 OFFICE O/P EST MOD 30 MIN: CPT | Mod: 95 | Performed by: FAMILY MEDICINE

## 2022-05-03 RX ORDER — DOXYCYCLINE 100 MG/1
100 CAPSULE ORAL 2 TIMES DAILY
Qty: 14 CAPSULE | Refills: 0 | Status: SHIPPED | OUTPATIENT
Start: 2022-05-03 | End: 2022-07-01

## 2022-05-03 RX ORDER — CLOBETASOL PROPIONATE 0.5 MG/G
CREAM TOPICAL
Qty: 30 G | Refills: 3 | Status: SHIPPED | OUTPATIENT
Start: 2022-05-03 | End: 2022-05-13

## 2022-05-03 RX ORDER — LOSARTAN POTASSIUM 100 MG/1
100 TABLET ORAL DAILY
Qty: 90 TABLET | Refills: 1 | Status: SHIPPED | OUTPATIENT
Start: 2022-05-03 | End: 2022-11-09

## 2022-05-03 ASSESSMENT — ANXIETY QUESTIONNAIRES
2. NOT BEING ABLE TO STOP OR CONTROL WORRYING: SEVERAL DAYS
7. FEELING AFRAID AS IF SOMETHING AWFUL MIGHT HAPPEN: SEVERAL DAYS
4. TROUBLE RELAXING: NOT AT ALL
GAD7 TOTAL SCORE: 5
5. BEING SO RESTLESS THAT IT IS HARD TO SIT STILL: NOT AT ALL
6. BECOMING EASILY ANNOYED OR IRRITABLE: SEVERAL DAYS
1. FEELING NERVOUS, ANXIOUS, OR ON EDGE: SEVERAL DAYS
GAD7 TOTAL SCORE: 5
3. WORRYING TOO MUCH ABOUT DIFFERENT THINGS: SEVERAL DAYS
GAD7 TOTAL SCORE: 5
7. FEELING AFRAID AS IF SOMETHING AWFUL MIGHT HAPPEN: SEVERAL DAYS

## 2022-05-03 ASSESSMENT — PATIENT HEALTH QUESTIONNAIRE - PHQ9
10. IF YOU CHECKED OFF ANY PROBLEMS, HOW DIFFICULT HAVE THESE PROBLEMS MADE IT FOR YOU TO DO YOUR WORK, TAKE CARE OF THINGS AT HOME, OR GET ALONG WITH OTHER PEOPLE: SOMEWHAT DIFFICULT
SUM OF ALL RESPONSES TO PHQ QUESTIONS 1-9: 5
SUM OF ALL RESPONSES TO PHQ QUESTIONS 1-9: 5

## 2022-05-04 ASSESSMENT — ANXIETY QUESTIONNAIRES: GAD7 TOTAL SCORE: 5

## 2022-05-04 ASSESSMENT — PATIENT HEALTH QUESTIONNAIRE - PHQ9: SUM OF ALL RESPONSES TO PHQ QUESTIONS 1-9: 5

## 2022-05-05 ENCOUNTER — MYC MEDICAL ADVICE (OUTPATIENT)
Dept: FAMILY MEDICINE | Facility: CLINIC | Age: 62
End: 2022-05-05
Payer: COMMERCIAL

## 2022-05-06 NOTE — TELEPHONE ENCOUNTER
Appears that pt has discussed these symptoms with provider previously. Was seen for a virtual visit 5/3/22. Routing to provider to advise.     Yaz Porter, MARVAN, RN, PHN  Registered Nurse-Clinic Triage  [facility]  5/6/2022 at 12:40 PM

## 2022-05-09 ENCOUNTER — MYC MEDICAL ADVICE (OUTPATIENT)
Dept: FAMILY MEDICINE | Facility: CLINIC | Age: 62
End: 2022-05-09
Payer: COMMERCIAL

## 2022-05-09 DIAGNOSIS — R20.9 SKIN SENSATION DISTURBANCE: Primary | ICD-10-CM

## 2022-05-09 NOTE — TELEPHONE ENCOUNTER
Spoke with patient appointment scheduled     Next 5 appointments (look out 90 days)    May 25, 2022  4:30 PM  (Arrive by 4:10 PM)  Provider Visit with Liz Peterson MD  [facility] ([facility] ) [id number] [address] [facility], [address]  [facility] [address]  [phone]   Jul 27, 2022  5:30 PM  (Arrive by 5:10 PM)  Provider Visit with Liz Peterson MD  [facility] ([facility] ) [id number] [address] [facility], [address]  [facility] [address]  [phone]        Closing encounter  Emi LOPES (R)

## 2022-05-10 ENCOUNTER — MYC MEDICAL ADVICE (OUTPATIENT)
Dept: FAMILY MEDICINE | Facility: CLINIC | Age: 62
End: 2022-05-10

## 2022-05-10 ENCOUNTER — IMMUNIZATION (OUTPATIENT)
Dept: FAMILY MEDICINE | Facility: CLINIC | Age: 62
End: 2022-05-10
Payer: COMMERCIAL

## 2022-05-10 ENCOUNTER — LAB (OUTPATIENT)
Dept: LAB | Facility: CLINIC | Age: 62
End: 2022-05-10
Payer: COMMERCIAL

## 2022-05-10 ENCOUNTER — TELEPHONE (OUTPATIENT)
Dept: FAMILY MEDICINE | Facility: CLINIC | Age: 62
End: 2022-05-10

## 2022-05-10 DIAGNOSIS — R20.9 SKIN SENSATION DISTURBANCE: ICD-10-CM

## 2022-05-10 DIAGNOSIS — I10 HYPERTENSION GOAL BP (BLOOD PRESSURE) < 140/90: ICD-10-CM

## 2022-05-10 DIAGNOSIS — D69.3 IDIOPATHIC THROMBOCYTOPENIC PURPURA (H): ICD-10-CM

## 2022-05-10 DIAGNOSIS — E61.1 IRON DEFICIENCY: ICD-10-CM

## 2022-05-10 DIAGNOSIS — Z23 HIGH PRIORITY FOR 2019-NCOV VACCINE: Primary | ICD-10-CM

## 2022-05-10 DIAGNOSIS — L30.9 DERMATITIS: ICD-10-CM

## 2022-05-10 LAB
ANION GAP SERPL CALCULATED.3IONS-SCNC: 7 MMOL/L (ref 3–14)
BUN SERPL-MCNC: 19 MG/DL (ref 7–30)
CALCIUM SERPL-MCNC: 9.1 MG/DL (ref 8.5–10.1)
CHLORIDE BLD-SCNC: 110 MMOL/L (ref 94–109)
CO2 SERPL-SCNC: 24 MMOL/L (ref 20–32)
CREAT SERPL-MCNC: 0.99 MG/DL (ref 0.52–1.04)
ERYTHROCYTE [DISTWIDTH] IN BLOOD BY AUTOMATED COUNT: 14.5 % (ref 10–15)
FERRITIN SERPL-MCNC: 111 NG/ML (ref 8–252)
GFR SERPL CREATININE-BSD FRML MDRD: 65 ML/MIN/1.73M2
GLUCOSE BLD-MCNC: 112 MG/DL (ref 70–99)
HBA1C MFR BLD: 6.1 % (ref 0–5.6)
HCT VFR BLD AUTO: 41.4 % (ref 35–47)
HGB BLD-MCNC: 13.8 G/DL (ref 11.7–15.7)
MCH RBC QN AUTO: 30.5 PG (ref 26.5–33)
MCHC RBC AUTO-ENTMCNC: 33.3 G/DL (ref 31.5–36.5)
MCV RBC AUTO: 91 FL (ref 78–100)
PLATELET # BLD AUTO: 321 10E3/UL (ref 150–450)
POTASSIUM BLD-SCNC: 3.6 MMOL/L (ref 3.4–5.3)
RBC # BLD AUTO: 4.53 10E6/UL (ref 3.8–5.2)
SODIUM SERPL-SCNC: 141 MMOL/L (ref 133–144)
VIT B12 SERPL-MCNC: 199 PG/ML (ref 193–986)
WBC # BLD AUTO: 7.3 10E3/UL (ref 4–11)

## 2022-05-10 PROCEDURE — 82607 VITAMIN B-12: CPT

## 2022-05-10 PROCEDURE — 82728 ASSAY OF FERRITIN: CPT

## 2022-05-10 PROCEDURE — 91306 COVID-19,PF,MODERNA (18+ YRS BOOSTER .25ML): CPT

## 2022-05-10 PROCEDURE — 0064A COVID-19,PF,MODERNA (18+ YRS BOOSTER .25ML): CPT

## 2022-05-10 PROCEDURE — 80048 BASIC METABOLIC PNL TOTAL CA: CPT

## 2022-05-10 PROCEDURE — 83036 HEMOGLOBIN GLYCOSYLATED A1C: CPT

## 2022-05-10 PROCEDURE — 85027 COMPLETE CBC AUTOMATED: CPT

## 2022-05-10 PROCEDURE — 36415 COLL VENOUS BLD VENIPUNCTURE: CPT

## 2022-05-10 NOTE — RESULT ENCOUNTER NOTE
Please inform of results if patient has not viewed in [employer] within 3 business days.    CBC Results - Your cell counts were normal.    Please call the clinic with any questions you may have.     Have a great day,    Dr. Samuel

## 2022-05-10 NOTE — TELEPHONE ENCOUNTER
Routing to PA Pool please start a PRIOR AUTHORIZATION for clobetasol (TEMOVATE) 0.05 % external cream    Thanks  Emi Vanessa RT (R)

## 2022-05-11 ENCOUNTER — MYC MEDICAL ADVICE (OUTPATIENT)
Dept: FAMILY MEDICINE | Facility: CLINIC | Age: 62
End: 2022-05-11
Payer: COMMERCIAL

## 2022-05-11 NOTE — RESULT ENCOUNTER NOTE
Please inform of results if patient has not viewed in [employer] within 3 business days.    BMP - Your blood sugar was 112. Your kidney function and electrolytes were normal.    Your iron stores were normal.    Please call the clinic with any questions you may have.     Have a great day,    Dr. Samuel

## 2022-05-12 NOTE — TELEPHONE ENCOUNTER
PRIOR AUTHORIZATION DENIED    Medication: clobetasol (TEMOVATE) 0.05 % external cream    Denial Date: 5/12/2022    Denial Rational:  Patient must have a history of trial & failure to the formulary alternative(s) or have a contraindication or intolerance to the formulary alternatives:          Appeal Information:    If you would like to appeal, please supply P/A team with a letter of medical necessity with clinical reason.

## 2022-05-12 NOTE — TELEPHONE ENCOUNTER
Central Prior Authorization Team   Phone: [phone]    PA Initiation    Medication: clobetasol (TEMOVATE) 0.05 % external cream  Insurance Company: [employer] - Phone [phone] Fax [phone]  Pharmacy Filling the Rx: [id number] - [place], [place] - [address] [place] [address]  Filling Pharmacy Phone: [phone]  Filling Pharmacy Fax:    Start Date: 5/12/2022

## 2022-05-13 RX ORDER — BETAMETHASONE DIPROPIONATE 0.5 MG/G
CREAM TOPICAL
Qty: 30 G | Refills: 1 | Status: SHIPPED | OUTPATIENT
Start: 2022-05-13 | End: 2022-11-10

## 2022-05-18 ENCOUNTER — VIRTUAL VISIT (OUTPATIENT)
Dept: HEMATOLOGY | Facility: CLINIC | Age: 62
End: 2022-05-18
Attending: INTERNAL MEDICINE
Payer: COMMERCIAL

## 2022-05-18 DIAGNOSIS — D69.1 PLATELET GRANULE DEFECT (H): Primary | ICD-10-CM

## 2022-05-18 PROCEDURE — 99214 OFFICE O/P EST MOD 30 MIN: CPT | Mod: 95 | Performed by: INTERNAL MEDICINE

## 2022-05-18 NOTE — PROGRESS NOTES
[facility]  [address] [place] [id number]  Phone: [phone], Fax: [phone]    Outpatient Visit Note:    Patient: Jacquie Amador  MRN: [id number]  : 1960  ISIDRO: 22    Reason for Consultation:  Bruising    Assessment:  In summary, Jacquie Amador is a 61 year old woman presenting to clinic due to bruising/bleeding with abnormal platelet studies. 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  Easy bruising and prolonged healing secondary to #1  3.  Eruptive rash/lesions on sun exposed surfaces, porphyria cutanea tarda ruled out, concern for Behcet's disease  4.  Personal history of antiphospholipid antibody syndrome in pregnancy, no records  5.  Osteoarthritis requiring anti-inflammatory medications  6.  Depression requiring use of SSRI medication  7.  Acute on chronic migraines requiring use of triptans and aspirin      Ms. Amador presents with greater than 2 years of increased bruising/presence of lesions on her skin that exhibit prolonged healing.  Prior to her consultation in our clinic she had been evaluated by Dr. Douglas who had sent off PTT.  PTT was prolonged prompting further evaluation as an outpatient.  Evaluation was conducted in 2021.  At this time Jacquie was found to have negative antiphospholipid antibodies.  Jacquie reports a history of 2 miscarriages over 30 years ago that prompted the diagnosis of antiphospholipid antibody syndrome.  She recalls that she was on a steroid pill during her subsequent 2 successful pregnancies.  She does not recall having been on a blood thinner for these pregnancies.  She has no other personal thrombosis history.    Evaluation for von Willebrand's, including von Willebrand factor antigen, activity, ristocetin cofactor, and collagen  binding did not find any evidence of acquired or congenital VWF.  Evaluation of antiplatelet antibodies revealed that Jenny has no antibodies against platelet glycoprotein receptors.  However she did have the presence of anti-HLA and anti-GP1a antibodies.  She has no personal history of receiving red cell or platelet transfusions.    Jenny had one platelet aggregation study that was done when she was taking Excedrin Migraine which contains aspirin.  1 week later this was study was repeated. The study continued to be abnormal. She continued to have decreased arachidonic acid  As well and deaggregation with ADP and arachidonic acid. And decreased ATP release with thrombin and ADP. This is consistent with diagnosis of acquired qualitative platelet disorder and consistent with her anti-GP1a antibodies.     Trial of steroids with initial improvement in skin lesions- however patient mostly noted improvement in arthralgias and fatigue more.  Have moved to MMF instead of steriods due to hyperglycemia and pre-diabetes    Previously have discussed with patient that immunsuppression may help with symptoms- as we have seen modest improvement with steroids--- however long-term steriods have not been helpful. Rituximab is one option- but is NOT reversible. Have considered MMF and patient has prescription. However is hesitant about side effects. Another option would be azathioprine (Imuran)- before undergoing any of these treatments would like her to complete vaccination steroids. I suspect another systemic process- as oral lesions and skin lesions are atypical for ITP. Discussed with Dermatology- suspect non-vasculitic process such as Esquivel-Kaylee. However, platelets studies are not consistent. Will discuss potential Behcet's given oral lesions with Rheumatology.     Previously discussed that avoiding other medications that can worsen this, including aspirin, NSAID and potentially SSRIs. Right now IF Jacquie needs MAJOR  "SURGERY---  She should receive platelet transfusion (1-2 units). She is ok to receive injections.        Plan:  1. Majority of today's visit was spent counseling the patient regarding diagnosis, natural history, management and treatment options   2. Continue with MMF  3. Start B12 replacement  4. Labs in 3 weeks  5. Referral to Rheumatology    The patient is given our center's contact information and is instructed to call if she should have any further questions or concerns.  Otherwise, we will plan on seeing her back Follow up with Provider - 7/20/22- add on video visit at 9:30 AM- video ok    Danna Cobb, nurse clinician was involved with the care, education and coordination of patient care.     Patient understands and agrees with the above plan and recommendation.        Emely Grimes MD/PhD   of Medicine  Division of Hematology, Oncology and Transplantation  ----------------------------------------------------------------------------------------------------------------------  Interval History:  Jacquie Amador is a 61 year old woman with PMHx of fibromyalgia, anxiety/depression, hypertension and hyperlipidemia. She presented to clinic on 9/15/21 for her first in person evaluation due to bleeding and prolonged wound healing. Please refer to my consult note.     Jacquie had a good time in [place]. Had a lot of mouth sores while in [place].   Started her MMF a week an a half ago and feels they have improve. She is very tired- hardly able to function. Is having to take naps- which is not normal.     Has had about a few times of feeling unbalanced and tipping over. Will catch herself on the cough. Her feet are very pain.     Pain levels have been worse. Pain is in feet and legs. Hurt all the time. Pain is general pain that does not feel electric. Is not sharp all the time, but can have some periods with \"zaps\". Her ankle is still swollen. Walking is challenging    Skin on arms and hands- "   Has open ulcers that will not go away- sit full of blood.     Joints- pain is still terrible. Pain is the same throughout the day. The legs and lower back. Ankle is really bad the last couple days. Is swollen and will give out. Has not done that for a while.     Weight- stable. No issues with vomiting. Will have nausea and will feel like she will throw up in the late afternoons or evening. Will have to lay down to avoid throwing up. Will pass.     Past Medical History:  Past Medical History:   Diagnosis Date     ASTHMA - MODERATE PERSISTENT 9/21/2005     Chronic pain      Coronary artery disease      CVA (cerebral infarction)      Depressive disorder, not elsewhere classified      Diabetes (H)      Elevated serum alkaline phosphatase level     Liver source     Fibromyalgia      HYPERLIPIDEMIA NEC/NOS 12/29/2006     Hypertriglyceridemia      OA (osteoarthritis)      Thyroid disease      Trochanteric bursitis      Unspecified essential hypertension      Immunization  Immunization History   Administered Date(s) Administered     COVID-19,PF,Moderna 03/03/2021, 04/01/2021, 11/15/2021     COVID-19,PF,Moderna Booster 05/10/2022     FLU 6-35 months 09/22/2009     Influenza (IIV3) PF 11/09/2000, 12/08/2003, 12/06/2004, 12/08/2005, 11/09/2006, 11/01/2007, 12/04/2008, 09/24/2010, 10/26/2011, 10/26/2012     Influenza Quad, Recombinant, pf(RIV4) (Flublok) 09/30/2020     Influenza Vaccine IM > 6 months Valent IIV4 (Alfuria,Fluzone) 11/14/2014, 11/19/2015, 11/26/2019, 11/15/2021     Pneumococcal 23 valent 11/09/2000, 12/23/2021, 02/25/2022     TD (ADULT, 7+) 11/21/2000     TDAP Vaccine (Adacel) 01/18/2011     Tdap (Adacel,Boostrix) 11/30/2021     Zoster vaccine recombinant adjuvanted (SHINGRIX) 11/26/2019       Past Surgical History:  Past Surgical History:   Procedure Laterality Date     3 teeth pulled       INJECT EPIDURAL TRANSFORAMINAL  11/25/2014    [facility]     INJECT JOINT SACROILIAC  09/23/2014     [facility]     LAMINECTOMY LUMBAR ONE LEVEL Left 2014    [facility]     ZZC  DELIVERY ONLY      , Low Cervical       Medications:  Current Outpatient Medications   Medication Sig Dispense Refill     albuterol (PROVENTIL) (2.5 MG/3ML) 0.083% neb solution Take 1 vial (2.5 mg) by nebulization every 6 hours as needed for shortness of breath / dyspnea or wheezing 3 mL 4     albuterol (VENTOLIN HFA) 108 (90 Base) MCG/ACT inhaler INHALE 2 PUFFS INTO THE LUNGS EVERY 6 HOURS AS NEEDED FOR SHORTNESS OF BREATH / DYSPNEA 54 g 0     ALPRAZolam (XANAX) 0.25 MG tablet Take 1 tablet (0.25 mg) by mouth daily as needed for anxiety 30 tablet 0     aluminum chloride (DRYSOL) 20 % external solution Apply topically At Bedtime 60 mL 1     atorvastatin (LIPITOR) 20 MG tablet Take 1 tablet (20 mg) by mouth daily 90 tablet 2     augmented betamethasone dipropionate (DIPROLENE AF) 0.05 % external cream Apply sparingly to affected area twice daily as needed for up to 14 days. 30 g 1     buPROPion (WELLBUTRIN SR) 200 MG 12 hr tablet Take 1 tablet (200 mg) by mouth 2 times daily 180 tablet 1     celecoxib (CELEBREX) 200 MG capsule TAKE ONE CAPSULE BY MOUTH TWICE A DAY AS NEEDED FOR PAIN 180 capsule 1     chlorhexidine (PERIDEX) 0.12 % solution Swish and spit 15 mLs in mouth 2 times daily 1893 mL 4     clindamycin (CLINDAMAX) 1 % external gel Apply topically 2 times daily 30 g 4     clotrimazole (MYCELEX) 10 MG lozenge Place 1 lozenge (10 mg) inside cheek 5 times daily 70 lozenge 1     doxycycline hyclate (VIBRAMYCIN) 100 MG capsule Take 1 capsule (100 mg) by mouth 2 times daily 14 capsule 0     DULoxetine (CYMBALTA) 60 MG capsule Take 2 capsules (120 mg) by mouth daily 180 capsule 1     famotidine (PEPCID) 40 MG tablet TAKE ONE TABLET BY MOUTH ONE TIME DAILY 90 tablet 1     hydrochlorothiazide (HYDRODIURIL) 25 MG tablet Take 1 tablet (25 mg) by mouth daily 90 tablet 0     losartan (COZAAR) 100  MG tablet Take 1 tablet (100 mg) by mouth daily 90 tablet 1     metoprolol succinate ER (TOPROL-XL) 100 MG 24 hr tablet Take 1 tablet (100 mg) by mouth 2 times daily 180 tablet 0     metoprolol succinate ER (TOPROL-XL) 25 MG 24 hr tablet Take 1 tablet (25 mg) by mouth 3 times daily 180 tablet 0     mometasone-formoterol (DULERA) 200-5 MCG/ACT inhaler Inhale 2 puffs into the lungs 2 times daily 39 g 1     montelukast (SINGULAIR) 10 MG tablet Take 1 tablet (10 mg) by mouth At Bedtime 90 tablet 3     multivitamin w/minerals (THERA-VIT-M) tablet Take 1 tablet by mouth daily       mupirocin (BACTROBAN) 2 % external cream Apply to affected area three times daily 60 g 1     mycophenolate (GENERIC EQUIVALENT) 250 MG capsule Take 2 capsules (500 mg) by mouth daily 180 capsule 0     nystatin (MYCOSTATIN) 690245 UNIT/ML suspension Take by mouth 4 times daily Has recurrent thrush. Uses for 2 weeks at a time as needed. 380 mL 1     oxybutynin ER (DITROPAN-XL) 5 MG 24 hr tablet Take 2 tablets (10 mg) by mouth daily 180 tablet 1     rizatriptan (MAXALT) 10 MG tablet Take 1 tablet (10 mg) by mouth at onset of headache for migraine May repeat in 2 hours. Max 3 tablets/24 hours. 18 tablet 0     rOPINIRole (REQUIP) 1 MG tablet TAKE THREE TABLETS BY MOUTH AT BEDTIME 270 tablet 1     triamcinolone (ARISTOCORT HP) 0.5 % external cream Apply topically 2 times daily 60 g 0     triamcinolone (KENALOG) 0.1 % paste Take by mouth 2 times daily 5 g 1     valACYclovir (VALTREX) 500 MG tablet Take 1 tablet (500 mg) by mouth daily 90 tablet 0     ZYRTEC 10 MG OR TABS 1 TABLET DAILY 90 3        Allergies:  Allergies   Allergen Reactions     Cats      and rabbits/wheezing     Dogs      wheezing     Lyrica [Pregabalin] Other (See Comments)     Rash, mouth sores, itching and burning     Seasonal Allergies      rhinits       ROS:  Remainder of a comprehensive 10 point ROS is negative unless noted above.    Social History:  Denies any tobacco use. No  significant alcohol use. Denies any illicit drug use.     Family History:  Two daughter  29 Yo daughter  26 yo daughter  1 grandson    1 sister- older- high blood glucose, arthritis  1 brother- older- no idea    Mother- - heart valve. Had MDS. Needed a shot every month  Father- - cancer    Objectives:  GENERAL: alert and no distress  EYES: Eyes grossly normal to inspection.  No discharge or erythema, or obvious scleral/conjunctival abnormalities.  RESP: No audible wheeze, cough, or visible cyanosis.  No visible retractions or increased work of breathing.    SKIN: hypopigmentation - hands, lips, neck and across face, scar - face and numerous scabs over face. Left wrist with ulceration on dorsal surface  NEURO: Cranial nerves grossly intact.  Mentation and speech appropriate for age.  PSYCH: Mentation appears normal, affect normal/bright, judgement and insight intact, normal speech and appearance well-groomed.        Labs:  Ferritin   Date Value Ref Range Status   05/10/2022 111 8 - 252 ng/mL Final   2021 376 (H) 8 - 252 ng/mL Final     Iron   Date Value Ref Range Status   09/15/2021 100 35 - 180 ug/dL Final   2021 50 35 - 180 ug/dL Final     Iron Binding Cap   Date Value Ref Range Status   2021 350 240 - 430 ug/dL Final     Iron Binding Capacity   Date Value Ref Range Status   09/15/2021 284 240 - 430 ug/dL Final           21  The INR is normal.   APTT ratio is elevated.   Platelet Neutralization is negative.   APTT 1:2 Mix is normal.   DRVVT Screen ratio is normal.   Thrombin time is normal.   Lupus anticoagulant negative  Anticardiolipin negative    VWF collagen binding normal (send out)  The von Willebrand factor antigen (VWF:Ag), von Willebrand factor   activity (VWF:ACT), and Factor 8 levels are within normal limits.   The Factor 8 to VWF:Ag ratio and the VWF:ACT to VWF:Ag ratio are   within normal limits.      Platelet function COL/EPI >300  Platelet function ADP 77  (normal)    FXIII antigen 137 (norma1)  Alpha-2 antiplasmin 140 (normal  Factor V 78 (normal)  Factor 10 128 (normal)  Factor  (elevated)    4/29/21  Abnormal platelet aggregation study. Maximal platelet aggregation is   decreased with Arachidonic acid and within normal limits with 5   micromolar and 10 micromolar ADP, Epinephrine, Collagen, and low and   high concentrations of Ristocetin.  Deaggregation is present with 5   micromolar ADP and Arachidonic acid.  The 1.00 mg/mL Ristocetin   reactions have a slight lag in secondary aggregation.  ATP release   is decreased with Thrombin and 5 micromolar and 10 micromolar ADP.   These findings are consistent with a congenital or acquired platelet   disorders.    Platelet Function Closure Time Col/ADP   Date Value Ref Range Status   03/03/2021 77 <120 sec Final     Comment:     Typical aspirin effect is characterized by prolonged Col/Epi and normal   Col/ADP.       PFA-Col/ADP   Date Value Ref Range Status   11/23/2021 79 <120 Seconds Final     Comment:     Typical aspirin effect is characterized by prolonged Col/Epi and normal Col/ADP   09/28/2021 88 <120 Seconds Final     Comment:     Typical aspirin effect is characterized by prolonged Col/Epi and normal Col/ADP     PLT Funct COL/EPI   Date Value Ref Range Status   03/03/2021 >300 (H) <170 sec Final     Comment:     Effective 12/01/2015, the reference range for this assay has changed.  Causes of a prolonged closure time include platelet dysfunction,vonWillebrand   disease, decreased hematocrit (<35%) and decreased platelet count (<150   x10e9/L).       PFA-Col/Epi   Date Value Ref Range Status   11/23/2021 188 (H) <170 Seconds Final     Refer to Versiti- autoantibody platelet study    Imaging:  Not applicable

## 2022-05-18 NOTE — LETTER
2022     RE: Jacquie Amador  [address] [facility] [address]     Dear Colleague,    Thank you for referring your patient, Jacquie Amador, to the [facility] at [facility]. Please see a copy of my visit note below.        [facility]  [facility] [place] [address]  Phone: [phone], Fax: [phone]    Outpatient Visit Note:    Patient: Jacquie Aamdor  MRN: [id number]  : 1960  ISIDRO: 22    Reason for Consultation:  Bruising    Assessment:  In summary, Jacquie Amador is a 61 year old woman presenting to clinic due to bruising/bleeding with abnormal platelet studies. 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  Easy bruising and prolonged healing secondary to #1  3.  Eruptive rash/lesions on sun exposed surfaces, porphyria cutanea tarda ruled out, concern for Behcet's disease  4.  Personal history of antiphospholipid antibody syndrome in pregnancy, no records  5.  Osteoarthritis requiring anti-inflammatory medications  6.  Depression requiring use of SSRI medication  7.  Acute on chronic migraines requiring use of triptans and aspirin      Ms. Amador presents with greater than 2 years of increased bruising/presence of lesions on her skin that exhibit prolonged healing.  Prior to her consultation in our clinic she had been evaluated by Dr. Douglas who had sent off PTT.  PTT was prolonged prompting further evaluation as an outpatient.  Evaluation was conducted in 2021.  At this time Jacquie was found to have negative antiphospholipid antibodies.  Jacquie reports a history of 2 miscarriages over 30 years ago that prompted the diagnosis of antiphospholipid antibody syndrome.  She  recalls that she was on a steroid pill during her subsequent 2 successful pregnancies.  She does not recall having been on a blood thinner for these pregnancies.  She has no other personal thrombosis history.    Evaluation for von Willebrand's, including von Willebrand factor antigen, activity, ristocetin cofactor, and collagen binding did not find any evidence of acquired or congenital VWF.  Evaluation of antiplatelet antibodies revealed that Jenny has no antibodies against platelet glycoprotein receptors.  However she did have the presence of anti-HLA and anti-GP1a antibodies.  She has no personal history of receiving red cell or platelet transfusions.    Jenny had one platelet aggregation study that was done when she was taking Excedrin Migraine which contains aspirin.  1 week later this was study was repeated. The study continued to be abnormal. She continued to have decreased arachidonic acid  As well and deaggregation with ADP and arachidonic acid. And decreased ATP release with thrombin and ADP. This is consistent with diagnosis of acquired qualitative platelet disorder and consistent with her anti-GP1a antibodies.     Trial of steroids with initial improvement in skin lesions- however patient mostly noted improvement in arthralgias and fatigue more.  Have moved to MMF instead of steriods due to hyperglycemia and pre-diabetes    Previously have discussed with patient that immunsuppression may help with symptoms- as we have seen modest improvement with steroids--- however long-term steriods have not been helpful. Rituximab is one option- but is NOT reversible. Have considered MMF and patient has prescription. However is hesitant about side effects. Another option would be azathioprine (Imuran)- before undergoing any of these treatments would like her to complete vaccination steroids. I suspect another systemic process- as oral lesions and skin lesions are atypical for ITP. Discussed with Dermatology-  suspect non-vasculitic process such as Esquivel-Kaylee. However, platelets studies are not consistent. Will discuss potential Behcet's given oral lesions with Rheumatology.     Previously discussed that avoiding other medications that can worsen this, including aspirin, NSAID and potentially SSRIs. Right now IF Jacquie needs MAJOR SURGERY---  She should receive platelet transfusion (1-2 units). She is ok to receive injections.        Plan:  1. Majority of today's visit was spent counseling the patient regarding diagnosis, natural history, management and treatment options   2. Continue with MMF  3. Start B12 replacement  4. Labs in 3 weeks  5. Referral to Rheumatology    The patient is given our center's contact information and is instructed to call if she should have any further questions or concerns.  Otherwise, we will plan on seeing her back Follow up with Provider - 7/20/22- add on video visit at 9:30 AM- video flo Cobb, nurse clinician was involved with the care, education and coordination of patient care.     Patient understands and agrees with the above plan and recommendation.        Emely Grimes MD/PhD   of Medicine  Division of Hematology, Oncology and Transplantation  ----------------------------------------------------------------------------------------------------------------------  Interval History:  Jacquie Amador is a 61 year old woman with PMHx of fibromyalgia, anxiety/depression, hypertension and hyperlipidemia. She presented to clinic on 9/15/21 for her first in person evaluation due to bleeding and prolonged wound healing. Please refer to my consult note.     Jacquie had a good time in [place]. Had a lot of mouth sores while in [place].   Started her MMF a week an a half ago and feels they have improve. She is very tired- hardly able to function. Is having to take naps- which is not normal.     Has had about a few times of feeling unbalanced and tipping over.  "Will catch herself on the cough. Her feet are very pain.     Pain levels have been worse. Pain is in feet and legs. Hurt all the time. Pain is general pain that does not feel electric. Is not sharp all the time, but can have some periods with \"zaps\". Her ankle is still swollen. Walking is challenging    Skin on arms and hands-   Has open ulcers that will not go away- sit full of blood.     Joints- pain is still terrible. Pain is the same throughout the day. The legs and lower back. Ankle is really bad the last couple days. Is swollen and will give out. Has not done that for a while.     Weight- stable. No issues with vomiting. Will have nausea and will feel like she will throw up in the late afternoons or evening. Will have to lay down to avoid throwing up. Will pass.     Past Medical History:  Past Medical History:   Diagnosis Date     ASTHMA - MODERATE PERSISTENT 9/21/2005     Chronic pain      Coronary artery disease      CVA (cerebral infarction)      Depressive disorder, not elsewhere classified      Diabetes (H)      Elevated serum alkaline phosphatase level     Liver source     Fibromyalgia      HYPERLIPIDEMIA NEC/NOS 12/29/2006     Hypertriglyceridemia      OA (osteoarthritis)      Thyroid disease      Trochanteric bursitis      Unspecified essential hypertension      Immunization  Immunization History   Administered Date(s) Administered     COVID-19,PF,Moderna 03/03/2021, 04/01/2021, 11/15/2021     COVID-19,PF,Moderna Booster 05/10/2022     FLU 6-35 months 09/22/2009     Influenza (IIV3) PF 11/09/2000, 12/08/2003, 12/06/2004, 12/08/2005, 11/09/2006, 11/01/2007, 12/04/2008, 09/24/2010, 10/26/2011, 10/26/2012     Influenza Quad, Recombinant, pf(RIV4) (Flublok) 09/30/2020     Influenza Vaccine IM > 6 months Valent IIV4 (Alfuria,Fluzone) 11/14/2014, 11/19/2015, 11/26/2019, 11/15/2021     Pneumococcal 23 valent 11/09/2000, 12/23/2021, 02/25/2022     TD (ADULT, 7+) 11/21/2000     TDAP Vaccine (Adacel) 01/18/2011 "     Tdap (Adacel,Boostrix) 2021     Zoster vaccine recombinant adjuvanted (SHINGRIX) 2019       Past Surgical History:  Past Surgical History:   Procedure Laterality Date     3 teeth pulled       INJECT EPIDURAL TRANSFORAMINAL  2014    [facility]     INJECT JOINT SACROILIAC  2014    [facility]     LAMINECTOMY LUMBAR ONE LEVEL Left 2014    [facility]  DELIVERY ONLY      , Low Cervical       Medications:  Current Outpatient Medications   Medication Sig Dispense Refill     albuterol (PROVENTIL) (2.5 MG/3ML) 0.083% neb solution Take 1 vial (2.5 mg) by nebulization every 6 hours as needed for shortness of breath / dyspnea or wheezing 3 mL 4     albuterol (VENTOLIN HFA) 108 (90 Base) MCG/ACT inhaler INHALE 2 PUFFS INTO THE LUNGS EVERY 6 HOURS AS NEEDED FOR SHORTNESS OF BREATH / DYSPNEA 54 g 0     ALPRAZolam (XANAX) 0.25 MG tablet Take 1 tablet (0.25 mg) by mouth daily as needed for anxiety 30 tablet 0     aluminum chloride (DRYSOL) 20 % external solution Apply topically At Bedtime 60 mL 1     atorvastatin (LIPITOR) 20 MG tablet Take 1 tablet (20 mg) by mouth daily 90 tablet 2     augmented betamethasone dipropionate (DIPROLENE AF) 0.05 % external cream Apply sparingly to affected area twice daily as needed for up to 14 days. 30 g 1     buPROPion (WELLBUTRIN SR) 200 MG 12 hr tablet Take 1 tablet (200 mg) by mouth 2 times daily 180 tablet 1     celecoxib (CELEBREX) 200 MG capsule TAKE ONE CAPSULE BY MOUTH TWICE A DAY AS NEEDED FOR PAIN 180 capsule 1     chlorhexidine (PERIDEX) 0.12 % solution Swish and spit 15 mLs in mouth 2 times daily 1893 mL 4     clindamycin (CLINDAMAX) 1 % external gel Apply topically 2 times daily 30 g 4     clotrimazole (MYCELEX) 10 MG lozenge Place 1 lozenge (10 mg) inside cheek 5 times daily 70 lozenge 1     doxycycline hyclate (VIBRAMYCIN) 100 MG capsule Take 1 capsule (100 mg) by mouth 2  times daily 14 capsule 0     DULoxetine (CYMBALTA) 60 MG capsule Take 2 capsules (120 mg) by mouth daily 180 capsule 1     famotidine (PEPCID) 40 MG tablet TAKE ONE TABLET BY MOUTH ONE TIME DAILY 90 tablet 1     hydrochlorothiazide (HYDRODIURIL) 25 MG tablet Take 1 tablet (25 mg) by mouth daily 90 tablet 0     losartan (COZAAR) 100 MG tablet Take 1 tablet (100 mg) by mouth daily 90 tablet 1     metoprolol succinate ER (TOPROL-XL) 100 MG 24 hr tablet Take 1 tablet (100 mg) by mouth 2 times daily 180 tablet 0     metoprolol succinate ER (TOPROL-XL) 25 MG 24 hr tablet Take 1 tablet (25 mg) by mouth 3 times daily 180 tablet 0     mometasone-formoterol (DULERA) 200-5 MCG/ACT inhaler Inhale 2 puffs into the lungs 2 times daily 39 g 1     montelukast (SINGULAIR) 10 MG tablet Take 1 tablet (10 mg) by mouth At Bedtime 90 tablet 3     multivitamin w/minerals (THERA-VIT-M) tablet Take 1 tablet by mouth daily       mupirocin (BACTROBAN) 2 % external cream Apply to affected area three times daily 60 g 1     mycophenolate (GENERIC EQUIVALENT) 250 MG capsule Take 2 capsules (500 mg) by mouth daily 180 capsule 0     nystatin (MYCOSTATIN) 885072 UNIT/ML suspension Take by mouth 4 times daily Has recurrent thrush. Uses for 2 weeks at a time as needed. 380 mL 1     oxybutynin ER (DITROPAN-XL) 5 MG 24 hr tablet Take 2 tablets (10 mg) by mouth daily 180 tablet 1     rizatriptan (MAXALT) 10 MG tablet Take 1 tablet (10 mg) by mouth at onset of headache for migraine May repeat in 2 hours. Max 3 tablets/24 hours. 18 tablet 0     rOPINIRole (REQUIP) 1 MG tablet TAKE THREE TABLETS BY MOUTH AT BEDTIME 270 tablet 1     triamcinolone (ARISTOCORT HP) 0.5 % external cream Apply topically 2 times daily 60 g 0     triamcinolone (KENALOG) 0.1 % paste Take by mouth 2 times daily 5 g 1     valACYclovir (VALTREX) 500 MG tablet Take 1 tablet (500 mg) by mouth daily 90 tablet 0     ZYRTEC 10 MG OR TABS 1 TABLET DAILY 90 3        Allergies:  Allergies    Allergen Reactions     Cats      and rabbits/wheezing     Dogs      wheezing     Lyrica [Pregabalin] Other (See Comments)     Rash, mouth sores, itching and burning     Seasonal Allergies      rhinits       ROS:  Remainder of a comprehensive 10 point ROS is negative unless noted above.    Social History:  Denies any tobacco use. No significant alcohol use. Denies any illicit drug use.     Family History:  Two daughter  29 Yo daughter  26 yo daughter  1 grandson    1 sister- older- high blood glucose, arthritis  1 brother- older- no idea    Mother- - heart valve. Had MDS. Needed a shot every month  Father- - cancer    Objectives:  GENERAL: alert and no distress  EYES: Eyes grossly normal to inspection.  No discharge or erythema, or obvious scleral/conjunctival abnormalities.  RESP: No audible wheeze, cough, or visible cyanosis.  No visible retractions or increased work of breathing.    SKIN: hypopigmentation - hands, lips, neck and across face, scar - face and numerous scabs over face. Left wrist with ulceration on dorsal surface  NEURO: Cranial nerves grossly intact.  Mentation and speech appropriate for age.  PSYCH: Mentation appears normal, affect normal/bright, judgement and insight intact, normal speech and appearance well-groomed.        Labs:  Ferritin   Date Value Ref Range Status   05/10/2022 111 8 - 252 ng/mL Final   2021 376 (H) 8 - 252 ng/mL Final     Iron   Date Value Ref Range Status   09/15/2021 100 35 - 180 ug/dL Final   2021 50 35 - 180 ug/dL Final     Iron Binding Cap   Date Value Ref Range Status   2021 350 240 - 430 ug/dL Final     Iron Binding Capacity   Date Value Ref Range Status   09/15/2021 284 240 - 430 ug/dL Final       21  The INR is normal.   APTT ratio is elevated.   Platelet Neutralization is negative.   APTT 1:2 Mix is normal.   DRVVT Screen ratio is normal.   Thrombin time is normal.   Lupus anticoagulant negative  Anticardiolipin  negative    VWF collagen binding normal (send out)  The von Willebrand factor antigen (VWF:Ag), von Willebrand factor   activity (VWF:ACT), and Factor 8 levels are within normal limits.   The Factor 8 to VWF:Ag ratio and the VWF:ACT to VWF:Ag ratio are   within normal limits.      Platelet function COL/EPI >300  Platelet function ADP 77 (normal)    FXIII antigen 137 (norma1)  Alpha-2 antiplasmin 140 (normal  Factor V 78 (normal)  Factor 10 128 (normal)  Factor  (elevated)    4/29/21  Abnormal platelet aggregation study. Maximal platelet aggregation is   decreased with Arachidonic acid and within normal limits with 5   micromolar and 10 micromolar ADP, Epinephrine, Collagen, and low and   high concentrations of Ristocetin.  Deaggregation is present with 5   micromolar ADP and Arachidonic acid.  The 1.00 mg/mL Ristocetin   reactions have a slight lag in secondary aggregation.  ATP release   is decreased with Thrombin and 5 micromolar and 10 micromolar ADP.   These findings are consistent with a congenital or acquired platelet   disorders.    Platelet Function Closure Time Col/ADP   Date Value Ref Range Status   03/03/2021 77 <120 sec Final     Comment:     Typical aspirin effect is characterized by prolonged Col/Epi and normal   Col/ADP.       PFA-Col/ADP   Date Value Ref Range Status   11/23/2021 79 <120 Seconds Final     Comment:     Typical aspirin effect is characterized by prolonged Col/Epi and normal Col/ADP   09/28/2021 88 <120 Seconds Final     Comment:     Typical aspirin effect is characterized by prolonged Col/Epi and normal Col/ADP     PLT Funct COL/EPI   Date Value Ref Range Status   03/03/2021 >300 (H) <170 sec Final     Comment:     Effective 12/01/2015, the reference range for this assay has changed.  Causes of a prolonged closure time include platelet dysfunction,vonWillebrand   disease, decreased hematocrit (<35%) and decreased platelet count (<150   x10e9/L).       PFA-Col/Epi   Date Value Ref  Range Status   11/23/2021 188 (H) <170 Seconds Final     Refer to [facility]iti- autoantibody platelet study    Imaging:  Not applicable    Again, thank you for allowing me to participate in the care of your patient.      Sincerely,    Emely Grimes MD

## 2022-05-20 ENCOUNTER — MYC MEDICAL ADVICE (OUTPATIENT)
Dept: HEMATOLOGY | Facility: CLINIC | Age: 62
End: 2022-05-20
Payer: COMMERCIAL

## 2022-05-23 ENCOUNTER — MYC MEDICAL ADVICE (OUTPATIENT)
Dept: FAMILY MEDICINE | Facility: CLINIC | Age: 62
End: 2022-05-23
Payer: COMMERCIAL

## 2022-05-25 ENCOUNTER — MYC MEDICAL ADVICE (OUTPATIENT)
Dept: FAMILY MEDICINE | Facility: CLINIC | Age: 62
End: 2022-05-25
Payer: COMMERCIAL

## 2022-05-25 NOTE — TELEPHONE ENCOUNTER
See also prior [employer] message 5/23/22 regarding COVID exposure and symptoms.  Has MD appointment in office today.  Falguni Crawford RN

## 2022-05-25 NOTE — TELEPHONE ENCOUNTER
See Instagram message.  Requesting a future in person visit;  Can we put her in a same day slot next week or week after?  Falguni DELGADO RN

## 2022-05-27 NOTE — TELEPHONE ENCOUNTER
Patient wasn't coming in for that appointment on 5/25 due to illness. Recommended rescheduling. Ok to place in a same day slot in the next few weeks.

## 2022-05-31 NOTE — TELEPHONE ENCOUNTER
[employer] message sent  Next 5 appointments (look out 90 days)    Jun 07, 2022  9:00 AM  (Arrive by 8:40 AM)  Provider Visit with Liz Peterson MD  [facility] ([facility] ) [address], [address]  [facility] [address]  [phone]   Jul 27, 2022  5:30 PM  (Arrive by 5:10 PM)  Provider Visit with Liz Peterson MD  [facility] ([facility] ) [address], [address]  [facility] [address]  [phone]          Emi LOPES (R)

## 2022-06-01 NOTE — PROGRESS NOTES
"Jacquie is a 61 year old who is being evaluated via a billable video visit.      How would you like to obtain your AVS? MyChart  If the video visit is dropped, the invitation should be resent by: Text to cell phone: [phone]  Will anyone else be joining your video visit? No      Video Start Time: 9:02 AM    Assessment & Plan     Lumbar back pain  Left leg numbness  Weakness of both lower extremities  Patient with lumbar laminectomy in 2014  Patient with worsening low back pain since she has not been following at the pain clinic.   She reports also some leg weakness and numbness that comes and goes.   Patient understands the limits of exam by video.   Do recommend MRI for further evaluation with these symptoms.   Consider PT. Consider referral back to pain management.   Will notify of results.   - MR Lumbar Spine w/o & w Contrast; Future      Restless leg syndrome  Patient with restless legs.   Happens during the day as well as at night.   She has taken the requip during the day but makes her too sleepy.   She will stop that.   She will try gabapentin as noted in orders.   Can increase further as needed.   - gabapentin (NEURONTIN) 100 MG capsule; 100 mg nightly for one week, then 200 mg nightly for one week then 300 mg nightly.    MARIZOL (generalized anxiety disorder)  Patient with some worsening of her anxiety.   Not controlled with duloxetine, bupropion.   Will see if the gabapentin helps with these symptoms as well.   Has appointment for follow up next month.   Recheck sooner as needed.                        Return in about 6 weeks (around 7/19/2022) for follow up anxiety.    Liz Peterson MD  [facility]    Veronica Dhillon is a 61 year old who presents for the following health issues     HPI     Discuss new health issues post covid - patient states she did not test positive for Covid.    Patient reports she is \"so tired\". This is a \"different time\".  Reports taking naps all the " "time which hasn't before.   Thigh goes numb off and on. Can't remember which side. Maybe the left side.     Headaches daily.     Reports sometimes forgets to take the afternoon metoprolol. Otherwise blood pressure has been ok.     In the afternoons feels \"just horrible\".  Takes a requip in the afternoon and then feels very fatigued and must nap.     Can't get off the floor.     Pain and swelling in the feet and ankles. Neck discomfort that continues.     Eyes are dry. Has pain. Saw the eye doctor recently.     Reports anxiety \"is off quite a bit\". Feels more anxious for several months.     Gets dizzy frequently.     Thinks a lot of this started in February.     Right side of the back is painful. History of back surgery on disk about 5 years ago. Weakness of lower legs off and on. Numbness over the front of the left thigh.  Lasted a short time.     Started on B12 for a deficiency.     Seeing rheumatology July 20th.         Review of Systems   CONSTITUTIONAL:weight increase  ENT/MOUTH: NEGATIVE for ear, mouth and throat problems  CV: NEGATIVE for chest pain, palpitations or peripheral edema  MUSCULOSKELETAL: as above.   PSYCHIATRIC: as above.       Objective           Vitals:  No vitals were obtained today due to virtual visit.    Physical Exam   GENERAL: Healthy, alert and no distress  EYES: Eyes grossly normal to inspection.  No discharge or erythema, or obvious scleral/conjunctival abnormalities.  RESP: No audible wheeze, cough, or visible cyanosis.  No visible retractions or increased work of breathing.    SKIN: Visible skin clear. No significant rash, abnormal pigmentation or lesions.  NEURO: Cranial nerves grossly intact.  Mentation and speech appropriate for age.  PSYCH: Mentation appears normal, affect normal/bright, judgement and insight intact, normal speech and appearance well-groomed.                Video-Visit Details    Type of service:  Video Visit    Video End Time:9:30 AM    Originating Location (pt. " Location): Home    Distant Location (provider location):  [facility]     Platform used for Video Visit: William

## 2022-06-06 ENCOUNTER — MYC MEDICAL ADVICE (OUTPATIENT)
Dept: FAMILY MEDICINE | Facility: CLINIC | Age: 62
End: 2022-06-06
Payer: COMMERCIAL

## 2022-06-06 DIAGNOSIS — E78.5 HYPERLIPIDEMIA LDL GOAL <130: ICD-10-CM

## 2022-06-07 ENCOUNTER — VIRTUAL VISIT (OUTPATIENT)
Dept: FAMILY MEDICINE | Facility: CLINIC | Age: 62
End: 2022-06-07
Payer: COMMERCIAL

## 2022-06-07 DIAGNOSIS — G25.81 RESTLESS LEG SYNDROME: ICD-10-CM

## 2022-06-07 DIAGNOSIS — R20.0 LEFT LEG NUMBNESS: ICD-10-CM

## 2022-06-07 DIAGNOSIS — F41.1 GAD (GENERALIZED ANXIETY DISORDER): ICD-10-CM

## 2022-06-07 DIAGNOSIS — R29.898 WEAKNESS OF BOTH LOWER EXTREMITIES: ICD-10-CM

## 2022-06-07 DIAGNOSIS — M54.50 LUMBAR BACK PAIN: Primary | ICD-10-CM

## 2022-06-07 PROCEDURE — 99214 OFFICE O/P EST MOD 30 MIN: CPT | Mod: 95 | Performed by: FAMILY MEDICINE

## 2022-06-07 RX ORDER — GABAPENTIN 100 MG/1
CAPSULE ORAL
Qty: 90 CAPSULE | Refills: 1 | Status: SHIPPED | OUTPATIENT
Start: 2022-06-07 | End: 2022-09-07

## 2022-06-07 NOTE — PATIENT INSTRUCTIONS
Stop the requip in the afternoon.     Gabapentin 100 mg nightly for one week then 200 mg nightly for one week then 300 mg nightly for one week.     MRI of lumber spine ordered. Will receive a call to schedule.

## 2022-06-09 RX ORDER — ATORVASTATIN CALCIUM 20 MG/1
20 TABLET, FILM COATED ORAL DAILY
Qty: 90 TABLET | Refills: 0 | Status: SHIPPED | OUTPATIENT
Start: 2022-06-09 | End: 2022-06-21

## 2022-06-09 NOTE — TELEPHONE ENCOUNTER
Pending Prescriptions:                       Disp   Refills    atorvastatin (LIPITOR) 20 MG tablet [Phar*90 tab*0            Sig: Take 1 tablet (20 mg) by mouth daily    Medication is being filled for 1 time bela refill only due to:  Patient is due for Labs by 8/2022    Provider please place orders  Please assist in scheduling    Thank you!

## 2022-06-09 NOTE — TELEPHONE ENCOUNTER
Patient has lab appt in [place] 6/10/22, please place orders if appropriate, patient is also schduled   Next 5 appointments (look out 90 days)    Jul 27, 2022  5:30 PM  (Arrive by 5:10 PM)  Provider Visit with Liz Peterson MD  [facility] ([facility] ) [address], [address]  [facility] [id number] [phone]        Closing encounter  Emi Vanessa RT (R)

## 2022-06-10 ENCOUNTER — MYC MEDICAL ADVICE (OUTPATIENT)
Dept: FAMILY MEDICINE | Facility: CLINIC | Age: 62
End: 2022-06-10
Payer: COMMERCIAL

## 2022-06-13 ENCOUNTER — LAB (OUTPATIENT)
Dept: LAB | Facility: CLINIC | Age: 62
End: 2022-06-13
Payer: COMMERCIAL

## 2022-06-13 DIAGNOSIS — E78.5 HYPERLIPIDEMIA LDL GOAL <130: ICD-10-CM

## 2022-06-13 DIAGNOSIS — D69.1 PLATELET GRANULE DEFECT (H): ICD-10-CM

## 2022-06-13 LAB
ALBUMIN SERPL-MCNC: 3.9 G/DL (ref 3.4–5)
ALP SERPL-CCNC: 139 U/L (ref 40–150)
ALT SERPL W P-5'-P-CCNC: 21 U/L (ref 0–50)
ANION GAP SERPL CALCULATED.3IONS-SCNC: 7 MMOL/L (ref 3–14)
AST SERPL W P-5'-P-CCNC: 16 U/L (ref 0–45)
BASOPHILS # BLD AUTO: 0.1 10E3/UL (ref 0–0.2)
BASOPHILS NFR BLD AUTO: 1 %
BILIRUB SERPL-MCNC: 0.5 MG/DL (ref 0.2–1.3)
BUN SERPL-MCNC: 17 MG/DL (ref 7–30)
CALCIUM SERPL-MCNC: 9.5 MG/DL (ref 8.5–10.1)
CHLORIDE BLD-SCNC: 105 MMOL/L (ref 94–109)
CO2 SERPL-SCNC: 29 MMOL/L (ref 20–32)
CREAT SERPL-MCNC: 1.23 MG/DL (ref 0.52–1.04)
EOSINOPHIL # BLD AUTO: 0.3 10E3/UL (ref 0–0.7)
EOSINOPHIL NFR BLD AUTO: 4 %
ERYTHROCYTE [DISTWIDTH] IN BLOOD BY AUTOMATED COUNT: 14.1 % (ref 10–15)
GFR SERPL CREATININE-BSD FRML MDRD: 50 ML/MIN/1.73M2
GLUCOSE BLD-MCNC: 139 MG/DL (ref 70–99)
HCT VFR BLD AUTO: 41.4 % (ref 35–47)
HGB BLD-MCNC: 13.9 G/DL (ref 11.7–15.7)
IMM GRANULOCYTES # BLD: 0 10E3/UL
IMM GRANULOCYTES NFR BLD: 0 %
LDH SERPL L TO P-CCNC: 187 U/L (ref 81–234)
LYMPHOCYTES # BLD AUTO: 2.3 10E3/UL (ref 0.8–5.3)
LYMPHOCYTES NFR BLD AUTO: 29 %
MCH RBC QN AUTO: 30.8 PG (ref 26.5–33)
MCHC RBC AUTO-ENTMCNC: 33.6 G/DL (ref 31.5–36.5)
MCV RBC AUTO: 92 FL (ref 78–100)
MONOCYTES # BLD AUTO: 0.5 10E3/UL (ref 0–1.3)
MONOCYTES NFR BLD AUTO: 7 %
NEUTROPHILS # BLD AUTO: 4.7 10E3/UL (ref 1.6–8.3)
NEUTROPHILS NFR BLD AUTO: 59 %
NRBC # BLD AUTO: 0 10E3/UL
NRBC BLD AUTO-RTO: 0 /100
PLATELET # BLD AUTO: 359 10E3/UL (ref 150–450)
POTASSIUM BLD-SCNC: 3.2 MMOL/L (ref 3.4–5.3)
PROT SERPL-MCNC: 8.1 G/DL (ref 6.8–8.8)
RBC # BLD AUTO: 4.52 10E6/UL (ref 3.8–5.2)
RETICS # AUTO: 0.08 10E6/UL (ref 0.03–0.1)
RETICS/RBC NFR AUTO: 1.8 % (ref 0.5–2)
SODIUM SERPL-SCNC: 141 MMOL/L (ref 133–144)
WBC # BLD AUTO: 8 10E3/UL (ref 4–11)

## 2022-06-13 PROCEDURE — 86022 PLATELET ANTIBODIES: CPT | Mod: 90

## 2022-06-13 PROCEDURE — 86340 INTRINSIC FACTOR ANTIBODY: CPT | Mod: 90

## 2022-06-13 PROCEDURE — 80061 LIPID PANEL: CPT

## 2022-06-13 PROCEDURE — 85025 COMPLETE CBC W/AUTO DIFF WBC: CPT

## 2022-06-13 PROCEDURE — 80053 COMPREHEN METABOLIC PANEL: CPT

## 2022-06-13 PROCEDURE — 83516 IMMUNOASSAY NONANTIBODY: CPT | Mod: 90

## 2022-06-13 PROCEDURE — 86023 IMMUNOGLOBULIN ASSAY: CPT

## 2022-06-13 PROCEDURE — 85045 AUTOMATED RETICULOCYTE COUNT: CPT

## 2022-06-13 PROCEDURE — 83615 LACTATE (LD) (LDH) ENZYME: CPT

## 2022-06-13 PROCEDURE — 36415 COLL VENOUS BLD VENIPUNCTURE: CPT

## 2022-06-13 PROCEDURE — 83921 ORGANIC ACID SINGLE QUANT: CPT

## 2022-06-14 ENCOUNTER — MYC MEDICAL ADVICE (OUTPATIENT)
Dept: FAMILY MEDICINE | Facility: CLINIC | Age: 62
End: 2022-06-14
Payer: COMMERCIAL

## 2022-06-14 DIAGNOSIS — R20.0 LEFT LEG NUMBNESS: ICD-10-CM

## 2022-06-14 DIAGNOSIS — R29.898 WEAKNESS OF BOTH LOWER EXTREMITIES: ICD-10-CM

## 2022-06-14 DIAGNOSIS — M54.50 LUMBAR BACK PAIN: Primary | ICD-10-CM

## 2022-06-15 LAB
CHOLEST SERPL-MCNC: 170 MG/DL
HDLC SERPL-MCNC: 51 MG/DL
IF BLOCK AB SER QL RIA: NEGATIVE
LDLC SERPL CALC-MCNC: 80 MG/DL
NONHDLC SERPL-MCNC: 119 MG/DL
PCA IGG SER-ACNC: 28 UNITS
TRIGL SERPL-MCNC: 196 MG/DL

## 2022-06-16 LAB
METHYLMALONATE SERPL-SCNC: 0.18 UMOL/L (ref 0–0.4)
SCANNED LAB RESULT: NORMAL

## 2022-06-20 ENCOUNTER — MYC MEDICAL ADVICE (OUTPATIENT)
Dept: FAMILY MEDICINE | Facility: CLINIC | Age: 62
End: 2022-06-20
Payer: COMMERCIAL

## 2022-06-20 DIAGNOSIS — E78.5 HYPERLIPIDEMIA LDL GOAL <130: ICD-10-CM

## 2022-06-20 NOTE — TELEPHONE ENCOUNTER
Routing to PCP    See Connect message.    MARVA ArreguinN, RN  [facility]  June 20, 2022

## 2022-06-21 RX ORDER — ATORVASTATIN CALCIUM 20 MG/1
20 TABLET, FILM COATED ORAL DAILY
Qty: 90 TABLET | Refills: 0 | OUTPATIENT
Start: 2022-06-21

## 2022-06-21 RX ORDER — ATORVASTATIN CALCIUM 20 MG/1
20 TABLET, FILM COATED ORAL DAILY
Qty: 90 TABLET | Refills: 3 | Status: SHIPPED | OUTPATIENT
Start: 2022-06-21 | End: 2022-09-04 | Stop reason: SINTOL

## 2022-06-21 NOTE — TELEPHONE ENCOUNTER
Was sent on 6/9/22, should not be out at this time, please check profile for any held scripts.    Shazia Fuentes RN  [facility] ~ Registered Nurse  Clinic Triage ~ [place] [facility]  June 21, 2022

## 2022-06-22 ENCOUNTER — MYC MEDICAL ADVICE (OUTPATIENT)
Dept: FAMILY MEDICINE | Facility: CLINIC | Age: 62
End: 2022-06-22

## 2022-06-22 DIAGNOSIS — L03.90 CELLULITIS, UNSPECIFIED CELLULITIS SITE: Primary | ICD-10-CM

## 2022-06-24 ENCOUNTER — MYC MEDICAL ADVICE (OUTPATIENT)
Dept: FAMILY MEDICINE | Facility: CLINIC | Age: 62
End: 2022-06-24

## 2022-06-24 DIAGNOSIS — L03.119 CELLULITIS OF UPPER EXTREMITY, UNSPECIFIED LATERALITY: ICD-10-CM

## 2022-06-24 DIAGNOSIS — I10 HYPERTENSION GOAL BP (BLOOD PRESSURE) < 140/90: ICD-10-CM

## 2022-06-24 DIAGNOSIS — R00.0 SINUS TACHYCARDIA: ICD-10-CM

## 2022-06-24 RX ORDER — DOXYCYCLINE HYCLATE 100 MG
100 TABLET ORAL 2 TIMES DAILY
Qty: 14 TABLET | Refills: 0 | Status: SHIPPED | OUTPATIENT
Start: 2022-06-24 | End: 2022-07-20

## 2022-06-30 RX ORDER — HYDROCHLOROTHIAZIDE 25 MG/1
25 TABLET ORAL DAILY
Qty: 90 TABLET | Refills: 1 | Status: SHIPPED | OUTPATIENT
Start: 2022-06-30 | End: 2023-01-18

## 2022-07-01 ENCOUNTER — MYC MEDICAL ADVICE (OUTPATIENT)
Dept: FAMILY MEDICINE | Facility: CLINIC | Age: 62
End: 2022-07-01

## 2022-07-01 RX ORDER — DOXYCYCLINE 100 MG/1
100 CAPSULE ORAL 2 TIMES DAILY
Qty: 14 CAPSULE | Refills: 0 | Status: SHIPPED | OUTPATIENT
Start: 2022-07-01 | End: 2022-07-20

## 2022-07-04 ENCOUNTER — MYC MEDICAL ADVICE (OUTPATIENT)
Dept: FAMILY MEDICINE | Facility: CLINIC | Age: 62
End: 2022-07-04

## 2022-07-04 NOTE — TELEPHONE ENCOUNTER
Patient Quality Outreach    Patient is due for the following:   Colon Cancer Screening -  FIT-has order but needs to complete  Physical  - due now  Immunizations  -  Zoster   Chronic Pain  - CSA, urine drug screen    NEXT STEPS:   Schedule a yearly physical    Type of outreach:    Sent [employer] message.      Questions for provider review:    Update CSA and review urine drug screen-states discontinued in [facility] but shows due in report due to dx on problem list     Marie Laura, [facility]  Chart routed to Care Team.

## 2022-07-05 DIAGNOSIS — R00.0 SINUS TACHYCARDIA: ICD-10-CM

## 2022-07-05 DIAGNOSIS — R00.2 PALPITATIONS: ICD-10-CM

## 2022-07-05 DIAGNOSIS — K12.0 APHTHAE, ORAL: ICD-10-CM

## 2022-07-06 ENCOUNTER — MYC MEDICAL ADVICE (OUTPATIENT)
Dept: FAMILY MEDICINE | Facility: CLINIC | Age: 62
End: 2022-07-06

## 2022-07-07 ENCOUNTER — MYC MEDICAL ADVICE (OUTPATIENT)
Dept: FAMILY MEDICINE | Facility: CLINIC | Age: 62
End: 2022-07-07

## 2022-07-07 DIAGNOSIS — G89.4 CHRONIC PAIN SYNDROME: ICD-10-CM

## 2022-07-07 DIAGNOSIS — M51.16 LUMBAR DISC DISEASE WITH RADICULOPATHY: Primary | ICD-10-CM

## 2022-07-07 NOTE — TELEPHONE ENCOUNTER
Routing to provider.  Regarding referral.    Please advise.    Shazia Fuentes RN  [facility] ~ Registered Nurse  Clinic Triage ~ [place] [facility]  July 7, 2022

## 2022-07-08 RX ORDER — VALACYCLOVIR HYDROCHLORIDE 500 MG/1
500 TABLET, FILM COATED ORAL DAILY
Qty: 90 TABLET | Refills: 0 | Status: SHIPPED | OUTPATIENT
Start: 2022-07-08 | End: 2022-10-13

## 2022-07-08 RX ORDER — METOPROLOL SUCCINATE 100 MG/1
100 TABLET, EXTENDED RELEASE ORAL 2 TIMES DAILY
Qty: 180 TABLET | Refills: 0 | Status: SHIPPED | OUTPATIENT
Start: 2022-07-08 | End: 2022-10-13

## 2022-07-08 NOTE — TELEPHONE ENCOUNTER
Anjelica  Next 5 appointments (look out 90 days)    Jul 26, 2022  2:00 PM  (Arrive by 1:40 PM)  Provider Visit with Liz Peterson MD  [facility] ([facility] ) [address], [address]  [facility] [id number] [phone]           English

## 2022-07-08 NOTE — TELEPHONE ENCOUNTER
Pain referral placed based on visit from 6/7/2022. Please fax as requested.    Earnest Noel MD  [facility]

## 2022-07-08 NOTE — TELEPHONE ENCOUNTER
[employer] sent   Referral has been faxed to [facility] Fax#[phone]    Closing encounter  Emi Vanessa RT (R)

## 2022-07-11 DIAGNOSIS — D69.1 PLATELET GRANULE DEFECT (H): Primary | ICD-10-CM

## 2022-07-11 NOTE — TELEPHONE ENCOUNTER
[facility] pain clinic called in , they are in need of last 3 office visit notes and mri/ct imaging reports faxed over also. Unable to see pt until those are received.  Fax to number below.

## 2022-07-12 NOTE — TELEPHONE ENCOUNTER
Pt has read [email or web address] message-no further out reach needed by support staff.    Provider please review and advise:    Questions for provider review:    Update CSA and review urine drug screen-states discontinued in [facility] but shows due in report due to dx on problem list. I have updated appt note on 7/23/22 with reminder to *UPDATE CSA* if able to do at that appt.     If no further outreach is needed provider can close when done.     Marie Laura, [facility]  Chart routed to Provider

## 2022-07-14 NOTE — TELEPHONE ENCOUNTER
Last prescription for Tramadol was February 2022.  No need for controlled substance agreement or urine drug screen at this time.  No current plan to continue this prescription.

## 2022-07-18 ENCOUNTER — LAB (OUTPATIENT)
Dept: LAB | Facility: CLINIC | Age: 62
End: 2022-07-18
Payer: COMMERCIAL

## 2022-07-18 DIAGNOSIS — Z12.11 COLON CANCER SCREENING: ICD-10-CM

## 2022-07-18 DIAGNOSIS — D69.1 PLATELET GRANULE DEFECT (H): ICD-10-CM

## 2022-07-18 DIAGNOSIS — Z13.29 SCREENING FOR ENDOCRINE DISORDER: ICD-10-CM

## 2022-07-18 DIAGNOSIS — K12.0 APHTHAE, ORAL: ICD-10-CM

## 2022-07-18 LAB
ABO/RH(D): NORMAL
ALBUMIN SERPL-MCNC: 3.6 G/DL (ref 3.4–5)
ALP SERPL-CCNC: 121 U/L (ref 40–150)
ALT SERPL W P-5'-P-CCNC: 19 U/L (ref 0–50)
ANION GAP SERPL CALCULATED.3IONS-SCNC: 5 MMOL/L (ref 3–14)
ANTIBODY SCREEN: NEGATIVE
APTT PPP: 40 SECONDS (ref 22–38)
AST SERPL W P-5'-P-CCNC: 15 U/L (ref 0–45)
BASOPHILS # BLD AUTO: 0 10E3/UL (ref 0–0.2)
BASOPHILS NFR BLD AUTO: 0 %
BILIRUB SERPL-MCNC: 0.5 MG/DL (ref 0.2–1.3)
BUN SERPL-MCNC: 12 MG/DL (ref 7–30)
CALCIUM SERPL-MCNC: 9.1 MG/DL (ref 8.5–10.1)
CHLORIDE BLD-SCNC: 107 MMOL/L (ref 94–109)
CO2 SERPL-SCNC: 30 MMOL/L (ref 20–32)
CREAT SERPL-MCNC: 1.01 MG/DL (ref 0.52–1.04)
CRP SERPL-MCNC: 8 MG/L (ref 0–8)
EOSINOPHIL # BLD AUTO: 0.2 10E3/UL (ref 0–0.7)
EOSINOPHIL NFR BLD AUTO: 4 %
ERYTHROCYTE [DISTWIDTH] IN BLOOD BY AUTOMATED COUNT: 13.8 % (ref 10–15)
ERYTHROCYTE [SEDIMENTATION RATE] IN BLOOD BY WESTERGREN METHOD: 35 MM/HR (ref 0–30)
FERRITIN SERPL-MCNC: 101 NG/ML (ref 8–252)
GFR SERPL CREATININE-BSD FRML MDRD: 63 ML/MIN/1.73M2
GLUCOSE BLD-MCNC: 112 MG/DL (ref 70–99)
HCT VFR BLD AUTO: 38.1 % (ref 35–47)
HGB BLD-MCNC: 12.8 G/DL (ref 11.7–15.7)
IMM GRANULOCYTES # BLD: 0 10E3/UL
IMM GRANULOCYTES NFR BLD: 0 %
INR PPP: 1.1 (ref 0.85–1.15)
LDH SERPL L TO P-CCNC: 193 U/L (ref 81–234)
LYMPHOCYTES # BLD AUTO: 1.7 10E3/UL (ref 0.8–5.3)
LYMPHOCYTES NFR BLD AUTO: 26 %
MAGNESIUM SERPL-MCNC: 2.1 MG/DL (ref 1.6–2.3)
MCH RBC QN AUTO: 30 PG (ref 26.5–33)
MCHC RBC AUTO-ENTMCNC: 33.6 G/DL (ref 31.5–36.5)
MCV RBC AUTO: 89 FL (ref 78–100)
MONOCYTES # BLD AUTO: 0.4 10E3/UL (ref 0–1.3)
MONOCYTES NFR BLD AUTO: 6 %
NEUTROPHILS # BLD AUTO: 4.2 10E3/UL (ref 1.6–8.3)
NEUTROPHILS NFR BLD AUTO: 64 %
PHOSPHATE SERPL-MCNC: 2.6 MG/DL (ref 2.5–4.5)
PLATELET # BLD AUTO: 330 10E3/UL (ref 150–450)
POTASSIUM BLD-SCNC: 3.6 MMOL/L (ref 3.4–5.3)
PROT SERPL-MCNC: 7.5 G/DL (ref 6.8–8.8)
RBC # BLD AUTO: 4.26 10E6/UL (ref 3.8–5.2)
RETICS # AUTO: 0.09 10E6/UL (ref 0.03–0.1)
RETICS/RBC NFR AUTO: 2 % (ref 0.5–2)
SODIUM SERPL-SCNC: 142 MMOL/L (ref 133–144)
SPECIMEN EXPIRATION DATE: NORMAL
TSH SERPL DL<=0.005 MIU/L-ACNC: 1.08 MU/L (ref 0.4–4)
WBC # BLD AUTO: 6.5 10E3/UL (ref 4–11)

## 2022-07-18 PROCEDURE — 82607 VITAMIN B-12: CPT

## 2022-07-18 PROCEDURE — 86140 C-REACTIVE PROTEIN: CPT

## 2022-07-18 PROCEDURE — 86900 BLOOD TYPING SEROLOGIC ABO: CPT

## 2022-07-18 PROCEDURE — 85652 RBC SED RATE AUTOMATED: CPT

## 2022-07-18 PROCEDURE — 85730 THROMBOPLASTIN TIME PARTIAL: CPT

## 2022-07-18 PROCEDURE — 80050 GENERAL HEALTH PANEL: CPT

## 2022-07-18 PROCEDURE — 84100 ASSAY OF PHOSPHORUS: CPT

## 2022-07-18 PROCEDURE — 82728 ASSAY OF FERRITIN: CPT

## 2022-07-18 PROCEDURE — 85045 AUTOMATED RETICULOCYTE COUNT: CPT

## 2022-07-18 PROCEDURE — 83735 ASSAY OF MAGNESIUM: CPT

## 2022-07-18 PROCEDURE — 83615 LACTATE (LD) (LDH) ENZYME: CPT

## 2022-07-18 PROCEDURE — 85610 PROTHROMBIN TIME: CPT

## 2022-07-18 PROCEDURE — 36415 COLL VENOUS BLD VENIPUNCTURE: CPT

## 2022-07-18 PROCEDURE — 86850 RBC ANTIBODY SCREEN: CPT

## 2022-07-18 PROCEDURE — 85060 BLOOD SMEAR INTERPRETATION: CPT | Performed by: PATHOLOGY

## 2022-07-18 PROCEDURE — 86901 BLOOD TYPING SEROLOGIC RH(D): CPT

## 2022-07-19 LAB — VIT B12 SERPL-MCNC: 1352 PG/ML (ref 232–1245)

## 2022-07-20 ENCOUNTER — VIRTUAL VISIT (OUTPATIENT)
Dept: HEMATOLOGY | Facility: CLINIC | Age: 62
End: 2022-07-20
Attending: INTERNAL MEDICINE
Payer: COMMERCIAL

## 2022-07-20 DIAGNOSIS — D69.1 PLATELET GRANULE DEFECT (H): Primary | ICD-10-CM

## 2022-07-20 PROCEDURE — 99214 OFFICE O/P EST MOD 30 MIN: CPT | Mod: 95 | Performed by: INTERNAL MEDICINE

## 2022-07-20 RX ORDER — PREDNISONE 10 MG/1
10 TABLET ORAL DAILY
Qty: 30 TABLET | Refills: 0 | Status: SHIPPED | OUTPATIENT
Start: 2022-07-20 | End: 2022-08-19

## 2022-07-20 NOTE — PROGRESS NOTES
[facility]  [address] [place] [id number]  Phone: [phone], Fax: [phone]    Outpatient Visit Note:    Patient: Jacquie Amador  MRN: [id number]  : 1960  ISIDRO:22      Reason for Consultation:  Bruising    Assessment:  In summary, Jacquie Amador is a 62 year old woman presenting to clinic due to bruising/bleeding with abnormal platelet studies. 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  Easy bruising and prolonged healing secondary to #1  3.  Eruptive rash/lesions on sun exposed surfaces, porphyria cutanea tarda ruled out, concern for Behcet's disease  4.  Personal history of antiphospholipid antibody syndrome in pregnancy, no records  5.  Osteoarthritis requiring anti-inflammatory medications  6.  Depression requiring use of SSRI medication  7.  Acute on chronic migraines requiring use of triptans and aspirin      Ms. Amador presents with greater than 2 years of increased bruising/presence of lesions on her skin that exhibit prolonged healing.  Prior to her consultation in our clinic she had been evaluated by Dr. Douglas who had sent off PTT.  PTT was prolonged prompting further evaluation as an outpatient.  Evaluation was conducted in 2021.  At this time Jacquie was found to have negative antiphospholipid antibodies.  Jacquie reports a history of 2 miscarriages over 30 years ago that prompted the diagnosis of antiphospholipid antibody syndrome.  She recalls that she was on a steroid pill during her subsequent 2 successful pregnancies.  She does not recall having been on a blood thinner for these pregnancies.  She has no other personal thrombosis history.Evaluation for von Willebrand's, including von Willebrand factor antigen, activity, ristocetin cofactor, and collagen binding  did not find any evidence of acquired or congenital VWF.  Evaluation of antiplatelet antibodies revealed that Jenny has no antibodies against platelet glycoprotein receptors.  However she did have the presence of anti-HLA and anti-GP1a antibodies.  She has no personal history of receiving red cell or platelet transfusions. Jenny had one platelet aggregation study that was done when she was taking Excedrin Migraine which contains aspirin.  1 week later this was study was repeated. The study continued to be abnormal. She continued to have decreased arachidonic acid  As well and deaggregation with ADP and arachidonic acid. And decreased ATP release with thrombin and ADP. This is consistent with diagnosis of acquired qualitative platelet disorder and consistent with her anti-GP1a antibodies.     Trial of steroids with initial improvement in skin lesions- however patient mostly noted improvement in arthralgias and fatigue more.  Have moved to MMF instead of steriods due to hyperglycemia and pre-diabetes-- however she did NOT tolerate this medication.     I suspect she may have Behcet syndrome, she meets criterial based on oral ulcerations (2 points)-   Cutaneous lesions (1 point), and arthritis symptoms. Have been waiting for Rheumatology referral so they can perform pathergen testing. Previous evaluation for uveitis done by Ophthalmology negative per patient report.     Previously have discussed with patient that immunsuppression may help with symptoms- as we have seen modest improvement with steroids--- however long-term steriods have not been helpful. Rituximab is one option- but is NOT reversible. Another option would be azathioprine (Imuran). Will send off testing for S-methyltransferase and nudix hydrolase 15   Prior to starting medication.    Previously discussed that avoiding other medications that can worsen this, including aspirin, NSAID and potentially SSRIs. Right now IF Jacquie needs MAJOR SURGERY---   "She should receive platelet transfusion (1-2 units). She is ok to receive injections.        Plan:  1. Majority of today's visit was spent counseling the patient regarding diagnosis, natural history, management and treatment options   2. Start 10 mg prednisone daily  3. continue B12 replacement  4.   Orders Placed This Encounter   Procedures     [employer]; [id number] (Laboratory Miscellaneous Order)     5. Referral to Rheumatology    The patient is given our center's contact information and is instructed to call if she should have any further questions or concerns.  Follow up on 9/7/22 at 12:30 PM--- add on    Danna Cobb, nurse clinician was involved with the care, education and coordination of patient care.     Patient understands and agrees with the above plan and recommendation.    1:08 PM- no person in video room  1:10 PM- reentry- same  1:14 PM-- doximetry link sent  1:15 PM- visit started  1:43 PM- visit ended      Emely Grimes MD/PhD   of Medicine  Division of Hematology, Oncology and Transplantation  ----------------------------------------------------------------------------------------------------------------------  Interval History:  Jacquie Amador is a 62 year old woman with PMHx of fibromyalgia, anxiety/depression, hypertension and hyperlipidemia. She presented to clinic on 9/15/21 for her first in person evaluation due to bleeding and prolonged wound healing. Please refer to my consult note.     Jacquie has her face, back and hands have a lot of lesions at present. She is also noting increase headaches. They start after she wakes up. She takes Tylenol. Has not taken her migraine medicine. Tylenol decreases it at bit. No change in headache with position. Headache is up and across her head. No ear pain. No jaw pain.     Mouth lesions- currently has some mouth lesions and \"thrush\" lesion on her inside lips. Has been present for a long time. Per patient recollection did " "not change with prednisone. Does sometimes feel that food gets \"stuck\" in her throat. No sores in her vagina or vulva. No anal lesions. Patient endorses significant constipation- having BM 1x week.     Joints- her toes are painful and curling. Her hips and lower back are causing issues. No numbness in her hands or feet- but will feel numb on her thigh.     Past Medical History:  Past Medical History:   Diagnosis Date     ASTHMA - MODERATE PERSISTENT 9/21/2005     Chronic pain      Coronary artery disease      CVA (cerebral infarction)      Depressive disorder, not elsewhere classified      Diabetes (H)      Elevated serum alkaline phosphatase level     Liver source     Fibromyalgia      HYPERLIPIDEMIA NEC/NOS 12/29/2006     Hypertriglyceridemia      OA (osteoarthritis)      Thyroid disease      Trochanteric bursitis      Unspecified essential hypertension      Immunization  Immunization History   Administered Date(s) Administered     COVID-19,PF,Moderna 03/03/2021, 04/01/2021, 11/15/2021     COVID-19,PF,Moderna Booster 05/10/2022     FLU 6-35 months 09/22/2009     Influenza (IIV3) PF 11/09/2000, 12/08/2003, 12/06/2004, 12/08/2005, 11/09/2006, 11/01/2007, 12/04/2008, 09/24/2010, 10/26/2011, 10/26/2012     Influenza Quad, Recombinant, pf(RIV4) (Flublok) 09/30/2020     Influenza Vaccine IM > 6 months Valent IIV4 (Alfuria,Fluzone) 11/14/2014, 11/19/2015, 11/26/2019, 11/15/2021     Pneumococcal 23 valent 11/09/2000, 12/23/2021, 02/25/2022     TD (ADULT, 7+) 11/21/2000     TDAP Vaccine (Adacel) 01/18/2011     Tdap (Adacel,Boostrix) 11/30/2021     Zoster vaccine recombinant adjuvanted (SHINGRIX) 11/26/2019       Past Surgical History:  Past Surgical History:   Procedure Laterality Date     3 teeth pulled       INJECT EPIDURAL TRANSFORAMINAL  11/25/2014    [facility] [place]     INJECT JOINT SACROILIAC  09/23/2014    [facility] [place]     LAMINECTOMY LUMBAR ONE LEVEL Left 04/08/2014    [facility]  DELIVERY ONLY      , Low Cervical       Medications:  Current Outpatient Medications   Medication Sig Dispense Refill     albuterol (PROVENTIL) (2.5 MG/3ML) 0.083% neb solution Take 1 vial (2.5 mg) by nebulization every 6 hours as needed for shortness of breath / dyspnea or wheezing 3 mL 4     albuterol (VENTOLIN HFA) 108 (90 Base) MCG/ACT inhaler INHALE 2 PUFFS INTO THE LUNGS EVERY 6 HOURS AS NEEDED FOR SHORTNESS OF BREATH / DYSPNEA 54 g 0     ALPRAZolam (XANAX) 0.25 MG tablet Take 1 tablet (0.25 mg) by mouth daily as needed for anxiety 30 tablet 0     aluminum chloride (DRYSOL) 20 % external solution Apply topically At Bedtime 60 mL 1     atorvastatin (LIPITOR) 20 MG tablet Take 1 tablet (20 mg) by mouth daily 90 tablet 3     augmented betamethasone dipropionate (DIPROLENE AF) 0.05 % external cream Apply sparingly to affected area twice daily as needed for up to 14 days. 30 g 1     buPROPion (WELLBUTRIN SR) 200 MG 12 hr tablet Take 1 tablet (200 mg) by mouth 2 times daily 180 tablet 1     celecoxib (CELEBREX) 200 MG capsule TAKE ONE CAPSULE BY MOUTH TWICE A DAY AS NEEDED FOR PAIN 180 capsule 1     chlorhexidine (PERIDEX) 0.12 % solution Swish and spit 15 mLs in mouth 2 times daily 1893 mL 4     clindamycin (CLINDAMAX) 1 % external gel Apply topically 2 times daily 30 g 4     clotrimazole (MYCELEX) 10 MG lozenge Place 1 lozenge (10 mg) inside cheek 5 times daily 70 lozenge 1     DULoxetine (CYMBALTA) 60 MG capsule Take 2 capsules (120 mg) by mouth daily 180 capsule 1     gabapentin (NEURONTIN) 100 MG capsule 100 mg nightly for one week, then 200 mg nightly for one week then 300 mg nightly. 90 capsule 1     hydrochlorothiazide (HYDRODIURIL) 25 MG tablet Take 1 tablet (25 mg) by mouth daily 90 tablet 1     losartan (COZAAR) 100 MG tablet Take 1 tablet (100 mg) by mouth daily 90 tablet 1     metoprolol succinate ER (TOPROL XL) 100 MG 24 hr tablet Take 1 tablet (100 mg) by mouth 2  times daily 180 tablet 0     metoprolol succinate ER (TOPROL-XL) 25 MG 24 hr tablet Take 1 tablet (25 mg) by mouth 3 times daily 180 tablet 0     mometasone-formoterol (DULERA) 200-5 MCG/ACT inhaler Inhale 2 puffs into the lungs 2 times daily 39 g 1     montelukast (SINGULAIR) 10 MG tablet Take 1 tablet (10 mg) by mouth At Bedtime 90 tablet 3     multivitamin w/minerals (THERA-VIT-M) tablet Take 1 tablet by mouth daily       mupirocin (BACTROBAN) 2 % external cream Apply to affected area three times daily 60 g 1     nystatin (MYCOSTATIN) 237112 UNIT/ML suspension Take by mouth 4 times daily Has recurrent thrush. Uses for 2 weeks at a time as needed. 380 mL 1     oxybutynin ER (DITROPAN-XL) 5 MG 24 hr tablet Take 2 tablets (10 mg) by mouth daily 180 tablet 1     rizatriptan (MAXALT) 10 MG tablet Take 1 tablet (10 mg) by mouth at onset of headache for migraine May repeat in 2 hours. Max 3 tablets/24 hours. 18 tablet 0     rOPINIRole (REQUIP) 1 MG tablet TAKE THREE TABLETS BY MOUTH AT BEDTIME 270 tablet 1     triamcinolone (ARISTOCORT HP) 0.5 % external cream Apply topically 2 times daily 60 g 0     triamcinolone (KENALOG) 0.1 % paste Take by mouth 2 times daily 5 g 1     valACYclovir (VALTREX) 500 MG tablet Take 1 tablet (500 mg) by mouth daily 90 tablet 0     ZYRTEC 10 MG OR TABS 1 TABLET DAILY 90 3        Allergies:  Allergies   Allergen Reactions     Cats      and rabbits/wheezing     Dogs      wheezing     Lyrica [Pregabalin] Other (See Comments)     Rash, mouth sores, itching and burning     Seasonal Allergies      rhinits       ROS:  Remainder of a comprehensive 10 point ROS is negative unless noted above.    Social History:  Denies any tobacco use. No significant alcohol use. Denies any illicit drug use.     Family History:  Two daughter  31 Yo daughter  28 yo daughter  1 grandson    1 sister- older- high blood glucose, arthritis  1 brother- older- no idea    Mother- - heart valve. Had MDS. Needed a  shot every month  Father- - cancer    Objectives:  GENERAL: alert and no distress  EYES: Eyes grossly normal to inspection.  No discharge or erythema, or obvious scleral/conjunctival abnormalities.  RESP: No audible wheeze, cough, or visible cyanosis.  No visible retractions or increased work of breathing.    SKIN: face covered -hypopigmentation - hands, lips, neck and across face, scar - face and numerous scabs over face. Left wrist with ulceration on dorsal surface (refer to photos in mychart)  NEURO: Cranial nerves grossly intact.  Mentation and speech appropriate for age.  PSYCH: Mentation appears normal, affect normal/bright, judgement and insight intact, normal speech and appearance well-groomed.      Labs:    Ferritin   Date Value Ref Range Status   2022 101 8 - 252 ng/mL Final   2021 376 (H) 8 - 252 ng/mL Final     Iron   Date Value Ref Range Status   09/15/2021 100 35 - 180 ug/dL Final   2021 50 35 - 180 ug/dL Final     Iron Binding Cap   Date Value Ref Range Status   2021 350 240 - 430 ug/dL Final     Iron Binding Capacity   Date Value Ref Range Status   09/15/2021 284 240 - 430 ug/dL Final           21  The INR is normal.   APTT ratio is elevated.   Platelet Neutralization is negative.   APTT 1:2 Mix is normal.   DRVVT Screen ratio is normal.   Thrombin time is normal.   Lupus anticoagulant negative  Anticardiolipin negative    VWF collagen binding normal (send out)  The von Willebrand factor antigen (VWF:Ag), von Willebrand factor   activity (VWF:ACT), and Factor 8 levels are within normal limits.   The Factor 8 to VWF:Ag ratio and the VWF:ACT to VWF:Ag ratio are   within normal limits.      Platelet function COL/EPI >300  Platelet function ADP 77 (normal)    FXIII antigen 137 (norma1)  Alpha-2 antiplasmin 140 (normal  Factor V 78 (normal)  Factor 10 128 (normal)  Factor  (elevated)    21  Abnormal platelet aggregation study. Maximal platelet aggregation is    decreased with Arachidonic acid and within normal limits with 5   micromolar and 10 micromolar ADP, Epinephrine, Collagen, and low and   high concentrations of Ristocetin.  Deaggregation is present with 5   micromolar ADP and Arachidonic acid.  The 1.00 mg/mL Ristocetin   reactions have a slight lag in secondary aggregation.  ATP release   is decreased with Thrombin and 5 micromolar and 10 micromolar ADP.   These findings are consistent with a congenital or acquired platelet   disorders.    Platelet Function Closure Time Col/ADP   Date Value Ref Range Status   03/03/2021 77 <120 sec Final     Comment:     Typical aspirin effect is characterized by prolonged Col/Epi and normal   Col/ADP.       PFA-Col/ADP   Date Value Ref Range Status   11/23/2021 79 <120 Seconds Final     Comment:     Typical aspirin effect is characterized by prolonged Col/Epi and normal Col/ADP   09/28/2021 88 <120 Seconds Final     Comment:     Typical aspirin effect is characterized by prolonged Col/Epi and normal Col/ADP     PLT Funct COL/EPI   Date Value Ref Range Status   03/03/2021 >300 (H) <170 sec Final     Comment:     Effective 12/01/2015, the reference range for this assay has changed.  Causes of a prolonged closure time include platelet dysfunction,vonWillebrand   disease, decreased hematocrit (<35%) and decreased platelet count (<150   x10e9/L).       PFA-Col/Epi   Date Value Ref Range Status   11/23/2021 188 (H) <170 Seconds Final     Refer to Versiti- autoantibody platelet study    Imaging:  Not applicable

## 2022-07-21 ENCOUNTER — TELEPHONE (OUTPATIENT)
Dept: HEMATOLOGY | Facility: CLINIC | Age: 62
End: 2022-07-21

## 2022-07-21 LAB
PATH REPORT.COMMENTS IMP SPEC: NORMAL
PATH REPORT.COMMENTS IMP SPEC: NORMAL
PATH REPORT.FINAL DX SPEC: NORMAL
PATH REPORT.MICROSCOPIC SPEC OTHER STN: NORMAL
PATH REPORT.MICROSCOPIC SPEC OTHER STN: NORMAL
PATH REPORT.RELEVANT HX SPEC: NORMAL

## 2022-07-21 NOTE — TELEPHONE ENCOUNTER
Reached out to Jacquie Amador to schedule lab work per orders by Dr. Emely Grimes.  These will be drawn on 7.26.2022.  Confirmed follow up appt with Dr. Grimes on 9.7.2022 @ 1230 - this will be a virtual visit.    Danna DURHAMN, RN   Nurse Clinician  [facility]  Office: [phone]  Main Office: [phone] (ask to speak with a nurse)  Fax: [phone]

## 2022-07-26 ENCOUNTER — LAB (OUTPATIENT)
Dept: LAB | Facility: CLINIC | Age: 62
End: 2022-07-26
Payer: COMMERCIAL

## 2022-07-26 ENCOUNTER — OFFICE VISIT (OUTPATIENT)
Dept: FAMILY MEDICINE | Facility: CLINIC | Age: 62
End: 2022-07-26

## 2022-07-26 ENCOUNTER — TELEPHONE (OUTPATIENT)
Dept: FAMILY MEDICINE | Facility: CLINIC | Age: 62
End: 2022-07-26

## 2022-07-26 VITALS
DIASTOLIC BLOOD PRESSURE: 82 MMHG | OXYGEN SATURATION: 99 % | WEIGHT: 225 LBS | SYSTOLIC BLOOD PRESSURE: 132 MMHG | HEIGHT: 66 IN | BODY MASS INDEX: 36.16 KG/M2 | HEART RATE: 75 BPM | RESPIRATION RATE: 18 BRPM | TEMPERATURE: 97.2 F

## 2022-07-26 DIAGNOSIS — J45.40 MODERATE PERSISTENT ASTHMA WITHOUT COMPLICATION: ICD-10-CM

## 2022-07-26 DIAGNOSIS — G43.009 MIGRAINE WITHOUT AURA AND WITHOUT STATUS MIGRAINOSUS, NOT INTRACTABLE: ICD-10-CM

## 2022-07-26 DIAGNOSIS — G89.4 CHRONIC PAIN SYNDROME: ICD-10-CM

## 2022-07-26 DIAGNOSIS — R49.0 HOARSENESS: ICD-10-CM

## 2022-07-26 DIAGNOSIS — D69.1 PLATELET GRANULE DEFECT (H): ICD-10-CM

## 2022-07-26 DIAGNOSIS — M19.049 HAND ARTHRITIS: ICD-10-CM

## 2022-07-26 DIAGNOSIS — R00.0 SINUS TACHYCARDIA: ICD-10-CM

## 2022-07-26 DIAGNOSIS — M51.16 LUMBAR DISC DISEASE WITH RADICULOPATHY: Primary | ICD-10-CM

## 2022-07-26 LAB
Lab: NORMAL
PERFORMING LABORATORY: NORMAL
SPECIMEN STATUS: NORMAL
TEST NAME: NORMAL

## 2022-07-26 PROCEDURE — 81335 TPMT GENE COM VARIANTS: CPT

## 2022-07-26 PROCEDURE — 36415 COLL VENOUS BLD VENIPUNCTURE: CPT

## 2022-07-26 PROCEDURE — 99214 OFFICE O/P EST MOD 30 MIN: CPT | Performed by: FAMILY MEDICINE

## 2022-07-26 RX ORDER — METOPROLOL SUCCINATE 25 MG/1
25 TABLET, EXTENDED RELEASE ORAL 3 TIMES DAILY
Qty: 180 TABLET | Refills: 0 | Status: SHIPPED | OUTPATIENT
Start: 2022-07-26 | End: 2022-09-22

## 2022-07-26 RX ORDER — CELECOXIB 200 MG/1
CAPSULE ORAL
Qty: 180 CAPSULE | Refills: 1 | Status: SHIPPED | OUTPATIENT
Start: 2022-07-26 | End: 2023-02-20

## 2022-07-26 RX ORDER — BUDESONIDE AND FORMOTEROL FUMARATE DIHYDRATE 160; 4.5 UG/1; UG/1
2 AEROSOL RESPIRATORY (INHALATION) 2 TIMES DAILY
Qty: 30.6 G | Refills: 3 | Status: SHIPPED | OUTPATIENT
Start: 2022-07-26 | End: 2022-11-01 | Stop reason: ALTCHOICE

## 2022-07-26 RX ORDER — BUDESONIDE AND FORMOTEROL FUMARATE DIHYDRATE 160; 4.5 UG/1; UG/1
2 AEROSOL RESPIRATORY (INHALATION) 2 TIMES DAILY
Qty: 10.2 G | Refills: 3 | Status: SHIPPED | OUTPATIENT
Start: 2022-07-26 | End: 2022-07-26

## 2022-07-26 ASSESSMENT — PAIN SCALES - GENERAL: PAINLEVEL: NO PAIN (0)

## 2022-07-26 NOTE — TELEPHONE ENCOUNTER
Started feeling like crap on Sunday but no symptoms.    On Monday started throwing up and bouts of diarrhea.    Today nothing, feeling better just slightly nauseated.  Has appointment today and told her to keep appointment but if she starts feeling worse with symptoms to let us know.      Shazia Fuentes RN  [facility] ~ Registered Nurse  Clinic Triage ~ [facility]  July 26, 2022

## 2022-07-26 NOTE — PROGRESS NOTES
Assessment & Plan     Lumbar disc disease with radiculopathy  Chronic pain syndrome  Patient with continued pain of the low back sometimes causing a feeling of weakness and sometimes numbness in the left thigh.  She has not completed the MRI that was ordered previously.  She has an appointment tomorrow with pain management.  Anticipate further work-up for her symptoms will be done.  No further intervention today until she has her consultation with pain management.  Patient agrees to plan.    Moderate persistent asthma without complication  Patient has not felt she has had as good control with her asthma as she had when she was on the Advair.  The Advair was stopped due to cost.  She is currently been on Dulera.  She would like to try something different.  She remains on the Singulair.  She uses albuterol as needed.  No abnormality on exam currently.  Will try Symbicort who placed the Dulera.  New prescription was sent.  - budesonide-formoterol (SYMBICORT) 160-4.5 MCG/ACT Inhaler; Inhale 2 puffs into the lungs 2 times daily    Hoarseness  Patient has noticed some hoarseness since she had a recent cough.  It has not resolved.  She will watch this for the next couple weeks and if it does not resolve she will let me know.  Would then recommend ENT evaluation.    Hand arthritis  Patient is requesting a refill on the Celebrex.  Refill was sent.  - celecoxib (CELEBREX) 200 MG capsule; TAKE ONE CAPSULE BY MOUTH TWICE A DAY AS NEEDED FOR PAIN    Sinus tachycardia  Patient has been doing well with this.  No concerns.  Refill sent.  - metoprolol succinate ER (TOPROL XL) 25 MG 24 hr tablet; Take 1 tablet (25 mg) by mouth 3 times daily    Migraine without aura and without status migrainosus, not intractable  Doing well. Well controlled. Tolerating medication.  No change in plan.                     No follow-ups on file.   Follow-up Visit   Expected date:  Sep 26, 2022 (Approximate)      Follow Up Appointment Details:      "Follow-up with whom?: Me    Follow-Up for what?: Chronic Disease f/u    Chronic Disease f/u:  Hypertension Comment - pain  Asthma       How?: In Person                    Liz Peterson MD  [facility] ANISH Dhillon is a 62 year old accompanied by her self, presenting for the following health issues:  Leg Problem      History of Present Illness       Back Pain:  She presents for follow up of back pain. Patient's back pain is a chronic problem.  Location of back pain:  Right lower back, left lower back, right middle of back, left buttock, right hip and left hip  Description of back pain: burning, gnawing, sharp, shooting and stabbing  Back pain spreads: left buttocks and left thigh    Since patient first noticed back pain, pain is: unchanged  Does back pain interfere with her job:  Not applicable      Headaches:   Since the patient's last clinic visit, headaches are: no change  The patient is getting headaches:  Daily  She is not able to do normal daily activities when she has a migraine.  The patient is taking the following rescue/relief medications:  Tylenol and Maxalt   Patient states \"The relief is inconsistent\" from the rescue/relief medications.   The patient is taking the following medications to prevent migraines:  No medications to prevent migraines  In the past 4 weeks, the patient has gone to an Urgent Care or Emergency Room 0 times times due to headaches.She consumes 0 sweetened beverage(s) daily.She exercises with enough effort to increase her heart rate 9 or less minutes per day.  She exercises with enough effort to increase her heart rate 3 or less days per week.   She is taking medications regularly.     PLATELET GRANULE DEFECT  Patient could not tolerate Mycophenalate and has stopped it. Had acne.   Looking for other options for treatment.   Is back on prednisone.   Will be seeing rheumatology due to question of Behcet's.   Has difficulty with healing.     Problems " "with back. Will be going to pain clinic at [place] tomorrow.  Difficult to stand for long periods. Has not had the MRI. When standing for a time can have numbness in the left thigh.  Weakness both legs. Can't get up off the floor well.     B12 was recently better.  Does feel that it helped with her walking since it came up. Still feels a bit off balance.     Skin changes. Was working but once she stopped the Mycophenalate it came back.           Review of Systems   CONSTITUTIONAL: NEGATIVE for fever, chills, change in weight  ENT/MOUTH: She has had some hoarseness for the last couple weeks.  This was after having experienced a cough and now seems to be improving some.  RESP: As above  CV: NEGATIVE for chest pain, palpitations or peripheral edema      Objective    /82 (BP Location: Left arm, Patient Position: Chair, Cuff Size: Adult Regular)   Pulse 75   Temp 97.2  F (36.2  C) (Temporal)   Resp 18   Ht 1.67 m (5' 5.75\")   Wt 102.1 kg (225 lb)   LMP 07/17/2009 (Exact Date)   SpO2 99%   Breastfeeding No   BMI 36.59 kg/m    Body mass index is 36.59 kg/m .  Physical Exam   GENERAL: healthy, alert and no distress  HENT: oropharynx clear and oral mucous membranes moist  NECK: no adenopathy, no asymmetry, masses, or scars and thyroid normal to palpation  RESP: lungs clear to auscultation - no rales, rhonchi or wheezes  CV: regular rate and rhythm, normal S1 S2, no S3 or S4, no murmur, click or rub, no peripheral edema and peripheral pulses strong  MS: no gross musculoskeletal defects noted, no edema  SKIN: Patient with a few open sores noted especially on both hands.  She has some healed areas on her face nothing currently open.  No evidence of infection in these areas.  Comprehensive back pain exam:  Tenderness of Across the low back midline and bilaterally., Lower extremity strength functional and equal on both sides, Lower extremity sensation normal and equal on both sides and Straight leg raise negative " bilaterally                    .  ..

## 2022-07-28 DIAGNOSIS — G25.81 RESTLESS LEG SYNDROME: ICD-10-CM

## 2022-07-28 NOTE — TELEPHONE ENCOUNTER
Pending Prescriptions:                       Disp   Refills    gabapentin (NEURONTIN) 100 MG capsule [Pha*90 cap*0        Si mg nightly for one week, then 200 mg nightly for           one week then 300 mg nightly.        Routing refill request to provider for review/approval because:  Drug not on the [facility] refill protocol   MARVA BaileyN, RN, PHN  [place]  2022

## 2022-07-28 NOTE — TELEPHONE ENCOUNTER
Verify if needed. And dose she is taking. Will give one month.   Pt. Had pain clinic appt. Yesterday- no notes in record, was at [place]. Further refills will come from pain clinic.   JESSICA Donahue CNP

## 2022-07-29 ENCOUNTER — MYC MEDICAL ADVICE (OUTPATIENT)
Dept: FAMILY MEDICINE | Facility: CLINIC | Age: 62
End: 2022-07-29

## 2022-07-29 DIAGNOSIS — M51.16 LUMBAR DISC DISEASE WITH RADICULOPATHY: Primary | ICD-10-CM

## 2022-07-29 NOTE — TELEPHONE ENCOUNTER
RN Called and left message for patient to return call to obtain information Hanh is requesting below.     Marivel Gross RN on 7/29/2022 at 4:00 PM

## 2022-07-29 NOTE — TELEPHONE ENCOUNTER
FYI - Status Update    Who is Calling: patient    Update: patient called and stated that she is not taking gabapatin and decided against the pain clinic because the provider had a low review rating on [facility] pain clinic site. She is waiting on the MRI that was dicussed in virtual visit on 6/07/22 to decide on next steps.     Does caller want a call/response back: Yes     Could we send this information to you in [employer] or would you prefer to receive a phone call?:   Patient would like to be contacted via [employer]       No lymphadedenopathy detailed exam

## 2022-08-01 RX ORDER — GABAPENTIN 100 MG/1
CAPSULE ORAL
Qty: 90 CAPSULE | Refills: 0 | OUTPATIENT
Start: 2022-08-01

## 2022-08-01 NOTE — TELEPHONE ENCOUNTER
MRI was ordered in other encounter. Patient notified in MyChart.     Appears patient does not want refill of the gabapentin.

## 2022-08-01 NOTE — TELEPHONE ENCOUNTER
I will give one month. Need dose verification. No further refills until seen by PCP for this issue and plan to be done. JESSICA Donahue CNP

## 2022-08-04 ENCOUNTER — MYC MEDICAL ADVICE (OUTPATIENT)
Dept: FAMILY MEDICINE | Facility: CLINIC | Age: 62
End: 2022-08-04

## 2022-08-04 DIAGNOSIS — F11.90 CHRONIC, CONTINUOUS USE OF OPIOIDS: ICD-10-CM

## 2022-08-04 DIAGNOSIS — M51.16 LUMBAR DISC DISEASE WITH RADICULOPATHY: Primary | ICD-10-CM

## 2022-08-04 DIAGNOSIS — D69.1 PLATELET GRANULE DEFECT (H): ICD-10-CM

## 2022-08-04 DIAGNOSIS — F51.04 PSYCHOPHYSIOLOGICAL INSOMNIA: ICD-10-CM

## 2022-08-04 LAB — MISCELLANEOUS TEST 1 (ARUP): NORMAL

## 2022-08-04 NOTE — TELEPHONE ENCOUNTER
NOTES Status Details   OFFICE NOTE from referring provider Internal 05.18.2022 Emely Grimes MD   OFFICE NOTE from other specialist     DISCHARGE SUMMARY from hospital     DISCHARGE REPORT from the ER     MEDICATION LIST Internal    LABS (Any and all labs)      Internal    Biopsy/pathology (Anything related to diagnoses I.e. fluid aspirations, lip biopsy, muscle biopsy)               Imaging (All imaging related to diagnoses)     Echo     HRCT     CXR     EMG                    Scleroderma/Dermatomyositis diagnoses     Previous Cardiology notes      Previous Pulmonary notes     Previous Dermatology notes     Previous GI notes       Lupus diagnoses     Previous Nephrology notes     Previous Dermatology notes     Previous Cardiology notes

## 2022-08-04 NOTE — LETTER
Opioid / Opioid Plus Controlled Substance Agreement    This is an agreement between you and your provider about the safe and appropriate use of controlled substance/opioids prescribed by your care team. Controlled substances are medicines that can cause physical and mental dependence (abuse).    There are strict laws about having and using these medicines. We here at [facility] are committing to working with you in your efforts to get better. To support you in this work, we ll help you schedule regular office appointments for medicine refills. If we must cancel or change your appointment for any reason, we ll make sure you have enough medicine to last until your next appointment.     As a Provider, I will:    Listen carefully to your concerns and treat you with respect.     Recommend a treatment plan that I believe is in your best interest. This plan may involve therapies other than opioid pain medication.     Talk with you often about the possible benefits, and the risk of harm of any medicine that we prescribe for you.     Provide a plan on how to taper (discontinue or go off) using this medicine if the decision is made to stop its use.    As a Patient, I understand that opioid(s):     Are a controlled substance prescribed by my care team to help me function or work and manage my condition(s).     Are strong medicines and can cause serious side effects such as:    Drowsiness, which can seriously affect my driving ability    A lower breathing rate, enough to cause death    Harm to my thinking ability     Depression     Abuse of and addiction to this medicine    Need to be taken exactly as prescribed. Combining opioids with certain medicines or chemicals (such as illegal drugs, sedatives, sleeping pills, and benzodiazepines) can be dangerous or even fatal. If I stop opioids suddenly, I may have severe withdrawal symptoms.    Do not work for all types of pain nor for all patients. If they re not helpful, I may  be asked to stop them.    I am also being prescribed a benzodiazepine (tranquilizer) controlled substance: I understand this type of medicine is sedating and can increase the risk of death when taken together with opioids. I have talked to my care team about having prescription Narcan available for reversing the opioid medicine and have been instructed in its use. I will be very careful to take my medicines only as directed    The risks, benefits and side effects of these medicine(s) were explained to me. I agree that:  1. I will take part in other treatments as advised by my care team. This may be psychiatry or counseling, physical therapy, behavioral therapy, group treatment or a referral to a specialist.     2. I will keep all my appointments. I understand that this is part of the monitoring of opioids. My care team may require an office visit for EVERY opioid/controlled substance refill. If I miss appointments or don t follow instructions, my care team may stop my medicine.    3. I will take my medicines as prescribed. I will not change the dose or schedule unless my care team tells me to. There will be no refills if I run out early.     4. I may be asked to come to the clinic and complete a urine drug test or complete a pill count at any time. If I don t give a urine sample or participate in a pill count, the care team may stop my medicine.    5. I will only receive prescriptions from this clinic for chronic pain. If I am treated by another provider for acute pain issues, I will tell them that I am taking opioid pain medication for chronic pain and that I have a treatment agreement with this provider. I will inform my [facility] care team within one business day if I am given a prescription for any pain medication by another healthcare provider. My [facility] care team can contact other providers and pharmacists about my use of any medicines.    6. It is up to me to make sure that I don t run out  of my medicines on weekends or holidays. If my care team is willing to refill my opioid prescription without a visit, I must request refills only during office hours. Refills may take up to 3 business days to process. I will use one pharmacy to fill all my opioid and other controlled substance prescriptions. I will notify the clinic about any changes to my insurance or medication availability.    7. I am responsible for my prescriptions. If the medicine/prescription is lost, stolen or destroyed, it will not be replaced. I also agree not to share controlled substance medicines with anyone.    8. I am aware I should not use any illegal or recreational drugs. I agree not to drink alcohol unless my care team says I can.       9. If I enroll in the [place] [facility] program, I will tell my care team prior to my next refill.     10. I will tell my care team right away if I become pregnant, have a new medical problem treated outside of my regular clinic, or have a change in my medications.    11. I understand that this medicine can affect my thinking, judgment and reaction time. Alcohol and drugs affect the brain and body, which can affect the safety of my driving. Being under the influence of alcohol or drugs can affect my decision-making, behaviors, personal safety, and the safety of others. Driving while impaired (DWI) can occur if a person is driving, operating, or in physical control of a car, motorcycle, boat, snowmobile, ATV, motorbike, off-road vehicle, or any other motor vehicle ([place] Statute 169A.20). I understand the risk if I choose to drive or operate any vehicle or machinery.    I understand that if I do not follow any of the conditions above, my prescriptions or treatment may be stopped or changed.          Opioids  What You Need to Know    What are opioids?   Opioids are pain medicines that must be prescribed by a doctor. They are also known as narcotics.     Examples are:   1. morphine (MS Contin,  Charlene)  2. oxycodone (Oxycontin)  3. oxycodone and acetaminophen (Percocet)  4. hydrocodone and acetaminophen (Vicodin, Norco)   5. fentanyl patch (Duragesic)   6. hydromorphone (Dilaudid)   7. methadone  8. codeine (Tylenol #3)     What do opioids do well?   Opioids are best for severe short-term pain such as after a surgery or injury. They may work well for cancer pain. They may help some people with long-lasting (chronic) pain.     What do opioids NOT do well?   Opioids never get rid of pain entirely, and they don t work well for most patients with chronic pain. Opioids don t reduce swelling, one of the causes of pain.                                    Other ways to manage chronic pain and improve function include:       Treat the health problem that may be causing pain    Anti-inflammation medicines, which reduce swelling and tenderness, such as ibuprofen (Advil, Motrin) or naproxen (Aleve)    Acetaminophen (Tylenol)    Antidepressants and anti-seizure medicines, especially for nerve pain    Topical treatments such as patches or creams    Injections or nerve blocks    Chiropractic or osteopathic treatment    Acupuncture, massage, deep breathing, meditation, visual imagery, aromatherapy    Use heat or ice at the pain site    Physical therapy     Exercise    Stop smoking    Take part in therapy       Risks and side effects     Talk to your doctor before you start or decide to keep taking opioids. Possible side effects include:      Lowering your breathing rate enough to cause death    Overdose, including death, especially if taking higher than prescribed doses    Worse depression symptoms; less pleasure in things you usually enjoy    Feeling tired or sluggish    Slower thoughts or cloudy thinking    Being more sensitive to pain over time; pain is harder to control    Trouble sleeping or restless sleep    Changes in hormone levels (for example, less testosterone)    Changes in sex drive or ability to have  sex    Constipation    Unsafe driving    Itching and sweating    Dizziness    Nausea, throwing up and dry mouth    What else should I know about opioids?    Opioids may lead to dependence, tolerance, or addiction.      Dependence means that if you stop or reduce the medicine too quickly, you will have withdrawal symptoms. These include loose poop (diarrhea), jitters, flu-like symptoms, nervousness and tremors. Dependence is not the same as addiction.                       Tolerance means needing higher doses over time to get the same effect. This may increase the chance of serious side effects.      Addiction is when people improperly use a substance that harms their body, their mind or their relations with others. Use of opiates can cause a relapse of addiction if you have a history of drug or alcohol abuse.      People who have used opioids for a long time may have a lower quality of life, worse depression, higher levels of pain and more visits to doctors.    You can overdose on opioids. Take these steps to lower your risk of overdose:    1. Recognize the signs:  Signs of overdose include decrease or loss of consciousness (blackout), slowed breathing, trouble waking up and blue lips. If someone is worried about overdose, they should call 911.    2. Talk to your doctor about Narcan (naloxone).   If you are at risk for overdose, you may be given a prescription for Narcan. This medicine very quickly reverses the effects of opioids.   If you overdose, a friend or family member can give you Narcan while waiting for the ambulance. They need to know the signs of overdose and how to give Narcan.     3. Don't use alcohol or street drugs.   Taking them with opioids can cause death.    4. Do not take any of these medicines unless your doctor says it s OK. Taking these with opioids can cause death:    Benzodiazepines, such as lorazepam (Ativan), alprazolam (Xanax) or diazepam (Valium)    Muscle relaxers, such as  cyclobenzaprine (Flexeril)    Sleeping pills like zolpidem (Ambien)     Other opioids      How to keep you and other people safe while taking opioids:    1. Never share your opioids with others.  Opioid medicines are regulated by the Drug Enforcement Agency ([employer]). Selling or sharing medications is a criminal act.    2. Be sure to store opioids in a secure place, locked up if possible. Young children can easily swallow them and overdose.    3. When you are traveling with your medicines, keep them in the original bottles. If you use a pill box, be sure you also carry a copy of your medicine list from your clinic or pharmacy.    4. Safe disposal of opioids    Most pharmacies have places to get rid of medicine, called disposal kiosks. Medicine disposal options are also available in every [facility]. Search your county and  medication disposal  to find more options. You can find more details at:  [email or web address]     I agree that my provider, clinic care team, and pharmacy may work with any city, state or federal law enforcement agency that investigates the misuse, sale, or other diversion of my controlled medicine. I will allow my provider to discuss my care with, or share a copy of, this agreement with any other treating provider, pharmacy or emergency room where I receive care.    I have read this agreement and have asked questions about anything I did not understand.    _______________________________________________________  Patient Signature - Jacquie Amador _____________________                   Date     _______________________________________________________  Provider Signature - Liz Peterson MD   _____________________                   Date     _______________________________________________________  Witness Signature (required if provider not present while patient signing)   _____________________                   Date

## 2022-08-05 RX ORDER — TRAZODONE HYDROCHLORIDE 50 MG/1
150 TABLET, FILM COATED ORAL AT BEDTIME
Qty: 270 TABLET | Refills: 0 | Status: SHIPPED | OUTPATIENT
Start: 2022-08-05 | End: 2022-09-22

## 2022-08-05 RX ORDER — PREDNISONE 10 MG/1
10 TABLET ORAL DAILY
Qty: 30 TABLET | Refills: 0 | Status: CANCELLED | OUTPATIENT
Start: 2022-08-05

## 2022-08-08 ENCOUNTER — E-VISIT (OUTPATIENT)
Dept: FAMILY MEDICINE | Facility: CLINIC | Age: 62
End: 2022-08-08
Payer: COMMERCIAL

## 2022-08-08 DIAGNOSIS — L03.90 CELLULITIS, UNSPECIFIED CELLULITIS SITE: Primary | ICD-10-CM

## 2022-08-08 PROCEDURE — 99421 OL DIG E/M SVC 5-10 MIN: CPT | Performed by: FAMILY MEDICINE

## 2022-08-08 RX ORDER — TRAMADOL HYDROCHLORIDE 50 MG/1
50 TABLET ORAL DAILY PRN
Qty: 30 TABLET | Refills: 0 | Status: SHIPPED | OUTPATIENT
Start: 2022-08-08 | End: 2022-08-18

## 2022-08-09 RX ORDER — DOXYCYCLINE 100 MG/1
100 CAPSULE ORAL 2 TIMES DAILY
Qty: 20 CAPSULE | Refills: 0 | Status: SHIPPED | OUTPATIENT
Start: 2022-08-09 | End: 2022-08-19

## 2022-08-12 DIAGNOSIS — G25.9 MOVEMENT DISORDER: ICD-10-CM

## 2022-08-16 ENCOUNTER — MYC MEDICAL ADVICE (OUTPATIENT)
Dept: FAMILY MEDICINE | Facility: CLINIC | Age: 62
End: 2022-08-16

## 2022-08-16 RX ORDER — ROPINIROLE 1 MG/1
TABLET, FILM COATED ORAL
Qty: 270 TABLET | Refills: 1 | Status: SHIPPED | OUTPATIENT
Start: 2022-08-16 | End: 2023-01-03

## 2022-08-17 ENCOUNTER — MYC MEDICAL ADVICE (OUTPATIENT)
Dept: FAMILY MEDICINE | Facility: CLINIC | Age: 62
End: 2022-08-17

## 2022-08-17 ENCOUNTER — MYC MEDICAL ADVICE (OUTPATIENT)
Dept: HEMATOLOGY | Facility: CLINIC | Age: 62
End: 2022-08-17

## 2022-08-17 DIAGNOSIS — G89.4 CHRONIC PAIN SYNDROME: ICD-10-CM

## 2022-08-17 DIAGNOSIS — F11.90 CHRONIC, CONTINUOUS USE OF OPIOIDS: Primary | ICD-10-CM

## 2022-08-17 DIAGNOSIS — D69.1 PLATELET GRANULE DEFECT (H): Primary | ICD-10-CM

## 2022-08-17 DIAGNOSIS — M51.16 LUMBAR DISC DISEASE WITH RADICULOPATHY: ICD-10-CM

## 2022-08-18 RX ORDER — PREDNISONE 10 MG/1
10 TABLET ORAL DAILY
Qty: 30 TABLET | Refills: 0 | Status: SHIPPED | OUTPATIENT
Start: 2022-08-18 | End: 2022-09-21

## 2022-08-18 RX ORDER — TRAMADOL HYDROCHLORIDE 50 MG/1
50 TABLET ORAL DAILY PRN
Qty: 30 TABLET | Refills: 0 | Status: SHIPPED | OUTPATIENT
Start: 2022-08-18 | End: 2022-09-21

## 2022-08-22 ENCOUNTER — PRE VISIT (OUTPATIENT)
Dept: RHEUMATOLOGY | Facility: CLINIC | Age: 62
End: 2022-08-22

## 2022-08-23 ENCOUNTER — MYC MEDICAL ADVICE (OUTPATIENT)
Dept: FAMILY MEDICINE | Facility: CLINIC | Age: 62
End: 2022-08-23

## 2022-09-02 ENCOUNTER — VIRTUAL VISIT (OUTPATIENT)
Dept: FAMILY MEDICINE | Facility: OTHER | Age: 62
End: 2022-09-02
Payer: COMMERCIAL

## 2022-09-02 ENCOUNTER — TELEPHONE (OUTPATIENT)
Dept: FAMILY MEDICINE | Facility: CLINIC | Age: 62
End: 2022-09-02

## 2022-09-02 DIAGNOSIS — U07.1 INFECTION DUE TO 2019 NOVEL CORONAVIRUS: Primary | ICD-10-CM

## 2022-09-02 DIAGNOSIS — R53.83 MALAISE AND FATIGUE: ICD-10-CM

## 2022-09-02 DIAGNOSIS — R05.9 COUGH: ICD-10-CM

## 2022-09-02 DIAGNOSIS — R53.81 MALAISE AND FATIGUE: ICD-10-CM

## 2022-09-02 PROCEDURE — 99213 OFFICE O/P EST LOW 20 MIN: CPT | Mod: CS | Performed by: PHYSICIAN ASSISTANT

## 2022-09-02 RX ORDER — PREDNISONE 20 MG/1
TABLET ORAL
Qty: 20 TABLET | Refills: 0 | Status: SHIPPED | OUTPATIENT
Start: 2022-09-02 | End: 2022-09-22

## 2022-09-02 RX ORDER — BENZONATATE 200 MG/1
200 CAPSULE ORAL 3 TIMES DAILY PRN
Qty: 30 CAPSULE | Refills: 0 | Status: SHIPPED | OUTPATIENT
Start: 2022-09-02 | End: 2023-01-18

## 2022-09-02 NOTE — PROGRESS NOTES
"Jacquie is a 62 year old who is being evaluated via a billable telephone visit.      What phone number would you like to be contacted at? [phone]  How would you like to obtain your AVS? MyChart    Assessment & Plan     Infection due to 2019 novel coronavirus  Malaise and fatigue  Cough  After discussion with the patient and we settled on medications as noted below.  We did discuss doing Paxlovid but the medication interactions as well as side effect profile were less than desirable.  We did discuss the fact that if she did not have a cough in place she probably would not be contacting us for further evaluation and treatment options.  Advised that we use medications as noted below and follow-up with her primary care provider in 3 to 5 days as needed.  - benzonatate (TESSALON) 200 MG capsule; Take 1 capsule (200 mg) by mouth 3 times daily as needed for cough  - predniSONE (DELTASONE) 20 MG tablet; Take 3 tabs by mouth daily x 3 days, then 2 tabs daily x 3 days, then 1 tab daily x 3 days, then 1/2 tab daily x 3 days.   BMI:   Estimated body mass index is 36.59 kg/m  as calculated from the following:    Height as of 7/26/22: 1.67 m (5' 5.75\").    Weight as of 7/26/22: 102.1 kg (225 lb).   Weight management plan: Patient was referred to their PCP to discuss a diet and exercise plan.    Regular exercise  Plenty of fluids rest and vitamin C is encouraged.    No follow-ups on file.    ALYSA Gracia [facility] [place]   Jacquie is a 62 year old, presenting for the following health issues:  Suspected Covid      HPI       COVID-19 Symptom Review  How many days ago did these symptoms start? tuesday    Are any of the following symptoms significant for you?    New or worsening difficulty breathing? No    Worsening cough? Yes, it's a dry cough.     Fever or chills? Yes, I felt feverish or had chills.    Headache: YES    Sore throat: YES- this morning    Chest pain: No    Diarrhea: " No    Body aches? YES    What treatments has patient tried? nothing   Does patient live in a nursing home, group home, or shelter? No  Does patient have a way to get food/medications during quarantined? Yes, I have a friend or family member who can help me.    Review of Systems   Constitutional, HEENT, cardiovascular, pulmonary, GI, , musculoskeletal, neuro, skin, endocrine and psych systems are negative, except as otherwise noted.      Objective    Vitals - Patient Reported  Systolic (Patient Reported): 133  Diastolic (Patient Reported): 78  Weight (Patient Reported): 90.7 kg (200 lb)  SpO2 (Patient Reported): 98  Pulse (Patient Reported): 80  Pain Score: Moderate Pain (5)  Pain Loc: Low Back      Vitals:  No vitals were obtained today due to virtual visit.    Physical Exam   fatigued and mild distress with congested nonproductive cough noted via phone visit  PSYCH: Alert and oriented times 3; coherent speech, normal   rate and volume, able to articulate logical thoughts, able   to abstract reason, no tangential thoughts, no hallucinations   or delusions  Her affect is flat  RESP: congested cough npted, no audible wheezing, struggled with full sentences at one point in our conversation  Remainder of exam unable to be completed due to telephone visits    No results found. However, due to the size of the patient record, not all encounters were searched. Please check Results Review for a complete set of results.          Phone call duration: 8 minutes

## 2022-09-04 ENCOUNTER — MYC MEDICAL ADVICE (OUTPATIENT)
Dept: FAMILY MEDICINE | Facility: OTHER | Age: 62
End: 2022-09-04

## 2022-09-04 ENCOUNTER — NURSE TRIAGE (OUTPATIENT)
Dept: NURSING | Facility: CLINIC | Age: 62
End: 2022-09-04

## 2022-09-04 DIAGNOSIS — U07.1 INFECTION DUE TO 2019 NOVEL CORONAVIRUS: Primary | ICD-10-CM

## 2022-09-04 NOTE — TELEPHONE ENCOUNTER
"Situation: Pt calling with sinus pressure and swelling around cheeks and requesting an antibiotic      Background: Pt tested positive for covid on 8/30/22 and put on prednisone and tessalon for worsening cough. She states she is immunocompromised and was on too many medications to get prescription for paxlovid      Assessment: Cough has still not gotten any better and she is now coughing up \"very green mucous\". She is also having headaches and \"a lot of drainage\" in throat     Protocol Recommended Disposition:   See PCP Within 3 Days-Pt is not willing to come in to be seen and would like an antibiotic prescribed so \"this doesn't get worse\"    Recommendation: On-call, Dr. Jason mariscal who states she needs to be seen in person if she wants an antibiotic. She also advised that she could try taking mucinex DM        Reason for Disposition    Lots of coughing    Additional Information    Negative: SEVERE difficulty breathing (e.g., struggling for each breath, speaks in single words)    Negative: Sounds like a life-threatening emergency to the triager    Negative: [1] Sinus infection AND [2] taking an antibiotic AND [3] symptoms continue    Negative: [1] Difficulty breathing AND [2] not from stuffy nose (e.g., not relieved by cleaning out the nose)    Negative: [1] SEVERE headache AND [2] fever    Negative: [1] Redness or swelling on the cheek, forehead or around the eye AND [2] fever    Negative: Fever > 104 F (40 C)    Negative: Patient sounds very sick or weak to the triager    Negative: [1] SEVERE pain AND [2] not improved 2 hours after pain medicine    Negative: [1] Redness or swelling on the cheek, forehead or around the eye AND [2] no fever    Negative: [1] Fever > 101 F (38.3 C) AND [2] age > 60 years    Negative: [1] Fever > 100.0 F (37.8 C) AND [2] bedridden (e.g., nursing home patient, CVA, chronic illness, recovering from surgery)    Negative: [1] Fever > 100.0 F (37.8 C) AND [2] diabetes mellitus or weak " immune system (e.g., HIV positive, cancer chemo, splenectomy, organ transplant, chronic steroids)    Negative: Fever present > 3 days (72 hours)    Negative: [1] Fever returns after gone for over 24 hours AND [2] symptoms worse or not improved    Negative: [1] Sinus pain (not just congestion) AND [2] fever    Negative: Earache    Negative: [1] Sinus congestion (pressure, fullness) AND [2] present > 10 days    Negative: [1] Nasal discharge AND [2] present > 10 days    Negative: [1] Using nasal washes and pain medicine > 24 hours AND [2] sinus pain (around cheekbone or eye) persists    Protocols used: SINUS PAIN OR CONGESTION-A-AH      Merced Leone RN [facility] September 4, 2022 5:25 PM

## 2022-09-04 NOTE — TELEPHONE ENCOUNTER
"Provider Recommendation Follow Up:   Reached patient/caregiver. Informed of provider's recommendations. Patient verbalized understanding and does not agree with the plan. Pt upset and states \"you guys are suppose to be helping me I am severely immunocompromised and this is not helping me\". Again, recommended that she be seen in person to get the correct treatment after being assessed as there could be something else going on. Pt states \"alright, I'm not happy about it\" and ended the phone call       Merced Leone RN [facility] September 4, 2022 5:35 PM  "

## 2022-09-05 NOTE — PROGRESS NOTES
Called patient and clarified concerns and medications.  Will have her try Paxlovid.  She is to call with questions.  Electronically signed:    Sal Lomax PA-C

## 2022-09-07 ENCOUNTER — VIRTUAL VISIT (OUTPATIENT)
Dept: HEMATOLOGY | Facility: CLINIC | Age: 62
End: 2022-09-07
Attending: INTERNAL MEDICINE
Payer: COMMERCIAL

## 2022-09-07 ENCOUNTER — E-VISIT (OUTPATIENT)
Dept: FAMILY MEDICINE | Facility: CLINIC | Age: 62
End: 2022-09-07
Payer: COMMERCIAL

## 2022-09-07 DIAGNOSIS — D69.1 PLATELET GRANULE DEFECT (H): ICD-10-CM

## 2022-09-07 DIAGNOSIS — K12.0 ORAL APHTHOUS ULCER: ICD-10-CM

## 2022-09-07 DIAGNOSIS — J01.01 ACUTE RECURRENT MAXILLARY SINUSITIS: Primary | ICD-10-CM

## 2022-09-07 DIAGNOSIS — T14.8XXA BRUISING: ICD-10-CM

## 2022-09-07 DIAGNOSIS — D69.3 IDIOPATHIC THROMBOCYTOPENIC PURPURA (H): Primary | ICD-10-CM

## 2022-09-07 DIAGNOSIS — L98.9 SKIN LESION: ICD-10-CM

## 2022-09-07 DIAGNOSIS — D69.1 PLATELET GRANULE DEFECT (H): Primary | ICD-10-CM

## 2022-09-07 PROCEDURE — 99421 OL DIG E/M SVC 5-10 MIN: CPT | Performed by: FAMILY MEDICINE

## 2022-09-07 PROCEDURE — 99215 OFFICE O/P EST HI 40 MIN: CPT | Mod: 95 | Performed by: INTERNAL MEDICINE

## 2022-09-07 NOTE — LETTER
2022       RE: Jacquie Amador  [address] [facility] [address]     Dear Colleague,    Thank you for referring your patient, Jacquie Amador, to the [facility] at [facility]. Please see a copy of my visit note below.        [facility]  [facility] - [address] [place] [address]  Phone: [phone], Fax: [phone]    Outpatient Visit Note:    Patient: Jacquie Amador  MRN: [id number]  : 1960  ISIDRO:[id number]    Reason for Initial Consultation:  Bruising    Assessment:  In summary, Jacquie Amador is a 62 year old woman presenting to clinic due to bruising/bleeding with abnormal platelet studies. 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 Overall clinical picture is highly concerning for underlying autoimmune disorder.    1.  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  Easy bruising and prolonged healing secondary to #1  3.  Eruptive rash/lesions on sun exposed surfaces, porphyria cutanea tarda ruled out, concern for Behcet's disease  4.  Personal history of antiphospholipid antibody syndrome in pregnancy, no records  5.  Osteoarthritis requiring anti-inflammatory medications  6.  Depression requiring use of SSRI medication  7.  Acute on chronic migraines requiring use of triptans and aspirin      Ms. Amador presents with greater than 2 years of increased bruising/presence of lesions on her skin that exhibit prolonged healing.  Prior to her consultation in our clinic she had been evaluated by Dr. Douglas who had sent off PTT.  PTT was prolonged prompting further evaluation as an outpatient. Evaluation for von Willebrand's, including von Willebrand factor antigen, activity, ristocetin cofactor, and collagen binding did not find any evidence of acquired or  congenital VWF.  Evaluation of antiplatelet antibodies revealed that Jenny has no antibodies against platelet glycoprotein receptors.  However she did have the presence of anti-HLA and anti-GP1a antibodies.  She has no personal history of receiving red cell or platelet transfusions. Jenny platelet aggregation study that had decreased arachidonic acid as well and deaggregation with ADP and arachidonic acid and decreased ATP release with thrombin and ADP. This is consistent with diagnosis of acquired qualitative platelet disorder and consistent with her anti-GP1a antibodies.     Trial of steroids with initial improvement in skin lesions- however patient mostly noted improvement in arthralgias and fatigue more.  Have moved to MMF instead of steriods due to hyperglycemia and pre-diabetes-- however she did NOT tolerate this medication.     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 Have been waiting for Rheumatology referral so they can perform pathergen testing. Discussed with on-call Rheumatology today as patient missed previous evaluations due to COVID positive status. Rheumatology has requested patient be evaluated by Dermatology--- will page On-call Dermatology and discuss. Would like biopsy of new/emerging lesions rather than her other crusted ones--- as previous records indicate just scar and Esquivel-Kaylee was suggested- but presence of mouth ulcers and arthralgias as well as autoimmune platelet labs does not fit that picture.     Previous evaluation for uveitis done by Ophthalmology negative per patient report.     Previously have discussed with patient that immunsuppression may help with symptoms- as we have seen modest improvement with steroids--- however long-term steriods have not been helpful. Rituximab is one option- but is NOT reversible. Another option would be azathioprine (Imuran)-  patient is normal for S-methyltransferase and nudix hydrolase 15   Prior to starting medication.    Previously discussed that avoiding other medications that can worsen this, including aspirin, NSAID and potentially SSRIs. Right now IF Jacquie needs MAJOR SURGERY---  She should receive platelet transfusion (1-2 units). She is ok to receive injections.        Plan:  1. Majority of today's visit was spent counseling the patient regarding diagnosis, natural history, management and treatment options   2. Continue prednisone daily  3. continue B12 replacement  4.   No orders of the defined types were placed in this encounter.    5. Referral to Rheumatology  6. Referral to Dermatology    The patient is given our center's contact information and is instructed to call if she should have any further questions or concerns.  Follow up on 10/5/22 at 12:30 PM--- add on- video flo Cobb, nurse clinician was involved with the care, education and coordination of patient care.     Patient understands and agrees with the above plan and recommendation.      Emely Grimes MD/PhD   of Medicine  Division of Hematology, Oncology and Transplantation  ----------------------------------------------------------------------------------------------------------------------  Interval History:  Jacquie Amador is a 62 year old woman with PMHx of fibromyalgia, anxiety/depression, hypertension and hyperlipidemia. She presented to clinic on 9/15/21 for her first in person evaluation due to bleeding and prolonged wound healing. Please refer to my consult note.     Jacquie currently has COVID. She has lost her sence of taste and has cough that is improving. She has been started on higher dose of prednisone which is helping. Cough is bringing up phlegm and is keeping her up at night. This is slowly improving.     Headaches are present. No having body aches. Has some mild ear pain. Was diagnosed with sinus infection and  "will be starting amoxicillin.     Mouth lesions- currently has one large lesion. Jacquie does feel it is getting better with prednisone.     Face and hands- lesions are similar- every morning she will wake up to a bump on her face- may or may not turn into something. Will start like a zit- but not. Lesion is painful. The current ones on her face have not improved. No sores of vulva, vagina or anus.     Eye- last couple month- dry eye bothersome- has been unplugging her tear ducts. Always hurt if she applies cold pack.     Constipation- continues to have issues. Endorses BM x1 week. Takes probiotic when she remembers. May be taking fiber.     Per patient recollection did not change with prednisone. Does sometimes feel that food gets \"stuck\" in her throat. No sores in her vagina or vulva. No anal lesions. Patient endorses significant constipation- having BM 1x week.     Joints- prednisone has made a HUGE difference- it is \"like a new life\". Can do stairs and other activities that she has not been able to do for year.      Past Medical History:  Past Medical History:   Diagnosis Date     ASTHMA - MODERATE PERSISTENT 9/21/2005     Chronic pain      Coronary artery disease      CVA (cerebral infarction)      Depressive disorder, not elsewhere classified      Diabetes (H)      Elevated serum alkaline phosphatase level     Liver source     Fibromyalgia      HYPERLIPIDEMIA NEC/NOS 12/29/2006     Hypertriglyceridemia      OA (osteoarthritis)      Thyroid disease      Trochanteric bursitis      Unspecified essential hypertension      Immunization  Immunization History   Administered Date(s) Administered     COVID-19,PF,Moderna 03/03/2021, 04/01/2021, 11/15/2021     COVID-19,PF,Moderna Booster 05/10/2022     FLU 6-35 months 09/22/2009     Influenza (IIV3) PF 11/09/2000, 12/08/2003, 12/06/2004, 12/08/2005, 11/09/2006, 11/01/2007, 12/04/2008, 09/24/2010, 10/26/2011, 10/26/2012     Influenza Quad, Recombinant, pf(RIV4) (Flublok) " 2020     Influenza Vaccine IM > 6 months Valent IIV4 (Alfuria,Fluzone) 2014, 2015, 2019, 11/15/2021     Pneumococcal 23 valent 2000, 2021, 2022     TD (ADULT, 7+) 2000     TDAP Vaccine (Adacel) 2011     Tdap (Adacel,Boostrix) 2021     Zoster vaccine recombinant adjuvanted (SHINGRIX) 2019       Past Surgical History:  Past Surgical History:   Procedure Laterality Date     3 teeth pulled       INJECT EPIDURAL TRANSFORAMINAL  2014    [facility]     INJECT JOINT SACROILIAC  2014    [facility]     LAMINECTOMY LUMBAR ONE LEVEL Left 2014    [facility]  DELIVERY ONLY      , Low Cervical       Medications:  Current Outpatient Medications   Medication Sig Dispense Refill     albuterol (PROVENTIL) (2.5 MG/3ML) 0.083% neb solution Take 1 vial (2.5 mg) by nebulization every 6 hours as needed for shortness of breath / dyspnea or wheezing 3 mL 4     albuterol (VENTOLIN HFA) 108 (90 Base) MCG/ACT inhaler INHALE 2 PUFFS INTO THE LUNGS EVERY 6 HOURS AS NEEDED FOR SHORTNESS OF BREATH / DYSPNEA 54 g 0     aluminum chloride (DRYSOL) 20 % external solution Apply topically At Bedtime 60 mL 1     amoxicillin-clavulanate (AUGMENTIN) 875-125 MG tablet Take 1 tablet by mouth 2 times daily for 10 days 20 tablet 0     augmented betamethasone dipropionate (DIPROLENE AF) 0.05 % external cream Apply sparingly to affected area twice daily as needed for up to 14 days. 30 g 1     benzonatate (TESSALON) 200 MG capsule Take 1 capsule (200 mg) by mouth 3 times daily as needed for cough 30 capsule 0     budesonide-formoterol (SYMBICORT) 160-4.5 MCG/ACT Inhaler Inhale 2 puffs into the lungs 2 times daily 30.6 g 3     buPROPion (WELLBUTRIN SR) 200 MG 12 hr tablet Take 1 tablet (200 mg) by mouth 2 times daily 180 tablet 1     celecoxib (CELEBREX) 200 MG capsule TAKE ONE CAPSULE BY MOUTH TWICE A DAY  AS NEEDED FOR PAIN 180 capsule 1     chlorhexidine (PERIDEX) 0.12 % solution Swish and spit 15 mLs in mouth 2 times daily 1893 mL 4     clindamycin (CLINDAMAX) 1 % external gel Apply topically 2 times daily 30 g 4     clotrimazole (MYCELEX) 10 MG lozenge Place 1 lozenge (10 mg) inside cheek 5 times daily 70 lozenge 1     DULoxetine (CYMBALTA) 60 MG capsule Take 2 capsules (120 mg) by mouth daily 180 capsule 1     hydrochlorothiazide (HYDRODIURIL) 25 MG tablet Take 1 tablet (25 mg) by mouth daily 90 tablet 1     losartan (COZAAR) 100 MG tablet Take 1 tablet (100 mg) by mouth daily 90 tablet 1     metoprolol succinate ER (TOPROL XL) 100 MG 24 hr tablet Take 1 tablet (100 mg) by mouth 2 times daily 180 tablet 0     metoprolol succinate ER (TOPROL XL) 25 MG 24 hr tablet Take 1 tablet (25 mg) by mouth 3 times daily 180 tablet 0     montelukast (SINGULAIR) 10 MG tablet Take 1 tablet (10 mg) by mouth At Bedtime 90 tablet 3     multivitamin w/minerals (THERA-VIT-M) tablet Take 1 tablet by mouth daily       mupirocin (BACTROBAN) 2 % external cream Apply to affected area three times daily 60 g 1     nystatin (MYCOSTATIN) 909240 UNIT/ML suspension Take by mouth 4 times daily Has recurrent thrush. Uses for 2 weeks at a time as needed. 380 mL 1     oxybutynin ER (DITROPAN-XL) 5 MG 24 hr tablet Take 2 tablets (10 mg) by mouth daily 180 tablet 1     predniSONE (DELTASONE) 10 MG tablet Take 1 tablet (10 mg) by mouth daily 30 tablet 0     predniSONE (DELTASONE) 20 MG tablet Take 3 tabs by mouth daily x 3 days, then 2 tabs daily x 3 days, then 1 tab daily x 3 days, then 1/2 tab daily x 3 days. 20 tablet 0     rizatriptan (MAXALT) 10 MG tablet Take 1 tablet (10 mg) by mouth at onset of headache for migraine May repeat in 2 hours. Max 3 tablets/24 hours. 18 tablet 0     rOPINIRole (REQUIP) 1 MG tablet TAKE THREE TABLETS BY MOUTH AT BEDTIME 270 tablet 1     traMADol (ULTRAM) 50 MG tablet Take 1 tablet (50 mg) by mouth daily as needed  for severe pain 30 tablet 0     traZODone (DESYREL) 50 MG tablet Take 3 tablets (150 mg) by mouth At Bedtime 270 tablet 0     triamcinolone (ARISTOCORT HP) 0.5 % external cream Apply topically 2 times daily 60 g 0     valACYclovir (VALTREX) 500 MG tablet Take 1 tablet (500 mg) by mouth daily 90 tablet 0     ZYRTEC 10 MG OR TABS 1 TABLET DAILY 90 3        Allergies:  Allergies   Allergen Reactions     Cats      and rabbits/wheezing     Dogs      wheezing     Lyrica [Pregabalin] Other (See Comments)     Rash, mouth sores, itching and burning     Seasonal Allergies      rhinits       ROS:  Remainder of a comprehensive 10 point ROS is negative unless noted above.    Social History:  Denies any tobacco use. No significant alcohol use. Denies any illicit drug use.     Family History:  Two daughter  31 Yo daughter  28 yo daughter  1 grandson    1 sister- older- high blood glucose, arthritis  1 brother- older- no idea    Mother- - heart valve. Had MDS. Needed a shot every month  Father- - cancer    Objectives:  GENERAL: alert and no distress  EYES: Eyes grossly normal to inspection.  No discharge or erythema, or obvious scleral/conjunctival abnormalities.  RESP: No audible wheeze, cough, or visible cyanosis.  No visible retractions or increased work of breathing.    SKIN: face covered -hypopigmentation - hands, lips, neck and across face, scar - face and numerous scabs over face. Left wrist with ulceration on dorsal surface (refer to photos in mychart)  NEURO: Cranial nerves grossly intact.  Mentation and speech appropriate for age.  PSYCH: Mentation appears normal, affect normal/bright, judgement and insight intact, normal speech and appearance well-groomed.      Labs:    Ferritin   Date Value Ref Range Status   2022 101 8 - 252 ng/mL Final   2021 376 (H) 8 - 252 ng/mL Final     Iron   Date Value Ref Range Status   09/15/2021 100 35 - 180 ug/dL Final   2021 50 35 - 180 ug/dL Final      Iron Binding Cap   Date Value Ref Range Status   01/20/2021 350 240 - 430 ug/dL Final     Iron Binding Capacity   Date Value Ref Range Status   09/15/2021 284 240 - 430 ug/dL Final           4/16/21  The INR is normal.   APTT ratio is elevated.   Platelet Neutralization is negative.   APTT 1:2 Mix is normal.   DRVVT Screen ratio is normal.   Thrombin time is normal.   Lupus anticoagulant negative  Anticardiolipin negative    VWF collagen binding normal (send out)  The von Willebrand factor antigen (VWF:Ag), von Willebrand factor   activity (VWF:ACT), and Factor 8 levels are within normal limits.   The Factor 8 to VWF:Ag ratio and the VWF:ACT to VWF:Ag ratio are   within normal limits.      Platelet function COL/EPI >300  Platelet function ADP 77 (normal)    FXIII antigen 137 (norma1)  Alpha-2 antiplasmin 140 (normal  Factor V 78 (normal)  Factor 10 128 (normal)  Factor  (elevated)    4/29/21  Abnormal platelet aggregation study. Maximal platelet aggregation is   decreased with Arachidonic acid and within normal limits with 5   micromolar and 10 micromolar ADP, Epinephrine, Collagen, and low and   high concentrations of Ristocetin.  Deaggregation is present with 5   micromolar ADP and Arachidonic acid.  The 1.00 mg/mL Ristocetin   reactions have a slight lag in secondary aggregation.  ATP release   is decreased with Thrombin and 5 micromolar and 10 micromolar ADP.   These findings are consistent with a congenital or acquired platelet   disorders.    Platelet Function Closure Time Col/ADP   Date Value Ref Range Status   03/03/2021 77 <120 sec Final     Comment:     Typical aspirin effect is characterized by prolonged Col/Epi and normal   Col/ADP.       PFA-Col/ADP   Date Value Ref Range Status   11/23/2021 79 <120 Seconds Final     Comment:     Typical aspirin effect is characterized by prolonged Col/Epi and normal Col/ADP   09/28/2021 88 <120 Seconds Final     Comment:     Typical aspirin effect is  characterized by prolonged Col/Epi and normal Col/ADP     PLT Funct COL/EPI   Date Value Ref Range Status   03/03/2021 >300 (H) <170 sec Final     Comment:     Effective 12/01/2015, the reference range for this assay has changed.  Causes of a prolonged closure time include platelet dysfunction,vonWillebrand   disease, decreased hematocrit (<35%) and decreased platelet count (<150   x10e9/L).       PFA-Col/Epi   Date Value Ref Range Status   11/23/2021 188 (H) <170 Seconds Final     Refer to Versiti- autoantibody platelet study    Imaging:  Not applicable        Sincerely,    Emely Grimes MD

## 2022-09-07 NOTE — PATIENT INSTRUCTIONS
Bebe Dhillon to hear you have COVID.     After reviewing your responses, I've been able to diagnose you with?a sinus infection caused by bacteria.? It is also possible this is related to COVID and not bacterial.      Based on your responses and diagnosis, I have prescribed Augmentin for 10 days to treat your symptoms. I have sent this to your pharmacy.?     It is also important to stay well hydrated, get lots of rest and take over-the-counter decongestants,?tylenol?or ibuprofen if you?are able to?take those medications per your primary care provider to help relieve discomfort.?     It is important that you take?all of?your prescribed medication even if your symptoms are improving after a few doses.? Taking?all of?your medicine helps prevent the symptoms from returning.?     If your symptoms worsen, you develop severe headache, vomiting, high fever (>102), or are not improving in 7 days, please contact your primary care provider for an appointment or visit any of our convenient Walk-in Care or Urgent Care Centers to be seen which can be found on our website?here.?     Thanks again for choosing?us?as your health care partner,?   ?  Liz Peterson MD?

## 2022-09-07 NOTE — PROGRESS NOTES
[facility]  [address] [place] [address]  Phone: [phone], Fax: [phone]    Outpatient Visit Note:    Patient: Jacquie Amador  MRN: [id number]  : 1960  ISIDRO:[id number]    Reason for Initial Consultation:  Bruising    Assessment:  In summary, Jacquie Amador is a 62 year old woman presenting to clinic due to bruising/bleeding with abnormal platelet studies. 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 Overall clinical picture is highly concerning for underlying autoimmune disorder.    1.  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  Easy bruising and prolonged healing secondary to #1  3.  Eruptive rash/lesions on sun exposed surfaces, porphyria cutanea tarda ruled out, concern for Behcet's disease  4.  Personal history of antiphospholipid antibody syndrome in pregnancy, no records  5.  Osteoarthritis requiring anti-inflammatory medications  6.  Depression requiring use of SSRI medication  7.  Acute on chronic migraines requiring use of triptans and aspirin      Ms. Amador presents with greater than 2 years of increased bruising/presence of lesions on her skin that exhibit prolonged healing.  Prior to her consultation in our clinic she had been evaluated by Dr. Douglas who had sent off PTT.  PTT was prolonged prompting further evaluation as an outpatient. Evaluation for von Willebrand's, including von Willebrand factor antigen, activity, ristocetin cofactor, and collagen binding did not find any evidence of acquired or congenital VWF.  Evaluation of antiplatelet antibodies revealed that Jenny has no antibodies against platelet glycoprotein receptors.  However she did have the presence of anti-HLA and anti-GP1a antibodies.  She has no personal history of receiving red cell or platelet transfusions. Jenny platelet aggregation study that had  decreased arachidonic acid as well and deaggregation with ADP and arachidonic acid and decreased ATP release with thrombin and ADP. This is consistent with diagnosis of acquired qualitative platelet disorder and consistent with her anti-GP1a antibodies.     Trial of steroids with initial improvement in skin lesions- however patient mostly noted improvement in arthralgias and fatigue more.  Have moved to MMF instead of steriods due to hyperglycemia and pre-diabetes-- however she did NOT tolerate this medication.     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 Have been waiting for Rheumatology referral so they can perform pathergen testing. Discussed with on-call Rheumatology today as patient missed previous evaluations due to COVID positive status. Rheumatology has requested patient be evaluated by Dermatology--- will page On-call Dermatology and discuss. Would like biopsy of new/emerging lesions rather than her other crusted ones--- as previous records indicate just scar and Esquivel-Kaylee was suggested- but presence of mouth ulcers and arthralgias as well as autoimmune platelet labs does not fit that picture.     Previous evaluation for uveitis done by Ophthalmology negative per patient report.     Previously have discussed with patient that immunsuppression may help with symptoms- as we have seen modest improvement with steroids--- however long-term steriods have not been helpful. Rituximab is one option- but is NOT reversible. Another option would be azathioprine (Imuran)- patient is normal for S-methyltransferase and nudix hydrolase 15   Prior to starting medication.    Previously discussed that avoiding other medications that can worsen this, including aspirin, NSAID and potentially SSRIs. Right now IF Jacquie needs MAJOR SURGERY---  She should receive platelet transfusion (1-2 units). She is ok to  receive injections.        Plan:  1. Majority of today's visit was spent counseling the patient regarding diagnosis, natural history, management and treatment options   2. Continue prednisone daily  3. continue B12 replacement  4.   No orders of the defined types were placed in this encounter.    5. Referral to Rheumatology  6. Referral to Dermatology    The patient is given our center's contact information and is instructed to call if she should have any further questions or concerns.  Follow up on 10/5/22 at 12:30 PM--- add on- video flo Cobb, nurse clinician was involved with the care, education and coordination of patient care.     Patient understands and agrees with the above plan and recommendation.      Emely Grimes MD/PhD   of Medicine  Division of Hematology, Oncology and Transplantation  ----------------------------------------------------------------------------------------------------------------------  Interval History:  Jacquie Amador is a 62 year old woman with PMHx of fibromyalgia, anxiety/depression, hypertension and hyperlipidemia. She presented to clinic on 9/15/21 for her first in person evaluation due to bleeding and prolonged wound healing. Please refer to my consult note.     Jacquie currently has COVID. She has lost her sence of taste and has cough that is improving. She has been started on higher dose of prednisone which is helping. Cough is bringing up phlegm and is keeping her up at night. This is slowly improving.     Headaches are present. No having body aches. Has some mild ear pain. Was diagnosed with sinus infection and will be starting amoxicillin.     Mouth lesions- currently has one large lesion. Jacquie does feel it is getting better with prednisone.     Face and hands- lesions are similar- every morning she will wake up to a bump on her face- may or may not turn into something. Will start like a zit- but not. Lesion is painful. The current  "ones on her face have not improved. No sores of vulva, vagina or anus.     Eye- last couple month- dry eye bothersome- has been unplugging her tear ducts. Always hurt if she applies cold pack.     Constipation- continues to have issues. Endorses BM x1 week. Takes probiotic when she remembers. May be taking fiber.     Per patient recollection did not change with prednisone. Does sometimes feel that food gets \"stuck\" in her throat. No sores in her vagina or vulva. No anal lesions. Patient endorses significant constipation- having BM 1x week.     Joints- prednisone has made a HUGE difference- it is \"like a new life\". Can do stairs and other activities that she has not been able to do for year.      Past Medical History:  Past Medical History:   Diagnosis Date     ASTHMA - MODERATE PERSISTENT 9/21/2005     Chronic pain      Coronary artery disease      CVA (cerebral infarction)      Depressive disorder, not elsewhere classified      Diabetes (H)      Elevated serum alkaline phosphatase level     Liver source     Fibromyalgia      HYPERLIPIDEMIA NEC/NOS 12/29/2006     Hypertriglyceridemia      OA (osteoarthritis)      Thyroid disease      Trochanteric bursitis      Unspecified essential hypertension      Immunization  Immunization History   Administered Date(s) Administered     COVID-19,PF,Moderna 03/03/2021, 04/01/2021, 11/15/2021     COVID-19,PF,Moderna Booster 05/10/2022     FLU 6-35 months 09/22/2009     Influenza (IIV3) PF 11/09/2000, 12/08/2003, 12/06/2004, 12/08/2005, 11/09/2006, 11/01/2007, 12/04/2008, 09/24/2010, 10/26/2011, 10/26/2012     Influenza Quad, Recombinant, pf(RIV4) (Flublok) 09/30/2020     Influenza Vaccine IM > 6 months Valent IIV4 (Alfuria,Fluzone) 11/14/2014, 11/19/2015, 11/26/2019, 11/15/2021     Pneumococcal 23 valent 11/09/2000, 12/23/2021, 02/25/2022     TD (ADULT, 7+) 11/21/2000     TDAP Vaccine (Adacel) 01/18/2011     Tdap (Adacel,Boostrix) 11/30/2021     Zoster vaccine recombinant " adjuvanted (SHINGRIX) 2019       Past Surgical History:  Past Surgical History:   Procedure Laterality Date     3 teeth pulled       INJECT EPIDURAL TRANSFORAMINAL  2014    [facility] [place]     INJECT JOINT SACROILIAC  2014    [facility] [place]     LAMINECTOMY LUMBAR ONE LEVEL Left 2014    [facility]  DELIVERY ONLY      , Low Cervical       Medications:  Current Outpatient Medications   Medication Sig Dispense Refill     albuterol (PROVENTIL) (2.5 MG/3ML) 0.083% neb solution Take 1 vial (2.5 mg) by nebulization every 6 hours as needed for shortness of breath / dyspnea or wheezing 3 mL 4     albuterol (VENTOLIN HFA) 108 (90 Base) MCG/ACT inhaler INHALE 2 PUFFS INTO THE LUNGS EVERY 6 HOURS AS NEEDED FOR SHORTNESS OF BREATH / DYSPNEA 54 g 0     aluminum chloride (DRYSOL) 20 % external solution Apply topically At Bedtime 60 mL 1     amoxicillin-clavulanate (AUGMENTIN) 875-125 MG tablet Take 1 tablet by mouth 2 times daily for 10 days 20 tablet 0     augmented betamethasone dipropionate (DIPROLENE AF) 0.05 % external cream Apply sparingly to affected area twice daily as needed for up to 14 days. 30 g 1     benzonatate (TESSALON) 200 MG capsule Take 1 capsule (200 mg) by mouth 3 times daily as needed for cough 30 capsule 0     budesonide-formoterol (SYMBICORT) 160-4.5 MCG/ACT Inhaler Inhale 2 puffs into the lungs 2 times daily 30.6 g 3     buPROPion (WELLBUTRIN SR) 200 MG 12 hr tablet Take 1 tablet (200 mg) by mouth 2 times daily 180 tablet 1     celecoxib (CELEBREX) 200 MG capsule TAKE ONE CAPSULE BY MOUTH TWICE A DAY AS NEEDED FOR PAIN 180 capsule 1     chlorhexidine (PERIDEX) 0.12 % solution Swish and spit 15 mLs in mouth 2 times daily 1893 mL 4     clindamycin (CLINDAMAX) 1 % external gel Apply topically 2 times daily 30 g 4     clotrimazole (MYCELEX) 10 MG lozenge Place 1 lozenge (10 mg) inside cheek 5 times daily 70 lozenge 1      DULoxetine (CYMBALTA) 60 MG capsule Take 2 capsules (120 mg) by mouth daily 180 capsule 1     hydrochlorothiazide (HYDRODIURIL) 25 MG tablet Take 1 tablet (25 mg) by mouth daily 90 tablet 1     losartan (COZAAR) 100 MG tablet Take 1 tablet (100 mg) by mouth daily 90 tablet 1     metoprolol succinate ER (TOPROL XL) 100 MG 24 hr tablet Take 1 tablet (100 mg) by mouth 2 times daily 180 tablet 0     metoprolol succinate ER (TOPROL XL) 25 MG 24 hr tablet Take 1 tablet (25 mg) by mouth 3 times daily 180 tablet 0     montelukast (SINGULAIR) 10 MG tablet Take 1 tablet (10 mg) by mouth At Bedtime 90 tablet 3     multivitamin w/minerals (THERA-VIT-M) tablet Take 1 tablet by mouth daily       mupirocin (BACTROBAN) 2 % external cream Apply to affected area three times daily 60 g 1     nystatin (MYCOSTATIN) 129377 UNIT/ML suspension Take by mouth 4 times daily Has recurrent thrush. Uses for 2 weeks at a time as needed. 380 mL 1     oxybutynin ER (DITROPAN-XL) 5 MG 24 hr tablet Take 2 tablets (10 mg) by mouth daily 180 tablet 1     predniSONE (DELTASONE) 10 MG tablet Take 1 tablet (10 mg) by mouth daily 30 tablet 0     predniSONE (DELTASONE) 20 MG tablet Take 3 tabs by mouth daily x 3 days, then 2 tabs daily x 3 days, then 1 tab daily x 3 days, then 1/2 tab daily x 3 days. 20 tablet 0     rizatriptan (MAXALT) 10 MG tablet Take 1 tablet (10 mg) by mouth at onset of headache for migraine May repeat in 2 hours. Max 3 tablets/24 hours. 18 tablet 0     rOPINIRole (REQUIP) 1 MG tablet TAKE THREE TABLETS BY MOUTH AT BEDTIME 270 tablet 1     traMADol (ULTRAM) 50 MG tablet Take 1 tablet (50 mg) by mouth daily as needed for severe pain 30 tablet 0     traZODone (DESYREL) 50 MG tablet Take 3 tablets (150 mg) by mouth At Bedtime 270 tablet 0     triamcinolone (ARISTOCORT HP) 0.5 % external cream Apply topically 2 times daily 60 g 0     valACYclovir (VALTREX) 500 MG tablet Take 1 tablet (500 mg) by mouth daily 90 tablet 0     ZYRTEC 10 MG OR  TABS 1 TABLET DAILY 90 3        Allergies:  Allergies   Allergen Reactions     Cats      and rabbits/wheezing     Dogs      wheezing     Lyrica [Pregabalin] Other (See Comments)     Rash, mouth sores, itching and burning     Seasonal Allergies      rhinits       ROS:  Remainder of a comprehensive 10 point ROS is negative unless noted above.    Social History:  Denies any tobacco use. No significant alcohol use. Denies any illicit drug use.     Family History:  Two daughter  31 Yo daughter  28 yo daughter  1 grandson    1 sister- older- high blood glucose, arthritis  1 brother- older- no idea    Mother- - heart valve. Had MDS. Needed a shot every month  Father- - cancer    Objectives:  GENERAL: alert and no distress  EYES: Eyes grossly normal to inspection.  No discharge or erythema, or obvious scleral/conjunctival abnormalities.  RESP: No audible wheeze, cough, or visible cyanosis.  No visible retractions or increased work of breathing.    SKIN: face covered -hypopigmentation - hands, lips, neck and across face, scar - face and numerous scabs over face. Left wrist with ulceration on dorsal surface (refer to photos in mychart)  NEURO: Cranial nerves grossly intact.  Mentation and speech appropriate for age.  PSYCH: Mentation appears normal, affect normal/bright, judgement and insight intact, normal speech and appearance well-groomed.      Labs:    Ferritin   Date Value Ref Range Status   2022 101 8 - 252 ng/mL Final   2021 376 (H) 8 - 252 ng/mL Final     Iron   Date Value Ref Range Status   09/15/2021 100 35 - 180 ug/dL Final   2021 50 35 - 180 ug/dL Final     Iron Binding Cap   Date Value Ref Range Status   2021 350 240 - 430 ug/dL Final     Iron Binding Capacity   Date Value Ref Range Status   09/15/2021 284 240 - 430 ug/dL Final           21  The INR is normal.   APTT ratio is elevated.   Platelet Neutralization is negative.   APTT 1:2 Mix is normal.   DRVVT Screen  ratio is normal.   Thrombin time is normal.   Lupus anticoagulant negative  Anticardiolipin negative    VWF collagen binding normal (send out)  The von Willebrand factor antigen (VWF:Ag), von Willebrand factor   activity (VWF:ACT), and Factor 8 levels are within normal limits.   The Factor 8 to VWF:Ag ratio and the VWF:ACT to VWF:Ag ratio are   within normal limits.      Platelet function COL/EPI >300  Platelet function ADP 77 (normal)    FXIII antigen 137 (norma1)  Alpha-2 antiplasmin 140 (normal  Factor V 78 (normal)  Factor 10 128 (normal)  Factor  (elevated)    4/29/21  Abnormal platelet aggregation study. Maximal platelet aggregation is   decreased with Arachidonic acid and within normal limits with 5   micromolar and 10 micromolar ADP, Epinephrine, Collagen, and low and   high concentrations of Ristocetin.  Deaggregation is present with 5   micromolar ADP and Arachidonic acid.  The 1.00 mg/mL Ristocetin   reactions have a slight lag in secondary aggregation.  ATP release   is decreased with Thrombin and 5 micromolar and 10 micromolar ADP.   These findings are consistent with a congenital or acquired platelet   disorders.    Platelet Function Closure Time Col/ADP   Date Value Ref Range Status   03/03/2021 77 <120 sec Final     Comment:     Typical aspirin effect is characterized by prolonged Col/Epi and normal   Col/ADP.       PFA-Col/ADP   Date Value Ref Range Status   11/23/2021 79 <120 Seconds Final     Comment:     Typical aspirin effect is characterized by prolonged Col/Epi and normal Col/ADP   09/28/2021 88 <120 Seconds Final     Comment:     Typical aspirin effect is characterized by prolonged Col/Epi and normal Col/ADP     PLT Funct COL/EPI   Date Value Ref Range Status   03/03/2021 >300 (H) <170 sec Final     Comment:     Effective 12/01/2015, the reference range for this assay has changed.  Causes of a prolonged closure time include platelet dysfunction,vonWillebrand   disease, decreased  hematocrit (<35%) and decreased platelet count (<150   x10e9/L).       PFA-Col/Epi   Date Value Ref Range Status   11/23/2021 188 (H) <170 Seconds Final     Refer to Versiti- autoantibody platelet study    Imaging:  Not applicable

## 2022-09-08 ENCOUNTER — TELEPHONE (OUTPATIENT)
Dept: RHEUMATOLOGY | Facility: CLINIC | Age: 62
End: 2022-09-08

## 2022-09-08 ENCOUNTER — MYC MEDICAL ADVICE (OUTPATIENT)
Dept: FAMILY MEDICINE | Facility: CLINIC | Age: 62
End: 2022-09-08

## 2022-09-08 DIAGNOSIS — U09.9 POST-ACUTE SEQUELAE OF COVID-19 (PASC): Primary | ICD-10-CM

## 2022-09-08 NOTE — TELEPHONE ENCOUNTER
----- Message from Guillermo Toribio MD sent at 9/7/2022 12:14 PM CDT -----  Can we see where this patient can be placed for new patient visit. It is not urgent. She will be seeing dermatology and will prefer her to be seen by dermatology first. We can use JOSTIN slot if needed.       Thanks,   Guillermo Toribio MD

## 2022-09-08 NOTE — TELEPHONE ENCOUNTER
Appointment scheduled for 9/14 at 1 p.m.     Called and left message for patient regarding appointment date and time. Patient to call back to confirm if appointment will work for her.   Message sent via [employer].     If patient call back: confirm if date and time will work, if not, please assist with rescheduling. OK to use JOSTIN slot per provider.     SHUN Fulton   Email: [email or web address]  [facility] - Rheumatology  Phone: [phone]  Fax: [phone]

## 2022-09-14 ENCOUNTER — MYC MEDICAL ADVICE (OUTPATIENT)
Dept: FAMILY MEDICINE | Facility: CLINIC | Age: 62
End: 2022-09-14

## 2022-09-14 ENCOUNTER — OFFICE VISIT (OUTPATIENT)
Dept: RHEUMATOLOGY | Facility: CLINIC | Age: 62
End: 2022-09-14
Payer: COMMERCIAL

## 2022-09-14 VITALS
OXYGEN SATURATION: 98 % | HEART RATE: 64 BPM | WEIGHT: 227 LBS | BODY MASS INDEX: 36.92 KG/M2 | SYSTOLIC BLOOD PRESSURE: 163 MMHG | DIASTOLIC BLOOD PRESSURE: 84 MMHG

## 2022-09-14 DIAGNOSIS — K12.0 ORAL APHTHOUS ULCER: ICD-10-CM

## 2022-09-14 DIAGNOSIS — J01.01 ACUTE RECURRENT MAXILLARY SINUSITIS: ICD-10-CM

## 2022-09-14 DIAGNOSIS — L98.9 SKIN LESION: ICD-10-CM

## 2022-09-14 DIAGNOSIS — K13.70 ORAL LESION: Primary | ICD-10-CM

## 2022-09-14 DIAGNOSIS — D69.1 PLATELET GRANULE DEFECT (H): ICD-10-CM

## 2022-09-14 PROCEDURE — 99205 OFFICE O/P NEW HI 60 MIN: CPT | Performed by: STUDENT IN AN ORGANIZED HEALTH CARE EDUCATION/TRAINING PROGRAM

## 2022-09-14 ASSESSMENT — PAIN SCALES - GENERAL: PAINLEVEL: NO PAIN (0)

## 2022-09-14 NOTE — PROGRESS NOTES
Rheumatology Clinic Visit     Jacquie Amador MRN# [id number]   YOB: 1960 Age: 62 year old     Date of Visit: Sep 14, 2022   Primary care provider: Liz Peterson          Assessment and Plan:     Assessment     Platelet granule defect  Recurrent skin lesions  Oral aphthous ulcer  Polyarticular osteoarthritis    Ms. Amador is 62 year old female seen in clinic for evaluation of possible Behcet's disease    Recurrent oral sores and skin lesions : Reports history of recurrent skin lesions for the last 5 years.  Dermatology evaluation in the past raised concern about excoriations as a cause of those lesions.  Skin biopsy in 2017 showed chronic inflammation likely related to excoriation.  No evidence of vasculitis was noted.  She has multiple discrete skin lesions on her forearms, face, chin and forehead.  She will be getting dermatology evaluation soon which will be extremely helpful.  In addition she reports recurrent oral sores.  Describes them as blisters which easily pop.  She has a large ulcer noted today on her gums around the last molar tooth.  She does report having pain in her molar tooth.  I recommend her to follow-up with dentist as well to make sure its not related to tooth infection.  She can try Magic mouthwash.  If oral lesions get worse then ENT referral will be helpful to rule out any other pathology like oral cancer or lichen planus etc. She will also follow-up with dermatology for these oral lesions as well.  She does not have any genital ulcers at present, denies any history of recurrent genital sores.     She denies any history of skin blisters or lesion after pinprick.  We used 20-gauge needle to do pinprick on her forearm for pathergy testing.  If she develops pustule within 48 hours of pinprick then pathergy test is considered positive. Pathergy is less common in [place] and  population but it still has high specificity.     Polyarticular osteoarthritis: She  reports pain in her hands, knees, shoulders and feet.  Previous x-rays reviewed which are mostly consistent with osteoarthritis.  Examination showed no synovitis or tenderness over her joints, she has bilateral first CMC joint involvement with osteoarthritis.  Mild tenderness over bilateral knees. For Osteoarthritis she can use Voltaren gel topically.  Tylenol arthritis 1-2 tabs as needed.  She avoids NSAIDs due to platelet disorder.    Plan    Joint pains are related to Osteoarthritis     Pathergy test will be done     ENT referral given for oral lesion     Magic mouth wash can be used     Please follow up with your dentist for oral lesion as well, it could be gingivitis     Follow up with dermatology    Follow up as needed     -- Orders placed this encounter  Orders Placed This Encounter   Procedures     Adult ENT  Referral       -- Medications   Orders Placed This Encounter   Medications     DISCONTD: magic mouthwash (ENTER INGREDIENTS IN COMMENTS) suspension     Sig: Guafenesin - 70cc  2% viscous lidocaine - 30cc  Diphenhydramine (12.5/5cc) - 200cc  Nystatin - 30 cc  Sucralfate - 100cc  Maalox - 50cc  Disp: 480 cc(16 oz)  Sig: Use 3 tsp.(15cc), t.i.d. Swish for 3 mins, gargle and expectorate for 2 weeks. Then use prn for maintenance.     Dispense:  480 mL     Refill:  1     TT 60 min was spent on date of the encounter doing chart review, history and exam, documentation and further activities as noted above. Any prior notes, outside records, laboratory results, and imaging studies were reviewed if relevant.    Patient verbalized agreement with and understanding of the rationale for the diagnosis and treatment plan.  All questions were answered to best of my ability and the patient's satisfaction.      Chart documentation done in part with Dragon Voice recognition Software. Although reviewed after completion, some word and grammatical error may remain.      Addendum : Patient evaluated by dermatology.   Had skin biopsy on 9/22/22 which showed some perivascular neutrophils raising concern about neutrophilic dermatosis like Behcet's.  Even though excoriated hypersensitivity reaction could not be ruled out.  But no evidence of porphyria or autoimmune bullous disease was seen. Trial of Dapsone will be made as per Dermatology.           Active Problem List:     Patient Active Problem List    Diagnosis Date Noted     Aphthous ulcer 10/15/2022     Priority: Medium     Rash 10/15/2022     Priority: Medium     Pruritus 10/15/2022     Priority: Medium     Platelet granule defect (H) 12/22/2021     Priority: Medium     Morbid obesity (H) 08/05/2021     Priority: Medium     Skin ulcer, limited to breakdown of skin (H) 07/19/2019     Priority: Medium     Migraine without aura and without status migrainosus, not intractable 12/21/2018     Priority: Medium     Intractable chronic migraine without aura and without status migrainosus 05/29/2018     Priority: Medium     Impaired fasting glucose 05/29/2018     Priority: Medium     Sinus tachycardia 12/08/2017     Priority: Medium     Primary osteoarthritis of both first carpometacarpal joints 09/28/2017     Priority: Medium     Arthritis of metatarsophalangeal joint 09/28/2017     Priority: Medium     Medical marijuana use 12/19/2016     Priority: Medium     Chronic, continuous use of opioids      Priority: Medium     Scratching 04/26/2016     Priority: Medium     Advanced directives, counseling/discussion 04/26/2016     Priority: Medium     Hand out given.        Iron deficiency 04/18/2016     Priority: Medium     Overweight 03/15/2016     Priority: Medium     Trochanteric bursitis of both hips 08/27/2015     Priority: Medium     Primary focal hyperhidrosis 07/03/2015     Priority: Medium     Right ankle instability 06/25/2015     Priority: Medium     Osteoarthritis of carpometacarpal joint of thumb - bilateral 03/26/2015     Priority: Medium     Trochanteric bursitis 03/26/2015      Priority: Medium     Iron deficiency anemia 11/14/2014     Priority: Medium     Constipation 04/04/2014     Priority: Medium     Lumbar disc disease with radiculopathy 04/04/2014     Priority: Medium     Chronic rhinitis 06/10/2013     Priority: Medium     Myalgia 04/11/2013     Priority: Medium     MARIZOL (generalized anxiety disorder) 05/10/2011     Priority: Medium     Diagnosis updated by automated process. Provider to review and confirm.       Hypertension goal BP (blood pressure) < 140/90 01/18/2011     Priority: Medium     HYPERLIPIDEMIA LDL GOAL <130 10/31/2010     Priority: Medium     Multiple joint pain 11/18/2009     Priority: Medium     Sees Rheumatology and Orthopedics at [place].  Receives steroid injections, but also uses Vicodin for pain.    Patient is followed by JAMILA OSORIO for ongoing prescription of narcotic pain medicine.  Med: Vicodin.   Maximum use per month: 60  Expected duration: indeterminate  Narcotic agreement on file: NO  Clinic visit recommended: Q 6  months         Moderate recurrent major depression (H) 12/04/2008     Priority: Medium     Esophageal reflux 07/05/2007     Priority: Medium     Allergic rhinitis due to other allergen 06/30/2006     Priority: Medium     Moderate persistent asthma without complication 09/21/2005     Priority: Medium            History of Present Illness:   Jacquie Amador is a 62 year old female with PMH of  Asthma, esophageal reflux, depression, hypertension, hyperlipidemia, osteoarthritis, migraines, morbid obesity, platelet granule defect seen in the clinic in consultation at request of Dr Grimes for evaluation of possible Behcet's disease.    She gets lesions on her face, neck, upper extremities. She has [facility] [place] in 2017 and had skin biopsy which was consistent with chronic inflammation related to excoriations. In 2021 she had virtual visit with Dr. Diez in dermatology and her skin lesions appeared to  be consistent again with excoriation, but she was recommended to come back for punch biopsy to rule out porphyria.  Skin biopsy has not been repeated since.  In addition she also reports getting lesions in her mouth which pop and open.  She gets these lesions once a month and are very painful.  Denies any history of recurrent genital sores.  Denies any history of eye inflammation, iritis uveitis episodes.    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  She was started on steroids which showed modest improvement in joint pains and fatigue.  MMF was started to replace steroids due to hyperglycemia and prediabetes but could not tolerate it.  Dr. Zaldivar will consider Imuran.    Patient reports pain in her hands, knees, shoulders. She used to get corticose injection in her thumb joints which helps some.  X-ray of bilateral hands done in the past has shown mainly first CMC joint arthritis.  X-ray of knees and left shoulder showed mostly mild degenerative changes.  X-ray of left foot in 2016 showed advanced degenerative arthritis of first MTP joint with complete loss of joint space, small plantar calcaneal spur.     Denies any raynauds, malar rash, photosensitivity, sicca symptoms, pleuritic chest pains, recurrent sinusitis/rhinitis, swallowing difficulty, hearing or visual changes recently.            Review of Systems:     Review Of Systems  Constitutional: denies fever, chills, night sweats and weight loss.  Skin: + skin rash.  Eyes: No dryness or irritation in eyes. No episode of eye inflammation or redness.   Ears/Nose/Throat: no recurrent sinus infections.  Respiratory: No shortness of breath, dyspnea on exertion, cough, or hemoptysis  Cardiovascular: no chest pain or palpitations.  Gastrointestinal: no nausea, vomiting, abdominal pain.  Normal bowel movements.  Genitourinary: no dysuria, frequency  or  hematuria.  Musculoskeletal: as in HPI  Neurologic: no numbness, tingling.  Psychiatric: no mood disorders.  Hematologic/Lymphatic/Immunologic: no history of easy bruising, petechia or purpura.  No abnormal bleeding.   Endocrine: no h/o thyroid disease, + Diabetes.                  Past Medical History:     Past Medical History:   Diagnosis Date     ASTHMA - MODERATE PERSISTENT 2005     Chronic pain      Coronary artery disease      CVA (cerebral infarction)      Depressive disorder, not elsewhere classified      Diabetes (H)      Elevated serum alkaline phosphatase level     Liver source     Fibromyalgia      HYPERLIPIDEMIA NEC/NOS 2006     Hypertriglyceridemia      OA (osteoarthritis)      Thyroid disease      Trochanteric bursitis      Unspecified essential hypertension      Past Surgical History:   Procedure Laterality Date     3 teeth pulled       INJECT EPIDURAL TRANSFORAMINAL  2014    [facility]     INJECT JOINT SACROILIAC  2014    [facility]     LAMINECTOMY LUMBAR ONE LEVEL Left 2014    [facility]     ZZC  DELIVERY ONLY      , Low Cervical            Social History:     Social History     Occupational History     Not on file   Tobacco Use     Smoking status: Former     Packs/day: 1.00     Types: Cigarettes     Smokeless tobacco: Never     Tobacco comments:     since    Vaping Use     Vaping Use: Never used   Substance and Sexual Activity     Alcohol use: No     Drug use: Yes     Comment: medical marjuana      Sexual activity: Not Currently     Partners: Male     Birth control/protection: None            Family History:     Family History   Problem Relation Age of Onset     Thyroid Disease Mother      Arthritis Mother      Colon Cancer Mother      Myelodysplastic syndrome Mother      Thyroid Disease Sister      Arthritis Sister      Lipids Sister      Hypertension Father      Diabetes Father      C.A.D. Father  "        MI age 75     Cardiovascular Father         heart attack     Cerebrovascular Disease Father      Cancer Father         renal cancer     Arthritis Father      Heart Disease Father         heart attack     Gastrointestinal Disease Father         liver     Genitourinary Problems Father         kidney     Asthma Daughter      Gastrointestinal Disease Daughter      Unknown Other      Multiple Sclerosis Maternal Aunt      Mastocytosis Nephew         \"System Mast Cell Disease and hyperesosinophilia\"     Breast Cancer No family hx of      Cancer - colorectal No family hx of      Alzheimer Disease No family hx of      Blood Disease No family hx of      Circulatory No family hx of      Eye Disorder No family hx of      Musculoskeletal Disorder No family hx of      Neurologic Disorder No family hx of      Respiratory No family hx of             Allergies:     Allergies   Allergen Reactions     Cats      and rabbits/wheezing     Dogs      wheezing     Lyrica [Pregabalin] Other (See Comments)     Rash, mouth sores, itching and burning     Seasonal Allergies      rhinits            Medications:     Current Outpatient Medications   Medication Sig Dispense Refill     albuterol (PROVENTIL) (2.5 MG/3ML) 0.083% neb solution Take 1 vial (2.5 mg) by nebulization every 6 hours as needed for shortness of breath / dyspnea or wheezing 3 mL 4     albuterol (VENTOLIN HFA) 108 (90 Base) MCG/ACT inhaler INHALE 2 PUFFS INTO THE LUNGS EVERY 6 HOURS AS NEEDED FOR SHORTNESS OF BREATH / DYSPNEA 54 g 0     aluminum chloride (DRYSOL) 20 % external solution Apply topically At Bedtime 60 mL 1     augmented betamethasone dipropionate (DIPROLENE AF) 0.05 % external cream Apply sparingly to affected area twice daily as needed for up to 14 days. 30 g 1     benzonatate (TESSALON) 200 MG capsule Take 1 capsule (200 mg) by mouth 3 times daily as needed for cough 30 capsule 0     budesonide-formoterol (SYMBICORT) 160-4.5 MCG/ACT Inhaler Inhale 2 puffs " into the lungs 2 times daily 30.6 g 3     buPROPion (WELLBUTRIN SR) 200 MG 12 hr tablet Take 1 tablet (200 mg) by mouth 2 times daily 180 tablet 1     celecoxib (CELEBREX) 200 MG capsule TAKE ONE CAPSULE BY MOUTH TWICE A DAY AS NEEDED FOR PAIN 180 capsule 1     chlorhexidine (PERIDEX) 0.12 % solution Swish and spit 15 mLs in mouth 2 times daily 1893 mL 4     clindamycin (CLINDAMAX) 1 % external gel Apply topically 2 times daily 30 g 4     clotrimazole (MYCELEX) 10 MG lozenge Place 1 lozenge (10 mg) inside cheek 5 times daily 70 lozenge 1     DULoxetine (CYMBALTA) 60 MG capsule Take 2 capsules (120 mg) by mouth daily 180 capsule 1     hydrochlorothiazide (HYDRODIURIL) 25 MG tablet Take 1 tablet (25 mg) by mouth daily 90 tablet 1     losartan (COZAAR) 100 MG tablet Take 1 tablet (100 mg) by mouth daily 90 tablet 1     montelukast (SINGULAIR) 10 MG tablet Take 1 tablet (10 mg) by mouth At Bedtime 90 tablet 3     multivitamin w/minerals (THERA-VIT-M) tablet Take 1 tablet by mouth daily       mupirocin (BACTROBAN) 2 % external cream Apply to affected area three times daily 60 g 1     nystatin (MYCOSTATIN) 944601 UNIT/ML suspension Take by mouth 4 times daily Has recurrent thrush. Uses for 2 weeks at a time as needed. 380 mL 1     oxybutynin ER (DITROPAN-XL) 5 MG 24 hr tablet Take 2 tablets (10 mg) by mouth daily 180 tablet 1     rOPINIRole (REQUIP) 1 MG tablet TAKE THREE TABLETS BY MOUTH AT BEDTIME 270 tablet 1     triamcinolone (ARISTOCORT HP) 0.5 % external cream Apply topically 2 times daily 60 g 0     ZYRTEC 10 MG OR TABS 1 TABLET DAILY 90 3     atorvastatin (LIPITOR) 20 MG tablet Take 1 tablet (20 mg) by mouth daily 90 tablet 2     atorvastatin (LIPITOR) 20 MG tablet Take 20 mg by mouth daily       azaTHIOprine (IMURAN) 50 MG tablet Take 1 tablet (50 mg) by mouth daily 30 tablet 0     betamethasone dipropionate (DIPROSONE) 0.05 % external ointment Apply topically 2 times daily To areas of skin sores.  Can also use  on mouth sores as needed twice daily. 50 g 3     colchicine (COLCYRS) 0.6 MG tablet Take 1 tablet (0.6 mg) by mouth 2 times daily 60 tablet 2     dapsone (ACZONE) 25 MG tablet Take 2 tablets (50 mg) by mouth daily 60 tablet 2     desonide (DESOWEN) 0.05 % external ointment Apply topically 2 times daily To areas of skin sores on faec 60 g 3     fluocinonide (LIDEX) 0.05 % external gel        gabapentin (NEURONTIN) 100 MG capsule Take 100 mg by mouth 3 times daily       hydrocortisone 2.5 % cream APPLY TOPICALLY 2 TIMES DAILY AS NEEDED 30 g 3     metoprolol succinate ER (TOPROL XL) 100 MG 24 hr tablet TAKE ONE TABLET BY MOUTH TWICE DAILY 180 tablet 0     metoprolol succinate ER (TOPROL XL) 25 MG 24 hr tablet Take 1 tablet (25 mg) by mouth 3 times daily 270 tablet 1     predniSONE (DELTASONE) 10 MG tablet Take 1 tablet (10 mg) by mouth daily 30 tablet 0     rizatriptan (MAXALT) 10 MG tablet Take 1 tablet (10 mg) by mouth at onset of headache for migraine May repeat in 2 hours. Max 3 tablets/24 hours. 18 tablet 1     traMADol (ULTRAM) 50 MG tablet Take 1 tablet (50 mg) by mouth daily as needed for severe pain (7-10) 30 tablet 0     valACYclovir (VALTREX) 500 MG tablet TAKE ONE TABLET BY MOUTH ONE TIME DAILY 90 tablet 1            Physical Exam:   Blood pressure (!) 163/84, pulse 64, weight 103 kg (227 lb), last menstrual period 07/17/2009, SpO2 98 %, not currently breastfeeding.  Wt Readings from Last 4 Encounters:   09/14/22 103 kg (227 lb)   07/26/22 102.1 kg (225 lb)   12/22/21 104.3 kg (229 lb 14.4 oz)   12/01/21 102.1 kg (225 lb)       Constitutional: Morbidly obese, appearing stated age; cooperative  Eyes: nl EOM, PERRLA, vision, conjunctiva, sclera  ENT: nl external ears, nose, hearing, lips, teeth, gums, throat  No mucous membrane lesions, normal saliva pool  Neck: no mass or thyroid enlargement  Resp: lungs clear to auscultation, nl to palpation  CV: RRR, no murmurs, rubs or gallops, no edema  GI: no ABD mass  or tenderness, no HSM  : not tested  Lymph: no cervical, supraclavicular, inguinal or epitrochlear nodes    MS: All TMJ, neck, shoulder, elbow, wrist, MCP/PIP/DIP, spine, hip, knee, ankle, and foot MTP/IP joints were examined.   -- No active synovitis noted over MCP, PIP, DIP joints. Tenderness over b/l first CMC joints, + thumb drop.  No s/t over wrists, elbows. Normal ROM of shoulders.   -- No dactylitis,  tenosynovitis, enthesopathy.    Skin: no nail pitting, alopecia,  Lesions over forearms, face, neck noted.  Neuro: nl cranial nerves, strength, sensation, DTRs.   Psych: nl judgement, orientation, memory, affect.         Data:     No results found for any visits on 09/14/22.    Recent Labs   Lab Test 07/18/22  1055 06/13/22  1205 05/10/22  0931 11/23/21  1211 01/20/21  1530 09/22/20  0955   WBC 6.5 8.0 7.3 7.2   < > 6.0   RBC 4.26 4.52 4.53 4.68   < > 4.30   HGB 12.8 13.9 13.8 14.5   < > 12.4   HCT 38.1 41.4 41.4 43.2   < > 38.7   MCV 89 92 91 92   < > 90   RDW 13.8 14.1 14.5 12.6   < > 14.8    359 321 338   < > 375   ALBUMIN 3.6 3.9  --  3.8   < > 3.7   CRP 8.0  --   --  5.3  --  <2.9   BUN 12 17 19 19   < > 12    < > = values in this interval not displayed.      Recent Labs   Lab Test 07/18/22  1055 08/05/21  1150 03/03/21  0706 06/30/17  1720 03/15/16  1516 11/19/15  1525 08/29/14  0934   TSH 1.08 1.52 1.89   < > 1.72 1.32 2.44   T4  --   --   --   --  1.01 0.93 0.95    < > = values in this interval not displayed.     Hemoglobin   Date Value Ref Range Status   09/22/2022 13.1 11.7 - 15.7 g/dL Final   07/18/2022 12.8 11.7 - 15.7 g/dL Final   06/13/2022 13.9 11.7 - 15.7 g/dL Final   04/16/2021 13.7 11.7 - 15.7 g/dL Final   01/20/2021 13.1 11.7 - 15.7 g/dL Final   09/22/2020 12.4 11.7 - 15.7 g/dL Final     Urea Nitrogen   Date Value Ref Range Status   09/22/2022 13.8 8.0 - 23.0 mg/dL Final   07/18/2022 12 7 - 30 mg/dL Final   06/13/2022 17 7 - 30 mg/dL Final   05/10/2022 19 7 - 30 mg/dL Final    01/20/2021 16 7 - 30 mg/dL Final   09/22/2020 12 7 - 30 mg/dL Final   02/04/2020 14 7 - 30 mg/dL Final     Sed Rate   Date Value Ref Range Status   09/22/2020 25 0 - 30 mm/h Final   01/08/2018 19 0 - 30 mm/h Final   09/09/2014 18 0 - 30 mm/h Final     Erythrocyte Sedimentation Rate   Date Value Ref Range Status   07/18/2022 35 (H) 0 - 30 mm/hr Final   11/23/2021 29 0 - 30 mm/hr Final     CRP Inflammation   Date Value Ref Range Status   07/18/2022 8.0 0.0 - 8.0 mg/L Final   11/23/2021 5.3 0.0 - 8.0 mg/L Final   09/22/2020 <2.9 0.0 - 8.0 mg/L Final   01/08/2018 4.7 0.0 - 8.0 mg/L Final   09/09/2014 4.8 0.0 - 8.0 mg/L Final     AST   Date Value Ref Range Status   07/18/2022 15 0 - 45 U/L Final   06/13/2022 16 0 - 45 U/L Final   11/23/2021 18 0 - 45 U/L Final   01/20/2021 16 0 - 45 U/L Final   09/22/2020 12 0 - 45 U/L Final   02/04/2020 18 0 - 45 U/L Final     Albumin   Date Value Ref Range Status   07/18/2022 3.6 3.4 - 5.0 g/dL Final   06/13/2022 3.9 3.4 - 5.0 g/dL Final   11/23/2021 3.8 3.4 - 5.0 g/dL Final   01/20/2021 3.8 3.4 - 5.0 g/dL Final   09/22/2020 3.7 3.4 - 5.0 g/dL Final   02/04/2020 3.9 3.4 - 5.0 g/dL Final     Alkaline Phosphatase   Date Value Ref Range Status   07/18/2022 121 40 - 150 U/L Final   06/13/2022 139 40 - 150 U/L Final   11/23/2021 134 40 - 150 U/L Final   01/20/2021 132 40 - 150 U/L Final   09/22/2020 108 40 - 150 U/L Final   02/04/2020 129 40 - 150 U/L Final     ALT   Date Value Ref Range Status   07/18/2022 19 0 - 50 U/L Final   06/13/2022 21 0 - 50 U/L Final   11/23/2021 27 0 - 50 U/L Final   01/20/2021 26 0 - 50 U/L Final   09/22/2020 22 0 - 50 U/L Final   02/04/2020 23 0 - 50 U/L Final     Rheumatoid Factor   Date Value Ref Range Status   12/08/2021 8 <12 IU/mL Final   01/08/2018 <20 <20 IU/mL Final     Recent Labs   Lab Test 09/22/22  1152 07/18/22  1055 06/13/22  1205 05/10/22  0931 11/23/21  1211 08/05/21  1150 04/16/21  0940 03/03/21  0706   WBC 10.6 6.5 8.0   < > 7.2 7.6   < >  --     HGB 13.1 12.8 13.9   < > 14.5 13.8   < >  --    HCT 39.0 38.1 41.4   < > 43.2 41.5   < >  --    MCV 91 89 92   < > 92 92   < >  --     330 359   < > 338 277   < >  --    BUN 13.8 12 17   < > 19 13   < >  --    TSH  --  1.08  --   --   --  1.52  --  1.89   AST  --  15 16  --  18 16   < >  --    ALT  --  19 21  --  27 27   < >  --    ALKPHOS  --  121 139  --  134 128   < >  --     < > = values in this interval not displayed.     Outside studies reviewed: Skin biopsy report from 2017 reviewed    Reviewed Rheumatology lab flowsheet    Guillermo Toribio MD  [facility]  Department of Rheumatology & Autoimmune Disorders  [facility]: [phone]   Pager - [phone]

## 2022-09-14 NOTE — PATIENT INSTRUCTIONS
Joint pains are related to Osteoarthritis     Pathergy test will be done     ENT referral given for oral lesion     Magic mouth wash can be used     Please follow up with your dentist for oral lesion, it could be gingivitis     Follow up with dermatology    Follow up as needed

## 2022-09-14 NOTE — TELEPHONE ENCOUNTER
Patient calling and stately that her sinus symptoms are slowly going away and wondering if you would just extend the amoxicillin or if she can get into clinic?    Dedrick Noe, MARVAN, RN, PHN  [facility] ~ Registered Nurse  Clinic Triage ~ [facility]  September 14, 2022

## 2022-09-14 NOTE — NURSING NOTE
"Chief Complaint   Patient presents with     New Patient     Referred by :  Emely Grimes MD       Consult     Platelet granule defect       Initial BP (!) 163/84 (BP Location: Right arm, Patient Position: Sitting, Cuff Size: Adult Large)   Pulse 64   Wt 103 kg (227 lb)   LMP 07/17/2009 (Exact Date)   SpO2 98%   BMI 36.92 kg/m   Estimated body mass index is 36.92 kg/m  as calculated from the following:    Height as of 7/26/22: 1.67 m (5' 5.75\").    Weight as of this encounter: 103 kg (227 lb)..  She requests these members of her care team be copied on today's visit information:     PCP: Liz Peterson    Referring Provider:  Emely Grimes MD  [address] [place] [address]    Do you need any medication refills at today's visit? NO     SHUN Fulton   Email: [email or web address]  [facility] - Rheumatology  Phone: [phone]  Fax: [phone]      "

## 2022-09-16 DIAGNOSIS — E78.5 HYPERLIPIDEMIA LDL GOAL <130: ICD-10-CM

## 2022-09-20 RX ORDER — ATORVASTATIN CALCIUM 20 MG/1
20 TABLET, FILM COATED ORAL DAILY
Qty: 90 TABLET | Refills: 0 | OUTPATIENT
Start: 2022-09-20

## 2022-09-21 ENCOUNTER — MYC MEDICAL ADVICE (OUTPATIENT)
Dept: FAMILY MEDICINE | Facility: CLINIC | Age: 62
End: 2022-09-21

## 2022-09-21 ENCOUNTER — MYC REFILL (OUTPATIENT)
Dept: FAMILY MEDICINE | Facility: CLINIC | Age: 62
End: 2022-09-21

## 2022-09-21 DIAGNOSIS — G89.4 CHRONIC PAIN SYNDROME: ICD-10-CM

## 2022-09-21 DIAGNOSIS — F11.90 CHRONIC, CONTINUOUS USE OF OPIOIDS: ICD-10-CM

## 2022-09-21 DIAGNOSIS — I10 HYPERTENSION GOAL BP (BLOOD PRESSURE) < 140/90: Primary | ICD-10-CM

## 2022-09-21 DIAGNOSIS — D69.1 PLATELET GRANULE DEFECT (H): ICD-10-CM

## 2022-09-21 RX ORDER — PREDNISONE 10 MG/1
10 TABLET ORAL DAILY
Qty: 30 TABLET | Refills: 0 | Status: SHIPPED | OUTPATIENT
Start: 2022-09-21 | End: 2022-10-27

## 2022-09-22 ENCOUNTER — LAB (OUTPATIENT)
Dept: LAB | Facility: CLINIC | Age: 62
End: 2022-09-22
Payer: COMMERCIAL

## 2022-09-22 ENCOUNTER — OFFICE VISIT (OUTPATIENT)
Dept: DERMATOLOGY | Facility: CLINIC | Age: 62
End: 2022-09-22

## 2022-09-22 DIAGNOSIS — I10 HYPERTENSION GOAL BP (BLOOD PRESSURE) < 140/90: ICD-10-CM

## 2022-09-22 DIAGNOSIS — D50.8 OTHER IRON DEFICIENCY ANEMIA: ICD-10-CM

## 2022-09-22 DIAGNOSIS — K12.0 APHTHOUS ULCER: Primary | ICD-10-CM

## 2022-09-22 DIAGNOSIS — R21 RASH: ICD-10-CM

## 2022-09-22 DIAGNOSIS — L29.9 PRURITUS: ICD-10-CM

## 2022-09-22 DIAGNOSIS — F11.90 CHRONIC, CONTINUOUS USE OF OPIOIDS: ICD-10-CM

## 2022-09-22 LAB
ANION GAP SERPL CALCULATED.3IONS-SCNC: 15 MMOL/L (ref 7–15)
BASOPHILS # BLD AUTO: 0.1 10E3/UL (ref 0–0.2)
BASOPHILS NFR BLD AUTO: 1 %
BUN SERPL-MCNC: 13.8 MG/DL (ref 8–23)
CALCIUM SERPL-MCNC: 9.5 MG/DL (ref 8.8–10.2)
CHLORIDE SERPL-SCNC: 103 MMOL/L (ref 98–107)
CREAT SERPL-MCNC: 0.99 MG/DL (ref 0.51–0.95)
CREAT UR-MCNC: 193 MG/DL
DEPRECATED HCO3 PLAS-SCNC: 25 MMOL/L (ref 22–29)
EOSINOPHIL # BLD AUTO: 0.1 10E3/UL (ref 0–0.7)
EOSINOPHIL NFR BLD AUTO: 1 %
ERYTHROCYTE [DISTWIDTH] IN BLOOD BY AUTOMATED COUNT: 14.7 % (ref 10–15)
FERRITIN SERPL-MCNC: 147 NG/ML (ref 11–328)
GFR SERPL CREATININE-BSD FRML MDRD: 64 ML/MIN/1.73M2
GLUCOSE SERPL-MCNC: 122 MG/DL (ref 70–99)
HCT VFR BLD AUTO: 39 % (ref 35–47)
HGB BLD-MCNC: 13.1 G/DL (ref 11.7–15.7)
IMM GRANULOCYTES # BLD: 0.1 10E3/UL
IMM GRANULOCYTES NFR BLD: 1 %
IRON BINDING CAPACITY (ROCHE): 260 UG/DL (ref 240–430)
IRON SATN MFR SERPL: 34 % (ref 15–46)
IRON SERPL-MCNC: 88 UG/DL (ref 37–145)
LYMPHOCYTES # BLD AUTO: 1.9 10E3/UL (ref 0.8–5.3)
LYMPHOCYTES NFR BLD AUTO: 18 %
MCH RBC QN AUTO: 30.5 PG (ref 26.5–33)
MCHC RBC AUTO-ENTMCNC: 33.6 G/DL (ref 31.5–36.5)
MCV RBC AUTO: 91 FL (ref 78–100)
MONOCYTES # BLD AUTO: 0.5 10E3/UL (ref 0–1.3)
MONOCYTES NFR BLD AUTO: 5 %
NEUTROPHILS # BLD AUTO: 8 10E3/UL (ref 1.6–8.3)
NEUTROPHILS NFR BLD AUTO: 74 %
NRBC # BLD AUTO: 0 10E3/UL
NRBC BLD AUTO-RTO: 0 /100
PATH REPORT.COMMENTS IMP SPEC: NORMAL
PATH REPORT.FINAL DX SPEC: NORMAL
PATH REPORT.MICROSCOPIC SPEC OTHER STN: NORMAL
PATH REPORT.MICROSCOPIC SPEC OTHER STN: NORMAL
PATH REPORT.RELEVANT HX SPEC: NORMAL
PLATELET # BLD AUTO: 309 10E3/UL (ref 150–450)
POTASSIUM SERPL-SCNC: 3.5 MMOL/L (ref 3.4–5.3)
RBC # BLD AUTO: 4.29 10E6/UL (ref 3.8–5.2)
RETICS # AUTO: 0.1 10E6/UL (ref 0.03–0.1)
RETICS/RBC NFR AUTO: 2.4 % (ref 0.5–2)
SODIUM SERPL-SCNC: 143 MMOL/L (ref 136–145)
TOTAL PROTEIN SERUM FOR ELP: 6.7 G/DL (ref 6.4–8.3)
WBC # BLD AUTO: 10.6 10E3/UL (ref 4–11)

## 2022-09-22 PROCEDURE — 82728 ASSAY OF FERRITIN: CPT | Mod: 90 | Performed by: PATHOLOGY

## 2022-09-22 PROCEDURE — 83540 ASSAY OF IRON: CPT | Performed by: PATHOLOGY

## 2022-09-22 PROCEDURE — 99000 SPECIMEN HANDLING OFFICE-LAB: CPT | Performed by: PATHOLOGY

## 2022-09-22 PROCEDURE — 99204 OFFICE O/P NEW MOD 45 MIN: CPT | Mod: 25 | Performed by: DERMATOLOGY

## 2022-09-22 PROCEDURE — 88305 TISSUE EXAM BY PATHOLOGIST: CPT | Mod: 26 | Performed by: DERMATOLOGY

## 2022-09-22 PROCEDURE — 83550 IRON BINDING TEST: CPT | Performed by: PATHOLOGY

## 2022-09-22 PROCEDURE — 80307 DRUG TEST PRSMV CHEM ANLYZR: CPT | Mod: 90 | Performed by: PATHOLOGY

## 2022-09-22 PROCEDURE — 84165 PROTEIN E-PHORESIS SERUM: CPT

## 2022-09-22 PROCEDURE — 88350 IMFLUOR EA ADDL 1ANTB STN PX: CPT | Mod: TC | Performed by: DERMATOLOGY

## 2022-09-22 PROCEDURE — 85045 AUTOMATED RETICULOCYTE COUNT: CPT | Performed by: PATHOLOGY

## 2022-09-22 PROCEDURE — 36415 COLL VENOUS BLD VENIPUNCTURE: CPT | Performed by: PATHOLOGY

## 2022-09-22 PROCEDURE — 80048 BASIC METABOLIC PNL TOTAL CA: CPT | Performed by: PATHOLOGY

## 2022-09-22 PROCEDURE — 88312 SPECIAL STAINS GROUP 1: CPT | Mod: 26 | Performed by: DERMATOLOGY

## 2022-09-22 PROCEDURE — 11105 PUNCH BX SKIN EA SEP/ADDL: CPT | Performed by: DERMATOLOGY

## 2022-09-22 PROCEDURE — 85025 COMPLETE CBC W/AUTO DIFF WBC: CPT | Performed by: PATHOLOGY

## 2022-09-22 PROCEDURE — 99207 BLOOD MORPHOLOGY PATHOLOGIST REVIEW: CPT | Performed by: STUDENT IN AN ORGANIZED HEALTH CARE EDUCATION/TRAINING PROGRAM

## 2022-09-22 PROCEDURE — 11104 PUNCH BX SKIN SINGLE LESION: CPT | Performed by: DERMATOLOGY

## 2022-09-22 PROCEDURE — 84155 ASSAY OF PROTEIN SERUM: CPT | Mod: 90 | Performed by: PATHOLOGY

## 2022-09-22 PROCEDURE — 88346 IMFLUOR 1ST 1ANTB STAIN PX: CPT | Mod: 26 | Performed by: DERMATOLOGY

## 2022-09-22 RX ORDER — BETAMETHASONE DIPROPIONATE 0.05 %
OINTMENT (GRAM) TOPICAL 2 TIMES DAILY
Qty: 50 G | Refills: 3 | Status: SHIPPED | OUTPATIENT
Start: 2022-09-22 | End: 2022-12-21

## 2022-09-22 RX ORDER — ATORVASTATIN CALCIUM 20 MG/1
20 TABLET, FILM COATED ORAL DAILY
COMMUNITY
End: 2022-12-19

## 2022-09-22 RX ORDER — GABAPENTIN 100 MG/1
100 CAPSULE ORAL 3 TIMES DAILY
COMMUNITY
End: 2022-12-07

## 2022-09-22 RX ORDER — FLUOCINONIDE GEL 0.5 MG/G
GEL TOPICAL
COMMUNITY
Start: 2022-09-20 | End: 2022-10-27

## 2022-09-22 RX ORDER — TRAMADOL HYDROCHLORIDE 50 MG/1
50 TABLET ORAL DAILY PRN
Qty: 30 TABLET | Refills: 0 | Status: SHIPPED | OUTPATIENT
Start: 2022-09-22 | End: 2022-11-01

## 2022-09-22 RX ORDER — COLCHICINE 0.6 MG/1
0.6 TABLET ORAL 2 TIMES DAILY
Qty: 60 TABLET | Refills: 2 | Status: SHIPPED | OUTPATIENT
Start: 2022-09-22 | End: 2022-12-07

## 2022-09-22 ASSESSMENT — PAIN SCALES - GENERAL: PAINLEVEL: NO PAIN (0)

## 2022-09-22 NOTE — NURSING NOTE
Chief Complaint   Patient presents with     Derm Problem     Spots/sores all over body, but primarily face and hands     Noam Pisano, EMT

## 2022-09-22 NOTE — PATIENT INSTRUCTIONS
Wound Care After a Biopsy    What is a skin biopsy?  A skin biopsy allows the doctor to examine a very small piece of tissue under the microscope to determine the diagnosis and the best treatment for the skin condition. A local anesthetic (numbing medicine)  is injected with a very small needle into the skin area to be tested. A small piece of skin is taken from the area. Sometimes a suture (stitch) is used.     What are the risks of a skin biopsy?  I will experience scar, bleeding, swelling, pain, crusting and redness. I may experience incomplete removal or recurrence. Risks of this procedure are excessive bleeding, bruising, infection, nerve damage, numbness, thick (hypertrophic or keloidal) scar and non-diagnostic biopsy.    How should I care for my wound for the first 24 hours?  Keep the wound dry and covered for 24 hours  If it bleeds, hold direct pressure on the area for 15 minutes. If bleeding does not stop then go to the emergency room  Avoid strenuous exercise the first 1-2 days or as your doctor instructs you    How should I care for the wound after 24 hours?  After 24 hours, remove the bandage  You may bathe or shower as normal  If you had a scalp biopsy, you can shampoo as usual and can use shower water to clean the biopsy site daily  Clean the wound twice a day with gentle soap and water  Do not scrub, be gentle  Apply white petroleum/Vaseline after cleaning the wound with a cotton swab or a clean finger, and keep the site covered with a Bandaid /bandage. Bandages are not necessary with a scalp biopsy  If you are unable to cover the site with a Bandaid /bandage, re-apply ointment 2-3 times a day to keep the site moist. Moisture will help with healing  Avoid strenuous activity for first 1-2 days  Avoid lakes, rivers, pools, and oceans until the stitches are removed or the site is healed    How do I clean my wound?  Wash hands thoroughly with soap or use hand  before all wound care  Clean the  wound with gentle soap and water  Apply white petroleum/Vaseline  to wound after it is clean  Replace the Bandaid /bandage to keep the wound covered for the first few days or as instructed by your doctor  If you had a scalp biopsy, warm shower water to the area on a daily basis should suffice    What should I use to clean my wound?   Cotton-tipped applicators (Qtips )  White petroleum jelly (Vaseline ). Use a clean new container and use Q-tips to apply.  Bandaids   as needed  Gentle soap     How should I care for my wound long term?  Do not get your wound dirty  Keep up with wound care for one week or until the area is healed.  A small scab will form and fall off by itself when the area is completely healed. The area will be red and will become pink in color as it heals. Sun protection is very important for how your scar will turn out. Sunscreen with an SPF 30 or greater is recommended once the area is healed.  If you have stitches, stitches need to be removed in 10 days. You may return to our clinic for this or you may have it done locally at your doctor s office.  You should have some soreness but it should be mild and slowly go away over several days. Talk to your doctor about using tylenol for pain,    When should I call my doctor?  If you have increased:   Pain or swelling  Pus or drainage (clear or slightly yellow drainage is ok)  Temperature over 100F  Spreading redness or warmth around wound    When will I hear about my results?  The biopsy results can take 2 weeks to come back.  Your results will automatically release to [facility] before your provider has even reviewed them.  The clinic will call you with the results, send you a [facility] message, or have you schedule a follow-up clinic or phone time to discuss the results.  Contact our clinics if you do not hear from us in 2 weeks.    Who should I call with questions?  [facility]: [phone]  [facility],  [place]: [phone]  For urgent needs outside of business hours call the [facility] at [phone] and ask for the dermatology resident on call

## 2022-09-22 NOTE — PROGRESS NOTES
NEW DERMATOLOGY VISIT     CHIEF COMPLAINT:  Recurring mouth and skin sores.    HISTORY OF PRESENT ILLNESS:  Jacquie is a very pleasant, 62-year-old female who is a new patient to our clinic today, presenting with a chief complaint of recurring oral and skin sores.  She follows with Dr. Emely Grimes in Hematology for a chronic platelet dysfunction and ITP.  Dr. Grimes is concerned that she may have Behcet disease or another autoimmune syndrome given her overall clinical picture.      Today Jacquie describes a several-year history of recurring itchy and painful skin sores that primarily affect her hands and arms, but are also seen on the thighs, significantly on the face, neck and also occasionally on the upper back.  She also has a deep linear, vertically oriented scar on her central forehead that she reports appears to have been a similar problem to her current skin sores.  This wound appeared several years ago and was very slow to heal.  Currently, she has many open sores on her face and on her arms and hands.  She is unsure of any triggers for these spots, but notes that they are itchy and painful when they do appear and are slow to heal.  The newest of the spots, she believes, is behind her left ear.  She is unsure if the sun is a trigger for this or not.      Regarding her oral sores, this has been a problem for several years as well.  She has multiple eruptions of what she describes as somewhat aphthous-like ulcers with shallow small round sores, which take several weeks to heal and are painful.  Currently, she has a very large wound on her posterior hard palate surrounding her upper molars, though she reports that this lesion appears different than her usual recurring sores.  She saw an oral surgeon for this who gave her a steroid gel, which she has difficulty tolerating.  Also, of note, she has previously had porphyrin studies, which were normal.  She also had a pathergy test for Behcet disease, which she  reported was normal as well.    REVIEW OF SYSTEMS:  See HPI.  Chronic fatigue.  No recent fevers or significant weight loss.    PHYSICAL EXAMINATION:    GENERAL:  This is a well-appearing, well-nourished female with a normal mood and affect who is oriented x3.  SKIN:  A cutaneous exam of the head, neck, chest, abdomen, back, bilateral upper and lower extremities and oral cavity was performed.  Circumferentially surrounding the upper posterior molars on the right hard palate, there is a broad-based, well-marginated ulcer without active erythema at its borders.  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  Diffusely on the cheeks, chin and forehead, there are multiple shallow, oval and angulated ulcers without obvious active surrounding erythema.  There is a similar-appearing shallow, oval, ulcerated papule on the left posterior auricular skin.  On the dorsal hands, there are ulcerated papular nodules.  On the forearms, there are many angulated, hypopigmented scars diffusely.  On the anterior thighs, there are occasional crusted, flesh-toned, eroded papule nodules.    ASSESSMENT AND PLAN:  Recurring oral ulcers, which by history suggest aphthous ulcers, in the setting of multiple recurring skin sores.  I am unsure of a definite unifying diagnosis for Jacquie today.  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  Her skin manifestations most resemble prurigo nodularis or another neurodermatitis.  She acknowledges that she picks at many of these lesions.  It was difficult to appreciate any primary lesions today, as most of the lesions appear to be secondarily excoriated and traumatized.  Regardless, we would like to rule out Behcet syndrome as well as several other entities, including pemphigoid nodularis and (less likely) cutaneous porphyria or pseudoporphyria.  Today's plan  is as follows:    1.  We performed 2 biopsies today of the lesion on her left postauricular skin, one for H&E and one for DIF.  Please see the procedure note below.  2.  As we would for other patients with symptoms and signs of prurigo nodularis, we ordered a serum protein electrophoresis and a peripheral blood smear to rule out the possibility of myelodysplasia.  3.  As she was complaining of symptoms of anemia, at her request we ordered ferritin and iron studies today.  4.  We gave her a prescription for betamethasone ointment to be applied twice daily to affected areas on the skin, avoiding the face.  She may occlude this ointment at bedtime for bothersome spots on her hands.  She may also use betamethasone ointment as needed for recurrent oral sores.  5.  As we would treat patients with other aphthous ulcer disorders, we gave her a prescription for colchicine 0.6 mg to start twice daily. We discussed common side effects, including loose stools and diarrhea.  6.  I would like her to follow up in clinic in 2-3 weeks' time.      Jay Warren MD  Dermatology Attending

## 2022-09-22 NOTE — TELEPHONE ENCOUNTER
Pending Prescriptions:                       Disp   Refills    traMADol (ULTRAM) 50 MG tablet             30 tab*0        Sig: Take 1 tablet (50 mg) by mouth daily as needed for           severe pain (7-10)    Routing refill request to provider for review/approval because:  Drug not on the [facility] refill protocol     Requested Prescriptions   Pending Prescriptions Disp Refills    traMADol (ULTRAM) 50 MG tablet 30 tablet 0     Sig: Take 1 tablet (50 mg) by mouth daily as needed for severe pain (7-10)        There is no refill protocol information for this order

## 2022-09-22 NOTE — LETTER
9/22/2022       RE: Jacquie Amador  [address]  [place] [address]     Dear Colleague,    Thank you for referring your patient, Jacquie Amador, to the [facility] [place] at [facility]. Please see a copy of my visit note below.    NEW DERMATOLOGY VISIT     CHIEF COMPLAINT:  Recurring mouth and skin sores.    HISTORY OF PRESENT ILLNESS:  Jacquie is a very pleasant, 62-year-old female who is a new patient to our clinic today, presenting with a chief complaint of recurring oral and skin sores.  She follows with Dr. Emely Grimes in Hematology for a chronic platelet dysfunction and ITP.  Dr. Grimes is concerned that she may have Behcet disease or another autoimmune syndrome given her overall clinical picture.      Today Jacquie describes a several-year history of recurring itchy and painful skin sores that primarily affect her hands and arms, but are also seen on the thighs, significantly on the face, neck and also occasionally on the upper back.  She also has a deep linear, vertically oriented scar on her central forehead that she reports appears to have been a similar problem to her current skin sores.  This wound appeared several years ago and was very slow to heal.  Currently, she has many open sores on her face and on her arms and hands.  She is unsure of any triggers for these spots, but notes that they are itchy and painful when they do appear and are slow to heal.  The newest of the spots, she believes, is behind her left ear.  She is unsure if the sun is a trigger for this or not.      Regarding her oral sores, this has been a problem for several years as well.  She has multiple eruptions of what she describes as somewhat aphthous-like ulcers with shallow small round sores, which take several weeks to heal and are painful.  Currently, she has a very large wound on her posterior hard palate surrounding her upper molars, though  she reports that this lesion appears different than her usual recurring sores.  She saw an oral surgeon for this who gave her a steroid gel, which she has difficulty tolerating.  Also, of note, she has previously had porphyrin studies, which were normal.  She also had a pathergy test for Behcet disease, which she reported was normal as well.    REVIEW OF SYSTEMS:  See HPI.  Chronic fatigue.  No recent fevers or significant weight loss.    PHYSICAL EXAMINATION:    GENERAL:  This is a well-appearing, well-nourished female with a normal mood and affect who is oriented x3.  SKIN:  A cutaneous exam of the head, neck, chest, abdomen, back, bilateral upper and lower extremities and oral cavity was performed.  Circumferentially surrounding the upper posterior molars on the right hard palate, there is a broad-based, well-marginated ulcer without active erythema at its borders.  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  Diffusely on the cheeks, chin and forehead, there are multiple shallow, oval and angulated ulcers without obvious active surrounding erythema.  There is a similar-appearing shallow, oval, ulcerated papule on the left posterior auricular skin.  On the dorsal hands, there are ulcerated papular nodules.  On the forearms, there are many angulated, hypopigmented scars diffusely.  On the anterior thighs, there are occasional crusted, flesh-toned, eroded papule nodules.    ASSESSMENT AND PLAN:  Recurring oral ulcers, which by history suggest aphthous ulcers, in the setting of multiple recurring skin sores.  I am unsure of a definite unifying diagnosis for Jacquie today.  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  Her skin manifestations most resemble prurigo nodularis or another neurodermatitis.  She acknowledges that she picks at many of these lesions.  It was  difficult to appreciate any primary lesions today, as most of the lesions appear to be secondarily excoriated and traumatized.  Regardless, we would like to rule out Behcet syndrome as well as several other entities, including pemphigoid nodularis and (less likely) cutaneous porphyria or pseudoporphyria.  Today's plan is as follows:    1.  We performed 2 biopsies today of the lesion on her left postauricular skin, one for H&E and one for DIF.  Please see the procedure note below.  2.  As we would for other patients with symptoms and signs of prurigo nodularis, we ordered a serum protein electrophoresis and a peripheral blood smear to rule out the possibility of myelodysplasia.  3.  As she was complaining of symptoms of anemia, at her request we ordered ferritin and iron studies today.  4.  We gave her a prescription for betamethasone ointment to be applied twice daily to affected areas on the skin, avoiding the face.  She may occlude this ointment at bedtime for bothersome spots on her hands.  She may also use betamethasone ointment as needed for recurrent oral sores.  5.  As we would treat patients with other aphthous ulcer disorders, we gave her a prescription for colchicine 0.6 mg to start twice daily. We discussed common side effects, including loose stools and diarrhea.  6.  I would like her to follow up in clinic in 2-3 weeks' time.      Jay Warren MD  Dermatology Attending

## 2022-09-23 LAB
ALBUMIN SERPL ELPH-MCNC: 3.7 G/DL (ref 3.7–5.1)
ALPHA1 GLOB SERPL ELPH-MCNC: 0.4 G/DL (ref 0.2–0.4)
ALPHA2 GLOB SERPL ELPH-MCNC: 0.9 G/DL (ref 0.5–0.9)
B-GLOBULIN SERPL ELPH-MCNC: 0.8 G/DL (ref 0.6–1)
GAMMA GLOB SERPL ELPH-MCNC: 0.9 G/DL (ref 0.7–1.6)
M PROTEIN SERPL ELPH-MCNC: 0 G/DL
PROT PATTERN SERPL ELPH-IMP: NORMAL

## 2022-09-27 LAB
N-NORTRAMADOL/CREAT UR CFM: 2829 NG/MG {CREAT}
O-NORTRAMADOL UR CFM-MCNC: 5460 NG/ML
TRAMADOL CTO UR CFM-MCNC: 3980 NG/ML
TRAMADOL/CREAT UR: 2062 NG/MG {CREAT}

## 2022-10-05 ENCOUNTER — MYC MEDICAL ADVICE (OUTPATIENT)
Dept: FAMILY MEDICINE | Facility: CLINIC | Age: 62
End: 2022-10-05

## 2022-10-05 ENCOUNTER — VIRTUAL VISIT (OUTPATIENT)
Dept: HEMATOLOGY | Facility: CLINIC | Age: 62
End: 2022-10-05
Attending: INTERNAL MEDICINE
Payer: COMMERCIAL

## 2022-10-05 DIAGNOSIS — L30.9 DERMATITIS: ICD-10-CM

## 2022-10-05 DIAGNOSIS — D69.1 PLATELET GRANULE DEFECT (H): Primary | ICD-10-CM

## 2022-10-05 PROCEDURE — 99215 OFFICE O/P EST HI 40 MIN: CPT | Mod: 95 | Performed by: INTERNAL MEDICINE

## 2022-10-05 RX ORDER — AZATHIOPRINE 50 MG/1
50 TABLET ORAL DAILY
Qty: 30 TABLET | Refills: 0 | Status: SHIPPED | OUTPATIENT
Start: 2022-10-05 | End: 2022-12-12

## 2022-10-05 NOTE — PROGRESS NOTES
Patient was contacted to complete the pre-visit call prior to their video visit with the provider.      Allergies and medications were reviewed.    I thanked them for their time to cover this information     Elbert Gomez CMA

## 2022-10-05 NOTE — PROGRESS NOTES
[facility]  [address] [place] [id number]  Phone: [phone], Fax: [phone]    Outpatient Visit Note:    Patient: Jacquie Amador  MRN: [id number]  : 1960  ISIDRO:10/05/22    Start time: 12:52 PM  End time: 1:25 PM    Reason for Initial Consultation:  Bruising    Assessment:  In summary, Jacquie Amador is a 62 year old woman presenting to clinic due to bruising/bleeding with abnormal platelet studies. 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 Overall clinical picture is highly concerning for underlying autoimmune disorder.    1.  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  Easy bruising and prolonged healing secondary to #1  3.  Eruptive rash/lesions on sun exposed surfaces, porphyria cutanea tarda ruled out, concern for Behcet's disease  4.  Personal history of antiphospholipid antibody syndrome in pregnancy, no records  5.  Osteoarthritis requiring anti-inflammatory medications  6.  Depression requiring use of SSRI medication  7.  Acute on chronic migraines requiring use of triptans and aspirin    Ms. Amador presents with greater than 2 years of increased bruising/presence of lesions on her skin that exhibit prolonged healing.  Prior to her consultation in our clinic she had been evaluated by Dr. Douglas who had sent off PTT.  PTT was prolonged prompting further evaluation as an outpatient. Evaluation for von Willebrand's, including von Willebrand factor antigen, activity, ristocetin cofactor, and collagen binding did not find any evidence of acquired or congenital VWF.  Evaluation of antiplatelet antibodies revealed that Jenny has no antibodies against platelet glycoprotein receptors.  However she did have the presence of anti-HLA and anti-GP1a antibodies.  She has no personal history of receiving red cell or platelet transfusions.  Jenny platelet aggregation study that had decreased arachidonic acid as well and deaggregation with ADP and arachidonic acid and decreased ATP release with thrombin and ADP. This is consistent with diagnosis of acquired qualitative platelet disorder and consistent with her anti-GP1a antibodies.     Trial of steroids with initial improvement in skin lesions- however patient mostly noted improvement in arthralgias and fatigue more.  Have moved to MMF instead of steriods due to hyperglycemia and pre-diabetes-- however she did NOT tolerate this medication.     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 She has been evaluated by Rheumatology and Dermatology with outstanding testing. Started Colcrys with some improvement.     Previously have discussed with patient that immunsuppression may help with symptoms- as we have seen modest improvement with steroids--- however long-term steriods have not been helpful. Rituximab is one option- but is NOT reversible. Another option would be azathioprine (Imuran)- patient is normal for S-methyltransferase and nudix hydrolase 15   Prior to starting medication. Jacquie will START Imuran    Previously discussed that avoiding other medications that can worsen this, including aspirin, NSAID and potentially SSRIs. Right now IF Jacquie needs MAJOR SURGERY---  She should receive platelet transfusion (1-2 units). She is ok to receive injections.        Plan:  1. Majority of today's visit was spent counseling the patient regarding diagnosis, natural history, management and treatment options   2. Continue prednisone daily  3. continue B12 replacement  4. Start Imuran    The patient is given our center's contact information and is instructed to call if she should have any further questions or concerns.  Follow up on 11/2/22 at 12:00 PM--- add on- video ok    nurse Lavon  "clinician was involved with the care, education and coordination of patient care.     Patient understands and agrees with the above plan and recommendation.      Emely Grimes MD/PhD   of Medicine  Division of Hematology, Oncology and Transplantation  ----------------------------------------------------------------------------------------------------------------------  Interval History:  Jacquie Amador is a 62 year old woman with PMHx of fibromyalgia, anxiety/depression, hypertension and hyperlipidemia. She presented to clinic on 9/15/21 for her first in person evaluation due to bleeding and prolonged wound healing. Please refer to my consult note.     Jacquie was started on colchcine (Colcrys) by Dermatology. Does feel it is making some modest difference. Face is rather bad and sores are drying up. Some new ones are eruption.     Mouth sores- has one healing sore. No other new ones. An oral surgeon is following.     Was on antibiotic for sinus infection and that seem to help more.     Face and hands- lesions are better.     Eye- last couple month- left eye has been dry and painful. Has been doing drops. dry eye bothersome- has been unplugging her tear ducts. Always hurt if she applies cold pack.     Constipation- improved with starting Colcrys. Endorses BM x1 week. Takes probiotic when she remembers. May be taking fiber.     Joints- prednisone has made a HUGE difference- it is \"like a new life\". Can do stairs and other activities that she has not been able to do for year.    Daughter will be having her twins tomorrow. She will be taking care of her older grandson.     Past Medical History:  Past Medical History:   Diagnosis Date     ASTHMA - MODERATE PERSISTENT 9/21/2005     Chronic pain      Coronary artery disease      CVA (cerebral infarction)      Depressive disorder, not elsewhere classified      Diabetes (H)      Elevated serum alkaline phosphatase level     Liver source     " Fibromyalgia      HYPERLIPIDEMIA NEC/NOS 2006     Hypertriglyceridemia      OA (osteoarthritis)      Thyroid disease      Trochanteric bursitis      Unspecified essential hypertension      Immunization  Immunization History   Administered Date(s) Administered     COVID-19,PF,Moderna 2021, 2021, 11/15/2021     COVID-19,PF,Moderna Booster 05/10/2022     FLU 6-35 months 2009     Influenza (IIV3) PF 2000, 2003, 2004, 2005, 2006, 2007, 2008, 2010, 10/26/2011, 10/26/2012     Influenza Quad, Recombinant, pf(RIV4) (Flublok) 2020     Influenza Vaccine IM > 6 months Valent IIV4 (Alfuria,Fluzone) 2014, 2015, 2019, 11/15/2021     Pneumococcal 23 valent 2000, 2021, 2022     TD (ADULT, 7+) 2000     TDAP Vaccine (Adacel) 2011     Tdap (Adacel,Boostrix) 2021     Zoster vaccine recombinant adjuvanted (SHINGRIX) 2019       Past Surgical History:  Past Surgical History:   Procedure Laterality Date     3 teeth pulled       INJECT EPIDURAL TRANSFORAMINAL  2014    [facility]     INJECT JOINT SACROILIAC  2014    [facility]     LAMINECTOMY LUMBAR ONE LEVEL Left 2014    [facility]  DELIVERY ONLY      , Low Cervical       Medications:  Current Outpatient Medications   Medication Sig Dispense Refill     albuterol (PROVENTIL) (2.5 MG/3ML) 0.083% neb solution Take 1 vial (2.5 mg) by nebulization every 6 hours as needed for shortness of breath / dyspnea or wheezing 3 mL 4     albuterol (VENTOLIN HFA) 108 (90 Base) MCG/ACT inhaler INHALE 2 PUFFS INTO THE LUNGS EVERY 6 HOURS AS NEEDED FOR SHORTNESS OF BREATH / DYSPNEA 54 g 0     aluminum chloride (DRYSOL) 20 % external solution Apply topically At Bedtime 60 mL 1     atorvastatin (LIPITOR) 20 MG tablet Take 1 tablet (20 mg) by mouth daily 90 tablet 2     atorvastatin  (LIPITOR) 20 MG tablet Take 20 mg by mouth daily       augmented betamethasone dipropionate (DIPROLENE AF) 0.05 % external cream Apply sparingly to affected area twice daily as needed for up to 14 days. 30 g 1     benzonatate (TESSALON) 200 MG capsule Take 1 capsule (200 mg) by mouth 3 times daily as needed for cough 30 capsule 0     betamethasone dipropionate (DIPROSONE) 0.05 % external ointment Apply topically 2 times daily To areas of skin sores.  Can also use on mouth sores as needed twice daily. 50 g 3     budesonide-formoterol (SYMBICORT) 160-4.5 MCG/ACT Inhaler Inhale 2 puffs into the lungs 2 times daily 30.6 g 3     buPROPion (WELLBUTRIN SR) 200 MG 12 hr tablet Take 1 tablet (200 mg) by mouth 2 times daily 180 tablet 1     celecoxib (CELEBREX) 200 MG capsule TAKE ONE CAPSULE BY MOUTH TWICE A DAY AS NEEDED FOR PAIN 180 capsule 1     chlorhexidine (PERIDEX) 0.12 % solution Swish and spit 15 mLs in mouth 2 times daily 1893 mL 4     clindamycin (CLINDAMAX) 1 % external gel Apply topically 2 times daily 30 g 4     clotrimazole (MYCELEX) 10 MG lozenge Place 1 lozenge (10 mg) inside cheek 5 times daily 70 lozenge 1     colchicine (COLCYRS) 0.6 MG tablet Take 1 tablet (0.6 mg) by mouth 2 times daily 60 tablet 2     DULoxetine (CYMBALTA) 60 MG capsule Take 2 capsules (120 mg) by mouth daily 180 capsule 1     fluocinonide (LIDEX) 0.05 % external gel        gabapentin (NEURONTIN) 100 MG capsule Take 100 mg by mouth 3 times daily       hydrochlorothiazide (HYDRODIURIL) 25 MG tablet Take 1 tablet (25 mg) by mouth daily 90 tablet 1     losartan (COZAAR) 100 MG tablet Take 1 tablet (100 mg) by mouth daily 90 tablet 1     metoprolol succinate ER (TOPROL XL) 100 MG 24 hr tablet Take 1 tablet (100 mg) by mouth 2 times daily 180 tablet 0     metoprolol succinate ER (TOPROL XL) 25 MG 24 hr tablet Take 1 tablet (25 mg) by mouth 3 times daily 270 tablet 1     montelukast (SINGULAIR) 10 MG tablet Take 1 tablet (10 mg) by mouth At  Bedtime 90 tablet 3     multivitamin w/minerals (THERA-VIT-M) tablet Take 1 tablet by mouth daily       mupirocin (BACTROBAN) 2 % external cream Apply to affected area three times daily 60 g 1     nystatin (MYCOSTATIN) 699245 UNIT/ML suspension Take by mouth 4 times daily Has recurrent thrush. Uses for 2 weeks at a time as needed. 380 mL 1     oxybutynin ER (DITROPAN-XL) 5 MG 24 hr tablet Take 2 tablets (10 mg) by mouth daily 180 tablet 1     predniSONE (DELTASONE) 10 MG tablet Take 1 tablet (10 mg) by mouth daily 30 tablet 0     rizatriptan (MAXALT) 10 MG tablet Take 1 tablet (10 mg) by mouth at onset of headache for migraine May repeat in 2 hours. Max 3 tablets/24 hours. 18 tablet 0     rOPINIRole (REQUIP) 1 MG tablet TAKE THREE TABLETS BY MOUTH AT BEDTIME 270 tablet 1     traMADol (ULTRAM) 50 MG tablet Take 1 tablet (50 mg) by mouth daily as needed for severe pain (7-10) 30 tablet 0     triamcinolone (ARISTOCORT HP) 0.5 % external cream Apply topically 2 times daily 60 g 0     valACYclovir (VALTREX) 500 MG tablet Take 1 tablet (500 mg) by mouth daily 90 tablet 0     ZYRTEC 10 MG OR TABS 1 TABLET DAILY 90 3        Allergies:  Allergies   Allergen Reactions     Cats      and rabbits/wheezing     Dogs      wheezing     Lyrica [Pregabalin] Other (See Comments)     Rash, mouth sores, itching and burning     Seasonal Allergies      rhinits       ROS:  Remainder of a comprehensive 10 point ROS is negative unless noted above.    Social History:  Denies any tobacco use. No significant alcohol use. Denies any illicit drug use.     Family History:  Two daughter  31 Yo daughter  28 yo daughter  1 grandson    1 sister- older- high blood glucose, arthritis  1 brother- older- no idea    Mother- - heart valve. Had MDS. Needed a shot every month  Father- - cancer    Objectives:  GENERAL: alert and no distress  EYES: Eyes grossly normal to inspection.  No discharge or erythema, or obvious scleral/conjunctival  abnormalities.  RESP: No audible wheeze, cough, or visible cyanosis.  No visible retractions or increased work of breathing.    SKIN: face covered -hypopigmentation - hands, lips, neck and across face, scar - face and numerous scabs over face. Left wrist with ulceration on dorsal surface (refer to photos in mychart)  NEURO: Cranial nerves grossly intact.  Mentation and speech appropriate for age.  PSYCH: Mentation appears normal, affect normal/bright, judgement and insight intact, normal speech and appearance well-groomed.      Labs:    Ferritin   Date Value Ref Range Status   09/22/2022 147 11 - 328 ng/mL Final   04/16/2021 376 (H) 8 - 252 ng/mL Final     Iron   Date Value Ref Range Status   09/22/2022 88 37 - 145 ug/dL Final   01/20/2021 50 35 - 180 ug/dL Final     Iron Binding Cap   Date Value Ref Range Status   01/20/2021 350 240 - 430 ug/dL Final     Iron Binding Capacity   Date Value Ref Range Status   09/22/2022 260 240 - 430 ug/dL Final   09/15/2021 284 240 - 430 ug/dL Final       4/16/21  The INR is normal.   APTT ratio is elevated.   Platelet Neutralization is negative.   APTT 1:2 Mix is normal.   DRVVT Screen ratio is normal.   Thrombin time is normal.   Lupus anticoagulant negative  Anticardiolipin negative    VWF collagen binding normal (send out)  The von Willebrand factor antigen (VWF:Ag), von Willebrand factor   activity (VWF:ACT), and Factor 8 levels are within normal limits.   The Factor 8 to VWF:Ag ratio and the VWF:ACT to VWF:Ag ratio are   within normal limits.      Platelet function COL/EPI >300  Platelet function ADP 77 (normal)    FXIII antigen 137 (norma1)  Alpha-2 antiplasmin 140 (normal  Factor V 78 (normal)  Factor 10 128 (normal)  Factor  (elevated)    4/29/21  Abnormal platelet aggregation study. Maximal platelet aggregation is   decreased with Arachidonic acid and within normal limits with 5   micromolar and 10 micromolar ADP, Epinephrine, Collagen, and low and   high  concentrations of Ristocetin.  Deaggregation is present with 5   micromolar ADP and Arachidonic acid.  The 1.00 mg/mL Ristocetin   reactions have a slight lag in secondary aggregation.  ATP release   is decreased with Thrombin and 5 micromolar and 10 micromolar ADP.   These findings are consistent with a congenital or acquired platelet   disorders.    Platelet Function Closure Time Col/ADP   Date Value Ref Range Status   03/03/2021 77 <120 sec Final     Comment:     Typical aspirin effect is characterized by prolonged Col/Epi and normal   Col/ADP.       PFA-Col/ADP   Date Value Ref Range Status   11/23/2021 79 <120 Seconds Final     Comment:     Typical aspirin effect is characterized by prolonged Col/Epi and normal Col/ADP   09/28/2021 88 <120 Seconds Final     Comment:     Typical aspirin effect is characterized by prolonged Col/Epi and normal Col/ADP     PLT Funct COL/EPI   Date Value Ref Range Status   03/03/2021 >300 (H) <170 sec Final     Comment:     Effective 12/01/2015, the reference range for this assay has changed.  Causes of a prolonged closure time include platelet dysfunction,vonWillebrand   disease, decreased hematocrit (<35%) and decreased platelet count (<150   x10e9/L).       PFA-Col/Epi   Date Value Ref Range Status   11/23/2021 188 (H) <170 Seconds Final     Refer to Versiti- autoantibody platelet study    Imaging:  Not applicable

## 2022-10-05 NOTE — PATIENT INSTRUCTIONS
Azathioprine Oral Tablet 50 mg  Uses  This medicine is used for the following purposes:  arthritis  autoimmune disorder  inflammatory bowel disease  inflammatory disease  prevent organ transplant rejection  skin wound  Instructions  Take the medicine with 250 mL (1 cup) of water.  You may take with food to prevent stomach upset.  It is very important that you take the medicine at about the same time every day. It will work best if you do this.  Store at room temperature in a dry place. Do not keep in the bathroom.  Keep the medicine away from heat and light.  This medicine may cause you to become more sensitive to the sun. Use sunscreen or wear protective clothing when you are exposed to the sun.  It may take several months for this medicine to fully work.  It is important that you keep taking each dose of this medicine on time even if you are feeling well.  If you forget to take a dose on time, take it as soon as you remember. If it is almost time for the next dose, do not take the missed dose. Return to your normal dosing schedule. Do not take 2 doses of this medicine at one time.  If you miss 2 or more doses in a row, contact your doctor for instructions.  Please tell your doctor and pharmacist about all the medicines you take. Include both prescription and over-the-counter medicines. Also tell them about any vitamins, herbal medicines, or anything else you take for your health.  Use effective birth control to avoid pregnancy.  It is very important that you keep all appointments for medical exams and tests while on this medicine.  Cautions  Tell your doctor and pharmacist if you ever had an allergic reaction to a medicine. Symptoms of an allergic reaction can include trouble breathing, skin rash, itching, swelling, or severe dizziness.  This medicine is associated with a rare but very serious medical condition. Please speak with your doctor about symptoms you should look out for while on this medicine. Notify  your doctor immediately if you develop those symptoms.  Some patients on this medicine have developed severe, life-threatening infections. Please speak with your doctor about the risks and benefits of using this medicine.  Some patients taking this medicine have experienced serious side effects. Please speak with your doctor to understand the risks and benefits associated with this medicine.  This medicine may increase the risk of some types of cancer. Please speak with your doctor about the risks and benefits of using this medicine.  Do not use the medication any more than instructed.  Please check with your doctor before drinking alcohol while on this medicine.  This medicine may reduce your body's ability to fight infections. Try to avoid contact with people with colds, flu or other infections.  Contact your doctor if you develop any signs of a new infection such as fever, cough, sore throat, or chills.  Wash your hands often and avoid close contact with people with infections such as colds and flu.  Speak with your health care provider before receiving any vaccinations.  Please tell your doctor if you have moderate to severe diarrhea while on this medicine. Do not treat the diarrhea with over-the-counter diarrhea medicine.  Tell the doctor or pharmacist if you are pregnant, planning to be pregnant, or breastfeeding.  Do not use this medicine if you are pregnant. If you become pregnant while on this medicine, contact your doctor immediately.  This medicine can cause birth defects. Speak with your doctor about birth control methods that should be used while on this medicine.  This medicine can hurt a new baby in the womb. If you become pregnant while on this medicine, tell your doctor immediately. Your doctor may switch you to a different medicine.  Women who are pregnant or in their childbearing years should not touch or handle this medicine. This medicine can be absorbed through the woman's skin and harm the  unborn baby.  Ask your pharmacist if this medicine can interact with any of your other medicines. Be sure to tell them about all the medicines you take.  Please tell all your doctors and dentists that you are on this medicine before they provide care.  Do not start or stop any other medicines without first speaking to your doctor or pharmacist.  Call your doctor right away if you notice any unusual bleeding or bruising.  Do not share this medicine with anyone who has not been prescribed this medicine.  Side Effects  The following is a list of some common side effects from this medicine. Please speak with your doctor about what you should do if you experience these or other side effects.  decreased appetite  changes in the number of cells in the blood  increased risk of cancer  diarrhea  nausea  vomiting  Call your doctor or get medical help right away if you notice any of these more serious side effects:  loss of balance  bleeding that is severe or takes longer to stop  unusual bruising or discoloration on skin  confusion  coughing up blood or vomit that looks like coffee grounds  fever or chills  high fever  flu-like symptoms  hair loss  cold hands or feet  severe or persistent headache  fast or irregular heart beats  symptoms of liver damage (such as yellowing of skin or eyes, dark urine, unusual tiredness or weakness; severe stomach or back pain)  mouth sores or irritation  tight or rigid muscles  pale or blue skin, lips or fingernails  skin irritation such as redness, itching, rash, or burning  seizures  skin changes - brown or black area with uneven edges or coloring  change in the size or color of a skin mole  slurred speech  stomach pain  light colored stool  dark, tarry stool  difficulty swallowing  unusual or unexplained tiredness or weakness  blurring or changes of vision  severe or persistent vomiting  weakness  A few people may have an allergic reactions to this medicine. Symptoms can include difficulty  breathing, skin rash, itching, swelling, or severe dizziness. If you notice any of these symptoms, seek medical help quickly.  Extra  Please speak with your doctor, nurse, or pharmacist if you have any questions about this medicine.  [email or web address]  IMPORTANT NOTE: This document tells you briefly how to take your medicine, but it does not tell you all there is to know about it.Your doctor or pharmacist may give you other documents about your medicine. Please talk to them if you have any questions.Always follow their advice. There is a more complete description of this medicine available in English.Scan this code on your smartphone or tablet or use the web address below. You can also ask your pharmacist for a printout. If you have any questions, please ask your pharmacist.     2021 Written.

## 2022-10-07 LAB
PATH REPORT.COMMENTS IMP SPEC: NORMAL
PATH REPORT.FINAL DX SPEC: NORMAL
PATH REPORT.GROSS SPEC: NORMAL
PATH REPORT.MICROSCOPIC SPEC OTHER STN: NORMAL
PATH REPORT.RELEVANT HX SPEC: NORMAL

## 2022-10-09 ENCOUNTER — HEALTH MAINTENANCE LETTER (OUTPATIENT)
Age: 62
End: 2022-10-09

## 2022-10-10 RX ORDER — HYDROCORTISONE 2.5 %
CREAM (GRAM) TOPICAL
Qty: 30 G | Refills: 3 | Status: SHIPPED | OUTPATIENT
Start: 2022-10-10 | End: 2023-02-28

## 2022-10-10 NOTE — TELEPHONE ENCOUNTER
Please see [employer] message. Requested clarification on the medication pt is asking for.     Yaz Porter, MARVAN, RN, PHN  Registered Nurse-Clinic Triage  [facility]  10/10/2022 at 8:16 AM

## 2022-10-13 ENCOUNTER — VIRTUAL VISIT (OUTPATIENT)
Dept: DERMATOLOGY | Facility: CLINIC | Age: 62
End: 2022-10-13
Payer: COMMERCIAL

## 2022-10-13 DIAGNOSIS — Z79.899 ENCOUNTER FOR LONG-TERM (CURRENT) USE OF HIGH-RISK MEDICATION: ICD-10-CM

## 2022-10-13 DIAGNOSIS — R21 RASH: Primary | ICD-10-CM

## 2022-10-13 PROCEDURE — 99214 OFFICE O/P EST MOD 30 MIN: CPT | Mod: 95 | Performed by: DERMATOLOGY

## 2022-10-13 RX ORDER — DESONIDE 0.5 MG/G
OINTMENT TOPICAL 2 TIMES DAILY
Qty: 60 G | Refills: 3 | Status: SHIPPED | OUTPATIENT
Start: 2022-10-13 | End: 2023-04-11

## 2022-10-13 RX ORDER — DAPSONE 25 MG/1
50 TABLET ORAL DAILY
Qty: 60 TABLET | Refills: 2 | Status: SHIPPED | OUTPATIENT
Start: 2022-10-13 | End: 2022-12-07

## 2022-10-13 ASSESSMENT — PAIN SCALES - GENERAL: PAINLEVEL: NO PAIN (0)

## 2022-10-13 NOTE — NURSING NOTE
Dermatology Rooming Note    Jacquie Amador's goals for this visit include:   Chief Complaint   Patient presents with     Derm Problem     Jacquie is being seen today for a 2 week follow up - thinks its worse     SHUN Gonzalez

## 2022-10-13 NOTE — PROGRESS NOTES
[facility] Dermatology Note  Encounter Date: Oct 13, 2022  Store-and-Forward and Telephone ([phone]). Location of teledermatologist: [facility] [place].  Start time: 12:25. End time: 12:36.    Dermatology Problem List:  1. Recurrent aphthous ulcers  2. Multiple skin ulcers resembling prurigo/neurodermatitis    ____________________________________________    Assessment & Plan:     # Recurring oral and genital sores, likely recurrent aphthous ulcers, as well as multiple diffuse discrete skin ulcers.  While many of the ulcerated papules affecting her head and neck resemble prurigo or neurodermatitis, her biopsy was not entirely conclusive, but did demonstrate a notable number of dermal neutrophils, thus this eruption may be related to aphthosis and may possibly represent Behcet's disease.  Jacquie and I discussed today that an empiric trial of dapsone would be a reasonable option for her, and I also discussed its safety with her hematologist Dr. Grimes, given her history of platelet dysfunction.  Dr. Grimes was supportive of a cautious trial of this medication.  Today's plan is as follows:  - We will plan to have her start dapsone 50 mg once daily, pending her baseline screening safety labs.  If she starts this medication we will repeat a CBC with differential and a reticulocyte count once weekly for the next 3 weeks.  - We discussed common side effects of dapsone including anemia, methemoglobinemia and exercise intolerance, as well as the rare risks of allergy including serious allergies such as DRESS syndrome and SJS/TEN.  - For now she will continue colchicine 0.6 mg twice daily, as she has tolerated this and has seen some improvement in her oral sores.  - She can also continue betamethasone ointment as needed for persistent skin sores and pain.  - We also gave her a prescription for desonide ointment to be used to affected areas on the face, where she should  not be using betamethasone.    We will have her follow-up in our clinic in 1 month's time.        Jay Warren MD  Dermatology Attending  ____________________________________________    CC: Derm Problem (Jacquie is being seen today for a 2 week follow up - thinks its worse)    HPI:  Ms. Jacquie Amador is a(n) 62 year old female who presents today for followup on skin sores and recurring oral and genital sores.      Seen for the first time 9/22/22 at the request of Dr Grimes, at that time we performed a biopsy which demonstrated ulcer and dermal neutrophils.  We recommended she start colchicine at that visit, and she has noticed some modest improvement in oral sores, but still has many painful uncontrolled skin sores.    Patient is otherwise feeling well, without additional skin concerns.    Labs Reviewed:  CBC from Sept 22, 2022  Dermatopathology    Physical Exam:  Vitals: LMP 07/17/2009 (Exact Date)   SKIN: Teledermatology photos were reviewed; image quality and interpretability: acceptable. Image date: 10/13/22.  - multiple oval shallow ulcers of face/forehead/chin, many with geographic appearance  - oval shallow ulcers x2 on left forearm and left dorsal hand  - No other lesions of concern on areas examined.     Medications:  Current Outpatient Medications   Medication     albuterol (PROVENTIL) (2.5 MG/3ML) 0.083% neb solution     albuterol (VENTOLIN HFA) 108 (90 Base) MCG/ACT inhaler     aluminum chloride (DRYSOL) 20 % external solution     atorvastatin (LIPITOR) 20 MG tablet     atorvastatin (LIPITOR) 20 MG tablet     augmented betamethasone dipropionate (DIPROLENE AF) 0.05 % external cream     azaTHIOprine (IMURAN) 50 MG tablet     benzonatate (TESSALON) 200 MG capsule     betamethasone dipropionate (DIPROSONE) 0.05 % external ointment     budesonide-formoterol (SYMBICORT) 160-4.5 MCG/ACT Inhaler     buPROPion (WELLBUTRIN SR) 200 MG 12 hr tablet     celecoxib (CELEBREX) 200 MG capsule     chlorhexidine  (PERIDEX) 0.12 % solution     clindamycin (CLINDAMAX) 1 % external gel     clotrimazole (MYCELEX) 10 MG lozenge     colchicine (COLCYRS) 0.6 MG tablet     DULoxetine (CYMBALTA) 60 MG capsule     fluocinonide (LIDEX) 0.05 % external gel     gabapentin (NEURONTIN) 100 MG capsule     hydrochlorothiazide (HYDRODIURIL) 25 MG tablet     hydrocortisone 2.5 % cream     losartan (COZAAR) 100 MG tablet     metoprolol succinate ER (TOPROL XL) 100 MG 24 hr tablet     metoprolol succinate ER (TOPROL XL) 25 MG 24 hr tablet     montelukast (SINGULAIR) 10 MG tablet     multivitamin w/minerals (THERA-VIT-M) tablet     mupirocin (BACTROBAN) 2 % external cream     nystatin (MYCOSTATIN) 422863 UNIT/ML suspension     oxybutynin ER (DITROPAN-XL) 5 MG 24 hr tablet     predniSONE (DELTASONE) 10 MG tablet     rizatriptan (MAXALT) 10 MG tablet     rOPINIRole (REQUIP) 1 MG tablet     traMADol (ULTRAM) 50 MG tablet     triamcinolone (ARISTOCORT HP) 0.5 % external cream     valACYclovir (VALTREX) 500 MG tablet     ZYRTEC 10 MG OR TABS     No current facility-administered medications for this visit.      Past Medical/Surgical History:   Patient Active Problem List   Diagnosis     Moderate persistent asthma without complication     Allergic rhinitis due to other allergen     Esophageal reflux     Moderate recurrent major depression (H)     Multiple joint pain     HYPERLIPIDEMIA LDL GOAL <130     Hypertension goal BP (blood pressure) < 140/90     MARIZOL (generalized anxiety disorder)     Myalgia     Chronic rhinitis     Constipation     Lumbar disc disease with radiculopathy     Iron deficiency anemia     Osteoarthritis of carpometacarpal joint of thumb - bilateral     Trochanteric bursitis     Right ankle instability     Primary focal hyperhidrosis     Trochanteric bursitis of both hips     Overweight     Iron deficiency     Scratching     Advanced directives, counseling/discussion     Chronic, continuous use of opioids     Medical marijuana use      Primary osteoarthritis of both first carpometacarpal joints     Arthritis of metatarsophalangeal joint     Sinus tachycardia     Intractable chronic migraine without aura and without status migrainosus     Impaired fasting glucose     Migraine without aura and without status migrainosus, not intractable     Skin ulcer, limited to breakdown of skin (H)     Morbid obesity (H)     Platelet granule defect (H)     Past Medical History:   Diagnosis Date     ASTHMA - MODERATE PERSISTENT 9/21/2005     Chronic pain      Coronary artery disease      CVA (cerebral infarction)      Depressive disorder, not elsewhere classified      Diabetes (H)      Elevated serum alkaline phosphatase level     Liver source     Fibromyalgia      HYPERLIPIDEMIA NEC/NOS 12/29/2006     Hypertriglyceridemia      OA (osteoarthritis)      Thyroid disease      Trochanteric bursitis      Unspecified essential hypertension        CC Referred Self, MD  No address on file on close of this encounter.

## 2022-10-13 NOTE — LETTER
10/13/2022       RE: Jacquie Amador  [address]  [facility] [address]     Dear Colleague,    Thank you for referring your patient, Jacquie Amador, to the [facility] [place] [facility]. Please see a copy of my visit note below.    [facility] Dermatology Note  Encounter Date: Oct 13, 2022  Store-and-Forward and Telephone ([phone]). Location of teledermatologist: [facility] [place].  Start time: 12:25. End time: 12:36.    Dermatology Problem List:  1. Recurrent aphthous ulcers  2. Multiple skin ulcers resembling prurigo/neurodermatitis    ____________________________________________    Assessment & Plan:     # Recurring oral and genital sores, likely recurrent aphthous ulcers, as well as multiple diffuse discrete skin ulcers.  While many of the ulcerated papules affecting her head and neck resemble prurigo or neurodermatitis, her biopsy was not entirely conclusive, but did demonstrate a notable number of dermal neutrophils, thus this eruption may be related to aphthosis and may possibly represent Behcet's disease.  Jacquie and I discussed today that an empiric trial of dapsone would be a reasonable option for her, and I also discussed its safety with her hematologist Dr. Grimes, given her history of platelet dysfunction.  Dr. Grimes was supportive of a cautious trial of this medication.  Today's plan is as follows:  - We will plan to have her start dapsone 50 mg once daily, pending her baseline screening safety labs.  If she starts this medication we will repeat a CBC with differential and a reticulocyte count once weekly for the next 3 weeks.  - We discussed common side effects of dapsone including anemia, methemoglobinemia and exercise intolerance, as well as the rare risks of allergy including serious allergies such as DRESS syndrome and SJS/TEN.  - For now she will  continue colchicine 0.6 mg twice daily, as she has tolerated this and has seen some improvement in her oral sores.  - She can also continue betamethasone ointment as needed for persistent skin sores and pain.  - We also gave her a prescription for desonide ointment to be used to affected areas on the face, where she should not be using betamethasone.    We will have her follow-up in our clinic in 1 month's time.        Jay Warren MD  Dermatology Attending  ____________________________________________    CC: Derm Problem (Jacquie is being seen today for a 2 week follow up - thinks its worse)    HPI:  Ms. Jacquie Amador is a(n) 62 year old female who presents today for followup on skin sores and recurring oral and genital sores.      Seen for the first time 9/22/22 at the request of Dr Grimes, at that time we performed a biopsy which demonstrated ulcer and dermal neutrophils.  We recommended she start colchicine at that visit, and she has noticed some modest improvement in oral sores, but still has many painful uncontrolled skin sores.    Patient is otherwise feeling well, without additional skin concerns.    Labs Reviewed:  CBC from Sept 22, 2022  Dermatopathology    Physical Exam:  Vitals: LMP 07/17/2009 (Exact Date)   SKIN: Teledermatology photos were reviewed; image quality and interpretability: acceptable. Image date: 10/13/22.  - multiple oval shallow ulcers of face/forehead/chin, many with geographic appearance  - oval shallow ulcers x2 on left forearm and left dorsal hand  - No other lesions of concern on areas examined.     Medications:  Current Outpatient Medications   Medication     albuterol (PROVENTIL) (2.5 MG/3ML) 0.083% neb solution     albuterol (VENTOLIN HFA) 108 (90 Base) MCG/ACT inhaler     aluminum chloride (DRYSOL) 20 % external solution     atorvastatin (LIPITOR) 20 MG tablet     atorvastatin (LIPITOR) 20 MG tablet     augmented betamethasone dipropionate (DIPROLENE AF) 0.05 % external  cream     azaTHIOprine (IMURAN) 50 MG tablet     benzonatate (TESSALON) 200 MG capsule     betamethasone dipropionate (DIPROSONE) 0.05 % external ointment     budesonide-formoterol (SYMBICORT) 160-4.5 MCG/ACT Inhaler     buPROPion (WELLBUTRIN SR) 200 MG 12 hr tablet     celecoxib (CELEBREX) 200 MG capsule     chlorhexidine (PERIDEX) 0.12 % solution     clindamycin (CLINDAMAX) 1 % external gel     clotrimazole (MYCELEX) 10 MG lozenge     colchicine (COLCYRS) 0.6 MG tablet     DULoxetine (CYMBALTA) 60 MG capsule     fluocinonide (LIDEX) 0.05 % external gel     gabapentin (NEURONTIN) 100 MG capsule     hydrochlorothiazide (HYDRODIURIL) 25 MG tablet     hydrocortisone 2.5 % cream     losartan (COZAAR) 100 MG tablet     metoprolol succinate ER (TOPROL XL) 100 MG 24 hr tablet     metoprolol succinate ER (TOPROL XL) 25 MG 24 hr tablet     montelukast (SINGULAIR) 10 MG tablet     multivitamin w/minerals (THERA-VIT-M) tablet     mupirocin (BACTROBAN) 2 % external cream     nystatin (MYCOSTATIN) 378718 UNIT/ML suspension     oxybutynin ER (DITROPAN-XL) 5 MG 24 hr tablet     predniSONE (DELTASONE) 10 MG tablet     rizatriptan (MAXALT) 10 MG tablet     rOPINIRole (REQUIP) 1 MG tablet     traMADol (ULTRAM) 50 MG tablet     triamcinolone (ARISTOCORT HP) 0.5 % external cream     valACYclovir (VALTREX) 500 MG tablet     ZYRTEC 10 MG OR TABS     No current facility-administered medications for this visit.      Past Medical/Surgical History:   Patient Active Problem List   Diagnosis     Moderate persistent asthma without complication     Allergic rhinitis due to other allergen     Esophageal reflux     Moderate recurrent major depression (H)     Multiple joint pain     HYPERLIPIDEMIA LDL GOAL <130     Hypertension goal BP (blood pressure) < 140/90     MARIZOL (generalized anxiety disorder)     Myalgia     Chronic rhinitis     Constipation     Lumbar disc disease with radiculopathy     Iron deficiency anemia     Osteoarthritis of  carpometacarpal joint of thumb - bilateral     Trochanteric bursitis     Right ankle instability     Primary focal hyperhidrosis     Trochanteric bursitis of both hips     Overweight     Iron deficiency     Scratching     Advanced directives, counseling/discussion     Chronic, continuous use of opioids     Medical marijuana use     Primary osteoarthritis of both first carpometacarpal joints     Arthritis of metatarsophalangeal joint     Sinus tachycardia     Intractable chronic migraine without aura and without status migrainosus     Impaired fasting glucose     Migraine without aura and without status migrainosus, not intractable     Skin ulcer, limited to breakdown of skin (H)     Morbid obesity (H)     Platelet granule defect (H)     Past Medical History:   Diagnosis Date     ASTHMA - MODERATE PERSISTENT 9/21/2005     Chronic pain      Coronary artery disease      CVA (cerebral infarction)      Depressive disorder, not elsewhere classified      Diabetes (H)      Elevated serum alkaline phosphatase level     Liver source     Fibromyalgia      HYPERLIPIDEMIA NEC/NOS 12/29/2006     Hypertriglyceridemia      OA (osteoarthritis)      Thyroid disease      Trochanteric bursitis      Unspecified essential hypertension        CC Referred Self, MD  No address on file on close of this encounter.

## 2022-10-13 NOTE — PATIENT INSTRUCTIONS
Dear Jacquie,    Dr. Grimes was supportive of trying dapsone.    Please have a set of blood tests drawn before you start taking the medication, and then once you've started it, we need a repeat blood draw once a week for the next few weeks, mainly to make sure your hemoglobin remains stable at the beginning.    I will followup with you in one month.

## 2022-10-14 ENCOUNTER — MYC MEDICAL ADVICE (OUTPATIENT)
Dept: FAMILY MEDICINE | Facility: CLINIC | Age: 62
End: 2022-10-14

## 2022-10-15 PROBLEM — K12.0 APHTHOUS ULCER: Status: ACTIVE | Noted: 2022-10-15

## 2022-10-15 PROBLEM — L29.9 PRURITUS: Status: ACTIVE | Noted: 2022-10-15

## 2022-10-15 PROBLEM — R21 RASH: Status: ACTIVE | Noted: 2022-10-15

## 2022-10-17 ENCOUNTER — MYC MEDICAL ADVICE (OUTPATIENT)
Dept: HEMATOLOGY | Facility: CLINIC | Age: 62
End: 2022-10-17

## 2022-10-17 NOTE — TELEPHONE ENCOUNTER
Pt is calling back in response to Call from Triage earlier.  Pt is seen at [facility].  Pt transferred to [facility] clinic.  Message routed to [facility] clinic.

## 2022-10-24 ENCOUNTER — MYC MEDICAL ADVICE (OUTPATIENT)
Dept: FAMILY MEDICINE | Facility: CLINIC | Age: 62
End: 2022-10-24

## 2022-10-25 NOTE — TELEPHONE ENCOUNTER
Spoke with patient appointment changed   Next 5 appointments (look out 90 days)    Nov 01, 2022 11:00 AM  (Arrive by 10:40 AM)  Provider Visit with Liz Peterson MD  [facility] [place] ([facility] ) [address], [address]  [place] [address]  [phone]        Closing encounter  Emi Vanessa RT (R)

## 2022-10-27 ENCOUNTER — MYC REFILL (OUTPATIENT)
Dept: HEMATOLOGY | Facility: CLINIC | Age: 62
End: 2022-10-27

## 2022-10-27 DIAGNOSIS — D69.1 PLATELET GRANULE DEFECT (H): ICD-10-CM

## 2022-10-28 ENCOUNTER — E-VISIT (OUTPATIENT)
Dept: FAMILY MEDICINE | Facility: CLINIC | Age: 62
End: 2022-10-28
Payer: COMMERCIAL

## 2022-10-28 DIAGNOSIS — K12.0 APHTHAE, ORAL: Primary | ICD-10-CM

## 2022-10-28 PROCEDURE — 99207 PR NON-BILLABLE SERV PER CHARTING: CPT | Performed by: FAMILY MEDICINE

## 2022-10-28 RX ORDER — PREDNISONE 10 MG/1
10 TABLET ORAL DAILY
Qty: 30 TABLET | Refills: 0 | Status: SHIPPED | OUTPATIENT
Start: 2022-10-28 | End: 2022-12-06

## 2022-10-28 NOTE — TELEPHONE ENCOUNTER
Provider E-Visit time total (minutes):   I Called the patient to discuss her concerns. She is requesting antibiotics from her primary. I looked into her chart and she has received Augmentin twice in September for similar symptoms and has a history of C-diff. I also reviewedcharts from dermatology and the patient didn't start her dapsone yet. I advised the patient who was quite upset that I would need to review her charts extensively before prescribing more antibiotics. She wasn't too happy about this and asked for my name which I was happy to give and then proceeded to hang up on me. I do not feel comfortable prescribing antibiotics at this time. PCP can address on her return.  Eligio Gray MD on 10/28/2022 at 05:45PM

## 2022-10-29 DIAGNOSIS — K21.9 GASTROESOPHAGEAL REFLUX DISEASE, UNSPECIFIED WHETHER ESOPHAGITIS PRESENT: ICD-10-CM

## 2022-10-31 DIAGNOSIS — M19.049 HAND ARTHRITIS: ICD-10-CM

## 2022-10-31 PROBLEM — D69.1: Status: ACTIVE | Noted: 2021-12-22

## 2022-10-31 ASSESSMENT — ASTHMA QUESTIONNAIRES
QUESTION_4 LAST FOUR WEEKS HOW OFTEN HAVE YOU USED YOUR RESCUE INHALER OR NEBULIZER MEDICATION (SUCH AS ALBUTEROL): TWO OR THREE TIMES PER WEEK
ACT_TOTALSCORE: 21
QUESTION_1 LAST FOUR WEEKS HOW MUCH OF THE TIME DID YOUR ASTHMA KEEP YOU FROM GETTING AS MUCH DONE AT WORK, SCHOOL OR AT HOME: NONE OF THE TIME
QUESTION_2 LAST FOUR WEEKS HOW OFTEN HAVE YOU HAD SHORTNESS OF BREATH: ONCE OR TWICE A WEEK
QUESTION_5 LAST FOUR WEEKS HOW WOULD YOU RATE YOUR ASTHMA CONTROL: WELL CONTROLLED
QUESTION_3 LAST FOUR WEEKS HOW OFTEN DID YOUR ASTHMA SYMPTOMS (WHEEZING, COUGHING, SHORTNESS OF BREATH, CHEST TIGHTNESS OR PAIN) WAKE YOU UP AT NIGHT OR EARLIER THAN USUAL IN THE MORNING: NOT AT ALL
ACT_TOTALSCORE: 21

## 2022-11-01 ENCOUNTER — VIRTUAL VISIT (OUTPATIENT)
Dept: FAMILY MEDICINE | Facility: CLINIC | Age: 62
End: 2022-11-01
Payer: COMMERCIAL

## 2022-11-01 ENCOUNTER — MYC MEDICAL ADVICE (OUTPATIENT)
Dept: FAMILY MEDICINE | Facility: CLINIC | Age: 62
End: 2022-11-01

## 2022-11-01 ENCOUNTER — TELEPHONE (OUTPATIENT)
Dept: FAMILY MEDICINE | Facility: CLINIC | Age: 62
End: 2022-11-01

## 2022-11-01 DIAGNOSIS — J45.40 MODERATE PERSISTENT ASTHMA WITHOUT COMPLICATION: ICD-10-CM

## 2022-11-01 DIAGNOSIS — F11.90 CHRONIC, CONTINUOUS USE OF OPIOIDS: ICD-10-CM

## 2022-11-01 DIAGNOSIS — G89.4 CHRONIC PAIN SYNDROME: ICD-10-CM

## 2022-11-01 DIAGNOSIS — K21.9 GASTROESOPHAGEAL REFLUX DISEASE, UNSPECIFIED WHETHER ESOPHAGITIS PRESENT: ICD-10-CM

## 2022-11-01 DIAGNOSIS — L03.90 CELLULITIS, UNSPECIFIED CELLULITIS SITE: Primary | ICD-10-CM

## 2022-11-01 DIAGNOSIS — L30.9 DERMATITIS: ICD-10-CM

## 2022-11-01 DIAGNOSIS — B37.0 THRUSH: ICD-10-CM

## 2022-11-01 PROCEDURE — 99214 OFFICE O/P EST MOD 30 MIN: CPT | Mod: 95 | Performed by: FAMILY MEDICINE

## 2022-11-01 RX ORDER — TRAMADOL HYDROCHLORIDE 50 MG/1
50 TABLET ORAL DAILY PRN
Qty: 30 TABLET | Refills: 0 | Status: SHIPPED | OUTPATIENT
Start: 2022-11-01 | End: 2022-12-07

## 2022-11-01 RX ORDER — FAMOTIDINE 40 MG/1
40 TABLET, FILM COATED ORAL DAILY
Qty: 90 TABLET | Refills: 3 | Status: SHIPPED | OUTPATIENT
Start: 2022-11-01 | End: 2023-07-14

## 2022-11-01 RX ORDER — MUPIROCIN CALCIUM 20 MG/G
CREAM TOPICAL
Qty: 60 G | Refills: 1 | Status: SHIPPED | OUTPATIENT
Start: 2022-11-01 | End: 2024-06-27

## 2022-11-01 RX ORDER — FAMOTIDINE 40 MG/1
TABLET, FILM COATED ORAL
Qty: 90 TABLET | Refills: 0 | OUTPATIENT
Start: 2022-11-01

## 2022-11-01 RX ORDER — NYSTATIN 100000/ML
SUSPENSION, ORAL (FINAL DOSE FORM) ORAL 4 TIMES DAILY
Qty: 380 ML | Refills: 1 | Status: SHIPPED | OUTPATIENT
Start: 2022-11-01 | End: 2023-02-28

## 2022-11-01 NOTE — TELEPHONE ENCOUNTER
Routing refill request to provider for review/approval because:  Drug not on the [facility] refill protocol     BANDAR Arreguin, RN  [place]  November 1, 2022

## 2022-11-01 NOTE — PROGRESS NOTES
Jacquie is a 62 year old who is being evaluated via a billable video visit.      How would you like to obtain your AVS? MyChart  If the video visit is dropped, the invitation should be resent by: Text to cell phone: [phone]  Will anyone else be joining your video visit? No          Assessment & Plan     Cellulitis, unspecified cellulitis site  Patient with some evidence of cellulitis surrounding the lesions on her arms and hands.  She has had this previously.  We discussed the importance of keeping these areas clean.  Started on Augmentin twice daily for the next 10 days.  Discussed the importance of following up with dermatology to assist with the underlying cause of these ulcerations on her skin.  She has a prescription for dapsone.  She has been very hesitant about taking this.  She has not done the labs she was to do before she started the medication.  She has follow-up scheduled with dermatology.  Recommended that she go ahead and get her labs done.  Discussed follow-up with dermatology for further conversation regarding the dapsone.  Patient agrees to plan.  - amoxicillin-clavulanate (AUGMENTIN) 875-125 MG tablet; Take 1 tablet by mouth 2 times daily for 14 days    Dermatitis  Patient with history of some dermatitis that she uses mupirocin for.  She needs a refill.  Refill given.  - mupirocin (BACTROBAN) 2 % external cream; Apply to affected area three times daily    Thrush  Patient reports she has some thrush noted in her mouth.  She is starting on Augmentin.  Recommend prescription for nystatin to have available as needed.  - nystatin (MYCOSTATIN) 985348 UNIT/ML suspension; Take by mouth 4 times daily Has recurrent thrush. Uses for 2 weeks at a time as needed.    Gastroesophageal reflux disease, unspecified whether esophagitis present  Patient needs a refill of her Pepcid.  This is helping her reflux.  She denies any other problems or concerns.  - famotidine (PEPCID) 40 MG tablet; Take 1 tablet (40 mg)  by mouth daily    Moderate persistent asthma without complication  Patient reports no concerns with her breathing currently but does need a refill of Dulera.  Prescription is given.  - mometasone-formoterol (DULERA) 200-5 MCG/ACT inhaler; Inhale 2 puffs into the lungs 2 times daily                 Return in about 6 months (around 2023) for Follow up HTN.    Liz Peterson MD  [facility] [place] is a 62 year old presenting for the following health issues:  Recheck Medication and Sores in mouth and face/hands      History of Present Illness       Reason for visit:  Medications and address some sores in mouth and face/hands    She eats 0-1 servings of fruits and vegetables daily.She consumes 1 sweetened beverage(s) daily.She exercises with enough effort to increase her heart rate 9 or less minutes per day.  She exercises with enough effort to increase her heart rate 3 or less days per week.   She is taking medications regularly.     Has been helping with her daughter's  twins. Staying with due to sibling with RSV.     Recently saw dermatology.  Patient has started with desonide. The lesions have been drier. Feels like they are more red.  May be healing but only very slightly. Started with the desonide about 2 weeks ago.     She has some lesions on her left hand and arm that have been increasingly red and having some yellow drainage.     She has been increasingly sweating. The oxybutynin is not as helpful at this time. On oxybutynin 10 mg daily for some time. Used to work.  Causes dry mouth. Is not noting bladder concerns right now.     Has felt that hair has changed. Texture is different. Feels like straw. Wonders about the azathioprine causing it.         Review of Systems   CONSTITUTIONAL: NEGATIVE for fever, chills, change in weight  ENT/MOUTH: NEGATIVE for ear, mouth and throat problems  RESP: NEGATIVE for significant cough or SOB  CV: NEGATIVE for chest pain,  palpitations or peripheral edema      Objective           Vitals:  No vitals were obtained today due to virtual visit.    Physical Exam   GENERAL: Healthy, alert and no distress  EYES: Eyes grossly normal to inspection.  No discharge or erythema, or obvious scleral/conjunctival abnormalities.  RESP: No audible wheeze, cough, or visible cyanosis.  No visible retractions or increased work of breathing.    SKIN: Patient has some ulcerated lesions on her face and on her hands and arms.  The ones on her hands seem to have some spreading redness surrounding them.  No drainage.  NEURO: Cranial nerves grossly intact.  Mentation and speech appropriate for age.  PSYCH: Mentation appears normal, affect normal/bright, judgement and insight intact, normal speech and appearance well-groomed.                Video-Visit Details    Video Start Time: 11:06 am    Type of service:  Video Visit    Video End Time:11:38 AM    Originating Location (pt. Location): Home        Distant Location (provider location):  On-site    Platform used for Video Visit: [employer]

## 2022-11-01 NOTE — TELEPHONE ENCOUNTER
Routing to PA Pool please start a PRIOR AUTHORIZATION    mupirocin (BACTROBAN) 2 % external cream 60 g 1 11/1/2022  No   Sig: Apply to affected area three times daily   Sent to pharmacy as: Mupirocin Calcium 2 % External Cream (BACTROBAN)   Class: E-Prescribe   Order: [id number]   E-Prescribing Status: Receipt confirmed by pharmacy (11/1/2022 11:38 AM CDT)     Medication Administration Instructions    Apply to affected area three times daily     Associated Diagnoses    Dermatitis [L30.9]             Thanks  Emi Vanessa RT (R)

## 2022-11-02 ENCOUNTER — VIRTUAL VISIT (OUTPATIENT)
Dept: HEMATOLOGY | Facility: CLINIC | Age: 62
End: 2022-11-02
Attending: INTERNAL MEDICINE
Payer: COMMERCIAL

## 2022-11-02 DIAGNOSIS — M35.2 BEHCET'S SYNDROME INVOLVING ORAL MUCOSA (H): ICD-10-CM

## 2022-11-02 DIAGNOSIS — D69.1 PLATELET GRANULE DEFECT (H): Primary | ICD-10-CM

## 2022-11-02 PROCEDURE — 99215 OFFICE O/P EST HI 40 MIN: CPT | Mod: 95 | Performed by: INTERNAL MEDICINE

## 2022-11-02 NOTE — TELEPHONE ENCOUNTER
Central Prior Authorization Team   Phone: [phone]      PA Initiation    Medication: mupirocin (BACTROBAN) 2 % external cream - INITIATED  Insurance Company: [employer] - Phone [phone] Fax [phone]  Pharmacy Filling the Rx: [id number] - [place], [place] - [address] [place] [address]  Filling Pharmacy Phone: [phone]  Filling Pharmacy Fax:    Start Date: 11/2/2022

## 2022-11-02 NOTE — TELEPHONE ENCOUNTER
Prior Authorization Approval    Authorization Effective Date: 10/3/2022  Authorization Expiration Date: 11/2/2023  Medication: mupirocin (BACTROBAN) 2 % external cream - [place] APPROVED  Insurance Company: [employer] - Phone [phone] Fax [phone]  Which Pharmacy is filling the prescription (Not needed for infusion/clinic administered): [id number] - [place], [place] - [address] [place] [address]  Pharmacy Notified: Yes  Patient Notified: Yes (pharmacy will notify patient when ready)

## 2022-11-02 NOTE — PROGRESS NOTES
[facility]  [address] [place] [id number]  Phone: [phone], Fax: [phone]    Outpatient Visit Note:    Patient: Jacquie Amador  MRN: [id number]  : 1960  ISIDRO:22    Start time: 12:37 PM  End time: 1:05 PM    Reason for Initial Consultation:  Bruising    Assessment:  In summary, Jacquie Amador is a 62 year old woman presenting to clinic due to bruising/bleeding with abnormal platelet studies. 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 Overall clinical picture is highly concerning for underlying autoimmune disorder.    1.  Behcet's disease:   2. 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  Easy bruising and prolonged healing secondary to #1 and #2  4.  Personal history of antiphospholipid antibody syndrome in pregnancy, no records  5.  Osteoarthritis requiring anti-inflammatory medications  6.  Depression requiring use of SSRI medication  7.  Acute on chronic migraines requiring use of triptans and aspirin    Ms. Amador has been evaluated by Dermatology and Rheumatology. Skin biopsy with neutrophilic infiltrates that may be secondary to Behcets. Overall, clinical criteria is met and very consistent.  Started Colcrys with some improvement. Awaiting Dapsone.     For her bleeding/platelets, she has presence of anti-HLA and anti-GP1a antibodies.  She has no personal history of receiving red cell or platelet transfusions. Jenny platelet aggregation study that had decreased arachidonic acid as well and deaggregation with ADP and arachidonic acid and decreased ATP release with thrombin and ADP. This is consistent with diagnosis of acquired qualitative platelet disorder and consistent with her anti-GP1a antibodies. We have given her steriods, which helped with arthralgias and fatigue. MMF was not tolerated. Azathioprine has not improved  at this juncture and due to use of other medications (Colcry and Dapsone) will STOP azathioprine. Will discuss with Rheumatology/Dermatology regarding other potential immunosuppressive options.     At this time, recommend patient Stops Imuran and start taking Dapsone that was recently prescribed by Dermatology. She was informed that needs to have labs checked including G6PD levels prior to starting Dapsone. These labs have already been ordered by dermatology. We recommend Rheumatology referral as well, as patient may benefit from other systemic therapies like TNF alpha.      Previously discussed that avoiding other medications that can worsen this, including aspirin, NSAID and potentially SSRIs. Right now IF Jacquie needs MAJOR SURGERY---  She should receive platelet transfusion (1-2 units). She is ok to receive injections.        Plan:  1. Majority of today's visit was spent counseling the patient regarding diagnosis, natural history, management and treatment options   2. Continue prednisone daily  3. Start Dapsone per Dermatology prescription after required labs are drawn including G6PD level.  4. Stop Imuran  5. Referral to Rheumatology for further management    The patient is given our center's contact information and is instructed to call if she should have any further questions or concerns.  Follow up in Mid December.    Danna Cobb, nurse clinician was involved with the care, education and coordination of patient care.     Patient understands and agrees with the above plan and recommendation.    Patient seen and discussed with attending Hematologist Dr. Grimes.     Belinda Mtz MD   Hematology, Oncology and Transplantation Fellow  [facility]    Physician Attestation   I, Emely Grimes MD/PhD, saw this patient and agree with the findings and plan of care as documented in the note.      Items personally reviewed/procedural attestation: vitals, labs and evaluated patient and agree with the  interpretation documented in the note.    Emely Grimes MD/PhD  ----------------------------------------------------------------------------------------------------------------------  Interval History:  Jacquie Amador is a 62 year old woman with PMHx of fibromyalgia, anxiety/depression, hypertension and hyperlipidemia. She presented to clinic on 9/15/21 for her first in person evaluation due to bleeding and prolonged wound healing. Please refer to my consult note.     Jacquie was initially started on colchcine (Colcrys) and Desonide cream by Dermatology. She does feel like her face lesions are about the same since her last visit a month ago with us. She thinks the new topical cream recently prescribed by dermatology is making her face lesions red and appearing more pronounced. Some sores are drying up, however some new ones are erupting. She was prescribed Dapsone by dermatology but has not started taking it yet. She continues on Prednisone and Imuran. She applies Vaseline and Aquapor to the sores.She complaints of fatigue but does note that they take turns at night to take care of the twin babies.    Mouth sores- The sore in the back of her mouth has healed. She has noticed some new ones in the right cheek area and the front of her gum, some with whitish patch but small.. An oral surgeon is following.     Was on antibiotic for sinus infection and that seem to help more.     Face and hands- lesions are better.     Bruise: Has some slow healing bruises, in the inner upper left arm region.    Eye- last couple month- left eye remains dry and painful. Has been doing drops. dry eye bothersome- has been unplugging her tear ducts. Always hurt if she applies cold pack.     Hair: She things the texture of her hair has become more coarse and this could be due to her medications.     Joint pain: Remains improved with prednisone.    Daughter just recently had twins and she is helping to care for the young ones and her  older grandson.     Past Medical History:  Past Medical History:   Diagnosis Date     ASTHMA - MODERATE PERSISTENT 2005     Chronic pain      Coronary artery disease      CVA (cerebral infarction)      Depressive disorder, not elsewhere classified      Diabetes (H)      Elevated serum alkaline phosphatase level     Liver source     Fibromyalgia      HYPERLIPIDEMIA NEC/NOS 2006     Hypertriglyceridemia      OA (osteoarthritis)      Thyroid disease      Trochanteric bursitis      Unspecified essential hypertension      Immunization  Immunization History   Administered Date(s) Administered     COVID-19,PF,Moderna 2021, 2021, 11/15/2021     COVID-19,PF,Moderna Booster 05/10/2022     FLU 6-35 months 2009     Influenza (IIV3) PF 2000, 2003, 2004, 2005, 2006, 2007, 2008, 2010, 10/26/2011, 10/26/2012     Influenza Quad, Recombinant, pf(RIV4) (Flublok) 2020     Influenza Vaccine IM > 6 months Valent IIV4 (Alfuria,Fluzone) 2014, 2015, 2019, 11/15/2021     Pneumococcal 23 valent 2000, 2021, 2022     TD (ADULT, 7+) 2000     TDAP Vaccine (Adacel) 2011     Tdap (Adacel,Boostrix) 2021     Zoster vaccine recombinant adjuvanted (SHINGRIX) 2019       Past Surgical History:  Past Surgical History:   Procedure Laterality Date     3 teeth pulled       INJECT EPIDURAL TRANSFORAMINAL  2014    [facility]     INJECT JOINT SACROILIAC  2014    [facility]     LAMINECTOMY LUMBAR ONE LEVEL Left 2014    [facility]  DELIVERY ONLY      , Low Cervical       Medications:  Current Outpatient Medications   Medication Sig Dispense Refill     albuterol (PROVENTIL) (2.5 MG/3ML) 0.083% neb solution Take 1 vial (2.5 mg) by nebulization every 6 hours as needed for shortness of breath / dyspnea or wheezing 3 mL 4     albuterol  (VENTOLIN HFA) 108 (90 Base) MCG/ACT inhaler INHALE 2 PUFFS INTO THE LUNGS EVERY 6 HOURS AS NEEDED FOR SHORTNESS OF BREATH / DYSPNEA 54 g 0     aluminum chloride (DRYSOL) 20 % external solution Apply topically At Bedtime 60 mL 1     amoxicillin-clavulanate (AUGMENTIN) 875-125 MG tablet Take 1 tablet by mouth 2 times daily for 14 days 28 tablet 0     atorvastatin (LIPITOR) 20 MG tablet Take 1 tablet (20 mg) by mouth daily 90 tablet 2     atorvastatin (LIPITOR) 20 MG tablet Take 20 mg by mouth daily       augmented betamethasone dipropionate (DIPROLENE AF) 0.05 % external cream Apply sparingly to affected area twice daily as needed for up to 14 days. 30 g 1     azaTHIOprine (IMURAN) 50 MG tablet Take 1 tablet (50 mg) by mouth daily 30 tablet 0     benzonatate (TESSALON) 200 MG capsule Take 1 capsule (200 mg) by mouth 3 times daily as needed for cough 30 capsule 0     betamethasone dipropionate (DIPROSONE) 0.05 % external ointment Apply topically 2 times daily To areas of skin sores.  Can also use on mouth sores as needed twice daily. 50 g 3     buPROPion (WELLBUTRIN SR) 200 MG 12 hr tablet Take 1 tablet (200 mg) by mouth 2 times daily 180 tablet 1     celecoxib (CELEBREX) 200 MG capsule TAKE ONE CAPSULE BY MOUTH TWICE A DAY AS NEEDED FOR PAIN 180 capsule 1     chlorhexidine (PERIDEX) 0.12 % solution Swish and spit 15 mLs in mouth 2 times daily 1893 mL 4     clindamycin (CLINDAMAX) 1 % external gel Apply topically 2 times daily 30 g 4     clotrimazole (MYCELEX) 10 MG lozenge Place 1 lozenge (10 mg) inside cheek 5 times daily 70 lozenge 1     colchicine (COLCYRS) 0.6 MG tablet Take 1 tablet (0.6 mg) by mouth 2 times daily 60 tablet 2     dapsone (ACZONE) 25 MG tablet Take 2 tablets (50 mg) by mouth daily 60 tablet 2     desonide (DESOWEN) 0.05 % external ointment Apply topically 2 times daily To areas of skin sores on faec 60 g 3     DULoxetine (CYMBALTA) 60 MG capsule Take 2 capsules (120 mg) by mouth daily 180 capsule  1     famotidine (PEPCID) 40 MG tablet Take 1 tablet (40 mg) by mouth daily 90 tablet 3     fluocinonide (LIDEX) 0.05 % external gel Apply topically 2 times daily as needed 15 g 0     gabapentin (NEURONTIN) 100 MG capsule Take 100 mg by mouth 3 times daily       hydrochlorothiazide (HYDRODIURIL) 25 MG tablet Take 1 tablet (25 mg) by mouth daily 90 tablet 1     hydrocortisone 2.5 % cream APPLY TOPICALLY 2 TIMES DAILY AS NEEDED 30 g 3     losartan (COZAAR) 100 MG tablet Take 1 tablet (100 mg) by mouth daily 90 tablet 1     metoprolol succinate ER (TOPROL XL) 100 MG 24 hr tablet TAKE ONE TABLET BY MOUTH TWICE DAILY 180 tablet 0     metoprolol succinate ER (TOPROL XL) 25 MG 24 hr tablet Take 1 tablet (25 mg) by mouth 3 times daily 270 tablet 1     mometasone-formoterol (DULERA) 200-5 MCG/ACT inhaler Inhale 2 puffs into the lungs 2 times daily 39 g 3     montelukast (SINGULAIR) 10 MG tablet Take 1 tablet (10 mg) by mouth At Bedtime 90 tablet 3     multivitamin w/minerals (THERA-VIT-M) tablet Take 1 tablet by mouth daily       mupirocin (BACTROBAN) 2 % external cream Apply to affected area three times daily 60 g 1     nystatin (MYCOSTATIN) 851496 UNIT/ML suspension Take by mouth 4 times daily Has recurrent thrush. Uses for 2 weeks at a time as needed. 380 mL 1     oxybutynin ER (DITROPAN-XL) 5 MG 24 hr tablet Take 2 tablets (10 mg) by mouth daily 180 tablet 1     predniSONE (DELTASONE) 10 MG tablet Take 1 tablet (10 mg) by mouth daily 30 tablet 0     rizatriptan (MAXALT) 10 MG tablet Take 1 tablet (10 mg) by mouth at onset of headache for migraine May repeat in 2 hours. Max 3 tablets/24 hours. 18 tablet 1     rOPINIRole (REQUIP) 1 MG tablet TAKE THREE TABLETS BY MOUTH AT BEDTIME 270 tablet 1     traMADol (ULTRAM) 50 MG tablet Take 1 tablet (50 mg) by mouth daily as needed for severe pain 30 tablet 0     triamcinolone (ARISTOCORT HP) 0.5 % external cream Apply topically 2 times daily 60 g 0     valACYclovir (VALTREX) 500  MG tablet TAKE ONE TABLET BY MOUTH ONE TIME DAILY 90 tablet 1     ZYRTEC 10 MG OR TABS 1 TABLET DAILY 90 3        Allergies:  Allergies   Allergen Reactions     Cats      and rabbits/wheezing     Dogs      wheezing     Lyrica [Pregabalin] Other (See Comments)     Rash, mouth sores, itching and burning     Seasonal Allergies      rhinits       ROS:  Remainder of a comprehensive 10 point ROS is negative unless noted above.    Social History:  Denies any tobacco use. No significant alcohol use. Denies any illicit drug use.     Family History:  Two daughter  29 Yo daughter  26 yo daughter  1 grandson    1 sister- older- high blood glucose, arthritis  1 brother- older- no idea    Mother- - heart valve. Had MDS. Needed a shot every month  Father- - cancer    Objectives:  GENERAL: alert and no distress  EYES: Eyes grossly normal to inspection.  No discharge or erythema, or obvious scleral/conjunctival abnormalities.  RESP: No audible wheeze, cough, or visible cyanosis.  No visible retractions or increased work of breathing.    SKIN: face covered in reddish ulcerated lesions at various stages of healing with numerous scabs.  -hypopigmentation - hands, lips, neck and across face, scar - face and numerous scabs over face. Left wrist with ulceration on dorsal surface healing (refer to photos in mychart)  NEURO: Cranial nerves grossly intact.  Mentation and speech appropriate for age.  PSYCH: Mentation appears normal, affect normal/bright, judgement and insight intact, normal speech and appearance well-groomed.      Labs:    Ferritin   Date Value Ref Range Status   2022 147 11 - 328 ng/mL Final   2021 376 (H) 8 - 252 ng/mL Final     Iron   Date Value Ref Range Status   2022 88 37 - 145 ug/dL Final   2021 50 35 - 180 ug/dL Final     Iron Binding Cap   Date Value Ref Range Status   2021 350 240 - 430 ug/dL Final     Iron Binding Capacity   Date Value Ref Range Status   2022 260  240 - 430 ug/dL Final   09/15/2021 284 240 - 430 ug/dL Final       4/16/21  The INR is normal.   APTT ratio is elevated.   Platelet Neutralization is negative.   APTT 1:2 Mix is normal.   DRVVT Screen ratio is normal.   Thrombin time is normal.   Lupus anticoagulant negative  Anticardiolipin negative    VWF collagen binding normal (send out)  The von Willebrand factor antigen (VWF:Ag), von Willebrand factor   activity (VWF:ACT), and Factor 8 levels are within normal limits.   The Factor 8 to VWF:Ag ratio and the VWF:ACT to VWF:Ag ratio are   within normal limits.      Platelet function COL/EPI >300  Platelet function ADP 77 (normal)    FXIII antigen 137 (norma1)  Alpha-2 antiplasmin 140 (normal  Factor V 78 (normal)  Factor 10 128 (normal)  Factor  (elevated)    4/29/21  Abnormal platelet aggregation study. Maximal platelet aggregation is   decreased with Arachidonic acid and within normal limits with 5   micromolar and 10 micromolar ADP, Epinephrine, Collagen, and low and   high concentrations of Ristocetin.  Deaggregation is present with 5   micromolar ADP and Arachidonic acid.  The 1.00 mg/mL Ristocetin   reactions have a slight lag in secondary aggregation.  ATP release   is decreased with Thrombin and 5 micromolar and 10 micromolar ADP.   These findings are consistent with a congenital or acquired platelet   disorders.    Platelet Function Closure Time Col/ADP   Date Value Ref Range Status   03/03/2021 77 <120 sec Final     Comment:     Typical aspirin effect is characterized by prolonged Col/Epi and normal   Col/ADP.       PFA-Col/ADP   Date Value Ref Range Status   11/23/2021 79 <120 Seconds Final     Comment:     Typical aspirin effect is characterized by prolonged Col/Epi and normal Col/ADP   09/28/2021 88 <120 Seconds Final     Comment:     Typical aspirin effect is characterized by prolonged Col/Epi and normal Col/ADP     PLT Funct COL/EPI   Date Value Ref Range Status   03/03/2021 >300 (H) <170 sec  Final     Comment:     Effective 12/01/2015, the reference range for this assay has changed.  Causes of a prolonged closure time include platelet dysfunction,vonWillebrand   disease, decreased hematocrit (<35%) and decreased platelet count (<150   x10e9/L).       PFA-Col/Epi   Date Value Ref Range Status   11/23/2021 188 (H) <170 Seconds Final     Refer to Versiti- autoantibody platelet study    Imaging:  Not applicable

## 2022-11-02 NOTE — LETTER
2020         RE: Jacquie Amador  [address] [facility]  [place] [address]        Dear Colleague,    Thank you for referring your patient, Jacquie Amador, to the [facility] [place]. Please see a copy of my visit note below.    Jacquie Amador  :  1960  DOS: 20  MRN: [id number]    Sports Medicine Clinic Visit    PCP: Liz Peterson    Jacquie Amador is a 55 year old Right hand dominant female who is seen in follow up for her chronic bilateral hip and bilateral thumb CMC joint pain.      Interim History - 2018  Since last visit on 2018 patient has moderate-severe bilateral hip pain.  Bilateral hip trochanteric bursa injection completed on  provided good relief for ~ 2.5 - 3 months.  Patient is desiring repeat injections today.  No new injury in the interim.    She has continued to have pain in bilateral thumbs over last ~ 6 months.  Unable to repeat injections d/t her schedule, then developing secondary infection.  Recently discharged from Pain Mgmt clinic.    Interim History - 2020  Since last visit on 18 patient has moderate - severe bilateral lateral hip and bilateral thumb/CMC joint pain.  Bilateral hip trochanteric bursa injections completed on 18 provided good relief for ~ one year.  Patient notes that pain has been worsening over the past ~ 6+ months.  Bilateral thumb CMC joint steroid injections last completed 18 provided good relief for ~ 6 months.  Patient is now unable to grasp objects without significant pain and weakness.  No new injury in the interim.      Review of Systems  Musculoskeletal: as above  Remainder of review of systems is negative including constitutional, CV, pulmonary, GI, Skin and Neurologic except as noted in HPI or medical history.    Past Medical History:   Diagnosis Date     ASTHMA - MODERATE PERSISTENT 2005     Cancer (H)      Chronic pain      Coronary artery disease      CVA  "(cerebral infarction)      Depressive disorder, not elsewhere classified      Diabetes (H)      Elevated serum alkaline phosphatase level     Liver source     Fibromyalgia      HYPERLIPIDEMIA NEC/NOS 2006     Hypertriglyceridemia      OA (osteoarthritis)      Thyroid disease      Tobacco use disorder      Tobacco use disorder complicating pregnancy, childbirth, or the puerperium, antepartum condition or complication      Trochanteric bursitis      Unspecified essential hypertension      Past Surgical History:   Procedure Laterality Date     C  DELIVERY ONLY      , Low Cervical     INJECT EPIDURAL TRANSFORAMINAL  2014    [facility]     INJECT JOINT SACROILIAC  2014    [facility]     LAMINECTOMY LUMBAR ONE LEVEL Left 2014    [facility]     Objective  /84   Ht 1.676 m (5' 6\")   LMP 2009 (Exact Date)   BMI 31.70 kg/m       GENERAL APPEARANCE: healthy, alert and no distress   GAIT: NORMAL  SKIN: no suspicious lesions or rashes  NEURO: Normal strength and tone, mentation intact and speech normal  PSYCH:  mentation appears normal and affect normal/bright    Bilateral hip exam    Inspection:        no edema or ecchymosis in hip area    ROM:       Full active and passive ROM       Pain with active adduction over lateral hip and active ER    Strength:        flexion 5/5       extension 5/5       abduction 5/5       adduction 5/5    Tender:        greater trochanter r>>L today       SI joint mild       Piriformis, external rotators mildly    Non Tender:        remainder of hip area       illiac crest       ASIS       pubis    Sensation:        grossly intact in hip and thigh    Skin:       well perfused       capillary refill brisk    Special Tests:        neg (-) LORI       neg (-) FADIR       neg (-) scour       positive (+) Brooks    Bilateral Hand Exam  Inspection:Carpals: normal, Thumb: normal  Tender: Carpals: " triquetrum, Metacarpals: 1st metacarpal, Thumb: CMC, R>L, but still milder overall than we have seen in the past  Non-tender: Remainder of hand  Range of Motion Thumb: opposition Decreased R>L, flexion MCP decreased, painful, extension MCP decreased, flexion IP decreased, extension IP decreased  Strength: mild decrease in thumb  strength bilaterally  Special tests: Positive scour of CMC, negative snuffbox tenderness   Skin:  well perfused  capillary refill brisk    Large Joint Injection/Arthocentesis: bilateral greater trochanteric bursa  Date/Time: 2/12/2020 5:30 PM  Performed by: José Miguel Linder DO  Authorized by: José Miguel Linder DO     Indications:  Pain  Needle Size:  25 G  Guidance: landmark guided    Approach:  Posterolateral  Location:  Hip  Laterality:  Bilateral      Site:  Bilateral greater trochanteric bursa  Medications (Right):  2 mL lidocaine 1 %; 2 mL bupivacaine (PF) 0.5 %; 40 mg triamcinolone 40 MG/ML  Medications (Left):  2 mL lidocaine 1 %; 2 mL bupivacaine (PF) 0.5 %; 40 mg triamcinolone 40 MG/ML  Outcome:  Tolerated well, no immediate complications  Procedure discussed: discussed risks, benefits, and alternatives    Consent Given by:  Patient  Timeout: timeout called immediately prior to procedure    Prep: patient was prepped and draped in usual sterile fashion          Radiology:  FINGER LEFT TWO OR MORE VIEWS 5/13/2014 11:29 AM   HISTORY: Chronic pain.  COMPARISON: None.   FINDINGS: No fracture or malalignment is identified. There is near  complete joint space loss of the first carpal metacarpal joint with  adjacent subchondral cyst formation in the base of the first  metacarpal. Small osteophytes are also noted. Mild degenerative  changes of the first metacarpal phalangeal joint also noted.  IMPRESSION  IMPRESSION: Degenerative changes of the first carpal metacarpal and of  the first metacarpal phalangeal joints. No fracture.    Assessment:  1. Primary osteoarthritis of  both first carpometacarpal joints    2. Trochanteric bursitis of both hips        Plan:  Discussed the assessment with the patient.  Follow up: prn  XR images independently visualized and reviewed with patient again today in clinic  Reviewed prior hand and hip films today  Defer CMC injections for as long as possible, reviewed PRP option in detail again today  B/l troch bursa CSI repeated today  Water therapy and low impact activity options reviewed   Reviewed again number of steroid injection over time and importance of limiting overall number as much as possible  Risks, potential SE reviewed in detail  F/u in minimum of 2 weeks for b/l CMC CSI  Expectations and goals of CSI reviewed  Often 2-3 days for steroid effect, and can take up to two weeks for maximum effect  We discussed modified progressive pain-free activity as tolerated  Do not overuse in first two weeks if feeling better due to concern for vulnerability while steroid is working  Supportive care reviewed  All questions were answered today  Contact us with additional questions or concerns  Signs and sx of concern reviewed      José Miguel Linder DO, RADHA  Primary Care Sports Medicine  [facility]         Disclaimer: This note consists of symbols derived from keyboarding, dictation and/or voice recognition software. As a result, there may be errors in the script that have gone undetected. Please consider this when interpreting information found in this chart.      Again, thank you for allowing me to participate in the care of your patient.        Sincerely,        José Miguel Linder DO     Yes

## 2022-11-03 PROBLEM — M35.2: Status: ACTIVE | Noted: 2022-11-03

## 2022-11-03 RX ORDER — CELECOXIB 200 MG/1
CAPSULE ORAL
Qty: 180 CAPSULE | Refills: 0 | OUTPATIENT
Start: 2022-11-03

## 2022-11-06 PROBLEM — Z79.899 ENCOUNTER FOR LONG-TERM (CURRENT) USE OF HIGH-RISK MEDICATION: Status: ACTIVE | Noted: 2022-11-06

## 2022-11-09 ENCOUNTER — MYC MEDICAL ADVICE (OUTPATIENT)
Dept: FAMILY MEDICINE | Facility: CLINIC | Age: 62
End: 2022-11-09

## 2022-11-09 DIAGNOSIS — I10 HYPERTENSION GOAL BP (BLOOD PRESSURE) < 140/90: ICD-10-CM

## 2022-11-09 RX ORDER — LOSARTAN POTASSIUM 100 MG/1
100 TABLET ORAL DAILY
Qty: 90 TABLET | Refills: 1 | Status: SHIPPED | OUTPATIENT
Start: 2022-11-09 | End: 2023-02-28

## 2022-11-09 NOTE — TELEPHONE ENCOUNTER
Patient is looking for a refill of Losartan.  She needs it filled before the weekend.    Her BP was elevated on last visit on 9/14/22 it was 163/84 so she failed protocol.    Shazia Fuentes RN  [facility] ~ Registered Nurse  Clinic Triage ~ [facility]  November 9, 2022

## 2022-11-10 ENCOUNTER — VIRTUAL VISIT (OUTPATIENT)
Dept: DERMATOLOGY | Facility: CLINIC | Age: 62
End: 2022-11-10
Payer: COMMERCIAL

## 2022-11-10 DIAGNOSIS — L30.9 DERMATITIS: ICD-10-CM

## 2022-11-10 DIAGNOSIS — Z79.899 ENCOUNTER FOR LONG-TERM CURRENT USE OF HIGH RISK MEDICATION: Primary | ICD-10-CM

## 2022-11-10 PROCEDURE — 99214 OFFICE O/P EST MOD 30 MIN: CPT | Mod: 95 | Performed by: DERMATOLOGY

## 2022-11-10 RX ORDER — BETAMETHASONE DIPROPIONATE 0.5 MG/G
CREAM TOPICAL
Qty: 50 G | Refills: 2 | Status: SHIPPED | OUTPATIENT
Start: 2022-11-10 | End: 2023-09-12

## 2022-11-10 RX ORDER — DESONIDE 0.5 MG/G
CREAM TOPICAL 2 TIMES DAILY
Qty: 60 G | Refills: 3 | Status: SHIPPED | OUTPATIENT
Start: 2022-11-10 | End: 2022-12-21

## 2022-11-10 ASSESSMENT — PAIN SCALES - GENERAL: PAINLEVEL: MODERATE PAIN (5)

## 2022-11-10 NOTE — LETTER
11/10/2022       RE: Jacquie Amador  [address]  [facility] [address]     Dear Colleague,    Thank you for referring your patient, Jacquie Amador, to the [facility] [place] at [facility]. Please see a copy of my visit note below.    [facility] Dermatology Note  Encounter Date: Nov 10, 2022  Store-and-Forward and Telephone ([phone]). Location of teledermatologist: [facility] [place].  Start time: 12:58. End time: 1:13.    Dermatology Problem List:  1. Recurrent aphthous ulcers  2. Multiple skin ulcers resembling prurigo/neurodermatitis    ____________________________________________    Assessment & Plan:     # Recurring oral and genital sores, likely recurrent aphthous ulcers, as well as multiple diffuse discrete skin ulcers.  While many of the ulcerated papules affecting her head and neck resemble prurigo or neurodermatitis, her biopsy was not entirely conclusive, but did demonstrate a notable number of dermal neutrophils, thus this eruption may be related to her aphthosis and may possibly represent Behcet's disease.  We would like to consider starting dapsone; I discussed its safety with her hematologist Dr. Grimes, given her history of platelet dysfunction.  Dr. Grimes was supportive of a cautious trial of this medication.      Jacquie had hesitation about starting dapsone after reviewing side effects.  We had an extended discussion again about risks and benefits again today, and she was open to trying it.    Today's plan is as follows:  - We will plan to have her start dapsone 50 mg once daily, pending her baseline screening safety labs.  If she starts this medication we will repeat a CBC with differential and a reticulocyte count once weekly for the next 3 weeks.  - We discussed common side effects of dapsone including anemia, methemoglobinemia and exercise intolerance, as  well as the rare risks of allergy including serious allergies such as DRESS syndrome and SJS/TEN.  - For now she will continue colchicine 0.6 mg twice daily, as she has tolerated this and has seen some improvement in her oral sores.  - She can also continue betamethasone ointment as needed for persistent skin sores and pain.     We will have her follow-up in our clinic in 1 month's time.     Jay Warren MD  Dermatology Attending  ____________________________________________    CC: Derm Problem (Jacquie is being seen today for a follow up for sore )    HPI:  Ms. Jacquie Amador is a(n) 62 year old female who presents today for followup on skin sores of the face, neck and upper trunk.  Since last visit has had slight improvement with topical steroids, but not sure if colchicine has given her much improvement.  Still with many open and painful sores on face.  Has some hesitation about dapsone after reviewing potential side effects.    Patient is otherwise feeling well, without additional skin concerns.    Labs Reviewed:  Awaiting baseline CBC and CMP    Physical Exam:  Vitals: LMP 07/17/2009 (Exact Date)   SKIN: Teledermatology photos were reviewed; image quality and interpretability: acceptable. Image date: 11/10/22.  - multiple linear and amoeboid shallow ulcers diffusely distributed on face, without significant surrounding erythema  - No other lesions of concern on areas examined.     Medications:  Current Outpatient Medications   Medication     albuterol (PROVENTIL) (2.5 MG/3ML) 0.083% neb solution     albuterol (VENTOLIN HFA) 108 (90 Base) MCG/ACT inhaler     aluminum chloride (DRYSOL) 20 % external solution     amoxicillin-clavulanate (AUGMENTIN) 875-125 MG tablet     atorvastatin (LIPITOR) 20 MG tablet     atorvastatin (LIPITOR) 20 MG tablet     augmented betamethasone dipropionate (DIPROLENE AF) 0.05 % external cream     benzonatate (TESSALON) 200 MG capsule     betamethasone dipropionate (DIPROSONE) 0.05 %  external ointment     buPROPion (WELLBUTRIN SR) 200 MG 12 hr tablet     celecoxib (CELEBREX) 200 MG capsule     chlorhexidine (PERIDEX) 0.12 % solution     clindamycin (CLINDAMAX) 1 % external gel     clotrimazole (MYCELEX) 10 MG lozenge     colchicine (COLCYRS) 0.6 MG tablet     dapsone (ACZONE) 25 MG tablet     desonide (DESOWEN) 0.05 % external ointment     DULoxetine (CYMBALTA) 60 MG capsule     famotidine (PEPCID) 40 MG tablet     fluocinonide (LIDEX) 0.05 % external gel     gabapentin (NEURONTIN) 100 MG capsule     hydrochlorothiazide (HYDRODIURIL) 25 MG tablet     hydrocortisone 2.5 % cream     losartan (COZAAR) 100 MG tablet     metoprolol succinate ER (TOPROL XL) 100 MG 24 hr tablet     metoprolol succinate ER (TOPROL XL) 25 MG 24 hr tablet     mometasone-formoterol (DULERA) 200-5 MCG/ACT inhaler     montelukast (SINGULAIR) 10 MG tablet     multivitamin w/minerals (THERA-VIT-M) tablet     mupirocin (BACTROBAN) 2 % external cream     nystatin (MYCOSTATIN) 043481 UNIT/ML suspension     oxybutynin ER (DITROPAN-XL) 5 MG 24 hr tablet     predniSONE (DELTASONE) 10 MG tablet     rizatriptan (MAXALT) 10 MG tablet     rOPINIRole (REQUIP) 1 MG tablet     traMADol (ULTRAM) 50 MG tablet     triamcinolone (ARISTOCORT HP) 0.5 % external cream     valACYclovir (VALTREX) 500 MG tablet     ZYRTEC 10 MG OR TABS     azaTHIOprine (IMURAN) 50 MG tablet     No current facility-administered medications for this visit.      Past Medical/Surgical History:   Patient Active Problem List   Diagnosis     Moderate persistent asthma without complication     Allergic rhinitis due to other allergen     Esophageal reflux     Moderate recurrent major depression (H)     Multiple joint pain     HYPERLIPIDEMIA LDL GOAL <130     Hypertension goal BP (blood pressure) < 140/90     MARIZOL (generalized anxiety disorder)     Myalgia     Chronic rhinitis     Constipation     Lumbar disc disease with radiculopathy     Iron deficiency anemia      Osteoarthritis of carpometacarpal joint of thumb - bilateral     Trochanteric bursitis     Right ankle instability     Primary focal hyperhidrosis     Trochanteric bursitis of both hips     Overweight     Iron deficiency     Scratching     Advanced directives, counseling/discussion     Chronic, continuous use of opioids     Medical marijuana use     Primary osteoarthritis of both first carpometacarpal joints     Arthritis of metatarsophalangeal joint     Sinus tachycardia     Intractable chronic migraine without aura and without status migrainosus     Impaired fasting glucose     Migraine without aura and without status migrainosus, not intractable     Skin ulcer, limited to breakdown of skin (H)     Morbid obesity (H)     Platelet granule defect (H)     Aphthous ulcer     Rash     Pruritus     Behcet's syndrome involving oral mucosa (H)     Encounter for long-term (current) use of high-risk medication     Past Medical History:   Diagnosis Date     ASTHMA - MODERATE PERSISTENT 9/21/2005     Chronic pain      Coronary artery disease      CVA (cerebral infarction)      Depressive disorder, not elsewhere classified      Diabetes (H)      Elevated serum alkaline phosphatase level     Liver source     Fibromyalgia      HYPERLIPIDEMIA NEC/NOS 12/29/2006     Hypertriglyceridemia      OA (osteoarthritis)      Thyroid disease      Trochanteric bursitis      Unspecified essential hypertension        CC No referring provider defined for this encounter. on close of this encounter.      Jay Warren MD

## 2022-11-10 NOTE — PROGRESS NOTES
[facility] Dermatology Note  Encounter Date: Nov 10, 2022  Store-and-Forward and Telephone ([phone]). Location of teledermatologist: [facility] [place].  Start time: 12:58. End time: 1:13.    Dermatology Problem List:  1. Recurrent aphthous ulcers  2. Multiple skin ulcers resembling prurigo/neurodermatitis    ____________________________________________    Assessment & Plan:     # Recurring oral and genital sores, likely recurrent aphthous ulcers, as well as multiple diffuse discrete skin ulcers.  While many of the ulcerated papules affecting her head and neck resemble prurigo or neurodermatitis, her biopsy was not entirely conclusive, but did demonstrate a notable number of dermal neutrophils, thus this eruption may be related to her aphthosis and may possibly represent Behcet's disease.  We would like to consider starting dapsone; I discussed its safety with her hematologist Dr. Grimes, given her history of platelet dysfunction.  Dr. Grimes was supportive of a cautious trial of this medication.      Jacquie had hesitation about starting dapsone after reviewing side effects.  We had an extended discussion again about risks and benefits again today, and she was open to trying it.    Today's plan is as follows:  - We will plan to have her start dapsone 50 mg once daily, pending her baseline screening safety labs.  If she starts this medication we will repeat a CBC with differential and a reticulocyte count once weekly for the next 3 weeks.  - We discussed common side effects of dapsone including anemia, methemoglobinemia and exercise intolerance, as well as the rare risks of allergy including serious allergies such as DRESS syndrome and SJS/TEN.  - For now she will continue colchicine 0.6 mg twice daily, as she has tolerated this and has seen some improvement in her oral sores.  - She can also continue betamethasone ointment as needed for persistent skin sores  and pain.     We will have her follow-up in our clinic in 1 month's time.     Jay Warren MD  Dermatology Attending  ____________________________________________    CC: Derm Problem (Jacquie is being seen today for a follow up for sore )    HPI:  Ms. Jacquie Amador is a(n) 62 year old female who presents today for followup on skin sores of the face, neck and upper trunk.  Since last visit has had slight improvement with topical steroids, but not sure if colchicine has given her much improvement.  Still with many open and painful sores on face.  Has some hesitation about dapsone after reviewing potential side effects.    Patient is otherwise feeling well, without additional skin concerns.    Labs Reviewed:  Awaiting baseline CBC and CMP    Physical Exam:  Vitals: LMP 07/17/2009 (Exact Date)   SKIN: Teledermatology photos were reviewed; image quality and interpretability: acceptable. Image date: 11/10/22.  - multiple linear and amoeboid shallow ulcers diffusely distributed on face, without significant surrounding erythema  - No other lesions of concern on areas examined.     Medications:  Current Outpatient Medications   Medication     albuterol (PROVENTIL) (2.5 MG/3ML) 0.083% neb solution     albuterol (VENTOLIN HFA) 108 (90 Base) MCG/ACT inhaler     aluminum chloride (DRYSOL) 20 % external solution     amoxicillin-clavulanate (AUGMENTIN) 875-125 MG tablet     atorvastatin (LIPITOR) 20 MG tablet     atorvastatin (LIPITOR) 20 MG tablet     augmented betamethasone dipropionate (DIPROLENE AF) 0.05 % external cream     benzonatate (TESSALON) 200 MG capsule     betamethasone dipropionate (DIPROSONE) 0.05 % external ointment     buPROPion (WELLBUTRIN SR) 200 MG 12 hr tablet     celecoxib (CELEBREX) 200 MG capsule     chlorhexidine (PERIDEX) 0.12 % solution     clindamycin (CLINDAMAX) 1 % external gel     clotrimazole (MYCELEX) 10 MG lozenge     colchicine (COLCYRS) 0.6 MG tablet     dapsone (ACZONE) 25 MG tablet      desonide (DESOWEN) 0.05 % external ointment     DULoxetine (CYMBALTA) 60 MG capsule     famotidine (PEPCID) 40 MG tablet     fluocinonide (LIDEX) 0.05 % external gel     gabapentin (NEURONTIN) 100 MG capsule     hydrochlorothiazide (HYDRODIURIL) 25 MG tablet     hydrocortisone 2.5 % cream     losartan (COZAAR) 100 MG tablet     metoprolol succinate ER (TOPROL XL) 100 MG 24 hr tablet     metoprolol succinate ER (TOPROL XL) 25 MG 24 hr tablet     mometasone-formoterol (DULERA) 200-5 MCG/ACT inhaler     montelukast (SINGULAIR) 10 MG tablet     multivitamin w/minerals (THERA-VIT-M) tablet     mupirocin (BACTROBAN) 2 % external cream     nystatin (MYCOSTATIN) [id number] UNIT/ML suspension     oxybutynin ER (DITROPAN-XL) 5 MG 24 hr tablet     predniSONE (DELTASONE) 10 MG tablet     rizatriptan (MAXALT) 10 MG tablet     rOPINIRole (REQUIP) 1 MG tablet     traMADol (ULTRAM) 50 MG tablet     triamcinolone (ARISTOCORT HP) 0.5 % external cream     valACYclovir (VALTREX) 500 MG tablet     ZYRTEC 10 MG OR TABS     azaTHIOprine (IMURAN) 50 MG tablet     No current facility-administered medications for this visit.      Past Medical/Surgical History:   Patient Active Problem List   Diagnosis     Moderate persistent asthma without complication     Allergic rhinitis due to other allergen     Esophageal reflux     Moderate recurrent major depression (H)     Multiple joint pain     HYPERLIPIDEMIA LDL GOAL <130     Hypertension goal BP (blood pressure) < 140/90     MARIZOL (generalized anxiety disorder)     Myalgia     Chronic rhinitis     Constipation     Lumbar disc disease with radiculopathy     Iron deficiency anemia     Osteoarthritis of carpometacarpal joint of thumb - bilateral     Trochanteric bursitis     Right ankle instability     Primary focal hyperhidrosis     Trochanteric bursitis of both hips     Overweight     Iron deficiency     Scratching     Advanced directives, counseling/discussion     Chronic, continuous use of opioids      Medical marijuana use     Primary osteoarthritis of both first carpometacarpal joints     Arthritis of metatarsophalangeal joint     Sinus tachycardia     Intractable chronic migraine without aura and without status migrainosus     Impaired fasting glucose     Migraine without aura and without status migrainosus, not intractable     Skin ulcer, limited to breakdown of skin (H)     Morbid obesity (H)     Platelet granule defect (H)     Aphthous ulcer     Rash     Pruritus     Behcet's syndrome involving oral mucosa (H)     Encounter for long-term (current) use of high-risk medication     Past Medical History:   Diagnosis Date     ASTHMA - MODERATE PERSISTENT 9/21/2005     Chronic pain      Coronary artery disease      CVA (cerebral infarction)      Depressive disorder, not elsewhere classified      Diabetes (H)      Elevated serum alkaline phosphatase level     Liver source     Fibromyalgia      HYPERLIPIDEMIA NEC/NOS 12/29/2006     Hypertriglyceridemia      OA (osteoarthritis)      Thyroid disease      Trochanteric bursitis      Unspecified essential hypertension        CC No referring provider defined for this encounter. on close of this encounter.

## 2022-11-10 NOTE — NURSING NOTE
Dermatology Rooming Note    Jacquie Amador's goals for this visit include:   Chief Complaint   Patient presents with     Derm Problem     Jacquie is being seen today for a follow up for SHUN Robertson

## 2022-11-18 ENCOUNTER — MYC MEDICAL ADVICE (OUTPATIENT)
Dept: FAMILY MEDICINE | Facility: CLINIC | Age: 62
End: 2022-11-18

## 2022-11-21 ENCOUNTER — LAB (OUTPATIENT)
Dept: LAB | Facility: CLINIC | Age: 62
End: 2022-11-21
Payer: COMMERCIAL

## 2022-11-21 ENCOUNTER — ALLIED HEALTH/NURSE VISIT (OUTPATIENT)
Dept: FAMILY MEDICINE | Facility: CLINIC | Age: 62
End: 2022-11-21
Payer: COMMERCIAL

## 2022-11-21 DIAGNOSIS — Z23 HIGH PRIORITY FOR 2019-NCOV VACCINE: ICD-10-CM

## 2022-11-21 DIAGNOSIS — D69.1 PLATELET GRANULE DEFECT (H): ICD-10-CM

## 2022-11-21 DIAGNOSIS — Z23 NEED FOR PROPHYLACTIC VACCINATION AND INOCULATION AGAINST INFLUENZA: ICD-10-CM

## 2022-11-21 DIAGNOSIS — Z12.11 SCREEN FOR COLON CANCER: ICD-10-CM

## 2022-11-21 DIAGNOSIS — Z79.899 ENCOUNTER FOR LONG-TERM (CURRENT) USE OF HIGH-RISK MEDICATION: ICD-10-CM

## 2022-11-21 LAB
ALBUMIN SERPL-MCNC: 3.8 G/DL (ref 3.4–5)
ALP SERPL-CCNC: 101 U/L (ref 40–150)
ALT SERPL W P-5'-P-CCNC: 50 U/L (ref 0–50)
ANION GAP SERPL CALCULATED.3IONS-SCNC: 8 MMOL/L (ref 3–14)
AST SERPL W P-5'-P-CCNC: 34 U/L (ref 0–45)
BASOPHILS # BLD AUTO: 0 10E3/UL (ref 0–0.2)
BASOPHILS NFR BLD AUTO: 0 %
BILIRUB DIRECT SERPL-MCNC: 0.1 MG/DL (ref 0–0.2)
BILIRUB SERPL-MCNC: 0.7 MG/DL (ref 0.2–1.3)
BUN SERPL-MCNC: 24 MG/DL (ref 7–30)
CALCIUM SERPL-MCNC: 9.6 MG/DL (ref 8.5–10.1)
CHLORIDE BLD-SCNC: 105 MMOL/L (ref 94–109)
CO2 SERPL-SCNC: 28 MMOL/L (ref 20–32)
CREAT SERPL-MCNC: 1.3 MG/DL (ref 0.52–1.04)
EOSINOPHIL # BLD AUTO: 0.2 10E3/UL (ref 0–0.7)
EOSINOPHIL NFR BLD AUTO: 2 %
ERYTHROCYTE [DISTWIDTH] IN BLOOD BY AUTOMATED COUNT: 14 % (ref 10–15)
GFR SERPL CREATININE-BSD FRML MDRD: 46 ML/MIN/1.73M2
GLUCOSE BLD-MCNC: 118 MG/DL (ref 70–99)
HCT VFR BLD AUTO: 45.7 % (ref 35–47)
HGB BLD-MCNC: 14.9 G/DL (ref 11.7–15.7)
IMM GRANULOCYTES # BLD: 0 10E3/UL
IMM GRANULOCYTES NFR BLD: 0 %
LYMPHOCYTES # BLD AUTO: 3.7 10E3/UL (ref 0.8–5.3)
LYMPHOCYTES NFR BLD AUTO: 39 %
MCH RBC QN AUTO: 30.2 PG (ref 26.5–33)
MCHC RBC AUTO-ENTMCNC: 32.6 G/DL (ref 31.5–36.5)
MCV RBC AUTO: 93 FL (ref 78–100)
MONOCYTES # BLD AUTO: 0.7 10E3/UL (ref 0–1.3)
MONOCYTES NFR BLD AUTO: 7 %
NEUTROPHILS # BLD AUTO: 4.9 10E3/UL (ref 1.6–8.3)
NEUTROPHILS NFR BLD AUTO: 52 %
PLATELET # BLD AUTO: 324 10E3/UL (ref 150–450)
POTASSIUM BLD-SCNC: 3.3 MMOL/L (ref 3.4–5.3)
PROT SERPL-MCNC: 7.8 G/DL (ref 6.8–8.8)
RBC # BLD AUTO: 4.94 10E6/UL (ref 3.8–5.2)
RETICS # AUTO: 0.1 10E6/UL (ref 0.03–0.1)
RETICS/RBC NFR AUTO: 2 % (ref 0.5–2)
SODIUM SERPL-SCNC: 141 MMOL/L (ref 133–144)
WBC # BLD AUTO: 9.4 10E3/UL (ref 4–11)

## 2022-11-21 PROCEDURE — 80053 COMPREHEN METABOLIC PANEL: CPT

## 2022-11-21 PROCEDURE — 99207 PR NO CHARGE NURSE ONLY: CPT

## 2022-11-21 PROCEDURE — 36415 COLL VENOUS BLD VENIPUNCTURE: CPT

## 2022-11-21 PROCEDURE — 90471 IMMUNIZATION ADMIN: CPT

## 2022-11-21 PROCEDURE — 0134A COVID-19,PF,MODERNA BIVALENT: CPT

## 2022-11-21 PROCEDURE — 82248 BILIRUBIN DIRECT: CPT

## 2022-11-21 PROCEDURE — 99000 SPECIMEN HANDLING OFFICE-LAB: CPT

## 2022-11-21 PROCEDURE — 85025 COMPLETE CBC W/AUTO DIFF WBC: CPT

## 2022-11-21 PROCEDURE — 85045 AUTOMATED RETICULOCYTE COUNT: CPT

## 2022-11-21 PROCEDURE — 91313 COVID-19,PF,MODERNA BIVALENT: CPT

## 2022-11-21 PROCEDURE — 82955 ASSAY OF G6PD ENZYME: CPT | Mod: 90

## 2022-11-21 PROCEDURE — 90682 RIV4 VACC RECOMBINANT DNA IM: CPT

## 2022-11-23 ENCOUNTER — MYC MEDICAL ADVICE (OUTPATIENT)
Dept: FAMILY MEDICINE | Facility: CLINIC | Age: 62
End: 2022-11-23

## 2022-11-23 DIAGNOSIS — E87.6 HYPOKALEMIA: Primary | ICD-10-CM

## 2022-11-23 LAB — G6PD RBC-CCNT: 14.7 U/G HB

## 2022-11-23 RX ORDER — POTASSIUM CHLORIDE 1500 MG/1
20 TABLET, EXTENDED RELEASE ORAL 2 TIMES DAILY
Qty: 4 TABLET | Refills: 0 | Status: SHIPPED | OUTPATIENT
Start: 2022-11-23 | End: 2022-11-25

## 2022-11-23 NOTE — TELEPHONE ENCOUNTER
Pt has questions about labs results, specifically potassium.  Also requesting other labs.  See [employer] message.

## 2022-11-23 NOTE — TELEPHONE ENCOUNTER
May lab or Medical assistant assist with FIT test?. Please let the patient know Dr Peterson is out of the office until early next week.  Eligio Gray MD on 11/23/2022 at 12:53 PM

## 2022-11-23 NOTE — TELEPHONE ENCOUNTER
Provider please advise on potassium and other labs done with it-does she need to do ferritin? If so please place lab order.   Regarding FIT test that is something she will  from lab to complete-Dr Warren has an order already placed for this.    Marie Laura CMA (AAMA)

## 2022-11-28 DIAGNOSIS — L74.511 PRIMARY FOCAL HYPERHIDROSIS OF FACE: ICD-10-CM

## 2022-11-29 ENCOUNTER — MYC MEDICAL ADVICE (OUTPATIENT)
Dept: FAMILY MEDICINE | Facility: CLINIC | Age: 62
End: 2022-11-29

## 2022-11-29 DIAGNOSIS — M51.16 LUMBAR DISC DISEASE WITH RADICULOPATHY: Primary | ICD-10-CM

## 2022-11-29 RX ORDER — OXYBUTYNIN CHLORIDE 5 MG/1
10 TABLET, EXTENDED RELEASE ORAL DAILY
Qty: 180 TABLET | Refills: 2 | Status: SHIPPED | OUTPATIENT
Start: 2022-11-29 | End: 2023-02-28

## 2022-11-29 NOTE — TELEPHONE ENCOUNTER
No MRI order present. Pt requesting order, labs, and visit. PCP-please advise.    Yaz Porter, MARVAN, RN, PHN  Registered Nurse-Clinic Triage  [facility]  11/29/2022 at 3:28 PM

## 2022-11-30 NOTE — TELEPHONE ENCOUNTER
Responded to [employer] with information regarding MRI and ferritin. See provider note about scheduling. Please contact pt to schedule.    MARVA HernandezN, RN, PHN  Registered Nurse-Clinic Triage  [facility]  11/30/2022 at 2:06 PM

## 2022-11-30 NOTE — TELEPHONE ENCOUNTER
MRI order updated.     Ferritin was checked in the last 2 months and was great.  Shouldn't be an issue unless has been having bleeding concerns.     Can utilize a same day or authorized slot if available.

## 2022-11-30 NOTE — TELEPHONE ENCOUNTER
Left message for pt to return our call. Help schedule follow up     Can utilize a same day or authorized slot if available. -per Dr. Peterson

## 2022-12-01 NOTE — TELEPHONE ENCOUNTER
Please reach out to patient. May need to be triaged if patient is feeling in distress.  Can then see what options are needed/available.

## 2022-12-01 NOTE — TELEPHONE ENCOUNTER
"Contacted pt. She states that she is not having any thoughts of harming herself. She states that \"things are looking up\" now. She states that she just doesn't feel like the wellbutrin is helping much anymore but if she goes up on the dose than she cries all the time. She has previously tried an increased dose. She states that she thinks maybe once per day is not enough. She has tried twice per day before.     Pt states that she also has a recurrence of the infection on the inside of her mouth on her cheek that she is working with hematology for. She is wondering if PCP can help with that?      Next 5 appointments (look out 90 days)    Dec 20, 2022 10:30 AM  (Arrive by 10:10 AM)  Provider Visit with Liz Peterson MD  [facility] [place] ([facility] ) [address], [address]  [place] [facility] [address]  [phone]       Pt is wondering if she can be worked in sooner? There were no sooner approval or same day spots.     Yaz Porter, MARVAN, RN, PHN  Registered Nurse-Clinic Triage  [facility]  12/1/2022 at 3:07 PM    "

## 2022-12-06 DIAGNOSIS — D69.1 PLATELET GRANULE DEFECT (H): ICD-10-CM

## 2022-12-06 RX ORDER — PREDNISONE 10 MG/1
10 TABLET ORAL DAILY
Qty: 30 TABLET | Refills: 1 | Status: SHIPPED | OUTPATIENT
Start: 2022-12-06 | End: 2022-12-28

## 2022-12-07 ENCOUNTER — VIRTUAL VISIT (OUTPATIENT)
Dept: FAMILY MEDICINE | Facility: CLINIC | Age: 62
End: 2022-12-07
Payer: COMMERCIAL

## 2022-12-07 DIAGNOSIS — F11.90 CHRONIC, CONTINUOUS USE OF OPIOIDS: ICD-10-CM

## 2022-12-07 DIAGNOSIS — G89.4 CHRONIC PAIN SYNDROME: ICD-10-CM

## 2022-12-07 DIAGNOSIS — F33.1 MODERATE RECURRENT MAJOR DEPRESSION (H): ICD-10-CM

## 2022-12-07 DIAGNOSIS — B37.0 THRUSH: ICD-10-CM

## 2022-12-07 DIAGNOSIS — F41.1 GENERALIZED ANXIETY DISORDER: Primary | ICD-10-CM

## 2022-12-07 PROBLEM — Z79.899 ENCOUNTER FOR LONG-TERM CURRENT USE OF HIGH RISK MEDICATION: Status: ACTIVE | Noted: 2022-12-07

## 2022-12-07 PROBLEM — L30.9 DERMATITIS: Status: ACTIVE | Noted: 2022-12-07

## 2022-12-07 PROCEDURE — 99214 OFFICE O/P EST MOD 30 MIN: CPT | Mod: 95 | Performed by: FAMILY MEDICINE

## 2022-12-07 RX ORDER — BUPROPION HYDROCHLORIDE 200 MG/1
200 TABLET, EXTENDED RELEASE ORAL DAILY
Qty: 90 TABLET | Refills: 1 | Status: SHIPPED | OUTPATIENT
Start: 2022-12-07 | End: 2023-01-30 | Stop reason: DRUGHIGH

## 2022-12-07 RX ORDER — CLOTRIMAZOLE 10 MG/1
10 LOZENGE ORAL
Qty: 70 LOZENGE | Refills: 1 | Status: SHIPPED | OUTPATIENT
Start: 2022-12-07 | End: 2023-03-01

## 2022-12-07 RX ORDER — TRAMADOL HYDROCHLORIDE 50 MG/1
50 TABLET ORAL DAILY PRN
Qty: 30 TABLET | Refills: 0 | Status: SHIPPED | OUTPATIENT
Start: 2022-12-07 | End: 2023-01-25

## 2022-12-07 RX ORDER — BUSPIRONE HYDROCHLORIDE 5 MG/1
TABLET ORAL
Qty: 95 TABLET | Refills: 1 | Status: SHIPPED | OUTPATIENT
Start: 2022-12-07 | End: 2022-12-29 | Stop reason: DRUGHIGH

## 2022-12-07 ASSESSMENT — ANXIETY QUESTIONNAIRES
GAD7 TOTAL SCORE: 10
IF YOU CHECKED OFF ANY PROBLEMS ON THIS QUESTIONNAIRE, HOW DIFFICULT HAVE THESE PROBLEMS MADE IT FOR YOU TO DO YOUR WORK, TAKE CARE OF THINGS AT HOME, OR GET ALONG WITH OTHER PEOPLE: SOMEWHAT DIFFICULT
4. TROUBLE RELAXING: SEVERAL DAYS
2. NOT BEING ABLE TO STOP OR CONTROL WORRYING: NEARLY EVERY DAY
3. WORRYING TOO MUCH ABOUT DIFFERENT THINGS: SEVERAL DAYS
5. BEING SO RESTLESS THAT IT IS HARD TO SIT STILL: SEVERAL DAYS
7. FEELING AFRAID AS IF SOMETHING AWFUL MIGHT HAPPEN: SEVERAL DAYS
4. TROUBLE RELAXING: SEVERAL DAYS
6. BECOMING EASILY ANNOYED OR IRRITABLE: MORE THAN HALF THE DAYS
6. BECOMING EASILY ANNOYED OR IRRITABLE: MORE THAN HALF THE DAYS
5. BEING SO RESTLESS THAT IT IS HARD TO SIT STILL: SEVERAL DAYS
8. IF YOU CHECKED OFF ANY PROBLEMS, HOW DIFFICULT HAVE THESE MADE IT FOR YOU TO DO YOUR WORK, TAKE CARE OF THINGS AT HOME, OR GET ALONG WITH OTHER PEOPLE?: SOMEWHAT DIFFICULT
7. FEELING AFRAID AS IF SOMETHING AWFUL MIGHT HAPPEN: SEVERAL DAYS
1. FEELING NERVOUS, ANXIOUS, OR ON EDGE: SEVERAL DAYS
1. FEELING NERVOUS, ANXIOUS, OR ON EDGE: SEVERAL DAYS
7. FEELING AFRAID AS IF SOMETHING AWFUL MIGHT HAPPEN: SEVERAL DAYS
IF YOU CHECKED OFF ANY PROBLEMS ON THIS QUESTIONNAIRE, HOW DIFFICULT HAVE THESE PROBLEMS MADE IT FOR YOU TO DO YOUR WORK, TAKE CARE OF THINGS AT HOME, OR GET ALONG WITH OTHER PEOPLE: SOMEWHAT DIFFICULT
3. WORRYING TOO MUCH ABOUT DIFFERENT THINGS: SEVERAL DAYS
2. NOT BEING ABLE TO STOP OR CONTROL WORRYING: NEARLY EVERY DAY
GAD7 TOTAL SCORE: 10

## 2022-12-07 ASSESSMENT — PATIENT HEALTH QUESTIONNAIRE - PHQ9
SUM OF ALL RESPONSES TO PHQ QUESTIONS 1-9: 7
SUM OF ALL RESPONSES TO PHQ QUESTIONS 1-9: 7
10. IF YOU CHECKED OFF ANY PROBLEMS, HOW DIFFICULT HAVE THESE PROBLEMS MADE IT FOR YOU TO DO YOUR WORK, TAKE CARE OF THINGS AT HOME, OR GET ALONG WITH OTHER PEOPLE: SOMEWHAT DIFFICULT

## 2022-12-07 NOTE — PROGRESS NOTES
Jacquie is a 62 year old who is being evaluated via a billable video visit.      How would you like to obtain your AVS? MyChart  If the video visit is dropped, the invitation should be resent by: Text to cell phone: [phone]  Will anyone else be joining your video visit? No      Patient completed E-Check in. Questionnaires blown in and patient checked in without being called.     Assessment & Plan     Generalized anxiety disorder  Patient with continued concerns about anxiety.  There seems to be a lot of pressure at home in relation to her new grandbaby's that are twins that she is helping to care for.  She reports not getting a lot of sleep and that is making it more difficult.  She is upset about her skin.  She is not taking medication for this anymore and is not sure what to do regarding follow-up.  She has not been able to increase her Wellbutrin above 200 mg without causing some issues with being very tearful apparently.  Will continue on the Wellbutrin  mg daily.  Did discuss potentially changing to Wellbutrin XL as this will get better coverage however this is not done today.  Continue on Cymbalta at the current dose.  Adding BuSpar for now as noted.  Did discuss that I think it be very helpful for her to be seen by psychiatry.  Patient was in agreement but does not feel she can do this in the meantime will soon.  The referral is placed and did encourage her to schedule that as soon as she can.  - busPIRone (BUSPAR) 5 MG tablet; Take 1 tablet (5 mg) by mouth daily for 5 days, THEN 1 tablet (5 mg) 2 times daily for 5 days, THEN 2 tablets (10 mg) 2 times daily for 30 days.  - buPROPion (WELLBUTRIN SR) 200 MG 12 hr tablet; Take 1 tablet (200 mg) by mouth daily  - Adult Mental Health  Referral; Future    Moderate recurrent major depression (H)  As above  - Adult Mental Health  Referral; Future    Thrush  Patient is planning about thrush.  She said it is like her prior episodes.  She  uses the clotrimazole lozenges but only once in a while.  She is not using them 5 times a day for several days in a row to try to resolve it.  Encouraged her to take 5 times a day for the next 5 to 7 days depending on results..  If not resolved, will reach out.   - clotrimazole (MYCELEX) 10 MG lozenge; Place 1 lozenge (10 mg) inside cheek 5 times daily    Chronic, continuous use of opioids  Chronic pain syndrome  Patient in need of refills.  She reports no problems or concerns.  Refills given.  - traMADol (ULTRAM) 50 MG tablet; Take 1 tablet (50 mg) by mouth daily as needed for severe pain (7-10)                 No follow-ups on file.    Liz Peterson MD  [facility] ANISH Dhillon is a 62 year old presenting for the following health issues:   Follow Up      History of Present Illness       Back Pain:  She presents for follow up of back pain. Patient's back pain is a chronic problem.  Location of back pain:  Left lower back and left middle of back  Description of back pain: burning, gnawing and sharp  Back pain spreads: left thigh    Since patient first noticed back pain, pain is: gradually worsening  Does back pain interfere with her job:  No      Mental Health Follow-up:  Patient presents to follow-up on Depression & Anxiety.Patient's depression since last visit has been:  Worse  The patient is not having other symptoms associated with depression.  Patient's anxiety since last visit has been:  Worse  The patient is not having other symptoms associated with anxiety.  Any significant life events: grief or loss and other  Patient is feeling anxious or having panic attacks.  Patient has no concerns about alcohol or drug use.    Headaches:   Since the patient's last clinic visit, headaches are: worsened  The patient is getting headaches:  Daily  She is able to do normal daily activities when she has a migraine.  The patient is taking the following rescue/relief medications:  Maxalt  "  Patient states \"I get some relief\" from the rescue/relief medications.   The patient is taking the following medications to prevent migraines:  No medications to prevent migraines  In the past 4 weeks, the patient has gone to an Urgent Care or Emergency Room 0 times times due to headaches.    She eats 0-1 servings of fruits and vegetables daily.She consumes 1 sweetened beverage(s) daily.She exercises with enough effort to increase her heart rate 9 or less minutes per day.  She exercises with enough effort to increase her heart rate 3 or less days per week.   She is taking medications regularly.    Today's PHQ-9         PHQ-9 Total Score: 7    PHQ-9 Q9 Thoughts of better off dead/self-harm past 2 weeks :   Not at all    How difficult have these problems made it for you to do your work, take care of things at home, or get along with other people: Somewhat difficult  Today's MARIZOL-7 Score: 10     Wondering if she needs to switch up her depression medication.   She has tried to increase the wellbutrin but causes her to be tearful all the time.   \"it's a tough road with the twins\" being fussy and not eating well. Not sleeping well. Trying to help with her grandchildren as much as she can.     She is currently taking cymbalta 120 mg daily.   She is also taking the wellbutrin  mg once daily.     Feels frustrated.   She started on Dapsone but feels like her skin is burnt.  She stopped it. She doesn't feel it is helpful. She is unhappy about her skin. This is adding to her concerns.               Review of Systems   CONSTITUTIONAL: NEGATIVE for fever, chills, change in weight  ENT/MOUTH: NEGATIVE for ear, mouth and throat problems  RESP: NEGATIVE for significant cough or SOB  CV: NEGATIVE for chest pain, palpitations or peripheral edema      Objective           Vitals:  No vitals were obtained today due to virtual visit.    Physical Exam   GENERAL: Healthy, alert and no distress  EYES: Eyes grossly normal to " inspection.  No discharge or erythema, or obvious scleral/conjunctival abnormalities.  RESP: No audible wheeze, cough, or visible cyanosis.  No visible retractions or increased work of breathing.    SKIN: Skin lesions as previously noted.  NEURO: Cranial nerves grossly intact.  Mentation and speech appropriate for age.  PSYCH: Mentation appears normal, affect normal/bright, judgement and insight intact, normal speech and appearance well-groomed.                Video-Visit Details    Video Start Time: 1:27 pm    Type of service:  Video Visit    Video End Time:1:51 PM    Originating Location (pt. Location): Home        Distant Location (provider location):  On-site    Platform used for Video Visit: William

## 2022-12-11 ENCOUNTER — E-VISIT (OUTPATIENT)
Dept: FAMILY MEDICINE | Facility: CLINIC | Age: 62
End: 2022-12-11
Payer: COMMERCIAL

## 2022-12-11 DIAGNOSIS — K04.7 DENTAL INFECTION: Primary | ICD-10-CM

## 2022-12-11 PROCEDURE — 99421 OL DIG E/M SVC 5-10 MIN: CPT | Performed by: FAMILY MEDICINE

## 2022-12-12 NOTE — PATIENT INSTRUCTIONS
Thank you for choosing us for your care. I have placed an order for a prescription so that you can start treatment. View your full visit summary for details by clicking on the link below. Your pharmacist will able to address any questions you may have about the medication.     If you're not feeling better within 5-7 days, please schedule an appointment.  You can schedule an appointment right here in [facility], or call [phone]  If the visit is for the same symptoms as your eVisit, we'll refund the cost of your eVisit if seen within seven days.

## 2022-12-13 NOTE — PROGRESS NOTES
"Jacquie Amador is a 62 year old who is being evaluated via a billable video visit.      Pt will join video visit via: [employer]ed  If there are problems joining the visit, send backup video invite via: Text to preferred phone: [phone]    Reason for telehealth visit: Patient has requested telehealth visit    Originating location (patient location): Patient's home    Will anyone else be joining the visit? No         PSYCHIATRIC  DIAGNOSTIC  EVALUATION                                                                    Name:  Jacquie Amador  : 1960     Telemedicine Visit: The patient's condition can be safely assessed and treated via synchronous audio and visual telemedicine encounter.      Reason for Telemedicine Visit: COVID 19 pandemic and the social and physical recommendations by the CDC and MD.      Originating Site (Patient Location): Patient's home     Distant Site (Provider Location): Provider Remote Setting     Consent:  The patient/guardian has verbally consented to: the potential risks and benefits of telemedicine (video visit or phone) versus in person care; bill my insurance or make self-payment for services provided; and responsibility for payment of non-covered services.     Mode of Communication:  [employer] platform      As the provider I attest to compliance with applicable laws and regulations related to telemedicine.    Source of Referral:  Primary Care Provider: Liz Peterson MD   Current Psychotherapist: none     PCP Clinical Question for referral: \"unable to manage depression and anxiety symptoms after trying multiple medications.\"    The [facility] psychiat[facility] providers act as a specialty service for Primary Care Providers in the [facility] who seek to optimize medications for unstable patients. Once medications have been optimized, [facility] providers discharge the patient back to the referring Primary Care Provider for ongoing medication management. This type of system " "allows [facility] to serve a high volume of patients.     Identifying Data:  Patient is a 62 year old,  White Choose not to answer female  who presents for initial visit with me.    Patient prefers to be called: \"Jacquie\".  Patient is currently unemployed.     HPI  Patient presents for initial psychiatric evaluation with the [facility] ([facility]) for evaluation of depression and anxiety.      RECORDS AVAILABLE FOR REVIEW: EHR records through [employer] .       Chief Complaint:  \"This is for going over medication\"    Patient is a 62F seen today for psychiatric evaluation with the [facility] ([facility]) for evaluation of depression and anxiety after PCP referral. She reports a long history of medication management through PCP, with several trials of medications previously, and several medical comorbidities, including Behcet's syndrome and chronic pain, and acquired quantitative platelet disorder per recent [facility]. Also workign with dermatology and rheumatology. Reports depression started as young girl, anxiety later. No history of psychotherapy. Today CC: \"exhausted all the time\", pulled in every direction\". Symptoms have worsened over last several months with significant psychosocial stressors: daughter had twins and she is helping frequently, concern about daughter's PPD; current skin ulcers on face not responding to treatment with derm and leads to embarrassment, worsening depression.  Currently on duloxetine 120 mg and  bupropion  mg since ~2008. She does also still take trazodone, though med list shows discontinued. Did try higher bupropion previously but led to \"crying all the time\". Additionally trialled stopping duloxetine and her chronic pain worsened. PCP recently added buspirone titration - denies any noticeable SEs after 1 week. Has not used alprazolam in several months. Currently using tramadol every day ~11AM, denies significant " worsening of drowsiness. Denies SI/SIB/HI. No history of suicide attempts, psychosis, padmini, problematic substance use.       Current Medications:    Current Outpatient Medications:      albuterol (PROVENTIL) (2.5 MG/3ML) 0.083% neb solution, Take 1 vial (2.5 mg) by nebulization every 6 hours as needed for shortness of breath / dyspnea or wheezing, Disp: 3 mL, Rfl: 4     albuterol (VENTOLIN HFA) 108 (90 Base) MCG/ACT inhaler, INHALE 2 PUFFS INTO THE LUNGS EVERY 6 HOURS AS NEEDED FOR SHORTNESS OF BREATH / DYSPNEA, Disp: 54 g, Rfl: 0     aluminum chloride (DRYSOL) 20 % external solution, Apply topically At Bedtime, Disp: 60 mL, Rfl: 1     amoxicillin-clavulanate (AUGMENTIN) 875-125 MG tablet, Take 1 tablet by mouth 2 times daily for 7 days, Disp: 14 tablet, Rfl: 0     atorvastatin (LIPITOR) 20 MG tablet, Take 1 tablet (20 mg) by mouth daily, Disp: 90 tablet, Rfl: 2     atorvastatin (LIPITOR) 20 MG tablet, Take 20 mg by mouth daily, Disp: , Rfl:      augmented betamethasone dipropionate (DIPROLENE AF) 0.05 % external cream, Apply to affected area twice daily, Disp: 50 g, Rfl: 2     benzonatate (TESSALON) 200 MG capsule, Take 1 capsule (200 mg) by mouth 3 times daily as needed for cough, Disp: 30 capsule, Rfl: 0     betamethasone dipropionate (DIPROSONE) 0.05 % external ointment, Apply topically 2 times daily To areas of skin sores.  Can also use on mouth sores as needed twice daily., Disp: 50 g, Rfl: 3     buPROPion (WELLBUTRIN SR) 200 MG 12 hr tablet, Take 1 tablet (200 mg) by mouth daily, Disp: 90 tablet, Rfl: 1     busPIRone (BUSPAR) 5 MG tablet, Take 1 tablet (5 mg) by mouth daily for 5 days, THEN 1 tablet (5 mg) 2 times daily for 5 days, THEN 2 tablets (10 mg) 2 times daily for 30 days., Disp: 95 tablet, Rfl: 1     celecoxib (CELEBREX) 200 MG capsule, TAKE ONE CAPSULE BY MOUTH TWICE A DAY AS NEEDED FOR PAIN, Disp: 180 capsule, Rfl: 1     chlorhexidine (PERIDEX) 0.12 % solution, Swish and spit 15 mLs in mouth 2  times daily, Disp: 1893 mL, Rfl: 4     clindamycin (CLINDAMAX) 1 % external gel, Apply topically 2 times daily, Disp: 30 g, Rfl: 4     clotrimazole (MYCELEX) 10 MG lozenge, Place 1 lozenge (10 mg) inside cheek 5 times daily, Disp: 70 lozenge, Rfl: 1     desonide (DESOWEN) 0.05 % external cream, Apply topically 2 times daily To sores on face, Disp: 60 g, Rfl: 3     desonide (DESOWEN) 0.05 % external ointment, Apply topically 2 times daily To areas of skin sores on faec, Disp: 60 g, Rfl: 3     DULoxetine (CYMBALTA) 60 MG capsule, Take 2 capsules (120 mg) by mouth daily, Disp: 180 capsule, Rfl: 1     famotidine (PEPCID) 40 MG tablet, Take 1 tablet (40 mg) by mouth daily, Disp: 90 tablet, Rfl: 3     fluocinonide (LIDEX) 0.05 % external gel, Apply topically 2 times daily as needed, Disp: 15 g, Rfl: 0     hydrochlorothiazide (HYDRODIURIL) 25 MG tablet, Take 1 tablet (25 mg) by mouth daily, Disp: 90 tablet, Rfl: 1     hydrocortisone 2.5 % cream, APPLY TOPICALLY 2 TIMES DAILY AS NEEDED, Disp: 30 g, Rfl: 3     losartan (COZAAR) 100 MG tablet, Take 1 tablet (100 mg) by mouth daily, Disp: 90 tablet, Rfl: 1     metoprolol succinate ER (TOPROL XL) 100 MG 24 hr tablet, TAKE ONE TABLET BY MOUTH TWICE DAILY, Disp: 180 tablet, Rfl: 0     metoprolol succinate ER (TOPROL XL) 25 MG 24 hr tablet, Take 1 tablet (25 mg) by mouth 3 times daily, Disp: 270 tablet, Rfl: 1     mometasone-formoterol (DULERA) 200-5 MCG/ACT inhaler, Inhale 2 puffs into the lungs 2 times daily, Disp: 39 g, Rfl: 3     montelukast (SINGULAIR) 10 MG tablet, Take 1 tablet (10 mg) by mouth At Bedtime, Disp: 90 tablet, Rfl: 3     multivitamin w/minerals (THERA-VIT-M) tablet, Take 1 tablet by mouth daily, Disp: , Rfl:      mupirocin (BACTROBAN) 2 % external cream, Apply to affected area three times daily, Disp: 60 g, Rfl: 1     nystatin (MYCOSTATIN) 638853 UNIT/ML suspension, Take by mouth 4 times daily Has recurrent thrush. Uses for 2 weeks at a time as needed., Disp:  380 mL, Rfl: 1     oxybutynin ER (DITROPAN XL) 5 MG 24 hr tablet, Take 2 tablets (10 mg) by mouth daily, Disp: 180 tablet, Rfl: 2     predniSONE (DELTASONE) 10 MG tablet, Take 1 tablet (10 mg) by mouth daily, Disp: 30 tablet, Rfl: 1     rizatriptan (MAXALT) 10 MG tablet, Take 1 tablet (10 mg) by mouth at onset of headache for migraine May repeat in 2 hours. Max 3 tablets/24 hours., Disp: 18 tablet, Rfl: 1     rOPINIRole (REQUIP) 1 MG tablet, TAKE THREE TABLETS BY MOUTH AT BEDTIME, Disp: 270 tablet, Rfl: 1     traMADol (ULTRAM) 50 MG tablet, Take 1 tablet (50 mg) by mouth daily as needed for severe pain (7-10), Disp: 30 tablet, Rfl: 0     triamcinolone (ARISTOCORT HP) 0.5 % external cream, Apply topically 2 times daily, Disp: 60 g, Rfl: 0     valACYclovir (VALTREX) 500 MG tablet, TAKE ONE TABLET BY MOUTH ONE TIME DAILY, Disp: 90 tablet, Rfl: 1     ZYRTEC 10 MG OR TABS, 1 TABLET DAILY, Disp: 90, Rfl: 3    Medication side effects: Unknown    The [place] Prescription Monitoring Program has been reviewed and there are no concerns about diversionary activity for controlled substances at this time.     2022  1   2022  Tramadol Hcl 50 MG Tablet  30.00  30 Sa Fra   [id number]   Sup (0314)   0/0  5.00 MME  Comm Ins   [place]         Past psychotropic medication trials include but are not limited to:   Alprazolam 0.25 m -   Gabapentin: more for pain,   Hydroxyzine  Paroxetine 20 mg (1792-3536)  Sertraline (4598-3445, 4770-6881?)  topiramate  Trazodone (6795-4440)  Zolpidem (2479-7815)  CHantix ()      Psychiatric Review of Symptoms:  Depression: daily little interest / pleasure, sleep changes (insomnia or hypersomnia), fatigue of loss of energy and worthlessness or excessive guilt. Denies hopelessness.  SI/SIB/HI: denies all. No history.   Anxiety: excessive worry, difficult to control, restlessness / feeling keyed up,  easily fatigued,  difficulty concentrating or mind going blank, irritability, muscle  "tension and sleep disturbances (difficulty falling / staying asleep, or restless / unsatisfying) Self rates as 7/10, where 0 is none at all and 10 being severe anxiety.   Sleep / changes: \"sleep good\", easy onset and maintenance, unless up to bathroom. Sleeping 10:30-7:30 most nights. Rested.  Energy: \"exhausted all the time.\"  Appetite or weight changes: denies  Irritability / mood swings: quick to frustration, sometimes.   Vanessa / impulsivity / racing thoughts / speech: denies   Panic (description): \"used to have those -  Not so much.\"   OCD: denies  Psychosis/AH/VH/delusions: reports occurred last year \"1 time when potassium went way down, having hallucinations. Went to the hospital. Legs wouldn't work. Has gone away since potassium has been fine.\"    Paranoia: denies  Eating DO: (requires further eval)  Trauma sx: denies    PHQ-9 scores:   PHQ-9 SCORE 5/3/2022 5/25/2022 12/7/2022   PHQ-9 Total Score - - -   PHQ-9 Total Score MyChart 5 (Mild depression) 8 (Mild depression) 7 (Mild depression)   PHQ-9 Total Score - - -   PHQ-9 Total Score 5 8 7       MARIZOL-7 scores:    MARIZOL-7 SCORE 5/25/2022 12/7/2022 12/7/2022   Total Score - - -   Total Score 13 (moderate anxiety) - 10 (moderate anxiety)   Total Score 13 10 10       Medical Review of Systems:  10 systems (general, cardiovascular, respiratory, eyes, ENT, endocrine, GI, , M/S, neurological) were reviewed. Most pertinent finding(s) is/are: reports frequent dizziness, HTN. GI: chronic constipation. Frequent migraines. The remaining systems are all unremarkable    Vitals:   LMP 07/17/2009 (Exact Date)     Pulse Readings from Last 5 Encounters:   09/14/22 64   07/26/22 75   12/22/21 66   11/30/21 68   09/15/21 77     Wt Readings from Last 5 Encounters:   09/14/22 103 kg (227 lb)   07/26/22 102.1 kg (225 lb)   12/22/21 104.3 kg (229 lb 14.4 oz)   12/01/21 102.1 kg (225 lb)   11/30/21 105.5 kg (232 lb 8 oz)     BP Readings from Last 5 Encounters:   09/14/22 (!) 163/84 "   07/26/22 132/82   04/06/22 134/82   12/22/21 (!) 158/84   11/30/21 132/88       Psychiatric History:   Hospitalizations: None  History of Commitment? No   Past Treatment: medication(s) from physician / PCP  History of Suicidal Ideation: Denies  Suicide Attempts: No   History of Violence/Aggression: No   Self-injurious Behavior: Denies  Electroconvulsive Therapy (ECT) or Transcranial Magnetic Stimulation (TMS): No   GeneSight Genetic Testing: No       Substance Use History:  Current Use of Drugs/Alcohol: Reports no alcohol intake in last ~10 years due to meds. Denies all other substance use, no longer using medial marijuana as worsened her fatigue and cost prohibitive, denied much benefit.  Past Use of Drugs/Alcohol: denies  Patient reports no problems as a result of their drinking / drug use.   Patient has not received chemical dependency treatment in the past  Recovery Programming Involvement: Not Applicable    Tobacco use: History former smoker: 1 pack / day, quit in 2013.  Caffeine: 2 cups a day - AM and mid afternoon.     Based on the clinical interview, there  are not indications of drug or alcohol abuse. Continue to monitor.   Discussed effect of substance use on overall health.   CAGE-AID Score today was: 0    Family History:   Patient reported family history includes:   Family History   Problem Relation Age of Onset     Thyroid Disease Mother      Arthritis Mother      Colon Cancer Mother      Myelodysplastic syndrome Mother      Thyroid Disease Sister      Arthritis Sister      Lipids Sister      Hypertension Father      Diabetes Father      C.A.D. Father         MI age 75     Cardiovascular Father         heart attack     Cerebrovascular Disease Father      Cancer Father         renal cancer     Arthritis Father      Heart Disease Father         heart attack     Gastrointestinal Disease Father         liver     Genitourinary Problems Father         kidney     Asthma Daughter      Gastrointestinal Disease  "Daughter      Unknown Other      Multiple Sclerosis Maternal Aunt      Mastocytosis Nephew         \"System Mast Cell Disease and hyperesosinophilia\"     Breast Cancer No family hx of      Cancer - colorectal No family hx of      Alzheimer Disease No family hx of      Blood Disease No family hx of      Circulatory No family hx of      Eye Disorder No family hx of      Musculoskeletal Disorder No family hx of      Neurologic Disorder No family hx of      Respiratory No family hx of       Mental Illness History: depression- sister, mom, both daughters.  Substance Abuse History: Denies  Suicide History:  Denies  Medications that helped:  Unknown     Social History:   Birth place: [facility]  Childhood: Yes intact home  Siblings:  2   Highest education level was some college.   Employment Status: unemployed  Relationship status: .  Current Living situation:  Me and .  Feels safe at home.  Children: two daughters - 27, 30    Firearms/Weapons Access: No: Patient denies   Service: No  Support: adult child    Legal History:  No: Patient denies any legal history       Significant Losses / Trauma / Abuse / Neglect Issues:  There are no indications or report of: significant losses, trauma, abuse or neglect.   Issues of possible neglect are not present.     Past Medical History:  Reviewed per Electronic Medical Record Today    Past Medical History:   Diagnosis Date     ASTHMA - MODERATE PERSISTENT 9/21/2005     Chronic pain      Coronary artery disease      CVA (cerebral infarction)      Depressive disorder, not elsewhere classified      Diabetes (H)      Elevated serum alkaline phosphatase level     Liver source     Fibromyalgia      HYPERLIPIDEMIA NEC/NOS 12/29/2006     Hypertriglyceridemia      OA (osteoarthritis)      Thyroid disease      Trochanteric bursitis      Unspecified essential hypertension       Surgery:   Past Surgical History:   Procedure Laterality Date     3 teeth pulled   "     INJECT EPIDURAL TRANSFORAMINAL  2014    [facility]     INJECT JOINT SACROILIAC  2014    [facility]     LAMINECTOMY LUMBAR ONE LEVEL Left 2014    [facility]     ZZC  DELIVERY ONLY      , Low Cervical     Food and Medicine Allergies:     Allergies   Allergen Reactions     Cats      and rabbits/wheezing     Dogs      wheezing     Lyrica [Pregabalin] Other (See Comments)     Rash, mouth sores, itching and burning     Seasonal Allergies      rhinits     Seizures or Head Injury: CVA per chart  Diet: No Restrictions  Exercise: No regular exercise program reported  Pregnant: n/a      Mental Status Exam:  Alertness: alert  and oriented   Appearance: adequately groomed and appearance was notable for red ulcerated lesions and scabs across face  Behavior/Demeanor: cooperative and pleasant, with good eye contact   Speech: normal and regular rate and rhythm  Language: intact and no problems  Psychomotor: normal or unremarkable  Mood: description consistent with euthymia  Affect: full range and appropriate; was congruent to mood; was congruent to content  Thought Process/Associations: unremarkable  Thought Content:  Reports none;  Denies suicidal ideation, violent ideation and delusions  Perception:  Reports none;  Denies auditory hallucinations and visual hallucinations  Insight: intact  Judgment: intact  Cognition: does  appear grossly intact; formal cognitive testing was not done  Recent and Remote Memory: Intact to interview. Not formally assessed. No amnesia.  Attention Span and Concentration: normal  Fund of Knowledge: appropriate  Gait and Station: unremarkable    Strengths and Opportunities:   Jacquie Amador identified the following strengths or resources that may help she succeed in counseling: family support.     Things that may interfere with the patient's success include:  none noted at this time.    Protective Factors:  restricted access to means, access to care as needed, reasons for living and displaying help seeking behavior    Static Risk Factors: history of MH diagnoses and/or treatment    Dynamic Risk Factors: anxiety and chronic pain    There are no language or communication issues or need for modification in treatment.   There are no ethnic, cultural or Christian factors that may be relevant for therapy.  Client identified their preferred language to be English.  Client does not need the assistance of an  or other support involved in therapy.    Suicide Risk Assessment:  Based on review of above risk and protective factors and today's exam, pt is at low elevated risk of harm to self or others. She does not meet criteria for a 72 hr hold and remains appropriate for ongoing outpatient care. The patient convincingly denies suicidality today. There was no deceit detected, and the patient presented in a manner that was believable. Local community safety resources reviewed and sent to patient to use if needed.    Recommended that patient call 911 or go to the local ED should there be a change in any of these risk factors.    DSM5  Diagnosis:  296.32 (F33.1) Major Depressive Disorder, Recurrent Episode, Moderate _  300.02 (F41.1) Generalized Anxiety Disorder    Medical Comorbidities Include:   Patient Active Problem List    Diagnosis Date Noted     Dermatitis 12/07/2022     Priority: Medium     Encounter for long-term current use of high risk medication 12/07/2022     Priority: Medium     Encounter for long-term (current) use of high-risk medication 11/06/2022     Priority: Medium     Behcet's syndrome involving oral mucosa (H) 11/03/2022     Priority: Medium     Aphthous ulcer 10/15/2022     Priority: Medium     Rash 10/15/2022     Priority: Medium     Pruritus 10/15/2022     Priority: Medium     Platelet granule defect (H) 12/22/2021     Priority: Medium     Sees Dr. Emely Grimes       Morbid obesity (H) 08/05/2021      Priority: Medium     Skin ulcer, limited to breakdown of skin (H) 07/19/2019     Priority: Medium     Migraine without aura and without status migrainosus, not intractable 12/21/2018     Priority: Medium     Intractable chronic migraine without aura and without status migrainosus 05/29/2018     Priority: Medium     Impaired fasting glucose 05/29/2018     Priority: Medium     Sinus tachycardia 12/08/2017     Priority: Medium     Primary osteoarthritis of both first carpometacarpal joints 09/28/2017     Priority: Medium     Arthritis of metatarsophalangeal joint 09/28/2017     Priority: Medium     Medical marijuana use 12/19/2016     Priority: Medium     Chronic, continuous use of opioids      Priority: Medium     Scratching 04/26/2016     Priority: Medium     Advanced directives, counseling/discussion 04/26/2016     Priority: Medium     Hand out given.        Iron deficiency 04/18/2016     Priority: Medium     Overweight 03/15/2016     Priority: Medium     Trochanteric bursitis of both hips 08/27/2015     Priority: Medium     Primary focal hyperhidrosis 07/03/2015     Priority: Medium     Right ankle instability 06/25/2015     Priority: Medium     Osteoarthritis of carpometacarpal joint of thumb - bilateral 03/26/2015     Priority: Medium     Trochanteric bursitis 03/26/2015     Priority: Medium     Iron deficiency anemia 11/14/2014     Priority: Medium     Constipation 04/04/2014     Priority: Medium     Lumbar disc disease with radiculopathy 04/04/2014     Priority: Medium     Chronic rhinitis 06/10/2013     Priority: Medium     Myalgia 04/11/2013     Priority: Medium     MARIZOL (generalized anxiety disorder) 05/10/2011     Priority: Medium     Diagnosis updated by automated process. Provider to review and confirm.       Hypertension goal BP (blood pressure) < 140/90 01/18/2011     Priority: Medium     HYPERLIPIDEMIA LDL GOAL <130 10/31/2010     Priority: Medium     Multiple joint pain 11/18/2009     Priority: Medium      Sees Rheumatology and Orthopedics at [place].  Receives steroid injections, but also uses Vicodin for pain.    Patient is followed by JAMILA OSORIO for ongoing prescription of narcotic pain medicine.  Med: Vicodin.   Maximum use per month: 60  Expected duration: indeterminate  Narcotic agreement on file: NO  Clinic visit recommended: Q 6  months         Moderate recurrent major depression (H) 12/04/2008     Priority: Medium     Esophageal reflux 07/05/2007     Priority: Medium     Allergic rhinitis due to other allergen 06/30/2006     Priority: Medium     Moderate persistent asthma without complication 09/21/2005     Priority: Medium       DIFFERENTIAL DIAGNOSIS: R/O depression due to other medical condition     Medical comorbidities impacting or contributing to clinical picture: Behcet's syndrome involving oral mucosa, PLT disorder, chronic pain, migraines,  Known issue that I take into account for their medical decisions, no current exacerbations or new concerns.    Impression:  Jacquie Amador is a 62 year old White, female who presents for initial visit with [facility] ([facility] for medication management. She reports a long history of medication management through PCP, with several trials of medications previously. Her clinical picture is complicated by several medical comorbidities, including Behcet's syndrome, chronic pain with continued opioid use, and acquired quantitative platelet disorder. Current exacerbation of anxiety mostly in relation to additional psychosocial stressors and medical comorbidities, in particular current appearance of her skin. CC: fatigue even with good sleep. Denies SI/SIB/HI. No history of suicide attempts, psychosis, padmini, problematic substance use.  She is hesitant to make changes to duloxetine as noted benefit to chronic pain, and currently at max dose. Higher doses of bupropion were intolerable due to SE (^ crying). PCP recently started buspirone  titration, denies any SE at this time. We will continue titrating buspirone, hold other changes at this time. Patient is agreeable to plan. She has no history of psychotherapy, and recommended she consider referral.     Medication side effects and alternatives reviewed. Health promotion activities recommended and reviewed today. All questions addressed. Education and counseling completed regarding risks and benefits of medications and psychotherapy options. Recommend therapy for additional support.     Treatment Plan:    INCREASE TO buspirone 10 mg BID per PCP. No script needed.    CONTINUE duloxetine 120 mg (2x 60 mg) every day per PCP. No script needed.    CONTINUE trazodone 25-75 mg at bedtime PRN for insomnia. Script sent for 1 month.    Continue all other medications per primary care and other providers.    Safety plan reviewed. To the Emergency Department as needed or call after hours crisis line at [phone] or [phone]. [place] Crisis Text Line: Text [place] to 883613 or  Suicide LifeLine Chat: suicidepreZoomyline.org/chat    Schedule an appointment with me in 2 weeks or sooner as needed.  Call [facility] at [phone] to schedule.    Follow up with primary care provider as planned or sooner if needed for acute medical concerns.    Call the psychiatric nurse line with medication questions or concerns at [phone].    BeavExhart may be used to communicate with your provider, but this is not intended to be used for emergencies.    Patient Education:  Medication side effects and alternatives reviewed. Health promotion activities recommended and reviewed today. All questions addressed. Education and counseling completed regarding risks and benefits of medications and psychotherapy options.  Consent provided by patient/guardian  Call the psychiatric nurse line with medication questions or concerns at [phone].  BeavExhart may be used to communicate with your provider, but this is not  intended to be used for emergencies.  SEROTONIN SYNDROME:  Discussed risks of Serotonin syndrome (ie, serotonin toxicity) which is a potentially life-threatening condition associated with increased serotonergic activity in the central nervous system (CNS). It is seen with therapeutic medication use, inadvertent interactions between drugs, and intentional self-poisoning. Serotonin syndrome may involve a spectrum of clinical findings, which often include mental status changes, autonomic hyperactivity, and neuromuscular abnormalities.    Medlineplus.gov is information for patients.  It is run by the [employer] of Medicine and it contains information about all disorders, diseases and all medications.       DRUG INTERACTIONS:   TRAMADOL, TRAZODONE:  Serotonergic effects of this combination may be additive. The risk for serotonin syndrome/toxicity may be increased. Additionally, additive CNS depression may occur.   DULOXETINE, TRAZODONE:  Serotonergic effects of trazodone and serotonin/norepinephrine reuptake inhibitors (SNRIs) may be additive. The risk of serotonin syndrome/toxicity may be increased.    DULOXETINE, TRAMADOL:  Effects: Duloxetine may enhance the adverse/toxic effect of tramadol. The risk for serotonin syndrome/serotonin toxicity and seizures may be increased with this combination. Duloxetine may diminish the therapeutic effect of tramadol.    Mechanism: Inhibition of CYP2D6 by duloxetine may decrease the metabolic elimination of tramadol. Additionally, additive serotonergic effects may be seen with this combination.    Management: Monitor closely for signs and symptoms of serotonin syndrome/serotonin toxicity (eg, hyperreflexia, clonus, hyperthermia, diaphoresis, tremor, autonomic instability, mental status changes) when duloxetine and tramadol are combined. Consider alternatives in patients with other risk factors (eg, higher drug concentrations/doses, greater numbers of serotonergic agents) who  are likely at an even greater risk for these potentially life-threatening toxicities. Additionally, monitor for reduced tramadol effectiveness and seizures when combined with duloxetine.           Community Resources:    National Suicide Prevention Lifeline: 425.363.6813 (TTY: 480.853.7305). Call anytime for help.  (www.suicidepreventionlifeline.org)  National [place] on Mental Illness (www.richy.org): 291.374.7166 or 570-489-6232.   Mental Health Association (www.mentalhealth.org): 566.250.9263 or 548-940-0261.  [place] Crisis Text Line: Text [place] to 764884  Suicide LifeLine Chat: ColoraderdamÂ®.org/chat    Administrative Billin minutes spent on the date of the encounter doing chart review and reviewing referral documents, history and exam of patient, documentation and further activities as noted above.    Video/Phone Start Time: 9:10 am  Video/Phone End Time: 9:55 am      Patient Status:  CCPS MD/DO/NP/PA providers offer care a specialty service for Primary Care Providers in the [facility] that seek to optimize psychotropic medications for unstable patients.  Once medications have been optimized, our providers discharge the patient back to the referring Primary Care Provider for ongoing medication management.  This type of system allows our providers to serve a high volume of patients.   Patient will continue to be seen for ongoing consultation and stabilization.    Signed:   Lora Kaur, MSN, APRN, PMHNP-BC  Collaborative Care Psychiatry Service (CCPS)  [facility]    Chart documentation done in part with Dragon Voice Recognition software.  Although reviewed after completion, some word and grammatical errors may remain.

## 2022-12-14 ENCOUNTER — VIRTUAL VISIT (OUTPATIENT)
Dept: PSYCHIATRY | Facility: CLINIC | Age: 62
End: 2022-12-14
Payer: COMMERCIAL

## 2022-12-14 ENCOUNTER — VIRTUAL VISIT (OUTPATIENT)
Dept: HEMATOLOGY | Facility: CLINIC | Age: 62
End: 2022-12-14
Attending: INTERNAL MEDICINE
Payer: COMMERCIAL

## 2022-12-14 DIAGNOSIS — F41.1 GENERALIZED ANXIETY DISORDER: Primary | ICD-10-CM

## 2022-12-14 DIAGNOSIS — D69.1 PLATELET GRANULE DEFECT (H): ICD-10-CM

## 2022-12-14 DIAGNOSIS — M35.2 BEHCET'S SYNDROME INVOLVING ORAL MUCOSA (H): Primary | ICD-10-CM

## 2022-12-14 DIAGNOSIS — F33.1 MODERATE RECURRENT MAJOR DEPRESSION (H): ICD-10-CM

## 2022-12-14 PROCEDURE — 99214 OFFICE O/P EST MOD 30 MIN: CPT | Mod: 95 | Performed by: INTERNAL MEDICINE

## 2022-12-14 PROCEDURE — 99205 OFFICE O/P NEW HI 60 MIN: CPT | Mod: 95 | Performed by: NURSE PRACTITIONER

## 2022-12-14 RX ORDER — TRAZODONE HYDROCHLORIDE 50 MG/1
150 TABLET, FILM COATED ORAL AT BEDTIME
Qty: 270 TABLET | Refills: 0 | Status: SHIPPED | OUTPATIENT
Start: 2022-12-14 | End: 2023-07-11

## 2022-12-14 NOTE — PROGRESS NOTES
[facility]  [facility] [place] [address]  Phone: [phone], Fax: [phone]    Outpatient Visit Note:    Patient: Jacquie Amador  MRN: [id number]  : 1960  ISIDRO:22    Start time: 12:52 PM  End time: 1:10 PM    Reason for Initial Consultation:  Bruising    Assessment:  In summary, Jacquie Amador is a 62 year old woman presenting to clinic due to bruising/bleeding with abnormal platelet studies. 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 Overall clinical picture is highly concerning for underlying autoimmune disorder.    1.  Behcet's disease:   2. 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  Easy bruising and prolonged healing secondary to #1 and #2  4.  Personal history of antiphospholipid antibody syndrome in pregnancy, no records  5.  Osteoarthritis requiring anti-inflammatory medications  6.  Depression requiring use of SSRI medication  7.  Acute on chronic migraines requiring use of triptans and aspirin    Ms. Amador has been evaluated by Dermatology and Rheumatology. Skin biopsy with neutrophilic infiltrates that may be secondary to Behcets. Overall, clinical criteria is met and very consistent.  Started Colcrys with some improvement. Started Dapsone- then stopped. Is back on. Does endorse some mild progress- but overall remains frustrated. Needs to follow up with both Dermatology and Rheumatology- however has significant stressor(s) with  twin grandsons and 's health.     For her bleeding/platelets, she has presence of anti-HLA and anti-GP1a antibodies.  She has no personal history of receiving red cell or platelet transfusions. Jenny platelet aggregation study that had decreased arachidonic acid as well and deaggregation with ADP and arachidonic acid and decreased ATP release with thrombin and ADP. This is  consistent with diagnosis of acquired qualitative platelet disorder and consistent with her anti-GP1a antibodies. We have given her steriods, which helped with arthralgias and fatigue.     Previously discussed that avoiding other medications that can worsen this, including aspirin, NSAID and potentially SSRIs. Right now IF Jacquie needs MAJOR SURGERY---  She should receive platelet transfusion (1-2 units). She is ok to receive injections.        Plan:  1. Majority of today's visit was spent counseling the patient regarding diagnosis, natural history, management and treatment options   2. Continue prednisone daily  3. encouranged dapsone      The patient is given our center's contact information and is instructed to call if she should have any further questions or concerns.  Follow up 23    Patient understands and agrees with the above plan and recommendation.    Emely Grimes MD   Hematology, Oncology and Transplantation Fellow  [facility]      Emely Grimes MD/PhD  ----------------------------------------------------------------------------------------------------------------------  Interval History:  Jacquie Amador is a 62 year old woman with PMHx of fibromyalgia, anxiety/depression, hypertension and hyperlipidemia. She presented to clinic on 9/15/21 for her first in person evaluation due to bleeding and prolonged wound healing. Please refer to my consult note.     Jacquie reports she is exhausted. Her  twin babies are very colicy and having issues with reflux and sleep. Whole family is exhausted. Spouse had surgery was is hospital.     Skin has been terrible. Stopped dapsone- felt like her wounds were burning. Started a few days ago and same thing is happen. Stopped colchicine.     Mouth sores-  Does have Right sided gum infection- on antibiotics for seven days. The sore on the other side when away. Has white coating on cheeks and tongue. Coating will come off on lips with brushing  teeth.     Also feels she also had a sinus infection- had terrible headaches that have gotten better with antibiotics.     Hand lesions are pretty much gone. Does feel like one is coming.     Face- ones on face are burning and not healing. New one every couple of days. Will have sensation but then it will not start.     Eye- still very dry. Doing hot packs and that helped.      Joint pain: Remains improved with prednisone.      Past Medical History:  Past Medical History:   Diagnosis Date     ASTHMA - MODERATE PERSISTENT 9/21/2005     Chronic pain      Coronary artery disease      CVA (cerebral infarction)      Depressive disorder, not elsewhere classified      Diabetes (H)      Elevated serum alkaline phosphatase level     Liver source     Fibromyalgia      HYPERLIPIDEMIA NEC/NOS 12/29/2006     Hypertriglyceridemia      OA (osteoarthritis)      Thyroid disease      Trochanteric bursitis      Unspecified essential hypertension      Immunization  Immunization History   Administered Date(s) Administered     COVID-19 Vaccine 18+ (Moderna) 03/03/2021, 04/01/2021, 11/15/2021     COVID-19 Vaccine Bivalent Booster 18+ (Moderna) 11/21/2022     COVID-19,PF,Moderna Booster 05/10/2022     FLU 6-35 months 09/22/2009     Influenza (IIV3) PF 11/09/2000, 12/08/2003, 12/06/2004, 12/08/2005, 11/09/2006, 11/01/2007, 12/04/2008, 09/24/2010, 10/26/2011, 10/26/2012     Influenza Vaccine 50-64 or 18-64 w/egg allergy (Flublok) 09/30/2020, 11/21/2022     Influenza Vaccine >6 months (Alfuria,Fluzone) 11/14/2014, 11/19/2015, 11/26/2019, 11/15/2021     Pneumococcal 23 valent 11/09/2000, 12/23/2021, 02/25/2022     TD (ADULT, 7+) 11/21/2000     TDAP Vaccine (Adacel) 01/18/2011     Tdap (Adacel,Boostrix) 11/30/2021     Zoster vaccine recombinant adjuvanted (SHINGRIX) 11/26/2019       Past Surgical History:  Past Surgical History:   Procedure Laterality Date     3 teeth pulled       INJECT EPIDURAL TRANSFORAMINAL  11/25/2014     [facility]     INJECT JOINT SACROILIAC  2014    [facility]     LAMINECTOMY LUMBAR ONE LEVEL Left 2014    [facility]     ZZC  DELIVERY ONLY      , Low Cervical       Medications:  Current Outpatient Medications   Medication Sig Dispense Refill     albuterol (PROVENTIL) (2.5 MG/3ML) 0.083% neb solution Take 1 vial (2.5 mg) by nebulization every 6 hours as needed for shortness of breath / dyspnea or wheezing 3 mL 4     albuterol (VENTOLIN HFA) 108 (90 Base) MCG/ACT inhaler INHALE 2 PUFFS INTO THE LUNGS EVERY 6 HOURS AS NEEDED FOR SHORTNESS OF BREATH / DYSPNEA 54 g 0     aluminum chloride (DRYSOL) 20 % external solution Apply topically At Bedtime 60 mL 1     amoxicillin-clavulanate (AUGMENTIN) 875-125 MG tablet Take 1 tablet by mouth 2 times daily for 7 days 14 tablet 0     atorvastatin (LIPITOR) 20 MG tablet Take 1 tablet (20 mg) by mouth daily 90 tablet 2     atorvastatin (LIPITOR) 20 MG tablet Take 20 mg by mouth daily       augmented betamethasone dipropionate (DIPROLENE AF) 0.05 % external cream Apply to affected area twice daily 50 g 2     benzonatate (TESSALON) 200 MG capsule Take 1 capsule (200 mg) by mouth 3 times daily as needed for cough 30 capsule 0     betamethasone dipropionate (DIPROSONE) 0.05 % external ointment Apply topically 2 times daily To areas of skin sores.  Can also use on mouth sores as needed twice daily. 50 g 3     buPROPion (WELLBUTRIN SR) 200 MG 12 hr tablet Take 1 tablet (200 mg) by mouth daily 90 tablet 1     busPIRone (BUSPAR) 5 MG tablet Take 1 tablet (5 mg) by mouth daily for 5 days, THEN 1 tablet (5 mg) 2 times daily for 5 days, THEN 2 tablets (10 mg) 2 times daily for 30 days. 95 tablet 1     celecoxib (CELEBREX) 200 MG capsule TAKE ONE CAPSULE BY MOUTH TWICE A DAY AS NEEDED FOR PAIN 180 capsule 1     chlorhexidine (PERIDEX) 0.12 % solution Swish and spit 15 mLs in mouth 2 times daily 1893 mL 4      clindamycin (CLINDAMAX) 1 % external gel Apply topically 2 times daily 30 g 4     clotrimazole (MYCELEX) 10 MG lozenge Place 1 lozenge (10 mg) inside cheek 5 times daily 70 lozenge 1     desonide (DESOWEN) 0.05 % external cream Apply topically 2 times daily To sores on face 60 g 3     desonide (DESOWEN) 0.05 % external ointment Apply topically 2 times daily To areas of skin sores on faec 60 g 3     DULoxetine (CYMBALTA) 60 MG capsule Take 2 capsules (120 mg) by mouth daily 180 capsule 1     famotidine (PEPCID) 40 MG tablet Take 1 tablet (40 mg) by mouth daily 90 tablet 3     fluocinonide (LIDEX) 0.05 % external gel Apply topically 2 times daily as needed 15 g 0     hydrochlorothiazide (HYDRODIURIL) 25 MG tablet Take 1 tablet (25 mg) by mouth daily 90 tablet 1     hydrocortisone 2.5 % cream APPLY TOPICALLY 2 TIMES DAILY AS NEEDED 30 g 3     losartan (COZAAR) 100 MG tablet Take 1 tablet (100 mg) by mouth daily 90 tablet 1     metoprolol succinate ER (TOPROL XL) 100 MG 24 hr tablet TAKE ONE TABLET BY MOUTH TWICE DAILY 180 tablet 0     metoprolol succinate ER (TOPROL XL) 25 MG 24 hr tablet Take 1 tablet (25 mg) by mouth 3 times daily 270 tablet 1     mometasone-formoterol (DULERA) 200-5 MCG/ACT inhaler Inhale 2 puffs into the lungs 2 times daily 39 g 3     montelukast (SINGULAIR) 10 MG tablet Take 1 tablet (10 mg) by mouth At Bedtime 90 tablet 3     multivitamin w/minerals (THERA-VIT-M) tablet Take 1 tablet by mouth daily       mupirocin (BACTROBAN) 2 % external cream Apply to affected area three times daily 60 g 1     nystatin (MYCOSTATIN) 999070 UNIT/ML suspension Take by mouth 4 times daily Has recurrent thrush. Uses for 2 weeks at a time as needed. 380 mL 1     oxybutynin ER (DITROPAN XL) 5 MG 24 hr tablet Take 2 tablets (10 mg) by mouth daily 180 tablet 2     predniSONE (DELTASONE) 10 MG tablet Take 1 tablet (10 mg) by mouth daily 30 tablet 1     rizatriptan (MAXALT) 10 MG tablet Take 1 tablet (10 mg) by mouth at  onset of headache for migraine May repeat in 2 hours. Max 3 tablets/24 hours. 18 tablet 1     rOPINIRole (REQUIP) 1 MG tablet TAKE THREE TABLETS BY MOUTH AT BEDTIME 270 tablet 1     traMADol (ULTRAM) 50 MG tablet Take 1 tablet (50 mg) by mouth daily as needed for severe pain (7-10) 30 tablet 0     traZODone (DESYREL) 50 MG tablet Take 3 tablets (150 mg) by mouth At Bedtime 270 tablet 0     triamcinolone (ARISTOCORT HP) 0.5 % external cream Apply topically 2 times daily 60 g 0     valACYclovir (VALTREX) 500 MG tablet TAKE ONE TABLET BY MOUTH ONE TIME DAILY 90 tablet 1     ZYRTEC 10 MG OR TABS 1 TABLET DAILY 90 3        Allergies:  Allergies   Allergen Reactions     Cats      and rabbits/wheezing     Dogs      wheezing     Lyrica [Pregabalin] Other (See Comments)     Rash, mouth sores, itching and burning     Seasonal Allergies      rhinits       ROS:  Remainder of a comprehensive 10 point ROS is negative unless noted above.    Social History:  Denies any tobacco use. No significant alcohol use. Denies any illicit drug use.     Family History:  Two daughter  29 Yo daughter  28 yo daughter  1 grandson    1 sister- older- high blood glucose, arthritis  1 brother- older- no idea    Mother- - heart valve. Had MDS. Needed a shot every month  Father- - cancer    Objectives:  GENERAL: alert and no distress  EYES: Eyes grossly normal to inspection.  No discharge or erythema, or obvious scleral/conjunctival abnormalities.  RESP: No audible wheeze, cough, or visible cyanosis.  No visible retractions or increased work of breathing.    SKIN: face covered in reddish ulcerated lesions at various stages of healing with numerous scabs.  -hypopigmentation - hands, lips, neck and across face, scar - face and numerous scabs over face.--- IMPROVED FROM PREVIOUS EVAL  NEURO: Cranial nerves grossly intact.  Mentation and speech appropriate for age.  PSYCH: Mentation appears normal, affect normal/bright, judgement and  insight intact, normal speech and appearance well-groomed.      Labs:    Ferritin   Date Value Ref Range Status   09/22/2022 147 11 - 328 ng/mL Final   04/16/2021 376 (H) 8 - 252 ng/mL Final     Iron   Date Value Ref Range Status   09/22/2022 88 37 - 145 ug/dL Final   01/20/2021 50 35 - 180 ug/dL Final     Iron Binding Cap   Date Value Ref Range Status   01/20/2021 350 240 - 430 ug/dL Final     Iron Binding Capacity   Date Value Ref Range Status   09/22/2022 260 240 - 430 ug/dL Final   09/15/2021 284 240 - 430 ug/dL Final       Refer to Versiti- autoantibody platelet study    Imaging:  Not applicable

## 2022-12-14 NOTE — Clinical Note
Please call to schedule patient for follow-up in 2 weeks.  Thank you!   JESSICA Wells, NELL-BC Collaborative Care Psychiatry Service (CCPS) [facility]

## 2022-12-14 NOTE — Clinical Note
Thank you for the Psychiatry referral to the [facility] Care Psychiatry Service (CCPS). Our psychiatry providers act as a specialty service for Primary Care Providers in the [facility] who seek to optimize medications for unstable patients.  Once medications have been optimized, our providers discharge the patient back to the referring Primary Care Provider for ongoing medication management.  This type of system allows our providers to serve a high volume of patients.   Please see my Impression and Plan. I will continue to work with them in stabilizing their mood.  If you have any questions or concerns, please let me know. Thank you again!  JESSICA Wells, PMRAVENP-BC Collaborative Care Psychiatry Service (CCPS) [facility]

## 2022-12-15 NOTE — PATIENT INSTRUCTIONS
**For crisis resources, please see the information at the end of this document**     Thank you for coming to the [facility] M[facility]ALTH & [facility] [place] [facility].    TREATMENT PLAN:  Medications:   INCREASE TO buspirone 10 mg BID per PCP. No script needed.  CONTINUE duloxetine 120 mg (2x 60 mg) every day per PCP. No script needed.  CONTINUE trazodone 25-75 mg at bedtime PRN for insomnia. Script sent for 1 month.  Continue all other medications per primary care and other providers.  Consults / Referrals:   - Recommend therapy. To schedule individual or family therapy, call [facility] at [phone].  Psychoeducation:  - Multiple serotonergic medications (duloxetine, tramadol, trazodone, buspirone):  Monitor closely for signs and symptoms of serotonin syndrome/serotonin toxicity (eg, hyperreflexia, clonus, hyperthermia, diaphoresis, tremor, autonomic instability, mental status changes) when these are combined.     Follow Up:  Schedule an appointment with me in 2 weeks or sooner as needed.  Call [facility] at [phone] to schedule.  Follow up with primary care provider as planned or sooner if needed for acute medical concerns.  Call the psychiatric nurse line with medication questions or concerns at [phone].  "[employer]t may be used to communicate with your provider, but this is not intended to be used for emergencies.    MEDICATION REFILLS:  If you need any refills please call your pharmacy and they will contact us. Our fax number for refills is [phone]. Please allow three business for refill processing. If you need to  your refill at a new pharmacy, please contact the new pharmacy directly. The new pharmacy will help you get your medications transferred.       Financial Assistance [phone]  [facility] Billing [phone]  Central Billing Office, [facility]: [phone]  [facility]illing [phone]  Medical Records [phone]  [facility] Patient Bill of  Rights [email or web address]       MENTAL HEALTH CRISIS RESOURCES:  For a emergency help, please call 911 or go to the nearest Emergency Department.     Emergency Walk-In Options:   EmPATH Unit @ [facility] [facility] ([place]): [phone] - Specialized mental health emergency area designed to be calming  [facility] ([place]): [phone]  [facility] ([place]): [phone]  [facility]): [phone]    [facility] Crisis Information:   [place]: [phone]  [place]: [phone]  [place] ([place]) - Adult: [phone]     Child: [phone]  [place] - Adult: [phone]     Child: [phone]  [place]: [phone]  List of all [facility] resources:   https://[place].gov/dhs/people-we-serve/adults/health-care/mental-health/resources/crisis-contacts.jsp    National Crisis Information:   National Suicide & Crisis Lifeline: Call 154        For online chat options, visit https://suicidepreventionlifeline.org/chat/  Poison Control Center: 4-898-844-1188  Poison Control Center: 6-350-007-3911  Trans Lifeline: 4-577-271-1006 - Hotline for transgender people of all ages  The [employer]: 9-816-926-2266 - Hotline for LGBT youth     For Non-Emergency Support:   Fast Tracker: Mental Health & Substance Use Disorder Resources -   https://www.fasttrackermn.org/       Again thank you for choosing [facility] MENTAL HEALTH & ADDICTION [place] [facility] and please let us know how we can best partner with you to improve you and your family's health.    You may be receiving a survey regarding this appointment. We would love to have your feedback, both positive and negative. The survey is done by an external company, so your answers are anonymous.

## 2022-12-16 ENCOUNTER — MYC MEDICAL ADVICE (OUTPATIENT)
Dept: FAMILY MEDICINE | Facility: CLINIC | Age: 62
End: 2022-12-16

## 2022-12-16 DIAGNOSIS — K04.7 DENTAL INFECTION: ICD-10-CM

## 2022-12-16 DIAGNOSIS — M25.50 MULTIPLE JOINT PAIN: ICD-10-CM

## 2022-12-16 RX ORDER — DULOXETIN HYDROCHLORIDE 60 MG/1
120 CAPSULE, DELAYED RELEASE ORAL DAILY
Qty: 180 CAPSULE | Refills: 1 | Status: SHIPPED | OUTPATIENT
Start: 2022-12-16 | End: 2023-02-28

## 2022-12-16 NOTE — TELEPHONE ENCOUNTER
"Pending Prescriptions:                       Disp   Refills    DULoxetine (CYMBALTA) 60 MG capsule        180 ca*1        Sig: Take 2 capsules (120 mg) by mouth daily    Routing refill request to provider for review/approval because:  Labs out of range:  BP  A break in medication    Requested Prescriptions   Pending Prescriptions Disp Refills    DULoxetine (CYMBALTA) 60 MG capsule 180 capsule 1     Sig: Take 2 capsules (120 mg) by mouth daily       Serotonin-Norepinephrine Reuptake Inhibitors  Failed - 12/16/2022  9:21 AM        Failed - Blood pressure under 140/90 in past 12 months     BP Readings from Last 3 Encounters:   09/14/22 (!) 163/84   07/26/22 132/82   04/06/22 134/82                 Passed - Recent (12 mo) or future (30 days) visit within the authorizing provider's specialty     Patient has had an office visit with the authorizing provider or a provider within the authorizing providers department within the previous 12 mos or has a future within next 30 days. See \"Patient Info\" tab in inbasket, or \"Choose Columns\" in Meds & Orders section of the refill encounter.              Passed - Medication is active on med list        Passed - Patient is age 18 or older        Passed - No active pregnancy on record        Passed - No positive pregnancy test in past 12 months             "

## 2022-12-20 DIAGNOSIS — K12.0 APHTHAE, ORAL: ICD-10-CM

## 2022-12-20 DIAGNOSIS — G25.9 MOVEMENT DISORDER: ICD-10-CM

## 2022-12-20 RX ORDER — ROPINIROLE 1 MG/1
TABLET, FILM COATED ORAL
Qty: 270 TABLET | Refills: 0 | OUTPATIENT
Start: 2022-12-20

## 2022-12-20 RX ORDER — TRIAMCINOLONE ACETONIDE 0.1 %
PASTE (GRAM) DENTAL 2 TIMES DAILY
Qty: 5 G | Refills: 0 | OUTPATIENT
Start: 2022-12-20

## 2022-12-20 NOTE — TELEPHONE ENCOUNTER
Was sent on 8/16/22 with refills, should not be out at this time, please check profile for any held scripts.

## 2022-12-21 ENCOUNTER — MYC MEDICAL ADVICE (OUTPATIENT)
Dept: FAMILY MEDICINE | Facility: CLINIC | Age: 62
End: 2022-12-21

## 2022-12-26 ENCOUNTER — MYC MEDICAL ADVICE (OUTPATIENT)
Dept: HEMATOLOGY | Facility: CLINIC | Age: 62
End: 2022-12-26

## 2022-12-26 DIAGNOSIS — D69.1 PLATELET GRANULE DEFECT (H): ICD-10-CM

## 2022-12-27 ENCOUNTER — MYC MEDICAL ADVICE (OUTPATIENT)
Dept: FAMILY MEDICINE | Facility: CLINIC | Age: 62
End: 2022-12-27

## 2022-12-27 DIAGNOSIS — R79.89 ELEVATED SERUM CREATININE: Primary | ICD-10-CM

## 2022-12-27 DIAGNOSIS — F41.9 ANXIETY: Primary | ICD-10-CM

## 2022-12-27 NOTE — TELEPHONE ENCOUNTER
Patient is calling about her MRI this morning. She could not complete it as she could not get in. She asked for something besides Xanax to take as she doesn't think that will work. She knows how she feels on that and does not believe it would help her any. She is looking for anything else that would help.     She is also hoping to get this completed before the end of the year but is nervous to reschedule without knowing she can get other medication to help her when she would go next time.     Please call patient back at [phone].

## 2022-12-28 ENCOUNTER — MYC MEDICAL ADVICE (OUTPATIENT)
Dept: FAMILY MEDICINE | Facility: CLINIC | Age: 62
End: 2022-12-28
Payer: COMMERCIAL

## 2022-12-28 ENCOUNTER — LAB (OUTPATIENT)
Dept: LAB | Facility: CLINIC | Age: 62
End: 2022-12-28
Payer: COMMERCIAL

## 2022-12-28 DIAGNOSIS — D69.1: Primary | ICD-10-CM

## 2022-12-28 DIAGNOSIS — Z79.899 ENCOUNTER FOR LONG-TERM (CURRENT) USE OF HIGH-RISK MEDICATION: ICD-10-CM

## 2022-12-28 DIAGNOSIS — E87.6 HYPOKALEMIA: ICD-10-CM

## 2022-12-28 DIAGNOSIS — D69.1 PLATELET GRANULE DEFECT (H): ICD-10-CM

## 2022-12-28 DIAGNOSIS — Z12.11 SCREEN FOR COLON CANCER: ICD-10-CM

## 2022-12-28 DIAGNOSIS — D50.8 OTHER IRON DEFICIENCY ANEMIAS: Primary | ICD-10-CM

## 2022-12-28 LAB
BASOPHILS # BLD AUTO: 0 10E3/UL (ref 0–0.2)
BASOPHILS NFR BLD AUTO: 0 %
EOSINOPHIL # BLD AUTO: 0 10E3/UL (ref 0–0.7)
EOSINOPHIL NFR BLD AUTO: 0 %
ERYTHROCYTE [DISTWIDTH] IN BLOOD BY AUTOMATED COUNT: 14.9 % (ref 10–15)
HCT VFR BLD AUTO: 39.3 % (ref 35–47)
HGB BLD-MCNC: 12.7 G/DL (ref 11.7–15.7)
IMM GRANULOCYTES # BLD: 0.1 10E3/UL
IMM GRANULOCYTES NFR BLD: 1 %
LYMPHOCYTES # BLD AUTO: 2.2 10E3/UL (ref 0.8–5.3)
LYMPHOCYTES NFR BLD AUTO: 21 %
MCH RBC QN AUTO: 30.4 PG (ref 26.5–33)
MCHC RBC AUTO-ENTMCNC: 32.3 G/DL (ref 31.5–36.5)
MCV RBC AUTO: 94 FL (ref 78–100)
MONOCYTES # BLD AUTO: 0.5 10E3/UL (ref 0–1.3)
MONOCYTES NFR BLD AUTO: 5 %
NEUTROPHILS # BLD AUTO: 7.8 10E3/UL (ref 1.6–8.3)
NEUTROPHILS NFR BLD AUTO: 74 %
PLATELET # BLD AUTO: 318 10E3/UL (ref 150–450)
RBC # BLD AUTO: 4.18 10E6/UL (ref 3.8–5.2)
WBC # BLD AUTO: 10.5 10E3/UL (ref 4–11)

## 2022-12-28 PROCEDURE — 85025 COMPLETE CBC W/AUTO DIFF WBC: CPT

## 2022-12-28 PROCEDURE — 80053 COMPREHEN METABOLIC PANEL: CPT

## 2022-12-28 PROCEDURE — 85045 AUTOMATED RETICULOCYTE COUNT: CPT

## 2022-12-28 PROCEDURE — 82728 ASSAY OF FERRITIN: CPT | Performed by: FAMILY MEDICINE

## 2022-12-28 PROCEDURE — 82248 BILIRUBIN DIRECT: CPT

## 2022-12-28 PROCEDURE — 36415 COLL VENOUS BLD VENIPUNCTURE: CPT

## 2022-12-28 RX ORDER — HEPARIN SODIUM,PORCINE 10 UNIT/ML
5 VIAL (ML) INTRAVENOUS
Status: CANCELLED | OUTPATIENT
Start: 2022-12-28

## 2022-12-28 RX ORDER — PREDNISONE 10 MG/1
10 TABLET ORAL DAILY
Qty: 90 TABLET | Refills: 0 | Status: SHIPPED | OUTPATIENT
Start: 2022-12-28 | End: 2023-05-03

## 2022-12-28 RX ORDER — HEPARIN SODIUM (PORCINE) LOCK FLUSH IV SOLN 100 UNIT/ML 100 UNIT/ML
5 SOLUTION INTRAVENOUS
Status: CANCELLED | OUTPATIENT
Start: 2022-12-28

## 2022-12-28 RX ORDER — LORAZEPAM 0.5 MG/1
TABLET ORAL
Qty: 2 TABLET | Refills: 0 | Status: SHIPPED | OUTPATIENT
Start: 2022-12-28 | End: 2023-01-16

## 2022-12-28 NOTE — TELEPHONE ENCOUNTER
What medications have helped in the past, Lorazepam or clonazepam?.  Eligio Gray MD on 12/27/2022 at 10:25 PM

## 2022-12-28 NOTE — TELEPHONE ENCOUNTER
I ordered Lorazepam to the [facility] pharmacy at [facility].  She may reschedule her MRI when she is available and update her PCP if further concerns.  Eligio Gray MD on 12/28/2022 at 10:28 AM

## 2022-12-28 NOTE — TELEPHONE ENCOUNTER
Staff called and spoke with patient who stated that she has not taking any else other than the Xanax.     Her main concerns are anxiety and being severely claustrophobic.    Patient states that she is unsure of what else to take and was hoping for something other than the Xanax.

## 2022-12-28 NOTE — TELEPHONE ENCOUNTER
Dr Peterson I am not sure what she is referring to as the AVS attached is one from our clinics. Do you know what this is in reference to?    Marie Laura CMA ([facility])

## 2022-12-29 ENCOUNTER — VIRTUAL VISIT (OUTPATIENT)
Dept: PSYCHIATRY | Facility: CLINIC | Age: 62
End: 2022-12-29
Payer: COMMERCIAL

## 2022-12-29 DIAGNOSIS — F33.1 MODERATE RECURRENT MAJOR DEPRESSION (H): Primary | ICD-10-CM

## 2022-12-29 DIAGNOSIS — F41.1 GENERALIZED ANXIETY DISORDER: ICD-10-CM

## 2022-12-29 LAB
ALBUMIN SERPL-MCNC: 3.5 G/DL (ref 3.4–5)
ALP SERPL-CCNC: 107 U/L (ref 40–150)
ALT SERPL W P-5'-P-CCNC: 26 U/L (ref 0–50)
ANION GAP SERPL CALCULATED.3IONS-SCNC: 6 MMOL/L (ref 3–14)
AST SERPL W P-5'-P-CCNC: 16 U/L (ref 0–45)
BILIRUB DIRECT SERPL-MCNC: <0.1 MG/DL (ref 0–0.2)
BILIRUB SERPL-MCNC: 0.4 MG/DL (ref 0.2–1.3)
BUN SERPL-MCNC: 22 MG/DL (ref 7–30)
CALCIUM SERPL-MCNC: 9.7 MG/DL (ref 8.5–10.1)
CHLORIDE BLD-SCNC: 105 MMOL/L (ref 94–109)
CO2 SERPL-SCNC: 29 MMOL/L (ref 20–32)
CREAT SERPL-MCNC: 1.3 MG/DL (ref 0.52–1.04)
FERRITIN SERPL-MCNC: 64 NG/ML (ref 8–252)
GFR SERPL CREATININE-BSD FRML MDRD: 46 ML/MIN/1.73M2
GLUCOSE BLD-MCNC: 147 MG/DL (ref 70–99)
POTASSIUM BLD-SCNC: 3.9 MMOL/L (ref 3.4–5.3)
PROT SERPL-MCNC: 7.4 G/DL (ref 6.8–8.8)
RETICS # AUTO: 0.15 10E6/UL (ref 0.03–0.1)
RETICS/RBC NFR AUTO: 3.6 % (ref 0.5–2)
SODIUM SERPL-SCNC: 140 MMOL/L (ref 133–144)

## 2022-12-29 PROCEDURE — 99214 OFFICE O/P EST MOD 30 MIN: CPT | Mod: 95 | Performed by: NURSE PRACTITIONER

## 2022-12-29 RX ORDER — DULOXETIN HYDROCHLORIDE 30 MG/1
30 CAPSULE, DELAYED RELEASE ORAL EVERY MORNING
Qty: 90 CAPSULE | Refills: 0 | Status: SHIPPED | OUTPATIENT
Start: 2022-12-29 | End: 2023-01-16

## 2022-12-29 RX ORDER — BUSPIRONE HYDROCHLORIDE 15 MG/1
15 TABLET ORAL 2 TIMES DAILY
Qty: 180 TABLET | Refills: 0 | Status: SHIPPED | OUTPATIENT
Start: 2022-12-29 | End: 2023-03-03

## 2022-12-29 NOTE — PROGRESS NOTES
"Jacquie is a 62 year old who is being evaluated via a billable video visit.      How would you like to obtain your AVS? MyChart  If the video visit is dropped, the invitation should be resent by: Text to cell phone: [phone]  Will anyone else be joining your video visit? No        PSYCHIATRIC MEDICATION FOLLOW UP APPT     Name: Jacquie Amador   : 1960               Telemedicine Visit: The patient's condition can be safely assessed and treated via synchronous audio and visual telemedicine encounter.      Reason for Telemedicine Visit: COVID 19 pandemic and the social and physical recommendations by the CDC and MD.      Originating Site (Patient Location): Patient's home     Distant Site (Provider Location): Provider Remote Setting     Consent:  The patient/guardian has verbally consented to: the potential risks and benefits of telemedicine (video visit or phone) versus in person care; bill my insurance or make self-payment for services provided; and responsibility for payment of non-covered services.     Mode of Communication:  [employer] video platform      As the provider I attest to compliance with applicable laws and regulations related to telemedicine.         Source of Referral / Care Team:  Primary Care Provider: Liz Peterson MD   Therapist: none     [facility] psychiat[facility] providers act as a specialty service for Primary Care Providers in the [facility] who seek to optimize medications for unstable patients. Once medications have been optimized, [facility] providers discharge the patient back to the referring Primary Care Provider for ongoing medication management. This type of system allows [facility] to serve a high volume of patients.     Patient Identification:  Patient is a 62 year old,   White Choose not to answer female  who presents for return visit with me.   Patient prefers to be called: \"Jacquie\".  Patient is currently unemployed.     Patient attended the session " alone.    RECORDS AVAILABLE FOR REVIEW: EHR records through [facility] , including recent Hematology appt (12/14/22).       Interim History:  I last saw Jacquie Amador for outpatient psychiatry Consultation 2 weeks ago on 12/14/22. During that appointment, we continued recent increase to buspirone 10 mg BID, along with duloxetine 120 mg every day, trazodone  mg PRN for insomnia. Patient reports ADHERING to prescribed medications. She denies any significant change to symptoms over the last 2 weeks, anxiety remains high and significant fatigue regardless of sleep. Typically taking 50 mg trazodone at bedtime, denies any worsening of fatigue with dose. She met with Hematology and reviewing note they also seems concerned for possible worsening of bleeding given high dose of duloxetine. She reports sore in mouth that isn't healing, and recent lesion on face that could not stop bleeding for long time. Her pain remains high, has MRI tomorrow for her back pain, and is concerned w/ lower duloxetine pain might worsen. Denies SI/SIB/HI.       Initial Impression:   12/14/22: Jacquie Amador is a 62 year old White, female who presents for initial visit with [facility] ([facility]) for medication management. She reports a long history of medication management through PCP, with several trials of medications previously. Her clinical picture is complicated by several medical comorbidities, including Behcet's syndrome, chronic pain with continued opioid use, and acquired quantitative platelet disorder. Current exacerbation of anxiety mostly in relation to additional psychosocial stressors and medical comorbidities, in particular current appearance of her skin. CC: fatigue even with good sleep. Denies SI/SIB/HI. No history of suicide attempts, psychosis, padmini, problematic substance use.  She is hesitant to make changes to duloxetine as noted benefit to chronic pain, and currently at max dose. Higher doses of  bupropion were intolerable due to SE (^ crying). PCP recently started buspirone titration, denies any SE at this time. We will continue titrating buspirone, hold other changes at this time. Patient is agreeable to plan. She has no history of psychotherapy, and recommended she consider referral.       Current medications include:   Current Outpatient Medications   Medication Sig     busPIRone (BUSPAR) 15 MG tablet Take 1 tablet (15 mg) by mouth 2 times daily     DULoxetine (CYMBALTA) 30 MG capsule Take 1 capsule (30 mg) by mouth every morning Along with 60 mg capsule in evening (90 mg daily total).     albuterol (PROVENTIL) (2.5 MG/3ML) 0.083% neb solution Take 1 vial (2.5 mg) by nebulization every 6 hours as needed for shortness of breath / dyspnea or wheezing     albuterol (VENTOLIN HFA) 108 (90 Base) MCG/ACT inhaler INHALE 2 PUFFS INTO THE LUNGS EVERY 6 HOURS AS NEEDED FOR SHORTNESS OF BREATH / DYSPNEA     aluminum chloride (DRYSOL) 20 % external solution Apply topically At Bedtime     amoxicillin-clavulanate (AUGMENTIN) 875-125 MG tablet Take 1 tablet by mouth 2 times daily     atorvastatin (LIPITOR) 20 MG tablet Take 1 tablet (20 mg) by mouth daily     atorvastatin (LIPITOR) 20 MG tablet Take 1 tablet (20 mg) by mouth daily     augmented betamethasone dipropionate (DIPROLENE AF) 0.05 % external cream Apply to affected area twice daily     benzonatate (TESSALON) 200 MG capsule Take 1 capsule (200 mg) by mouth 3 times daily as needed for cough     betamethasone dipropionate (DIPROSONE) 0.05 % external ointment Apply topically 2 times daily To areas of skin sores.  Can also use on mouth sores as needed twice daily.     buPROPion (WELLBUTRIN SR) 200 MG 12 hr tablet Take 1 tablet (200 mg) by mouth daily     celecoxib (CELEBREX) 200 MG capsule TAKE ONE CAPSULE BY MOUTH TWICE A DAY AS NEEDED FOR PAIN     chlorhexidine (PERIDEX) 0.12 % solution Swish and spit 15 mLs in mouth 2 times daily     clindamycin (CLINDAMAX) 1 %  external gel Apply topically 2 times daily     clotrimazole (MYCELEX) 10 MG lozenge Place 1 lozenge (10 mg) inside cheek 5 times daily     dapsone (ACZONE) 25 MG tablet Take 1 tablet (25 mg) by mouth daily for 90 days     desonide (DESOWEN) 0.05 % external cream Apply topically 2 times daily To sores on face     desonide (DESOWEN) 0.05 % external ointment Apply topically 2 times daily To areas of skin sores on faec     DULoxetine (CYMBALTA) 60 MG capsule Take 2 capsules (120 mg) by mouth daily     famotidine (PEPCID) 40 MG tablet Take 1 tablet (40 mg) by mouth daily     fluocinonide (LIDEX) 0.05 % external gel Apply topically 2 times daily as needed     hydrochlorothiazide (HYDRODIURIL) 25 MG tablet Take 1 tablet (25 mg) by mouth daily     hydrocortisone 2.5 % cream APPLY TOPICALLY 2 TIMES DAILY AS NEEDED     LORazepam (ATIVAN) 0.5 MG tablet Take 0.5 -1 mg 30 minutes prior to procedure.     losartan (COZAAR) 100 MG tablet Take 1 tablet (100 mg) by mouth daily     metoprolol succinate ER (TOPROL XL) 100 MG 24 hr tablet TAKE ONE TABLET BY MOUTH TWICE DAILY     metoprolol succinate ER (TOPROL XL) 25 MG 24 hr tablet Take 1 tablet (25 mg) by mouth 3 times daily     mometasone-formoterol (DULERA) 200-5 MCG/ACT inhaler Inhale 2 puffs into the lungs 2 times daily     montelukast (SINGULAIR) 10 MG tablet Take 1 tablet (10 mg) by mouth At Bedtime     multivitamin w/minerals (THERA-VIT-M) tablet Take 1 tablet by mouth daily     mupirocin (BACTROBAN) 2 % external cream Apply to affected area three times daily     nystatin (MYCOSTATIN) 254820 UNIT/ML suspension Take by mouth 4 times daily Has recurrent thrush. Uses for 2 weeks at a time as needed.     oxybutynin ER (DITROPAN XL) 5 MG 24 hr tablet Take 2 tablets (10 mg) by mouth daily     predniSONE (DELTASONE) 10 MG tablet Take 1 tablet (10 mg) by mouth daily     rizatriptan (MAXALT) 10 MG tablet Take 1 tablet (10 mg) by mouth at onset of headache for migraine May repeat in 2  "hours. Max 3 tablets/24 hours.     rOPINIRole (REQUIP) 1 MG tablet TAKE THREE TABLETS BY MOUTH AT BEDTIME     traMADol (ULTRAM) 50 MG tablet Take 1 tablet (50 mg) by mouth daily as needed for severe pain (7-10)     traZODone (DESYREL) 50 MG tablet Take 3 tablets (150 mg) by mouth At Bedtime     triamcinolone (ARISTOCORT HP) 0.5 % external cream Apply topically 2 times daily     valACYclovir (VALTREX) 500 MG tablet TAKE ONE TABLET BY MOUTH ONE TIME DAILY     ZYRTEC 10 MG OR TABS 1 TABLET DAILY     No current facility-administered medications for this visit.         Side effects: Denies. Possible worsening of bleeding risk    The [place] Prescription Monitoring Program has been reviewed and there are no concerns about diversionary activity for controlled substances at this time.  12/28/2022  1   12/28/2022  Lorazepam 0.5 MG Tablet  2.00  1 Krishnamurthy Iko   [id number]   Sup (9752)   0/0  1.00 LME  Comm [place]   [place]   12/07/2022  1   12/07/2022  Tramadol Hcl 50 MG Tablet  30.00  30 Sa Fra   [id number]   Sup (4604)   0/0  5.00 [place]       Psychiatric ROS:  Jacquie Amador reports mood has been: \"Ok\"  Depression has been: depressed mood and fatigue of loss of energy Self rates as ~5 / 10, where 0 is none at all and 10 being severe depression.   Anxiety has been: excessive worry, difficult to control, restlessness / feeling keyed up,  easily fatigued,  difficulty concentrating or mind going blank, irritability and muscle tension  self rates as 6/10, where 0 is none at all and 10 being severe anxiety. Was 7/10 at last appt.  Sleep has been: reports w/trazodone \"sleeping well\".  Energy has been: still very low  Appetite has been: \"fine -- too good maybe.\"   Vanessa sxs: denies  Psychosis sxs: denies  PTSD sxs: denies     PHQ-9 scores:   PHQ-9 SCORE 5/3/2022 5/25/2022 12/7/2022   PHQ-9 Total Score - - -   PHQ-9 Total Score MyChart 5 (Mild depression) 8 (Mild depression) 7 (Mild depression)   PHQ-9 Total Score - - -   PHQ-9 Total " Score 5 8 7       MARIZOL-7 scores:    MARIZOL-7 SCORE 5/25/2022 12/7/2022 12/7/2022   Total Score - - -   Total Score 13 (moderate anxiety) - 10 (moderate anxiety)   Total Score 13 10 10         Current stressors include: Parenting Stress, Symptoms and Caregiving Stress  Coping mechanisms and supports include: Family      Past Medical/Surgical History:  Past Medical History:   Diagnosis Date     ASTHMA - MODERATE PERSISTENT 9/21/2005     Chronic pain      Coronary artery disease      CVA (cerebral infarction)      Depressive disorder, not elsewhere classified      Diabetes (H)      Elevated serum alkaline phosphatase level     Liver source     Fibromyalgia      HYPERLIPIDEMIA NEC/NOS 12/29/2006     Hypertriglyceridemia      OA (osteoarthritis)      Thyroid disease      Trochanteric bursitis      Unspecified essential hypertension       has a past medical history of ASTHMA - MODERATE PERSISTENT (9/21/2005), Chronic pain, Coronary artery disease, CVA (cerebral infarction), Depressive disorder, not elsewhere classified, Diabetes (H), Elevated serum alkaline phosphatase level, Fibromyalgia, HYPERLIPIDEMIA NEC/NOS (12/29/2006), Hypertriglyceridemia, OA (osteoarthritis), Thyroid disease, Trochanteric bursitis, and Unspecified essential hypertension.    She has no past medical history of Congestive heart failure, unspecified, COPD (chronic obstructive pulmonary disease) (H), or History of blood transfusion.    Medication allergies:    Allergies   Allergen Reactions     Cats      and rabbits/wheezing     Dogs      wheezing     Lyrica [Pregabalin] Other (See Comments)     Rash, mouth sores, itching and burning     Seasonal Allergies      rhinits       Social History:  Birth place: [facility]  Childhood: Yes intact home  Siblings:  2   Highest education level was some college.   Employment Status: unemployed  Relationship status: .  Current Living situation:  Me and .  Feels safe at home.  Children:  "two daughters - 27, 30    Firearms/Weapons Access: No: Patient denies   Service: No  Support: adult child    Tobacco use: History former smoker: 1 pack / day, quit in 2013.  Caffeine: 2 cups a day - AM and mid afternoon.     Vital Signs:   LMP 07/17/2009 (Exact Date)     Labs:  Most recent laboratory results reviewed and the pertinent results include: CBC 12/28 and all WNL. CMP: estimated GFR: 46 (> 60) and Creatinine 1.3, and reticulocyte count 3.6 (0.5-2.0)    Review of Systems:  10 systems (general, cardiovascular, respiratory, eyes, ENT, endocrine, GI, , M/S, neurological) were reviewed. Most pertinent finding(s) is/are: integumentary: multiple ulcerated lesions in various stages of healing visible on face. Additional frequent dizziness, migraines. Chronic constipation. The remaining systems are all unremarkable.    Mental Status Exam:  Alertness: alert  and oriented   Appearance: adequately groomed and appearance was notable for red ulcerated lesions and scabs across face  Behavior/Demeanor: cooperative, pleasant and calm, with fair eye contact   Speech: normal and regular rate and rhythm  Language: intact and no problems  Psychomotor: normal or unremarkable  Mood: \"Umm Ok I guess.\"  Affect: full range and appropriate; was congruent to mood; was congruent to content  Thought Process/Associations: unremarkable  Thought Content:  Reports none;  Denies suicidal ideation, violent ideation and delusions  Perception:  Reports none;  Denies auditory hallucinations and visual hallucinations  Insight: intact  Judgment: intact  Cognition: does  appear grossly intact; formal cognitive testing was not done  Recent and Remote Memory: Intact to interview. Not formally assessed. No amnesia.  Attention Span and Concentration: normal  Fund of Knowledge:  appropriate  Gait and Station: unremarkable    Suicide Risk Assessment:  Today Jacquie Amador reports no suicidal ideation. There are notable risk factors for " self-harm, including anxiety and comorbid medical condition of  Behcet's syndrome involving oral mucosa, PLT disorder, chronic pain, migraines. However, risk is mitigated by commitment to family, sobriety, absence of past attempts, history of seeking help when needed and denies suicidal intent or plan. Therefore, based on all available evidence including the factors cited above, Jacquie Amador does not appear to be at imminent risk for self-harm, does not meet criteria for a 72-hr hold, and therefore remains appropriate for ongoing outpatient level of care.  A thorough assessment of risk factors related to suicide and self-harm have been reviewed and are noted above. The patient convincingly denies suicidality on several occasions. Local community safety resources printed and reviewed for patient to use if needed. There was no deceit detected, and the patient presented in a manner that was believable.     DSM5 Diagnosis:  296.32 (F33.1) Major Depressive Disorder, Recurrent Episode, Moderate _  300.02 (F41.1) Generalized Anxiety Disorder    Medical comorbidities include:   Patient Active Problem List    Diagnosis Date Noted     Acquired platelet disorder (H) 12/28/2022     Priority: Medium     Dermatitis 12/07/2022     Priority: Medium     Encounter for long-term current use of high risk medication 12/07/2022     Priority: Medium     Encounter for long-term (current) use of high-risk medication 11/06/2022     Priority: Medium     Behcet's syndrome involving oral mucosa (H) 11/03/2022     Priority: Medium     Aphthous ulcer 10/15/2022     Priority: Medium     Rash 10/15/2022     Priority: Medium     Pruritus 10/15/2022     Priority: Medium     Platelet granule defect (H) 12/22/2021     Priority: Medium     Sees Dr. Emely Grimes       Morbid obesity (H) 08/05/2021     Priority: Medium     Skin ulcer, limited to breakdown of skin (H) 07/19/2019     Priority: Medium     Migraine without aura and without status  migrainosus, not intractable 12/21/2018     Priority: Medium     Intractable chronic migraine without aura and without status migrainosus 05/29/2018     Priority: Medium     Impaired fasting glucose 05/29/2018     Priority: Medium     Sinus tachycardia 12/08/2017     Priority: Medium     Primary osteoarthritis of both first carpometacarpal joints 09/28/2017     Priority: Medium     Arthritis of metatarsophalangeal joint 09/28/2017     Priority: Medium     Medical marijuana use 12/19/2016     Priority: Medium     Chronic, continuous use of opioids      Priority: Medium     Scratching 04/26/2016     Priority: Medium     Advanced directives, counseling/discussion 04/26/2016     Priority: Medium     Hand out given.        Iron deficiency 04/18/2016     Priority: Medium     Overweight 03/15/2016     Priority: Medium     Trochanteric bursitis of both hips 08/27/2015     Priority: Medium     Primary focal hyperhidrosis 07/03/2015     Priority: Medium     Right ankle instability 06/25/2015     Priority: Medium     Osteoarthritis of carpometacarpal joint of thumb - bilateral 03/26/2015     Priority: Medium     Trochanteric bursitis 03/26/2015     Priority: Medium     Iron deficiency anemia 11/14/2014     Priority: Medium     Constipation 04/04/2014     Priority: Medium     Lumbar disc disease with radiculopathy 04/04/2014     Priority: Medium     Chronic rhinitis 06/10/2013     Priority: Medium     Myalgia 04/11/2013     Priority: Medium     Generalized anxiety disorder 05/10/2011     Priority: Medium     Diagnosis updated by automated process. Provider to review and confirm.       Hypertension goal BP (blood pressure) < 140/90 01/18/2011     Priority: Medium     HYPERLIPIDEMIA LDL GOAL <130 10/31/2010     Priority: Medium     Multiple joint pain 11/18/2009     Priority: Medium     Sees Rheumatology and Orthopedics at [place].  Receives steroid injections, but also uses Vicodin for pain.    Patient is followed by DEVON  JAMILA for ongoing prescription of narcotic pain medicine.  Med: Vicodin.   Maximum use per month: 60  Expected duration: indeterminate  Narcotic agreement on file: NO  Clinic visit recommended: Q 6  months         Moderate recurrent major depression (H) 12/04/2008     Priority: Medium     Esophageal reflux 07/05/2007     Priority: Medium     Allergic rhinitis due to other allergen 06/30/2006     Priority: Medium     Moderate persistent asthma without complication 09/21/2005     Priority: Medium       Psychosocial & Contextual Factors:  Parent/Child Relationship Difficulties and Medical Comorbidites    DIFFERENTIAL DIAGNOSIS: R/O depression due to other medical condition    Medical comorbidities impacting or contributing to clinical picture:  Behcet's syndrome involving oral mucosa, PLT disorder, chronic pain, migraines  Known issue that I take into account for their medical decisions, no current exacerbations or new concerns.    Impression:  Jacquie Amador is a 62 year old White, female who presents for return visit with  [facility] ([facility]) for medication management. At initial appointment 2 weeks ago, we continued recent increase to buspirone 10 mg BID, along with duloxetine 120 mg every day, trazodone  mg PRN for insomnia. Patient reports ADHERING to prescribed medications. She denies any significant change to symptoms over the last 2 weeks, anxiety remains high and significant fatigue regardless of sleep. Typically taking 50 mg trazodone at bedtime, denies any worsening of fatigue with dose. She met with Hematology and reviewing note they also seems concerned for possible worsening of bleeding given high dose of duloxetine. She reports sore in mouth that isn't healing, and recent lesion on face that could not stop bleeding for long time. Her pain remains high, has MRI tomorrow for her back pain, and is concerned w/ lower duloxetine pain might worsen. Denies SI/SIB/HI. Given  additional risk factors, recommending small increase to duloxetine while monitoring anxiety, mood, and pain closely. Will increase buspirone as well. Additionally, given the high amount of medications that patient is currently prescribed that may be worsening her symptoms (including bleeding and /or  Fatigue), will refer to [facility] to meet with pharmacist to review current medications. Follow-up with this provider in 1 month. Patient is agreeable to plan.     Medication side effects and alternatives were reviewed. Health promotion activities recommended and reviewed today. All questions addressed. Education and counseling completed regarding risks and benefits of medications and psychotherapy options. Recommend therapy for additional support.       Treatment Plan:    DECREASE TO duloxetine 30 mg QAM and 60 mg QPM. Script sent for 90 #30 mg tablets.     INCREASE TO buspirone 15 mg twice daily.     CONTINUE trazodone  mg at bedtime PRN for insomnia. No script needed.    Continue all other medications per primary care and other providers.    Safety plan reviewed. To the Emergency Department as needed or call after hours crisis line at [phone] or [phone]. [place] Crisis Text Line. Text [place] to 888452 or Suicide LifeLine Chat: suicidepreventionlifeline.org/chat    Schedule an appointment with me in 4 weeks or sooner as needed. Call [facility] at [phone] to schedule.    Follow up with primary care provider as planned or for acute medical concerns.    Call the psychiatric nurse line with medication questions or concerns at [phone].    [employer] Weekly Flower Deliveryhart may be used to communicate with your provider, but this is not intended to be used for emergencies.    Patient Education:  Medication side effects and alternatives reviewed. Health promotion activities recommended and reviewed today. All questions addressed. Education and counseling completed regarding risks and benefits of medications and  psychotherapy options.  Consent provided by patient/guardian  Call the psychiatric nurse line with medication questions or concerns at [phone].  MyChart may be used to communicate with your provider, but this is not intended to be used for emergencies.  SEROTONIN SYNDROME:  Discussed risks of Serotonin syndrome (ie, serotonin toxicity) which is a potentially life-threatening condition associated with increased serotonergic activity in the central nervous system (CNS). It is seen with therapeutic medication use, inadvertent interactions between drugs, and intentional self-poisoning. Serotonin syndrome may involve a spectrum of clinical findings, which often include mental status changes, autonomic hyperactivity, and neuromuscular abnormalities.    Medlineplus.gov is information for patients.  It is run by the [employer] and it contains information about all disorders, diseases and all medications.      Community Resources:    National Suicide Prevention Lifeline: 268.340.6714 (TTY: 755.998.5644). Call anytime for help.  (www.suicidepreventionlifeline.org)  National [place] on Mental Illness (www.richy.org): 342.150.3011 or 172-482-4605.   Mental Health Association (www.mentalhealth.org): 655.756.1622 or 651-351-2200.  [place] Crisis Text Line: Text [place] to 166181  Suicide LifeLine Chat: suicideOPKO Health.org/chat    Administrative Billin minutes spent on the date of the encounter doing chart review and reviewing referral documents, history and exam of patient, documentation and further activities as noted above.    Video/Phone Start Time: 9:58 AM  Video/Phone End Time: 10:18 AM      Patient Status:  [facility] MD/DO/NP/PA providers offer care a specialty service for Primary Care Providers in the [facility] that seek to optimize psychotropic medications for unstable patients.  Once medications have been optimized, our providers discharge the patient back to the referring Primary  Care Provider for ongoing medication management.  This type of system allows our providers to serve a high volume of patients.   Patient will continue to be seen for ongoing consultation and stabilization.    Signed:   Lora Kaur, MSN, APRN, PMHNP-BC  [facility] Service ([facility])  [facility]    Chart documentation done in part with Dragon Voice Recognition software.  Although reviewed after completion, some word and grammatical errors may remain.

## 2022-12-29 NOTE — PATIENT INSTRUCTIONS
**For crisis resources, please see the information at the end of this document**     Thank you for coming to the [facility] & [facility] [place] [facility].    TREATMENT PLAN:  Medications:   DECREASE TO duloxetine 30 mg QAM and 60 mg QPM. Script sent for 90 #30 mg tablets.   INCREASE TO buspirone 15 mg twice daily.   CONTINUE trazodone  mg at bedtime PRN for insomnia. No script needed.  Continue all other medications per primary care and other providers.    Consults / Referrals:   - Recommend therapy. To schedule individual or family therapy, call [facility] at [phone].  - Referral placed for medication review with pharmacist (Med Therapy Management Referral)     Psychoeducation:  - Patient to monitor for signs and symptoms of serotonin syndrome/serotonin toxicity (eg, hyperreflexia, clonus, hyperthermia, diaphoresis, tremor, autonomic instability, mental status changes) when these drugs are combined.    Follow Up:  Schedule an appointment with me in 4 weeks or sooner as needed. Call [facility] at [phone] to schedule.  Follow up with primary care provider as planned or for acute medical concerns.  Call the psychiatric nurse line with medication questions or concerns at [phone].  [employer] may be used to communicate with your provider, but this is not intended to be used for emergencies.    MEDICATION REFILLS:  If you need any refills please call your pharmacy and they will contact us. Our fax number for refills is [phone]. Please allow three business for refill processing. If you need to  your refill at a new pharmacy, please contact the new pharmacy directly. The new pharmacy will help you get your medications transferred.       Financial Assistance [phone]  [facility] Billing [phone]  Central Billing Office, [facility]: [phone]  [facility] Billing [phone]  Medical Records [phone]  [facility] Patient Bill of Rights  [email or web address]       MENTAL HEALTH CRISIS RESOURCES:  For a emergency help, please call 911 or go to the nearest Emergency Department.     Emergency Walk-In Options:   Em[facility] Unit @ [facility] ([place]): [phone] - Specialized mental health emergency area designed to be calming  [facility] ([place]): [phone]  [facility] ([place]): [phone]  [facility] ([place]): [phone]    [facility] Crisis Information:   [place]: [phone]  [place]: [phone]  [place] ([place]) - Adult: [phone]     Child: [phone]  [place] - Adult: [phone]     Child: [phone]  [place]: [phone]  List of all [facility] resources:   https://[place].gov/dhs/people-we-serve/adults/health-care/mental-health/resources/crisis-contacts.jsp    National Crisis Information:   National Suicide & Crisis Lifeline: Call 882        For online chat options, visit https://suicidepreventionlifeline.org/chat/  Poison Control Center: 4-519-082-5839  Poison Control Center: 4-597-306-3303  Trans Lifeline: 9-783-445-5542 - Hotline for transgender people of all ages  The [employer]: 2-686-731-0233 - Hotline for LGBT youth     For Non-Emergency Support:   Fast Tracker: Mental Health & Substance Use Disorder Resources -   https://www.fasttrackermn.org/       Again thank you for choosing [facility] [place] [facility] and please let us know how we can best partner with you to improve you and your family's health.    You may be receiving a survey regarding this appointment. We would love to have your feedback, both positive and negative. The survey is done by an external company, so your answers are anonymous.

## 2022-12-29 NOTE — RESULT ENCOUNTER NOTE
Please inform of results if patient has not viewed in [employer] for [employer] within 3 business days.    Your ferritin is in the normal range, but lower than previous at 64.      Please call the clinic with any questions you may have.     Have a great day,    Dr. Samuel

## 2022-12-29 NOTE — Clinical Note
Please call to schedule patient for follow-up in 1 month.  Thank you!   JESSICA Wells, PMCRUZ-BC [facility] ([facility]PS) [facility]

## 2022-12-30 ENCOUNTER — HOSPITAL ENCOUNTER (OUTPATIENT)
Dept: MRI IMAGING | Facility: CLINIC | Age: 62
Discharge: HOME | End: 2022-12-30
Attending: FAMILY MEDICINE | Admitting: FAMILY MEDICINE
Payer: COMMERCIAL

## 2022-12-30 DIAGNOSIS — M51.16 LUMBAR DISC DISEASE WITH RADICULOPATHY: ICD-10-CM

## 2022-12-30 PROCEDURE — 72158 MRI LUMBAR SPINE W/O & W/DYE: CPT

## 2022-12-30 PROCEDURE — 255N000002 HC RX 255 OP 636: Performed by: FAMILY MEDICINE

## 2022-12-30 PROCEDURE — A9585 GADOBUTROL INJECTION: HCPCS | Performed by: FAMILY MEDICINE

## 2022-12-30 RX ORDER — GADOBUTROL 604.72 MG/ML
10 INJECTION INTRAVENOUS ONCE
Status: COMPLETED | OUTPATIENT
Start: 2022-12-30 | End: 2022-12-30

## 2022-12-30 RX ADMIN — GADOBUTROL 10 ML: 604.72 INJECTION INTRAVENOUS at 09:53

## 2022-12-31 ENCOUNTER — MYC MEDICAL ADVICE (OUTPATIENT)
Dept: FAMILY MEDICINE | Facility: CLINIC | Age: 62
End: 2022-12-31

## 2023-01-02 NOTE — TELEPHONE ENCOUNTER
To MD to advise on her MRI concerns as well as labs order by Dr. Warren (derm).  Per the 12/29/22 JustGo message he told patient to discuss the elevated creatinine and GFR with PCP.  Falguni Crawford RN

## 2023-01-04 ENCOUNTER — MYC MEDICAL ADVICE (OUTPATIENT)
Dept: FAMILY MEDICINE | Facility: CLINIC | Age: 63
End: 2023-01-04

## 2023-01-04 NOTE — TELEPHONE ENCOUNTER
Called patient re: hoohbe message for medication change issue. 1 week ago decreased to duloxetine 30 mg QAM, 60 mg QPM (from 60 mg BID), while increasing to buspirone 15 mg BID. She reports first 4 days ok, but yesterday and today noticed feeling more down, lower energy and motivation, and a little increase in her physical pain symptoms. Reports this AM nausea and vomiting 1x, now resolved. Denies fever, diaphoresis, chest pain / tightness / palpitations, tics/tremors/abnormal muscle mvmts.  Additionally + mild dizziness, feeling losing balance some. Denies any falls. Recommending changing current dose of buspirone to TID dosing vs BID. After discussing with patient she is open to continuing lower duloxetine for a few more days and then will return to higher duloxetine dose. She will check in 1/9/23 by hoohbe message, or may call the nurse line. If medical emergency or mental health crisis, 911 or closest hospital.

## 2023-01-05 ENCOUNTER — VIRTUAL VISIT (OUTPATIENT)
Dept: DERMATOLOGY | Facility: CLINIC | Age: 63
End: 2023-01-05
Payer: COMMERCIAL

## 2023-01-05 ENCOUNTER — TELEPHONE (OUTPATIENT)
Dept: DERMATOLOGY | Facility: CLINIC | Age: 63
End: 2023-01-05

## 2023-01-05 DIAGNOSIS — L30.9 FACIAL ECZEMA: Primary | ICD-10-CM

## 2023-01-05 DIAGNOSIS — L20.89 OTHER ATOPIC DERMATITIS: ICD-10-CM

## 2023-01-05 PROCEDURE — 99214 OFFICE O/P EST MOD 30 MIN: CPT | Mod: 95 | Performed by: DERMATOLOGY

## 2023-01-05 RX ORDER — TACROLIMUS 1 MG/G
OINTMENT TOPICAL 2 TIMES DAILY
Qty: 60 G | Refills: 1 | Status: SHIPPED | OUTPATIENT
Start: 2023-01-05 | End: 2023-09-12

## 2023-01-05 NOTE — PROGRESS NOTES
[facility] Dermatology Note  Encounter Date: Jan 5, 2023  Store-and-Forward and Telephone ([phone]). Location of teledermatologist: [facility]ERMATOLOGY [facility] [place].  Start time: 12:30. End time: 12:45.    Dermatology Problem List:  1. Recurrent aphthous ulcers  2. Multiple skin ulcers resembling prurigo/neurodermatitis    ____________________________________________    Assessment & Plan:     # Recurring oral and genital sores, likely recurrent aphthous ulcers, as well as multiple diffuse discrete skin ulcers.  The precise diagnosis for her facial ulcers is not entirely clear.  We have considered the possibility of Behcet's disease, and her skin biopsy did demonstrate dermal neutrophils (though these were suspected to be reactive to an overlying ulcer), however she has not had significant improvement in her skin changes with either colchicine or oral dapsone.    Today I believe that part of her skin issue represents facial eczema and atopic dermatitis, with superimposed changes of prurigo nodularis.  She reports a history of recurring rashes on the face and arms as a child which by history are compatible with atopic dermatitis.  She has also had issues with recurrent rash and has ongoing sores on the face currently.    Today's plan is as follows:  - We gave her a prescription to start Protopic 0.1% ointment twice daily for affected areas on the face.  We discussed common local side effects including stinging and burning with this for several days of applications.  - We also placed orders for her to start dupilumab, injecting 600 mg on the first dose and 300 mg once every 2 weeks thereafter, treating for both atopic dermatitis and prurigo nodularis.  We discussed potential side effects including drug-induced conjunctivitis.  - For now she will continue colchicine 0.6 mg twice daily, as she has tolerated this and has seen some improvement in her oral sores.  -We will plan to  have her stop dapsone, as she has not seen any improvement with this.  - She can also continue betamethasone ointment as needed for persistent skin sores and pain.     We will have her follow-up in our clinic in 6 weeks time.     Jay Warren MD  Dermatology Attending  ____________________________________________    CC: Derm Problem (Patient states that lesion has worsened recently. Patient states that lesions have been experiencing drainage along with a throbbing pain.Pt would like to review recent labs and other treatments for face. Photos of lesions on mychart.)    HPI:  Ms. Jacquie Amador is a(n) 62 year old female who presents today for recurring oral ulcers and facial sores.  Last seen 11/10/22.  She continues to have uncontrolled tender sores on her face.  She has not seen major improvement in her skin changes with either colchicine or oral dapsone.  She has perhaps had modest improvement in her oral ulcers.    Patient is otherwise feeling well, without additional skin concerns.    Labs Reviewed:  N/A    Physical Exam:  SKIN: Teledermatology photos were reviewed; image quality and interpretability: acceptable. Image date: 1/5/23.  - multiple linear and ovoid shallow ulcers diffusely distributed on face, without significant surrounding erythema  - No other lesions of concern on areas examined.     Medications:  Current Outpatient Medications   Medication     albuterol (PROVENTIL) (2.5 MG/3ML) 0.083% neb solution     albuterol (VENTOLIN HFA) 108 (90 Base) MCG/ACT inhaler     aluminum chloride (DRYSOL) 20 % external solution     amoxicillin-clavulanate (AUGMENTIN) 875-125 MG tablet     atorvastatin (LIPITOR) 20 MG tablet     atorvastatin (LIPITOR) 20 MG tablet     augmented betamethasone dipropionate (DIPROLENE AF) 0.05 % external cream     benzonatate (TESSALON) 200 MG capsule     betamethasone dipropionate (DIPROSONE) 0.05 % external ointment     buPROPion (WELLBUTRIN SR) 200 MG 12 hr tablet     busPIRone  (BUSPAR) 15 MG tablet     celecoxib (CELEBREX) 200 MG capsule     chlorhexidine (PERIDEX) 0.12 % solution     clindamycin (CLINDAMAX) 1 % external gel     clotrimazole (MYCELEX) 10 MG lozenge     dapsone (ACZONE) 25 MG tablet     desonide (DESOWEN) 0.05 % external cream     desonide (DESOWEN) 0.05 % external ointment     DULoxetine (CYMBALTA) 30 MG capsule     DULoxetine (CYMBALTA) 60 MG capsule     famotidine (PEPCID) 40 MG tablet     fluocinonide (LIDEX) 0.05 % external gel     hydrochlorothiazide (HYDRODIURIL) 25 MG tablet     hydrocortisone 2.5 % cream     LORazepam (ATIVAN) 0.5 MG tablet     losartan (COZAAR) 100 MG tablet     metoprolol succinate ER (TOPROL XL) 100 MG 24 hr tablet     metoprolol succinate ER (TOPROL XL) 25 MG 24 hr tablet     mometasone-formoterol (DULERA) 200-5 MCG/ACT inhaler     montelukast (SINGULAIR) 10 MG tablet     multivitamin w/minerals (THERA-VIT-M) tablet     mupirocin (BACTROBAN) 2 % external cream     nystatin (MYCOSTATIN) 464976 UNIT/ML suspension     oxybutynin ER (DITROPAN XL) 5 MG 24 hr tablet     predniSONE (DELTASONE) 10 MG tablet     rizatriptan (MAXALT) 10 MG tablet     rOPINIRole (REQUIP) 1 MG tablet     traMADol (ULTRAM) 50 MG tablet     traZODone (DESYREL) 50 MG tablet     triamcinolone (ARISTOCORT HP) 0.5 % external cream     valACYclovir (VALTREX) 500 MG tablet     ZYRTEC 10 MG OR TABS     No current facility-administered medications for this visit.      Past Medical/Surgical History:   Patient Active Problem List   Diagnosis     Moderate persistent asthma without complication     Allergic rhinitis due to other allergen     Esophageal reflux     Moderate recurrent major depression (H)     Multiple joint pain     HYPERLIPIDEMIA LDL GOAL <130     Hypertension goal BP (blood pressure) < 140/90     Generalized anxiety disorder     Myalgia     Chronic rhinitis     Constipation     Lumbar disc disease with radiculopathy     Iron deficiency anemia     Osteoarthritis of  carpometacarpal joint of thumb - bilateral     Trochanteric bursitis     Right ankle instability     Primary focal hyperhidrosis     Trochanteric bursitis of both hips     Overweight     Iron deficiency     Scratching     Advanced directives, counseling/discussion     Chronic, continuous use of opioids     Medical marijuana use     Primary osteoarthritis of both first carpometacarpal joints     Arthritis of metatarsophalangeal joint     Sinus tachycardia     Intractable chronic migraine without aura and without status migrainosus     Impaired fasting glucose     Migraine without aura and without status migrainosus, not intractable     Skin ulcer, limited to breakdown of skin (H)     Morbid obesity (H)     Platelet granule defect (H)     Aphthous ulcer     Rash     Pruritus     Behcet's syndrome involving oral mucosa (H)     Encounter for long-term (current) use of high-risk medication     Dermatitis     Encounter for long-term current use of high risk medication     Acquired platelet disorder (H)     Past Medical History:   Diagnosis Date     ASTHMA - MODERATE PERSISTENT 9/21/2005     Chronic pain      Coronary artery disease      CVA (cerebral infarction)      Depressive disorder, not elsewhere classified      Diabetes (H)      Elevated serum alkaline phosphatase level     Liver source     Fibromyalgia      HYPERLIPIDEMIA NEC/NOS 12/29/2006     Hypertriglyceridemia      OA (osteoarthritis)      Thyroid disease      Trochanteric bursitis      Unspecified essential hypertension        CC Emely Grimes MD  [address] [place] [address] on close of this encounter.

## 2023-01-05 NOTE — TELEPHONE ENCOUNTER
Spoke with patient and changed her appointment into a phone visit. She will be sending in photos.    Willy Cao CMA

## 2023-01-05 NOTE — TELEPHONE ENCOUNTER
[facility] Call Center    Phone Message    May a detailed message be left on voicemail: yes     Reason for Call: Appointment Intake    Referring Provider Name: NA  Diagnosis and/or Symptoms: pt would like to switch her Appt from in clinic to video Appt. We are unable to switch with in 48 hours and for skin lesion. Pt is coming in for follow-up and will send pics. Please call ASAP   Pt is concern with 94 is glare ice with lots of accident   Thanks   No answer in clinic    Action Taken: Message routed to:  [facility] Derm    Travel Screening: Not Applicable

## 2023-01-05 NOTE — NURSING NOTE
Dermatology Rooming Note    Jacquie Amador's goals for this visit include:   Chief Complaint   Patient presents with     Derm Problem     Patient states that lesion has worsened recently. Patient states that lesions have been experiencing drainage along with a throbbing pain.Pt would like to review recent labs and other treatments for face. Photos of lesions on mychart.     Al May, EMT-B

## 2023-01-05 NOTE — LETTER
1/5/2023       RE: Jacquie Amador  [address]  [place] [address]     Dear Colleague,    Thank you for referring your patient, Jacquie Amador, to the [facility]ERMATOLOGY [facility] [place] at [facility]. Please see a copy of my visit note below.    [facility] Dermatology Note  Encounter Date: Jan 5, 2023  Store-and-Forward and Telephone ([phone]). Location of teledermatologist: [facility]ERMATOLOGY [facility] [place].  Start time: 12:30. End time: 12:45.    Dermatology Problem List:  1. Recurrent aphthous ulcers  2. Multiple skin ulcers resembling prurigo/neurodermatitis    ____________________________________________    Assessment & Plan:     # Recurring oral and genital sores, likely recurrent aphthous ulcers, as well as multiple diffuse discrete skin ulcers.  The precise diagnosis for her facial ulcers is not entirely clear.  We have considered the possibility of Behcet's disease, and her skin biopsy did demonstrate dermal neutrophils (though these were suspected to be reactive to an overlying ulcer), however she has not had significant improvement in her skin changes with either colchicine or oral dapsone.    Today I believe that part of her skin issue represents facial eczema and atopic dermatitis, with superimposed changes of prurigo nodularis.  She reports a history of recurring rashes on the face and arms as a child which by history are compatible with atopic dermatitis.  She has also had issues with recurrent rash and has ongoing sores on the face currently.    Today's plan is as follows:  - We gave her a prescription to start Protopic 0.1% ointment twice daily for affected areas on the face.  We discussed common local side effects including stinging and burning with this for several days of applications.  - We also placed orders for her to start dupilumab, injecting 600 mg on the first dose and 300 mg once  every 2 weeks thereafter, treating for both atopic dermatitis and prurigo nodularis.  We discussed potential side effects including drug-induced conjunctivitis.  - For now she will continue colchicine 0.6 mg twice daily, as she has tolerated this and has seen some improvement in her oral sores.  -We will plan to have her stop dapsone, as she has not seen any improvement with this.  - She can also continue betamethasone ointment as needed for persistent skin sores and pain.     We will have her follow-up in our clinic in 6 weeks time.     Jay Warren MD  Dermatology Attending  ____________________________________________    CC: Derm Problem (Patient states that lesion has worsened recently. Patient states that lesions have been experiencing drainage along with a throbbing pain.Pt would like to review recent labs and other treatments for face. Photos of lesions on mychart.)    HPI:  Ms. Jacquie Amador is a(n) 62 year old female who presents today for recurring oral ulcers and facial sores.  Last seen 11/10/22.  She continues to have uncontrolled tender sores on her face.  She has not seen major improvement in her skin changes with either colchicine or oral dapsone.  She has perhaps had modest improvement in her oral ulcers.    Patient is otherwise feeling well, without additional skin concerns.    Labs Reviewed:  N/A    Physical Exam:  SKIN: Teledermatology photos were reviewed; image quality and interpretability: acceptable. Image date: 1/5/23.  - multiple linear and ovoid shallow ulcers diffusely distributed on face, without significant surrounding erythema  - No other lesions of concern on areas examined.     Medications:  Current Outpatient Medications   Medication     albuterol (PROVENTIL) (2.5 MG/3ML) 0.083% neb solution     albuterol (VENTOLIN HFA) 108 (90 Base) MCG/ACT inhaler     aluminum chloride (DRYSOL) 20 % external solution     amoxicillin-clavulanate (AUGMENTIN) 875-125 MG tablet     atorvastatin  (LIPITOR) 20 MG tablet     atorvastatin (LIPITOR) 20 MG tablet     augmented betamethasone dipropionate (DIPROLENE AF) 0.05 % external cream     benzonatate (TESSALON) 200 MG capsule     betamethasone dipropionate (DIPROSONE) 0.05 % external ointment     buPROPion (WELLBUTRIN SR) 200 MG 12 hr tablet     busPIRone (BUSPAR) 15 MG tablet     celecoxib (CELEBREX) 200 MG capsule     chlorhexidine (PERIDEX) 0.12 % solution     clindamycin (CLINDAMAX) 1 % external gel     clotrimazole (MYCELEX) 10 MG lozenge     dapsone (ACZONE) 25 MG tablet     desonide (DESOWEN) 0.05 % external cream     desonide (DESOWEN) 0.05 % external ointment     DULoxetine (CYMBALTA) 30 MG capsule     DULoxetine (CYMBALTA) 60 MG capsule     famotidine (PEPCID) 40 MG tablet     fluocinonide (LIDEX) 0.05 % external gel     hydrochlorothiazide (HYDRODIURIL) 25 MG tablet     hydrocortisone 2.5 % cream     LORazepam (ATIVAN) 0.5 MG tablet     losartan (COZAAR) 100 MG tablet     metoprolol succinate ER (TOPROL XL) 100 MG 24 hr tablet     metoprolol succinate ER (TOPROL XL) 25 MG 24 hr tablet     mometasone-formoterol (DULERA) 200-5 MCG/ACT inhaler     montelukast (SINGULAIR) 10 MG tablet     multivitamin w/minerals (THERA-VIT-M) tablet     mupirocin (BACTROBAN) 2 % external cream     nystatin (MYCOSTATIN) 958500 UNIT/ML suspension     oxybutynin ER (DITROPAN XL) 5 MG 24 hr tablet     predniSONE (DELTASONE) 10 MG tablet     rizatriptan (MAXALT) 10 MG tablet     rOPINIRole (REQUIP) 1 MG tablet     traMADol (ULTRAM) 50 MG tablet     traZODone (DESYREL) 50 MG tablet     triamcinolone (ARISTOCORT HP) 0.5 % external cream     valACYclovir (VALTREX) 500 MG tablet     ZYRTEC 10 MG OR TABS     No current facility-administered medications for this visit.      Past Medical/Surgical History:   Patient Active Problem List   Diagnosis     Moderate persistent asthma without complication     Allergic rhinitis due to other allergen     Esophageal reflux     Moderate  recurrent major depression (H)     Multiple joint pain     HYPERLIPIDEMIA LDL GOAL <130     Hypertension goal BP (blood pressure) < 140/90     Generalized anxiety disorder     Myalgia     Chronic rhinitis     Constipation     Lumbar disc disease with radiculopathy     Iron deficiency anemia     Osteoarthritis of carpometacarpal joint of thumb - bilateral     Trochanteric bursitis     Right ankle instability     Primary focal hyperhidrosis     Trochanteric bursitis of both hips     Overweight     Iron deficiency     Scratching     Advanced directives, counseling/discussion     Chronic, continuous use of opioids     Medical marijuana use     Primary osteoarthritis of both first carpometacarpal joints     Arthritis of metatarsophalangeal joint     Sinus tachycardia     Intractable chronic migraine without aura and without status migrainosus     Impaired fasting glucose     Migraine without aura and without status migrainosus, not intractable     Skin ulcer, limited to breakdown of skin (H)     Morbid obesity (H)     Platelet granule defect (H)     Aphthous ulcer     Rash     Pruritus     Behcet's syndrome involving oral mucosa (H)     Encounter for long-term (current) use of high-risk medication     Dermatitis     Encounter for long-term current use of high risk medication     Acquired platelet disorder (H)     Past Medical History:   Diagnosis Date     ASTHMA - MODERATE PERSISTENT 9/21/2005     Chronic pain      Coronary artery disease      CVA (cerebral infarction)      Depressive disorder, not elsewhere classified      Diabetes (H)      Elevated serum alkaline phosphatase level     Liver source     Fibromyalgia      HYPERLIPIDEMIA NEC/NOS 12/29/2006     Hypertriglyceridemia      OA (osteoarthritis)      Thyroid disease      Trochanteric bursitis      Unspecified essential hypertension      CC Emely Grimes MD  [address] [place] [id number] on close of this encounter.

## 2023-01-06 ENCOUNTER — TELEPHONE (OUTPATIENT)
Dept: DERMATOLOGY | Facility: CLINIC | Age: 63
End: 2023-01-06

## 2023-01-06 NOTE — TELEPHONE ENCOUNTER
Prior Authorization Approval    Authorization Effective Date: 1/6/2023  Authorization Expiration Date: 5/6/2023  Medication: Dupixent- PA Approved  Approved Dose/Quantity: loading and maintenance dose  Reference #: [id number] - PA Case ID: [id number]   Ins[facility] - Phone [phone] Fax [phone]  Expected CoPay: $3319.92     CoPay Card Available: Yes    Foundation Assistance Needed:    Which Pharmacy is filling the prescription (Not needed for infusion/clinic administered): [facility] - [place], [place] - [address]  Pharmacy Notified: Yes  Patient Notified: YesComment:  Jorge

## 2023-01-06 NOTE — TELEPHONE ENCOUNTER
PA Initiation    Medication: Dupixent- PA Pending  Insurance Company: [employer] - Phone [phone] Fax [phone]  Pharmacy Filling the Rx: [facility] - [place], [place] - [facility] [address]  Filling Pharmacy Phone:    Filling Pharmacy Fax:    Start Date: 1/6/2023

## 2023-01-10 ENCOUNTER — E-VISIT (OUTPATIENT)
Dept: FAMILY MEDICINE | Facility: CLINIC | Age: 63
End: 2023-01-10
Payer: COMMERCIAL

## 2023-01-10 DIAGNOSIS — B96.89 ACUTE BACTERIAL SINUSITIS: Primary | ICD-10-CM

## 2023-01-10 DIAGNOSIS — J01.90 ACUTE BACTERIAL SINUSITIS: Primary | ICD-10-CM

## 2023-01-10 PROCEDURE — 99421 OL DIG E/M SVC 5-10 MIN: CPT | Performed by: FAMILY MEDICINE

## 2023-01-10 RX ORDER — DOXYCYCLINE HYCLATE 100 MG
100 TABLET ORAL 2 TIMES DAILY
Qty: 14 TABLET | Refills: 0 | Status: SHIPPED | OUTPATIENT
Start: 2023-01-10 | End: 2023-01-18

## 2023-01-10 NOTE — PATIENT INSTRUCTIONS
Dear Jacquie Amador    After reviewing your responses, I've been able to diagnose you with sinusitis.    Based on your responses and diagnosis, I have prescribed No orders of the defined types were placed in this encounter.   to treat your symptoms. I have sent this to your pharmacy.?     It is also important to stay well hydrated, get lots of rest and take over-the-counter decongestants,?tylenol?or ibuprofen if you?are able to?take those medications per your primary care provider to help relieve discomfort.?     It is important that you take?all of?your prescribed medication even if your symptoms are improving after a few doses.? Taking?all of?your medicine helps prevent the symptoms from returning.?     If your symptoms worsen, you develop severe headache, vomiting, high fever (>102), or are not improving in 7 days, please contact your primary care provider for an appointment or visit any of our convenient Walk-in Care or Urgent Care Centers to be seen which can be found on our website?here.?     Thanks again for choosing?us?as your health care partner,?   ?  Liz Peterson MD?

## 2023-01-16 ENCOUNTER — VIRTUAL VISIT (OUTPATIENT)
Dept: PHARMACY | Facility: CLINIC | Age: 63
End: 2023-01-16
Attending: NURSE PRACTITIONER
Payer: COMMERCIAL

## 2023-01-16 ENCOUNTER — MYC MEDICAL ADVICE (OUTPATIENT)
Dept: FAMILY MEDICINE | Facility: CLINIC | Age: 63
End: 2023-01-16

## 2023-01-16 DIAGNOSIS — G43.909 MIGRAINE: ICD-10-CM

## 2023-01-16 DIAGNOSIS — I10 BENIGN ESSENTIAL HYPERTENSION: ICD-10-CM

## 2023-01-16 DIAGNOSIS — F41.1 GAD (GENERALIZED ANXIETY DISORDER): ICD-10-CM

## 2023-01-16 DIAGNOSIS — E78.5 HYPERLIPIDEMIA LDL GOAL <100: ICD-10-CM

## 2023-01-16 DIAGNOSIS — J30.81 ALLERGIC RHINITIS DUE TO ANIMALS: ICD-10-CM

## 2023-01-16 DIAGNOSIS — J45.909 ASTHMA: ICD-10-CM

## 2023-01-16 DIAGNOSIS — B37.0 THRUSH: ICD-10-CM

## 2023-01-16 DIAGNOSIS — M19.90 OSTEOARTHRITIS: ICD-10-CM

## 2023-01-16 DIAGNOSIS — J32.9 SINUS INFECTION: ICD-10-CM

## 2023-01-16 DIAGNOSIS — R61 EXCESSIVE SWEATING: ICD-10-CM

## 2023-01-16 DIAGNOSIS — F33.9 MDD (MAJOR DEPRESSIVE DISORDER), RECURRENT EPISODE (H): Primary | ICD-10-CM

## 2023-01-16 DIAGNOSIS — K21.9 GASTROESOPHAGEAL REFLUX DISEASE WITHOUT ESOPHAGITIS: ICD-10-CM

## 2023-01-16 DIAGNOSIS — D69.1: ICD-10-CM

## 2023-01-16 DIAGNOSIS — G25.81 RESTLESS LEG SYNDROME: ICD-10-CM

## 2023-01-16 DIAGNOSIS — L98.9 DERMATOLOGIC PROBLEM: ICD-10-CM

## 2023-01-16 PROCEDURE — 99607 MTMS BY PHARM ADDL 15 MIN: CPT | Performed by: PHARMACIST

## 2023-01-16 PROCEDURE — 99605 MTMS BY PHARM NP 15 MIN: CPT | Performed by: PHARMACIST

## 2023-01-16 RX ORDER — ACETAMINOPHEN 500 MG
500-1000 TABLET ORAL EVERY 6 HOURS PRN
COMMUNITY

## 2023-01-16 NOTE — Clinical Note
Hi Dr. Lara,   I am an [facility] pharmacist who met with patient to review her medications (referred to me by psychiatry). We reviewed all her medications and I noted that her BP and asthma seem suboptimally controlled. For asthma - wondering your thoughts on having her start Spiriva or switching to Trelegy Ellipta (not sure if insurance coverage will be an issue)? For BP - possibly adding spironolactone vs increasing hydrochlorothiazide?  Let me know your thoughts and I am happy to follow-up with patient to make changes, otherwise I see that you have an appt coming up with her soon (1/31).  Thank you!  Paulina Mitchell, PharmD, BCPP Medication Therapy Management Pharmacist [facility]

## 2023-01-16 NOTE — Clinical Note
Ciaran Paulino,   Thank you for the referral. This patient certainly has a lot going on! For psych meds, I am recommend she take cymbalta once daily (as prescribed) instead of split dosing. She tried decreasing it and felt symptoms worsened so she increased it back up. We discussed possible risk of bleed but she is interested in continuing. She also reported new onset hallucinations occurring only in the middle of the night upon waking up - visions of her . She kind of laughed it off and said it has happened in the past when her potassium is low. She is getting labs done later this week, but I wanted to make sure that was on your radar for your visit with her on 1/30. I have a couple possible med recommendations for PCP so I will plan to follow-up with patient again once I hear back from pcp.   Let me know if you have questions!  Thank you,   Paulina Mitchell, PharmD, BCPP Medication Therapy Management Pharmacist [facility]

## 2023-01-16 NOTE — PROGRESS NOTES
Medication Therapy Management (MTM) Encounter    ASSESSMENT:                            Medication Adherence/Access: No issues identified    MDD, MARIZOL: Patient attempted Cymbalta dose reduction, but was unable to tolerate due to worsening mood and pain. Since she is taking 60mg twice daily and experiencing daytime fatigue and insomnia, recommend trying to take 120mg daily in the morning. Patient agreeable. Discussed risk of bleed with SSRIs/SNRIs, although there is less evidence and conflicting data regarding risk with SNRIs compared to SSRIs. Patient wishes to continue Cymbalta. Hallucinations would be a very unlikely side effect from Buspar (<1% reported incidence). Patient will be getting labs to check for hypokalemia as this has been a cause in the past. If hallucinations persist, may want to consider stopping buspar.     Hematology: Continue to follow with hematology.    Asthma: Patient on high-dose ICS/LABA and is experiencing increased wheezing and using albuterol use 1-2 times daily. Could consider adding on Sprivia or possibly switch to triple therapy Trelegy Ellipta. Will discuss with PCP.     Allergic rhinitis: Stable.    Hyperlipidemia: Stable.    GERD: Did confirm via chart review (5/21/21 Mychart encounter) that hematology recommended against omeprazole due to increased risk of bleed. Could consider splitting famotidine to twice daily to see if that helps with better all day symptom control.     Headaches/migraines: Not well controlled, although recent dapsone use may be exacerbating headaches. Could consider evaluation by Neurology headache specialist for assessment/recommendations. Patient will think about this.     Hypertension: Patient is not meeting blood pressure goal of <140/90mmHg. We discussed moving losartan administration to afternoon, possibly consolidating metoprolol dosing and either increasing hydrochlorothiazide or adding another agent such as spironolactone.    Sinus Infection:   Improving.     Osteoarthritis/DDD: No recommendations at this time. Patient has tried stopping Celebrex in the past, but pain returned. Aware of NSAID and bleed risk.     Restless leg: Stable    Dermatology: Plan in place - continue with derm.    Sweating: Stable    Thrush: Currently stable.    PLAN:                            1. Try changing Cymbalta from 60mg twice daily to 120mg in the morning to see if this help with sleep and daytime fatigue.  2. You could try taking famotidine 20mg twice daily instead of 40mg once daily to see if this is more helpful for all-day symptom control.   3. You could try taking losartan in the afternoon instead of the morning to see if this helps with your blood pressure control. I can talk with your PCP about other options as well.    4. I will talk to your PCP about options for asthma control. Adding on Spiriva or switching to Trelegy Ellipta may be potential options.   5. There are several options available for migraine prophylaxis. You may consider seeing a neurologist for evaluation and recommendations.     Follow-up: 3-4 weeks - will send [employer] message    SUBJECTIVE/OBJECTIVE:                          Jacquie Amador is a 62 year old female contacted via secure video for an initial visit. She was referred to me from psychiatry - [facility].      Reason for visit: extensive list of medications, concern for SE: bleeding risk with current duloxetine - plan to reduce dose and titrate buspirone if tolerated.    Allergies/ADRs: Reviewed in chart  Past Medical History: Reviewed in chart  Tobacco: She reports that she has quit smoking. Her smoking use included cigarettes. She smoked an average of 1 pack per day. She has never used smokeless tobacco.  Alcohol: none      Medication Adherence/Access: no issues reported    MDD, MARIZOL:   Current medications:   Duloxetine 60mg twice daily   Wellbutrin SR 200mg daily  Buspar 15mg twice daily   Trazodone 100-150mg nightly    Pt is seen by  [facility] by JESSICA Wells. Most recent visit was 12/29 at which time duloxetine was decreased and buspar was increased.   Impression (copied from 12/29/22 psychiatry note):  Jacquie Amador is a 62 year old White, female who presents for return visit with  [facility] ([facility] for medication management. At initial appointment 2 weeks ago, we continued recent increase to buspirone 10 mg BID, along with duloxetine 120 mg every day, trazodone  mg PRN for insomnia. Patient reports ADHERING to prescribed medications. She denies any significant change to symptoms over the last 2 weeks, anxiety remains high and significant fatigue regardless of sleep. Typically taking 50 mg trazodone at bedtime, denies any worsening of fatigue with dose. She met with Hematology and reviewing note they also seems concerned for possible worsening of bleeding given high dose of duloxetine. She reports sore in mouth that isn't healing, and recent lesion on face that could not stop bleeding for long time. Her pain remains high, has MRI tomorrow for her back pain, and is concerned w/ lower duloxetine pain might worsen. Denies SI/SIB/HI. Given additional risk factors, recommending small increase to duloxetine while monitoring anxiety, mood, and pain closely. Will increase buspirone as well. Additionally, given the high amount of medications that patient is currently prescribed that may be worsening her symptoms (including bleeding and /or  Fatigue), will refer to MTM to meet with pharmacist to review current medications. Follow-up with this provider in 1 month. Patient is agreeable to planPlease see note by pt's provider for additional details regarding symptoms and history.       Today, pt reports she tried decreasing Cymbalta to 90mg, but she noticed symptoms of low mood, increased irritability, worsening pain. She was on 90mg daily for about 1 week, increased it back to 120mg about 1 week  ago. She takes 60mg twice daily. She reports she has been on Wellbutrin and Cymbalta for many years. Started Buspar in December, increased dose a couple weeks ago. Isn't sure if she has noticed a benefit.  Over the last week, she has experienced visual hallucinations on 3 occassions, all in the middle of the night. She reports waking up and seeing her  in the doorway when he isn't really there. She wonders if it could be due to Buspar. She reports she had hallucinations in the past due to low potassium. She is scheduled for a lab appointment tomorrow (1/17). When asked what her primary concerns are today, patient reports fatigue and headaches.     Hematology: Current medication: prednisone 10mg daily. Patient follows with hematology. 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     Asthma: Current medications: ICS/LABA- Dulera 2 puff(s) twice daily  Montelukast (Singulair) once daily  Short-Acting Bronchodilator: Albuterol MDI and Nebs. Typically uses 1-2x/day. Triggers include: animal dander.  Patient reports the following symptoms: wheezing.    ACT Total Scores 11/30/2021 4/6/2022 10/31/2022   ACT TOTAL SCORE - - -   ASTHMA ER VISITS - - -   ASTHMA HOSPITALIZATIONS - - -   ACT TOTAL SCORE (Goal Greater than or Equal to 20) 11 22 21   In the past 12 months, how many times did you visit the emergency room for your asthma without being admitted to the hospital? 0 0 0   In the past 12 months, how many times were you hospitalized overnight because of your asthma? 0 0 0         Allergic Rhinitis: Current medications include cetirizine 10mg daily as needed. Patient feels that current therapy is effective.     Hyperlipidemia: Current therapy includes atorvastatin 20mg daily.  Patient reports no significant myalgias or other side effects.  The 10-year ASCVD risk score (Maddie TAYLOR, et al., 2019) is: 8.2%    Values used to calculate the score:      Age: 62 years      Sex:  Female      Is Non- : No      Diabetic: No      Tobacco smoker: No      Systolic Blood Pressure: 163 mmHg      Is BP treated: Yes      HDL Cholesterol: 51 mg/dL      Total Cholesterol: 170 mg/dL  Recent Labs   Lab Test 06/13/22  1205 08/05/21  1150 03/15/16  1516 02/03/15  1201   CHOL 170 181   < > 176   HDL 51 59   < > 64   LDL 80 95   < > 87   TRIG 196* 134   < > 123   CHOLHDLRATIO  --   --   --  2.8    < > = values in this interval not displayed.       GERD: Current medications include: Pepcid (famotidine) 40mg once daily + Tums or rolaids as needed. Patient reports heartburn, belching and eructation about 3-4 days/week.  Patient feels that current regimen is somewhat effective. She was on omeprazole in the past, but recalls hematology recommended switch to famotidine due to increased risk of bleed with omeprazole.    Headaches/migraines: Current preventive medications include: none specifically for ppx, but taking metoprolol for bp. Current abortive medications include: Rizatriptan 10 mg. Patient reports having almost daily headaches. She does feel Dapsone made her headaches worse. Her headaches have gotten slightly better since stopping dapsone 2 weeks ago. Patient has failed the following preventive medications: none. She has never seen a neurologist for migraine.    Hypertension: Current medications include hydrochlorothiazide 25mg daily, losartan 100mg daily, metoprolol ER 100mg twice daily and ER 25mg three times daily.  Sometimes feels like her BP/HR is higher in the afternoon/evening. Patient does self-monitor blood pressure. Home BP monitoring in range of 140's systolic over 80's diastolic. Today: BP: 146/84 Pulse: 59   Patient reports no current medication side effects.  BP Readings from Last 3 Encounters:   09/14/22 (!) 163/84   07/26/22 132/82   04/06/22 134/82       Sinus Infection: Currently taking Doxycycline 100mg twice daily for sinus infection. Feels it is improving. Has  a couple more days left of abx.     Osteoarthritis/DDD: Taking Celebrex 200mg twice daily as needed (takes twice daily) and finds it helpful, tramadol 50mg daily as needed (has been taking every day lately due to helping take care of grandkids and increased physical activity with this. ), APAP as needed - somewhat helpful. Notes indicate hematology has recommended stopping celebrex - patient tried in 2021 and pain worsened. Celebrex was restarted.     Restless leg: Current medication: ropinirole 3mg nightly. Works well, no issues reported.     Dermatology: Current therapy includes: Dupixent, valtrex as needed and various creams/topical agents. Dapsone was recently tried, but discontinued after no improvement. Patient has recurring sores/ulcers and rash. Follows with dermatology closely, most recent visit 1/5/23.     Sweating: Takes oxybutrin ER 10mg daily for sweating. Feels this has been very helpful. No longer neeing drysol.     Thrush: Uses nystatin, chlorhexidine oral rinse, and clotrimazole lozenges as needed for thrush. Rinses mouth after using steroid inhaler.       ----------------      I spent 70 minutes with this patient today. All changes were made via collaborative practice agreement with Liz Peterson A copy of the visit note was provided to the patient's provider(s).    A summary of these recommendations was sent via [employer].    Paulina Mitchell, PharmD, BCPP  Medication Therapy Management Pharmacist  [facility]        Telemedicine Visit Details  Type of service:  Video Conference via [employer]  Start Time: 11:04 AM  End Time: 12:14 PM     Medication Therapy Recommendations  Asthma    Current Medication: mometasone-formoterol (DULERA) 200-5 MCG/ACT inhaler   Rationale: Synergistic therapy - Needs additional medication therapy - Indication   Recommendation: Start Medication   Status: Contact Provider - Awaiting Response   Note: Spiriva/Trelegy Ellipta         Esophageal  reflux    Current Medication: famotidine (PEPCID) 40 MG tablet   Rationale: Condition refractory to medication - Ineffective medication - Effectiveness   Recommendation: Increase Frequency - famotidine 20 MG tablet   Status: Accepted - no CPA Needed         Hypertension goal BP (blood pressure) < 140/90    Current Medication: losartan (COZAAR) 100 MG tablet   Rationale: Synergistic therapy - Needs additional medication therapy - Indication   Recommendation: Start Medication - spironolactone 25 MG tablet   Status: Contact Provider - Awaiting Response         MDD (major depressive disorder), recurrent episode (H)    Current Medication: DULoxetine (CYMBALTA) 60 MG capsule   Rationale: Does not understand instructions - Adherence - Adherence   Recommendation: Provide Education - Cymbalta 60 MG Cpep   Status: Patient Agreed - Adherence/Education

## 2023-01-17 ENCOUNTER — MYC MEDICAL ADVICE (OUTPATIENT)
Dept: FAMILY MEDICINE | Facility: CLINIC | Age: 63
End: 2023-01-17

## 2023-01-17 DIAGNOSIS — J01.90 ACUTE BACTERIAL SINUSITIS: ICD-10-CM

## 2023-01-17 DIAGNOSIS — I10 HYPERTENSION GOAL BP (BLOOD PRESSURE) < 140/90: ICD-10-CM

## 2023-01-17 DIAGNOSIS — B96.89 ACUTE BACTERIAL SINUSITIS: ICD-10-CM

## 2023-01-17 NOTE — PATIENT INSTRUCTIONS
"Recommendations from today's MTM visit:                                                      1. Try changing Cymbalta from 60mg twice daily to 120mg in the morning to see if this help with sleep and daytime fatigue.  2. You could try taking famotidine 20mg twice daily instead of 40mg once daily to see if this is more helpful for all-day symptom control.   3. You could try taking losartan in the afternoon instead of the morning to see if this helps with your blood pressure control. I can talk with your PCP about other options as well.    4. I will talk to your PCP about options for asthma control. Adding on Spiriva or switching to Trelegy Ellipta may be potential options.   5. There are several options available for migraine prophylaxis. You may consider seeing a neurologist for evaluation and recommendations.     Follow-up: 3-4 weeks - will send [employer] message    It was great speaking with you today.  I value your experience and would be very thankful for your time in providing feedback in our clinic survey. In the next few days, you may receive an email or text message from [employer] with a link to a survey related to your  clinical pharmacist.\"     To schedule another MTM appointment, please call the clinic directly or you may call the MTM scheduling line at [phone] or toll-free at [phone].     My Clinical Pharmacist's contact information:                                                      Please feel free to contact me with any questions or concerns you have.      Paulina Mitchell, PharmD, BCPP  Medication Therapy Management Pharmacist  [facility]     "

## 2023-01-17 NOTE — TELEPHONE ENCOUNTER
"Pending Prescriptions:                       Disp   Refills    hydrochlorothiazide (HYDRODIURIL) 25 MG ta*90 tab*1        Sig: Take 1 tablet (25 mg) by mouth daily        Routing refill request to provider for review/approval because:  Diuretics (Including Combos) Protocol Failed    Rerun Protocol (1/17/2023 2:27 PM)    Blood pressure under 140/90 in past 12 months      BP Readings from Last 3 Encounters:   09/14/22 (!) 163/84   07/26/22 132/82   04/06/22 134/82           Normal serum creatinine on file in past 12 months      Recent Labs   Lab Test 12/28/22  1604   CR 1.30*        Recent (12 mo) or future (30 days) visit within the authorizing provider's specialty  Patient has had an office visit with the authorizing provider or a provider within the authorizing providers department within the previous 12 mos or has a future within next 30 days. See \"Patient Info\" tab in inbasket, or \"Choose Columns\" in Meds & Orders section of the refill encounter.         Medication is active on med list      Patient is age 18 or older      No active pregancy on record      Normal serum potassium on file in past 12 months      Recent Labs   Lab Test 12/28/22  1604   POTASSIUM 3.9                 Normal serum sodium on file in past 12 months      Recent Labs   Lab Test 12/28/22  1604           No positive pregnancy test in past 12 months          "

## 2023-01-17 NOTE — TELEPHONE ENCOUNTER
PCP-are you willing to work pt in the end of February?    Yaz Porter, BSN, RN, PHN  Registered Nurse-Clinic Triage  [facility]  1/17/2023 at 7:45 AM

## 2023-01-17 NOTE — TELEPHONE ENCOUNTER
Pending Prescriptions:                       Disp   Refills    hydrochlorothiazide (HYDRODIURIL) 25 MG t*90 tab*1            Sig: Take 1 tablet (25 mg) by mouth daily    Yaz Porter RN on 1/17/2023 at 2:26 PM

## 2023-01-18 RX ORDER — HYDROCHLOROTHIAZIDE 25 MG/1
25 TABLET ORAL DAILY
Qty: 90 TABLET | Refills: 1 | Status: SHIPPED | OUTPATIENT
Start: 2023-01-18 | End: 2023-06-28

## 2023-01-18 RX ORDER — DOXYCYCLINE HYCLATE 100 MG
100 TABLET ORAL 2 TIMES DAILY
Qty: 14 TABLET | Refills: 0 | Status: SHIPPED | OUTPATIENT
Start: 2023-01-18 | End: 2023-02-28

## 2023-01-20 DIAGNOSIS — J45.40 MODERATE PERSISTENT ASTHMA WITHOUT COMPLICATION: Primary | ICD-10-CM

## 2023-01-20 RX ORDER — FLUTICASONE FUROATE, UMECLIDINIUM BROMIDE AND VILANTEROL TRIFENATATE 100; 62.5; 25 UG/1; UG/1; UG/1
1 POWDER RESPIRATORY (INHALATION) DAILY
Qty: 28 EACH | Refills: 1 | Status: SHIPPED | OUTPATIENT
Start: 2023-01-20 | End: 2023-01-20

## 2023-01-20 RX ORDER — FLUTICASONE FUROATE, UMECLIDINIUM BROMIDE AND VILANTEROL TRIFENATATE 100; 62.5; 25 UG/1; UG/1; UG/1
1 POWDER RESPIRATORY (INHALATION) DAILY
Qty: 84 EACH | Refills: 1 | Status: SHIPPED | OUTPATIENT
Start: 2023-01-20 | End: 2023-03-01 | Stop reason: DRUGHIGH

## 2023-01-20 NOTE — PROGRESS NOTES
[employer] message sent to patient. Will try Trelegy first, if too expensive will go to [facility]. Patient is getting BMP next week - will hold off on starting spironolactone until results are back.        Medication Therapy Recommendations  Asthma    Current Medication: mometasone-formoterol (DULERA) 200-5 MCG/ACT inhaler (Discontinued)   Rationale: Synergistic therapy - Needs additional medication therapy - Indication   Recommendation: Start Medication   Status: Accepted per Provider   Note: Spiriva/Trelegy Ellipta         Esophageal reflux    Current Medication: famotidine (PEPCID) 40 MG tablet   Rationale: Condition refractory to medication - Ineffective medication - Effectiveness   Recommendation: Increase Frequency - famotidine 20 MG tablet   Status: Accepted - no CPA Needed         Hypertension goal BP (blood pressure) < 140/90    Current Medication: losartan (COZAAR) 100 MG tablet   Rationale: Synergistic therapy - Needs additional medication therapy - Indication   Recommendation: Start Medication - spironolactone 25 MG tablet   Status: Accepted per Provider         MDD (major depressive disorder), recurrent episode (H)    Current Medication: DULoxetine (CYMBALTA) 60 MG capsule   Rationale: Does not understand instructions - Adherence - Adherence   Recommendation: Provide Education - Cymbalta 60 MG Cpep   Status: Patient Agreed - Adherence/Education

## 2023-01-24 DIAGNOSIS — G89.4 CHRONIC PAIN SYNDROME: ICD-10-CM

## 2023-01-24 DIAGNOSIS — F11.90 CHRONIC, CONTINUOUS USE OF OPIOIDS: ICD-10-CM

## 2023-01-25 RX ORDER — TRAMADOL HYDROCHLORIDE 50 MG/1
50 TABLET ORAL DAILY PRN
Qty: 30 TABLET | Refills: 0 | Status: SHIPPED | OUTPATIENT
Start: 2023-01-25 | End: 2023-02-28

## 2023-01-30 ENCOUNTER — VIRTUAL VISIT (OUTPATIENT)
Dept: PSYCHIATRY | Facility: CLINIC | Age: 63
End: 2023-01-30
Payer: COMMERCIAL

## 2023-01-30 ENCOUNTER — MYC MEDICAL ADVICE (OUTPATIENT)
Dept: FAMILY MEDICINE | Facility: CLINIC | Age: 63
End: 2023-01-30
Payer: COMMERCIAL

## 2023-01-30 ENCOUNTER — MYC MEDICAL ADVICE (OUTPATIENT)
Dept: DERMATOLOGY | Facility: CLINIC | Age: 63
End: 2023-01-30
Payer: COMMERCIAL

## 2023-01-30 DIAGNOSIS — F41.1 GENERALIZED ANXIETY DISORDER: ICD-10-CM

## 2023-01-30 DIAGNOSIS — F33.1 MODERATE RECURRENT MAJOR DEPRESSION (H): Primary | ICD-10-CM

## 2023-01-30 PROCEDURE — 99215 OFFICE O/P EST HI 40 MIN: CPT | Mod: 95 | Performed by: NURSE PRACTITIONER

## 2023-01-30 RX ORDER — BUPROPION HYDROCHLORIDE 300 MG/1
300 TABLET ORAL EVERY MORNING
Qty: 30 TABLET | Refills: 0 | Status: SHIPPED | OUTPATIENT
Start: 2023-01-30 | End: 2023-03-03

## 2023-01-30 ASSESSMENT — ANXIETY QUESTIONNAIRES
1. FEELING NERVOUS, ANXIOUS, OR ON EDGE: SEVERAL DAYS
3. WORRYING TOO MUCH ABOUT DIFFERENT THINGS: SEVERAL DAYS
7. FEELING AFRAID AS IF SOMETHING AWFUL MIGHT HAPPEN: MORE THAN HALF THE DAYS
7. FEELING AFRAID AS IF SOMETHING AWFUL MIGHT HAPPEN: MORE THAN HALF THE DAYS
IF YOU CHECKED OFF ANY PROBLEMS ON THIS QUESTIONNAIRE, HOW DIFFICULT HAVE THESE PROBLEMS MADE IT FOR YOU TO DO YOUR WORK, TAKE CARE OF THINGS AT HOME, OR GET ALONG WITH OTHER PEOPLE: SOMEWHAT DIFFICULT
5. BEING SO RESTLESS THAT IT IS HARD TO SIT STILL: NOT AT ALL
8. IF YOU CHECKED OFF ANY PROBLEMS, HOW DIFFICULT HAVE THESE MADE IT FOR YOU TO DO YOUR WORK, TAKE CARE OF THINGS AT HOME, OR GET ALONG WITH OTHER PEOPLE?: SOMEWHAT DIFFICULT
4. TROUBLE RELAXING: NOT AT ALL
GAD7 TOTAL SCORE: 10
2. NOT BEING ABLE TO STOP OR CONTROL WORRYING: NEARLY EVERY DAY
GAD7 TOTAL SCORE: 10
GAD7 TOTAL SCORE: 10
6. BECOMING EASILY ANNOYED OR IRRITABLE: NEARLY EVERY DAY

## 2023-01-30 NOTE — Clinical Note
Please call to schedule patient for follow-up in 1 month.  Thank you!   JESSICA Wells, PMCRUZ-BC [facility]

## 2023-01-30 NOTE — PATIENT INSTRUCTIONS
**For crisis resources, please see the information at the end of this document**     Thank you for coming to the [facility] [place] [facility].    TREATMENT PLAN:  Medications:   INCREASE TO bupropion  mg every day. Script sent for 1 month.  CONTINUE duloxetine 120 mg every day. No script needed.  CONTINUE buspirone 15 mg BID. No script needed.  Continue all other medications per primary care provider.     Psychoeducation:  - Discussed side effects of bupropion to include agitation and other activation issues, ^ BP or HR, insomnia, stomach upset, appetite loss, weight loss, risk for precipitation of padmini, and increased risk for seizures.  Patient agreed to take Bupropion to treat low mood, lack of motivation and poor concentration. Patient verbalized understanding of medication schedule, of side effects, avoidance of alcohol and marijuana due to increase risk for seizures.    Follow Up:   Complete labs as ordered by PCP.   Schedule an appointment with me in 4 weeks or sooner as needed. Call [facility] at [phone] to schedule.  Follow up with primary care provider as planned or for acute medical concerns.  Call the psychiatric nurse line with medication questions or concerns at [phone].  Spotwisehart may be used to communicate with your provider, but this is not intended to be used for emergencies.      MEDICATION REFILLS:  If you need any refills please call your pharmacy and they will contact us. Our fax number for refills is [phone]. Please allow three business for refill processing. If you need to  your refill at a new pharmacy, please contact the new pharmacy directly. The new pharmacy will help you get your medications transferred.       Financial Assistance [phone]  [facility] Billing [phone]  Central Billing Office, [facility]: [phone]  [facility] [phone]  Medical Records [phone]  [facility] Patient Bill of Rights  [email or web address]       MENTAL HEALTH CRISIS RESOURCES:  For a emergency help, please call 911 or go to the nearest Emergency Department.     Emergency Walk-In Options:   [facility] Unit @ [facility] ([place]): [phone] - Specialized mental health emergency area designed to be calming  [facility] [place] ([place]): [phone]  [facility] ([place]): [phone]  [facility] ([place]): [phone]    [facility] Crisis Information:   [place]: [phone]  [place]: [phone]  [place] ([place]) - Adult: [phone]     Child: [phone]  [place] - Adult: [phone]     Child: [phone]  [place]: [phone]  List of all [facility] resources:   https://[place].gov/dhs/people-we-serve/adults/health-care/mental-health/resources/crisis-contacts.jsp    National Crisis Information:   National Suicide & Crisis Lifeline: Call 430        For online chat options, visit https://suicidepreventionlifeline.org/chat/  Poison Control Center: 7-154-123-4615  Poison Control Center: 7-858-413-5779  Trans Lifeline: 9-541-000-5162 - Hotline for transgender people of all ages  [employer] [place]: 2-097-144-9542 - Hotline for LGBT youth     For Non-Emergency Support:   Fast Tracker: Mental Health & Substance Use Disorder Resources -   https://www.fasttrackermn.org/       Again thank you for choosing [facility] [place] [facility] and please let us know how we can best partner with you to improve you and your family's health.    You may be receiving a survey regarding this appointment. We would love to have your feedback, both positive and negative. The survey is done by an external company, so your answers are anonymous.

## 2023-01-30 NOTE — TELEPHONE ENCOUNTER
Good afternoon,     I tried calling you. Please call us at [phone] to schedule. We can assist you quicker over the phone verses through [employer].       Take Care,       BANDAR Bailey, RN, PHN  [place]  January 30, 2023      Team- please assist patient with scheduling a lab appointment. This does not have to be done by an RN.     BANDAR Bailey, RN, PHN  [place]  January 30, 2023

## 2023-01-30 NOTE — PROGRESS NOTES
"Jacquie is a 62 year old who is being evaluated via a billable video visit.      How would you like to obtain your AVS? MyChart  If the video visit is dropped, the invitation should be resent by: Text to cell phone: [phone]  Will anyone else be joining your video visit? No           PSYCHIATRIC MEDICATION FOLLOW UP APPT     Name: Jacquie Amador   : 1960               Telemedicine Visit: The patient's condition can be safely assessed and treated via synchronous audio and visual telemedicine encounter.      Reason for Telemedicine Visit: COVID 19 pandemic and the social and physical recommendations by the CDC and MD.      Originating Site (Patient Location): Patient's home     Distant Site (Provider Location): Provider Remote Setting     Consent:  The patient/guardian has verbally consented to: the potential risks and benefits of telemedicine (video visit or phone) versus in person care; bill my insurance or make self-payment for services provided; and responsibility for payment of non-covered services.     Mode of Communication:  [employer] video platform      As the provider I attest to compliance with applicable laws and regulations related to telemedicine.         Source of Referral / Care Team:  Primary Care Provider: Liz Peterson MD   Therapist: none     The [facility] psychiatry providers act as a specialty service for Primary Care Providers in the [facility] who seek to optimize medications for unstable patients. Once medications have been optimized, [facility] providers discharge the patient back to the referring Primary Care Provider for ongoing medication management. This type of system allows [facility] to serve a high volume of patients.     Patient Identification:  Patient is a 62 year old,   White Choose not to answer female  who presents for return visit with me.   Patient prefers to be called: \"Jacquie\".  Patient is currently unemployed.      Patient attended the session " "alone.    RECORDS AVAILABLE FOR REVIEW: EHR records through [employer] .       Interim History:  I last saw Jacquie Amador for outpatient psychiatry Return Visit 1 month ago on 12/29/22. During that appointment, we trialed reducing to duloxetine 90 mg (from 120 mg) and increased to buspirone 15 mg BID, continued trazodone  PRN QHS. Additionally referred patient to MTM with pharmacist given her extensive med list and concern for SE. In interim, pt also reported significant worsening of pain, lower mood and returned to higher duloxetine dose. Patient reports ADHERING to prescribed medications. MTM recommended taking all QAM which patient did, and has noticed improvement in sleep but daytime fatigue remains very high, no change. Denies associated w/ trazodone. She reports low mood associated with anergia, amotivation, \"a lot of mental health is my face\" (multiple skin ulcerations with unclear dx per last derm note). Started on dupixent w/ derm, unsure if helping yet. Her anxiety has been a little higher: \"but a lot of reasons why it is: my daughter and the twins still having a rough time. Worry about her. Returning to work, always worrying about her.\" No panic. She denies significant improvement in anxiety since +buspirone. Dizziness has improved that was likely SE to increased dose. She did report to [facility]TM 2 nights with possible hypnopompic hallucinations: waking up and seeing  in the room briefly. Denies all since, none currently. No AH. She is unbothered by this. She has experienced VH in the past with hypokalemia -- has not completed labs as ordered by PCP and rec she do ASAP. Denies SI/SIB/HI.    Initial Impression:   12/29/22: At initial appointment 2 weeks ago, we continued recent increase to buspirone 10 mg BID, along with duloxetine 120 mg every day, trazodone  mg PRN for insomnia. Patient reports ADHERING to prescribed medications. She denies any significant change to symptoms over the last 2 " weeks, anxiety remains high and significant fatigue regardless of sleep. Typically taking 50 mg trazodone at bedtime, denies any worsening of fatigue with dose. She met with Hematology and reviewing note they also seems concerned for possible worsening of bleeding given high dose of duloxetine. She reports sore in mouth that isn't healing, and recent lesion on face that could not stop bleeding for long time. Her pain remains high, has MRI tomorrow for her back pain, and is concerned w/ lower duloxetine pain might worsen. Denies SI/SIB/HI. Given additional risk factors, recommending small increase to duloxetine while monitoring anxiety, mood, and pain closely. Will increase buspirone as well. Additionally, given the high amount of medications that patient is currently prescribed that may be worsening her symptoms (including bleeding and /or  Fatigue), will refer to MTM to meet with pharmacist to review current medications. Follow-up with this provider in 1 month.    12/14/22: Jacquie Amador is a 62 year old White, female who presents for initial visit with [facility] ([facility]) for medication management. She reports a long history of medication management through PCP, with several trials of medications previously. Her clinical picture is complicated by several medical comorbidities, including Behcet's syndrome, chronic pain with continued opioid use, and acquired quantitative platelet disorder. Current exacerbation of anxiety mostly in relation to additional psychosocial stressors and medical comorbidities, in particular current appearance of her skin. CC: fatigue even with good sleep. Denies SI/SIB/HI. No history of suicide attempts, psychosis, padmini, problematic substance use.  She is hesitant to make changes to duloxetine as noted benefit to chronic pain, and currently at max dose. Higher doses of bupropion were intolerable due to SE (^ crying). PCP recently started buspirone titration,  denies any SE at this time. We will continue titrating buspirone, hold other changes at this time. Patient is agreeable to plan. She has no history of psychotherapy, and recommended she consider referral.       Current medications include:   Current Outpatient Medications   Medication Sig     acetaminophen (TYLENOL) 500 MG tablet Take 500-1,000 mg by mouth every 6 hours as needed for mild pain     albuterol (PROVENTIL) (2.5 MG/3ML) 0.083% neb solution Take 1 vial (2.5 mg) by nebulization every 6 hours as needed for shortness of breath / dyspnea or wheezing     albuterol (VENTOLIN HFA) 108 (90 Base) MCG/ACT inhaler INHALE 2 PUFFS INTO THE LUNGS EVERY 6 HOURS AS NEEDED FOR SHORTNESS OF BREATH / DYSPNEA     atorvastatin (LIPITOR) 20 MG tablet Take 1 tablet (20 mg) by mouth daily     augmented betamethasone dipropionate (DIPROLENE AF) 0.05 % external cream Apply to affected area twice daily     betamethasone dipropionate (DIPROSONE) 0.05 % external ointment Apply topically 2 times daily To areas of skin sores.  Can also use on mouth sores as needed twice daily.     buPROPion (WELLBUTRIN SR) 200 MG 12 hr tablet Take 1 tablet (200 mg) by mouth daily     busPIRone (BUSPAR) 15 MG tablet Take 1 tablet (15 mg) by mouth 2 times daily     celecoxib (CELEBREX) 200 MG capsule TAKE ONE CAPSULE BY MOUTH TWICE A DAY AS NEEDED FOR PAIN     chlorhexidine (PERIDEX) 0.12 % solution Swish and spit 15 mLs in mouth 2 times daily     clindamycin (CLINDAMAX) 1 % external gel Apply topically 2 times daily     clotrimazole (MYCELEX) 10 MG lozenge Place 1 lozenge (10 mg) inside cheek 5 times daily (Patient taking differently: Place 10 mg inside cheek 5 x daily PRN)     desonide (DESOWEN) 0.05 % external cream Apply topically 2 times daily To sores on face     desonide (DESOWEN) 0.05 % external ointment Apply topically 2 times daily To areas of skin sores on faec     doxycycline hyclate (VIBRA-TABS) 100 MG tablet Take 1 tablet (100 mg) by mouth  2 times daily     DULoxetine (CYMBALTA) 60 MG capsule Take 2 capsules (120 mg) by mouth daily     dupilumab (DUPIXENT) 300 MG/2ML prefilled pen Inject 2 mLs (300 mg) Subcutaneous every 14 days After first loading dose     famotidine (PEPCID) 40 MG tablet Take 1 tablet (40 mg) by mouth daily     fluocinonide (LIDEX) 0.05 % external gel Apply topically 2 times daily as needed     Fluticasone-Umeclidin-Vilant (TRELEGY ELLIPTA) 100-62.5-25 MCG/ACT oral inhaler Inhale 1 puff into the lungs daily     hydrochlorothiazide (HYDRODIURIL) 25 MG tablet Take 1 tablet (25 mg) by mouth daily     hydrocortisone 2.5 % cream APPLY TOPICALLY 2 TIMES DAILY AS NEEDED     losartan (COZAAR) 100 MG tablet Take 1 tablet (100 mg) by mouth daily     metoprolol succinate ER (TOPROL XL) 100 MG 24 hr tablet TAKE ONE TABLET BY MOUTH TWICE DAILY     metoprolol succinate ER (TOPROL XL) 25 MG 24 hr tablet Take 1 tablet (25 mg) by mouth 3 times daily     montelukast (SINGULAIR) 10 MG tablet Take 1 tablet (10 mg) by mouth At Bedtime     mupirocin (BACTROBAN) 2 % external cream Apply to affected area three times daily     nystatin (MYCOSTATIN) 777354 UNIT/ML suspension Take by mouth 4 times daily Has recurrent thrush. Uses for 2 weeks at a time as needed.     oxybutynin ER (DITROPAN XL) 5 MG 24 hr tablet Take 2 tablets (10 mg) by mouth daily     predniSONE (DELTASONE) 10 MG tablet Take 1 tablet (10 mg) by mouth daily     rizatriptan (MAXALT) 10 MG tablet Take 1 tablet (10 mg) by mouth at onset of headache for migraine May repeat in 2 hours. Max 3 tablets/24 hours.     rOPINIRole (REQUIP) 1 MG tablet TAKE THREE TABLETS BY MOUTH AT BEDTIME     tacrolimus (PROTOPIC) 0.1 % external ointment Apply topically 2 times daily To areas of sores on face     traMADol (ULTRAM) 50 MG tablet Take 1 tablet (50 mg) by mouth daily as needed for severe pain (7-10)     traZODone (DESYREL) 50 MG tablet Take 3 tablets (150 mg) by mouth At Bedtime     triamcinolone  "(ARISTOCORT HP) 0.5 % external cream Apply topically 2 times daily     valACYclovir (VALTREX) 500 MG tablet TAKE ONE TABLET BY MOUTH ONE TIME DAILY (Patient taking differently: 500 mg daily as needed)     ZYRTEC 10 MG OR TABS Take 10 mg by mouth daily as needed     No current facility-administered medications for this visit.         Side effects: Denies. Possible worsening of bleeding risk. Unable to tolerate lower duloxetine.    The [place] Prescription Monitoring Program has been reviewed and there are no concerns about diversionary activity for controlled substances at this time.   01/25/2023  1   01/25/2023  Tramadol Hcl 50 MG Tablet  30.00  30 Sa Fra   [id number]   Sup (0314)   0/0  5.00 MME  Comm Ins   [place]         Psychiatric ROS:  Jacquie Amador reports mood has been: \"Oh ok.\"  Depression has been: depressed mood, little interest / pleasure, sleep changes (insomnia or hypersomnia), fatigue of loss of energy and difficulty concentrating or indecisiveness self rates as 7/10, where 0 is none at all and 10 being severe depression. Was ~5/10 at last appt.  SI/SIB/HI: denies SI, SIB, HI.   Anxiety has been: excessive worry, difficult to control,  easily fatigued, irritability and muscle tension  self rates as 6/10, where 0 is none at all and 10 being severe anxiety. Was 6/10 at last appt.  Sleep has been: reports \"sleeping better\" without no duloxetine at bedtime (but has never reported poor sleep) - usually from 10:30-7, up several times to the bathroom but easy return to sleep. Reports regardless of trazodone, amt of sleep, she is very unrested waking up.    Energy has been: \"zero\".   Appetite has been: \"Can always eat.\" Denies significant change. Has noticed weight gain in last 2 years.   Vanessa sxs: denies  Psychosis sxs: denies all currently. See HPI.   PTSD sxs: denies    PHQ2 and GAD7 scores were reviewed today.  PHQ2:  1    PHQ-9 scores:   PHQ-9 SCORE 5/3/2022 5/25/2022 12/7/2022   PHQ-9 Total Score - - - "   PHQ-9 Total Score [facility] 5 (Mild depression) 8 (Mild depression) 7 (Mild depression)   PHQ-9 Total Score - - -   PHQ-9 Total Score 5 8 7       MARIZOL-7 scores:    MARIZOL-7 SCORE 12/7/2022 12/7/2022 1/30/2023   Total Score - - -   Total Score - 10 (moderate anxiety) 10 (moderate anxiety)   Total Score 10 10 10         Current stressors include: Parenting Stress, Symptoms and Caregiving Stress  Coping mechanisms and supports include: Family      Past Medical/Surgical History:  Past Medical History:   Diagnosis Date     ASTHMA - MODERATE PERSISTENT 9/21/2005     Chronic pain      Coronary artery disease      CVA (cerebral infarction)      Depressive disorder, not elsewhere classified      Diabetes (H)      Elevated serum alkaline phosphatase level     Liver source     Fibromyalgia      HYPERLIPIDEMIA NEC/NOS 12/29/2006     Hypertriglyceridemia      OA (osteoarthritis)      Thyroid disease      Trochanteric bursitis      Unspecified essential hypertension       has a past medical history of ASTHMA - MODERATE PERSISTENT (9/21/2005), Chronic pain, Coronary artery disease, CVA (cerebral infarction), Depressive disorder, not elsewhere classified, Diabetes (H), Elevated serum alkaline phosphatase level, Fibromyalgia, HYPERLIPIDEMIA NEC/NOS (12/29/2006), Hypertriglyceridemia, OA (osteoarthritis), Thyroid disease, Trochanteric bursitis, and Unspecified essential hypertension.    She has no past medical history of Congestive heart failure, unspecified, COPD (chronic obstructive pulmonary disease) (H), or History of blood transfusion.    Medication allergies:    Allergies   Allergen Reactions     Cats      and rabbits/wheezing     Dogs      wheezing     Lyrica [Pregabalin] Other (See Comments)     Rash, mouth sores, itching and burning     Seasonal Allergies      rhinits       Social History:  Birth place: [facility]  Childhood: Yes intact home  Siblings:  2   Highest education level was some college.   Employment  "Status: unemployed  Relationship status: .  Current Living situation:  Me and .  Feels safe at home.  Children: two daughters - 27, 30    Firearms/Weapons Access: No: Patient denies   Service: No  Support: adult child     Tobacco use: History former smoker: 1 pack / day, quit in 2013.  Caffeine: 2 cups a day - AM and mid afternoon.     Vital Signs:   LMP 07/17/2009 (Exact Date)     Labs:  Most recent laboratory results reviewed and no new labs.     Review of Systems:  10 systems (general, cardiovascular, respiratory, eyes, ENT, endocrine, GI, , M/S, neurological) were reviewed. Most pertinent finding(s) is/are: multiple ulcerated lesions in various stages of healing visible on face/neck. Chronic migraines. Chronic constipation. The remaining systems are all unremarkable.      Mental Status Exam:  Alertness: alert  and oriented   Appearance: adequately groomed and  appearance was notable for red ulcerated lesions and scabs across face  Behavior/Demeanor: cooperative, pleasant and calm, with good eye contact   Speech: normal and regular rate and rhythm  Language: intact and no problems  Psychomotor: normal or unremarkable  Mood: \"Oh Ok\"  Affect: full range and appropriate; was congruent to mood; was congruent to content  Thought Process/Associations: unremarkable  Thought Content:  Reports none;  Denies suicidal ideation, violent ideation and delusions  Perception:  Reports none;  Denies auditory hallucinations and visual hallucinations  Insight: intact  Judgment: intact  Cognition: does  appear grossly intact; formal cognitive testing was not done  Recent and Remote Memory: Intact to interview. Not formally assessed. No amnesia.  Attention Span and Concentration: normal  Fund of Knowledge:  appropriate  Gait and Station: unremarkable via video, seated throughout exam    Suicide Risk Assessment:  Today Jacquie Amador reports no suicidal ideation. There are notable risk factors for self-harm, " including anxiety and comorbid medical condition of  ?Behcet's syndrome, PLT disorder, chronic pain, migraines. However, risk is mitigated by commitment to family, sobriety, absence of past attempts, history of seeking help when needed and denies suicidal intent or plan. Therefore, based on all available evidence including the factors cited above, Jacquie Amador does not appear to be at imminent risk for self-harm, does not meet criteria for a 72-hr hold, and therefore remains appropriate for ongoing outpatient level of care.  A thorough assessment of risk factors related to suicide and self-harm have been reviewed and are noted above. The patient convincingly denies suicidality on several occasions. Local community safety resources printed and reviewed for patient to use if needed. There was no deceit detected, and the patient presented in a manner that was believable.     DSM5 Diagnosis:  296.32 (F33.1) Major Depressive Disorder, Recurrent Episode, Moderate _  300.02 (F41.1) Generalized Anxiety Disorder    Medical comorbidities include:   Patient Active Problem List    Diagnosis Date Noted     Acquired platelet disorder (H) 12/28/2022     Priority: Medium     Dermatitis 12/07/2022     Priority: Medium     Encounter for long-term current use of high risk medication 12/07/2022     Priority: Medium     Encounter for long-term (current) use of high-risk medication 11/06/2022     Priority: Medium     Behcet's syndrome involving oral mucosa (H) 11/03/2022     Priority: Medium     Aphthous ulcer 10/15/2022     Priority: Medium     Rash 10/15/2022     Priority: Medium     Pruritus 10/15/2022     Priority: Medium     Platelet granule defect (H) 12/22/2021     Priority: Medium     Sees Dr. Emely Grimes       Morbid obesity (H) 08/05/2021     Priority: Medium     Skin ulcer, limited to breakdown of skin (H) 07/19/2019     Priority: Medium     Migraine without aura and without status migrainosus, not intractable 12/21/2018      Priority: Medium     Intractable chronic migraine without aura and without status migrainosus 05/29/2018     Priority: Medium     Impaired fasting glucose 05/29/2018     Priority: Medium     Sinus tachycardia 12/08/2017     Priority: Medium     Primary osteoarthritis of both first carpometacarpal joints 09/28/2017     Priority: Medium     Arthritis of metatarsophalangeal joint 09/28/2017     Priority: Medium     Medical marijuana use 12/19/2016     Priority: Medium     Chronic, continuous use of opioids      Priority: Medium     Scratching 04/26/2016     Priority: Medium     Advanced directives, counseling/discussion 04/26/2016     Priority: Medium     Hand out given.        Iron deficiency 04/18/2016     Priority: Medium     Overweight 03/15/2016     Priority: Medium     Trochanteric bursitis of both hips 08/27/2015     Priority: Medium     Primary focal hyperhidrosis 07/03/2015     Priority: Medium     Right ankle instability 06/25/2015     Priority: Medium     Osteoarthritis of carpometacarpal joint of thumb - bilateral 03/26/2015     Priority: Medium     Trochanteric bursitis 03/26/2015     Priority: Medium     Iron deficiency anemia 11/14/2014     Priority: Medium     Constipation 04/04/2014     Priority: Medium     Lumbar disc disease with radiculopathy 04/04/2014     Priority: Medium     Chronic rhinitis 06/10/2013     Priority: Medium     Myalgia 04/11/2013     Priority: Medium     Generalized anxiety disorder 05/10/2011     Priority: Medium     Diagnosis updated by automated process. Provider to review and confirm.       Hypertension goal BP (blood pressure) < 140/90 01/18/2011     Priority: Medium     HYPERLIPIDEMIA LDL GOAL <130 10/31/2010     Priority: Medium     Multiple joint pain 11/18/2009     Priority: Medium     Sees Rheumatology and Orthopedics at [facility].  Receives steroid injections, but also uses Vicodin for pain.    Patient is followed by JAMILA OSORIO for ongoing prescription of  "narcotic pain medicine.  Med: Vicodin.   Maximum use per month: 60  Expected duration: indeterminate  Narcotic agreement on file: NO  Clinic visit recommended: Q 6  months         Moderate recurrent major depression (H) 12/04/2008     Priority: Medium     Esophageal reflux 07/05/2007     Priority: Medium     Allergic rhinitis due to other allergen 06/30/2006     Priority: Medium     Moderate persistent asthma without complication 09/21/2005     Priority: Medium       Psychosocial & Contextual Factors:  Parent/Child Relationship Difficulties and Medical Comorbidites     DIFFERENTIAL DIAGNOSIS: R/O depression due to other medical condition     Medical comorbidities impacting or contributing to clinical picture:  ?Behcet's syndrome or other unknown disorder involving oral mucosa / cutaneous lesions, PLT disorder, chronic pain, migraines  Known issue that I take into account for their medical decisions, no current exacerbations or new concerns.    Impression:  Jacquie Amador is a 62 year old White, female who presents for return visit with  [facility] ([facility]) for medication management. At last appointment, we trialed reducing to duloxetine 90 mg (from 120 mg) out of caution it may be worsening bleeding risk; increased to buspirone 15 mg BID, continued trazodone  PRN QHS. In interim, pt also reported significant worsening of pain, lower mood and returned to higher duloxetine dose. Additionally referred patient to MTM with pharmacist given her extensive med list and concern for SE. MTM recommended taking duloxetine all QAM which patient did, and has noticed improvement in sleep but daytime fatigue remains very high, no change. Denies associated w/ trazodone. She reports low mood associated with anergia, amotivation, \"a lot of mental health is my face\" (multiple skin ulcerations with unclear dx per last derm note). Started on dupixent w/ derm, unsure if helping yet. Her anxiety has been a " little higher, pedro luis r/t daughter's mental health. She denies significant improvement in anxiety since +buspirone. Dizziness has improved that was likely SE to increased dose. [place] [facility] [place] possible hypnopompic hallucinations: waking up and seeing  in the room briefly. Denies all since, none currently. No AH. She is unbothered by this. She has experienced VH in the past with hypokalemia -- has not completed labs as ordered by PCP and rec she do ASAP. Denies SI/SIB/HI. Discussed current medications, and past trial of bupropion. Patient is open to trying increase in dose. Reviewed chart extensively and unable to determine move from XR to SR in 2011,  will change to XR formulation with higher dose. Recommending continuing all other medication. Follow-up in 1 month. Patient agreeable to plan.     Medication side effects and alternatives were reviewed. Health promotion activities recommended and reviewed today. All questions addressed. Education and counseling completed regarding risks and benefits of medications and psychotherapy options. Recommend therapy for additional support.       Treatment Plan:    INCREASE TO bupropion  mg every day. Script sent for 1 month.    CONTINUE duloxetine 120 mg every day. No script needed.    CONTINUE buspirone 15 mg BID. No script needed.    Continue all other medications per primary care provider.     Complete labs as ordered by PCP.     Safety plan reviewed. To the Emergency Department as needed or call after hours crisis line at [phone] or [phone]. [place] Crisis Text Line. Text [place] to 978372 or Suicide LifeLine Chat: suicidepreventionlifeline.org/chat    Schedule an appointment with me in 4 weeks or sooner as needed. Call [facility] at [phone] to schedule.    Follow up with primary care provider as planned or for acute medical concerns.    Call the psychiatric nurse line with medication questions or concerns at  [phone].    MyChart may be used to communicate with your provider, but this is not intended to be used for emergencies.    Patient Education:  Medication side effects and alternatives reviewed. Health promotion activities recommended and reviewed today. All questions addressed. Education and counseling completed regarding risks and benefits of medications and psychotherapy options.  Consent provided by patient/guardian  Call the psychiatric nurse line with medication questions or concerns at [phone].  MyChart may be used to communicate with your provider, but this is not intended to be used for emergencies.  Medlineplus.gov is information for patients.  It is run by the [employer] and it contains information about all disorders, diseases and all medications.       Discussed side effects of bupropion to include agitation and other activation issues, ^ BP or HR, insomnia, stomach upset, appetite loss, weight loss, risk for precipitation of padmini, and increased risk for seizures.  Patient agreed to take Bupropion to treat low mood, lack of motivation and poor concentration. Patient verbalized understanding of medication schedule, of side effects, avoidance of alcohol and marijuana due to increase risk for seizures.      Community Resources:    National Suicide Prevention Lifeline: 440.330.5615 (TTY: 551.932.6951). Call anytime for help.  (www.suicidepreventionlifeline.org)  National [place] [employer]ness (www.richy.org): 163.800.1404 or 307-089-2004.   Mental Health Association (www.mentalhealth.org): 425.433.8035 or 223-465-6749.  [place] Crisis Text Line: Text [place] to 293103  Suicide LifeLine Chat: suicideArtSquare.org/chat    Administrative Billin minutes spent on the date of the encounter doing chart review and reviewing referral documents, history and exam of patient, documentation and further activities as noted above.    Video/Phone Start Time: 11:01  AM  Video/Phone End Time: 11:34 AM    Patient Status:  [facility] MD/DO/NP/PA providers offer care a specialty service for Primary Care Providers in the [facility] that seek to optimize psychotropic medications for unstable patients.  Once medications have been optimized, our providers discharge the patient back to the referring Primary Care Provider for ongoing medication management.  This type of system allows our providers to serve a high volume of patients.   Patient will continue to be seen for ongoing consultation and stabilization.    Signed:   Lora Kaur, MSN, APRN, PMHNP-BC  [facility] ([facility])  [facility]

## 2023-01-31 ENCOUNTER — MYC MEDICAL ADVICE (OUTPATIENT)
Dept: FAMILY MEDICINE | Facility: CLINIC | Age: 63
End: 2023-01-31

## 2023-02-04 PROBLEM — L20.89 OTHER ATOPIC DERMATITIS: Status: ACTIVE | Noted: 2023-02-04

## 2023-02-04 PROBLEM — L30.9 FACIAL ECZEMA: Status: ACTIVE | Noted: 2023-02-04

## 2023-02-07 ENCOUNTER — MYC MEDICAL ADVICE (OUTPATIENT)
Dept: FAMILY MEDICINE | Facility: CLINIC | Age: 63
End: 2023-02-07

## 2023-02-07 ENCOUNTER — VIRTUAL VISIT (OUTPATIENT)
Dept: DERMATOLOGY | Facility: CLINIC | Age: 63
End: 2023-02-07
Payer: COMMERCIAL

## 2023-02-07 ENCOUNTER — LAB (OUTPATIENT)
Dept: LAB | Facility: CLINIC | Age: 63
End: 2023-02-07
Payer: COMMERCIAL

## 2023-02-07 ENCOUNTER — TELEPHONE (OUTPATIENT)
Dept: DERMATOLOGY | Facility: CLINIC | Age: 63
End: 2023-02-07

## 2023-02-07 DIAGNOSIS — L30.9 DERMATITIS: ICD-10-CM

## 2023-02-07 DIAGNOSIS — R20.8 SKIN PAIN: Primary | ICD-10-CM

## 2023-02-07 DIAGNOSIS — Z79.899 ENCOUNTER FOR LONG-TERM (CURRENT) USE OF HIGH-RISK MEDICATION: ICD-10-CM

## 2023-02-07 DIAGNOSIS — R79.89 ELEVATED SERUM CREATININE: ICD-10-CM

## 2023-02-07 DIAGNOSIS — E50.7 XEROPHTHALMIA: ICD-10-CM

## 2023-02-07 LAB
ALBUMIN SERPL-MCNC: 3.3 G/DL (ref 3.4–5)
ALP SERPL-CCNC: 85 U/L (ref 40–150)
ALT SERPL W P-5'-P-CCNC: 28 U/L (ref 0–50)
ANION GAP SERPL CALCULATED.3IONS-SCNC: 8 MMOL/L (ref 3–14)
AST SERPL W P-5'-P-CCNC: 15 U/L (ref 0–45)
BASOPHILS # BLD AUTO: 0 10E3/UL (ref 0–0.2)
BASOPHILS NFR BLD AUTO: 0 %
BILIRUB DIRECT SERPL-MCNC: 0.2 MG/DL (ref 0–0.2)
BILIRUB SERPL-MCNC: 0.4 MG/DL (ref 0.2–1.3)
BUN SERPL-MCNC: 15 MG/DL (ref 7–30)
CALCIUM SERPL-MCNC: 9.6 MG/DL (ref 8.5–10.1)
CHLORIDE BLD-SCNC: 101 MMOL/L (ref 94–109)
CO2 SERPL-SCNC: 33 MMOL/L (ref 20–32)
CREAT SERPL-MCNC: 0.92 MG/DL (ref 0.52–1.04)
EOSINOPHIL # BLD AUTO: 0.2 10E3/UL (ref 0–0.7)
EOSINOPHIL NFR BLD AUTO: 2 %
ERYTHROCYTE [DISTWIDTH] IN BLOOD BY AUTOMATED COUNT: 14.1 % (ref 10–15)
GFR SERPL CREATININE-BSD FRML MDRD: 70 ML/MIN/1.73M2
GLUCOSE BLD-MCNC: 121 MG/DL (ref 70–99)
HCT VFR BLD AUTO: 40.7 % (ref 35–47)
HGB BLD-MCNC: 13.3 G/DL (ref 11.7–15.7)
IMM GRANULOCYTES # BLD: 0 10E3/UL
IMM GRANULOCYTES NFR BLD: 0 %
LYMPHOCYTES # BLD AUTO: 4.2 10E3/UL (ref 0.8–5.3)
LYMPHOCYTES NFR BLD AUTO: 38 %
MCH RBC QN AUTO: 30.1 PG (ref 26.5–33)
MCHC RBC AUTO-ENTMCNC: 32.7 G/DL (ref 31.5–36.5)
MCV RBC AUTO: 92 FL (ref 78–100)
MONOCYTES # BLD AUTO: 0.7 10E3/UL (ref 0–1.3)
MONOCYTES NFR BLD AUTO: 6 %
NEUTROPHILS # BLD AUTO: 6 10E3/UL (ref 1.6–8.3)
NEUTROPHILS NFR BLD AUTO: 54 %
PLATELET # BLD AUTO: 333 10E3/UL (ref 150–450)
POTASSIUM BLD-SCNC: 2.9 MMOL/L (ref 3.4–5.3)
PROT SERPL-MCNC: 7.1 G/DL (ref 6.8–8.8)
RBC # BLD AUTO: 4.42 10E6/UL (ref 3.8–5.2)
RETICS # AUTO: 0.11 10E6/UL (ref 0.03–0.1)
RETICS/RBC NFR AUTO: 2.4 % (ref 0.5–2)
SODIUM SERPL-SCNC: 142 MMOL/L (ref 133–144)
WBC # BLD AUTO: 11 10E3/UL (ref 4–11)

## 2023-02-07 PROCEDURE — 36415 COLL VENOUS BLD VENIPUNCTURE: CPT

## 2023-02-07 PROCEDURE — 85025 COMPLETE CBC W/AUTO DIFF WBC: CPT

## 2023-02-07 PROCEDURE — 99214 OFFICE O/P EST MOD 30 MIN: CPT | Mod: 95 | Performed by: DERMATOLOGY

## 2023-02-07 PROCEDURE — 85045 AUTOMATED RETICULOCYTE COUNT: CPT

## 2023-02-07 PROCEDURE — 80053 COMPREHEN METABOLIC PANEL: CPT

## 2023-02-07 PROCEDURE — 82248 BILIRUBIN DIRECT: CPT

## 2023-02-07 RX ORDER — BUTORPHANOL TARTRATE 10 MG/ML
1 SPRAY NASAL EVERY 4 HOURS PRN
Qty: 2.5 ML | Refills: 3 | Status: SHIPPED | OUTPATIENT
Start: 2023-02-07 | End: 2023-02-28

## 2023-02-07 ASSESSMENT — PAIN SCALES - GENERAL: PAINLEVEL: SEVERE PAIN (6)

## 2023-02-07 NOTE — TELEPHONE ENCOUNTER
PRIOR AUTHORIZATION DENIED    Medication: butorphanol (STADOL) 10 MG/ML nasal spray - EPA DENIED    Denial Date: 2/7/2023    Denial Rational:       Appeal Information:

## 2023-02-07 NOTE — LETTER
2/7/2023       RE: Jacquie Amador  [address]  [facility] [address]     Dear Colleague,    Thank you for referring your patient, Jacquie Amador, to the [facility] [place] at [facility]. Please see a copy of my visit note below.    [facility] Dermatology Note  Encounter Date: Feb 7, 2023  Store-and-Forward and Telephone ([phone]). Location of teledermatologist: [facility] [place].  Start time: 10:38. End time: 10:52    Dermatology Problem List:  1. Recurrent aphthous ulcers  - Current treatment: dupixent, gentle skin care with   2. Multiple skin ulcers resembling prurigo/neurodermatitis    Assessment & Plan:     # Recurring oral and genital sores, likely recurrent aphthous ulcers, as well as multiple diffuse discrete skin ulcers.  With minimal improvement after trial of Dupixent at this visit.  We discussed various treatment options for her facial ulcers, which are bringing her the most distress.  Could consider trial of naltrexone in these patients, however patient is chronically on tramadol for chronic pain and naltrexone may cause this to become less efficacious.  A brief review of the literature shows butorphanol to be effective in patients with intractable pruritus, and this may be a good option for her.  - Start butorphanol nasal spray for intractable pain and itch.   - Continue gentle skin care with Protopic and mupirocin.  - Recommended hydrocolloid bandages and restoration of skin barrier.  Can provide hydrocolloid bandages for the patient if she is willing to come to clinic to obtain these.  - Continue Dupixent for now.   - For now she will continue colchicine 0.6 mg twice daily, as she has tolerated this and has seen some improvement in her oral sores.  -We will plan to have her stop dapsone, as she has not seen any improvement with this.  - She can also continue  "betamethasone ointment as needed for persistent skin sores and pain.     We will have her follow-up in our clinic in 6 weeks time.     Rea Mendes  PGY-3, Internal Medicine-Dermatology  Pager: *[phone] or TextPage Link     ____________________________________________    CC: Derm Problem (Jacquie is being seen today for a follow up for body/facial lesion. Consistent lesions that havent healed, starts to crust and very painful )    HPI:  Ms. Jacquie Amador is a(n) 62 year old female who presents today as a return patient for ulcers on the face.     Since her last visit she notes that her ulcers are more or less unchanged.  Presently is continuing of the Dupixent, however is not sure if this is effectively treating her lesions.  Notes that these bring her great stress as she finds them disfiguring.    Verbalizes that pain is one of the more disabling symptom for her. She also describes them as \"burnt\" feeling. She's currently using protopic for these regions, as well as mupirocin. \"I don't know. I just kind of gave up after nothing seemed to be working.\"     Patient is otherwise feeling well, without additional skin concerns.    Labs Reviewed:  CBC from this morning within normal limits.     Physical Exam:  Vitals: LMP 07/17/2009 (Exact Date)   SKIN: Teledermatology photos were reviewed; image quality and interpretability: acceptable. Image date:2/6/23   - A the face there are several punched out-appearing erosions with central hemorrhagic crusting.   - No other lesions of concern on areas examined.     Medications:  Current Outpatient Medications   Medication     acetaminophen (TYLENOL) 500 MG tablet     albuterol (PROVENTIL) (2.5 MG/3ML) 0.083% neb solution     albuterol (VENTOLIN HFA) 108 (90 Base) MCG/ACT inhaler     atorvastatin (LIPITOR) 20 MG tablet     augmented betamethasone dipropionate (DIPROLENE AF) 0.05 % external cream     betamethasone dipropionate (DIPROSONE) 0.05 % external ointment     buPROPion " (WELLBUTRIN XL) 300 MG 24 hr tablet     busPIRone (BUSPAR) 15 MG tablet     celecoxib (CELEBREX) 200 MG capsule     chlorhexidine (PERIDEX) 0.12 % solution     clindamycin (CLINDAMAX) 1 % external gel     clotrimazole (MYCELEX) 10 MG lozenge     desonide (DESOWEN) 0.05 % external cream     desonide (DESOWEN) 0.05 % external ointment     doxycycline hyclate (VIBRA-TABS) 100 MG tablet     DULoxetine (CYMBALTA) 60 MG capsule     dupilumab (DUPIXENT) 300 MG/2ML prefilled pen     famotidine (PEPCID) 40 MG tablet     fluocinonide (LIDEX) 0.05 % external gel     Fluticasone-Umeclidin-Vilant (TRELEGY ELLIPTA) 100-62.5-25 MCG/ACT oral inhaler     hydrochlorothiazide (HYDRODIURIL) 25 MG tablet     hydrocortisone 2.5 % cream     losartan (COZAAR) 100 MG tablet     metoprolol succinate ER (TOPROL XL) 100 MG 24 hr tablet     metoprolol succinate ER (TOPROL XL) 25 MG 24 hr tablet     montelukast (SINGULAIR) 10 MG tablet     mupirocin (BACTROBAN) 2 % external cream     nystatin (MYCOSTATIN) 546517 UNIT/ML suspension     oxybutynin ER (DITROPAN XL) 5 MG 24 hr tablet     predniSONE (DELTASONE) 10 MG tablet     rizatriptan (MAXALT) 10 MG tablet     rOPINIRole (REQUIP) 1 MG tablet     tacrolimus (PROTOPIC) 0.1 % external ointment     traMADol (ULTRAM) 50 MG tablet     traZODone (DESYREL) 50 MG tablet     triamcinolone (ARISTOCORT HP) 0.5 % external cream     valACYclovir (VALTREX) 500 MG tablet     ZYRTEC 10 MG OR TABS     No current facility-administered medications for this visit.      Past Medical/Surgical History:   Patient Active Problem List   Diagnosis     Moderate persistent asthma without complication     Allergic rhinitis due to other allergen     Esophageal reflux     Moderate recurrent major depression (H)     Multiple joint pain     HYPERLIPIDEMIA LDL GOAL <130     Hypertension goal BP (blood pressure) < 140/90     Generalized anxiety disorder     Myalgia     Chronic rhinitis     Constipation     Lumbar disc disease with  radiculopathy     Iron deficiency anemia     Osteoarthritis of carpometacarpal joint of thumb - bilateral     Trochanteric bursitis     Right ankle instability     Primary focal hyperhidrosis     Trochanteric bursitis of both hips     Overweight     Iron deficiency     Scratching     Advanced directives, counseling/discussion     Chronic, continuous use of opioids     Medical marijuana use     Primary osteoarthritis of both first carpometacarpal joints     Arthritis of metatarsophalangeal joint     Sinus tachycardia     Intractable chronic migraine without aura and without status migrainosus     Impaired fasting glucose     Migraine without aura and without status migrainosus, not intractable     Skin ulcer, limited to breakdown of skin (H)     Morbid obesity (H)     Platelet granule defect (H)     Aphthous ulcer     Rash     Pruritus     Behcet's syndrome involving oral mucosa (H)     Encounter for long-term (current) use of high-risk medication     Dermatitis     Encounter for long-term current use of high risk medication     Acquired platelet disorder (H)     Other atopic dermatitis     Facial eczema     Past Medical History:   Diagnosis Date     ASTHMA - MODERATE PERSISTENT 9/21/2005     Chronic pain      Coronary artery disease      CVA (cerebral infarction)      Depressive disorder, not elsewhere classified      Diabetes (H)      Elevated serum alkaline phosphatase level     Liver source     Fibromyalgia      HYPERLIPIDEMIA NEC/NOS 12/29/2006     Hypertriglyceridemia      OA (osteoarthritis)      Thyroid disease      Trochanteric bursitis      Unspecified essential hypertension        CC No referring provider defined for this encounter. on close of this encounter.    February 9, 2023    To Whom it may Concern:    I am writing this letter on behalf of my patient, Ms. Jacquie Amador (date of birth 1960), to petition for insurance coverage for intranasal butorphanol.  We received a denial for this medication  and a request that we consider starting alternative medications first, however we are prescribing this medication for chronic intractable itch with a minor component of pain, and thus other opioid medications are not indicated for this condition.  In fact, medications such as morphine or fentanyl may up-regulate pruritus in patients with chronic itch and are thus contraindicated.  In most instances we would start with oral naltrexone for patients with refractory chronic itch, however Jacquie takes tramadol and we are concerned for potential drug-drug interactions.  There is good dermatology literature support for using intranasal butorphanol for intractable itch, and we feel this may be a good choice for Jacquie, as she also has significant pain.  Please see the reference below.  We have tried many other anti-itch therapies for Jacquie with little benefit, and thus I view this medication as required and the next step in her therapeutic options.  Please feel free to contact me at anytime with questions.    Reference:  Rocío BAXTER, Shobha EMMANUEL. Butorphanol for treatment of intractable pruritus. J Am Acad Dermatol. 2006 Mar;54(3):527-31. doi: 10.1016/j.jaad.2005.12.010. Epub 2006 Jan 18. PMID: [id number].        Sincerely,    Jay Warren MD   of dermatology

## 2023-02-07 NOTE — PROGRESS NOTES
[facility] Dermatology Note  Encounter Date: Feb 7, 2023  Store-and-Forward and Telephone ([phone]). Location of teledermatologist: [facility] [place].  Start time: 10:38. End time: 10:52    Dermatology Problem List:  1. Recurrent aphthous ulcers  - Current treatment: dupixent, gentle skin care with   2. Multiple skin ulcers resembling prurigo/neurodermatitis    Assessment & Plan:     # Recurring oral and genital sores, likely recurrent aphthous ulcers, as well as multiple diffuse discrete skin ulcers.  With minimal improvement after trial of Dupixent at this visit.  We discussed various treatment options for her facial ulcers, which are bringing her the most distress.  Could consider trial of naltrexone in these patients, however patient is chronically on tramadol for chronic pain and naltrexone may cause this to become less efficacious.  A brief review of the literature shows butorphanol to be effective in patients with intractable pruritus, and this may be a good option for her.  - Start butorphanol nasal spray for intractable pain and itch.   - Continue gentle skin care with Protopic and mupirocin.  - Recommended hydrocolloid bandages and restoration of skin barrier.  Can provide hydrocolloid bandages for the patient if she is willing to come to clinic to obtain these.  - Continue Dupixent for now.   - For now she will continue colchicine 0.6 mg twice daily, as she has tolerated this and has seen some improvement in her oral sores.  -We will plan to have her stop dapsone, as she has not seen any improvement with this.  - She can also continue betamethasone ointment as needed for persistent skin sores and pain.     We will have her follow-up in our clinic in 6 weeks time.     Rea Mendes  PGY-3, Internal Medicine-Dermatology  Pager: *[phone] or TextPage Link     I participated in the entirety of the interview, reviewed all available images, and agree with  "the assessment and plan as documented in the resident's note.    Jay Warren MD  Dermatology Attending    ____________________________________________    CC: Derm Problem (Jacquie is being seen today for a follow up for body/facial lesion. Consistent lesions that havent healed, starts to crust and very painful )    HPI:  Ms. Jacquie Amador is a(n) 62 year old female who presents today as a return patient for ulcers on the face.     Since her last visit she notes that her ulcers are more or less unchanged.  Presently is continuing of the Dupixent, however is not sure if this is effectively treating her lesions.  Notes that these bring her great stress as she finds them disfiguring.    Verbalizes that pain is one of the more disabling symptom for her. She also describes them as \"burnt\" feeling. She's currently using protopic for these regions, as well as mupirocin. \"I don't know. I just kind of gave up after nothing seemed to be working.\"     Patient is otherwise feeling well, without additional skin concerns.    Labs Reviewed:  CBC from this morning within normal limits.     Physical Exam:  Vitals: LMP 07/17/2009 (Exact Date)   SKIN: Teledermatology photos were reviewed; image quality and interpretability: acceptable. Image date:2/6/23   - A the face there are several punched out-appearing erosions with central hemorrhagic crusting.   - No other lesions of concern on areas examined.     Medications:  Current Outpatient Medications   Medication     acetaminophen (TYLENOL) 500 MG tablet     albuterol (PROVENTIL) (2.5 MG/3ML) 0.083% neb solution     albuterol (VENTOLIN HFA) 108 (90 Base) MCG/ACT inhaler     atorvastatin (LIPITOR) 20 MG tablet     augmented betamethasone dipropionate (DIPROLENE AF) 0.05 % external cream     betamethasone dipropionate (DIPROSONE) 0.05 % external ointment     buPROPion (WELLBUTRIN XL) 300 MG 24 hr tablet     busPIRone (BUSPAR) 15 MG tablet     celecoxib (CELEBREX) 200 MG capsule     " chlorhexidine (PERIDEX) 0.12 % solution     clindamycin (CLINDAMAX) 1 % external gel     clotrimazole (MYCELEX) 10 MG lozenge     desonide (DESOWEN) 0.05 % external cream     desonide (DESOWEN) 0.05 % external ointment     doxycycline hyclate (VIBRA-TABS) 100 MG tablet     DULoxetine (CYMBALTA) 60 MG capsule     dupilumab (DUPIXENT) 300 MG/2ML prefilled pen     famotidine (PEPCID) 40 MG tablet     fluocinonide (LIDEX) 0.05 % external gel     Fluticasone-Umeclidin-Vilant (TRELEGY ELLIPTA) 100-62.5-25 MCG/ACT oral inhaler     hydrochlorothiazide (HYDRODIURIL) 25 MG tablet     hydrocortisone 2.5 % cream     losartan (COZAAR) 100 MG tablet     metoprolol succinate ER (TOPROL XL) 100 MG 24 hr tablet     metoprolol succinate ER (TOPROL XL) 25 MG 24 hr tablet     montelukast (SINGULAIR) 10 MG tablet     mupirocin (BACTROBAN) 2 % external cream     nystatin (MYCOSTATIN) 171944 UNIT/ML suspension     oxybutynin ER (DITROPAN XL) 5 MG 24 hr tablet     predniSONE (DELTASONE) 10 MG tablet     rizatriptan (MAXALT) 10 MG tablet     rOPINIRole (REQUIP) 1 MG tablet     tacrolimus (PROTOPIC) 0.1 % external ointment     traMADol (ULTRAM) 50 MG tablet     traZODone (DESYREL) 50 MG tablet     triamcinolone (ARISTOCORT HP) 0.5 % external cream     valACYclovir (VALTREX) 500 MG tablet     ZYRTEC 10 MG OR TABS     No current facility-administered medications for this visit.      Past Medical/Surgical History:   Patient Active Problem List   Diagnosis     Moderate persistent asthma without complication     Allergic rhinitis due to other allergen     Esophageal reflux     Moderate recurrent major depression (H)     Multiple joint pain     HYPERLIPIDEMIA LDL GOAL <130     Hypertension goal BP (blood pressure) < 140/90     Generalized anxiety disorder     Myalgia     Chronic rhinitis     Constipation     Lumbar disc disease with radiculopathy     Iron deficiency anemia     Osteoarthritis of carpometacarpal joint of thumb - bilateral      Trochanteric bursitis     Right ankle instability     Primary focal hyperhidrosis     Trochanteric bursitis of both hips     Overweight     Iron deficiency     Scratching     Advanced directives, counseling/discussion     Chronic, continuous use of opioids     Medical marijuana use     Primary osteoarthritis of both first carpometacarpal joints     Arthritis of metatarsophalangeal joint     Sinus tachycardia     Intractable chronic migraine without aura and without status migrainosus     Impaired fasting glucose     Migraine without aura and without status migrainosus, not intractable     Skin ulcer, limited to breakdown of skin (H)     Morbid obesity (H)     Platelet granule defect (H)     Aphthous ulcer     Rash     Pruritus     Behcet's syndrome involving oral mucosa (H)     Encounter for long-term (current) use of high-risk medication     Dermatitis     Encounter for long-term current use of high risk medication     Acquired platelet disorder (H)     Other atopic dermatitis     Facial eczema     Past Medical History:   Diagnosis Date     ASTHMA - MODERATE PERSISTENT 9/21/2005     Chronic pain      Coronary artery disease      CVA (cerebral infarction)      Depressive disorder, not elsewhere classified      Diabetes (H)      Elevated serum alkaline phosphatase level     Liver source     Fibromyalgia      HYPERLIPIDEMIA NEC/NOS 12/29/2006     Hypertriglyceridemia      OA (osteoarthritis)      Thyroid disease      Trochanteric bursitis      Unspecified essential hypertension        CC No referring provider defined for this encounter. on close of this encounter.

## 2023-02-07 NOTE — NURSING NOTE
Dermatology Rooming Note    Jacquie Amador's goals for this visit include:   Chief Complaint   Patient presents with     Derm Problem     Jacquie is being seen today for a follow up for body/facial lesion. Consistent lesions that havent healed, starts to crust and very painful      SHUN Gonzalez

## 2023-02-08 ENCOUNTER — VIRTUAL VISIT (OUTPATIENT)
Dept: HEMATOLOGY | Facility: CLINIC | Age: 63
End: 2023-02-08
Attending: INTERNAL MEDICINE
Payer: COMMERCIAL

## 2023-02-08 ENCOUNTER — TELEPHONE (OUTPATIENT)
Dept: HEMATOLOGY | Facility: CLINIC | Age: 63
End: 2023-02-08

## 2023-02-08 DIAGNOSIS — E87.6 LOW SERUM POTASSIUM: ICD-10-CM

## 2023-02-08 DIAGNOSIS — D69.1 PLATELET GRANULE DEFECT (H): Primary | ICD-10-CM

## 2023-02-08 DIAGNOSIS — E87.6 HYPOKALEMIA: Primary | ICD-10-CM

## 2023-02-08 DIAGNOSIS — K12.0 APHTHOUS ULCER: ICD-10-CM

## 2023-02-08 PROCEDURE — G0463 HOSPITAL OUTPT CLINIC VISIT: HCPCS | Mod: PN,GT | Performed by: INTERNAL MEDICINE

## 2023-02-08 PROCEDURE — 99214 OFFICE O/P EST MOD 30 MIN: CPT | Mod: VID | Performed by: INTERNAL MEDICINE

## 2023-02-08 RX ORDER — LIDOCAINE HYDROCHLORIDE 20 MG/ML
SOLUTION OROPHARYNGEAL
COMMUNITY
Start: 2023-01-30 | End: 2023-12-29

## 2023-02-08 RX ORDER — AMOXICILLIN 500 MG/1
500 CAPSULE ORAL 3 TIMES DAILY
COMMUNITY
Start: 2023-02-03 | End: 2023-02-28

## 2023-02-08 RX ORDER — PREDNISONE 10 MG/1
TABLET ORAL
Qty: 34 TABLET | Refills: 0 | Status: SHIPPED | OUTPATIENT
Start: 2023-02-08 | End: 2023-03-04

## 2023-02-08 RX ORDER — POTASSIUM CHLORIDE 1.5 G/1.58G
20 POWDER, FOR SOLUTION ORAL 2 TIMES DAILY
Qty: 14 PACKET | Refills: 0 | Status: SHIPPED | OUTPATIENT
Start: 2023-02-08 | End: 2023-02-10 | Stop reason: SINTOL

## 2023-02-08 RX ORDER — PREDNISONE 10 MG/1
10 TABLET ORAL DAILY
Qty: 90 TABLET | Refills: 0 | Status: SHIPPED | OUTPATIENT
Start: 2023-03-03 | End: 2023-04-11

## 2023-02-08 NOTE — TELEPHONE ENCOUNTER
[id number]  Jacquie Amador  62 year old female  [facility] Diagnosis: Acquired Platelet Disorder  [facility] Provider: Dr. Cornelio Dhillon was seen by Dr[facility] Grimes [facility]. Dr. Grimes placed orders for patient to get a CMP checked in one week. RN called patient to assist in scheduling lab appointment. Voicemail left with clinic call-back number.    Quyen Foster RN, BSN, PCCN  Nurse Clinician    [facility]  [address] [place] [address], [address], [place] [address]   Office, direct: [phone]  Main office number: [phone]  Pronouns: She, her, hers

## 2023-02-08 NOTE — PROGRESS NOTES
Patient was contacted to complete the pre-visit call prior to their video visit with the provider.      Allergies and medications were reviewed.    I thanked them for their time to cover this information     Rola Patrick CMA

## 2023-02-08 NOTE — PROGRESS NOTES
[facility]  [address] [place] [address]  Phone: [phone], Fax: [phone]    Outpatient Visit Note:    Patient: Jacquie Amador  MRN: [id number]  : 1960  ISIDRO:23    Reason for Initial Consultation:  Bruising    Assessment:  In summary, Jacquie Amador is a 62 year old woman presenting to clinic due to bruising/bleeding with abnormal platelet studies. 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 Overall clinical picture is highly concerning for underlying autoimmune disorder.    1.  Behcet's disease:   2. 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  Easy bruising and prolonged healing secondary to #1 and #2  4.  Personal history of antiphospholipid antibody syndrome in pregnancy, no records  5.  Osteoarthritis requiring anti-inflammatory medications  6.  Depression requiring use of SSRI medication  7.  Acute on chronic migraines requiring use of triptans and aspirin    Ms. Amador has been evaluated by Dermatology and Rheumatology. Skin biopsy with neutrophilic infiltrates that may be secondary to Behcets. Overall, clinical criteria is met and very consistent.  Started Colcrys with some improvement in mouth sores. Working with Dr. Warren for skin lesions.    For her bleeding/platelets, she has presence of anti-HLA and anti-GP1a antibodies.  She has no personal history of receiving red cell or platelet transfusions. Jenny platelet aggregation study that had decreased arachidonic acid as well and deaggregation with ADP and arachidonic acid and decreased ATP release with thrombin and ADP. This is consistent with diagnosis of acquired qualitative platelet disorder and consistent with her anti-GP1a antibodies. We have given her steriods, which helped with arthralgias and fatigue.     Previously discussed that avoiding other medications  that can worsen this, including aspirin, NSAID and potentially SSRIs. Right now IF Jacquie needs MAJOR SURGERY---  She should receive platelet transfusion (1-2 units). She is ok to receive injections.        Plan:  1. Majority of today's visit was spent counseling the patient regarding diagnosis, natural history, management and treatment options   2. Pred burst- 20 mg x 7 days, 15 mg x 5 days, 10 mg thereafter (scripts sent  3. Augmentin for mouth sore  4. Replace potassium- will message PCP to follow up    The patient is given our center's contact information and is instructed to call if she should have any further questions or concerns.  Follow up 5/3/23    Patient understands and agrees with the above plan and recommendation.    Emely Grimes MD/PhD  Hematology, Oncology and Transplantation Fellow  [facility]    Video-Visit Details    Type of service:  Video Visit    Video Start Time (time video started): 12:10 PM    Video End Time (time video stopped): 12:37 PM    Originating Location (pt. Location): Home        Distant Location (provider location):  On-site    Mode of Communication:  Video Conference via Playboox      ----------------------------------------------------------------------------------------------------------------------  Interval History:  Jacquie Amador is a 62 year old woman with PMHx of fibromyalgia, anxiety/depression, hypertension and hyperlipidemia. She presented to clinic on 9/15/21 for her first in person evaluation due to bleeding and prolonged wound healing. Please refer to my consult note.     Jacquie has her twin grandsons today. Her daughter is back is work. Jacquie has been helping her daughter with them. They are still not sleeping long. Family remains rather tired. Spouse recovered from his surgery. Marco's start  March 6th until first week in April. Will be on [place] and will be in [place].       Mouth sores-  Has sore on her bottom left side  that is currently being treated with amoxicillin . Feels like it has stuff in it. Is currently not painful. Does have Right sided gum infection- on antibiotics for seven days. The sore on the other side when away. Has white coating on cheeks and tongue. Coating will come off on lips with brushing teeth.     No issues with loose bowel movements.     Joint pain: somewhat worse- Remains improved with prednisone.      Past Medical History:  Past Medical History:   Diagnosis Date     ASTHMA - MODERATE PERSISTENT 9/21/2005     Chronic pain      Coronary artery disease      CVA (cerebral infarction)      Depressive disorder, not elsewhere classified      Diabetes (H)      Elevated serum alkaline phosphatase level     Liver source     Fibromyalgia      HYPERLIPIDEMIA NEC/NOS 12/29/2006     Hypertriglyceridemia      OA (osteoarthritis)      Thyroid disease      Trochanteric bursitis      Unspecified essential hypertension      Immunization  Immunization History   Administered Date(s) Administered     COVID-19 Vaccine 18+ (Moderna) 03/03/2021, 04/01/2021, 11/15/2021     COVID-19 Vaccine Bivalent Booster 18+ (Moderna) 11/21/2022     COVID-19,PF,Moderna Booster 05/10/2022     FLU 6-35 months 09/22/2009     Influenza (IIV3) PF 11/09/2000, 12/08/2003, 12/06/2004, 12/08/2005, 11/09/2006, 11/01/2007, 12/04/2008, 09/24/2010, 10/26/2011, 10/26/2012     Influenza Vaccine 50-64 or 18-64 w/egg allergy (Flublok) 09/30/2020, 11/21/2022     Influenza Vaccine >6 months (Alfuria,Fluzone) 11/14/2014, 11/19/2015, 11/26/2019, 11/15/2021     Pneumococcal 23 valent 11/09/2000, 12/23/2021, 02/25/2022     TD (ADULT, 7+) 11/21/2000     TDAP Vaccine (Adacel) 01/18/2011     Tdap (Adacel,Boostrix) 11/30/2021     Zoster vaccine recombinant adjuvanted (SHINGRIX) 11/26/2019       Past Surgical History:  Past Surgical History:   Procedure Laterality Date     3 teeth pulled       INJECT EPIDURAL TRANSFORAMINAL  11/25/2014    [facility]     INJECT JOINT SACROILIAC  2014    [facility]     LAMINECTOMY LUMBAR ONE LEVEL Left 2014    [facility]     ZZC  DELIVERY ONLY      , Low Cervical       Medications:  Current Outpatient Medications   Medication Sig Dispense Refill     acetaminophen (TYLENOL) 500 MG tablet Take 500-1,000 mg by mouth every 6 hours as needed for mild pain       albuterol (PROVENTIL) (2.5 MG/3ML) 0.083% neb solution Take 1 vial (2.5 mg) by nebulization every 6 hours as needed for shortness of breath / dyspnea or wheezing 3 mL 4     albuterol (VENTOLIN HFA) 108 (90 Base) MCG/ACT inhaler INHALE 2 PUFFS INTO THE LUNGS EVERY 6 HOURS AS NEEDED FOR SHORTNESS OF BREATH / DYSPNEA 54 g 1     atorvastatin (LIPITOR) 20 MG tablet Take 1 tablet (20 mg) by mouth daily 90 tablet 1     augmented betamethasone dipropionate (DIPROLENE AF) 0.05 % external cream Apply to affected area twice daily 50 g 2     betamethasone dipropionate (DIPROSONE) 0.05 % external ointment Apply topically 2 times daily To areas of skin sores.  Can also use on mouth sores as needed twice daily. 50 g 3     buPROPion (WELLBUTRIN XL) 300 MG 24 hr tablet Take 1 tablet (300 mg) by mouth every morning 30 tablet 0     busPIRone (BUSPAR) 15 MG tablet Take 1 tablet (15 mg) by mouth 2 times daily 180 tablet 0     butorphanol (STADOL) 10 MG/ML nasal spray Spray 1 spray in nostril every 4 hours as needed for severe pain (7-10) 2.5 mL 3     celecoxib (CELEBREX) 200 MG capsule TAKE ONE CAPSULE BY MOUTH TWICE A DAY AS NEEDED FOR PAIN 180 capsule 1     chlorhexidine (PERIDEX) 0.12 % solution Swish and spit 15 mLs in mouth 2 times daily 1893 mL 4     clindamycin (CLINDAMAX) 1 % external gel Apply topically 2 times daily 30 g 4     clotrimazole (MYCELEX) 10 MG lozenge Place 1 lozenge (10 mg) inside cheek 5 times daily (Patient taking differently: Place 10 mg inside cheek 5 x daily PRN) 70 lozenge 1     desonide (DESOWEN) 0.05 %  external cream Apply topically 2 times daily To sores on face 60 g 3     desonide (DESOWEN) 0.05 % external ointment Apply topically 2 times daily To areas of skin sores on faec 60 g 3     dextran 70-hypromellose (TEARS NATURALE FREE PF) 0.1-0.3 % ophthalmic solution Place 2 drops into both eyes daily as needed (for dry eyes) 35 each 3     doxycycline hyclate (VIBRA-TABS) 100 MG tablet Take 1 tablet (100 mg) by mouth 2 times daily 14 tablet 0     DULoxetine (CYMBALTA) 60 MG capsule Take 2 capsules (120 mg) by mouth daily 180 capsule 1     dupilumab (DUPIXENT) 300 MG/2ML prefilled pen Inject 2 mLs (300 mg) Subcutaneous every 14 days After first loading dose 4 mL 2     famotidine (PEPCID) 40 MG tablet Take 1 tablet (40 mg) by mouth daily 90 tablet 3     fluocinonide (LIDEX) 0.05 % external gel Apply topically 2 times daily as needed 15 g 0     Fluticasone-Umeclidin-Vilant (TRELEGY ELLIPTA) 100-62.5-25 MCG/ACT oral inhaler Inhale 1 puff into the lungs daily 84 each 1     hydrochlorothiazide (HYDRODIURIL) 25 MG tablet Take 1 tablet (25 mg) by mouth daily 90 tablet 1     hydrocortisone 2.5 % cream APPLY TOPICALLY 2 TIMES DAILY AS NEEDED 30 g 3     losartan (COZAAR) 100 MG tablet Take 1 tablet (100 mg) by mouth daily 90 tablet 1     metoprolol succinate ER (TOPROL XL) 100 MG 24 hr tablet TAKE ONE TABLET BY MOUTH TWICE DAILY 180 tablet 1     metoprolol succinate ER (TOPROL XL) 25 MG 24 hr tablet Take 1 tablet (25 mg) by mouth 3 times daily 270 tablet 1     montelukast (SINGULAIR) 10 MG tablet Take 1 tablet (10 mg) by mouth At Bedtime 90 tablet 3     mupirocin (BACTROBAN) 2 % external cream Apply to affected area three times daily 60 g 1     nystatin (MYCOSTATIN) 068049 UNIT/ML suspension Take by mouth 4 times daily Has recurrent thrush. Uses for 2 weeks at a time as needed. 380 mL 1     oxybutynin ER (DITROPAN XL) 5 MG 24 hr tablet Take 2 tablets (10 mg) by mouth daily 180 tablet 2     predniSONE (DELTASONE) 10 MG tablet  Take 1 tablet (10 mg) by mouth daily 90 tablet 0     rizatriptan (MAXALT) 10 MG tablet Take 1 tablet (10 mg) by mouth at onset of headache for migraine May repeat in 2 hours. Max 3 tablets/24 hours. 18 tablet 1     rOPINIRole (REQUIP) 1 MG tablet TAKE THREE TABLETS BY MOUTH AT BEDTIME 270 tablet 3     tacrolimus (PROTOPIC) 0.1 % external ointment Apply topically 2 times daily To areas of sores on face 60 g 1     traMADol (ULTRAM) 50 MG tablet Take 1 tablet (50 mg) by mouth daily as needed for severe pain (7-10) 30 tablet 0     traZODone (DESYREL) 50 MG tablet Take 3 tablets (150 mg) by mouth At Bedtime 270 tablet 0     triamcinolone (ARISTOCORT HP) 0.5 % external cream Apply topically 2 times daily 60 g 0     valACYclovir (VALTREX) 500 MG tablet TAKE ONE TABLET BY MOUTH ONE TIME DAILY (Patient taking differently: 500 mg daily as needed) 90 tablet 1     ZYRTEC 10 MG OR TABS Take 10 mg by mouth daily as needed 90 3        Allergies:  Allergies   Allergen Reactions     Cats      and rabbits/wheezing     Dogs      wheezing     Lyrica [Pregabalin] Other (See Comments)     Rash, mouth sores, itching and burning     Seasonal Allergies      rhinits       ROS:  Remainder of a comprehensive 10 point ROS is negative unless noted above.    Social History:  Denies any tobacco use. No significant alcohol use. Denies any illicit drug use.     Family History:  Two daughter  29 Yo daughter  28 yo daughter  1 grandson    1 sister- older- high blood glucose, arthritis  1 brother- older- no idea    Mother- - heart valve. Had MDS. Needed a shot every month  Father- - cancer    Objectives:  GENERAL: alert and no distress  EYES: Eyes grossly normal to inspection.  No discharge or erythema, or obvious scleral/conjunctival abnormalities.  RESP: No audible wheeze, cough, or visible cyanosis.  No visible retractions or increased work of breathing.    SKIN: face covered in reddish ulcerated lesions at various stages of healing  with numerous scabs.  -hypopigmentation - hands, lips, neck and across face, scar - face and numerous scabs over face.--- review MyChart  NEURO: Cranial nerves grossly intact.  Mentation and speech appropriate for age.  PSYCH: Mentation appears normal, affect normal/bright, judgement and insight intact, normal speech and appearance well-groomed.      Labs:    Ferritin   Date Value Ref Range Status   12/28/2022 64 8 - 252 ng/mL Final   04/16/2021 376 (H) 8 - 252 ng/mL Final     Iron   Date Value Ref Range Status   09/22/2022 88 37 - 145 ug/dL Final   01/20/2021 50 35 - 180 ug/dL Final     Iron Binding Cap   Date Value Ref Range Status   01/20/2021 350 240 - 430 ug/dL Final     Iron Binding Capacity   Date Value Ref Range Status   09/22/2022 260 240 - 430 ug/dL Final   09/15/2021 284 240 - 430 ug/dL Final       Refer to [facility]- autoantibody platelet study    Imaging:  Not applicable

## 2023-02-09 NOTE — PROGRESS NOTES
February 9, 2023    To Whom it may Concern:    I am writing this letter on behalf of my patient, Ms. Jacquie Amador (date of birth 1960), to petition for insurance coverage for intranasal butorphanol.  We received a denial for this medication and a request that we consider starting alternative medications first, however we are prescribing this medication for chronic intractable itch with a minor component of pain, and thus other opioid medications are not indicated for this condition.  In fact, medications such as morphine or fentanyl may up-regulate pruritus in patients with chronic itch and are thus contraindicated.  In most instances we would start with oral naltrexone for patients with refractory chronic itch, however Jacquie takes tramadol and we are concerned for potential drug-drug interactions.  There is good dermatology literature support for using intranasal butorphanol for intractable itch, and we feel this may be a good choice for Jacquie, as she also has significant pain.  Please see the reference below.  We have tried many other anti-itch therapies for Jacquie with little benefit, and thus I view this medication as required and the next step in her therapeutic options.  Please feel free to contact me at anytime with questions.    Reference:  Rocío BAXTER, Shobha EMMANUEL. Butorphanol for treatment of intractable pruritus. J Am Acad Dermatol. 2006 Mar;54(3):527-31. doi: 10.1016/j.jaad.2005.12.010. Epub 2006 Jan 18. PMID: 03237735.        Sincerely,    Jay Warren MD   of dermatology

## 2023-02-10 DIAGNOSIS — E87.6 LOW SERUM POTASSIUM: Primary | ICD-10-CM

## 2023-02-10 RX ORDER — POTASSIUM CHLORIDE 1500 MG/1
20 TABLET, EXTENDED RELEASE ORAL 2 TIMES DAILY
Qty: 14 TABLET | Refills: 0 | Status: SHIPPED | OUTPATIENT
Start: 2023-02-10 | End: 2023-02-17

## 2023-02-12 ENCOUNTER — HEALTH MAINTENANCE LETTER (OUTPATIENT)
Age: 63
End: 2023-02-12

## 2023-02-14 ENCOUNTER — LAB (OUTPATIENT)
Dept: LAB | Facility: CLINIC | Age: 63
End: 2023-02-14
Payer: COMMERCIAL

## 2023-02-14 DIAGNOSIS — E87.6 LOW SERUM POTASSIUM: ICD-10-CM

## 2023-02-14 DIAGNOSIS — E87.6 HYPOKALEMIA: ICD-10-CM

## 2023-02-14 DIAGNOSIS — L30.9 DERMATITIS: ICD-10-CM

## 2023-02-14 DIAGNOSIS — D50.8 OTHER IRON DEFICIENCY ANEMIAS: ICD-10-CM

## 2023-02-14 LAB
ALBUMIN SERPL-MCNC: 3.5 G/DL (ref 3.4–5)
ALP SERPL-CCNC: 97 U/L (ref 40–150)
ALT SERPL W P-5'-P-CCNC: 29 U/L (ref 0–50)
ANION GAP SERPL CALCULATED.3IONS-SCNC: 5 MMOL/L (ref 3–14)
AST SERPL W P-5'-P-CCNC: 15 U/L (ref 0–45)
BILIRUB SERPL-MCNC: 0.6 MG/DL (ref 0.2–1.3)
BUN SERPL-MCNC: 17 MG/DL (ref 7–30)
CALCIUM SERPL-MCNC: 9.5 MG/DL (ref 8.5–10.1)
CHLORIDE BLD-SCNC: 105 MMOL/L (ref 94–109)
CO2 SERPL-SCNC: 29 MMOL/L (ref 20–32)
CREAT SERPL-MCNC: 0.95 MG/DL (ref 0.52–1.04)
GFR SERPL CREATININE-BSD FRML MDRD: 67 ML/MIN/1.73M2
GLUCOSE BLD-MCNC: 121 MG/DL (ref 70–99)
POTASSIUM BLD-SCNC: 3.7 MMOL/L (ref 3.4–5.3)
PROT SERPL-MCNC: 7.7 G/DL (ref 6.8–8.8)
SODIUM SERPL-SCNC: 139 MMOL/L (ref 133–144)

## 2023-02-14 PROCEDURE — 99000 SPECIMEN HANDLING OFFICE-LAB: CPT

## 2023-02-14 PROCEDURE — 84311 SPECTROPHOTOMETRY: CPT | Mod: 90

## 2023-02-14 PROCEDURE — 36415 COLL VENOUS BLD VENIPUNCTURE: CPT

## 2023-02-14 PROCEDURE — 82728 ASSAY OF FERRITIN: CPT

## 2023-02-14 PROCEDURE — 80053 COMPREHEN METABOLIC PANEL: CPT

## 2023-02-15 DIAGNOSIS — E87.6 HYPOKALEMIA: Primary | ICD-10-CM

## 2023-02-16 ENCOUNTER — MYC MEDICAL ADVICE (OUTPATIENT)
Dept: FAMILY MEDICINE | Facility: CLINIC | Age: 63
End: 2023-02-16

## 2023-02-16 DIAGNOSIS — D50.8 OTHER IRON DEFICIENCY ANEMIAS: Primary | ICD-10-CM

## 2023-02-17 LAB — FERRITIN SERPL-MCNC: 53 NG/ML (ref 8–252)

## 2023-02-20 ENCOUNTER — MYC MEDICAL ADVICE (OUTPATIENT)
Dept: FAMILY MEDICINE | Facility: CLINIC | Age: 63
End: 2023-02-20
Payer: COMMERCIAL

## 2023-02-20 LAB
PATH INTERP SPEC-IMP: NORMAL
PATH REV: NORMAL
PORPHYRINS SERPL-MCNC: <1 MCG/DL

## 2023-02-21 NOTE — TELEPHONE ENCOUNTER
RN called to discuss further.   Patient scheduled with RN on Monday 02/27/2023.   MARVA BaileyN, RN, PHN  [place]  February 21, 2023

## 2023-02-21 NOTE — TELEPHONE ENCOUNTER
LOMN Below        February 9, 2023     To Whom it may Concern:     I am writing this letter on behalf of my patient, Ms. Jacquie Amador (date of birth 1960), to petition for insurance coverage for intranasal butorphanol.  We received a denial for this medication and a request that we consider starting alternative medications first, however we are prescribing this medication for chronic intractable itch with a minor component of pain, and thus other opioid medications are not indicated for this condition.  In fact, medications such as morphine or fentanyl may up-regulate pruritus in patients with chronic itch and are thus contraindicated.  In most instances we would start with oral naltrexone for patients with refractory chronic itch, however Jacquie takes tramadol and we are concerned for potential drug-drug interactions.  There is good dermatology literature support for using intranasal butorphanol for intractable itch, and we feel this may be a good choice for Jacquie, as she also has significant pain.  Please see the reference below.  We have tried many other anti-itch therapies for Jacquie with little benefit, and thus I view this medication as required and the next step in her therapeutic options.  Please feel free to contact me at anytime with questions.     Reference:  Rocío BAXTER, Shobha EMMANUEL. Butorphanol for treatment of intractable pruritus. J Am Acad Dermatol. 2006 Mar;54(3):527-31. doi: 10.1016/j.jaad.2005.12.010. Epub 2006 Jan 18. PMID: 21558994.           Sincerely,     Jay Warren MD   of dermatology

## 2023-02-22 NOTE — TELEPHONE ENCOUNTER
Medication Appeal Initiation    We have initiated an appeal for the requested medication:  Medication: butorphanol (STADOL) 10 MG/ML nasal spray - APPEAL INITIATED  Appeal Start Date:  2/22/2023  Insurance Company: [employer] - Phone [phone] Fax [phone]  Comments:

## 2023-02-24 NOTE — TELEPHONE ENCOUNTER
MEDICATION APPEAL DENIED    Medication: butorphanol (STADOL) 10 MG/ML nasal spray - APPEAL DENIED    Denial Date: 2/24/2023    Denial Rational:       Second Level Appeal Information:       Second level appeals will be managed by the clinic staff and provider. Please contact the [employer] Prior Authorization Team if additional information about the denial is needed.

## 2023-02-28 ENCOUNTER — VIRTUAL VISIT (OUTPATIENT)
Dept: DERMATOLOGY | Facility: CLINIC | Age: 63
End: 2023-02-28
Payer: COMMERCIAL

## 2023-02-28 ENCOUNTER — MYC MEDICAL ADVICE (OUTPATIENT)
Dept: FAMILY MEDICINE | Facility: CLINIC | Age: 63
End: 2023-02-28

## 2023-02-28 ENCOUNTER — LAB (OUTPATIENT)
Dept: LAB | Facility: CLINIC | Age: 63
End: 2023-02-28
Payer: COMMERCIAL

## 2023-02-28 ENCOUNTER — OFFICE VISIT (OUTPATIENT)
Dept: FAMILY MEDICINE | Facility: CLINIC | Age: 63
End: 2023-02-28
Payer: COMMERCIAL

## 2023-02-28 VITALS
OXYGEN SATURATION: 98 % | SYSTOLIC BLOOD PRESSURE: 130 MMHG | WEIGHT: 224 LBS | BODY MASS INDEX: 36 KG/M2 | DIASTOLIC BLOOD PRESSURE: 86 MMHG | HEIGHT: 66 IN | RESPIRATION RATE: 22 BRPM | TEMPERATURE: 97.7 F | HEART RATE: 85 BPM

## 2023-02-28 DIAGNOSIS — L29.9 PRURITUS: Primary | ICD-10-CM

## 2023-02-28 DIAGNOSIS — R00.0 SINUS TACHYCARDIA: ICD-10-CM

## 2023-02-28 DIAGNOSIS — G43.009 MIGRAINE WITHOUT AURA AND WITHOUT STATUS MIGRAINOSUS, NOT INTRACTABLE: ICD-10-CM

## 2023-02-28 DIAGNOSIS — D69.1 PLATELET GRANULE DEFECT (H): ICD-10-CM

## 2023-02-28 DIAGNOSIS — Z12.11 SCREEN FOR COLON CANCER: ICD-10-CM

## 2023-02-28 DIAGNOSIS — L30.9 DERMATITIS: ICD-10-CM

## 2023-02-28 DIAGNOSIS — J45.40 MODERATE PERSISTENT ASTHMA WITHOUT COMPLICATION: Primary | ICD-10-CM

## 2023-02-28 DIAGNOSIS — M19.049 HAND ARTHRITIS: ICD-10-CM

## 2023-02-28 DIAGNOSIS — I10 HYPERTENSION GOAL BP (BLOOD PRESSURE) < 140/90: ICD-10-CM

## 2023-02-28 DIAGNOSIS — K12.0 APHTHAE, ORAL: ICD-10-CM

## 2023-02-28 DIAGNOSIS — M25.50 MULTIPLE JOINT PAIN: ICD-10-CM

## 2023-02-28 DIAGNOSIS — L74.511 PRIMARY FOCAL HYPERHIDROSIS OF FACE: ICD-10-CM

## 2023-02-28 DIAGNOSIS — E87.6 HYPOKALEMIA: ICD-10-CM

## 2023-02-28 DIAGNOSIS — G89.4 CHRONIC PAIN SYNDROME: ICD-10-CM

## 2023-02-28 DIAGNOSIS — E78.5 HYPERLIPIDEMIA LDL GOAL <130: ICD-10-CM

## 2023-02-28 DIAGNOSIS — B37.0 THRUSH: ICD-10-CM

## 2023-02-28 DIAGNOSIS — F11.90 CHRONIC, CONTINUOUS USE OF OPIOIDS: ICD-10-CM

## 2023-02-28 LAB — POTASSIUM BLD-SCNC: 3.2 MMOL/L (ref 3.4–5.3)

## 2023-02-28 PROCEDURE — 99214 OFFICE O/P EST MOD 30 MIN: CPT | Performed by: FAMILY MEDICINE

## 2023-02-28 PROCEDURE — 99442 PR PHYSICIAN TELEPHONE EVALUATION 11-20 MIN: CPT | Mod: 95 | Performed by: DERMATOLOGY

## 2023-02-28 PROCEDURE — 36415 COLL VENOUS BLD VENIPUNCTURE: CPT

## 2023-02-28 PROCEDURE — 84132 ASSAY OF SERUM POTASSIUM: CPT

## 2023-02-28 RX ORDER — TRAMADOL HYDROCHLORIDE 50 MG/1
50 TABLET ORAL EVERY 12 HOURS PRN
Qty: 45 TABLET | Refills: 0 | Status: SHIPPED | OUTPATIENT
Start: 2023-02-28 | End: 2023-04-28

## 2023-02-28 RX ORDER — METOPROLOL SUCCINATE 25 MG/1
25 TABLET, EXTENDED RELEASE ORAL 3 TIMES DAILY
Qty: 270 TABLET | Refills: 1 | Status: SHIPPED | OUTPATIENT
Start: 2023-02-28 | End: 2023-04-19

## 2023-02-28 RX ORDER — ATORVASTATIN CALCIUM 20 MG/1
20 TABLET, FILM COATED ORAL DAILY
Qty: 90 TABLET | Refills: 1 | Status: SHIPPED | OUTPATIENT
Start: 2023-02-28 | End: 2023-09-05

## 2023-02-28 RX ORDER — MONTELUKAST SODIUM 10 MG/1
10 TABLET ORAL AT BEDTIME
Qty: 90 TABLET | Refills: 3 | Status: SHIPPED | OUTPATIENT
Start: 2023-02-28 | End: 2023-12-29

## 2023-02-28 RX ORDER — NALTREXONE HYDROCHLORIDE 50 MG/1
TABLET, FILM COATED ORAL
Qty: 30 TABLET | Refills: 1 | Status: SHIPPED | OUTPATIENT
Start: 2023-02-28 | End: 2023-04-28

## 2023-02-28 RX ORDER — OXYBUTYNIN CHLORIDE 5 MG/1
10 TABLET, EXTENDED RELEASE ORAL DAILY
Qty: 180 TABLET | Refills: 2 | Status: SHIPPED | OUTPATIENT
Start: 2023-02-28 | End: 2023-12-05

## 2023-02-28 RX ORDER — NYSTATIN 100000/ML
SUSPENSION, ORAL (FINAL DOSE FORM) ORAL 4 TIMES DAILY
Qty: 380 ML | Refills: 1 | Status: SHIPPED | OUTPATIENT
Start: 2023-02-28 | End: 2024-02-29

## 2023-02-28 RX ORDER — ALBUTEROL SULFATE 90 UG/1
AEROSOL, METERED RESPIRATORY (INHALATION)
Qty: 54 G | Refills: 1 | Status: SHIPPED | OUTPATIENT
Start: 2023-02-28

## 2023-02-28 RX ORDER — DULOXETIN HYDROCHLORIDE 60 MG/1
120 CAPSULE, DELAYED RELEASE ORAL DAILY
Qty: 180 CAPSULE | Refills: 1 | Status: SHIPPED | OUTPATIENT
Start: 2023-02-28 | End: 2023-12-29

## 2023-02-28 RX ORDER — FLUOCINONIDE GEL 0.5 MG/G
GEL TOPICAL 2 TIMES DAILY PRN
Qty: 15 G | Refills: 0 | Status: SHIPPED | OUTPATIENT
Start: 2023-02-28 | End: 2023-03-03

## 2023-02-28 RX ORDER — RIZATRIPTAN BENZOATE 10 MG/1
10 TABLET ORAL
Qty: 18 TABLET | Refills: 1 | Status: SHIPPED | OUTPATIENT
Start: 2023-02-28

## 2023-02-28 RX ORDER — CELECOXIB 200 MG/1
CAPSULE ORAL
Qty: 180 CAPSULE | Refills: 1 | Status: SHIPPED | OUTPATIENT
Start: 2023-02-28 | End: 2023-12-29

## 2023-02-28 RX ORDER — LOSARTAN POTASSIUM 100 MG/1
100 TABLET ORAL DAILY
Qty: 90 TABLET | Refills: 1 | Status: SHIPPED | OUTPATIENT
Start: 2023-02-28 | End: 2023-05-09

## 2023-02-28 RX ORDER — HYDROCORTISONE 2.5 %
CREAM (GRAM) TOPICAL
Qty: 30 G | Refills: 3 | Status: SHIPPED | OUTPATIENT
Start: 2023-02-28 | End: 2023-12-21

## 2023-02-28 ASSESSMENT — ANXIETY QUESTIONNAIRES
6. BECOMING EASILY ANNOYED OR IRRITABLE: MORE THAN HALF THE DAYS
2. NOT BEING ABLE TO STOP OR CONTROL WORRYING: SEVERAL DAYS
3. WORRYING TOO MUCH ABOUT DIFFERENT THINGS: SEVERAL DAYS
GAD7 TOTAL SCORE: 8
5. BEING SO RESTLESS THAT IT IS HARD TO SIT STILL: NOT AT ALL
IF YOU CHECKED OFF ANY PROBLEMS ON THIS QUESTIONNAIRE, HOW DIFFICULT HAVE THESE PROBLEMS MADE IT FOR YOU TO DO YOUR WORK, TAKE CARE OF THINGS AT HOME, OR GET ALONG WITH OTHER PEOPLE: SOMEWHAT DIFFICULT
7. FEELING AFRAID AS IF SOMETHING AWFUL MIGHT HAPPEN: SEVERAL DAYS
GAD7 TOTAL SCORE: 8
1. FEELING NERVOUS, ANXIOUS, OR ON EDGE: NEARLY EVERY DAY

## 2023-02-28 ASSESSMENT — PAIN SCALES - GENERAL: PAINLEVEL: SEVERE PAIN (7)

## 2023-02-28 ASSESSMENT — PATIENT HEALTH QUESTIONNAIRE - PHQ9
SUM OF ALL RESPONSES TO PHQ QUESTIONS 1-9: 12
5. POOR APPETITE OR OVEREATING: NOT AT ALL

## 2023-02-28 ASSESSMENT — ASTHMA QUESTIONNAIRES: ACT_TOTALSCORE: 15

## 2023-02-28 NOTE — LETTER
2/28/2023       RE: Jacquie Amador  [address]  [facility] [address]     Dear Colleague,    Thank you for referring your patient, Jacquie Amador, to the [facility] [place] [facility]. Please see a copy of my visit note below.    [facility] Dermatology Note  Encounter Date: Feb 28, 2023  Store-and-Forward and Telephone ([phone]). Location of teledermatologist: [facility] [place].  Start time: 8:15. End time: 8:28.    Dermatology Problem List:  1. Recurrent aphthous ulcers  - Current treatment: dupixent, gentle skin care with   2. Multiple skin ulcers resembling prurigo/neurodermatitis    ____________________________________________    Assessment & Plan:     # Recurring oral and genital sores, likely recurrent aphthous ulcers, as well as multiple diffuse discrete skin ulcers.  Chronic active problem, uncontrolled. With minimal improvement after trial of Dupixent at this visit.  We discussed various treatment options for her facial ulcers, which are bringing her the most distress.  We attempted to prescribe intranasal butorphanol but this was denied by her insurer, despite an appeal.  - Will cautiously trial naltrexone, but may need to stop if interfering with tramadol; start 12.5mg once a day  - Continue gentle skin care with Protopic and mupirocin.  - Recommended hydrocolloid bandages and restoration of skin barrier.  Can provide hydrocolloid bandages for the patient if she is willing to come to clinic to obtain these.  - Continue Dupixent for now.   - For now she will continue colchicine 0.6 mg twice daily, as she has tolerated this and has seen some improvement in her oral sores.  -We will plan to have her stop dapsone, as she has not seen any improvement with this.  - She can also continue betamethasone ointment as needed for persistent skin sores and pain.      Followup: 6  deisy Warren MD  Dermatology Attending  ____________________________________________    CC: Derm Problem (Jacquie is being seen today for a follow up- nasal spray was denied )    HPI:  Ms. Jacquie Amador is a(n) 62 year old female who presents today for followup on facial sores.  At her last visit we prescribed intranasal butorphanol as she has both significant pain and itch from face sores, but this was denied by her insurance.  She has ongoing pain and itch as previously.    Patient is otherwise feeling well, without additional skin concerns.    Labs Reviewed:  N/A    Physical Exam:  Vitals: LMP 07/17/2009 (Exact Date)   SKIN: Teledermatology photos were reviewed; image quality and interpretability: acceptable. Image date: 2/28/23.  - multiple geographic shallow ulcers with hemorrhagic crusts on forehead and cheeks  - no obvious actively inflamed borders to ulcers  - No other lesions of concern on areas examined.     Medications:  Current Outpatient Medications   Medication     acetaminophen (TYLENOL) 500 MG tablet     albuterol (PROVENTIL) (2.5 MG/3ML) 0.083% neb solution     albuterol (VENTOLIN HFA) 108 (90 Base) MCG/ACT inhaler     amoxicillin (AMOXIL) 500 MG capsule     atorvastatin (LIPITOR) 20 MG tablet     augmented betamethasone dipropionate (DIPROLENE AF) 0.05 % external cream     betamethasone dipropionate (DIPROSONE) 0.05 % external ointment     buPROPion (WELLBUTRIN XL) 300 MG 24 hr tablet     busPIRone (BUSPAR) 15 MG tablet     butorphanol (STADOL) 10 MG/ML nasal spray     celecoxib (CELEBREX) 200 MG capsule     chlorhexidine (PERIDEX) 0.12 % solution     clindamycin (CLINDAMAX) 1 % external gel     clotrimazole (MYCELEX) 10 MG lozenge     desonide (DESOWEN) 0.05 % external cream     desonide (DESOWEN) 0.05 % external ointment     dextran 70-hypromellose (TEARS NATURALE FREE PF) 0.1-0.3 % ophthalmic solution     doxycycline hyclate (VIBRA-TABS) 100 MG tablet     DULoxetine (CYMBALTA) 60  MG capsule     dupilumab (DUPIXENT) 300 MG/2ML prefilled pen     famotidine (PEPCID) 40 MG tablet     fluocinonide (LIDEX) 0.05 % external gel     Fluticasone-Umeclidin-Vilant (TRELEGY ELLIPTA) 100-62.5-25 MCG/ACT oral inhaler     hydrochlorothiazide (HYDRODIURIL) 25 MG tablet     hydrocortisone 2.5 % cream     lidocaine, viscous, (XYLOCAINE) 2 % solution     losartan (COZAAR) 100 MG tablet     metoprolol succinate ER (TOPROL XL) 100 MG 24 hr tablet     metoprolol succinate ER (TOPROL XL) 25 MG 24 hr tablet     montelukast (SINGULAIR) 10 MG tablet     mupirocin (BACTROBAN) 2 % external cream     nystatin (MYCOSTATIN) 322486 UNIT/ML suspension     oxybutynin ER (DITROPAN XL) 5 MG 24 hr tablet     predniSONE (DELTASONE) 10 MG tablet     [START ON 3/3/2023] predniSONE (DELTASONE) 10 MG tablet     predniSONE (DELTASONE) 10 MG tablet     rizatriptan (MAXALT) 10 MG tablet     rOPINIRole (REQUIP) 1 MG tablet     tacrolimus (PROTOPIC) 0.1 % external ointment     traMADol (ULTRAM) 50 MG tablet     traZODone (DESYREL) 50 MG tablet     triamcinolone (ARISTOCORT HP) 0.5 % external cream     valACYclovir (VALTREX) 500 MG tablet     ZYRTEC 10 MG OR TABS     No current facility-administered medications for this visit.      Past Medical/Surgical History:   Patient Active Problem List   Diagnosis     Moderate persistent asthma without complication     Allergic rhinitis due to other allergen     Esophageal reflux     Moderate recurrent major depression (H)     Multiple joint pain     HYPERLIPIDEMIA LDL GOAL <130     Hypertension goal BP (blood pressure) < 140/90     Generalized anxiety disorder     Myalgia     Chronic rhinitis     Constipation     Lumbar disc disease with radiculopathy     Iron deficiency anemia     Osteoarthritis of carpometacarpal joint of thumb - bilateral     Trochanteric bursitis     Right ankle instability     Primary focal hyperhidrosis     Trochanteric bursitis of both hips     Overweight     Iron deficiency      Scratching     Advanced directives, counseling/discussion     Chronic, continuous use of opioids     Medical marijuana use     Primary osteoarthritis of both first carpometacarpal joints     Arthritis of metatarsophalangeal joint     Sinus tachycardia     Intractable chronic migraine without aura and without status migrainosus     Impaired fasting glucose     Migraine without aura and without status migrainosus, not intractable     Skin ulcer, limited to breakdown of skin (H)     Morbid obesity (H)     Platelet granule defect (H)     Aphthous ulcer     Rash     Pruritus     Behcet's syndrome involving oral mucosa (H)     Encounter for long-term (current) use of high-risk medication     Dermatitis     Encounter for long-term current use of high risk medication     Acquired platelet disorder (H)     Other atopic dermatitis     Facial eczema     Past Medical History:   Diagnosis Date     ASTHMA - MODERATE PERSISTENT 9/21/2005     Chronic pain      Coronary artery disease      CVA (cerebral infarction)      Depressive disorder, not elsewhere classified      Diabetes (H)      Elevated serum alkaline phosphatase level     Liver source     Fibromyalgia      HYPERLIPIDEMIA NEC/NOS 12/29/2006     Hypertriglyceridemia      OA (osteoarthritis)      Thyroid disease      Trochanteric bursitis      Unspecified essential hypertension        CC No referring provider defined for this encounter. on close of this encounter.

## 2023-02-28 NOTE — PROGRESS NOTES
[facility] Dermatology Note  Encounter Date: Feb 28, 2023  Store-and-Forward and Telephone ([phone]). Location of teledermatologist: [facility] [place].  Start time: 8:15. End time: 8:28.    Dermatology Problem List:  1. Recurrent aphthous ulcers  - Current treatment: dupixent, gentle skin care with   2. Multiple skin ulcers resembling prurigo/neurodermatitis    ____________________________________________    Assessment & Plan:     # Recurring oral and genital sores, likely recurrent aphthous ulcers, as well as multiple diffuse discrete skin ulcers.  Chronic active problem, uncontrolled. With minimal improvement after trial of Dupixent at this visit.  We discussed various treatment options for her facial ulcers, which are bringing her the most distress.  We attempted to prescribe intranasal butorphanol but this was denied by her insurer, despite an appeal.  - Will cautiously trial naltrexone, but may need to stop if interfering with tramadol; start 12.5mg once a day  - Continue gentle skin care with Protopic and mupirocin.  - Recommended hydrocolloid bandages and restoration of skin barrier.  Can provide hydrocolloid bandages for the patient if she is willing to come to clinic to obtain these.  - Continue Dupixent for now.   - For now she will continue colchicine 0.6 mg twice daily, as she has tolerated this and has seen some improvement in her oral sores.  -We will plan to have her stop dapsone, as she has not seen any improvement with this.  - She can also continue betamethasone ointment as needed for persistent skin sores and pain.      Followup: 6 weeks    Jay Warren MD  Dermatology Attending  ____________________________________________    CC: Derm Problem (Jacquie is being seen today for a follow up- nasal spray was denied )    HPI:  Ms. Jacquie Amador is a(n) 62 year old female who presents today for followup on facial sores.  At her last visit we  prescribed intranasal butorphanol as she has both significant pain and itch from face sores, but this was denied by her insurance.  She has ongoing pain and itch as previously.    Patient is otherwise feeling well, without additional skin concerns.    Labs Reviewed:  N/A    Physical Exam:  Vitals: LMP 07/17/2009 (Exact Date)   SKIN: Teledermatology photos were reviewed; image quality and interpretability: acceptable. Image date: 2/28/23.  - multiple geographic shallow ulcers with hemorrhagic crusts on forehead and cheeks  - no obvious actively inflamed borders to ulcers  - No other lesions of concern on areas examined.     Medications:  Current Outpatient Medications   Medication     acetaminophen (TYLENOL) 500 MG tablet     albuterol (PROVENTIL) (2.5 MG/3ML) 0.083% neb solution     albuterol (VENTOLIN HFA) 108 (90 Base) MCG/ACT inhaler     amoxicillin (AMOXIL) 500 MG capsule     atorvastatin (LIPITOR) 20 MG tablet     augmented betamethasone dipropionate (DIPROLENE AF) 0.05 % external cream     betamethasone dipropionate (DIPROSONE) 0.05 % external ointment     buPROPion (WELLBUTRIN XL) 300 MG 24 hr tablet     busPIRone (BUSPAR) 15 MG tablet     butorphanol (STADOL) 10 MG/ML nasal spray     celecoxib (CELEBREX) 200 MG capsule     chlorhexidine (PERIDEX) 0.12 % solution     clindamycin (CLINDAMAX) 1 % external gel     clotrimazole (MYCELEX) 10 MG lozenge     desonide (DESOWEN) 0.05 % external cream     desonide (DESOWEN) 0.05 % external ointment     dextran 70-hypromellose (TEARS NATURALE FREE PF) 0.1-0.3 % ophthalmic solution     doxycycline hyclate (VIBRA-TABS) 100 MG tablet     DULoxetine (CYMBALTA) 60 MG capsule     dupilumab (DUPIXENT) 300 MG/2ML prefilled pen     famotidine (PEPCID) 40 MG tablet     fluocinonide (LIDEX) 0.05 % external gel     Fluticasone-Umeclidin-Vilant (TRELEGY ELLIPTA) 100-62.5-25 MCG/ACT oral inhaler     hydrochlorothiazide (HYDRODIURIL) 25 MG tablet     hydrocortisone 2.5 % cream      lidocaine, viscous, (XYLOCAINE) 2 % solution     losartan (COZAAR) 100 MG tablet     metoprolol succinate ER (TOPROL XL) 100 MG 24 hr tablet     metoprolol succinate ER (TOPROL XL) 25 MG 24 hr tablet     montelukast (SINGULAIR) 10 MG tablet     mupirocin (BACTROBAN) 2 % external cream     nystatin (MYCOSTATIN) 897761 UNIT/ML suspension     oxybutynin ER (DITROPAN XL) 5 MG 24 hr tablet     predniSONE (DELTASONE) 10 MG tablet     [START ON 3/3/2023] predniSONE (DELTASONE) 10 MG tablet     predniSONE (DELTASONE) 10 MG tablet     rizatriptan (MAXALT) 10 MG tablet     rOPINIRole (REQUIP) 1 MG tablet     tacrolimus (PROTOPIC) 0.1 % external ointment     traMADol (ULTRAM) 50 MG tablet     traZODone (DESYREL) 50 MG tablet     triamcinolone (ARISTOCORT HP) 0.5 % external cream     valACYclovir (VALTREX) 500 MG tablet     ZYRTEC 10 MG OR TABS     No current facility-administered medications for this visit.      Past Medical/Surgical History:   Patient Active Problem List   Diagnosis     Moderate persistent asthma without complication     Allergic rhinitis due to other allergen     Esophageal reflux     Moderate recurrent major depression (H)     Multiple joint pain     HYPERLIPIDEMIA LDL GOAL <130     Hypertension goal BP (blood pressure) < 140/90     Generalized anxiety disorder     Myalgia     Chronic rhinitis     Constipation     Lumbar disc disease with radiculopathy     Iron deficiency anemia     Osteoarthritis of carpometacarpal joint of thumb - bilateral     Trochanteric bursitis     Right ankle instability     Primary focal hyperhidrosis     Trochanteric bursitis of both hips     Overweight     Iron deficiency     Scratching     Advanced directives, counseling/discussion     Chronic, continuous use of opioids     Medical marijuana use     Primary osteoarthritis of both first carpometacarpal joints     Arthritis of metatarsophalangeal joint     Sinus tachycardia     Intractable chronic migraine without aura and without  status migrainosus     Impaired fasting glucose     Migraine without aura and without status migrainosus, not intractable     Skin ulcer, limited to breakdown of skin (H)     Morbid obesity (H)     Platelet granule defect (H)     Aphthous ulcer     Rash     Pruritus     Behcet's syndrome involving oral mucosa (H)     Encounter for long-term (current) use of high-risk medication     Dermatitis     Encounter for long-term current use of high risk medication     Acquired platelet disorder (H)     Other atopic dermatitis     Facial eczema     Past Medical History:   Diagnosis Date     ASTHMA - MODERATE PERSISTENT 9/21/2005     Chronic pain      Coronary artery disease      CVA (cerebral infarction)      Depressive disorder, not elsewhere classified      Diabetes (H)      Elevated serum alkaline phosphatase level     Liver source     Fibromyalgia      HYPERLIPIDEMIA NEC/NOS 12/29/2006     Hypertriglyceridemia      OA (osteoarthritis)      Thyroid disease      Trochanteric bursitis      Unspecified essential hypertension        CC No referring provider defined for this encounter. on close of this encounter.

## 2023-02-28 NOTE — TELEPHONE ENCOUNTER
Patient was recently seen today by .   Dario Clinton, BSN, RN, PHN  [place]  February 28, 2023

## 2023-02-28 NOTE — Clinical Note
Jacquie Durant reached back out to me again. She thinks she can afford to pay out of pocket for the nasal spray you mentioned. She is wondering if you can send the prescription to try.   Liz

## 2023-02-28 NOTE — Clinical Note
Bhargav - I just saw Jacquie this morning. She mentioned naltrexone as an option. Currently she is on Tramadol for pain and requested some additional since she is planning to be more active while in [place].  I don't have a lot of experience with naltrexone but understand it will be one or the other. I'm wondering if that would wait until she returns and can try off the tramadol at that time.  Let me know your thoughts. I will be in clinic all day. My phone number is [phone] if you prefer to speak by phone.   Liz

## 2023-02-28 NOTE — Clinical Note
Ciaran Gallardo,  I met with Jacquie today.  She feels the Trelegy is not doing enough for her. Wondering if you had any concerns with an increase in dose of this medication.   Liz Peterson MD

## 2023-02-28 NOTE — TELEPHONE ENCOUNTER
Visit already completed.    Yaz Porter, BSN, RN, PHN  Registered Nurse-Clinic Triage  [facility]  2/28/2023 at 1:53 PM

## 2023-02-28 NOTE — NURSING NOTE
Dermatology Rooming Note    Jacquie Amador's goals for this visit include:   Chief Complaint   Patient presents with     Derm Problem     Jacquie is being seen today for a follow up- nasal spray was denied      SHUN Gonzalez

## 2023-02-28 NOTE — PROGRESS NOTES
Assessment & Plan     Moderate persistent asthma without complication  Patient with moderate persistent after asthma not currently controlled.  She has some continued cough and continues to have some shortness of breath.  Continue with the Singulair that was started at 10 mg daily.  Recommended increase dose of Trelegy.   Patient will recheck in about 3 to 4 months or sooner if problems or concerns.  - albuterol (VENTOLIN HFA) 108 (90 Base) MCG/ACT inhaler; INHALE 2 PUFFS INTO THE LUNGS EVERY 6 HOURS AS NEEDED FOR SHORTNESS OF BREATH / DYSPNEA  - montelukast (SINGULAIR) 10 MG tablet; Take 1 tablet (10 mg) by mouth At Bedtime    Multiple joint pain  Hand arthritis  Chronic, continuous use of opioids  Chronic pain syndrome  Patient with chronic pain related to multiple joints.  Hand arthritis has been an issue for patient as well.  She does take Cymbalta 60 mg 2 capsules/day and utilizes Celebrex on occasion for discomfort.  She generally is taking 1 tramadol each day for pain.  There are some question whether the itching from this medication could be contributing to her skin issues.  Dr. Warren is looking at some options to help potentially with her pain and skin.  Awaiting to hear back regarding next steps of plan.  - DULoxetine (CYMBALTA) 60 MG capsule; Take 2 capsules (120 mg) by mouth daily  - celecoxib (CELEBREX) 200 MG capsule; TAKE ONE CAPSULE BY MOUTH TWICE A DAY AS NEEDED FOR PAIN Strength: 200 mg  - traMADol (ULTRAM) 50 MG tablet; Take 1 tablet (50 mg) by mouth every 12 hours as needed for severe pain (7-10)  - traMADol (ULTRAM) 50 MG tablet; Take 1 tablet (50 mg) by mouth every 12 hours as needed for severe pain (7-10)    Dermatitis  Patient requesting refill of her hydrocortisone which helps with some of the rashes she experiences.  Refill given.  - hydrocortisone 2.5 % cream; APPLY TOPICALLY 2 TIMES DAILY AS NEEDED    Aphthae, oral  Patient continues care with her dental professional as well as  "dermatology.    Hypertension goal BP (blood pressure) < 140/90  Doing well. Well controlled. Tolerating medication.  No change in plan.    - losartan (COZAAR) 100 MG tablet; Take 1 tablet (100 mg) by mouth daily    Hyperlipidemia LDL goal <130  Doing well. Well controlled. Tolerating medication.  No change in plan.    - atorvastatin (LIPITOR) 20 MG tablet; Take 1 tablet (20 mg) by mouth daily    Sinus tachycardia  Well-controlled at this time.  No change in plan.  - metoprolol succinate ER (TOPROL XL) 25 MG 24 hr tablet; Take 1 tablet (25 mg) by mouth 3 times daily    Primary focal hyperhidrosis of face  Patient has had some good improvement with use of the oxybutynin.  Continue current plan.  - oxybutynin ER (DITROPAN XL) 5 MG 24 hr tablet; Take 2 tablets (10 mg) by mouth daily    Migraine without aura and without status migrainosus, not intractable  Doing well. Well controlled. Tolerating medication.  No change in plan.    - rizatriptan (MAXALT) 10 MG tablet; Take 1 tablet (10 mg) by mouth at onset of headache for migraine May repeat in 2 hours. Max 3 tablets/24 hours.    Screen for colon cancer  Patient needs new FIT cards to send in.  Will notify results.  - Fecal colorectal cancer screen (FIT); Future  - Fecal colorectal cancer screen (FIT)    Platelet granule defect (H)  Under the care of hematology.  Continue present plan.    Thrush  Refill given to patient.  - nystatin (MYCOSTATIN) 973654 UNIT/ML suspension; Take by mouth 4 times daily Has recurrent thrush. Uses for 2 weeks at a time as needed.             BMI:   Estimated body mass index is 36.65 kg/m  as calculated from the following:    Height as of this encounter: 1.665 m (5' 5.55\").    Weight as of this encounter: 101.6 kg (224 lb).   Weight management plan: Discussed healthy diet and exercise guidelines        Return in about 3 months (around 5/28/2023) for Follow up pain, hypertension.    Liz Peterson MD  [facility] " ANISH Dhillon is a 62 year old accompanied by her self, presenting for the following health issues:  Depression, Asthma, Lipids, and Hypertension      HPI       Hyperlipidemia Follow-Up      Are you regularly taking any medication or supplement to lower your cholesterol?   Yes- atorvastatin    Are you having muscle aches or other side effects that you think could be caused by your cholesterol lowering medication?  No    Hypertension Follow-up      Do you check your blood pressure regularly outside of the clinic? Yes     Are you following a low salt diet? Yes    Are your blood pressures ever more than 140 on the top number (systolic) OR more   than 90 on the bottom number (diastolic), for example 140/90? Yes    Depression Followup    How are you doing with your depression since your last visit? Worsened     Are you having other symptoms that might be associated with depression? Yes:  Has had so many side effects from medication it is hard to know what is what    Have you had a significant life event?  No      Are you feeling anxious or having panic attacks?   Yes:  anxious    Do you have any concerns with your use of alcohol or other drugs? No    Social History     Tobacco Use     Smoking status: Former     Packs/day: 1.00     Types: Cigarettes     Smokeless tobacco: Never     Tobacco comments:     since 1981   Vaping Use     Vaping Use: Never used   Substance Use Topics     Alcohol use: No     Drug use: Yes     Comment: medical marjuana      PHQ 5/25/2022 12/7/2022 2/28/2023   PHQ-9 Total Score 8 7 12   Q9: Thoughts of better off dead/self-harm past 2 weeks Not at all Not at all Not at all     MARIZOL-7 SCORE 12/7/2022 1/30/2023 2/28/2023   Total Score - - -   Total Score 10 (moderate anxiety) 10 (moderate anxiety) -   Total Score 10 10 8         Suicide Assessment Five-step Evaluation and Treatment (SAFE-T)      Asthma Follow-Up    Was ACT completed today?    Yes    ACT Total Scores 2/28/2023   ACT  TOTAL SCORE -   ASTHMA ER VISITS -   ASTHMA HOSPITALIZATIONS -   ACT TOTAL SCORE (Goal Greater than or Equal to 20) 15   In the past 12 months, how many times did you visit the emergency room for your asthma without being admitted to the hospital? 0   In the past 12 months, how many times were you hospitalized overnight because of your asthma? 0         How many days per week do you miss taking your asthma controller medication?  0    Please describe any recent triggers for your asthma: None    Have you had any Emergency Room Visits, Urgent Care Visits, or Hospital Admissions since your last office visit?  No     Currently with a cough. Feels like it is since on the dupixent.   Feels like her breathing is fair. Using Trelegy 100. She is not feeling that it does enough.       How many servings of fruits and vegetables do you eat daily?  0-1    On average, how many sweetened beverages do you drink each day (Examples: soda, juice, sweet tea, etc.  Do NOT count diet or artificially sweetened beverages)?   0    How many days per week do you exercise enough to make your heart beat faster? 3 or less    How many minutes a day do you exercise enough to make your heart beat faster? 9 or less    How many days per week do you miss taking your medication? 0    Patient has a platelet granule defect.  She has multiple skin sores and oral apathae that have been an issue.  She is currently under the care of dermatology who is assisting with options to help with her itching.  Potentially Stadol versus use of naltrexone.  Awaiting further information from insurance to Dr. Warren.    Migraine.  Patient has history of migraines which she reports has not been a significant issue currently.    Chronic pain related to multiple joints.  She takes 1 tramadol daily.  She is heading out to [place] soon and she is wondering about some increase medication for her.  There is some potential for her to change to naltrexone or Stadol as above.   "Waiting for information from insurance to Dr. Warren.    Thrush.  This is an issue off and on.  She would like refills of her medication.  She understands that this should be used 5 times a day for 5 days at least when she is having this issue to make sure this is cleared.        Review of Systems   CONSTITUTIONAL: NEGATIVE for fever, chills, change in weight  ENT/MOUTH: As above  RESP: As above  CV: NEGATIVE for chest pain, palpitations or peripheral edema      Objective    /86   Pulse 85   Temp 97.7  F (36.5  C) (Temporal)   Resp 22   Ht 1.665 m (5' 5.55\")   Wt 101.6 kg (224 lb)   LMP 07/17/2009 (Exact Date)   SpO2 98%   BMI 36.65 kg/m    Body mass index is 36.65 kg/m .  Physical Exam   GENERAL: alert, no distress and fatigued  HENT: normal cephalic/atraumatic, oral mucous membranes moist and few oral apthae noted.   NECK: no adenopathy, no asymmetry, masses, or scars and thyroid normal to palpation  RESP: lungs clear to auscultation - no rales, rhonchi or wheezes  CV: regular rate and rhythm, normal S1 S2, no S3 or S4, no murmur, click or rub, no peripheral edema and peripheral pulses strong  MS: no gross musculoskeletal defects noted, no edema  SKIN: Patient with ulcer type lesions noted forehead and cheeks.  Excoriations noted on arms.                    "

## 2023-03-01 ENCOUNTER — MYC REFILL (OUTPATIENT)
Dept: FAMILY MEDICINE | Facility: CLINIC | Age: 63
End: 2023-03-01

## 2023-03-01 ENCOUNTER — E-VISIT (OUTPATIENT)
Dept: FAMILY MEDICINE | Facility: CLINIC | Age: 63
End: 2023-03-01
Payer: COMMERCIAL

## 2023-03-01 ENCOUNTER — MYC MEDICAL ADVICE (OUTPATIENT)
Dept: DERMATOLOGY | Facility: CLINIC | Age: 63
End: 2023-03-01
Payer: COMMERCIAL

## 2023-03-01 ENCOUNTER — TELEPHONE (OUTPATIENT)
Dept: FAMILY MEDICINE | Facility: CLINIC | Age: 63
End: 2023-03-01

## 2023-03-01 DIAGNOSIS — F41.1 GENERALIZED ANXIETY DISORDER: ICD-10-CM

## 2023-03-01 DIAGNOSIS — R00.0 SINUS TACHYCARDIA: ICD-10-CM

## 2023-03-01 DIAGNOSIS — B37.0 THRUSH: ICD-10-CM

## 2023-03-01 DIAGNOSIS — L30.9 DERMATITIS: ICD-10-CM

## 2023-03-01 DIAGNOSIS — J45.40 MODERATE PERSISTENT ASTHMA WITHOUT COMPLICATION: ICD-10-CM

## 2023-03-01 DIAGNOSIS — E87.6 HYPOKALEMIA: Primary | ICD-10-CM

## 2023-03-01 DIAGNOSIS — J20.9 ACUTE BRONCHITIS WITH SYMPTOMS > 10 DAYS: Primary | ICD-10-CM

## 2023-03-01 DIAGNOSIS — F33.1 MODERATE RECURRENT MAJOR DEPRESSION (H): ICD-10-CM

## 2023-03-01 DIAGNOSIS — R00.2 PALPITATIONS: ICD-10-CM

## 2023-03-01 PROCEDURE — 99421 OL DIG E/M SVC 5-10 MIN: CPT | Performed by: FAMILY MEDICINE

## 2023-03-01 RX ORDER — BUPROPION HYDROCHLORIDE 300 MG/1
300 TABLET ORAL EVERY MORNING
Qty: 30 TABLET | Refills: 0 | Status: CANCELLED | OUTPATIENT
Start: 2023-03-01

## 2023-03-01 RX ORDER — FLUTICASONE FUROATE, UMECLIDINIUM BROMIDE AND VILANTEROL TRIFENATATE 200; 62.5; 25 UG/1; UG/1; UG/1
1 POWDER RESPIRATORY (INHALATION) DAILY
Qty: 60 EACH | Refills: 3 | Status: SHIPPED | OUTPATIENT
Start: 2023-03-01 | End: 2023-04-19

## 2023-03-01 RX ORDER — DOXYCYCLINE HYCLATE 100 MG
100 TABLET ORAL 2 TIMES DAILY
Qty: 20 TABLET | Refills: 0 | Status: SHIPPED | OUTPATIENT
Start: 2023-03-01 | End: 2023-03-10

## 2023-03-01 RX ORDER — POTASSIUM CHLORIDE 750 MG/1
TABLET, EXTENDED RELEASE ORAL
Qty: 95 TABLET | Refills: 0 | Status: SHIPPED | OUTPATIENT
Start: 2023-03-01 | End: 2023-09-19

## 2023-03-01 NOTE — TELEPHONE ENCOUNTER
Orion message previously sent to PCP, routing patient follow up responses to PCP     MARVA ParekhN, RN  [facility] ~ Registered Nurse  Clinic Triage ~ [place] [facility] [place]  March 1, 2023

## 2023-03-01 NOTE — TELEPHONE ENCOUNTER
Fluocinonide External Gel: is on back order, wanting to know if cream can be substituted.     Please review med refill requests;  There are 2 different prescription's for metoprolol on med list; is she on both?  Falguni DELGADO RN

## 2023-03-02 ENCOUNTER — MYC MEDICAL ADVICE (OUTPATIENT)
Dept: FAMILY MEDICINE | Facility: CLINIC | Age: 63
End: 2023-03-02
Payer: COMMERCIAL

## 2023-03-02 NOTE — TELEPHONE ENCOUNTER
Letter not clear, [facility]. Closing message.     MARVA ParekhN, RN  [facility] ~ Registered Nurse  Clinic Triage ~ [place] [facility] [place]  March 2, 2023

## 2023-03-02 NOTE — TELEPHONE ENCOUNTER
Letter forwarded to PCP.     MARVA ParekhN, RN  [facility] ~ Registered Nurse  Clinic Triage ~ [place] [facility] [place]  March 2, 2023

## 2023-03-03 ENCOUNTER — TELEPHONE (OUTPATIENT)
Dept: FAMILY MEDICINE | Facility: CLINIC | Age: 63
End: 2023-03-03
Payer: COMMERCIAL

## 2023-03-03 DIAGNOSIS — F33.1 MODERATE RECURRENT MAJOR DEPRESSION (H): ICD-10-CM

## 2023-03-03 RX ORDER — FLUOCINONIDE GEL 0.5 MG/G
GEL TOPICAL 2 TIMES DAILY PRN
Qty: 15 G | Refills: 0 | Status: SHIPPED | OUTPATIENT
Start: 2023-03-03 | End: 2023-05-09

## 2023-03-03 RX ORDER — CLOTRIMAZOLE 10 MG/1
10 LOZENGE ORAL
Qty: 70 LOZENGE | Refills: 1 | Status: SHIPPED | OUTPATIENT
Start: 2023-03-03 | End: 2023-11-07

## 2023-03-03 RX ORDER — BUPROPION HYDROCHLORIDE 200 MG/1
200 TABLET, EXTENDED RELEASE ORAL 2 TIMES DAILY
Qty: 180 TABLET | Refills: 1 | Status: SHIPPED | OUTPATIENT
Start: 2023-03-03 | End: 2023-09-29

## 2023-03-03 RX ORDER — METOPROLOL SUCCINATE 100 MG/1
100 TABLET, EXTENDED RELEASE ORAL 2 TIMES DAILY
Qty: 180 TABLET | Refills: 1 | Status: SHIPPED | OUTPATIENT
Start: 2023-03-03 | End: 2024-02-09

## 2023-03-03 RX ORDER — BUSPIRONE HYDROCHLORIDE 15 MG/1
15 TABLET ORAL 2 TIMES DAILY
Qty: 180 TABLET | Refills: 1 | Status: SHIPPED | OUTPATIENT
Start: 2023-03-03 | End: 2023-06-19

## 2023-03-03 NOTE — TELEPHONE ENCOUNTER
Patient called in regarding a new dose of Bupropion and she would like this filled today if possible. Dr. Peterson is out today 3/3 so sending to provider that is in today.     She stated this was discussed at last appointment but they hadn't changed the dose at that time. She would like to start 200mg. She is going out of town tomorrow so she requested a rush on this. She said if she cannot get the 200mg if she can get 300mg sent.     Please call patient as soon as able at mobile number on file.

## 2023-03-04 PROBLEM — R20.8 SKIN PAIN: Status: ACTIVE | Noted: 2023-03-04

## 2023-03-10 ENCOUNTER — MYC MEDICAL ADVICE (OUTPATIENT)
Dept: FAMILY MEDICINE | Facility: CLINIC | Age: 63
End: 2023-03-10
Payer: COMMERCIAL

## 2023-03-10 DIAGNOSIS — J20.9 ACUTE BRONCHITIS WITH SYMPTOMS > 10 DAYS: ICD-10-CM

## 2023-03-10 RX ORDER — DOXYCYCLINE HYCLATE 100 MG
100 TABLET ORAL 2 TIMES DAILY
Qty: 20 TABLET | Refills: 0 | Status: SHIPPED | OUTPATIENT
Start: 2023-03-10 | End: 2023-04-11

## 2023-03-10 NOTE — TELEPHONE ENCOUNTER
Sending update to provider. Patient is currently out of state.     Yaz Porter, MARVAN, RN, PHN  Registered Nurse-Clinic Triage  [facility]  3/10/2023 at 10:38 AM

## 2023-03-14 DIAGNOSIS — R20.8 SKIN PAIN: ICD-10-CM

## 2023-03-14 RX ORDER — BUTORPHANOL TARTRATE 10 MG/ML
1 SPRAY NASAL EVERY 4 HOURS PRN
Qty: 2.5 ML | Refills: 3 | Status: SHIPPED | OUTPATIENT
Start: 2023-03-14 | End: 2023-07-11

## 2023-03-16 ENCOUNTER — MYC MEDICAL ADVICE (OUTPATIENT)
Dept: FAMILY MEDICINE | Facility: CLINIC | Age: 63
End: 2023-03-16
Payer: COMMERCIAL

## 2023-03-30 ENCOUNTER — MYC MEDICAL ADVICE (OUTPATIENT)
Dept: DERMATOLOGY | Facility: CLINIC | Age: 63
End: 2023-03-30
Payer: COMMERCIAL

## 2023-04-11 ENCOUNTER — VIRTUAL VISIT (OUTPATIENT)
Dept: DERMATOLOGY | Facility: CLINIC | Age: 63
End: 2023-04-11
Payer: COMMERCIAL

## 2023-04-11 ENCOUNTER — E-VISIT (OUTPATIENT)
Dept: FAMILY MEDICINE | Facility: CLINIC | Age: 63
End: 2023-04-11

## 2023-04-11 ENCOUNTER — MYC MEDICAL ADVICE (OUTPATIENT)
Dept: FAMILY MEDICINE | Facility: CLINIC | Age: 63
End: 2023-04-11

## 2023-04-11 DIAGNOSIS — L30.9 DERMATITIS: ICD-10-CM

## 2023-04-11 DIAGNOSIS — J01.01 ACUTE RECURRENT MAXILLARY SINUSITIS: Primary | ICD-10-CM

## 2023-04-11 DIAGNOSIS — D69.1 PLATELET GRANULE DEFECT (H): ICD-10-CM

## 2023-04-11 DIAGNOSIS — R05.1 ACUTE COUGH: ICD-10-CM

## 2023-04-11 PROCEDURE — 99421 OL DIG E/M SVC 5-10 MIN: CPT | Performed by: FAMILY MEDICINE

## 2023-04-11 PROCEDURE — 99442 PR PHYSICIAN TELEPHONE EVALUATION 11-20 MIN: CPT | Mod: 95 | Performed by: DERMATOLOGY

## 2023-04-11 RX ORDER — DOXYCYCLINE 100 MG/1
100 CAPSULE ORAL 2 TIMES DAILY
Qty: 20 CAPSULE | Refills: 0 | Status: SHIPPED | OUTPATIENT
Start: 2023-04-11 | End: 2023-04-21

## 2023-04-11 RX ORDER — PREDNISONE 10 MG/1
TABLET ORAL
Qty: 100 TABLET | Refills: 0 | Status: SHIPPED | OUTPATIENT
Start: 2023-04-11 | End: 2023-05-03

## 2023-04-11 RX ORDER — DESONIDE 0.5 MG/G
CREAM TOPICAL 2 TIMES DAILY
Qty: 60 G | Refills: 3 | Status: SHIPPED | OUTPATIENT
Start: 2023-04-11 | End: 2023-09-12

## 2023-04-11 ASSESSMENT — PAIN SCALES - GENERAL: PAINLEVEL: NO PAIN (0)

## 2023-04-11 NOTE — PATIENT INSTRUCTIONS
Ciaran Dhillon,    I am sending a prescription of doxycycline to your pharmacy.    I would also recommend increasing prednisone to 40 mg daily for 2 days, then 20 mg daily for 4 days then return to 10 mg daily.  Let me know if you need a new prescription sent.     Please let me know what questions you have.     Liz Peterson MD

## 2023-04-11 NOTE — PROGRESS NOTES
[facility] Dermatology Note  Encounter Date: April 11, 2023  Store-and-Forward and Telephone ([phone]). Location of teledermatologist: [facility] [place].  Start time: 8:15. End time: 8:28.     Dermatology Problem List:  1. Recurrent aphthous ulcers  - Current treatment: dupixent, gentle skin care with   2. Multiple skin ulcers resembling prurigo/neurodermatitis     ____________________________________________     Assessment & Plan:      # Recurring oral and genital sores, likely recurrent aphthous ulcers, as well as multiple diffuse discrete skin ulcers.  Chronic active problem, uncontrolled.   Today Jacquie notes ongoing skin sores on face, though her submitted photos appear notably improved on forehead and cheeks vs last visit.  - Continue intranasal butorphanol q6hr as needed; if inadequate response with initial daily dose can be repeated after 120 minutes  - Continue gentle skin care with Protopic and mupirocin.  - OK to stop Dupixent  - She can also continue betamethasone ointment and desonide cream as needed for persistent skin sores and pain.     # Recent significant itch and pain of sun-exposed areas of arms after travel to [place].  This new symptom continues to make us suspicious for a sun-sensitive dermatosis; may have photo-exacerbated chronic pruritus.  Will continue close surveillance for the possibility of evolving chronic actinic dermatitis.  (though in theory dupilumab should have been an effective treatment for CAD).     Followup: 6 weeks in person     Jay Warren MD  Dermatology Attending  ____________________________________________     CC: Derm Problem (Jacquie is being seen today for a follow up)    HPI:  Ms. Jacquie Amador is a(n) 62 year old female who presents today for followup on facial sores.  At her last visit we prescribed intranasal butorphanol as she has both significant pain and itch from face sores.  This was initially  denied but she was able to get it covered.  She has been using this without significant side effects.    She reports many ongoing sores on her face which are painful.  She also reports she recently took a trip to [place] and had a significant flare of itch and pain in sun exposed areas including head, neck and arms.       Physical Exam:  SKIN: Teledermatology photos were reviewed; image quality and interpretability: acceptable. Image date: 4/10/23.  - multiple geographic shallow ulcers with hemorrhagic crusts on forehead and cheeks  - no obvious actively inflamed borders to ulcers  - No other lesions of concern on areas examined.

## 2023-04-11 NOTE — LETTER
4/11/2023       RE: Jacquie Amador  [address]  [facility] [address]     Dear Colleague,    Thank you for referring your patient, Jacquie Amador, to the [facility] [place] at [facility]. Please see a copy of my visit note below.    [facility] D[facility]atology Note  Encounter Date: April 11, 2023  Store-and-Forward and Telephone ([phone]). Location of teledermatologist: [facility] [place].  Start time: 8:15. End time: 8:28.     Dermatology Problem List:  1. Recurrent aphthous ulcers  - Current treatment: dupixent, gentle skin care with   2. Multiple skin ulcers resembling prurigo/neurodermatitis     ____________________________________________     Assessment & Plan:      # Recurring oral and genital sores, likely recurrent aphthous ulcers, as well as multiple diffuse discrete skin ulcers.  Chronic active problem, uncontrolled.   Today Jacquie notes ongoing skin sores on face, though her submitted photos appear notably improved on forehead and cheeks vs last visit.  - Continue intranasal butorphanol q6hr as needed; if inadequate response with initial daily dose can be repeated after 120 minutes  - Continue gentle skin care with Protopic and mupirocin.  - OK to stop Dupixent  - She can also continue betamethasone ointment and desonide cream as needed for persistent skin sores and pain.     # Recent significant itch and pain of sun-exposed areas of arms after travel to [place].  This new symptom continues to make us suspicious for a sun-sensitive dermatosis; may have photo-exacerbated chronic pruritus.  Will continue close surveillance for the possibility of evolving chronic actinic dermatitis.  (though in theory dupilumab should have been an effective treatment for CAD).     Followup: 6 weeks in person     Jay Warren MD  Dermatology  Attending  ____________________________________________     CC: Derm Problem (Jacquie is being seen today for a follow up)    HPI:  Ms. Jacquie Amador is a(n) 62 year old female who presents today for followup on facial sores.  At her last visit we prescribed intranasal butorphanol as she has both significant pain and itch from face sores.  This was initially denied but she was able to get it covered.  She has been using this without significant side effects.    She reports many ongoing sores on her face which are painful.  She also reports she recently took a trip to [place] and had a significant flare of itch and pain in sun exposed areas including head, neck and arms.       Physical Exam:  SKIN: Teledermatology photos were reviewed; image quality and interpretability: acceptable. Image date: 4/10/23.  - multiple geographic shallow ulcers with hemorrhagic crusts on forehead and cheeks  - no obvious actively inflamed borders to ulcers  - No other lesions of concern on areas examined.

## 2023-04-12 ENCOUNTER — MYC MEDICAL ADVICE (OUTPATIENT)
Dept: DERMATOLOGY | Facility: CLINIC | Age: 63
End: 2023-04-12

## 2023-04-12 ENCOUNTER — MYC MEDICAL ADVICE (OUTPATIENT)
Dept: FAMILY MEDICINE | Facility: CLINIC | Age: 63
End: 2023-04-12

## 2023-04-12 DIAGNOSIS — L20.89 OTHER ATOPIC DERMATITIS: ICD-10-CM

## 2023-04-16 NOTE — TELEPHONE ENCOUNTER
dupilumab (DUPIXENT) 300 MG/2ML prefilled pen      Last Written Prescription Date:  1-5-23  Last Fill Quantity: 4 ml,   # refills: 2  Last Office Visit : 4-11-23  Future Office visit:  none    Routing refill request to provider for review/approval because:  Drug not on the [facility], [facility] or [facility] refill protocol or controlled substance

## 2023-04-17 NOTE — TELEPHONE ENCOUNTER
Received refill request for dupilumab as the resident on call. Reviewed patient's chart and attached communication. Patient last seen 4/11/23 for dermatitis. RTC 6 weeks. Plan was she could stop dupilumab if she wanted to but per MyC message on 4/12/23 patient wanting to go back on. Will send refill and CC Dr. Warren as FYI for further management.    Artemio Brooks MD

## 2023-04-18 ENCOUNTER — MYC MEDICAL ADVICE (OUTPATIENT)
Dept: FAMILY MEDICINE | Facility: CLINIC | Age: 63
End: 2023-04-18

## 2023-04-18 ENCOUNTER — E-VISIT (OUTPATIENT)
Dept: FAMILY MEDICINE | Facility: CLINIC | Age: 63
End: 2023-04-18

## 2023-04-18 DIAGNOSIS — Z53.9 ERRONEOUS ENCOUNTER--DISREGARD: Primary | ICD-10-CM

## 2023-04-18 NOTE — PATIENT INSTRUCTIONS
Thank you for choosing us for your care. I think an in-clinic visit would be best next steps based on your symptoms. Please schedule a clinic appointment; you won t be charged for this eVisit.  An urgent care visit would also be appropriate.     You can schedule an appointment right here in [facility], or call [phone]

## 2023-04-19 ENCOUNTER — VIRTUAL VISIT (OUTPATIENT)
Dept: PHARMACY | Facility: CLINIC | Age: 63
End: 2023-04-19
Payer: COMMERCIAL

## 2023-04-19 DIAGNOSIS — I10 BENIGN ESSENTIAL HYPERTENSION: ICD-10-CM

## 2023-04-19 DIAGNOSIS — J45.40 MODERATE PERSISTENT ASTHMA WITHOUT COMPLICATION: Primary | ICD-10-CM

## 2023-04-19 DIAGNOSIS — J45.909 ASTHMA: ICD-10-CM

## 2023-04-19 DIAGNOSIS — F33.9 MDD (MAJOR DEPRESSIVE DISORDER), RECURRENT EPISODE (H): Primary | ICD-10-CM

## 2023-04-19 DIAGNOSIS — F41.1 GAD (GENERALIZED ANXIETY DISORDER): ICD-10-CM

## 2023-04-19 PROCEDURE — 99606 MTMS BY PHARM EST 15 MIN: CPT | Performed by: PHARMACIST

## 2023-04-19 PROCEDURE — 99607 MTMS BY PHARM ADDL 15 MIN: CPT | Performed by: PHARMACIST

## 2023-04-19 RX ORDER — METOPROLOL SUCCINATE 25 MG/1
25 TABLET, EXTENDED RELEASE ORAL 3 TIMES DAILY
COMMUNITY
End: 2023-10-20

## 2023-04-19 NOTE — PROGRESS NOTES
Medication Therapy Management (MTM) Encounter    ASSESSMENT:                            Medication Adherence/Access: No issues identified    Asthma: Not at goal; ACT < 20. Patient would benefit from Long acting bronchodilator inhaler: Spiriva.     Hypertension: Stable. Patient is meeting blood pressure goal of < 140/90mmHg.    MDD, MARIZOL: Discussed that Wellbutrin should not cause increased low mood/crying. Encouraged retrial of increasing back to 200mg twice daily. Patient agreeable.       PLAN:                            1. Start Spiriva 2 puffs once daily.   2. Refill sent for Dulera   3. For your psychiatry medications: Take Cymbalta 120mg once daily, bupropion SR 200mg twice daily, and Buspar 15mg twice daily      Follow-up: 5/9/23 with PCP  Return in about 3 months (around 7/19/2023) for Medication Therapy Management Pharmacist.    SUBJECTIVE/OBJECTIVE:                          Jacquie Amador is a 62 year old female called for a follow-up visit.  Today's visit is a follow-up MTM visit from 1/16/23     Reason for visit: med check-in, asthma and depression.    Allergies/ADRs: Reviewed in chart  Past Medical History: Reviewed in chart  Tobacco: She reports that she has quit smoking. Her smoking use included cigarettes. She smoked an average of 1 pack per day. She has never used smokeless tobacco.  Alcohol: none    Medication Adherence/Access: no issues reported    Asthma: Current medications:  Montelukast (Singulair) once daily  Short-Acting Bronchodilator: Albuterol MDI and Nebs.  Tried Trelegy Ellipta but caused significant thrush, so she went back to using Dulera. Would like to try adding on Spiriva.    Patient reports the following symptoms: increased need of albuterol.  Asthma Action Plan on file: YES      4/6/2022     3:56 PM 10/31/2022     4:57 PM 2/28/2023    11:26 AM   ACT Total Scores   ACT TOTAL SCORE (Goal Greater than or Equal to 20) 22 21 15   In the past 12 months, how many times did you visit the  emergency room for your asthma without being admitted to the hospital? 0 0 0   In the past 12 months, how many times were you hospitalized overnight because of your asthma? 0 0 0         Hypertension: Current medications include metoprolol ER 100mg twice daily+25mg three times daily, hydrochlorothiazide 25mg daily, losartan 100mg daily.  Patient does self-monitor blood pressure. Home BP monitoring in range of 130's systolic over 80's diastolic.  Patient reports no current medication side effects.  BP Readings from Last 3 Encounters:   02/28/23 130/86   09/14/22 (!) 163/84   07/26/22 132/82     MDD, MARIZOL:   Current medications:   Duloxetine 120mg once daily   Wellbutrin SR 200mg twice daily   Buspar 15mg twice daily   Trazodone 100-150mg nightly    Pt is seen by [facility] by JESSICA Wells. Most recent visit was 1/30 at which time Wellbutrin was increased. At 1/16 MTM we discussed consolidating Cymbalta 60mg twice daily to 120mg once daily for convenience and to see if this helped with daytime energy.     Today, pt reports she hasn't noticed much difference with Cymbalta once daily vs twice daily. She has been on Wellbutrin and Cymbalta for many years. She tried taking Wellbutrin SR twice daily for about one week, but experienced increased crying and tearfulness so reduced it back down to 200mg once daily. Continues to experience fatigue and low mood.         5/25/2022    11:44 AM 12/7/2022     8:55 AM 2/28/2023    11:26 AM   PHQ   PHQ-9 Total Score 8 7 12   Q9: Thoughts of better off dead/self-harm past 2 weeks Not at all Not at all Not at all         12/7/2022    12:45 PM 1/30/2023    10:45 AM 2/28/2023    11:26 AM   MARIZOL-7 SCORE   Total Score 10 (moderate anxiety) 10 (moderate anxiety)    Total Score 10 10 8           ----------------      I spent 30 minutes with this patient today. All changes were made via collaborative practice agreement with Liz Peterson A copy of the visit note was  provided to the patient's provider(s).    A summary of these recommendations was sent via [employer].    Paulina Mitchell, PharmD, BCPP  Medication Therapy Management Pharmacist  [facility]    Telemedicine Visit Details  Type of service:  Telephone visit  Start Time: 9:30 am  End Time: 10:00 am     Medication Therapy Recommendations  Asthma    Current Medication: mometasone-formoterol (DULERA) 200-5 MCG/ACT inhaler   Rationale: Synergistic therapy - Needs additional medication therapy - Indication   Recommendation: Start Medication - Spiriva Respimat 2.5 MCG/ACT Aers   Status: Accepted per CPA

## 2023-04-19 NOTE — PATIENT INSTRUCTIONS
"Recommendations from today's MTM visit:                                                      1. Start Spiriva 2 puffs once daily.   2. Refill sent for Dulera   3. For your psychiatry medications: Take Cymbalta 120mg once daily, bupropion SR 200mg twice daily, and Buspar 15mg twice daily      Follow-up: 5/9/23 with PCP  Return in about 3 months (around 7/19/2023) for Medication Therapy Management Pharmacist.    It was great speaking with you today.  I value your experience and would be very thankful for your time in providing feedback in our clinic survey. In the next few days, you may receive an email or text message from [employer] with a link to a survey related to your  clinical pharmacist.\"     To schedule another MTM appointment, please call the clinic directly or you may call the MTM scheduling line at [phone] or toll-free at [phone].     My Clinical Pharmacist's contact information:                                                      Please feel free to contact me with any questions or concerns you have.      Paulina Mitchell, PharmD, BCPP  Medication Therapy Management Pharmacist  [facility]     "

## 2023-04-28 ENCOUNTER — MYC REFILL (OUTPATIENT)
Dept: FAMILY MEDICINE | Facility: CLINIC | Age: 63
End: 2023-04-28
Payer: COMMERCIAL

## 2023-04-28 DIAGNOSIS — F11.90 CHRONIC, CONTINUOUS USE OF OPIOIDS: ICD-10-CM

## 2023-04-28 DIAGNOSIS — G89.4 CHRONIC PAIN SYNDROME: ICD-10-CM

## 2023-04-28 RX ORDER — TRAMADOL HYDROCHLORIDE 50 MG/1
50 TABLET ORAL EVERY 12 HOURS PRN
Qty: 45 TABLET | Refills: 0 | Status: SHIPPED | OUTPATIENT
Start: 2023-04-28 | End: 2023-07-07

## 2023-05-02 ENCOUNTER — MYC MEDICAL ADVICE (OUTPATIENT)
Dept: FAMILY MEDICINE | Facility: CLINIC | Age: 63
End: 2023-05-02

## 2023-05-03 ENCOUNTER — LAB (OUTPATIENT)
Dept: LAB | Facility: CLINIC | Age: 63
End: 2023-05-03
Payer: COMMERCIAL

## 2023-05-03 ENCOUNTER — VIRTUAL VISIT (OUTPATIENT)
Dept: HEMATOLOGY | Facility: CLINIC | Age: 63
End: 2023-05-03
Attending: INTERNAL MEDICINE
Payer: COMMERCIAL

## 2023-05-03 DIAGNOSIS — E87.6 HYPOKALEMIA: ICD-10-CM

## 2023-05-03 DIAGNOSIS — E61.1 IRON DEFICIENCY: ICD-10-CM

## 2023-05-03 DIAGNOSIS — D69.1 PLATELET GRANULE DEFECT (H): ICD-10-CM

## 2023-05-03 LAB
ALBUMIN SERPL BCG-MCNC: 4 G/DL (ref 3.5–5.2)
ALP SERPL-CCNC: 88 U/L (ref 35–104)
ALT SERPL W P-5'-P-CCNC: 16 U/L (ref 10–35)
ANION GAP SERPL CALCULATED.3IONS-SCNC: 13 MMOL/L (ref 7–15)
AST SERPL W P-5'-P-CCNC: 20 U/L (ref 10–35)
BILIRUB SERPL-MCNC: 0.5 MG/DL
BUN SERPL-MCNC: 13.8 MG/DL (ref 8–23)
CALCIUM SERPL-MCNC: 9.7 MG/DL (ref 8.8–10.2)
CHLORIDE SERPL-SCNC: 100 MMOL/L (ref 98–107)
CREAT SERPL-MCNC: 1.24 MG/DL (ref 0.51–0.95)
DEPRECATED HCO3 PLAS-SCNC: 28 MMOL/L (ref 22–29)
GFR SERPL CREATININE-BSD FRML MDRD: 49 ML/MIN/1.73M2
GLUCOSE SERPL-MCNC: 130 MG/DL (ref 70–99)
POTASSIUM SERPL-SCNC: 3.9 MMOL/L (ref 3.4–5.3)
PROT SERPL-MCNC: 7.1 G/DL (ref 6.4–8.3)
SODIUM SERPL-SCNC: 141 MMOL/L (ref 136–145)

## 2023-05-03 PROCEDURE — 80053 COMPREHEN METABOLIC PANEL: CPT

## 2023-05-03 PROCEDURE — 36415 COLL VENOUS BLD VENIPUNCTURE: CPT

## 2023-05-03 PROCEDURE — 99213 OFFICE O/P EST LOW 20 MIN: CPT | Mod: VID | Performed by: INTERNAL MEDICINE

## 2023-05-03 PROCEDURE — G0463 HOSPITAL OUTPT CLINIC VISIT: HCPCS | Mod: PN,GT | Performed by: INTERNAL MEDICINE

## 2023-05-03 PROCEDURE — 82728 ASSAY OF FERRITIN: CPT

## 2023-05-03 RX ORDER — TRANEXAMIC ACID 650 MG/1
650 TABLET ORAL DAILY
Qty: 30 TABLET | Refills: 0 | Status: SHIPPED | OUTPATIENT
Start: 2023-05-03 | End: 2023-06-02

## 2023-05-03 RX ORDER — PREDNISONE 10 MG/1
10 TABLET ORAL DAILY
Qty: 90 TABLET | Refills: 0 | Status: SHIPPED | OUTPATIENT
Start: 2023-05-03 | End: 2023-07-10

## 2023-05-03 NOTE — PROGRESS NOTES
[facility]  [address] [place] [id number]  Phone: [phone], Fax: [phone]    Outpatient Visit Note:    Patient: Jacquie Amador  MRN: [id number]  : 1960  ISIDRO: 23      Reason for Initial Consultation:  Bruising    Assessment:  In summary, Jacquie Amador is a 62 year old woman presenting to clinic due to bruising/bleeding with abnormal platelet studies. 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 Overall clinical picture is highly concerning for underlying autoimmune disorder.    1.  Behcet's disease:   2. 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  Easy bruising and prolonged healing secondary to #1 and #2  4.  Personal history of antiphospholipid antibody syndrome in pregnancy, no records  5.  Osteoarthritis requiring anti-inflammatory medications  6.  Depression requiring use of SSRI medication  7.  Acute on chronic migraines requiring use of triptans and aspirin    Ms. Amador has been evaluated by Dermatology and Rheumatology. Skin biopsy with neutrophilic infiltrates that may be secondary to Behcets. Overall, clinical criteria is met and very consistent.  Started Colcrys with some improvement in mouth sores. Working with Dr. Warren for skin lesions.    For her bleeding/platelets, she has presence of anti-HLA and anti-GP1a antibodies.  She has no personal history of receiving red cell or platelet transfusions. Jenny platelet aggregation study that had decreased arachidonic acid as well and deaggregation with ADP and arachidonic acid and decreased ATP release with thrombin and ADP. This is consistent with diagnosis of acquired qualitative platelet disorder and consistent with her anti-GP1a antibodies. We have given her steriods, which helped with arthralgias and fatigue.     Previously discussed that avoiding other  medications that can worsen this, including aspirin, NSAID and potentially SSRIs. Right now IF Jacquie needs MAJOR SURGERY---  She should receive platelet transfusion (1-2 units). She is ok to receive injections.      Plan:  1. Majority of today's visit was spent counseling the patient regarding diagnosis, natural history, management and treatment options   2. Continue prednisone 10 mg daily  3. Trial of tranexamic acid 650 mg PO daily to assess if bleeding at night into wounds stops    The patient is given our center's contact information and is instructed to call if she should have any further questions or concerns.  Follow up 7/12/23    Patient understands and agrees with the above plan and recommendation.    Emely Grimes MD/PhD  Hematology, Oncology and Transplantation Fellow  [facility]    Video-Visit Details    Type of service:  Video Visit    Video Start Time (time video started): 12:51 PM    Video End Time (time video stopped): 1:12 PM    Originating Location (pt. Location): Home        Distant Location (provider location):  On-site    Mode of Communication:  Video Conference via [employer]      ----------------------------------------------------------------------------------------------------------------------  Interval History:  Jacquie Amador is a 62 year old woman with PMHx of fibromyalgia, anxiety/depression, hypertension and hyperlipidemia. She presented to clinic on 9/15/21 for her first in person evaluation due to bleeding and prolonged wound healing. Please refer to my consult note.     Jacquie had raised red rash and itching with her skin while in [place]. It was painful. Face is clearing up a bit. Hands are sore from holding grandsons.     Legs- feels like she can not walk right. Unsure if low potassium. Legs hurt a little. Pain starts as soon as she moves. Feels better when she is sitting down.     Mouth sores-  Taking forever but is decreasing. The white coating comes and  goes.     No issues with loose bowel movements. Having issues with constipation. Having 1 or fewer bowel movements a week.     Fill up at night with blood and takes a while to stop.       Past Medical History:  Past Medical History:   Diagnosis Date     ASTHMA - MODERATE PERSISTENT 2005     Chronic pain      Coronary artery disease      CVA (cerebral infarction)      Depressive disorder, not elsewhere classified      Diabetes (H)      Elevated serum alkaline phosphatase level     Liver source     Fibromyalgia      HYPERLIPIDEMIA NEC/NOS 2006     Hypertriglyceridemia      OA (osteoarthritis)      Thyroid disease      Trochanteric bursitis      Unspecified essential hypertension      Immunization  Immunization History   Administered Date(s) Administered     COVID-19 Bivalent 18+ (Moderna) 2022     COVID-19 Monovalent 18+ (Moderna) 2021, 2021, 11/15/2021     COVID-19 Monovalent Booster 18+ (Moderna) 05/10/2022     FLU 6-35 months 2009     Influenza (IIV3) PF 2000, 2003, 2004, 2005, 2006, 2007, 2008, 2010, 10/26/2011, 10/26/2012     Influenza Vaccine 50-64 or 18-64 w/egg allergy (Flublok) 2020, 2022     Influenza Vaccine >6 months (Alfuria,Fluzone) 2014, 2015, 2019, 11/15/2021     Pneumococcal 23 valent 2000, 2021, 2022     TD,PF 7+ (Tenivac) 2000     TDAP (Adacel,Boostrix) 2021     TDAP Vaccine (Adacel) 2011     Zoster recombinant adjuvanted (SHINGRIX) 2019       Past Surgical History:  Past Surgical History:   Procedure Laterality Date     3 teeth pulled       INJECT EPIDURAL TRANSFORAMINAL  2014    [place]umbosacral-[facility] [place]     INJECT JOINT SACROILIAC  2014    [facility] [place]     LAMINECTOMY LUMBAR ONE LEVEL Left 2014    [facility]  DELIVERY ONLY      , Low Cervical        Medications:  Current Outpatient Medications   Medication Sig Dispense Refill     acetaminophen (TYLENOL) 500 MG tablet Take 500-1,000 mg by mouth every 6 hours as needed for mild pain       albuterol (PROVENTIL) (2.5 MG/3ML) 0.083% neb solution Take 1 vial (2.5 mg) by nebulization every 6 hours as needed for shortness of breath / dyspnea or wheezing 3 mL 4     albuterol (VENTOLIN HFA) 108 (90 Base) MCG/ACT inhaler INHALE 2 PUFFS INTO THE LUNGS EVERY 6 HOURS AS NEEDED FOR SHORTNESS OF BREATH / DYSPNEA 54 g 1     atorvastatin (LIPITOR) 20 MG tablet Take 1 tablet (20 mg) by mouth daily 90 tablet 1     augmented betamethasone dipropionate (DIPROLENE AF) 0.05 % external cream Apply to affected area twice daily 50 g 2     betamethasone dipropionate (DIPROSONE) 0.05 % external ointment Apply topically 2 times daily To areas of skin sores.  Can also use on mouth sores as needed twice daily. 50 g 3     buPROPion (WELLBUTRIN SR) 200 MG 12 hr tablet Take 1 tablet (200 mg) by mouth 2 times daily 180 tablet 1     busPIRone (BUSPAR) 15 MG tablet Take 1 tablet (15 mg) by mouth 2 times daily 180 tablet 1     butorphanol (STADOL) 10 MG/ML nasal spray Spray 1 spray in nostril every 4 hours as needed for pain 2.5 mL 3     celecoxib (CELEBREX) 200 MG capsule TAKE ONE CAPSULE BY MOUTH TWICE A DAY AS NEEDED FOR PAIN Strength: 200 mg 180 capsule 1     chlorhexidine (PERIDEX) 0.12 % solution Swish and spit 15 mLs in mouth 2 times daily 1893 mL 4     clindamycin (CLINDAMAX) 1 % external gel Apply topically 2 times daily 30 g 4     clotrimazole (MYCELEX) 10 MG lozenge Place 1 lozenge (10 mg) inside cheek 5 times daily 70 lozenge 1     desonide (DESOWEN) 0.05 % external cream Apply topically 2 times daily To sores on face 60 g 3     dextran 70-hypromellose (TEARS NATURALE FREE PF) 0.1-0.3 % ophthalmic solution Place 2 drops into both eyes daily as needed (for dry eyes) 35 each 3     DULoxetine (CYMBALTA) 60 MG capsule Take 2 capsules  (120 mg) by mouth daily 180 capsule 1     dupilumab (DUPIXENT) 300 MG/2ML prefilled pen Inject 2 mLs (300 mg) Subcutaneous every 14 days After first loading dose 4 mL 2     famotidine (PEPCID) 40 MG tablet Take 1 tablet (40 mg) by mouth daily 90 tablet 3     fluocinonide (LIDEX) 0.05 % external gel Apply topically 2 times daily as needed 15 g 0     hydrochlorothiazide (HYDRODIURIL) 25 MG tablet Take 1 tablet (25 mg) by mouth daily 90 tablet 1     hydrocortisone 2.5 % cream APPLY TOPICALLY 2 TIMES DAILY AS NEEDED 30 g 3     lidocaine, viscous, (XYLOCAINE) 2 % solution Swish and spit 10ml every 3 hours as needed for oral pain; max 8 doses/24hrs.  Do not eat or chew gum for 60 minutes following use.       losartan (COZAAR) 100 MG tablet Take 1 tablet (100 mg) by mouth daily 90 tablet 1     metoprolol succinate ER (TOPROL XL) 100 MG 24 hr tablet Take 1 tablet (100 mg) by mouth 2 times daily 180 tablet 1     metoprolol succinate ER (TOPROL XL) 25 MG 24 hr tablet Take 25 mg by mouth 3 times daily       mometasone-formoterol (DULERA) 200-5 MCG/ACT inhaler Inhale 2 puffs into the lungs 2 times daily 39 g 1     montelukast (SINGULAIR) 10 MG tablet Take 1 tablet (10 mg) by mouth At Bedtime 90 tablet 3     mupirocin (BACTROBAN) 2 % external cream Apply to affected area three times daily 60 g 1     nystatin (MYCOSTATIN) 133511 UNIT/ML suspension Take by mouth 4 times daily Has recurrent thrush. Uses for 2 weeks at a time as needed. 380 mL 1     oxybutynin ER (DITROPAN XL) 5 MG 24 hr tablet Take 2 tablets (10 mg) by mouth daily 180 tablet 2     potassium chloride ER (KLOR-CON M) 10 MEQ CR tablet 20 meq daily for 5 days then 10 meq daily. 95 tablet 0     predniSONE (DELTASONE) 10 MG tablet 40 mg daily for 2 days, then 20 mg daily for 4 days then 10 mg daily. 100 tablet 0     predniSONE (DELTASONE) 10 MG tablet Take 1 tablet (10 mg) by mouth daily 90 tablet 0     rizatriptan (MAXALT) 10 MG tablet Take 1 tablet (10 mg) by mouth at  onset of headache for migraine May repeat in 2 hours. Max 3 tablets/24 hours. 18 tablet 1     rOPINIRole (REQUIP) 1 MG tablet TAKE THREE TABLETS BY MOUTH AT BEDTIME 270 tablet 3     tacrolimus (PROTOPIC) 0.1 % external ointment Apply topically 2 times daily To areas of sores on face 60 g 1     tiotropium (SPIRIVA RESPIMAT) 2.5 MCG/ACT inhaler Inhale 2 puffs into the lungs daily 12 g 1     traMADol (ULTRAM) 50 MG tablet Take 1 tablet (50 mg) by mouth every 12 hours as needed for severe pain 45 tablet 0     traZODone (DESYREL) 50 MG tablet Take 3 tablets (150 mg) by mouth At Bedtime 270 tablet 0     triamcinolone (ARISTOCORT HP) 0.5 % external cream Apply topically 2 times daily 60 g 0     valACYclovir (VALTREX) 500 MG tablet TAKE ONE TABLET BY MOUTH ONE TIME DAILY (Patient taking differently: 500 mg daily as needed) 90 tablet 1     ZYRTEC 10 MG OR TABS Take 10 mg by mouth daily as needed 90 3        Allergies:  Allergies   Allergen Reactions     Cats      and rabbits/wheezing     Dogs      wheezing     Lyrica [Pregabalin] Other (See Comments)     Rash, mouth sores, itching and burning     Seasonal Allergies      rhinits       ROS:  Remainder of a comprehensive 10 point ROS is negative unless noted above.    Social History:  Denies any tobacco use. No significant alcohol use. Denies any illicit drug use.     Family History:  Two daughter  31 Yo daughter  28 yo daughter  1 grandson    1 sister- older- high blood glucose, arthritis  1 brother- older- no idea    Mother- - heart valve. Had MDS. Needed a shot every month  Father- - cancer    Objectives:  GENERAL: alert and no distress  EYES: Eyes grossly normal to inspection.  No discharge or erythema, or obvious scleral/conjunctival abnormalities.  RESP: No audible wheeze, cough, or visible cyanosis.  No visible retractions or increased work of breathing.    SKIN: face covered in reddish ulcerated lesions at various stages of healing with numerous scabs.   -hypopigmentation - hands, lips, neck and across face, scar - face and numerous scabs over face.--- review MyChart  NEURO: Cranial nerves grossly intact.  Mentation and speech appropriate for age.  PSYCH: Mentation appears normal, affect normal/bright, judgement and insight intact, normal speech and appearance well-groomed.      Labs:    Ferritin   Date Value Ref Range Status   02/14/2023 53 8 - 252 ng/mL Final   04/16/2021 376 (H) 8 - 252 ng/mL Final     Iron   Date Value Ref Range Status   09/22/2022 88 37 - 145 ug/dL Final   01/20/2021 50 35 - 180 ug/dL Final     Iron Binding Cap   Date Value Ref Range Status   01/20/2021 350 240 - 430 ug/dL Final     Iron Binding Capacity   Date Value Ref Range Status   09/22/2022 260 240 - 430 ug/dL Final   09/15/2021 284 240 - 430 ug/dL Final       Refer to [facility]- autoantibody platelet study    Imaging:  Not applicable

## 2023-05-03 NOTE — LETTER
5/3/2023       RE: Jacquie Amador  [address]  [facility] [address]     Dear Colleague,    Thank you for referring your patient, Jacquie Amador, to the [facility] at [facility]. Please see a copy of my visit note below.    Patient was contacted to complete the pre-visit call prior to their telephone visit with the provider.     Allergies and medications were reviewed.     I thanked them for their time to cover this information.     Melissa Fuentes, [facility]            [facility]  [address] [place] [id number]  Phone: [phone], Fax: [phone]    Outpatient Visit Note:    Patient: Jacquie Amador  MRN: [id number]  : 1960  ISIDRO: 23      Reason for Initial Consultation:  Bruising    Assessment:  In summary, Jacquie Amador is a 62 year old woman presenting to clinic due to bruising/bleeding with abnormal platelet studies. 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 Overall clinical picture is highly concerning for underlying autoimmune disorder.    1.  Behcet's disease:   2. 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  Easy bruising and prolonged healing secondary to #1 and #2  4.  Personal history of antiphospholipid antibody syndrome in pregnancy, no records  5.  Osteoarthritis requiring anti-inflammatory medications  6.  Depression requiring use of SSRI medication  7.  Acute on chronic migraines requiring use of triptans and aspirin    Ms. Amador has been evaluated by Dermatology and Rheumatology. Skin biopsy with neutrophilic infiltrates that may be secondary to Behcets. Overall, clinical criteria is met and very consistent.  Started Colcrys with some improvement in mouth sores. Working with Dr. Warren for skin lesions.    For her  bleeding/platelets, she has presence of anti-HLA and anti-GP1a antibodies.  She has no personal history of receiving red cell or platelet transfusions. Jenny platelet aggregation study that had decreased arachidonic acid as well and deaggregation with ADP and arachidonic acid and decreased ATP release with thrombin and ADP. This is consistent with diagnosis of acquired qualitative platelet disorder and consistent with her anti-GP1a antibodies. We have given her steriods, which helped with arthralgias and fatigue.     Previously discussed that avoiding other medications that can worsen this, including aspirin, NSAID and potentially SSRIs. Right now IF Jacquie needs MAJOR SURGERY---  She should receive platelet transfusion (1-2 units). She is ok to receive injections.      Plan:  1. Majority of today's visit was spent counseling the patient regarding diagnosis, natural history, management and treatment options   2. Continue prednisone 10 mg daily  3. Trial of tranexamic acid 650 mg PO daily to assess if bleeding at night into wounds stops    The patient is given our center's contact information and is instructed to call if she should have any further questions or concerns.  Follow up 7/12/23    Patient understands and agrees with the above plan and recommendation.    Emely Grimes MD/PhD  Hematology, Oncology and Transplantation Fellow  [facility]    Video-Visit Details    Type of service:  Video Visit    Video Start Time (time video started): 12:51 PM    Video End Time (time video stopped): 1:12 PM    Originating Location (pt. Location): Home        Distant Location (provider location):  On-site    Mode of Communication:  Video Conference via [email or web address]      ----------------------------------------------------------------------------------------------------------------------  Interval History:  Jacquie Amador is a 62 year old woman with PMHx of fibromyalgia, anxiety/depression, hypertension and  hyperlipidemia. She presented to clinic on 9/15/21 for her first in person evaluation due to bleeding and prolonged wound healing. Please refer to my consult note.     Jacquie had raised red rash and itching with her skin while in [place]. It was painful. Face is clearing up a bit. Hands are sore from holding grandsons.     Legs- feels like she can not walk right. Unsure if low potassium. Legs hurt a little. Pain starts as soon as she moves. Feels better when she is sitting down.     Mouth sores-  Taking forever but is decreasing. The white coating comes and goes.     No issues with loose bowel movements. Having issues with constipation. Having 1 or fewer bowel movements a week.     Fill up at night with blood and takes a while to stop.       Past Medical History:  Past Medical History:   Diagnosis Date     ASTHMA - MODERATE PERSISTENT 9/21/2005     Chronic pain      Coronary artery disease      CVA (cerebral infarction)      Depressive disorder, not elsewhere classified      Diabetes (H)      Elevated serum alkaline phosphatase level     Liver source     Fibromyalgia      HYPERLIPIDEMIA NEC/NOS 12/29/2006     Hypertriglyceridemia      OA (osteoarthritis)      Thyroid disease      Trochanteric bursitis      Unspecified essential hypertension      Immunization  Immunization History   Administered Date(s) Administered     COVID-19 Bivalent 18+ (Moderna) 11/21/2022     COVID-19 Monovalent 18+ (Moderna) 03/03/2021, 04/01/2021, 11/15/2021     COVID-19 Monovalent Booster 18+ (Moderna) 05/10/2022     FLU 6-35 months 09/22/2009     Influenza (IIV3) PF 11/09/2000, 12/08/2003, 12/06/2004, 12/08/2005, 11/09/2006, 11/01/2007, 12/04/2008, 09/24/2010, 10/26/2011, 10/26/2012     Influenza Vaccine 50-64 or 18-64 w/egg allergy (Flublok) 09/30/2020, 11/21/2022     Influenza Vaccine >6 months (Alfuria,Fluzone) 11/14/2014, 11/19/2015, 11/26/2019, 11/15/2021     Pneumococcal 23 valent 11/09/2000, 12/23/2021, 02/25/2022     TD,PF 7+  (Tenivac) 2000     TDAP (Adacel,Boostrix) 2021     TDAP Vaccine (Adacel) 2011     Zoster recombinant adjuvanted (SHINGRIX) 2019       Past Surgical History:  Past Surgical History:   Procedure Laterality Date     3 teeth pulled       INJECT EPIDURAL TRANSFORAMINAL  2014    [facility]     INJECT JOINT SACROILIAC  2014    [facility]     LAMINECTOMY LUMBAR ONE LEVEL Left 2014    [facility]  DELIVERY ONLY      , Low Cervical       Medications:  Current Outpatient Medications   Medication Sig Dispense Refill     acetaminophen (TYLENOL) 500 MG tablet Take 500-1,000 mg by mouth every 6 hours as needed for mild pain       albuterol (PROVENTIL) (2.5 MG/3ML) 0.083% neb solution Take 1 vial (2.5 mg) by nebulization every 6 hours as needed for shortness of breath / dyspnea or wheezing 3 mL 4     albuterol (VENTOLIN HFA) 108 (90 Base) MCG/ACT inhaler INHALE 2 PUFFS INTO THE LUNGS EVERY 6 HOURS AS NEEDED FOR SHORTNESS OF BREATH / DYSPNEA 54 g 1     atorvastatin (LIPITOR) 20 MG tablet Take 1 tablet (20 mg) by mouth daily 90 tablet 1     augmented betamethasone dipropionate (DIPROLENE AF) 0.05 % external cream Apply to affected area twice daily 50 g 2     betamethasone dipropionate (DIPROSONE) 0.05 % external ointment Apply topically 2 times daily To areas of skin sores.  Can also use on mouth sores as needed twice daily. 50 g 3     buPROPion (WELLBUTRIN SR) 200 MG 12 hr tablet Take 1 tablet (200 mg) by mouth 2 times daily 180 tablet 1     busPIRone (BUSPAR) 15 MG tablet Take 1 tablet (15 mg) by mouth 2 times daily 180 tablet 1     butorphanol (STADOL) 10 MG/ML nasal spray Spray 1 spray in nostril every 4 hours as needed for pain 2.5 mL 3     celecoxib (CELEBREX) 200 MG capsule TAKE ONE CAPSULE BY MOUTH TWICE A DAY AS NEEDED FOR PAIN Strength: 200 mg 180 capsule 1     chlorhexidine (PERIDEX) 0.12 % solution Swish  and spit 15 mLs in mouth 2 times daily 1893 mL 4     clindamycin (CLINDAMAX) 1 % external gel Apply topically 2 times daily 30 g 4     clotrimazole (MYCELEX) 10 MG lozenge Place 1 lozenge (10 mg) inside cheek 5 times daily 70 lozenge 1     desonide (DESOWEN) 0.05 % external cream Apply topically 2 times daily To sores on face 60 g 3     dextran 70-hypromellose (TEARS NATURALE FREE PF) 0.1-0.3 % ophthalmic solution Place 2 drops into both eyes daily as needed (for dry eyes) 35 each 3     DULoxetine (CYMBALTA) 60 MG capsule Take 2 capsules (120 mg) by mouth daily 180 capsule 1     dupilumab (DUPIXENT) 300 MG/2ML prefilled pen Inject 2 mLs (300 mg) Subcutaneous every 14 days After first loading dose 4 mL 2     famotidine (PEPCID) 40 MG tablet Take 1 tablet (40 mg) by mouth daily 90 tablet 3     fluocinonide (LIDEX) 0.05 % external gel Apply topically 2 times daily as needed 15 g 0     hydrochlorothiazide (HYDRODIURIL) 25 MG tablet Take 1 tablet (25 mg) by mouth daily 90 tablet 1     hydrocortisone 2.5 % cream APPLY TOPICALLY 2 TIMES DAILY AS NEEDED 30 g 3     lidocaine, viscous, (XYLOCAINE) 2 % solution Swish and spit 10ml every 3 hours as needed for oral pain; max 8 doses/24hrs.  Do not eat or chew gum for 60 minutes following use.       losartan (COZAAR) 100 MG tablet Take 1 tablet (100 mg) by mouth daily 90 tablet 1     metoprolol succinate ER (TOPROL XL) 100 MG 24 hr tablet Take 1 tablet (100 mg) by mouth 2 times daily 180 tablet 1     metoprolol succinate ER (TOPROL XL) 25 MG 24 hr tablet Take 25 mg by mouth 3 times daily       mometasone-formoterol (DULERA) 200-5 MCG/ACT inhaler Inhale 2 puffs into the lungs 2 times daily 39 g 1     montelukast (SINGULAIR) 10 MG tablet Take 1 tablet (10 mg) by mouth At Bedtime 90 tablet 3     mupirocin (BACTROBAN) 2 % external cream Apply to affected area three times daily 60 g 1     nystatin (MYCOSTATIN) 843623 UNIT/ML suspension Take by mouth 4 times daily Has recurrent thrush.  Uses for 2 weeks at a time as needed. 380 mL 1     oxybutynin ER (DITROPAN XL) 5 MG 24 hr tablet Take 2 tablets (10 mg) by mouth daily 180 tablet 2     potassium chloride ER (KLOR-CON M) 10 MEQ CR tablet 20 meq daily for 5 days then 10 meq daily. 95 tablet 0     predniSONE (DELTASONE) 10 MG tablet 40 mg daily for 2 days, then 20 mg daily for 4 days then 10 mg daily. 100 tablet 0     predniSONE (DELTASONE) 10 MG tablet Take 1 tablet (10 mg) by mouth daily 90 tablet 0     rizatriptan (MAXALT) 10 MG tablet Take 1 tablet (10 mg) by mouth at onset of headache for migraine May repeat in 2 hours. Max 3 tablets/24 hours. 18 tablet 1     rOPINIRole (REQUIP) 1 MG tablet TAKE THREE TABLETS BY MOUTH AT BEDTIME 270 tablet 3     tacrolimus (PROTOPIC) 0.1 % external ointment Apply topically 2 times daily To areas of sores on face 60 g 1     tiotropium (SPIRIVA RESPIMAT) 2.5 MCG/ACT inhaler Inhale 2 puffs into the lungs daily 12 g 1     traMADol (ULTRAM) 50 MG tablet Take 1 tablet (50 mg) by mouth every 12 hours as needed for severe pain 45 tablet 0     traZODone (DESYREL) 50 MG tablet Take 3 tablets (150 mg) by mouth At Bedtime 270 tablet 0     triamcinolone (ARISTOCORT HP) 0.5 % external cream Apply topically 2 times daily 60 g 0     valACYclovir (VALTREX) 500 MG tablet TAKE ONE TABLET BY MOUTH ONE TIME DAILY (Patient taking differently: 500 mg daily as needed) 90 tablet 1     ZYRTEC 10 MG OR TABS Take 10 mg by mouth daily as needed 90 3        Allergies:  Allergies   Allergen Reactions     Cats      and rabbits/wheezing     Dogs      wheezing     Lyrica [Pregabalin] Other (See Comments)     Rash, mouth sores, itching and burning     Seasonal Allergies      rhinits       ROS:  Remainder of a comprehensive 10 point ROS is negative unless noted above.    Social History:  Denies any tobacco use. No significant alcohol use. Denies any illicit drug use.     Family History:  Two daughter  31 Yo daughter  28 yo daughter  1  grandson    1 sister- older- high blood glucose, arthritis  1 brother- older- no idea    Mother- - heart valve. Had MDS. Needed a shot every month  Father- - cancer    Objectives:  GENERAL: alert and no distress  EYES: Eyes grossly normal to inspection.  No discharge or erythema, or obvious scleral/conjunctival abnormalities.  RESP: No audible wheeze, cough, or visible cyanosis.  No visible retractions or increased work of breathing.    SKIN: face covered in reddish ulcerated lesions at various stages of healing with numerous scabs.  -hypopigmentation - hands, lips, neck and across face, scar - face and numerous scabs over face.--- review MyChart  NEURO: Cranial nerves grossly intact.  Mentation and speech appropriate for age.  PSYCH: Mentation appears normal, affect normal/bright, judgement and insight intact, normal speech and appearance well-groomed.      Labs:    Ferritin   Date Value Ref Range Status   2023 53 8 - 252 ng/mL Final   2021 376 (H) 8 - 252 ng/mL Final     Iron   Date Value Ref Range Status   2022 88 37 - 145 ug/dL Final   2021 50 35 - 180 ug/dL Final     Iron Binding Cap   Date Value Ref Range Status   2021 350 240 - 430 ug/dL Final     Iron Binding Capacity   Date Value Ref Range Status   2022 260 240 - 430 ug/dL Final   09/15/2021 284 240 - 430 ug/dL Final       Refer to [facility]- autoantibody platelet study    Imaging:  Not applicable    Again, thank you for allowing me to participate in the care of your patient.      Sincerely,    Emely Grimes MD

## 2023-05-04 ENCOUNTER — MYC MEDICAL ADVICE (OUTPATIENT)
Dept: FAMILY MEDICINE | Facility: CLINIC | Age: 63
End: 2023-05-04
Payer: COMMERCIAL

## 2023-05-04 DIAGNOSIS — J20.9 ACUTE BRONCHITIS WITH SYMPTOMS > 10 DAYS: ICD-10-CM

## 2023-05-04 DIAGNOSIS — E61.1 IRON DEFICIENCY: Primary | ICD-10-CM

## 2023-05-04 NOTE — TELEPHONE ENCOUNTER
RN is unable to fill since last fill was back in 2021.     Dario Clinton, MARVAN, RN, PHN  [facility]  May 4, 2023

## 2023-05-04 NOTE — TELEPHONE ENCOUNTER
Providers please review and advise if this was needing to be done at her lab draw yesterday. Looks like all that was done was the CMP but there does not seem to be any out standing orders for lab to complete a ferritin or a CBC.     Marie Laura CMA ([facility])

## 2023-05-05 ENCOUNTER — E-VISIT (OUTPATIENT)
Dept: FAMILY MEDICINE | Facility: CLINIC | Age: 63
End: 2023-05-05
Payer: COMMERCIAL

## 2023-05-05 ENCOUNTER — TRANSFERRED RECORDS (OUTPATIENT)
Dept: HEALTH INFORMATION MANAGEMENT | Facility: CLINIC | Age: 63
End: 2023-05-05

## 2023-05-05 ENCOUNTER — TRANSFERRED RECORDS (OUTPATIENT)
Dept: MULTI SPECIALTY CLINIC | Facility: CLINIC | Age: 63
End: 2023-05-05

## 2023-05-05 DIAGNOSIS — J01.90 ACUTE SINUSITIS, RECURRENCE NOT SPECIFIED, UNSPECIFIED LOCATION: Primary | ICD-10-CM

## 2023-05-05 LAB
FERRITIN SERPL-MCNC: 80 NG/ML (ref 11–328)
HEMOCCULT STL QL IA: NEGATIVE

## 2023-05-05 PROCEDURE — 99421 OL DIG E/M SVC 5-10 MIN: CPT | Performed by: FAMILY MEDICINE

## 2023-05-05 RX ORDER — ALBUTEROL SULFATE 0.83 MG/ML
2.5 SOLUTION RESPIRATORY (INHALATION) EVERY 6 HOURS PRN
Qty: 3 ML | Refills: 4 | Status: SHIPPED | OUTPATIENT
Start: 2023-05-05 | End: 2023-11-07

## 2023-05-05 RX ORDER — ALBUTEROL SULFATE 0.83 MG/ML
2.5 SOLUTION RESPIRATORY (INHALATION) EVERY 6 HOURS PRN
Qty: 3 ML | Refills: 4 | Status: CANCELLED | OUTPATIENT
Start: 2023-05-05

## 2023-05-05 NOTE — TELEPHONE ENCOUNTER
Patient calling because she tested positive for Covid Tuesday and continues to have a cough.    Patient is immunocompromised and is asking for an antibiotic because she is compromised.    She is afebrile, no body aches or chills.  No shortness of breath.    Does have a cough.      Encouraged lots of water.    Did give her the covid treatment line to call.    Shazia Fuentes RN  [facility] ~ Registered Nurse  Clinic Triage ~ [facility]  September 2, 2022     Vital Signs Last 24 Hrs  T(C): 36.6 (05-05-23 @ 07:34), Max: 36.6 (05-05-23 @ 07:34)  T(F): 97.8 (05-05-23 @ 07:34), Max: 97.8 (05-05-23 @ 07:34)  HR: --  BP: --  BP(mean): --  RR: 18 (05-05-23 @ 07:34) (18 - 18)  SpO2: 99% (05-05-23 @ 07:34) (99% - 99%)    Orthostatic VS  05-05-23 @ 07:34  Lying BP: --/-- HR: --  Sitting BP: 137/81 HR: 73  Standing BP: 146/93 HR: 108  Site: upper right arm  Mode: electronic  Orthostatic VS  05-04-23 @ 07:55  Lying BP: --/-- HR: --  Sitting BP: 148/79 HR: 80  Standing BP: 136/88 HR: 84  Site: upper right arm  Mode: electronic

## 2023-05-05 NOTE — TELEPHONE ENCOUNTER
Replied to [employer].  Please review refill request for albuterol neb pending.     Marie Laura CMA ([facility])

## 2023-05-06 RX ORDER — PREDNISONE 20 MG/1
TABLET ORAL
Qty: 8 TABLET | Refills: 0 | Status: SHIPPED | OUTPATIENT
Start: 2023-05-06 | End: 2023-07-26

## 2023-05-06 RX ORDER — DOXYCYCLINE 100 MG/1
100 CAPSULE ORAL 2 TIMES DAILY
Qty: 20 CAPSULE | Refills: 0 | Status: SHIPPED | OUTPATIENT
Start: 2023-05-06 | End: 2023-05-17

## 2023-05-07 NOTE — PATIENT INSTRUCTIONS
Dear Jacquie Amador    After reviewing your responses, I've been able to diagnose you with sinusitis which is a common infection of your lungs. While this is most commonly caused by a virus, the symptoms you have given suggest you should be treated with antibiotics.     I have sent antibiotic and prednisone to your pharmacy to treat this infection.     It is important that you take all of your prescribed medication even if your symptoms are improving after a few doses. Taking all of your medicine helps prevent the symptoms from returning.     If your symptoms worsen, you develop chest pain or shortness of breath, fevers over 101, or are not improving in 5 days, please contact your primary care provider for an appointment or visit any of our convenient Walk-in Care or Urgent Care Centers to be seen which can be found on our website here.    Thanks again for choosing us as your health care partner,    Liz Peterson MD

## 2023-05-08 ASSESSMENT — ASTHMA QUESTIONNAIRES
QUESTION_4 LAST FOUR WEEKS HOW OFTEN HAVE YOU USED YOUR RESCUE INHALER OR NEBULIZER MEDICATION (SUCH AS ALBUTEROL): ONE OR TWO TIMES PER DAY
QUESTION_1 LAST FOUR WEEKS HOW MUCH OF THE TIME DID YOUR ASTHMA KEEP YOU FROM GETTING AS MUCH DONE AT WORK, SCHOOL OR AT HOME: A LITTLE OF THE TIME
QUESTION_2 LAST FOUR WEEKS HOW OFTEN HAVE YOU HAD SHORTNESS OF BREATH: MORE THAN ONCE A DAY
QUESTION_5 LAST FOUR WEEKS HOW WOULD YOU RATE YOUR ASTHMA CONTROL: SOMEWHAT CONTROLLED
ACT_TOTALSCORE: 11
ACT_TOTALSCORE: 11
QUESTION_3 LAST FOUR WEEKS HOW OFTEN DID YOUR ASTHMA SYMPTOMS (WHEEZING, COUGHING, SHORTNESS OF BREATH, CHEST TIGHTNESS OR PAIN) WAKE YOU UP AT NIGHT OR EARLIER THAN USUAL IN THE MORNING: FOUR OR MORE NIGHTS A WEEK

## 2023-05-09 ENCOUNTER — OFFICE VISIT (OUTPATIENT)
Dept: FAMILY MEDICINE | Facility: CLINIC | Age: 63
End: 2023-05-09
Payer: COMMERCIAL

## 2023-05-09 ENCOUNTER — MYC MEDICAL ADVICE (OUTPATIENT)
Dept: FAMILY MEDICINE | Facility: CLINIC | Age: 63
End: 2023-05-09

## 2023-05-09 ENCOUNTER — ANCILLARY PROCEDURE (OUTPATIENT)
Dept: GENERAL RADIOLOGY | Facility: CLINIC | Age: 63
End: 2023-05-09
Attending: FAMILY MEDICINE
Payer: COMMERCIAL

## 2023-05-09 VITALS
RESPIRATION RATE: 14 BRPM | HEIGHT: 66 IN | BODY MASS INDEX: 34.87 KG/M2 | SYSTOLIC BLOOD PRESSURE: 115 MMHG | DIASTOLIC BLOOD PRESSURE: 77 MMHG | TEMPERATURE: 97.6 F | OXYGEN SATURATION: 98 % | WEIGHT: 217 LBS | HEART RATE: 71 BPM

## 2023-05-09 DIAGNOSIS — J40 BRONCHITIS: Primary | ICD-10-CM

## 2023-05-09 DIAGNOSIS — L30.9 DERMATITIS: ICD-10-CM

## 2023-05-09 DIAGNOSIS — R29.898 WEAKNESS OF BOTH LOWER EXTREMITIES: ICD-10-CM

## 2023-05-09 DIAGNOSIS — F11.90 CHRONIC, CONTINUOUS USE OF OPIOIDS: ICD-10-CM

## 2023-05-09 DIAGNOSIS — R05.1 ACUTE COUGH: ICD-10-CM

## 2023-05-09 DIAGNOSIS — I10 HYPERTENSION GOAL BP (BLOOD PRESSURE) < 140/90: ICD-10-CM

## 2023-05-09 DIAGNOSIS — R05.1 ACUTE COUGH: Primary | ICD-10-CM

## 2023-05-09 DIAGNOSIS — G89.4 CHRONIC PAIN SYNDROME: ICD-10-CM

## 2023-05-09 DIAGNOSIS — E61.1 IRON DEFICIENCY: ICD-10-CM

## 2023-05-09 LAB
ALBUMIN SERPL BCG-MCNC: 4.3 G/DL (ref 3.5–5.2)
ALP SERPL-CCNC: 96 U/L (ref 35–104)
ALT SERPL W P-5'-P-CCNC: 20 U/L (ref 10–35)
ANION GAP SERPL CALCULATED.3IONS-SCNC: 15 MMOL/L (ref 7–15)
AST SERPL W P-5'-P-CCNC: 27 U/L (ref 10–35)
BASOPHILS # BLD AUTO: 0 10E3/UL (ref 0–0.2)
BASOPHILS NFR BLD AUTO: 0 %
BILIRUB SERPL-MCNC: 0.5 MG/DL
BUN SERPL-MCNC: 18.7 MG/DL (ref 8–23)
CALCIUM SERPL-MCNC: 10 MG/DL (ref 8.8–10.2)
CHLORIDE SERPL-SCNC: 100 MMOL/L (ref 98–107)
CK SERPL-CCNC: 166 U/L (ref 26–192)
CREAT SERPL-MCNC: 1.37 MG/DL (ref 0.51–0.95)
DEPRECATED HCO3 PLAS-SCNC: 25 MMOL/L (ref 22–29)
EOSINOPHIL # BLD AUTO: 0 10E3/UL (ref 0–0.7)
EOSINOPHIL NFR BLD AUTO: 0 %
ERYTHROCYTE [DISTWIDTH] IN BLOOD BY AUTOMATED COUNT: 14 % (ref 10–15)
GFR SERPL CREATININE-BSD FRML MDRD: 43 ML/MIN/1.73M2
GLUCOSE SERPL-MCNC: 159 MG/DL (ref 70–99)
HCT VFR BLD AUTO: 43.4 % (ref 35–47)
HGB BLD-MCNC: 14.2 G/DL (ref 11.7–15.7)
IMM GRANULOCYTES # BLD: 0.1 10E3/UL
IMM GRANULOCYTES NFR BLD: 1 %
LYMPHOCYTES # BLD AUTO: 1.6 10E3/UL (ref 0.8–5.3)
LYMPHOCYTES NFR BLD AUTO: 12 %
MCH RBC QN AUTO: 29.7 PG (ref 26.5–33)
MCHC RBC AUTO-ENTMCNC: 32.7 G/DL (ref 31.5–36.5)
MCV RBC AUTO: 91 FL (ref 78–100)
MONOCYTES # BLD AUTO: 0.8 10E3/UL (ref 0–1.3)
MONOCYTES NFR BLD AUTO: 6 %
NEUTROPHILS # BLD AUTO: 10.7 10E3/UL (ref 1.6–8.3)
NEUTROPHILS NFR BLD AUTO: 81 %
PLATELET # BLD AUTO: 290 10E3/UL (ref 150–450)
POTASSIUM SERPL-SCNC: 4 MMOL/L (ref 3.4–5.3)
PROT SERPL-MCNC: 7.5 G/DL (ref 6.4–8.3)
RBC # BLD AUTO: 4.78 10E6/UL (ref 3.8–5.2)
SODIUM SERPL-SCNC: 140 MMOL/L (ref 136–145)
WBC # BLD AUTO: 13.2 10E3/UL (ref 4–11)

## 2023-05-09 PROCEDURE — 99214 OFFICE O/P EST MOD 30 MIN: CPT | Performed by: FAMILY MEDICINE

## 2023-05-09 PROCEDURE — 36415 COLL VENOUS BLD VENIPUNCTURE: CPT

## 2023-05-09 PROCEDURE — 80053 COMPREHEN METABOLIC PANEL: CPT

## 2023-05-09 PROCEDURE — 71046 X-RAY EXAM CHEST 2 VIEWS: CPT | Mod: TC | Performed by: RADIOLOGY

## 2023-05-09 PROCEDURE — 85025 COMPLETE CBC W/AUTO DIFF WBC: CPT | Performed by: FAMILY MEDICINE

## 2023-05-09 PROCEDURE — 82550 ASSAY OF CK (CPK): CPT | Performed by: FAMILY MEDICINE

## 2023-05-09 RX ORDER — LOSARTAN POTASSIUM 100 MG/1
100 TABLET ORAL DAILY
Qty: 90 TABLET | Refills: 1 | Status: SHIPPED | OUTPATIENT
Start: 2023-05-09 | End: 2023-11-02

## 2023-05-09 RX ORDER — FLUOCINONIDE GEL 0.5 MG/G
GEL TOPICAL 2 TIMES DAILY PRN
Qty: 15 G | Refills: 1 | Status: SHIPPED | OUTPATIENT
Start: 2023-05-09

## 2023-05-09 RX ORDER — TRIAMCINOLONE ACETONIDE 5 MG/G
CREAM TOPICAL 2 TIMES DAILY
Qty: 60 G | Refills: 0 | Status: SHIPPED | OUTPATIENT
Start: 2023-05-09 | End: 2023-12-29

## 2023-05-09 RX ORDER — TRAMADOL HYDROCHLORIDE 50 MG/1
50 TABLET ORAL EVERY 12 HOURS PRN
Qty: 45 TABLET | Refills: 0 | Status: CANCELLED | OUTPATIENT
Start: 2023-05-09

## 2023-05-09 RX ORDER — BENZONATATE 200 MG/1
200 CAPSULE ORAL 2 TIMES DAILY PRN
Qty: 40 CAPSULE | Refills: 1 | Status: SHIPPED | OUTPATIENT
Start: 2023-05-09 | End: 2023-12-29

## 2023-05-09 ASSESSMENT — ANXIETY QUESTIONNAIRES
7. FEELING AFRAID AS IF SOMETHING AWFUL MIGHT HAPPEN: NOT AT ALL
3. WORRYING TOO MUCH ABOUT DIFFERENT THINGS: SEVERAL DAYS
8. IF YOU CHECKED OFF ANY PROBLEMS, HOW DIFFICULT HAVE THESE MADE IT FOR YOU TO DO YOUR WORK, TAKE CARE OF THINGS AT HOME, OR GET ALONG WITH OTHER PEOPLE?: SOMEWHAT DIFFICULT
6. BECOMING EASILY ANNOYED OR IRRITABLE: SEVERAL DAYS
7. FEELING AFRAID AS IF SOMETHING AWFUL MIGHT HAPPEN: NOT AT ALL
5. BEING SO RESTLESS THAT IT IS HARD TO SIT STILL: NOT AT ALL
1. FEELING NERVOUS, ANXIOUS, OR ON EDGE: SEVERAL DAYS
GAD7 TOTAL SCORE: 4
GAD7 TOTAL SCORE: 4
4. TROUBLE RELAXING: NOT AT ALL
GAD7 TOTAL SCORE: 4
IF YOU CHECKED OFF ANY PROBLEMS ON THIS QUESTIONNAIRE, HOW DIFFICULT HAVE THESE PROBLEMS MADE IT FOR YOU TO DO YOUR WORK, TAKE CARE OF THINGS AT HOME, OR GET ALONG WITH OTHER PEOPLE: SOMEWHAT DIFFICULT
2. NOT BEING ABLE TO STOP OR CONTROL WORRYING: SEVERAL DAYS

## 2023-05-09 ASSESSMENT — PAIN SCALES - GENERAL: PAINLEVEL: MODERATE PAIN (5)

## 2023-05-09 ASSESSMENT — ENCOUNTER SYMPTOMS: COUGH: 1

## 2023-05-09 NOTE — PROGRESS NOTES
Assessment & Plan     Bronchitis  Patient with cough that is productive.  Illness began with sore throat and congestion.  She is not currently having any fevers.  On exam she has expiratory wheezes throughout.  Her chest x-ray was clear.  She had already previously been started on doxycycline and will continue that.  We will increase the prednisone to 60 mg daily for 2 days and then will taper from there.  Benzonatate given for cough suppression.  If noticing worsening or concerns over the next several days she will reach back out.  Patient agrees to plan.  - XR Chest 2 Views; Future  - benzonatate (TESSALON) 200 MG capsule; Take 1 capsule (200 mg) by mouth 2 times daily as needed for cough    Hypertension goal BP (blood pressure) < 140/90  Doing well. Well controlled. Tolerating medication.  No change in plan.    - losartan (COZAAR) 100 MG tablet; Take 1 tablet (100 mg) by mouth daily    Dermatitis  Patient requesting refills.  She feels her skin is improving slightly.  Refills given.  - fluocinonide (LIDEX) 0.05 % external gel; Apply topically 2 times daily as needed  - triamcinolone (ARISTOCORT HP) 0.5 % external cream; Apply topically 2 times daily    Chronic, continuous use of opioids  Chronic pain syndrome  Patient has been stable with pain right now.  No changes planned at this point.    Weakness of both lower extremities  Patient complaining of some weakness of both lower extremities.  Unable to note weakness on exam.  She feels she is unstable at times.  Recommend check a CK due to the feeling of weakness.  Reviewed other recent labs and did not notice any other concerns at this time.  Discussed seeing physical therapy for strengthening and she would have interest in that.  Awaiting labs and then will address.  - CK total; Future  - CK total    Iron deficiency  Patient with history of iron deficiency.  Recheck hemoglobin.  - CBC with platelets and differential                 MD SAMUEL Castillo  [facility] ANISH Dhillon is a 62 year old, presenting for the following health issues:  Cough, Hypertension, and Pain Management        5/9/2023    10:56 AM   Additional Questions   Roomed by Melody   Accompanied by self         5/9/2023    10:57 AM   Patient Reported Additional Medications   Patient reports taking the following new medications using other inhalers but not sure which ones they are     Cough    History of Present Illness       Reason for visit:  I plan a appt every three months it so happens i am sick with it now    She eats 0-1 servings of fruits and vegetables daily.She consumes 0 sweetened beverage(s) daily.She exercises with enough effort to increase her heart rate 9 or less minutes per day.  She exercises with enough effort to increase her heart rate 3 or less days per week.   She is taking medications regularly.    Today's PHQ-9         PHQ-9 Total Score: 6    PHQ-9 Q9 Thoughts of better off dead/self-harm past 2 weeks :   Not at all    How difficult have these problems made it for you to do your work, take care of things at home, or get along with other people: Somewhat difficult  Today's MARIZOL-7 Score: 4       Acute Illness  Acute illness concerns: Cough, wheezing  Onset/Duration: 1 week  Symptoms:  Fever: No  Chills/Sweats: No  Headache (location?): YES  Sinus Pressure: YES  Conjunctivitis:  No  Ear Pain: YES: right  Rhinorrhea: No  Congestion: YES  Sore Throat: YES  Cough: YES-productive of yellow sputum, productive of green sputum  Wheeze: YES  Decreased Appetite: YES  Nausea: No  Vomiting: No  Diarrhea: No  Dysuria/Freq.: No  Dysuria or Hematuria: No  Fatigue/Achiness: YES  Sick/Strep Exposure: YES  Therapies tried and outcome: Antibiotics   Started doxycycline and prednisone yesterday.       LEG WEAKNESS - when walking feels like she will run into the wall. Sometimes feels like she needs to grab onto something.  In February had a fall. Had trouble getting  up. No dizziness. The weakness used to come and go but now seems to be all the time. History of low potassium but not recently. Hard to go up stairs. Both legs. Not one more than another.   Her mom had a blood issue that caused mom some weakness. Patient is followed by hematology. Hard time walking long distances.     Pain History:  When did you first notice your pain? Years  Have you seen this provider for your pain in the past?   Yes   Where in your body do you have pain? All Over  Are you seeing anyone else for your pain? No        12/7/2022     8:55 AM 2/28/2023    11:26 AM 5/9/2023    10:54 AM   PHQ-9 SCORE   PHQ-9 Total Score MyChart 7 (Mild depression)  6 (Mild depression)   PHQ-9 Total Score 7 12 6           1/30/2023    10:45 AM 2/28/2023    11:26 AM 5/9/2023    10:55 AM   MARIZOL-7 SCORE   Total Score 10 (moderate anxiety)  4 (minimal anxiety)   Total Score 10 8 4           5/9/2023    11:00 AM   PEG Score   PEG Total Score 3.33           12/7/2022     8:55 AM 2/28/2023    11:26 AM 5/9/2023    10:54 AM   PHQ-9 SCORE   PHQ-9 Total Score MyChart 7 (Mild depression)  6 (Mild depression)   PHQ-9 Total Score 7 12 6         1/30/2023    10:45 AM 2/28/2023    11:26 AM 5/9/2023    10:55 AM   MARIZOL-7 SCORE   Total Score 10 (moderate anxiety)  4 (minimal anxiety)   Total Score 10 8 4         5/9/2023    11:00 AM   PEG Score   PEG Total Score 3.33       Chronic Pain Follow Up:    Location of pain: back, hands  Analgesia/pain control:    - Recent changes:  none    - Overall control: Tolerable with discomfort    - Current treatments: Tramadol, occasional injections, Stadol   Adherence:     - Do you ever take more pain medicine than prescribed? No    - When did you take your last dose of pain medicine?  yesterday   Adverse effects: No   PDMP Review       Value Time User    State PDMP site checked  Yes 4/28/2023  1:21 PM Liz Peterson MD        Last CSA Agreement:   CSA -- Patient Level:     [Media Unavailable] Controlled  "Substance Agreement - Opioid - Scan on 9/14/2022 12:32 PM: OPIOD   [Media Unavailable] Controlled Substance Agreement - Opioid - Scan on 9/14/2022 12:30 PM: OPIOD       Last UDS: 9/27/2022        Hypertension Follow-up      Do you check your blood pressure regularly outside of the clinic? Yes     Are you following a low salt diet? Yes    Are your blood pressures ever more than 140 on the top number (systolic) OR more   than 90 on the bottom number (diastolic), for example 140/90? Yes        Review of Systems   Respiratory: Positive for cough.       CONSTITUTIONAL: NEGATIVE for fever, chills, change in weight  ENT/MOUTH: as above  RESP:as above.   CV: NEGATIVE for chest pain, palpitations or peripheral edema      Objective    /77 (BP Location: Left arm, Patient Position: Sitting, Cuff Size: Adult Regular)   Pulse 71   Temp 97.6  F (36.4  C) (Temporal)   Resp 14   Ht 1.685 m (5' 6.34\")   Wt 98.4 kg (217 lb)   LMP 07/17/2009 (Exact Date)   SpO2 98%   BMI 34.67 kg/m    Body mass index is 34.67 kg/m .  Physical Exam   GENERAL: alert and no distress  HENT: ear canals and TM's normal, nose and mouth without ulcers or lesions  NECK: no adenopathy, no asymmetry, masses, or scars and thyroid normal to palpation  RESP: moving air well. Scattered expiratory wheezes throughout. No rhonchi  CV: regular rate and rhythm, normal S1 S2, no S3 or S4, no murmur, click or rub, no peripheral edema and peripheral pulses strong  MS: no gross musculoskeletal defects noted, no edema  NEURO: Normal strength and tone, sensory exam grossly normal and mentation intact    CXR - Reviewed and interpreted by me Normal- no infiltrates, effusions, pneumothoraces, cardiomegaly or masses                "

## 2023-05-09 NOTE — TELEPHONE ENCOUNTER
Ok with plan as noted. She seems to be feeling better. Continue to push fluids. Follow up at urgent care over the weekend if needed. Call back as needed.    oral

## 2023-05-10 RX ORDER — PREDNISONE 20 MG/1
TABLET ORAL
Qty: 14 TABLET | Refills: 0 | Status: SHIPPED | OUTPATIENT
Start: 2023-05-10 | End: 2023-06-09

## 2023-05-10 NOTE — TELEPHONE ENCOUNTER
Given to provider to review, sign. Will fax accordingly when complete.    Provider please advise and can then send mychart to patient with instructions.    Marie Laura CMA ([facility])

## 2023-05-11 DIAGNOSIS — R26.89 BALANCE PROBLEMS: ICD-10-CM

## 2023-05-11 DIAGNOSIS — R29.898 WEAKNESS OF BOTH LOWER EXTREMITIES: Primary | ICD-10-CM

## 2023-05-12 ENCOUNTER — TELEPHONE (OUTPATIENT)
Dept: DERMATOLOGY | Facility: CLINIC | Age: 63
End: 2023-05-12
Payer: COMMERCIAL

## 2023-05-12 NOTE — ADDENDUM NOTE
Addended by: MINO HILL on: 6/6/2022 04:24 PM     Modules accepted: Orders     Zyclara Counseling:  I discussed with the patient the risks of imiquimod including but not limited to erythema, scaling, itching, weeping, crusting, and pain.  Patient understands that the inflammatory response to imiquimod is variable from person to person and was educated regarded proper titration schedule.  If flu-like symptoms develop, patient knows to discontinue the medication and contact us.

## 2023-05-12 NOTE — TELEPHONE ENCOUNTER
PA Needed    Medication: Dupixent 300mg/2ml SOPN  QTY/DS: 4ml/28 days  NEW INS: No  Insurance Company: "[employer]"  Pharmacy Filling the Rx:  [facility]  PA :  2023  Date of last fill: 2023

## 2023-05-16 NOTE — TELEPHONE ENCOUNTER
PA Initiation    Medication: Dupixent  Insurance Company: [facility] - Phone [phone] Fax [phone]  Pharmacy Filling the Rx: [facility] - [place], [place] - [facility] [address]  Filling Pharmacy Phone:    Filling Pharmacy Fax:    Start Date: 5/16/2023    Key: [id number]

## 2023-05-16 NOTE — TELEPHONE ENCOUNTER
Prior Authorization Approval    Authorization Effective Date: 5/16/2023  Authorization Expiration Date: 5/15/2024  Medication: Dupixent  Approved Dose/Quantity: 4ml per 28 days  Reference #: Key: [id number]   Insurance Company: [employer] - Phone [phone] Fax [phone]  Expected CoPay:       CoPay Card Available:      Foundation Assistance Needed:    Which Pharmacy is filling the prescription (Not needed for infusion/clinic administered): [facility] - [place], [place] - [address]  Pharmacy Notified: Yes  Patient Notified: No

## 2023-05-16 NOTE — TELEPHONE ENCOUNTER
Please initiate PA renewal. Patient is still using Dupixent. According to A&P and Dr. Warren's notes it is up to patient on if they stop or not.    Alen Nickerson, EMT

## 2023-05-16 NOTE — TELEPHONE ENCOUNTER
Called and verified patient by last name and . Patient states she is continuing with Dupixent and is due for next dose tomorrow. Informed patient PA will be initiated. Patient acknowledged and asked how long they take. Informed patient that it depends on her insurance and could be a few days or weeks, there is no way to know for sure how long this will take. Patient states if it takes to long she will discontinue but wants to wait and see how long it takes before deciding.    Alen Nickerson, EMT

## 2023-05-17 ENCOUNTER — NURSE TRIAGE (OUTPATIENT)
Dept: FAMILY MEDICINE | Facility: CLINIC | Age: 63
End: 2023-05-17
Payer: COMMERCIAL

## 2023-05-17 ENCOUNTER — MYC MEDICAL ADVICE (OUTPATIENT)
Dept: FAMILY MEDICINE | Facility: CLINIC | Age: 63
End: 2023-05-17
Payer: COMMERCIAL

## 2023-05-17 DIAGNOSIS — J01.90 ACUTE SINUSITIS, RECURRENCE NOT SPECIFIED, UNSPECIFIED LOCATION: ICD-10-CM

## 2023-05-17 RX ORDER — DOXYCYCLINE 100 MG/1
100 CAPSULE ORAL 2 TIMES DAILY
Qty: 14 CAPSULE | Refills: 0 | Status: SHIPPED | OUTPATIENT
Start: 2023-05-17 | End: 2023-05-24

## 2023-05-17 NOTE — TELEPHONE ENCOUNTER
"Nurse Triage SBAR  Is this a 2nd Level Triage? NO    SITUATION:                                                    (Clearly and briefly define the situation)  Jacquie Amador is a 62 year old female     Called out to patient based on [employer] message for more information.     BACKGROUND:                                                    (Provide clear, relevant background information that relates to the situation)  Current Medication: Albuterol neb, Albuterol inhaler, Tessalon, Delsym, Doxycycline  Last seen:  5/9/23, Urgent care visit 5/13/2023    Patient reports she is still coughing, sometimes dry, sometimes productive. Patient reports sometimes she is able to clear mucus but most often not. When she does clear mucus it is still a thick, yellow-green color.     Patient denies SOB when she is not coughing but states during coughing spells she does have some SOB stating \"I recover right away once I stop coughing but when I start coughing it is hard to stop. It is really deep and I want to say its wet but not wet.\"    Patient reports cough comes and goes    Patient reports \"Wheezing at night\" Denies wheezing today/currently. Nebulizer and inhaler have helped with wheezing.    Patient reports she was seen in Urgent Care on Saturday, provider did not prescribe any new treatments but told patient to continue with current plan.     Patient reports treatments she has done are helping but cough is still lingering. Patient reports she is feeling better than on Saturday but still not over illness.     No vomiting after coughing  No coughing up blood  No chest pain   No fever    Patient completed course of Doxycyline last night   Albuterol nebs help with cough and wheezing and helped patient sleep a few nights ago.  Tessalon has helped but cough is still present  Delsym helps cough a little bit  NURSE ASSESSMENT:                                                    (A statement of your professional conclusion)    RN advised " for patient to be seen this week (with-in 3 days) for follow up as cough has been present for greater than 3 weeks. Patient reports she does not want to come in again. RN advised a follow up is recommended due to the duration of her cough. Patient declined appointment at this time and would like to her PCP recommendation.     RN advsied patient she would reach out to provider on recommendation if patient can continue home care and current medications and if not improved after the weekend have patient come in for follow up appointment.     RN advised continue to increase hydration he help with cough and secretions. Humidifier use. OTC cough medications to help with coughing spells but educated that clearing mucus out of her lungs is important as well. Advised of when to seek higher level of care. Patient verbalized understanding and all questions answered.     (See information below for more triage details.)  RECOMMENDATION(S) and PLAN:                                                    (What do you need from this individual?)  Protocol Recommended Disposition: See in Office Within 3 Days  Will comply with recommendation: no - would like provider opinion of home care and monitoring over the weekend with follow up next week if not improved.    Routing to provider for recommendation on: Can patient continue home care and current treatment plan and if symptoms do not improve after the weekend will proceed with in clinic follow up? (Precautions given to patient of when to seek higher level of care)    Does the patient meet one of the following criteria for ADS visit consideration? 16+ years old, with an [facility] PCP     TIP  Providers, please consider if this condition is appropriate for management at one of our Acute and Diagnostic Services sites.     If patient is a good candidate, please use dotphrase <dot>triageresponse and select Refer to ADS to document.    Encourage to return call clinic triage nurse if further  questions/concerns that may come up or if symptoms do not improve, worsen, or new symptoms develop.    NOTES: Disposition was determined by the first positive assessment question, therefore all previous assessment questions were negative.  Guideline used: System Protocol    BANDAR Parekh, RN  [facility] ~ Registered Nurse  Clinic Triage ~ [facility] [facility] [place]  May 17, 2023    Reason for Disposition    Cough has been present for > 3 weeks    Additional Information    Negative: Bluish (or gray) lips or face    Negative: SEVERE difficulty breathing (e.g., struggling for each breath, speaks in single words)    Negative: Rapid onset of cough and has hives    Negative: Coughing started suddenly after medicine, an allergic food or bee sting    Negative: Difficulty breathing after exposure to flames, smoke, or fumes    Negative: Sounds like a life-threatening emergency to the triager    Negative: Previous asthma attacks and this feels like asthma attack    Negative: Dry cough (non-productive; no sputum or minimal clear sputum) and within 14 days of COVID-19 Exposure    Negative: MODERATE difficulty breathing (e.g., speaks in phrases, SOB even at rest, pulse 100-120) and still present when not coughing    Negative: Chest pain present when not coughing    Negative: Passed out (i.e., fainted, collapsed and was not responding)    Negative: Patient sounds very sick or weak to the triager    Negative: MILD difficulty breathing (e.g., minimal/no SOB at rest, SOB with walking, pulse <100) and still present when not coughing    Negative: Coughed up > 1 tablespoon (15 ml) blood (Exception: Blood-tinged sputum.)    Negative: Fever > 103 F (39.4 C)    Negative: Fever > 101 F (38.3 C) and over 60 years of age    Negative: Fever > 100.0 F (37.8 C) and has diabetes mellitus or a weak immune system (e.g., HIV positive, cancer chemotherapy, organ transplant, splenectomy, chronic steroids)    Negative: Fever > 100.0 F (37.8 C) and  bedridden (e.g., nursing home patient, stroke, chronic illness, recovering from surgery)    Negative: Increasing ankle swelling    Negative: Wheezing is present    Negative: SEVERE coughing spells (e.g., whooping sound after coughing, vomiting after coughing)    Negative: Coughing up emile-colored (reddish-brown) or blood-tinged sputum    Negative: Fever present > 3 days (72 hours)    Negative: Fever returns after gone for over 24 hours and symptoms worse or not improved    Negative: Using nasal washes and pain medicine > 24 hours and sinus pain persists    Negative: Known COPD or other severe lung disease (i.e., bronchiectasis, cystic fibrosis, lung surgery) and worsening symptoms (i.e., increased sputum purulence or amount, increased breathing difficulty)    Negative: Continuous (nonstop) coughing interferes with work or school and no improvement using cough treatment per Care Advice    Negative: Patient wants to be seen    Protocols used: COUGH-A-OH

## 2023-05-19 ENCOUNTER — MYC MEDICAL ADVICE (OUTPATIENT)
Dept: FAMILY MEDICINE | Facility: CLINIC | Age: 63
End: 2023-05-19
Payer: COMMERCIAL

## 2023-05-19 DIAGNOSIS — M54.59 LUMBAR FACET JOINT PAIN: Primary | ICD-10-CM

## 2023-05-23 ENCOUNTER — TELEPHONE (OUTPATIENT)
Dept: PALLIATIVE MEDICINE | Facility: CLINIC | Age: 63
End: 2023-05-23

## 2023-05-23 NOTE — TELEPHONE ENCOUNTER
Pt sees Hematology.   Dr. Grimes is it safe for pt to have a steroid injection-lumbar facet joint injection?      Denisse, RN-BSN  [facility]   [phone]

## 2023-05-23 NOTE — TELEPHONE ENCOUNTER
Screening Questions for Radiology Injections:    Injection to be done at which interventional clinic site? [facility]    Procedure ordered by PCP    Procedure ordered? Bilateral L4-5 and L5-S1 facet joint injections    Transforaminal Cervical SENDY - Send to [facility] - No [facility] Site providers perform this procedure    What insurance would patient like us to bill for this procedure? [employer]     IF SCHEDULING IN [facility] PLEASE SCHEDULE AT LEAST 7-10 BUSINESS DAYS OUT SO A PA CAN BE OBTAINED    Worker's comp or MVA (motor vehicle accident) -Any injection DO NOT SCHEDULE and route to Tila Fu.      [employer] insurance - For SI joint injections, DO NOT SCHEDULE and route to Chantal Gan.       ALL BCBS, [employer] and [employer] - DO NOT SCHEDULE and route to Chantal Gan    MEDICA- facet joint injections, route to Chantal Malik    Is patient scheduled at [facility]?    If YES, route every encounter to [facility] CARE NAVIGATION POOL [[phone]]    Is an  needed? No     Patient has a  home? (Review Grid) YES: Informed     Any chance of pregnancy? NO   If YES, do NOT schedule and route to RN aminah  - Dr. Reardon route to Juliet Caro and PM&R Nurse  [[id number]]      Is patient actively being treated for cancer or immunocompromised? 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 Reardon's Team    Does the patient have a bleeding or clotting disorder? Yes - Clotting Disorder , pt takes Prednisone for this disorder. Please review.     If YES, okay to schedule AND route to RN nurse pool/ Dr. Reardon's Team     (For any patients with platelet count <100, RN must forward to provider)    Is patient taking any Blood Thinners OR Antiplatelet medication?  No   If hold needed, do NOT schedule, route to RN pool/ Dr. Reardon's Team    Examples:   o Blood Thinners: (Coumadin, Warfarin,  Jantoven, Pradaxa, Xarelto, Eliquis, Edoxaban, Enoxaparin, Lovenox, Heparin, Arixtra, Fondaparinux or Fragmin)  o Antiplatelet Medications: (Plavix, Brilinta or Effient)     Is patient taking any aspirin products (includes Excedrin and Fiorinal)? No     If more than 325mg/day, OK to schedule; Instruct Pt to decrease to less than 325 mg for 7 days AND route to RN pool/ Dr. Reardon's Team     For CERVICAL procedures, hold all aspirin products for 6 days.     Tell Pt that if aspirin product is not held for 6 days, the procedure WILL BE cancelled.     Any allergies to contrast dye, iodine, shellfish, or numbing and steroid medications? No    If YES, schedule and add allergy information to appointment notes AND route to the RN pool/ Dr. Reardon's Team    If SENDY and Contrast Dye / Iodine Allergy? DO NOT SCHEDULE, route to RN pool/ Dr. Reardon's Team    Allergies: Cats, Dogs, Lyrica [pregabalin], and Seasonal allergies     Does patient have an active infection or treated for one within the past week? No    Is patient currently taking any antibiotics or steroid medications?  No     For patients on chronic, preventative, or prophylactic antibiotics, procedures may be scheduled.     For patients on antibiotics for active or recent infection, schedule 4 days after completed.    For patients on steroid medications, schedule 4 days after completed.     Has the patient had a flu shot or any other vaccinations within the past 7 days? No  If yes, explain that for the vaccine to work best they need to:       wait 1 week before and 1 week after getting any Vaccine    wait 1 week before and 2 weeks after getting Covid Vaccine #2 or BOOSTER    If patient has concerns about the timing, send to RN pool/ Dr. Reardon's Team    Does patient have an MRI/CT?  YES: 12/30/22 Include Date and Check Procedure Scheduling Grid to see if required.    Was the MRI/CT done within the last 3 years?  Yes     If no route to RN Pool/ Dr. Reardon's Team    If  yes, where was the MRI/CT done?  [facility]     Refer to PACS Transmissions list for approved external locations and route to RN Pool High Priority/ Dr. Reardon's Team    If MRI was not done at approved external location do NOT schedule and route to RN pool/ Dr. Reardon's Team      If patient has an imaging disc, the injection MAY be scheduled but patient must bring disc to appt or appt will be cancelled.    Is patient able to transfer to a procedure table with minimal or no assistance? Yes     If no, do NOT schedule and route to RN Pool/ Dr. Reardon's Team    Procedure Specific Instructions:    If celiac plexus block, informed patient NPO for 6 hours and that it is okay to take medications with sips of water, especially blood pressure medications Not Applicable         If this is for a cervical procedure, informed patient that aspirin needs to be held for 6 days.   Not Applicable      Sedation, If Sedation is ordered for any procedure, patient must be NPO for 6 hours prior to procedure Not Applicable      If IV needed:    Do not schedule procedures requiring IV placement in the first appointment of the day or first appointment after lunch. Do NOT schedule at 0745, 0815 or 1245.       Instructed patient to arrive 30 minutes early for IV start if required. (Check Procedure Scheduling Grid)  Not Applicable    Reminders:    If you are started on any steroids or antibiotics between now and your appointment, you must contact us because the procedure may need to be cancelled.  Yes      As a reminder, receiving steroids can decrease your body's ability to fight infection.   Would you still like to move forward with scheduling the injection?  Yes      IV Sedation is not provided for procedures. If oral anti-anxiety medication is needed, the patient should request this from their referring provider.      Instruct patient to arrive as directed prior to the scheduled appointment time:  If IV needed 30 minutes before appointment  time       For patients 85 or older we recommend having an adult stay w/ them for the remainder of the day.       If the patient is Diabetic, remind them to bring their glucometer.      Does the patient have any questions?  NO  Rosario Ramirez  [facility]

## 2023-05-23 NOTE — TELEPHONE ENCOUNTER
Okay to schedule.    Denisse, RN-BSN  [facility]   [phone]

## 2023-06-08 ENCOUNTER — TELEPHONE (OUTPATIENT)
Dept: PALLIATIVE MEDICINE | Facility: CLINIC | Age: 63
End: 2023-06-08

## 2023-06-08 NOTE — TELEPHONE ENCOUNTER
[facility] Call Center    Phone Message    May a detailed message be left on voicemail: yes     Reason for Call: Other: Patient mychart message to cancel injection - please see below:    Jacquie Amador would like to cancel the following appointments:      José Miguel Brownlee MD in  PAIN MANAGEMENT ([id number]), 6/8/2023  1:15 PM   BEPAIN1 in  PAIN MANAGEMENT ([id number]), 6/8/2023  1:15 PM      Comments:   I Need to cancel I have strep. 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 I would like to reschedule when you have an opening. I'm sorry for such late notice.  Jacquie Amador         Action Taken: Other: Pain Nurse Pool    Travel Screening: Not Applicable

## 2023-06-09 ENCOUNTER — TRANSFERRED RECORDS (OUTPATIENT)
Dept: HEALTH INFORMATION MANAGEMENT | Facility: CLINIC | Age: 63
End: 2023-06-09

## 2023-06-15 DIAGNOSIS — D69.1: Primary | ICD-10-CM

## 2023-06-15 RX ORDER — TRANEXAMIC ACID 650 MG/1
650 TABLET ORAL DAILY
Qty: 30 TABLET | Refills: 3 | Status: SHIPPED | OUTPATIENT
Start: 2023-06-15 | End: 2023-07-26

## 2023-06-19 ENCOUNTER — VIRTUAL VISIT (OUTPATIENT)
Dept: PHARMACY | Facility: CLINIC | Age: 63
End: 2023-06-19
Payer: COMMERCIAL

## 2023-06-19 DIAGNOSIS — J45.909 ASTHMA: ICD-10-CM

## 2023-06-19 DIAGNOSIS — F33.9 MDD (MAJOR DEPRESSIVE DISORDER), RECURRENT EPISODE (H): Primary | ICD-10-CM

## 2023-06-19 DIAGNOSIS — F41.1 GENERALIZED ANXIETY DISORDER: ICD-10-CM

## 2023-06-19 PROCEDURE — 99606 MTMS BY PHARM EST 15 MIN: CPT | Performed by: PHARMACIST

## 2023-06-19 PROCEDURE — 99607 MTMS BY PHARM ADDL 15 MIN: CPT | Performed by: PHARMACIST

## 2023-06-19 RX ORDER — BUSPIRONE HYDROCHLORIDE 15 MG/1
TABLET ORAL
Qty: 270 TABLET | Refills: 1 | Status: SHIPPED | OUTPATIENT
Start: 2023-06-19 | End: 2023-07-25

## 2023-06-19 NOTE — Clinical Note
Attempted to call pt's mom to change pt's peds endo appt to video visit; to no avail   Unable to leave a voice message due to full mailbox.   fyi

## 2023-06-19 NOTE — PROGRESS NOTES
Medication Therapy Management (MTM) Encounter    ASSESSMENT:                              MDD, MARIZOL: Not optimally controlled. Discussed options including increasing Buspar or switching Cymbalta to an alternate antidepressant. Patient feels Cymbalta has been helpful, prefers to increase Buspar.     Asthma: Thrush resolved and better patient reported control of asthma since starting Spiriva. Writer sent ACT for patient to complete via [employer].       PLAN:                            1. Increase Buspar to 30mg daily and 15mg in the afternoon/evening    Follow-up: MTM 7/25/23    SUBJECTIVE/OBJECTIVE:                          Jacquie Amador is a 62 year old female called for a follow-up visit.  Today's visit is a follow-up MTM visit from 4/19/23     Reason for visit: anxiety check in (see Mychart from patient 6/8/23).    Allergies/ADRs: Reviewed in chart  Past Medical History: Reviewed in chart  Tobacco: She reports that she has quit smoking. Her smoking use included cigarettes. She smoked an average of 1 pack per day. She has never used smokeless tobacco.  Alcohol: none    Medication Adherence/Access: no issues reported    MDD, MARIZOL:   Current medications:   Duloxetine 120mg once daily   Wellbutrin SR 200mg twice daily   Buspar 15mg twice daily   Trazodone 100-150mg nightly as needed (prescribed 150mg nightly)    Pt was seen by [facility] by JESSICA Wells. Most recent visit was 1/30/23 at which time Wellbutrin was increased. Now seeing PCP, Liz Peterson at [facility] for psych medication medications.     Today, pt reports she has been struggling moreso with anxiety lately. She reports ruminating thoughts, feeling on edge, irritability.  Denies any medication side effects. She does report daytime fatigue and decreased motivation. Helping daughter with her twin grandchildren which keeps her active, but is tiring. Hasn't been on higher doses of Buspar.           12/7/2022     8:55 AM  2/28/2023    11:26 AM 5/9/2023    10:54 AM   PHQ   PHQ-9 Total Score 7 12 6   Q9: Thoughts of better off dead/self-harm past 2 weeks Not at all Not at all Not at all         1/30/2023    10:45 AM 2/28/2023    11:26 AM 5/9/2023    10:55 AM   MARIZOL-7 SCORE   Total Score 10 (moderate anxiety)  4 (minimal anxiety)   Total Score 10 8 4       Asthma: Current medications:  Montelukast (Singulair) once daily, Dulera 2 puffs twice daily, Spiriva 2 puffs daily.  Short-Acting Bronchodilator: Albuterol MDI and Nebs.  Tried Trelegy Ellipta but caused significant thrush, so she went back to using Dulera and added Spiriva. Would like to try adding on Spiriva.  Since using Spiriva has been needing albuterol much less often.           10/31/2022     4:57 PM 2/28/2023    11:26 AM 5/8/2023     8:08 PM   ACT Total Scores   ACT TOTAL SCORE (Goal Greater than or Equal to 20) 21 15 11   In the past 12 months, how many times did you visit the emergency room for your asthma without being admitted to the hospital? 0 0 0   In the past 12 months, how many times were you hospitalized overnight because of your asthma? 0 0 0       ----------------      I spent 30 minutes with this patient today. All changes were made via collaborative practice agreement with Liz Peterson MD. A copy of the visit note was provided to the patient's provider(s).    A summary of these recommendations was sent via [employer].    Paulina Mitchell, PharmD, BCPP  Medication Therapy Management Pharmacist  [facility]      Telemedicine Visit Details  Type of service:  Telephone visit  Start Time: 940am  End Time: 10:10am     Medication Therapy Recommendations  Generalized anxiety disorder    Current Medication: busPIRone (BUSPAR) 15 MG tablet   Rationale: Dose too low - Dosage too low - Effectiveness   Recommendation: Increase Dose - busPIRone 15 MG tablet   Status: Accepted per CPA

## 2023-06-28 ENCOUNTER — MYC MEDICAL ADVICE (OUTPATIENT)
Dept: FAMILY MEDICINE | Facility: CLINIC | Age: 63
End: 2023-06-28
Payer: COMMERCIAL

## 2023-06-28 DIAGNOSIS — I10 HYPERTENSION GOAL BP (BLOOD PRESSURE) < 140/90: ICD-10-CM

## 2023-06-28 RX ORDER — HYDROCHLOROTHIAZIDE 25 MG/1
25 TABLET ORAL DAILY
Qty: 30 TABLET | Refills: 0 | Status: SHIPPED | OUTPATIENT
Start: 2023-06-28 | End: 2023-07-03

## 2023-06-28 NOTE — PATIENT INSTRUCTIONS
"Recommendations from today's MTM visit:                                                      1. Increase Buspar to 30mg daily and 15mg in the afternoon/evening    Follow-up: MTM 7/25/23    It was great speaking with you today.  I value your experience and would be very thankful for your time in providing feedback in our clinic survey. In the next few days, you may receive an email or text message from [employer] with a link to a survey related to your  clinical pharmacist.\"     To schedule another MTM appointment, please call the clinic directly or you may call the MTM scheduling line at [phone] or toll-free at [phone].     My Clinical Pharmacist's contact information:                                                      Please feel free to contact me with any questions or concerns you have.      Paulina Mitchell, PharmD, BCPP  Medication Therapy Management Pharmacist  [facility]     "

## 2023-06-28 NOTE — TELEPHONE ENCOUNTER
Pending Prescriptions:                       Disp   Refills    hydrochlorothiazide (HYDRODIURIL) 25 MG ta*90 tab*0        Sig: Take 1 tablet (25 mg) by mouth daily    Routing refill request to provider for review/approval because:  Labs out of range:  creat

## 2023-06-29 ASSESSMENT — ASTHMA QUESTIONNAIRES: ACT_TOTALSCORE: 17

## 2023-06-29 NOTE — TELEPHONE ENCOUNTER
OK to WAIT for PCP    Pt replied to nikolas. Pt would like Dr. Peterson to review and give her thoughts, pt would not like to come in early due to her labs, she would only like to come in for her 3 month check as planned. (see indert encounter for her thoughts)

## 2023-06-30 ENCOUNTER — RADIOLOGY INJECTION OFFICE VISIT (OUTPATIENT)
Dept: PALLIATIVE MEDICINE | Facility: CLINIC | Age: 63
End: 2023-06-30
Payer: COMMERCIAL

## 2023-06-30 VITALS — DIASTOLIC BLOOD PRESSURE: 76 MMHG | OXYGEN SATURATION: 99 % | HEART RATE: 60 BPM | SYSTOLIC BLOOD PRESSURE: 145 MMHG

## 2023-06-30 DIAGNOSIS — M47.816 SPONDYLOSIS OF LUMBAR REGION WITHOUT MYELOPATHY OR RADICULOPATHY: ICD-10-CM

## 2023-06-30 DIAGNOSIS — M54.59 LUMBAR FACET JOINT PAIN: ICD-10-CM

## 2023-06-30 PROCEDURE — 64494 INJ PARAVERT F JNT L/S 2 LEV: CPT | Mod: 50 | Performed by: PAIN MEDICINE

## 2023-06-30 PROCEDURE — 64493 INJ PARAVERT F JNT L/S 1 LEV: CPT | Mod: 50 | Performed by: PAIN MEDICINE

## 2023-06-30 RX ORDER — TRIAMCINOLONE ACETONIDE 40 MG/ML
40 INJECTION, SUSPENSION INTRA-ARTICULAR; INTRAMUSCULAR ONCE
Status: COMPLETED | OUTPATIENT
Start: 2023-06-30 | End: 2023-06-30

## 2023-06-30 RX ADMIN — TRIAMCINOLONE ACETONIDE 40 MG: 40 INJECTION, SUSPENSION INTRA-ARTICULAR; INTRAMUSCULAR at 08:45

## 2023-06-30 ASSESSMENT — PAIN SCALES - GENERAL
PAINLEVEL: EXTREME PAIN (8)
PAINLEVEL: NO PAIN (0)

## 2023-06-30 NOTE — PATIENT INSTRUCTIONS
[facility]   Procedure Discharge Instructions    Today you saw:    Dr. José Miguel Brownlee,        You had an: facet joint injection      Medications used:  Lidocaine   Bupivacaine   Dexamethasone Omnipaque  Ropivicaine   Kenalog   Gadolinium  Normal saline          Be cautious when walking. Numbness and/or weakness in the lower extremities may occur for up to 6-8 hours after the procedure due to effect of the local anesthetic  Do not drive for 6 hours. The effect of the local anesthetic could slow your reflexes.   You may resume your regular activities after 24 hours  Avoid strenuous activity for the first 24 hours  You may shower, however avoid swimming, tub baths or hot tubs for 24 hours following your procedure  You may have a mild to moderate increase in pain for several days following the injection.  It may take up to 14 days for the steroid medication to start working although you may feel the effect as early as a few days after the procedure.     You may use ice packs for 10-15 minutes, 3 to 4 times a day at the injection site for comfort  Do not use heat to painful areas for 6 to 8 hours. This will give the local anesthetic time to wear off and prevent you from accidentally burning your skin.   Unless you have been directed to avoid the use of anti-inflammatory medications (NSAIDS), you may use medications such as ibuprofen, Aleve or Tylenol for pain control if needed.   Possible side effects of steroids that you may experience include flushing, elevated blood pressure, increased appetite, mild headaches and restlessness.  All of these symptoms will get better with time.  If you experience any of the following, call the Pain Clinic during work hours (Mon-Friday 8-4:30 pm) at [phone] or the Provider Line after hours at [phone]:  -Fever over 100 degree F  -Swelling, bleeding, redness, drainage, warmth at the injection site  -Progressive weakness or numbness in your legs    -Loss of bowel or bladder function  -Unusual new onset of pain that is not improving

## 2023-06-30 NOTE — NURSING NOTE
Pre-procedure Intake  If YES to any questions or NO to having a   Please complete laminated checklist and leave on the computer keyboard for Provider, verbally inform provider if able.    For SCS Trial, RFA's or any sedation procedure:  Have you been fasting? NA    If yes, for how long?     Are you taking any any blood thinners such as Coumadin, Warfarin, Jantoven, Pradaxa Xarelto, Eliquis, Edoxaban, Enoxaparin, Lovenox, Heparin, Arixtra, Fondaparinux, or Fragmin? OR Antiplatelet medication such as Plavix, Brilinta, or Effient?   No     If yes, when did you take your last dose?     Do you take aspirin?  No    If cervical procedure, have you held aspirin for 6 days?   NA    Do you have any allergies to contrast dye, iodine, steroid and/or numbing medications?  NO    Are you currently taking antibiotics or have an active infection?  Yes- For the face to help her heal.     Have you had a fever/elevated temperature within the past week? NO    Are you currently taking oral steroids? YES: Prednisone 10 mg    Do you have a ? Yes    Are you pregnant or breastfeeding?  NO    Have you received the COVID-19 vaccine? Yes    If yes, was it your 1st, 2nd or only dose needed? 2nd dose and booster    Date of most recent vaccine: 11/21/2022    Notify provider and RNs if systolic BP >170, diastolic BP >100, P >100 or O2 sats < 90%    Juliet Zabala MA  [facility] [place]

## 2023-06-30 NOTE — PROGRESS NOTES
Pre procedure Diagnosis: lumbar spondylosis without myelopathy   Post procedure Diagnosis: Same  Procedure performed: bilaeral  facet joint injections at l4-5, L5-S1, fluoroscopically guided, contrast controlled  Indication:  Therapeutic for pain  Anesthesia: none  Complication:  none  Operators: José Miguel Brownele MD    Indications:   Jacquie Amador is a 62 year old female was sent for facet joint injection.  The patient has a history of axial lbp.  Exam shows + kemps and reproduction of pain with extension and lateral rotation.  They have tried conservative treatment including meds/pt.    Options/alternatives, benefits and risks were discussed with the patient including bleeding, infection, flared pain, tissue trauma, exposure to radiation, reaction to medications including seizure, spinal cord injury, paralysis, weakness, numbness and headache.     Questions were answered to her satisfaction and she wishes to proceed. Voluntary informed consent was obtained and signed.     Vitals were reviewed: Yes  Allergies were reviewed:  Yes   Medications were reviewed:  Yes   Pre-procedure pain score: 8/10    Procedure:  After obtaining signed informed consent, the patient was brought into the procedure suite and was placed in a prone position on the procedure table.   A Pause for the Cause was performed.  The patient was prepped and draped in the usual sterile fashion.     Under AP fluoroscopic guidance the l4-5,L5-S1 facet joints bilaterally were identified, and the C-arm was rotated obliquely to the affected side to open the joint space. A total of 3 ml of 1% lidocaine was injected at the needle entry point and needle tract. Then a 22 gauge 3,5 inch spinal needle was inserted and advanced under fluoroscopic guidance targeting the superior articular process of each joint. Once the needle*made contact with the SAP, it was rotated and advanced into the joint.    AP and lateral fluoroscopic views were obtained to confirm  the needle placement. Then,  Omnipaque 0.25 mlcontrast dye was injected after negative aspiration for heme and CSF in each joint, confirming appropriate needle placement.  A total of 1ml of Omnipaque was used.  Omnipaque wasted:  9 ml.    Then, each joint was injected with 1 ml of a combination of Kenalog 40 mg and 0.25% bupivacaine 3 ml (total injectate volume 4 ml) and the needles were flushed with local anesthetic as they were withdrawn.       Hemostasis was achieved, the area was cleaned, and bandaids were placed when appropriate.  The patient tolerated the procedure well, and was taken to the recovery room.    Images were saved to PACS.    Post-procedure pain score: 8/10  Follow-up includes:   -f/u 2 wks with provider     José Miguel Brownlee MD  [facility] [place]

## 2023-06-30 NOTE — NURSING NOTE
Discharge Information    IV Discontiued Time:  NA    Amount of Fluid Infused:  NA    Discharge Criteria = When patient returns to baseline or as per MD order    Consciousness:  Pt is fully awake    Circulation:  BP +/- 20% of pre-procedure level    Respiration:  Patient is able to breathe deeply    O2 Sat:  Patient is able to maintain O2 Sat >92% on room air    Activity:  Moves 4 extremities on command    Ambulation:  Patient is able to stand and walk or stand and pivot into wheelchair    Dressing:  Clean/dry or No Dressing    Notes:   Discharge instructions and AVS given to patient    Patient meets criteria for discharge?  YES    Admitted to PCU?  No    Responsible adult present to accompany patient home?  Yes    Signature/Title:    Wendy Underwood RN  RN Care Coordinator  [facility] [place]

## 2023-07-03 RX ORDER — HYDROCHLOROTHIAZIDE 25 MG/1
25 TABLET ORAL DAILY
Qty: 90 TABLET | Refills: 0 | Status: SHIPPED | OUTPATIENT
Start: 2023-07-03 | End: 2023-11-02

## 2023-07-06 DIAGNOSIS — F11.90 CHRONIC, CONTINUOUS USE OF OPIOIDS: ICD-10-CM

## 2023-07-06 DIAGNOSIS — L20.89 OTHER ATOPIC DERMATITIS: ICD-10-CM

## 2023-07-06 DIAGNOSIS — G89.4 CHRONIC PAIN SYNDROME: ICD-10-CM

## 2023-07-07 ENCOUNTER — TELEPHONE (OUTPATIENT)
Dept: FAMILY MEDICINE | Facility: CLINIC | Age: 63
End: 2023-07-07
Payer: COMMERCIAL

## 2023-07-07 RX ORDER — TRAMADOL HYDROCHLORIDE 50 MG/1
50 TABLET ORAL EVERY 12 HOURS PRN
Qty: 45 TABLET | Refills: 0 | Status: SHIPPED | OUTPATIENT
Start: 2023-07-07 | End: 2023-08-29

## 2023-07-07 NOTE — TELEPHONE ENCOUNTER
So pharmacist from [facility] in [place] calling regarding the awareness of the butorphanol and ultram.  Pharmacist is saying its a antagonist/agonist mixed with these both medications and wanting to make sure you are aware and still wanting these ordered.    Shazia Fuentes RN  [facility] ~ Registered Nurse  Clinic Triage ~ [facility] [place]  July 7, 2023

## 2023-07-07 NOTE — TELEPHONE ENCOUNTER
Spoke with the pharmacist at [facility] in [place] and let her know that this was discussed with PCP and patient.    Closing encounter.    Shazia Fuentes RN  [facility] ~ Registered Nurse  Clinic Triage ~ [facility] [place]  July 7, 2023

## 2023-07-10 DIAGNOSIS — D69.1 PLATELET GRANULE DEFECT (H): ICD-10-CM

## 2023-07-10 RX ORDER — PREDNISONE 10 MG/1
10 TABLET ORAL DAILY
Qty: 90 TABLET | Refills: 3 | Status: SHIPPED | OUTPATIENT
Start: 2023-07-10 | End: 2023-07-26

## 2023-07-11 ENCOUNTER — MYC MEDICAL ADVICE (OUTPATIENT)
Dept: FAMILY MEDICINE | Facility: CLINIC | Age: 63
End: 2023-07-11

## 2023-07-11 ENCOUNTER — VIRTUAL VISIT (OUTPATIENT)
Dept: DERMATOLOGY | Facility: CLINIC | Age: 63
End: 2023-07-11
Payer: COMMERCIAL

## 2023-07-11 DIAGNOSIS — L20.89 OTHER ATOPIC DERMATITIS: ICD-10-CM

## 2023-07-11 DIAGNOSIS — R20.8 SKIN PAIN: ICD-10-CM

## 2023-07-11 DIAGNOSIS — F33.1 MODERATE RECURRENT MAJOR DEPRESSION (H): ICD-10-CM

## 2023-07-11 DIAGNOSIS — F11.90 CHRONIC, CONTINUOUS USE OF OPIOIDS: ICD-10-CM

## 2023-07-11 DIAGNOSIS — F41.1 GENERALIZED ANXIETY DISORDER: ICD-10-CM

## 2023-07-11 DIAGNOSIS — G89.4 CHRONIC PAIN SYNDROME: ICD-10-CM

## 2023-07-11 DIAGNOSIS — L28.1 PRURIGO NODULARIS: Primary | ICD-10-CM

## 2023-07-11 PROCEDURE — 99442 PR PHYSICIAN TELEPHONE EVALUATION 11-20 MIN: CPT | Mod: 95 | Performed by: DERMATOLOGY

## 2023-07-11 RX ORDER — BUTORPHANOL TARTRATE 10 MG/ML
1 SPRAY NASAL EVERY 4 HOURS PRN
Qty: 2.5 ML | Refills: 3 | Status: SHIPPED | OUTPATIENT
Start: 2023-07-11 | End: 2023-09-12

## 2023-07-11 RX ORDER — TRAZODONE HYDROCHLORIDE 50 MG/1
150 TABLET, FILM COATED ORAL AT BEDTIME
Qty: 270 TABLET | Refills: 0 | Status: SHIPPED | OUTPATIENT
Start: 2023-07-11 | End: 2023-11-03

## 2023-07-11 ASSESSMENT — PAIN SCALES - GENERAL: PAINLEVEL: SEVERE PAIN (6)

## 2023-07-11 NOTE — LETTER
7/11/2023       RE: Jacquie Amador  [address]  [place] [address]     Dear Colleague,    Thank you for referring your patient, Jacquie Amador, to the [facility] [place] at [facility]. Please see a copy of my visit note below.    [facility] Dermatology Note  Encounter Date: July 11, 2023  [employer] and Telephone ([phone]). Location of teledermatologist: [facility] [place].  Start time: 11:52. End time: 12:01.     Dermatology Problem List:  1. Recurrent aphthous ulcers  - Current treatment: dupixent, gentle skin care with   2. Multiple skin ulcers resembling prurigo/neurodermatitis     ____________________________________________     Assessment & Plan:      # Recurring oral and genital sores, likely recurrent aphthous ulcers, as well as multiple diffuse discrete skin ulcers.  Chronic active problem, uncontrolled.   Today Jacquie notes ongoing skin sores on face, though her submitted photos appear stable vs last visit.  - Continue intranasal butorphanol q6hr as needed; if inadequate response with initial daily dose can be repeated after 120 minutes  - Continue gentle skin care with Protopic and mupirocin.  - She has resumed Dupixent 300mg q2 weeks and is tolerating this without problems  - She can also continue betamethasone ointment and desonide cream as needed for persistent skin sores and pain.  - Today also sent rx for compounded amytriptyline/ketamine cream to apply twice daily to areas of sores  - Start n-acetylcystiene        Followup: 8 weeks in person     Jay Warren MD  Dermatology Attending    ___________________________________________________________________________    CC: Derm Problem (Jacquie is being seen today for a follow up)     HPI:  Ms. Jacquie Amador is a(n) 62 year old female who presents today for followup on facial sores.  At her last visit  we continued intranasal butorphanol.   She has been using this without significant side effects and finds it modestly helpful but occasionally feels fatigue when using frequently.     She reports ongoing painful sores on face, but not sure if much improved or worse since last visit.     Physical Exam:  SKIN: Teledermatology photos were reviewed; image quality and interpretability: acceptable. Image date: 7/11/23.  - multiple geographic shallow ulcers with hemorrhagic crusts on cheeks and bilateral preauricular areas on face  - reticulated white scars on forearms and dorsal hands  - cardioid-shaped shallow ulcer approx 1cm on right dorsal hand  - No other lesions of concern on areas examined.       a

## 2023-07-11 NOTE — PROGRESS NOTES
[facility] Dermatology Note  Encounter Date: July 11, 2023  [employer] and Telephone ([phone]). Location of teledermatologist: [facility] [place].  Start time: 11:52. End time: 12:01.     Dermatology Problem List:  1. Recurrent aphthous ulcers  - Current treatment: dupixent, gentle skin care with   2. Multiple skin ulcers resembling prurigo/neurodermatitis     ____________________________________________     Assessment & Plan:      # Recurring oral and genital sores, likely recurrent aphthous ulcers, as well as multiple diffuse discrete skin ulcers.  Chronic active problem, uncontrolled.   Today Jacquie notes ongoing skin sores on face, though her submitted photos appear stable vs last visit.  - Continue intranasal butorphanol q6hr as needed; if inadequate response with initial daily dose can be repeated after 120 minutes  - Continue gentle skin care with Protopic and mupirocin.  - She has resumed Dupixent 300mg q2 weeks and is tolerating this without problems  - She can also continue betamethasone ointment and desonide cream as needed for persistent skin sores and pain.  - Today also sent rx for compounded amytriptyline/ketamine cream to apply twice daily to areas of sores  - Start n-acetylcystiene        Followup: 8 weeks in person     Jay Warren MD  Dermatology Attending    ___________________________________________________________________________    CC: Derm Problem (Jacquie is being seen today for a follow up)     HPI:  Ms. Jacquie Amador is a(n) 62 year old female who presents today for followup on facial sores.  At her last visit we continued intranasal butorphanol.   She has been using this without significant side effects and finds it modestly helpful but occasionally feels fatigue when using frequently.     She reports ongoing painful sores on face, but not sure if much improved or worse since last visit.     Physical Exam:  SKIN:  Teledermatology photos were reviewed; image quality and interpretability: acceptable. Image date: 7/11/23.  - multiple geographic shallow ulcers with hemorrhagic crusts on cheeks and bilateral preauricular areas on face  - reticulated white scars on forearms and dorsal hands  - cardioid-shaped shallow ulcer approx 1cm on right dorsal hand  - No other lesions of concern on areas examined.

## 2023-07-11 NOTE — TELEPHONE ENCOUNTER
dupilumab (DUPIXENT) 300 MG/2ML prefilled pen      Last Written Prescription Date:  4/17-23  Last Fill Quantity: 4 ml,   # refills: 2  Last Office Visit : 7/11/23  Future Office visit:  9/12/23       Routing refill request to provider for review/approval because:  DUPIXENT not on the [facility], P or [facility] refill protocol

## 2023-07-11 NOTE — PATIENT INSTRUCTIONS
You can purchase [facility] Advanced Healing Hydrocolloid Bandages either at [facility] or on [employer]    See you again in 2 months or so.

## 2023-07-12 DIAGNOSIS — K21.9 GASTROESOPHAGEAL REFLUX DISEASE, UNSPECIFIED WHETHER ESOPHAGITIS PRESENT: ICD-10-CM

## 2023-07-13 ENCOUNTER — MYC MEDICAL ADVICE (OUTPATIENT)
Dept: PHARMACY | Facility: CLINIC | Age: 63
End: 2023-07-13
Payer: COMMERCIAL

## 2023-07-13 DIAGNOSIS — F41.1 GENERALIZED ANXIETY DISORDER: ICD-10-CM

## 2023-07-14 RX ORDER — FAMOTIDINE 40 MG/1
40 TABLET, FILM COATED ORAL DAILY
Qty: 90 TABLET | Refills: 0 | Status: SHIPPED | OUTPATIENT
Start: 2023-07-14 | End: 2023-10-04

## 2023-07-17 NOTE — TELEPHONE ENCOUNTER
Ok to increase Buspar to 30mg twice daily. Patient has MTM appt scheduled 7/25/23. New Rx not needed at this time; will update at MTM visit.

## 2023-07-25 ENCOUNTER — VIRTUAL VISIT (OUTPATIENT)
Dept: PHARMACY | Facility: CLINIC | Age: 63
End: 2023-07-25
Payer: COMMERCIAL

## 2023-07-25 DIAGNOSIS — F41.1 GENERALIZED ANXIETY DISORDER: Primary | ICD-10-CM

## 2023-07-25 DIAGNOSIS — F33.9 MDD (MAJOR DEPRESSIVE DISORDER), RECURRENT EPISODE (H): ICD-10-CM

## 2023-07-25 DIAGNOSIS — F41.1 GENERALIZED ANXIETY DISORDER: ICD-10-CM

## 2023-07-25 PROCEDURE — 99606 MTMS BY PHARM EST 15 MIN: CPT | Performed by: PHARMACIST

## 2023-07-25 RX ORDER — BUSPIRONE HYDROCHLORIDE 30 MG/1
30 TABLET ORAL 2 TIMES DAILY
Qty: 180 TABLET | Refills: 1 | Status: SHIPPED | OUTPATIENT
Start: 2023-07-25 | End: 2023-10-19

## 2023-07-25 NOTE — PROGRESS NOTES
Medication Therapy Management (MTM) Encounter    ASSESSMENT:                            Medication Adherence/Access: No issues identified    MDD, MARIZOL: Patient has seen some initial improvement in anxiety with recent Buspar dose increases. Most recent increase made ~1 week ago and patient may see continued improvement over the next several weeks. She has several significant life stressors at this time. Encourage establishing care with therapy provider. Future consideration: increase Wellbutrin to XL 450mg daily if depression symptoms worsen (would need to monitor for increase in anxiety).     PLAN:                            No medication changes today. Continue Buspar 30mg twice daily, Wellbutrin SR 200mg twice daily, Cymbalta 120mg daily    Follow-up: 8/22/23 with Danica Escalante (writer out on leave)    SUBJECTIVE/OBJECTIVE:                          Jacquie Amador is a 63 year old female called for a follow-up visit from 6/19.       Reason for visit: med check.    Allergies/ADRs: Reviewed in chart  Past Medical History: Reviewed in chart  Tobacco: She reports that she has quit smoking. Her smoking use included cigarettes. She smoked an average of 1 pack per day. She has never used smokeless tobacco.    Medication Adherence/Access: no issues reported    MDD, MARIZOL:   Current medications:   Duloxetine 120mg once daily   Wellbutrin SR 200mg twice daily   Buspar 30mg twice daily   Trazodone 100-150mg nightly as needed (prescribed 150mg nightly)    Pt was seen by [facility] by JESSICA Wells. Most recent visit was 1/30/23 at which time Wellbutrin was increased. Now seeing PCP, Liz Peterson at [facility] for psych medication medications.     Most recent medication changes: 6/19 increased buspar to 30mg/15mg; 7/13 increased buspar to 30mg twice daily. Buspar dose increased in between appointments due to increased anxiety regarding her husbands health (see 7/13/23 Mychart message).    Today, patient reports she felt an initial improvement in anxiety. Now isn't sure if it's led to continued benefit. She has had increased stressors - is waiting on test results regarding possible cancer dx for her  and her friend just had a stroke. She denies any side effects from Buspar dose increase. She has been on Cymbalta and Wellbutrin for many years, Buspar was started 12/22.  She is not seeing a therapist, but plans to establish care.         12/7/2022     8:55 AM 2/28/2023    11:26 AM 5/9/2023    10:54 AM   PHQ   PHQ-9 Total Score 7 12 6   Q9: Thoughts of better off dead/self-harm past 2 weeks Not at all Not at all Not at all         1/30/2023    10:45 AM 2/28/2023    11:26 AM 5/9/2023    10:55 AM   MARIZOL-7 SCORE   Total Score 10 (moderate anxiety)  4 (minimal anxiety)   Total Score 10 8 4       Recent psych med changes:   1/16/23 (MTM visit): changed cymbalta from 60mg twice daily to 120mg daily  1/30/23 (psych visit): Wellbutrin increased from 200mg once daily to 200mg twice daily   6/19/23 (MTM visit): Buspar increased to 30mg in the morning and 15mg in the afternoon due to anxiety (depression improved with Wellbutrin dose increase)  7/13/23 (via [facility]): Buspar increased to 30mg twice daily     ----------------      I spent 20 minutes with this patient today. All changes were made via collaborative practice agreement with Liz Peterson A copy of the visit note was provided to the patient's provider(s).    A summary of these recommendations was sent via [employer].    Paulina Mitchell, PharmD, BCPP  Medication Therapy Management Pharmacist  [facility]      Telemedicine Visit Details  Type of service:  Telephone visit  Start Time:  10:00 am  End Time:  10:20 am     Medication Therapy Recommendations  No medication therapy recommendations to display

## 2023-07-25 NOTE — Clinical Note
Hello -  Sending as an FYI that this is a patient of mine that I scheduled with you while I am out. We just increased Buspar and she has noticed some benefit, but has lots of life stressors going on right now ( possibly has cancer, friend had a stroke). Did see [facility] in the past, now PCP is managing psych meds, I thought it would be good for you to check in so things don't fall through the cracks.   Thank you!  Paulina

## 2023-07-25 NOTE — PATIENT INSTRUCTIONS
"Recommendations from today's MTM visit:                                                      No medication changes today. Continue Buspar 30mg twice daily, Wellbutrin SR 200mg twice daily, Cymbalta 120mg daily    Follow-up: 8/22/23 with Danica Escalante     It was great speaking with you today.  I value your experience and would be very thankful for your time in providing feedback in our clinic survey. In the next few days, you may receive an email or text message from [employer] with a link to a survey related to your  clinical pharmacist.\"     To schedule another MTM appointment, please call the clinic directly or you may call the MTM scheduling line at [phone] or toll-free at [phone].     My Clinical Pharmacist's contact information:                                                      Please feel free to contact me with any questions or concerns you have.      Paulina Mitchell, PharmD, BCPP  Medication Therapy Management Pharmacist  [facility]     "

## 2023-07-26 ENCOUNTER — VIRTUAL VISIT (OUTPATIENT)
Dept: HEMATOLOGY | Facility: CLINIC | Age: 63
End: 2023-07-26
Attending: INTERNAL MEDICINE
Payer: COMMERCIAL

## 2023-07-26 DIAGNOSIS — D69.1 PLATELET GRANULE DEFECT (H): ICD-10-CM

## 2023-07-26 DIAGNOSIS — D69.1: ICD-10-CM

## 2023-07-26 PROCEDURE — 99443 PR PHYSICIAN TELEPHONE EVALUATION 21-30 MIN: CPT | Performed by: INTERNAL MEDICINE

## 2023-07-26 RX ORDER — PREDNISONE 10 MG/1
10 TABLET ORAL DAILY
Qty: 90 TABLET | Refills: 3 | Status: SHIPPED | OUTPATIENT
Start: 2023-07-26 | End: 2024-06-12

## 2023-07-26 RX ORDER — TRANEXAMIC ACID 650 MG/1
650 TABLET ORAL DAILY
Qty: 30 TABLET | Refills: 3 | Status: SHIPPED | OUTPATIENT
Start: 2023-07-26 | End: 2023-10-18

## 2023-07-26 NOTE — Clinical Note
Contact Jacquie. If her insurance deductible is met we will repeat the lab studies I have ordered (including aggs). Would like this done 1 week prior to visit with me on 10/18/23

## 2023-07-26 NOTE — PROGRESS NOTES
Patient was contacted to complete the pre-visit call prior to their telephone visit with the provider.     Allergies and medications were reviewed.     I thanked them for their time to cover this information.     Melissa Fuentes MA

## 2023-07-26 NOTE — PROGRESS NOTES
[facility]  [address] [place] [id number]  Phone: [phone], Fax: [phone]    Outpatient Visit Note:    Patient: Jacquie Amador  MRN: [id number]  : 1960  ISIDRO: 23    Reason for Initial Consultation:  Bruising    Assessment:  In summary, Jacquie Amador is a 63 year old woman presenting to clinic due to bruising/bleeding with abnormal platelet studies. 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 Overall clinical picture is highly concerning for underlying autoimmune disorder.    1.  Behcet's disease:   2. 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  Easy bruising and prolonged healing secondary to #1 and #2  4.  Personal history of antiphospholipid antibody syndrome in pregnancy, no records  5.  Osteoarthritis requiring anti-inflammatory medications  6.  Depression requiring use of SSRI medication  7.  Acute on chronic migraines requiring use of triptans and aspirin    Ms. Amador has been evaluated by Dermatology and Rheumatology. Skin biopsy with neutrophilic infiltrates that may be secondary to Behcets. Overall, clinical criteria is met and very consistent.  Working with Dr. Warren for skin lesions.    For her bleeding/platelets, she has presence of anti-HLA and anti-GP1a antibodies.  She has no personal history of receiving red cell or platelet transfusions. Jenny platelet aggregation study that had decreased arachidonic acid as well and deaggregation with ADP and arachidonic acid and decreased ATP release with thrombin and ADP. This is consistent with diagnosis of acquired qualitative platelet disorder and consistent with her anti-GP1a antibodies. We have given her steriods, which helped with arthralgias and fatigue.     Previously discussed that avoiding other medications that can worsen this, including aspirin, NSAID and  potentially SSRIs. Right now IF Jacquie needs MAJOR SURGERY---  She should receive platelet transfusion (1-2 units). She is ok to receive injections.      Plan:  1. Majority of today's visit was spent counseling the patient regarding diagnosis, natural history, management and treatment options   2. Continue prednisone 10 mg daily  3. Tranexamic acid 650 mg PO daily to assess if bleeding at night into wounds stops  4. Will inquire regarding ENT team to consult  5. Plan to REPEAT coagulation testing in October    The patient is given our center's contact information and is instructed to call if she should have any further questions or concerns.  Follow up     Patient understands and agrees with the above plan and recommendation.    Emely Grimes MD/PhD   of Medicine  Division of Hematology, Oncology and Transplantation      Telephone time: 27 minutes      ----------------------------------------------------------------------------------------------------------------------  Interval History:  Jacquie Amador is a 63 year old woman with PMHx of fibromyalgia, anxiety/depression, hypertension and hyperlipidemia. She presented to clinic on 9/15/21 for her first in person evaluation due to bleeding and prolonged wound healing. Please refer to my consult note.     Jacquie- hot weather is not causing issues with her skin. Still has two sores on her face that do not want to heal- today may have some improvement. Has been using everything.     Hands- sore from holding grandsons.     Jacquie had raised red rash and itching with her skin while in [place]. It was painful. Face is clearing up a bit. Hands are sore from holding grandsons.      had concern for lung cancer. Got better news today    Mouth sores-  overall better- come and go and can get rid of them if she uses the gel for the mouth. Does have one right now.     The larger wound on her face will fill up with blood. Will be more painful when  blood is presents.     R ankle is swollen again- does not change night or day. Topical pain cream helps. Swelling continues. Swelling is right at the ankle bone. Moving     Past Medical History:  Past Medical History:   Diagnosis Date    ASTHMA - MODERATE PERSISTENT 2005    Chronic pain     Coronary artery disease     CVA (cerebral infarction)     Depressive disorder, not elsewhere classified     Diabetes (H)     Elevated serum alkaline phosphatase level     Liver source    Fibromyalgia     HYPERLIPIDEMIA NEC/NOS 2006    Hypertriglyceridemia     OA (osteoarthritis)     Thyroid disease     Trochanteric bursitis     Unspecified essential hypertension      Immunization  Immunization History   Administered Date(s) Administered    COVID-19 Bivalent 18+ (Moderna) 2022    COVID-19 Monovalent 18+ (Moderna) 2021, 2021, 11/15/2021    COVID-19 Monovalent Booster 18+ (Moderna) 05/10/2022    FLU 6-35 months 2009    Influenza (IIV3) PF 2000, 2003, 2004, 2005, 2006, 2007, 2008, 2010, 10/26/2011, 10/26/2012    Influenza Vaccine 50-64 or 18-64 w/egg allergy (Flublok) 2020, 2022    Influenza Vaccine >6 months (Alfuria,Fluzone) 2014, 2015, 2019, 11/15/2021    Pneumococcal 23 valent 2000, 2021, 2022    TD,PF 7+ (Tenivac) 2000    TDAP (Adacel,Boostrix) 2021    TDAP Vaccine (Adacel) 2011    Zoster recombinant adjuvanted (SHINGRIX) 2019       Past Surgical History:  Past Surgical History:   Procedure Laterality Date    3 teeth pulled      INJECT EPIDURAL TRANSFORAMINAL  2014    [facility]    INJECT JOINT SACROILIAC  2014    [facility]    LAMINECTOMY LUMBAR ONE LEVEL Left 2014    [facility]  DELIVERY ONLY      , Low Cervical       Medications:  Current Outpatient Medications   Medication Sig  Dispense Refill    acetaminophen (TYLENOL) 500 MG tablet Take 500-1,000 mg by mouth every 6 hours as needed for mild pain      acetylcysteine (N-ACETYL-L-CYSTEINE) 600 MG CAPS capsule Take 1 capsule (600 mg) by mouth 2 times daily 60 capsule 2    albuterol (PROVENTIL) (2.5 MG/3ML) 0.083% neb solution Take 1 vial (2.5 mg) by nebulization every 6 hours as needed for shortness of breath or wheezing 3 mL 4    albuterol (VENTOLIN HFA) 108 (90 Base) MCG/ACT inhaler INHALE 2 PUFFS INTO THE LUNGS EVERY 6 HOURS AS NEEDED FOR SHORTNESS OF BREATH / DYSPNEA 54 g 1    atorvastatin (LIPITOR) 20 MG tablet Take 1 tablet (20 mg) by mouth daily 90 tablet 1    augmented betamethasone dipropionate (DIPROLENE AF) 0.05 % external cream Apply to affected area twice daily 50 g 2    benzonatate (TESSALON) 200 MG capsule Take 1 capsule (200 mg) by mouth 2 times daily as needed for cough 40 capsule 1    betamethasone dipropionate (DIPROSONE) 0.05 % external ointment Apply topically 2 times daily To areas of skin sores.  Can also use on mouth sores as needed twice daily. 50 g 3    buPROPion (WELLBUTRIN SR) 200 MG 12 hr tablet Take 1 tablet (200 mg) by mouth 2 times daily 180 tablet 1    busPIRone (BUSPAR) 30 MG tablet Take 1 tablet (30 mg) by mouth 2 times daily 180 tablet 1    butorphanol (STADOL) 10 MG/ML nasal spray Spray 1 spray in nostril every 4 hours as needed for pain 2.5 mL 3    celecoxib (CELEBREX) 200 MG capsule TAKE ONE CAPSULE BY MOUTH TWICE A DAY AS NEEDED FOR PAIN Strength: 200 mg 180 capsule 1    chlorhexidine (PERIDEX) 0.12 % solution Swish and spit 15 mLs in mouth 2 times daily 1893 mL 4    clindamycin (CLINDAMAX) 1 % external gel Apply topically 2 times daily 30 g 4    clotrimazole (MYCELEX) 10 MG lozenge Place 1 lozenge (10 mg) inside cheek 5 times daily 70 lozenge 1    COMPOUND CONTAINING CONTROLLED SUBSTANCE (CMPD RX) - PHARMACY TO MIX COMPOUNDED MEDICATION Apply twice daily to sores and areas of pain.  Please compound  amitryptyline 2% and ketamine 0.5% in 120g lipoderm or Vanicream 120 g 0    desonide (DESOWEN) 0.05 % external cream Apply topically 2 times daily To sores on face 60 g 3    dextran 70-hypromellose (TEARS NATURALE FREE PF) 0.1-0.3 % ophthalmic solution Place 2 drops into both eyes daily as needed (for dry eyes) 35 each 3    doxycycline hyclate (VIBRAMYCIN) 100 MG capsule Take 1 capsule (100 mg) by mouth 2 times daily 20 capsule 0    DULoxetine (CYMBALTA) 60 MG capsule Take 2 capsules (120 mg) by mouth daily 180 capsule 1    dupilumab (DUPIXENT) 300 MG/2ML prefilled pen Inject 2 mLs (300 mg) Subcutaneous every 14 days After first loading dose 4 mL 2    famotidine (PEPCID) 40 MG tablet Take 1 tablet (40 mg) by mouth daily 90 tablet 0    fluocinonide (LIDEX) 0.05 % external gel Apply topically 2 times daily as needed 15 g 1    hydrochlorothiazide (HYDRODIURIL) 25 MG tablet Take 1 tablet (25 mg) by mouth daily 90 tablet 0    hydrocortisone 2.5 % cream APPLY TOPICALLY 2 TIMES DAILY AS NEEDED 30 g 3    lidocaine, viscous, (XYLOCAINE) 2 % solution Swish and spit 10ml every 3 hours as needed for oral pain; max 8 doses/24hrs.  Do not eat or chew gum for 60 minutes following use.      losartan (COZAAR) 100 MG tablet Take 1 tablet (100 mg) by mouth daily 90 tablet 1    metoprolol succinate ER (TOPROL XL) 100 MG 24 hr tablet Take 1 tablet (100 mg) by mouth 2 times daily 180 tablet 1    metoprolol succinate ER (TOPROL XL) 25 MG 24 hr tablet Take 25 mg by mouth 3 times daily      mometasone-formoterol (DULERA) 200-5 MCG/ACT inhaler Inhale 2 puffs into the lungs 2 times daily 39 g 1    montelukast (SINGULAIR) 10 MG tablet Take 1 tablet (10 mg) by mouth At Bedtime 90 tablet 3    mupirocin (BACTROBAN) 2 % external cream Apply to affected area three times daily 60 g 1    nystatin (MYCOSTATIN) 058287 UNIT/ML suspension Take by mouth 4 times daily Has recurrent thrush. Uses for 2 weeks at a time as needed. 380 mL 1    oxybutynin ER  (DITROPAN XL) 5 MG 24 hr tablet Take 2 tablets (10 mg) by mouth daily 180 tablet 2    potassium chloride ER (KLOR-CON M) 10 MEQ CR tablet 20 meq daily for 5 days then 10 meq daily. 95 tablet 0    predniSONE (DELTASONE) 10 MG tablet Take 1 tablet (10 mg) by mouth daily 90 tablet 3    predniSONE (DELTASONE) 20 MG tablet 60 mg daily for 2 days, then  40 mg daily for 2 days then 20 mg daily for 4 days. 14 tablet 0    predniSONE (DELTASONE) 20 MG tablet 40 mg daily for 2 days, then 20 mg daily for 4 days, then return to 10 mg daily 8 tablet 0    rizatriptan (MAXALT) 10 MG tablet Take 1 tablet (10 mg) by mouth at onset of headache for migraine May repeat in 2 hours. Max 3 tablets/24 hours. 18 tablet 1    rOPINIRole (REQUIP) 1 MG tablet TAKE THREE TABLETS BY MOUTH AT BEDTIME 270 tablet 3    tacrolimus (PROTOPIC) 0.1 % external ointment Apply topically 2 times daily To areas of sores on face 60 g 1    tiotropium (SPIRIVA RESPIMAT) 2.5 MCG/ACT inhaler Inhale 2 puffs into the lungs daily 12 g 1    traMADol (ULTRAM) 50 MG tablet Take 1 tablet (50 mg) by mouth every 12 hours as needed for severe pain 45 tablet 0    tranexamic acid (LYSTEDA) 650 MG tablet Take 1 tablet (650 mg) by mouth daily 30 tablet 3    traZODone (DESYREL) 50 MG tablet Take 3 tablets (150 mg) by mouth At Bedtime 270 tablet 0    triamcinolone (ARISTOCORT HP) 0.5 % external cream Apply topically 2 times daily 60 g 0    valACYclovir (VALTREX) 500 MG tablet TAKE ONE TABLET BY MOUTH ONE TIME DAILY (Patient taking differently: 500 mg daily as needed) 90 tablet 1    ZYRTEC 10 MG OR TABS Take 10 mg by mouth daily as needed 90 3        Allergies:  Allergies   Allergen Reactions    Cats      and rabbits/wheezing    Dogs      wheezing    Lyrica [Pregabalin] Other (See Comments)     Rash, mouth sores, itching and burning    Seasonal Allergies      rhinits       ROS:  Remainder of a comprehensive 10 point ROS is negative unless noted above.    Social History:  Denies any  tobacco use. No significant alcohol use. Denies any illicit drug use.     Family History:  Two daughter  29 Yo daughter  28 yo daughter  3 grandson    1 sister- older- high blood glucose, arthritis  1 brother- older- no idea    Mother- - heart valve. Had MDS. Needed a shot every month  Father- - cancer    Objectives:  Reviewed photos in the chart    Labs:    Ferritin   Date Value Ref Range Status   2023 80 11 - 328 ng/mL Final   2021 376 (H) 8 - 252 ng/mL Final     Iron   Date Value Ref Range Status   2022 88 37 - 145 ug/dL Final   2021 50 35 - 180 ug/dL Final     Iron Binding Cap   Date Value Ref Range Status   2021 350 240 - 430 ug/dL Final     Iron Binding Capacity   Date Value Ref Range Status   2022 260 240 - 430 ug/dL Final   09/15/2021 284 240 - 430 ug/dL Final       Refer to [facility]- autoantibody platelet study    Imaging:  Not applicable

## 2023-07-31 ENCOUNTER — MYC MEDICAL ADVICE (OUTPATIENT)
Dept: FAMILY MEDICINE | Facility: CLINIC | Age: 63
End: 2023-07-31
Payer: COMMERCIAL

## 2023-07-31 ENCOUNTER — MYC REFILL (OUTPATIENT)
Dept: FAMILY MEDICINE | Facility: CLINIC | Age: 63
End: 2023-07-31
Payer: COMMERCIAL

## 2023-07-31 DIAGNOSIS — K21.9 GASTROESOPHAGEAL REFLUX DISEASE, UNSPECIFIED WHETHER ESOPHAGITIS PRESENT: ICD-10-CM

## 2023-08-01 RX ORDER — FAMOTIDINE 40 MG/1
40 TABLET, FILM COATED ORAL DAILY
Qty: 90 TABLET | Refills: 0 | OUTPATIENT
Start: 2023-08-01

## 2023-08-01 NOTE — TELEPHONE ENCOUNTER
Pt has upcoming labs with another provider and is wondering if you would like to add any labs before her Aug 29 appt with you

## 2023-08-03 ENCOUNTER — E-VISIT (OUTPATIENT)
Dept: FAMILY MEDICINE | Facility: CLINIC | Age: 63
End: 2023-08-03
Payer: COMMERCIAL

## 2023-08-03 ENCOUNTER — TELEPHONE (OUTPATIENT)
Dept: HEMATOLOGY | Facility: CLINIC | Age: 63
End: 2023-08-03
Payer: COMMERCIAL

## 2023-08-03 DIAGNOSIS — J01.01 ACUTE RECURRENT MAXILLARY SINUSITIS: ICD-10-CM

## 2023-08-03 DIAGNOSIS — R05.1 ACUTE COUGH: ICD-10-CM

## 2023-08-03 PROCEDURE — 99207 PR NON-BILLABLE SERV PER CHARTING: CPT | Performed by: FAMILY MEDICINE

## 2023-08-03 NOTE — TELEPHONE ENCOUNTER
[id number]  Jacquie Amador  63 year old female  CBCD Diagnosis: acquired platelet disorder  CBCD Provider: MD Dr. Cornelio Mendoza has placed orders for labs to be done one week prior to return visit with Dr. Grimes on 10/18/2023.  Jacquie contacted and is in agreement and will anticipate a call from [facility] for scheduling.  Reached out to [facility], they confirmed that they had all needed orders and will reach out to patient to schedule.    Danna DURHAMN, RN   Nurse Clinician  [facility]  Office: [phone]  Main Office: [phone] (ask to speak with a nurse)  Fax: [phone]

## 2023-08-04 RX ORDER — PREDNISONE 20 MG/1
TABLET ORAL
Qty: 14 TABLET | Refills: 0 | Status: SHIPPED | OUTPATIENT
Start: 2023-08-04 | End: 2023-09-19

## 2023-08-04 RX ORDER — DOXYCYCLINE 100 MG/1
100 CAPSULE ORAL 2 TIMES DAILY
Qty: 20 CAPSULE | Refills: 0 | Status: SHIPPED | OUTPATIENT
Start: 2023-08-04 | End: 2023-08-22

## 2023-08-04 NOTE — PATIENT INSTRUCTIONS
"  Dear Jacquie Amador    After reviewing your responses, I've been able to diagnose you with \"Bronchitis\" which is a common infection of your lungs. While this is most commonly caused by a virus, the symptoms you have given suggest you should be treated with antibiotics.     I have sent doxycycline and prednisone to your pharmacy to treat this infection.     It is important that you take all of your prescribed medication even if your symptoms are improving after a few doses. Taking all of your medicine helps prevent the symptoms from returning.     If your symptoms worsen, you develop chest pain or shortness of breath, fevers over 101, or are not improving in 5 days, please contact your primary care provider for an appointment or visit any of our convenient Walk-in Care or Urgent Care Centers to be seen which can be found on our website here.    Thanks again for choosing us as your health care partner,    Liz Peterson MD    "

## 2023-08-11 ENCOUNTER — TELEPHONE (OUTPATIENT)
Dept: PALLIATIVE MEDICINE | Facility: CLINIC | Age: 63
End: 2023-08-11
Payer: COMMERCIAL

## 2023-08-11 NOTE — TELEPHONE ENCOUNTER
[facility] Call Center    Phone Message    May a detailed message be left on voicemail: yes     Reason for Call: Other: Pt is calling and stating that the injections that Pt got in her back a few weeks ago are not working and Pt would like to know what the next step is Please call Pt back to discuss.      Action Taken: Message routed to:  Other: [facility]    Travel Screening: Not Applicable

## 2023-08-11 NOTE — TELEPHONE ENCOUNTER
6/30/2023  Procedure performed: bilaeral  facet joint injections at l4-5, L5-S1, fluoroscopically guided, contrast controlled     Patient is currently an injection only patient.       Patient identifies that injection relief only lasted a couple of weeks.     Writer recommends that patient discuss this with primary provider and request new injection order that identifies that patient would like injection with different approach.     Patient agreeable to making this request.     Wendy Underwood RN  [facility]  [phone]

## 2023-08-13 PROBLEM — L28.1 PRURIGO NODULARIS: Status: ACTIVE | Noted: 2023-08-13

## 2023-08-15 ENCOUNTER — MYC MEDICAL ADVICE (OUTPATIENT)
Dept: DERMATOLOGY | Facility: CLINIC | Age: 63
End: 2023-08-15
Payer: COMMERCIAL

## 2023-08-15 ENCOUNTER — MYC MEDICAL ADVICE (OUTPATIENT)
Dept: FAMILY MEDICINE | Facility: CLINIC | Age: 63
End: 2023-08-15
Payer: COMMERCIAL

## 2023-08-15 DIAGNOSIS — R20.8 SKIN PAIN: ICD-10-CM

## 2023-08-15 DIAGNOSIS — M54.59 LUMBAR FACET JOINT PAIN: Primary | ICD-10-CM

## 2023-08-15 NOTE — TELEPHONE ENCOUNTER
Writer contacted patient and re-iterated conversation had on 8/11 between patient and this writer.     Patient states she is having difficulty contacting PCP.  Writer suggests MyChart or new call to provider.     Patient verbalizes understanding and states she will reach out again.     Wendy Underwood RN  [facility]  [phone]

## 2023-08-15 NOTE — TELEPHONE ENCOUNTER
Pt said that she was supposed to get a call back from a nurse in regards to this and she has not gotten a call back yet.  Pt is requesting a call back to discuss.  Thanks.

## 2023-08-16 ENCOUNTER — MYC MEDICAL ADVICE (OUTPATIENT)
Dept: FAMILY MEDICINE | Facility: CLINIC | Age: 63
End: 2023-08-16
Payer: COMMERCIAL

## 2023-08-16 ENCOUNTER — TELEPHONE (OUTPATIENT)
Dept: PALLIATIVE MEDICINE | Facility: CLINIC | Age: 63
End: 2023-08-16
Payer: COMMERCIAL

## 2023-08-16 NOTE — TELEPHONE ENCOUNTER
Screening Questions for Radiology Injections:    Injection to be done at which interventional clinic site? [facility]    Procedure ordered by  Liz Peterson MD     Procedure ordered? Lumbar Intraarticular Steroid Injection             Transforaminal Cervical SENDY - [place] [facility] - No [facility] providers perform this procedure    What insurance would patient like us to bill for this procedure? [employer]    IF SCHEDULING IN [place] [facility] PLEASE SCHEDULE AT LEAST 7-10 BUSINESS DAYS OUT SO A PA CAN BE OBTAINED    Worker's comp or MVA (motor vehicle accident) -Any injection DO NOT SCHEDULE and route to Tila Fu.      [employer] insurance - For SI joint injections, DO NOT SCHEDULE and route to Chantal Malik.       ALL BCBS, Hum[employer] and HP CIGNA - DO NOT SCHEDULE and route to Chantal Malik    MEDICA- facet joint injections, route to Chantal Malik    Is patient scheduled at [facility]? No   If YES, route every encounter to [facility] SPINE CENTER CARE NAVIGATION POOL [[phone]]    Is an  needed? No     Patient has a  home? (Review Grid) YES: Informed    Any chance of pregnancy? NO   If YES, do NOT schedule and route to RN pool  - Dr. Reardon route to Juliet Caro and PM&R Nurse  [[id number]]      Is patient actively being treated for cancer or immunocompromised? 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 Reardon's Team    Does the patient have a bleeding or clotting disorder?  Yes - Clotting Disorder. Please review.     If YES, okay to schedule AND route to RN nurse / Dr. Reardon's Team     (For any patients with platelet count <100, RN must forward to provider)    Is patient taking any Blood Thinners OR Antiplatelet medication?  No   If hold needed, do NOT schedule, route to RN pool/ Dr. Reardon's Team    Examples:   o Blood Thinners: (Coumadin, Warfarin, Jantoven, Pradaxa, Xarelto,  Eliquis, Edoxaban, Enoxaparin, Lovenox, Heparin, Arixtra, Fondaparinux or Fragmin)  o Antiplatelet Medications: (Plavix, Brilinta or Effient)     Is patient taking any aspirin products (includes Excedrin and Fiorinal)? No     If more than 325mg/day, OK to schedule; Instruct Pt to decrease to less than 325 mg for 7 days AND route to RN pool/ Dr. Reardon's Team     For CERVICAL procedures, hold all aspirin products for 6 days.     Tell Pt that if aspirin product is not held for 6 days, the procedure WILL BE cancelled.     Any allergies to contrast dye, iodine, shellfish, or numbing and steroid medications? No    If YES, schedule and add allergy information to appointment notes AND route to the RN pool/ Dr. Reardon's Team    If SENDY and Contrast Dye / Iodine Allergy? DO NOT SCHEDULE, route to RN pool/ Dr. Reardon's Team    Allergies: Cats, Dogs, Lyrica [pregabalin], and Seasonal allergies     Does patient have an active infection or treated for one within the past week? No    Is patient currently taking any antibiotics or steroid medications?  Yes - predniSONE (DELTASONE) 20 MG tablet    For patients on chronic, preventative, or prophylactic antibiotics, procedures may be scheduled.     For patients on antibiotics for active or recent infection, schedule 4 days after completed.    For patients on steroid medications, schedule 4 days after completed.     Has the patient had a flu shot or any other vaccinations within the past 7 days? No  If yes, explain that for the vaccine to work best they need to:       wait 1 week before and 1 week after getting any Vaccine    wait 1 week before and 2 weeks after getting Covid Vaccine #2 or BOOSTER    If patient has concerns about the timing, send to RN pool/ Dr. Reardon's Team    Does patient have an MRI/CT?  YES: 12/30/22 Include Date and Check Procedure Scheduling Grid to see if required.    Was the MRI/CT done within the last 3 years?  Yes     If no route to RN Pool/ Dr. Reardon's  Team    If yes, where was the MRI/CT done?      Refer to PACS Transmissions list for approved external locations and route to RN Pool High Priority/ Dr. Valdess Team    If MRI was not done at approved external location do NOT schedule and route to RN pool/ Dr. Reardon's Team      If patient has an imaging disc, the injection MAY be scheduled but patient must bring disc to appt or appt will be cancelled.    Is patient able to transfer to a procedure table with minimal or no assistance? Yes     If no, do NOT schedule and route to RN Pool/ Dr. Reardon's Team    Procedure Specific Instructions:    If celiac plexus block, informed patient NPO for 6 hours and that it is okay to take medications with sips of water, especially blood pressure medications Not Applicable         If this is for a cervical procedure, informed patient that aspirin needs to be held for 6 days.   Not Applicable      Sedation, If Sedation is ordered for any procedure, patient must be NPO for 6 hours prior to procedure Not Applicable      If IV needed:    Do not schedule procedures requiring IV placement in the first appointment of the day or first appointment after lunch. Do NOT schedule at 0745, 0815 or 1245.       Instructed patient to arrive 30 minutes early for IV start if required. (Check Procedure Scheduling Grid)  Not Applicable    Reminders:    If you are started on any steroids or antibiotics between now and your appointment, you must contact us because the procedure may need to be cancelled.  Yes      As a reminder, receiving steroids can decrease your body's ability to fight infection.   Would you still like to move forward with scheduling the injection?  Yes      IV Sedation is not provided for procedures. If oral anti-anxiety medication is needed, the patient should request this from their referring provider.      Instruct patient to arrive as directed prior to the scheduled appointment time:  If IV needed 30 minutes before appointment  time       For patients 85 or older we recommend having an adult stay w/ them for the remainder of the day.       If the patient is Diabetic, remind them to bring their glucometer.      Does the patient have any questions?  NO  Rosario Ramirez  [facility]

## 2023-08-17 ENCOUNTER — MYC MEDICAL ADVICE (OUTPATIENT)
Dept: PHARMACY | Facility: CLINIC | Age: 63
End: 2023-08-17

## 2023-08-17 ENCOUNTER — LAB (OUTPATIENT)
Dept: LAB | Facility: CLINIC | Age: 63
End: 2023-08-17
Payer: COMMERCIAL

## 2023-08-17 DIAGNOSIS — E78.5 HYPERLIPIDEMIA LDL GOAL <130: ICD-10-CM

## 2023-08-17 DIAGNOSIS — D69.1 PLATELET GRANULE DEFECT (H): ICD-10-CM

## 2023-08-17 DIAGNOSIS — D69.1: ICD-10-CM

## 2023-08-17 LAB
ALBUMIN SERPL BCG-MCNC: 4.1 G/DL (ref 3.5–5.2)
ALP SERPL-CCNC: 82 U/L (ref 35–104)
ALT SERPL W P-5'-P-CCNC: 14 U/L (ref 0–50)
ANION GAP SERPL CALCULATED.3IONS-SCNC: 11 MMOL/L (ref 7–15)
AST SERPL W P-5'-P-CCNC: 23 U/L (ref 0–45)
BASOPHILS # BLD AUTO: 0 10E3/UL (ref 0–0.2)
BASOPHILS NFR BLD AUTO: 0 %
BILIRUB SERPL-MCNC: 0.4 MG/DL
BUN SERPL-MCNC: 19.1 MG/DL (ref 8–23)
CALCIUM SERPL-MCNC: 9.5 MG/DL (ref 8.8–10.2)
CHLORIDE SERPL-SCNC: 104 MMOL/L (ref 98–107)
CHOLEST SERPL-MCNC: 162 MG/DL
CREAT SERPL-MCNC: 1.32 MG/DL (ref 0.51–0.95)
DEPRECATED HCO3 PLAS-SCNC: 25 MMOL/L (ref 22–29)
EOSINOPHIL # BLD AUTO: 0.4 10E3/UL (ref 0–0.7)
EOSINOPHIL NFR BLD AUTO: 4 %
ERYTHROCYTE [DISTWIDTH] IN BLOOD BY AUTOMATED COUNT: 13.9 % (ref 10–15)
FERRITIN SERPL-MCNC: 100 NG/ML (ref 11–328)
GFR SERPL CREATININE-BSD FRML MDRD: 45 ML/MIN/1.73M2
GLUCOSE SERPL-MCNC: 137 MG/DL (ref 70–99)
HCT VFR BLD AUTO: 40.9 % (ref 35–47)
HDLC SERPL-MCNC: 51 MG/DL
HGB BLD-MCNC: 13.6 G/DL (ref 11.7–15.7)
IMM GRANULOCYTES # BLD: 0 10E3/UL
IMM GRANULOCYTES NFR BLD: 0 %
LDH SERPL L TO P-CCNC: 224 U/L (ref 0–250)
LDLC SERPL CALC-MCNC: 75 MG/DL
LYMPHOCYTES # BLD AUTO: 3.6 10E3/UL (ref 0.8–5.3)
LYMPHOCYTES NFR BLD AUTO: 40 %
MCH RBC QN AUTO: 29.5 PG (ref 26.5–33)
MCHC RBC AUTO-ENTMCNC: 33.3 G/DL (ref 31.5–36.5)
MCV RBC AUTO: 89 FL (ref 78–100)
MONOCYTES # BLD AUTO: 0.8 10E3/UL (ref 0–1.3)
MONOCYTES NFR BLD AUTO: 8 %
NEUTROPHILS # BLD AUTO: 4.3 10E3/UL (ref 1.6–8.3)
NEUTROPHILS NFR BLD AUTO: 47 %
NONHDLC SERPL-MCNC: 111 MG/DL
PLATELET # BLD AUTO: 290 10E3/UL (ref 150–450)
POTASSIUM SERPL-SCNC: 3.6 MMOL/L (ref 3.4–5.3)
PROT SERPL-MCNC: 6.9 G/DL (ref 6.4–8.3)
RBC # BLD AUTO: 4.61 10E6/UL (ref 3.8–5.2)
RETICS # AUTO: 0.12 10E6/UL (ref 0.03–0.1)
RETICS/RBC NFR AUTO: 2.6 % (ref 0.5–2)
SODIUM SERPL-SCNC: 140 MMOL/L (ref 136–145)
TRIGL SERPL-MCNC: 179 MG/DL
WBC # BLD AUTO: 9.1 10E3/UL (ref 4–11)

## 2023-08-17 PROCEDURE — 83615 LACTATE (LD) (LDH) ENZYME: CPT

## 2023-08-17 PROCEDURE — 82728 ASSAY OF FERRITIN: CPT

## 2023-08-17 PROCEDURE — 80061 LIPID PANEL: CPT

## 2023-08-17 PROCEDURE — 85025 COMPLETE CBC W/AUTO DIFF WBC: CPT

## 2023-08-17 PROCEDURE — 36415 COLL VENOUS BLD VENIPUNCTURE: CPT

## 2023-08-17 PROCEDURE — 80053 COMPREHEN METABOLIC PANEL: CPT

## 2023-08-17 PROCEDURE — 85045 AUTOMATED RETICULOCYTE COUNT: CPT

## 2023-08-17 NOTE — TELEPHONE ENCOUNTER
Is patient actively being treated for cancer or immunocompromised? 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  Yes - Clotting Disorder. Please review.     Latest Reference Range & Units 04/17/12 13:20 04/25/13 08:52 08/29/14 09:34 09/09/14 11:46 11/14/14 14:58 06/30/17 17:20 01/08/18 15:36 05/08/18 11:36 12/04/18 11:57 07/19/19 13:54 02/04/20 14:11 09/22/20 09:55 01/20/21 15:30 04/16/21 09:40 08/05/21 11:50 11/23/21 12:11 05/10/22 09:31 06/13/22 12:05 07/18/22 10:55 09/22/22 11:52 11/21/22 10:05 12/28/22 16:04 02/07/23 09:36 05/09/23 11:47   Platelet Count 150 - 450 10e3/uL 381 385 343 345 280 320 365 423 390 372 329 375 355 296 277 338 321 359 330 309 324 [phone]     Attempted to contact patient regarding prednisone prescribed on 8/4/2023.  LVM requesting return call.       Okay to schedule with above or should we obtain clearance from managing provider?     Routing to interventionists to review.     Wendy Underwood RN  [facility]  [phone]

## 2023-08-17 NOTE — TELEPHONE ENCOUNTER
I would recommend that you schedule this patient at least a few weeks out from now and then route to the provider actually doing the injection to order labs & have RN reach out to the patient to discuss these things further if necessary.     Mónica Nunez MD

## 2023-08-18 NOTE — TELEPHONE ENCOUNTER
Patient Is scheduled 09/14.      Please review [facility] message        Anjelica Silver  Complex   [facility]

## 2023-08-22 ENCOUNTER — VIRTUAL VISIT (OUTPATIENT)
Dept: PHARMACY | Facility: CLINIC | Age: 63
End: 2023-08-22
Payer: COMMERCIAL

## 2023-08-22 DIAGNOSIS — F33.1 MODERATE EPISODE OF RECURRENT MAJOR DEPRESSIVE DISORDER (H): ICD-10-CM

## 2023-08-22 DIAGNOSIS — F41.1 GENERALIZED ANXIETY DISORDER: Primary | ICD-10-CM

## 2023-08-22 PROCEDURE — 99606 MTMS BY PHARM EST 15 MIN: CPT | Performed by: PHARMACIST

## 2023-08-22 NOTE — TELEPHONE ENCOUNTER
Emely Grimes MD  Pain Nurse; [facility] Nurse 8/22/23  (2:33 PM)       Yes  Would need 1 unit platelets 1 day prior due to functional platelet issue.    [facility] nursing team- We will need to schedule and I will have to call and do phone consent (with witness) for blood product.    Emely Grimes MD/PhD   of Medicine  Division of Hematology, Oncology and Transplantation  Pager [phone]

## 2023-08-22 NOTE — Clinical Note
Hello, I had follow up MTM with this patient today and it seems that her symptoms from last week's MyChart message are resolved. I have some suspicion that it could be related to drinking enough water, which she agreed to increase.  Also placed therapy referral and provided her some info for social support. Please let me know if you have questions. Danica Escalante, PharmD Medication Therapy Management Pharmacist [facility]

## 2023-08-22 NOTE — PROGRESS NOTES
"Medication Therapy Management (MTM) Encounter    ASSESSMENT:                            Medication Adherence/Access: No issues identified    MDD/MARIZOL: Discussed importance of drinking more water, especially with the very hot weather we've had and how dehydration may have played a role in her weakness/\"hallucination\" symptoms last week. Smoky look in the living room could have been related to blurry vision from dehydration as well. Encouraged her to increase by 1-2 glasses each week.    Discussed benefit of therapy in addition to medication for managing the many life changes and stressors she's experienced over the last year. She is open to therapy and referral was placed. Provided phone number for her to call if she doesn't hear from them.  Reviewed that she is on the recommended maximum doses of her medications and we could consider switching her antidepressant, though she is not interested in making a change today.   Also discussed value of social interaction and she was interested in getting out of the house. Provided information for the [facility] and local [place] for a variety of events and activities that may be of interest.    PLAN:                            -Continue current medications  -Drink more water  -Mental health referral placed: Call [phone]   -[facility] and [facility]    Follow-up: 10/11/23 at 11am    SUBJECTIVE/OBJECTIVE:                          Jacquie Amador is a 63 year old female called for a follow-up visit from 7/25/23.       Reason for visit: med check.    Allergies/ADRs: Reviewed in chart  Past Medical History: Reviewed in chart  Tobacco: She reports that she has quit smoking. Her smoking use included cigarettes. She smoked an average of 1 pack per day. She has never used smokeless tobacco.  Alcohol: not currently using    Medication Adherence/Access: no issues reported    MDD/MARIZOL:   Duloxetine 120mg once daily   Wellbutrin SR 200mg twice " "daily   Buspar 30mg twice daily   Trazodone 100-150mg nightly as needed (prescribed 150mg nightly)    On 8/17/23 patient sent "[employer] message reporting hallucinations and multiple falls. Previously hallucinations have been associated with hypokalemia. Lab results from 8/17 show potassium is WNL.    Today, patient reported that she's doing well.  I asked how she's been feeling since sending [employer] messages last week, though she stated that she didn't know what she had sent. Reviewed the messages and she reported that she has not been feeling weak legs, had any falls, or had any hallucinations other than the one day last week. She described the hallucination as \"the living room looked really smoky, but it wasn't.\"  She described that recent steroid injections were not helpful and she was having a lot of back pain around that time. She thinks she drinks two or three 10oz glasses of water in a day.  She has had lots of stress this year and feels that anxiety has been worse lately. She described herself as \"a worrywort\" and barely leaves the house, which she thinks contributes to feeling anxious.    She thinks she might feel less anxious since increasing buspirone in the last month, but is \"still constantly anxious. I'm worrying all the time.\" She reported that she felt very anxious and kind of sad when moving to current home 2.5 years ago from her previous home of 30 years and feels that this still impacts her thinking and overall mood. She still misses things such as her large garden and outdoor space. Feels she has nothing to do at her current home. She worries about aging and what might occur in the future.  Anxiety is worse during the afternoon. Morning feels ok.  She is sleeping well.    ----------------    I spent 25 minutes with this patient today. All changes were made via collaborative practice agreement with Liz Peterson MD. A copy of the visit note was provided to the patient's provider(s).    A " summary of these recommendations was sent via [employer].    Danica Escalante PharmD  Medication Therapy Management Pharmacist  [facility]    Telemedicine Visit Details  Type of service:  Telephone visit  Start Time:  12:30pm  End Time:  12:55pm     Medication Therapy Recommendations  No medication therapy recommendations to display

## 2023-08-22 NOTE — PATIENT INSTRUCTIONS
"Recommendations from today's MTM visit:                                                    MTM (medication therapy management) is a service provided by a clinical pharmacist designed to help you get the most of out of your medicines.   Today we reviewed what your medicines are for, how to know if they are working, that your medicines are safe and how to make your medicine regimen as easy as possible.      -Continue current medications. Please reach out if you would like to further discuss medication change  -Please call on 8/25 if you haven't heard from them to schedule a therapy appointment  -Try to increase water intake by 1-2 glasses per week    -Check out the [facility] system for various events that you might enjoy.  Here is the event schedule link: [email or web address]    -Check out the [facility] for other activities and events: [email or web address]    Follow-up: I will call you on 10/11/23 at 11am    It was great speaking with you today.  I value your experience and would be very thankful for your time in providing feedback in our clinic survey. In the next few days, you may receive an email or text message from [employer] with a link to a survey related to your  clinical pharmacist.\"     To schedule another MTM appointment, please call the clinic directly or you may call the MTM scheduling line at [phone] or toll-free at [phone].     My Clinical Pharmacist's contact information:                                                      Please feel free to contact me with any questions or concerns you have.      Danica Escalante, Martín  Medication Therapy Management Pharmacist  [facility]  "

## 2023-08-22 NOTE — TELEPHONE ENCOUNTER
Patient is currently scheduled for Lumbar Intraarticular Steroid Injection     on 9/14/2023    We are requesting your approval to go proceed with injection.    Please keep call open and route back to the PAIN NURSE [[id number]] pool.    Thank you.    Routed to Hematology.     Wendy Underwood RN  [facility]  [phone]

## 2023-08-23 NOTE — TELEPHONE ENCOUNTER
The injection is scheduled for 9/14/23.    Called pt and informed her that she will need a platelet infusion the day before and Dr. Grimes's nursing team would be calling her to help coordinate this or she can give them a call to help facilitate.    Addendum:  no lab is needed before the procedure per Dr. Torrie Salcedo, RN-BSN  [facility]   [phone]

## 2023-08-24 ENCOUNTER — MYC MEDICAL ADVICE (OUTPATIENT)
Dept: HEMATOLOGY | Facility: CLINIC | Age: 63
End: 2023-08-24
Payer: COMMERCIAL

## 2023-08-28 ENCOUNTER — MYC MEDICAL ADVICE (OUTPATIENT)
Dept: HEMATOLOGY | Facility: CLINIC | Age: 63
End: 2023-08-28
Payer: COMMERCIAL

## 2023-08-29 DIAGNOSIS — G89.4 CHRONIC PAIN SYNDROME: ICD-10-CM

## 2023-08-29 DIAGNOSIS — F11.90 CHRONIC, CONTINUOUS USE OF OPIOIDS: ICD-10-CM

## 2023-08-29 RX ORDER — TRAMADOL HYDROCHLORIDE 50 MG/1
50 TABLET ORAL EVERY 12 HOURS PRN
Qty: 45 TABLET | Refills: 0 | Status: SHIPPED | OUTPATIENT
Start: 2023-08-29 | End: 2023-10-20

## 2023-08-29 NOTE — TELEPHONE ENCOUNTER
Provider canceling clinic after 11 am. Spoke with pt and rescheduled for next week.     Pt is in need of refill -traMADol (ULTRAM) 50 MG tablet  in the meantime

## 2023-08-31 ENCOUNTER — E-VISIT (OUTPATIENT)
Dept: FAMILY MEDICINE | Facility: CLINIC | Age: 63
End: 2023-08-31
Payer: COMMERCIAL

## 2023-08-31 DIAGNOSIS — L98.491 SKIN ULCER, LIMITED TO BREAKDOWN OF SKIN (H): Primary | ICD-10-CM

## 2023-08-31 DIAGNOSIS — L03.90 CELLULITIS, UNSPECIFIED CELLULITIS SITE: ICD-10-CM

## 2023-08-31 PROCEDURE — 99421 OL DIG E/M SVC 5-10 MIN: CPT | Performed by: FAMILY MEDICINE

## 2023-08-31 NOTE — TELEPHONE ENCOUNTER
Nerve Block    Date/Time: 8/31/2023 7:22 AM    Performed by: Frantz Tillman MD  Authorized by: Frantz Tillman MD    Block Type: adductor canal  Laterality:  Right  Indication: post-op pain management at surgeon's request    Preanesthetic Checklist Patient Identified (2 criteria), Block Plan Confirmed, Resuscitation Equipment Available, Supplemental O2 (if needed), Allergies Confirmed, Block Site Marked (if applicable), Monitors Applied, Aseptic Technique, Coagulation Status, Necessary Block Equipment Present, Timeout Performed, IV Access Functioning, Consent Verified, Drugs/Solutions Labeled and Sedation Given (if needed)    Patient Position:  Supine  Prep:  Chlorhexidine gluconate (CHG)  Max Sterile Barrier Technique:  Hand washing, cap/mask and sterile gloves  Monitoring:  Continuous pulse oximetry, EKG and blood pressure  Injection Technique:  Single-shot  Procedures: ultrasound guided    Needle Gauge:  20 G  Needle Length:  4 in  Needle Localization:  Ultrasound guidance   in-plane  Physical status during block:  Awake  Medications Administered  ropivacaine (NAROPIN) 0.5 % - Infiltration   20 mL - 8/31/2023 7:22:00 AM  dexAMETHasone (PF) (DECADRON) 10 mg/mL - Infiltration   5 mg - 8/31/2023 7:22:00 AM  Injection Assessment:  No paresthesia on injection, no resistance to injection, negative aspiration for heme, incremental injection and local visualized surrounding nerve on ultrasound  Patient Condition:  Tolerated well, no immediate complications  Paresthesia Pain:  None  Heart Rate Change: No    Slowly Injected: Yes    Start Time:8/31/2023 7:22 AM  Stop Time: 8/31/2023 7:26 AM       Provider E-Visit time total (minutes): 5

## 2023-09-01 RX ORDER — DOXYCYCLINE 100 MG/1
100 CAPSULE ORAL 2 TIMES DAILY
Qty: 14 CAPSULE | Refills: 0 | Status: SHIPPED | OUTPATIENT
Start: 2023-09-01 | End: 2023-09-08

## 2023-09-01 NOTE — PATIENT INSTRUCTIONS
Dear Jacquie    I have sent a prescription for you.  Let me know if things don't improve.     Thanks for choosing us as your health care partner,    Liz Peterson MD

## 2023-09-04 DIAGNOSIS — E78.5 HYPERLIPIDEMIA LDL GOAL <130: ICD-10-CM

## 2023-09-05 RX ORDER — ATORVASTATIN CALCIUM 20 MG/1
20 TABLET, FILM COATED ORAL DAILY
Qty: 90 TABLET | Refills: 0 | Status: SHIPPED | OUTPATIENT
Start: 2023-09-05 | End: 2023-12-05

## 2023-09-06 ENCOUNTER — MYC MEDICAL ADVICE (OUTPATIENT)
Dept: FAMILY MEDICINE | Facility: CLINIC | Age: 63
End: 2023-09-06
Payer: COMMERCIAL

## 2023-09-06 NOTE — TELEPHONE ENCOUNTER
Please see [employer] messages from 9/4/2023.     Please help with rescheduling.     MARVA ParekhN, RN  [facility] ~ Registered Nurse  Clinic Triage ~ [facility]  September 6, 2023

## 2023-09-08 NOTE — TELEPHONE ENCOUNTER
Looks like Maryellen and pt were talking via [employer] regarding appt openings for today, pt didn't return thoughts on appt today and now it's filled.     Willing to work in this afternoon or another day?

## 2023-09-11 NOTE — TELEPHONE ENCOUNTER
Call to Pt to verify that she has appointment for platelet infustion the day before her YANCI.     Pt stated that Dr. Grimes said that she didn't need to have the infusion now.  \\]    Writer reviewed chart, unable to find any mention of Pt not having an infusion.  Placed call to Dr. Grimes's office and LVM with [facility] Care Coordinator to call writer back.     José Miguel Alarcon, RN  Patient Care Supervisor   [facility] [place]

## 2023-09-11 NOTE — TELEPHONE ENCOUNTER
Call back from Pt's Hematology clinic.  OK to proceed with injection without Platelet infusion.  No need to draw Plt labs before injection.  Pt being treated with Tranexamic acid before the injection.     Will Forward to Dr. Brownlee as KATARINA.    José Miguel Alarcon, RN  Patient Care Supervisor   [facility] [place]

## 2023-09-12 ENCOUNTER — OFFICE VISIT (OUTPATIENT)
Dept: DERMATOLOGY | Facility: CLINIC | Age: 63
End: 2023-09-12
Payer: COMMERCIAL

## 2023-09-12 DIAGNOSIS — L30.9 FACIAL ECZEMA: ICD-10-CM

## 2023-09-12 DIAGNOSIS — L20.89 OTHER ATOPIC DERMATITIS: ICD-10-CM

## 2023-09-12 DIAGNOSIS — R20.8 SKIN PAIN: ICD-10-CM

## 2023-09-12 DIAGNOSIS — L30.9 DERMATITIS: ICD-10-CM

## 2023-09-12 DIAGNOSIS — L28.1 PRURIGO NODULARIS: ICD-10-CM

## 2023-09-12 PROCEDURE — 99214 OFFICE O/P EST MOD 30 MIN: CPT | Mod: GC | Performed by: DERMATOLOGY

## 2023-09-12 RX ORDER — BETAMETHASONE DIPROPIONATE 0.5 MG/G
CREAM TOPICAL
Qty: 50 G | Refills: 2 | Status: SHIPPED | OUTPATIENT
Start: 2023-09-12 | End: 2024-06-27

## 2023-09-12 RX ORDER — BUTORPHANOL TARTRATE 10 MG/ML
1 SPRAY NASAL EVERY 4 HOURS PRN
Qty: 2.5 ML | Refills: 3 | Status: SHIPPED | OUTPATIENT
Start: 2023-09-12 | End: 2024-05-01

## 2023-09-12 RX ORDER — TACROLIMUS 1 MG/G
OINTMENT TOPICAL 2 TIMES DAILY
Qty: 60 G | Refills: 1 | Status: SHIPPED | OUTPATIENT
Start: 2023-09-12

## 2023-09-12 RX ORDER — DESONIDE 0.5 MG/G
CREAM TOPICAL 2 TIMES DAILY
Qty: 60 G | Refills: 3 | Status: SHIPPED | OUTPATIENT
Start: 2023-09-12 | End: 2023-12-21

## 2023-09-12 RX ORDER — BUTORPHANOL TARTRATE 10 MG/ML
1 SPRAY NASAL EVERY 4 HOURS PRN
Qty: 2.5 ML | Refills: 3 | Status: CANCELLED | OUTPATIENT
Start: 2023-09-12

## 2023-09-12 ASSESSMENT — PAIN SCALES - GENERAL: PAINLEVEL: NO PAIN (0)

## 2023-09-12 NOTE — LETTER
9/12/2023       RE: Jacquie Amador  [address]  [place] [address]     Dear Colleague,    Thank you for referring your patient, Jacquie Amador, to the [facility] [place] at [facility]. Please see a copy of my visit note below.    [facility] Dermatology Note  Encounter Date: Sep 12, 2023  Office Visit     Dermatology Problem List:    Multiple skin ulcers resembling prurigo/neurodermatitis  Facial erosions, healing -chronic active problem, uncontrolled but improving   - Current tx: Protopic , mupirocin, betamethasone ointment, desonide, compounded amytriptyline/ketamine cream, Dupixent 300mg q2 weeks, N-acetylcystiene 600 mg daily and PRN  intranasal butorphanol as rescue   - follow up in 3-4 months for recheck     Recurrent aphthous ulcers, improved     ___________________________________________     Assessment & Plan:      Multiple skin ulcers resembling prurigo/neurodermatitis  Facial erosions, chronic active problem, uncontrolled.  But slowly improving.  Patient with a history of recurring oral and genital sores, likely recurrent aphthous ulcers, as well as multiple diffuse discrete skin ulcers.   Today, Jacquie notes ongoing skin sores on face, however only 3 remain and the rest have healed. She reports improvement in her symptoms but does occasionally still have stinging intermittent pains, does have moments of relief. Symptoms arely with nasal  butorphanol used as rescue therapy. Overall, patient is improved and she does not report any other new concerns or complaints.   - Gentle skin care regimen, provided   - Protopic and mupirocin topically   - Betamethasone ointment and desonide cream as needed for persistent skin sores and pain  - Amytriptyline/ketamine cream to apply twice daily to areas of sores  - Dupixent 300mg q2 weeks and is tolerating this without problems   - N-acetylcystiene 600 mg daily   -  using intranasal butorphanol as rescue therapy     Recurrent aphthous ulcers  - well managed     Procedures Performed:   None    Follow-up: 3 month(s) in-person, or earlier for new or changing lesions    Staff and Resident:   Dr. Warren and Nasra Lo, DO     I have seen and examined this patient and agree with the assessment and plan as documented in the resident's note.    Jay Warren MD  Dermatology Attending    ____________________________________________    CC: Derm Problem (Jacquie is being seen today for a 2 month follow up for spots on the face. )    HPI:  Ms. Jacquie Amador is a(n) 63 year old female who presents today as a return patient for facial erosions and ulcerations.     Today, Jacquie notes ongoing skin sores on face, however only 3 remain and the rest have healed. She reports improvement in her symptoms but does occasionally still have stinging intermittent pains, does have moments of relief. Symptoms arely with nasal  butorphanol used as rescue therapy. Overall, patient is improved and she does not report any other new concerns or complaints.     Patient is otherwise feeling well, without additional skin concerns.    Labs Reviewed:  N/A    Physical Exam:  Vitals: LMP 07/17/2009 (Exact Date)   GEN: This is a well developed, well-nourished female in no acute distress, in a pleasant mood.    SKIN: Focused examination of the face was performed.  -Ye skin type: II  - facial erosions # 3 on exam, largest is 1.5 cm in maximum diameter to the right side of the face   - areas of prior erosions well healed   - no oral ulcers or lesions identified   -No other lesions of concern on areas examined.          Medications:  Current Outpatient Medications   Medication    acetaminophen (TYLENOL) 500 MG tablet    acetylcysteine (N-ACETYL-L-CYSTEINE) 600 MG CAPS capsule    albuterol (PROVENTIL) (2.5 MG/3ML) 0.083% neb solution    albuterol (VENTOLIN HFA) 108 (90 Base) MCG/ACT inhaler     atorvastatin (LIPITOR) 20 MG tablet    augmented betamethasone dipropionate (DIPROLENE AF) 0.05 % external cream    benzonatate (TESSALON) 200 MG capsule    betamethasone dipropionate (DIPROSONE) 0.05 % external ointment    buPROPion (WELLBUTRIN SR) 200 MG 12 hr tablet    busPIRone (BUSPAR) 30 MG tablet    butorphanol (STADOL) 10 MG/ML nasal spray    celecoxib (CELEBREX) 200 MG capsule    chlorhexidine (PERIDEX) 0.12 % solution    clindamycin (CLINDAMAX) 1 % external gel    clotrimazole (MYCELEX) 10 MG lozenge    COMPOUND CONTAINING CONTROLLED SUBSTANCE (CMPD RX) - PHARMACY TO MIX COMPOUNDED MEDICATION    desonide (DESOWEN) 0.05 % external cream    dextran 70-hypromellose (TEARS NATURALE FREE PF) 0.1-0.3 % ophthalmic solution    DULoxetine (CYMBALTA) 60 MG capsule    dupilumab (DUPIXENT) 300 MG/2ML prefilled pen    famotidine (PEPCID) 40 MG tablet    fluocinonide (LIDEX) 0.05 % external gel    hydrochlorothiazide (HYDRODIURIL) 25 MG tablet    hydrocortisone 2.5 % cream    lidocaine, viscous, (XYLOCAINE) 2 % solution    losartan (COZAAR) 100 MG tablet    metoprolol succinate ER (TOPROL XL) 100 MG 24 hr tablet    metoprolol succinate ER (TOPROL XL) 25 MG 24 hr tablet    mometasone-formoterol (DULERA) 200-5 MCG/ACT inhaler    montelukast (SINGULAIR) 10 MG tablet    mupirocin (BACTROBAN) 2 % external cream    nystatin (MYCOSTATIN) 192990 UNIT/ML suspension    oxybutynin ER (DITROPAN XL) 5 MG 24 hr tablet    potassium chloride ER (KLOR-CON M) 10 MEQ CR tablet    predniSONE (DELTASONE) 10 MG tablet    predniSONE (DELTASONE) 20 MG tablet    rizatriptan (MAXALT) 10 MG tablet    rOPINIRole (REQUIP) 1 MG tablet    tacrolimus (PROTOPIC) 0.1 % external ointment    tiotropium (SPIRIVA RESPIMAT) 2.5 MCG/ACT inhaler    traMADol (ULTRAM) 50 MG tablet    tranexamic acid (LYSTEDA) 650 MG tablet    traZODone (DESYREL) 50 MG tablet    triamcinolone (ARISTOCORT HP) 0.5 % external cream    valACYclovir (VALTREX) 500 MG tablet    ZYRTEC  10 MG OR TABS     No current facility-administered medications for this visit.      Past Medical History:   Patient Active Problem List   Diagnosis    Moderate persistent asthma without complication    Allergic rhinitis due to other allergen    Esophageal reflux    Moderate recurrent major depression (H)    Multiple joint pain    HYPERLIPIDEMIA LDL GOAL <130    Hypertension goal BP (blood pressure) < 140/90    Generalized anxiety disorder    Myalgia    Chronic rhinitis    Constipation    Lumbar disc disease with radiculopathy    Iron deficiency anemia    Osteoarthritis of carpometacarpal joint of thumb - bilateral    Trochanteric bursitis    Right ankle instability    Primary focal hyperhidrosis    Trochanteric bursitis of both hips    Overweight    Iron deficiency    Scratching    Advanced directives, counseling/discussion    Chronic, continuous use of opioids    Medical marijuana use    Primary osteoarthritis of both first carpometacarpal joints    Arthritis of metatarsophalangeal joint    Sinus tachycardia    Intractable chronic migraine without aura and without status migrainosus    Impaired fasting glucose    Migraine without aura and without status migrainosus, not intractable    Skin ulcer, limited to breakdown of skin (H)    Morbid obesity (H)    Platelet granule defect (H)    Aphthous ulcer    Rash    Pruritus    Behcet's syndrome involving oral mucosa (H)    Encounter for long-term (current) use of high-risk medication    Dermatitis    Encounter for long-term current use of high risk medication    Acquired platelet disorder (H)    Other atopic dermatitis    Facial eczema    Skin pain    Prurigo nodularis     Past Medical History:   Diagnosis Date    ASTHMA - MODERATE PERSISTENT 9/21/2005    Chronic pain     Coronary artery disease     CVA (cerebral infarction)     Depressive disorder, not elsewhere classified     Diabetes (H)     Elevated serum alkaline phosphatase level     Liver source    Fibromyalgia      HYPERLIPIDEMIA NEC/NOS 12/29/2006    Hypertriglyceridemia     OA (osteoarthritis)     Thyroid disease     Trochanteric bursitis     Unspecified essential hypertension      CC No referring provider defined for this encounter. on close of this encounter.    Prescriptions faxed the compounding pharmacy and also [facility] pharmacy.

## 2023-09-12 NOTE — PATIENT INSTRUCTIONS
- Continue gentle skin care with Protopic and mupirocin  - Continue Betamethasone ointment and desonide cream as needed for persistent skin sores and pain  - Continue Amytriptyline/ketamine cream to apply twice daily to areas of sores  - Dupixent 300mg q2 weeks as you are tolerating this   - N-acetylcystiene 600 mg daily   - Continue intranasal butorphanol as rescue therapy     - We have sent refills for you today       Gentle Skin Care  Below is a list of products our providers recommend for gentle skin care.  Daily bathing is recommended. Make sure you are applying a good moisturizer after bathing every time.  Use Moisturizing creams at least twice daily to the whole body. Your provider may recommend a lighter or heavier moisturizer   Lighter, more pleasing to the feel moisturizers include products such as; Cetaphil, Cerave, Aveeno and Vanicream light.   Thicker agents include; Aquaphor ointment, Vaseline, Eucerin and Vanicream.  Creams are more moisturizing than lotions  Products should be fragrance free- soaps, creams, detergents.  Mild Bar Soaps include;   Fragrance Free Dove, Basis and Purpose  Mild Liquid Cleansers;  Vanicream, Cetaphil, Aquanil, Cerave and Aquaphor  Laundry Products include;  All Free and Clear, Dreft, and Cheer Free  Care Plan:  Keep bathing and showering short, less than 15 mins  Always use lukewarm warm when possible. AVOID very HOT or COLD water  DO NOT use bubble bath  Limit the use of soaps. Focus on  dirty  areas of the body; face, armpits, groin and feet  Do NOT vigorously scrub when you cleanse your skin  After bathing, PAT your skin lightly with a towel. DO NOT rub or scrub when drying  ALWAYS apply a moisturizer immediately after bathing. This helps to  lock in  the moisture. * IF YOU WERE PRESCRIBED A TOPICAL MEDICATION, APPLY YOUR MEDICATION FIRST THEN COVER WITH YOUR DAILY MOISTURIZER  Reapply moisturizing agents at least twice daily to your whole body  Do not use products such  "as powders, perfumes, or colognes on your skin  Avoid saunas and steam baths. This temperature is too HOT  Use unscented hypo-allergenic laundry products. AVOID fabric softeners  and dryer sheets  Avoid tight or  scratchy  clothing such as wool  Always wash new clothing before wearing them for the first time  Sometimes a humidifier or vaporizer, used at night can help the dry skin. Remember to keep it clean to avoid mold growth.    Sun Protection      Sunscreen   What does \"broad spectrum mean\"?  Broad spectrum sunscreens protect against both UVA and UVB radiation. UVC is filtered out by the ozone layer.     What does SPF mean?   SPF stands for  Sun Protection Factor  and represents the ability to screen only UVB (burning) rays. UVB rays are mostly blocked in all sunscreens, but only those that contain titanium dioxide, zinc oxide, mexoryl or Parsol 1789 (avobenzone) block the UVA spectrum. Even though a sunscreen is labeled  UVA/UVB Protection  that is not entirely accurate because products that only partially protect against UVA can claim to protect against both UVA and UVB.     What SPF should I chose?   Aim to get a sunscreen that is at least sun protection factor (SPF) 30. SPF 15 provides about 92-93% coverage, SPF 30 about 95-97% coverage, and SPF 45 about 98% coverage. That is to say, SPF 30 is not twice as good as SPF 15. The reason why we recommend SPF 30 is because we are usually only putting on half the necessary amount of sunscreen to achieve the advertised protection. That means that it is very possible that your SPF 30 sunscreen is only providing you with SPF 15 coverage based on how much you are applying. SPF 15 (92-93% coverage) is the absolute minimal that we recommend. Similarly, the benefit of sunscreens with SPF higher than 50 is that even if you put on less than the required amount, you are likely still getting good protection (ex: even if you apply only half the recommended amount of , " it should still provide you with an SPF of 50).     How much should I apply?  If covering your whole body, you should be using 30 grams, or one ounce, which is how much is in one shot glass! That s a THICK layer! May times, you are only applying half the recommended amount, which means that you are only getting half the SPF (for example, you may be using SPF 30 but if you're only applying half the recommended amount, you're only getting SPF 15 protection.      When do I need to wear sunscreen?  Every day, rain or shine! Even on a rainy day or a day when you are only indoors, you are still being exposed harmful UV radiation from the sun. We usually recommend physical/mineral sunscreens (active ingredient is titanium dioxide or zinc oxide) as these ingredients have been around for many years, there is no concern of them being absorbed into the bloodstream, and they are coral reef friendly! However, the best sunscreen is the one that you will use everyday.     What about my kids?  Sunscreen is not recommended for infants under the age of 6 months. Use clothing, shade and sun avoidance for small infants. For kids older than 6 months, we recommend that you should use only mineral/physical sunscreens that have zinc oxide as the active ingredient. Sun-protective clothing and hats are also important for people of all ages.     Sun protective clothing and Resources   Coolibar (www.coolibar.[employer])  CryptoSeal (PayParade Pictures)  Athleta (wwwPlaytox.[employer])  WealthEngine (wwwGist)  Carve Designs (ComCam) - affordable  Skinz (LearnStreetskinz.com)    Long sleeve - Adrian Cool DRI UPF 50 or [place] PFG UPF 50  Hoodie - [place] PFG UPF 50  Swimshirt/Rash Guard - Esha UPF 50 (on Amazon)  Neck - Outdoor Research Ubertubes (www.outdoorresSetgo.[employer])      Do I need tinted sunscreen?  There is more and more research showing that visible light can also lead to discoloration (such as melasma). Tinted sunscreens (which contain  iron oxides) protect against visible light as an added bonus.     What brands do you recommend?  Physical/Mineral Sunscreens (in no particular order)  Elta MD UV Physical Broad-Spectrum SPF 41 Sunscreen (Tinted, $33)  Skin Ceuticals Physical Fusion UV Defense SPF 50 (Tinted, $34)  Unsun Mineral Tinted Face Sunscreen (Tinted, with 2 shade ranges, $29)  It Cosmetics CC+ Cream with SPF 50+ (Tinted, can also double as foundation/coverage -- great range of shades, $40)  Biossance Squalane + Zinc Sheer Mineral Sunscreen SPF 30 PA +++ (goes on white then blends in, $30)  Cerave 100% Mineral Sunscreen SPF 50 Face (good for sensitive skin, $15)  La Roche Posay Anthelios Mineral Zinc Oxide Sunscreen SPF 50 ($35)  La Roche Posay Anthelios Mineral Tinted Sunscreen for Face SPF 50 ($35)  Think Sport Sunscreen (great for sports, though has more of a white cast, $20)  Think Baby Sunscreen (for kids, $21)  Color Science Sunforgettable Total Protection Brush On Shield SPF 50 (Multiple tints, $130)    Chemical Sunscreens  Joy Rivera Weightless Protection SPF 30 ($48)  Jacey SPF Brightening Moisturizer ($30)  Urban Skin Complexion Protection Moisturizer SPF 30 ($20)  Total Defense + Repair Broad Spectrum SPF 34 ($68)  Clarins UV PLUS Anti-Pollution Sunscreen Multi-Protection Tint SPF 50 (Multiple tints, $45)  Neutrogena Healthy Skin Glow Sheers Tinted Moisturizer with SPF 20 (Multiple tints, $11)

## 2023-09-12 NOTE — PROGRESS NOTES
[facility] Dermatology Note  Encounter Date: Sep 12, 2023  Office Visit     Dermatology Problem List:    Multiple skin ulcers resembling prurigo/neurodermatitis  Facial erosions, healing -chronic active problem, uncontrolled but improving   - Current tx: Protopic , mupirocin, betamethasone ointment, desonide, compounded amytriptyline/ketamine cream, Dupixent 300mg q2 weeks, N-acetylcystiene 600 mg daily and PRN  intranasal butorphanol as rescue   - follow up in 3-4 months for recheck     Recurrent aphthous ulcers, improved     ___________________________________________     Assessment & Plan:      Multiple skin ulcers resembling prurigo/neurodermatitis  Facial erosions, chronic active problem, uncontrolled.  But slowly improving.  Patient with a history of recurring oral and genital sores, likely recurrent aphthous ulcers, as well as multiple diffuse discrete skin ulcers.   Today, Jacquie notes ongoing skin sores on face, however only 3 remain and the rest have healed. She reports improvement in her symptoms but does occasionally still have stinging intermittent pains, does have moments of relief. Symptoms arely with nasal  butorphanol used as rescue therapy. Overall, patient is improved and she does not report any other new concerns or complaints.   - Gentle skin care regimen, provided   - Protopic and mupirocin topically   - Betamethasone ointment and desonide cream as needed for persistent skin sores and pain  - Amytriptyline/ketamine cream to apply twice daily to areas of sores  - Dupixent 300mg q2 weeks and is tolerating this without problems   - N-acetylcystiene 600 mg daily   - using intranasal butorphanol as rescue therapy     Recurrent aphthous ulcers  - well managed     Procedures Performed:   None    Follow-up: 3 month(s) in-person, or earlier for new or changing lesions    Staff and Resident:   Dr. Warren and Nasra Lo, DO     I have seen and examined this patient and agree  with the assessment and plan as documented in the resident's note.    Jay Warren MD  Dermatology Attending    ____________________________________________    CC: Derm Problem (Jacquie is being seen today for a 2 month follow up for spots on the face. )    HPI:  Ms. Jacquie Amador is a(n) 63 year old female who presents today as a return patient for facial erosions and ulcerations.     Today, Jacquie notes ongoing skin sores on face, however only 3 remain and the rest have healed. She reports improvement in her symptoms but does occasionally still have stinging intermittent pains, does have moments of relief. Symptoms arely with nasal  butorphanol used as rescue therapy. Overall, patient is improved and she does not report any other new concerns or complaints.     Patient is otherwise feeling well, without additional skin concerns.    Labs Reviewed:  N/A    Physical Exam:  Vitals: LMP 07/17/2009 (Exact Date)   GEN: This is a well developed, well-nourished female in no acute distress, in a pleasant mood.    SKIN: Focused examination of the face was performed.  -Ye skin type: II  - facial erosions # 3 on exam, largest is 1.5 cm in maximum diameter to the right side of the face   - areas of prior erosions well healed   - no oral ulcers or lesions identified   -No other lesions of concern on areas examined.          Medications:  Current Outpatient Medications   Medication     acetaminophen (TYLENOL) 500 MG tablet     acetylcysteine (N-ACETYL-L-CYSTEINE) 600 MG CAPS capsule     albuterol (PROVENTIL) (2.5 MG/3ML) 0.083% neb solution     albuterol (VENTOLIN HFA) 108 (90 Base) MCG/ACT inhaler     atorvastatin (LIPITOR) 20 MG tablet     augmented betamethasone dipropionate (DIPROLENE AF) 0.05 % external cream     benzonatate (TESSALON) 200 MG capsule     betamethasone dipropionate (DIPROSONE) 0.05 % external ointment     buPROPion (WELLBUTRIN SR) 200 MG 12 hr tablet     busPIRone (BUSPAR) 30 MG tablet      butorphanol (STADOL) 10 MG/ML nasal spray     celecoxib (CELEBREX) 200 MG capsule     chlorhexidine (PERIDEX) 0.12 % solution     clindamycin (CLINDAMAX) 1 % external gel     clotrimazole (MYCELEX) 10 MG lozenge     COMPOUND CONTAINING CONTROLLED SUBSTANCE (CMPD RX) - PHARMACY TO MIX COMPOUNDED MEDICATION     desonide (DESOWEN) 0.05 % external cream     dextran 70-hypromellose (TEARS NATURALE FREE PF) 0.1-0.3 % ophthalmic solution     DULoxetine (CYMBALTA) 60 MG capsule     dupilumab (DUPIXENT) 300 MG/2ML prefilled pen     famotidine (PEPCID) 40 MG tablet     fluocinonide (LIDEX) 0.05 % external gel     hydrochlorothiazide (HYDRODIURIL) 25 MG tablet     hydrocortisone 2.5 % cream     lidocaine, viscous, (XYLOCAINE) 2 % solution     losartan (COZAAR) 100 MG tablet     metoprolol succinate ER (TOPROL XL) 100 MG 24 hr tablet     metoprolol succinate ER (TOPROL XL) 25 MG 24 hr tablet     mometasone-formoterol (DULERA) 200-5 MCG/ACT inhaler     montelukast (SINGULAIR) 10 MG tablet     mupirocin (BACTROBAN) 2 % external cream     nystatin (MYCOSTATIN) 267969 UNIT/ML suspension     oxybutynin ER (DITROPAN XL) 5 MG 24 hr tablet     potassium chloride ER (KLOR-CON M) 10 MEQ CR tablet     predniSONE (DELTASONE) 10 MG tablet     predniSONE (DELTASONE) 20 MG tablet     rizatriptan (MAXALT) 10 MG tablet     rOPINIRole (REQUIP) 1 MG tablet     tacrolimus (PROTOPIC) 0.1 % external ointment     tiotropium (SPIRIVA RESPIMAT) 2.5 MCG/ACT inhaler     traMADol (ULTRAM) 50 MG tablet     tranexamic acid (LYSTEDA) 650 MG tablet     traZODone (DESYREL) 50 MG tablet     triamcinolone (ARISTOCORT HP) 0.5 % external cream     valACYclovir (VALTREX) 500 MG tablet     ZYRTEC 10 MG OR TABS     No current facility-administered medications for this visit.      Past Medical History:   Patient Active Problem List   Diagnosis     Moderate persistent asthma without complication     Allergic rhinitis due to other allergen     Esophageal reflux      Moderate recurrent major depression (H)     Multiple joint pain     HYPERLIPIDEMIA LDL GOAL <130     Hypertension goal BP (blood pressure) < 140/90     Generalized anxiety disorder     Myalgia     Chronic rhinitis     Constipation     Lumbar disc disease with radiculopathy     Iron deficiency anemia     Osteoarthritis of carpometacarpal joint of thumb - bilateral     Trochanteric bursitis     Right ankle instability     Primary focal hyperhidrosis     Trochanteric bursitis of both hips     Overweight     Iron deficiency     Scratching     Advanced directives, counseling/discussion     Chronic, continuous use of opioids     Medical marijuana use     Primary osteoarthritis of both first carpometacarpal joints     Arthritis of metatarsophalangeal joint     Sinus tachycardia     Intractable chronic migraine without aura and without status migrainosus     Impaired fasting glucose     Migraine without aura and without status migrainosus, not intractable     Skin ulcer, limited to breakdown of skin (H)     Morbid obesity (H)     Platelet granule defect (H)     Aphthous ulcer     Rash     Pruritus     Behcet's syndrome involving oral mucosa (H)     Encounter for long-term (current) use of high-risk medication     Dermatitis     Encounter for long-term current use of high risk medication     Acquired platelet disorder (H)     Other atopic dermatitis     Facial eczema     Skin pain     Prurigo nodularis     Past Medical History:   Diagnosis Date     ASTHMA - MODERATE PERSISTENT 9/21/2005     Chronic pain      Coronary artery disease      CVA (cerebral infarction)      Depressive disorder, not elsewhere classified      Diabetes (H)      Elevated serum alkaline phosphatase level     Liver source     Fibromyalgia      HYPERLIPIDEMIA NEC/NOS 12/29/2006     Hypertriglyceridemia      OA (osteoarthritis)      Thyroid disease      Trochanteric bursitis      Unspecified essential hypertension      CC No referring provider defined for  this encounter. on close of this encounter.

## 2023-09-14 ENCOUNTER — RADIOLOGY INJECTION OFFICE VISIT (OUTPATIENT)
Dept: PALLIATIVE MEDICINE | Facility: CLINIC | Age: 63
End: 2023-09-14
Payer: COMMERCIAL

## 2023-09-14 ENCOUNTER — E-VISIT (OUTPATIENT)
Dept: FAMILY MEDICINE | Facility: CLINIC | Age: 63
End: 2023-09-14

## 2023-09-14 VITALS — OXYGEN SATURATION: 98 % | DIASTOLIC BLOOD PRESSURE: 70 MMHG | SYSTOLIC BLOOD PRESSURE: 141 MMHG | HEART RATE: 74 BPM

## 2023-09-14 DIAGNOSIS — M47.816 SPONDYLOSIS OF LUMBAR REGION WITHOUT MYELOPATHY OR RADICULOPATHY: ICD-10-CM

## 2023-09-14 DIAGNOSIS — L60.8 NAIL DEFORMITY: Primary | ICD-10-CM

## 2023-09-14 PROCEDURE — 64493 INJ PARAVERT F JNT L/S 1 LEV: CPT | Mod: 50 | Performed by: PAIN MEDICINE

## 2023-09-14 PROCEDURE — 64494 INJ PARAVERT F JNT L/S 2 LEV: CPT | Mod: 50 | Performed by: PAIN MEDICINE

## 2023-09-14 PROCEDURE — 99207 PR NON-BILLABLE SERV PER CHARTING: CPT | Performed by: FAMILY MEDICINE

## 2023-09-14 RX ORDER — TRIAMCINOLONE ACETONIDE 40 MG/ML
40 INJECTION, SUSPENSION INTRA-ARTICULAR; INTRAMUSCULAR ONCE
Status: COMPLETED | OUTPATIENT
Start: 2023-09-14 | End: 2023-09-14

## 2023-09-14 RX ADMIN — TRIAMCINOLONE ACETONIDE 40 MG: 40 INJECTION, SUSPENSION INTRA-ARTICULAR; INTRAMUSCULAR at 15:52

## 2023-09-14 ASSESSMENT — PAIN SCALES - GENERAL: PAINLEVEL: EXTREME PAIN (8)

## 2023-09-14 NOTE — PROGRESS NOTES
Pre procedure Diagnosis: lumbar spondylosis without myelopathy   Post procedure Diagnosis: Same  Procedure performed: bilaeral  facet joint injections at l4-5, L5-S1, fluoroscopically guided, contrast controlled  Indication:  Therapeutic for pain  Anesthesia: none  Complication:  none  Operators: José Miguel Brownlee MD    Indications:   Jacquie Amador is a 63 year old female was sent for facet joint injection.  The patient has a history of axial lbp.  Exam shows + kemps and reproduction of pain with extension and lateral rotation.  They have tried conservative treatment including meds/pt.    Options/alternatives, benefits and risks were discussed with the patient including bleeding, infection, flared pain, tissue trauma, exposure to radiation, reaction to medications including seizure, spinal cord injury, paralysis, weakness, numbness and headache.     Questions were answered to her satisfaction and she wishes to proceed. Voluntary informed consent was obtained and signed.     Vitals were reviewed: Yes  Allergies were reviewed:  Yes   Medications were reviewed:  Yes   Pre-procedure pain score: 8/10    Procedure:  After obtaining signed informed consent, the patient was brought into the procedure suite and was placed in a prone position on the procedure table.   A Pause for the Cause was performed.  The patient was prepped and draped in the usual sterile fashion.     Under AP fluoroscopic guidance the l4-5,L5-S1 facet joints bilaterally were identified, and the C-arm was rotated obliquely to the affected side to open the joint space. A total of 3 ml of 1% lidocaine was injected at the needle entry point and needle tract. Then a 22 gauge 3,5 inch spinal needle was inserted and advanced under fluoroscopic guidance targeting the superior articular process of each joint. Once the needle*made contact with the SAP, it was rotated and advanced into the joint.    AP and lateral fluoroscopic views were obtained to confirm  the needle placement. Then,  Omnipaque 0.25 mlcontrast dye was injected after negative aspiration for heme and CSF in each joint, confirming appropriate needle placement.  A total of 1ml of Omnipaque was used.  Omnipaque wasted:  9 ml.    Then, each joint was injected with 1 ml of a combination of Kenalog 40 mg and 0.25% bupivacaine 3 ml (total injectate volume 4 ml) and the needles were flushed with local anesthetic as they were withdrawn.       Hemostasis was achieved, the area was cleaned, and bandaids were placed when appropriate.  The patient tolerated the procedure well, and was taken to the recovery room.    Images were saved to PACS.    Post-procedure pain score: 0/10  Follow-up includes:   - consider Lumbar RFA if symptoms return   -f/u 2 wks with provider     José Miguel Brownlee MD  [facility]

## 2023-09-14 NOTE — NURSING NOTE
Discharge Information    IV Discontiued Time:  NA    Amount of Fluid Infused:  NA    Discharge Criteria = When patient returns to baseline or as per MD order    Consciousness:  Pt is fully awake    Circulation:  BP +/- 20% of pre-procedure level    Respiration:  Patient is able to breathe deeply    O2 Sat:  Patient is able to maintain O2 Sat >92% on room air    Activity:  Moves 4 extremities on command    Ambulation:  Patient is able to stand and walk or stand and pivot into wheelchair    Dressing:  Clean/dry or No Dressing    Notes:   Discharge instructions and AVS given to patient    Patient meets criteria for discharge?  YES    Admitted to PCU?  No    Responsible adult present to accompany patient home?  Yes    Signature/Title:    julieta freed RN  RN Care Coordinator  [facility]

## 2023-09-14 NOTE — PATIENT INSTRUCTIONS
[facility]   Procedure Discharge Instructions    Today you saw:   Dr. José Miguel Brownlee       You had an: lumbar   facet joint injection      Be cautious when walking. Numbness and/or weakness in the lower extremities may occur for up to 6-8 hours after the procedure due to effect of the local anesthetic  Do not drive for 6 hours. The effect of the local anesthetic could slow your reflexes.   You may resume your regular activities after 24 hours  Avoid strenuous activity for the first 24 hours  You may shower, however avoid swimming, tub baths or hot tubs for 24 hours following your procedure  You may have a mild to moderate increase in pain for several days following the injection.  It may take up to 14 days for the steroid medication to start working although you may feel the effect as early as a few days after the procedure.     You may use ice packs for 10-15 minutes, 3 to 4 times a day at the injection site for comfort  Do not use heat to painful areas for 6 to 8 hours. This will give the local anesthetic time to wear off and prevent you from accidentally burning your skin.   Unless you have been directed to avoid the use of anti-inflammatory medications (NSAIDS), you may use medications such as ibuprofen, Aleve or Tylenol for pain control if needed.   If you were fasting, you may resume your normal diet and medications after the procedure  If you have diabetes, check your blood sugar more frequently than usual as your blood sugar may be higher than normal for 10-14 days following a steroid injection. Contact your doctor who manages your diabetes if your blood sugar is higher than usual  Possible side effects of steroids that you may experience include flushing, elevated blood pressure, increased appetite, mild headaches and restlessness.  All of these symptoms will get better with time.  If you experience any of the following, call the Pain Clinic during work hours (Mon-Friday  8-4:30 pm) at [phone] or the Provider Line after hours at [phone]:  -Fever over 100 degree F  -Swelling, bleeding, redness, drainage, warmth at the injection site  -Progressive weakness or numbness in your legs or arms  -Loss of bowel or bladder function  -Unusual headache that is not relieved by Tylenol or other pain reliever  -Unusual new onset of pain that is not improving

## 2023-09-14 NOTE — NURSING NOTE
Pre-procedure Intake  If YES to any questions or NO to having a   Please complete laminated checklist and leave on the computer keyboard for Provider, verbally inform provider if able.    For SCS Trial, RFA's or any sedation procedure:  Have you been fasting? NA  If yes, for how long?     Are you taking any any blood thinners such as Coumadin, Warfarin, Jantoven, Pradaxa Xarelto, Eliquis, Edoxaban, Enoxaparin, Lovenox, Heparin, Arixtra, Fondaparinux, or Fragmin? OR Antiplatelet medication such as Plavix, Brilinta, or Effient?   No   If yes, when did you take your last dose?     Do you take aspirin?  No  If cervical procedure, have you held aspirin for 6 days?       Do you have any allergies to contrast dye, iodine, steroid and/or numbing medications?  NO    Are you currently taking antibiotics or have an active infection?  NO    Have you had a fever/elevated temperature within the past week? NO    Are you currently taking oral steroids? Prednisone     Do you have a ? Yes    Are you pregnant or breastfeeding?  NO    Have you received the COVID-19 vaccine? Yes  If yes, was it your 1st, 2nd or only dose needed?   Date of most recent vaccine: 11/21/22    Notify provider and RNs if systolic BP >170, diastolic BP >100, P >100 or O2 sats < 90%

## 2023-09-19 ENCOUNTER — TELEPHONE (OUTPATIENT)
Dept: FAMILY MEDICINE | Facility: CLINIC | Age: 63
End: 2023-09-19

## 2023-09-19 ENCOUNTER — OFFICE VISIT (OUTPATIENT)
Dept: FAMILY MEDICINE | Facility: CLINIC | Age: 63
End: 2023-09-19
Payer: COMMERCIAL

## 2023-09-19 VITALS
HEIGHT: 66 IN | WEIGHT: 207 LBS | BODY MASS INDEX: 33.27 KG/M2 | TEMPERATURE: 97.6 F | SYSTOLIC BLOOD PRESSURE: 132 MMHG | RESPIRATION RATE: 19 BRPM | HEART RATE: 62 BPM | DIASTOLIC BLOOD PRESSURE: 80 MMHG | OXYGEN SATURATION: 99 %

## 2023-09-19 DIAGNOSIS — M25.50 MULTIPLE JOINT PAIN: ICD-10-CM

## 2023-09-19 DIAGNOSIS — Z12.11 SCREEN FOR COLON CANCER: ICD-10-CM

## 2023-09-19 DIAGNOSIS — F11.90 CHRONIC, CONTINUOUS USE OF OPIOIDS: ICD-10-CM

## 2023-09-19 DIAGNOSIS — R26.89 LOSS OF BALANCE: ICD-10-CM

## 2023-09-19 DIAGNOSIS — E78.5 HYPERLIPIDEMIA LDL GOAL <130: ICD-10-CM

## 2023-09-19 DIAGNOSIS — F33.1 MODERATE RECURRENT MAJOR DEPRESSION (H): ICD-10-CM

## 2023-09-19 DIAGNOSIS — J45.40 MODERATE PERSISTENT ASTHMA WITHOUT COMPLICATION: ICD-10-CM

## 2023-09-19 DIAGNOSIS — Z12.31 VISIT FOR SCREENING MAMMOGRAM: ICD-10-CM

## 2023-09-19 DIAGNOSIS — I10 HYPERTENSION GOAL BP (BLOOD PRESSURE) < 140/90: Primary | ICD-10-CM

## 2023-09-19 DIAGNOSIS — L60.8 NAIL DEFORMITY: ICD-10-CM

## 2023-09-19 LAB — CREAT UR-MCNC: 192 MG/DL

## 2023-09-19 PROCEDURE — 90682 RIV4 VACC RECOMBINANT DNA IM: CPT | Performed by: FAMILY MEDICINE

## 2023-09-19 PROCEDURE — G0480 DRUG TEST DEF 1-7 CLASSES: HCPCS | Performed by: FAMILY MEDICINE

## 2023-09-19 PROCEDURE — 96127 BRIEF EMOTIONAL/BEHAV ASSMT: CPT | Performed by: FAMILY MEDICINE

## 2023-09-19 PROCEDURE — 90471 IMMUNIZATION ADMIN: CPT | Performed by: FAMILY MEDICINE

## 2023-09-19 PROCEDURE — 99214 OFFICE O/P EST MOD 30 MIN: CPT | Mod: 25 | Performed by: FAMILY MEDICINE

## 2023-09-19 ASSESSMENT — ANXIETY QUESTIONNAIRES
7. FEELING AFRAID AS IF SOMETHING AWFUL MIGHT HAPPEN: SEVERAL DAYS
1. FEELING NERVOUS, ANXIOUS, OR ON EDGE: SEVERAL DAYS
GAD7 TOTAL SCORE: 5
2. NOT BEING ABLE TO STOP OR CONTROL WORRYING: SEVERAL DAYS
4. TROUBLE RELAXING: NOT AT ALL
6. BECOMING EASILY ANNOYED OR IRRITABLE: SEVERAL DAYS
GAD7 TOTAL SCORE: 5
5. BEING SO RESTLESS THAT IT IS HARD TO SIT STILL: NOT AT ALL
3. WORRYING TOO MUCH ABOUT DIFFERENT THINGS: SEVERAL DAYS

## 2023-09-19 ASSESSMENT — PAIN SCALES - GENERAL: PAINLEVEL: MODERATE PAIN (5)

## 2023-09-19 NOTE — TELEPHONE ENCOUNTER
Called pharmacy, spoke with pharmacist- medication processed through insurance with $0 co-pay.     Called and spoke with patient and advised medication has been resent to pharmacy and processed through insurance. Patient verbalized understanding and all questions answered.     MARVA ParekhN, RN  [facility] ~ Registered Nurse  Clinic Triage ~ [facility]  September 19, 2023

## 2023-09-19 NOTE — LETTER
Opioid / Opioid Plus Controlled Substance Agreement    This is an agreement between you and your provider about the safe and appropriate use of controlled substance/opioids prescribed by your care team. Controlled substances are medicines that can cause physical and mental dependence (abuse).    There are strict laws about having and using these medicines. We here at [facility] are committing to working with you in your efforts to get better. To support you in this work, we ll help you schedule regular office appointments for medicine refills. If we must cancel or change your appointment for any reason, we ll make sure you have enough medicine to last until your next appointment.     As a Provider, I will:  Listen carefully to your concerns and treat you with respect.   Recommend a treatment plan that I believe is in your best interest. This plan may involve therapies other than opioid pain medication.   Talk with you often about the possible benefits, and the risk of harm of any medicine that we prescribe for you.   Provide a plan on how to taper (discontinue or go off) using this medicine if the decision is made to stop its use.    As a Patient, I understand that opioid(s):   Are a controlled substance prescribed by my care team to help me function or work and manage my condition(s).   Are strong medicines and can cause serious side effects such as:  Drowsiness, which can seriously affect my driving ability  A lower breathing rate, enough to cause death  Harm to my thinking ability   Depression   Abuse of and addiction to this medicine  Need to be taken exactly as prescribed. Combining opioids with certain medicines or chemicals (such as illegal drugs, sedatives, sleeping pills, and benzodiazepines) can be dangerous or even fatal. If I stop opioids suddenly, I may have severe withdrawal symptoms.  Do not work for all types of pain nor for all patients. If they re not helpful, I may be asked to stop  them.        The risks, benefits and side effects of these medicine(s) were explained to me. I agree that:  I will take part in other treatments as advised by my care team. This may be psychiatry or counseling, physical therapy, behavioral therapy, group treatment or a referral to a specialist.     I will keep all my appointments. I understand that this is part of the monitoring of opioids. My care team may require an office visit for EVERY opioid/controlled substance refill. If I miss appointments or don t follow instructions, my care team may stop my medicine.    I will take my medicines as prescribed. I will not change the dose or schedule unless my care team tells me to. There will be no refills if I run out early.     I may be asked to come to the clinic and complete a urine drug test or complete a pill count at any time. If I don t give a urine sample or participate in a pill count, the care team may stop my medicine.    I will only receive prescriptions from this clinic for chronic pain. If I am treated by another provider for acute pain issues, I will tell them that I am taking opioid pain medication for chronic pain and that I have a treatment agreement with this provider. I will inform my [facility] care team within one business day if I am given a prescription for any pain medication by another healthcare provider. My [facility] care team can contact other providers and pharmacists about my use of any medicines.    It is up to me to make sure that I don t run out of my medicines on weekends or holidays. If my care team is willing to refill my opioid prescription without a visit, I must request refills only during office hours. Refills may take up to 3 business days to process. I will use one pharmacy to fill all my opioid and other controlled substance prescriptions. I will notify the clinic about any changes to my insurance or medication availability.    I am responsible for my  prescriptions. If the medicine/prescription is lost, stolen or destroyed, it will not be replaced. I also agree not to share controlled substance medicines with anyone.    I am aware I should not use any illegal or recreational drugs. I agree not to drink alcohol unless my care team says I can.       If I enroll in the [place] [facility] Cannabis program, I will tell my care team prior to my next refill.     I will tell my care team right away if I become pregnant, have a new medical problem treated outside of my regular clinic, or have a change in my medications.    I understand that this medicine can affect my thinking, judgment and reaction time. Alcohol and drugs affect the brain and body, which can affect the safety of my driving. Being under the influence of alcohol or drugs can affect my decision-making, behaviors, personal safety, and the safety of others. Driving while impaired (DWI) can occur if a person is driving, operating, or in physical control of a car, motorcycle, boat, snowmobile, ATV, motorbike, off-road vehicle, or any other motor vehicle ([place] Statute 169A.20). I understand the risk if I choose to drive or operate any vehicle or machinery.    I understand that if I do not follow any of the conditions above, my prescriptions or treatment may be stopped or changed.          Opioids  What You Need to Know    What are opioids?   Opioids are pain medicines that must be prescribed by a doctor. They are also known as narcotics.     Examples are:   morphine (MS Contin, Charlene)  oxycodone (Oxycontin)  oxycodone and acetaminophen (Percocet)  hydrocodone and acetaminophen (Vicodin, Norco)   fentanyl patch (Duragesic)   hydromorphone (Dilaudid)   methadone  codeine (Tylenol #3)     What do opioids do well?   Opioids are best for severe short-term pain such as after a surgery or injury. They may work well for cancer pain. They may help some people with long-lasting (chronic) pain.     What do opioids NOT do  well?   Opioids never get rid of pain entirely, and they don t work well for most patients with chronic pain. Opioids don t reduce swelling, one of the causes of pain.                                    Other ways to manage chronic pain and improve function include:     Treat the health problem that may be causing pain  Anti-inflammation medicines, which reduce swelling and tenderness, such as ibuprofen (Advil, Motrin) or naproxen (Aleve)  Acetaminophen (Tylenol)  Antidepressants and anti-seizure medicines, especially for nerve pain  Topical treatments such as patches or creams  Injections or nerve blocks  Chiropractic or osteopathic treatment  Acupuncture, massage, deep breathing, meditation, visual imagery, aromatherapy  Use heat or ice at the pain site  Physical therapy   Exercise  Stop smoking  Take part in therapy       Risks and side effects     Talk to your doctor before you start or decide to keep taking opioids. Possible side effects include:    Lowering your breathing rate enough to cause death  Overdose, including death, especially if taking higher than prescribed doses  Worse depression symptoms; less pleasure in things you usually enjoy  Feeling tired or sluggish  Slower thoughts or cloudy thinking  Being more sensitive to pain over time; pain is harder to control  Trouble sleeping or restless sleep  Changes in hormone levels (for example, less testosterone)  Changes in sex drive or ability to have sex  Constipation  Unsafe driving  Itching and sweating  Dizziness  Nausea, throwing up and dry mouth    What else should I know about opioids?    Opioids may lead to dependence, tolerance, or addiction.    Dependence means that if you stop or reduce the medicine too quickly, you will have withdrawal symptoms. These include loose poop (diarrhea), jitters, flu-like symptoms, nervousness and tremors. Dependence is not the same as addiction.                     Tolerance means needing higher doses over time to  get the same effect. This may increase the chance of serious side effects.    Addiction is when people improperly use a substance that harms their body, their mind or their relations with others. Use of opiates can cause a relapse of addiction if you have a history of drug or alcohol abuse.    People who have used opioids for a long time may have a lower quality of life, worse depression, higher levels of pain and more visits to doctors.    You can overdose on opioids. Take these steps to lower your risk of overdose:    Recognize the signs:  Signs of overdose include decrease or loss of consciousness (blackout), slowed breathing, trouble waking up and blue lips. If someone is worried about overdose, they should call 911.    Talk to your doctor about Narcan (naloxone).   If you are at risk for overdose, you may be given a prescription for Narcan. This medicine very quickly reverses the effects of opioids.   If you overdose, a friend or family member can give you Narcan while waiting for the ambulance. They need to know the signs of overdose and how to give Narcan.     Don't use alcohol or street drugs.   Taking them with opioids can cause death.    Do not take any of these medicines unless your doctor says it s OK. Taking these with opioids can cause death:  Benzodiazepines, such as lorazepam (Ativan), alprazolam (Xanax) or diazepam (Valium)  Muscle relaxers, such as cyclobenzaprine (Flexeril)  Sleeping pills like zolpidem (Ambien)   Other opioids      How to keep you and other people safe while taking opioids:    Never share your opioids with others.  Opioid medicines are regulated by the Drug Enforcement Agency ([employer]). Selling or sharing medications is a criminal act.    2. Be sure to store opioids in a secure place, locked up if possible. Young children can easily swallow them and overdose.    3. When you are traveling with your medicines, keep them in the original bottles. If you use a pill box, be sure you also  carry a copy of your medicine list from your clinic or pharmacy.    4. Safe disposal of opioids    Most pharmacies have places to get rid of medicine, called disposal kiosks. Medicine disposal options are also available in every [facility]. Search your county and  medication disposal  to find more options. You can find more details at:  [email or web address]://[email or web address]./living-green/managing-unwanted-medications     I agree that my provider, clinic care team, and pharmacy may work with any city, state or federal law enforcement agency that investigates the misuse, sale, or other diversion of my controlled medicine. I will allow my provider to discuss my care with, or share a copy of, this agreement with any other treating provider, pharmacy or emergency room where I receive care.    I have read this agreement and have asked questions about anything I did not understand.    _______________________________________________________  Patient Signature - Jacquie Amador _____________________                   Date     _______________________________________________________  Provider Signature - Liz Peterson MD   _____________________                   Date     _______________________________________________________  Witness Signature (required if provider not present while patient signing)   _____________________                   Date

## 2023-09-19 NOTE — PROGRESS NOTES
"  Assessment & Plan     Hypertension goal BP (blood pressure) < 140/90  Doing well. Well controlled. Tolerating medication.  No change in plan.      Multiple joint pain  Chronic, continuous use of opioids  Patient reports good control overall.   She is taking as recommended.   Does not take the Stadol much at all due to the fatigue.   CSA signed with patient today.   UDS done today. Will notify of results.   - [id number] - Urine Drug Confirmation Panel (Comprehensive); Future  - [id number] - Urine Drug Confirmation Panel (Comprehensive)    Moderate recurrent major depression (H)  Doing well. Well controlled. Tolerating medication.  No change in plan.      Nail deformity  Doesn't necessarily appear to be fungal - no thickening. Developed quickly.   She sees derm and has not shown the dermatologist.   Ok to try the Diflucan that she has been prescribed.   Reach out for worsening.   She has a message out to derm with pics to ask for input.     Loss of balance  Discussed PT. Patient declines.   Discussed exercises to help with strengthening.   If worsening, she will reach back out.     Moderate persistent asthma without complication  Doing well. Well controlled. Tolerating medication.  No change in plan.      HYPERLIPIDEMIA LDL GOAL <130  Doing well. Well controlled. Tolerating medication.  No change in plan.      Screen for colon cancer  FIT done May of this year. Sent to scanning.     Visit for screening mammogram  Due. Order placed.   - MA SCREENING DIGITAL BILAT - Future  (s+30); Future             BMI:   Estimated body mass index is 33.92 kg/m  as calculated from the following:    Height as of this encounter: 1.664 m (5' 5.5\").    Weight as of this encounter: 93.9 kg (207 lb).   Weight management plan: Discussed healthy diet and exercise guidelines        Liz Peterson MD  [facility] ANISH Dhillon is a 63 year old, presenting for the following health issues:  Nail Problem, " Hypertension, Asthma, Depression, and Pain      9/19/2023    11:27 AM   Additional Questions   Roomed by VE       History of Present Illness       Reason for visit:  White brown tiny yellow on nails (under)    She eats 0-1 servings of fruits and vegetables daily.She consumes 1 sweetened beverage(s) daily.She exercises with enough effort to increase her heart rate 9 or less minutes per day.  She exercises with enough effort to increase her heart rate 3 or less days per week.   She is taking medications regularly.     NAIL DEFORMITY  Patient has some changes to a couple finger nails.  Started over a couple days. Prior to that were normal. She was seen at  and given a prescription for Diflucan to take but hasn't yet.       Hyperlipidemia Follow-Up    Are you regularly taking any medication or supplement to lower your cholesterol?   Yes- atorvastatin  Are you having muscle aches or other side effects that you think could be caused by your cholesterol lowering medication?  unsure    Hypertension Follow-up    Do you check your blood pressure regularly outside of the clinic? Yes   Are you following a low salt diet? Yes  Are your blood pressures ever more than 140 on the top number (systolic) OR more   than 90 on the bottom number (diastolic), for example 140/90? Yes    Depression and Anxiety Follow-Up  How are you doing with your depression since your last visit? Improved   How are you doing with your anxiety since your last visit?  No change  Are you having other symptoms that might be associated with depression or anxiety? No  Have you had a significant life event? No   Do you have any concerns with your use of alcohol or other drugs? No    Social History     Tobacco Use    Smoking status: Former     Packs/day: 1.00     Types: Cigarettes     Passive exposure: Past    Smokeless tobacco: Never    Tobacco comments:     since 1981   Vaping Use    Vaping Use: Never used   Substance Use Topics    Alcohol use: No    Drug use:  Yes     Comment: medical marjuana          2/28/2023    11:26 AM 5/9/2023    10:54 AM 8/29/2023    10:43 AM   PHQ   PHQ-9 Total Score 12 6 7    7   Q9: Thoughts of better off dead/self-harm past 2 weeks Not at all Not at all Not at all    Not at all         5/9/2023    10:55 AM 8/29/2023    10:44 AM 9/19/2023     8:44 AM   MARIZOL-7 SCORE   Total Score 4 (minimal anxiety) 9 (mild anxiety) 5 (mild anxiety)   Total Score 4 9    9 5         Suicide Assessment Five-step Evaluation and Treatment (SAFE-T)    Asthma Follow-Up    Was ACT completed today?  Yes        8/29/2023    10:50 AM   ACT Total Scores   ACT TOTAL SCORE (Goal Greater than or Equal to 20) 18    18   In the past 12 months, how many times did you visit the emergency room for your asthma without being admitted to the hospital? 0    0   In the past 12 months, how many times were you hospitalized overnight because of your asthma? 0    0        How many days per week do you miss taking your asthma controller medication?  0  Please describe any recent triggers for your asthma: upper respiratory infections, animal dander, humidity, and cold air  Have you had any Emergency Room Visits, Urgent Care Visits, or Hospital Admissions since your last office visit?  No      Pain History:  When did you first notice your pain? Ongoing pain   Have you seen this provider for your pain in the past? Yes   Where in your body do you have pain? Back and Hips, hands  Are you seeing anyone else for your pain? No        2/28/2023    11:26 AM 5/9/2023    10:54 AM 8/29/2023    10:43 AM   PHQ-9 SCORE   PHQ-9 Total Score MyChart  6 (Mild depression) 7 (Mild depression)   PHQ-9 Total Score 12 6 7    7           5/9/2023    10:55 AM 8/29/2023    10:44 AM 9/19/2023     8:44 AM   MARIZOL-7 SCORE   Total Score 4 (minimal anxiety) 9 (mild anxiety) 5 (mild anxiety)   Total Score 4 9    9 5               Chronic Pain Follow Up:    Location of pain: shins, hands, back - recent injections - seems some  "improved.   Analgesia/pain control:    - Recent changes:  'continued pain'    - Overall control: Tolerable with discomfort    - Current treatments: recent injection for back. Tramadol helps take the edge off. Hasn't been using the Stadol due to significant fatigue though helps.      Adherence:     - Do you ever take more pain medicine than prescribed? No    - When did you take your last dose of pain medicine?  Last taken 2 days ago.    Adverse effects: Yes: with Stadol - causes fatigue. No current issues with Tramadol.    PDMP Review         Value Time User    State PDMP site checked  Yes 9/19/2023 11:57 AM Liz Peterson MD          Last CSA Agreement:   CSA -- Patient Level:     [Media Unavailable] Controlled Substance Agreement - Opioid - Scan on 9/14/2022 12:32 PM: OPIOD   [Media Unavailable] Controlled Substance Agreement - Opioid - Scan on 9/14/2022 12:30 PM: OPIOD       Last UDS: 9/27/2022                    Review of Systems   CONSTITUTIONAL: NEGATIVE for fever, chills, change in weight  ENT/MOUTH: NEGATIVE for ear, mouth and throat problems  RESP: NEGATIVE for significant cough or SOB  CV: NEGATIVE for chest pain, palpitations or peripheral edema      Objective    /80 (BP Location: Left arm, Patient Position: Chair, Cuff Size: Adult Regular)   Pulse 62   Temp 97.6  F (36.4  C) (Temporal)   Resp 19   Ht 1.664 m (5' 5.5\")   Wt 93.9 kg (207 lb)   LMP 07/17/2009 (Exact Date)   SpO2 99%   Breastfeeding No   BMI 33.92 kg/m    Body mass index is 33.92 kg/m .  Physical Exam   GENERAL: healthy, alert and no distress  NECK: no adenopathy, no asymmetry, masses, or scars and thyroid normal to palpation  RESP: lungs clear to auscultation - no rales, rhonchi or wheezes  CV: regular rate and rhythm, normal S1 S2, no S3 or S4, no murmur, click or rub, no peripheral edema and peripheral pulses strong  ABDOMEN: soft, nontender, no hepatosplenomegaly, no masses and bowel sounds normal  MS: no gross " musculoskeletal defects noted, no edema  SKIN: patient has 2 finger nails that show some loosening distally. No thickening.

## 2023-09-19 NOTE — TELEPHONE ENCOUNTER
Received call from patient. Patient reports that she went to  her Spiriva and the pharmacy told her that her provider has cancelled the prescription and they are unable to fill it.   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 When she went to pick it up today she was advised of the above.     Are you able to resend prescription for patient again if appropriate? Patient reports she selected not taking on check in today as she had lost her prescription and had not yet picked up a new one.     Patient has questions about if insurance will still cover prescription as she lost her last refill and they had approved another fill. RN called pharmacy and they advised that prescription was going through insurance before it was deactivated so if should process through once prescription is renewed.     Patient requests a callback for update if able to fill/unable to fill.    MARVA ParekhN, RN  [facility] ~ Registered Nurse  Clinic Triage ~ [place] [facility] [place]  September 19, 2023

## 2023-09-21 ENCOUNTER — MYC MEDICAL ADVICE (OUTPATIENT)
Dept: FAMILY MEDICINE | Facility: CLINIC | Age: 63
End: 2023-09-21
Payer: COMMERCIAL

## 2023-09-21 LAB
N-NORTRAMADOL/CREAT UR CFM: 4021 NG/MG {CREAT}
O-NORTRAMADOL UR CFM-MCNC: 7720 NG/ML
TRAMADOL CTO UR CFM-MCNC: 6780 NG/ML
TRAMADOL/CREAT UR: 3531 NG/MG {CREAT}

## 2023-09-21 NOTE — TELEPHONE ENCOUNTER
Spoke to Jacquie on the phone and let her know the results of her urine screen were not unexpected and to not worry about the other drugs that were listed on the drug screen.  Let her know that if she tested positive for anything other than what she is on, the results would have showed up in the place where the tramadol results were posted.    Patient was thankful for the call and put her mind to rest.    Closing encounter.    Shazia Fuentes RN  [facility] ~ Registered Nurse  Clinic Triage ~ [place] [facility] [place]  September 21, 2023

## 2023-09-29 DIAGNOSIS — F33.1 MODERATE RECURRENT MAJOR DEPRESSION (H): ICD-10-CM

## 2023-09-29 RX ORDER — BUPROPION HYDROCHLORIDE 200 MG/1
200 TABLET, EXTENDED RELEASE ORAL 2 TIMES DAILY
Qty: 180 TABLET | Refills: 0 | Status: SHIPPED | OUTPATIENT
Start: 2023-09-29 | End: 2023-11-09

## 2023-10-05 ENCOUNTER — E-VISIT (OUTPATIENT)
Dept: FAMILY MEDICINE | Facility: CLINIC | Age: 63
End: 2023-10-05
Payer: COMMERCIAL

## 2023-10-05 DIAGNOSIS — L60.8 CHANGE IN NAIL APPEARANCE: Primary | ICD-10-CM

## 2023-10-05 PROCEDURE — 99421 OL DIG E/M SVC 5-10 MIN: CPT | Performed by: FAMILY MEDICINE

## 2023-10-05 RX ORDER — FLUCONAZOLE 150 MG/1
150 TABLET ORAL
Qty: 6 TABLET | Refills: 0 | Status: SHIPPED | OUTPATIENT
Start: 2023-10-05 | End: 2024-06-27

## 2023-10-05 NOTE — PATIENT INSTRUCTIONS
Thank you for choosing us for your care. I have placed an order for a prescription so that you can start treatment. View your full visit summary for details by clicking on the link below. Your pharmacist will able to address any questions you may have about the medication.

## 2023-10-09 DIAGNOSIS — L70.0 ACNE VULGARIS: Primary | ICD-10-CM

## 2023-10-09 RX ORDER — DOXYCYCLINE 100 MG/1
CAPSULE ORAL
Qty: 28 CAPSULE | Refills: 3 | Status: SHIPPED | OUTPATIENT
Start: 2023-10-09 | End: 2023-12-21

## 2023-10-18 ENCOUNTER — VIRTUAL VISIT (OUTPATIENT)
Dept: HEMATOLOGY | Facility: CLINIC | Age: 63
End: 2023-10-18
Attending: INTERNAL MEDICINE
Payer: COMMERCIAL

## 2023-10-18 ENCOUNTER — VIRTUAL VISIT (OUTPATIENT)
Dept: PHARMACY | Facility: CLINIC | Age: 63
End: 2023-10-18
Payer: COMMERCIAL

## 2023-10-18 DIAGNOSIS — D69.1: ICD-10-CM

## 2023-10-18 DIAGNOSIS — F33.1 MODERATE EPISODE OF RECURRENT MAJOR DEPRESSIVE DISORDER (H): ICD-10-CM

## 2023-10-18 DIAGNOSIS — F41.1 GENERALIZED ANXIETY DISORDER: Primary | ICD-10-CM

## 2023-10-18 PROCEDURE — 99606 MTMS BY PHARM EST 15 MIN: CPT | Performed by: PHARMACIST

## 2023-10-18 PROCEDURE — 99607 MTMS BY PHARM ADDL 15 MIN: CPT | Performed by: PHARMACIST

## 2023-10-18 PROCEDURE — 99442 PR PHYSICIAN TELEPHONE EVALUATION 11-20 MIN: CPT | Mod: 95 | Performed by: INTERNAL MEDICINE

## 2023-10-18 RX ORDER — TRANEXAMIC ACID 650 MG/1
650 TABLET ORAL 2 TIMES DAILY
Qty: 60 TABLET | Refills: 3 | Status: SHIPPED | OUTPATIENT
Start: 2023-10-18 | End: 2023-11-15

## 2023-10-18 NOTE — PROGRESS NOTES
Medication Therapy Management (MTM) Encounter    ASSESSMENT:                            Medication Adherence/Access: No issues identified    Depression/MARIZOL:   Patient would benefit from starting gabapentin and switching from buspirone due to potential side effects of hallucination with buspirone increase. Can decrease buspirone first to determine if this has been causing the issue. Gabapentin can also help with the anxiety. Since she is experiencing day time fatigue she should also consider decreasing trazodone to 50 mg at night when starting gabapentin.     PLAN:                            Consider decreasing caffeine intake  Start drinking more water.   Decrease buspirone back down to 15 mg twice a day   Start gabapentin 300 mg at night  Consider decreasing trazodone to 50 mg nightly.     Follow-up: Return in about 3 weeks (around 11/8/2023) for Follow up, with me.      SUBJECTIVE/OBJECTIVE:                          Jacquie Amador is a 63 year old female called for a follow-up visit from 8/22.       Reason for visit: depression follow-up.    Allergies/ADRs: Reviewed in chart  Past Medical History: Reviewed in chart  Tobacco: She reports that she quit smoking about 10 years ago. Her smoking use included cigarettes. She started smoking about 43 years ago. She has a 9.90 pack-year smoking history. She has been exposed to tobacco smoke. She has never used smokeless tobacco.  Alcohol: not currently using    Medication Adherence/Access: no issues reported      MDD/MARIZOL:   Duloxetine 120mg once daily   Wellbutrin SR 200mg twice daily   Buspar 30 mg twice daily - was increased to this dose in July. Did notice a potential benefit in her anxiety.   Trazodone 100-150mg nightly as needed (prescribed 150mg nightly) - usually takes 100 mg but sometimes take 150 mg.     She reports that she has been having some hallucinations at night but not every night. Reports that she was awake. Wondering if going up on the Wellbutrin was the  "issue. Increased in March it looks like per chart from 300 mg daily  Reports that the hallucinations started about 2 months ago. Reports this happens every couple of weeks. These always happen at night and last about 10 minutes. Reports that she has hallucinations that her \" is having an affair.\" In her mind she starts yelling at this woman who is in the house. Does not audibly yell and there is no one in her house. Reports that sometimes it comes on right as she is going to sleep.   Reports that they always happen after she takes the trazodone. Reports that she can't sleep at night at times and then she tends to doze off. Has taken 3 tablets in the last week unsure if this was the same day.     Reports she feels dehydrated and has been drinking more coffee than water. Drinking 2 coffees per day and an espresso. Reports she is drinking some soda as well. Drinking about 16 oz of water twice a day.     Reports that lately anxiety has not been doing well. May possibly be setting up with therapy, but \"not right now\".       Today's Vitals: LMP 07/17/2009 (Exact Date)   ----------------      I spent 30 minutes with this patient today. All changes were made via collaborative practice agreement with Liz Peterson MD. A copy of the visit note was provided to the patient's provider(s).    A summary of these recommendations was sent via [employer].    Ishmael Gracia, Pharm. D., [facility]  Medication Therapy Management Pharmacist    Telemedicine Visit Details  Type of service:  Telephone visit  Start Time:  11:30 am  End Time:  12 pm       Medication Therapy Recommendations  Generalized anxiety disorder    Current Medication: busPIRone (BUSPAR) 15 MG tablet   Rationale: Condition refractory to medication - Ineffective medication - Effectiveness   Recommendation: Start Medication - gabapentin 300 MG capsule   Status: Accepted per Provider          Current Medication: busPIRone (BUSPAR) 30 MG tablet (Discontinued)   Rationale: " Undesirable effect - Adverse medication event - Safety   Recommendation: Decrease Dose   Status: Accepted per CPA

## 2023-10-18 NOTE — PATIENT INSTRUCTIONS
Naturium Tranexamic Topical Acid 5% - 1 fl oz  -- can be purchased at [employer]    Other OTC formulations  Boots, etc--- look for 5% tranexamic acid    Tentative plan to discussed platelet results (that are back) on 11/15/23    Increase your tranexamic acid tablets to 650 mg twice a day.     Emely Grimes MD/PhD   of Medicine  Division of Hematology, Oncology and Transplantation    [facility]  [address] [place] [address]  Main: [phone], Fax: [phone]

## 2023-10-18 NOTE — PROGRESS NOTES
[facility]  [address] [place] [id number]  Phone: [phone], Fax: [phone]    Outpatient Visit Note:    Patient: Jacquie Amador  MRN: [id number]  : 1960  ISIDRO: 10/18/23    Reason for Initial Consultation:  Bruising    Assessment:  In summary, Jacquie Amador is a 63 year old woman presenting to clinic due to bruising/bleeding with abnormal platelet studies. 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 Overall clinical picture is highly concerning for underlying autoimmune disorder.    1.  Behcet's disease:   2. 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  Easy bruising and prolonged healing secondary to #1 and #2  4.  Personal history of antiphospholipid antibody syndrome in pregnancy, no records  5.  Osteoarthritis requiring anti-inflammatory medications  6.  Depression requiring use of SSRI medication  7.  Acute on chronic migraines requiring use of triptans and aspirin    Ms. Amador has been evaluated by Dermatology and Rheumatology. Skin biopsy with neutrophilic infiltrates that may be secondary to Behcets. Overall, clinical criteria is met and very consistent.  Working with Dr. Warren for skin lesions.    For her bleeding/platelets, she has presence of anti-HLA and anti-GP1a antibodies.  She has no personal history of receiving red cell or platelet transfusions. Jenny platelet aggregation study that had decreased arachidonic acid as well and deaggregation with ADP and arachidonic acid and decreased ATP release with thrombin and ADP. This is consistent with diagnosis of acquired qualitative platelet disorder and consistent with her anti-GP1a antibodies. We have given her steriods, which helped with arthralgias and fatigue.     Previously discussed that avoiding other medications that can worsen this, including aspirin, NSAID and  potentially SSRIs. Right now IF Jacquie needs MAJOR SURGERY---  She should receive platelet transfusion (1-2 units). She is ok to receive injections.      Plan:  1. Majority of today's visit was spent counseling the patient regarding diagnosis, natural history, management and treatment options   2. Continue prednisone 10 mg daily  3. Increase Tranexamic acid 650 mg PO BID to assess if bleeding at night into wounds stops  4. Start OTC topical 5% tranexamic acid   5. Plan to REPEAT coagulation testing in Nov    The patient is given our center's contact information and is instructed to call if she should have any further questions or concerns.  Follow up 11/14/23 for coagulation testing and 11/15/23 (tentative) phone with Dr. Grimes    Patient understands and agrees with the above plan and recommendation.    Emely Grimes MD/PhD   of Medicine  Division of Hematology, Oncology and Transplantation      Telephone time: 20 minutes      ----------------------------------------------------------------------------------------------------------------------  Interval History:  Jacquie Amador is a 63 year old woman with PMHx of fibromyalgia, anxiety/depression, hypertension and hyperlipidemia. She presented to clinic on 9/15/21 for her first in person evaluation due to bleeding and prolonged wound healing. Please refer to my consult note.     Spots on her face keep getting blood in them and they hurt--- have to get it out.   Is on doxycycline- will see if that will help.     Two site-  avoiding going out due to spots.     Tranexamic acid- currently taking one time at night---  hot weather is not causing issues with her skin. Still has two sores on her face that do not want to heal- today may have some improvement. Has been using everything.     Knuckles will get swollen and double the size, but this waxes and wanes. No sores on hands.     Mouths sores--- has one sore on upper palate that is healing a bit  with white plaque. Not an open eschar. Skin that hurts.     Spouse does not likely have cancer.   Grandchildren and doing well.     Past Medical History:  Past Medical History:   Diagnosis Date    ASTHMA - MODERATE PERSISTENT 2005    Chronic pain     Coronary artery disease     CVA (cerebral infarction)     Depressive disorder, not elsewhere classified     Diabetes (H)     Elevated serum alkaline phosphatase level     Liver source    Fibromyalgia     HYPERLIPIDEMIA NEC/NOS 2006    Hypertriglyceridemia     OA (osteoarthritis)     Thyroid disease     Trochanteric bursitis     Unspecified essential hypertension      Immunization  Immunization History   Administered Date(s) Administered    COVID-19 Bivalent 18+ (Moderna) 2022    COVID-19 Monovalent 18+ (Moderna) 2021, 2021, 11/15/2021    COVID-19 Monovalent Booster 18+ (Moderna) 05/10/2022    FLU 6-35 months 2009    Influenza (IIV3) PF 2000, 2003, 2004, 2005, 2006, 2007, 2008, 2010, 10/26/2011, 10/26/2012    Influenza Vaccine 18-64 (Flublok) 2020, 2022, 2023    Influenza Vaccine >6 months (Alfuria,Fluzone) 2014, 2015, 2019, 11/15/2021    Pneumococcal 23 valent 2000, 2021, 2022    TD,PF 7+ (Tenivac) 2000    TDAP (Adacel,Boostrix) 2021    TDAP Vaccine (Adacel) 2011    Zoster recombinant adjuvanted (SHINGRIX) 2019       Past Surgical History:  Past Surgical History:   Procedure Laterality Date    3 teeth pulled      INJECT EPIDURAL TRANSFORAMINAL  2014    Lumbosacral-[facility]    INJECT JOINT SACROILIAC  2014    [facility]    LAMINECTOMY LUMBAR ONE LEVEL Left 2014    [facility]  DELIVERY ONLY      , Low Cervical       Medications:  Current Outpatient Medications   Medication Sig Dispense Refill    acetaminophen (TYLENOL) 500 MG tablet  Take 500-1,000 mg by mouth every 6 hours as needed for mild pain      acetylcysteine (N-ACETYL-L-CYSTEINE) 600 MG CAPS capsule Take 1 capsule (600 mg) by mouth 2 times daily 60 capsule 2    albuterol (PROVENTIL) (2.5 MG/3ML) 0.083% neb solution Take 1 vial (2.5 mg) by nebulization every 6 hours as needed for shortness of breath or wheezing 3 mL 4    albuterol (VENTOLIN HFA) 108 (90 Base) MCG/ACT inhaler INHALE 2 PUFFS INTO THE LUNGS EVERY 6 HOURS AS NEEDED FOR SHORTNESS OF BREATH / DYSPNEA 54 g 1    atorvastatin (LIPITOR) 20 MG tablet TAKE ONE TABLET BY MOUTH ONE TIME DAILY 90 tablet 0    augmented betamethasone dipropionate (DIPROLENE AF) 0.05 % external cream Apply to affected area twice daily 50 g 2    benzonatate (TESSALON) 200 MG capsule Take 1 capsule (200 mg) by mouth 2 times daily as needed for cough 40 capsule 1    betamethasone dipropionate (DIPROSONE) 0.05 % external ointment Apply topically 2 times daily To areas of skin sores.  Can also use on mouth sores as needed twice daily. 50 g 3    buPROPion (WELLBUTRIN SR) 200 MG 12 hr tablet TAKE ONE TABLET BY MOUTH TWICE DAILY 180 tablet 0    busPIRone (BUSPAR) 30 MG tablet Take 1 tablet (30 mg) by mouth 2 times daily 180 tablet 1    butorphanol (STADOL) 10 MG/ML nasal spray Spray 1 spray in nostril every 4 hours as needed for pain 2.5 mL 3    celecoxib (CELEBREX) 200 MG capsule TAKE ONE CAPSULE BY MOUTH TWICE A DAY AS NEEDED FOR PAIN Strength: 200 mg 180 capsule 1    chlorhexidine (PERIDEX) 0.12 % solution Swish and spit 15 mLs in mouth 2 times daily 1893 mL 4    clindamycin (CLINDAMAX) 1 % external gel Apply topically 2 times daily 30 g 4    clotrimazole (MYCELEX) 10 MG lozenge Place 1 lozenge (10 mg) inside cheek 5 times daily 70 lozenge 1    COMPOUND CONTAINING CONTROLLED SUBSTANCE (CMPD RX) - PHARMACY TO MIX COMPOUNDED MEDICATION Apply twice daily to sores and areas of pain.  Please compound amitryptyline 2% and ketamine 0.5% in 120g lipoderm or Vanicream  120 g 0    desonide (DESOWEN) 0.05 % external cream Apply topically 2 times daily To sores on face 60 g 3    dextran 70-hypromellose (TEARS NATURALE FREE PF) 0.1-0.3 % ophthalmic solution Place 2 drops into both eyes daily as needed (for dry eyes) 35 each 3    doxycycline monohydrate (MONODOX) 100 MG capsule One pill twice daily, after meals and with big glass of water 28 capsule 3    DULoxetine (CYMBALTA) 60 MG capsule Take 2 capsules (120 mg) by mouth daily 180 capsule 1    dupilumab (DUPIXENT) 300 MG/2ML prefilled pen Inject 2 mLs (300 mg) Subcutaneous every 14 days After first loading dose 4 mL 2    famotidine (PEPCID) 40 MG tablet Take 1 tablet (40 mg) by mouth daily 90 tablet 0    fluconazole (DIFLUCAN) 150 MG tablet Take 1 tablet (150 mg) by mouth every 3 days 6 tablet 0    fluocinonide (LIDEX) 0.05 % external gel Apply topically 2 times daily as needed 15 g 1    hydrochlorothiazide (HYDRODIURIL) 25 MG tablet Take 1 tablet (25 mg) by mouth daily 90 tablet 0    hydrocortisone 2.5 % cream APPLY TOPICALLY 2 TIMES DAILY AS NEEDED 30 g 3    lidocaine, viscous, (XYLOCAINE) 2 % solution Swish and spit 10ml every 3 hours as needed for oral pain; max 8 doses/24hrs.  Do not eat or chew gum for 60 minutes following use.      losartan (COZAAR) 100 MG tablet Take 1 tablet (100 mg) by mouth daily 90 tablet 1    metoprolol succinate ER (TOPROL XL) 100 MG 24 hr tablet Take 1 tablet (100 mg) by mouth 2 times daily 180 tablet 1    metoprolol succinate ER (TOPROL XL) 25 MG 24 hr tablet Take 25 mg by mouth 3 times daily      mometasone-formoterol (DULERA) 200-5 MCG/ACT inhaler Inhale 2 puffs into the lungs 2 times daily 39 g 1    montelukast (SINGULAIR) 10 MG tablet Take 1 tablet (10 mg) by mouth At Bedtime 90 tablet 3    mupirocin (BACTROBAN) 2 % external cream Apply to affected area three times daily 60 g 1    nystatin (MYCOSTATIN) 381851 UNIT/ML suspension Take by mouth 4 times daily Has recurrent thrush. Uses for 2 weeks at  a time as needed. 380 mL 1    oxybutynin ER (DITROPAN XL) 5 MG 24 hr tablet Take 2 tablets (10 mg) by mouth daily 180 tablet 2    predniSONE (DELTASONE) 10 MG tablet Take 1 tablet (10 mg) by mouth daily 90 tablet 3    rizatriptan (MAXALT) 10 MG tablet Take 1 tablet (10 mg) by mouth at onset of headache for migraine May repeat in 2 hours. Max 3 tablets/24 hours. 18 tablet 1    rOPINIRole (REQUIP) 1 MG tablet TAKE THREE TABLETS BY MOUTH AT BEDTIME 270 tablet 3    tacrolimus (PROTOPIC) 0.1 % external ointment Apply topically 2 times daily To areas of sores on face 60 g 1    tiotropium (SPIRIVA RESPIMAT) 2.5 MCG/ACT inhaler Inhale 2 puffs into the lungs daily 12 g 3    traMADol (ULTRAM) 50 MG tablet Take 1 tablet (50 mg) by mouth every 12 hours as needed for severe pain 45 tablet 0    tranexamic acid (LYSTEDA) 650 MG tablet Take 1 tablet (650 mg) by mouth daily 30 tablet 3    traZODone (DESYREL) 50 MG tablet Take 3 tablets (150 mg) by mouth At Bedtime 270 tablet 0    triamcinolone (ARISTOCORT HP) 0.5 % external cream Apply topically 2 times daily 60 g 0    valACYclovir (VALTREX) 500 MG tablet TAKE ONE TABLET BY MOUTH ONE TIME DAILY (Patient taking differently: 500 mg daily as needed) 90 tablet 1    ZYRTEC 10 MG OR TABS Take 10 mg by mouth daily as needed 90 3        Allergies:  Allergies   Allergen Reactions    Cats      and rabbits/wheezing    Dogs      wheezing    Lyrica [Pregabalin] Other (See Comments)     Rash, mouth sores, itching and burning    Seasonal Allergies      rhinits       ROS:  Remainder of a comprehensive 10 point ROS is negative unless noted above.    Social History:  Denies any tobacco use. No significant alcohol use. Denies any illicit drug use.     Family History:  Two daughter  29 Yo daughter  28 yo daughter  3 grandsons    1 sister- older- high blood glucose, arthritis  1 brother- older- no idea    Mother- - heart valve. Had MDS. Needed a shot every month  Father- -  cancer    Objectives:  Reviewed photos in the chart    Labs:    Ferritin   Date Value Ref Range Status   08/17/2023 100 11 - 328 ng/mL Final   04/16/2021 376 (H) 8 - 252 ng/mL Final     Iron   Date Value Ref Range Status   09/22/2022 88 37 - 145 ug/dL Final   01/20/2021 50 35 - 180 ug/dL Final     Iron Binding Cap   Date Value Ref Range Status   01/20/2021 350 240 - 430 ug/dL Final     Iron Binding Capacity   Date Value Ref Range Status   09/22/2022 260 240 - 430 ug/dL Final   09/15/2021 284 240 - 430 ug/dL Final       Refer to [facility]- autoantibody platelet study    Imaging:  Not applicable

## 2023-10-19 ENCOUNTER — MYC MEDICAL ADVICE (OUTPATIENT)
Dept: FAMILY MEDICINE | Facility: CLINIC | Age: 63
End: 2023-10-19
Payer: COMMERCIAL

## 2023-10-19 DIAGNOSIS — R53.83 OTHER FATIGUE: Primary | ICD-10-CM

## 2023-10-19 RX ORDER — GABAPENTIN 300 MG/1
300 CAPSULE ORAL AT BEDTIME
Qty: 30 CAPSULE | Refills: 1 | Status: SHIPPED | OUTPATIENT
Start: 2023-10-19 | End: 2023-12-06

## 2023-10-19 RX ORDER — BUSPIRONE HYDROCHLORIDE 15 MG/1
15 TABLET ORAL 2 TIMES DAILY
Qty: 60 TABLET | Refills: 1 | Status: SHIPPED | OUTPATIENT
Start: 2023-10-19 | End: 2023-11-28 | Stop reason: SINTOL

## 2023-10-19 NOTE — TELEPHONE ENCOUNTER
SITUATION:   Fatigued    BACKGROUND:   The patient has been feeling extremely fatigued.  She takes one to two naps per day. Naps are usually about an hour.   She sleeps at night. Goes to sleep by 11 PM-3 AM. Then goes back to sleep until 8:30 AM.        PATIENT REQUEST:   Lab Work    PLAN:       Routed to provider    Dario Matos, MSN, RN, PHN  [place]  October 19, 2023

## 2023-10-20 DIAGNOSIS — G89.4 CHRONIC PAIN SYNDROME: ICD-10-CM

## 2023-10-20 DIAGNOSIS — F11.90 CHRONIC, CONTINUOUS USE OF OPIOIDS: ICD-10-CM

## 2023-10-20 DIAGNOSIS — R00.0 SINUS TACHYCARDIA: Primary | ICD-10-CM

## 2023-10-20 RX ORDER — METOPROLOL SUCCINATE 25 MG/1
25 TABLET, EXTENDED RELEASE ORAL 3 TIMES DAILY
Qty: 270 TABLET | Refills: 1 | Status: SHIPPED | OUTPATIENT
Start: 2023-10-20 | End: 2024-06-19

## 2023-10-20 RX ORDER — TRAMADOL HYDROCHLORIDE 50 MG/1
50 TABLET ORAL EVERY 12 HOURS PRN
Qty: 45 TABLET | Refills: 0 | Status: SHIPPED | OUTPATIENT
Start: 2023-10-20 | End: 2023-12-06

## 2023-10-20 NOTE — PATIENT INSTRUCTIONS
"Recommendations from today's MTM visit:                                                    MTM (medication therapy management) is a service provided by a clinical pharmacist designed to help you get the most of out of your medicines.   Today we reviewed what your medicines are for, how to know if they are working, that your medicines are safe and how to make your medicine regimen as easy as possible.    Consider decreasing caffeine intake  Start drinking more water.   Decrease buspirone back down to 15 mg twice a day   Start gabapentin 300 mg at night  Consider decreasing trazodone to 50 mg nightly.     Follow-up: Return in about 3 weeks (around 11/8/2023) for Follow up, with me.    It was great speaking with you today.  I value your experience and would be very thankful for your time in providing feedback in our clinic survey. In the next few days, you may receive an email or text message from [employer] with a link to a survey related to your  clinical pharmacist.\"     To schedule another MTM appointment, please call the clinic directly or you may call the MTM scheduling line at [phone] or toll-free at [phone].     My Clinical Pharmacist's contact information:                                                      Please feel free to contact me with any questions or concerns you have.      Ishmael Gracia, Pharm. D., [facility]  Medication Therapy Management Pharmacist    "

## 2023-10-25 ENCOUNTER — TELEPHONE (OUTPATIENT)
Dept: PALLIATIVE MEDICINE | Facility: CLINIC | Age: 63
End: 2023-10-25
Payer: COMMERCIAL

## 2023-10-25 NOTE — TELEPHONE ENCOUNTER
Patient is injection only patient.      Contacted patient and educated that she should request orders for MBB to RFA from PCP.     Wendy Underwood RN  [facility] Pain Management [facility]  [phone]

## 2023-10-25 NOTE — TELEPHONE ENCOUNTER
[facility] Call [facility]    Phone Message    May a detailed message be left on voicemail: yes     Reason for Call: Other: Patient called wanting to set up a back ablation.  The other shots have not worked. Patient requesting a call back.      Action Taken: Message routed to:  [facility] Mukul Hutchison    Travel Screening: Not Applicable

## 2023-11-01 ENCOUNTER — MYC MEDICAL ADVICE (OUTPATIENT)
Dept: FAMILY MEDICINE | Facility: CLINIC | Age: 63
End: 2023-11-01
Payer: COMMERCIAL

## 2023-11-01 DIAGNOSIS — K13.79 RECURRENT ORAL ULCERS: Primary | ICD-10-CM

## 2023-11-02 NOTE — TELEPHONE ENCOUNTER
Request for oral surgeon referral.     Dario Matos, MSN, RN, PHN  [place]  November 2, 2023

## 2023-11-03 DIAGNOSIS — F41.1 GENERALIZED ANXIETY DISORDER: ICD-10-CM

## 2023-11-03 DIAGNOSIS — K12.0 APHTHOUS ULCER: Primary | ICD-10-CM

## 2023-11-03 DIAGNOSIS — F33.1 MODERATE RECURRENT MAJOR DEPRESSION (H): ICD-10-CM

## 2023-11-03 RX ORDER — COLCHICINE 0.6 MG/1
0.6 TABLET ORAL 2 TIMES DAILY
Qty: 60 TABLET | Refills: 1 | Status: SHIPPED | OUTPATIENT
Start: 2023-11-03 | End: 2023-12-29

## 2023-11-03 RX ORDER — TRAZODONE HYDROCHLORIDE 50 MG/1
150 TABLET, FILM COATED ORAL AT BEDTIME
Qty: 270 TABLET | Refills: 0 | Status: SHIPPED | OUTPATIENT
Start: 2023-11-03 | End: 2024-02-28

## 2023-11-07 ENCOUNTER — MYC REFILL (OUTPATIENT)
Dept: DERMATOLOGY | Facility: CLINIC | Age: 63
End: 2023-11-07
Payer: COMMERCIAL

## 2023-11-07 ENCOUNTER — MYC REFILL (OUTPATIENT)
Dept: FAMILY MEDICINE | Facility: CLINIC | Age: 63
End: 2023-11-07
Payer: COMMERCIAL

## 2023-11-07 DIAGNOSIS — B37.0 THRUSH: ICD-10-CM

## 2023-11-07 DIAGNOSIS — L70.0 ACNE VULGARIS: ICD-10-CM

## 2023-11-07 DIAGNOSIS — K12.0 APHTHAE, ORAL: ICD-10-CM

## 2023-11-07 RX ORDER — CLOTRIMAZOLE 10 MG/1
10 LOZENGE ORAL
Qty: 70 LOZENGE | Refills: 1 | Status: SHIPPED | OUTPATIENT
Start: 2023-11-07

## 2023-11-07 RX ORDER — CHLORHEXIDINE GLUCONATE ORAL RINSE 1.2 MG/ML
15 SOLUTION DENTAL 2 TIMES DAILY
Qty: 1893 ML | Refills: 4 | OUTPATIENT
Start: 2023-11-07

## 2023-11-07 RX ORDER — DOXYCYCLINE 100 MG/1
CAPSULE ORAL
Qty: 28 CAPSULE | Refills: 3 | Status: CANCELLED | OUTPATIENT
Start: 2023-11-07

## 2023-11-08 ENCOUNTER — MYC MEDICAL ADVICE (OUTPATIENT)
Dept: PHARMACY | Facility: CLINIC | Age: 63
End: 2023-11-08
Payer: COMMERCIAL

## 2023-11-08 ENCOUNTER — VIRTUAL VISIT (OUTPATIENT)
Dept: PHARMACY | Facility: CLINIC | Age: 63
End: 2023-11-08
Payer: COMMERCIAL

## 2023-11-08 ENCOUNTER — MYC MEDICAL ADVICE (OUTPATIENT)
Dept: FAMILY MEDICINE | Facility: CLINIC | Age: 63
End: 2023-11-08
Payer: COMMERCIAL

## 2023-11-08 DIAGNOSIS — F33.1 MODERATE EPISODE OF RECURRENT MAJOR DEPRESSIVE DISORDER (H): Primary | ICD-10-CM

## 2023-11-08 DIAGNOSIS — F41.1 GENERALIZED ANXIETY DISORDER: Primary | ICD-10-CM

## 2023-11-08 DIAGNOSIS — G47.9 SLEEP DISORDER: ICD-10-CM

## 2023-11-08 DIAGNOSIS — F33.1 MODERATE EPISODE OF RECURRENT MAJOR DEPRESSIVE DISORDER (H): ICD-10-CM

## 2023-11-08 PROCEDURE — 99606 MTMS BY PHARM EST 15 MIN: CPT | Performed by: PHARMACIST

## 2023-11-08 PROCEDURE — 99607 MTMS BY PHARM ADDL 15 MIN: CPT | Performed by: PHARMACIST

## 2023-11-08 NOTE — PROGRESS NOTES
"Medication Therapy Management (MTM) Encounter    ASSESSMENT:                            Medication Adherence/Access: No issues identified    MDD/MARIZOL/Sleep:  Since decrease in buspirone has resolved hallucinations, it was likely related to the dose of buspirone. Should discontinue buspirone if hallucinations re-emerge. Since patient is having symptoms of depression and would like to increase bupropion will bring this up to PCP. Bupropion interacts with duloxetine, metoprolol, and tramadol thus increasing their concentrations. With other serotonergic medications caution should be had with increasing exposure to duloxetine. Should discontinue trazodone for this reason especially if it is not providing significant benefit for sleep. Since patient is worried about sleep, discussed just minimizing use of trazodone vs full discontinuation at this time.     PLAN:                            Minimize use of trazodone and consider discontinuing as it is not providing significant benefit for sleep.  Will ask PCP about increasing bupropion to 450 mg daily.     Follow-up: Return in about 4 weeks (around 12/6/2023) for Follow up.    SUBJECTIVE/OBJECTIVE:                          Jacquie Amador is a 63 year old female called for a follow-up visit from 10/18.       Reason for visit: medication change follow-up.    Allergies/ADRs: Reviewed in chart  Past Medical History: Reviewed in chart  Tobacco: She reports that she quit smoking about 10 years ago. Her smoking use included cigarettes. She started smoking about 43 years ago. She has a 9.90 pack-year smoking history. She has been exposed to tobacco smoke. She has never used smokeless tobacco.  Alcohol: not currently using    Medication Adherence/Access: no issues reported    MDD/MARIZLO/Sleep:   Duloxetine 120mg once daily   Wellbutrin SR 200mg twice daily - takes second dose in late afternoon or evening. Is feeling like she \"could use some help\" lately. Wondering about increasing this " "dose. Feels like she is \"not put together\"  Buspar 15 mg twice daily - was previously on 30 mg and we reduced at last visit.   Trazodone 50-150mg nightly as needed - did try going down to 50 mg and this was okay. Did take 150 mg last night. Taking less of the trazodone did help daytime fatigue.   Gabapentin 300 mg daily - Was helping with sleep until the last week. Last night she was awake until 3 am.     Has been trying to drink more water and has reduced caffeine intake.     Reports that the hallucinations have been \"totally gone\" and thinks it has to have been the increased dose of the buspirone      Reports that lately anxiety has not been doing well. May possibly be setting up with therapy, but \"not right now\".     Today's Vitals: LMP 07/17/2009 (Exact Date)   ----------------    I spent 15 minutes with this patient today. All changes were made via collaborative practice agreement with Liz Peterson MD. A copy of the visit note was provided to the patient's provider(s).    A summary of these recommendations was sent via [employer].    Ishmael Gracia, Pharm. D., [facility]  Medication Therapy Management Pharmacist    Telemedicine Visit Details  Type of service:  Telephone visit  Start Time:  11:03 am  End Time:  11:18 am     Medication Therapy Recommendations  Moderate episode of recurrent major depressive disorder (H)    Current Medication: buPROPion (WELLBUTRIN SR) 200 MG 12 hr tablet   Rationale: Dose too low - Dosage too low - Effectiveness   Recommendation: Increase Dose   Status: Contact Provider - Awaiting Response         Sleep disorder    Current Medication: traZODone (DESYREL) 50 MG tablet   Rationale: Frequency inappropriate - Dosage too high - Safety   Recommendation: Decrease Frequency   Status: Accepted - no CPA Needed            "

## 2023-11-08 NOTE — PATIENT INSTRUCTIONS
"Recommendations from today's MTM visit:                                                    MTM (medication therapy management) is a service provided by a clinical pharmacist designed to help you get the most of out of your medicines.   Today we reviewed what your medicines are for, how to know if they are working, that your medicines are safe and how to make your medicine regimen as easy as possible.    Minimize use of trazodone and consider discontinuing as it is not providing significant benefit for sleep.  Will ask PCP about increasing bupropion to 450 mg daily.     Follow-up: Return in about 4 weeks (around 12/6/2023) for Follow up.    It was great speaking with you today.  I value your experience and would be very thankful for your time in providing feedback in our clinic survey. In the next few days, you may receive an email or text message from [employer] with a link to a survey related to your  clinical pharmacist.\"     To schedule another MTM appointment, please call the clinic directly or you may call the MTM scheduling line at [phone] or toll-free at [phone].     My Clinical Pharmacist's contact information:                                                      Please feel free to contact me with any questions or concerns you have.      Ishmael Gracia, Pharm. D., [facility]  Medication Therapy Management Pharmacist    "

## 2023-11-08 NOTE — TELEPHONE ENCOUNTER
"The patient is requesting a work in phone visit with PCP.   Do you have availability? If so, when? Do you prefer in person?    SITUATION:   Sent indert.     BACKGROUND:   1. The patient would like to discuss going off of Gabapentin and Colchicine.   2. She mentions \" My eyes are affected and feel not myself\". She states she has trouble with dry eyes. \" I always have trouble with dry eye but they have been more painful tan blurry every now and then and headache. I did not get one thing done today just mellow. I have felt way in the dumps talked to pharmacist at the   this morning he is upping my Wellbutrin . I read side effects on both drugs and really don t like what I saw. I have some major painful sores going on in my mouth. I have always at least one but now three\".       NURSE RECOMMENDATION:   The patient should schedule a visit.   If symptoms worsen should go into the ER.     PLAN:       Routed to provider    Dario Matos, ALEX, RN, PHN  [facility]  November 8, 2023    "

## 2023-11-09 RX ORDER — BUPROPION HYDROCHLORIDE 150 MG/1
150 TABLET ORAL EVERY MORNING
Qty: 90 TABLET | Refills: 1 | Status: SHIPPED | OUTPATIENT
Start: 2023-11-09 | End: 2024-05-09

## 2023-11-09 RX ORDER — BUPROPION HYDROCHLORIDE 300 MG/1
300 TABLET ORAL EVERY MORNING
Qty: 90 TABLET | Refills: 1 | Status: SHIPPED | OUTPATIENT
Start: 2023-11-09 | End: 2024-05-09

## 2023-11-09 NOTE — TELEPHONE ENCOUNTER
Patient scheduled for phone visit. Patient states she is getting labs done on Monday at the . She is wondering if there is any labs you would like added on to this visit?

## 2023-11-09 NOTE — TELEPHONE ENCOUNTER
The colchicine was prescribed by Dr. Warren with specialty.  Recommend reaching out to him.     Could do a 7:30 am telephone visit next Tuesday morning.

## 2023-11-10 NOTE — TELEPHONE ENCOUNTER
I realize it may mean that she will need to be redrawn but would rather have the visit before adding further lab orders at this time.

## 2023-11-12 ASSESSMENT — ANXIETY QUESTIONNAIRES
3. WORRYING TOO MUCH ABOUT DIFFERENT THINGS: SEVERAL DAYS
GAD7 TOTAL SCORE: 6
6. BECOMING EASILY ANNOYED OR IRRITABLE: SEVERAL DAYS
4. TROUBLE RELAXING: NOT AT ALL
7. FEELING AFRAID AS IF SOMETHING AWFUL MIGHT HAPPEN: MORE THAN HALF THE DAYS
1. FEELING NERVOUS, ANXIOUS, OR ON EDGE: SEVERAL DAYS
2. NOT BEING ABLE TO STOP OR CONTROL WORRYING: SEVERAL DAYS
GAD7 TOTAL SCORE: 6
IF YOU CHECKED OFF ANY PROBLEMS ON THIS QUESTIONNAIRE, HOW DIFFICULT HAVE THESE PROBLEMS MADE IT FOR YOU TO DO YOUR WORK, TAKE CARE OF THINGS AT HOME, OR GET ALONG WITH OTHER PEOPLE: NOT DIFFICULT AT ALL
5. BEING SO RESTLESS THAT IT IS HARD TO SIT STILL: NOT AT ALL

## 2023-11-12 ASSESSMENT — ASTHMA QUESTIONNAIRES: ACT_TOTALSCORE: 21

## 2023-11-13 ENCOUNTER — ALLIED HEALTH/NURSE VISIT (OUTPATIENT)
Dept: HEMATOLOGY | Facility: CLINIC | Age: 63
End: 2023-11-13
Payer: COMMERCIAL

## 2023-11-13 DIAGNOSIS — D69.1: Primary | ICD-10-CM

## 2023-11-13 DIAGNOSIS — E78.5 HYPERLIPIDEMIA LDL GOAL <130: ICD-10-CM

## 2023-11-13 DIAGNOSIS — D69.1 PLATELET GRANULE DEFECT (H): ICD-10-CM

## 2023-11-13 LAB
ALBUMIN SERPL BCG-MCNC: 4.4 G/DL (ref 3.5–5.2)
ALP SERPL-CCNC: 102 U/L (ref 35–104)
ALT SERPL W P-5'-P-CCNC: 50 U/L (ref 0–50)
ANION GAP SERPL CALCULATED.3IONS-SCNC: 10 MMOL/L (ref 7–15)
AST SERPL W P-5'-P-CCNC: 43 U/L (ref 0–45)
BASOPHILS # BLD AUTO: 0.1 10E3/UL (ref 0–0.2)
BASOPHILS NFR BLD AUTO: 1 %
BILIRUB SERPL-MCNC: 0.5 MG/DL
BUN SERPL-MCNC: 13.2 MG/DL (ref 8–23)
CALCIUM SERPL-MCNC: 9.6 MG/DL (ref 8.8–10.2)
CHLORIDE SERPL-SCNC: 100 MMOL/L (ref 98–107)
CREAT SERPL-MCNC: 1.24 MG/DL (ref 0.51–0.95)
DEPRECATED HCO3 PLAS-SCNC: 31 MMOL/L (ref 22–29)
EGFRCR SERPLBLD CKD-EPI 2021: 49 ML/MIN/1.73M2
EOSINOPHIL # BLD AUTO: 0.4 10E3/UL (ref 0–0.7)
EOSINOPHIL NFR BLD AUTO: 4 %
ERYTHROCYTE [DISTWIDTH] IN BLOOD BY AUTOMATED COUNT: 14.3 % (ref 10–15)
GLUCOSE SERPL-MCNC: 89 MG/DL (ref 70–99)
HCT VFR BLD AUTO: 42.7 % (ref 35–47)
HGB BLD-MCNC: 14.2 G/DL (ref 11.7–15.7)
IMM GRANULOCYTES # BLD: 0.1 10E3/UL
IMM GRANULOCYTES NFR BLD: 1 %
IRON BINDING CAPACITY (ROCHE): 272 UG/DL (ref 240–430)
IRON SATN MFR SERPL: 36 % (ref 15–46)
IRON SERPL-MCNC: 99 UG/DL (ref 37–145)
LYMPHOCYTES # BLD AUTO: 3.4 10E3/UL (ref 0.8–5.3)
LYMPHOCYTES NFR BLD AUTO: 36 %
Lab: NORMAL
Lab: NORMAL
MCH RBC QN AUTO: 30.9 PG (ref 26.5–33)
MCHC RBC AUTO-ENTMCNC: 33.3 G/DL (ref 31.5–36.5)
MCV RBC AUTO: 93 FL (ref 78–100)
MONOCYTES # BLD AUTO: 0.8 10E3/UL (ref 0–1.3)
MONOCYTES NFR BLD AUTO: 8 %
NEUTROPHILS # BLD AUTO: 4.8 10E3/UL (ref 1.6–8.3)
NEUTROPHILS NFR BLD AUTO: 50 %
NRBC # BLD AUTO: 0 10E3/UL
NRBC BLD AUTO-RTO: 0 /100
PERFORMING LABORATORY: NORMAL
PERFORMING LABORATORY: NORMAL
PLATELET # BLD AUTO: 373 10E3/UL (ref 150–450)
POTASSIUM SERPL-SCNC: 3.7 MMOL/L (ref 3.4–5.3)
PROT SERPL-MCNC: 7.7 G/DL (ref 6.4–8.3)
RBC # BLD AUTO: 4.6 10E6/UL (ref 3.8–5.2)
SODIUM SERPL-SCNC: 141 MMOL/L (ref 135–145)
SPECIMEN STATUS: NORMAL
SPECIMEN STATUS: NORMAL
TEST NAME: NORMAL
TEST NAME: NORMAL
WBC # BLD AUTO: 9.5 10E3/UL (ref 4–11)

## 2023-11-13 PROCEDURE — 84999 UNLISTED CHEMISTRY PROCEDURE: CPT

## 2023-11-13 PROCEDURE — 85025 COMPLETE CBC W/AUTO DIFF WBC: CPT

## 2023-11-13 PROCEDURE — 85385 FIBRINOGEN ANTIGEN: CPT

## 2023-11-13 PROCEDURE — 88348 ELECTRON MICROSCOPY DX: CPT

## 2023-11-13 PROCEDURE — 84165 PROTEIN E-PHORESIS SERUM: CPT | Mod: TC | Performed by: PATHOLOGY

## 2023-11-13 PROCEDURE — 84155 ASSAY OF PROTEIN SERUM: CPT

## 2023-11-13 PROCEDURE — 85290 CLOT FACTOR XIII FIBRIN STAB: CPT

## 2023-11-13 PROCEDURE — 86023 IMMUNOGLOBULIN ASSAY: CPT

## 2023-11-13 PROCEDURE — 85390 FIBRINOLYSINS SCREEN I&R: CPT

## 2023-11-13 PROCEDURE — 36415 COLL VENOUS BLD VENIPUNCTURE: CPT

## 2023-11-13 PROCEDURE — 83521 IG LIGHT CHAINS FREE EACH: CPT | Mod: 59

## 2023-11-13 PROCEDURE — 85420 FIBRINOLYTIC PLASMINOGEN: CPT

## 2023-11-13 PROCEDURE — 88187 FLOWCYTOMETRY/READ 2-8: CPT

## 2023-11-13 PROCEDURE — 86022 PLATELET ANTIBODIES: CPT

## 2023-11-13 PROCEDURE — 83550 IRON BINDING TEST: CPT

## 2023-11-13 PROCEDURE — 999N000103 HC STATISTIC NO CHARGE FACILITY FEE

## 2023-11-13 PROCEDURE — 84165 PROTEIN E-PHORESIS SERUM: CPT | Mod: 26 | Performed by: PATHOLOGY

## 2023-11-13 PROCEDURE — 88184 FLOWCYTOMETRY/ TC 1 MARKER: CPT

## 2023-11-13 PROCEDURE — 82310 ASSAY OF CALCIUM: CPT

## 2023-11-13 PROCEDURE — 88185 FLOWCYTOMETRY/TC ADD-ON: CPT

## 2023-11-13 PROCEDURE — 86038 ANTINUCLEAR ANTIBODIES: CPT | Performed by: PATHOLOGY

## 2023-11-13 PROCEDURE — 86334 IMMUNOFIX E-PHORESIS SERUM: CPT | Mod: 26 | Performed by: PATHOLOGY

## 2023-11-13 PROCEDURE — 86334 IMMUNOFIX E-PHORESIS SERUM: CPT | Performed by: PATHOLOGY

## 2023-11-13 PROCEDURE — 86225 DNA ANTIBODY NATIVE: CPT

## 2023-11-14 ENCOUNTER — VIRTUAL VISIT (OUTPATIENT)
Dept: FAMILY MEDICINE | Facility: CLINIC | Age: 63
End: 2023-11-14
Payer: COMMERCIAL

## 2023-11-14 ENCOUNTER — MYC MEDICAL ADVICE (OUTPATIENT)
Dept: FAMILY MEDICINE | Facility: CLINIC | Age: 63
End: 2023-11-14

## 2023-11-14 DIAGNOSIS — F33.1 MODERATE RECURRENT MAJOR DEPRESSION (H): Primary | ICD-10-CM

## 2023-11-14 DIAGNOSIS — F41.1 GENERALIZED ANXIETY DISORDER: ICD-10-CM

## 2023-11-14 LAB
KAPPA LC FREE SER-MCNC: 2.96 MG/DL (ref 0.33–1.94)
KAPPA LC FREE/LAMBDA FREE SER NEPH: 1.39 {RATIO} (ref 0.26–1.65)
LAMBDA LC FREE SERPL-MCNC: 2.13 MG/DL (ref 0.57–2.63)

## 2023-11-14 PROCEDURE — 99207 E-CONSULT TO BEHAVIORAL HEALTH (ADULT OUTPT PROVIDER TO SPECIALIST WRITTEN QUESTION & RESPONSE): CPT | Performed by: FAMILY MEDICINE

## 2023-11-14 PROCEDURE — 99443 PR PHYSICIAN TELEPHONE EVALUATION 21-30 MIN: CPT | Mod: 95 | Performed by: FAMILY MEDICINE

## 2023-11-14 ASSESSMENT — PATIENT HEALTH QUESTIONNAIRE - PHQ9
SUM OF ALL RESPONSES TO PHQ QUESTIONS 1-9: 6
10. IF YOU CHECKED OFF ANY PROBLEMS, HOW DIFFICULT HAVE THESE PROBLEMS MADE IT FOR YOU TO DO YOUR WORK, TAKE CARE OF THINGS AT HOME, OR GET ALONG WITH OTHER PEOPLE: SOMEWHAT DIFFICULT
SUM OF ALL RESPONSES TO PHQ QUESTIONS 1-9: 6

## 2023-11-14 NOTE — PROGRESS NOTES
Jacquie is a 63 year old who is being evaluated via a billable telephone visit.      What phone number would you like to be contacted at? [phone]  How would you like to obtain your AVS? [facility]    Distant Location (provider location):  On-site    Assessment & Plan     Moderate recurrent major depression (H)  Generalized anxiety disorder  Patient had a decrease in her buspar to 15 mg bid due to hallucinations  With the decrease she is no longer experiencing hallucinations.   She continues to feel that her anxiety is a problem and seems some worse since decreasing her buspar.   See subjective regarding prior medications.   Remains on cymbalta and wellbutrin XL which was recently increased to 450 mg daily.   Discussed consultation with psychiatry - econsult vs referral  Patient would like to proceed with an econsult.   Econsult sent.   - Adult E-Consult to Behavioral Health (Outpt Provider to Specialist Written Question & Response)                 Liz Peterson MD  [facility]    Subjective   Jacquie is a 63 year old, presenting for the following health issues:  Results      11/14/2023     7:05 AM   Additional Questions   Roomed by Cezar       History of Present Illness       Reason for visit:  Lab results  Symptom onset:  Today    She eats 0-1 servings of fruits and vegetables daily.She consumes 1 sweetened beverage(s) daily.She exercises with enough effort to increase her heart rate 9 or less minutes per day.  She exercises with enough effort to increase her heart rate 3 or less days per week.   She is taking medications regularly.     Patient reporting trouble with depression. Saw the [facility]. Wellbutrin raised to 450 mg. She is worried about it. She 'hasn't felt real good'. She has a headache. Tylenol helps it sometimes. Some stomach discomfort.     Hallucinations from Buspar. Lowering the dose helped. Patient was hearing people talking outside her bedroom.  Just at night would see  'ghosts of people'. At the buspar 15 mg twice daily is not having hallucinations. She is having some increase in anxiety with the decrease in buspar.     Moved to a new house to get away from construction. Now there is construction at her new place.     She has also been on Cymbalta 120 mg daily. When she has missed a dose of cymbalta she notes increased pain especially in her feet.    Has tried zoloft in the past. Has used. lorazepam and alprazolam in the past for anxiety. Not currently taking.     Wondering other options to assist with her anxiety.     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 Has follow up with MTM      Review of Systems   CONSTITUTIONAL: NEGATIVE for fever, chills, change in weight  ENT/MOUTH: NEGATIVE for ear, mouth and throat problems  RESP: NEGATIVE for significant cough or SOB  CV: NEGATIVE for chest pain, palpitations or peripheral edema      Objective           Vitals:  No vitals were obtained today due to virtual visit.    Physical Exam   healthy, alert, and no distress  PSYCH: Alert and oriented times 3; coherent speech, normal   rate and volume, able to articulate logical thoughts, able   to abstract reason, no tangential thoughts, no hallucinations   or delusions  Her affect is normal  RESP: No cough, no audible wheezing, able to talk in full sentences  Remainder of exam unable to be completed due to telephone visits                Phone call duration: 22 minutes

## 2023-11-15 ENCOUNTER — E-CONSULT (OUTPATIENT)
Dept: PSYCHIATRY | Facility: CLINIC | Age: 63
End: 2023-11-15
Payer: COMMERCIAL

## 2023-11-15 ENCOUNTER — VIRTUAL VISIT (OUTPATIENT)
Dept: HEMATOLOGY | Facility: CLINIC | Age: 63
End: 2023-11-15
Attending: INTERNAL MEDICINE
Payer: COMMERCIAL

## 2023-11-15 DIAGNOSIS — D69.1: ICD-10-CM

## 2023-11-15 LAB
FACT XIII AG ACT/NOR PPP IA: 125 % (ref 75–155)
MAYO MISC RESULT: NORMAL
PLG ACT/NOR PPP CHRO: 65 % (ref 80–133)
TOTAL PROTEIN SERUM FOR ELP: 7.2 G/DL (ref 6.4–8.3)

## 2023-11-15 PROCEDURE — 99442 PR PHYSICIAN TELEPHONE EVALUATION 11-20 MIN: CPT | Mod: 95 | Performed by: INTERNAL MEDICINE

## 2023-11-15 PROCEDURE — 99451 NTRPROF PH1/NTRNET/EHR 5/>: CPT | Performed by: PSYCHIATRY & NEUROLOGY

## 2023-11-15 RX ORDER — TRANEXAMIC ACID 650 MG/1
TABLET ORAL
Qty: 270 TABLET | Refills: 3 | Status: SHIPPED | OUTPATIENT
Start: 2023-11-15 | End: 2024-02-13

## 2023-11-15 NOTE — PROGRESS NOTES
[facility]  [address] [place] [address]  Phone: [phone], Fax: [phone]    Outpatient Visit Note:    Patient: Jacquie Amador  MRN: [id number]  : 1960  ISIDRO: 11/15/23      Reason for Initial Consultation:  Bruising    Assessment:  In summary, Jacquie Amador is a 63 year old woman presenting to clinic due to bruising/bleeding with abnormal platelet studies. 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 Overall clinical picture is highly concerning for underlying autoimmune disorder.    1.  Behcet's disease:   2. 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  Easy bruising and prolonged healing secondary to #1 and #2  4.  Personal history of antiphospholipid antibody syndrome in pregnancy, no records  5.  Osteoarthritis requiring anti-inflammatory medications  6.  Depression requiring use of SSRI medication  7.  Acute on chronic migraines requiring use of triptans and aspirin    Ms. Amador has been evaluated by Dermatology and Rheumatology. Skin biopsy with neutrophilic infiltrates that may be secondary to Behcets. Overall, clinical criteria is met and very consistent.  Working with Dr. Warren for skin lesions.    For her bleeding/platelets, she has presence of anti-HLA and anti-GP1a antibodies on studies in --- repeat values are pending.  She has no personal history of receiving red cell or platelet transfusions. Jenny platelet aggregation study that had decreased arachidonic acid as well and deaggregation with ADP and arachidonic acid and decreased ATP release with thrombin and ADP. This is consistent with diagnosis of acquired qualitative platelet disorder and consistent with her anti-GP1a antibodies. Currently using tranexamic acid to prevent bleeding and stablize wounds.    Had started steroids- unclear if helping with  autoantibodies- so repeat testing is pending. Had done trials of MMF, etc but did not tolerate. Overall, steroids helped with arthralgias and fatigue.     Previously discussed that avoiding other medications that can worsen this, including aspirin, NSAID and potentially SSRIs. Right now IF Jacquie needs MAJOR SURGERY---  She should receive platelet transfusion (1-2 units). She is ok to receive injections.      Plan:  1. Majority of today's visit was spent counseling the patient regarding diagnosis, natural history, management and treatment options   2. Continue prednisone 10 mg daily  3. Increase Tranexamic acid 650 mg PO morning and 1300 mg at night to assess if bleeding at night into wounds stops  4. Start OTC topical 5% tranexamic acid   5. Will follow up platelet autoantibody testing (sent 11/13/23)    The patient is given our center's contact information and is instructed to call if she should have any further questions or concerns.  Follow up 1/10/24  phone with Dr. Grimes    Patient understands and agrees with the above plan and recommendation.    Emely Grimes MD/PhD   of Medicine  Division of Hematology, Oncology and Transplantation      Telephone time: 20 minutes      ----------------------------------------------------------------------------------------------------------------------  Interval History:  Jacquie Amador is a 63 year old woman with PMHx of fibromyalgia, anxiety/depression, hypertension and hyperlipidemia. She presented to clinic on 9/15/21 for her first in person evaluation due to bleeding and prolonged wound healing. Please refer to my consult note.     Jacquie reports that overall her health has been up and down.     Increased her Wellbutrin dose  Started Colcrys--- does not feel generally between   Is currently on doxycycline which she helps with sores in mouth does not help    Spots on face are still filling up with blood in the morning or if she puts on makeup.      Hands- those are doing ok- not opening up with sores.     Legs- swelling a little bit.     Knee- swollen from hitting it.     Two site-  avoiding going out due to spots.     Is nothing some dizziness and lightheadedness.     Tranexamic acid- currently taking one time at night---  In morning it will look reddish- will wash her face and the scab/covering will come off - will not bleed long after that.     No gum bleeding.     Has sores in her left bottom and left inside and outside of her teeth and top left   Sores are painful.     Past Medical History:  Past Medical History:   Diagnosis Date    ASTHMA - MODERATE PERSISTENT 9/21/2005    Chronic pain     Coronary artery disease     CVA (cerebral infarction)     Depressive disorder, not elsewhere classified     Diabetes (H)     Elevated serum alkaline phosphatase level     Liver source    Fibromyalgia     HYPERLIPIDEMIA NEC/NOS 12/29/2006    Hypertriglyceridemia     OA (osteoarthritis)     Thyroid disease     Trochanteric bursitis     Unspecified essential hypertension      Immunization  Immunization History   Administered Date(s) Administered    COVID-19 Bivalent 18+ (Moderna) 11/21/2022    COVID-19 Monovalent 18+ (Moderna) 03/03/2021, 04/01/2021, 11/15/2021    COVID-19 Monovalent Booster 18+ (Moderna) 05/10/2022    FLU 6-35 months 09/22/2009    Influenza (IIV3) PF 11/09/2000, 12/08/2003, 12/06/2004, 12/08/2005, 11/09/2006, 11/01/2007, 12/04/2008, 09/24/2010, 10/26/2011, 10/26/2012    Influenza Vaccine 18-64 (Flublok) 09/30/2020, 11/21/2022, 09/19/2023    Influenza Vaccine >6 months (Alfuria,Fluzone) 11/14/2014, 11/19/2015, 11/26/2019, 11/15/2021    Pneumococcal 23 valent 11/09/2000, 12/23/2021, 02/25/2022    TD,PF 7+ (Tenivac) 11/21/2000    TDAP (Adacel,Boostrix) 11/30/2021    TDAP Vaccine (Adacel) 01/18/2011    Zoster recombinant adjuvanted (SHINGRIX) 11/26/2019       Past Surgical History:  Past Surgical History:   Procedure Laterality Date    3 teeth pulled       INJECT EPIDURAL TRANSFORAMINAL  2014    [facility]    INJECT JOINT SACROILIAC  2014    [facility]    LAMINECTOMY LUMBAR ONE LEVEL Left 2014    [facility]    [facility]  DELIVERY ONLY      , Low Cervical       Medications:  Current Outpatient Medications   Medication Sig Dispense Refill    acetaminophen (TYLENOL) 500 MG tablet Take 500-1,000 mg by mouth every 6 hours as needed for mild pain      acetylcysteine (N-ACETYL-L-CYSTEINE) 600 MG CAPS capsule Take 1 capsule (600 mg) by mouth 2 times daily 60 capsule 2    albuterol (PROVENTIL) (2.5 MG/3ML) 0.083% neb solution Take 1 vial (2.5 mg) by nebulization every 6 hours as needed for shortness of breath or wheezing 3 mL 4    albuterol (VENTOLIN HFA) 108 (90 Base) MCG/ACT inhaler INHALE 2 PUFFS INTO THE LUNGS EVERY 6 HOURS AS NEEDED FOR SHORTNESS OF BREATH / DYSPNEA 54 g 1    atorvastatin (LIPITOR) 20 MG tablet TAKE ONE TABLET BY MOUTH ONE TIME DAILY 90 tablet 0    augmented betamethasone dipropionate (DIPROLENE AF) 0.05 % external cream Apply to affected area twice daily 50 g 2    benzonatate (TESSALON) 200 MG capsule Take 1 capsule (200 mg) by mouth 2 times daily as needed for cough 40 capsule 1    betamethasone dipropionate (DIPROSONE) 0.05 % external ointment Apply topically 2 times daily To areas of skin sores.  Can also use on mouth sores as needed twice daily. 50 g 3    buPROPion (WELLBUTRIN XL) 150 MG 24 hr tablet Take 1 tablet (150 mg) by mouth every morning To take with the 300 mg dose for a total of 450 mg daily. 90 tablet 1    buPROPion (WELLBUTRIN XL) 300 MG 24 hr tablet Take 1 tablet (300 mg) by mouth every morning To take with the 150 mg dose for a total of 450 mg daily. 90 tablet 1    busPIRone (BUSPAR) 15 MG tablet Take 1 tablet (15 mg) by mouth 2 times daily 60 tablet 1    butorphanol (STADOL) 10 MG/ML nasal spray Spray 1 spray in nostril every 4 hours as needed for pain  2.5 mL 3    celecoxib (CELEBREX) 200 MG capsule TAKE ONE CAPSULE BY MOUTH TWICE A DAY AS NEEDED FOR PAIN Strength: 200 mg 180 capsule 1    chlorhexidine (PERIDEX) 0.12 % solution Swish and spit 15 mLs in mouth 2 times daily 1893 mL 4    clindamycin (CLINDAMAX) 1 % external gel Apply topically 2 times daily 30 g 4    clotrimazole (MYCELEX) 10 MG lozenge Place 1 lozenge (10 mg) inside cheek 5 times daily 70 lozenge 1    colchicine (COLCRYS) 0.6 MG tablet Take 1 tablet (0.6 mg) by mouth 2 times daily 60 tablet 1    desonide (DESOWEN) 0.05 % external cream Apply topically 2 times daily To sores on face 60 g 3    dextran 70-hypromellose (TEARS NATURALE FREE PF) 0.1-0.3 % ophthalmic solution Place 2 drops into both eyes daily as needed (for dry eyes) 35 each 3    doxycycline monohydrate (MONODOX) 100 MG capsule One pill twice daily, after meals and with big glass of water 28 capsule 3    DULoxetine (CYMBALTA) 60 MG capsule Take 2 capsules (120 mg) by mouth daily 180 capsule 1    dupilumab (DUPIXENT) 300 MG/2ML prefilled pen Inject 2 mLs (300 mg) Subcutaneous every 14 days After first loading dose 4 mL 2    famotidine (PEPCID) 40 MG tablet Take 1 tablet (40 mg) by mouth daily 90 tablet 0    fluconazole (DIFLUCAN) 150 MG tablet Take 1 tablet (150 mg) by mouth every 3 days 6 tablet 0    fluocinonide (LIDEX) 0.05 % external gel Apply topically 2 times daily as needed 15 g 1    gabapentin (NEURONTIN) 300 MG capsule Take 1 capsule (300 mg) by mouth at bedtime 30 capsule 1    hydrochlorothiazide (HYDRODIURIL) 25 MG tablet Take 1 tablet (25 mg) by mouth daily 90 tablet 1    hydrocortisone 2.5 % cream APPLY TOPICALLY 2 TIMES DAILY AS NEEDED 30 g 3    lidocaine, viscous, (XYLOCAINE) 2 % solution Swish and spit 10ml every 3 hours as needed for oral pain; max 8 doses/24hrs.  Do not eat or chew gum for 60 minutes following use.      losartan (COZAAR) 100 MG tablet Take 1 tablet (100 mg) by mouth daily 90 tablet 1    metoprolol  succinate ER (TOPROL XL) 100 MG 24 hr tablet Take 1 tablet (100 mg) by mouth 2 times daily 180 tablet 1    metoprolol succinate ER (TOPROL XL) 25 MG 24 hr tablet Take 1 tablet (25 mg) by mouth 3 times daily 270 tablet 1    mometasone-formoterol (DULERA) 200-5 MCG/ACT inhaler Inhale 2 puffs into the lungs 2 times daily 39 g 1    montelukast (SINGULAIR) 10 MG tablet Take 1 tablet (10 mg) by mouth At Bedtime 90 tablet 3    mupirocin (BACTROBAN) 2 % external cream Apply to affected area three times daily 60 g 1    nystatin (MYCOSTATIN) 344843 UNIT/ML suspension Take by mouth 4 times daily Has recurrent thrush. Uses for 2 weeks at a time as needed. 380 mL 1    oxybutynin ER (DITROPAN XL) 5 MG 24 hr tablet Take 2 tablets (10 mg) by mouth daily 180 tablet 2    predniSONE (DELTASONE) 10 MG tablet Take 1 tablet (10 mg) by mouth daily 90 tablet 3    rizatriptan (MAXALT) 10 MG tablet Take 1 tablet (10 mg) by mouth at onset of headache for migraine May repeat in 2 hours. Max 3 tablets/24 hours. 18 tablet 1    rOPINIRole (REQUIP) 1 MG tablet TAKE THREE TABLETS BY MOUTH AT BEDTIME 270 tablet 3    tacrolimus (PROTOPIC) 0.1 % external ointment Apply topically 2 times daily To areas of sores on face 60 g 1    tiotropium (SPIRIVA RESPIMAT) 2.5 MCG/ACT inhaler Inhale 2 puffs into the lungs daily 12 g 3    traMADol (ULTRAM) 50 MG tablet Take 1 tablet (50 mg) by mouth every 12 hours as needed for severe pain 45 tablet 0    tranexamic acid (LYSTEDA) 650 MG tablet Take 1 tablet (650 mg) by mouth 2 times daily for 30 days 60 tablet 3    traZODone (DESYREL) 50 MG tablet Take 3 tablets (150 mg) by mouth At Bedtime (Patient taking differently: Take 50 mg by mouth nightly as needed for sleep) 270 tablet 0    triamcinolone (ARISTOCORT HP) 0.5 % external cream Apply topically 2 times daily 60 g 0    valACYclovir (VALTREX) 500 MG tablet Take 1 tablet (500 mg) by mouth daily 90 tablet 1    ZYRTEC 10 MG OR TABS Take 10 mg by mouth daily as needed  90 3        Allergies:  Allergies   Allergen Reactions    Cats      and rabbits/wheezing    Dogs      wheezing    Lyrica [Pregabalin] Other (See Comments)     Rash, mouth sores, itching and burning    Seasonal Allergies      rhinits       ROS:  Remainder of a comprehensive 10 point ROS is negative unless noted above.    Social History:  Denies any tobacco use. No significant alcohol use. Denies any illicit drug use.     Family History:  Two daughter  31 Yo daughter  28 yo daughter  3 grandsons    1 sister- older- high blood glucose, arthritis  1 brother- older- no idea    Mother- - heart valve. Had MDS. Needed a shot every month  Father- - cancer    Objectives:  Reviewed photos in the chart    Labs:    Ferritin   Date Value Ref Range Status   2023 100 11 - 328 ng/mL Final   2021 376 (H) 8 - 252 ng/mL Final     Iron   Date Value Ref Range Status   2023 99 37 - 145 ug/dL Final   2021 50 35 - 180 ug/dL Final     Iron Binding Cap   Date Value Ref Range Status   2021 350 240 - 430 ug/dL Final     Iron Binding Capacity   Date Value Ref Range Status   2023 272 240 - 430 ug/dL Final   09/15/2021 284 240 - 430 ug/dL Final       Refer to [facility]- autoantibody platelet study    Imaging:  Not applicable

## 2023-11-15 NOTE — LETTER
11/15/2023       RE: Jacquie Amador  [address]  [facility] [address]     Dear Colleague,    Thank you for referring your patient, Jacquie Amador, to the [facility] at [facility]. Please see a copy of my visit note below.    Patient was contacted to complete the pre-visit call prior to their telephone visit with the provider.     Allergies and medications were reviewed.     I thanked them for their time to cover this information.     Melissa Fuentes MA            [facility]  [address] [place] [id number]  Phone: [phone], Fax: [phone]    Outpatient Visit Note:    Patient: Jacquie Amador  MRN: [id number]  : 1960  ISIDRO: 11/15/23      Reason for Initial Consultation:  Bruising    Assessment:  In summary, Jacquie Amador is a 63 year old woman presenting to clinic due to bruising/bleeding with abnormal platelet studies. 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 Overall clinical picture is highly concerning for underlying autoimmune disorder.    1.  Behcet's disease:   2. 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  Easy bruising and prolonged healing secondary to #1 and #2  4.  Personal history of antiphospholipid antibody syndrome in pregnancy, no records  5.  Osteoarthritis requiring anti-inflammatory medications  6.  Depression requiring use of SSRI medication  7.  Acute on chronic migraines requiring use of triptans and aspirin    Ms. Amador has been evaluated by Dermatology and Rheumatology. Skin biopsy with neutrophilic infiltrates that may be secondary to Behcets. Overall, clinical criteria is met and very consistent.  Working with Dr. Warren for skin lesions.    For her bleeding/platelets, she has presence of anti-HLA and  anti-GP1a antibodies.  She has no personal history of receiving red cell or platelet transfusions. Jenny platelet aggregation study that had decreased arachidonic acid as well and deaggregation with ADP and arachidonic acid and decreased ATP release with thrombin and ADP. This is consistent with diagnosis of acquired qualitative platelet disorder and consistent with her anti-GP1a antibodies. We have given her steriods, which helped with arthralgias and fatigue.     Previously discussed that avoiding other medications that can worsen this, including aspirin, NSAID and potentially SSRIs. Right now IF Jacquie needs MAJOR SURGERY---  She should receive platelet transfusion (1-2 units). She is ok to receive injections.      Plan:  1. Majority of today's visit was spent counseling the patient regarding diagnosis, natural history, management and treatment options   2. Continue prednisone 10 mg daily  3. Increase Tranexamic acid 650 mg PO morning and 1300 mg at night to assess if bleeding at night into wounds stops  4. Start OTC topical 5% tranexamic acid   5. Will follow up platelet autoantibody testing (sent 11/13/23)    The patient is given our center's contact information and is instructed to call if she should have any further questions or concerns.  Follow up 1/10/24  phone with Dr. Grimes    Patient understands and agrees with the above plan and recommendation.    Emely Grimes MD/PhD   of Medicine  Division of Hematology, Oncology and Transplantation      Telephone time: 20 minutes      ----------------------------------------------------------------------------------------------------------------------  Interval History:  Jacquie Amador is a 63 year old woman with PMHx of fibromyalgia, anxiety/depression, hypertension and hyperlipidemia. She presented to clinic on 9/15/21 for her first in person evaluation due to bleeding and prolonged wound healing. Please refer to my consult note.      Jacquie reports that overall her health has been up and down.     Increased her Wellbutrin dose  Started Colcrys--- does not feel generally between   Is currently on doxycycline which she helps with sores in mouth does not help    Spots on face are still filling up with blood in the morning or if she puts on makeup.     Hands- those are doing ok- not opening up with sores.     Legs- swelling a little bit.     Knee- swollen from hitting it.     Two site-  avoiding going out due to spots.     Is nothing some dizziness and lightheadedness.     Tranexamic acid- currently taking one time at night---  In morning it will look reddish- will wash her face and the scab/covering will come off - will not bleed long after that.     No gum bleeding.     Has sores in her left bottom and left inside and outside of her teeth and top left   Sores are painful.     Past Medical History:  Past Medical History:   Diagnosis Date    ASTHMA - MODERATE PERSISTENT 9/21/2005    Chronic pain     Coronary artery disease     CVA (cerebral infarction)     Depressive disorder, not elsewhere classified     Diabetes (H)     Elevated serum alkaline phosphatase level     Liver source    Fibromyalgia     HYPERLIPIDEMIA NEC/NOS 12/29/2006    Hypertriglyceridemia     OA (osteoarthritis)     Thyroid disease     Trochanteric bursitis     Unspecified essential hypertension      Immunization  Immunization History   Administered Date(s) Administered    COVID-19 Bivalent 18+ (Moderna) 11/21/2022    COVID-19 Monovalent 18+ (Moderna) 03/03/2021, 04/01/2021, 11/15/2021    COVID-19 Monovalent Booster 18+ (Moderna) 05/10/2022    FLU 6-35 months 09/22/2009    Influenza (IIV3) PF 11/09/2000, 12/08/2003, 12/06/2004, 12/08/2005, 11/09/2006, 11/01/2007, 12/04/2008, 09/24/2010, 10/26/2011, 10/26/2012    Influenza Vaccine 18-64 (Flublok) 09/30/2020, 11/21/2022, 09/19/2023    Influenza Vaccine >6 months (Alfuria,Fluzone) 11/14/2014, 11/19/2015, 11/26/2019, 11/15/2021     Pneumococcal 23 valent 2000, 2021, 2022    TD,PF 7+ (Tenivac) 2000    TDAP (Adacel,Boostrix) 2021    TDAP Vaccine (Adacel) 2011    Zoster recombinant adjuvanted (SHINGRIX) 2019       Past Surgical History:  Past Surgical History:   Procedure Laterality Date    3 teeth pulled      INJECT EPIDURAL TRANSFORAMINAL  2014    [facility] [place]    INJECT JOINT SACROILIAC  2014    [facility] [place]    LAMINECTOMY LUMBAR ONE LEVEL Left 2014    [facility]  DELIVERY ONLY      , Low Cervical       Medications:  Current Outpatient Medications   Medication Sig Dispense Refill    acetaminophen (TYLENOL) 500 MG tablet Take 500-1,000 mg by mouth every 6 hours as needed for mild pain      acetylcysteine (N-ACETYL-L-CYSTEINE) 600 MG CAPS capsule Take 1 capsule (600 mg) by mouth 2 times daily 60 capsule 2    albuterol (PROVENTIL) (2.5 MG/3ML) 0.083% neb solution Take 1 vial (2.5 mg) by nebulization every 6 hours as needed for shortness of breath or wheezing 3 mL 4    albuterol (VENTOLIN HFA) 108 (90 Base) MCG/ACT inhaler INHALE 2 PUFFS INTO THE LUNGS EVERY 6 HOURS AS NEEDED FOR SHORTNESS OF BREATH / DYSPNEA 54 g 1    atorvastatin (LIPITOR) 20 MG tablet TAKE ONE TABLET BY MOUTH ONE TIME DAILY 90 tablet 0    augmented betamethasone dipropionate (DIPROLENE AF) 0.05 % external cream Apply to affected area twice daily 50 g 2    benzonatate (TESSALON) 200 MG capsule Take 1 capsule (200 mg) by mouth 2 times daily as needed for cough 40 capsule 1    betamethasone dipropionate (DIPROSONE) 0.05 % external ointment Apply topically 2 times daily To areas of skin sores.  Can also use on mouth sores as needed twice daily. 50 g 3    buPROPion (WELLBUTRIN XL) 150 MG 24 hr tablet Take 1 tablet (150 mg) by mouth every morning To take with the 300 mg dose for a total of 450 mg daily. 90 tablet 1    buPROPion (WELLBUTRIN XL) 300 MG 24 hr  tablet Take 1 tablet (300 mg) by mouth every morning To take with the 150 mg dose for a total of 450 mg daily. 90 tablet 1    busPIRone (BUSPAR) 15 MG tablet Take 1 tablet (15 mg) by mouth 2 times daily 60 tablet 1    butorphanol (STADOL) 10 MG/ML nasal spray Spray 1 spray in nostril every 4 hours as needed for pain 2.5 mL 3    celecoxib (CELEBREX) 200 MG capsule TAKE ONE CAPSULE BY MOUTH TWICE A DAY AS NEEDED FOR PAIN Strength: 200 mg 180 capsule 1    chlorhexidine (PERIDEX) 0.12 % solution Swish and spit 15 mLs in mouth 2 times daily 1893 mL 4    clindamycin (CLINDAMAX) 1 % external gel Apply topically 2 times daily 30 g 4    clotrimazole (MYCELEX) 10 MG lozenge Place 1 lozenge (10 mg) inside cheek 5 times daily 70 lozenge 1    colchicine (COLCRYS) 0.6 MG tablet Take 1 tablet (0.6 mg) by mouth 2 times daily 60 tablet 1    desonide (DESOWEN) 0.05 % external cream Apply topically 2 times daily To sores on face 60 g 3    dextran 70-hypromellose (TEARS NATURALE FREE PF) 0.1-0.3 % ophthalmic solution Place 2 drops into both eyes daily as needed (for dry eyes) 35 each 3    doxycycline monohydrate (MONODOX) 100 MG capsule One pill twice daily, after meals and with big glass of water 28 capsule 3    DULoxetine (CYMBALTA) 60 MG capsule Take 2 capsules (120 mg) by mouth daily 180 capsule 1    dupilumab (DUPIXENT) 300 MG/2ML prefilled pen Inject 2 mLs (300 mg) Subcutaneous every 14 days After first loading dose 4 mL 2    famotidine (PEPCID) 40 MG tablet Take 1 tablet (40 mg) by mouth daily 90 tablet 0    fluconazole (DIFLUCAN) 150 MG tablet Take 1 tablet (150 mg) by mouth every 3 days 6 tablet 0    fluocinonide (LIDEX) 0.05 % external gel Apply topically 2 times daily as needed 15 g 1    gabapentin (NEURONTIN) 300 MG capsule Take 1 capsule (300 mg) by mouth at bedtime 30 capsule 1    hydrochlorothiazide (HYDRODIURIL) 25 MG tablet Take 1 tablet (25 mg) by mouth daily 90 tablet 1    hydrocortisone 2.5 % cream APPLY TOPICALLY 2  TIMES DAILY AS NEEDED 30 g 3    lidocaine, viscous, (XYLOCAINE) 2 % solution Swish and spit 10ml every 3 hours as needed for oral pain; max 8 doses/24hrs.  Do not eat or chew gum for 60 minutes following use.      losartan (COZAAR) 100 MG tablet Take 1 tablet (100 mg) by mouth daily 90 tablet 1    metoprolol succinate ER (TOPROL XL) 100 MG 24 hr tablet Take 1 tablet (100 mg) by mouth 2 times daily 180 tablet 1    metoprolol succinate ER (TOPROL XL) 25 MG 24 hr tablet Take 1 tablet (25 mg) by mouth 3 times daily 270 tablet 1    mometasone-formoterol (DULERA) 200-5 MCG/ACT inhaler Inhale 2 puffs into the lungs 2 times daily 39 g 1    montelukast (SINGULAIR) 10 MG tablet Take 1 tablet (10 mg) by mouth At Bedtime 90 tablet 3    mupirocin (BACTROBAN) 2 % external cream Apply to affected area three times daily 60 g 1    nystatin (MYCOSTATIN) 279596 UNIT/ML suspension Take by mouth 4 times daily Has recurrent thrush. Uses for 2 weeks at a time as needed. 380 mL 1    oxybutynin ER (DITROPAN XL) 5 MG 24 hr tablet Take 2 tablets (10 mg) by mouth daily 180 tablet 2    predniSONE (DELTASONE) 10 MG tablet Take 1 tablet (10 mg) by mouth daily 90 tablet 3    rizatriptan (MAXALT) 10 MG tablet Take 1 tablet (10 mg) by mouth at onset of headache for migraine May repeat in 2 hours. Max 3 tablets/24 hours. 18 tablet 1    rOPINIRole (REQUIP) 1 MG tablet TAKE THREE TABLETS BY MOUTH AT BEDTIME 270 tablet 3    tacrolimus (PROTOPIC) 0.1 % external ointment Apply topically 2 times daily To areas of sores on face 60 g 1    tiotropium (SPIRIVA RESPIMAT) 2.5 MCG/ACT inhaler Inhale 2 puffs into the lungs daily 12 g 3    traMADol (ULTRAM) 50 MG tablet Take 1 tablet (50 mg) by mouth every 12 hours as needed for severe pain 45 tablet 0    tranexamic acid (LYSTEDA) 650 MG tablet Take 1 tablet (650 mg) by mouth 2 times daily for 30 days 60 tablet 3    traZODone (DESYREL) 50 MG tablet Take 3 tablets (150 mg) by mouth At Bedtime (Patient taking  differently: Take 50 mg by mouth nightly as needed for sleep) 270 tablet 0    triamcinolone (ARISTOCORT HP) 0.5 % external cream Apply topically 2 times daily 60 g 0    valACYclovir (VALTREX) 500 MG tablet Take 1 tablet (500 mg) by mouth daily 90 tablet 1    ZYRTEC 10 MG OR TABS Take 10 mg by mouth daily as needed 90 3        Allergies:  Allergies   Allergen Reactions    Cats      and rabbits/wheezing    Dogs      wheezing    Lyrica [Pregabalin] Other (See Comments)     Rash, mouth sores, itching and burning    Seasonal Allergies      rhinits       ROS:  Remainder of a comprehensive 10 point ROS is negative unless noted above.    Social History:  Denies any tobacco use. No significant alcohol use. Denies any illicit drug use.     Family History:  Two daughter  31 Yo daughter  26 yo daughter  3 grandsons    1 sister- older- high blood glucose, arthritis  1 brother- older- no idea    Mother- - heart valve. Had MDS. Needed a shot every month  Father- - cancer    Objectives:  Reviewed photos in the chart    Labs:    Ferritin   Date Value Ref Range Status   2023 100 11 - 328 ng/mL Final   2021 376 (H) 8 - 252 ng/mL Final     Iron   Date Value Ref Range Status   2023 99 37 - 145 ug/dL Final   2021 50 35 - 180 ug/dL Final     Iron Binding Cap   Date Value Ref Range Status   2021 350 240 - 430 ug/dL Final     Iron Binding Capacity   Date Value Ref Range Status   2023 272 240 - 430 ug/dL Final   09/15/2021 284 240 - 430 ug/dL Final       Refer to [facility]- autoantibody platelet study    Imaging:  Not applicable

## 2023-11-15 NOTE — PROGRESS NOTES
11/15/2023     E-Consult has been accepted.    Interprofessional consultation requested by:  Liz Peterson MD      Clinical Question/Purpose: MY CLINICAL QUESTION IS: patient with longstanding history of anxiety and depression. Most recently and increase of Wellbutrin XL to 450 mg daily. Patient is having some mild headache and nausea but feels that she can manage to see if that improves with time.     Patient assessment and information reviewed: chart review    Recommendations:  I know you mentioned therapy to her, but this may be the best option.  There are no side effects or medication interactions to worry about and it is as effective as medications.  We get our best results with both therapy and medications.  I strongly encourage getting a therapist to help out.    As far as medications go, there are a few things that we might be able to do.  She is on gabapentin 300 mg at bedtime per Epic.  Gabapentin is an excellent medication for anxiety, but it needs to be given 3 times a day.  We usually start at 100 mg tid and increase from there.  The average dose is 300 mg tid but it can go up to 1200 mg tid.  Since she is on 300 mg at bedtime, I think the next step would be to add 100 mg in the morning and 100 mg early afternoon.  If not improved, then going up to 300 mg tid.  It may take a few weeks to see full improvement, but sometimes people can feel better within a few days.  Gabapentin can cause sleepiness and some cognitive dulling, so this should be monitored but we often will increase the nighttime dose more due to the sleepiness.      Another option could be seroquel which can be helpful for anxiety.  It can be scheduled or used prn.  It can make one sleepy, so often we will start with a night dose and can use lower doses during the day.  It also depends on when her anxiety is at its worse and dosing around that.  It could be started 25 mg at bedtime with a bid prn dosing of 12.5 - 25 mg as needed.   Max in this situation would be around 300 mg but for those with psychosis, it can go up to 900 mg.  It can cause some weight gain the longer it is used and the higher the dosage.    Another option is abilify.  It can be a nice adjunctive for anxiety/depression.  Abilify 2 mg is a good starting dose.  If not better in a few weeks, can increase to 5 mg (comes in 2 mg and 5 mg tabs, so easier to go to 5 mg).  Max is 15 mg as an adjunct (30 mg is psychosis).  If any muscle stiffness, mouth/tongue movements or restlessness occurs, the medication should be stopped.  This is also true for seroquel.  This is rare for both these medications but even rarer for seroquel.      Thank you for the consult.        The recommendations provided in this E-Consult are based on a review of clinical data pertinent to the clinical question presented, without a review of the patient's complete medical record or, the benefit of a comprehensive in-person or virtual patient evaluation. This consultation should not replace the clinical judgement and evaluation of the provider ordering this E-Consult. Any new clinical issues, or changes in patient status since the filing of this E-Consult will need to be taken into account when assessing these recommendations. Please contact me if you have further questions.    My total time spent reviewing clinical information and formulating assessment was 15 minutes.        Luke Jimenez MD

## 2023-11-16 ENCOUNTER — MYC MEDICAL ADVICE (OUTPATIENT)
Dept: HEMATOLOGY | Facility: CLINIC | Age: 63
End: 2023-11-16

## 2023-11-16 DIAGNOSIS — L28.1 PRURIGO NODULARIS: ICD-10-CM

## 2023-11-16 LAB
ALBUMIN SERPL ELPH-MCNC: 4.1 G/DL (ref 3.7–5.1)
ALPHA1 GLOB SERPL ELPH-MCNC: 0.4 G/DL (ref 0.2–0.4)
ALPHA2 GLOB SERPL ELPH-MCNC: 0.9 G/DL (ref 0.5–0.9)
ANA SER QL IF: NEGATIVE
B-GLOBULIN SERPL ELPH-MCNC: 0.8 G/DL (ref 0.6–1)
DSDNA AB SER-ACNC: 0.9 IU/ML
GAMMA GLOB SERPL ELPH-MCNC: 0.9 G/DL (ref 0.7–1.6)
M PROTEIN SERPL ELPH-MCNC: 0 G/DL
PROT PATTERN SERPL ELPH-IMP: NORMAL
PROT PATTERN SERPL IFE-IMP: NORMAL

## 2023-11-16 NOTE — TELEPHONE ENCOUNTER
Assessment & Plan:      Multiple skin ulcers resembling prurigo/neurodermatitis  Facial erosions, chronic active problem, uncontrolled.  But slowly improving.  Patient with a history of recurring oral and genital sores, likely recurrent aphthous ulcers, as well as multiple diffuse discrete skin ulcers.   Today, Jacquie notes ongoing skin sores on face, however only 3 remain and the rest have healed. She reports improvement in her symptoms but does occasionally still have stinging intermittent pains, does have moments of relief. Symptoms arely with nasal  butorphanol used as rescue therapy. Overall, patient is improved and she does not report any other new concerns or complaints.   - Gentle skin care regimen, provided   - Protopic and mupirocin topically   - Betamethasone ointment and desonide cream as needed for persistent skin sores and pain  - Amytriptyline/ketamine cream to apply twice daily to areas of sores  - Dupixent 300mg q2 weeks and is tolerating this without problems   - N-acetylcystiene 600 mg daily   - using intranasal butorphanol as rescue therapy      Recurrent aphthous ulcers  - well managed      Procedures Performed:   None     Follow-up: 3 month(s) in-person, or earlier for new or changing lesions

## 2023-11-16 NOTE — PROGRESS NOTES
Platelet autoantibody testing is NEGATIVE  This is repeat from testing done in 2021    Makes likelihood of immune-mediated platelet defect less likely at this juncture.     Emely Grimes MD/PhD   of Medicine  Division of Hematology, Oncology and Transplantation

## 2023-11-17 LAB — FIBRINOGEN AG PPP IA-MCNC: 587 MG/DL

## 2023-11-20 ENCOUNTER — MYC MEDICAL ADVICE (OUTPATIENT)
Dept: FAMILY MEDICINE | Facility: CLINIC | Age: 63
End: 2023-11-20
Payer: COMMERCIAL

## 2023-11-20 ENCOUNTER — TELEPHONE (OUTPATIENT)
Dept: RHEUMATOLOGY | Facility: CLINIC | Age: 63
End: 2023-11-20
Payer: COMMERCIAL

## 2023-11-20 ENCOUNTER — MYC MEDICAL ADVICE (OUTPATIENT)
Dept: HEMATOLOGY | Facility: CLINIC | Age: 63
End: 2023-11-20
Payer: COMMERCIAL

## 2023-11-20 DIAGNOSIS — D69.1: Primary | ICD-10-CM

## 2023-11-20 NOTE — TELEPHONE ENCOUNTER
Appointment for 11/21 already scheduled.     SHUN Fulton   Email: [email or web address]  [facility] - Rheumatology  Phone: [phone]  Fax: [phone]

## 2023-11-20 NOTE — TELEPHONE ENCOUNTER
Patient called regarding bruising on knee. See images sent today. She denies any new symptoms, but states color is getting darker. Patient is thinking this will need to be drained. Patient is unsure who she should be working with for this. RN advised patient that it looks like she has been talking with hematology for this (see MyChart from 11/14, visit on 11/15, and MyCharts on 11/15 and 11/16).     Patient is also requesting ferritin lab ordered and wondered about labs PCP ordered in October (CMP and TSH). RN advised patient that it looks like a CMP was done on 11/13 ordered by Dr. Grimes. Patient thought this was different than the one PCP ordered, but she has not heard anything back on these. RN advised I would get a message to PCP to see if she can review the CMP and advise if patient should still do the TSH.     RN was unsure if this is something PCP would address or if it should be hematology?    MARVA SinghN, RN

## 2023-11-20 NOTE — TELEPHONE ENCOUNTER
Guillermo Toribio MD  P [facility] [place]; P  Rheumatology  Can we offer her follow up appointment to discuss about her symptoms and treatment options, we can use any JOSTIN slot.

## 2023-11-20 NOTE — PROGRESS NOTES
Jacquie is a 63 year old who is being evaluated via a billable video visit.      How would you like to obtain your AVS? MyChart  If the video visit is dropped, the invitation should be resent by: Text to cell phone: [phone]  Will anyone else be joining your video visit? No      Video-Visit Details    Type of service:  Video Visit     Video start time : 12:35 PM   Video Stop time : 1:00 PM    Originating Location (pt. Location): Home    Distant Location (provider location):  Off-site  Platform used for Video Visit: [facility]    Rheumatology Clinic Visit     Jacquie Amador MRN# [id number]   YOB: 1960 Age: 62 year old     Date of Visit: Nov 21, 2023   Primary care provider: Liz Peterson          Assessment and Plan:     Assessment     Platelet granule defect  Recurrent skin lesions  Oral aphthous ulcer  Polyarticular osteoarthritis    Ms. Amador is 63 year old female seen virtually for evaluation of possible Behcet's disease    Recurrent oral sores and skin lesions : Reports history of recurrent skin lesions for the last 7 years.  Dermatology evaluation in the past raised concern about excoriations as a cause of those lesions.  Skin biopsy in 2017 showed chronic inflammation likely related to excoriation.  No evidence of vasculitis was noted.  She has multiple discrete skin lesions on her forearms, face, chin and forehead.  Skin biopsy performed 9/22 showed perivascular neutrophils clustered around superficial vessels raising concern about neutrophilic dermatosis such as Behcet's disease.  In addition she reports recurrent oral sores.  Describes them as blisters which easily pop. She does not have any genital ulcers at present, denies any history of recurrent genital sores.     She denies any history of skin blisters or lesion after pinprick.    Due to recurrent oral sores and skin lesions, we discussed about using colchicine.  She has used colchicine 0.6 mg twice daily for couple weeks but due  to worsening headaches she discontinued.  We can try Apremilast, which has been approved for recurrent oral sores due to Behcet's disease.  Side effects of apremilast including diarrhea, nausea, worsening of depression and weight loss discussed.  She will start with apremilast starter pack once it gets approved.    Polyarticular osteoarthritis: She reports pain in her hands, knees, shoulders and feet.  Previous x-rays reviewed which are mostly consistent with osteoarthritis. She has bilateral first CMC joint involvement with osteoarthritis.  Mild tenderness over bilateral knees. For Osteoarthritis she can use Voltaren gel topically.  Tylenol arthritis 1-2 tabs as needed.  She avoids NSAIDs due to platelet disorder.    Plan    Will start Otezla for behcet's disease     Follow up in 3 months, can use JOSTIN slot.     -- Orders placed this encounter  Orders Placed This Encounter   Procedures    Med Therapy Management Referral       -- Medications   Orders Placed This Encounter   Medications    Apremilast (OTEZLA) 10 & 20 & 30 MG TBPK     Sig: Take by mouth according to the instructions on the packet. Hold for signs of infection,any GI upset, weight loss or depression concerns, and seek medical attention.     Dispense:  1 each     Refill:  0     1 each = 1 Starter Pack    apremilast (OTEZLA) 30 MG tablet     Sig: Take 1 tablet (30 mg) by mouth 2 times daily Hold for signs of infection, and seek medical attention.     Dispense:  60 tablet     Refill:  5     TT 30 min was spent on date of the encounter doing chart review, history and exam, documentation and further activities as noted above. Any prior notes, outside records, laboratory results, and imaging studies were reviewed if relevant.    Patient verbalized agreement with and understanding of the rationale for the diagnosis and treatment plan.  All questions were answered to best of my ability and the patient's satisfaction.      Chart documentation done in part with  [employer] Voice recognition Software. Although reviewed after completion, some word and grammatical error may remain.            Active Problem List:     Patient Active Problem List    Diagnosis Date Noted    Prurigo nodularis 08/13/2023     Priority: Medium    Skin pain 03/04/2023     Priority: Medium    Other atopic dermatitis 02/04/2023     Priority: Medium    Facial eczema 02/04/2023     Priority: Medium    Acquired platelet disorder (H) 12/28/2022     Priority: Medium    Dermatitis 12/07/2022     Priority: Medium    Encounter for long-term current use of high risk medication 12/07/2022     Priority: Medium    Encounter for long-term (current) use of high-risk medication 11/06/2022     Priority: Medium    Behcet's syndrome involving oral mucosa (H) 11/03/2022     Priority: Medium    Aphthous ulcer 10/15/2022     Priority: Medium    Rash 10/15/2022     Priority: Medium    Pruritus 10/15/2022     Priority: Medium    Platelet granule defect (H) 12/22/2021     Priority: Medium     Sees Dr. Emely Grimes      Morbid obesity (H) 08/05/2021     Priority: Medium    Skin ulcer, limited to breakdown of skin (H) 07/19/2019     Priority: Medium    Migraine without aura and without status migrainosus, not intractable 12/21/2018     Priority: Medium    Intractable chronic migraine without aura and without status migrainosus 05/29/2018     Priority: Medium    Impaired fasting glucose 05/29/2018     Priority: Medium    Sinus tachycardia 12/08/2017     Priority: Medium    Primary osteoarthritis of both first carpometacarpal joints 09/28/2017     Priority: Medium    Arthritis of metatarsophalangeal joint 09/28/2017     Priority: Medium    Medical marijuana use 12/19/2016     Priority: Medium    Chronic, continuous use of opioids      Priority: Medium    Scratching 04/26/2016     Priority: Medium    Advanced directives, counseling/discussion 04/26/2016     Priority: Medium     Hand out given.       Iron deficiency 04/18/2016      Priority: Medium    Overweight 03/15/2016     Priority: Medium    Trochanteric bursitis of both hips 08/27/2015     Priority: Medium    Primary focal hyperhidrosis 07/03/2015     Priority: Medium    Right ankle instability 06/25/2015     Priority: Medium    Osteoarthritis of carpometacarpal joint of thumb - bilateral 03/26/2015     Priority: Medium    Trochanteric bursitis 03/26/2015     Priority: Medium    Iron deficiency anemia 11/14/2014     Priority: Medium    Constipation 04/04/2014     Priority: Medium    Lumbar disc disease with radiculopathy 04/04/2014     Priority: Medium    Chronic rhinitis 06/10/2013     Priority: Medium    Myalgia 04/11/2013     Priority: Medium    Generalized anxiety disorder 05/10/2011     Priority: Medium     Diagnosis updated by automated process. Provider to review and confirm.      Hypertension goal BP (blood pressure) < 140/90 01/18/2011     Priority: Medium    HYPERLIPIDEMIA LDL GOAL <130 10/31/2010     Priority: Medium    Multiple joint pain 11/18/2009     Priority: Medium     Sees [facility]ology [facility] at [place].  Receives steroid injections, but also uses Vicodin for pain.    Patient is followed by JAMILA OSORIO for ongoing prescription of narcotic pain medicine.  Med: Vicodin.   Maximum use per month: 60  Expected duration: indeterminate  Narcotic agreement on file: NO  Clinic visit recommended: Q 6  months        Moderate recurrent major depression (H) 12/04/2008     Priority: Medium    Esophageal reflux 07/05/2007     Priority: Medium    Allergic rhinitis due to other allergen 06/30/2006     Priority: Medium    Moderate persistent asthma without complication 09/21/2005     Priority: Medium            History of Present Illness:   Jacquie Amador is a 63 year old female with PMH of  Asthma, esophageal reflux, depression, hypertension, hyperlipidemia, osteoarthritis, migraines, morbid obesity, platelet granule defect seen as follow up for possible Behcet's  disease.    History from initial visit  : She gets lesions on her face, neck, upper extremities. She has [facility] [place] in 2017 and had skin biopsy which was consistent with chronic inflammation related to excoriations. In 2021 she had virtual visit with Dr. Diez in dermatology and her skin lesions appeared to be consistent again with excoriation, but she was recommended to come back for punch biopsy to rule out porphyria.  Skin biopsy has not been repeated since.  In addition she also reports getting lesions in her mouth which pop and open.  She gets these lesions once a month and are very painful.  Denies any history of recurrent genital sores.  Denies any history of eye inflammation, iritis uveitis episodes.    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  She was started on steroids which showed modest improvement in joint pains and fatigue.  MMF was started to replace steroids due to hyperglycemia and prediabetes but could not tolerate it. Dr. Zaldivar will consider Imuran.    Patient reports pain in her hands, knees, shoulders. She used to get corticose injection in her thumb joints which helps some.  X-ray of bilateral hands done in the past has shown mainly first CMC joint arthritis.  X-ray of knees and left shoulder showed mostly mild degenerative changes.  X-ray of left foot in 2016 showed advanced degenerative arthritis of first MTP joint with complete loss of joint space, small plantar calcaneal spur.     Denies any raynauds, malar rash, photosensitivity, sicca symptoms, pleuritic chest pains, recurrent sinusitis/rhinitis, swallowing difficulty, hearing or visual changes recently.   November 21, 2023 - Skin biopsy performed 9/22 showed perivascular neutrophils clustered around superficial vessels raising concern about neutrophilic dermatosis such as  Behcet's disease.  She was prescribed colchicine 0.6 mg twice daily few weeks ago.  She took it for couple weeks but due to worsening headaches it was discontinued.  She still gets oral sores and skin lesions on her face, neck extremities. She gets sores in her mouth, when it comes it takes longer to heal. She gets canker sores on her gums, palate. She never had genital sores. She gets excoriations on her face which then increase in size. She has pain and dryness in her left eye.   Joint pains mainly in her hands, knees and hips is related to osteoarthritis. She has pain in her knees which worsened after recent fall. She has hand Osteoarthritis. Reports pain in her ankles and hips.          Review of Systems:     Review Of Systems  Constitutional: denies fever, chills, night sweats and weight loss.  Skin: + skin rash.  Eyes: No dryness or irritation in eyes. No episode of eye inflammation or redness.   Ears/Nose/Throat: no recurrent sinus infections.  Respiratory: No shortness of breath, dyspnea on exertion, cough, or hemoptysis  Cardiovascular: no chest pain or palpitations.  Gastrointestinal: no nausea, vomiting, abdominal pain.  Normal bowel movements.  Genitourinary: no dysuria, frequency  or hematuria.  Musculoskeletal: as in HPI  Neurologic: no numbness, tingling.  Psychiatric: no mood disorders.  Hematologic/Lymphatic/Immunologic: no history of easy bruising, petechia or purpura.  No abnormal bleeding.   Endocrine: no h/o thyroid disease, + Diabetes.                  Past Medical History:     Past Medical History:   Diagnosis Date    ASTHMA - MODERATE PERSISTENT 9/21/2005    Chronic pain     Coronary artery disease     CVA (cerebral infarction)     Depressive disorder, not elsewhere classified     Diabetes (H)     Elevated serum alkaline phosphatase level     Liver source    Fibromyalgia     HYPERLIPIDEMIA NEC/NOS 12/29/2006    Hypertriglyceridemia     OA (osteoarthritis)     Thyroid disease     Trochanteric  "bursitis     Unspecified essential hypertension      Past Surgical History:   Procedure Laterality Date    3 teeth pulled      INJECT EPIDURAL TRANSFORAMINAL  2014    [facility]    INJECT JOINT SACROILIAC  2014    [facility]    LAMINECTOMY LUMBAR ONE LEVEL Left 2014    [facility]    ZZC  DELIVERY ONLY      , Low Cervical            Social History:     Social History     Occupational History    Not on file   Tobacco Use    Smoking status: Former     Packs/day: 0.30     Years: 33.00     Additional pack years: 0.00     Total pack years: 9.90     Types: Cigarettes     Start date: 1980     Quit date: 2013     Years since quitting: 10.2     Passive exposure: Past    Smokeless tobacco: Never    Tobacco comments:     since    Vaping Use    Vaping Use: Never used   Substance and Sexual Activity    Alcohol use: No    Drug use: Yes     Comment: medical marjuana     Sexual activity: Not Currently     Partners: Male     Birth control/protection: None            Family History:     Family History   Problem Relation Age of Onset    Thyroid Disease Mother     Arthritis Mother     Colon Cancer Mother     Myelodysplastic syndrome Mother     Thyroid Disease Sister     Arthritis Sister     Lipids Sister     Hypertension Father     Diabetes Father     C.A.D. Father         MI age 75    Cardiovascular Father         heart attack    Cerebrovascular Disease Father     Cancer Father         renal cancer    Arthritis Father     Heart Disease Father         heart attack    Gastrointestinal Disease Father         liver    Genitourinary Problems Father         kidney    Asthma Daughter     Gastrointestinal Disease Daughter     Unknown Other     Multiple Sclerosis Maternal Aunt     Mastocytosis Nephew         \"System Mast Cell Disease and hyperesosinophilia\"    Breast Cancer No family hx of     Cancer - colorectal No family hx of     Alzheimer " Disease No family hx of     Blood Disease No family hx of     Circulatory No family hx of     Eye Disorder No family hx of     Musculoskeletal Disorder No family hx of     Neurologic Disorder No family hx of     Respiratory No family hx of             Allergies:     Allergies   Allergen Reactions    Cats      and rabbits/wheezing    Dogs      wheezing    Lyrica [Pregabalin] Other (See Comments)     Rash, mouth sores, itching and burning    Seasonal Allergies      rhinits            Medications:     Current Outpatient Medications   Medication Sig Dispense Refill    acetaminophen (TYLENOL) 500 MG tablet Take 500-1,000 mg by mouth every 6 hours as needed for mild pain      acetylcysteine (N-ACETYL-L-CYSTEINE) 600 MG CAPS capsule Take 1 capsule (600 mg) by mouth 2 times daily 60 capsule 2    albuterol (PROVENTIL) (2.5 MG/3ML) 0.083% neb solution Take 1 vial (2.5 mg) by nebulization every 6 hours as needed for shortness of breath or wheezing 3 mL 4    albuterol (VENTOLIN HFA) 108 (90 Base) MCG/ACT inhaler INHALE 2 PUFFS INTO THE LUNGS EVERY 6 HOURS AS NEEDED FOR SHORTNESS OF BREATH / DYSPNEA 54 g 1    Apremilast (OTEZLA) 10 & 20 & 30 MG TBPK Take by mouth according to the instructions on the packet. Hold for signs of infection,any GI upset, weight loss or depression concerns, and seek medical attention. 1 each 0    apremilast (OTEZLA) 30 MG tablet Take 1 tablet (30 mg) by mouth 2 times daily Hold for signs of infection, and seek medical attention. 60 tablet 5    atorvastatin (LIPITOR) 20 MG tablet TAKE ONE TABLET BY MOUTH ONE TIME DAILY 90 tablet 0    augmented betamethasone dipropionate (DIPROLENE AF) 0.05 % external cream Apply to affected area twice daily 50 g 2    benzonatate (TESSALON) 200 MG capsule Take 1 capsule (200 mg) by mouth 2 times daily as needed for cough 40 capsule 1    betamethasone dipropionate (DIPROSONE) 0.05 % external ointment Apply topically 2 times daily To areas of skin sores.  Can also use on  mouth sores as needed twice daily. 50 g 3    buPROPion (WELLBUTRIN XL) 150 MG 24 hr tablet Take 1 tablet (150 mg) by mouth every morning To take with the 300 mg dose for a total of 450 mg daily. 90 tablet 1    buPROPion (WELLBUTRIN XL) 300 MG 24 hr tablet Take 1 tablet (300 mg) by mouth every morning To take with the 150 mg dose for a total of 450 mg daily. 90 tablet 1    busPIRone (BUSPAR) 15 MG tablet Take 1 tablet (15 mg) by mouth 2 times daily 60 tablet 1    butorphanol (STADOL) 10 MG/ML nasal spray Spray 1 spray in nostril every 4 hours as needed for pain 2.5 mL 3    celecoxib (CELEBREX) 200 MG capsule TAKE ONE CAPSULE BY MOUTH TWICE A DAY AS NEEDED FOR PAIN Strength: 200 mg 180 capsule 1    chlorhexidine (PERIDEX) 0.12 % solution Swish and spit 15 mLs in mouth 2 times daily 1893 mL 4    clindamycin (CLINDAMAX) 1 % external gel Apply topically 2 times daily 30 g 4    clotrimazole (MYCELEX) 10 MG lozenge Place 1 lozenge (10 mg) inside cheek 5 times daily 70 lozenge 1    colchicine (COLCRYS) 0.6 MG tablet Take 1 tablet (0.6 mg) by mouth 2 times daily 60 tablet 1    desonide (DESOWEN) 0.05 % external cream Apply topically 2 times daily To sores on face 60 g 3    dextran 70-hypromellose (TEARS NATURALE FREE PF) 0.1-0.3 % ophthalmic solution Place 2 drops into both eyes daily as needed (for dry eyes) 35 each 3    doxycycline monohydrate (MONODOX) 100 MG capsule One pill twice daily, after meals and with big glass of water 28 capsule 3    DULoxetine (CYMBALTA) 60 MG capsule Take 2 capsules (120 mg) by mouth daily 180 capsule 1    dupilumab (DUPIXENT) 300 MG/2ML prefilled pen Inject 2 mLs (300 mg) Subcutaneous every 14 days After first loading dose 4 mL 2    famotidine (PEPCID) 40 MG tablet Take 1 tablet (40 mg) by mouth daily 90 tablet 0    fluconazole (DIFLUCAN) 150 MG tablet Take 1 tablet (150 mg) by mouth every 3 days 6 tablet 0    fluocinonide (LIDEX) 0.05 % external gel Apply topically 2 times daily as needed 15  g 1    gabapentin (NEURONTIN) 300 MG capsule Take 1 capsule (300 mg) by mouth at bedtime 30 capsule 1    hydrochlorothiazide (HYDRODIURIL) 25 MG tablet Take 1 tablet (25 mg) by mouth daily 90 tablet 1    hydrocortisone 2.5 % cream APPLY TOPICALLY 2 TIMES DAILY AS NEEDED 30 g 3    lidocaine, viscous, (XYLOCAINE) 2 % solution Swish and spit 10ml every 3 hours as needed for oral pain; max 8 doses/24hrs.  Do not eat or chew gum for 60 minutes following use.      losartan (COZAAR) 100 MG tablet Take 1 tablet (100 mg) by mouth daily 90 tablet 1    metoprolol succinate ER (TOPROL XL) 100 MG 24 hr tablet Take 1 tablet (100 mg) by mouth 2 times daily 180 tablet 1    metoprolol succinate ER (TOPROL XL) 25 MG 24 hr tablet Take 1 tablet (25 mg) by mouth 3 times daily 270 tablet 1    mometasone-formoterol (DULERA) 200-5 MCG/ACT inhaler Inhale 2 puffs into the lungs 2 times daily 39 g 1    montelukast (SINGULAIR) 10 MG tablet Take 1 tablet (10 mg) by mouth At Bedtime 90 tablet 3    mupirocin (BACTROBAN) 2 % external cream Apply to affected area three times daily 60 g 1    nystatin (MYCOSTATIN) 922725 UNIT/ML suspension Take by mouth 4 times daily Has recurrent thrush. Uses for 2 weeks at a time as needed. 380 mL 1    oxybutynin ER (DITROPAN XL) 5 MG 24 hr tablet Take 2 tablets (10 mg) by mouth daily 180 tablet 2    predniSONE (DELTASONE) 10 MG tablet Take 1 tablet (10 mg) by mouth daily 90 tablet 3    rizatriptan (MAXALT) 10 MG tablet Take 1 tablet (10 mg) by mouth at onset of headache for migraine May repeat in 2 hours. Max 3 tablets/24 hours. 18 tablet 1    rOPINIRole (REQUIP) 1 MG tablet TAKE THREE TABLETS BY MOUTH AT BEDTIME 270 tablet 3    tacrolimus (PROTOPIC) 0.1 % external ointment Apply topically 2 times daily To areas of sores on face 60 g 1    tiotropium (SPIRIVA RESPIMAT) 2.5 MCG/ACT inhaler Inhale 2 puffs into the lungs daily 12 g 3    traMADol (ULTRAM) 50 MG tablet Take 1 tablet (50 mg) by mouth every 12 hours as  needed for severe pain 45 tablet 0    tranexamic acid (LYSTEDA) 650 MG tablet Take 1 tablet (650 mg) by mouth every morning AND 2 tablets (1,300 mg) at bedtime. Do all this for 90 days. 270 tablet 3    traZODone (DESYREL) 50 MG tablet Take 3 tablets (150 mg) by mouth At Bedtime (Patient taking differently: Take 50 mg by mouth nightly as needed for sleep) 270 tablet 0    triamcinolone (ARISTOCORT HP) 0.5 % external cream Apply topically 2 times daily 60 g 0    valACYclovir (VALTREX) 500 MG tablet Take 1 tablet (500 mg) by mouth daily 90 tablet 1    ZYRTEC 10 MG OR TABS Take 10 mg by mouth daily as needed 90 3            Physical Exam:   Last menstrual period 07/17/2009, not currently breastfeeding.  Wt Readings from Last 4 Encounters:   09/19/23 93.9 kg (207 lb)   05/09/23 98.4 kg (217 lb)   02/28/23 101.6 kg (224 lb)   09/14/22 103 kg (227 lb)       Constitutional: Morbidly obese, appearing stated age; cooperative    MS:No active synovitis noted over MCP, PIP, DIP joints. Tenderness over b/l first CMC joints, + thumb drop.  No s/t over wrists, elbows. Normal ROM of shoulders. No dactylitis,  tenosynovitis, enthesopathy.    Skin: Lesions over forearms, face, neck noted.  Psych: nl judgement, orientation, memory, affect.         Data:     No results found for any visits on 11/21/23.    Recent Labs   Lab Test 07/18/22  1055 06/13/22  1205 05/10/22  0931 11/23/21  1211 01/20/21  1530 09/22/20  0955   WBC 6.5 8.0 7.3 7.2   < > 6.0   RBC 4.26 4.52 4.53 4.68   < > 4.30   HGB 12.8 13.9 13.8 14.5   < > 12.4   HCT 38.1 41.4 41.4 43.2   < > 38.7   MCV 89 92 91 92   < > 90   RDW 13.8 14.1 14.5 12.6   < > 14.8    359 321 338   < > 375   ALBUMIN 3.6 3.9  --  3.8   < > 3.7   CRP 8.0  --   --  5.3  --  <2.9   BUN 12 17 19 19   < > 12    < > = values in this interval not displayed.      Recent Labs   Lab Test 07/18/22  1055 08/05/21  1150 03/03/21  0706 06/30/17  1720 03/15/16  1516 11/19/15  1525 08/29/14  0934   TSH 1.08  1.52 1.89   < > 1.72 1.32 2.44   T4  --   --   --   --  1.01 0.93 0.95    < > = values in this interval not displayed.     Hemoglobin   Date Value Ref Range Status   11/13/2023 14.2 11.7 - 15.7 g/dL Final   08/17/2023 13.6 11.7 - 15.7 g/dL Final   05/09/2023 14.2 11.7 - 15.7 g/dL Final   04/16/2021 13.7 11.7 - 15.7 g/dL Final   01/20/2021 13.1 11.7 - 15.7 g/dL Final   09/22/2020 12.4 11.7 - 15.7 g/dL Final     Urea Nitrogen   Date Value Ref Range Status   11/13/2023 13.2 8.0 - 23.0 mg/dL Final   08/17/2023 19.1 8.0 - 23.0 mg/dL Final   05/09/2023 18.7 8.0 - 23.0 mg/dL Final   02/14/2023 17 7 - 30 mg/dL Final   02/07/2023 15 7 - 30 mg/dL Final   12/28/2022 22 7 - 30 mg/dL Final   01/20/2021 16 7 - 30 mg/dL Final   09/22/2020 12 7 - 30 mg/dL Final   02/04/2020 14 7 - 30 mg/dL Final     Sed Rate   Date Value Ref Range Status   09/22/2020 25 0 - 30 mm/h Final   01/08/2018 19 0 - 30 mm/h Final   09/09/2014 18 0 - 30 mm/h Final     Erythrocyte Sedimentation Rate   Date Value Ref Range Status   07/18/2022 35 (H) 0 - 30 mm/hr Final   11/23/2021 29 0 - 30 mm/hr Final     CRP Inflammation   Date Value Ref Range Status   07/18/2022 8.0 0.0 - 8.0 mg/L Final   11/23/2021 5.3 0.0 - 8.0 mg/L Final   09/22/2020 <2.9 0.0 - 8.0 mg/L Final   01/08/2018 4.7 0.0 - 8.0 mg/L Final   09/09/2014 4.8 0.0 - 8.0 mg/L Final     AST   Date Value Ref Range Status   11/13/2023 43 0 - 45 U/L Final     Comment:     Reference intervals for this test were updated on 6/12/2023 to more accurately reflect our healthy population. There may be differences in the flagging of prior results with similar values performed with this method. Interpretation of those prior results can be made in the context of the updated reference intervals.   08/17/2023 23 0 - 45 U/L Final     Comment:     Reference intervals for this test were updated on 6/12/2023 to more accurately reflect our healthy population. There may be differences in the flagging of prior results with  similar values performed with this method. Interpretation of those prior results can be made in the context of the updated reference intervals.   05/09/2023 27 10 - 35 U/L Final   01/20/2021 16 0 - 45 U/L Final   09/22/2020 12 0 - 45 U/L Final   02/04/2020 18 0 - 45 U/L Final     Albumin   Date Value Ref Range Status   11/13/2023 4.4 3.5 - 5.2 g/dL Final   08/17/2023 4.1 3.5 - 5.2 g/dL Final   05/09/2023 4.3 3.5 - 5.2 g/dL Final   02/14/2023 3.5 3.4 - 5.0 g/dL Final   02/07/2023 3.3 (L) 3.4 - 5.0 g/dL Final   12/28/2022 3.5 3.4 - 5.0 g/dL Final   01/20/2021 3.8 3.4 - 5.0 g/dL Final   09/22/2020 3.7 3.4 - 5.0 g/dL Final   02/04/2020 3.9 3.4 - 5.0 g/dL Final     Alkaline Phosphatase   Date Value Ref Range Status   11/13/2023 102 35 - 104 U/L Final   08/17/2023 82 35 - 104 U/L Final   05/09/2023 96 35 - 104 U/L Final   01/20/2021 132 40 - 150 U/L Final   09/22/2020 108 40 - 150 U/L Final   02/04/2020 129 40 - 150 U/L Final     ALT   Date Value Ref Range Status   11/13/2023 50 0 - 50 U/L Final     Comment:     Reference intervals for this test were updated on 6/12/2023 to more accurately reflect our healthy population. There may be differences in the flagging of prior results with similar values performed with this method. Interpretation of those prior results can be made in the context of the updated reference intervals.     08/17/2023 14 0 - 50 U/L Final     Comment:     Reference intervals for this test were updated on 6/12/2023 to more accurately reflect our healthy population. There may be differences in the flagging of prior results with similar values performed with this method. Interpretation of those prior results can be made in the context of the updated reference intervals.     05/09/2023 20 10 - 35 U/L Final   01/20/2021 26 0 - 50 U/L Final   09/22/2020 22 0 - 50 U/L Final   02/04/2020 23 0 - 50 U/L Final     Rheumatoid Factor   Date Value Ref Range Status   12/08/2021 8 <12 IU/mL Final   01/08/2018 <20 <20  IU/mL Final     Recent Labs   Lab Test 11/13/23  1021 08/17/23  1036 05/09/23  1147 09/22/22  1152 07/18/22  1055 11/23/21  1211 08/05/21  1150 04/16/21  0940 03/03/21  0706   WBC 9.5 9.1 13.2*   < > 6.5   < > 7.6   < >  --    HGB 14.2 13.6 14.2   < > 12.8   < > 13.8   < >  --    HCT 42.7 40.9 43.4   < > 38.1   < > 41.5   < >  --    MCV 93 89 91   < > 89   < > 92   < >  --     290 290   < > 330   < > 277   < >  --    BUN 13.2 19.1 18.7   < > 12   < > 13  --   --    TSH  --   --   --   --  1.08  --  1.52  --  1.89   AST 43 23 27   < > 15   < > 16  --   --    ALT 50 14 20   < > 19   < > 27  --   --    ALKPHOS 102 82 96   < > 121   < > 128  --   --     < > = values in this interval not displayed.     Outside studies reviewed: Skin biopsy report from 2017 reviewed    Reviewed Rheumatology lab flowsheet    Guillermo Toribio MD  [facility]  Department of Rheumatology & Autoimmune Disorders  [facility] [place]: [phone]   Pager - [phone]

## 2023-11-21 ENCOUNTER — MYC MEDICAL ADVICE (OUTPATIENT)
Dept: FAMILY MEDICINE | Facility: CLINIC | Age: 63
End: 2023-11-21

## 2023-11-21 ENCOUNTER — VIRTUAL VISIT (OUTPATIENT)
Dept: RHEUMATOLOGY | Facility: CLINIC | Age: 63
End: 2023-11-21
Payer: COMMERCIAL

## 2023-11-21 DIAGNOSIS — M35.2 BEHCET'S DISEASE (H): Primary | ICD-10-CM

## 2023-11-21 LAB
MAYO MISC RESULT: NORMAL
SCANNED LAB RESULT: NORMAL
SCANNED LAB RESULT: NORMAL

## 2023-11-21 PROCEDURE — 99214 OFFICE O/P EST MOD 30 MIN: CPT | Mod: 95 | Performed by: STUDENT IN AN ORGANIZED HEALTH CARE EDUCATION/TRAINING PROGRAM

## 2023-11-22 ENCOUNTER — TELEPHONE (OUTPATIENT)
Dept: RHEUMATOLOGY | Facility: CLINIC | Age: 63
End: 2023-11-22
Payer: COMMERCIAL

## 2023-11-22 NOTE — TELEPHONE ENCOUNTER
acetylcysteine 600 MG     Last Written Prescription Date:  9/12/23  Last Fill Quantity: 60,   # refills: 2  Last Office Visit : 9/12/23  Future Office visit:  12/12/23  Routing refill request to provider for review/approval because:  Drug not on the refill protocol

## 2023-11-22 NOTE — TELEPHONE ENCOUNTER
PA Initiation    Medication: OTEZLA 30 MG PO TABS  Insurance Company: [employer] - Phone [phone] Fax [phone]  Pharmacy Filling the Rx: [facility] - [place], [place] - [address]  Filling Pharmacy Phone:    Filling Pharmacy Fax:    Start Date: 11/22/2023     Jacquie Amador ([place]: [id number])

## 2023-11-22 NOTE — TELEPHONE ENCOUNTER
Prior Authorization Approval    Medication: OTEZLA 30 MG PO TABS  Authorization Effective Date: 10/23/2023  Authorization Expiration Date: 3/21/2024  Approved Dose/Quantity: loading and maintenaince  Reference #: [id number]   Insurance Company: [employer] - Phone [phone] Fax [phone]  Expected CoPay: $ 0  CoPay Card Available: No    Financial Assistance Needed: NA  Which Pharmacy is filling the prescription: [facility] - [place] [address] [address] Amador ([place]: [id number])

## 2023-11-22 NOTE — TELEPHONE ENCOUNTER
Appointments in Next Year      Dec 04, 2023 10:30 AM  MTM New with Golden Cadena [facility] [place] ([facility] ) [phone]     Dec 06, 2023 11:30 AM  Pharmacist Visit with Paulina Mitchell [facility] ([facility] ) [phone]     Dec 12, 2023 11:45 AM  (Arrive by 11:30 AM)  Return Visit with Jay Warren MD  [facility] ([facility] ) [phone]     Dec 20, 2023  1:00 PM  RETURN CLOTTING DISORDER with Emely Grimes MD  [facility] ([facility] ) [phone]     Dec 28, 2023  3:15 PM  (Arrive by 3:00 PM)  3D Screening Mammogram with MGMA1  [facility] ([facility]) [phone]     Dec 29, 2023  3:00 PM  (Arrive by 2:40 PM)  Provider Visit with Liz Peterson MD  [facility] ([facility] ) [phone]     Feb 20, 2024 10:30 AM  (Arrive by 10:15 AM)  Video Visit with Guillermo Toribio MD  [facility] ([facility]) [phone]          BANDAR Parekh, RN  [facility] ~ Registered Nurse  Clinic Triage ~ [place] [facility] [place]  November 22, 2023

## 2023-11-28 ENCOUNTER — VIRTUAL VISIT (OUTPATIENT)
Dept: FAMILY MEDICINE | Facility: CLINIC | Age: 63
End: 2023-11-28
Payer: COMMERCIAL

## 2023-11-28 ENCOUNTER — VIRTUAL VISIT (OUTPATIENT)
Dept: RHEUMATOLOGY | Facility: CLINIC | Age: 63
End: 2023-11-28
Attending: STUDENT IN AN ORGANIZED HEALTH CARE EDUCATION/TRAINING PROGRAM
Payer: COMMERCIAL

## 2023-11-28 DIAGNOSIS — M35.2 BEHCET'S SYNDROME INVOLVING ORAL MUCOSA (H): Primary | ICD-10-CM

## 2023-11-28 DIAGNOSIS — M35.2 BEHCET'S DISEASE (H): ICD-10-CM

## 2023-11-28 DIAGNOSIS — Z71.85 VACCINE COUNSELING: ICD-10-CM

## 2023-11-28 DIAGNOSIS — G43.009 MIGRAINE WITHOUT AURA AND WITHOUT STATUS MIGRAINOSUS, NOT INTRACTABLE: ICD-10-CM

## 2023-11-28 DIAGNOSIS — F41.1 GENERALIZED ANXIETY DISORDER: Primary | ICD-10-CM

## 2023-11-28 DIAGNOSIS — L74.519 PRIMARY FOCAL HYPERHIDROSIS: ICD-10-CM

## 2023-11-28 DIAGNOSIS — I10 HYPERTENSION GOAL BP (BLOOD PRESSURE) < 140/90: ICD-10-CM

## 2023-11-28 DIAGNOSIS — F33.1 MODERATE RECURRENT MAJOR DEPRESSION (H): ICD-10-CM

## 2023-11-28 DIAGNOSIS — G25.9 MOVEMENT DISORDER: ICD-10-CM

## 2023-11-28 DIAGNOSIS — L30.9 DERMATITIS: ICD-10-CM

## 2023-11-28 DIAGNOSIS — E78.5 HYPERLIPIDEMIA LDL GOAL <130: ICD-10-CM

## 2023-11-28 DIAGNOSIS — B37.0 THRUSH: ICD-10-CM

## 2023-11-28 DIAGNOSIS — J45.40 MODERATE PERSISTENT ASTHMA WITHOUT COMPLICATION: ICD-10-CM

## 2023-11-28 DIAGNOSIS — K21.00 GASTROESOPHAGEAL REFLUX DISEASE WITH ESOPHAGITIS WITHOUT HEMORRHAGE: ICD-10-CM

## 2023-11-28 DIAGNOSIS — D69.1: ICD-10-CM

## 2023-11-28 PROCEDURE — 99214 OFFICE O/P EST MOD 30 MIN: CPT | Mod: 95 | Performed by: FAMILY MEDICINE

## 2023-11-28 RX ORDER — QUETIAPINE FUMARATE 25 MG/1
25 TABLET, FILM COATED ORAL 2 TIMES DAILY
Qty: 30 TABLET | Refills: 1 | Status: SHIPPED | OUTPATIENT
Start: 2023-11-28 | End: 2023-11-28

## 2023-11-28 RX ORDER — QUETIAPINE FUMARATE 25 MG/1
25 TABLET, FILM COATED ORAL AT BEDTIME
Qty: 30 TABLET | Refills: 1 | Status: SHIPPED | OUTPATIENT
Start: 2023-11-28 | End: 2023-12-06

## 2023-11-28 NOTE — Clinical Note
11/28/2023       RE: Jacquie Amador  [address]  [place] [address]     Dear Colleague,    Thank you for referring your patient, Jacquie Amador, to the [facility] [place] at [facility]. Please see a copy of my visit note below.    Medication Therapy Management (MTM) Encounter    ASSESSMENT:                            Medication Adherence/Access: See below for considerations    ***:  ***      PLAN:                            Start Otezla when received from specialty pharmacy.   Initial Dose:   Maintenance Dose:     Otezla dosing Initial: 10 mg in the morning on day 1. Titrate upward by additional 10 mg per day on days 2 to 5 as follows: Day 2: 10 mg twice daily; Day 3: 10 mg in the morning and 20 mg in the evening; Day 4: 20 mg twice daily; Day 5: 20 mg in the morning and 30 mg in the evening. Maintenance dose: 30 mg twice daily starting on day 6.  Take inhalers daily   Blood pressure meds - follow up with the MTM on 12/6/23 to discuss Vaccines - shingles (dose 2), COVID, RSV    Follow-up: with MTM pharmacist on 12/27/23.     SUBJECTIVE/OBJECTIVE:                          Jacquie Amador is a 63 year old female called for an initial visit. She was referred to me from Guillermo Toribio MD.     Reason for visit: Otezla start.    Allergies/ADRs: Reviewed in chart  Past Medical History: Reviewed in chart  Tobacco: She reports that she quit smoking about 10 years ago. Her smoking use included cigarettes. She started smoking about 43 years ago. She has a 9.90 pack-year smoking history. She has been exposed to tobacco smoke. She has never used smokeless tobacco.  Alcohol: none    Medication Adherence/Access: Forgets to take valacyclovir, uses Dulera as needed.     Behcet Disease:   ***      Eye drops not helping much - pain in eye s     N-acetyl cysteine - not taking (Derm won't refill)       Dupixent - depends on insurance coverage        Acetaminophen about 1 per day - has headaches - seems to help   Rizatriptan - has not taken in about a year     Albuterols as needed - with cough       Atorvastatin 20 mg daily - aches and pains (thighs do have muscle pain all the time) !!!!!!    Bupropion 450 mg daily - helping with mood, not as much with anxiety   Duloxetine 120 mg   Buspirone 5 mg twice daily - having less hallucinations with lower dose   Trazodone 50 mg nightly - helping     Butorphanol 10 mg/mL nasal spray - does not take because makes her fall asleep     Famotidine - not providing relief       Hypertension:  Hydrochlorothiazide 25 mg daily   Losartan 100 mg daily   Metoprolol  mg twice daily   Metoprolol ER 25 mg three times daily   Patient reports occasional dizziness that causes her to grab on to something for stability. Mentioned it happens randomly, but sometimes with position changes. Not taking blood pressures at home regularly. Monitors when she feels like she needs to and readings are typically 130-140/85 mmHg.     BP Readings from Last 3 Encounters:   09/19/23 132/80   09/14/23 (!) 141/70   06/30/23 (!) 145/76     Asthma:   Dulera 200-5 mcg/act - 2 puffs twice daily   Spiriva  2.5 mcg/act - 2 puffs once daily   Albuterol 0.083% nebulizer solution  Albuterol 90 mcg/act as needed   Montelukast 10 mg at bedtime  Reported using Dulera as a backup to the Spiriva. Only uses when she feels like it is needed. Has albuterol nebulizer and inhaler available as needed. Does not use albuterol often and only if she is sick or has a cough.      Primary Focal Hyperhidrosis:  Oxybutynin ER 10 mg daily   Feels like medication is helping. No side effects or concerns at this time.     Valacyclovir forgets to take       Today's Vitals: LMP 07/17/2009 (Exact Date)   ----------------    I spent {mtm total time 3:462294} with this patient today. { :788577}. A copy of the visit note was provided to the patient's provider(s).    A summary of these  "recommendations was sent via [employer].    Golden Cadena, PharmD  Medication Therapy Management Pharmacist  [facility]  Phone: [phone]     Telemedicine Visit Details  Type of service:  {telemedvisitmtm:375488::\"Telephone visit\"}  Start Time: {video/phone visit start time:152948}  End Time: {video/phone visit end time:152948}     Medication Therapy Recommendations  No medication therapy recommendations to display         Medication Therapy Management (MTM) Encounter    ASSESSMENT:                            Medication Adherence/Access: See below for considerations    Behcet Disease: Provided education on Otezla today including dosing, general administration, side effects (both common/serious), precautions, monitoring and time to efficacy. Patient would benefit from starting Otezla once received from the pharmacy and using as laid out in the packaging. Discussed dosing schedule and patient agreed with plan.     Vaccines: Encouraged indicated non-live vaccines and avoidance of live vaccines. Per ACIP guidelines, patient is eligible for VACCINATION: Covid-19, RSV, and Zoster (second dose).     Depression/Anxiety: Patient improving with current therapy. Managing closely with provider.     Hypertension: Patient right at blood pressure goal < 130/80 mmHg. Following up with primary care MTM to manage.     Hyperlipidemia: Stable. Patient at LDL goal < 100 mg/dL. Having pain in her thighs and unable to tell when or how the pain started. Advised patient to discuss with primary care provider at next visit for further evaluation.     Asthma/Cough: Discussed inhalers and educated patient on appropriate usage. Advised patient to use both Dulera and Spiriva daily for best effect.     Dermatitis/Skin Pain/Acne: Patient managing with dermatology. Has upcoming appointment and will discuss with providers at next visit.     Migraines: Stable.     Movement Disorder: Stable.     GERD: Appropriate to " re-evaluate therapy. Not experiencing much benefit with famotidine. Can consider switching to a proton pump inhibitor for increased efficacy. Patient to discuss with primary care provider at follow up.     Acquired Platelet Disorder: Patient managing closely with provider.     Primary Focal Hyperhidrosis: Stable.     Thrush: Stable.     PLAN:                            Start Otezla when received from specialty pharmacy.   Initial Dose:   Day 1 = 10 mg in the morning  Day 2 = 10 mg twice daily  Day 3 = 10 mg in the morning and 20 mg in the evening  Day 4 = 20 mg twice daily  Day 5 = 20 mg in the morning and 30 mg in the evening  Maintenance Dose:   Day 6 and on = 30 mg twice daily    Use Dulera and Spiriva inhalers everyday to prevent asthma symptoms.      Discuss leg/thigh pain with your primary care provider at the next visit.     Consider the following vaccines:  Shingles (Shingrix)  Due for final dose of series.  COVID  RSV    Follow-up: with [facility] pharmacist on 12/27/23.     SUBJECTIVE/OBJECTIVE:                          Jacquie Amador is a 63 year old female called for an initial visit. She was referred to me from Guillermo Toribio MD.     Reason for visit: Otezla start.    Allergies/ADRs: Reviewed in chart  Past Medical History: Reviewed in chart  Tobacco: She reports that she quit smoking about 10 years ago. Her smoking use included cigarettes. She started smoking about 43 years ago. She has a 9.90 pack-year smoking history. She has been exposed to tobacco smoke. She has never used smokeless tobacco.  Alcohol: none    Medication Adherence/Access: Forgets to take valacyclovir, uses Dulera as needed.     Behcet Disease:   Prednisone 10 mg daily   Celecoxib 200 mg twice daily as needed   Tramadol 50 mg twice daily as needed   Tears Naturale Free as needed   Colchicine 0.6 mg twice daily - not taking   Valacyclovir 500 mg daily   Lidocaine 2% solution as needed   Patient reported experiencing recurrent oral and  skin lesions that are very bothersome. Occasionally forgets to take valacyclovir. Mentioned needing to set reminders in order to take consistently. Does not feel like eye drops are helping much. Experienced headaches with colchicine and stopped taking.     Vaccines:  Eligible for shingles, COVID, and RSV vaccines.     Depression/Anxiety:  Bupropion 450 mg daily  Duloxetine 120 mg daily  Buspirone 5 mg twice daily    Gabapentin 300 mg at bedtime   Quetiapine 25 mg at bedtime  Trazodone 50 mg nightly  Reported medications helping with mood, not as much with anxiety. Having less hallucinations on lower buspirone dose. Feels like condition is improving and will continue to manage with provider.            5/9/2023    10:54 AM 8/29/2023    10:43 AM 11/14/2023     7:05 AM   PHQ-9 SCORE   PHQ-9 Total Score MyChart 6 (Mild depression) 7 (Mild depression) 6 (Mild depression)   PHQ-9 Total Score 6 7    7 6         8/29/2023    10:44 AM 9/19/2023     8:44 AM 11/12/2023     1:34 PM   MARIZOL-7 SCORE   Total Score 9 (mild anxiety) 5 (mild anxiety) 6 (mild anxiety)   Total Score 9    9 5 6     Hypertension:  Hydrochlorothiazide 25 mg daily   Losartan 100 mg daily   Metoprolol  mg twice daily   Metoprolol ER 25 mg three times daily   Patient reports occasional dizziness that causes her to grab on to something for stability. Mentioned it happens randomly, but sometimes with position changes. Not taking blood pressures at home regularly. Monitors when she feels like she needs to and readings are typically 130-140/85 mmHg.     BP Readings from Last 3 Encounters:   09/19/23 132/80   09/14/23 (!) 141/70   06/30/23 (!) 145/76     Hyperlipidemia:  Atorvastatin 20 mg daily   Reported having aches and pains in her thighs that is always present. Unsure of when pain started.    LDL Cholesterol Calculated   Date Value Ref Range Status   08/17/2023 75 <=100 mg/dL Final   02/04/2020 78 <100 mg/dL Final     Comment:     Desirable:       <100  mg/dl     Asthma/Cough:   Dulera 200-5 mcg/act - 2 puffs twice daily   Spiriva  2.5 mcg/act - 2 puffs once daily   Albuterol 0.083% nebulizer solution  Albuterol 90 mcg/act as needed   Montelukast 10 mg at bedtime  Benzonatate 200 mg twice daily as needed   Reported using Dulera as a backup to the Spiriva. Only uses when she feels like it is needed. Has albuterol nebulizer and inhaler available as needed. Does not use albuterol often and only if she is sick or has a cough.      Dermatitis/Skin Pain/Acne:  Dupixent 300 mg every 14 days   Augmented betamethasone dipropionate 0.05% cream twice daily   Butorphanol 10 mg/mL nasal spray - not using   Clindamycin 1% gel twice daily   Desonide 0.05% cream twice daily   Doxycycline 100 mg twice daily   Fluconazole 150 mg every 3 days   Fluocinonide 0.05% gel twice daily as needed   Hydrocortisone cream 2.5% twice daily as needed   Mupirocin 2% cream three times daily   Tacrolimus 0.1% ointment twice daily   Triamcinolone 0.5% cream twice daily   Patient reported no complaints and is using many of the creams as needed. Mentioned not using butorphanol because it causes drowsiness. Working with dermatology to manage and has an appointment coming up.      Migraines:  Acetaminophen 500-1000 mg every 6 hours as needed   Rizatriptan 10 mg as needed   Takes acetaminophen usually once per day for headaches. Notices improvement with acetaminophen and has not taken rizatriptan in a while. Has available in case it is needed.     Movement Disorder:  Ropinirole 3 mg at bedtime   Patient has no complaints at this time. Will plan to follow up with prescriber to manage.     GERD:   Famotidine 40 mg daily   Not experiencing much relief from medication.     Acquired Platelet Disorder:  Tranexamic acid 650 mg in the morning 1300 mg at bedtime   Prescribed by hematologist. Patient has no concerns at this time.     Primary Focal Hyperhidrosis:  Oxybutynin ER 10 mg daily   Feels like medication is  helping. No side effects or concerns at this time.     Thrush:  Chlorhexidine 0.12% solution twice daily   Clotrimazole 10 mg lozenge five times daily   Nystatin 100,000 unit/mL suspension as needed   Medications are going well and managed closely by provider.     Today's Vitals: LMP 07/17/2009 (Exact Date)   ----------------    I spent 40 minutes with this patient today. All changes were made via collaborative practice agreement with Guillermo Toribio MD. A copy of the visit note was provided to the patient's provider(s).    A summary of these recommendations was sent via [employer].    Golden Cadena PharmD  Medication Therapy Management Pharmacist  [facility]  Phone: [phone]     Telemedicine Visit Details  Type of service:  Telephone visit  Start Time:  9:00 AM  End Time:  9:40 AM     Medication Therapy Recommendations  No medication therapy recommendations to display           Again, thank you for allowing me to participate in the care of your patient.      Sincerely,    Golden Cadena RPH

## 2023-11-28 NOTE — PROGRESS NOTES
Medication Therapy Management (MTM) Encounter    ASSESSMENT:                            Medication Adherence/Access: See below for considerations    Behcet Disease: Provided education on Otezla today including dosing, general administration, side effects (both common/serious), precautions, monitoring and time to efficacy. Patient would benefit from starting Otezla once received from the pharmacy and using as laid out in the packaging. Discussed dosing schedule and patient agreed with plan.     Vaccines: Encouraged indicated non-live vaccines and avoidance of live vaccines. Per ACIP guidelines, patient is eligible for VACCINATION: Covid-19, RSV, and Zoster (second dose).     Depression/Anxiety: Patient improving with current therapy. Managing closely with provider.     Hypertension: Patient right at blood pressure goal < 130/80 mmHg. Following up with primary care MTM to manage.     Hyperlipidemia: Stable. Patient at LDL goal < 100 mg/dL. Having pain in her thighs and unable to tell when or how the pain started. Advised patient to discuss with primary care provider at next visit for further evaluation.     Asthma/Cough: Discussed inhalers and educated patient on appropriate usage. Advised patient to use both Dulera and Spiriva daily for best effect.     Dermatitis/Skin Pain/Acne: Patient managing with dermatology. Has upcoming appointment and will discuss with providers at next visit.     Migraines: Stable.     Movement Disorder: Stable.     GERD: Appropriate to re-evaluate therapy. Not experiencing much benefit with famotidine. Can consider switching to a proton pump inhibitor for increased efficacy. Patient to discuss with primary care provider at follow up.     Acquired Platelet Disorder: Patient managing closely with provider.     Primary Focal Hyperhidrosis: Stable.     Thrush: Stable.     PLAN:                            Start Otezla when received from specialty pharmacy.   Initial Dose:   Day 1 = 10 mg in the  morning  Day 2 = 10 mg twice daily  Day 3 = 10 mg in the morning and 20 mg in the evening  Day 4 = 20 mg twice daily  Day 5 = 20 mg in the morning and 30 mg in the evening  Maintenance Dose:   Day 6 and on = 30 mg twice daily    Use Dulera and Spiriva inhalers everyday to prevent asthma symptoms.      Discuss leg/thigh pain with your primary care provider at the next visit.     Consider the following vaccines:  Shingles (Shingrix)  Due for final dose of series.  COVID  RSV    Follow-up: with [facility] pharmacist on 12/27/23.     SUBJECTIVE/OBJECTIVE:                          Jacquie Amador is a 63 year old female called for an initial visit. She was referred to me from Guillermo Toribio MD.     Reason for visit: Otezla start.    Allergies/ADRs: Reviewed in chart  Past Medical History: Reviewed in chart  Tobacco: She reports that she quit smoking about 10 years ago. Her smoking use included cigarettes. She started smoking about 43 years ago. She has a 9.90 pack-year smoking history. She has been exposed to tobacco smoke. She has never used smokeless tobacco.  Alcohol: none    Medication Adherence/Access: Forgets to take valacyclovir, uses Dulera as needed.     Behcet Disease:   Prednisone 10 mg daily   Celecoxib 200 mg twice daily as needed   Tramadol 50 mg twice daily as needed   Tears Naturale Free as needed   Colchicine 0.6 mg twice daily - not taking   Valacyclovir 500 mg daily   Lidocaine 2% solution as needed   Patient reported experiencing recurrent oral and skin lesions that are very bothersome. Occasionally forgets to take valacyclovir. Mentioned needing to set reminders in order to take consistently. Does not feel like eye drops are helping much. Experienced headaches with colchicine and stopped taking.     Vaccines:  Eligible for shingles, COVID, and RSV vaccines.     Depression/Anxiety:  Bupropion 450 mg daily  Duloxetine 120 mg daily  Buspirone 5 mg twice daily    Gabapentin 300 mg at bedtime   Quetiapine 25  mg at bedtime  Trazodone 50 mg nightly  Reported medications helping with mood, not as much with anxiety. Having less hallucinations on lower buspirone dose. Feels like condition is improving and will continue to manage with provider.            5/9/2023    10:54 AM 8/29/2023    10:43 AM 11/14/2023     7:05 AM   PHQ-9 SCORE   PHQ-9 Total Score MyChart 6 (Mild depression) 7 (Mild depression) 6 (Mild depression)   PHQ-9 Total Score 6 7    7 6         8/29/2023    10:44 AM 9/19/2023     8:44 AM 11/12/2023     1:34 PM   MARIZOL-7 SCORE   Total Score 9 (mild anxiety) 5 (mild anxiety) 6 (mild anxiety)   Total Score 9    9 5 6     Hypertension:  Hydrochlorothiazide 25 mg daily   Losartan 100 mg daily   Metoprolol  mg twice daily   Metoprolol ER 25 mg three times daily   Patient reports occasional dizziness that causes her to grab on to something for stability. Mentioned it happens randomly, but sometimes with position changes. Not taking blood pressures at home regularly. Monitors when she feels like she needs to and readings are typically 130-140/85 mmHg.     BP Readings from Last 3 Encounters:   09/19/23 132/80   09/14/23 (!) 141/70   06/30/23 (!) 145/76     Hyperlipidemia:  Atorvastatin 20 mg daily   Reported having aches and pains in her thighs that is always present. Unsure of when pain started.    LDL Cholesterol Calculated   Date Value Ref Range Status   08/17/2023 75 <=100 mg/dL Final   02/04/2020 78 <100 mg/dL Final     Comment:     Desirable:       <100 mg/dl     Asthma/Cough:   Dulera 200-5 mcg/act - 2 puffs twice daily   Spiriva  2.5 mcg/act - 2 puffs once daily   Albuterol 0.083% nebulizer solution  Albuterol 90 mcg/act as needed   Montelukast 10 mg at bedtime  Benzonatate 200 mg twice daily as needed   Reported using Dulera as a backup to the Spiriva. Only uses when she feels like it is needed. Has albuterol nebulizer and inhaler available as needed. Does not use albuterol often and only if she is sick or  has a cough.      Dermatitis/Skin Pain/Acne:  Dupixent 300 mg every 14 days   Augmented betamethasone dipropionate 0.05% cream twice daily   Butorphanol 10 mg/mL nasal spray - not using   Clindamycin 1% gel twice daily   Desonide 0.05% cream twice daily   Doxycycline 100 mg twice daily   Fluconazole 150 mg every 3 days   Fluocinonide 0.05% gel twice daily as needed   Hydrocortisone cream 2.5% twice daily as needed   Mupirocin 2% cream three times daily   Tacrolimus 0.1% ointment twice daily   Triamcinolone 0.5% cream twice daily   Patient reported no complaints and is using many of the creams as needed. Mentioned not using butorphanol because it causes drowsiness. Working with dermatology to manage and has an appointment coming up.      Migraines:  Acetaminophen 500-1000 mg every 6 hours as needed   Rizatriptan 10 mg as needed   Takes acetaminophen usually once per day for headaches. Notices improvement with acetaminophen and has not taken rizatriptan in a while. Has available in case it is needed.     Movement Disorder:  Ropinirole 3 mg at bedtime   Patient has no complaints at this time. Will plan to follow up with prescriber to manage.     GERD:   Famotidine 40 mg daily   Not experiencing much relief from medication.     Acquired Platelet Disorder:  Tranexamic acid 650 mg in the morning 1300 mg at bedtime   Prescribed by hematologist. Patient has no concerns at this time.     Primary Focal Hyperhidrosis:  Oxybutynin ER 10 mg daily   Feels like medication is helping. No side effects or concerns at this time.     Thrush:  Chlorhexidine 0.12% solution twice daily   Clotrimazole 10 mg lozenge five times daily   Nystatin 100,000 unit/mL suspension as needed   Medications are going well and managed closely by provider.     Today's Vitals: LMP 07/17/2009 (Exact Date)   ----------------    I spent 40 minutes with this patient today. All changes were made via collaborative practice agreement with Guillermo Toribio MD. MARKOS  copy of the visit note was provided to the patient's provider(s).    A summary of these recommendations was sent via [address].    Nikki MeyerD  Medication Therapy Management Pharmacist  [facility]  Phone: [phone]     Telemedicine Visit Details  Type of service:  Telephone visit  Start Time:  9:00 AM  End Time:  9:40 AM     Medication Therapy Recommendations  Behcet's disease (H)    Rationale: Untreated condition - Needs additional medication therapy - Indication   Recommendation: Start Medication - Otezla 10 & 20 & 30 MG Tbpk   Status: Accepted per CPA

## 2023-11-28 NOTE — PROGRESS NOTES
Jacquie is a 63 year old who is being evaluated via a billable telephone visit.      What phone number would you like to be contacted at? [phone]   How would you like to obtain your AVS? [facility]    Distant Location (provider location):  On-site    Assessment & Plan     Generalized anxiety disorder  Patient is having visit today to discuss follow-up treatment for anxiety after E consult was obtained.  Had recommended patient consider Seroquel but patient had a lot of questions about more comfortable with having a visit.  She had noticed some good improvement with her anxiety related to BuSpar however the BuSpar caused hallucinations.  Currently she is only taking 5 mg once a day of BuSpar and she is not getting good relief of her anxiety.  She relates her anxiety to the medical conditions that she has.  Discussed with her consideration of counseling but patient has declined.  Discussed option of Seroquel or Abilify.  After discussion decided on Seroquel as she has a lot of difficulty with sleep at night.  Will start at 25 mg once a night at bedtime.  Could potentially increase we will see how she does over the next few weeks.  Patient is rescheduled for follow-up on December 19.  She is to reach out sooner problems or concerns.  Patient agrees with this plan.  - QUEtiapine (SEROQUEL) 25 MG tablet; Take 1 tablet (25 mg) by mouth at bedtime                 Liz Peterson MD  [facility]    Veronica Dhillon is a 63 year old, presenting for the following health issues:  Recheck Medication (Just wants to go over all medications)        11/28/2023    11:05 AM   Additional Questions   Roomed by Tamiko       Patient states that she talked to a pharmacist this morning and talked through all the medications which is what she was going to discuss with you. So not really sure what she wants to talk about. Just going through everything again    She is starting the Otezla for her skin sores and  the oral lesions. Wondering about opinion on that.     Wondering about metoprolol. Has some dizziness. Not constant. Comes and goes.     Patient had econsult with psychiatry. Wants to discuss the consideration of Seroquel or other medication. Has been on buspar. Had hallucinations. Is only able to tolerate the 5 mg daily.       History of Present Illness       Reason for visit:  Medication check in    She eats 0-1 servings of fruits and vegetables daily.She consumes 1 sweetened beverage(s) daily.She exercises with enough effort to increase her heart rate 9 or less minutes per day.  She exercises with enough effort to increase her heart rate 3 or less days per week.   She is taking medications regularly.       Medication Followup of All Medication   Taking Medication as prescribed: yes  Side Effects:  doesn't know which one is causing nausea and dizziness  Medication Helping Symptoms:  yes        Review of Systems   CONSTITUTIONAL: NEGATIVE for fever, chills, change in weight  ENT/MOUTH: NEGATIVE for ear, mouth and throat problems  RESP: NEGATIVE for significant cough or SOB  CV: NEGATIVE for chest pain, palpitations or peripheral edema      Objective           Vitals:  No vitals were obtained today due to virtual visit.    Physical Exam   healthy, alert, and no distress  PSYCH: Alert and oriented times 3; coherent speech, normal   rate and volume, able to articulate logical thoughts, able   to abstract reason, no tangential thoughts, no hallucinations   or delusions  Her affect is normal  RESP: No cough, no audible wheezing, able to talk in full sentences  Remainder of exam unable to be completed due to telephone visits                Phone call duration: 20 minutes

## 2023-11-29 ENCOUNTER — MYC MEDICAL ADVICE (OUTPATIENT)
Dept: FAMILY MEDICINE | Facility: CLINIC | Age: 63
End: 2023-11-29
Payer: COMMERCIAL

## 2023-12-01 ENCOUNTER — MYC MEDICAL ADVICE (OUTPATIENT)
Dept: FAMILY MEDICINE | Facility: CLINIC | Age: 63
End: 2023-12-01
Payer: COMMERCIAL

## 2023-12-04 NOTE — PATIENT INSTRUCTIONS
"Recommendations from today's MTM visit:                                                    MTM (medication therapy management) is a service provided by a clinical pharmacist designed to help you get the most of out of your medicines.      Start Otezla when received from specialty pharmacy.   Initial Dose:   Day 1 = 10 mg in the morning  Day 2 = 10 mg twice daily  Day 3 = 10 mg in the morning and 20 mg in the evening  Day 4 = 20 mg twice daily  Day 5 = 20 mg in the morning and 30 mg in the evening  Maintenance Dose:   Day 6 and on = 30 mg twice daily    Use Dulera and Spiriva inhalers everyday to prevent asthma symptoms.      Discuss leg/thigh pain with your primary care provider at the next visit.     Consider the following vaccines:  Shingles (Shingrix)  Due for final dose of series.  COVID  RSV    Follow-up: with MTM pharmacist on 12/27/23.     It was great speaking with you today.  I value your experience and would be very thankful for your time in providing feedback in our clinic survey. In the next few days, you may receive an email or text message from [employer] with a link to a survey related to your  clinical pharmacist.\"     To schedule another MTM appointment, please call the clinic directly or you may call the MTM scheduling line at [phone] or toll-free at [phone].     My Clinical Pharmacist's contact information:                                                      Please feel free to contact me with any questions or concerns you have.      Golden Cadena, PharmD  Medication Therapy Management Pharmacist  [facility]  Phone: [phone]    "

## 2023-12-05 DIAGNOSIS — L74.511 PRIMARY FOCAL HYPERHIDROSIS OF FACE: ICD-10-CM

## 2023-12-05 DIAGNOSIS — F11.90 CHRONIC, CONTINUOUS USE OF OPIOIDS: ICD-10-CM

## 2023-12-05 DIAGNOSIS — E78.5 HYPERLIPIDEMIA LDL GOAL <130: ICD-10-CM

## 2023-12-05 DIAGNOSIS — G89.4 CHRONIC PAIN SYNDROME: ICD-10-CM

## 2023-12-06 ENCOUNTER — VIRTUAL VISIT (OUTPATIENT)
Dept: PHARMACY | Facility: CLINIC | Age: 63
End: 2023-12-06
Payer: COMMERCIAL

## 2023-12-06 DIAGNOSIS — F33.1 MODERATE EPISODE OF RECURRENT MAJOR DEPRESSIVE DISORDER (H): Primary | ICD-10-CM

## 2023-12-06 DIAGNOSIS — G47.9 SLEEP DISORDER: ICD-10-CM

## 2023-12-06 DIAGNOSIS — F41.1 GENERALIZED ANXIETY DISORDER: ICD-10-CM

## 2023-12-06 DIAGNOSIS — F41.1 GAD (GENERALIZED ANXIETY DISORDER): ICD-10-CM

## 2023-12-06 PROCEDURE — 99606 MTMS BY PHARM EST 15 MIN: CPT | Performed by: PHARMACIST

## 2023-12-06 PROCEDURE — 99607 MTMS BY PHARM ADDL 15 MIN: CPT | Performed by: PHARMACIST

## 2023-12-06 RX ORDER — GABAPENTIN 300 MG/1
CAPSULE ORAL
Qty: 270 CAPSULE | Refills: 0 | Status: SHIPPED | OUTPATIENT
Start: 2023-12-06 | End: 2023-12-29

## 2023-12-06 RX ORDER — OXYBUTYNIN CHLORIDE 5 MG/1
10 TABLET, EXTENDED RELEASE ORAL DAILY
Qty: 180 TABLET | Refills: 2 | Status: SHIPPED | OUTPATIENT
Start: 2023-12-06 | End: 2024-09-18

## 2023-12-06 RX ORDER — TRAMADOL HYDROCHLORIDE 50 MG/1
50 TABLET ORAL EVERY 12 HOURS PRN
Qty: 45 TABLET | Refills: 0 | Status: SHIPPED | OUTPATIENT
Start: 2023-12-06 | End: 2024-01-02

## 2023-12-06 RX ORDER — ATORVASTATIN CALCIUM 20 MG/1
20 TABLET, FILM COATED ORAL DAILY
Qty: 90 TABLET | Refills: 0 | Status: SHIPPED | OUTPATIENT
Start: 2023-12-06 | End: 2024-02-28

## 2023-12-06 NOTE — Clinical Note
Fyi - patient reported trouble tolerating seroquel and stopped it after a couple days. Opted to try increasing gabapentin today.   Thank you!  Paulina Mitchell, PharmD, BCPP Medication Therapy Management Pharmacist [facility]

## 2023-12-06 NOTE — PATIENT INSTRUCTIONS
"Recommendations from today's MTM visit:                                                      Increase gabapentin to 300 mg in the afternoon and 600 mg at night.    Follow-up: PCP 12/19/2023, MTM 12/28/2023    It was great speaking with you today.  I value your experience and would be very thankful for your time in providing feedback in our clinic survey. In the next few days, you may receive an email or text message from [facility] [employer] with a link to a survey related to your  clinical pharmacist.\"     To schedule another MTM appointment, please call the clinic directly or you may call the MTM scheduling line at [phone] or toll-free at [phone].     My Clinical Pharmacist's contact information:                                                      Please feel free to contact me with any questions or concerns you have.      Paulina Mitchell, PharmD, BCPP  Medication Therapy Management Pharmacist  [facility]    "

## 2023-12-06 NOTE — PROGRESS NOTES
"Medication Therapy Management (MTM) Encounter    ASSESSMENT:                            Medication Adherence/Access: No issues identified    MDD/MARIZOL/Sleep:   Patient with ongoing anxiety and trouble sleeping.  She was unable to tolerate Seroquel due to irritability.  We will try increasing gabapentin today as she has tolerated it well at low-dose 300 mg nightly.  Wellbutrin was increased about 1 month ago so we will give it additional time to see if higher dose is more effective for her depression symptoms.    PLAN:                            Increase gabapentin to 300 mg in the afternoon and 600 mg at night.    Follow-up: PCP 12/19/2023, MTM 12/28/2023    SUBJECTIVE/OBJECTIVE:                          Jacquie Amador is a 63 year old female called for a follow-up visit from 11/8/23 with Ishmael Gracia.       Reason for visit: med check.    Allergies/ADRs: Reviewed in chart  Past Medical History: Reviewed in chart  Tobacco: She reports that she quit smoking about 10 years ago. Her smoking use included cigarettes. She started smoking about 43 years ago. She has a 9.90 pack-year smoking history. She has been exposed to tobacco smoke. She has never used smokeless tobacco.  Alcohol: none    Medication Adherence/Access: no issues reported    MDD/MARIZOL/Sleep:   Duloxetine 120mg once daily   Wellbutrin XL 450mg once daily   Gabapentin 300mg nightly   Trazodone 50-150mg nightly as needed (typically taking 100mg nightly)    Today, patient reports feeling \"ok\", still struggling with anxiety. Holidays are a stressors. She has had several medication changes over the last couple months (see below). Sleep is \"so so\", able to fall asleep, but wakes up often. Most recently prescribed Seroquel which she reports increased irritability. She stopped taking it after a day or 2. Would like something to help anxiety.         8/29/2023    10:44 AM 9/19/2023     8:44 AM 11/12/2023     1:34 PM   MARIZOL-7 SCORE   Total Score 9 (mild anxiety) 5 (mild " anxiety) 6 (mild anxiety)   Total Score 9    9 5 6           5/9/2023    10:54 AM 8/29/2023    10:43 AM 11/14/2023     7:05 AM   PHQ   PHQ-9 Total Score 6 7    7 6   Q9: Thoughts of better off dead/self-harm past 2 weeks Not at all Not at all    Not at all Not at all       Summary of recent medication changes 10/2023:   - Buspar reduced (10/18/23) due to concern it was causing hallucinations - hallucinations resolved, Buspar stopped 11/28/23  - Gabapentin started for anxiety (10/18/23)  - Trazodone - encouraged decreased use due to daytime fatigue (fatigue predated gabapentin) (10/18/23)  - Wellbutrin increased from 400mg to 450mg daily (11/8/23)  - Seroquel (started 11/28/23) - stopped after a couple days due to increased irritability    ----------------      I spent 30 minutes with this patient today. All changes were made via collaborative practice agreement with Liz Peterson MD. A copy of the visit note was provided to the patient's provider(s).    A summary of these recommendations was sent via [facility].    Paulina Mitchell, PharmD, BCPP  Medication Therapy Management Pharmacist  [facility]    Telemedicine Visit Details  Type of service:  Telephone visit  Start Time:  11:30 am  End Time: 11:59 AM     Medication Therapy Recommendations  Generalized anxiety disorder    Current Medication: gabapentin (NEURONTIN) 300 MG capsule   Rationale: Dose too low - Dosage too low - Effectiveness   Recommendation: Increase Dose   Status: Accepted per CPA         Moderate episode of recurrent major depressive disorder (H)    Current Medication: buPROPion (WELLBUTRIN SR) 200 MG 12 hr tablet (Discontinued)   Rationale: Dose too low - Dosage too low - Effectiveness   Recommendation: Increase Dose   Status: Accepted per Provider

## 2023-12-07 DIAGNOSIS — L20.89 OTHER ATOPIC DERMATITIS: ICD-10-CM

## 2023-12-11 ENCOUNTER — MYC MEDICAL ADVICE (OUTPATIENT)
Dept: PHARMACY | Facility: CLINIC | Age: 63
End: 2023-12-11
Payer: COMMERCIAL

## 2023-12-11 ENCOUNTER — MYC MEDICAL ADVICE (OUTPATIENT)
Dept: FAMILY MEDICINE | Facility: CLINIC | Age: 63
End: 2023-12-11
Payer: COMMERCIAL

## 2023-12-18 ENCOUNTER — VIRTUAL VISIT (OUTPATIENT)
Dept: PHARMACY | Facility: CLINIC | Age: 63
End: 2023-12-18
Payer: COMMERCIAL

## 2023-12-18 DIAGNOSIS — G47.9 SLEEP DISORDER: ICD-10-CM

## 2023-12-18 DIAGNOSIS — F41.1 GAD (GENERALIZED ANXIETY DISORDER): Primary | ICD-10-CM

## 2023-12-18 DIAGNOSIS — F33.1 MODERATE EPISODE OF RECURRENT MAJOR DEPRESSIVE DISORDER (H): ICD-10-CM

## 2023-12-18 PROCEDURE — 99606 MTMS BY PHARM EST 15 MIN: CPT | Performed by: PHARMACIST

## 2023-12-18 PROCEDURE — 99607 MTMS BY PHARM ADDL 15 MIN: CPT | Performed by: PHARMACIST

## 2023-12-18 NOTE — PROGRESS NOTES
Medication Therapy Management (MTM) Encounter    ASSESSMENT:                            Medication Adherence/Access: No issues identified    MDD/MARIZOL/Sleep:   Patient with ongoing anxiety and trouble sleeping.  We will try increasing gabapentin today as she had previously tolerated it well at low-dose 300 mg nightly.  Wellbutrin was increased about 6 weeks ago so we will give it additional time to see if higher dose is more effective for her depression symptoms. Current CrCl 53 ml/min.        Future considerations: adjunctive mirtazapine (?) if gabapentin not effective/well-tolerated    PLAN:                            Restart gabapentin 300mg nightly for 1 week then increase to 600mg nightly.     Follow-up: 12/28/23 psych MTM     SUBJECTIVE/OBJECTIVE:                          Jacquie Amador is a 63 year old female called for a follow-up visit from 12/6/23.       Reason for visit: gabapentin dose increase check in.    Allergies/ADRs: Reviewed in chart  Past Medical History: Reviewed in chart  Tobacco: She reports that she quit smoking about 10 years ago. Her smoking use included cigarettes. She started smoking about 43 years ago. She has a 9.90 pack-year smoking history. She has been exposed to tobacco smoke. She has never used smokeless tobacco.  Alcohol: none    Medication Adherence/Access: no issues reported      MDD/MARIZOL/Sleep:   Duloxetine 120mg once daily   Wellbutrin XL 450mg once daily   Gabapentin 300mg nightly (not currently taking)  Trazodone 50-150mg nightly as needed (typically taking 100mg nightly)    At last MTM visit 12/6/2023, plan was made to increase gabapentin to target ongoing anxiety and difficulty sleeping.  Today, patient reports she is unsure if she increased gabapentin or not.  She was concerned that it could cause hallucinations and therefore she stopped taking it about 2 weeks ago.  She reports worsening anxiety. Holidays are a stressors. She has had several medication changes over the last  "couple months (see below). Sleep is \"so so\", able to fall asleep, but wakes up often. Most recently prescribed Seroquel which she reports increased irritability. She stopped taking it after a day or 2. Would like something to help anxiety.           8/29/2023    10:44 AM 9/19/2023     8:44 AM 11/12/2023     1:34 PM   MARIZOL-7 SCORE   Total Score 9 (mild anxiety) 5 (mild anxiety) 6 (mild anxiety)   Total Score 9    9 5 6           5/9/2023    10:54 AM 8/29/2023    10:43 AM 11/14/2023     7:05 AM   PHQ   PHQ-9 Total Score 6 7    7 6   Q9: Thoughts of better off dead/self-harm past 2 weeks Not at all Not at all    Not at all Not at all         Summary of recent medication changes 10/2023:   - Buspar reduced (10/18/23) due to concern it was causing hallucinations - hallucinations resolved, Buspar stopped 11/28/23  - Gabapentin started for anxiety (10/18/23)  - Trazodone - encouraged decreased use due to daytime fatigue (fatigue predated gabapentin) (10/18/23)  - Wellbutrin increased from 400mg to 450mg daily (11/8/23)  - Seroquel (started 11/28/23) - stopped after a couple days due to increased irritability      ----------------      I spent 20 minutes with this patient today. A copy of the visit note was provided to the patient's provider(s).    A summary of these recommendations was sent via [employer].    Paulina Mitchell, PharmD, BCPP  Medication Therapy Management Pharmacist  [facility]      Telemedicine Visit Details  Type of service:  Telephone visit  Start Time:  10:35 AM  End Time:  10:55 AM     Medication Therapy Recommendations  Generalized anxiety disorder    Current Medication: gabapentin (NEURONTIN) 300 MG capsule   Rationale: Patient prefers not to take - Adherence - Adherence   Recommendation: Provide Education   Status: Patient Agreed - Adherence/Education            "

## 2023-12-18 NOTE — PATIENT INSTRUCTIONS
"Recommendations from today's MTM visit:                                                      Restart gabapentin 300mg nightly for 1 week then increase to 600mg nightly.     Follow-up: 12/28/23 psych MTM     It was great speaking with you today.  I value your experience and would be very thankful for your time in providing feedback in our clinic survey. In the next few days, you may receive an email or text message from [facility] [employer] with a link to a survey related to your  clinical pharmacist.\"     To schedule another MTM appointment, please call the clinic directly or you may call the MTM scheduling line at [phone] or toll-free at [phone].     My Clinical Pharmacist's contact information:                                                      Please feel free to contact me with any questions or concerns you have.      Paulina Mitchell, PharmD, BCPP  Medication Therapy Management Pharmacist  [facility]    "

## 2023-12-20 ENCOUNTER — VIRTUAL VISIT (OUTPATIENT)
Dept: HEMATOLOGY | Facility: CLINIC | Age: 63
End: 2023-12-20
Attending: INTERNAL MEDICINE
Payer: COMMERCIAL

## 2023-12-20 DIAGNOSIS — M35.2 BEHCET'S SYNDROME INVOLVING ORAL MUCOSA (H): ICD-10-CM

## 2023-12-20 DIAGNOSIS — D69.1 PLATELET GRANULE DEFECT (H): Primary | ICD-10-CM

## 2023-12-20 PROCEDURE — 99443 PR PHYSICIAN TELEPHONE EVALUATION 21-30 MIN: CPT | Mod: 95 | Performed by: INTERNAL MEDICINE

## 2023-12-20 NOTE — PROGRESS NOTES
[facility]  [address] [place] [id number]  Phone: [phone], Fax: [phone]    Outpatient Visit Note:    Patient: Jacquie Amador  MRN: [id number]  : 1960  ISIDRO: 23    Reason for Initial Consultation:  Bruising    Assessment:  In summary, Jacquie Amador is a 63 year old woman presenting to clinic due to bruising/bleeding with abnormal platelet studies. 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 This has resolved after starting steroids--- negative testing x2 ( and ). Overall, her clinical picture (wounds, arthralgias, etc) is highly concerning for underlying autoimmune disorder.    1.  Behcet's disease:   2. 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  Easy bruising and prolonged healing secondary to #1 and #2  4.  Personal history of antiphospholipid antibody syndrome in pregnancy, no records  5.  Osteoarthritis requiring anti-inflammatory medications  6.  Depression requiring use of SSRI medication  7.  Acute on chronic migraines requiring use of triptans and aspirin    Ms. Amador has been evaluated by Dermatology and Rheumatology. Skin biopsy with neutrophilic infiltrates that may be secondary to Behcets. Overall, clinical criteria is met and very consistent.  Working with Dr. Warren and Malu for skin and oral lesions.    For her bleeding/platelets, she has her prior anti-HLA and anti-GP1a antibodies were positive in  --- now negative.  Would not repeat platelet aggregometry. Does find some benefit from TXA use--- therefore will continue this to prevent bleeding and stablize wounds.    Would not taper/stop steroid at 10 mg daily at this juncture---- if we attempt would need to likely transition to hydrocortef given duration of steroid use.      Previously discussed that avoiding other medications that  can worsen this, including aspirin, NSAID and potentially SSRIs.      Plan:  1. Majority of today's visit was spent counseling the patient regarding diagnosis, natural history, management and treatment options   2. Continue prednisone 10 mg daily  3. Continue Tranexamic acid 650 mg PO morning and 1300 mg at night to assess if bleeding at night into wounds stops      The patient is given our center's contact information and is instructed to call if she should have any further questions or concerns.  Follow up 2/21/24  with Dr. Grimes    Patient understands and agrees with the above plan and recommendation.    Emely Grimes MD/PhD   of Medicine  Division of Hematology, Oncology and Transplantation      Telephone time: 21 minutes      ----------------------------------------------------------------------------------------------------------------------  Interval History:  Jacquie Amador is a 63 year old woman with PMHx of fibromyalgia, anxiety/depression, hypertension and hyperlipidemia. She presented to clinic on 9/15/21 for her first in person evaluation due to bleeding and prolonged wound healing. Please refer to my consult note.     Jacquie reports that overall her health has been up and down.     Bruising on legs is improving.   Face is steady to slowly improving    Mouth- doing ok- went to oral surgeon- his wife has Bechet's--- is oral steroid and got a rash.     Hands- those are doing ok- not opening up with sores.     Is nothing some dizziness and lightheadedness.     Tranexamic acid- currently taking one in the morning and two at night---    Took herself off the doxycycline- not sure if it was helping it.   No gum bleeding.     Going to daughter's for [employer].     Past Medical History:  Past Medical History:   Diagnosis Date    ASTHMA - MODERATE PERSISTENT 9/21/2005    Chronic pain     Coronary artery disease     CVA (cerebral infarction)     Depressive disorder, not elsewhere  classified     Diabetes (H)     Elevated serum alkaline phosphatase level     Liver source    Fibromyalgia     HYPERLIPIDEMIA NEC/NOS 2006    Hypertriglyceridemia     OA (osteoarthritis)     Thyroid disease     Trochanteric bursitis     Unspecified essential hypertension      Immunization  Immunization History   Administered Date(s) Administered    COVID-19 Bivalent 18+ (Moderna) 2022    COVID-19 Monovalent 18+ (Moderna) 2021, 2021, 11/15/2021    COVID-19 Monovalent Booster 18+ (Moderna) 05/10/2022    Influenza (IIV3) PF 2000, 2003, 2004, 2005, 2006, 2007, 2008, 2010, 10/26/2011, 10/26/2012    Influenza Vaccine 18-64 (Flublok) 2020, 2022, 2023    Influenza Vaccine >6 months,quad, PF 2014, 2015, 2019, 11/15/2021    Influenza, seasonal, injectable, PF 2009    Pneumococcal 23 valent 2000, 2021, 2022    TD,PF 7+ (Tenivac) 2000    TDAP (Adacel,Boostrix) 2021    TDAP Vaccine (Adacel) 2011    Zoster recombinant adjuvanted (SHINGRIX) 2019     Past Surgical History:  Past Surgical History:   Procedure Laterality Date    3 teeth pulled      INJECT EPIDURAL TRANSFORAMINAL  2014    [facility]    INJECT JOINT SACROILIAC  2014    [facility]    LAMINECTOMY LUMBAR ONE LEVEL Left 2014    [facility]  DELIVERY ONLY      , Low Cervical     Medications:  Current Outpatient Medications   Medication Sig Dispense Refill    acetaminophen (TYLENOL) 500 MG tablet Take 500-1,000 mg by mouth every 6 hours as needed for mild pain      acetylcysteine (N-ACETYL-L-CYSTEINE) 600 MG CAPS capsule Take 1 capsule (600 mg) by mouth 2 times daily (Patient not taking: Reported on 12/3/2023) 60 capsule 2    albuterol (PROVENTIL) (2.5 MG/3ML) 0.083% neb solution Take 1 vial (2.5 mg) by nebulization every 6 hours  as needed for shortness of breath or wheezing 3 mL 4    albuterol (VENTOLIN HFA) 108 (90 Base) MCG/ACT inhaler INHALE 2 PUFFS INTO THE LUNGS EVERY 6 HOURS AS NEEDED FOR SHORTNESS OF BREATH / DYSPNEA 54 g 1    Apremilast (OTEZLA) 10 & 20 & 30 MG TBPK Take by mouth according to the instructions on the packet. Hold for signs of infection,any GI upset, weight loss or depression concerns, and seek medical attention. 1 each 0    apremilast (OTEZLA) 30 MG tablet Take 1 tablet (30 mg) by mouth 2 times daily Hold for signs of infection, and seek medical attention. 60 tablet 5    atorvastatin (LIPITOR) 20 MG tablet Take 1 tablet (20 mg) by mouth daily 90 tablet 0    augmented betamethasone dipropionate (DIPROLENE AF) 0.05 % external cream Apply to affected area twice daily 50 g 2    benzonatate (TESSALON) 200 MG capsule Take 1 capsule (200 mg) by mouth 2 times daily as needed for cough 40 capsule 1    betamethasone dipropionate (DIPROSONE) 0.05 % external ointment Apply topically 2 times daily To areas of skin sores.  Can also use on mouth sores as needed twice daily. 50 g 3    buPROPion (WELLBUTRIN XL) 150 MG 24 hr tablet Take 1 tablet (150 mg) by mouth every morning To take with the 300 mg dose for a total of 450 mg daily. 90 tablet 1    buPROPion (WELLBUTRIN XL) 300 MG 24 hr tablet Take 1 tablet (300 mg) by mouth every morning To take with the 150 mg dose for a total of 450 mg daily. 90 tablet 1    butorphanol (STADOL) 10 MG/ML nasal spray Spray 1 spray in nostril every 4 hours as needed for pain 2.5 mL 3    celecoxib (CELEBREX) 200 MG capsule TAKE ONE CAPSULE BY MOUTH TWICE A DAY AS NEEDED FOR PAIN Strength: 200 mg 180 capsule 1    chlorhexidine (PERIDEX) 0.12 % solution Swish and spit 15 mLs in mouth 2 times daily 1893 mL 4    clindamycin (CLINDAMAX) 1 % external gel Apply topically 2 times daily 30 g 4    clotrimazole (MYCELEX) 10 MG lozenge Place 1 lozenge (10 mg) inside cheek 5 times daily 70 lozenge 1    colchicine  (COLCRYS) 0.6 MG tablet Take 1 tablet (0.6 mg) by mouth 2 times daily 60 tablet 1    desonide (DESOWEN) 0.05 % external cream Apply topically 2 times daily To sores on face 60 g 3    dextran 70-hypromellose (TEARS NATURALE FREE PF) 0.1-0.3 % ophthalmic solution Place 2 drops into both eyes daily as needed (for dry eyes) 35 each 3    doxycycline monohydrate (MONODOX) 100 MG capsule One pill twice daily, after meals and with big glass of water 28 capsule 3    DULoxetine (CYMBALTA) 60 MG capsule Take 2 capsules (120 mg) by mouth daily 180 capsule 1    dupilumab (DUPIXENT) 300 MG/2ML prefilled pen Inject 2 mLs (300 mg) Subcutaneous every 14 days After first loading dose 4 mL 2    famotidine (PEPCID) 40 MG tablet Take 1 tablet (40 mg) by mouth daily 90 tablet 0    fluconazole (DIFLUCAN) 150 MG tablet Take 1 tablet (150 mg) by mouth every 3 days 6 tablet 0    fluocinonide (LIDEX) 0.05 % external gel Apply topically 2 times daily as needed 15 g 1    gabapentin (NEURONTIN) 300 MG capsule Take 300mg daily in the afternoon and 600mg at bedtime 270 capsule 0    hydrochlorothiazide (HYDRODIURIL) 25 MG tablet Take 1 tablet (25 mg) by mouth daily 90 tablet 1    hydrocortisone 2.5 % cream APPLY TOPICALLY 2 TIMES DAILY AS NEEDED 30 g 3    lidocaine, viscous, (XYLOCAINE) 2 % solution Swish and spit 10ml every 3 hours as needed for oral pain; max 8 doses/24hrs.  Do not eat or chew gum for 60 minutes following use.      losartan (COZAAR) 100 MG tablet Take 1 tablet (100 mg) by mouth daily 90 tablet 1    metoprolol succinate ER (TOPROL XL) 100 MG 24 hr tablet Take 1 tablet (100 mg) by mouth 2 times daily 180 tablet 1    metoprolol succinate ER (TOPROL XL) 25 MG 24 hr tablet Take 1 tablet (25 mg) by mouth 3 times daily 270 tablet 1    mometasone-formoterol (DULERA) 200-5 MCG/ACT inhaler Inhale 2 puffs into the lungs 2 times daily 39 g 1    montelukast (SINGULAIR) 10 MG tablet Take 1 tablet (10 mg) by mouth At Bedtime 90 tablet 3     mupirocin (BACTROBAN) 2 % external cream Apply to affected area three times daily 60 g 1    nystatin (MYCOSTATIN) 682675 UNIT/ML suspension Take by mouth 4 times daily Has recurrent thrush. Uses for 2 weeks at a time as needed. 380 mL 1    oxyBUTYnin ER (DITROPAN XL) 5 MG 24 hr tablet Take 2 tablets (10 mg) by mouth daily 180 tablet 2    predniSONE (DELTASONE) 10 MG tablet Take 1 tablet (10 mg) by mouth daily 90 tablet 3    rizatriptan (MAXALT) 10 MG tablet Take 1 tablet (10 mg) by mouth at onset of headache for migraine May repeat in 2 hours. Max 3 tablets/24 hours. 18 tablet 1    rOPINIRole (REQUIP) 1 MG tablet TAKE THREE TABLETS BY MOUTH AT BEDTIME 270 tablet 3    tacrolimus (PROTOPIC) 0.1 % external ointment Apply topically 2 times daily To areas of sores on face 60 g 1    tiotropium (SPIRIVA RESPIMAT) 2.5 MCG/ACT inhaler Inhale 2 puffs into the lungs daily 12 g 3    traMADol (ULTRAM) 50 MG tablet Take 1 tablet (50 mg) by mouth every 12 hours as needed for severe pain 45 tablet 0    tranexamic acid (LYSTEDA) 650 MG tablet Take 1 tablet (650 mg) by mouth every morning AND 2 tablets (1,300 mg) at bedtime. Do all this for 90 days. 270 tablet 3    traZODone (DESYREL) 50 MG tablet Take 3 tablets (150 mg) by mouth At Bedtime (Patient taking differently: Take 50 mg by mouth nightly as needed for sleep) 270 tablet 0    triamcinolone (ARISTOCORT HP) 0.5 % external cream Apply topically 2 times daily 60 g 0    valACYclovir (VALTREX) 500 MG tablet Take 1 tablet (500 mg) by mouth daily 90 tablet 1      Allergies:  Allergies   Allergen Reactions    Cats      and rabbits/wheezing    Dogs      wheezing    Lyrica [Pregabalin] Other (See Comments)     Rash, mouth sores, itching and burning    Seasonal Allergies      rhinits     ROS:  Remainder of a comprehensive 10 point ROS is negative unless noted above.    Social History:  Denies any tobacco use. No significant alcohol use. Denies any illicit drug use.     Family  History:  Two daughter  29 Yo daughter  26 yo daughter  3 grandsons    1 sister- older- high blood glucose, arthritis  1 brother- older- no idea    Mother- - heart valve. Had MDS. Needed a shot every month  Father- - cancer    Objectives:  Reviewed photos in the chart    Labs:  Reviewed  Fibriogen antigen 587  Plasminogen 65 (low--- on Tranexamic acid)    Ferritin   Date Value Ref Range Status   2023 100 11 - 328 ng/mL Final   2021 376 (H) 8 - 252 ng/mL Final     Iron   Date Value Ref Range Status   2023 99 37 - 145 ug/dL Final   2021 50 35 - 180 ug/dL Final     Iron Binding Cap   Date Value Ref Range Status   2021 350 240 - 430 ug/dL Final     Iron Binding Capacity   Date Value Ref Range Status   2023 272 240 - 430 ug/dL Final   09/15/2021 284 240 - 430 ug/dL Final       Refer to Versiti- autoantibody platelet study            Platelet Glycoprotein Flow, B     GPIIb CD41                      89                    %     >=70%       GPIIIa CD61                     88                    %     >=70%       GPIX CD42a                      102                   %     >=70%       GPIb-alpha CD42b                87                    %     >=70%       GPIa CD49b                      88                    %     >=60%       GPVI                            92                    %     >=70%       Final Diagnosis                 SEE NOTE                                  The platelet surface glycoprotein (GP) profiles are       completely normal. There are no significant deficiencies of       CD41 (GPIIb), CD42a (GPIX), CD42b (GPIb-alpha), CD49b       (GPIa), CD61 (GPIIIa) or GPVI.   Imaging:  Not applicable

## 2023-12-21 ENCOUNTER — VIRTUAL VISIT (OUTPATIENT)
Dept: DERMATOLOGY | Facility: CLINIC | Age: 63
End: 2023-12-21
Payer: COMMERCIAL

## 2023-12-21 ENCOUNTER — MYC MEDICAL ADVICE (OUTPATIENT)
Dept: FAMILY MEDICINE | Facility: CLINIC | Age: 63
End: 2023-12-21

## 2023-12-21 ENCOUNTER — MYC MEDICAL ADVICE (OUTPATIENT)
Dept: DERMATOLOGY | Facility: CLINIC | Age: 63
End: 2023-12-21

## 2023-12-21 DIAGNOSIS — L70.0 ACNE VULGARIS: ICD-10-CM

## 2023-12-21 DIAGNOSIS — J45.40 MODERATE PERSISTENT ASTHMA WITHOUT COMPLICATION: ICD-10-CM

## 2023-12-21 DIAGNOSIS — L30.9 DERMATITIS: ICD-10-CM

## 2023-12-21 DIAGNOSIS — L20.89 OTHER ATOPIC DERMATITIS: ICD-10-CM

## 2023-12-21 DIAGNOSIS — L28.1 PRURIGO NODULARIS: ICD-10-CM

## 2023-12-21 DIAGNOSIS — K21.9 GASTROESOPHAGEAL REFLUX DISEASE, UNSPECIFIED WHETHER ESOPHAGITIS PRESENT: ICD-10-CM

## 2023-12-21 PROCEDURE — 99441 PR PHYSICIAN TELEPHONE EVALUATION 5-10 MIN: CPT | Mod: 95 | Performed by: DERMATOLOGY

## 2023-12-21 RX ORDER — DESONIDE 0.5 MG/G
CREAM TOPICAL 2 TIMES DAILY
Qty: 60 G | Refills: 3 | Status: SHIPPED | OUTPATIENT
Start: 2023-12-21

## 2023-12-21 RX ORDER — FAMOTIDINE 40 MG/1
40 TABLET, FILM COATED ORAL DAILY
Qty: 90 TABLET | Refills: 1 | Status: SHIPPED | OUTPATIENT
Start: 2023-12-21 | End: 2024-05-13

## 2023-12-21 RX ORDER — PIMECROLIMUS 10 MG/G
CREAM TOPICAL 2 TIMES DAILY
Qty: 60 G | Refills: 3 | Status: SHIPPED | OUTPATIENT
Start: 2023-12-21 | End: 2024-06-27

## 2023-12-21 RX ORDER — DOXYCYCLINE 100 MG/1
CAPSULE ORAL
Qty: 60 CAPSULE | Refills: 3 | Status: SHIPPED | OUTPATIENT
Start: 2023-12-21 | End: 2024-05-01

## 2023-12-21 ASSESSMENT — PAIN SCALES - GENERAL: PAINLEVEL: NO PAIN (0)

## 2023-12-21 NOTE — LETTER
12/21/2023       RE: Jacquie Amador  [address]  [facility] [address]     Dear Colleague,    Thank you for referring your patient, Jacquie Amador, to the [facility] [place] at [facility]. Please see a copy of my visit note below.    [facility] Dermatology Note  Encounter Date: Dec 21, 2023  [employer]-and[employer] and Telephone ([phone] ). Location of teledermatologist: [facility] [place].  Start time: 3:06. End time: 3:16.    Dermatology Problem List:  1. Multiple skin ulcers resembling prurigo/neurodermatitis  Facial erosions, healing -chronic active problem, uncontrolled but improving   - Current tx: Protopic , mupirocin, betamethasone ointment, desonide, compounded amytriptyline/ketamine cream, Dupixent 300mg q2 weeks, N-acetylcystiene 600 mg daily and PRN  intranasal butorphanol as rescue   - follow up in 3-4 months for recheck      2. Recurrent aphthous ulcers, improved     ____________________________________________    Assessment & Plan:     Multiple skin ulcers resembling prurigo/neurodermatitis  Facial erosions, chronic active problem, uncontrolled.  But slowly improving.  Patient with a history of recurring oral and genital sores, likely recurrent aphthous ulcers, as well as multiple diffuse discrete skin ulcers.   Today, Jacquie notes ongoing skin sores on face, however only 3 remain and the rest have healed. She reports improvement in her symptoms but does occasionally still have stinging intermittent pains, does have moments of relief. Symptoms arely with nasal  butorphanol used as rescue therapy. Overall, patient is improved and she does not report any other new concerns or complaints.   - Gentle skin care regimen, provided   - Elidel cream bid (patient prefers a cream to Protopic ointment)  - Betamethasone ointment and desonide cream as needed for persistent skin sores  and pain  - Can restart amytriptyline/ketamine cream to apply twice daily to areas of sores as desired  - Dupixent 300mg q2 weeks and is tolerating this without problems   - N-acetylcystiene 600 mg bid  - using intranasal butorphanol as rescue therapy      Recurrent aphthous ulcers  - well managed     Follow-up: 3 month(s) in-person, or earlier for new or changing lesions     Jay Warren MD  Dermatology Attending  ____________________________________________    CC: Derm Problem (Jacquie is being seen today for a follow up for facial sores )    HPI:  Ms. Jacquie Amador is a(n) 63 year old female who presents today for followup on neurodermatitis and persistent facial sores.  Jacquie thinks some of the previously involved sores on the left side of the face are improving, but many are still open and only slowly healing on the left.    Patient is otherwise feeling well, without additional skin concerns.    Labs Reviewed:  N/A    Physical Exam:  Vitals: LMP 07/17/2009 (Exact Date)   SKIN: Teledermatology photos were reviewed; image quality and interpretability: acceptable. Image date: 12/21/23.  - geographic shallow ulcers and 3mm papulonodules with central ulcer and hemorrhagic crusts on face, right greater than left  - No other lesions of concern on areas examined.     Medications:  Current Outpatient Medications   Medication    acetaminophen (TYLENOL) 500 MG tablet    acetylcysteine (N-ACETYL-L-CYSTEINE) 600 MG CAPS capsule    albuterol (PROVENTIL) (2.5 MG/3ML) 0.083% neb solution    albuterol (VENTOLIN HFA) 108 (90 Base) MCG/ACT inhaler    Apremilast (OTEZLA) 10 & 20 & 30 MG TBPK    apremilast (OTEZLA) 30 MG tablet    atorvastatin (LIPITOR) 20 MG tablet    augmented betamethasone dipropionate (DIPROLENE AF) 0.05 % external cream    benzonatate (TESSALON) 200 MG capsule    betamethasone dipropionate (DIPROSONE) 0.05 % external ointment    buPROPion (WELLBUTRIN XL) 150 MG 24 hr tablet    buPROPion (WELLBUTRIN XL)  300 MG 24 hr tablet    butorphanol (STADOL) 10 MG/ML nasal spray    celecoxib (CELEBREX) 200 MG capsule    chlorhexidine (PERIDEX) 0.12 % solution    clindamycin (CLINDAMAX) 1 % external gel    clotrimazole (MYCELEX) 10 MG lozenge    colchicine (COLCRYS) 0.6 MG tablet    desonide (DESOWEN) 0.05 % external cream    dextran 70-hypromellose (TEARS NATURALE FREE PF) 0.1-0.3 % ophthalmic solution    doxycycline monohydrate (MONODOX) 100 MG capsule    DULoxetine (CYMBALTA) 60 MG capsule    dupilumab (DUPIXENT) 300 MG/2ML prefilled pen    famotidine (PEPCID) 40 MG tablet    fluconazole (DIFLUCAN) 150 MG tablet    fluocinonide (LIDEX) 0.05 % external gel    gabapentin (NEURONTIN) 300 MG capsule    hydrochlorothiazide (HYDRODIURIL) 25 MG tablet    hydrocortisone 2.5 % cream    lidocaine, viscous, (XYLOCAINE) 2 % solution    losartan (COZAAR) 100 MG tablet    metoprolol succinate ER (TOPROL XL) 100 MG 24 hr tablet    metoprolol succinate ER (TOPROL XL) 25 MG 24 hr tablet    mometasone-formoterol (DULERA) 200-5 MCG/ACT inhaler    montelukast (SINGULAIR) 10 MG tablet    mupirocin (BACTROBAN) 2 % external cream    nystatin (MYCOSTATIN) 573475 UNIT/ML suspension    oxyBUTYnin ER (DITROPAN XL) 5 MG 24 hr tablet    predniSONE (DELTASONE) 10 MG tablet    rizatriptan (MAXALT) 10 MG tablet    rOPINIRole (REQUIP) 1 MG tablet    tacrolimus (PROTOPIC) 0.1 % external ointment    tiotropium (SPIRIVA RESPIMAT) 2.5 MCG/ACT inhaler    traMADol (ULTRAM) 50 MG tablet    tranexamic acid (LYSTEDA) 650 MG tablet    traZODone (DESYREL) 50 MG tablet    triamcinolone (ARISTOCORT HP) 0.5 % external cream    valACYclovir (VALTREX) 500 MG tablet     No current facility-administered medications for this visit.      Past Medical/Surgical History:   Patient Active Problem List   Diagnosis    Moderate persistent asthma without complication    Allergic rhinitis due to other allergen    Esophageal reflux    Moderate recurrent major depression (H)    Multiple  joint pain    HYPERLIPIDEMIA LDL GOAL <130    Hypertension goal BP (blood pressure) < 140/90    Generalized anxiety disorder    Myalgia    Chronic rhinitis    Constipation    Lumbar disc disease with radiculopathy    Iron deficiency anemia    Osteoarthritis of carpometacarpal joint of thumb - bilateral    Trochanteric bursitis    Right ankle instability    Primary focal hyperhidrosis    Trochanteric bursitis of both hips    Overweight    Iron deficiency    Scratching    Advanced directives, counseling/discussion    Chronic, continuous use of opioids    Medical marijuana use    Primary osteoarthritis of both first carpometacarpal joints    Arthritis of metatarsophalangeal joint    Sinus tachycardia    Intractable chronic migraine without aura and without status migrainosus    Impaired fasting glucose    Migraine without aura and without status migrainosus, not intractable    Skin ulcer, limited to breakdown of skin (H)    Morbid obesity (H)    Platelet granule defect (H)    Aphthous ulcer    Rash    Pruritus    Behcet's syndrome involving oral mucosa (H)    Encounter for long-term (current) use of high-risk medication    Dermatitis    Encounter for long-term current use of high risk medication    Acquired platelet disorder (H)    Other atopic dermatitis    Facial eczema    Skin pain    Prurigo nodularis     Past Medical History:   Diagnosis Date    ASTHMA - MODERATE PERSISTENT 9/21/2005    Chronic pain     Coronary artery disease     CVA (cerebral infarction)     Depressive disorder, not elsewhere classified     Diabetes (H)     Elevated serum alkaline phosphatase level     Liver source    Fibromyalgia     HYPERLIPIDEMIA NEC/NOS 12/29/2006    Hypertriglyceridemia     OA (osteoarthritis)     Thyroid disease     Trochanteric bursitis     Unspecified essential hypertension        CC No referring provider defined for this encounter. on close of this encounter.

## 2023-12-21 NOTE — PROGRESS NOTES
[facility] Dermatology Note  Encounter Date: Dec 21, 2023  Store-and-Forward and Telephone ([phone] ). Location of teledermatologist: [facility] [place].  Start time: 3:06. End time: 3:16.    Dermatology Problem List:  1. Multiple skin ulcers resembling prurigo/neurodermatitis  Facial erosions, healing -chronic active problem, uncontrolled but improving   - Current tx: Protopic , mupirocin, betamethasone ointment, desonide, compounded amytriptyline/ketamine cream, Dupixent 300mg q2 weeks, N-acetylcystiene 600 mg daily and PRN  intranasal butorphanol as rescue   - follow up in 3-4 months for recheck      2. Recurrent aphthous ulcers, improved     ____________________________________________    Assessment & Plan:     Multiple skin ulcers resembling prurigo/neurodermatitis  Facial erosions, chronic active problem, uncontrolled.  But slowly improving.  Patient with a history of recurring oral and genital sores, likely recurrent aphthous ulcers, as well as multiple diffuse discrete skin ulcers.   Today, Jacquie notes ongoing skin sores on face, however only 3 remain and the rest have healed. She reports improvement in her symptoms but does occasionally still have stinging intermittent pains, does have moments of relief. Symptoms arely with nasal  butorphanol used as rescue therapy. Overall, patient is improved and she does not report any other new concerns or complaints.   - Gentle skin care regimen, provided   - Elidel cream bid (patient prefers a cream to Protopic ointment)  - Betamethasone ointment and desonide cream as needed for persistent skin sores and pain  - Can restart amytriptyline/ketamine cream to apply twice daily to areas of sores as desired  - Dupixent 300mg q2 weeks and is tolerating this without problems   - N-acetylcystiene 600 mg bid  - using intranasal butorphanol as rescue therapy      Recurrent aphthous ulcers  - well managed     Follow-up: 3  month(s) in-person, or earlier for new or changing lesions     Jay Warren MD  Dermatology Attending  ____________________________________________    CC: Derm Problem (Jacquie is being seen today for a follow up for facial sores )    HPI:  Ms. Jacquie Amador is a(n) 63 year old female who presents today for followup on neurodermatitis and persistent facial sores.  Jacquie thinks some of the previously involved sores on the left side of the face are improving, but many are still open and only slowly healing on the left.    Patient is otherwise feeling well, without additional skin concerns.    Labs Reviewed:  N/A    Physical Exam:  Vitals: LMP 07/17/2009 (Exact Date)   SKIN: Teledermatology photos were reviewed; image quality and interpretability: acceptable. Image date: 12/21/23.  - geographic shallow ulcers and 3mm papulonodules with central ulcer and hemorrhagic crusts on face, right greater than left  - No other lesions of concern on areas examined.     Medications:  Current Outpatient Medications   Medication     acetaminophen (TYLENOL) 500 MG tablet     acetylcysteine (N-ACETYL-L-CYSTEINE) 600 MG CAPS capsule     albuterol (PROVENTIL) (2.5 MG/3ML) 0.083% neb solution     albuterol (VENTOLIN HFA) 108 (90 Base) MCG/ACT inhaler     Apremilast (OTEZLA) 10 & 20 & 30 MG TBPK     apremilast (OTEZLA) 30 MG tablet     atorvastatin (LIPITOR) 20 MG tablet     augmented betamethasone dipropionate (DIPROLENE AF) 0.05 % external cream     benzonatate (TESSALON) 200 MG capsule     betamethasone dipropionate (DIPROSONE) 0.05 % external ointment     buPROPion (WELLBUTRIN XL) 150 MG 24 hr tablet     buPROPion (WELLBUTRIN XL) 300 MG 24 hr tablet     butorphanol (STADOL) 10 MG/ML nasal spray     celecoxib (CELEBREX) 200 MG capsule     chlorhexidine (PERIDEX) 0.12 % solution     clindamycin (CLINDAMAX) 1 % external gel     clotrimazole (MYCELEX) 10 MG lozenge     colchicine (COLCRYS) 0.6 MG tablet     desonide (DESOWEN) 0.05 %  external cream     dextran 70-hypromellose (TEARS NATURALE FREE PF) 0.1-0.3 % ophthalmic solution     doxycycline monohydrate (MONODOX) 100 MG capsule     DULoxetine (CYMBALTA) 60 MG capsule     dupilumab (DUPIXENT) 300 MG/2ML prefilled pen     famotidine (PEPCID) 40 MG tablet     fluconazole (DIFLUCAN) 150 MG tablet     fluocinonide (LIDEX) 0.05 % external gel     gabapentin (NEURONTIN) 300 MG capsule     hydrochlorothiazide (HYDRODIURIL) 25 MG tablet     hydrocortisone 2.5 % cream     lidocaine, viscous, (XYLOCAINE) 2 % solution     losartan (COZAAR) 100 MG tablet     metoprolol succinate ER (TOPROL XL) 100 MG 24 hr tablet     metoprolol succinate ER (TOPROL XL) 25 MG 24 hr tablet     mometasone-formoterol (DULERA) 200-5 MCG/ACT inhaler     montelukast (SINGULAIR) 10 MG tablet     mupirocin (BACTROBAN) 2 % external cream     nystatin (MYCOSTATIN) 524574 UNIT/ML suspension     oxyBUTYnin ER (DITROPAN XL) 5 MG 24 hr tablet     predniSONE (DELTASONE) 10 MG tablet     rizatriptan (MAXALT) 10 MG tablet     rOPINIRole (REQUIP) 1 MG tablet     tacrolimus (PROTOPIC) 0.1 % external ointment     tiotropium (SPIRIVA RESPIMAT) 2.5 MCG/ACT inhaler     traMADol (ULTRAM) 50 MG tablet     tranexamic acid (LYSTEDA) 650 MG tablet     traZODone (DESYREL) 50 MG tablet     triamcinolone (ARISTOCORT HP) 0.5 % external cream     valACYclovir (VALTREX) 500 MG tablet     No current facility-administered medications for this visit.      Past Medical/Surgical History:   Patient Active Problem List   Diagnosis     Moderate persistent asthma without complication     Allergic rhinitis due to other allergen     Esophageal reflux     Moderate recurrent major depression (H)     Multiple joint pain     HYPERLIPIDEMIA LDL GOAL <130     Hypertension goal BP (blood pressure) < 140/90     Generalized anxiety disorder     Myalgia     Chronic rhinitis     Constipation     Lumbar disc disease with radiculopathy     Iron deficiency anemia      Osteoarthritis of carpometacarpal joint of thumb - bilateral     Trochanteric bursitis     Right ankle instability     Primary focal hyperhidrosis     Trochanteric bursitis of both hips     Overweight     Iron deficiency     Scratching     Advanced directives, counseling/discussion     Chronic, continuous use of opioids     Medical marijuana use     Primary osteoarthritis of both first carpometacarpal joints     Arthritis of metatarsophalangeal joint     Sinus tachycardia     Intractable chronic migraine without aura and without status migrainosus     Impaired fasting glucose     Migraine without aura and without status migrainosus, not intractable     Skin ulcer, limited to breakdown of skin (H)     Morbid obesity (H)     Platelet granule defect (H)     Aphthous ulcer     Rash     Pruritus     Behcet's syndrome involving oral mucosa (H)     Encounter for long-term (current) use of high-risk medication     Dermatitis     Encounter for long-term current use of high risk medication     Acquired platelet disorder (H)     Other atopic dermatitis     Facial eczema     Skin pain     Prurigo nodularis     Past Medical History:   Diagnosis Date     ASTHMA - MODERATE PERSISTENT 9/21/2005     Chronic pain      Coronary artery disease      CVA (cerebral infarction)      Depressive disorder, not elsewhere classified      Diabetes (H)      Elevated serum alkaline phosphatase level     Liver source     Fibromyalgia      HYPERLIPIDEMIA NEC/NOS 12/29/2006     Hypertriglyceridemia      OA (osteoarthritis)      Thyroid disease      Trochanteric bursitis      Unspecified essential hypertension        CC No referring provider defined for this encounter. on close of this encounter.

## 2023-12-21 NOTE — PATIENT INSTRUCTIONS
"Dear Jacquie,    Here is a link to the hydrocolloid bandages that [facility] has available (\"[facility] Health Advanced Healing Hydrocolloid Bandages\"). You can cut them into smaller squares, usually just a bit larger than the wound itself.    [email or web address]  "

## 2023-12-21 NOTE — TELEPHONE ENCOUNTER
Medications pended and routing to refill pool.     MARVA ParekhN, RN  [facility] ~ Registered Nurse  Clinic Triage ~ [facility]  December 21, 2023

## 2023-12-21 NOTE — NURSING NOTE
Dermatology Rooming Note    Jacquie Amador's goals for this visit include:   Chief Complaint   Patient presents with    Derm Problem     Jacquie is being seen today for a follow up for facial sores      SHUN Gonzalez

## 2023-12-22 RX ORDER — HYDROCORTISONE 2.5 %
CREAM (GRAM) TOPICAL
Qty: 30 G | Refills: 3 | Status: SHIPPED | OUTPATIENT
Start: 2023-12-22

## 2023-12-27 ENCOUNTER — VIRTUAL VISIT (OUTPATIENT)
Dept: RHEUMATOLOGY | Facility: CLINIC | Age: 63
End: 2023-12-27
Attending: STUDENT IN AN ORGANIZED HEALTH CARE EDUCATION/TRAINING PROGRAM
Payer: COMMERCIAL

## 2023-12-27 DIAGNOSIS — M35.2 BEHCET'S DISEASE (H): Primary | ICD-10-CM

## 2023-12-27 NOTE — Clinical Note
12/27/2023       RE: Jacquie Amador  [address]  [place] [address]     Dear Colleague,    Thank you for referring your patient, Jacquie Amador, to the [facility] [place] [facility]. Please see a copy of my visit note below.    Medication Therapy Management (MTM) Encounter    ASSESSMENT:                            Medication Adherence/Access: No issues identified    Behcet Disease: Patient has experienced improvement in hand and foot pain since starting Otezla and would benefit from continuing therapy. Has noticed headaches return but difficult to determine whether caused by Otezla or restarting colchicine. Previously had discontinued colchicine due to headaches. Discussed maximum acetaminophen dose and advised patient to limit to 4000 mg daily.     PLAN:                            Continue Otezla 30 mg twice daily.     Do not take more than 4000 mg of Tylenol daily.    Can take 2 of the 500 mg tablets up to 4 times daily.      Consider discussing a medication to help prevent headaches/migraines with your primary care provider.      Follow-up: with MTM pharmacist on 3/19/24.    SUBJECTIVE/OBJECTIVE:                          Jacquie Aamdor is a 63 year old female called for a follow-up visit from 11/28/23.       Reason for visit: Otezla follow-up.    Allergies/ADRs: Reviewed in chart  Past Medical History: Reviewed in chart  Tobacco: She reports that she quit smoking about 10 years ago. Her smoking use included cigarettes. She started smoking about 44 years ago. She has a 9.9 pack-year smoking history. She has been exposed to tobacco smoke. She has never used smokeless tobacco.  Alcohol: none    Medication Adherence/Access: no issues reported    Behcet Disease:   Otezla 30 mg twice daily   Prednisone 10 mg daily   Celecoxib 200 mg twice daily as needed   Tramadol 50 mg twice daily as needed   Tears Naturale Free as needed    Colchicine 0.6 mg twice daily   Valacyclovir 500 mg daily   Lidocaine 2% solution as needed   Patient has experienced some improvement since starting Otezla. Reported both her hands and feet have been feeling better. Has not noticed any improvement in her back and mentioned she receives steroid injections for degenerative disk disease, which help a little. Getting headaches again since starting Otezla. Were gone for awhile but now getting daily. Taking acetaminophen 4229-5621 mg four times daily for pain. Not taking anything for migraine prevention and does not take Maxalt because she does not like the way it makes her feel. Imitrex made her feel jittery but patient is willing to try again. Restarted colchicine and said medication going well. Noticing improvement from liquid betamethasone for her mouth sores.       Liver Function Studies -   Recent Labs   Lab Test 11/13/23  1021   PROTTOTAL 7.7   ALBUMIN 4.4   BILITOTAL 0.5   ALKPHOS 102   AST 43   ALT 50     Today's Vitals: LMP 07/17/2009 (Exact Date)   ----------------    I spent 20 minutes with this patient today. All changes were made via collaborative practice agreement with Guillermo Toribio MD. A copy of the visit note was provided to the patient's provider(s).    A summary of these recommendations was sent via [employer].    Nikki MeyerD  Medication Therapy Management Pharmacist  [facility]  Phone: [phone]     Telemedicine Visit Details  Type of service:  Telephone visit  Start Time:  9:00 AM  End Time:  9:20 AM     Medication Therapy Recommendations  No medication therapy recommendations to display         Medication Therapy Management (MTM) Encounter    ASSESSMENT:                            Medication Adherence/Access: No issues identified    Behcet Disease: Patient has experienced improvement in hand and foot pain since starting Otezla and would benefit from continuing therapy. Has noticed headaches return which  likely are the result of restarting colchicine. Previously had discontinued colchicine due to headaches. Advised patient to discuss colchicine therapy further with ordering provider. Discussed maximum acetaminophen dose and advised patient to limit to 4000 mg daily.     PLAN:                            Continue Otezla 30 mg twice daily.     Do not take more than 4000 mg of Tylenol daily.    Can take 2 of the 500 mg tablets up to 4 times daily.      Discuss the risks and benefits of colchicine with your provider. It may be contributing to the headaches that have returned.    Follow-up: with MT pharmacist on 3/19/24.    SUBJECTIVE/OBJECTIVE:                          Jacquie Amador is a 63 year old female called for a follow-up visit from 11/28/23.       Reason for visit: Otezla follow-up.    Allergies/ADRs: Reviewed in chart  Past Medical History: Reviewed in chart  Tobacco: She reports that she quit smoking about 10 years ago. Her smoking use included cigarettes. She started smoking about 44 years ago. She has a 9.9 pack-year smoking history. She has been exposed to tobacco smoke. She has never used smokeless tobacco.  Alcohol: none    Medication Adherence/Access: no issues reported    Behcet Disease:   Otezla 30 mg twice daily   Prednisone 10 mg daily   Celecoxib 200 mg twice daily as needed   Tramadol 50 mg twice daily as needed   Tears Naturale Free as needed   Colchicine 0.6 mg twice daily   Valacyclovir 500 mg daily   Lidocaine 2% solution as needed   Patient has experienced some improvement since starting Otezla. Reported both her hands and feet have been feeling better. Has not noticed any improvement in her back and mentioned she receives steroid injections for degenerative disk disease, which help a little. Getting headaches again since starting Otezla and restarting colchicine. Were gone for awhile but now getting daily. Taking acetaminophen 9163-6065 mg four times daily for pain. Not taking anything for  migraine prevention and does not take Maxalt because she does not like the way it makes her feel. Imitrex made her feel jittery but patient is willing to try again. Noticing improvement from liquid betamethasone for her mouth sores.       Liver Function Studies -   Recent Labs   Lab Test 11/13/23  1021   PROTTOTAL 7.7   ALBUMIN 4.4   BILITOTAL 0.5   ALKPHOS 102   AST 43   ALT 50     Today's Vitals: LMP 07/17/2009 (Exact Date)   ----------------    I spent 20 minutes with this patient today. All changes were made via collaborative practice agreement with Guillermo Toribio MD. A copy of the visit note was provided to the patient's provider(s).    A summary of these recommendations was sent via [employer].    Golden Cadena PharmD  Medication Therapy Management Pharmacist  [facility]  Phone: [phone]     Telemedicine Visit Details  Type of service:  Telephone visit  Start Time:  9:00 AM  End Time:  9:20 AM     Medication Therapy Recommendations  No medication therapy recommendations to display           Again, thank you for allowing me to participate in the care of your patient.      Sincerely,    Golden Cadena RPH

## 2023-12-27 NOTE — PROGRESS NOTES
Medication Therapy Management (MTM) Encounter    ASSESSMENT:                            Medication Adherence/Access: No issues identified    Behcet Disease: Patient has experienced improvement in hand and foot pain since starting Otezla and would benefit from continuing therapy. Has noticed headaches return which likely are the result of restarting colchicine. Previously had discontinued colchicine due to headaches. Advised patient to discuss colchicine therapy further with ordering provider. Discussed maximum acetaminophen dose and advised patient to limit to 4000 mg daily.     PLAN:                            Continue Otezla 30 mg twice daily.     Do not take more than 4000 mg of Tylenol daily.    Can take 2 of the 500 mg tablets up to 4 times daily.      Discuss the risks and benefits of colchicine with your provider. It may be contributing to the headaches that have returned.    Follow-up: with MTM pharmacist on 3/19/24.    SUBJECTIVE/OBJECTIVE:                          Jacquie Amador is a 63 year old female called for a follow-up visit from 11/28/23.       Reason for visit: Otezla follow-up.    Allergies/ADRs: Reviewed in chart  Past Medical History: Reviewed in chart  Tobacco: She reports that she quit smoking about 10 years ago. Her smoking use included cigarettes. She started smoking about 44 years ago. She has a 9.9 pack-year smoking history. She has been exposed to tobacco smoke. She has never used smokeless tobacco.  Alcohol: none    Medication Adherence/Access: no issues reported    Behcet Disease:   Otezla 30 mg twice daily   Prednisone 10 mg daily   Celecoxib 200 mg twice daily as needed   Tramadol 50 mg twice daily as needed   Tears Naturale Free as needed   Colchicine 0.6 mg twice daily   Valacyclovir 500 mg daily   Lidocaine 2% solution as needed   Patient has experienced some improvement since starting Otezla. Reported both her hands and feet have been feeling better. Has not noticed any  improvement in her back and mentioned she receives steroid injections for degenerative disk disease, which help a little. Getting headaches again since starting Otezla and restarting colchicine. Were gone for awhile but now getting daily. Taking acetaminophen 9033-0839 mg four times daily for pain. Not taking anything for migraine prevention and does not take Maxalt because she does not like the way it makes her feel. Imitrex made her feel jittery but patient is willing to try again. Noticing improvement from liquid betamethasone for her mouth sores.       Liver Function Studies -   Recent Labs   Lab Test 11/13/23  1021   PROTTOTAL 7.7   ALBUMIN 4.4   BILITOTAL 0.5   ALKPHOS 102   AST 43   ALT 50     Today's Vitals: LMP 07/17/2009 (Exact Date)   ----------------    I spent 20 minutes with this patient today. All changes were made via collaborative practice agreement with Guillermo Toribio MD. A copy of the visit note was provided to the patient's provider(s).    A summary of these recommendations was sent via [employer].    Golden Cadena, PharmD  Medication Therapy Management Pharmacist  [facility]  Phone: [phone]     Telemedicine Visit Details  Type of service:  Telephone visit  Start Time:  9:00 AM  End Time:  9:20 AM

## 2023-12-28 ENCOUNTER — TELEPHONE (OUTPATIENT)
Dept: DERMATOLOGY | Facility: CLINIC | Age: 63
End: 2023-12-28

## 2023-12-28 ENCOUNTER — VIRTUAL VISIT (OUTPATIENT)
Dept: PHARMACY | Facility: CLINIC | Age: 63
End: 2023-12-28
Payer: COMMERCIAL

## 2023-12-28 DIAGNOSIS — F41.1 GAD (GENERALIZED ANXIETY DISORDER): Primary | ICD-10-CM

## 2023-12-28 DIAGNOSIS — F33.1 MODERATE EPISODE OF RECURRENT MAJOR DEPRESSIVE DISORDER (H): ICD-10-CM

## 2023-12-28 DIAGNOSIS — G47.9 SLEEP DISORDER: ICD-10-CM

## 2023-12-28 PROCEDURE — 99606 MTMS BY PHARM EST 15 MIN: CPT | Performed by: PHARMACIST

## 2023-12-28 PROCEDURE — 99607 MTMS BY PHARM ADDL 15 MIN: CPT | Performed by: PHARMACIST

## 2023-12-28 NOTE — PATIENT INSTRUCTIONS
Recommendations from today's MTM visit:                                                      Increase gabapentin to 300mg daily around noon and 600mg nightly.     Follow-up:   Future Appointments 12/28/2023 - 6/25/2024        Date Visit Type Length Department Provider     12/29/2023  3:00 PM OFFICE VISIT 30 min [facility] Liz Peterson MD    Location Instructions:     [facility] is located at [address] [address] [facility], one mile north of the [address] [facility] exit off of [address] . From [address] [facility], exit to turn west on [address], then turn south on [address].               1/29/2024 11:00 AM NEW - MTM 60 min UR MTM PSYCHIATRY Paulina Mitchell [facility]              2/20/2024 10:30 AM VIDEO VISIT RETURN 30 min MG RHEUM Guillermo Toribio MD              3/19/2024 10:30 AM RETURN - MTM 30 min [facility] ADULT RHEUMATOLOGY Golden Cadena [facility]    Location Instructions:     Located in the Clinics and Surgery Center at [address] [address]. For parking options, enter the [facility] /arrival plaza from [facility] and attendants can assist you based on your needs.  parking is available for those with limited mobility M-F from 7 a.m. to 5 p.m. Due to short staffing, we are unable to offer  to all patients/visitors. Visit [email or web address] [place] for more details.  Self-parking:&nbsp;  West Lot: Located across from the main entrance, this is a convenient option for patients. Enter on [facility] [address]. Parking attendants available most hours to assist.&nbsp;     [address] Ramp: Enter at the [facility] SE entrance (one block north of the [facility] main entrance). Do not enter the ramp from [facility] - this entrance is not staffed and is further from the [facility] main entrance.                      It was great speaking with you today.  I value your experience and would be very thankful for your time in providing feedback in our clinic survey. In the next few days,  "you may receive an email or text message from [facility] with a link to a survey related to your  clinical pharmacist.\"     To schedule another MTM appointment, please call the clinic directly or you may call the MTM scheduling line at [phone] or toll-free at [phone].     My Clinical Pharmacist's contact information:                                                      Please feel free to contact me with any questions or concerns you have.      Paulina Mitchell, PharmD, BCPP  Medication Therapy Management Pharmacist  [facility]    "

## 2023-12-28 NOTE — PROGRESS NOTES
Medication Therapy Management (MTM) Encounter    ASSESSMENT:                            Medication Adherence/Access: No issues identified    MDD/MARIZOL/Sleep:   Patient with ongoing anxiety. Sleep and pain improved with gabapentin.  We will try increasing gabapentin today and monitoring for daytime fatigue.  Wellbutrin was increased about 6-8 weeks ago patient does feel it has been helpful for depression symptoms. Current CrCl 53 ml/min.        Future considerations: adjunctive mirtazapine (?, concern for hematologic/bleed risk) or switching antidepressants if gabapentin not effective/well-tolerated     PLAN:                            Increase gabapentin to 300mg daily around noon and 600mg nightly.     Follow-up:   Future Appointments 12/28/2023 - 6/25/2024        Date Visit Type Length Department Provider     12/29/2023  3:00 PM OFFICE VISIT 30 min  FAMILY PRACTICE Liz Peterson MD    Location Instructions:     [facility] is located at [address] [facility], one mile north of the [place] [address] exit off of [place] [address]. From [place] [address]/[facility], exit to turn west on [address] [place] [address], then turn south on [facility] [address].               1/29/2024 11:00 AM NEW - MTM 60 min [facility]              2/20/2024 10:30 AM VIDEO VISIT RETURN 30 min MG RHEUM Guillermo Toribio MD              3/19/2024 10:30 AM RETURN - MTM 30 min  ADULT RHEUMATOLOGY Golden Cadena, [facility]    Location Instructions:     Located in the Clinics and Surgery Center at [address] [place] [address]. For parking options, enter the [facility] /arrival theaza from [facility] and attendants can assist you based on your needs.  parking is available for those with limited mobility M-F from 7 a.m. to 5 p.m. Due to short staffing, we are unable to offer  to all patients/visitors. Visit [email or web address] for more details.  Self-parking:&nbsp;  West Lot: Located across from the  main entrance, this is a convenient option for patients. Enter on [facility]. Parking attendants available most hours to assist.&nbsp;     [facility] Ramp: Enter at the [facility] SE entrance (one block north of the [facility] main entrance). Do not enter the ramp from [facility] - this entrance is not staffed and is further from the [facility] main entrance.                      SUBJECTIVE/OBJECTIVE:                          Jacquie Amador is a 63 year old female called for a follow-up visit from 12/18/23.       Reason for visit: gabapentin check in.    Allergies/ADRs: Reviewed in chart  Past Medical History: Reviewed in chart  Tobacco: She reports that she quit smoking about 10 years ago. Her smoking use included cigarettes. She started smoking about 44 years ago. She has a 9.9 pack-year smoking history. She has been exposed to tobacco smoke. She has never used smokeless tobacco.  Alcohol: none    MDD/MARIZOL/Sleep:   Duloxetine 120mg once daily   Wellbutrin XL 450mg once daily   Gabapentin 600mg nightly   Trazodone 50-150mg nightly as needed (typically taking 50-100mg nightly)    At last [facility] visit 12/18/2023, plan was made to restart gabapentin to target ongoing anxiety and difficulty sleeping, as patient had previously stopped it after a short time due to concern it could cause hallucinations.     Today, she reports gabapentin 600mg nightly has been very helpful sleep and pain (foot/toe pain), not sure if it has been helpful for anxiety. She does endorse ongoing anxiety, which she reports is related, in part, to the sores on her face (doesn't want to go out in public). She reports taking gabapentin 1-2 times in the afternoon over the last 2 weeks, thinks it may have caused some daytime fatigue, but isn't sure. Does feel Wellbutrin dose increase has helped with depression symptoms. She reports overall anxiety has remained unchanged over the last couple months.  Holidays have been stressful. Will go to [place] for 1-2  months in March.        8/29/2023    10:44 AM 9/19/2023     8:44 AM 11/12/2023     1:34 PM   MARIZOL-7 SCORE   Total Score 9 (mild anxiety) 5 (mild anxiety) 6 (mild anxiety)   Total Score 9    9 5 6           5/9/2023    10:54 AM 8/29/2023    10:43 AM 11/14/2023     7:05 AM   PHQ   PHQ-9 Total Score 6 7    7 6   Q9: Thoughts of better off dead/self-harm past 2 weeks Not at all Not at all Not at all         Summary of recent medication changes 10/2023:   - Buspar reduced (10/18/23) due to concern it was causing hallucinations - hallucinations resolved, Buspar stopped 11/28/23  - Gabapentin started for anxiety (10/18/23), unclear how long patient took it, restarted 12/18/23.  - Trazodone - encouraged decreased use due to daytime fatigue (fatigue predated gabapentin) (10/18/23)  - Wellbutrin increased from 400mg to 450mg daily (11/8/23)  - Seroquel (started 11/28/23) - stopped after a couple days due to increased irritability      ----------------      I spent 25 minutes with this patient today. A copy of the visit note was provided to the patient's provider(s).    A summary of these recommendations was sent via [employer].    Paulina Mitchell, PharmD, BCPP  Medication Therapy Management Pharmacist  [facility]      Telemedicine Visit Details  Type of service:  Telephone visit  Start Time:  10:45 AM  End Time:  11:10 AM     Medication Therapy Recommendations  MARIZOL (generalized anxiety disorder)    Current Medication: gabapentin (NEURONTIN) 300 MG capsule   Rationale: Dose too low - Dosage too low - Effectiveness   Recommendation: Increase Dose - gabapentin 300 MG capsule   Status: Accepted per CPA

## 2023-12-29 ENCOUNTER — OFFICE VISIT (OUTPATIENT)
Dept: FAMILY MEDICINE | Facility: CLINIC | Age: 63
End: 2023-12-29
Payer: COMMERCIAL

## 2023-12-29 VITALS
TEMPERATURE: 97.6 F | DIASTOLIC BLOOD PRESSURE: 78 MMHG | SYSTOLIC BLOOD PRESSURE: 136 MMHG | HEART RATE: 70 BPM | OXYGEN SATURATION: 96 %

## 2023-12-29 DIAGNOSIS — M19.049 HAND ARTHRITIS: ICD-10-CM

## 2023-12-29 DIAGNOSIS — L30.9 DERMATITIS: ICD-10-CM

## 2023-12-29 DIAGNOSIS — R05.9 COUGH, UNSPECIFIED TYPE: ICD-10-CM

## 2023-12-29 DIAGNOSIS — F41.1 GENERALIZED ANXIETY DISORDER: ICD-10-CM

## 2023-12-29 DIAGNOSIS — G43.009 MIGRAINE WITHOUT AURA AND WITHOUT STATUS MIGRAINOSUS, NOT INTRACTABLE: ICD-10-CM

## 2023-12-29 DIAGNOSIS — G89.4 CHRONIC PAIN SYNDROME: Primary | ICD-10-CM

## 2023-12-29 DIAGNOSIS — K12.0 APHTHAE, ORAL: ICD-10-CM

## 2023-12-29 DIAGNOSIS — M25.50 MULTIPLE JOINT PAIN: ICD-10-CM

## 2023-12-29 DIAGNOSIS — J45.40 MODERATE PERSISTENT ASTHMA WITHOUT COMPLICATION: ICD-10-CM

## 2023-12-29 PROCEDURE — 99215 OFFICE O/P EST HI 40 MIN: CPT | Performed by: FAMILY MEDICINE

## 2023-12-29 RX ORDER — CELECOXIB 200 MG/1
CAPSULE ORAL
Qty: 180 CAPSULE | Refills: 1 | Status: SHIPPED | OUTPATIENT
Start: 2023-12-29 | End: 2024-03-05

## 2023-12-29 RX ORDER — VALACYCLOVIR HYDROCHLORIDE 500 MG/1
500 TABLET, FILM COATED ORAL DAILY
Qty: 90 TABLET | Refills: 1 | Status: SHIPPED | OUTPATIENT
Start: 2023-12-29

## 2023-12-29 RX ORDER — CLINDAMYCIN PHOSPHATE 10 MG/G
GEL TOPICAL 2 TIMES DAILY
Qty: 30 G | Refills: 4 | Status: SHIPPED | OUTPATIENT
Start: 2023-12-29

## 2023-12-29 RX ORDER — MONTELUKAST SODIUM 10 MG/1
10 TABLET ORAL AT BEDTIME
Qty: 90 TABLET | Refills: 3 | Status: SHIPPED | OUTPATIENT
Start: 2023-12-29

## 2023-12-29 RX ORDER — DULOXETIN HYDROCHLORIDE 60 MG/1
120 CAPSULE, DELAYED RELEASE ORAL DAILY
Qty: 180 CAPSULE | Refills: 1 | Status: SHIPPED | OUTPATIENT
Start: 2023-12-29 | End: 2024-07-26

## 2023-12-29 RX ORDER — BENZONATATE 200 MG/1
200 CAPSULE ORAL 2 TIMES DAILY PRN
Qty: 40 CAPSULE | Refills: 1 | Status: SHIPPED | OUTPATIENT
Start: 2023-12-29

## 2023-12-29 RX ORDER — TRIAMCINOLONE ACETONIDE 5 MG/G
CREAM TOPICAL 2 TIMES DAILY
Qty: 60 G | Refills: 0 | Status: SHIPPED | OUTPATIENT
Start: 2023-12-29

## 2023-12-29 RX ORDER — PREGABALIN 50 MG/1
CAPSULE ORAL
Qty: 180 CAPSULE | Refills: 0 | Status: SHIPPED | OUTPATIENT
Start: 2023-12-29 | End: 2024-02-08

## 2023-12-29 RX ORDER — LIDOCAINE HYDROCHLORIDE 20 MG/ML
SOLUTION OROPHARYNGEAL
Qty: 100 ML | Refills: 1 | Status: SHIPPED | OUTPATIENT
Start: 2023-12-29

## 2023-12-29 RX ORDER — SUMATRIPTAN 100 MG/1
TABLET, FILM COATED ORAL
Qty: 18 TABLET | Refills: 1 | Status: SHIPPED | OUTPATIENT
Start: 2023-12-29

## 2023-12-29 ASSESSMENT — ANXIETY QUESTIONNAIRES
6. BECOMING EASILY ANNOYED OR IRRITABLE: SEVERAL DAYS
4. TROUBLE RELAXING: NOT AT ALL
GAD7 TOTAL SCORE: 7
7. FEELING AFRAID AS IF SOMETHING AWFUL MIGHT HAPPEN: MORE THAN HALF THE DAYS
3. WORRYING TOO MUCH ABOUT DIFFERENT THINGS: SEVERAL DAYS
2. NOT BEING ABLE TO STOP OR CONTROL WORRYING: SEVERAL DAYS
5. BEING SO RESTLESS THAT IT IS HARD TO SIT STILL: SEVERAL DAYS
1. FEELING NERVOUS, ANXIOUS, OR ON EDGE: SEVERAL DAYS
IF YOU CHECKED OFF ANY PROBLEMS ON THIS QUESTIONNAIRE, HOW DIFFICULT HAVE THESE PROBLEMS MADE IT FOR YOU TO DO YOUR WORK, TAKE CARE OF THINGS AT HOME, OR GET ALONG WITH OTHER PEOPLE: SOMEWHAT DIFFICULT
GAD7 TOTAL SCORE: 7

## 2023-12-29 ASSESSMENT — PATIENT HEALTH QUESTIONNAIRE - PHQ9
10. IF YOU CHECKED OFF ANY PROBLEMS, HOW DIFFICULT HAVE THESE PROBLEMS MADE IT FOR YOU TO DO YOUR WORK, TAKE CARE OF THINGS AT HOME, OR GET ALONG WITH OTHER PEOPLE: SOMEWHAT DIFFICULT
SUM OF ALL RESPONSES TO PHQ QUESTIONS 1-9: 8
SUM OF ALL RESPONSES TO PHQ QUESTIONS 1-9: 8

## 2023-12-29 ASSESSMENT — PAIN SCALES - GENERAL: PAINLEVEL: EXTREME PAIN (8)

## 2023-12-29 NOTE — PATIENT INSTRUCTIONS
Gabapentin - reduce to 300 mg twice daily for the next 3-4 days, then stop.     Start Lyrica 50 mg daily for one week, then 50 mg twice daily.

## 2023-12-29 NOTE — PROGRESS NOTES
Assessment & Plan     Chronic pain syndrome  Patient is not wishing to stay with gabapentin.  The dose was increased yesterday but she is not feeling that it is the right medication for her.  She is very concerned that it is causing increased sweating.  She has some improvement with her pain and anxiety but does not feel it is enough to stay with the medication especially since she will need to continue to increase the dose at this point anyways.  Discussed with her that it is unclear if the gabapentin is causing the sweating.  We can try Lyrica in place of the gabapentin and discussed how to stop the gabapentin and start the Lyrica.  Is possible she will still have sweating issues and then could consider going back to the gabapentin or making alternative choices.  Patient agrees to plan.  - pregabalin (LYRICA) 50 MG capsule; Take 1 capsule (50 mg) by mouth daily for 7 days, THEN 1 capsule (50 mg) 2 times daily for 90 days.    Generalized anxiety disorder  See above.  Patient is on duloxetine, Wellbutrin for anxiety along with the gabapentin above.  Will try Lyrica and this also could help with some anxiety symptoms.  Will start with doses noted and may need to increase in the future.  - pregabalin (LYRICA) 50 MG capsule; Take 1 capsule (50 mg) by mouth daily for 7 days, THEN 1 capsule (50 mg) 2 times daily for 90 days.    Dermatitis  Patient needs refills on the 2 medications below that she uses for skin concerns not related to the ulcer issues.  - triamcinolone (ARISTOCORT HP) 0.5 % external cream; Apply topically 2 times daily  - clindamycin (CLINDAMAX) 1 % external gel; Apply topically 2 times daily    Migraine without aura and without status migrainosus, not intractable  Doing well. Well controlled. Tolerating medication.  No change in plan.    - SUMAtriptan (IMITREX) 100 MG tablet; take 1 tablet at onset of headache may repeat in 2 hours max 2 tabs/day    Cough, unspecified type  Patient has cough which  could be related to asthma versus reflux versus both.  She uses benzonatate at bedtime and sometimes during the day to help with cough she also notices cough is worse with her dry mouth.  Working on these issues with her but will continue the benzonatate for cough as needed.  - benzonatate (TESSALON) 200 MG capsule; Take 1 capsule (200 mg) by mouth 2 times daily as needed for cough    Moderate persistent asthma without complication  Doing well. Well controlled. Tolerating medication.  No change in plan.    - montelukast (SINGULAIR) 10 MG tablet; Take 1 tablet (10 mg) by mouth at bedtime    Multiple joint pain  Patient has had some improvement of her joint pains.  She continues with orthopedic evaluation and injections at times.  See above regarding pain.  Continue with the duloxetine.  Trial of Lyrica.  - DULoxetine (CYMBALTA) 60 MG capsule; Take 2 capsules (120 mg) by mouth daily    Aphthae, oral  Patient requested refill of the lidocaine.  She also is seeing rheumatology and started on Otezla for skin and ulcerative changes.  - lidocaine, viscous, (XYLOCAINE) 2 % solution; Swish and spit 10ml every 3 hours as needed for oral pain; max 8 doses/24hrs.  Do not eat or chew gum for 60 minutes following use.    Hand arthritis  Patient requests refill of the Celebrex that she uses as needed for pain.  Refill sent.  - celecoxib (CELEBREX) 200 MG capsule; TAKE ONE CAPSULE BY MOUTH TWICE A DAY AS NEEDED FOR PAIN Strength: 200 mg      45 minutes spent by me on the date of the encounter doing chart review, patient visit, and documentation            Liz Peterson MD  [facility] ANISH Dhillon is a 63 year old, presenting for the following health issues:  Recheck Medication      12/29/2023     2:49 PM   Additional Questions   Roomed by Leidy BUTLER CMA   Accompanied by self         12/29/2023     2:49 PM   Patient Reported Additional Medications   Patient reports taking the following new  medications n/a       History of Present Illness       Mental Health Follow-up:  Patient presents to follow-up on Depression & Anxiety.Patient's depression since last visit has been:  Better  The patient is not having other symptoms associated with depression.  Patient's anxiety since last visit has been:  Good  The patient is having other symptoms associated with anxiety.  Any significant life events: other  Patient is feeling anxious or having panic attacks.  Patient has no concerns about alcohol or drug use.          Hypertension Follow-up    Do you check your blood pressure regularly outside of the clinic? No   Are you following a low salt diet? Yes  Are your blood pressures ever more than 140 on the top number (systolic) OR more   than 90 on the bottom number (diastolic), for example 140/90? Yes  How many servings of fruits and vegetables do you eat daily?  2-3  On average, how many sweetened beverages do you drink each day (Examples: soda, juice, sweet tea, etc.  Do NOT count diet or artificially sweetened beverages)?   1  How many days per week do you exercise enough to make your heart beat faster? 3 or less  How many minutes a day do you exercise enough to make your heart beat faster? 9 or less  How many days per week do you miss taking your medication? 0      Skin issues - she is worried that it is not yet healed. She is seeing Dr. Warren with derm.  She wants things healed before [place]. Going to [place] on March 3 - April 15.  Added Colloid bandaids and creams to help her.     Seeing rheumatology. Started on Otezla. Also steroid mouth rinse.     Benzonatate refill.  These help her cough.  Cough more before falling asleep. The benzonatate helps.     Sweating is a little worse. Seems the gabapentin, to her, is making it worse. Notices when goes to the store or over to her daughers.  She is taking the gabapentin for chronic pain.  She has been taking and then stopping it on her own she is not very happy  with the medication overall.  Had been on Lyrica years ago and felt that they were causing her oral ulcers.  She has continued to have problems with oral ulcers and has not been on the medication for 7 years or so.  She is being treated for Bechets.  She feels the gabapentin has been somewhat helpful for anxiety and for pain but she does not wish to stay with it because of her sweating.    Review of Systems   Constitutional, HEENT, cardiovascular, pulmonary, gi and gu systems are negative, except as otherwise noted.      Objective    /78   Pulse 70   Temp 97.6  F (36.4  C) (Temporal)   LMP 07/17/2009 (Exact Date)   SpO2 96%   There is no height or weight on file to calculate BMI.  Physical Exam   GENERAL: healthy, alert and no distress  NECK: no adenopathy, no asymmetry, masses, or scars and thyroid normal to palpation  RESP: lungs clear to auscultation - no rales, rhonchi or wheezes  CV: regular rate and rhythm, normal S1 S2, no S3 or S4, no murmur, click or rub, no peripheral edema and peripheral pulses strong  ABDOMEN: soft, nontender, no hepatosplenomegaly, no masses and bowel sounds normal  MS: no gross musculoskeletal defects noted, no edema  SKIN: Erosions on the right cheek seem to be healing.  There is less erythema.  No noted new lesions.

## 2023-12-30 ENCOUNTER — MYC MEDICAL ADVICE (OUTPATIENT)
Dept: FAMILY MEDICINE | Facility: CLINIC | Age: 63
End: 2023-12-30
Payer: COMMERCIAL

## 2023-12-30 DIAGNOSIS — G89.4 CHRONIC PAIN SYNDROME: ICD-10-CM

## 2023-12-30 DIAGNOSIS — F11.90 CHRONIC, CONTINUOUS USE OF OPIOIDS: ICD-10-CM

## 2024-01-02 RX ORDER — TRAMADOL HYDROCHLORIDE 50 MG/1
50 TABLET ORAL EVERY 12 HOURS PRN
Qty: 45 TABLET | Refills: 0 | Status: SHIPPED | OUTPATIENT
Start: 2024-01-02 | End: 2024-02-09

## 2024-01-02 NOTE — PATIENT INSTRUCTIONS
"Recommendations from today's MTM visit:                                                       Continue Otezla 30 mg twice daily.     Do not take more than 4000 mg of Tylenol daily.    Can take 2 of the 500 mg tablets up to 4 times daily.      Discuss the risks and benefits of colchicine with your provider. It may be contributing to the headaches that have returned.    Follow-up: with MTM pharmacist on 3/19/24.    It was great speaking with you today.  I value your experience and would be very thankful for your time in providing feedback in our clinic survey. In the next few days, you may receive an email or text message from [employer] with a link to a survey related to your  clinical pharmacist.\"     To schedule another MTM appointment, please call the clinic directly or you may call the MTM scheduling line at [phone] or toll-free at [phone].     My Clinical Pharmacist's contact information:                                                      Please feel free to contact me with any questions or concerns you have.      Golden Cadena, PharmD  Medication Therapy Management Pharmacist  [facility]  Phone: [phone]    "

## 2024-01-05 ENCOUNTER — VIRTUAL VISIT (OUTPATIENT)
Dept: FAMILY MEDICINE | Facility: CLINIC | Age: 64
End: 2024-01-05
Payer: COMMERCIAL

## 2024-01-05 ENCOUNTER — MYC MEDICAL ADVICE (OUTPATIENT)
Dept: FAMILY MEDICINE | Facility: CLINIC | Age: 64
End: 2024-01-05

## 2024-01-05 DIAGNOSIS — F41.1 GENERALIZED ANXIETY DISORDER: ICD-10-CM

## 2024-01-05 DIAGNOSIS — R11.0 NAUSEA: Primary | ICD-10-CM

## 2024-01-05 DIAGNOSIS — B34.9 VIRAL ILLNESS: ICD-10-CM

## 2024-01-05 PROCEDURE — 99442 PR PHYSICIAN TELEPHONE EVALUATION 11-20 MIN: CPT | Mod: 93 | Performed by: FAMILY MEDICINE

## 2024-01-05 RX ORDER — ONDANSETRON 4 MG/1
4 TABLET, ORALLY DISINTEGRATING ORAL EVERY 8 HOURS PRN
Qty: 20 TABLET | Refills: 0 | Status: SHIPPED | OUTPATIENT
Start: 2024-01-05 | End: 2024-05-07

## 2024-01-05 ASSESSMENT — ANXIETY QUESTIONNAIRES
2. NOT BEING ABLE TO STOP OR CONTROL WORRYING: MORE THAN HALF THE DAYS
3. WORRYING TOO MUCH ABOUT DIFFERENT THINGS: MORE THAN HALF THE DAYS
7. FEELING AFRAID AS IF SOMETHING AWFUL MIGHT HAPPEN: SEVERAL DAYS
1. FEELING NERVOUS, ANXIOUS, OR ON EDGE: MORE THAN HALF THE DAYS
IF YOU CHECKED OFF ANY PROBLEMS ON THIS QUESTIONNAIRE, HOW DIFFICULT HAVE THESE PROBLEMS MADE IT FOR YOU TO DO YOUR WORK, TAKE CARE OF THINGS AT HOME, OR GET ALONG WITH OTHER PEOPLE: SOMEWHAT DIFFICULT
GAD7 TOTAL SCORE: 9
GAD7 TOTAL SCORE: 9
5. BEING SO RESTLESS THAT IT IS HARD TO SIT STILL: NOT AT ALL
6. BECOMING EASILY ANNOYED OR IRRITABLE: MORE THAN HALF THE DAYS

## 2024-01-05 ASSESSMENT — PATIENT HEALTH QUESTIONNAIRE - PHQ9
SUM OF ALL RESPONSES TO PHQ QUESTIONS 1-9: 10
5. POOR APPETITE OR OVEREATING: NOT AT ALL

## 2024-01-05 ASSESSMENT — ENCOUNTER SYMPTOMS: NERVOUS/ANXIOUS: 1

## 2024-01-05 NOTE — TELEPHONE ENCOUNTER
Are you able to provide requested Zofran for nausea? Recommend visit for further assessment/follow up?    MARVA ParekhN, RN  [facility] ~ Registered Nurse  Clinic Triage ~ [place] [facility] [place]  January 5, 2024

## 2024-01-05 NOTE — PROGRESS NOTES
"    Instructions Relayed to Patient by Virtual Roomer:     Patient is active on [employer]:   Relayed following to patient: \"It looks like you are active on [employer], are you able to join the visit this way? If not, do you need us to send you a link now or would you like your provider to send a link via text or email when they are ready to initiate the visit?\"    Reminded patient to ensure they were logged on to virtual visit by arrival time listed. Documented in appointment notes if patient had flexibility to initiate visit sooner than arrival time. If pediatric virtual visit, ensured pediatric patient along with parent/guardian will be present for video visit.     Patient offered the website [email or web address] and/or phone number to [employer] Help line: [phone]    Jacquie is a 63 year old who is being evaluated via a billable telephone visit.      What phone number would you like to be contacted at? [phone]  How would you like to obtain your AVS? [facility]    Distant Location (provider location):  On-site    Assessment & Plan     (R11.0) Nausea  (primary encounter diagnosis)  (B34.9) Viral illness  Comment: Patient is having some continued nausea after what sounds to be a viral gastroenteritis.  She is having issues with vomiting and diarrhea.  Other members of her family have similar.  This all started at the same time as her starting the Lyrica so there is question about what is side effect and what is going next.  She does have some continued nausea and some drooling with that.  These are possible complications of Lyrica.  Could also be related to her recent illness.  She had 1 episode where she thought maybe was a hallucination.  No other issues.  She feels the Lyrica has helped somewhat with her pain and unclear with her anxiety yet at this point.  Will have her try some Zofran for the nausea.  Discussed stopping Lyrica and going back to gabapentin or sticking it out with the Lyrica little " longer to see how it goes.  Patient would like to stick with the Lyrica for now and will reach out if any problems or concerns.    (F41.1) Generalized anxiety disorder  Comment: See above.  In the midst of medication change.  Plan: She will follow-up as previously planned.                 Liz Peterson MD  [facility] ANISH Dhillon is a 63 year old, presenting for the following health issues:  Depression, Anxiety, and Pain      1/5/2024     3:38 PM   Additional Questions   Roomed by roula   Accompanied by self       Anxiety    Pain    History of Present Illness             11/14/2023     7:05 AM 12/29/2023     1:12 PM 1/5/2024     3:39 PM   PHQ   PHQ-9 Total Score 6 8 10   Q9: Thoughts of better off dead/self-harm past 2 weeks Not at all Not at all Not at all          11/12/2023     1:34 PM 12/29/2023     1:14 PM 1/5/2024     3:39 PM   MARIZOL-7 SCORE   Total Score 6 (mild anxiety) 7 (mild anxiety)    Total Score 6 7 9     Patient was seen recently for her pain, anxiety possible side effects from gabapentin.  Decision was made to stop the gabapentin and try Lyrica in place of that.  Shortly after that visit and decision patient developed what sounds like a GI illness.  Likely viral.  She is having nausea vomiting as well as diarrhea.  She says things have been improving overall but continues to have some nausea and when she has the nausea she is also drooling.  This is making things hard as far as eating.     Pt says she's having side effects with new medication, lyrica.  She is wondering if these symptoms including the nausea are related to the Lyrica.  There were other members in the family sick but they seem to be getting better  She has noted nausea, excessive saliva, and possibly hallucination.   She had 1 episode where she thought somebody was opening and closing the door in her bedroom.  She is concerned about the hallucinations she had this issue before.  She did have this issue  on gabapentin.  She also is feeling more anxious and wondering if the hallucinations related to that.  Feet feel better.   Sweating is better off the gabapentin.   Has been sick            Review of Systems   Psychiatric/Behavioral:  The patient is nervous/anxious.       Constitutional, HEENT, cardiovascular, pulmonary, gi and gu systems are negative, except as otherwise noted.      Objective           Vitals:  No vitals were obtained today due to virtual visit.    Physical Exam   healthy, alert, and no distress  PSYCH: Alert and oriented times 3; coherent speech, normal   rate and volume, able to articulate logical thoughts, able   to abstract reason, no tangential thoughts, no hallucinations   or delusions  Her affect is normal  RESP: No cough, no audible wheezing, able to talk in full sentences  Remainder of exam unable to be completed due to telephone visits                Phone call duration: 14 minutes

## 2024-01-06 ENCOUNTER — HEALTH MAINTENANCE LETTER (OUTPATIENT)
Age: 64
End: 2024-01-06

## 2024-01-08 NOTE — TELEPHONE ENCOUNTER
Patient had appointment 1/5/2023.    MARVA ParekhN, RN  [facility] ~ Registered Nurse  Clinic Triage ~ [place] [facility] [place]  January 8, 2024

## 2024-01-11 ENCOUNTER — E-VISIT (OUTPATIENT)
Dept: FAMILY MEDICINE | Facility: CLINIC | Age: 64
End: 2024-01-11
Payer: COMMERCIAL

## 2024-01-11 DIAGNOSIS — R06.2 WHEEZING: Primary | ICD-10-CM

## 2024-01-11 DIAGNOSIS — R05.1 ACUTE COUGH: ICD-10-CM

## 2024-01-11 PROCEDURE — 99421 OL DIG E/M SVC 5-10 MIN: CPT | Performed by: FAMILY MEDICINE

## 2024-01-11 RX ORDER — PREDNISONE 20 MG/1
TABLET ORAL
Qty: 14 TABLET | Refills: 0 | Status: SHIPPED | OUTPATIENT
Start: 2024-01-11 | End: 2024-02-26

## 2024-01-11 NOTE — PATIENT INSTRUCTIONS
Jacquie,    Thank you for choosing us for your care. I have placed an order for a lab test(s) - COVID and influenza swabs. View your full visit summary for details by clicking on the link below. You can schedule a lab only appointment right here in [employer], or by calling [phone].    You will receive your lab results and next steps via [employer] when the lab results return.    I will also send a prescription for an increase in prednisone to help with the wheezing.     If you have a oxygen saturation monitor, let me know how your oxygen level is.     If any worsening or concerns, you should be seen. Or please call and speak with my nurse.     Sincerely,  Liz Peterson MD

## 2024-01-16 ENCOUNTER — LAB (OUTPATIENT)
Dept: LAB | Facility: CLINIC | Age: 64
End: 2024-01-16
Attending: FAMILY MEDICINE
Payer: COMMERCIAL

## 2024-01-16 ENCOUNTER — MYC MEDICAL ADVICE (OUTPATIENT)
Dept: FAMILY MEDICINE | Facility: CLINIC | Age: 64
End: 2024-01-16

## 2024-01-16 DIAGNOSIS — R05.1 ACUTE COUGH: ICD-10-CM

## 2024-01-16 DIAGNOSIS — R06.2 WHEEZING: ICD-10-CM

## 2024-01-16 LAB
FLUAV AG SPEC QL IA: NEGATIVE
FLUBV AG SPEC QL IA: NEGATIVE

## 2024-01-16 PROCEDURE — 87804 INFLUENZA ASSAY W/OPTIC: CPT | Mod: 59 | Performed by: FAMILY MEDICINE

## 2024-01-16 PROCEDURE — 87635 SARS-COV-2 COVID-19 AMP PRB: CPT

## 2024-01-16 PROCEDURE — 87804 INFLUENZA ASSAY W/OPTIC: CPT | Performed by: FAMILY MEDICINE

## 2024-01-16 NOTE — TELEPHONE ENCOUNTER
Health Maintenance Due   Topic Date Due    ZOSTER IMMUNIZATION (2 of 2) 01/21/2020    Pneumococcal Vaccine: Pediatrics (0 to 5 Years) and At-Risk Patients (6 to 64 Years) (3 of 3 - PCV) 02/25/2023    COVID-19 Vaccine (6 - 2023-24 season) 09/01/2023   Also can get RSV.    Sent mychart of due vaccines and to schedule nurse only visit if insurance covers at clinic or to do them at pharmacy.    Marie Laura CMA ([facility])

## 2024-01-17 ENCOUNTER — NURSE TRIAGE (OUTPATIENT)
Dept: FAMILY MEDICINE | Facility: CLINIC | Age: 64
End: 2024-01-17
Payer: COMMERCIAL

## 2024-01-17 LAB — SARS-COV-2 RNA RESP QL NAA+PROBE: NEGATIVE

## 2024-01-17 NOTE — TELEPHONE ENCOUNTER
"Nurse Triage SBAR    Is this a 2nd Level Triage? NO    Situation: Patient has had URI since 1/1/24, had E-visit 1/11, Influenza negative and Covid pending. 1/16/24 increased wheezing and coughing fits with light green colored sputum, with slight nasal congestion. Denies shortness of breath, chest pain, color changes to face, swelling, fever or difficulty walking around. Oxygen saturations have been 97-98%.    Background: Patient does have history of asthma and this is not like her \"flare ups\"     Assessment: Offered appointments with covering provider NP at 9:40 & 2:40 today. Patient declined. Dr. Peterson is unable to see today and not in tomorrow. Refusing clinic visits due to helping daughter with . Stated she would go to urgent care.    Protocol Recommended Disposition:   Go To Office Now    Recommendation: Alerted patient that evaluation today would be recommended and urgent cares in [facility] and otherwise.     Does the patient meet one of the following criteria for ADS visit consideration? 16+ years old, with an [facility] PCP     TIP  Providers, please consider if this condition is appropriate for management at one of our Acute and Diagnostic Services sites.     If patient is a good candidate, please use dotphrase <dot>triageresponse and select Refer to ADS to document.  Liane Wharton RN  [facility] - Registered Nurse  Clinic Triage [place]   January 17, 2024        Coughing fitno  Reason for Disposition   Wheezing is present    Additional Information   Negative: Bluish (or gray) lips or face   Negative: SEVERE difficulty breathing (e.g., struggling for each breath, speaks in single words)   Negative: Rapid onset of cough and has hives   Negative: Coughing started suddenly after medicine, an allergic food or bee sting   Negative: Difficulty breathing after exposure to flames, smoke, or fumes   Negative: Sounds like a life-threatening emergency to the triager   Negative: Previous asthma attacks and " "this feels like asthma attack   Negative: Dry cough (non-productive; no sputum or minimal clear sputum) and within 14 days of COVID-19 Exposure   Negative: MODERATE difficulty breathing (e.g., speaks in phrases, SOB even at rest, pulse 100-120) and still present when not coughing   Negative: Chest pain present when not coughing   Negative: Passed out (i.e., fainted, collapsed and was not responding)   Negative: Patient sounds very sick or weak to the triager   Negative: MILD difficulty breathing (e.g., minimal/no SOB at rest, SOB with walking, pulse <100) and still present when not coughing   Negative: Coughed up > 1 tablespoon (15 ml) blood (Exception: Blood-tinged sputum.)   Negative: Fever > 103 F (39.4 C)   Negative: Fever > 101 F (38.3 C) and over 60 years of age   Negative: Fever > 100.0 F (37.8 C) and has diabetes mellitus or a weak immune system (e.g., HIV positive, cancer chemotherapy, organ transplant, splenectomy, chronic steroids)   Negative: Fever > 100.0 F (37.8 C) and bedridden (e.g., CVA, chronic illness, recovering from surgery)   Negative: Increasing ankle swelling    Answer Assessment - Initial Assessment Questions  1. ONSET: \"When did the cough begin?\"       01/1/2024  2. SEVERITY: \"How bad is the cough today?\"      moderate  3. SPUTUM: \"Describe the color of your sputum\" (none, dry cough; clear, white, yellow, green)      greenish  4. HEMOPTYSIS: \"Are you coughing up any blood?\" If so ask: \"How much?\" (flecks, streaks, tablespoons, etc.)      no  5. DIFFICULTY BREATHING: \"Are you having difficulty breathing?\" If Yes, ask: \"How bad is it?\" (e.g., mild, moderate, severe)     - MILD: No SOB at rest, mild SOB with walking, speaks normally in sentences, can lie down, no retractions, pulse < 100.     - MODERATE: SOB at rest, SOB with minimal exertion and prefers to sit, cannot lie down flat, speaks in phrases, mild retractions, audible wheezing, pulse 100-120. no    - SEVERE: Very SOB at rest, speaks " "in single words, struggling to breathe, sitting hunched forward, retractions, pulse > 120      Worsening hacking with movement  6. FEVER: \"Do you have a fever?\" If Yes, ask: \"What is your temperature, how was it measured, and when did it start?\"  no  7. CARDIAC HISTORY: \"Do you have any history of heart disease?\" (e.g., heart attack, congestive heart failure)       no  8. LUNG HISTORY: \"Do you have any history of lung disease?\"  (e.g., pulmonary embolus, asthma, emphysema)      asthma  9. PE RISK FACTORS: \"Do you have a history of blood clots?\" (or: recent major surgery, recent prolonged travel, bedridden)      no  10. OTHER SYMPTOMS: \"Do you have any other symptoms?\" (e.g., runny nose, wheezing, chest pain)    Wheezy, runny nose off and on  11. PREGNANCY: \"Is there any chance you are pregnant?\" \"When was your last menstrual period?\"  no  12. TRAVEL: \"Have you traveled out of the country in the last month?\" (e.g., travel history, exposures)        no    Protocols used: Cough-A-OH    "

## 2024-01-22 ENCOUNTER — MYC MEDICAL ADVICE (OUTPATIENT)
Dept: FAMILY MEDICINE | Facility: CLINIC | Age: 64
End: 2024-01-22
Payer: COMMERCIAL

## 2024-01-23 NOTE — TELEPHONE ENCOUNTER
CBC, TSH with Free T4 and CMP are future orders in chart.   Would you like an other orders placed? Appointment needed?    Patient last seen virtually 1/5/24.  F2F last seen 12/29/23.    MARVA ParekhN, RN  [facility] ~ Registered Nurse  Clinic Triage ~ [facility]  January 23, 2024

## 2024-01-29 ENCOUNTER — VIRTUAL VISIT (OUTPATIENT)
Dept: PHARMACY | Facility: CLINIC | Age: 64
End: 2024-01-29
Payer: COMMERCIAL

## 2024-01-29 DIAGNOSIS — I10 BENIGN ESSENTIAL HYPERTENSION: ICD-10-CM

## 2024-01-29 DIAGNOSIS — M25.50 MULTIPLE JOINT PAIN: ICD-10-CM

## 2024-01-29 DIAGNOSIS — J45.40 MODERATE PERSISTENT ASTHMA WITHOUT COMPLICATION: ICD-10-CM

## 2024-01-29 DIAGNOSIS — F41.1 GAD (GENERALIZED ANXIETY DISORDER): Primary | ICD-10-CM

## 2024-01-29 DIAGNOSIS — F33.1 MODERATE EPISODE OF RECURRENT MAJOR DEPRESSIVE DISORDER (H): ICD-10-CM

## 2024-01-29 DIAGNOSIS — E78.5 HYPERLIPIDEMIA LDL GOAL <100: ICD-10-CM

## 2024-01-29 DIAGNOSIS — G47.9 SLEEP DISORDER: ICD-10-CM

## 2024-01-29 PROCEDURE — 99606 MTMS BY PHARM EST 15 MIN: CPT | Performed by: PHARMACIST

## 2024-01-29 PROCEDURE — 99607 MTMS BY PHARM ADDL 15 MIN: CPT | Performed by: PHARMACIST

## 2024-01-29 NOTE — PATIENT INSTRUCTIONS
"Recommendations from today's MTM visit:                                                      Stop gabapentin, start Lyrica 50mg daily x1 week then increase to 50mg twice daily as previously prescribed by PCP.   Increase dulera 2 puffs twice daily    Try to take Lyrica consistently, but if you stop it, please make note of why and when and let me know    It was great speaking with you today.  I value your experience and would be very thankful for your time in providing feedback in our clinic survey. In the next few days, you may receive an email or text message from [employer] with a link to a survey related to your  clinical pharmacist.\"     To schedule another MTM appointment, please call the clinic directly or you may call the MTM scheduling line at [phone] or toll-free at [phone].     My Clinical Pharmacist's contact information:                                                      Please feel free to contact me with any questions or concerns you have.      Paulina Mitchell, PharmD, BCPP  Medication Therapy Management Pharmacist  [facility]    "

## 2024-01-29 NOTE — PROGRESS NOTES
Medication Therapy Management (MTM) Encounter    ASSESSMENT:                            MDD/MARIZOL/Sleep:   Patient has not started Lyrica. Recommend trying that to see if she tolerates it better than gabapentin. Patient has history of starting/stopping medications on her own after only a few days (ie Seroquel, Buspar, gabapentin). Recommend reaching out if she has issues with Lyrica as opposed to stopping it. If she stops it, please make note of date and reason.      Pain:   Try lyrica instead of gabapentin as above    Hyperlipidemia:   Stable.    Hypertension:   Stable.    Asthma:   Patient would benefit from increasing dulera to twice daily as prescribed.    PLAN:                            Stop gabapentin, start Lyrica 50mg daily x1 week then increase to 50mg twice daily as previously prescribed by PCP.   Increase dulera 2 puffs twice daily    Try to take Lyrica consistently, but if you stop it, please make note of why and when and let me know    Follow-up: 4 weeks    SUBJECTIVE/OBJECTIVE:                          Jacquie Amador is a 63 year old female called for an initial visit. She was referred to me from PCP.      Reason for visit: medication check - in.    Allergies/ADRs: Reviewed in chart  Past Medical History: Reviewed in chart  Tobacco: She reports that she quit smoking about 10 years ago. Her smoking use included cigarettes. She started smoking about 44 years ago. She has a 9.9 pack-year smoking history. She has been exposed to tobacco smoke. She has never used smokeless tobacco.  Alcohol: none    MDD/MARIZOL/Sleep:   Duloxetine 120mg once daily   Wellbutrin XL 450mg once daily   Trazodone 50-150mg nightly as needed (typically taking 50-100mg nightly)    At last MTM visit 12/28/2023, plan was made to increase gabapentin by 300mg to target ongoing anxiety, difficulty sleeping, and pain. However, patient met with PCP 12/29/23 and plan was made to stop gabapentin due to patient reported concern for sweating and  start Lyrica instead.      Today, patient reports she doesn't recall ever trying Lyrica. She thinks she continued taking gabapentin for maybe 2 weeks max after our last visit, but then stopped it. She reports restarting gabapentin 2 days ago due to significant increased anxiety and pain. Reports feeling less cloudy and less off-balance while off gabapentin. Sweating also seems better.     Pain:   Celecoxib 200mg as needed   Tramadol 50mg as needed (takes maybe once daily)  Lyrica 50mg daily for 1 week then increase to 50mg twice daily     Reports her pain in worse since stopping gabapentin as above.     Hyperlipidemia:   atorvastatin 20mg daily  Patient reports no current medication side effects.  The 10-year ASCVD risk score (Maddie TAYLOR, et al., 2019) is: 6.2%    Values used to calculate the score:      Age: 63 years      Sex: Female      Is Non- : No      Diabetic: No      Tobacco smoker: No      Systolic Blood Pressure: 136 mmHg      Is BP treated: Yes      HDL Cholesterol: 51 mg/dL      Total Cholesterol: 162 mg/dL  Recent Labs   Lab Test 08/17/23  1036 06/13/22  1205   CHOL 162 170   HDL 51 51   LDL 75 80   TRIG 179* 196*     Hypertension:   Hydrochlorothiazide 25mg daily  Losartan 100mg daily  Metoprolol ER 125mg twice daily + 25mg as needed if needed for elevated HR  Patient reports no current medication side effects.  Patient self-monitors blood pressure.    BP Readings from Last 3 Encounters:   12/29/23 136/78   09/19/23 132/80   09/14/23 (!) 141/70     Pulse Readings from Last 3 Encounters:   12/29/23 70   09/19/23 62   09/14/23 74     Asthma:   Spiriva 2 puffs daily   Dulera 2 puffs twice daily (has only been taking once daily)  Albuterol (ProAir/Ventolin/Proventil)  Patient reports no current medication side effects.      Patient reports the following symptoms: recent increase in symptoms and needing more albuterol.    ----------------      I spent 50 minutes with this patient  today. A copy of the visit note was provided to the patient's provider(s).    A summary of these recommendations was sent via [employer].    Paulina Mitchell, PharmD, BCPP  Medication Therapy Management Pharmacist  [facility]      Telemedicine Visit Details  Type of service:  Telephone visit  Start Time:  11: 05 AM  End Time:  11: 55 AM     Medication Therapy Recommendations  MARIZOL (generalized anxiety disorder)    Current Medication: pregabalin (LYRICA) 50 MG capsule   Rationale: Does not understand instructions - Adherence - Adherence   Recommendation: Provide Education   Status: Patient Agreed - Adherence/Education         Moderate persistent asthma without complication    Current Medication: mometasone-formoterol (DULERA) 200-5 MCG/ACT inhaler   Rationale: Does not understand instructions - Adherence - Adherence   Recommendation: Provide Education   Status: Patient Agreed - Adherence/Education

## 2024-01-30 ENCOUNTER — MYC MEDICAL ADVICE (OUTPATIENT)
Dept: FAMILY MEDICINE | Facility: CLINIC | Age: 64
End: 2024-01-30

## 2024-01-30 DIAGNOSIS — M54.50 BILATERAL LOW BACK PAIN WITHOUT SCIATICA, UNSPECIFIED CHRONICITY: Primary | ICD-10-CM

## 2024-02-02 ENCOUNTER — TRANSFERRED RECORDS (OUTPATIENT)
Dept: HEALTH INFORMATION MANAGEMENT | Facility: CLINIC | Age: 64
End: 2024-02-02
Payer: COMMERCIAL

## 2024-02-02 LAB — RETINOPATHY: NEGATIVE

## 2024-02-05 ENCOUNTER — MYC MEDICAL ADVICE (OUTPATIENT)
Dept: PHARMACY | Facility: CLINIC | Age: 64
End: 2024-02-05
Payer: COMMERCIAL

## 2024-02-06 ENCOUNTER — MYC MEDICAL ADVICE (OUTPATIENT)
Dept: FAMILY MEDICINE | Facility: CLINIC | Age: 64
End: 2024-02-06

## 2024-02-06 DIAGNOSIS — M54.50 BILATERAL LOW BACK PAIN WITHOUT SCIATICA, UNSPECIFIED CHRONICITY: Primary | ICD-10-CM

## 2024-02-07 NOTE — TELEPHONE ENCOUNTER
Patient needs a more specific referral for back/spine injections as current one does not cover those and that site does not do the injections for current referral.    Marie Laura CMA ([facility])

## 2024-02-08 ENCOUNTER — VIRTUAL VISIT (OUTPATIENT)
Dept: PHARMACY | Facility: CLINIC | Age: 64
End: 2024-02-08
Payer: COMMERCIAL

## 2024-02-08 DIAGNOSIS — G47.9 SLEEP DISORDER: ICD-10-CM

## 2024-02-08 DIAGNOSIS — F41.1 GAD (GENERALIZED ANXIETY DISORDER): Primary | ICD-10-CM

## 2024-02-08 DIAGNOSIS — F33.9 MDD (MAJOR DEPRESSIVE DISORDER), RECURRENT EPISODE (H): ICD-10-CM

## 2024-02-08 PROCEDURE — 99607 MTMS BY PHARM ADDL 15 MIN: CPT | Performed by: PHARMACIST

## 2024-02-08 PROCEDURE — 99606 MTMS BY PHARM EST 15 MIN: CPT | Performed by: PHARMACIST

## 2024-02-08 RX ORDER — GABAPENTIN 300 MG/1
300 CAPSULE ORAL AT BEDTIME
COMMUNITY
End: 2024-05-01

## 2024-02-08 NOTE — PROGRESS NOTES
Medication Therapy Management (MTM) Encounter    ASSESSMENT:                              MDD/MARIZOL/Sleep:   Will plan to restart gabapentin for sleep, pain, anxiety. Patient previously tolerated 300mg daily well. Prefers not to change cymbalta due to benefit for pain. Additionally I think patient would benefit from re-establishing with psychiatry given multiple failed medication trials. Patient agreeable.     PLAN:                            Start gabapentin 300mg daily  Referral placed for long-term psychiatry medication management.     Follow-up: MTM 2 weeks    SUBJECTIVE/OBJECTIVE:                          Jacquie Amador is a 63 year old female called for a follow-up visit from 1/29/24.       Reason for visit: Lyrica - not interested in taking.    Allergies/ADRs: Reviewed in chart  Past Medical History: Reviewed in chart  Tobacco: She reports that she quit smoking about 10 years ago. Her smoking use included cigarettes. She started smoking about 44 years ago. She has a 9.9 pack-year smoking history. She has been exposed to tobacco smoke. She has never used smokeless tobacco.  Alcohol: none    MDD/MARIZOL/Sleep:   Duloxetine 120mg once daily   Wellbutrin XL 450mg once daily   Trazodone 50-150mg nightly as needed (typically taking 50-100mg nightly)    At last MTM visit 1/29/2024, plan was made to increase Lyrica to target ongoing anxiety, difficulty sleeping, and pain. However, patient reports she decided not to start Lyrica due to concern for side effects. Still having anxiety, pain.       ----------------      I spent 25 minutes with this patient today. A copy of the visit note was provided to the patient's provider(s).    A summary of these recommendations was sent via [employer].    Paulina Mitchell, PharmD, BCPP  Medication Therapy Management Pharmacist  [facility]      Telemedicine Visit Details  Type of service:  Telephone visit  Start Time:  10:35am  End Time: 11:02 AM     Medication  Therapy Recommendations  MARIZOL (generalized anxiety disorder)    Current Medication: DULoxetine (CYMBALTA) 60 MG capsule   Rationale: Synergistic therapy - Needs additional medication therapy - Indication   Recommendation: Start Medication - gabapentin 300 MG capsule   Status: Accepted per CPA

## 2024-02-08 NOTE — Clinical Note
Hello,   I have been meeting with patient for psychiatry MT, but am feeling stuck with med options - patient has tried and failed many. I discussed referring her back to long-term psych med management and she was agreeable. I placed a referral today, likely she will establish in April upon her return from [place].   Thank you,   Paulina Mitchell, PharmD, BCPP Medication Therapy Management Pharmacist [facility]

## 2024-02-09 ENCOUNTER — MYC MEDICAL ADVICE (OUTPATIENT)
Dept: FAMILY MEDICINE | Facility: CLINIC | Age: 64
End: 2024-02-09
Payer: COMMERCIAL

## 2024-02-09 DIAGNOSIS — R00.0 SINUS TACHYCARDIA: ICD-10-CM

## 2024-02-09 DIAGNOSIS — F11.90 CHRONIC, CONTINUOUS USE OF OPIOIDS: ICD-10-CM

## 2024-02-09 DIAGNOSIS — G89.4 CHRONIC PAIN SYNDROME: ICD-10-CM

## 2024-02-09 DIAGNOSIS — R00.2 PALPITATIONS: ICD-10-CM

## 2024-02-09 RX ORDER — TRAMADOL HYDROCHLORIDE 50 MG/1
50 TABLET ORAL EVERY 12 HOURS PRN
Qty: 45 TABLET | Refills: 0 | Status: SHIPPED | OUTPATIENT
Start: 2024-02-09 | End: 2024-02-27

## 2024-02-09 RX ORDER — METOPROLOL SUCCINATE 100 MG/1
100 TABLET, EXTENDED RELEASE ORAL 2 TIMES DAILY
Qty: 180 TABLET | Refills: 1 | Status: SHIPPED | OUTPATIENT
Start: 2024-02-09 | End: 2024-08-04

## 2024-02-09 NOTE — TELEPHONE ENCOUNTER
RN Triage    Patient Contact    Attempt # 1    Was call answered?  No.  Left message on voicemail with information to call me back.   See [facility] [facility] messages    Upon return call verify which medications she needs refills for.      Liane Wharton RN  [facility] - Registered Nurse  Clinic Triage [place]   February 9, 2024

## 2024-02-13 ENCOUNTER — TELEPHONE (OUTPATIENT)
Dept: PALLIATIVE MEDICINE | Facility: CLINIC | Age: 64
End: 2024-02-13
Payer: COMMERCIAL

## 2024-02-13 ENCOUNTER — MYC MEDICAL ADVICE (OUTPATIENT)
Dept: HEMATOLOGY | Facility: CLINIC | Age: 64
End: 2024-02-13
Payer: COMMERCIAL

## 2024-02-13 DIAGNOSIS — L98.9 SKIN LESION: Primary | ICD-10-CM

## 2024-02-13 NOTE — TELEPHONE ENCOUNTER
[facility] Call Center    Phone Message    May a detailed message be left on voicemail: yes     Reason for Call: Other: Patient called and stated she was scheduled to see Dr. Brownlee On March 25th, She stated that when she scheduled the appointment she was told February 25th. Patient is upset and stated they weill not be around in March and April so she needs to be seen in February. Writer was not able to schedule her in February due to no openings. Please review and call back.     Action Taken: Message routed to:  Other: [facility]     Travel Screening: Not Applicable

## 2024-02-13 NOTE — TELEPHONE ENCOUNTER
I'm not sure what to tell this patient but we are out for new consult everyone is scheduled out.  Pt on a waiting list.

## 2024-02-14 NOTE — TELEPHONE ENCOUNTER
"\"I'm curious how when I talked to the gal I made the appointment for February so that's where I'm really confused. \" Writer identifies that appointment was made on 2/6 and does not appear to have been changed since that time. Patient is upset and asks how this could occur.  Writer identifies that without being the individual who scheduled this appointment I am unable to speculate on how error with month occurred.     Patient asks about appointment type and identifies she does not know why she has been scheduled for this.  Writer notes that patient was referred for interventional evaluation and does not currently have an active referral for injection.  Writer identifies that a request can be made to provider to place injection order for repeat.      Patient last 9/14/2023 Procedure performed: bilaeral facet joint injections at l4-5, L5-S1, fluoroscopically guided, contrast controlled   Patient identifies she had relief for \"maybe a week\"  Writer identifies that insurance would not cover this with only a week relief. \" I just find it odd it didn't work because I've had this before and it worked\"  Writer identifies that again this would be outside of scope to speculate on.     Writer does offer to request order from provider for MBB to RFA process but identifies that RFA's are currently scheduled into March also.     Patient states \"I'll just have to skip it because the timing is bad with my trip and I thought I started this months ago.\" Patient confirms she would like to cancel 3/25 appointment.   Writer educates that if when patient returns from trip and would like to go forward clinic staff would be happy to assist in getting correct orders placed for patient.     Wendy Underwood RN  [facility] Pain Management [facility]  [phone]        "

## 2024-02-15 ENCOUNTER — MYC MEDICAL ADVICE (OUTPATIENT)
Dept: FAMILY MEDICINE | Facility: CLINIC | Age: 64
End: 2024-02-15
Payer: COMMERCIAL

## 2024-02-15 ENCOUNTER — MYC MEDICAL ADVICE (OUTPATIENT)
Dept: HEMATOLOGY | Facility: CLINIC | Age: 64
End: 2024-02-15
Payer: COMMERCIAL

## 2024-02-15 DIAGNOSIS — M54.59 LUMBAR FACET JOINT PAIN: Primary | ICD-10-CM

## 2024-02-15 DIAGNOSIS — K12.0 APHTHOUS ULCER: ICD-10-CM

## 2024-02-15 RX ORDER — CEFDINIR 300 MG/1
300 CAPSULE ORAL 2 TIMES DAILY
Qty: 20 CAPSULE | Refills: 0 | Status: SHIPPED | OUTPATIENT
Start: 2024-02-15 | End: 2024-02-25

## 2024-02-16 ENCOUNTER — TELEPHONE (OUTPATIENT)
Dept: RHEUMATOLOGY | Facility: CLINIC | Age: 64
End: 2024-02-16

## 2024-02-16 ENCOUNTER — TELEPHONE (OUTPATIENT)
Dept: DERMATOLOGY | Facility: CLINIC | Age: 64
End: 2024-02-16

## 2024-02-16 NOTE — LETTER
February 25, 2024    ATTN: [employer]                                      Re: Prior Authorization Request   Plan: [facility]                                                    Member ID: [id number]      Patient: Jacquie Amador            YOB: 1960    To whom it may concern:     I am contacting you as a dermatologist caring for Jacquie Amador with regard to the patient's diagnosis of Atopic Dermatitis (L20.9). Of note, their diagnosis has been confirmed by biopsy.     I prescribed this patient Dupixent, which required a prior authorization. This was approved by the plan in January 2023. Unfortunately, the prior authorization renewal was denied by the plan due to needing supporting documentation that demonstrates clinical response to treatment and the patient is now unable to fill their prescription. I have reviewed the patient's diagnosis, care plan and clinical guidelines for treatment and request a formal appeal of your denial for Dupixent.    Ms. Amador initiated Dupixent 300 mg every 14 days (with one time load) in January 2023, and has since had an overall positive clinical response in the treatment of her Atopic Dermatitis. More specifically, she has had improved healing with reduced BSA involvement, reduced pruritus, and decreased need for topical therapies since starting the Dupixent. Furthermore, Ms. Amador consistently & appropriately administers her every 14 day regimen of Dupixent and has a PDC score (percent of days covered) of 100% adherence over the last 6 months. Prior to initiating Dupixent, Ms. Amador had had tried numerous treatments including topical steroids, topical calcineurin inhibitors, oral antihistamines, and more without an adequate & sustained response to treatment. Given Ms. Amador's current response & improvement in AD following initiation around 12 months, I strongly believe the patient needs continued access & coverage for Dupixent (dupilumab) 300 mg every 14  "days for the treatment of their Atopic Dermatitis.     I strongly believe this patient needs continued access to Dupixent.  Dupixent is approved for the treatment of moderate to severe atopic dermatitis and achieves degrees of improvement not achieved by other comparably safe therapies. In the published phase 3 trials, over 1/3 of patients achieved the endpoint of \"clear or almost clear\" and 2/3 of patients achieved EASI 50, an endpoint which results in marked improvement in quality of life.      Additionally, I request that you review the following evidence showing how this medication can be effectively utilized to treat Atopic Dermatitis (L20.9):    American Academy of Dermatology guideline on atopic dermatitis available at https://www.aad.org/guidelines/ad  Donal EL, Giles T, Jose E, Bryan LA, Pritesh A, Harry MJ, Chiara JI, Geno M, Jimbo Y, Bibi KARO, Manjit K, Lance M, Tuan Y, Oliver A, Sarina Y, Roney NM, Radhika G, Ramila B, Akbar H, Matteo V, Isidro L, Noah A, Franck N, Taylor SALMON, Dwight BAKER; SOLO 1 and SOLO 2 Investigators. Two Phase 3 Trials of Dupilumab versus Placebo in Atopic Dermatitis. N Engl J Med. 2016 Sep 30. [Epub ahead of print]    On behalf of Ms. Amador, I would appreciate your prompt reconsideration of this denial. Please feel free to contact me below for any additional information you may require. I look forward to receiving your response and approval of coverage for this medication.    Sincerely,    Jay Warren MD  Dermatology Attending   [facility] [address] [address]  Phone: [phone]  Fax: [phone]    Office Contact:   Sejal Flores  Pharmacy Liaison Dermatology  P: [phone]  F: [phone]  [email or web address]      "

## 2024-02-16 NOTE — TELEPHONE ENCOUNTER
PA Needed    Medication: OTEZLA  QTY/DS: 60/30DS  NEW INS:   Insurance Company:  [employer]  Pharmacy Filling the Rx:  [facility]  PA :    Date of last fill: 24

## 2024-02-16 NOTE — TELEPHONE ENCOUNTER
PA Needed    Medication: DUPIXENT  QTY/DS: DS  NEW INS:  Insurance Company:  [employer]  Pharmacy Filling the Rx:  [facility]   PA :    Date of last fill: 24

## 2024-02-19 RX ORDER — COLCHICINE 0.6 MG/1
0.6 TABLET ORAL 2 TIMES DAILY
Qty: 180 TABLET | Refills: 1 | Status: SHIPPED | OUTPATIENT
Start: 2024-02-19 | End: 2024-08-13

## 2024-02-19 NOTE — TELEPHONE ENCOUNTER
Patient has multiple questions about her medication Otezla.  She is wondering how long she needs to be on it.  She also notes that she has facial sores/lesions that have not heeled, she sees Dermatology for this.  She was just prescribed cefdinir.       Patient [place] out of state [place] 2 weeks until [place].  Will ask if MT visit can be moved up sometime this week.  Did advise that Otezla should be held when taking antibiotics, she was not aware of that.    Routing to [facility] to review    Lauren Levine RN

## 2024-02-20 ENCOUNTER — MYC MEDICAL ADVICE (OUTPATIENT)
Dept: PALLIATIVE MEDICINE | Facility: CLINIC | Age: 64
End: 2024-02-20

## 2024-02-20 NOTE — TELEPHONE ENCOUNTER
PA Initiation    Medication: DUPIXENT 300 MG/2ML SC SOPN  Insurance Company: [employer] - Phone [phone] Fax [phone]  Pharmacy Filling the Rx: [facility] - [place], [place] - [facility] [address]  Filling Pharmacy Phone:    Filling Pharmacy Fax:    Start Date: 2/20/2024      Key: [id number]

## 2024-02-20 NOTE — TELEPHONE ENCOUNTER
"Screening Questions for Radiology Injections:    Injection to be done at which interventional clinic site? [facility]    If choosing [facility] for location, please inform patient:  \"[facility] is a Hospital based clinic. Before your visit, you should check with your insurance about how it covers the charges for facility services in a hospital-based clinic.     Procedure ordered by Nicholas    Procedure ordered? Lumbar Facet Joint Injection   Transforaminal Cervical SENDY - Send to [facility] - No Replaced by [facility] providers perform this procedure    What insurance would patient like us to bill for this procedure? ucare  IF SCHEDULING IN [place] [facility] OR SPINE PLEASE SCHEDULE AT LEAST 7-10 BUSINESS DAYS OUT SO A PA CAN BE OBTAINED  Worker's comp or MVA (motor vehicle accident) -Any injection DO NOT SCHEDULE and route to Tila Fu.    [employer] insurance - For SI joint injections, DO NOT SCHEDULE and route to Chantal Gan.     ALL BCBS, Humana and HP CIGNA - DO NOT SCHEDULE and route to Chantal Gan  MEDICA- facet joint injections, route to Chantal Gan    Is patient scheduled at [facility]? no   If YES, route every encounter to [facility] SPINE CENTER CARE NAVIGATION POOL [[phone]]    Is an  needed? No     Patient has a  home? (Review Grid) YES: ok    Any chance of pregnancy? NO   If YES, do NOT schedule and route to RN pool  - Dr. Reardon route to Juliet Caro and PM&R Nurse  [[id number]]      Is patient actively being treated for cancer or immunocompromised? No  If YES, do NOT schedule and route to RN pool/ Dr. Reardno's Team    Does the patient have a bleeding or clotting disorder? No   If YES, okay to schedule AND route to MICHELLE luna / Dr. Reardon's Team   (For any patients with platelet count <100, RN must forward to provider)    Is patient taking any Blood Thinners OR Antiplatelet medication?  No   If hold needed, do NOT schedule, route to RN pool/ Dr." Caron's Team  Examples:   Blood Thinners: (Coumadin, Warfarin, Jantoven, Pradaxa, Xarelto, Eliquis, Edoxaban, Enoxaparin, Lovenox, Heparin, Arixtra, Fondaparinux or Fragmin)  Antiplatelet Medications: (Plavix, Brilinta or Effient)     Is patient taking any aspirin products (includes Excedrin and Fiorinal)? No   If more than 325mg/day, OK to schedule; Instruct Pt to decrease to less than 325 mg for 7 days AND route to RN pool/ Dr. Reardon's Team   For CERVICAL procedures, hold all aspirin products for 6 days.   Tell Pt that if aspirin product is not held for 6 days, the procedure WILL BE cancelled.     Any allergies to contrast dye, iodine, shellfish, or numbing and steroid medications? No  If YES, schedule and add allergy information to appointment notes AND route to the RN pool/ Dr. Reardon's Team  If SENDY and Contrast Dye / Iodine Allergy? DO NOT SCHEDULE, route to RN pool/ Dr. Reardon's Team  Allergies: Cats, Dogs, Lyrica [pregabalin], and Seasonal allergies     Does patient have an active infection or treated for one within the past week? No  Is patient currently taking any antibiotics or steroid medications?  Yes - Antibiotic and Steroid Daily  Doxycycline, and Prendisone  For patients on chronic, preventative, or prophylactic antibiotics, procedures may be scheduled.   For patients on antibiotics for active or recent infection, schedule 4 days after completed.  For patients on steroid medications, schedule 4 days after completed.     Has the patient had a flu shot or any other vaccinations within the past 7 days? No  If yes, explain that for the vaccine to work best they need to:     wait 1 week before and 1 week after getting any Vaccine  wait 1 week before and 2 weeks after getting any Covid Vaccine   If patient has concerns about the timing, send to RN pool/ Dr. Reardon's Team    Does patient have an MRI/CT?  YES: MRI Include Date and Check Procedure Scheduling Grid to see if required.  Was the MRI/CT done  within the last 3 years?  Yes   If no route to RN Pool/ Dr. Reardon's Team  If yes, where was the MRI/CT done? [facility]   Refer to PACS Transmissions list for approved external locations and route to RN Pool High Priority/ Dr. Valdess Team  If MRI was not done at approved external location do NOT schedule and route to RN pool/ Dr. Reardon's Team    If patient has an imaging disc, the injection MAY be scheduled but patient must bring disc to appt or appt will be cancelled.    Is patient able to transfer to a procedure table with minimal or no assistance? Yes   If no, do NOT schedule and route to RN Pool/ Dr. Reardon's Team    Procedure Specific Instructions:  If celiac plexus block, informed patient NPO for 6 hours and that it is okay to take medications with sips of water, especially blood pressure medications Not Applicable       If this is for a cervical procedure, informed patient that aspirin needs to be held for 6 days.   Not Applicable    Sedation, If Sedation is ordered for any procedure, patient must be NPO for 6 hours prior to procedure Not Applicable    If IV needed:  Do not schedule procedures requiring IV placement in the first appointment of the day or first appointment after lunch. Do NOT schedule at 0745, 0815 or 1245. ok  Instructed patient to arrive 30 minutes early for IV start if required. (Check Procedure Scheduling Grid)  Not Applicable    Reminders:  If you are started on any steroids or antibiotics between now and your appointment, you must contact us because the procedure may need to be cancelled.  Yes    As a reminder, receiving steroids can decrease your body's ability to fight infection.   Would you still like to move forward with scheduling the injection?  Yes    IV Sedation is not provided for procedures. If oral anti-anxiety medication is needed, the patient should request this from their referring provider.    Instruct patient to arrive as directed prior to the scheduled appointment  time:  If IV needed 30 minutes before appointment time     For patients 85 or older we recommend having an adult stay w/ them for the remainder of the day.     If the patient is Diabetic, remind them to bring their glucometer.      Does the patient have any questions?  Not   Anais Fu  [facility] [place]

## 2024-02-20 NOTE — TELEPHONE ENCOUNTER
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

## 2024-02-21 NOTE — TELEPHONE ENCOUNTER
Ok to schedule from a nursing standpoint.   Pt is on  prednisone and doxycycline chronically.  Does insurance need to be checked?      Denisse RN-BSN  [facility]   [phone]

## 2024-02-22 NOTE — TELEPHONE ENCOUNTER
MEDICATION APPEAL APPROVED    Medication: OTEZLA 30 MG PO TABS  Authorization Effective Date: 2/22/2024  Authorization Expiration Date: 2/21/2025  Approved Dose/Quantity: bid  Reference #:     Appeal Insurance Company: JOSETTE  Expected CoPay: $       CoPay Card Available: Yes  Financial Assistance Needed: NO  Filling Pharmacy: [facility] - [place] [address]  Patient Notified: YES  Comments:

## 2024-02-22 NOTE — TELEPHONE ENCOUNTER
PRIOR AUTHORIZATION DENIED    Medication: OTEZLA 30 MG PO TABS  Insurance Company: Ericka - Phone [phone] Fax [phone]  Denial Date: 2/22/2024  Denial Reason(s): Chart notes didn't show pt has improved  Appeal Information: sent via email to [email or web address]  Patient Notified: yes

## 2024-02-22 NOTE — TELEPHONE ENCOUNTER
PRIOR AUTHORIZATION DENIED    Medication: DUPIXENT 300 MG/2ML SC SOPN  Insurance Company: [employer] - Phone [phone] Fax [phone]  Denial Date: 2/21/2024  Denial Reason(s):   Appeal Information: [phone]  Patient Notified:

## 2024-02-23 ENCOUNTER — MYC MEDICAL ADVICE (OUTPATIENT)
Dept: FAMILY MEDICINE | Facility: CLINIC | Age: 64
End: 2024-02-23
Payer: COMMERCIAL

## 2024-02-25 NOTE — TELEPHONE ENCOUNTER
Routing to Derm liaisons to submit to insurance on behalf of patient. Please see attached appeal from 2/25/24..     Aixa Echavarria RPh

## 2024-02-26 ENCOUNTER — E-VISIT (OUTPATIENT)
Dept: FAMILY MEDICINE | Facility: CLINIC | Age: 64
End: 2024-02-26
Payer: COMMERCIAL

## 2024-02-26 DIAGNOSIS — J06.9 ACUTE UPPER RESPIRATORY INFECTION, UNSPECIFIED: Primary | ICD-10-CM

## 2024-02-26 DIAGNOSIS — R05.1 ACUTE COUGH: ICD-10-CM

## 2024-02-26 PROCEDURE — 99421 OL DIG E/M SVC 5-10 MIN: CPT | Performed by: FAMILY MEDICINE

## 2024-02-26 RX ORDER — PREDNISONE 20 MG/1
TABLET ORAL
Qty: 14 TABLET | Refills: 0 | Status: SHIPPED | OUTPATIENT
Start: 2024-02-26 | End: 2024-02-27

## 2024-02-26 RX ORDER — DOXYCYCLINE HYCLATE 100 MG
100 TABLET ORAL 2 TIMES DAILY
Qty: 20 TABLET | Refills: 0 | Status: SHIPPED | OUTPATIENT
Start: 2024-02-26 | End: 2024-03-06

## 2024-02-26 NOTE — TELEPHONE ENCOUNTER
Medication Appeal Initiation    Medication: DUPIXENT 300 MG/2ML SC SOPN  Appeal Start Date:  2/26/2024  Insurance Company: Jin  Insurance Phone: [phone]  Insurance Fax: [phone]  Comments:  Faxed appeal

## 2024-02-27 DIAGNOSIS — F11.90 CHRONIC, CONTINUOUS USE OF OPIOIDS: ICD-10-CM

## 2024-02-27 DIAGNOSIS — G89.4 CHRONIC PAIN SYNDROME: ICD-10-CM

## 2024-02-27 RX ORDER — PREDNISONE 20 MG/1
TABLET ORAL
Qty: 14 TABLET | Refills: 0 | Status: SHIPPED | OUTPATIENT
Start: 2024-02-27 | End: 2024-05-01

## 2024-02-27 NOTE — PATIENT INSTRUCTIONS
Jacquie,    Thank you for choosing us for your care. I have placed an order for a lab test(s).     I've also sent prescriptions.     View your full visit summary for details by clicking on the link below. You can schedule a lab only appointment right here in [facility], or by calling [phone].    You will receive your lab results and next steps via [facility] when the lab results return.    Sincerely,  Liz Peterson MD

## 2024-02-27 NOTE — TELEPHONE ENCOUNTER
Received call from patient. Patient report that her new insurance has been causing a lot of difficulties with her Tramadol.     Patient reports that her new insurance does not recognize her as a past insurer.     When her Tramadol was filled last on 2/9/24 she was only given a 14 tablets and just received the last 31 today.     Patient reports that she does not have enough medication to while she is in [place] until mid April and is concerned about filling medication in [place] or if it can be filled there.     RN advised she would request refill from provider of Tramadol and recommended patient determine if there is a pharmacy near where she will be in [place] and we can follow up with Dr. Peterson if a prescription can be sent down to [place] at a later date when due. RN advised she can not guarantee this but she will follow up with provider on recommended next steps.     If able to provider refill at this time please send to [facility] [place].     Medication pended.     MARVA ParekhN, RN  [facility] [employer] ~ Registered Nurse  Clinic Triage ~ [place] [facility] [place]  February 27, 2024

## 2024-02-28 ENCOUNTER — MYC MEDICAL ADVICE (OUTPATIENT)
Dept: FAMILY MEDICINE | Facility: CLINIC | Age: 64
End: 2024-02-28

## 2024-02-28 DIAGNOSIS — B37.0 THRUSH: ICD-10-CM

## 2024-02-28 DIAGNOSIS — F33.1 MODERATE RECURRENT MAJOR DEPRESSION (H): ICD-10-CM

## 2024-02-28 DIAGNOSIS — G25.9 MOVEMENT DISORDER: ICD-10-CM

## 2024-02-28 DIAGNOSIS — E78.5 HYPERLIPIDEMIA LDL GOAL <130: ICD-10-CM

## 2024-02-28 DIAGNOSIS — F41.1 GENERALIZED ANXIETY DISORDER: ICD-10-CM

## 2024-02-28 RX ORDER — ROPINIROLE 1 MG/1
3 TABLET, FILM COATED ORAL AT BEDTIME
Qty: 270 TABLET | Refills: 1 | Status: SHIPPED | OUTPATIENT
Start: 2024-02-28 | End: 2024-05-29

## 2024-02-28 RX ORDER — ATORVASTATIN CALCIUM 20 MG/1
20 TABLET, FILM COATED ORAL DAILY
Qty: 90 TABLET | Refills: 1 | Status: SHIPPED | OUTPATIENT
Start: 2024-02-28 | End: 2024-10-02

## 2024-02-28 RX ORDER — TRAZODONE HYDROCHLORIDE 50 MG/1
150 TABLET, FILM COATED ORAL AT BEDTIME
Qty: 270 TABLET | Refills: 1 | Status: SHIPPED | OUTPATIENT
Start: 2024-02-28 | End: 2024-08-13

## 2024-02-28 RX ORDER — TRAMADOL HYDROCHLORIDE 50 MG/1
50 TABLET ORAL EVERY 12 HOURS PRN
Qty: 45 TABLET | Refills: 0 | Status: SHIPPED | OUTPATIENT
Start: 2024-02-28 | End: 2024-05-01

## 2024-02-28 NOTE — TELEPHONE ENCOUNTER
Printed and signed. Patient will have to .  This is due to pharmacy issue receiving this electronically.

## 2024-02-28 NOTE — TELEPHONE ENCOUNTER
Spoke with emma, they have received appeal and it is currently pending. Will check back in a few days

## 2024-02-29 RX ORDER — NYSTATIN 100000/ML
SUSPENSION, ORAL (FINAL DOSE FORM) ORAL 4 TIMES DAILY
Qty: 380 ML | Refills: 1 | Status: SHIPPED | OUTPATIENT
Start: 2024-02-29 | End: 2024-05-29

## 2024-02-29 NOTE — TELEPHONE ENCOUNTER
MEDICATION APPEAL APPROVED    Medication: DUPIXENT 300 MG/2ML SC SOPN  Authorization Effective Date: 2/26/2024  Authorization Expiration Date: 2/26/2025  Approved Dose/Quantity: 4ml per 28 days  Reference #: Key: [id number]   Appeal Insurance Company: Ten  Expected CoPay: $       CoPay Card Available: No  Financial Assistance Needed: no -copay card on file  Filling Pharmacy: [facility] - [facility] [address]  Patient Notified: not needed, notified pharmacy   Comments:

## 2024-03-05 ENCOUNTER — IMMUNIZATION (OUTPATIENT)
Dept: FAMILY MEDICINE | Facility: CLINIC | Age: 64
End: 2024-03-05
Payer: COMMERCIAL

## 2024-03-05 ENCOUNTER — LAB (OUTPATIENT)
Dept: LAB | Facility: CLINIC | Age: 64
End: 2024-03-05
Payer: COMMERCIAL

## 2024-03-05 DIAGNOSIS — Z23 ENCOUNTER FOR IMMUNIZATION: Primary | ICD-10-CM

## 2024-03-05 DIAGNOSIS — R53.83 OTHER FATIGUE: ICD-10-CM

## 2024-03-05 PROCEDURE — 84443 ASSAY THYROID STIM HORMONE: CPT

## 2024-03-05 PROCEDURE — 90471 IMMUNIZATION ADMIN: CPT

## 2024-03-05 PROCEDURE — 36415 COLL VENOUS BLD VENIPUNCTURE: CPT

## 2024-03-05 PROCEDURE — 90480 ADMN SARSCOV2 VAC 1/ONLY CMP: CPT

## 2024-03-05 PROCEDURE — 80053 COMPREHEN METABOLIC PANEL: CPT

## 2024-03-05 PROCEDURE — 90678 RSV VACC PREF BIVALENT IM: CPT

## 2024-03-05 PROCEDURE — 99207 PR NO CHARGE NURSE ONLY: CPT

## 2024-03-05 PROCEDURE — 91320 SARSCV2 VAC 30MCG TRS-SUC IM: CPT

## 2024-03-05 NOTE — PROGRESS NOTES
Prior to immunization administration, verified patients identity using patient s name and date of birth. Please see Immunization Activity for additional information.     Screening Questionnaire for Adult Immunization    Are you sick today?   No   Do you have allergies to medications, food, a vaccine component or latex?   No   Have you ever had a serious reaction after receiving a vaccination?   No   Do you have a long-term health problem with heart, lung, kidney, or metabolic disease (e.g., diabetes), asthma, a blood disorder, no spleen, complement component deficiency, a cochlear implant, or a spinal fluid leak?  Are you on long-term aspirin therapy?   No   Do you have cancer, leukemia, HIV/AIDS, or any other immune system problem?   No   Do you have a parent, brother, or sister with an immune system problem?   No   In the past 3 months, have you taken medications that affect  your immune system, such as prednisone, other steroids, or anticancer drugs; drugs for the treatment of rheumatoid arthritis, Crohn s disease, or psoriasis; or have you had radiation treatments?   No   Have you had a seizure, or a brain or other nervous system problem?   No   During the past year, have you received a transfusion of blood or blood    products, or been given immune (gamma) globulin or antiviral drug?   No   For women: Are you pregnant or is there a chance you could become       pregnant during the next month?   No   Have you received any vaccinations in the past 4 weeks?   No     Immunization questionnaire answers were all negative.    I have reviewed the following standing orders:   This patient is due and qualifies for the Covid-19 vaccine.     Click here for COVID-19 Standing Order    I have reviewed the vaccines inclusion and exclusion criteria; No concerns regarding eligibility.     This patient is due and qualifies for the RSV vaccine.    Click here for RSV Vaccine Standing Order    I have reviewed the vaccines inclusion  and exclusion criteria; No concerns regarding eligibility.     Patient instructed to remain in clinic for 15 minutes afterwards, and to report any adverse reactions.     Screening performed by Leidy Walters MA on 3/5/2024 at 1:58 PM.

## 2024-03-06 ENCOUNTER — E-VISIT (OUTPATIENT)
Dept: FAMILY MEDICINE | Facility: CLINIC | Age: 64
End: 2024-03-06
Payer: COMMERCIAL

## 2024-03-06 DIAGNOSIS — J06.9 ACUTE UPPER RESPIRATORY INFECTION, UNSPECIFIED: ICD-10-CM

## 2024-03-06 DIAGNOSIS — R05.1 ACUTE COUGH: ICD-10-CM

## 2024-03-06 LAB
ALBUMIN SERPL BCG-MCNC: 3.9 G/DL (ref 3.5–5.2)
ALP SERPL-CCNC: 103 U/L (ref 40–150)
ALT SERPL W P-5'-P-CCNC: 27 U/L (ref 0–50)
ANION GAP SERPL CALCULATED.3IONS-SCNC: 11 MMOL/L (ref 7–15)
AST SERPL W P-5'-P-CCNC: 29 U/L (ref 0–45)
BILIRUB SERPL-MCNC: 0.5 MG/DL
BUN SERPL-MCNC: 20.3 MG/DL (ref 8–23)
CALCIUM SERPL-MCNC: 9.1 MG/DL (ref 8.8–10.2)
CHLORIDE SERPL-SCNC: 102 MMOL/L (ref 98–107)
CREAT SERPL-MCNC: 1.23 MG/DL (ref 0.51–0.95)
DEPRECATED HCO3 PLAS-SCNC: 27 MMOL/L (ref 22–29)
EGFRCR SERPLBLD CKD-EPI 2021: 49 ML/MIN/1.73M2
GLUCOSE SERPL-MCNC: 181 MG/DL (ref 70–99)
POTASSIUM SERPL-SCNC: 3.9 MMOL/L (ref 3.4–5.3)
PROT SERPL-MCNC: 6.6 G/DL (ref 6.4–8.3)
SODIUM SERPL-SCNC: 140 MMOL/L (ref 135–145)
TSH SERPL DL<=0.005 MIU/L-ACNC: 0.52 UIU/ML (ref 0.3–4.2)

## 2024-03-06 PROCEDURE — 99421 OL DIG E/M SVC 5-10 MIN: CPT | Performed by: FAMILY MEDICINE

## 2024-03-06 RX ORDER — DOXYCYCLINE HYCLATE 100 MG
100 TABLET ORAL 2 TIMES DAILY
Qty: 20 TABLET | Refills: 0 | Status: SHIPPED | OUTPATIENT
Start: 2024-03-06 | End: 2024-05-01

## 2024-03-16 ENCOUNTER — HEALTH MAINTENANCE LETTER (OUTPATIENT)
Age: 64
End: 2024-03-16

## 2024-04-19 ENCOUNTER — MYC MEDICAL ADVICE (OUTPATIENT)
Dept: FAMILY MEDICINE | Facility: CLINIC | Age: 64
End: 2024-04-19
Payer: COMMERCIAL

## 2024-04-19 DIAGNOSIS — R53.83 OTHER FATIGUE: Primary | ICD-10-CM

## 2024-04-23 NOTE — TELEPHONE ENCOUNTER
Spoke to patient and scheduled appointment.    Next 5 appointments (look out 90 days)      Apr 30, 2024 11:00 AM  (Arrive by 10:40 AM)  Provider Visit with Liz Peterson MD  [facility] [place] ([facility] ) [address], [address]  [facility] [address] [phone]

## 2024-04-23 NOTE — TELEPHONE ENCOUNTER
Held a spot at 5p on 5/1 for patient, waiting to hear back via [employer] if that would work for her.    Marie Laura CMA ([facility])

## 2024-04-23 NOTE — TELEPHONE ENCOUNTER
Please assist in scheduling some time in the next 1-3 weeks. Can use same day or authorized slot.

## 2024-04-25 ENCOUNTER — MYC MEDICAL ADVICE (OUTPATIENT)
Dept: PALLIATIVE MEDICINE | Facility: CLINIC | Age: 64
End: 2024-04-25
Payer: COMMERCIAL

## 2024-04-25 DIAGNOSIS — M50.30 DDD (DEGENERATIVE DISC DISEASE), CERVICAL: ICD-10-CM

## 2024-04-25 DIAGNOSIS — L70.0 ACNE VULGARIS: Primary | ICD-10-CM

## 2024-04-25 DIAGNOSIS — M47.812 SPONDYLOSIS OF CERVICAL REGION WITHOUT MYELOPATHY OR RADICULOPATHY: Primary | ICD-10-CM

## 2024-04-25 RX ORDER — MINOCYCLINE HYDROCHLORIDE 50 MG/1
50 CAPSULE ORAL 2 TIMES DAILY
Qty: 60 CAPSULE | Refills: 3 | Status: SHIPPED | OUTPATIENT
Start: 2024-04-25 | End: 2024-06-27

## 2024-04-25 NOTE — TELEPHONE ENCOUNTER
There is already an interventional eval order in pt's chart back in February.  Pt was scheduled for this but cancelled and did not reschedule.     Per patient [email or web address] message:  You  Jacquie MARKOS العلي now (4:33 PM)   i Jacquie,  I did a chart review.    Looks like Dr. Peterson did discuss w/ Dr. Brownlee.    You do need to Dr. Brownlee or a med spine provider for an evaluation.  It looks like you are scheduled already for the eval with Dr. Brownlee back on 3/25/24 but cancelled and that has not been rescheduled.  This is what is needed at this time.  Do you want me to have our schedulers give you a call to make this appointment?  ___________________    Routed to both Dr. Brownlee and Dr. Peterson so the everyone is aware of the plan.        Denisse RN-BSN  [facility]   [phone]

## 2024-04-25 NOTE — TELEPHONE ENCOUNTER
KATARINA.  Brought over from another encounter.    Jorge sent to pt:  You  Jacquie العلي now (4:33 PM)       Ciaran Dhillon,  I did a chart review.    Looks like Dr. Peterson did discuss w/ Dr. Brownlee.    You do need to Dr. Brownlee or a med spine provider for an evaluation.  It looks like you are scheduled already for the eval with Dr. Brownlee back on 3/25/24 but cancelled and that has not been rescheduled.  This is what is needed at this time.  Do you want me to have our schedulers give you a call to make this appointment?        Denisse, RN-BSN  [facility]   [phone]

## 2024-04-26 ENCOUNTER — LAB (OUTPATIENT)
Dept: LAB | Facility: OTHER | Age: 64
End: 2024-04-26
Payer: COMMERCIAL

## 2024-04-26 DIAGNOSIS — R53.83 OTHER FATIGUE: ICD-10-CM

## 2024-04-26 LAB
ALBUMIN SERPL BCG-MCNC: 4.5 G/DL (ref 3.5–5.2)
ALP SERPL-CCNC: 119 U/L (ref 40–150)
ALT SERPL W P-5'-P-CCNC: 17 U/L (ref 0–50)
ANION GAP SERPL CALCULATED.3IONS-SCNC: 11 MMOL/L (ref 7–15)
AST SERPL W P-5'-P-CCNC: 24 U/L (ref 0–45)
BASOPHILS # BLD AUTO: 0 10E3/UL (ref 0–0.2)
BASOPHILS NFR BLD AUTO: 0 %
BILIRUB SERPL-MCNC: 0.5 MG/DL
BUN SERPL-MCNC: 31.7 MG/DL (ref 8–23)
CALCIUM SERPL-MCNC: 10.6 MG/DL (ref 8.8–10.2)
CHLORIDE SERPL-SCNC: 99 MMOL/L (ref 98–107)
CREAT SERPL-MCNC: 1.44 MG/DL (ref 0.51–0.95)
DEPRECATED HCO3 PLAS-SCNC: 28 MMOL/L (ref 22–29)
EGFRCR SERPLBLD CKD-EPI 2021: 41 ML/MIN/1.73M2
EOSINOPHIL # BLD AUTO: 0.2 10E3/UL (ref 0–0.7)
EOSINOPHIL NFR BLD AUTO: 2 %
ERYTHROCYTE [DISTWIDTH] IN BLOOD BY AUTOMATED COUNT: 13.9 % (ref 10–15)
GLUCOSE SERPL-MCNC: 118 MG/DL (ref 70–99)
HCT VFR BLD AUTO: 44.6 % (ref 35–47)
HGB BLD-MCNC: 14.9 G/DL (ref 11.7–15.7)
IMM GRANULOCYTES # BLD: 0 10E3/UL
IMM GRANULOCYTES NFR BLD: 0 %
LYMPHOCYTES # BLD AUTO: 4.3 10E3/UL (ref 0.8–5.3)
LYMPHOCYTES NFR BLD AUTO: 37 %
MCH RBC QN AUTO: 30.3 PG (ref 26.5–33)
MCHC RBC AUTO-ENTMCNC: 33.4 G/DL (ref 31.5–36.5)
MCV RBC AUTO: 91 FL (ref 78–100)
MONOCYTES # BLD AUTO: 0.9 10E3/UL (ref 0–1.3)
MONOCYTES NFR BLD AUTO: 8 %
NEUTROPHILS # BLD AUTO: 6.1 10E3/UL (ref 1.6–8.3)
NEUTROPHILS NFR BLD AUTO: 53 %
PLATELET # BLD AUTO: 380 10E3/UL (ref 150–450)
POTASSIUM SERPL-SCNC: 3.9 MMOL/L (ref 3.4–5.3)
PROT SERPL-MCNC: 7.7 G/DL (ref 6.4–8.3)
RBC # BLD AUTO: 4.92 10E6/UL (ref 3.8–5.2)
SODIUM SERPL-SCNC: 138 MMOL/L (ref 135–145)
TSH SERPL DL<=0.005 MIU/L-ACNC: 0.99 UIU/ML (ref 0.3–4.2)
WBC # BLD AUTO: 11.5 10E3/UL (ref 4–11)

## 2024-04-26 PROCEDURE — 36415 COLL VENOUS BLD VENIPUNCTURE: CPT

## 2024-04-26 PROCEDURE — 85025 COMPLETE CBC W/AUTO DIFF WBC: CPT

## 2024-04-26 PROCEDURE — 84443 ASSAY THYROID STIM HORMONE: CPT

## 2024-04-26 PROCEDURE — 82306 VITAMIN D 25 HYDROXY: CPT

## 2024-04-26 PROCEDURE — 80053 COMPREHEN METABOLIC PANEL: CPT

## 2024-04-26 NOTE — TELEPHONE ENCOUNTER
"Per chart review patient was noted to have been dismissed from [place] in 2018 which resulted in the cancellation.    I do note that patient does have the following order on file:     Question Answer   Reason for Referral: Procedure Order   Procedure: Facet Procedure   Facet Procedure: Intraarticular Steroid Injection   Location: Lumbar   Scheduling Instructions: [facility] will call you to coordinate care as prescribed your provider. If you don t hear from a representative within 2 business days, please call [phone].       Chart reviewed.   Note that patient self-discharged from [facility] in 2018 on two occasions.    Per 5/8/2018 Encounter regarding Care Plan note: \"  Patient is discharged from the pain clinic due to Self-Discharge.  If the patient requests to return, the situation will be reviewed prior to scheduling and the patient may need a new referral. This patient requires a new referral before any appointment is made.     BANDAR Dyer, RN-BC  Patient Care Supervisor/Care Coordinator  [facility]         \"    Routing to leadership for review prior before any other scheduling occurs.    Wendy Underwood RN  [facility] [place]  [phone]    "

## 2024-04-26 NOTE — TELEPHONE ENCOUNTER
Hi spoke to Jacquie BURROWS,   I got her in [place] not tell 6/10  but could she just get reschedule for the injection that was scheduled 2/28/24  in [place] before she was leaving to [place],  she cancelled the appt can she get an injection that has not been done yet,   instead schedule Med spine appt.    Please advise     Liseth

## 2024-04-26 NOTE — TELEPHONE ENCOUNTER
2/6/24: interventional eval order.    Please call pt and schedule the above.  If w/ Dr. Brownlee ok to schedule in a 30 minute slot or a med spine slot.      Denisse RN-BSN  [facility]   [phone]

## 2024-04-27 LAB — VIT D+METAB SERPL-MCNC: 35 NG/ML (ref 20–50)

## 2024-04-29 ENCOUNTER — MYC MEDICAL ADVICE (OUTPATIENT)
Dept: FAMILY MEDICINE | Facility: CLINIC | Age: 64
End: 2024-04-29
Payer: COMMERCIAL

## 2024-04-29 DIAGNOSIS — N18.32 STAGE 3B CHRONIC KIDNEY DISEASE (H): Primary | ICD-10-CM

## 2024-04-29 NOTE — TELEPHONE ENCOUNTER
Called pt  Writer asked pt if she is wanting a repeat lumbar facet injection or see a provider to determine what is needed.  Pt reported that she did NOT receive relief from either of the facet injections.  Writer informed her that an eval would be needed.    She was given the following options:  Keep the eval appointment with Dr. Brownlee for 6/10/24 stating this is the soonest appointment possible or she can get a referral from her primary care provider for a med spine eval; she will most likely get in sooner     Plan: Pt wants the med spine referral.  Her primary care provider to order a med spine if indicated.     -writer will keep the 6/10 appointment w/ Dr. Brownlee for now and f/unit(s) on this call If pt is able to get in sooner than will cancel Dr. Brownlee's appointment.     Denisse, RN-BSN  [facility]   [phone]

## 2024-04-29 NOTE — TELEPHONE ENCOUNTER
Per leadership patient is able to schedule as she has been seen by Dr. Brownlee since historical dismissal.     Please contact patient for scheduling for below injection.  Please use the 2/20/2024 TE for procedure scheduling.     Question Answer   Reason for Referral: Procedure Order   Procedure: Facet Procedure   Facet Procedure: Intraarticular Steroid Injection   Location: Lumbar   Scheduling Instructions: [facility] will call you to coordinate care as prescribed your provider. If you don t hear from a representative within 2 business days, please call [phone].     Wendy Underwood RN  [facility]  [phone]

## 2024-04-29 NOTE — TELEPHONE ENCOUNTER
Closing this encounter.    For now, pt is not having a lumbar facet joint injection as the initial 2 were not helpful.  See the 4/25/24 encounter for future information on this.      Denisse RN-BSN  [facility] [place]   [phone]

## 2024-04-30 ENCOUNTER — TELEPHONE (OUTPATIENT)
Dept: NEPHROLOGY | Facility: CLINIC | Age: 64
End: 2024-04-30

## 2024-04-30 NOTE — ADDENDUM NOTE
Addended by: CRISTINA SANCHEZ on: 9/28/2021 11:39 AM     Modules accepted: Orders    
Addended by: CRISTINA SANCHEZ on: 9/28/2021 11:41 AM     Modules accepted: Orders    
No

## 2024-04-30 NOTE — TELEPHONE ENCOUNTER
[facility] Call Center    Phone Message    May a detailed message be left on voicemail: yes     Reason for Call: Other: Patient being referred for Stage 3b chronic kidney disease (H) by referring provider. Patient declined to schedule first available appointment, stating that she needed to be seen sooner. Sending encounter message for review and follow-up with Pt for scheduling.      Action Taken: Other: [facility]    Travel Screening: Not Applicable

## 2024-05-01 ENCOUNTER — OFFICE VISIT (OUTPATIENT)
Dept: FAMILY MEDICINE | Facility: CLINIC | Age: 64
End: 2024-05-01
Payer: COMMERCIAL

## 2024-05-01 VITALS
HEART RATE: 75 BPM | DIASTOLIC BLOOD PRESSURE: 70 MMHG | HEIGHT: 66 IN | WEIGHT: 189 LBS | OXYGEN SATURATION: 99 % | TEMPERATURE: 97.3 F | SYSTOLIC BLOOD PRESSURE: 110 MMHG | BODY MASS INDEX: 30.37 KG/M2 | RESPIRATION RATE: 16 BRPM

## 2024-05-01 DIAGNOSIS — D69.1: ICD-10-CM

## 2024-05-01 DIAGNOSIS — F11.90 CHRONIC, CONTINUOUS USE OF OPIOIDS: ICD-10-CM

## 2024-05-01 DIAGNOSIS — N18.32 STAGE 3B CHRONIC KIDNEY DISEASE (H): ICD-10-CM

## 2024-05-01 DIAGNOSIS — D72.829 LEUKOCYTOSIS, UNSPECIFIED TYPE: ICD-10-CM

## 2024-05-01 DIAGNOSIS — I10 HYPERTENSION GOAL BP (BLOOD PRESSURE) < 140/90: ICD-10-CM

## 2024-05-01 DIAGNOSIS — M51.16 LUMBAR DISC DISEASE WITH RADICULOPATHY: Primary | ICD-10-CM

## 2024-05-01 DIAGNOSIS — G89.4 CHRONIC PAIN SYNDROME: ICD-10-CM

## 2024-05-01 LAB
BASOPHILS # BLD AUTO: 0 10E3/UL (ref 0–0.2)
BASOPHILS NFR BLD AUTO: 0 %
EOSINOPHIL # BLD AUTO: 0.3 10E3/UL (ref 0–0.7)
EOSINOPHIL NFR BLD AUTO: 3 %
ERYTHROCYTE [DISTWIDTH] IN BLOOD BY AUTOMATED COUNT: 13.7 % (ref 10–15)
HCT VFR BLD AUTO: 44.2 % (ref 35–47)
HGB BLD-MCNC: 14.8 G/DL (ref 11.7–15.7)
IMM GRANULOCYTES # BLD: 0 10E3/UL
IMM GRANULOCYTES NFR BLD: 0 %
LYMPHOCYTES # BLD AUTO: 4 10E3/UL (ref 0.8–5.3)
LYMPHOCYTES NFR BLD AUTO: 39 %
MCH RBC QN AUTO: 30 PG (ref 26.5–33)
MCHC RBC AUTO-ENTMCNC: 33.5 G/DL (ref 31.5–36.5)
MCV RBC AUTO: 90 FL (ref 78–100)
MONOCYTES # BLD AUTO: 0.9 10E3/UL (ref 0–1.3)
MONOCYTES NFR BLD AUTO: 9 %
NEUTROPHILS # BLD AUTO: 4.9 10E3/UL (ref 1.6–8.3)
NEUTROPHILS NFR BLD AUTO: 48 %
PLATELET # BLD AUTO: 344 10E3/UL (ref 150–450)
RBC # BLD AUTO: 4.93 10E6/UL (ref 3.8–5.2)
WBC # BLD AUTO: 10.2 10E3/UL (ref 4–11)

## 2024-05-01 PROCEDURE — G2211 COMPLEX E/M VISIT ADD ON: HCPCS | Performed by: FAMILY MEDICINE

## 2024-05-01 PROCEDURE — 36415 COLL VENOUS BLD VENIPUNCTURE: CPT | Performed by: FAMILY MEDICINE

## 2024-05-01 PROCEDURE — 99215 OFFICE O/P EST HI 40 MIN: CPT | Performed by: FAMILY MEDICINE

## 2024-05-01 PROCEDURE — 80048 BASIC METABOLIC PNL TOTAL CA: CPT | Performed by: FAMILY MEDICINE

## 2024-05-01 PROCEDURE — 85025 COMPLETE CBC W/AUTO DIFF WBC: CPT | Performed by: FAMILY MEDICINE

## 2024-05-01 RX ORDER — HYDROCHLOROTHIAZIDE 25 MG/1
25 TABLET ORAL DAILY
Qty: 90 TABLET | Refills: 1 | Status: SHIPPED | OUTPATIENT
Start: 2024-05-01

## 2024-05-01 RX ORDER — LOSARTAN POTASSIUM 100 MG/1
100 TABLET ORAL DAILY
Qty: 90 TABLET | Refills: 1 | Status: SHIPPED | OUTPATIENT
Start: 2024-05-01

## 2024-05-01 RX ORDER — TRAMADOL HYDROCHLORIDE 50 MG/1
50 TABLET ORAL EVERY 12 HOURS PRN
Qty: 60 TABLET | Refills: 0 | Status: SHIPPED | OUTPATIENT
Start: 2024-05-01 | End: 2024-06-24

## 2024-05-01 ASSESSMENT — PATIENT HEALTH QUESTIONNAIRE - PHQ9
SUM OF ALL RESPONSES TO PHQ QUESTIONS 1-9: 8
10. IF YOU CHECKED OFF ANY PROBLEMS, HOW DIFFICULT HAVE THESE PROBLEMS MADE IT FOR YOU TO DO YOUR WORK, TAKE CARE OF THINGS AT HOME, OR GET ALONG WITH OTHER PEOPLE: SOMEWHAT DIFFICULT
SUM OF ALL RESPONSES TO PHQ QUESTIONS 1-9: 8

## 2024-05-01 NOTE — PROGRESS NOTES
Assessment & Plan     Lumbar disc disease with radiculopathy  Chronic pain syndrome  Chronic, continuous use of opioids  Patient has been having a lot of discomfort with her low back and into her buttock area.  She has history of this in the past and has had periodic injections with pain management.  She is currently scheduled for next month with pain management for referral as her most recent facet injections were not helpful.  Being off the Celebrex she has had worse pain control.  Currently has been using 1 tramadol daily.  Will increase to 1 tablet twice a day this time.  Reach out if worsening or concerns.  - traMADol (ULTRAM) 50 MG tablet; Take 1 tablet (50 mg) by mouth every 12 hours as needed for moderate to severe pain      Stage 3b chronic kidney disease (H)  Her last creatinine was a little more elevated than usual with GFR slightly decreased.  She was fasting at that time so we will repeat today.  Continue to avoid anti-inflammatories including Celebrex, ibuprofen, Advil, naproxen  Discussed the importance of good blood pressure control.  She will be reaching out to other nephrology groups that are covered by her insurance and will send referral over as needed.  - Basic metabolic panel  (Ca, Cl, CO2, Creat, Gluc, K, Na, BUN); Future  - Basic metabolic panel  (Ca, Cl, CO2, Creat, Gluc, K, Na, BUN)    Leukocytosis, unspecified type  No other signs or symptoms of infection.  She is on chronic prednisone.  Will recheck white blood cell count today and will notify results  - CBC with platelets and differential; Future  - CBC with platelets and differential    Hypertension goal BP (blood pressure) < 140/90  Doing well. Well controlled. Tolerating medication.  No change in plan.    - losartan (COZAAR) 100 MG tablet; Take 1 tablet (100 mg) by mouth daily  - hydrochlorothiazide (HYDRODIURIL) 25 MG tablet; Take 1 tablet (25 mg) by mouth daily    Acquired platelet disorder (H)  Patient is concerned about how she  "is doing with this and plans to follow-up with her specialist.  She will reach out as needed.        42 minutes spent by me on the date of the encounter doing chart review, history and exam, documentation and further activities per the note    The longitudinal plan of care for the diagnosis(es)/condition(s) as documented were addressed during this visit. Due to the added complexity in care, I will continue to support Jacquie in the subsequent management and with ongoing continuity of care.      BMI  Estimated body mass index is 30.97 kg/m  as calculated from the following:    Height as of this encounter: 1.664 m (5' 5.5\").    Weight as of this encounter: 85.7 kg (189 lb).   Weight management plan: Discussed healthy diet and exercise guidelines          Subjective   Jacquie is a 63 year old, presenting for the following health issues:  Follow Up      5/1/2024     4:45 PM   Additional Questions   Roomed by Leidy BUTLER CMA   Accompanied by self     Via the Health Maintenance questionnaire, the patient has reported the following services have been completed -Mammogram, this information has been sent to the abstraction team.  History of Present Illness       Reason for visit:  Go over things    She eats 2-3 servings of fruits and vegetables daily.She consumes 0 sweetened beverage(s) daily.She exercises with enough effort to increase her heart rate 9 or less minutes per day.  She exercises with enough effort to increase her heart rate 3 or less days per week.   She is taking medications regularly.     Low back pain. Is scheduled for injection. Denisse at [facility]. Has appointment with with Dr. Brownlee for consult in [facility].       Chronic Kidney Disease Follow-up    Do you take any over the counter pain medicine?: Yes  What over the counter medicine are you taking for your pain?:  Tylenol as needed   How often do you take this medicine?:  A few times a week  She does have history of hypertension and had " "some difficulty with control in the past though more recently is under better control.  She has been referred to nephrology but is unable to get in until October with [facility].  She is concerned about this and wondering about other options.  She can see kidney specialist through her insurance as well.  She had been on Celebrex but this was stopped within the last couple months because of concerns of kidney function.  She is having a lot more pain since she has been unable to take this.  She is not taking any other anti-inflammatories.      She had a mild elevation of white blood cell count on recent blood work.  She is interested in having this rechecked.  She has had a lot of fatigue.  She has not had any fevers.  She has had decreased appetite but no nausea or vomiting.          Objective    /70   Pulse 75   Temp 97.3  F (36.3  C) (Temporal)   Resp 16   Ht 1.664 m (5' 5.5\")   Wt 85.7 kg (189 lb)   LMP 07/17/2009 (Exact Date)   SpO2 99%   BMI 30.97 kg/m    Body mass index is 30.97 kg/m .  Physical Exam   GENERAL: alert and no distress  NECK: no adenopathy, no asymmetry, masses, or scars  RESP: lungs clear to auscultation - no rales, rhonchi or wheezes  CV: regular rate and rhythm, normal S1 S2, no S3 or S4, no murmur, click or rub, no peripheral edema  MS: no gross musculoskeletal defects noted, no edema            Signed Electronically by: Liz Peterson MD    "

## 2024-05-01 NOTE — TELEPHONE ENCOUNTER
Called and spoke with pt. Offered to schedule next available New Pt appt with Nephrology. In Clinic at the [place] location. Dr. Cunningham 10/22/24 at 1:00pm with a non fasting lab appointment at 12:30pm (blood and urine collection).  Pt agreed to schedule, but prefers a sooner appointment.    Informed pt she will be added to the Wait list. Encouraged to discuss with her referring Provider if pt needs a ref elsewhere for a sooner appt and to check with her Insurance plan clinics that are covered.    Pt agreed with plan.    Carmen Goldberg LPN

## 2024-05-02 ENCOUNTER — MYC MEDICAL ADVICE (OUTPATIENT)
Dept: PALLIATIVE MEDICINE | Facility: CLINIC | Age: 64
End: 2024-05-02

## 2024-05-02 DIAGNOSIS — M47.816 SPONDYLOSIS OF LUMBAR REGION WITHOUT MYELOPATHY OR RADICULOPATHY: Primary | ICD-10-CM

## 2024-05-02 NOTE — TELEPHONE ENCOUNTER
The lumbar facet joint injection is scheduled as well as an eval with Dr. Brownlee for med spine.      Denisse RN-BSN  [facility]   [phone]

## 2024-05-02 NOTE — TELEPHONE ENCOUNTER
Called pt.   Informed her that the lumbar facet joint injection is scheduled and she is scheduled with Dr. Brownlee for a med spine eval.  She is ok w/ this plan.    Denisse RN-BSN  [facility]   [phone]

## 2024-05-03 ENCOUNTER — MYC MEDICAL ADVICE (OUTPATIENT)
Dept: FAMILY MEDICINE | Facility: CLINIC | Age: 64
End: 2024-05-03
Payer: COMMERCIAL

## 2024-05-03 DIAGNOSIS — N18.32 STAGE 3B CHRONIC KIDNEY DISEASE (H): Primary | ICD-10-CM

## 2024-05-03 LAB
ANION GAP SERPL CALCULATED.3IONS-SCNC: 14 MMOL/L (ref 7–15)
BUN SERPL-MCNC: 32.7 MG/DL (ref 8–23)
CALCIUM SERPL-MCNC: 9.7 MG/DL (ref 8.8–10.2)
CHLORIDE SERPL-SCNC: 102 MMOL/L (ref 98–107)
CREAT SERPL-MCNC: 1.61 MG/DL (ref 0.51–0.95)
DEPRECATED HCO3 PLAS-SCNC: 22 MMOL/L (ref 22–29)
EGFRCR SERPLBLD CKD-EPI 2021: 36 ML/MIN/1.73M2
GLUCOSE SERPL-MCNC: 95 MG/DL (ref 70–99)
POTASSIUM SERPL-SCNC: 3.8 MMOL/L (ref 3.4–5.3)
SODIUM SERPL-SCNC: 138 MMOL/L (ref 135–145)

## 2024-05-06 ENCOUNTER — TELEPHONE (OUTPATIENT)
Dept: FAMILY MEDICINE | Facility: CLINIC | Age: 64
End: 2024-05-06
Payer: COMMERCIAL

## 2024-05-06 NOTE — TELEPHONE ENCOUNTER
Patient is requesting a referral to [facility] and is concerned about elevated creatine numbers.     Marie Laura CMA ([facility])

## 2024-05-06 NOTE — TELEPHONE ENCOUNTER
Order/Referral Request    Who is requesting: Pt    Orders being requested: Kidney    Reason service is needed/diagnosis: N/A    When are orders needed by: ASAP    Has this been discussed with Provider: Yes    Does patient have a preference on a Group/Provider/Facility? [facility]    Does patient have an appointment scheduled?: No    Where to send orders: Fax - [phone]    Could we send this information to you in Myreks or would you prefer to receive a phone call?:   No preference   Okay to leave a detailed message?: Yes at Home number on file [phone] (home)

## 2024-05-07 ENCOUNTER — MYC MEDICAL ADVICE (OUTPATIENT)
Dept: FAMILY MEDICINE | Facility: CLINIC | Age: 64
End: 2024-05-07
Payer: COMMERCIAL

## 2024-05-07 ENCOUNTER — VIRTUAL VISIT (OUTPATIENT)
Dept: RHEUMATOLOGY | Facility: CLINIC | Age: 64
End: 2024-05-07
Attending: NURSE PRACTITIONER
Payer: COMMERCIAL

## 2024-05-07 DIAGNOSIS — M35.2 BEHCET'S DISEASE (H): Primary | ICD-10-CM

## 2024-05-07 DIAGNOSIS — N18.32 STAGE 3B CHRONIC KIDNEY DISEASE (H): Primary | ICD-10-CM

## 2024-05-07 DIAGNOSIS — R11.0 NAUSEA: ICD-10-CM

## 2024-05-07 RX ORDER — ONDANSETRON 4 MG/1
4 TABLET, ORALLY DISINTEGRATING ORAL EVERY 8 HOURS PRN
Qty: 20 TABLET | Refills: 0 | Status: SHIPPED | OUTPATIENT
Start: 2024-05-07 | End: 2024-09-20

## 2024-05-07 NOTE — TELEPHONE ENCOUNTER
TC-Referral from 5/3/24 for [facility] is for Stage 3b chronic kidney disease (H) [N18.32] which is CKD and should be on the referral that was faxed.     Provider-please advise on leukocytosis and will then let patient know. Refill for ondansetron pending.    Marie Laura CMA ([facility])

## 2024-05-07 NOTE — PROGRESS NOTES
Medication Therapy Management (MTM) Encounter    ASSESSMENT:                            Medication Adherence/Access: No issues identified    Behcet Disease: Patient experiencing worsening pain after stopping celecoxib due to renal function. Continues to experience some improvement from Otezla and would benefit from continuing therapy. Up to date on routine lab monitoring and working with providers to manage kidney function. Encouraged patient to receive pain block as scheduled on 5/8 and to continue to use acetaminophen, tramadol, and diclofenac gel to manage pain. Patient had to cancel recent rheumatology appointment and I advised her to schedule follow-up. Patient does have an upcoming appointment with dermatology to evaluate efficacy of Otezla.     PLAN:                            Continue Otezla 30 mg twice daily.     Get pain block as scheduled on 5/8/24.     Schedule follow-up rheumatology appointment.    Phone: [phone]    Follow-up: with MTM pharmacist on 6/17/24.     SUBJECTIVE/OBJECTIVE:                          Jacquie Amador is a 63 year old female called for a follow-up visit.       Reason for visit: Otezla follow-up.    Allergies/ADRs: Reviewed in chart  Past Medical History: Reviewed in chart  Tobacco: She reports that she quit smoking about 10 years ago. Her smoking use included cigarettes. She started smoking about 44 years ago. She has a 10.1 pack-year smoking history. She has been exposed to tobacco smoke. She has never used smokeless tobacco.  Alcohol: none    Medication Adherence/Access: No issues identified     Behcet Disease:   Otezla 30 mg twice daily   Prednisone 10 mg daily   Tramadol 50 mg twice daily as needed   Acetaminophen 500-1000 mg every 6 hours as needed   Tears Naturale Free as needed   Colchicine 0.6 mg twice daily   Valacyclovir 500 mg daily   Lidocaine 2% solution as needed   Patient reported she has been doing a little better since returning from [place]. Had to stop  celecoxib due to kidney function and noticed a significant increase in pain. Hard getting out of bed in the morning due to back pain, which has improved some. Feet pain returning - always worsened when off celecoxib in the past. Feels morning stiffness is severe but improves throughout the day. Using tramadol, acetaminophen, and diclofenac gel for pain. Is scheduled to receive a pain block tomorrow and reported last 2 didn't work. Thinks Otezla is helping. Reported face is healing and she has lost 25 lbs since starting. Weight loss has been gradual and denied other adverse effects. Concerned with renal function and mentioned father  from kidney issues. Unable to get in with a [place] nephrologist until October and plans to see an outside kidney specialist sooner.      Today's Vitals: LMP 2009 (Exact Date)   ----------------    I spent 25 minutes with this patient today. A copy of the visit note was provided to the patient's provider(s).    A summary of these recommendations was sent via tidy.    Golden Cadena, PharmD  Medication Therapy Management Pharmacist  [facility]  Phone: [phone]     Telemedicine Visit Details  Type of service:  Telephone visit  Start Time:  10:00 AM  End Time:  10:25 AM     Medication Therapy Recommendations  No medication therapy recommendations to display

## 2024-05-07 NOTE — Clinical Note
FYI - Patient was scheduled to see you a few weeks ago and had to cancel for . Otezla Rx set to be renewed soon. Encouraged her to schedule rheum follow-up

## 2024-05-07 NOTE — Clinical Note
5/7/2024       RE: Jacquie Amador  [address]  [place] [address]     Dear Colleague,    Thank you for referring your patient, Jacquie Amador, to the [facility] [place] at [facility]. Please see a copy of my visit note below.    Medication Therapy Management (MTM) Encounter    ASSESSMENT:                            Medication Adherence/Access: No issues identified    Behcet Disease: Patient     PLAN:                            Call  rheum scheduling number [phone]    Follow-up: with MTM pharmacist on 6/17/24.     SUBJECTIVE/OBJECTIVE:                          Jacquie Amador is a 63 year old female called for a follow-up visit.       Reason for visit: Otezla follow-up.    Allergies/ADRs: Reviewed in chart  Past Medical History: Reviewed in chart  Tobacco: She reports that she quit smoking about 10 years ago. Her smoking use included cigarettes. She started smoking about 44 years ago. She has a 10.1 pack-year smoking history. She has been exposed to tobacco smoke. She has never used smokeless tobacco.  Alcohol: none    Medication Adherence/Access: No issues identified     Behcet Disease:   Otezla 30 mg twice daily   Prednisone 10 mg daily   Celecoxib 200 mg twice daily as needed - stopped   Tramadol 50 mg twice daily as needed   Acetaminophen 500-1000 mg every 6 hours as needed   Tears Naturale Free as needed   Colchicine 0.6 mg twice daily   Valacyclovir 500 mg daily   Lidocaine 2% solution as needed       Using Voltaren     Warm water/cold water - heating     Hard to get out of bed in the mornings   Stopped celebrex - pain much worse since stopping (hard to because of kidney) - feet pain bad (always had when off celebrex) - pain is worst part - toes to just below - back pain is better than what it was - morning stiffness severe than improves -   Using acetaminophen and tramadol for pain - 3000 mg APAP   Supposed to have pain  "block tomorrow - not sure if needed last   Things going well with Otezla - thinks it's helping face - healing up   25 lb lost over since starting Otezla - gradual   Dad  of renal issues   [facility] called and can't get in until October - sent referral to [facility]??? Referral sent waiting for call to schedule   Started minocycline (previously on doxycycline) - Jumped       Today's Vitals: LMP 2009 (Exact Date)   ----------------    I spent *** minutes with this patient today. All changes were made via collaborative practice agreement with ***. A copy of the visit note was provided to the patient's provider(s).    A summary of these recommendations was sent via Jam[employer].    Nikki MeyerD  Medication Therapy Management Pharmacist  [facility]  Phone: [phone]     Telemedicine Visit Details  Type of service:  {telemedvisitmtm:115009::\"Telephone visit\"}  Start Time: {video/phone visit start time:1529}  End Time: {video/phone visit end time:1529}     Medication Therapy Recommendations  No medication therapy recommendations to display         Medication Therapy Management (MTM) Encounter    ASSESSMENT:                            Medication Adherence/Access: No issues identified    Behcet Disease: Patient experiencing worsening pain after stopping celecoxib due to renal function. Continues to experience some improvement from Otezla and would benefit from continuing therapy. Up to date on routine lab monitoring and working with providers to manage kidney function. Encouraged patient to receive pain block as scheduled on  and to continue to use acetaminophen, tramadol, and diclofenac gel to manage pain. Patient had to cancel recent rheumatology appointment and I advised her to schedule follow-up. Patient does have an upcoming appointment with dermatology to evaluate efficacy of Otezla.     PLAN:                            Continue Otezla 30 mg twice daily.     Get pain block as " scheduled on 24.     Schedule follow-up rheumatology appointment.    Phone: [phone]    Follow-up: with [facility] pharmacist on 24.     SUBJECTIVE/OBJECTIVE:                          Jacquie Amador is a 63 year old female called for a follow-up visit.       Reason for visit: Otezla follow-up.    Allergies/ADRs: Reviewed in chart  Past Medical History: Reviewed in chart  Tobacco: She reports that she quit smoking about 10 years ago. Her smoking use included cigarettes. She started smoking about 44 years ago. She has a 10.1 pack-year smoking history. She has been exposed to tobacco smoke. She has never used smokeless tobacco.  Alcohol: none    Medication Adherence/Access: No issues identified     Behcet Disease:   Otezla 30 mg twice daily   Prednisone 10 mg daily   Tramadol 50 mg twice daily as needed   Acetaminophen 500-1000 mg every 6 hours as needed   Tears Naturale Free as needed   Colchicine 0.6 mg twice daily   Valacyclovir 500 mg daily   Lidocaine 2% solution as needed   Patient reported she has been doing a little better since returning from [place]. Had to stop celecoxib due to kidney function and noticed a significant increase in pain. Hard getting out of bed in the morning due to back pain, which has improved some. Feet pain returning - always worsened when off celecoxib in the past. Feels morning stiffness is severe but improves throughout the day. Using tramadol, acetaminophen, and diclofenac gel for pain. Is scheduled to receive a pain block tomorrow and reported last 2 didn't work. Thinks Otezla is helping. Reported face is healing and she has lost 25 lbs since starting. Weight loss has been gradual and denied other adverse effects. Concerned with renal function and mentioned father  from kidney issues. Unable to get in with a [place] nephrologist until October and plans to see an outside kidney specialist sooner.      Today's Vitals: LMP 2009 (Exact Date)   ----------------    I spent  25 minutes with this patient today. A copy of the visit note was provided to the patient's provider(s).    A summary of these recommendations was sent via Algae[employer].    Golden Cadena PharmD  Medication Therapy Management Pharmacist  [facility]  Phone: [phone]     Telemedicine Visit Details  Type of service:  Telephone visit  Start Time:  10:00 AM  End Time:  10:25 AM     Medication Therapy Recommendations  No medication therapy recommendations to display           Again, thank you for allowing me to participate in the care of your patient.      Sincerely,    Golden Cadena RPH

## 2024-05-08 ENCOUNTER — RADIOLOGY INJECTION OFFICE VISIT (OUTPATIENT)
Dept: GENERAL RADIOLOGY | Facility: CLINIC | Age: 64
End: 2024-05-08
Attending: FAMILY MEDICINE
Payer: COMMERCIAL

## 2024-05-08 ENCOUNTER — TELEPHONE (OUTPATIENT)
Dept: FAMILY MEDICINE | Facility: CLINIC | Age: 64
End: 2024-05-08

## 2024-05-08 VITALS — HEART RATE: 63 BPM | OXYGEN SATURATION: 98 % | SYSTOLIC BLOOD PRESSURE: 133 MMHG | DIASTOLIC BLOOD PRESSURE: 85 MMHG

## 2024-05-08 DIAGNOSIS — M54.59 LUMBAR FACET JOINT PAIN: ICD-10-CM

## 2024-05-08 DIAGNOSIS — M47.816 SPONDYLOSIS OF LUMBAR REGION WITHOUT MYELOPATHY OR RADICULOPATHY: ICD-10-CM

## 2024-05-08 PROCEDURE — 64494 INJ PARAVERT F JNT L/S 2 LEV: CPT | Mod: 50

## 2024-05-08 PROCEDURE — 64494 INJ PARAVERT F JNT L/S 2 LEV: CPT | Mod: 50 | Performed by: PAIN MEDICINE

## 2024-05-08 PROCEDURE — 64493 INJ PARAVERT F JNT L/S 1 LEV: CPT | Mod: 50 | Performed by: PAIN MEDICINE

## 2024-05-08 RX ORDER — IOPAMIDOL 408 MG/ML
10 INJECTION, SOLUTION INTRATHECAL ONCE
Status: COMPLETED | OUTPATIENT
Start: 2024-05-08 | End: 2024-05-08

## 2024-05-08 ASSESSMENT — PAIN SCALES - GENERAL
PAINLEVEL: EXTREME PAIN (9)
PAINLEVEL: NO PAIN (0)

## 2024-05-08 NOTE — NURSING NOTE
Pre-procedure Intake  If YES to any questions or NO to having a   Please complete laminated checklist and leave on the computer keyboard for Provider, verbally inform provider if able.    For SCS Trial, RFA's or any sedation procedure:  Have you been fasting? NA  If yes, for how long?     Are you taking any any blood thinners such as Coumadin, Warfarin, Jantoven, Pradaxa Xarelto, Eliquis, Edoxaban, Enoxaparin, Lovenox, Heparin, Arixtra, Fondaparinux, or Fragmin? OR Antiplatelet medication such as Plavix, Brilinta, or Effient?   No   If yes, when did you take your last dose?     Do you take aspirin?  No  If cervical procedure, have you held aspirin for 6 days?   NA    Do you have any allergies to contrast dye, iodine, steroid and/or numbing medications?  NO    Are you currently taking antibiotics or have an active infection?  YES: minocycline (MINOCIN) 50 MG capsule     Have you had a fever/elevated temperature within the past week? NO    Are you currently taking oral steroids? YES: predniSONE (DELTASONE) 10 MG tablet     Do you have a ? Yes    Are you pregnant or breastfeeding?  Not Applicable    Have you received the COVID-19 vaccine? Yes  If yes, was it your 1st, 2nd or only dose needed?   Date of most recent vaccine: 03/05/24    Notify provider and RNs if systolic BP >170, diastolic BP >100, P >100 or O2 sats < 90%      Brittnee Israel MA  [facility]

## 2024-05-08 NOTE — NURSING NOTE
Discharge Information    IV Discontiued Time:  NA    Amount of Fluid Infused:  NA    Discharge Criteria = When patient returns to baseline or as per MD order    Consciousness:  Pt is fully awake    Circulation:  BP +/- 20% of pre-procedure level    Respiration:  Patient is able to breathe deeply    O2 Sat:  Patient is able to maintain O2 Sat >92% on room air    Activity:  Moves 4 extremities on command    Ambulation:  Patient is able to stand and walk or stand and pivot into wheelchair    Dressing:  Clean/dry or No Dressing    Notes:   Discharge instructions and AVS given to patient    Patient meets criteria for discharge?  YES    Admitted to PCU?  No    Responsible adult present to accompany patient home?  Yes    Signature/Title:    Lauren Schmitz RN  RN Care Coordinator  [facility] [place]

## 2024-05-08 NOTE — TELEPHONE ENCOUNTER
Called and spoke with patient about mychart conversation.  She requested to get her labs drawn when she comes to fill out the REED for labs and US study.  States she still has not heard from the imaging center.   I gave her he number to call for scheduling the US.   I mentioned that it looks like the imaging was entered as a referral and not an imaging order so may have not been received by scheduling to reach out to her.   If she would like a copy of those images she will need to let the imaging center know so they can put them on a disc for her.   She would also like report sent to Kidney specialist.     Patient has an appointment tomorrow with lab and coming to fill out a REED for recent studies to go to her kidney specialist   Informed her to reach out to us if there are any other issues with scheduling this US.

## 2024-05-08 NOTE — PROGRESS NOTES
Pre procedure Diagnosis:lumbosacral spondylosis   Post procedure Diagnosis: Same  Procedure performed: bilateral L3,4,5 medial branch block  Indication:  Diagnostic   Anesthesia: none  Complications: none  Operators: José Miguel Brownlee MD   Indications:   Jacquie Amador is a 63 year old female. The patient has a history of axial lbp.  Exam shows +++ and pain with extension/rotation, and they have tried conservative treatment including PHYSICAL THERAPY  and meds.    Options/alternatives, benefits and risks were discussed with the patient including but not limited to bleeding, infection, tissue trauma, exposure to radiation, reaction to medications, spinal cord injury, weakness, numbness and paralysis.  Questions were answered to her satisfaction and she agrees to proceed. Voluntary informed consent was obtained and signed.     Vitals were reviewed: Yes  Allergies were reviewed:  Yes   Medications were reviewed:  Yes   Pre-procedure pain score: 9/10    Procedure:  After obtaining signed informed consent, the patient was brought into the procedure suite and was placed in a prone position on the procedure table.   A Pause for the Cause was performed.  The patient was prepped and draped in the usual sterile fashion.     Under AP fluoroscopic guidance the L3, L4, L5 vertebral bodies were identified. The C-arm was rotated to the oblique view to afford optimal visualization the pedicles.  Lidocaine 1% was used to anesthetize the skin at each level.  Under intermittent fluoroscopy, 25G 3.5inch spinal needles were positioned inferior and lateral to the intersection of the transverse process and pedicle at the Bilateral L4 & L5 levels SA. The needle positions were verified and optimized from the AP view.    The anatomic targets for the L3 & L4 medial nerve and L5 dorsal ramus (which functionally incorporates the medial branch) were the  L4 & L5 transverse processes and sacral alar notch, with laterality as described above,  resulting in blockade of the L4/5 and L5/S1 facet joints.    Bupivacaine 0.25% 1 ml was injected at each location.  The needles were removed.      Hemostasis was achieved, the area was cleaned, and bandaids were placed when appropriate.  The patient tolerated the procedure well, and was taken to the recovery room.    Images were saved to PACS.     The patient will continue to monitor progress, and they were given a pain diary to complete at home.  They will either fax or mail this back to us or bring it to their next appointment. We will determine the treatment plan after we review the diary.      Post-procedure pain score: 0/10  Follow-up includes:   -will await diary for further planning.    José Miguel Brownlee MD  [facility]

## 2024-05-08 NOTE — PATIENT INSTRUCTIONS
[facility]   Medial Branch Block Discharge Instructions      Your procedure was performed by: Dr. José Miguel Brownlee       You will need to complete the Pain Scale Log form and return it to us as soon as possible.  Once we have received the form, we will review it and call you to determine the next steps.     The form can be faxed to [phone] or mailed to:   [facility]   [address]   [place] [address]    You may resume your regular medications  You may resume your regular activities  Be cautious with walking as numbness and/or weakness in the lower extremities may occur for up to 6-8 hours due to the effects of the anesthetic.  Avoid driving for 6 hours. The local anesthetic could slow your reflexes.   You may shower, however no swimming or tub baths or hot tubs for 24 hours following your procedure.  Your pain will return after the numbing medications have worn off.  You may use your current pain medications as needed.  Unless you have been directed to avoid the use of anti-inflammatory medications (NSAIDS), you may use medications such as ibuprofen, Aleve or Tylenol for pain control if needed.  Some people find it helpful to alternate ibuprofen and Tylenol every 3 hours for a couple of days.  You may use ice packs 10-15 minutes three to four times a day at the injection site for comfort.   Do not use heat to painful areas for 6 to 8 hours. This will give the local anesthetic time to wear off and prevent you from accidentally burning your skin.   If you experience any of the following, call the Pain Clinic during work hours (Monday through Friday 8 am-4:30 pm) at [phone] or the Provider Line after hours at [phone]:  -Fever over 100 degree F  -Swelling, bleeding, redness, drainage, warmth at the injection site  -Progressive weakness or numbness in your legs or arms  -If lumbar, call if you have a loss of bowel or bladder function  -If  cervical, call if you have any unusual headache that is not relieved by Tylenol  -Unusual new onset of pain that is not improving

## 2024-05-09 ENCOUNTER — MYC MEDICAL ADVICE (OUTPATIENT)
Dept: FAMILY MEDICINE | Facility: CLINIC | Age: 64
End: 2024-05-09

## 2024-05-09 DIAGNOSIS — L20.89 OTHER ATOPIC DERMATITIS: ICD-10-CM

## 2024-05-09 DIAGNOSIS — F33.1 MODERATE EPISODE OF RECURRENT MAJOR DEPRESSIVE DISORDER (H): ICD-10-CM

## 2024-05-09 DIAGNOSIS — K21.9 GASTROESOPHAGEAL REFLUX DISEASE, UNSPECIFIED WHETHER ESOPHAGITIS PRESENT: ICD-10-CM

## 2024-05-09 RX ORDER — FAMOTIDINE 40 MG/1
40 TABLET, FILM COATED ORAL DAILY
Qty: 90 TABLET | Refills: 0 | OUTPATIENT
Start: 2024-05-09

## 2024-05-09 RX ORDER — BUPROPION HYDROCHLORIDE 150 MG/1
150 TABLET ORAL EVERY MORNING
Qty: 90 TABLET | Refills: 0 | Status: SHIPPED | OUTPATIENT
Start: 2024-05-09 | End: 2024-08-04

## 2024-05-09 RX ORDER — BUPROPION HYDROCHLORIDE 300 MG/1
300 TABLET ORAL EVERY MORNING
Qty: 90 TABLET | Refills: 0 | Status: SHIPPED | OUTPATIENT
Start: 2024-05-09 | End: 2024-08-04

## 2024-05-10 ENCOUNTER — LAB (OUTPATIENT)
Dept: LAB | Facility: OTHER | Age: 64
End: 2024-05-10
Payer: COMMERCIAL

## 2024-05-10 ENCOUNTER — ANCILLARY PROCEDURE (OUTPATIENT)
Dept: ULTRASOUND IMAGING | Facility: OTHER | Age: 64
End: 2024-05-10
Attending: FAMILY MEDICINE
Payer: COMMERCIAL

## 2024-05-10 DIAGNOSIS — E87.6 HYPOKALEMIA: Primary | ICD-10-CM

## 2024-05-10 DIAGNOSIS — E87.6 HYPOKALEMIA: ICD-10-CM

## 2024-05-10 DIAGNOSIS — N18.32 STAGE 3B CHRONIC KIDNEY DISEASE (H): ICD-10-CM

## 2024-05-10 LAB
ALBUMIN UR-MCNC: NEGATIVE MG/DL
ANION GAP SERPL CALCULATED.3IONS-SCNC: 8 MMOL/L (ref 7–15)
APPEARANCE UR: CLEAR
BASOPHILS # BLD AUTO: 0 10E3/UL (ref 0–0.2)
BASOPHILS NFR BLD AUTO: 0 %
BILIRUB UR QL STRIP: NEGATIVE
BUN SERPL-MCNC: 11.3 MG/DL (ref 8–23)
CALCIUM SERPL-MCNC: 9.6 MG/DL (ref 8.8–10.2)
CHLORIDE SERPL-SCNC: 104 MMOL/L (ref 98–107)
COLOR UR AUTO: YELLOW
CREAT SERPL-MCNC: 1.08 MG/DL (ref 0.51–0.95)
CREAT UR-MCNC: 139.6 MG/DL
DEPRECATED HCO3 PLAS-SCNC: 29 MMOL/L (ref 22–29)
EGFRCR SERPLBLD CKD-EPI 2021: 57 ML/MIN/1.73M2
EOSINOPHIL # BLD AUTO: 0.2 10E3/UL (ref 0–0.7)
EOSINOPHIL NFR BLD AUTO: 3 %
ERYTHROCYTE [DISTWIDTH] IN BLOOD BY AUTOMATED COUNT: 13.6 % (ref 10–15)
GLUCOSE SERPL-MCNC: 92 MG/DL (ref 70–99)
GLUCOSE UR STRIP-MCNC: NEGATIVE MG/DL
HCT VFR BLD AUTO: 39 % (ref 35–47)
HGB BLD-MCNC: 13.1 G/DL (ref 11.7–15.7)
HGB UR QL STRIP: NEGATIVE
IMM GRANULOCYTES # BLD: 0 10E3/UL
IMM GRANULOCYTES NFR BLD: 0 %
KETONES UR STRIP-MCNC: NEGATIVE MG/DL
LEUKOCYTE ESTERASE UR QL STRIP: NEGATIVE
LYMPHOCYTES # BLD AUTO: 3.4 10E3/UL (ref 0.8–5.3)
LYMPHOCYTES NFR BLD AUTO: 47 %
MAGNESIUM SERPL-MCNC: 1.8 MG/DL (ref 1.7–2.3)
MCH RBC QN AUTO: 30.4 PG (ref 26.5–33)
MCHC RBC AUTO-ENTMCNC: 33.6 G/DL (ref 31.5–36.5)
MCV RBC AUTO: 91 FL (ref 78–100)
MICROALBUMIN UR-MCNC: <12 MG/L
MICROALBUMIN/CREAT UR: NORMAL MG/G{CREAT}
MONOCYTES # BLD AUTO: 0.6 10E3/UL (ref 0–1.3)
MONOCYTES NFR BLD AUTO: 8 %
NEUTROPHILS # BLD AUTO: 3 10E3/UL (ref 1.6–8.3)
NEUTROPHILS NFR BLD AUTO: 42 %
NITRATE UR QL: NEGATIVE
PH UR STRIP: 6 [PH] (ref 5–7)
PLATELET # BLD AUTO: 282 10E3/UL (ref 150–450)
POTASSIUM SERPL-SCNC: 3.3 MMOL/L (ref 3.4–5.3)
RBC # BLD AUTO: 4.31 10E6/UL (ref 3.8–5.2)
SODIUM SERPL-SCNC: 141 MMOL/L (ref 135–145)
SP GR UR STRIP: 1.02 (ref 1–1.03)
UROBILINOGEN UR STRIP-ACNC: 0.2 E.U./DL
WBC # BLD AUTO: 7.3 10E3/UL (ref 4–11)

## 2024-05-10 PROCEDURE — 81003 URINALYSIS AUTO W/O SCOPE: CPT

## 2024-05-10 PROCEDURE — 36415 COLL VENOUS BLD VENIPUNCTURE: CPT

## 2024-05-10 PROCEDURE — 85025 COMPLETE CBC W/AUTO DIFF WBC: CPT

## 2024-05-10 PROCEDURE — 82570 ASSAY OF URINE CREATININE: CPT

## 2024-05-10 PROCEDURE — 80048 BASIC METABOLIC PNL TOTAL CA: CPT

## 2024-05-10 PROCEDURE — 76770 US EXAM ABDO BACK WALL COMP: CPT | Mod: TC | Performed by: RADIOLOGY

## 2024-05-10 PROCEDURE — 82043 UR ALBUMIN QUANTITATIVE: CPT

## 2024-05-10 PROCEDURE — 83735 ASSAY OF MAGNESIUM: CPT

## 2024-05-10 RX ORDER — POTASSIUM CHLORIDE 1500 MG/1
20 TABLET, EXTENDED RELEASE ORAL DAILY
Qty: 1 TABLET | Refills: 0 | Status: SHIPPED | OUTPATIENT
Start: 2024-05-10 | End: 2024-05-13

## 2024-05-12 ENCOUNTER — MYC MEDICAL ADVICE (OUTPATIENT)
Dept: FAMILY MEDICINE | Facility: CLINIC | Age: 64
End: 2024-05-12
Payer: COMMERCIAL

## 2024-05-12 DIAGNOSIS — E87.6 HYPOKALEMIA: ICD-10-CM

## 2024-05-12 DIAGNOSIS — K21.9 GASTROESOPHAGEAL REFLUX DISEASE, UNSPECIFIED WHETHER ESOPHAGITIS PRESENT: ICD-10-CM

## 2024-05-13 RX ORDER — FAMOTIDINE 40 MG/1
40 TABLET, FILM COATED ORAL DAILY
Qty: 90 TABLET | Refills: 1 | Status: SHIPPED | OUTPATIENT
Start: 2024-05-13

## 2024-05-13 RX ORDER — POTASSIUM CHLORIDE 1500 MG/1
20 TABLET, EXTENDED RELEASE ORAL DAILY
Qty: 1 TABLET | Refills: 0 | Status: SHIPPED | OUTPATIENT
Start: 2024-05-13 | End: 2024-06-27

## 2024-05-13 NOTE — TELEPHONE ENCOUNTER
Rx pended for provider to sign, requesting new pharmacy.    Please advise on mag use and additional lab requested per patient.    Sejal Iraheta RN, BSN

## 2024-05-14 ENCOUNTER — MYC MEDICAL ADVICE (OUTPATIENT)
Dept: PALLIATIVE MEDICINE | Facility: CLINIC | Age: 64
End: 2024-05-14

## 2024-05-14 NOTE — PATIENT INSTRUCTIONS
"Recommendations from today's MTM visit:                                                       Continue Otezla 30 mg twice daily.     Get pain block as scheduled on 5/8/24.     Schedule follow-up rheumatology appointment.    Phone: [phone]    Follow-up: with MTM pharmacist on 6/17/24.     It was great speaking with you today.  I value your experience and would be very thankful for your time in providing feedback in our clinic survey. In the next few days, you may receive an email or text message from [employer] with a link to a survey related to your  clinical pharmacist.\"     To schedule another MTM appointment, please call the clinic directly or you may call the MTM scheduling line at [phone].    My Clinical Pharmacist's contact information:                                                      Please feel free to contact me with any questions or concerns you have.      Golden Cadena, PharmD  Medication Therapy Management Pharmacist  [facility]  Phone: [phone]    "

## 2024-05-15 ENCOUNTER — MYC MEDICAL ADVICE (OUTPATIENT)
Dept: RHEUMATOLOGY | Facility: CLINIC | Age: 64
End: 2024-05-15
Payer: COMMERCIAL

## 2024-05-15 NOTE — TELEPHONE ENCOUNTER
Pt had a bilateralLumbar  medial branch block # 1 on 5/8/24.  The post medial branch block form was received.    Called pt to gather more information. She says she did have temporary significant relief on the day of the 5/8/24 and would like to proceed to block #2.  Max relief from block is:    Per pt verbal report she had  significant on the day of the block. (%)  Physical therapy:    Last done in?:  none in epic. Pt reports she had done PT in [place].   If pt has not done PT does it needs to be ordered/started in order to have the radiofrequency ablation? TBD.    Routed to Chantal to review. Does pt had enough relief insurance-wise to proceed to medial branch block #2?  If so please schedule.      Denisse Maldonado, RN-BSN  [facility]

## 2024-05-15 NOTE — TELEPHONE ENCOUNTER
Called patient.  She verbalized frustration about her appointments being canceled and the delay of being seen.  Listened to patient actively and offered her to see Dr. Hawkins on 6/18/24.      She appreciated the earlier appointment.      Lauren Levine RN

## 2024-05-15 NOTE — TELEPHONE ENCOUNTER
**12/8/2021 Per [facility] if there the procedure is NOT on the prior authorization list or investigative list, there are no specific conditions**        OKAY TO SCHEDULE LMBB #2        Chantal Saba   [facility]

## 2024-05-16 NOTE — CONFIDENTIAL NOTE
NOTES Status Details   OFFICE NOTE from referring provider     OFFICE NOTE from other specialist Internal 11.21.2023 Guillermo Toribio MD    DISCHARGE SUMMARY from hospital     DISCHARGE REPORT from the ER     MEDICATION LIST Internal    LABS (Any and all labs)      Internal    Biopsy/pathology (Anything related to diagnoses I.e. fluid aspirations, lip biopsy, muscle biopsy)               Imaging (All imaging related to diagnoses)     Echo     HRCT     CXR Internal 05.09.2023 XR Chest 2 Views    EMG                    Scleroderma/Dermatomyositis diagnoses     Previous Cardiology notes      Previous Pulmonary notes     Previous Dermatology notes     Previous GI notes     Lupus diagnoses     Previous Nephrology notes     Previous Dermatology notes     Previous Cardiology notes

## 2024-05-17 ENCOUNTER — MYC MEDICAL ADVICE (OUTPATIENT)
Dept: FAMILY MEDICINE | Facility: CLINIC | Age: 64
End: 2024-05-17
Payer: COMMERCIAL

## 2024-05-20 ENCOUNTER — MYC MEDICAL ADVICE (OUTPATIENT)
Dept: PALLIATIVE MEDICINE | Facility: CLINIC | Age: 64
End: 2024-05-20
Payer: COMMERCIAL

## 2024-05-21 NOTE — TELEPHONE ENCOUNTER
Patient has LMBB #2 scheduled on 6/6/2024 followed by a new med spine appointment on 6/10/2024.    Chart reviewed. Note that patient did not feel that facet joint injections were helpful previously.     Contacted patient per patient request.   Patient identifying that she is confused regarding appointment plan as she identifies that she has her second block on 6/6/2024 and anticipates going forward with RFA and does not see need for 6/10/2024 appointment.  Note that following second MBB patient will complete new pain diary and following results of this insurance will be contacted for approval to proceed to RFA.  Note that coverage / continuation of process is not guaranteed.   Patient identifies that she would like to proceed with cancelling of 6/10/2024 appointment. Cancellation of that appointment completed.     Wendy Underwood RN  [facility]  [phone]

## 2024-05-22 ENCOUNTER — MYC MEDICAL ADVICE (OUTPATIENT)
Dept: FAMILY MEDICINE | Facility: CLINIC | Age: 64
End: 2024-05-22

## 2024-05-24 ENCOUNTER — TRANSFERRED RECORDS (OUTPATIENT)
Dept: HEALTH INFORMATION MANAGEMENT | Facility: CLINIC | Age: 64
End: 2024-05-24

## 2024-05-29 ENCOUNTER — OFFICE VISIT (OUTPATIENT)
Dept: FAMILY MEDICINE | Facility: CLINIC | Age: 64
End: 2024-05-29
Attending: FAMILY MEDICINE
Payer: COMMERCIAL

## 2024-05-29 ENCOUNTER — TELEPHONE (OUTPATIENT)
Dept: FAMILY MEDICINE | Facility: CLINIC | Age: 64
End: 2024-05-29

## 2024-05-29 ENCOUNTER — LAB (OUTPATIENT)
Dept: LAB | Facility: CLINIC | Age: 64
End: 2024-05-29
Payer: COMMERCIAL

## 2024-05-29 VITALS
WEIGHT: 189.7 LBS | RESPIRATION RATE: 20 BRPM | OXYGEN SATURATION: 100 % | SYSTOLIC BLOOD PRESSURE: 134 MMHG | TEMPERATURE: 97 F | HEART RATE: 66 BPM | BODY MASS INDEX: 31.09 KG/M2 | DIASTOLIC BLOOD PRESSURE: 78 MMHG

## 2024-05-29 DIAGNOSIS — N18.31 CHRONIC KIDNEY DISEASE, STAGE 3A (H): Primary | ICD-10-CM

## 2024-05-29 DIAGNOSIS — G25.9 MOVEMENT DISORDER: ICD-10-CM

## 2024-05-29 DIAGNOSIS — Z12.31 ENCOUNTER FOR SCREENING MAMMOGRAM FOR BREAST CANCER: Primary | ICD-10-CM

## 2024-05-29 DIAGNOSIS — E87.6 HYPOKALEMIA: ICD-10-CM

## 2024-05-29 DIAGNOSIS — B37.0 THRUSH: ICD-10-CM

## 2024-05-29 PROCEDURE — 99214 OFFICE O/P EST MOD 30 MIN: CPT | Performed by: FAMILY MEDICINE

## 2024-05-29 PROCEDURE — G2211 COMPLEX E/M VISIT ADD ON: HCPCS | Performed by: FAMILY MEDICINE

## 2024-05-29 PROCEDURE — 80048 BASIC METABOLIC PNL TOTAL CA: CPT

## 2024-05-29 PROCEDURE — 36415 COLL VENOUS BLD VENIPUNCTURE: CPT

## 2024-05-29 RX ORDER — ROPINIROLE 1 MG/1
3 TABLET, FILM COATED ORAL AT BEDTIME
Qty: 270 TABLET | Refills: 3 | Status: SHIPPED | OUTPATIENT
Start: 2024-05-29 | End: 2024-08-13

## 2024-05-29 RX ORDER — NYSTATIN 100000/ML
SUSPENSION, ORAL (FINAL DOSE FORM) ORAL 4 TIMES DAILY
Qty: 380 ML | Refills: 1 | Status: SHIPPED | OUTPATIENT
Start: 2024-05-29 | End: 2024-06-27

## 2024-05-29 ASSESSMENT — ASTHMA QUESTIONNAIRES
ACT_TOTALSCORE: 20
QUESTION_3 LAST FOUR WEEKS HOW OFTEN DID YOUR ASTHMA SYMPTOMS (WHEEZING, COUGHING, SHORTNESS OF BREATH, CHEST TIGHTNESS OR PAIN) WAKE YOU UP AT NIGHT OR EARLIER THAN USUAL IN THE MORNING: ONCE OR TWICE
ACT_TOTALSCORE: 20
QUESTION_4 LAST FOUR WEEKS HOW OFTEN HAVE YOU USED YOUR RESCUE INHALER OR NEBULIZER MEDICATION (SUCH AS ALBUTEROL): TWO OR THREE TIMES PER WEEK
QUESTION_2 LAST FOUR WEEKS HOW OFTEN HAVE YOU HAD SHORTNESS OF BREATH: NOT AT ALL
QUESTION_1 LAST FOUR WEEKS HOW MUCH OF THE TIME DID YOUR ASTHMA KEEP YOU FROM GETTING AS MUCH DONE AT WORK, SCHOOL OR AT HOME: A LITTLE OF THE TIME
QUESTION_5 LAST FOUR WEEKS HOW WOULD YOU RATE YOUR ASTHMA CONTROL: WELL CONTROLLED

## 2024-05-29 ASSESSMENT — PATIENT HEALTH QUESTIONNAIRE - PHQ9
SUM OF ALL RESPONSES TO PHQ QUESTIONS 1-9: 5
SUM OF ALL RESPONSES TO PHQ QUESTIONS 1-9: 5
10. IF YOU CHECKED OFF ANY PROBLEMS, HOW DIFFICULT HAVE THESE PROBLEMS MADE IT FOR YOU TO DO YOUR WORK, TAKE CARE OF THINGS AT HOME, OR GET ALONG WITH OTHER PEOPLE: NOT DIFFICULT AT ALL

## 2024-05-29 ASSESSMENT — PAIN SCALES - GENERAL: PAINLEVEL: SEVERE PAIN (7)

## 2024-05-29 NOTE — PROGRESS NOTES
"  Assessment & Plan     Chronic kidney disease, stage 3a (H)  Reviewed new GFR. This is now at 46.   She reports remaining off Celebrex and other NSAIDS  blood pressure controlled.   Recent work up with albumin and ultrasound ok.   Discussed potential for doing an Econsult. She understands there may be a charge.     Looking for advice for other recommended medication changes, further workup, consideration of SGLT2 inhibitor prior to evaluation later this year.       Thrush  Mild. History of. Refilled nystatin.   - nystatin (MYCOSTATIN) 873592 UNIT/ML suspension; Take by mouth 4 times daily Has recurrent thrush. Uses for 2 weeks at a time as needed.    Movement disorder  Requip is controlling this overall.   Refills given.   - rOPINIRole (REQUIP) 1 MG tablet; Take 3 tablets (3 mg) by mouth at bedtime                  The longitudinal plan of care for the diagnosis(es)/condition(s) as documented were addressed during this visit. Due to the added complexity in care, I will continue to support Jcaquie in the subsequent management and with ongoing continuity of care.      Subjective   Jacquie is a 63 year old, presenting for the following health issues:  Follow Up        5/29/2024     1:36 PM   Additional Questions   Roomed by Maryellen EMMANUEL   Accompanied by None         5/29/2024     1:36 PM   Patient Reported Additional Medications   Patient reports taking the following new medications NA     Pt reports \"just wanting to chat and touch base\"     History of Present Illness       Reason for visit:  Just checking in    She eats 0-1 servings of fruits and vegetables daily.She consumes 1 sweetened beverage(s) daily.She exercises with enough effort to increase her heart rate 9 or less minutes per day.  She exercises with enough effort to increase her heart rate 3 or less days per week.   She is taking medications regularly.      RFA on June 6th with Dr. Brownlee. Had some relief with the test dose. Tramadol helps some also. Misses " taking her celebrex.     July 1 and 3 having eye surgery. Cataract with lens implants.             Chronic Kidney Disease Follow-up    Do you take any over the counter pain medicine?: Yes  What over the counter medicine are you taking for your pain?:  Tylenol   Patient is here to follow-up on CKD as well.  Patient has history of  high blood pressure.  Most recent blood pressures have shown good control with losartan 100 mg daily, h she is on multiple other medications that have been added due to her 25 mgydrochlorothiazide 25 mg daily, metoprolol  milligrams twice a day with an additional 25 mg up to TID.  Platelet disorder, chronic skin concerns with the Behcets diagnosed.  She has had issues with chronic back pain, chronic joint discomfort related to osteoarthritis.  She is seeing pain management and working on injections for her back and is scheduled for RFA for her low back.  She was previously on Celebrex and this was discontinued a few months back.  She is having trouble with controlling pain and has been using tramadol.    She over time is seeing an increase in her creatinine as well with a decrease in her GFR.  On May 8 her creatinine was much improved at 1.08 with her GFR improved to 57 but labs done at this time show again and a GFR 46 and creatinine of 1.31.  She reports she has been working hard on remaining well-hydrated.  Most recent labs were drawn in the afternoon.      Microalbumin is normal.  Renal ultrasound was normal.  Nephrology consult scheduled for October.  Patient is concerned regarding renal function.              Objective    /78   Pulse 66   Temp 97  F (36.1  C) (Temporal)   Resp 20   Wt 86 kg (189 lb 11.2 oz)   LMP 07/17/2009 (Exact Date)   SpO2 100%   BMI 31.09 kg/m    Body mass index is 31.09 kg/m .  Physical Exam   GENERAL: alert and no distress  HENT: mild erythema and white coating around gum and buccal mucosa  NECK: no adenopathy, no asymmetry, masses, or  scars  RESP: lungs clear to auscultation - no rales, rhonchi or wheezes  CV: regular rate and rhythm, normal S1 S2, no S3 or S4, no murmur, click or rub, no peripheral edema  ABDOMEN: soft, nontender, no hepatosplenomegaly, no masses and bowel sounds normal  MS: no gross musculoskeletal defects noted, no edema  SKIN: no change to skin lesions.             Signed Electronically by: Liz Peterson MD

## 2024-05-30 ENCOUNTER — MYC MEDICAL ADVICE (OUTPATIENT)
Dept: PALLIATIVE MEDICINE | Facility: CLINIC | Age: 64
End: 2024-05-30

## 2024-05-30 ENCOUNTER — MYC MEDICAL ADVICE (OUTPATIENT)
Dept: DERMATOLOGY | Facility: CLINIC | Age: 64
End: 2024-05-30

## 2024-05-30 ENCOUNTER — TELEPHONE (OUTPATIENT)
Dept: FAMILY MEDICINE | Facility: CLINIC | Age: 64
End: 2024-05-30

## 2024-05-30 ENCOUNTER — MYC MEDICAL ADVICE (OUTPATIENT)
Dept: FAMILY MEDICINE | Facility: CLINIC | Age: 64
End: 2024-05-30

## 2024-05-30 ENCOUNTER — ORDERS ONLY (AUTO-RELEASED) (OUTPATIENT)
Dept: FAMILY MEDICINE | Facility: CLINIC | Age: 64
End: 2024-05-30

## 2024-05-30 ENCOUNTER — OFFICE VISIT (OUTPATIENT)
Dept: DERMATOLOGY | Facility: CLINIC | Age: 64
End: 2024-05-30
Payer: COMMERCIAL

## 2024-05-30 DIAGNOSIS — F41.9 ANXIETY: Primary | ICD-10-CM

## 2024-05-30 DIAGNOSIS — Z12.11 SCREEN FOR COLON CANCER: ICD-10-CM

## 2024-05-30 DIAGNOSIS — L20.81 ATOPIC NEURODERMATITIS: ICD-10-CM

## 2024-05-30 DIAGNOSIS — Z12.11 SCREEN FOR COLON CANCER: Primary | ICD-10-CM

## 2024-05-30 DIAGNOSIS — N18.31 CHRONIC KIDNEY DISEASE, STAGE 3A (H): Primary | ICD-10-CM

## 2024-05-30 DIAGNOSIS — L70.0 ACNE VULGARIS: Primary | ICD-10-CM

## 2024-05-30 LAB
ANION GAP SERPL CALCULATED.3IONS-SCNC: 11 MMOL/L (ref 7–15)
BUN SERPL-MCNC: 17.3 MG/DL (ref 8–23)
CALCIUM SERPL-MCNC: 9.2 MG/DL (ref 8.8–10.2)
CHLORIDE SERPL-SCNC: 105 MMOL/L (ref 98–107)
CREAT SERPL-MCNC: 1.31 MG/DL (ref 0.51–0.95)
DEPRECATED HCO3 PLAS-SCNC: 25 MMOL/L (ref 22–29)
EGFRCR SERPLBLD CKD-EPI 2021: 46 ML/MIN/1.73M2
GLUCOSE SERPL-MCNC: 107 MG/DL (ref 70–99)
POTASSIUM SERPL-SCNC: 3.7 MMOL/L (ref 3.4–5.3)
SODIUM SERPL-SCNC: 141 MMOL/L (ref 135–145)

## 2024-05-30 PROCEDURE — 99207 E-CONSULT TO NEPHROLOGY (ADULT OUTPT PROVIDER TO SPECIALIST WRITTEN QUESTION & RESPONSE): CPT | Performed by: FAMILY MEDICINE

## 2024-05-30 PROCEDURE — 99214 OFFICE O/P EST MOD 30 MIN: CPT | Performed by: DERMATOLOGY

## 2024-05-30 RX ORDER — BETAMETHASONE DIPROPIONATE 0.5 MG/G
CREAM TOPICAL 2 TIMES DAILY
Qty: 50 G | Refills: 3 | Status: SHIPPED | OUTPATIENT
Start: 2024-05-30

## 2024-05-30 RX ORDER — BUTORPHANOL TARTRATE 10 MG/ML
1 SPRAY NASAL EVERY 4 HOURS PRN
Qty: 2.5 ML | Refills: 3 | Status: CANCELLED | OUTPATIENT
Start: 2024-05-30

## 2024-05-30 RX ORDER — PIMECROLIMUS 10 MG/G
CREAM TOPICAL 2 TIMES DAILY
Qty: 60 G | Refills: 3 | Status: SHIPPED | OUTPATIENT
Start: 2024-05-30

## 2024-05-30 RX ORDER — MUPIROCIN CALCIUM 20 MG/G
CREAM TOPICAL 3 TIMES DAILY
Qty: 30 G | Refills: 3 | Status: SHIPPED | OUTPATIENT
Start: 2024-05-30

## 2024-05-30 ASSESSMENT — PAIN SCALES - GENERAL: PAINLEVEL: SEVERE PAIN (6)

## 2024-05-30 NOTE — LETTER
5/30/2024       RE: Jacquie Amador  [address]  [facility] [address]     Dear Colleague,    Thank you for referring your patient, Jacquie Amador, to the [facility] [place] at [facility]. Please see a copy of my visit note below.    [facility] Dermatology Note  Encounter Date: May 30, 2024  Office Visit     Dermatology Problem List:  1. Multiple skin ulcers resembling prurigo/neurodermatitis  Facial erosions, healing -chronic active problem, uncontrolled but improving   - Current tx: Protopic, mupirocin, betamethasone ointment, desonide, compounded amytriptyline/ketamine cream, Dupixent 300mg q2 weeks, N-acetylcystiene 600 mg daily, apremilast 30mg BID   - follow up in 3-4 months for recheck     2. Recurrent aphthous ulcers  ____________________________________________    Assessment & Plan:  Multiple skin ulcers resembling prurigo/neurodermatitis  Facial erosions, chronic active problem, uncontrolled.  But slowly improving.  Patient with a history of recurring oral and genital sores, likely recurrent aphthous ulcers, as well as multiple diffuse discrete skin ulcers. Today, Jacquie notes ongoing skin sores on face and describes that her condition has had a large emotional impact. She does endorse that she will pick at the lesions when they become painful to try to express the contents and relieve the pain. She did not start the amitriptyline/ketamine cream after last visit. Discussed continue current regimen and restarting the compound cream. Reviewed dermatologic medications and discussed that they are unlikely to be contributing to elevated creatinine. Discussed contribution of anxiety to condition and patient expressed interest in psychiatry referral for further evaluation.   - Gentle skin care regimen  - Elidel cream bid (patient prefers a cream to Protopic ointment)  - Betamethasone ointment and desonide cream  as needed for persistent skin sores and pain  - Can restart amytriptyline/ketamine cream to apply twice daily to areas of sores as desired  - Dupixent 300mg q2 weeks and is tolerating this without problems   - N-acetylcystiene 600 mg bid  - Psych referral for anxiety/depression     # Recurrent Apthous ulcers   Currently overall improved from prior; one ulcer present today but have otherwise been infrequent     Procedures Performed:   None    Follow-up: 6 month(s) in-person, or earlier for new or changing lesions    Staff and Medical Student:     Ochoa Nuñez, M3    I was present with the medical student who participated in the service and in the documentation.  I have verified the history and personally performed the physical exam and medical decision making.  I agree with the assessment and plan of care as documented in the note.      Jay Warren MD  Dermatology Attending      ____________________________________________    CC: Derm Problem (Follow up- needs to talk about Otezla, mouth sores, facial sores. Has questions about R leg being darker than left. Wants to talk about Dupixent)    HPI:  Ms. Jacquie Amador is a(n) 63 year old female who presents today as a return patient for followup on neurodermatitis and persistent facial sores.     Patient is tearful today describing the impact that her condition has had on her life and confidence. She reports that she does not feel that her current medications are helping. She states that she is using her medication as prescribed, though did not yet restart the amitriptyline/ketamine compound cream. She does endorse a new mouth ulcer today, though feels that overall her mouth ulcers have been much less frequent recently. She wonders if her new ulcer could be related to stress. She and her  both agree that stress, anxiety, and depression could be contributing to her condition. Today, patient admits that she will pick her  lesions when they are first forming and  are painful in order to try to express contents from the lesions.     She reports she has had elevated creatinine for the past 9 months and is worried that her dermatologic medications could be contributing.     Patient is otherwise feeling well, without additional skin concerns.    Labs:  Reviewed creatinine trend from past year.     Physical Exam:  Vitals: LMP 07/17/2009 (Exact Date)   SKIN: Focused examination of face, arms, mouth was performed.  - Apthous ulcer on interior left cheek and gum with yellow base   - Multiple excoriated ulcerations on upper back with hemorrhagic crust  - Multiple ulcerations on face with hemorrhagic crusting   - Bilateral lower extremities with diffuse, very dark pigmentation that appears to correspond to sun-exposure   - No other lesions of concern on areas examined.     Medications:  Current Outpatient Medications   Medication Sig Dispense Refill    acetaminophen (TYLENOL) 500 MG tablet Take 500-1,000 mg by mouth every 6 hours as needed for mild pain      acetylcysteine (N-ACETYL-L-CYSTEINE) 600 MG CAPS capsule Take 1 capsule (600 mg) by mouth 2 times daily 180 capsule 2    albuterol (PROVENTIL) (2.5 MG/3ML) 0.083% neb solution Take 1 vial (2.5 mg) by nebulization every 6 hours as needed for shortness of breath or wheezing 3 mL 4    albuterol (VENTOLIN HFA) 108 (90 Base) MCG/ACT inhaler INHALE 2 PUFFS INTO THE LUNGS EVERY 6 HOURS AS NEEDED FOR SHORTNESS OF BREATH / DYSPNEA 54 g 1    Apremilast (OTEZLA) 10 & 20 & 30 MG TBPK Take by mouth according to the instructions on the packet. Hold for signs of infection,any GI upset, weight loss or depression concerns, and seek medical attention. 1 each 0    apremilast (OTEZLA) 30 MG tablet Take 1 tablet (30 mg) by mouth 2 times daily Hold for signs of infection, and seek medical attention. 60 tablet 5    atorvastatin (LIPITOR) 20 MG tablet Take 1 tablet (20 mg) by mouth daily 90 tablet 1    augmented betamethasone dipropionate (DIPROLENE AF)  0.05 % external cream Apply to affected area twice daily 50 g 2    benzonatate (TESSALON) 200 MG capsule Take 1 capsule (200 mg) by mouth 2 times daily as needed for cough 40 capsule 1    betamethasone dipropionate (DIPROSONE) 0.05 % external ointment Apply topically 2 times daily To areas of skin sores.  Can also use on mouth sores as needed twice daily. 50 g 3    buPROPion (WELLBUTRIN XL) 150 MG 24 hr tablet Take 1 tablet (150 mg) by mouth every morning To take with the 300 mg dose for a total of 450 mg daily. 90 tablet 0    buPROPion (WELLBUTRIN XL) 300 MG 24 hr tablet Take 1 tablet (300 mg) by mouth every morning To take with the 150 mg dose for a total of 450 mg daily. 90 tablet 0    celecoxib (CELEBREX) 200 MG capsule TAKE ONE CAPSULE BY MOUTH TWICE A DAY AS NEEDED FOR PAIN Strength: 200 mg 180 capsule 1    chlorhexidine (PERIDEX) 0.12 % solution Swish and spit 15 mLs in mouth 2 times daily 1893 mL 4    clindamycin (CLINDAMAX) 1 % external gel Apply topically 2 times daily 30 g 4    clotrimazole (MYCELEX) 10 MG lozenge Place 1 lozenge (10 mg) inside cheek 5 times daily 70 lozenge 1    colchicine (COLCRYS) 0.6 MG tablet Take 1 tablet (0.6 mg) by mouth 2 times daily 180 tablet 1    desonide (DESOWEN) 0.05 % external cream Apply topically 2 times daily To sores on face 60 g 3    dextran 70-hypromellose (TEARS NATURALE FREE PF) 0.1-0.3 % ophthalmic solution Place 2 drops into both eyes daily as needed (for dry eyes) 35 each 3    DULoxetine (CYMBALTA) 60 MG capsule Take 2 capsules (120 mg) by mouth daily 180 capsule 1    dupilumab (DUPIXENT) 300 MG/2ML prefilled pen Inject 2 mLs (300 mg) Subcutaneous every 14 days After first loading dose 4 mL 1    famotidine (PEPCID) 40 MG tablet Take 1 tablet (40 mg) by mouth daily 90 tablet 1    fluconazole (DIFLUCAN) 150 MG tablet Take 1 tablet (150 mg) by mouth every 3 days 6 tablet 0    fluocinonide (LIDEX) 0.05 % external gel Apply topically 2 times daily as needed 15 g 1     hydrochlorothiazide (HYDRODIURIL) 25 MG tablet Take 1 tablet (25 mg) by mouth daily 90 tablet 1    hydrocortisone 2.5 % cream APPLY TOPICALLY 2 TIMES DAILY AS NEEDED 30 g 3    lidocaine, viscous, (XYLOCAINE) 2 % solution Swish and spit 10ml every 3 hours as needed for oral pain; max 8 doses/24hrs.  Do not eat or chew gum for 60 minutes following use. 100 mL 1    losartan (COZAAR) 100 MG tablet Take 1 tablet (100 mg) by mouth daily 90 tablet 1    metoprolol succinate ER (TOPROL XL) 100 MG 24 hr tablet Take 1 tablet (100 mg) by mouth 2 times daily 180 tablet 1    metoprolol succinate ER (TOPROL XL) 25 MG 24 hr tablet Take 1 tablet (25 mg) by mouth 3 times daily 270 tablet 1    minocycline (MINOCIN) 50 MG capsule Take 1 capsule (50 mg) by mouth 2 times daily After meals and with big glass of water 60 capsule 3    mometasone-formoterol (DULERA) 200-5 MCG/ACT inhaler Inhale 2 puffs into the lungs 2 times daily 39 g 1    montelukast (SINGULAIR) 10 MG tablet Take 1 tablet (10 mg) by mouth at bedtime 90 tablet 3    mupirocin (BACTROBAN) 2 % external cream Apply to affected area three times daily 60 g 1    nystatin (MYCOSTATIN) 648288 UNIT/ML suspension Take by mouth 4 times daily Has recurrent thrush. Uses for 2 weeks at a time as needed. 380 mL 1    ondansetron (ZOFRAN ODT) 4 MG ODT tab Take 1 tablet (4 mg) by mouth every 8 hours as needed for nausea 20 tablet 0    oxyBUTYnin ER (DITROPAN XL) 5 MG 24 hr tablet Take 2 tablets (10 mg) by mouth daily 180 tablet 2    pimecrolimus (ELIDEL) 1 % external cream Apply topically 2 times daily 60 g 3    potassium chloride yuli ER (KLOR-CON M20) 20 MEQ CR tablet Take 1 tablet (20 mEq) by mouth daily 1 tablet 0    predniSONE (DELTASONE) 10 MG tablet Take 1 tablet (10 mg) by mouth daily 90 tablet 3    rizatriptan (MAXALT) 10 MG tablet Take 1 tablet (10 mg) by mouth at onset of headache for migraine May repeat in 2 hours. Max 3 tablets/24 hours. 18 tablet 1    rOPINIRole (REQUIP) 1 MG  tablet Take 3 tablets (3 mg) by mouth at bedtime 270 tablet 3    SUMAtriptan (IMITREX) 100 MG tablet take 1 tablet at onset of headache may repeat in 2 hours max 2 tabs/day 18 tablet 1    tacrolimus (PROTOPIC) 0.1 % external ointment Apply topically 2 times daily To areas of sores on face 60 g 1    tiotropium (SPIRIVA RESPIMAT) 2.5 MCG/ACT inhaler Inhale 2 puffs into the lungs daily 12 g 3    traMADol (ULTRAM) 50 MG tablet Take 1 tablet (50 mg) by mouth every 12 hours as needed for moderate to severe pain 60 tablet 0    traZODone (DESYREL) 50 MG tablet Take 3 tablets (150 mg) by mouth at bedtime 270 tablet 1    triamcinolone (ARISTOCORT HP) 0.5 % external cream Apply topically 2 times daily 60 g 0    valACYclovir (VALTREX) 500 MG tablet Take 1 tablet (500 mg) by mouth daily 90 tablet 1     No current facility-administered medications for this visit.      Past Medical History:   Patient Active Problem List   Diagnosis    Moderate persistent asthma without complication    Allergic rhinitis due to other allergen    Esophageal reflux    Moderate recurrent major depression (H)    Multiple joint pain    HYPERLIPIDEMIA LDL GOAL <130    Hypertension goal BP (blood pressure) < 140/90    Generalized anxiety disorder    Myalgia    Chronic rhinitis    Constipation    Lumbar disc disease with radiculopathy    Iron deficiency anemia    Osteoarthritis of carpometacarpal joint of thumb - bilateral    Trochanteric bursitis    Right ankle instability    Primary focal hyperhidrosis    Trochanteric bursitis of both hips    Overweight    Iron deficiency    Scratching    Advanced directives, counseling/discussion    Chronic, continuous use of opioids    Medical marijuana use    Primary osteoarthritis of both first carpometacarpal joints    Arthritis of metatarsophalangeal joint    Sinus tachycardia    Intractable chronic migraine without aura and without status migrainosus    Impaired fasting glucose    Migraine without aura and without  status migrainosus, not intractable    Skin ulcer, limited to breakdown of skin (H)    Morbid obesity (H)    Platelet granule defect (H)    Aphthous ulcer    Rash    Pruritus    Behcet's syndrome involving oral mucosa (H)    Encounter for long-term (current) use of high-risk medication    Dermatitis    Encounter for long-term current use of high risk medication    Acquired platelet disorder (H)    Other atopic dermatitis    Facial eczema    Skin pain    Prurigo nodularis     Past Medical History:   Diagnosis Date    ASTHMA - MODERATE PERSISTENT 9/21/2005    Chronic pain     Coronary artery disease     CVA (cerebral infarction)     Depressive disorder, not elsewhere classified     Diabetes (H)     Elevated serum alkaline phosphatase level     Liver source    Fibromyalgia     HYPERLIPIDEMIA NEC/NOS 12/29/2006    Hypertriglyceridemia     OA (osteoarthritis)     Thyroid disease     Trochanteric bursitis     Unspecified essential hypertension         CC Liz Peterson MD  [address] [place] [address]

## 2024-05-30 NOTE — PROGRESS NOTES
[facility] Dermatology Note  Encounter Date: May 30, 2024  Office Visit     Dermatology Problem List:  1. Multiple skin ulcers resembling prurigo/neurodermatitis  Facial erosions, healing -chronic active problem, uncontrolled but improving   - Current tx: Protopic, mupirocin, betamethasone ointment, desonide, compounded amytriptyline/ketamine cream, Dupixent 300mg q2 weeks, N-acetylcystiene 600 mg daily, apremilast 30mg BID   - follow up in 3-4 months for recheck     2. Recurrent aphthous ulcers  ____________________________________________    Assessment & Plan:  Multiple skin ulcers resembling prurigo/neurodermatitis  Facial erosions, chronic active problem, uncontrolled.  But slowly improving.  Patient with a history of recurring oral and genital sores, likely recurrent aphthous ulcers, as well as multiple diffuse discrete skin ulcers. Today, Jacquie notes ongoing skin sores on face and describes that her condition has had a large emotional impact. She does endorse that she will pick at the lesions when they become painful to try to express the contents and relieve the pain. She did not start the amitriptyline/ketamine cream after last visit. Discussed continue current regimen and restarting the compound cream. Reviewed dermatologic medications and discussed that they are unlikely to be contributing to elevated creatinine. Discussed contribution of anxiety to condition and patient expressed interest in psychiatry referral for further evaluation.   - Gentle skin care regimen  - Elidel cream bid (patient prefers a cream to Protopic ointment)  - Betamethasone ointment and desonide cream as needed for persistent skin sores and pain  - Can restart amytriptyline/ketamine cream to apply twice daily to areas of sores as desired  - Dupixent 300mg q2 weeks and is tolerating this without problems   - N-acetylcystiene 600 mg bid  - Psych referral for anxiety/depression     # Recurrent Apthous ulcers    Currently overall improved from prior; one ulcer present today but have otherwise been infrequent     Procedures Performed:   None    Follow-up: 6 month(s) in-person, or earlier for new or changing lesions    Staff and Medical Student:     Ochoa Nuñez, M3    I was present with the medical student who participated in the service and in the documentation.  I have verified the history and personally performed the physical exam and medical decision making.  I agree with the assessment and plan of care as documented in the note.      Jay Warren MD  Dermatology Attending      ____________________________________________    CC: Derm Problem (Follow up- needs to talk about Otezla, mouth sores, facial sores. Has questions about R leg being darker than left. Wants to talk about Dupixent)    HPI:  Ms. Jacquie Amador is a(n) 63 year old female who presents today as a return patient for followup on neurodermatitis and persistent facial sores.     Patient is tearful today describing the impact that her condition has had on her life and confidence. She reports that she does not feel that her current medications are helping. She states that she is using her medication as prescribed, though did not yet restart the amitriptyline/ketamine compound cream. She does endorse a new mouth ulcer today, though feels that overall her mouth ulcers have been much less frequent recently. She wonders if her new ulcer could be related to stress. She and her  both agree that stress, anxiety, and depression could be contributing to her condition. Today, patient admits that she will pick her  lesions when they are first forming and are painful in order to try to express contents from the lesions.     She reports she has had elevated creatinine for the past 9 months and is worried that her dermatologic medications could be contributing.     Patient is otherwise feeling well, without additional skin concerns.    Labs:  Reviewed creatinine  trend from past year.     Physical Exam:  Vitals: LMP 07/17/2009 (Exact Date)   SKIN: Focused examination of face, arms, mouth was performed.  - Apthous ulcer on interior left cheek and gum with yellow base   - Multiple excoriated ulcerations on upper back with hemorrhagic crust  - Multiple ulcerations on face with hemorrhagic crusting   - Bilateral lower extremities with diffuse, very dark pigmentation that appears to correspond to sun-exposure   - No other lesions of concern on areas examined.     Medications:  Current Outpatient Medications   Medication Sig Dispense Refill    acetaminophen (TYLENOL) 500 MG tablet Take 500-1,000 mg by mouth every 6 hours as needed for mild pain      acetylcysteine (N-ACETYL-L-CYSTEINE) 600 MG CAPS capsule Take 1 capsule (600 mg) by mouth 2 times daily 180 capsule 2    albuterol (PROVENTIL) (2.5 MG/3ML) 0.083% neb solution Take 1 vial (2.5 mg) by nebulization every 6 hours as needed for shortness of breath or wheezing 3 mL 4    albuterol (VENTOLIN HFA) 108 (90 Base) MCG/ACT inhaler INHALE 2 PUFFS INTO THE LUNGS EVERY 6 HOURS AS NEEDED FOR SHORTNESS OF BREATH / DYSPNEA 54 g 1    Apremilast (OTEZLA) 10 & 20 & 30 MG TBPK Take by mouth according to the instructions on the packet. Hold for signs of infection,any GI upset, weight loss or depression concerns, and seek medical attention. 1 each 0    apremilast (OTEZLA) 30 MG tablet Take 1 tablet (30 mg) by mouth 2 times daily Hold for signs of infection, and seek medical attention. 60 tablet 5    atorvastatin (LIPITOR) 20 MG tablet Take 1 tablet (20 mg) by mouth daily 90 tablet 1    augmented betamethasone dipropionate (DIPROLENE AF) 0.05 % external cream Apply to affected area twice daily 50 g 2    benzonatate (TESSALON) 200 MG capsule Take 1 capsule (200 mg) by mouth 2 times daily as needed for cough 40 capsule 1    betamethasone dipropionate (DIPROSONE) 0.05 % external ointment Apply topically 2 times daily To areas of skin sores.  Can  also use on mouth sores as needed twice daily. 50 g 3    buPROPion (WELLBUTRIN XL) 150 MG 24 hr tablet Take 1 tablet (150 mg) by mouth every morning To take with the 300 mg dose for a total of 450 mg daily. 90 tablet 0    buPROPion (WELLBUTRIN XL) 300 MG 24 hr tablet Take 1 tablet (300 mg) by mouth every morning To take with the 150 mg dose for a total of 450 mg daily. 90 tablet 0    celecoxib (CELEBREX) 200 MG capsule TAKE ONE CAPSULE BY MOUTH TWICE A DAY AS NEEDED FOR PAIN Strength: 200 mg 180 capsule 1    chlorhexidine (PERIDEX) 0.12 % solution Swish and spit 15 mLs in mouth 2 times daily 1893 mL 4    clindamycin (CLINDAMAX) 1 % external gel Apply topically 2 times daily 30 g 4    clotrimazole (MYCELEX) 10 MG lozenge Place 1 lozenge (10 mg) inside cheek 5 times daily 70 lozenge 1    colchicine (COLCRYS) 0.6 MG tablet Take 1 tablet (0.6 mg) by mouth 2 times daily 180 tablet 1    desonide (DESOWEN) 0.05 % external cream Apply topically 2 times daily To sores on face 60 g 3    dextran 70-hypromellose (TEARS NATURALE FREE PF) 0.1-0.3 % ophthalmic solution Place 2 drops into both eyes daily as needed (for dry eyes) 35 each 3    DULoxetine (CYMBALTA) 60 MG capsule Take 2 capsules (120 mg) by mouth daily 180 capsule 1    dupilumab (DUPIXENT) 300 MG/2ML prefilled pen Inject 2 mLs (300 mg) Subcutaneous every 14 days After first loading dose 4 mL 1    famotidine (PEPCID) 40 MG tablet Take 1 tablet (40 mg) by mouth daily 90 tablet 1    fluconazole (DIFLUCAN) 150 MG tablet Take 1 tablet (150 mg) by mouth every 3 days 6 tablet 0    fluocinonide (LIDEX) 0.05 % external gel Apply topically 2 times daily as needed 15 g 1    hydrochlorothiazide (HYDRODIURIL) 25 MG tablet Take 1 tablet (25 mg) by mouth daily 90 tablet 1    hydrocortisone 2.5 % cream APPLY TOPICALLY 2 TIMES DAILY AS NEEDED 30 g 3    lidocaine, viscous, (XYLOCAINE) 2 % solution Swish and spit 10ml every 3 hours as needed for oral pain; max 8 doses/24hrs.  Do not eat  or chew gum for 60 minutes following use. 100 mL 1    losartan (COZAAR) 100 MG tablet Take 1 tablet (100 mg) by mouth daily 90 tablet 1    metoprolol succinate ER (TOPROL XL) 100 MG 24 hr tablet Take 1 tablet (100 mg) by mouth 2 times daily 180 tablet 1    metoprolol succinate ER (TOPROL XL) 25 MG 24 hr tablet Take 1 tablet (25 mg) by mouth 3 times daily 270 tablet 1    minocycline (MINOCIN) 50 MG capsule Take 1 capsule (50 mg) by mouth 2 times daily After meals and with big glass of water 60 capsule 3    mometasone-formoterol (DULERA) 200-5 MCG/ACT inhaler Inhale 2 puffs into the lungs 2 times daily 39 g 1    montelukast (SINGULAIR) 10 MG tablet Take 1 tablet (10 mg) by mouth at bedtime 90 tablet 3    mupirocin (BACTROBAN) 2 % external cream Apply to affected area three times daily 60 g 1    nystatin (MYCOSTATIN) 299111 UNIT/ML suspension Take by mouth 4 times daily Has recurrent thrush. Uses for 2 weeks at a time as needed. 380 mL 1    ondansetron (ZOFRAN ODT) 4 MG ODT tab Take 1 tablet (4 mg) by mouth every 8 hours as needed for nausea 20 tablet 0    oxyBUTYnin ER (DITROPAN XL) 5 MG 24 hr tablet Take 2 tablets (10 mg) by mouth daily 180 tablet 2    pimecrolimus (ELIDEL) 1 % external cream Apply topically 2 times daily 60 g 3    potassium chloride yuli ER (KLOR-CON M20) 20 MEQ CR tablet Take 1 tablet (20 mEq) by mouth daily 1 tablet 0    predniSONE (DELTASONE) 10 MG tablet Take 1 tablet (10 mg) by mouth daily 90 tablet 3    rizatriptan (MAXALT) 10 MG tablet Take 1 tablet (10 mg) by mouth at onset of headache for migraine May repeat in 2 hours. Max 3 tablets/24 hours. 18 tablet 1    rOPINIRole (REQUIP) 1 MG tablet Take 3 tablets (3 mg) by mouth at bedtime 270 tablet 3    SUMAtriptan (IMITREX) 100 MG tablet take 1 tablet at onset of headache may repeat in 2 hours max 2 tabs/day 18 tablet 1    tacrolimus (PROTOPIC) 0.1 % external ointment Apply topically 2 times daily To areas of sores on face 60 g 1    tiotropium  (SPIRIVA RESPIMAT) 2.5 MCG/ACT inhaler Inhale 2 puffs into the lungs daily 12 g 3    traMADol (ULTRAM) 50 MG tablet Take 1 tablet (50 mg) by mouth every 12 hours as needed for moderate to severe pain 60 tablet 0    traZODone (DESYREL) 50 MG tablet Take 3 tablets (150 mg) by mouth at bedtime 270 tablet 1    triamcinolone (ARISTOCORT HP) 0.5 % external cream Apply topically 2 times daily 60 g 0    valACYclovir (VALTREX) 500 MG tablet Take 1 tablet (500 mg) by mouth daily 90 tablet 1     No current facility-administered medications for this visit.      Past Medical History:   Patient Active Problem List   Diagnosis    Moderate persistent asthma without complication    Allergic rhinitis due to other allergen    Esophageal reflux    Moderate recurrent major depression (H)    Multiple joint pain    HYPERLIPIDEMIA LDL GOAL <130    Hypertension goal BP (blood pressure) < 140/90    Generalized anxiety disorder    Myalgia    Chronic rhinitis    Constipation    Lumbar disc disease with radiculopathy    Iron deficiency anemia    Osteoarthritis of carpometacarpal joint of thumb - bilateral    Trochanteric bursitis    Right ankle instability    Primary focal hyperhidrosis    Trochanteric bursitis of both hips    Overweight    Iron deficiency    Scratching    Advanced directives, counseling/discussion    Chronic, continuous use of opioids    Medical marijuana use    Primary osteoarthritis of both first carpometacarpal joints    Arthritis of metatarsophalangeal joint    Sinus tachycardia    Intractable chronic migraine without aura and without status migrainosus    Impaired fasting glucose    Migraine without aura and without status migrainosus, not intractable    Skin ulcer, limited to breakdown of skin (H)    Morbid obesity (H)    Platelet granule defect (H)    Aphthous ulcer    Rash    Pruritus    Behcet's syndrome involving oral mucosa (H)    Encounter for long-term (current) use of high-risk medication    Dermatitis     Encounter for long-term current use of high risk medication    Acquired platelet disorder (H)    Other atopic dermatitis    Facial eczema    Skin pain    Prurigo nodularis     Past Medical History:   Diagnosis Date    ASTHMA - MODERATE PERSISTENT 9/21/2005    Chronic pain     Coronary artery disease     CVA (cerebral infarction)     Depressive disorder, not elsewhere classified     Diabetes (H)     Elevated serum alkaline phosphatase level     Liver source    Fibromyalgia     HYPERLIPIDEMIA NEC/NOS 12/29/2006    Hypertriglyceridemia     OA (osteoarthritis)     Thyroid disease     Trochanteric bursitis     Unspecified essential hypertension         CC Liz Peterson MD  [address] [place] [address] on close of this encounter.

## 2024-05-30 NOTE — TELEPHONE ENCOUNTER
Is she wanting to do the once yearly FIT card as she has or is she looking for the Cologuard which is done every 3 years?  We didn't talk about this at her visit.

## 2024-05-30 NOTE — PATIENT INSTRUCTIONS
Today we discussed:   - Restart ketamine/amitriptyline compounded cream. Can apply this before bed and apply hydrocolloid bandage on top   - Your medications are unlikely to be contributing to elevated creatinine   - Referral to discuss anxiety, depression

## 2024-05-30 NOTE — NURSING NOTE
Dermatology Rooming Note    Jacquie Amador's goals for this visit include:   Chief Complaint   Patient presents with    Derm Problem     Follow up- needs to talk about Otezla, mouth sores, facial sores. Has questions about R leg being darker than left. Wants to talk about Rajat [place], [place]

## 2024-05-31 ENCOUNTER — TELEPHONE (OUTPATIENT)
Dept: DERMATOLOGY | Facility: CLINIC | Age: 64
End: 2024-05-31
Payer: COMMERCIAL

## 2024-05-31 ENCOUNTER — MYC MEDICAL ADVICE (OUTPATIENT)
Dept: FAMILY MEDICINE | Facility: CLINIC | Age: 64
End: 2024-05-31
Payer: COMMERCIAL

## 2024-05-31 DIAGNOSIS — B37.0 THRUSH: ICD-10-CM

## 2024-05-31 RX ORDER — NYSTATIN 100000/ML
500000 SUSPENSION, ORAL (FINAL DOSE FORM) ORAL 4 TIMES DAILY
Qty: 280 ML | Refills: 2 | Status: SHIPPED | OUTPATIENT
Start: 2024-05-31 | End: 2024-08-14

## 2024-05-31 NOTE — TELEPHONE ENCOUNTER
Called [facility] to see if they are able to see if other [facility] pharmacies have the nystatin oral suspension. They state all of the [facility] pharmacies are in the same boat. She states she does not know of any alternatives for this.     Called and spoke to Danna at [facility] in [place]. She states they currently have this medication in stock, and that they would be able to call and transfer the prescription from [facility].     Called patient to see if she would be willing/able to fill her nystatin there at [facility]. She states she would and she believes her insurance would allow it. She was advised to call [facility] and ask them to call [facility] to transfer the prescription. Writer provided patient with the phone number to [facility]. She had no further questions.     Renetta Arita, MARVAN, RN

## 2024-05-31 NOTE — TELEPHONE ENCOUNTER
Susan, pharmacist from [facility] in [place] called stating she needs clarification of how many mL patient is to take 4x a day.     Please call with clarifying orders to [phone].     Routed to provider for review.     Gloria Sung RN on 5/31/2024 at 3:51 PM

## 2024-05-31 NOTE — TELEPHONE ENCOUNTER
[facility] Call Center    Phone Message    May a detailed message be left on voicemail: yes     Reason for Call: Medication Question or concern regarding medication   Prescription Clarification  Name of Medication: COMPOUND CONTAINING CONTROLLED SUBSTANCE (CMPD RX) - PHARMACY TO MIX COMPOUNDED MEDICATION  Prescribing Provider: Kelvin   Pharmacy: [facility]   What on the order needs clarification? No application instructions - how often use, how much per devante, etc       Action Taken: Message routed to:  [facility] Derm    Travel Screening: Not Applicable     Date of Service:

## 2024-06-02 ENCOUNTER — E-CONSULT (OUTPATIENT)
Dept: MULTI SPECIALTY CLINIC | Facility: CLINIC | Age: 64
End: 2024-06-02
Payer: COMMERCIAL

## 2024-06-02 PROBLEM — L20.81 ATOPIC NEURODERMATITIS: Status: ACTIVE | Noted: 2023-02-04

## 2024-06-02 PROBLEM — F41.9 ANXIETY: Status: ACTIVE | Noted: 2024-06-02

## 2024-06-02 PROCEDURE — 99451 NTRPROF PH1/NTRNET/EHR 5/>: CPT | Performed by: INTERNAL MEDICINE

## 2024-06-03 NOTE — PROGRESS NOTES
6/2/2024     E-Consult has been accepted.    Interprofessional consultation requested by:  Liz Peterson MD      Clinical Question/Purpose: MY CLINICAL QUESTION IS: Looking for advice for other recommended medication changes, further workup, consideration of SGLT2 inhibitor prior to evaluation later this year.    Patient assessment and information reviewed: 64 yo female with hx of chronic pain (was on chronic celebrex dating back to 2010), hypertension, A1C of 6.1% in May 2022. Question is regarding additional workup/mgmt decisions at this time. Ultrasound normal May 2024 - maybe slight enlargement of the right kidney. Addt hx includes behcets.     Recommendations:   CKD STage 3a:  Creatinine has been at this level dating back to May 2023 - was actually higher in April and May 2024 at 1.44 and 1.61. She is without hematuria or microalbuminuria which is reassuring. Ultrasound was without obstruction. Her biggest risk for kidney disease is the longstanding celebrex use. I agree with staying away from celebrex and avoiding any NSAIDs. Continue to stay hydrated. She is not on PPI therapy which is reassuring.     Recommendations:  Continue to avoid NSAIDs  Recommend repeat labs every 3 months with UA, creatinine to assure no changes occurring.   Agree with nephrology consultation  Continue to focus on staying hydrated  Given no albuminuria, would hold on starting SGLT2 inhibitor if BP is well controlled.     Fidelia Mccauley, DO      The recommendations provided in this E-Consult are based on a review of clinical data pertinent to the clinical question presented, without a review of the patient's complete medical record or, the benefit of a comprehensive in-person or virtual patient evaluation. This consultation should not replace the clinical judgement and evaluation of the provider ordering this E-Consult. Any new clinical issues, or changes in patient status since the filing of this E-Consult will need to be  taken into account when assessing these recommendations. Please contact me if you have further questions.    My total time spent reviewing clinical information and formulating assessment was 15 minutes.        Fidelia Mccauley, DO

## 2024-06-03 NOTE — TELEPHONE ENCOUNTER
Disp Refills Start End ASHLEIGH   nystatin (MYCOSTATIN) 547590 UNIT/ML suspension 280 mL 2 5/31/2024 7/12/2024 No   Sig - Route: Take 5 mLs (500,000 Units) by mouth 4 times daily for 42 days Has recurrent thrush. Uses for 2 weeks (280 ml)  at a time as needed. - Oral   Sent to pharmacy as: Nystatin 658100 UNIT/ML Mouth/Throat Suspension (MYCOSTATIN)   Class: E-Prescribe   Order: [id number]   E-Prescribing Status: Receipt confirmed by pharmacy (5/31/2024  4:59 PM CDT)   Done closing encounter.    Liane Wharton RN  [facility] - Registered Nurse  Clinic Triage [place]   Katty 3, 2024

## 2024-06-04 DIAGNOSIS — N18.31 CHRONIC KIDNEY DISEASE, STAGE 3A (H): Primary | ICD-10-CM

## 2024-06-06 ENCOUNTER — RADIOLOGY INJECTION OFFICE VISIT (OUTPATIENT)
Dept: PALLIATIVE MEDICINE | Facility: CLINIC | Age: 64
End: 2024-06-06
Payer: COMMERCIAL

## 2024-06-06 ENCOUNTER — MYC MEDICAL ADVICE (OUTPATIENT)
Dept: PALLIATIVE MEDICINE | Facility: CLINIC | Age: 64
End: 2024-06-06

## 2024-06-06 VITALS — HEART RATE: 71 BPM | SYSTOLIC BLOOD PRESSURE: 126 MMHG | DIASTOLIC BLOOD PRESSURE: 76 MMHG | OXYGEN SATURATION: 95 %

## 2024-06-06 DIAGNOSIS — M47.816 SPONDYLOSIS OF LUMBAR REGION WITHOUT MYELOPATHY OR RADICULOPATHY: ICD-10-CM

## 2024-06-06 PROCEDURE — 64493 INJ PARAVERT F JNT L/S 1 LEV: CPT | Mod: 50 | Performed by: PAIN MEDICINE

## 2024-06-06 PROCEDURE — 64494 INJ PARAVERT F JNT L/S 2 LEV: CPT | Mod: 50 | Performed by: PAIN MEDICINE

## 2024-06-06 RX ORDER — DOXYCYCLINE 100 MG/1
100 CAPSULE ORAL 2 TIMES DAILY
Qty: 60 CAPSULE | Refills: 2 | Status: SHIPPED | OUTPATIENT
Start: 2024-06-06 | End: 2024-09-12

## 2024-06-06 ASSESSMENT — PAIN SCALES - GENERAL: PAINLEVEL: EXTREME PAIN (9)

## 2024-06-06 NOTE — NURSING NOTE
Pre-procedure Intake  If YES to any questions or NO to having a   Please complete laminated checklist and leave on the computer keyboard for Provider, verbally inform provider if able.    For SCS Trial, RFA's or any sedation procedure:  Have you been fasting? NA  If yes, for how long?     Are you taking any any blood thinners such as Coumadin, Warfarin, Jantoven, Pradaxa Xarelto, Eliquis, Edoxaban, Enoxaparin, Lovenox, Heparin, Arixtra, Fondaparinux, or Fragmin? OR Antiplatelet medication such as Plavix, Brilinta, or Effient?   No   If yes, when did you take your last dose?     Do you take aspirin?  No  If cervical procedure, have you held aspirin for 6 days?       Do you have any allergies to contrast dye, iodine, steroid and/or numbing medications?  NO    Are you currently taking antibiotics or have an active infection?  NO    Have you had a fever/elevated temperature within the past week? NO    Are you currently taking oral steroids? NO    Do you have a ? Yes    Are you pregnant or breastfeeding?  NO    Have you received the COVID-19 vaccine? Yes  If yes, was it your 1st, 2nd or only dose needed? No   Date of most recent vaccine: 3/5/24    Notify provider and RNs if systolic BP >170, diastolic BP >100, P >100 or O2 sats < 90%

## 2024-06-06 NOTE — TELEPHONE ENCOUNTER
Pt calliing to ask if she can get the doxyxyxline she requested - please call back [phone] Thank you

## 2024-06-06 NOTE — PATIENT INSTRUCTIONS
[facility]   Medial Branch Block Discharge Instructions      Your procedure was performed by: Dr. José Miguel Brownlee       Medications used include:  Lidocaine  Bupivicaine  Omnipaque  Omniscan  Ropivicaine Normal saline     You will need to complete the Pain Scale Log form and return it to us as soon as possible.  Once we have received the form, we will review it and call you to determine the next steps.     The form can be faxed to [phone] or mailed to:   [facility]   [id number]   [place] [address]    You may resume your regular medications  If you were holding your blood thinning medication, please restart taking it: N/A  You may resume your regular activities  Be cautious with walking as numbness and/or weakness in the lower extremities may occur for up to 6-8 hours due to the effects of the anesthetic.  Avoid driving for 6 hours. The local anesthetic could slow your reflexes.   You may shower, however no swimming or tub baths or hot tubs for 24 hours following your procedure.  Your pain will return after the numbing medications have worn off.  You may use your current pain medications as needed.  Unless you have been directed to avoid the use of anti-inflammatory medications (NSAIDS), you may use medications such as ibuprofen, Aleve or Tylenol for pain control if needed.  Some people find it helpful to alternate ibuprofen and Tylenol every 3 hours for a couple of days.  You may use ice packs 10-15 minutes three to four times a day at the injection site for comfort.   Do not use heat to painful areas for 6 to 8 hours. This will give the local anesthetic time to wear off and prevent you from accidentally burning your skin.   If you experience any of the following, call the Pain Clinic during work hours (Monday through Friday 8 am-4:30 pm) at [phone] or the Provider Line after hours at [phone]:  -Fever over 100 degree F  -Swelling,  bleeding, redness, drainage, warmth at the injection site  -Progressive weakness or numbness in your legs or arms  -If lumbar, call if you have a loss of bowel or bladder function  -If cervical, call if you have any unusual headache that is not relieved by Tylenol  -Unusual new onset of pain that is not improving

## 2024-06-06 NOTE — NURSING NOTE
Discharge Information    IV Discontiued Time:  NA    Amount of Fluid Infused:  NA    Discharge Criteria = When patient returns to baseline or as per MD order    Consciousness:  Pt is fully awake    Circulation:  BP +/- 20% of pre-procedure level    Respiration:  Patient is able to breathe deeply    O2 Sat:  Patient is able to maintain O2 Sat >92% on room air    Activity:  Moves 4 extremities on command    Ambulation:  Patient is able to stand and walk or stand and pivot into wheelchair    Dressing:  Clean/dry or No Dressing    Notes:   Discharge instructions and AVS given to patient    Patient meets criteria for discharge?  YES    Admitted to PCU?  No    Responsible adult present to accompany patient home?  Yes    Signature/Title:    Wendy Underwood RN  RN Care Coordinator  [facility]

## 2024-06-06 NOTE — TELEPHONE ENCOUNTER
This was noted.   Pt had the injection today.      Denisse, RN-BSN  [facility]   [phone]

## 2024-06-07 ENCOUNTER — MYC MEDICAL ADVICE (OUTPATIENT)
Dept: PALLIATIVE MEDICINE | Facility: CLINIC | Age: 64
End: 2024-06-07
Payer: COMMERCIAL

## 2024-06-07 NOTE — PROGRESS NOTES
Pre procedure Diagnosis:lumbosacral spondylosis   Post procedure Diagnosis: Same  Procedure performed: bilateral L3,4,5 medial branch block  Indication:  Diagnostic   Anesthesia: none  Complications: none  Operators: José Miguel Brownlee MD   Indications:   Jacquie Amador is a 63 year old female. The patient has a history of axial lbp.  Exam shows +++ and pain with extension/rotation, and they have tried conservative treatment including PHYSICAL THERAPY  and meds.    Options/alternatives, benefits and risks were discussed with the patient including but not limited to bleeding, infection, tissue trauma, exposure to radiation, reaction to medications, spinal cord injury, weakness, numbness and paralysis.  Questions were answered to her satisfaction and she agrees to proceed. Voluntary informed consent was obtained and signed.     Vitals were reviewed: Yes  Allergies were reviewed:  Yes   Medications were reviewed:  Yes   Pre-procedure pain score: 9\8/10    Procedure:  After obtaining signed informed consent, the patient was brought into the procedure suite and was placed in a prone position on the procedure table.   A Pause for the Cause was performed.  The patient was prepped and draped in the usual sterile fashion.     Under AP fluoroscopic guidance the L3, L4, L5 vertebral bodies were identified. The C-arm was rotated to the oblique view to afford optimal visualization the pedicles.  Lidocaine 1% was used to anesthetize the skin at each level.  Under intermittent fluoroscopy, 25G 3.5inch spinal needles were positioned inferior and lateral to the intersection of the transverse process and pedicle at the Bilateral L4 & L5 levels SA. The needle positions were verified and optimized from the AP view.    The anatomic targets for the L3 & L4 medial nerve and L5 dorsal ramus (which functionally incorporates the medial branch) were the  L4 & L5 transverse processes and sacral alar notch, with laterality as described  above, resulting in blockade of the L4/5 and L5/S1 facet joints.    Bupivacaine 0.25% 1 ml was injected at each location.  The needles were removed.      Hemostasis was achieved, the area was cleaned, and bandaids were placed when appropriate.  The patient tolerated the procedure well, and was taken to the recovery room.    Images were saved to PACS.     The patient will continue to monitor progress, and they were given a pain diary to complete at home.  They will either fax or mail this back to us or bring it to their next appointment. We will determine the treatment plan after we review the diary.      Post-procedure pain score: 0/10  Follow-up includes:   -will await diary for further planning.    José Miguel Brownlee MD  [facility]

## 2024-06-10 NOTE — TELEPHONE ENCOUNTER
Pt had a bilateral Lumbar  medial branch block # 2 on 6/6/24.  The post medial branch block form was  received.    Max % of pain relief from medial branch block #1:           Per pt verbal report she had  significant on the day of the block. (%)   Max relief from block #2 is:     Max relief from the block: % on procedure day  Physical therapy:    Last done in?:  a while ago.   How many sessions?:  unsure.    Where was it done?  Non [place].  Somewhere in [place].  Called pt and informed him/her that insurance would be checked and will be  called once we get a response.  Done  Fax the pain diary to [place].   No.  Attached to encounter.     Routed to [place] to check insurance coverage.        /Denisse RN-BSN  [facility]   [phone]

## 2024-06-11 ENCOUNTER — MYC MEDICAL ADVICE (OUTPATIENT)
Dept: HEMATOLOGY | Facility: CLINIC | Age: 64
End: 2024-06-11

## 2024-06-11 NOTE — TELEPHONE ENCOUNTER
**12/8/2021 Per BRODYare if there the procedure is NOT on the prior authorization list or investigative list, there are no specific conditions**                Okay to schedule NERY Saba   [facility]

## 2024-06-12 ENCOUNTER — TELEPHONE (OUTPATIENT)
Dept: HEMATOLOGY | Facility: CLINIC | Age: 64
End: 2024-06-12
Payer: COMMERCIAL

## 2024-06-12 ENCOUNTER — VIRTUAL VISIT (OUTPATIENT)
Dept: HEMATOLOGY | Facility: CLINIC | Age: 64
End: 2024-06-12
Attending: INTERNAL MEDICINE
Payer: COMMERCIAL

## 2024-06-12 VITALS — BODY MASS INDEX: 30.97 KG/M2 | WEIGHT: 189 LBS

## 2024-06-12 DIAGNOSIS — D69.3 IDIOPATHIC THROMBOCYTOPENIC PURPURA (H): Primary | ICD-10-CM

## 2024-06-12 DIAGNOSIS — T14.8XXA BRUISING: ICD-10-CM

## 2024-06-12 DIAGNOSIS — K12.0 APHTHOUS ULCER: ICD-10-CM

## 2024-06-12 DIAGNOSIS — D69.1 PLATELET GRANULE DEFECT (H): ICD-10-CM

## 2024-06-12 PROCEDURE — G2211 COMPLEX E/M VISIT ADD ON: HCPCS | Mod: 95 | Performed by: INTERNAL MEDICINE

## 2024-06-12 PROCEDURE — 99214 OFFICE O/P EST MOD 30 MIN: CPT | Mod: 95 | Performed by: INTERNAL MEDICINE

## 2024-06-12 RX ORDER — TRANEXAMIC ACID 650 MG/1
1300 TABLET ORAL 2 TIMES DAILY
Qty: 120 TABLET | Refills: 2 | Status: SHIPPED | OUTPATIENT
Start: 2024-06-12 | End: 2024-06-17

## 2024-06-12 RX ORDER — PREDNISONE 10 MG/1
10 TABLET ORAL DAILY
Qty: 90 TABLET | Refills: 3 | Status: SHIPPED | OUTPATIENT
Start: 2024-06-12

## 2024-06-12 NOTE — PROGRESS NOTES
[facility]  [address] [place] [id number]  Phone: [phone], Fax: [phone]    Outpatient Visit Note:    Patient: Jacquie Amador  MRN: [id number]  : 24      Reason for Initial Consultation:  Bruising    Assessment:  In summary, Jacquie Amador is a 63 year old woman presenting to clinic due to bruising/bleeding with abnormal platelet studies. 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 This has resolved after starting steroids--- negative testing x2 ( and ). Overall, her clinical picture (wounds, arthralgias, etc) is highly concerning for underlying autoimmune disorder.    1.  Behcet's disease:   2.  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  Easy bruising and prolonged healing secondary to #1 and #2  4.  Personal history of antiphospholipid antibody syndrome in pregnancy, no records  5.  Osteoarthritis requiring anti-inflammatory medications  6.  Depression requiring use of SSRI medication  7.  Acute on chronic migraines requiring use of triptans and aspirin  8. Acute on chronic renal insufficiency    Ms. Amador has been evaluated by Dermatology and Rheumatology. Skin biopsy with neutrophilic infiltrates that may be secondary to Behcets. Overall, clinical criteria is met and very consistent.  Working with Dr. Warren and Malu for skin and oral lesions. Will establish care with Dr. Hawkins next week.     For her bleeding/platelets, she has her prior anti-HLA and anti-GP1a antibodies were positive in  --- now negative on multiple tests  Would not repeat platelet aggregometry. Does find some benefit from TXA use--- therefore will continue this to prevent bleeding and stablize wounds.    Would not taper/stop steroid at 10 mg daily at this juncture---- if we attempt would need to likely transition to hydrocortef given  duration of steroid use of >10 months.      We had written for Celebrex due to inability to recommend aspirin and ibuprofen for joint issues. This was STOPPED due to her renal insufficiency- and patient does endorse significant loss of benefit with stopping NSAID. If her renal functions improves she CAN either resume celebrex OR another NSAID per discretion of her PCP or Rheumatology.     For her kidney disease- we have previously check for monoclonal gammapathy in 2021 AND 2023. Cryoglobulins were also neagtiave. In 2023 did have mild elevation of her kapa free light chaing without a monoclonal peak or monoclonal protein on her immmunofix. In other workup--- have also assessed for ALEXA and her DNA-Ds that was negative in 2023. Would be reasonable to repeat MGUS labs (ordered) and cystatin C. She has referrals for Nephrology pending as well.      Plan:  1. Majority of today's visit was spent counseling the patient regarding diagnosis, natural history, management and treatment options   2. Continue prednisone 10 mg daily  3. Continue Tranexamic acid 650 mg PO morning and 1300 mg at night to assess if bleeding at night into wounds stops  4.   Orders Placed This Encounter   Procedures    Immunoglobulins A G and M    Protein Immunofixation Serum    Protein electrophoresis random urine    Kappa and lambda light chain    Immunoglobulin Total Light Chains, Urine    Cystatin C with GFR    Physical Therapy  Referral    Protein electrophoresis       The patient is given our center's contact information and is instructed to call if she should have any further questions or concerns.  Follow up 6 months  with Dr. Grimes    Patient understands and agrees with the above plan and recommendation.    Emely Grimes MD/PhD   of Medicine  Division of Hematology, Oncology and Transplantation    Video time: 26 min 27 s    The longitudinal plan of care for the diagnosis(es)/condition(s) as documented were  addressed during this visit. Due to the added complexity in care, I will continue to support Jacquie in the subsequent management and with ongoing continuity of care.    ----------------------------------------------------------------------------------------------------------------------  Interval History:  Jacquie Amador is a 63 year old woman with PMHx of fibromyalgia, anxiety/depression, hypertension and hyperlipidemia. She presented to clinic on 9/15/21 for her first in person evaluation due to bleeding and prolonged wound healing. Please refer to my consult note.     Jacquie reports that overall her health has been up and down.   Jacquie is currently on apremilast and dupilumab. Is still on Colcrys    Jacquie has lost ~25 lbs since her last visit. Did this due to diet and decreased appetite. Is still on prednisone.     Kidney function has been worsening so her Celebrex was discontinued. Her pain has increased since stopping this. Is using tylenol, Voltaren and tramadol. These help but it is hard getting out of bed in the morning.   Will not that with movement the pain and stiffness improve a little bit.     Right now the pain in the arms and shoulder is keeping her from twin grandson.     Is taking tranexamic acid- 1 in morning and two night.     Did have a scab that will bleed- will keep going.       Past Medical History:  Past Medical History:   Diagnosis Date    ASTHMA - MODERATE PERSISTENT 9/21/2005    Chronic pain     Coronary artery disease     CVA (cerebral infarction)     Depressive disorder, not elsewhere classified     Diabetes (H)     Elevated serum alkaline phosphatase level     Liver source    Fibromyalgia     HYPERLIPIDEMIA NEC/NOS 12/29/2006    Hypertriglyceridemia     OA (osteoarthritis)     Thyroid disease     Trochanteric bursitis     Unspecified essential hypertension      Immunization  Immunization History   Administered Date(s) Administered    COVID-19 12+ (2023-24) (Pfizer) 03/05/2024     COVID-19 Bivalent 18+ (Moderna) 2022    COVID-19 Monovalent 18+ (Moderna) 2021, 2021, 11/15/2021    COVID-19 Monovalent Booster 18+ (Moderna) 05/10/2022    Influenza (IIV3) PF 2000, 2003, 2004, 2005, 2006, 2007, 2008, 2010, 10/26/2011, 10/26/2012    Influenza Vaccine 18-64 (Flublok) 2020, 2022, 2023    Influenza Vaccine >6 months,quad, PF 2014, 2015, 2019, 11/15/2021    Influenza, seasonal, injectable, PF 2009    Pneumococcal 23 valent 2000, 2021, 2022    RSV Vaccine (Abrysvo) 2024    TD,PF 7+ (Tenivac) 2000    TDAP (Adacel,Boostrix) 2021    TDAP Vaccine (Adacel) 2011    Zoster recombinant adjuvanted (SHINGRIX) 2019     Past Surgical History:  Past Surgical History:   Procedure Laterality Date    3 teeth pulled      INJECT EPIDURAL TRANSFORAMINAL  2014    [facility]    INJECT JOINT SACROILIAC  2014    [facility]    LAMINECTOMY LUMBAR ONE LEVEL Left 2014    [facility]  DELIVERY ONLY      , Low Cervical     Medications:  Current Outpatient Medications   Medication Sig Dispense Refill    acetaminophen (TYLENOL) 500 MG tablet Take 500-1,000 mg by mouth every 6 hours as needed for mild pain      acetylcysteine (N-ACETYL-L-CYSTEINE) 600 MG CAPS capsule Take 1 capsule (600 mg) by mouth 2 times daily 180 capsule 2    albuterol (PROVENTIL) (2.5 MG/3ML) 0.083% neb solution Take 1 vial (2.5 mg) by nebulization every 6 hours as needed for shortness of breath or wheezing 3 mL 4    albuterol (VENTOLIN HFA) 108 (90 Base) MCG/ACT inhaler INHALE 2 PUFFS INTO THE LUNGS EVERY 6 HOURS AS NEEDED FOR SHORTNESS OF BREATH / DYSPNEA 54 g 1    Apremilast (OTEZLA) 10 & 20 & 30 MG TBPK Take by mouth according to the instructions on the packet. Hold for signs of infection,any GI upset, weight loss or  depression concerns, and seek medical attention. 1 each 0    apremilast (OTEZLA) 30 MG tablet Take 1 tablet (30 mg) by mouth 2 times daily Hold for signs of infection, and seek medical attention. 60 tablet 5    atorvastatin (LIPITOR) 20 MG tablet Take 1 tablet (20 mg) by mouth daily 90 tablet 1    augmented betamethasone dipropionate (DIPROLENE AF) 0.05 % external cream Apply topically 2 times daily 50 g 3    augmented betamethasone dipropionate (DIPROLENE AF) 0.05 % external cream Apply to affected area twice daily 50 g 2    benzonatate (TESSALON) 200 MG capsule Take 1 capsule (200 mg) by mouth 2 times daily as needed for cough 40 capsule 1    betamethasone dipropionate (DIPROSONE) 0.05 % external ointment Apply topically 2 times daily To areas of skin sores.  Can also use on mouth sores as needed twice daily. 50 g 3    buPROPion (WELLBUTRIN XL) 150 MG 24 hr tablet Take 1 tablet (150 mg) by mouth every morning To take with the 300 mg dose for a total of 450 mg daily. 90 tablet 0    buPROPion (WELLBUTRIN XL) 300 MG 24 hr tablet Take 1 tablet (300 mg) by mouth every morning To take with the 150 mg dose for a total of 450 mg daily. 90 tablet 0    chlorhexidine (PERIDEX) 0.12 % solution Swish and spit 15 mLs in mouth 2 times daily 1893 mL 4    clindamycin (CLINDAMAX) 1 % external gel Apply topically 2 times daily 30 g 4    clotrimazole (MYCELEX) 10 MG lozenge Place 1 lozenge (10 mg) inside cheek 5 times daily 70 lozenge 1    colchicine (COLCRYS) 0.6 MG tablet Take 1 tablet (0.6 mg) by mouth 2 times daily 180 tablet 1    COMPOUND CONTAINING CONTROLLED SUBSTANCE (CMPD RX) - PHARMACY TO MIX COMPOUNDED MEDICATION Compound amitriptyline 2% and ketamine 0.5% in 80g of lipoderm or Vanicream 80 g 0    desonide (DESOWEN) 0.05 % external cream Apply topically 2 times daily To sores on face 60 g 3    dextran 70-hypromellose (TEARS NATURALE FREE PF) 0.1-0.3 % ophthalmic solution Place 2 drops into both eyes daily as needed (for  dry eyes) 35 each 3    doxycycline monohydrate (MONODOX) 100 MG capsule Take 1 capsule (100 mg) by mouth 2 times daily After meals and with big glass of water 60 capsule 2    DULoxetine (CYMBALTA) 60 MG capsule Take 2 capsules (120 mg) by mouth daily 180 capsule 1    dupilumab (DUPIXENT) 300 MG/2ML prefilled pen Inject 2 mLs (300 mg) Subcutaneous every 14 days After first loading dose 4 mL 1    famotidine (PEPCID) 40 MG tablet Take 1 tablet (40 mg) by mouth daily 90 tablet 1    fluconazole (DIFLUCAN) 150 MG tablet Take 1 tablet (150 mg) by mouth every 3 days 6 tablet 0    fluocinonide (LIDEX) 0.05 % external gel Apply topically 2 times daily as needed 15 g 1    hydrochlorothiazide (HYDRODIURIL) 25 MG tablet Take 1 tablet (25 mg) by mouth daily 90 tablet 1    hydrocortisone 2.5 % cream APPLY TOPICALLY 2 TIMES DAILY AS NEEDED 30 g 3    lidocaine, viscous, (XYLOCAINE) 2 % solution Swish and spit 10ml every 3 hours as needed for oral pain; max 8 doses/24hrs.  Do not eat or chew gum for 60 minutes following use. 100 mL 1    losartan (COZAAR) 100 MG tablet Take 1 tablet (100 mg) by mouth daily 90 tablet 1    metoprolol succinate ER (TOPROL XL) 100 MG 24 hr tablet Take 1 tablet (100 mg) by mouth 2 times daily 180 tablet 1    metoprolol succinate ER (TOPROL XL) 25 MG 24 hr tablet Take 1 tablet (25 mg) by mouth 3 times daily 270 tablet 1    minocycline (MINOCIN) 50 MG capsule Take 1 capsule (50 mg) by mouth 2 times daily After meals and with big glass of water 60 capsule 3    mometasone-formoterol (DULERA) 200-5 MCG/ACT inhaler Inhale 2 puffs into the lungs 2 times daily 39 g 1    montelukast (SINGULAIR) 10 MG tablet Take 1 tablet (10 mg) by mouth at bedtime 90 tablet 3    mupirocin (BACTROBAN) 2 % external cream Apply topically 3 times daily Apply to affected area three times daily 30 g 3    mupirocin (BACTROBAN) 2 % external cream Apply to affected area three times daily 60 g 1    nystatin (MYCOSTATIN) 403787 UNIT/ML  suspension Take 5 mLs (500,000 Units) by mouth 4 times daily for 42 days Has recurrent thrush. Uses for 2 weeks (280 ml)  at a time as needed. 280 mL 2    nystatin (MYCOSTATIN) 522879 UNIT/ML suspension Take by mouth 4 times daily Has recurrent thrush. Uses for 2 weeks at a time as needed. 380 mL 1    ondansetron (ZOFRAN ODT) 4 MG ODT tab Take 1 tablet (4 mg) by mouth every 8 hours as needed for nausea 20 tablet 0    oxyBUTYnin ER (DITROPAN XL) 5 MG 24 hr tablet Take 2 tablets (10 mg) by mouth daily 180 tablet 2    pimecrolimus (ELIDEL) 1 % external cream Apply topically 2 times daily 60 g 3    pimecrolimus (ELIDEL) 1 % external cream Apply topically 2 times daily 60 g 3    potassium chloride yuli ER (KLOR-CON M20) 20 MEQ CR tablet Take 1 tablet (20 mEq) by mouth daily 1 tablet 0    predniSONE (DELTASONE) 10 MG tablet Take 1 tablet (10 mg) by mouth daily 90 tablet 3    rizatriptan (MAXALT) 10 MG tablet Take 1 tablet (10 mg) by mouth at onset of headache for migraine May repeat in 2 hours. Max 3 tablets/24 hours. 18 tablet 1    rOPINIRole (REQUIP) 1 MG tablet Take 3 tablets (3 mg) by mouth at bedtime 270 tablet 3    SUMAtriptan (IMITREX) 100 MG tablet take 1 tablet at onset of headache may repeat in 2 hours max 2 tabs/day 18 tablet 1    tacrolimus (PROTOPIC) 0.1 % external ointment Apply topically 2 times daily To areas of sores on face 60 g 1    tiotropium (SPIRIVA RESPIMAT) 2.5 MCG/ACT inhaler Inhale 2 puffs into the lungs daily 12 g 3    traMADol (ULTRAM) 50 MG tablet Take 1 tablet (50 mg) by mouth every 12 hours as needed for moderate to severe pain 60 tablet 0    traZODone (DESYREL) 50 MG tablet Take 3 tablets (150 mg) by mouth at bedtime 270 tablet 1    triamcinolone (ARISTOCORT HP) 0.5 % external cream Apply topically 2 times daily 60 g 0    valACYclovir (VALTREX) 500 MG tablet Take 1 tablet (500 mg) by mouth daily 90 tablet 1      Allergies:  Allergies   Allergen Reactions    Cats      and rabbits/wheezing     Dogs      wheezing    Lyrica [Pregabalin] Other (See Comments)     Rash, mouth sores, itching and burning    Seasonal Allergies      rhinits     ROS:  Remainder of a comprehensive 10 point ROS is negative unless noted above.    Social History:  Denies any tobacco use. No significant alcohol use. Denies any illicit drug use.     Family History:  Two daughter  31 Yo daughter  26 yo daughter  3 grandsons    1 sister- older- high blood glucose, arthritis  1 brother- older- no idea    Mother- - heart valve. Had MDS. Needed a shot every month  Father- - cancer    Objectives:  Reviewed photos in the chart  Video visit- however patient did NOT turn on screen  GENERAL: alert and no distress  RESP: No audible wheeze, cough   NEURO: Mentation and speech appropriate for age.  PSYCH: Appropriate affect, tone, and pace of words      Labs: reviewed in EPIC    Ferritin   Date Value Ref Range Status   2023 100 11 - 328 ng/mL Final   2021 376 (H) 8 - 252 ng/mL Final     Iron   Date Value Ref Range Status   2023 99 37 - 145 ug/dL Final   2021 50 35 - 180 ug/dL Final     Iron Binding Cap   Date Value Ref Range Status   2021 350 240 - 430 ug/dL Final     Iron Binding Capacity   Date Value Ref Range Status   2023 272 240 - 430 ug/dL Final   09/15/2021 284 240 - 430 ug/dL Final     Recent Labs   Lab Test 23  1021 22  1152 21  0940   MONOCLONAL PEAK 0.0 0.0 0.0   KAPPA FREE LT CHAIN 2.96*  --   --    LAMBDA FREE LT CHAIN 2.13  --   --    KAPPA LAMBDA RATIO 1.39  --   --      ELP Interpretation:   Date Value Ref Range Status   2021   Final    Essentially normal electrophoretic pattern.  No obvious monoclonal proteins seen.    Pathologic significance requires clinical correlation.  FARRAH Levy M.D., Ph.D.,   Pathologist ().       ELP Interpretation   Date Value Ref Range Status   2023   Final    Essentially normal electrophoretic pattern. No  obvious monoclonal proteins seen. Pathologic significance requires clinical correlation. FARRAH Levy M.D., Ph.D., Pathologist ().   09/22/2022   Final    Essentially normal electrophoretic pattern. No obvious monoclonal proteins seen. Pathologic significance requires clinical correlation. JOSE ALBERTO Blancas M.D., Ph.D., Pathologist.     Immunofixation ELP   Date Value Ref Range Status   11/13/2023   Final    No monoclonal protein seen on immunofixation. Pathologic significance requires clinical correlation.  FARRAH Levy M.D., Ph.D., Pathologist ()   12/08/2021   Final    No monoclonal protein seen on immunofixation. Pathological significance requires clinical correlation. FARRAH Levy M.D., Ph.D., Pathologist ([phone])   08/29/2014   Final    No monoclonal protein seen on immunofixation.  Pathological significance   requires clinical correlation.   Nadia Ellison M.D., PH.D.         Imaging:  Not applicable

## 2024-06-12 NOTE — TELEPHONE ENCOUNTER
[id number]  Jacquie Amador  63 year old female  [facility] Diagnosis: Acquired Quantitative Platelet Disorder-now negative, easy bruising  [facility] Provider: Cornelio    Jacquie Amador was seen by Dr. Grimes earlier today. Dr. Grimes placed orders for labs. One of the orders is a Protein Electrophoresis Random Urine. RN called patient and let her know that a first morning void is recommended for collection. RN called [facility]. Patient can come  urine sterile cup at her convenience. Use it to sample early AM urine and then refrigerate sample until she comes to lab appointment later on that day. Jacquie verbalized understanding. She is scheduled on 6/17.      Quyen Foster RN, BSN, PCCN  Nurse Clinician    [facility]  [address] [place] [address], [address], [place] [address]   Office, direct: [phone]  Main office number: [phone]  Pronouns: She, her, hers

## 2024-06-12 NOTE — LETTER
2024       RE: Jacquie Amador  [address]  [facility] [address]     Dear Colleague,    Thank you for referring your patient, Jacquie Amador, to the [facility] at [facility]. Please see a copy of my visit note below.        [facility]  [facility] [place] [address]  Phone: [phone], Fax: [phone]    Outpatient Visit Note:    Patient: Jacquie Amador  MRN: [id number]  : 24      Reason for Initial Consultation:  Bruising    Assessment:  In summary, Jacquie Amador is a 63 year old woman presenting to clinic due to bruising/bleeding with abnormal platelet studies. 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 This has resolved after starting steroids--- negative testing x2 ( and ). Overall, her clinical picture (wounds, arthralgias, etc) is highly concerning for underlying autoimmune disorder.    1.  Behcet's disease:   2.  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  Easy bruising and prolonged healing secondary to #1 and #2  4.  Personal history of antiphospholipid antibody syndrome in pregnancy, no records  5.  Osteoarthritis requiring anti-inflammatory medications  6.  Depression requiring use of SSRI medication  7.  Acute on chronic migraines requiring use of triptans and aspirin  8. Acute on chronic renal insufficiency    Ms. Amador has been evaluated by Dermatology and Rheumatology. Skin biopsy with neutrophilic infiltrates that may be secondary to Behcets. Overall, clinical criteria is met and very consistent.  Working with Dr. Warren and Malu for skin and oral lesions. Will establish care with Dr. Hawkins next week.     For her bleeding/platelets, she has her prior anti-HLA and anti-GP1a antibodies were positive in   2021--- now negative on multiple tests  Would not repeat platelet aggregometry. Does find some benefit from TXA use--- therefore will continue this to prevent bleeding and stablize wounds.    Would not taper/stop steroid at 10 mg daily at this juncture---- if we attempt would need to likely transition to hydrocortef given duration of steroid use of >10 months.      We had written for Celebrex due to inability to recommend aspirin and ibuprofen for joint issues. This was STOPPED due to her renal insufficiency- and patient does endorse significant loss of benefit with stopping NSAID. If her renal functions improves she CAN either resume celebrex OR another NSAID per discretion of her PCP or Rheumatology.     For her kidney disease- we have previously check for monoclonal gammapathy in 2021 AND 2023. Cryoglobulins were also neagtiave. In 2023 did have mild elevation of her kapa free light chaing without a monoclonal peak or monoclonal protein on her immmunofix. In other workup--- have also assessed for ALEXA and her DNA-Ds that was negative in 2023. Would be reasonable to repeat MGUS labs (ordered) and cystatin C. She has referrals for Nephrology pending as well.      Plan:  1. Majority of today's visit was spent counseling the patient regarding diagnosis, natural history, management and treatment options   2. Continue prednisone 10 mg daily  3. Continue Tranexamic acid 650 mg PO morning and 1300 mg at night to assess if bleeding at night into wounds stops  4.   Orders Placed This Encounter   Procedures    Immunoglobulins A G and M    Protein Immunofixation Serum    Protein electrophoresis random urine    Kappa and lambda light chain    Immunoglobulin Total Light Chains, Urine    Cystatin C with GFR    Physical Therapy  Referral    Protein electrophoresis       The patient is given our center's contact information and is instructed to call if she should have any further questions or concerns.  Follow up 6 months   with Dr. Grimes    Patient understands and agrees with the above plan and recommendation.    Emely Grimes MD/PhD   of Medicine  Division of Hematology, Oncology and Transplantation    Video time: 26 min 27 s    The longitudinal plan of care for the diagnosis(es)/condition(s) as documented were addressed during this visit. Due to the added complexity in care, I will continue to support Jacquie in the subsequent management and with ongoing continuity of care.    ----------------------------------------------------------------------------------------------------------------------  Interval History:  Jacquie Amador is a 63 year old woman with PMHx of fibromyalgia, anxiety/depression, hypertension and hyperlipidemia. She presented to clinic on 9/15/21 for her first in person evaluation due to bleeding and prolonged wound healing. Please refer to my consult note.     Jacquie reports that overall her health has been up and down.   Jacquie is currently on apremilast and dupilumab. Is still on Colcrys    Jacquie has lost ~25 lbs since her last visit. Did this due to diet and decreased appetite. Is still on prednisone.     Kidney function has been worsening so her Celebrex was discontinued. Her pain has increased since stopping this. Is using tylenol, Voltaren and tramadol. These help but it is hard getting out of bed in the morning.   Will not that with movement the pain and stiffness improve a little bit.     Right now the pain in the arms and shoulder is keeping her from twin grandson.     Is taking tranexamic acid- 1 in morning and two night.     Did have a scab that will bleed- will keep going.       Past Medical History:  Past Medical History:   Diagnosis Date    ASTHMA - MODERATE PERSISTENT 9/21/2005    Chronic pain     Coronary artery disease     CVA (cerebral infarction)     Depressive disorder, not elsewhere classified     Diabetes (H)     Elevated serum alkaline phosphatase level      Liver source    Fibromyalgia     HYPERLIPIDEMIA NEC/NOS 2006    Hypertriglyceridemia     OA (osteoarthritis)     Thyroid disease     Trochanteric bursitis     Unspecified essential hypertension      Immunization  Immunization History   Administered Date(s) Administered    COVID-19 12+ () (Pfizer) 2024    COVID-19 Bivalent 18+ (Moderna) 2022    COVID-19 Monovalent 18+ (Moderna) 2021, 2021, 11/15/2021    COVID-19 Monovalent Booster 18+ (Moderna) 05/10/2022    Influenza (IIV3) PF 2000, 2003, 2004, 2005, 2006, 2007, 2008, 2010, 10/26/2011, 10/26/2012    Influenza Vaccine 18-64 (Flublok) 2020, 2022, 2023    Influenza Vaccine >6 months,quad, PF 2014, 2015, 2019, 11/15/2021    Influenza, seasonal, injectable, PF 2009    Pneumococcal 23 valent 2000, 2021, 2022    RSV Vaccine (Abrysvo) 2024    TD,PF 7+ (Tenivac) 2000    TDAP (Adacel,Boostrix) 2021    TDAP Vaccine (Adacel) 2011    Zoster recombinant adjuvanted (SHINGRIX) 2019     Past Surgical History:  Past Surgical History:   Procedure Laterality Date    3 teeth pulled      INJECT EPIDURAL TRANSFORAMINAL  2014    [facility]    INJECT JOINT SACROILIAC  2014    [facility]    LAMINECTOMY LUMBAR ONE LEVEL Left 2014    [facility]  DELIVERY ONLY      , Low Cervical     Medications:  Current Outpatient Medications   Medication Sig Dispense Refill    acetaminophen (TYLENOL) 500 MG tablet Take 500-1,000 mg by mouth every 6 hours as needed for mild pain      acetylcysteine (N-ACETYL-L-CYSTEINE) 600 MG CAPS capsule Take 1 capsule (600 mg) by mouth 2 times daily 180 capsule 2    albuterol (PROVENTIL) (2.5 MG/3ML) 0.083% neb solution Take 1 vial (2.5 mg) by nebulization every 6 hours as needed for shortness of breath or  wheezing 3 mL 4    albuterol (VENTOLIN HFA) 108 (90 Base) MCG/ACT inhaler INHALE 2 PUFFS INTO THE LUNGS EVERY 6 HOURS AS NEEDED FOR SHORTNESS OF BREATH / DYSPNEA 54 g 1    Apremilast (OTEZLA) 10 & 20 & 30 MG TBPK Take by mouth according to the instructions on the packet. Hold for signs of infection,any GI upset, weight loss or depression concerns, and seek medical attention. 1 each 0    apremilast (OTEZLA) 30 MG tablet Take 1 tablet (30 mg) by mouth 2 times daily Hold for signs of infection, and seek medical attention. 60 tablet 5    atorvastatin (LIPITOR) 20 MG tablet Take 1 tablet (20 mg) by mouth daily 90 tablet 1    augmented betamethasone dipropionate (DIPROLENE AF) 0.05 % external cream Apply topically 2 times daily 50 g 3    augmented betamethasone dipropionate (DIPROLENE AF) 0.05 % external cream Apply to affected area twice daily 50 g 2    benzonatate (TESSALON) 200 MG capsule Take 1 capsule (200 mg) by mouth 2 times daily as needed for cough 40 capsule 1    betamethasone dipropionate (DIPROSONE) 0.05 % external ointment Apply topically 2 times daily To areas of skin sores.  Can also use on mouth sores as needed twice daily. 50 g 3    buPROPion (WELLBUTRIN XL) 150 MG 24 hr tablet Take 1 tablet (150 mg) by mouth every morning To take with the 300 mg dose for a total of 450 mg daily. 90 tablet 0    buPROPion (WELLBUTRIN XL) 300 MG 24 hr tablet Take 1 tablet (300 mg) by mouth every morning To take with the 150 mg dose for a total of 450 mg daily. 90 tablet 0    chlorhexidine (PERIDEX) 0.12 % solution Swish and spit 15 mLs in mouth 2 times daily 1893 mL 4    clindamycin (CLINDAMAX) 1 % external gel Apply topically 2 times daily 30 g 4    clotrimazole (MYCELEX) 10 MG lozenge Place 1 lozenge (10 mg) inside cheek 5 times daily 70 lozenge 1    colchicine (COLCRYS) 0.6 MG tablet Take 1 tablet (0.6 mg) by mouth 2 times daily 180 tablet 1    COMPOUND CONTAINING CONTROLLED SUBSTANCE (CMPD RX) - PHARMACY TO MIX  COMPOUNDED MEDICATION Compound amitriptyline 2% and ketamine 0.5% in 80g of lipoderm or Vanicream 80 g 0    desonide (DESOWEN) 0.05 % external cream Apply topically 2 times daily To sores on face 60 g 3    dextran 70-hypromellose (TEARS NATURALE FREE PF) 0.1-0.3 % ophthalmic solution Place 2 drops into both eyes daily as needed (for dry eyes) 35 each 3    doxycycline monohydrate (MONODOX) 100 MG capsule Take 1 capsule (100 mg) by mouth 2 times daily After meals and with big glass of water 60 capsule 2    DULoxetine (CYMBALTA) 60 MG capsule Take 2 capsules (120 mg) by mouth daily 180 capsule 1    dupilumab (DUPIXENT) 300 MG/2ML prefilled pen Inject 2 mLs (300 mg) Subcutaneous every 14 days After first loading dose 4 mL 1    famotidine (PEPCID) 40 MG tablet Take 1 tablet (40 mg) by mouth daily 90 tablet 1    fluconazole (DIFLUCAN) 150 MG tablet Take 1 tablet (150 mg) by mouth every 3 days 6 tablet 0    fluocinonide (LIDEX) 0.05 % external gel Apply topically 2 times daily as needed 15 g 1    hydrochlorothiazide (HYDRODIURIL) 25 MG tablet Take 1 tablet (25 mg) by mouth daily 90 tablet 1    hydrocortisone 2.5 % cream APPLY TOPICALLY 2 TIMES DAILY AS NEEDED 30 g 3    lidocaine, viscous, (XYLOCAINE) 2 % solution Swish and spit 10ml every 3 hours as needed for oral pain; max 8 doses/24hrs.  Do not eat or chew gum for 60 minutes following use. 100 mL 1    losartan (COZAAR) 100 MG tablet Take 1 tablet (100 mg) by mouth daily 90 tablet 1    metoprolol succinate ER (TOPROL XL) 100 MG 24 hr tablet Take 1 tablet (100 mg) by mouth 2 times daily 180 tablet 1    metoprolol succinate ER (TOPROL XL) 25 MG 24 hr tablet Take 1 tablet (25 mg) by mouth 3 times daily 270 tablet 1    minocycline (MINOCIN) 50 MG capsule Take 1 capsule (50 mg) by mouth 2 times daily After meals and with big glass of water 60 capsule 3    mometasone-formoterol (DULERA) 200-5 MCG/ACT inhaler Inhale 2 puffs into the lungs 2 times daily 39 g 1    montelukast  (SINGULAIR) 10 MG tablet Take 1 tablet (10 mg) by mouth at bedtime 90 tablet 3    mupirocin (BACTROBAN) 2 % external cream Apply topically 3 times daily Apply to affected area three times daily 30 g 3    mupirocin (BACTROBAN) 2 % external cream Apply to affected area three times daily 60 g 1    nystatin (MYCOSTATIN) 562042 UNIT/ML suspension Take 5 mLs (500,000 Units) by mouth 4 times daily for 42 days Has recurrent thrush. Uses for 2 weeks (280 ml)  at a time as needed. 280 mL 2    nystatin (MYCOSTATIN) 352875 UNIT/ML suspension Take by mouth 4 times daily Has recurrent thrush. Uses for 2 weeks at a time as needed. 380 mL 1    ondansetron (ZOFRAN ODT) 4 MG ODT tab Take 1 tablet (4 mg) by mouth every 8 hours as needed for nausea 20 tablet 0    oxyBUTYnin ER (DITROPAN XL) 5 MG 24 hr tablet Take 2 tablets (10 mg) by mouth daily 180 tablet 2    pimecrolimus (ELIDEL) 1 % external cream Apply topically 2 times daily 60 g 3    pimecrolimus (ELIDEL) 1 % external cream Apply topically 2 times daily 60 g 3    potassium chloride yuli ER (KLOR-CON M20) 20 MEQ CR tablet Take 1 tablet (20 mEq) by mouth daily 1 tablet 0    predniSONE (DELTASONE) 10 MG tablet Take 1 tablet (10 mg) by mouth daily 90 tablet 3    rizatriptan (MAXALT) 10 MG tablet Take 1 tablet (10 mg) by mouth at onset of headache for migraine May repeat in 2 hours. Max 3 tablets/24 hours. 18 tablet 1    rOPINIRole (REQUIP) 1 MG tablet Take 3 tablets (3 mg) by mouth at bedtime 270 tablet 3    SUMAtriptan (IMITREX) 100 MG tablet take 1 tablet at onset of headache may repeat in 2 hours max 2 tabs/day 18 tablet 1    tacrolimus (PROTOPIC) 0.1 % external ointment Apply topically 2 times daily To areas of sores on face 60 g 1    tiotropium (SPIRIVA RESPIMAT) 2.5 MCG/ACT inhaler Inhale 2 puffs into the lungs daily 12 g 3    traMADol (ULTRAM) 50 MG tablet Take 1 tablet (50 mg) by mouth every 12 hours as needed for moderate to severe pain 60 tablet 0    traZODone (DESYREL) 50  MG tablet Take 3 tablets (150 mg) by mouth at bedtime 270 tablet 1    triamcinolone (ARISTOCORT HP) 0.5 % external cream Apply topically 2 times daily 60 g 0    valACYclovir (VALTREX) 500 MG tablet Take 1 tablet (500 mg) by mouth daily 90 tablet 1      Allergies:  Allergies   Allergen Reactions    Cats      and rabbits/wheezing    Dogs      wheezing    Lyrica [Pregabalin] Other (See Comments)     Rash, mouth sores, itching and burning    Seasonal Allergies      rhinits     ROS:  Remainder of a comprehensive 10 point ROS is negative unless noted above.    Social History:  Denies any tobacco use. No significant alcohol use. Denies any illicit drug use.     Family History:  Two daughter  31 Yo daughter  28 yo daughter  3 grandsons    1 sister- older- high blood glucose, arthritis  1 brother- older- no idea    Mother- - heart valve. Had MDS. Needed a shot every month  Father- - cancer    Objectives:  Reviewed photos in the chart  Video visit- however patient did NOT turn on screen  GENERAL: alert and no distress  RESP: No audible wheeze, cough   NEURO: Mentation and speech appropriate for age.  PSYCH: Appropriate affect, tone, and pace of words      Labs: reviewed in EPIC    Ferritin   Date Value Ref Range Status   2023 100 11 - 328 ng/mL Final   2021 376 (H) 8 - 252 ng/mL Final     Iron   Date Value Ref Range Status   2023 99 37 - 145 ug/dL Final   2021 50 35 - 180 ug/dL Final     Iron Binding Cap   Date Value Ref Range Status   2021 350 240 - 430 ug/dL Final     Iron Binding Capacity   Date Value Ref Range Status   2023 272 240 - 430 ug/dL Final   09/15/2021 284 240 - 430 ug/dL Final     Recent Labs   Lab Test 23  1021 22  1152 21  0940   MONOCLONAL PEAK 0.0 0.0 0.0   KAPPA FREE LT CHAIN 2.96*  --   --    LAMBDA FREE LT CHAIN 2.13  --   --    KAPPA LAMBDA RATIO 1.39  --   --      ELP Interpretation:   Date Value Ref Range Status   2021    Final    Essentially normal electrophoretic pattern.  No obvious monoclonal proteins seen.    Pathologic significance requires clinical correlation.  FARRAH Levy M.D., Ph.D.,   Pathologist ().       ELP Interpretation   Date Value Ref Range Status   11/13/2023   Final    Essentially normal electrophoretic pattern. No obvious monoclonal proteins seen. Pathologic significance requires clinical correlation. FARRAH Levy M.D., Ph.D., Pathologist ().   09/22/2022   Final    Essentially normal electrophoretic pattern. No obvious monoclonal proteins seen. Pathologic significance requires clinical correlation. JOSE ALBERTO Blancas M.D., Ph.D., Pathologist.     Immunofixation ELP   Date Value Ref Range Status   11/13/2023   Final    No monoclonal protein seen on immunofixation. Pathologic significance requires clinical correlation.  FARRAH Levy M.D., Ph.D., Pathologist ()   12/08/2021   Final    No monoclonal protein seen on immunofixation. Pathological significance requires clinical correlation. FARRAH Levy M.D., Ph.D., Pathologist ([phone])   08/29/2014   Final    No monoclonal protein seen on immunofixation.  Pathological significance   requires clinical correlation.   Nadia Ellison M.D., PH.D.         Imaging:  Not applicable      Emely Grimes MD

## 2024-06-13 ENCOUNTER — MYC MEDICAL ADVICE (OUTPATIENT)
Dept: HEMATOLOGY | Facility: CLINIC | Age: 64
End: 2024-06-13
Payer: COMMERCIAL

## 2024-06-13 ENCOUNTER — MYC MEDICAL ADVICE (OUTPATIENT)
Dept: PALLIATIVE MEDICINE | Facility: CLINIC | Age: 64
End: 2024-06-13
Payer: COMMERCIAL

## 2024-06-13 DIAGNOSIS — M35.2 BEHCET'S DISEASE (H): ICD-10-CM

## 2024-06-17 ENCOUNTER — MYC MEDICAL ADVICE (OUTPATIENT)
Dept: HEMATOLOGY | Facility: CLINIC | Age: 64
End: 2024-06-17

## 2024-06-17 DIAGNOSIS — M35.2 BEHCET'S DISEASE (H): ICD-10-CM

## 2024-06-17 RX ORDER — TRANEXAMIC ACID 650 MG/1
TABLET ORAL
Qty: 270 TABLET | Refills: 0 | Status: SHIPPED | OUTPATIENT
Start: 2024-06-17

## 2024-06-18 ENCOUNTER — PRE VISIT (OUTPATIENT)
Dept: RHEUMATOLOGY | Facility: CLINIC | Age: 64
End: 2024-06-18
Payer: COMMERCIAL

## 2024-06-18 ENCOUNTER — MYC MEDICAL ADVICE (OUTPATIENT)
Dept: PALLIATIVE MEDICINE | Facility: CLINIC | Age: 64
End: 2024-06-18

## 2024-06-18 ENCOUNTER — VIRTUAL VISIT (OUTPATIENT)
Dept: RHEUMATOLOGY | Facility: CLINIC | Age: 64
End: 2024-06-18
Attending: INTERNAL MEDICINE
Payer: COMMERCIAL

## 2024-06-18 VITALS — HEIGHT: 66 IN | WEIGHT: 189 LBS | BODY MASS INDEX: 30.37 KG/M2

## 2024-06-18 DIAGNOSIS — Z79.60 LONG-TERM USE OF IMMUNOSUPPRESSANT MEDICATION: Primary | ICD-10-CM

## 2024-06-18 DIAGNOSIS — M35.2 BEHCET'S DISEASE (H): ICD-10-CM

## 2024-06-18 PROCEDURE — 99417 PROLNG OP E/M EACH 15 MIN: CPT | Mod: 95 | Performed by: INTERNAL MEDICINE

## 2024-06-18 PROCEDURE — G2211 COMPLEX E/M VISIT ADD ON: HCPCS | Mod: 95 | Performed by: INTERNAL MEDICINE

## 2024-06-18 PROCEDURE — 99215 OFFICE O/P EST HI 40 MIN: CPT | Mod: 95 | Performed by: INTERNAL MEDICINE

## 2024-06-18 ASSESSMENT — PAIN SCALES - GENERAL: PAINLEVEL: SEVERE PAIN (7)

## 2024-06-18 NOTE — TELEPHONE ENCOUNTER
Per patient [employer] message (Brought over from the other 6/18/24 encounter):  asael CHARLES Pain Nurse (supporting José Miguel Brownlee MD)34 minutes ago (12:31 PM)    See the other encounter for more information on this.      I need proof that my insurance is covering it since no one contacted me. I have been waiting for this.      I also will be following up on the $3,000 we paid recently what portion  went to the pain clinic . I would assume $0 since I m not a patient there.       See the other encounter for more information on this.       Routed to Chantal to call alex Salcedo RN-MARVAN  [facility]   [phone]

## 2024-06-18 NOTE — LETTER
6/18/2024       RE: Jacquie Amador  [address]  [facility] [address]     Dear Colleague,    Thank you for referring your patient, Jacquie Amador, to the [facility] [place] at [facility]. Please see a copy of my visit note below.    Virtual Visit Details    Type of service:  Video Visit     Originating Location (pt. Location): Home    Distant Location (provider location):  Off-site  Platform used for Video Visit: [employer]    Video start time: 11:06 AM    Video end time: 11:55 AM      Jacquie Amador presents in consultation at the request of Dr. Emely Grimes MD/PhD  to evaluate for possible Behcet's disease. -ALEXA, -RF, -CCP. Previous non-diagnostic skin ulcer biopsy with perivascular mixed cell infiltrate, rich in neutrophils, indicative of excoriation. Intolerant of colchicine. Treatment with apremilast since 9-2023.    She has painful skin ulcerations on her face, and these can appear infected at times. Other times these skin lesions appear to have some blood in the lesions. Today she has expressed out some pus from one of the skin ulcers. She denies scratching or itching.     She has a history of painful ulcers in her mouth, and has one that is just now resolving. These can be painful at first. She denies any history of genital ulcerations. She has treated with apremilast 30 mg BID since last September and feels that she is mildly improved in both her mouth and skin. She also uses colchicine 0.6 mg prescribed by her family doctor.  The usefulness of this is unclear.    She reports a lot of problems with the joints of her hands, and particularly in her thumbs. She has deformities in her hands and has hand injections. This hand pain is not too much of a problem today.  Her foot pain can be the greater concern more so than the hands. She has chronic low back pain with history of surgery. Prolonged standing can lead to left leg  numbness.    She has an issue of easy bruising and bleeding with known platelet granule issue. Her platelet issues are now apparently in remission.    She has no important problems with sweating, fevers or chills and her weight is stable. Her vision is generally quite good and stable with an upcoming procedure to correct her visual acuity.    Otezla 30 mg bid is well tolerated. She also uses colchicine 0.6 mg BID with good tolerance.    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, platelet granule disorder, GERD    Past Medical History:   Diagnosis Date    ASTHMA - MODERATE PERSISTENT 2005    Chronic pain     Coronary artery disease     CVA (cerebral infarction)     Depressive disorder, not elsewhere classified     Diabetes (H)     Elevated serum alkaline phosphatase level     Liver source    Fibromyalgia     HYPERLIPIDEMIA NEC/NOS 2006    Hypertriglyceridemia     OA (osteoarthritis)     Thyroid disease     Trochanteric bursitis     Unspecified essential hypertension      Surgical-  Past Surgical History:   Procedure Laterality Date    3 teeth pulled      INJECT EPIDURAL TRANSFORAMINAL  2014    [facility]    INJECT JOINT SACROILIAC  2014    [facility]    LAMINECTOMY LUMBAR ONE LEVEL Left 2014    [facility]  DELIVERY ONLY      , Low Cervical     Injuries-none    Active Problems:  Patient Active Problem List   Diagnosis    Moderate persistent asthma without complication    Allergic rhinitis due to other allergen    Esophageal reflux    Moderate recurrent major depression (H)    Multiple joint pain    HYPERLIPIDEMIA LDL GOAL <130    Hypertension goal BP (blood pressure) < 140/90    Generalized anxiety disorder    Myalgia     Chronic rhinitis    Constipation    Lumbar disc disease with radiculopathy    Iron deficiency anemia    Osteoarthritis of carpometacarpal joint of thumb - bilateral    Trochanteric bursitis    Right ankle instability    Primary focal hyperhidrosis    Trochanteric bursitis of both hips    Overweight    Iron deficiency    Scratching    Chronic, continuous use of opioids    Medical marijuana use    Primary osteoarthritis of both first carpometacarpal joints    Arthritis of metatarsophalangeal joint    Sinus tachycardia    Intractable chronic migraine without aura and without status migrainosus    Impaired fasting glucose    Migraine without aura and without status migrainosus, not intractable    Skin ulcer, limited to breakdown of skin (H)    Morbid obesity (H)    Platelet granule defect (H)    Aphthous ulcer    Rash    Pruritus    Behcet's syndrome involving oral mucosa (H)    Encounter for long-term (current) use of high-risk medication    Dermatitis    Encounter for long-term current use of high risk medication    Acquired platelet disorder (H)    Atopic neurodermatitis    Facial eczema    Skin pain    Prurigo nodularis    Chronic kidney disease, stage 3a (H)    Anxiety     Allergies   Allergen Reactions    Cats      and rabbits/wheezing    Dogs      wheezing    Lyrica [Pregabalin] Other (See Comments)     Rash, mouth sores, itching and burning    Seasonal Allergies      rhinits     Social History:   with 2 children. Not working at present. Non-smoker. No EtOH.   Social History     Socioeconomic History    Marital status:      Spouse name: Not on file    Number of children: Not on file    Years of education: Not on file    Highest education level: Not on file   Occupational History    Not on file   Tobacco Use    Smoking status: Former     Current packs/day: 0.00     Average packs/day: 0.3 packs/day for 33.7 years (10.1 ttl pk-yrs)     Types: Cigarettes     Start date: 1/1/1980     Quit date: 9/1/2013      Years since quitting: 10.8     Passive exposure: Past    Smokeless tobacco: Never    Tobacco comments:     since 1981   Vaping Use    Vaping status: Never Used   Substance and Sexual Activity    Alcohol use: No    Drug use: Yes     Comment: medical marjuana     Sexual activity: Not Currently     Partners: Male     Birth control/protection: None   Other Topics Concern    Parent/sibling w/ CABG, MI or angioplasty before 65F 55M? No     Service No    Blood Transfusions No    Caffeine Concern No    Occupational Exposure Not Asked    Hobby Hazards Not Asked    Sleep Concern No    Stress Concern No    Weight Concern Yes    Special Diet No    Back Care Not Asked    Exercise No    Bike Helmet No     Comment: Doesn't ride a bike    Seat Belt Yes    Self-Exams Yes     Comment: she does self breast exams every other month   Social History Narrative    Not on file     Social Determinants of Health     Financial Resource Strain: Low Risk  (12/29/2023)    Financial Resource Strain     Within the past 12 months, have you or your family members you live with been unable to get utilities (heat, electricity) when it was really needed?: No   Food Insecurity: Low Risk  (12/29/2023)    Food Insecurity     Within the past 12 months, did you worry that your food would run out before you got money to buy more?: No     Within the past 12 months, did the food you bought just not last and you didn t have money to get more?: No   Transportation Needs: Low Risk  (12/29/2023)    Transportation Needs     Within the past 12 months, has lack of transportation kept you from medical appointments, getting your medicines, non-medical meetings or appointments, work, or from getting things that you need?: No   Physical Activity: Not on file   Stress: Not on file   Social Connections: Not on file   Interpersonal Safety: Low Risk  (5/29/2024)    Interpersonal Safety     Do you feel physically and emotionally safe where you currently live?: Yes  "    Within the past 12 months, have you been hit, slapped, kicked or otherwise physically hurt by someone?: No     Within the past 12 months, have you been humiliated or emotionally abused in other ways by your partner or ex-partner?: No   Housing Stability: Low Risk  (12/29/2023)    Housing Stability     Do you have housing? : Yes     Are you worried about losing your housing?: No     Family History:    Family History   Problem Relation Age of Onset    Thyroid Disease Mother     Arthritis Mother     Colon Cancer Mother     Myelodysplastic syndrome Mother     Hypertension Father     Diabetes Father     C.A.D. Father         MI age 75    Cardiovascular Father         heart attack    Cerebrovascular Disease Father     Cancer Father         renal cancer    Arthritis Father     Heart Disease Father         heart attack    Gastrointestinal Disease Father         liver    Genitourinary Problems Father         kidney    Thyroid Disease Sister     Arthritis Sister     Lipids Sister     Asthma Daughter     Gastrointestinal Disease Daughter     Multiple Sclerosis Maternal Aunt     Mastocytosis Nephew         \"System Mast Cell Disease and hyperesosinophilia\"    Unknown Other     Breast Cancer No family hx of     Cancer - colorectal No family hx of     Alzheimer Disease No family hx of     Blood Disease No family hx of     Circulatory No family hx of     Eye Disorder No family hx of     Musculoskeletal Disorder No family hx of     Neurologic Disorder No family hx of     Respiratory No family hx of     Melanoma No family hx of     Skin Cancer No family hx of      Review Of Systems:  +purple dots on her LE  +intermittent chest pains with anxiety or GERD  +occasional heartburn  +left leg numbness with prolonged standing  +dark skin on LE in the sun exposed areas with longstanding use of tetracyclines  +dry mouth  +cataract and planning surgery  Remainder of the 14 point ROS obtained and found negative.    Physical " Exam:  Constitutional: WD-WN-WG cooperative  Eyes: nl EOM, sclera  ENT: nl external ears, nose, hearing, lips  Neck: no visible thyroid enlargement  Resp: nl effort  MS: R>L 1st MCP swelling and Z-deformity of the thumb; All other neck, shoulder, elbow, wrist, hand joints were examined and otherwise found normal. No active synovitis. Full ROM. +heberden's nodes.  Skin: no alopecia; discrete areas of crusting facial lesions  Neuro: nl cranial nerves   Psych: nl judgement, affect    Laboratory:    Recent Results (from the past 1344 hour(s))   Comprehensive metabolic panel (BMP + Alb, Alk Phos, ALT, AST, Total. Bili, TP)    Collection Time: 04/26/24 12:10 PM   Result Value Ref Range    Sodium 138 135 - 145 mmol/L    Potassium 3.9 3.4 - 5.3 mmol/L    Carbon Dioxide (CO2) 28 22 - 29 mmol/L    Anion Gap 11 7 - 15 mmol/L    Urea Nitrogen 31.7 (H) 8.0 - 23.0 mg/dL    Creatinine 1.44 (H) 0.51 - 0.95 mg/dL    GFR Estimate 41 (L) >60 mL/min/1.73m2    Calcium 10.6 (H) 8.8 - 10.2 mg/dL    Chloride 99 98 - 107 mmol/L    Glucose 118 (H) 70 - 99 mg/dL    Alkaline Phosphatase 119 40 - 150 U/L    AST 24 0 - 45 U/L    ALT 17 0 - 50 U/L    Protein Total 7.7 6.4 - 8.3 g/dL    Albumin 4.5 3.5 - 5.2 g/dL    Bilirubin Total 0.5 <=1.2 mg/dL   TSH with free T4 reflex    Collection Time: 04/26/24 12:10 PM   Result Value Ref Range    TSH 0.99 0.30 - 4.20 uIU/mL   Vitamin D Deficiency    Collection Time: 04/26/24 12:10 PM   Result Value Ref Range    Vitamin D, Total (25-Hydroxy) 35 20 - 50 ng/mL   CBC with platelets and differential    Collection Time: 04/26/24 12:10 PM   Result Value Ref Range    WBC Count 11.5 (H) 4.0 - 11.0 10e3/uL    RBC Count 4.92 3.80 - 5.20 10e6/uL    Hemoglobin 14.9 11.7 - 15.7 g/dL    Hematocrit 44.6 35.0 - 47.0 %    MCV 91 78 - 100 fL    MCH 30.3 26.5 - 33.0 pg    MCHC 33.4 31.5 - 36.5 g/dL    RDW 13.9 10.0 - 15.0 %    Platelet Count 380 150 - 450 10e3/uL    % Neutrophils 53 %    % Lymphocytes 37 %    % Monocytes 8 %     % Eosinophils 2 %    % Basophils 0 %    % Immature Granulocytes 0 %    Absolute Neutrophils 6.1 1.6 - 8.3 10e3/uL    Absolute Lymphocytes 4.3 0.8 - 5.3 10e3/uL    Absolute Monocytes 0.9 0.0 - 1.3 10e3/uL    Absolute Eosinophils 0.2 0.0 - 0.7 10e3/uL    Absolute Basophils 0.0 0.0 - 0.2 10e3/uL    Absolute Immature Granulocytes 0.0 <=0.4 10e3/uL   Basic metabolic panel  (Ca, Cl, CO2, Creat, Gluc, K, Na, BUN)    Collection Time: 05/01/24  5:35 PM   Result Value Ref Range    Sodium 138 135 - 145 mmol/L    Potassium 3.8 3.4 - 5.3 mmol/L    Chloride 102 98 - 107 mmol/L    Carbon Dioxide (CO2) 22 22 - 29 mmol/L    Anion Gap 14 7 - 15 mmol/L    Urea Nitrogen 32.7 (H) 8.0 - 23.0 mg/dL    Creatinine 1.61 (H) 0.51 - 0.95 mg/dL    GFR Estimate 36 (L) >60 mL/min/1.73m2    Calcium 9.7 8.8 - 10.2 mg/dL    Glucose 95 70 - 99 mg/dL   CBC with platelets and differential    Collection Time: 05/01/24  5:35 PM   Result Value Ref Range    WBC Count 10.2 4.0 - 11.0 10e3/uL    RBC Count 4.93 3.80 - 5.20 10e6/uL    Hemoglobin 14.8 11.7 - 15.7 g/dL    Hematocrit 44.2 35.0 - 47.0 %    MCV 90 78 - 100 fL    MCH 30.0 26.5 - 33.0 pg    MCHC 33.5 31.5 - 36.5 g/dL    RDW 13.7 10.0 - 15.0 %    Platelet Count 344 150 - 450 10e3/uL    % Neutrophils 48 %    % Lymphocytes 39 %    % Monocytes 9 %    % Eosinophils 3 %    % Basophils 0 %    % Immature Granulocytes 0 %    Absolute Neutrophils 4.9 1.6 - 8.3 10e3/uL    Absolute Lymphocytes 4.0 0.8 - 5.3 10e3/uL    Absolute Monocytes 0.9 0.0 - 1.3 10e3/uL    Absolute Eosinophils 0.3 0.0 - 0.7 10e3/uL    Absolute Basophils 0.0 0.0 - 0.2 10e3/uL    Absolute Immature Granulocytes 0.0 <=0.4 10e3/uL   CBC with platelets and differential    Collection Time: 05/10/24 11:11 AM   Result Value Ref Range    WBC Count 7.3 4.0 - 11.0 10e3/uL    RBC Count 4.31 3.80 - 5.20 10e6/uL    Hemoglobin 13.1 11.7 - 15.7 g/dL    Hematocrit 39.0 35.0 - 47.0 %    MCV 91 78 - 100 fL    MCH 30.4 26.5 - 33.0 pg    MCHC 33.6 31.5 - 36.5  g/dL    RDW 13.6 10.0 - 15.0 %    Platelet Count 282 150 - 450 10e3/uL    % Neutrophils 42 %    % Lymphocytes 47 %    % Monocytes 8 %    % Eosinophils 3 %    % Basophils 0 %    % Immature Granulocytes 0 %    Absolute Neutrophils 3.0 1.6 - 8.3 10e3/uL    Absolute Lymphocytes 3.4 0.8 - 5.3 10e3/uL    Absolute Monocytes 0.6 0.0 - 1.3 10e3/uL    Absolute Eosinophils 0.2 0.0 - 0.7 10e3/uL    Absolute Basophils 0.0 0.0 - 0.2 10e3/uL    Absolute Immature Granulocytes 0.0 <=0.4 10e3/uL   Basic metabolic panel  (Ca, Cl, CO2, Creat, Gluc, K, Na, BUN)    Collection Time: 05/10/24 11:12 AM   Result Value Ref Range    Sodium 141 135 - 145 mmol/L    Potassium 3.3 (L) 3.4 - 5.3 mmol/L    Chloride 104 98 - 107 mmol/L    Carbon Dioxide (CO2) 29 22 - 29 mmol/L    Anion Gap 8 7 - 15 mmol/L    Urea Nitrogen 11.3 8.0 - 23.0 mg/dL    Creatinine 1.08 (H) 0.51 - 0.95 mg/dL    GFR Estimate 57 (L) >60 mL/min/1.73m2    Calcium 9.6 8.8 - 10.2 mg/dL    Glucose 92 70 - 99 mg/dL   Magnesium    Collection Time: 05/10/24 11:12 AM   Result Value Ref Range    Magnesium 1.8 1.7 - 2.3 mg/dL   UA Macroscopic with reflex to Microscopic and Culture - Lab Collect    Collection Time: 05/10/24 11:20 AM    Specimen: Urine, NOS   Result Value Ref Range    Color Urine Yellow Colorless, Straw, Light Yellow, Yellow    Appearance Urine Clear Clear    Glucose Urine Negative Negative mg/dL    Bilirubin Urine Negative Negative    Ketones Urine Negative Negative mg/dL    Specific Gravity Urine 1.020 1.003 - 1.035    Blood Urine Negative Negative    pH Urine 6.0 5.0 - 7.0    Protein Albumin Urine Negative Negative mg/dL    Urobilinogen Urine 0.2 0.2, 1.0 E.U./dL    Nitrite Urine Negative Negative    Leukocyte Esterase Urine Negative Negative   Albumin Random Urine Quantitative with Creat Ratio    Collection Time: 05/10/24 11:20 AM   Result Value Ref Range    Creatinine Urine mg/dL 139.6 mg/dL    Albumin Urine mg/L <12.0 mg/L    Albumin Urine mg/g Cr     Basic metabolic  panel  (Ca, Cl, CO2, Creat, Gluc, K, Na, BUN)    Collection Time: 05/29/24  1:40 PM   Result Value Ref Range    Sodium 141 135 - 145 mmol/L    Potassium 3.7 3.4 - 5.3 mmol/L    Chloride 105 98 - 107 mmol/L    Carbon Dioxide (CO2) 25 22 - 29 mmol/L    Anion Gap 11 7 - 15 mmol/L    Urea Nitrogen 17.3 8.0 - 23.0 mg/dL    Creatinine 1.31 (H) 0.51 - 0.95 mg/dL    GFR Estimate 46 (L) >60 mL/min/1.73m2    Calcium 9.2 8.8 - 10.2 mg/dL    Glucose 107 (H) 70 - 99 mg/dL     My review of photographs in the media tab suggests dusky gray skin discoloration    Impression:    Multifactorial mucocutaneous lesions-with history of ulcers due to prurigo/neurodermatitis, platelet dysfunction and easy bruising/bleeding, and oral aphthous ulcers, and hyperpigmentation. The question to me is whether Behcet's disease underlies any or all of these abnormal features. The patient is currently on colchicine and apremilast, either of which can be effective in Behcet's, and she is generally improved. She has no other features of Behcet's such as genital ulcers, ischemic intestinal disease, ocular disease, neurological, or vasculitic disease. While she has chronic kidney disease, this may relate more to chronic pain and NSAID use. She has joint pains that likely relate to osteoarthritis and pain amplification syndrome. Importantly, she lacks any symptoms of systemic inflammatory disease. Taken together she does not meet criteria for Behcet's diagnosis; however, this is clouded by her use of her current medication regimen. Based on all of this, her improvement in symptoms on apremilast increases the likelihood that she has a behcet's like process and I agree to continue the medication. Plan to get markers of systemic inflammation. She will follow up with Dr. Warren in Dermatology for her skin problems. Of note, her skin hyperpigmentation is reminiscent of tetracycline toxicity based on my review of the photographs, but I will defer to Dr. Warren  regarding the evaluation and management of that problem.    Hand osteoarthritis-by exam. I will get Xrays to assess this further for any signs of inflammatory arthritis.    Bone health-I will ask for a DEXA scan now to assess her risk for future fracture.     Long term management of immunosuppression-with stable toxicity screening. Avoid NSAIDs in view of her CKD.     I appreciated the opportunity to play a role in this patient's care. Plan for follow up in 6-12 months.    Jay Hawkins MD  Professor of Medicine  Director, Division of Rheumatic and Autoimmune Diseases    A total of 87 minutes was spent in face to face patient interaction and chart review on the day of service.    The longitudinal plan of care for the diagnosis(es)/condition(s) as documented were addressed during this visit. Due to the added complexity in care, I will continue to support Jacquie in the subsequent management and with ongoing continuity of care.

## 2024-06-18 NOTE — PROGRESS NOTES
Virtual Visit Details    Type of service:  Video Visit     Originating Location (pt. Location): Home    Distant Location (provider location):  Off-site  Platform used for Video Visit: [employer]    Video start time: 11:06 AM    Video end time: 11:55 AM      Jacquie BURROWS Marjorie presents in consultation at the request of Dr. Emely Grimes MD/PhD  to evaluate for possible Behcet's disease. -ALEXA, -RF, -CCP. Previous non-diagnostic skin ulcer biopsy with perivascular mixed cell infiltrate, rich in neutrophils, indicative of excoriation. Intolerant of colchicine. Treatment with apremilast since 9-2023.    She has painful skin ulcerations on her face, and these can appear infected at times. Other times these skin lesions appear to have some blood in the lesions. Today she has expressed out some pus from one of the skin ulcers. She denies scratching or itching.     She has a history of painful ulcers in her mouth, and has one that is just now resolving. These can be painful at first. She denies any history of genital ulcerations. She has treated with apremilast 30 mg BID since last September and feels that she is mildly improved in both her mouth and skin. She also uses colchicine 0.6 mg prescribed by her family doctor.  The usefulness of this is unclear.    She reports a lot of problems with the joints of her hands, and particularly in her thumbs. She has deformities in her hands and has hand injections. This hand pain is not too much of a problem today.  Her foot pain can be the greater concern more so than the hands. She has chronic low back pain with history of surgery. Prolonged standing can lead to left leg numbness.    She has an issue of easy bruising and bleeding with known platelet granule issue. Her platelet issues are now apparently in remission.    She has no important problems with sweating, fevers or chills and her weight is stable. Her vision is generally quite good and stable with an upcoming procedure to  correct her visual acuity.    Otezla 30 mg bid is well tolerated. She also uses colchicine 0.6 mg BID with good tolerance.    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, platelet granule disorder, GERD    Past Medical History:   Diagnosis Date    ASTHMA - MODERATE PERSISTENT 2005    Chronic pain     Coronary artery disease     CVA (cerebral infarction)     Depressive disorder, not elsewhere classified     Diabetes (H)     Elevated serum alkaline phosphatase level     Liver source    Fibromyalgia     HYPERLIPIDEMIA NEC/NOS 2006    Hypertriglyceridemia     OA (osteoarthritis)     Thyroid disease     Trochanteric bursitis     Unspecified essential hypertension      Surgical-  Past Surgical History:   Procedure Laterality Date    3 teeth pulled      INJECT EPIDURAL TRANSFORAMINAL  2014    [facility]    INJECT JOINT SACROILIAC  2014    [facility]    LAMINECTOMY LUMBAR ONE LEVEL Left 2014    [facility]  DELIVERY ONLY      , Low Cervical     Injuries-none    Active Problems:  Patient Active Problem List   Diagnosis    Moderate persistent asthma without complication    Allergic rhinitis due to other allergen    Esophageal reflux    Moderate recurrent major depression (H)    Multiple joint pain    HYPERLIPIDEMIA LDL GOAL <130    Hypertension goal BP (blood pressure) < 140/90    Generalized anxiety disorder    Myalgia    Chronic rhinitis    Constipation    Lumbar disc disease with radiculopathy    Iron deficiency anemia    Osteoarthritis of carpometacarpal joint of thumb - bilateral    Trochanteric bursitis    Right ankle instability    Primary focal hyperhidrosis    Trochanteric bursitis of both hips    Overweight    Iron deficiency     Scratching    Chronic, continuous use of opioids    Medical marijuana use    Primary osteoarthritis of both first carpometacarpal joints    Arthritis of metatarsophalangeal joint    Sinus tachycardia    Intractable chronic migraine without aura and without status migrainosus    Impaired fasting glucose    Migraine without aura and without status migrainosus, not intractable    Skin ulcer, limited to breakdown of skin (H)    Morbid obesity (H)    Platelet granule defect (H)    Aphthous ulcer    Rash    Pruritus    Behcet's syndrome involving oral mucosa (H)    Encounter for long-term (current) use of high-risk medication    Dermatitis    Encounter for long-term current use of high risk medication    Acquired platelet disorder (H)    Atopic neurodermatitis    Facial eczema    Skin pain    Prurigo nodularis    Chronic kidney disease, stage 3a (H)    Anxiety     Allergies   Allergen Reactions    Cats      and rabbits/wheezing    Dogs      wheezing    Lyrica [Pregabalin] Other (See Comments)     Rash, mouth sores, itching and burning    Seasonal Allergies      rhinits     Social History:   with 2 children. Not working at present. Non-smoker. No EtOH.   Social History     Socioeconomic History    Marital status:      Spouse name: Not on file    Number of children: Not on file    Years of education: Not on file    Highest education level: Not on file   Occupational History    Not on file   Tobacco Use    Smoking status: Former     Current packs/day: 0.00     Average packs/day: 0.3 packs/day for 33.7 years (10.1 ttl pk-yrs)     Types: Cigarettes     Start date: 1/1/1980     Quit date: 9/1/2013     Years since quitting: 10.8     Passive exposure: Past    Smokeless tobacco: Never    Tobacco comments:     since 1981   Vaping Use    Vaping status: Never Used   Substance and Sexual Activity    Alcohol use: No    Drug use: Yes     Comment: medical [facility]     Sexual activity: Not Currently     Partners: Male      Birth control/protection: None   Other Topics Concern    Parent/sibling w/ CABG, MI or angioplasty before 65F 55M? No     Service No    Blood Transfusions No    Caffeine Concern No    Occupational Exposure Not Asked    Hobby Hazards Not Asked    Sleep Concern No    Stress Concern No    Weight Concern Yes    Special Diet No    Back Care Not Asked    Exercise No    Bike Helmet No     Comment: Doesn't ride a bike    Seat Belt Yes    Self-Exams Yes     Comment: she does self breast exams every other month   Social History Narrative    Not on file     Social Determinants of Health     Financial Resource Strain: Low Risk  (12/29/2023)    Financial Resource Strain     Within the past 12 months, have you or your family members you live with been unable to get utilities (heat, electricity) when it was really needed?: No   Food Insecurity: Low Risk  (12/29/2023)    Food Insecurity     Within the past 12 months, did you worry that your food would run out before you got money to buy more?: No     Within the past 12 months, did the food you bought just not last and you didn t have money to get more?: No   Transportation Needs: Low Risk  (12/29/2023)    Transportation Needs     Within the past 12 months, has lack of transportation kept you from medical appointments, getting your medicines, non-medical meetings or appointments, work, or from getting things that you need?: No   Physical Activity: Not on file   Stress: Not on file   Social Connections: Not on file   Interpersonal Safety: Low Risk  (5/29/2024)    Interpersonal Safety     Do you feel physically and emotionally safe where you currently live?: Yes     Within the past 12 months, have you been hit, slapped, kicked or otherwise physically hurt by someone?: No     Within the past 12 months, have you been humiliated or emotionally abused in other ways by your partner or ex-partner?: No   Housing Stability: Low Risk  (12/29/2023)    Housing Stability     Do you have  "housing? : Yes     Are you worried about losing your housing?: No     Family History:    Family History   Problem Relation Age of Onset    Thyroid Disease Mother     Arthritis Mother     Colon Cancer Mother     Myelodysplastic syndrome Mother     Hypertension Father     Diabetes Father     C.A.D. Father         MI age 75    Cardiovascular Father         heart attack    Cerebrovascular Disease Father     Cancer Father         renal cancer    Arthritis Father     Heart Disease Father         heart attack    Gastrointestinal Disease Father         liver    Genitourinary Problems Father         kidney    Thyroid Disease Sister     Arthritis Sister     Lipids Sister     Asthma Daughter     Gastrointestinal Disease Daughter     Multiple Sclerosis Maternal Aunt     Mastocytosis Nephew         \"System Mast Cell Disease and hyperesosinophilia\"    Unknown Other     Breast Cancer No family hx of     Cancer - colorectal No family hx of     Alzheimer Disease No family hx of     Blood Disease No family hx of     Circulatory No family hx of     Eye Disorder No family hx of     Musculoskeletal Disorder No family hx of     Neurologic Disorder No family hx of     Respiratory No family hx of     Melanoma No family hx of     Skin Cancer No family hx of      Review Of Systems:  +purple dots on her LE  +intermittent chest pains with anxiety or GERD  +occasional heartburn  +left leg numbness with prolonged standing  +dark skin on LE in the sun exposed areas with longstanding use of tetracyclines  +dry mouth  +cataract and planning surgery  Remainder of the 14 point ROS obtained and found negative.    Physical Exam:  Constitutional: WD-WN-WG cooperative  Eyes: nl EOM, sclera  ENT: nl external ears, nose, hearing, lips  Neck: no visible thyroid enlargement  Resp: nl effort  MS: R>L 1st MCP swelling and Z-deformity of the thumb; All other neck, shoulder, elbow, wrist, hand joints were examined and otherwise found normal. No active synovitis. " Full ROM. +heberden's nodes.  Skin: no alopecia; discrete areas of crusting facial lesions  Neuro: nl cranial nerves   Psych: nl judgement, affect    Laboratory:    Recent Results (from the past 1344 hour(s))   Comprehensive metabolic panel (BMP + Alb, Alk Phos, ALT, AST, Total. Bili, TP)    Collection Time: 04/26/24 12:10 PM   Result Value Ref Range    Sodium 138 135 - 145 mmol/L    Potassium 3.9 3.4 - 5.3 mmol/L    Carbon Dioxide (CO2) 28 22 - 29 mmol/L    Anion Gap 11 7 - 15 mmol/L    Urea Nitrogen 31.7 (H) 8.0 - 23.0 mg/dL    Creatinine 1.44 (H) 0.51 - 0.95 mg/dL    GFR Estimate 41 (L) >60 mL/min/1.73m2    Calcium 10.6 (H) 8.8 - 10.2 mg/dL    Chloride 99 98 - 107 mmol/L    Glucose 118 (H) 70 - 99 mg/dL    Alkaline Phosphatase 119 40 - 150 U/L    AST 24 0 - 45 U/L    ALT 17 0 - 50 U/L    Protein Total 7.7 6.4 - 8.3 g/dL    Albumin 4.5 3.5 - 5.2 g/dL    Bilirubin Total 0.5 <=1.2 mg/dL   TSH with free T4 reflex    Collection Time: 04/26/24 12:10 PM   Result Value Ref Range    TSH 0.99 0.30 - 4.20 uIU/mL   Vitamin D Deficiency    Collection Time: 04/26/24 12:10 PM   Result Value Ref Range    Vitamin D, Total (25-Hydroxy) 35 20 - 50 ng/mL   CBC with platelets and differential    Collection Time: 04/26/24 12:10 PM   Result Value Ref Range    WBC Count 11.5 (H) 4.0 - 11.0 10e3/uL    RBC Count 4.92 3.80 - 5.20 10e6/uL    Hemoglobin 14.9 11.7 - 15.7 g/dL    Hematocrit 44.6 35.0 - 47.0 %    MCV 91 78 - 100 fL    MCH 30.3 26.5 - 33.0 pg    MCHC 33.4 31.5 - 36.5 g/dL    RDW 13.9 10.0 - 15.0 %    Platelet Count 380 150 - 450 10e3/uL    % Neutrophils 53 %    % Lymphocytes 37 %    % Monocytes 8 %    % Eosinophils 2 %    % Basophils 0 %    % Immature Granulocytes 0 %    Absolute Neutrophils 6.1 1.6 - 8.3 10e3/uL    Absolute Lymphocytes 4.3 0.8 - 5.3 10e3/uL    Absolute Monocytes 0.9 0.0 - 1.3 10e3/uL    Absolute Eosinophils 0.2 0.0 - 0.7 10e3/uL    Absolute Basophils 0.0 0.0 - 0.2 10e3/uL    Absolute Immature Granulocytes 0.0  <=0.4 10e3/uL   Basic metabolic panel  (Ca, Cl, CO2, Creat, Gluc, K, Na, BUN)    Collection Time: 05/01/24  5:35 PM   Result Value Ref Range    Sodium 138 135 - 145 mmol/L    Potassium 3.8 3.4 - 5.3 mmol/L    Chloride 102 98 - 107 mmol/L    Carbon Dioxide (CO2) 22 22 - 29 mmol/L    Anion Gap 14 7 - 15 mmol/L    Urea Nitrogen 32.7 (H) 8.0 - 23.0 mg/dL    Creatinine 1.61 (H) 0.51 - 0.95 mg/dL    GFR Estimate 36 (L) >60 mL/min/1.73m2    Calcium 9.7 8.8 - 10.2 mg/dL    Glucose 95 70 - 99 mg/dL   CBC with platelets and differential    Collection Time: 05/01/24  5:35 PM   Result Value Ref Range    WBC Count 10.2 4.0 - 11.0 10e3/uL    RBC Count 4.93 3.80 - 5.20 10e6/uL    Hemoglobin 14.8 11.7 - 15.7 g/dL    Hematocrit 44.2 35.0 - 47.0 %    MCV 90 78 - 100 fL    MCH 30.0 26.5 - 33.0 pg    MCHC 33.5 31.5 - 36.5 g/dL    RDW 13.7 10.0 - 15.0 %    Platelet Count 344 150 - 450 10e3/uL    % Neutrophils 48 %    % Lymphocytes 39 %    % Monocytes 9 %    % Eosinophils 3 %    % Basophils 0 %    % Immature Granulocytes 0 %    Absolute Neutrophils 4.9 1.6 - 8.3 10e3/uL    Absolute Lymphocytes 4.0 0.8 - 5.3 10e3/uL    Absolute Monocytes 0.9 0.0 - 1.3 10e3/uL    Absolute Eosinophils 0.3 0.0 - 0.7 10e3/uL    Absolute Basophils 0.0 0.0 - 0.2 10e3/uL    Absolute Immature Granulocytes 0.0 <=0.4 10e3/uL   CBC with platelets and differential    Collection Time: 05/10/24 11:11 AM   Result Value Ref Range    WBC Count 7.3 4.0 - 11.0 10e3/uL    RBC Count 4.31 3.80 - 5.20 10e6/uL    Hemoglobin 13.1 11.7 - 15.7 g/dL    Hematocrit 39.0 35.0 - 47.0 %    MCV 91 78 - 100 fL    MCH 30.4 26.5 - 33.0 pg    MCHC 33.6 31.5 - 36.5 g/dL    RDW 13.6 10.0 - 15.0 %    Platelet Count 282 150 - 450 10e3/uL    % Neutrophils 42 %    % Lymphocytes 47 %    % Monocytes 8 %    % Eosinophils 3 %    % Basophils 0 %    % Immature Granulocytes 0 %    Absolute Neutrophils 3.0 1.6 - 8.3 10e3/uL    Absolute Lymphocytes 3.4 0.8 - 5.3 10e3/uL    Absolute Monocytes 0.6 0.0 - 1.3  10e3/uL    Absolute Eosinophils 0.2 0.0 - 0.7 10e3/uL    Absolute Basophils 0.0 0.0 - 0.2 10e3/uL    Absolute Immature Granulocytes 0.0 <=0.4 10e3/uL   Basic metabolic panel  (Ca, Cl, CO2, Creat, Gluc, K, Na, BUN)    Collection Time: 05/10/24 11:12 AM   Result Value Ref Range    Sodium 141 135 - 145 mmol/L    Potassium 3.3 (L) 3.4 - 5.3 mmol/L    Chloride 104 98 - 107 mmol/L    Carbon Dioxide (CO2) 29 22 - 29 mmol/L    Anion Gap 8 7 - 15 mmol/L    Urea Nitrogen 11.3 8.0 - 23.0 mg/dL    Creatinine 1.08 (H) 0.51 - 0.95 mg/dL    GFR Estimate 57 (L) >60 mL/min/1.73m2    Calcium 9.6 8.8 - 10.2 mg/dL    Glucose 92 70 - 99 mg/dL   Magnesium    Collection Time: 05/10/24 11:12 AM   Result Value Ref Range    Magnesium 1.8 1.7 - 2.3 mg/dL   UA Macroscopic with reflex to Microscopic and Culture - Lab Collect    Collection Time: 05/10/24 11:20 AM    Specimen: Urine, NOS   Result Value Ref Range    Color Urine Yellow Colorless, Straw, Light Yellow, Yellow    Appearance Urine Clear Clear    Glucose Urine Negative Negative mg/dL    Bilirubin Urine Negative Negative    Ketones Urine Negative Negative mg/dL    Specific Gravity Urine 1.020 1.003 - 1.035    Blood Urine Negative Negative    pH Urine 6.0 5.0 - 7.0    Protein Albumin Urine Negative Negative mg/dL    Urobilinogen Urine 0.2 0.2, 1.0 E.U./dL    Nitrite Urine Negative Negative    Leukocyte Esterase Urine Negative Negative   Albumin Random Urine Quantitative with Creat Ratio    Collection Time: 05/10/24 11:20 AM   Result Value Ref Range    Creatinine Urine mg/dL 139.6 mg/dL    Albumin Urine mg/L <12.0 mg/L    Albumin Urine mg/g Cr     Basic metabolic panel  (Ca, Cl, CO2, Creat, Gluc, K, Na, BUN)    Collection Time: 05/29/24  1:40 PM   Result Value Ref Range    Sodium 141 135 - 145 mmol/L    Potassium 3.7 3.4 - 5.3 mmol/L    Chloride 105 98 - 107 mmol/L    Carbon Dioxide (CO2) 25 22 - 29 mmol/L    Anion Gap 11 7 - 15 mmol/L    Urea Nitrogen 17.3 8.0 - 23.0 mg/dL    Creatinine  1.31 (H) 0.51 - 0.95 mg/dL    GFR Estimate 46 (L) >60 mL/min/1.73m2    Calcium 9.2 8.8 - 10.2 mg/dL    Glucose 107 (H) 70 - 99 mg/dL     My review of photographs in the media tab suggests dusky gray skin discoloration    Impression:    Multifactorial mucocutaneous lesions-with history of ulcers due to prurigo/neurodermatitis, platelet dysfunction and easy bruising/bleeding, and oral aphthous ulcers, and hyperpigmentation. The question to me is whether Behcet's disease underlies any or all of these abnormal features. The patient is currently on colchicine and apremilast, either of which can be effective in Behcet's, and she is generally improved. She has no other features of Behcet's such as genital ulcers, ischemic intestinal disease, ocular disease, neurological, or vasculitic disease. While she has chronic kidney disease, this may relate more to chronic pain and NSAID use. She has joint pains that likely relate to osteoarthritis and pain amplification syndrome. Importantly, she lacks any symptoms of systemic inflammatory disease. Taken together she does not meet criteria for Behcet's diagnosis; however, this is clouded by her use of her current medication regimen. Based on all of this, her improvement in symptoms on apremilast increases the likelihood that she has a behcet's like process and I agree to continue the medication. Plan to get markers of systemic inflammation. She will follow up with Dr. Warren in Dermatology for her skin problems. Of note, her skin hyperpigmentation is reminiscent of tetracycline toxicity based on my review of the photographs, but I will defer to Dr. Warren regarding the evaluation and management of that problem.    Hand osteoarthritis-by exam. I will get Xrays to assess this further for any signs of inflammatory arthritis.    Bone health-I will ask for a DEXA scan now to assess her risk for future fracture.     Long term management of immunosuppression-with stable toxicity screening.  Avoid NSAIDs in view of her CKD.     I appreciated the opportunity to play a role in this patient's care. Plan for follow up in 6-12 months.    Jay Hawkins MD  Professor of Medicine  Director, Division of Rheumatic and Autoimmune Diseases    A total of 87 minutes was spent in face to face patient interaction and chart review on the day of service.    The longitudinal plan of care for the diagnosis(es)/condition(s) as documented were addressed during this visit. Due to the added complexity in care, I will continue to support Jacquie in the subsequent management and with ongoing continuity of care.

## 2024-06-18 NOTE — TELEPHONE ENCOUNTER
Pt is NOT wanting to schedule at this time.    She was advised to call if/when she is ready to schedule.     See today's mychart for more information.      Denisse RN-BSN  [facility]   [phone]

## 2024-06-18 NOTE — NURSING NOTE
Is the patient currently in the state of [place]? YES    Visit mode:VIDEO    If the visit is dropped, the patient can be reconnected by: VIDEO VISIT: Text to cell phone:   Telephone Information:   Mobile [phone]       Will anyone else be joining the visit? NO  (If patient encounters technical issues they should call [phone])    How would you like to obtain your AVS? MyChart    Are changes needed to the allergy or medication list? Yes Pt states she does not have an allergy to Lyrica, Pt declined med review, and Pt stated no med changes    Are refills needed on medications prescribed by this physician? NO    Reason for visit: Consult    Amilcar PEOPLES

## 2024-06-18 NOTE — PATIENT INSTRUCTIONS
Get a DEXA scan and Xrays of the hands  Get laboratory testing of your inflammation markers  Continue the Otezla  Follow up with me in 6-12 months

## 2024-06-19 DIAGNOSIS — R00.0 SINUS TACHYCARDIA: ICD-10-CM

## 2024-06-19 RX ORDER — METOPROLOL SUCCINATE 25 MG/1
25 TABLET, EXTENDED RELEASE ORAL 3 TIMES DAILY
Qty: 270 TABLET | Refills: 1 | Status: SHIPPED | OUTPATIENT
Start: 2024-06-19 | End: 2024-06-27

## 2024-06-19 NOTE — TELEPHONE ENCOUNTER
See the 6/18/24 mychart encounter for more information.    Denisse Maldonado RN-BSN  [facility]

## 2024-06-19 NOTE — TELEPHONE ENCOUNTER
Spoke to patient, she states he did not have any questions regarding insurance. I did inform her that there is not any prior approval for insurance. She states she is upset that no one called her to schedule this. She is also upset that she is banned from the pain clinic.       When looking at scheduling, it does state she was dismissed 2018. Patient has been seen since 2018 (most recently 6/6/24) Supervisor (José Miguel ZUÑIGA) was emailed to look into this further as I am not sure what is going on with patients chart          Chantal RIGGS  Complex   [facility]

## 2024-06-21 ENCOUNTER — MYC MEDICAL ADVICE (OUTPATIENT)
Dept: PALLIATIVE MEDICINE | Facility: CLINIC | Age: 64
End: 2024-06-21
Payer: COMMERCIAL

## 2024-06-21 NOTE — TELEPHONE ENCOUNTER
See the 6/18/24 mychart encounter for more information.    Denisse Maldonado RN-BSN  [facility]

## 2024-06-24 ENCOUNTER — MYC MEDICAL ADVICE (OUTPATIENT)
Dept: FAMILY MEDICINE | Facility: CLINIC | Age: 64
End: 2024-06-24
Payer: COMMERCIAL

## 2024-06-24 DIAGNOSIS — F11.90 CHRONIC, CONTINUOUS USE OF OPIOIDS: ICD-10-CM

## 2024-06-24 DIAGNOSIS — G89.4 CHRONIC PAIN SYNDROME: ICD-10-CM

## 2024-06-24 RX ORDER — TRAMADOL HYDROCHLORIDE 50 MG/1
50 TABLET ORAL EVERY 12 HOURS PRN
Qty: 60 TABLET | Refills: 0 | Status: SHIPPED | OUTPATIENT
Start: 2024-06-24 | End: 2024-07-26

## 2024-06-24 NOTE — TELEPHONE ENCOUNTER
Screening Questions for RFA Procedure      Procedure ordered? LRFA     What insurance are we billing for this procedure?  [employer]  IF SCHEDULING AT [facility] PLEASE SCHEDULE AT LEAST 7-10 BUSINESS DAYS OUT SO A PA CAN BE OBTAINED    Is patient scheduled at [facility]? no  If YES, route every encounter to [facility] CARE NAVIGATION POOL [[id number]]  Has patient had this injection before? No  Any chance of pregnancy? NO   If YES, do NOT schedule and route to RN pool     Is  Needed?: No  Will patient have a ?  Yes   If pt is given sedation meds, no driving for 24 hours.  Is pt taking a cab or transportation service? no      If so will need to be accompanied by an adult too (friend/family member) in order for IV sedation to be given.    Per [facility] Policy:  Outpatients are to have responsible adult or family member to accompany them at discharge and drive them home. A service providing medically trained drivers or attendants would be acceptable. Public transportation would not be acceptable unless the patient is accompanied by a responsible adult or family member.  Is patient taking any blood thinners (i.e. plavix, coumadin, jantoven, warfarin, heparin, pradaxa or dabigatran, etc)? No   If YES, do NOT schedule, and route to RN pool    Is patient taking any aspirin products? no - Pt takes 0mg daily; instructed to hold 0 day(s) prior to procedure.    If more than 325mg/day, OK to schedule; Instruct pt to decrease to less than 325 mg for 7 days AND route to RN pool  For CERVICAL procedures, hold all aspirin products for 6 days.   Tell pt that if aspirin product is not held for 6 days, the procedure WILL BE cancelled.      Does the patient have a bleeding or clotting disorder? No   If YES, it it OKAY to schedule AND route to RN pool  **For any patients with platelet count <100, must be forwarded to provider**    Is patient diabetic? No If YES, have them bring their  glucometer.    Does patient have an active infection or treated for one within the past week? No   If YES, do NOT schedule and route to RN nurse pool     Is patient currently taking any antibiotics?  Yes - Daily  For patients on chronic, preventative, or prophylactic antibiotics, procedures may be scheduled.   For patients on antibiotics for active or recent infection:antibiotic course must have been completed for 4 days    Is patient currently taking any steroid medications? (i.e. Prednisone, Medrol)  Yes -  Daily  For patients on steroid medications, course must have been completed for 4 days    Is patient actively being treated for cancer or immunocompromised, including the spleen having been removed? No  If YES, do NOT schedule and route to RN pool     Any history of complications with sedation medications?  NO   If YES, OK to schedule AND route to RN pool     Any history of sleep apnea?  NO   If YES, OK to schedule AND route to RN pool     Any cardiac history?  NO   If YES, OK to schedule AND route to RN pool     Do you have an implanted pacemaker, ICD (implanted cardiac device) or AICD (automatic implanted cardiac device)?  NO  If YES, do NOT schedule AND route to RN pool (for all providers including Dr Juan)   Obtain name of device :     Obtain name of cardiologist:       Do you have an implanted stimulator?  NO  If YES, OK to schedule AND route to nursing.   Instruct patient to bring in the remote to the appointment and it will need to be turned off.  reviewed      Does patient have an allergy to contrast dye, iodine or shellfish?  No   If YES, OK to schedule. Route to RN pool AND add allergy information to appointment notes    Are you able to get on and off an exam table with minimal or no assistance? Yes   If NO, do NOT schedule and route to RN pool    Are you able to roll over and lay on your stomach with minimal or no assistance? Yes   If NO, do NOT schedule and route to RN  pool    Reminders:  If you are started on any steroids or antibiotics between now and your appointment, you must contact us because it may affect our ability to perform your procedure.  Yes  Informed patient that s/he needs to fast for 6 hours before procedure?  YES  Informed patient that it is OK to take normal medications with sips of water, especially blood pressure medications, before the procedure and must hold blood thinners as instructed.  Yes  Informed patient to arrive 30 minutes before procedure time to have an IV inserted.  reviewed   Do NOT schedule at 0745, 0815 or 1245.  reviewed   All radiofrequency ablations are in a 60 minute time slot.If cervical RFA, please schedule each side separate. If okay to do bilateral cervical RFA, schedule for 90 minutes.  reviewed

## 2024-06-26 ENCOUNTER — MYC MEDICAL ADVICE (OUTPATIENT)
Dept: FAMILY MEDICINE | Facility: CLINIC | Age: 64
End: 2024-06-26
Payer: COMMERCIAL

## 2024-06-27 ENCOUNTER — TELEPHONE (OUTPATIENT)
Dept: RHEUMATOLOGY | Facility: CLINIC | Age: 64
End: 2024-06-27

## 2024-06-27 ENCOUNTER — LAB (OUTPATIENT)
Dept: LAB | Facility: CLINIC | Age: 64
End: 2024-06-27
Payer: COMMERCIAL

## 2024-06-27 ENCOUNTER — OFFICE VISIT (OUTPATIENT)
Dept: FAMILY MEDICINE | Facility: CLINIC | Age: 64
End: 2024-06-27
Payer: COMMERCIAL

## 2024-06-27 ENCOUNTER — MYC MEDICAL ADVICE (OUTPATIENT)
Dept: FAMILY MEDICINE | Facility: CLINIC | Age: 64
End: 2024-06-27

## 2024-06-27 VITALS
SYSTOLIC BLOOD PRESSURE: 116 MMHG | TEMPERATURE: 98 F | RESPIRATION RATE: 19 BRPM | HEIGHT: 65 IN | WEIGHT: 188.5 LBS | BODY MASS INDEX: 31.4 KG/M2 | DIASTOLIC BLOOD PRESSURE: 78 MMHG | HEART RATE: 74 BPM | OXYGEN SATURATION: 96 %

## 2024-06-27 DIAGNOSIS — K12.0 APHTHOUS ULCER: ICD-10-CM

## 2024-06-27 DIAGNOSIS — Z01.818 PRE-OPERATIVE EXAMINATION: Primary | ICD-10-CM

## 2024-06-27 DIAGNOSIS — H26.9 CATARACT OF BOTH EYES, UNSPECIFIED CATARACT TYPE: ICD-10-CM

## 2024-06-27 DIAGNOSIS — D69.3 IDIOPATHIC THROMBOCYTOPENIC PURPURA (H): ICD-10-CM

## 2024-06-27 DIAGNOSIS — J45.40 MODERATE PERSISTENT ASTHMA WITHOUT COMPLICATION: ICD-10-CM

## 2024-06-27 DIAGNOSIS — N18.31 CHRONIC KIDNEY DISEASE, STAGE 3A (H): ICD-10-CM

## 2024-06-27 DIAGNOSIS — F33.1 MODERATE RECURRENT MAJOR DEPRESSION (H): ICD-10-CM

## 2024-06-27 DIAGNOSIS — G43.009 MIGRAINE WITHOUT AURA AND WITHOUT STATUS MIGRAINOSUS, NOT INTRACTABLE: ICD-10-CM

## 2024-06-27 DIAGNOSIS — Z79.60 LONG-TERM USE OF IMMUNOSUPPRESSANT MEDICATION: ICD-10-CM

## 2024-06-27 DIAGNOSIS — M35.2 BEHCET'S DISEASE (H): ICD-10-CM

## 2024-06-27 DIAGNOSIS — Z01.818 PRE-OPERATIVE EXAMINATION: ICD-10-CM

## 2024-06-27 DIAGNOSIS — M51.16 LUMBAR DISC DISEASE WITH RADICULOPATHY: ICD-10-CM

## 2024-06-27 DIAGNOSIS — T14.8XXA BRUISING: ICD-10-CM

## 2024-06-27 DIAGNOSIS — D69.1 PLATELET GRANULE DEFECT (H): ICD-10-CM

## 2024-06-27 DIAGNOSIS — L74.519 PRIMARY FOCAL HYPERHIDROSIS: ICD-10-CM

## 2024-06-27 DIAGNOSIS — F11.90 CHRONIC, CONTINUOUS USE OF OPIOIDS: ICD-10-CM

## 2024-06-27 DIAGNOSIS — M35.2 BEHCET'S SYNDROME INVOLVING ORAL MUCOSA (H): ICD-10-CM

## 2024-06-27 DIAGNOSIS — F41.1 GENERALIZED ANXIETY DISORDER: ICD-10-CM

## 2024-06-27 DIAGNOSIS — I10 HYPERTENSION GOAL BP (BLOOD PRESSURE) < 140/90: ICD-10-CM

## 2024-06-27 LAB
CRP SERPL-MCNC: <3 MG/L
CYSTATIN C (ROCHE): 1.5 MG/L (ref 0.6–1)
ERYTHROCYTE [SEDIMENTATION RATE] IN BLOOD BY WESTERGREN METHOD: 12 MM/HR (ref 0–30)
GFR SERPL CREATININE-BSD FRML MDRD: 42 ML/MIN/1.73M2
TOTAL PROTEIN SERUM FOR ELP: 6.6 G/DL (ref 6.4–8.3)

## 2024-06-27 PROCEDURE — 85652 RBC SED RATE AUTOMATED: CPT

## 2024-06-27 PROCEDURE — 84155 ASSAY OF PROTEIN SERUM: CPT

## 2024-06-27 PROCEDURE — 83521 IG LIGHT CHAINS FREE EACH: CPT

## 2024-06-27 PROCEDURE — G2211 COMPLEX E/M VISIT ADD ON: HCPCS | Performed by: FAMILY MEDICINE

## 2024-06-27 PROCEDURE — 82610 CYSTATIN C: CPT

## 2024-06-27 PROCEDURE — 84165 PROTEIN E-PHORESIS SERUM: CPT | Performed by: STUDENT IN AN ORGANIZED HEALTH CARE EDUCATION/TRAINING PROGRAM

## 2024-06-27 PROCEDURE — 80048 BASIC METABOLIC PNL TOTAL CA: CPT

## 2024-06-27 PROCEDURE — 99214 OFFICE O/P EST MOD 30 MIN: CPT | Performed by: FAMILY MEDICINE

## 2024-06-27 PROCEDURE — 36415 COLL VENOUS BLD VENIPUNCTURE: CPT

## 2024-06-27 PROCEDURE — 86140 C-REACTIVE PROTEIN: CPT

## 2024-06-27 PROCEDURE — 86334 IMMUNOFIX E-PHORESIS SERUM: CPT | Performed by: STUDENT IN AN ORGANIZED HEALTH CARE EDUCATION/TRAINING PROGRAM

## 2024-06-27 PROCEDURE — 82784 ASSAY IGA/IGD/IGG/IGM EACH: CPT

## 2024-06-27 RX ORDER — METOPROLOL SUCCINATE 25 MG/1
25 TABLET, EXTENDED RELEASE ORAL 2 TIMES DAILY
COMMUNITY
Start: 2024-06-27

## 2024-06-27 RX ORDER — FLUCONAZOLE 150 MG/1
150 TABLET ORAL
Qty: 6 TABLET | Refills: 0 | Status: SHIPPED | OUTPATIENT
Start: 2024-06-27

## 2024-06-27 ASSESSMENT — ANXIETY QUESTIONNAIRES
GAD7 TOTAL SCORE: 6
1. FEELING NERVOUS, ANXIOUS, OR ON EDGE: MORE THAN HALF THE DAYS
IF YOU CHECKED OFF ANY PROBLEMS ON THIS QUESTIONNAIRE, HOW DIFFICULT HAVE THESE PROBLEMS MADE IT FOR YOU TO DO YOUR WORK, TAKE CARE OF THINGS AT HOME, OR GET ALONG WITH OTHER PEOPLE: NOT DIFFICULT AT ALL
2. NOT BEING ABLE TO STOP OR CONTROL WORRYING: SEVERAL DAYS
4. TROUBLE RELAXING: NOT AT ALL
GAD7 TOTAL SCORE: 6
8. IF YOU CHECKED OFF ANY PROBLEMS, HOW DIFFICULT HAVE THESE MADE IT FOR YOU TO DO YOUR WORK, TAKE CARE OF THINGS AT HOME, OR GET ALONG WITH OTHER PEOPLE?: NOT DIFFICULT AT ALL
7. FEELING AFRAID AS IF SOMETHING AWFUL MIGHT HAPPEN: NOT AT ALL
5. BEING SO RESTLESS THAT IT IS HARD TO SIT STILL: NOT AT ALL
GAD7 TOTAL SCORE: 6
3. WORRYING TOO MUCH ABOUT DIFFERENT THINGS: SEVERAL DAYS
6. BECOMING EASILY ANNOYED OR IRRITABLE: MORE THAN HALF THE DAYS
7. FEELING AFRAID AS IF SOMETHING AWFUL MIGHT HAPPEN: NOT AT ALL

## 2024-06-27 ASSESSMENT — ASTHMA QUESTIONNAIRES
ACT_TOTALSCORE: 21
QUESTION_3 LAST FOUR WEEKS HOW OFTEN DID YOUR ASTHMA SYMPTOMS (WHEEZING, COUGHING, SHORTNESS OF BREATH, CHEST TIGHTNESS OR PAIN) WAKE YOU UP AT NIGHT OR EARLIER THAN USUAL IN THE MORNING: ONCE A WEEK
ACT_TOTALSCORE: 21
QUESTION_5 LAST FOUR WEEKS HOW WOULD YOU RATE YOUR ASTHMA CONTROL: COMPLETELY CONTROLLED
QUESTION_2 LAST FOUR WEEKS HOW OFTEN HAVE YOU HAD SHORTNESS OF BREATH: NOT AT ALL
QUESTION_1 LAST FOUR WEEKS HOW MUCH OF THE TIME DID YOUR ASTHMA KEEP YOU FROM GETTING AS MUCH DONE AT WORK, SCHOOL OR AT HOME: NONE OF THE TIME
QUESTION_4 LAST FOUR WEEKS HOW OFTEN HAVE YOU USED YOUR RESCUE INHALER OR NEBULIZER MEDICATION (SUCH AS ALBUTEROL): TWO OR THREE TIMES PER WEEK

## 2024-06-27 ASSESSMENT — PATIENT HEALTH QUESTIONNAIRE - PHQ9
SUM OF ALL RESPONSES TO PHQ QUESTIONS 1-9: 4
SUM OF ALL RESPONSES TO PHQ QUESTIONS 1-9: 4
10. IF YOU CHECKED OFF ANY PROBLEMS, HOW DIFFICULT HAVE THESE PROBLEMS MADE IT FOR YOU TO DO YOUR WORK, TAKE CARE OF THINGS AT HOME, OR GET ALONG WITH OTHER PEOPLE: SOMEWHAT DIFFICULT

## 2024-06-27 ASSESSMENT — PAIN SCALES - GENERAL: PAINLEVEL: EXTREME PAIN (9)

## 2024-06-27 NOTE — PATIENT INSTRUCTIONS
Important Takeaway Points From This Visit:     Hold Dupixent (Dupilumab) for 14 days after the procedure (24)  No topicals the day of the procedure.  Hold your hydrochlorothiazide the morning of the procedure.  Triptans, migraine abortives: DO NOT TAKE on day of surgery      Separately from your surgery, hold your colchicine while on the diflucan.      Melanocyte stimulating hormone?         Patient Education   Preparing for Your Surgery  Getting started  A nurse will call you to review your health history and instructions. They will give you an arrival time based on your scheduled surgery time. Please be ready to share:  Your doctor's clinic name and phone number  Your medical, surgical, and anesthesia history  A list of allergies and sensitivities  A list of medicines, including herbal treatments and over-the-counter drugs  Whether the patient has a legal guardian (ask how to send us the papers in advance)  Please tell us if you're pregnant--or if there's any chance you might be pregnant. Some surgeries may injure a fetus (unborn baby), so they require a pregnancy test. Surgeries that are safe for a fetus don't always need a test, and you can choose whether to have one.   If you have a child who's having surgery, please ask for a copy of Preparing for Your Child's Surgery.    Preparing for surgery  Within 10 to 30 days of surgery: Have a pre-op exam (sometimes called an H&P, or History and Physical). This can be done at a clinic or pre-operative center.  If you're having a , you may not need this exam. Talk to your care team.  At your pre-op exam, talk to your care team about all medicines you take. If you need to stop any medicines before surgery, ask when to start taking them again.  We do this for your safety. Many medicines can make you bleed too much during surgery. Some change how well surgery (anesthesia) drugs work.  Call your insurance company to let them know you're having surgery. (If you  don't have insurance, call [phone].)  Call your clinic if there's any change in your health. This includes signs of a cold or flu (sore throat, runny nose, cough, rash, fever). It also includes a scrape or scratch near the surgery site.  If you have questions on the day of surgery, call your hospital or surgery center.  Eating and drinking guidelines  For your safety: Unless your surgeon tells you otherwise, follow the guidelines below.  Eat and drink as usual until 8 hours before you arrive for surgery. After that, no food or milk.  Drink clear liquids until 2 hours before you arrive. These are liquids you can see through, like water, Gatorade, and Propel Water. They also include plain black coffee and tea (no cream or milk), candy, and breath mints. You can spit out gum when you arrive.  If you drink alcohol: Stop drinking it the night before surgery.  If your care team tells you to take medicine on the morning of surgery, it's okay to take it with a sip of water.  Preventing infection  Shower or bathe the night before and morning of your surgery. Follow the instructions your clinic gave you. (If no instructions, use regular soap.)  Don't shave or clip hair near your surgery site. We'll remove the hair if needed.  Don't smoke or vape the morning of surgery. You may chew nicotine gum up to 2 hours before surgery. A nicotine patch is okay.  Note: Some surgeries require you to completely quit smoking and nicotine. Check with your surgeon.  Your care team will make every effort to keep you safe from infection. We will:  Clean our hands often with soap and water (or an alcohol-based hand rub).  Clean the skin at your surgery site with a special soap that kills germs.  Give you a special gown to keep you warm. (Cold raises the risk of infection.)  Wear special hair covers, masks, gowns and gloves during surgery.  Give antibiotic medicine, if prescribed. Not all surgeries need antibiotics.  What to bring on the day  of surgery  Photo ID and insurance card  Copy of your health care directive, if you have one  Glasses and hearing aids (bring cases)  You can't wear contacts during surgery  Inhaler and eye drops, if you use them (tell us about these when you arrive)  CPAP machine or breathing device, if you use them  A few personal items, if spending the night  If you have . . .  A pacemaker, ICD (cardiac defibrillator) or other implant: Bring the ID card.  An implanted stimulator: Bring the remote control.  A legal guardian: Bring a copy of the certified (court-stamped) guardianship papers.  Please remove any jewelry, including body piercings. Leave jewelry and other valuables at home.  If you're going home the day of surgery  You must have a responsible adult drive you home. They should stay with you overnight as well.  If you don't have someone to stay with you, and you aren't safe to go home alone, we may keep you overnight. Insurance often won't pay for this.  After surgery  If it's hard to control your pain or you need more pain medicine, please call your surgeon's office.  Questions?   If you have any questions for your care team, list them here: _________________________________________________________________________________________________________________________________________________________________________ ____________________________________ ____________________________________ ____________________________________  For informational purposes only. Not to replace the advice of your health care provider. Copyright   2003, 2019 [facility]. All rights reserved. Clinically reviewed by Fatemeh Mckeon MD. [employer] [id number] - REV 12/22.

## 2024-06-27 NOTE — PROGRESS NOTES
Preoperative Evaluation  [facility] [place]  [address]  [place] [address]  Phone: [phone]  Fax: [phone]  Primary Provider: Liz Peterson MD  Pre-op Performing Provider: Earnest Noel MD  Jun 27, 2024 6/27/2024   Surgical Information   What procedure is being done? laser on eyes   Facility or Hospital where procedure/surgery will be performed: [facility] eye surgery   Who is doing the procedure / surgery? Dr Castaneda   Date of surgery / procedure: July 1&July 3   Time of surgery / procedure: 2:00pm   Where do you plan to recover after surgery? at home with family      Fax number for surgical facility:     Assessment & Plan     The proposed surgical procedure is considered LOW risk.    1. Pre-operative examination  Medically optimized for proposed procedure.  May proceed as planned.  Up-to-date on tetanus.  Chronic conditions controlled.  Able to tolerate 4 METS.   - CBC with platelets; Future  - Basic metabolic panel  (Ca, Cl, CO2, Creat, Gluc, K, Na, BUN); Future    2. Cataract of both eyes, unspecified cataract type  Plan for procedure above.    3. Idiopathic thrombocytopenic purpura (H)  4. Platelet granule defect (H)  Moniotr plts  - CBC with platelets; Future    5. Behcet's syndrome involving oral mucosa (H)  Hold topicals day of procedure. On chronic prednisone. Low risk procedure, stress dosed steroids not indicated. Hold dupixant 14 days before and after procedure.    6. Chronic kidney disease, stage 3a (H)  BMP done. Continue losartan for low risk procedure.    7. Moderate recurrent major depression (H)  8. Generalized anxiety disorder  Continue current regiment.wellbutrin, trazodone, cymbalta,     9. Hypertension goal BP (blood pressure) < 140/90  Controlled. Hold hydrochlorothiazide. Continue losartan and BB.  - Basic metabolic panel  (Ca, Cl, CO2, Creat, Gluc, K, Na, BUN); Future    10. Migraine without aura and without status  migrainosus, not intractable  Avoid triptans day prior to procedure    11. Moderate persistent asthma without complication  Continue inhaler use and singulair.    12. Lumbar disc disease with radiculopathy  13. Chronic, continuous use of opioids  Continue PRN tramadol. Not on NSAIDs.    14. Primary focal hyperhidrosis  Continue oxybutynin.       - No identified additional risk factors other than previously addressed    Antiplatelet or Anticoagulation Medication Instructions   - Patient is on no antiplatelet or anticoagulation medications.    Additional Medication Instructions  Hold Dupixent (Dupilumab) for 14 days after the procedure (7/17/24)  No topicals the day of the procedure.  Hold your hydrochlorothiazide the morning of the procedure.  Triptans, migraine abortives: DO NOT TAKE on day of surgery    Recommendation  Approval given to proceed with proposed procedure, without further diagnostic evaluation.    Earnest Noel MD  [facility]    Disclaimer: This note consists of symbols derived from keyboarding, dictation and/or voice recognition software. As a result, there may be errors in the script that have gone undetected. Please consider this when interpreting information found in this chart.      Veronica Dhillon is a 63 year old, presenting for the following:  Pre-Op Exam          6/27/2024     3:09 PM   Additional Questions   Roomed by LOR GUERRERO   Accompanied by SELF     HPI related to upcoming procedure: vision correction        6/27/2024   Pre-Op Questionnaire   Have you ever had a heart attack or stroke? No   Have you ever had surgery on your heart or blood vessels, such as a stent placement, a coronary artery bypass, or surgery on an artery in your head, neck, heart, or legs? No   Do you have chest pain with activity? No   Do you have a history of heart failure? No   Do you currently have a cold, bronchitis or symptoms of other infection? No   Do you have a cough,  shortness of breath, or wheezing? No   Do you or anyone in your family have previous history of blood clots? No   Do you or does anyone in your family have a serious bleeding problem such as prolonged bleeding following surgeries or cuts? (!) YES -  Hx of ITP   Have you ever had problems with anemia or been told to take iron pills? No   Have you had any abnormal blood loss such as black, tarry or bloody stools, or abnormal vaginal bleeding? No   Have you ever had a blood transfusion? No   Are you willing to have a blood transfusion if it is medically needed before, during, or after your surgery? Yes   Have you or any of your relatives ever had problems with anesthesia? (!) YES - daughter had a hard time coming out of anesthesia.   Do you have sleep apnea, excessive snoring or daytime drowsiness? No   Do you have any artifical heart valves or other implanted medical devices like a pacemaker, defibrillator, or continuous glucose monitor? No   Do you have artificial joints? No   Are you allergic to latex? No      Health Care Directive  Patient does not have a Health Care Directive or Living Will: Discussed advance care planning with patient; however, patient declined at this time.    Extended Emergency Contact Information  Primary Emergency Contact: TREMAINE ZAYAS  Address: [id number] [place] [address] [facility]  Home Phone: [phone]  Mobile Phone: [phone]  Relation: Spouse  Hearing or visual needs: None  Other needs: None  Preferred language: English   needed? No    Preoperative Review of    reviewed - controlled substances reflected in medication list.    Status of Chronic Conditions:  See problem list for active medical problems.  Problems all longstanding and stable, except as noted/documented.  See ROS for pertinent symptoms related to these conditions.    Patient Active Problem List    Diagnosis Date Noted    Anxiety 06/02/2024     Priority: Medium    Chronic kidney  disease, stage 3a (H) 05/30/2024     Priority: Medium    Prurigo nodularis 08/13/2023     Priority: Medium    Skin pain 03/04/2023     Priority: Medium    Atopic neurodermatitis 02/04/2023     Priority: Medium    Facial eczema 02/04/2023     Priority: Medium    Acquired platelet disorder (H) 12/28/2022     Priority: Medium    Dermatitis 12/07/2022     Priority: Medium    Encounter for long-term current use of high risk medication 12/07/2022     Priority: Medium    Encounter for long-term (current) use of high-risk medication 11/06/2022     Priority: Medium    Behcet's syndrome involving oral mucosa (H) 11/03/2022     Priority: Medium    Aphthous ulcer 10/15/2022     Priority: Medium    Rash 10/15/2022     Priority: Medium    Pruritus 10/15/2022     Priority: Medium    Platelet granule defect (H) 12/22/2021     Priority: Medium     Seeadelia Grimes      Morbid obesity (H) 08/05/2021     Priority: Medium    Skin ulcer, limited to breakdown of skin (H) 07/19/2019     Priority: Medium    Migraine without aura and without status migrainosus, not intractable 12/21/2018     Priority: Medium    Intractable chronic migraine without aura and without status migrainosus 05/29/2018     Priority: Medium    Impaired fasting glucose 05/29/2018     Priority: Medium    Sinus tachycardia 12/08/2017     Priority: Medium    Primary osteoarthritis of both first carpometacarpal joints 09/28/2017     Priority: Medium    Arthritis of metatarsophalangeal joint 09/28/2017     Priority: Medium    Medical marijuana use 12/19/2016     Priority: Medium    Chronic, continuous use of opioids      Priority: Medium    Scratching 04/26/2016     Priority: Medium    Iron deficiency 04/18/2016     Priority: Medium    Overweight 03/15/2016     Priority: Medium    Trochanteric bursitis of both hips 08/27/2015     Priority: Medium    Primary focal hyperhidrosis 07/03/2015     Priority: Medium    Right ankle instability 06/25/2015     Priority: Medium     Osteoarthritis of carpometacarpal joint of thumb - bilateral 03/26/2015     Priority: Medium    Trochanteric bursitis 03/26/2015     Priority: Medium    Iron deficiency anemia 11/14/2014     Priority: Medium    Constipation 04/04/2014     Priority: Medium    Lumbar disc disease with radiculopathy 04/04/2014     Priority: Medium    Chronic rhinitis 06/10/2013     Priority: Medium    Myalgia 04/11/2013     Priority: Medium    Generalized anxiety disorder 05/10/2011     Priority: Medium     Diagnosis updated by automated process. Provider to review and confirm.      Hypertension goal BP (blood pressure) < 140/90 01/18/2011     Priority: Medium    HYPERLIPIDEMIA LDL GOAL <130 10/31/2010     Priority: Medium    Multiple joint pain 11/18/2009     Priority: Medium     Sees Rheumatology and Orthopedics at [place].  Receives steroid injections, but also uses Vicodin for pain.    Patient is followed by JAMILA OSORIO for ongoing prescription of narcotic pain medicine.  Med: Vicodin.   Maximum use per month: 60  Expected duration: indeterminate  Narcotic agreement on file: NO  Clinic visit recommended: Q 6  months        Moderate recurrent major depression (H) 12/04/2008     Priority: Medium    Esophageal reflux 07/05/2007     Priority: Medium    Allergic rhinitis due to other allergen 06/30/2006     Priority: Medium    Moderate persistent asthma without complication 09/21/2005     Priority: Medium      Past Medical History:   Diagnosis Date    ASTHMA - MODERATE PERSISTENT 9/21/2005    Chronic pain     Coronary artery disease     CVA (cerebral infarction)     Depressive disorder, not elsewhere classified     Diabetes (H)     Elevated serum alkaline phosphatase level     Liver source    Fibromyalgia     HYPERLIPIDEMIA NEC/NOS 12/29/2006    Hypertriglyceridemia     OA (osteoarthritis)     Thyroid disease     Trochanteric bursitis     Unspecified essential hypertension      Past Surgical History:   Procedure Laterality Date     3 teeth pulled      INJECT EPIDURAL TRANSFORAMINAL  2014    [facility]    INJECT JOINT SACROILIAC  2014    [facility]    LAMINECTOMY LUMBAR ONE LEVEL Left 2014    [facility]  DELIVERY ONLY      , Low Cervical     Current Outpatient Medications   Medication Sig Dispense Refill    acetaminophen (TYLENOL) 500 MG tablet Take 500-1,000 mg by mouth every 6 hours as needed for mild pain      acetylcysteine (N-ACETYL-L-CYSTEINE) 600 MG CAPS capsule Take 1 capsule (600 mg) by mouth 2 times daily 180 capsule 2    apremilast (OTEZLA) 30 MG tablet Take 1 tablet (30 mg) by mouth 2 times daily Hold for signs of infection, and seek medical attention. 60 tablet 5    atorvastatin (LIPITOR) 20 MG tablet Take 1 tablet (20 mg) by mouth daily 90 tablet 1    augmented betamethasone dipropionate (DIPROLENE AF) 0.05 % external cream Apply topically 2 times daily 50 g 3    buPROPion (WELLBUTRIN XL) 150 MG 24 hr tablet Take 1 tablet (150 mg) by mouth every morning To take with the 300 mg dose for a total of 450 mg daily. 90 tablet 0    buPROPion (WELLBUTRIN XL) 300 MG 24 hr tablet Take 1 tablet (300 mg) by mouth every morning To take with the 150 mg dose for a total of 450 mg daily. 90 tablet 0    clotrimazole (MYCELEX) 10 MG lozenge Place 1 lozenge (10 mg) inside cheek 5 times daily 70 lozenge 1    colchicine (COLCRYS) 0.6 MG tablet Take 1 tablet (0.6 mg) by mouth 2 times daily 180 tablet 1    desonide (DESOWEN) 0.05 % external cream Apply topically 2 times daily To sores on face 60 g 3    doxycycline monohydrate (MONODOX) 100 MG capsule Take 1 capsule (100 mg) by mouth 2 times daily After meals and with big glass of water 60 capsule 2    DULoxetine (CYMBALTA) 60 MG capsule Take 2 capsules (120 mg) by mouth daily 180 capsule 1    famotidine (PEPCID) 40 MG tablet Take 1 tablet (40 mg) by mouth daily 90 tablet 1    fluconazole (DIFLUCAN) 150 MG  tablet Take 1 tablet (150 mg) by mouth every 3 days 6 tablet 0    fluocinonide (LIDEX) 0.05 % external gel Apply topically 2 times daily as needed 15 g 1    hydrochlorothiazide (HYDRODIURIL) 25 MG tablet Take 1 tablet (25 mg) by mouth daily 90 tablet 1    lidocaine, viscous, (XYLOCAINE) 2 % solution Swish and spit 10ml every 3 hours as needed for oral pain; max 8 doses/24hrs.  Do not eat or chew gum for 60 minutes following use. 100 mL 1    losartan (COZAAR) 100 MG tablet Take 1 tablet (100 mg) by mouth daily 90 tablet 1    metoprolol succinate ER (TOPROL XL) 100 MG 24 hr tablet Take 1 tablet (100 mg) by mouth 2 times daily 180 tablet 1    metoprolol succinate ER (TOPROL XL) 25 MG 24 hr tablet Take 1 tablet (25 mg) by mouth 2 times daily      montelukast (SINGULAIR) 10 MG tablet Take 1 tablet (10 mg) by mouth at bedtime 90 tablet 3    mupirocin (BACTROBAN) 2 % external cream Apply topically 3 times daily Apply to affected area three times daily 30 g 3    nystatin (MYCOSTATIN) 912354 UNIT/ML suspension Take 5 mLs (500,000 Units) by mouth 4 times daily for 42 days Has recurrent thrush. Uses for 2 weeks (280 ml)  at a time as needed. 280 mL 2    oxyBUTYnin ER (DITROPAN XL) 5 MG 24 hr tablet Take 2 tablets (10 mg) by mouth daily 180 tablet 2    predniSONE (DELTASONE) 10 MG tablet Take 1 tablet (10 mg) by mouth daily 90 tablet 3    tiotropium (SPIRIVA RESPIMAT) 2.5 MCG/ACT inhaler Inhale 2 puffs into the lungs daily 12 g 3    traMADol (ULTRAM) 50 MG tablet Take 1 tablet (50 mg) by mouth every 12 hours as needed for moderate to severe pain 60 tablet 0    tranexamic acid (LYSTEDA) 650 MG tablet Please take 1 tablet (650 mg) in the morning and 2 tablets (1300 mg) in the evening as needed for episodes of bleeding. 270 tablet 0    traZODone (DESYREL) 50 MG tablet Take 3 tablets (150 mg) by mouth at bedtime 270 tablet 1    albuterol (PROVENTIL) (2.5 MG/3ML) 0.083% neb solution Take 1 vial (2.5 mg) by nebulization every 6 hours  as needed for shortness of breath or wheezing (Patient not taking: Reported on 6/27/2024) 3 mL 4    albuterol (VENTOLIN HFA) 108 (90 Base) MCG/ACT inhaler INHALE 2 PUFFS INTO THE LUNGS EVERY 6 HOURS AS NEEDED FOR SHORTNESS OF BREATH / DYSPNEA (Patient not taking: Reported on 6/27/2024) 54 g 1    benzonatate (TESSALON) 200 MG capsule Take 1 capsule (200 mg) by mouth 2 times daily as needed for cough (Patient not taking: Reported on 6/27/2024) 40 capsule 1    chlorhexidine (PERIDEX) 0.12 % solution Swish and spit 15 mLs in mouth 2 times daily (Patient not taking: Reported on 6/27/2024) 1893 mL 4    clindamycin (CLINDAMAX) 1 % external gel Apply topically 2 times daily (Patient not taking: Reported on 6/27/2024) 30 g 4    COMPOUND CONTAINING CONTROLLED SUBSTANCE (CMPD RX) - PHARMACY TO MIX COMPOUNDED MEDICATION Compound amitriptyline 2% and ketamine 0.5% in 80g of lipoderm or Vanicream (Patient not taking: Reported on 6/27/2024) 80 g 0    dextran 70-hypromellose (TEARS NATURALE FREE PF) 0.1-0.3 % ophthalmic solution Place 2 drops into both eyes daily as needed (for dry eyes) (Patient not taking: Reported on 6/27/2024) 35 each 3    dupilumab (DUPIXENT) 300 MG/2ML prefilled pen Inject 2 mLs (300 mg) Subcutaneous every 14 days After first loading dose (Patient not taking: Reported on 6/27/2024) 4 mL 1    hydrocortisone 2.5 % cream APPLY TOPICALLY 2 TIMES DAILY AS NEEDED (Patient not taking: Reported on 6/27/2024) 30 g 3    mometasone-formoterol (DULERA) 200-5 MCG/ACT inhaler Inhale 2 puffs into the lungs 2 times daily (Patient not taking: Reported on 6/27/2024) 39 g 1    ondansetron (ZOFRAN ODT) 4 MG ODT tab Take 1 tablet (4 mg) by mouth every 8 hours as needed for nausea (Patient not taking: Reported on 6/27/2024) 20 tablet 0    pimecrolimus (ELIDEL) 1 % external cream Apply topically 2 times daily (Patient not taking: Reported on 6/27/2024) 60 g 3    rizatriptan (MAXALT) 10 MG tablet Take 1 tablet (10 mg) by mouth at  onset of headache for migraine May repeat in 2 hours. Max 3 tablets/24 hours. (Patient not taking: Reported on 6/27/2024) 18 tablet 1    rOPINIRole (REQUIP) 1 MG tablet Take 3 tablets (3 mg) by mouth at bedtime 270 tablet 3    SUMAtriptan (IMITREX) 100 MG tablet take 1 tablet at onset of headache may repeat in 2 hours max 2 tabs/day (Patient not taking: Reported on 6/27/2024) 18 tablet 1    tacrolimus (PROTOPIC) 0.1 % external ointment Apply topically 2 times daily To areas of sores on face (Patient not taking: Reported on 6/27/2024) 60 g 1    triamcinolone (ARISTOCORT HP) 0.5 % external cream Apply topically 2 times daily (Patient not taking: Reported on 6/27/2024) 60 g 0    valACYclovir (VALTREX) 500 MG tablet Take 1 tablet (500 mg) by mouth daily (Patient not taking: Reported on 6/27/2024) 90 tablet 1       Allergies   Allergen Reactions    Cats      and rabbits/wheezing    Dogs      wheezing    Lyrica [Pregabalin] Other (See Comments)     Rash, mouth sores, itching and burning    Seasonal Allergies      rhinits        Social History     Tobacco Use    Smoking status: Former     Current packs/day: 0.00     Average packs/day: 0.3 packs/day for 33.7 years (10.1 ttl pk-yrs)     Types: Cigarettes     Start date: 1/1/1980     Quit date: 9/1/2013     Years since quitting: 10.8     Passive exposure: Past    Smokeless tobacco: Never    Tobacco comments:     since 1981   Substance Use Topics    Alcohol use: No     Family History   Problem Relation Age of Onset    Thyroid Disease Mother     Arthritis Mother     Colon Cancer Mother     Myelodysplastic syndrome Mother     Hypertension Father     Diabetes Father     C.A.D. Father         MI age 75    Cardiovascular Father         heart attack    Cerebrovascular Disease Father     Cancer Father         renal cancer    Arthritis Father     Heart Disease Father         heart attack    Gastrointestinal Disease Father         liver    Genitourinary Problems Father         kidney  "   Thyroid Disease Sister     Arthritis Sister     Lipids Sister     Asthma Daughter     Gastrointestinal Disease Daughter     Multiple Sclerosis Maternal Aunt     Mastocytosis Nephew         \"System Mast Cell Disease and hyperesosinophilia\"    Unknown Other     Breast Cancer No family hx of     Cancer - colorectal No family hx of     Alzheimer Disease No family hx of     Blood Disease No family hx of     Circulatory No family hx of     Eye Disorder No family hx of     Musculoskeletal Disorder No family hx of     Neurologic Disorder No family hx of     Respiratory No family hx of     Melanoma No family hx of     Skin Cancer No family hx of      History   Drug Use     Comment: medical [facility]        Review of Systems  Constitutional, HEENT, cardiovascular, pulmonary, GI, , musculoskeletal, neuro, skin, endocrine and psych systems are negative, except as otherwise noted.    Objective    /78   Pulse 74   Temp 98  F (36.7  C) (Temporal)   Resp 19   Ht 1.651 m (5' 5\")   Wt 85.5 kg (188 lb 8 oz)   LMP 07/17/2009 (Exact Date)   SpO2 96%   BMI 31.37 kg/m     Estimated body mass index is 31.37 kg/m  as calculated from the following:    Height as of this encounter: 1.651 m (5' 5\").    Weight as of this encounter: 85.5 kg (188 lb 8 oz).  Physical Exam  Constitutional:       Appearance: Normal appearance. She is obese.   HENT:      Head: Normocephalic and atraumatic.      Right Ear: Tympanic membrane, ear canal and external ear normal. There is no impacted cerumen.      Left Ear: Tympanic membrane, ear canal and external ear normal. There is no impacted cerumen.      Nose: Nose normal. No congestion.      Mouth/Throat:      Mouth: Mucous membranes are moist.      Pharynx: Oropharynx is clear. No oropharyngeal exudate or posterior oropharyngeal erythema.   Eyes:      Extraocular Movements: Extraocular movements intact.      Conjunctiva/sclera: Conjunctivae normal.      Pupils: Pupils are equal, round, and " reactive to light.   Cardiovascular:      Rate and Rhythm: Normal rate and regular rhythm.      Heart sounds: Normal heart sounds. No murmur heard.     No friction rub. No gallop.   Pulmonary:      Effort: No respiratory distress.      Breath sounds: No wheezing or rhonchi.   Abdominal:      General: Abdomen is flat. There is no distension.      Palpations: Abdomen is soft. There is no mass.      Tenderness: There is no abdominal tenderness. There is no guarding.   Musculoskeletal:         General: No swelling.      Cervical back: Normal range of motion and neck supple.   Skin:     Findings: No rash.   Neurological:      General: No focal deficit present.      Mental Status: She is alert and oriented to person, place, and time.      Cranial Nerves: No cranial nerve deficit.      Sensory: No sensory deficit.      Motor: No weakness.      Gait: Gait normal.   Psychiatric:         Mood and Affect: Mood normal.         Behavior: Behavior normal.         Thought Content: Thought content normal.         Judgment: Judgment normal.       Diagnostics  Labs pending at this time.  Results will be reviewed when available.     No EKG required for low risk surgery (cataract, skin procedure, breast biopsy, etc).    Revised Cardiac Risk Index (RCRI)  The patient has the following serious cardiovascular risks for perioperative complications:   - No serious cardiac risks = 0 points     RCRI Interpretation: 0 points: Class I (very low risk - 0.4% complication rate)       Signed Electronically by: Earnest Noel MD  Copy of this evaluation report is provided to requesting physician.

## 2024-06-27 NOTE — TELEPHONE ENCOUNTER
Patient Contacted for the patient to call back and schedule the following:    Appointment type: Return Rheumatology  Provider: Dr. Hawkins  Return date: December 18th, 2024  Specialty phone number: [phone]  Additional appointment(s) needed: DEXA and xray  Additonal Notes: Pt will call to schedule appts later

## 2024-06-28 ENCOUNTER — LAB (OUTPATIENT)
Dept: LAB | Facility: CLINIC | Age: 64
End: 2024-06-28
Payer: COMMERCIAL

## 2024-06-28 ENCOUNTER — TELEPHONE (OUTPATIENT)
Dept: FAMILY MEDICINE | Facility: CLINIC | Age: 64
End: 2024-06-28

## 2024-06-28 DIAGNOSIS — I10 HYPERTENSION GOAL BP (BLOOD PRESSURE) < 140/90: ICD-10-CM

## 2024-06-28 DIAGNOSIS — D69.3 IDIOPATHIC THROMBOCYTOPENIC PURPURA (H): ICD-10-CM

## 2024-06-28 DIAGNOSIS — Z01.818 PRE-OPERATIVE EXAMINATION: ICD-10-CM

## 2024-06-28 DIAGNOSIS — Z01.818 PRE-OPERATIVE EXAMINATION: Primary | ICD-10-CM

## 2024-06-28 LAB
ALBUMIN SERPL ELPH-MCNC: 3.8 G/DL (ref 3.7–5.1)
ALPHA1 GLOB SERPL ELPH-MCNC: 0.3 G/DL (ref 0.2–0.4)
ALPHA2 GLOB SERPL ELPH-MCNC: 0.8 G/DL (ref 0.5–0.9)
ANION GAP SERPL CALCULATED.3IONS-SCNC: 11 MMOL/L (ref 7–15)
ANION GAP SERPL CALCULATED.3IONS-SCNC: 17 MMOL/L (ref 7–15)
B-GLOBULIN SERPL ELPH-MCNC: 0.8 G/DL (ref 0.6–1)
BUN SERPL-MCNC: 14.8 MG/DL (ref 8–23)
BUN SERPL-MCNC: 15.3 MG/DL (ref 8–23)
CALCIUM SERPL-MCNC: 9.2 MG/DL (ref 8.8–10.2)
CALCIUM SERPL-MCNC: 9.4 MG/DL (ref 8.8–10.2)
CHLORIDE SERPL-SCNC: 103 MMOL/L (ref 98–107)
CHLORIDE SERPL-SCNC: 105 MMOL/L (ref 98–107)
CREAT SERPL-MCNC: 1.09 MG/DL (ref 0.51–0.95)
CREAT SERPL-MCNC: 1.12 MG/DL (ref 0.51–0.95)
DEPRECATED HCO3 PLAS-SCNC: 21 MMOL/L (ref 22–29)
DEPRECATED HCO3 PLAS-SCNC: 26 MMOL/L (ref 22–29)
EGFRCR SERPLBLD CKD-EPI 2021: 55 ML/MIN/1.73M2
EGFRCR SERPLBLD CKD-EPI 2021: 57 ML/MIN/1.73M2
ERYTHROCYTE [DISTWIDTH] IN BLOOD BY AUTOMATED COUNT: 14.4 % (ref 10–15)
GAMMA GLOB SERPL ELPH-MCNC: 0.9 G/DL (ref 0.7–1.6)
GLUCOSE SERPL-MCNC: 120 MG/DL (ref 70–99)
GLUCOSE SERPL-MCNC: 92 MG/DL (ref 70–99)
HCT VFR BLD AUTO: 40.2 % (ref 35–47)
HGB BLD-MCNC: 13.5 G/DL (ref 11.7–15.7)
IGA SERPL-MCNC: 142 MG/DL (ref 84–499)
IGG SERPL-MCNC: 881 MG/DL (ref 610–1616)
IGM SERPL-MCNC: 96 MG/DL (ref 35–242)
KAPPA LC FREE SER-MCNC: 2.09 MG/DL (ref 0.33–1.94)
KAPPA LC FREE/LAMBDA FREE SER NEPH: 0.86 {RATIO} (ref 0.26–1.65)
LAMBDA LC FREE SERPL-MCNC: 2.42 MG/DL (ref 0.57–2.63)
M PROTEIN SERPL ELPH-MCNC: 0 G/DL
MCH RBC QN AUTO: 30.1 PG (ref 26.5–33)
MCHC RBC AUTO-ENTMCNC: 33.6 G/DL (ref 31.5–36.5)
MCV RBC AUTO: 90 FL (ref 78–100)
PATH REPORT.COMMENTS IMP SPEC: NORMAL
PATH REPORT.COMMENTS IMP SPEC: NORMAL
PLATELET # BLD AUTO: 302 10E3/UL (ref 150–450)
POTASSIUM SERPL-SCNC: 3.6 MMOL/L (ref 3.4–5.3)
POTASSIUM SERPL-SCNC: 3.9 MMOL/L (ref 3.4–5.3)
PROT PATTERN SERPL ELPH-IMP: NORMAL
PROT PATTERN SERPL IFE-IMP: NORMAL
RBC # BLD AUTO: 4.48 10E6/UL (ref 3.8–5.2)
SODIUM SERPL-SCNC: 140 MMOL/L (ref 135–145)
SODIUM SERPL-SCNC: 143 MMOL/L (ref 135–145)
WBC # BLD AUTO: 9.4 10E3/UL (ref 4–11)

## 2024-06-28 PROCEDURE — 85027 COMPLETE CBC AUTOMATED: CPT

## 2024-06-28 PROCEDURE — 36415 COLL VENOUS BLD VENIPUNCTURE: CPT

## 2024-06-28 PROCEDURE — 80048 BASIC METABOLIC PNL TOTAL CA: CPT

## 2024-06-28 NOTE — RESULT ENCOUNTER NOTE
Bhargav Dhillon,    If you have not viewed these results on [employer] within 3 days, we will use an alternative method to contact you. We will contact you via the following protocol:    - Via letter if your results are normal.  - Via phone ([phone]) if your results are abnormal.     Here are my comments about your recent results:    CBC Results - Your cell counts were normal.    Please call the clinic ([phone]), or message us on [employer] with any questions you may have.     Have a great day,    Dr. Samuel

## 2024-06-28 NOTE — TELEPHONE ENCOUNTER
Note is complete. Please fax. Jacquie was supposed to have labs done, but I do not see these were drawn. I can add them on to a sample drawn yesterday and will fax results when available.    Earnest Noel MD

## 2024-06-28 NOTE — RESULT ENCOUNTER NOTE
Bhargav Dhillon,    If you have not viewed these results on [employer] within 3 days, we will use an alternative method to contact you. We will contact you via the following protocol:    - Via letter if your results are normal.  - Via phone ([phone]) if your results are abnormal.     Here are my comments about your recent results:    BMP - Your blood sugar was normal. Your kidney function and electrolytes were normal/stable for you.    Please call the clinic ([phone]), or message us on [employer] with any questions you may have.     Have a great day,    Dr. Samuel

## 2024-06-28 NOTE — TELEPHONE ENCOUNTER
Yomaira from Medical records is calling to have Pre-op signed, the surgery is scheduled for tomorrow.  Yomaira's callback number is  [phone]

## 2024-07-02 ENCOUNTER — E-VISIT (OUTPATIENT)
Dept: FAMILY MEDICINE | Facility: CLINIC | Age: 64
End: 2024-07-02
Payer: COMMERCIAL

## 2024-07-02 DIAGNOSIS — J06.9 UPPER RESPIRATORY TRACT INFECTION, UNSPECIFIED TYPE: Primary | ICD-10-CM

## 2024-07-02 PROCEDURE — 99207 PR NON-BILLABLE SERV PER CHARTING: CPT | Performed by: FAMILY MEDICINE

## 2024-07-02 NOTE — PATIENT INSTRUCTIONS
Dear Jacquie,    We are sorry you are not feeling well. Based on the responses you provided, it is recommended that you be seen in-person in clinic so we can better evaluate your symptoms. Please schedule this visit in [employer]. You will have a Schedule Now button in [employer] to help with scheduling this appointment. Otherwise, you can call [phone] to schedule an appointment.     You will not be charged for this eVisit. Thank you for trusting us with your care.     Eligio Gray MD

## 2024-07-03 ENCOUNTER — MYC MEDICAL ADVICE (OUTPATIENT)
Dept: FAMILY MEDICINE | Facility: CLINIC | Age: 64
End: 2024-07-03
Payer: COMMERCIAL

## 2024-07-03 NOTE — TELEPHONE ENCOUNTER
Please add the patient to any same day or approval slot with available provider sometime this week.  Eligio Gray MD on 7/2/2024 at 7:12 PM

## 2024-07-03 NOTE — TELEPHONE ENCOUNTER
Sorry for the trouble, please remove appointment with Justyna, Patient is asking to cancel. Much appreciated      Provider E-Visit time total (minutes): No charge.

## 2024-07-05 NOTE — TELEPHONE ENCOUNTER
Scheduled for a video visit 7/12 since she is unable to drive after eye surgery.     Future Appointments 7/5/2024 - 1/1/2025        Date Visit Type Length Department Provider     7/12/2024  3:00 PM OFFICE VISIT 30 min [facility] Liz Pedro MD    Location Instructions:     [facility] is located at [address] one mile north of the Highway [facility] exit off of [address]. From [address] [facility]/[address], exit to turn west on [address], then turn south on [address].               8/28/2024  3:30 PM OFFICE VISIT 30 min [facility] Liz Pedro MD    Location Instructions:     [facility] is located at [address] one mile north of the [address] exit off of [address]. From [address], exit to turn west on [address], then turn south on [address].               9/3/2024  1:30 PM [facility]C OFFICE VISIT  30 min [facility] Liz Pedro MD    Location Instructions:     [facility] is located at [address] one mile north of the Highway [facility] exit off of [address]. From [address] [facility]/[address], exit to turn west on [address], then turn south on [address].               9/16/2024  9:30 AM RETURN - MTM 30 min  ADULT RHEUMATOLOGY Golden Cadena RPH    Location Instructions:     [facility] [place] area with multiple transportation and parking options. You may wish to review options for  service and self-parking in more detail on the [facility] s website at [email or web address].     This appointment is in a hospital-based location.&nbsp; Before your visit, you may want to check with your insurance company for coverage and referral options, including cost differences between services provided in different clinic settings.&nbsp; For more information visit this link on the [employer]  Website:&nbsp; [email or web address]              10/22/2024 12:30 PM LAB 15 min [facility] LAB[facility]ATORY LAB FIRST FLOOR [facility]    Location Instructions:     The clinic is located at [address] in [place].&nbsp; We offer free parking in our on-site lot.              10/22/2024  1:00 PM NEW NEPHROLOGY 60 min [facility] NEPHRO Rafael Cunningham MD              12/5/2024  2:45 PM RETURN DERMATOLOGY 15 min [facility] DERMATOLOGY Jay Warren MD    Location Instructions:     [facility] [place] area with multiple transportation and parking options. You may wish to review options for  service and self-parking in more detail on the [facility] [place] website at [email or web address].

## 2024-07-06 DIAGNOSIS — L20.89 OTHER ATOPIC DERMATITIS: ICD-10-CM

## 2024-07-10 NOTE — TELEPHONE ENCOUNTER
Last seen by Dr. Warren 5/30/24. Next appt 12/5/24  Assessment & Plan:  Multiple skin ulcers resembling prurigo/neurodermatitis  Facial erosions, chronic active problem, uncontrolled.  But slowly improving.  Patient with a history of recurring oral and genital sores, likely recurrent aphthous ulcers, as well as multiple diffuse discrete skin ulcers. Today, Jacquie notes ongoing skin sores on face and describes that her condition has had a large emotional impact. She does endorse that she will pick at the lesions when they become painful to try to express the contents and relieve the pain. She did not start the amitriptyline/ketamine cream after last visit. Discussed continue current regimen and restarting the compound cream. Reviewed dermatologic medications and discussed that they are unlikely to be contributing to elevated creatinine. Discussed contribution of anxiety to condition and patient expressed interest in psychiatry referral for further evaluation.   - Gentle skin care regimen  - Elidel cream bid (patient prefers a cream to Protopic ointment)  - Betamethasone ointment and desonide cream as needed for persistent skin sores and pain  - Can restart amytriptyline/ketamine cream to apply twice daily to areas of sores as desired  - Dupixent 300mg q2 weeks and is tolerating this without problems   - N-acetylcystiene 600 mg bid  - Psych referral for anxiety/depression

## 2024-07-11 ENCOUNTER — MYC MEDICAL ADVICE (OUTPATIENT)
Dept: FAMILY MEDICINE | Facility: CLINIC | Age: 64
End: 2024-07-11
Payer: COMMERCIAL

## 2024-07-12 NOTE — TELEPHONE ENCOUNTER
Patient has video visit with you this afternoon (7/12) and would like to change to inperson. Are you ok with doing that change?     Marie Laura CMA ([facility])

## 2024-07-12 NOTE — TELEPHONE ENCOUNTER
Updated appt and sent new arrival time to patient.    Marie Laura CMA ([facility])

## 2024-07-18 ENCOUNTER — MYC MEDICAL ADVICE (OUTPATIENT)
Dept: FAMILY MEDICINE | Facility: CLINIC | Age: 64
End: 2024-07-18
Payer: COMMERCIAL

## 2024-07-18 ENCOUNTER — TELEPHONE (OUTPATIENT)
Dept: FAMILY MEDICINE | Facility: CLINIC | Age: 64
End: 2024-07-18
Payer: COMMERCIAL

## 2024-07-18 NOTE — TELEPHONE ENCOUNTER
Called patient and made appointment.    Appointments in Next Year      Jul 26, 2024 3:00 PM  (Arrive by 2:40 PM)  Provider Visit with Liz Peterson MD  [facility] ([facility]) [phone]     Liane Wharton RN  [facility] - Registered Nurse  Clinic Triage [facility]   July 18, 2024

## 2024-07-18 NOTE — TELEPHONE ENCOUNTER
See telephone call      Liane Wharton RN  [facility] - Registered Nurse  Clinic Triage [facility]   July 18, 2024

## 2024-07-22 NOTE — TELEPHONE ENCOUNTER
Patient Contacted for the patient to call back and schedule the following:    Appointment type: Return Rheumatology  Provider: Dr. Hawkins  Return date: July 30th, 2024  Specialty phone number: [phone]  Additional appointment(s) needed: DEXA Scan  Additonal Notes: Pt reports in pain and wanted sooner appt. Offered virtual availability in February for in person scheduling as well. Pt declined and informed might look elsewhere for care.

## 2024-07-26 ENCOUNTER — OFFICE VISIT (OUTPATIENT)
Dept: FAMILY MEDICINE | Facility: CLINIC | Age: 64
End: 2024-07-26
Payer: COMMERCIAL

## 2024-07-26 VITALS
WEIGHT: 190 LBS | SYSTOLIC BLOOD PRESSURE: 120 MMHG | DIASTOLIC BLOOD PRESSURE: 78 MMHG | TEMPERATURE: 97.7 F | HEART RATE: 73 BPM | OXYGEN SATURATION: 97 % | BODY MASS INDEX: 31.62 KG/M2

## 2024-07-26 DIAGNOSIS — G89.4 CHRONIC PAIN SYNDROME: ICD-10-CM

## 2024-07-26 DIAGNOSIS — M51.16 LUMBAR DISC DISEASE WITH RADICULOPATHY: Primary | ICD-10-CM

## 2024-07-26 DIAGNOSIS — M25.511 ACUTE PAIN OF RIGHT SHOULDER: ICD-10-CM

## 2024-07-26 DIAGNOSIS — E66.811 CLASS 1 OBESITY DUE TO EXCESS CALORIES WITHOUT SERIOUS COMORBIDITY WITH BODY MASS INDEX (BMI) OF 30.0 TO 30.9 IN ADULT: ICD-10-CM

## 2024-07-26 DIAGNOSIS — F11.90 CHRONIC, CONTINUOUS USE OF OPIOIDS: ICD-10-CM

## 2024-07-26 DIAGNOSIS — E66.09 CLASS 1 OBESITY DUE TO EXCESS CALORIES WITHOUT SERIOUS COMORBIDITY WITH BODY MASS INDEX (BMI) OF 30.0 TO 30.9 IN ADULT: ICD-10-CM

## 2024-07-26 DIAGNOSIS — L98.499 NON-PRESSURE CHRONIC ULCER OF SKIN OF OTHER SITES WITH UNSPECIFIED SEVERITY (H): ICD-10-CM

## 2024-07-26 DIAGNOSIS — M25.50 MULTIPLE JOINT PAIN: ICD-10-CM

## 2024-07-26 DIAGNOSIS — E78.5 HYPERLIPIDEMIA LDL GOAL <130: ICD-10-CM

## 2024-07-26 PROCEDURE — G2211 COMPLEX E/M VISIT ADD ON: HCPCS | Performed by: FAMILY MEDICINE

## 2024-07-26 PROCEDURE — 99215 OFFICE O/P EST HI 40 MIN: CPT | Performed by: FAMILY MEDICINE

## 2024-07-26 RX ORDER — TRAMADOL HYDROCHLORIDE 50 MG/1
50 TABLET ORAL EVERY 12 HOURS PRN
Qty: 60 TABLET | Refills: 0 | Status: SHIPPED | OUTPATIENT
Start: 2024-07-26

## 2024-07-26 RX ORDER — DULOXETIN HYDROCHLORIDE 60 MG/1
120 CAPSULE, DELAYED RELEASE ORAL DAILY
Qty: 180 CAPSULE | Refills: 1 | Status: SHIPPED | OUTPATIENT
Start: 2024-07-26

## 2024-07-26 RX ORDER — LIFITEGRAST 50 MG/ML
SOLUTION/ DROPS OPHTHALMIC
COMMUNITY
Start: 2024-07-25

## 2024-07-26 ASSESSMENT — PAIN SCALES - GENERAL: PAINLEVEL: SEVERE PAIN (7)

## 2024-07-26 NOTE — PROGRESS NOTES
Assessment & Plan     Lumbar disc disease with radiculopathy  Patient has long history of low back pain and radiculopathy.  She has had some difficulty with getting established and getting visits within her timeframe.  She would like to be managed at a pain center again.  Discussed options and put a referral for [facility] pain.  Anticipate need for physical therapy as well as medication management potentially and possible some intervention with injections.  Patient is in agreement with this plan.  - Pain Management  Referral; Future  - Orthopedic  Referral; Future    Class 1 obesity due to excess calories without serious comorbidity with body mass index (BMI) of 30.0 to 30.9 in adult  Patient is interested in weight loss management.  She is interested in Wegovy.  Due to her back issues she has some difficulty with mobility but has been trying to walk.  She has made changes with her nutrition and has lost weight but has stalled out at this point.  There is no family history of thyroid cancers.  No history of any pancreatic issues.  Discussed options of Wegovy and other weight loss medications.  We discussed the side effects and risks and she understands.  She understands that she needs to continue to be active work on muscle.  Prescription sent for Wegovy.  She will need to follow-up prior to any changes in dose.  - Semaglutide-Weight Management (WEGOVY) 0.25 MG/0.5ML pen; Inject 0.25 mg subcutaneously once a week    Acute pain of right shoulder  Patient has new pain in the right shoulder.  She did have a fall but does not feel that the pain started then.  She is having some difficulty with raising her arm above her shoulder height.  No prior history of similar.  On exam does confirm that she has significant discomfort when trying to lift over her shoulder height.  She has no numbness or tingling normal  strength.  Recommend referral to sports medicine for consideration of injection.  Had  discussed physical therapy but she would like to consider how she does after seeing sports medicine.  - Orthopedic  Referral; Future    Chronic pain syndrome  Chronic, continuous use of opioids  Patient needs refill on the tramadol.  She uses this for her low back pain and joint pains.  She takes this up to twice daily.  Refill given.  - traMADol (ULTRAM) 50 MG tablet; Take 1 tablet (50 mg) by mouth every 12 hours as needed for moderate to severe pain    Multiple joint pain  Reports she does well on the duloxetine.  She would like to have this refilled.  Refills given.  - DULoxetine (CYMBALTA) 60 MG capsule; Take 2 capsules (120 mg) by mouth daily    Non-pressure chronic ulcer of skin of other sites with unspecified severity (H)  Patient is seeing dermatology for this care.    Hyperlipidemia LDL goal <130  Patient will have lipid panel done with her next labs.  Future order placed.  - Lipid panel reflex to direct LDL Non-fasting; Future      The longitudinal plan of care for the diagnosis(es)/condition(s) as documented were addressed during this visit. Due to the added complexity in care, I will continue to support Jacquie in the subsequent management and with ongoing continuity of care.      45 minutes spent by me on the date of the encounter doing patient visit and documentation         Subjective   Jacquie is a 64 year old, presenting for the following health issues:  Follow Up (Pain Clinic, [facility]), Recheck Medication, Medication Request (Wegovy), and Shoulder Pain  - Complains of pain in the right gluteus. Hard to go anywhere in the car or sit for too long.   - Would like to discuss weight loss medication  - shoulder pain after fall  - Discuss pain clinic in [place]  - Post op             7/26/2024     2:53 PM   Additional Questions   Roomed by Amilcar PERALES   Accompanied by Self     Shoulder Pain    History of Present Illness       Reason for visit:  Go over some issues    She eats 2-3 servings  of fruits and vegetables daily.She consumes 0 sweetened beverage(s) daily.She exercises with enough effort to increase her heart rate 9 or less minutes per day.  She exercises with enough effort to increase her heart rate 3 or less days per week.   She is taking medications regularly.         Pain History:  When did you first notice your pain? Pain started one month ago. Over the last couple of days she has had very bad pain in the Right shoulder down to the elbow. Traveling down to elbow  Have you seen anyone else for your pain? No  How has your pain affected your ability to work? Not applicable  Where in your body do you have pain? Musculoskeletal problem/pain  Onset/Duration: R should  Description  Location: Shoulder - right  Joint Swelling: No  Redness: No  Pain: YES  Warmth: No  Intensity:  severe  Progression of Symptoms:  worsening  Accompanying signs and symptoms:   Fevers: No  Numbness/tingling/weakness: No  History  Trauma to the area: YES- Fell about a month ago and tried to break her fall. She doesn't feel the pain is related.   Recent illness:  No  Previous similar problem: No  Previous evaluation:  No  Precipitating or alleviating factors:  Aggravating factors include: lifting  Therapies tried and outcome: rest/inactivity and heat, Voltaren and tramadol     Pain with lifting her right arm at the shoulder.   No other falls. No injury. No prior shoulder issues.             Pain Clinic - did 2 blocks to test and then scheduled an RFA and cannot get in until September. She had some disappointing interactions with staff at the [facility].     Feels like balance is still an issue. Pain at buttocks. Has some weakness she feels in her legs with her back pain.     Would like to try Wegovy. She has been walking. She has changed her nutrition. She has lost weight but stalled out.           Objective    /78 (BP Location: Left arm, Patient Position: Sitting, Cuff Size: Adult Regular)   Pulse 73    Temp 97.7  F (36.5  C) (Temporal)   LMP 07/17/2009 (Exact Date)   SpO2 97%   Body mass index is 31.62 kg/m .    Physical Exam   GENERAL: alert and no distress  NECK: no adenopathy, no asymmetry, masses, or scars  RESP: lungs clear to auscultation - no rales, rhonchi or wheezes  CV: regular rate and rhythm, normal S1 S2, no S3 or S4, no murmur, click or rub, no peripheral edema   ORTHO:   SHOULDER Exam-Right   Inspection: no swelling, no bruising, no discoloration, no obvious deformity, no asymmetry, no glenohumeral joint anterior bulge, no distal clavicle elevation, no muscle atrophy, no scapular winging   Tenderness of: SC joint- no, clavicle(prox-mid)- no, clavicle-(mid-distal)- no, AC joint- no, acromion- no, anterior capsule- no, prox bicep tendon- no, greater tuberosity- no, prox humerus- no, supraspinatous- no, infraspinatous- no, superior trapezious- no, rhomboids- no   Range of Motion: Active- limited due to pain., difficulty with movement of the arm over shoulder height.    Strength: internal rotation- painful, weakness           Comprehensive back pain exam:  Tenderness of bilateral low back, Demonstrates weakness in  bilateral hip flexors, indicating possible L2 nerve involvement, and Straight leg raise negative bilaterally              Signed Electronically by: Liz Peterson MD

## 2024-07-29 ENCOUNTER — TELEPHONE (OUTPATIENT)
Dept: FAMILY MEDICINE | Facility: CLINIC | Age: 64
End: 2024-07-29
Payer: COMMERCIAL

## 2024-07-29 NOTE — TELEPHONE ENCOUNTER
"PA required for   Semaglutide-Weight Management (WEGOVY) 0.25 MG/0.5ML pen         To complete the started PA :  1.  Login to go[employer]/login and click \"Enter a Key\"    2.  Enter the patient's last name and  and the key provided below.       Key: [id number]    3.  Complete the PA & click \"send to plan\" for approval.      "

## 2024-07-30 ENCOUNTER — VIRTUAL VISIT (OUTPATIENT)
Dept: RHEUMATOLOGY | Facility: CLINIC | Age: 64
End: 2024-07-30
Attending: INTERNAL MEDICINE
Payer: COMMERCIAL

## 2024-07-30 DIAGNOSIS — Z79.60 LONG-TERM USE OF IMMUNOSUPPRESSANT MEDICATION: ICD-10-CM

## 2024-07-30 DIAGNOSIS — M35.2 BEHCET'S DISEASE (H): Primary | ICD-10-CM

## 2024-07-30 PROCEDURE — 99214 OFFICE O/P EST MOD 30 MIN: CPT | Mod: 95 | Performed by: INTERNAL MEDICINE

## 2024-07-30 PROCEDURE — G2211 COMPLEX E/M VISIT ADD ON: HCPCS | Performed by: INTERNAL MEDICINE

## 2024-07-30 RX ORDER — GABAPENTIN 300 MG/1
300 CAPSULE ORAL AT BEDTIME
Qty: 90 CAPSULE | Refills: 3 | Status: SHIPPED | OUTPATIENT
Start: 2024-07-30 | End: 2024-09-20

## 2024-07-30 NOTE — PROGRESS NOTES
Virtual Visit Details        Video start time: 8:40 AM    Video end time: 9:09 AM      Jacquie Amador returns to evaluate for possible Behcet's disease. -ALEXA, -RF, -CCP. Previous non-diagnostic skin ulcer biopsy with perivascular mixed cell infiltrate, rich in neutrophils, indicative of excoriation. Treatment with apremilast since 9-2023.    She feels that she has more overall pain and this is a major problem. She is not currently going to the chronic pain management clinic and she won't go there. She complains of pain in the toes, hands and low back. This back pain that aggravates with walking or standing but no radiation. She complains of arthritis in her toes, particularly in the great toe. She denies redness, warmth, or swelling of joints. No functional problems in the toes, but she has hammertoes. No history of podiatry.    Her hands don't show any redness, warmth or swelling. The pain is overall painful, and this is not limited to the joints. Her thumbs can swelling but no true deformity. Her energy is low. Sleep seems fine. No important AM stiffness.     She has stable sores on the face, but only minor oral ulcers and no genital ulcers. Otezla 30 mg bid and colchicine 0.6 mg BID is also well tolerated and these are helpful.     No visual issues.     Prednisone 10 mg daily is her typical dose to maintain her platelet levels.    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    Past Medical History:   Diagnosis Date    ASTHMA - MODERATE PERSISTENT 9/21/2005    Chronic pain     Coronary artery disease     CVA (cerebral infarction)     Depressive disorder, not elsewhere classified     Diabetes (H)     Elevated serum alkaline phosphatase level     Liver source    Fibromyalgia      HYPERLIPIDEMIA NEC/NOS 2006    Hypertriglyceridemia     OA (osteoarthritis)     Thyroid disease     Trochanteric bursitis     Unspecified essential hypertension      Surgical-  Past Surgical History:   Procedure Laterality Date    3 teeth pulled      INJECT EPIDURAL TRANSFORAMINAL  2014    [facility]    INJECT JOINT SACROILIAC  2014    [facility]    LAMINECTOMY LUMBAR ONE LEVEL Left 2014    [facility]  DELIVERY ONLY      , Low Cervical     Injuries-none    Active Problems:  Patient Active Problem List   Diagnosis    Moderate persistent asthma without complication    Allergic rhinitis due to other allergen    Esophageal reflux    Moderate recurrent major depression (H)    Multiple joint pain    HYPERLIPIDEMIA LDL GOAL <130    Hypertension goal BP (blood pressure) < 140/90    Generalized anxiety disorder    Myalgia    Chronic rhinitis    Constipation    Lumbar disc disease with radiculopathy    Iron deficiency anemia    Osteoarthritis of carpometacarpal joint of thumb - bilateral    Trochanteric bursitis    Right ankle instability    Primary focal hyperhidrosis    Trochanteric bursitis of both hips    Overweight    Iron deficiency    Scratching    Chronic, continuous use of opioids    Medical marijuana use    Primary osteoarthritis of both first carpometacarpal joints    Arthritis of metatarsophalangeal joint    Sinus tachycardia    Intractable chronic migraine without aura and without status migrainosus    Impaired fasting glucose    Migraine without aura and without status migrainosus, not intractable    Skin ulcer, limited to breakdown of skin (H)    Morbid obesity (H)    Platelet granule defect (H)    Aphthous ulcer    Rash    Pruritus    Behcet's syndrome involving oral mucosa (H)    Encounter for long-term (current) use of high-risk medication    Dermatitis    Encounter for long-term current use of high risk  medication    Acquired platelet disorder (H)    Atopic neurodermatitis    Facial eczema    Skin pain    Prurigo nodularis    Chronic kidney disease, stage 3a (H)    Anxiety     Allergies   Allergen Reactions    Cats      and rabbits/wheezing    Dogs      wheezing    Lyrica [Pregabalin] Other (See Comments)     Rash, mouth sores, itching and burning    Seasonal Allergies      rhinits     Social History:   with 2 children. Not working at present. Non-smoker. No EtOH.   Social History     Socioeconomic History    Marital status:      Spouse name: Not on file    Number of children: Not on file    Years of education: Not on file    Highest education level: Not on file   Occupational History    Not on file   Tobacco Use    Smoking status: Former     Current packs/day: 0.00     Average packs/day: 0.3 packs/day for 33.7 years (10.1 ttl pk-yrs)     Types: Cigarettes     Start date: 1/1/1980     Quit date: 9/1/2013     Years since quitting: 10.9     Passive exposure: Past    Smokeless tobacco: Never    Tobacco comments:     since 1981   Vaping Use    Vaping status: Never Used   Substance and Sexual Activity    Alcohol use: No    Drug use: Yes     Comment: medical marjuana     Sexual activity: Not Currently     Partners: Male     Birth control/protection: None   Other Topics Concern    Parent/sibling w/ CABG, MI or angioplasty before 65F 55M? No     Service No    Blood Transfusions No    Caffeine Concern No    Occupational Exposure Not Asked    Hobby Hazards Not Asked    Sleep Concern No    Stress Concern No    Weight Concern Yes    Special Diet No    Back Care Not Asked    Exercise No    Bike Helmet No     Comment: Doesn't ride a bike    Seat Belt Yes    Self-Exams Yes     Comment: she does self breast exams every other month   Social History Narrative    Not on file     Social Determinants of Health     Financial Resource Strain: Low Risk  (12/29/2023)    Financial Resource Strain     Within the past 12  months, have you or your family members you live with been unable to get utilities (heat, electricity) when it was really needed?: No   Food Insecurity: Low Risk  (12/29/2023)    Food Insecurity     Within the past 12 months, did you worry that your food would run out before you got money to buy more?: No     Within the past 12 months, did the food you bought just not last and you didn t have money to get more?: No   Transportation Needs: Low Risk  (12/29/2023)    Transportation Needs     Within the past 12 months, has lack of transportation kept you from medical appointments, getting your medicines, non-medical meetings or appointments, work, or from getting things that you need?: No   Physical Activity: Not on file   Stress: Not on file   Social Connections: Not on file   Interpersonal Safety: Low Risk  (5/29/2024)    Interpersonal Safety     Do you feel physically and emotionally safe where you currently live?: Yes     Within the past 12 months, have you been hit, slapped, kicked or otherwise physically hurt by someone?: No     Within the past 12 months, have you been humiliated or emotionally abused in other ways by your partner or ex-partner?: No   Housing Stability: Low Risk  (12/29/2023)    Housing Stability     Do you have housing? : Yes     Are you worried about losing your housing?: No     Family History:    Family History   Problem Relation Age of Onset    Thyroid Disease Mother     Arthritis Mother     Colon Cancer Mother     Myelodysplastic syndrome Mother     Hypertension Father     Diabetes Father     C.A.D. Father         MI age 75    Cardiovascular Father         heart attack    Cerebrovascular Disease Father     Cancer Father         renal cancer    Arthritis Father     Heart Disease Father         heart attack    Gastrointestinal Disease Father         liver    Genitourinary Problems Father         kidney    Thyroid Disease Sister     Arthritis Sister     Lipids Sister     Asthma Daughter      "Gastrointestinal Disease Daughter     Multiple Sclerosis Maternal Aunt     Mastocytosis Nephew         \"System Mast Cell Disease and hyperesosinophilia\"    Unknown Other     Breast Cancer No family hx of     Cancer - colorectal No family hx of     Alzheimer Disease No family hx of     Blood Disease No family hx of     Circulatory No family hx of     Eye Disorder No family hx of     Musculoskeletal Disorder No family hx of     Neurologic Disorder No family hx of     Respiratory No family hx of     Melanoma No family hx of     Skin Cancer No family hx of      PMSF history personally obtained and updated by me this visit.    Review Of Systems:  +allergies as indicated  Remainder of the 14 point ROS obtained and found negative.    Physical Exam:  Constitutional: WD-WN-WG cooperative  Eyes: nl EOM, sclera  ENT: nl external ears, nose, hearing, lips  Neck: no visible thyroid enlargement  Resp: nl effort  MS: R>L 1st MCP swelling CMC squaring with Z-deformity of the thumb; All other neck, shoulder, elbow, wrist, hand joints were examined and otherwise found normal. No active synovitis. Full ROM. +heberden's nodes.  Skin: no alopecia; discrete areas of crusting facial lesions  Neuro: nl cranial nerves   Psych: nl judgement, affect    Laboratory:    Component      Latest Ref [facility] 6/28/2024  11:07 AM   Sodium      135 - 145 mmol/L 140    Potassium      3.4 - 5.3 mmol/L 3.6    Chloride      98 - 107 mmol/L 103    Carbon Dioxide (CO2)      22 - 29 mmol/L 26    Anion Gap      7 - 15 mmol/L 11    Urea Nitrogen      8.0 - 23.0 mg/dL 15.3    Creatinine      0.51 - 0.95 mg/dL 1.09 (H)    GFR Estimate      >60 mL/min/1.73m2 57 (L)    Calcium      8.8 - 10.2 mg/dL 9.4    Glucose      70 - 99 mg/dL 92    WBC      4.0 - 11.0 10e3/uL 9.4    RBC Count      3.80 - 5.20 10e6/uL 4.48    Hemoglobin      11.7 - 15.7 g/dL 13.5    Hematocrit      35.0 - 47.0 % 40.2    MCV      78 - 100 fL 90    MCH      26.5 - 33.0 pg 30.1    MCHC      31.5 - " 36.5 g/dL 33.6    RDW      10.0 - 15.0 % 14.4    Platelet Count      150 - 450 10e3/uL 302       Component      Latest Ref Rng 6/27/2024  2:50 PM   CRP Inflammation      <5.00 mg/L <3.00    Sed Rate      0 - 30 mm/hr 12        Component      Latest Ref Rng 6/27/2024  2:50 PM   Albumin Fraction      3.7 - 5.1 g/dL 3.8    Alpha 1 Fraction      0.2 - 0.4 g/dL 0.3    Alpha 2 Fraction      0.5 - 0.9 g/dL 0.8    Beta Fraction      0.6 - 1.0 g/dL 0.8    Gamma Fraction      0.7 - 1.6 g/dL 0.9    Monoclonal Peak      <=0.0 g/dL 0.0    ELP Interpretation: Essentially normal electrophoretic pattern. No obvious monoclonal proteins seen. Pathologic significance requires clinical correlation. Nadia Ellison M.D., Ph.D.    Signout Location if Remote Report signed out at:[facility]    Signout Location if Remote Report signed out at:[facility]    IGG      610 - 1,616 mg/dL 881    IGA      84 - 499 mg/dL 142    IGM      35 - 242 mg/dL 96    [place] Free Lt Chain      0.33 - 1.94 mg/dL 2.09 (H)    Lambda Free Lt Chain      0.57 - 2.63 mg/dL 2.42    Kappa Lambda Ratio      0.26 - 1.65  0.86    Immunofixation ELP No monoclonal protein seen on immunofixation. Pathologic significance requires clinical correlation.  Nadia Ellison M.D., Ph.D.    Cystatin C      0.6 - 1.0 mg/L 1.5 (H)    GFR Calculated with Cystatin C      >=60 mL/min/1.73m2 42 (L)    Total Protein Serum for ELP      6.4 - 8.3 g/dL 6.6       Legend:  (H) High  (L) Low  Impression:    Multifactorial mucocutaneous lesions-with history of ulcers due to prurigo/neurodermatitis, platelet dysfunction and easy bruising/bleeding, and oral aphthous ulcers, and hyperpigmentation. Although she does not meet criteria for Behcet's diagnosis; this is clouded by her use of her current medication regimen. Today she remains very stable and free of any Behcet's like mucocutaneous disease or systemic inflammatory disease. She continues to tolerate well the combination of colchicine and apremilast, and I  recommend its continuation. Plan to follow the inflammatory markers.     Hand osteoarthritis-plan to get hand and foot Xrays to assess for signs of inflammatory joint disease. Likely OA etiology for joint findings.    Chronic pain syndrome-fibromyalgia is the major pain problem for this patient. Plan to retry single dose gabapentin 300 mg hs only. She will message me if she fails to tolerate this dose. Analgesics in primary care only.    Bone health-plan to get the DEXA scan now.    Long term management of immunosuppression-with stable toxicity screening. Avoid NSAIDs in view of her CKD.     Plan for follow up in 6-12 months.      A total of 37 minutes was spent in face to face patient interaction and chart review on the day of service.    The longitudinal plan of care for the diagnosis(es)/condition(s) as documented were addressed during this visit. Due to the added complexity in care, I will continue to support Jacquie in the subsequent management and with ongoing continuity of care.

## 2024-07-30 NOTE — TELEPHONE ENCOUNTER
Central Prior Authorization Team  Phone: [phone]    [place] Initiation    Medication: WEGOVY 0.25 MG/0.5ML SC SOAJ  Insurance Company: [employer] - Phone [phone] Fax [phone]  Pharmacy Filling the Rx: [id number] - [place], [place] - [address] [place] [address]  Filling Pharmacy Phone: [phone]  Filling Pharmacy Fax:    Start Date: 7/30/2024

## 2024-07-30 NOTE — NURSING NOTE
Unable to complete any rooming and PHQ-2 due time restraint.       Current patient location: [address] [place] [id number]    Is the patient currently in the state of [place]? YES    Visit mode:VIDEO    If the visit is dropped, the patient can be reconnected by: VIDEO VISIT: Text to cell phone:   Telephone Information:   Mobile [phone]    and VIDEO VISIT: Send to e-mail at: [email or web address]    Will anyone else be joining the visit? NO  (If patient encounters technical issues they should call [phone]    How would you like to obtain your AVS? MyChart    Are changes needed to the allergy or medication list?  Unable to review due to time restraint.     Are refills needed on medications prescribed by this physician? NO    Reason for visit: RECHECK    Nasra PEOPLES

## 2024-07-30 NOTE — PROGRESS NOTES
Virtual Visit Details    Type of service:  Video Visit     Originating Location (pt. Location): Home    Distant Location (provider location):  Off-site  Platform used for Video Visit: William

## 2024-07-30 NOTE — LETTER
7/30/2024       RE: Jacquie Amador  [address]  [place] [address]     Dear Colleague,    Thank you for referring your patient, Jacquie Amador, to the [facility] RHEUMATOLOGY CLINIC [place] at [facility]. Please see a copy of my visit note below.    Virtual Visit Details        Video start time: 8:40 AM    Video end time: 9:09 AM      Jacquie Amador returns to evaluate for possible Behcet's disease. -ALEXA, -RF, -CCP. Previous non-diagnostic skin ulcer biopsy with perivascular mixed cell infiltrate, rich in neutrophils, indicative of excoriation. Treatment with apremilast since 9-2023.    She feels that she has more overall pain and this is a major problem. She is not currently going to the chronic pain management clinic and she won't go there. She complains of pain in the toes, hands and low back. This back pain that aggravates with walking or standing but no radiation. She complains of arthritis in her toes, particularly in the great toe. She denies redness, warmth, or swelling of joints. No functional problems in the toes, but she has hammertoes. No history of podiatry.    Her hands don't show any redness, warmth or swelling. The pain is overall painful, and this is not limited to the joints. Her thumbs can swelling but no true deformity. Her energy is low. Sleep seems fine. No important AM stiffness.     She has stable sores on the face, but only minor oral ulcers and no genital ulcers. Otezla 30 mg bid and colchicine 0.6 mg BID is also well tolerated and these are helpful.     No visual issues.     Prednisone 10 mg daily is her typical dose to maintain her platelet levels.    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    Past Medical History:   Diagnosis Date     ASTHMA - MODERATE PERSISTENT 2005     Chronic pain      Coronary artery disease      CVA (cerebral infarction)      Depressive disorder, not elsewhere classified      Diabetes (H)      Elevated serum alkaline phosphatase level     Liver source     Fibromyalgia      HYPERLIPIDEMIA NEC/NOS 2006     Hypertriglyceridemia      OA (osteoarthritis)      Thyroid disease      Trochanteric bursitis      Unspecified essential hypertension      Surgical-  Past Surgical History:   Procedure Laterality Date     3 teeth pulled       INJECT EPIDURAL TRANSFORAMINAL  2014    [facility] [place]     INJECT JOINT SACROILIAC  2014    [facility] [place]     LAMINECTOMY LUMBAR ONE LEVEL Left 2014    [facility]  DELIVERY ONLY      , Low Cervical     Injuries-none    Active Problems:  Patient Active Problem List   Diagnosis     Moderate persistent asthma without complication     Allergic rhinitis due to other allergen     Esophageal reflux     Moderate recurrent major depression (H)     Multiple joint pain     HYPERLIPIDEMIA LDL GOAL <130     Hypertension goal BP (blood pressure) < 140/90     Generalized anxiety disorder     Myalgia     Chronic rhinitis     Constipation     Lumbar disc disease with radiculopathy     Iron deficiency anemia     Osteoarthritis of carpometacarpal joint of thumb - bilateral     Trochanteric bursitis     Right ankle instability     Primary focal hyperhidrosis     Trochanteric bursitis of both hips     Overweight     Iron deficiency     Scratching     Chronic, continuous use of opioids     Medical marijuana use     Primary osteoarthritis of both first carpometacarpal joints     Arthritis of metatarsophalangeal joint     Sinus tachycardia     Intractable chronic migraine without aura and without status migrainosus     Impaired fasting glucose     Migraine without aura and  without status migrainosus, not intractable     Skin ulcer, limited to breakdown of skin (H)     Morbid obesity (H)     Platelet granule defect (H)     Aphthous ulcer     Rash     Pruritus     Behcet's syndrome involving oral mucosa (H)     Encounter for long-term (current) use of high-risk medication     Dermatitis     Encounter for long-term current use of high risk medication     Acquired platelet disorder (H)     Atopic neurodermatitis     Facial eczema     Skin pain     Prurigo nodularis     Chronic kidney disease, stage 3a (H)     Anxiety     Allergies   Allergen Reactions     Cats      and rabbits/wheezing     Dogs      wheezing     Lyrica [Pregabalin] Other (See Comments)     Rash, mouth sores, itching and burning     Seasonal Allergies      rhinits     Social History:   with 2 children. Not working at present. Non-smoker. No EtOH.   Social History     Socioeconomic History     Marital status:      Spouse name: Not on file     Number of children: Not on file     Years of education: Not on file     Highest education level: Not on file   Occupational History     Not on file   Tobacco Use     Smoking status: Former     Current packs/day: 0.00     Average packs/day: 0.3 packs/day for 33.7 years (10.1 ttl pk-yrs)     Types: Cigarettes     Start date: 1/1/1980     Quit date: 9/1/2013     Years since quitting: 10.9     Passive exposure: Past     Smokeless tobacco: Never     Tobacco comments:     since 1981   Vaping Use     Vaping status: Never Used   Substance and Sexual Activity     Alcohol use: No     Drug use: Yes     Comment: medical marjuana      Sexual activity: Not Currently     Partners: Male     Birth control/protection: None   Other Topics Concern     Parent/sibling w/ CABG, MI or angioplasty before 65F 55M? No      Service No     Blood Transfusions No     Caffeine Concern No     Occupational Exposure Not Asked     Hobby Hazards Not Asked     Sleep Concern No     Stress Concern No      Weight Concern Yes     Special Diet No     Back Care Not Asked     Exercise No     Bike Helmet No     Comment: Doesn't ride a bike     Seat Belt Yes     Self-Exams Yes     Comment: she does self breast exams every other month   Social History Narrative     Not on file     Social Determinants of Health     Financial Resource Strain: Low Risk  (12/29/2023)    Financial Resource Strain      Within the past 12 months, have you or your family members you live with been unable to get utilities (heat, electricity) when it was really needed?: No   Food Insecurity: Low Risk  (12/29/2023)    Food Insecurity      Within the past 12 months, did you worry that your food would run out before you got money to buy more?: No      Within the past 12 months, did the food you bought just not last and you didn t have money to get more?: No   Transportation Needs: Low Risk  (12/29/2023)    Transportation Needs      Within the past 12 months, has lack of transportation kept you from medical appointments, getting your medicines, non-medical meetings or appointments, work, or from getting things that you need?: No   Physical Activity: Not on file   Stress: Not on file   Social Connections: Not on file   Interpersonal Safety: Low Risk  (5/29/2024)    Interpersonal Safety      Do you feel physically and emotionally safe where you currently live?: Yes      Within the past 12 months, have you been hit, slapped, kicked or otherwise physically hurt by someone?: No      Within the past 12 months, have you been humiliated or emotionally abused in other ways by your partner or ex-partner?: No   Housing Stability: Low Risk  (12/29/2023)    Housing Stability      Do you have housing? : Yes      Are you worried about losing your housing?: No     Family History:    Family History   Problem Relation Age of Onset     Thyroid Disease Mother      Arthritis Mother      Colon Cancer Mother      Myelodysplastic syndrome Mother      Hypertension Father       "Diabetes Father      C.A.D. Father         MI age 75     Cardiovascular Father         heart attack     Cerebrovascular Disease Father      Cancer Father         renal cancer     Arthritis Father      Heart Disease Father         heart attack     Gastrointestinal Disease Father         liver     Genitourinary Problems Father         kidney     Thyroid Disease Sister      Arthritis Sister      Lipids Sister      Asthma Daughter      Gastrointestinal Disease Daughter      Multiple Sclerosis Maternal Aunt      Mastocytosis Nephew         \"System Mast Cell Disease and hyperesosinophilia\"     Unknown Other      Breast Cancer No family hx of      Cancer - colorectal No family hx of      Alzheimer Disease No family hx of      Blood Disease No family hx of      Circulatory No family hx of      Eye Disorder No family hx of      Musculoskeletal Disorder No family hx of      Neurologic Disorder No family hx of      Respiratory No family hx of      Melanoma No family hx of      Skin Cancer No family hx of      PMSF history personally obtained and updated by me this visit.    Review Of Systems:  +allergies as indicated  Remainder of the 14 point ROS obtained and found negative.    Physical Exam:  Constitutional: WD-WN-WG cooperative  Eyes: nl EOM, sclera  ENT: nl external ears, nose, hearing, lips  Neck: no visible thyroid enlargement  Resp: nl effort  MS: R>L 1st MCP swelling CMC squaring with Z-deformity of the thumb; All other neck, shoulder, elbow, wrist, hand joints were examined and otherwise found normal. No active synovitis. Full ROM. +heberden's nodes.  Skin: no alopecia; discrete areas of crusting facial lesions  Neuro: nl cranial nerves   Psych: nl judgement, affect    Laboratory:    Component      Latest Ref Rn 6/28/2024  11:07 AM   Sodium      135 - 145 mmol/L 140    Potassium      3.4 - 5.3 mmol/L 3.6    Chloride      98 - 107 mmol/L 103    Carbon Dioxide (CO2)      22 - 29 mmol/L 26    Anion Gap      7 - 15 " mmol/L 11    Urea Nitrogen      8.0 - 23.0 mg/dL 15.3    Creatinine      0.51 - 0.95 mg/dL 1.09 (H)    GFR Estimate      >60 mL/min/1.73m2 57 (L)    Calcium      8.8 - 10.2 mg/dL 9.4    Glucose      70 - 99 mg/dL 92    WBC      4.0 - 11.0 10e3/uL 9.4    RBC Count      3.80 - 5.20 10e6/uL 4.48    Hemoglobin      11.7 - 15.7 g/dL 13.5    Hematocrit      35.0 - 47.0 % 40.2    MCV      78 - 100 fL 90    MCH      26.5 - 33.0 pg 30.1    MCHC      31.5 - 36.5 g/dL 33.6    RDW      10.0 - 15.0 % 14.4    Platelet Count      150 - 450 10e3/uL 302       Component      Latest Ref [facility] 6/27/2024  2:50 PM   CRP Inflammation      <5.00 mg/L <3.00    Sed Rate      0 - 30 mm/hr 12        Component      Latest Ref [facility] 6/27/2024  2:50 PM   Albumin Fraction      3.7 - 5.1 g/dL 3.8    Alpha 1 Fraction      0.2 - 0.4 g/dL 0.3    Alpha 2 Fraction      0.5 - 0.9 g/dL 0.8    Beta Fraction      0.6 - 1.0 g/dL 0.8    Gamma Fraction      0.7 - 1.6 g/dL 0.9    Monoclonal Peak      <=0.0 g/dL 0.0    ELP Interpretation: Essentially normal electrophoretic pattern. No obvious monoclonal proteins seen. Pathologic significance requires clinical correlation. Nadia Ellison M.D., Ph.D.    Signout Location if Remote Report signed out at:[facility]    Signout Location if Remote Report signed out at:[email or web address]    IGG      610 - 1,616 mg/dL 881    IGA      84 - 499 mg/dL 142    IGM      35 - 242 mg/dL 96    Seffner Free Lt Chain      0.33 - 1.94 mg/dL 2.09 (H)    Lambda Free Lt Chain      0.57 - 2.63 mg/dL 2.42    Kappa Lambda Ratio      0.26 - 1.65  0.86    Immunofixation ELP No monoclonal protein seen on immunofixation. Pathologic significance requires clinical correlation.  Nadia Ellison M.D., Ph.D.    Cystatin C      0.6 - 1.0 mg/L 1.5 (H)    GFR Calculated with Cystatin C      >=60 mL/min/1.73m2 42 (L)    Total Protein Serum for ELP      6.4 - 8.3 g/dL 6.6       Legend:  (H) High  (L) Low  Impression:    Multifactorial mucocutaneous lesions-with history of ulcers due to  prurigo/neurodermatitis, platelet dysfunction and easy bruising/bleeding, and oral aphthous ulcers, and hyperpigmentation. Although she does not meet criteria for Behcet's diagnosis; this is clouded by her use of her current medication regimen. Today she remains very stable and free of any Behcet's like mucocutaneous disease or systemic inflammatory disease. She continues to tolerate well the combination of colchicine and apremilast, and I recommend its continuation. Plan to follow the inflammatory markers.     Hand osteoarthritis-plan to get hand and foot Xrays to assess for signs of inflammatory joint disease. Likely OA etiology for joint findings.    Chronic pain syndrome-fibromyalgia is the major pain problem for this patient. Plan to retry single dose gabapentin 300 mg hs only. She will message me if she fails to tolerate this dose. Analgesics in primary care only.    Bone health-plan to get the DEXA scan now.    Long term management of immunosuppression-with stable toxicity screening. Avoid NSAIDs in view of her CKD.     Plan for follow up in 6-12 months.      A total of 37 minutes was spent in face to face patient interaction and chart review on the day of service.    The longitudinal plan of care for the diagnosis(es)/condition(s) as documented were addressed during this visit. Due to the added complexity in care, I will continue to support Jacquie in the subsequent management and with ongoing continuity of care.      Virtual Visit Details    Type of service:  Video Visit     Originating Location (pt. Location): Home    Distant Location (provider location):  Off-site  Platform used for Video Visit: William      Again, thank you for allowing me to participate in the care of your patient.      Sincerely,    Jay Hawkins MD

## 2024-07-31 NOTE — TELEPHONE ENCOUNTER
Central Prior Authorization Team  Phone: [phone]    PRIOR AUTHORIZATION DENIED    Medication: WEGOVY 0.25 MG/0.5ML SC SOAJ  Insurance Company: [employer] - Phone [phone] Fax [phone]  Denial Date: 7/30/2024  Denial Reason(s): exclusion  Appeal Information: none  Patient Notified: NO- Unfortunately, we cannot call the patient with denials because we do not know what next steps the MD will take nor can we give medical advice, please notify the patient of what they are to expect for the continuation of their therapy from the provider.

## 2024-07-31 NOTE — PATIENT INSTRUCTIONS
Start gabapentin 300 mg at night only  Continue the other medications  Get a DEXA scan  Get Xrays of the hands and feet  Make an appointment in the podiatry clinic  Follow up with me in 6-12 months

## 2024-08-02 ENCOUNTER — MYC MEDICAL ADVICE (OUTPATIENT)
Dept: FAMILY MEDICINE | Facility: CLINIC | Age: 64
End: 2024-08-02
Payer: COMMERCIAL

## 2024-08-02 DIAGNOSIS — F33.1 MODERATE EPISODE OF RECURRENT MAJOR DEPRESSIVE DISORDER (H): ICD-10-CM

## 2024-08-02 DIAGNOSIS — R00.2 PALPITATIONS: ICD-10-CM

## 2024-08-02 DIAGNOSIS — R00.0 SINUS TACHYCARDIA: ICD-10-CM

## 2024-08-04 RX ORDER — METOPROLOL SUCCINATE 100 MG/1
100 TABLET, EXTENDED RELEASE ORAL 2 TIMES DAILY
Qty: 180 TABLET | Refills: 0 | Status: SHIPPED | OUTPATIENT
Start: 2024-08-04

## 2024-08-04 RX ORDER — BUPROPION HYDROCHLORIDE 150 MG/1
150 TABLET ORAL EVERY MORNING
Qty: 90 TABLET | Refills: 0 | Status: SHIPPED | OUTPATIENT
Start: 2024-08-04

## 2024-08-04 RX ORDER — BUPROPION HYDROCHLORIDE 300 MG/1
300 TABLET ORAL EVERY MORNING
Qty: 90 TABLET | Refills: 0 | Status: SHIPPED | OUTPATIENT
Start: 2024-08-04

## 2024-08-07 ENCOUNTER — TELEPHONE (OUTPATIENT)
Dept: RHEUMATOLOGY | Facility: CLINIC | Age: 64
End: 2024-08-07
Payer: COMMERCIAL

## 2024-08-07 DIAGNOSIS — F33.1 MODERATE EPISODE OF RECURRENT MAJOR DEPRESSIVE DISORDER (H): ICD-10-CM

## 2024-08-07 DIAGNOSIS — R00.2 PALPITATIONS: ICD-10-CM

## 2024-08-07 DIAGNOSIS — R00.0 SINUS TACHYCARDIA: ICD-10-CM

## 2024-08-07 NOTE — TELEPHONE ENCOUNTER
Left Voicemail (1st Attempt) and Sent Mychart (1st Attempt) for the patient to call back and schedule the following:    Appointment type: Return Rheumatology  Provider: Dr. Hawkins  Return date: January 30, 2025 - July 30th, 2025 (6 month to 1yr follow up)  Specialty phone number: [phone]  Additional appointment(s) needed: X-Rays and DEXA scan  Additonal Notes: NA

## 2024-08-08 RX ORDER — BUPROPION HYDROCHLORIDE 150 MG/1
150 TABLET ORAL EVERY MORNING
Qty: 90 TABLET | Refills: 0 | OUTPATIENT
Start: 2024-08-08

## 2024-08-08 RX ORDER — BUPROPION HYDROCHLORIDE 300 MG/1
300 TABLET ORAL EVERY MORNING
Qty: 90 TABLET | Refills: 0 | OUTPATIENT
Start: 2024-08-08

## 2024-08-08 RX ORDER — METOPROLOL SUCCINATE 100 MG/1
100 TABLET, EXTENDED RELEASE ORAL 2 TIMES DAILY
Qty: 180 TABLET | Refills: 0 | OUTPATIENT
Start: 2024-08-08

## 2024-08-09 ENCOUNTER — TELEPHONE (OUTPATIENT)
Dept: RHEUMATOLOGY | Facility: CLINIC | Age: 64
End: 2024-08-09
Payer: COMMERCIAL

## 2024-08-09 ENCOUNTER — MYC REFILL (OUTPATIENT)
Dept: FAMILY MEDICINE | Facility: CLINIC | Age: 64
End: 2024-08-09
Payer: COMMERCIAL

## 2024-08-09 DIAGNOSIS — J45.40 MODERATE PERSISTENT ASTHMA WITHOUT COMPLICATION: ICD-10-CM

## 2024-08-09 NOTE — TELEPHONE ENCOUNTER
Patient confirmed scheduled appointment:  Date: January 28th, 2025  Time: 10:30a  Visit type: Return Rheumatology Video  Provider: Dr. Hawkins  Location: [facility]  Testing/imaging: DEXA and x-rays (transferred to imaging to schedule)  Additional notes: NA

## 2024-08-12 ENCOUNTER — TRANSFERRED RECORDS (OUTPATIENT)
Dept: HEALTH INFORMATION MANAGEMENT | Facility: CLINIC | Age: 64
End: 2024-08-12
Payer: COMMERCIAL

## 2024-08-13 ENCOUNTER — MYC REFILL (OUTPATIENT)
Dept: FAMILY MEDICINE | Facility: CLINIC | Age: 64
End: 2024-08-13
Payer: COMMERCIAL

## 2024-08-13 ENCOUNTER — MYC MEDICAL ADVICE (OUTPATIENT)
Dept: FAMILY MEDICINE | Facility: CLINIC | Age: 64
End: 2024-08-13
Payer: COMMERCIAL

## 2024-08-13 DIAGNOSIS — B37.0 THRUSH: ICD-10-CM

## 2024-08-13 DIAGNOSIS — F41.1 GENERALIZED ANXIETY DISORDER: ICD-10-CM

## 2024-08-13 DIAGNOSIS — E66.811 CLASS 1 OBESITY DUE TO EXCESS CALORIES WITHOUT SERIOUS COMORBIDITY WITH BODY MASS INDEX (BMI) OF 30.0 TO 30.9 IN ADULT: Primary | ICD-10-CM

## 2024-08-13 DIAGNOSIS — G25.9 MOVEMENT DISORDER: ICD-10-CM

## 2024-08-13 DIAGNOSIS — E66.811 CLASS 1 OBESITY DUE TO EXCESS CALORIES WITH SERIOUS COMORBIDITY AND BODY MASS INDEX (BMI) OF 30.0 TO 30.9 IN ADULT: ICD-10-CM

## 2024-08-13 DIAGNOSIS — K12.0 APHTHOUS ULCER: ICD-10-CM

## 2024-08-13 DIAGNOSIS — E66.09 CLASS 1 OBESITY DUE TO EXCESS CALORIES WITHOUT SERIOUS COMORBIDITY WITH BODY MASS INDEX (BMI) OF 30.0 TO 30.9 IN ADULT: Primary | ICD-10-CM

## 2024-08-13 DIAGNOSIS — F33.1 MODERATE RECURRENT MAJOR DEPRESSION (H): ICD-10-CM

## 2024-08-13 DIAGNOSIS — E66.09 CLASS 1 OBESITY DUE TO EXCESS CALORIES WITH SERIOUS COMORBIDITY AND BODY MASS INDEX (BMI) OF 30.0 TO 30.9 IN ADULT: ICD-10-CM

## 2024-08-14 RX ORDER — TRAZODONE HYDROCHLORIDE 50 MG/1
150 TABLET, FILM COATED ORAL AT BEDTIME
Qty: 270 TABLET | Refills: 2 | Status: SHIPPED | OUTPATIENT
Start: 2024-08-14

## 2024-08-14 RX ORDER — ROPINIROLE 1 MG/1
3 TABLET, FILM COATED ORAL AT BEDTIME
Qty: 270 TABLET | Refills: 3 | Status: SHIPPED | OUTPATIENT
Start: 2024-08-14

## 2024-08-14 RX ORDER — COLCHICINE 0.6 MG/1
0.6 TABLET ORAL 2 TIMES DAILY
Qty: 180 TABLET | Refills: 1 | Status: SHIPPED | OUTPATIENT
Start: 2024-08-14

## 2024-08-14 RX ORDER — NYSTATIN 100000/ML
500000 SUSPENSION, ORAL (FINAL DOSE FORM) ORAL 4 TIMES DAILY
Qty: 280 ML | Refills: 2 | Status: SHIPPED | OUTPATIENT
Start: 2024-08-14

## 2024-08-14 NOTE — TELEPHONE ENCOUNTER
Requesting FV compound Wegovy    VV?    Liane Wharton RN  [facility] - Registered Nurse  Clinic Triage [place]   August 14, 2024

## 2024-08-14 NOTE — TELEPHONE ENCOUNTER
Requesting refill    Liane Wharton RN  [facility] - Registered Nurse  Clinic Triage [place]   August 14, 2024

## 2024-08-19 ENCOUNTER — VIRTUAL VISIT (OUTPATIENT)
Dept: RHEUMATOLOGY | Facility: CLINIC | Age: 64
End: 2024-08-19
Attending: INTERNAL MEDICINE
Payer: COMMERCIAL

## 2024-08-19 DIAGNOSIS — T50.905A ADVERSE DRUG EFFECT: Primary | ICD-10-CM

## 2024-08-19 NOTE — Clinical Note
8/19/2024       RE: Jacquie Amador  [address]  [place] [address]     Dear Colleague,    Thank you for referring your patient, Jacquie Amador, to the [facility] RHEUMATOLOGY CLINIC [place] at [facility]. Please see a copy of my visit note below.    Medication Therapy Management (MTM) Encounter    ASSESSMENT:                            Medication Adherence/Access: No issues identified    Hallucinations: Patient is on a number of medications that have the possible side effect of hallucinations. Possible offenders include ropinerole, montelukast, bupropion, oxybutynin, and the compounded amitriptyline/ketamine cream. Hallucinations typically occur at night 1-2 hours after administering night medications, which leads me to believe one of the night medications is responsible. MTM to reach out to patient's primary care provider, Liz Peterson MD, to determine next steps.     PLAN:                            I am reaching out to Dr. Peterson to determine next steps.     Follow-up: MTM to reach out with updates. Patient to reach out with questions.     SUBJECTIVE/OBJECTIVE:                          Jacquie Amador is a 64 year old female seen for a follow-up visit.       Reason for visit: Hallucination side effects.    Allergies/ADRs: Reviewed in chart  Past Medical History: Reviewed in chart  Tobacco: She reports that she quit smoking about 10 years ago. Her smoking use included cigarettes. She started smoking about 44 years ago. She has a 10.1 pack-year smoking history. She has been exposed to tobacco smoke. She has never used smokeless tobacco.  Alcohol: none    Medication Adherence/Access: no issues reported    Hallucinations:   Patient reported experiencing hallucinations that began about a week ago. Hallucinations only occur at night about 1-2 hours after taking nighttime medications. Involve seeing people, hearing things, and smelling things. Had  in the past while taking buspirone, but resolved when medication stopped. Reported taking ropinerole 3 mg consistently at bedtime which helps restless legs. Using a compounded amitriptyline and ketamine cream and recently increased from once-twice daily to four times daily. Noticing improvement in skin. Started gabapentin about 2 weeks ago but stopped after experiencing drowsiness and fogginess. Mentioned starting naltrexone 3 mg compounded capsules about 5 days ago and plans to start memantine soon to help with pain control.     Today's Vitals: LMP 07/17/2009 (Exact Date)   ----------------    I spent 30 minutes with this patient today. I offer these suggestions for consideration by Liz Peterson MD. A copy of the visit note was provided to the patient's provider(s).    A summary of these recommendations was sent via [employer].    Nikki MeyerD  Medication Therapy Management Pharmacist  [facility]  Phone: [phone]     Telemedicine Visit Details  Type of service:  Telephone visit  Start Time:  1:00 PM  End Time:  1:30 PM     Medication Therapy Recommendations  No medication therapy recommendations to display       Medication Therapy Management (MTM) Encounter    ASSESSMENT:                            Medication Adherence/Access: No issues identified    Adverse Drug Effect: Patient is on a number of medications that have the possible side effect of hallucinations. Possible offenders include ropinerole, montelukast, bupropion, oxybutynin, and the compounded amitriptyline/ketamine cream. Hallucinations typically occur at night 1-2 hours after administering night medications, which leads me to believe one of the night medications is responsible. MTM to reach out to patient's primary care provider, Liz Peterson MD, to determine next steps.     PLAN:                            I am reaching out to Dr. Peterson to determine next steps.     Follow-up: MTM to reach out with updates.  Patient to reach out with questions.     SUBJECTIVE/OBJECTIVE:                          Jacquie Amador is a 64 year old female seen for a follow-up visit.       Reason for visit: Hallucination side effects.    Allergies/ADRs: Reviewed in chart  Past Medical History: Reviewed in chart  Tobacco: She reports that she quit smoking about 10 years ago. Her smoking use included cigarettes. She started smoking about 44 years ago. She has a 10.1 pack-year smoking history. She has been exposed to tobacco smoke. She has never used smokeless tobacco.  Alcohol: none    Medication Adherence/Access: no issues reported    Adverse Drug Effect:   Patient reported experiencing hallucinations that began about a week ago. Hallucinations only occur at night about 1-2 hours after taking nighttime medications. Involve seeing people, hearing things, and smelling things. Had in the past while taking buspirone, but resolved when medication stopped. Reported taking ropinerole 3 mg consistently at bedtime which helps restless legs. Using a compounded amitriptyline and ketamine cream and recently increased from once-twice daily to four times daily. Noticing improvement in skin. Started gabapentin about 2 weeks ago but stopped after experiencing drowsiness and fogginess. Mentioned starting naltrexone 3 mg compounded capsules about 5 days ago and plans to start memantine soon to help with pain control.     Today's Vitals: LMP 07/17/2009 (Exact Date)   ----------------    I spent 30 minutes with this patient today. I offer these suggestions for consideration by Liz Peterson MD. A copy of the visit note was provided to the patient's provider(s).    A summary of these recommendations was sent via [employer]    Golden Cadena, PharmD  Medication Therapy Management Pharmacist  [facility]  Phone: [phone]     Telemedicine Visit Details  Type of service:  Telephone visit  Start Time:  1:00 PM  End Time:  1:30 PM      Medication Therapy Recommendations  No medication therapy recommendations to display         Again, thank you for allowing me to participate in the care of your patient.      Sincerely,    Golden aCdena RPH

## 2024-08-19 NOTE — PROGRESS NOTES
Medication Therapy Management (MTM) Encounter    ASSESSMENT:                            Medication Adherence/Access: No issues identified    Adverse Drug Effect: Patient is on a number of medications that have the possible side effect of hallucinations. Possible offenders include ropinerole, montelukast, bupropion, oxybutynin, and the compounded amitriptyline/ketamine cream. Hallucinations typically occur at night 1-2 hours after administering night medications, indicating one of these medications is likely responsible. Suspicious of the dopamine agonist, ropinerole. MTM to reach out to patient's primary care provider, Liz Peterson MD, to determine medication adjustments.     Addendum 8/21/24: Discussed with Dr. Peterson who agreed with reducing ropinerole dose to 2 mg at bedtime, with further decrease to 1 mg nightly, if needed. Called patient to relay recommendation. Patient had already self-decreased ropinerole dose to 1 mg in the afternoon and 1 mg at night after appointment. Also reduced compounded amitriptyline/ketamine cream back to once-twice daily. Patient no longer experiencing hallucinations. Restless leg syndrome well controlled with adjusted dosing. Skin continues to improve with compounded cream.     PLAN:                            I am reaching out to Dr. Peterson to determine next steps.     Follow-up: MTM to reach out with updates. Patient to reach out with questions.     SUBJECTIVE/OBJECTIVE:                          Jacquie Amador is a 64 year old female seen for a follow-up visit.       Reason for visit: Hallucination side effects.    Allergies/ADRs: Reviewed in chart  Past Medical History: Reviewed in chart  Tobacco: She reports that she quit smoking about 10 years ago. Her smoking use included cigarettes. She started smoking about 44 years ago. She has a 10.1 pack-year smoking history. She has been exposed to tobacco smoke. She has never used smokeless tobacco.  Alcohol:  none    Medication Adherence/Access: no issues reported    Adverse Drug Effect:   Patient reported experiencing hallucinations that began about a week ago. Hallucinations only occur at night about 1-2 hours after taking nighttime medications. Involve seeing people, hearing things, and smelling things. Had in the past while taking buspirone, but resolved when medication stopped. Reported taking ropinerole 3 mg consistently at bedtime which helps restless legs. Using a compounded amitriptyline and ketamine cream and recently increased from once-twice daily to four times daily. Noticing improvement in skin. Started gabapentin about 2 weeks ago but stopped after experiencing drowsiness and fogginess. Mentioned starting naltrexone 3 mg compounded capsules about 5 days ago and plans to start memantine soon to help with pain control.     Today's Vitals: LMP 07/17/2009 (Exact Date)   ----------------    I spent 30 minutes with this patient today. I offer these suggestions for consideration by Liz Peterson MD. A copy of the visit note was provided to the patient's provider(s).    A summary of these recommendations was sent via [employer].    Golden Cadena, PharmD  Medication Therapy Management Pharmacist  [facility]  Phone: [phone]     Telemedicine Visit Details  Type of service:  Telephone visit  Start Time:  1:00 PM  End Time:  1:30 PM     Medication Therapy Recommendations  Adverse drug effect    Rationale: Undesirable effect - Adverse medication event - Safety   Recommendation: Discontinue Medication - Experiencing hallucinations after night meds   Status: Contact Provider - Awaiting Response

## 2024-08-21 NOTE — PATIENT INSTRUCTIONS
"Recommendations from today's MTM visit:                                                       I am reaching out to Dr. Peterson to determine next steps.     Follow-up: MTM to reach out with updates. Patient to reach out with questions.     It was great speaking with you today.  I value your experience and would be very thankful for your time in providing feedback in our clinic survey. In the next few days, you may receive an email or text message from [facility] with a link to a survey related to your  clinical pharmacist.\"     To schedule another MTM appointment, please call the clinic directly or you may call the MTM scheduling line at [phone].    My Clinical Pharmacist's contact information:                                                      Please feel free to contact me with any questions or concerns you have.      Golden Cadena, PharmD  Medication Therapy Management Pharmacist  [facility]  Phone: [phone]    "

## 2024-08-26 ENCOUNTER — TRANSFERRED RECORDS (OUTPATIENT)
Dept: HEALTH INFORMATION MANAGEMENT | Facility: CLINIC | Age: 64
End: 2024-08-26
Payer: COMMERCIAL

## 2024-08-30 ENCOUNTER — MYC MEDICAL ADVICE (OUTPATIENT)
Dept: FAMILY MEDICINE | Facility: CLINIC | Age: 64
End: 2024-08-30
Payer: COMMERCIAL

## 2024-09-06 ENCOUNTER — MYC MEDICAL ADVICE (OUTPATIENT)
Dept: DERMATOLOGY | Facility: CLINIC | Age: 64
End: 2024-09-06
Payer: COMMERCIAL

## 2024-09-06 DIAGNOSIS — L20.81 ATOPIC NEURODERMATITIS: ICD-10-CM

## 2024-09-11 ENCOUNTER — MYC MEDICAL ADVICE (OUTPATIENT)
Dept: HEMATOLOGY | Facility: CLINIC | Age: 64
End: 2024-09-11

## 2024-09-11 DIAGNOSIS — M35.2 BEHCET'S SYNDROME INVOLVING ORAL MUCOSA (H): Primary | ICD-10-CM

## 2024-09-11 NOTE — TELEPHONE ENCOUNTER
Assessment & Plan:  Multiple skin ulcers resembling prurigo/neurodermatitis  Facial erosions, chronic active problem, uncontrolled.  But slowly improving.  Patient with a history of recurring oral and genital sores, likely recurrent aphthous ulcers, as well as multiple diffuse discrete skin ulcers. Today, Jacquie notes ongoing skin sores on face and describes that her condition has had a large emotional impact. She does endorse that she will pick at the lesions when they become painful to try to express the contents and relieve the pain. She did not start the amitriptyline/ketamine cream after last visit. Discussed continue current regimen and restarting the compound cream. Reviewed dermatologic medications and discussed that they are unlikely to be contributing to elevated creatinine. Discussed contribution of anxiety to condition and patient expressed interest in psychiatry referral for further evaluation.   - Gentle skin care regimen  - Elidel cream bid (patient prefers a cream to Protopic ointment)  - Betamethasone ointment and desonide cream as needed for persistent skin sores and pain  - Can restart amytriptyline/ketamine cream to apply twice daily to areas of sores as desired  - Dupixent 300mg q2 weeks and is tolerating this without problems   - N-acetylcystiene 600 mg bid  - Psych referral for anxiety/depression      # Recurrent Apthous ulcers   Currently overall improved from prior; one ulcer present today but have otherwise been infrequent

## 2024-09-11 NOTE — TELEPHONE ENCOUNTER
Scheduled Jacquie for a CBC to be drawn 09/13.    Jesse LOZADA RN  [facility]  Office: [phone]  Clinic: [phone]  Fax: [phone]

## 2024-09-12 ENCOUNTER — MYC MEDICAL ADVICE (OUTPATIENT)
Dept: FAMILY MEDICINE | Facility: CLINIC | Age: 64
End: 2024-09-12
Payer: COMMERCIAL

## 2024-09-12 DIAGNOSIS — L70.0 ACNE VULGARIS: ICD-10-CM

## 2024-09-12 RX ORDER — DOXYCYCLINE 100 MG/1
100 CAPSULE ORAL 2 TIMES DAILY
Qty: 60 CAPSULE | Refills: 2 | Status: SHIPPED | OUTPATIENT
Start: 2024-09-12

## 2024-09-13 NOTE — TELEPHONE ENCOUNTER
Pulmonary Progress Note    Subjective:  Pt seen and examined   Febrile     NAOE  Unresponsive on vent   fio2 40%    ROS: unobtainable due to pt status    Objective:  Temp:  [99.3 °F (37.4 °C)-100.8 °F (38.2 °C)] 99.3 °F (37.4 °C)  Heart Rate:  [] 89  Resp:  [18] 18  BP: (116-125)/(75-90) 120/75  FiO2 (%):  [40 %-41 %] 40 %     GEN: Eyes open, no acute distress  HEENT: PERRLA, trachea midline  CV: RRR no appreciable murmurs  LUNGS: Slightly coarse bilaterally   ABD: Soft, NT, ND   EXT:+ peripheral edema    Recent Labs     02/26/24  0550   CALCIUM 10.2       Recent Labs     02/24/24  0636 02/25/24  0637 02/26/24  0550   CO2 22 20* 18*   CALCIUM 10.5* 10.6* 10.2   CREATININE 1.23* 1.32* 1.12*   BUN 34* 49* 53*       No results for input(s): \"ALBUMIN\" in the last 72 hours.    Invalid input(s): \"LABPROT\"    No results for input(s): \"S8TYQPYZ\" in the last 72 hours.    Invalid input(s): \"PHART\", \"PO2ART\", \"FLB7HYK\", \"ULN7QLG\", \"BEART\", \"POCFIO2\"  Recent Labs     02/25/24  0637 02/26/24  0550   HGB 9.2* 8.7*   HCT 31.3* 29.3*    288   WBC 11.3* 8.9   RBC 3.25* 3.09*   MCV 96.3 94.8       Vent Settings Last Value   FiO2 40 % (02/26/24 1036)   Mode Volume A/C (02/26/24 1036)   Rate 18 (02/26/24 1036)   Tidal Volume 450 mL (02/26/24 1036)   Pressure Support 10 cm H20 (02/25/24 0805)   PEEP/CPAC/EPAP 5 cm H20 (02/26/24 1036)         Assessment:  Acute  on Chronic Hypoxic respiratory failure  Acute Kidney Injury - ATN per renal.  Chronic LLL opacity - seen prior imaging. Persistent on this admissions imaging.  H/o ICH s/p  shunt  H/o DVT s/p IVC filter  Chronic L pleural effusion - exudative (1.5.2024) prior cytology negative gor malignancy  Chronic Sacral Dec ulcer  PVD s/p R BKA and L AKA  Oropharyngeal dysphagia s/p G-tube  H/o MDRO UTI, ESBL Kleb bacteremia  Atrial fibrillation with hihg [id number]  MDRO organisms- CrAb, ESBL, CRE  HTN   Class III Obesity    Plan:  Continue Full vent, can initiate weaning  Replied via mychart.    Marie Laura CMA ([facility])     trials  Wean FiO2 for SaO2 90-94%  F/u repeat cxr and cultures   Trach care, HOB elevated, Asp precautions, suctioning PRN  Airway clearance with duonebs q4 WA, consider adding laba/ics nebs if no improvement in pulm toilet/airway pressures  ID consulted - on avycaz D5  Trend cultures, fever curve, WBC  LMWH Vte ppx per pmd    Thank you for allowing me to participate in the care of this patient.  (Time of filing of note does not necessarily represent when patient was seen)    Rain Lowe DO  [facility] [place]  Pulmonary and Critical Care Medicine  Office [phone]

## 2024-09-16 ENCOUNTER — MYC MEDICAL ADVICE (OUTPATIENT)
Dept: FAMILY MEDICINE | Facility: CLINIC | Age: 64
End: 2024-09-16
Payer: COMMERCIAL

## 2024-09-17 ENCOUNTER — TELEPHONE (OUTPATIENT)
Dept: RHEUMATOLOGY | Facility: CLINIC | Age: 64
End: 2024-09-17
Payer: COMMERCIAL

## 2024-09-17 NOTE — TELEPHONE ENCOUNTER
Left Voicemail (1st Attempt) and Sent Mychart (1st Attempt) for the patient to call back and schedule the following:    Appointment type: Return Rheumatology Video  Provider: Dr. Hawkins  Return date: January 28th, 2025  Specialty phone number: [phone]  Additional appointment(s) needed: NA  Additonal Notes: 1st reschedule attempt

## 2024-09-17 NOTE — CONFIDENTIAL NOTE
Call back received from pt: she is frustrated about her appointment being cancelled because she reports she was previously seeing Dr. Toribio, and says that her appts with Dr. Toribio were cancelled and rescheduled several times, and then Dr. Toribio left before pt was able to see her.   She was really hoping that she would have a better experience having Dr. Hawkins as her new provider, but feels like so far it seems to be a repeat of what was happening with Dr. Toribio.   She would really just like to have a provider she can meet with on a regular basis and not have to go a year or more between her 6-month follow-up appts.     Pt would like to see if Dr. Hawkins could possibly fit her in for an earlier appt (ok with either [facility] or [facility], but prefers [facility]).     If this is not possible, then she would like approval to switch her care to another provider.

## 2024-09-18 DIAGNOSIS — L74.511 PRIMARY FOCAL HYPERHIDROSIS OF FACE: ICD-10-CM

## 2024-09-18 RX ORDER — OXYBUTYNIN CHLORIDE 5 MG/1
10 TABLET, EXTENDED RELEASE ORAL DAILY
Qty: 180 TABLET | Refills: 0 | Status: SHIPPED | OUTPATIENT
Start: 2024-09-18

## 2024-09-20 ENCOUNTER — MYC MEDICAL ADVICE (OUTPATIENT)
Dept: FAMILY MEDICINE | Facility: CLINIC | Age: 64
End: 2024-09-20

## 2024-09-20 ENCOUNTER — VIRTUAL VISIT (OUTPATIENT)
Dept: FAMILY MEDICINE | Facility: CLINIC | Age: 64
End: 2024-09-20
Payer: COMMERCIAL

## 2024-09-20 DIAGNOSIS — R26.89 BALANCE PROBLEMS: ICD-10-CM

## 2024-09-20 DIAGNOSIS — F11.90 CHRONIC, CONTINUOUS USE OF OPIOIDS: ICD-10-CM

## 2024-09-20 DIAGNOSIS — R11.0 NAUSEA: ICD-10-CM

## 2024-09-20 DIAGNOSIS — E66.01 MORBID OBESITY (H): Primary | ICD-10-CM

## 2024-09-20 PROCEDURE — 99214 OFFICE O/P EST MOD 30 MIN: CPT | Mod: 95 | Performed by: FAMILY MEDICINE

## 2024-09-20 PROCEDURE — G2211 COMPLEX E/M VISIT ADD ON: HCPCS | Mod: 95 | Performed by: FAMILY MEDICINE

## 2024-09-20 RX ORDER — MEMANTINE HYDROCHLORIDE 10 MG/1
1 TABLET ORAL
COMMUNITY
Start: 2024-08-26

## 2024-09-20 RX ORDER — ONDANSETRON 4 MG/1
4 TABLET, ORALLY DISINTEGRATING ORAL EVERY 8 HOURS PRN
Qty: 20 TABLET | Refills: 2 | Status: SHIPPED | OUTPATIENT
Start: 2024-09-20

## 2024-09-20 ASSESSMENT — ASTHMA QUESTIONNAIRES
QUESTION_4 LAST FOUR WEEKS HOW OFTEN HAVE YOU USED YOUR RESCUE INHALER OR NEBULIZER MEDICATION (SUCH AS ALBUTEROL): TWO OR THREE TIMES PER WEEK
QUESTION_3 LAST FOUR WEEKS HOW OFTEN DID YOUR ASTHMA SYMPTOMS (WHEEZING, COUGHING, SHORTNESS OF BREATH, CHEST TIGHTNESS OR PAIN) WAKE YOU UP AT NIGHT OR EARLIER THAN USUAL IN THE MORNING: NOT AT ALL
ACT_TOTALSCORE: 20
QUESTION_2 LAST FOUR WEEKS HOW OFTEN HAVE YOU HAD SHORTNESS OF BREATH: ONCE OR TWICE A WEEK
QUESTION_5 LAST FOUR WEEKS HOW WOULD YOU RATE YOUR ASTHMA CONTROL: WELL CONTROLLED
QUESTION_1 LAST FOUR WEEKS HOW MUCH OF THE TIME DID YOUR ASTHMA KEEP YOU FROM GETTING AS MUCH DONE AT WORK, SCHOOL OR AT HOME: A LITTLE OF THE TIME
ACT_TOTALSCORE: 20

## 2024-09-20 ASSESSMENT — PAIN SCALES - PAIN ENJOYMENT GENERAL ACTIVITY SCALE (PEG)
PEG_TOTALSCORE: 6.33
INTERFERED_ENJOYMENT_LIFE: 6
INTERFERED_GENERAL_ACTIVITY: 7
AVG_PAIN_PASTWEEK: 6
PEG_TOTALSCORE: 6.33

## 2024-09-20 NOTE — PROGRESS NOTES
"Jacquie is a 64 year old who is being evaluated via a billable video visit.    How would you like to obtain your AVS? MyChart  If the video visit is dropped, the invitation should be resent by:   Will anyone else be joining your video visit? No      Assessment & Plan     Morbid obesity (H)  Patient has prescription for wegovy.   She has not yet started. She is concerned about injecting.   Worried about side effects.   Reviewed possible side effects and how to manage.   Can consider other options should she have these issues.   Offered nurse appointment to help teach her to give herself injections.   Patient scheduled.     Balance problems  Patient has declined referral in the past.   Recommend referral for PT to assist as she has had some balance issues with her back and leg pain.   Patient agrees to proceed  Referral placed.   - Physical Therapy  Referral; Future    Nausea  Occasional nausea. Possible medication side effect.   She would like refills.   Refills sent.   - ondansetron (ZOFRAN ODT) 4 MG ODT tab; Take 1 tablet (4 mg) by mouth every 8 hours as needed for nausea.    Chronic, continuous use of opioids  Patient is now under the care of [facility] for her pain management.   Medications updated in the record.     The longitudinal plan of care for the diagnosis(es)/condition(s) as documented were addressed during this visit. Due to the added complexity in care, I will continue to support Jacquie in the subsequent management and with ongoing continuity of care.            Subjective   Jacquie is a 64 year old, presenting for the following health issues:  Consult and Recheck Medication (\"Discuss issues\", also wants orders for labs and vaccines)        7/26/2024     2:53 PM   Additional Questions   Roomed by Amilcar PERALES   Accompanied by Self     History of Present Illness       Reason for visit:  Medication and talk abouta issue i have    She eats 0-1 servings of fruits and vegetables daily.She " consumes 0 sweetened beverage(s) daily.She exercises with enough effort to increase her heart rate 9 or less minutes per day.  She exercises with enough effort to increase her heart rate 3 or less days per week.   She is taking medications regularly.     She had to change her visit to virtual as her grandchild has a fever and she is watchin him.     Hasn't started shots for weight loss. She is worried about feeling 'crappy'. Her daughter  **    Requip dose reduction has been helpful as far as the hallucination. The reduction has helped. Does ok with restless legs with the reduced dose.     Seeing Pain Management - [facility]. Naltrexone and Mamentine started by [facility]. Feels like sleep is worse. Sleepy. Hard time concentrating. Sees them October. Few more weeks. Had ablation and it has helped but not as much as she hoped.     Still having issue with walking. Reports that she runs into things. Poor balance.     Rheumatology - she is upset that her appointment was rescheduled. Considering other options.                   Objective           Vitals:  No vitals were obtained today due to virtual visit.    Physical Exam   GENERAL: alert and no distress  EYES: Eyes grossly normal to inspection.  No discharge or erythema, or obvious scleral/conjunctival abnormalities.  RESP: No audible wheeze, cough, or visible cyanosis.    SKIN: Visible skin clear. No significant rash, abnormal pigmentation or lesions.  NEURO: Cranial nerves grossly intact.  Mentation and speech appropriate for age.  PSYCH: Appropriate affect, tone, and pace of words          Video-Visit Details    Type of service:  Video Visit   Originating Location (pt. Location): Home    Distant Location (provider location):  On-site  Platform used for Video Visit: William  Signed Electronically by: Liz Peterson MD

## 2024-09-20 NOTE — TELEPHONE ENCOUNTER
Patient just called with [facility] and asked if this can be virtual? Please call patient back as soon as possible so she can be updated on this.

## 2024-09-23 ENCOUNTER — MYC MEDICAL ADVICE (OUTPATIENT)
Dept: FAMILY MEDICINE | Facility: CLINIC | Age: 64
End: 2024-09-23
Payer: COMMERCIAL

## 2024-09-25 NOTE — TELEPHONE ENCOUNTER
RN called patient regarding appointment for 9/26/24 wegovy injection teaching. RN explained to watch video from wegovy site for information to prime and inject prior. This way if she has further questions during the teaching we can specifically address them. Patient verbalized understanding and all questions answered.     Liane Wharton RN  [facility] - Registered Nurse  Clinic Triage [place]   September 25, 2024

## 2024-09-26 NOTE — TELEPHONE ENCOUNTER
Routing to clinic pool to assist with scheduling lab and vaccine appts.  See mychart request.  Thank you.  Falguni DELGADO RN

## 2024-09-30 ENCOUNTER — NURSE TRIAGE (OUTPATIENT)
Dept: FAMILY MEDICINE | Facility: CLINIC | Age: 64
End: 2024-09-30
Payer: COMMERCIAL

## 2024-09-30 ENCOUNTER — MYC MEDICAL ADVICE (OUTPATIENT)
Dept: FAMILY MEDICINE | Facility: CLINIC | Age: 64
End: 2024-09-30
Payer: COMMERCIAL

## 2024-09-30 NOTE — TELEPHONE ENCOUNTER
"Nurse Triage SBAR    Is this a 2nd Level Triage? NO    Situation: Fatigue     Background: Patient states she started Wegovy on Thursday 09/26/2024 and it made her horribly sick \"it was a nightmare\". Patient states she tested positive for COVID on 09/29/2024. BP on 09/29/2024 was 99/75. Patient states she is feeling a little better after her nap, cough is not as bad.     Assessment: Patient reports cough, fatigue, loss of taste/smell, sinus concerns, headache. Patient denies SOB, chest pain/discomfort, breathing difficulty,     Protocol Recommended Disposition:   Home Care Patient denied COVID treatment r/t having to stop certain medications.     Recommendation: RN reviewed red flag symptoms with patient per RN protocol. Advised patient to seek emergency care if symptoms worsen or if pain becomes unmanageable. Patient verbalized understanding, agreeable to plan.    Gloria Sung RN on 9/30/2024 at 5:34 PM    Reason for Disposition   MILD weakness or fatigue with acute minor illness (e.g., colds)    Additional Information   Negative: SEVERE difficulty breathing (e.g., struggling for each breath, speaks in single words)   Negative: Shock suspected (e.g., cold/pale/clammy skin, too weak to stand, low BP, rapid pulse)   Negative: Difficult to awaken or acting confused (e.g., disoriented, slurred speech)   Negative: Fainted > 15 minutes ago and still feels too weak or dizzy to stand   Negative: SEVERE weakness (i.e., unable to walk or barely able to walk, requires support) and new-onset or worsening   Negative: Sounds like a life-threatening emergency to the triager   Negative: Difficulty breathing   Negative: Heart beating < 50 beats per minute OR > 140 beats per minute   Negative: Extra heartbeats, irregular heart beating, or heart is beating very fast (i.e., 'palpitations')   Negative: Follows large amount of bleeding (e.g., from vomiting, rectum, vagina) (Exception: Small transient weakness from sight of a small " "amount blood.)   Negative: Black or tarry bowel movements   Negative: MODERATE weakness or fatigue from poor fluid intake with no improvement after 2 hours of rest and fluids   Negative: Drinking very little and dehydration suspected (e.g., no urine > 12 hours, very dry mouth, very lightheaded)   Negative: Patient sounds very sick or weak to the triager   Negative: MODERATE weakness (i.e., interferes with work, school, normal activities) and cause unknown (Exceptions: Weakness from acute minor illness or from poor fluid intake; weakness is chronic and not worse.)   Negative: Fever > 103 F  (39.4 C) and not able to get the Fever down using CARE ADVICE   Negative: Fever > 100.0 F (37.8 C) and bedridden (e.g., CVA, chronic illness, recovering from surgery)   Negative: Fever > 101 F (38.3 C) and over 60 years of age   Negative: Fever > 100.0 F (37.8 C) and diabetes mellitus or weak immune system (e.g., HIV positive, cancer chemo, splenectomy, organ transplant, chronic steroids)   Negative: Pale skin (pallor)   Negative: MODERATE weakness (i.e., interferes with work, school, normal activities) and persists > 3 days   Negative: Taking a medicine that could cause weakness (e.g., blood pressure medications, diuretics)   Negative: Patient wants to be seen   Negative: Weakness is a chronic symptom (recurrent or ongoing AND lasting > 4 weeks)   Negative: Fatigue is a chronic symptom (recurrent or ongoing AND present > 4 weeks)   Negative: MILD weakness (i.e., does not interfere with ability to work, go to school, normal activities) and persists > 1 week   Negative: Fatigue (i.e., tires easily, decreased energy) and persists > 1 week    Answer Assessment - Initial Assessment Questions  1. DESCRIPTION: \"Describe how you are feeling.\"      09/30/2024  2. SEVERITY: \"How bad is it?\"  \"Can you stand and walk?\"    - MILD (0-3): Feels weak or tired, but does not interfere with work, school or normal activities.  Has not eaten " "today  3. ONSET: \"When did these symptoms begin?\" (e.g., hours, days, weeks, months)      This morning  4. CAUSE: \"What do you think is causing the weakness or fatigue?\" (e.g., not drinking enough fluids, medical problem, trouble sleeping)      Sleeping a lot, trying to take fluids  5. NEW MEDICINES:  \"Have you started on any new medicines recently?\" (e.g., opioid pain medicines, benzodiazepines, muscle relaxants, antidepressants, antihistamines, neuroleptics, beta blockers)      Wegovy - 09/26/2024 got very sick with the medication  6. OTHER SYMPTOMS: \"Do you have any other symptoms?\" (e.g., chest pain, fever, cough, SOB, vomiting, diarrhea, bleeding, other areas of pain)      Denies SOB, chest pain/discomfort, vomit diarrhea  Patient reports sinus issues, plugged up  7. PREGNANCY: \"Is there any chance you are pregnant?\" \"When was your last menstrual period?\"    Protocols used: Weakness (Generalized) and Fatigue-A-OH    "

## 2024-09-30 NOTE — TELEPHONE ENCOUNTER
RN Triage    Patient Contact    Attempt # 1    Was call answered?  No.  Left message on voicemail with information to call me back. [employer] sent    Upon call back please triage [employer] message as below    Hi just wanted to update you. I am over the hump from Wegovy.  That is the worst I have ever felt in my life! I didn t eat or barely drink for 3 days. Darn I was hoping this would work for me.  Well I noticed I couldn t taste everything Welcome Covid. I feel pretty awful again. I have a nasty constant cough and might need help getting rid of it. I am on the doxycycline monohydrate daily for face but that doesn t help I guess. I just don t want to end up super sick from cough. I truly cannot get up to come in. Hopefully it goes away quickly.     Thank you,  Jacquie Wharton RN  [facility] - Registered Nurse  Clinic Triage [place]   September 30, 2024

## 2024-09-30 NOTE — TELEPHONE ENCOUNTER
See telephone encounter    Liane Wharton RN  [facility] - Registered Nurse  Clinic Triage [place]   September 30, 2024

## 2024-10-01 ENCOUNTER — MYC MEDICAL ADVICE (OUTPATIENT)
Dept: FAMILY MEDICINE | Facility: CLINIC | Age: 64
End: 2024-10-01

## 2024-10-01 DIAGNOSIS — G25.81 RESTLESS LEG SYNDROME: Primary | ICD-10-CM

## 2024-10-01 DIAGNOSIS — K21.9 GASTROESOPHAGEAL REFLUX DISEASE, UNSPECIFIED WHETHER ESOPHAGITIS PRESENT: ICD-10-CM

## 2024-10-01 RX ORDER — FAMOTIDINE 40 MG/1
40 TABLET, FILM COATED ORAL DAILY
Qty: 90 TABLET | Refills: 0 | OUTPATIENT
Start: 2024-10-01

## 2024-10-03 ENCOUNTER — MYC MEDICAL ADVICE (OUTPATIENT)
Dept: FAMILY MEDICINE | Facility: CLINIC | Age: 64
End: 2024-10-03
Payer: COMMERCIAL

## 2024-10-03 RX ORDER — GABAPENTIN 100 MG/1
CAPSULE ORAL
Qty: 81 CAPSULE | Refills: 0 | Status: SHIPPED | OUTPATIENT
Start: 2024-10-03 | End: 2024-11-12 | Stop reason: SINTOL

## 2024-10-04 ENCOUNTER — NURSE TRIAGE (OUTPATIENT)
Dept: FAMILY MEDICINE | Facility: CLINIC | Age: 64
End: 2024-10-04
Payer: COMMERCIAL

## 2024-10-04 NOTE — TELEPHONE ENCOUNTER
See telephone encounter    Liane Wharton RN  [facility] - Registered Nurse  Clinic Triage [place]   October 4, 2024

## 2024-10-04 NOTE — TELEPHONE ENCOUNTER
Provider Response to 2nd Level Triage Request    I have reviewed the RN documentation. My recommendation is:  Refer to Urgent Care    I do have concerns with how she is doing. I would recommend an exam prior to consideration of steroids.

## 2024-10-04 NOTE — TELEPHONE ENCOUNTER
RN called patient and relayed message as below    Provider Response to 2nd Level Triage Request     I have reviewed the RN documentation. My recommendation is:  Refer to Urgent Care     I do have concerns with how she is doing. I would recommend an exam prior to consideration of steroids.    Liane Wharton RN  [facility] - Registered Nurse  Clinic Triage [place]   October 4, 2024

## 2024-10-04 NOTE — TELEPHONE ENCOUNTER
Nurse Triage SBAR    Is this a 2nd Level Triage? YES, LICENSED PRACTITIONER REVIEW IS REQUIRED    Situation: Cough congested harsh non productive with fatigue.  Background: Covid positive 9/27  Assessment: Patient states cough and congestion continues to get worse and not better. Feeling lightheaded and dizzy at times, fatigued, drinking water and Gatorade no real solids. Denies SOB, CP, HA, Diarrhea, numbness tingling, circumoral cyanosis. BP 99/75 2 days ago  Protocol Recommended Disposition:   Go To ED/UCC Now (Or To Office With PCP Approval)    ADS declined, unable to provide hydration if needed.     Recommendation: Patient declined ED and UC and requested provider give her steroids. RN reviewed increased symptoms to be seen urgently.Patient verbalized understanding and all questions answered.       Routed to provider    Does the patient meet one of the following criteria for ADS visit consideration? 16+ years old, with an [facility] PCP   Liane Wharton RN  [facility] - Registered Nurse  Clinic Triage [place]   October 4, 2024    Reason for Disposition   Patient sounds very sick or weak to the triager    Additional Information   Negative: Bluish (or gray) lips or face   Negative: SEVERE difficulty breathing (e.g., struggling for each breath, speaks in single words)   Negative: Rapid onset of cough and has hives   Negative: Coughing started suddenly after medicine, an allergic food or bee sting   Negative: Difficulty breathing after exposure to flames, smoke, or fumes   Negative: Sounds like a life-threatening emergency to the triager   Negative: Previous asthma attacks and this feels like asthma attack   Negative: Dry cough (non-productive; no sputum or minimal clear sputum) and within 14 days of COVID-19 Exposure   Negative: MODERATE difficulty breathing (e.g., speaks in phrases, SOB even at rest, pulse 100-120) and still present when not coughing   Negative: Chest pain present when not coughing   Negative:  Passed out (i.e., fainted, collapsed and was not responding)    Protocols used: Cough-A-OH

## 2024-10-08 ENCOUNTER — MYC MEDICAL ADVICE (OUTPATIENT)
Dept: RHEUMATOLOGY | Facility: CLINIC | Age: 64
End: 2024-10-08

## 2024-10-08 ENCOUNTER — VIRTUAL VISIT (OUTPATIENT)
Dept: PHARMACY | Facility: CLINIC | Age: 64
End: 2024-10-08
Attending: INTERNAL MEDICINE
Payer: COMMERCIAL

## 2024-10-08 ENCOUNTER — MYC MEDICAL ADVICE (OUTPATIENT)
Dept: FAMILY MEDICINE | Facility: CLINIC | Age: 64
End: 2024-10-08

## 2024-10-08 DIAGNOSIS — G25.81 RESTLESS LEG SYNDROME: Primary | ICD-10-CM

## 2024-10-08 DIAGNOSIS — E66.01 MORBID OBESITY (H): ICD-10-CM

## 2024-10-08 NOTE — PROGRESS NOTES
Medication Therapy Management (MTM) Encounter    ASSESSMENT:                            Medication Adherence/Access: No issues identified    Restless Leg Syndrome: Patient has tolerated first few doses of gabapentin and would benefit from continuing therapy. Discussed mental fogginess after dose and encouraged patient to monitor as dose is titrated. Gabapentin is controlling restless leg symptoms at night, but patient does struggle with symptoms daily in the afternoon. Discussed nonpharmacologic strategies to help with symptom management and prevention. If symptoms continue to be bothersome in the afternoon, it may be appropriate to consider a midday dose of gabapentin in addition to the nightly dose. Gabapentin is available as an oral solution which would allow more dosing flexibility for a potential afternoon dose, in order to minimize adverse effects. Patient has also been dealing with muscle weakness, lack of coordination, and dizziness. These are possible adverse effects of medications and MTM to discuss with patient's provider.     Weight Loss: Patient administered one dose of Wegovy and experienced significant nausea and upset stomach with dose. Tested positive for COVID shortly after and stopped medication. Patient interested in alternative options and MTM to discuss with patient's provider.     PLAN:                            Continue gabapentin as directed.   Please reach out if you notice issues as the dose is increased.     For active restless leg symptoms, you can try:  Soaking your legs in a warm bath.  Massaging your legs.  Using a leg compression device.    To help prevent restless legs, it is recommended to:   Complete mentally alerting activities (working on a computer, doing crossword puzzles).  Complete regular exercise.  Limit caffeine.      I am reaching out to Dr. Peterson to discuss a few of your medications and whether they could be contributing to restless legs or  coordination/balance/dizziness issues.     I am reaching out to Dr. Peterson to discuss possible options that could be used in place of Wegovy.     Follow-up: with MTM pharmacist on 11/6/24.     SUBJECTIVE/OBJECTIVE:                          Jacquie Amador is a 64 year old female seen for a follow-up visit.       Reason for visit: Gabapentin follow-up.    Allergies/ADRs: Reviewed in chart  Past Medical History: Reviewed in chart  Tobacco: She reports that she quit smoking about 11 years ago. Her smoking use included cigarettes. She started smoking about 44 years ago. She has a 10.1 pack-year smoking history. She has been exposed to tobacco smoke. She has never used smokeless tobacco.  Alcohol: none    Medication Adherence/Access: no issues reported    Restless Leg Syndrome:    Gabapentin 100 mg nightly   Patient started gabapentin on 10/3 and had no concerns with first few doses. Reported medication is helping with her restless leg symptoms at night. Notices minor fuzziness/fogginess after dose, which caused her to discontinue higher doses of gabapentin in the past. Side effects are tolerable when taken at night and patient is able to go to bed. Hallucinations have resolved since stopping ropinerole. Main concern is restless leg symptoms that occur earlier in the day. Last few months has been noticing it start around 1-2 pm. Also experiencing dizziness around 2-3 pm. Has been sick with COVID the past 10 days and reported barely eating or drinking. Trying her best to stay hydrated. Reported issues with walking straight and frequently runs into things around the house. Thinks muscle weakness and coordination are contributing.     Weight Loss:  Wegovy 0.25 mg subcutaneous injection every 7 days   Administered first dose a few weeks ago. Reported significant nausea that lasted a few days after the dose. Tested positive for COVID a few days later. Has lost about 30 lbs over the past few months with diet changes. Goal is  to lose another 20 lbs. No longer taking Wegovy and interested in alternative options.     Today's Vitals: LMP 07/17/2009 (Exact Date)   ----------------    I spent 30 minutes with this patient today. A copy of the visit note was provided to the patient's provider(s).    A summary of these recommendations was sent via [employer].    Golden Cadena PharmD  Medication Therapy Management Pharmacist  [facility]  Phone: [phone]     Telemedicine Visit Details  The patient's medications can be safely assessed via a telemedicine encounter.  Type of service:  Telephone visit  Originating Location (pt. Location): Home    Distant Location (provider location):  On-site  Start Time:  2:30 PM  End Time:  3:00 PM     Medication Therapy Recommendations  No medication therapy recommendations to display

## 2024-10-08 NOTE — Clinical Note
Jacquie seems to be tolerating the gabapentin fine at the 100 mg dose. Works well for RLS at night. I know she sent you a message about symptoms in the afternoon and we discussed possible non-pharm options. Depending on how the titration goes, I mentioned it may be possible to have a dose added in the afternoon, but she would have to discuss with you first. Gabapentin is available as an oral solution, which could be beneficial if you wanted to try 50 mg for an afternoon dose rather than 100 mg.   She also mentioned having issues with coordination and running into things around the house regularly. I wanted to get your thoughts on her memantine and oxybutynin.    Memantine can cause confusion (IR: 6%), depression (ER: 3%), dizziness (5% to 7%), drowsiness (3%), fatigue (IR: 2%), hallucination (IR: 3%), and headache (6%) Oxybutynin has been associated with a higher risk of affect/mood disorders, agitation, and balance/movement disorders.   Just thinking of other possible contributors. Thanks!

## 2024-10-09 ENCOUNTER — TRANSFERRED RECORDS (OUTPATIENT)
Dept: HEALTH INFORMATION MANAGEMENT | Facility: CLINIC | Age: 64
End: 2024-10-09
Payer: COMMERCIAL

## 2024-10-09 DIAGNOSIS — N18.31 CHRONIC KIDNEY DISEASE, STAGE 3A (H): Primary | ICD-10-CM

## 2024-10-10 ENCOUNTER — NURSE TRIAGE (OUTPATIENT)
Dept: FAMILY MEDICINE | Facility: CLINIC | Age: 64
End: 2024-10-10
Payer: COMMERCIAL

## 2024-10-10 ENCOUNTER — MYC MEDICAL ADVICE (OUTPATIENT)
Dept: FAMILY MEDICINE | Facility: CLINIC | Age: 64
End: 2024-10-10
Payer: COMMERCIAL

## 2024-10-10 ENCOUNTER — TELEPHONE (OUTPATIENT)
Dept: RHEUMATOLOGY | Facility: CLINIC | Age: 64
End: 2024-10-10
Payer: COMMERCIAL

## 2024-10-10 NOTE — TELEPHONE ENCOUNTER
Nurse Triage SBAR    Is this a 2nd Level Triage? YES, LICENSED PRACTITIONER REVIEW IS REQUIRED    Situation: Patient states she has been dizzy and unbalanced.    She states this has been going on for over a week but got worse today.    Background: patient sent in a mychart today and she recently had Covid    Assessment: She is having to hold onto things. She states at times she feels like she might faint. She states this is not every time.  She does not feel like that now because she is sitting down.  She states this happens when she gets up to walk.  No difficulty breathing.  She states she has had fatigue to the point it is hard to do things.    She has had a spinning and tilting sensation when she gets up to walk.  She states sometimes drinking fluids helps.    Protocol Recommended Disposition:   Call ADS/Go to ED/UCC Now (Or To Office with PCP Approval)    Recommendation: per protocol  patient should be seen today. Please advise.    Routed to provider  Emi Gomez RN on 10/10/2024 at 4:33 PM        Reason for Disposition   Spinning or tilting sensation (vertigo) present now and one or more stroke risk factors (i.e., hypertension, diabetes mellitus, prior stroke/TIA, heart attack, age over 60) (Exception: Prior physician evaluation for this AND no different/worse than usual.)    Additional Information   Negative: SEVERE difficulty breathing (e.g., struggling for each breath, speaks in single words)   Negative: Shock suspected (e.g., cold/pale/clammy skin, too weak to stand, low BP, rapid pulse)   Negative: Difficult to awaken or acting confused (e.g., disoriented, slurred speech)   Negative: Fainted, and still feels dizzy afterwards   Negative: Overdose (accidental or intentional) of medications   Negative: New neurologic deficit that is present now: * Weakness of the face, arm, or leg on one side of the body * Numbness of the face, arm, or leg on one side of the body * Loss of speech or garbled speech    Negative: Heart beating < 50 beats per minute OR > 140 beats per minute   Negative: Sounds like a life-threatening emergency to the triager   Negative: Chest pain   Negative: Rectal bleeding, bloody stool, or tarry-black stool   Negative: Vomiting is main symptom   Negative: Diarrhea is main symptom   Negative: Headache is main symptom   Negative: Heat exhaustion suspected (i.e., dehydration from heat exposure)   Negative: Patient states that they are having an anxiety or panic attack   Negative: Dizziness from low blood sugar (i.e., < 60 mg/dl or 3.5 mmol/l)   Negative: SEVERE dizziness (e.g., unable to stand, requires support to walk, feels like passing out now)   Negative: SEVERE headache or neck pain    Protocols used: Dizziness-A-OH

## 2024-10-10 NOTE — TELEPHONE ENCOUNTER
Message handled by Nurse Triage with [employer] - provider name: Jimmie .    Patient should go to urgent care or ER.    Patient notified of providers advice. Patient would like to know Dr. Hunt thought on her dizziness and she will go in if she gets worse.    Emi Gomez RN on 10/10/2024 at 4:59 PM

## 2024-10-10 NOTE — PATIENT INSTRUCTIONS
"Recommendations from today's MTM visit:                                                       Continue gabapentin as directed.   Please reach out if you notice issues as the dose is increased.     For active restless leg symptoms, you can try:  Soaking your legs in a warm bath.  Massaging your legs.  Using a leg compression device.    To help prevent restless legs, it is recommended to:   Complete mentally alerting activities (working on a computer, doing crossword puzzles).  Complete regular exercise.  Limit caffeine.      I am reaching out to Dr. Peterson to discuss a few of your medications and whether they could be contributing to restless legs or coordination/balance/dizziness issues.     I am reaching out to Dr. Peterson to discuss possible options that could be used in place of Wegovy.     Follow-up: with MTM pharmacist on 11/6/24.     It was great speaking with you today.  I value your experience and would be very thankful for your time in providing feedback in our clinic survey. In the next few days, you may receive an email or text message from [employer] with a link to a survey related to your  clinical pharmacist.\"     To schedule another MTM appointment, please call the clinic directly or you may call the MTM scheduling line at [phone].    My Clinical Pharmacist's contact information:                                                      Please feel free to contact me with any questions or concerns you have.      Golden Cadena, PharmD  Medication Therapy Management Pharmacist  [facility]  Phone: [phone]    "

## 2024-10-10 NOTE — TELEPHONE ENCOUNTER
Called patient to review recommendations since information was in multiple MyChart messages. Discussed lower dose gabapentin in afternoon and patient was interested. Reviewed magnesium interaction with doxycycline and plan to avoid. Patient expressed understanding.     Golden Cadena, PharmD  Medication Therapy Management Pharmacist  [facility]

## 2024-10-11 RX ORDER — GABAPENTIN 250 MG/5ML
SOLUTION ORAL
Qty: 150 ML | Refills: 1 | Status: SHIPPED | OUTPATIENT
Start: 2024-10-11 | End: 2024-11-12 | Stop reason: SINTOL

## 2024-10-11 NOTE — TELEPHONE ENCOUNTER
Responded to patient in Mychart with recommendation to be seen as per the recommendation she was given.

## 2024-10-16 ENCOUNTER — MYC MEDICAL ADVICE (OUTPATIENT)
Dept: FAMILY MEDICINE | Facility: CLINIC | Age: 64
End: 2024-10-16

## 2024-10-16 DIAGNOSIS — G25.81 RESTLESS LEGS SYNDROME (RLS): Primary | ICD-10-CM

## 2024-10-17 ENCOUNTER — LAB (OUTPATIENT)
Dept: LAB | Facility: CLINIC | Age: 64
End: 2024-10-17
Payer: COMMERCIAL

## 2024-10-17 ENCOUNTER — MYC MEDICAL ADVICE (OUTPATIENT)
Dept: FAMILY MEDICINE | Facility: CLINIC | Age: 64
End: 2024-10-17

## 2024-10-17 DIAGNOSIS — M35.2 BEHCET'S SYNDROME INVOLVING ORAL MUCOSA (H): ICD-10-CM

## 2024-10-17 DIAGNOSIS — Z79.60 LONG-TERM USE OF IMMUNOSUPPRESSANT MEDICATION: ICD-10-CM

## 2024-10-17 DIAGNOSIS — N18.31 CHRONIC KIDNEY DISEASE, STAGE 3A (H): ICD-10-CM

## 2024-10-17 DIAGNOSIS — G25.81 RESTLESS LEGS SYNDROME (RLS): ICD-10-CM

## 2024-10-17 DIAGNOSIS — E78.5 HYPERLIPIDEMIA LDL GOAL <130: ICD-10-CM

## 2024-10-17 DIAGNOSIS — K21.9 GASTROESOPHAGEAL REFLUX DISEASE, UNSPECIFIED WHETHER ESOPHAGITIS PRESENT: ICD-10-CM

## 2024-10-17 DIAGNOSIS — M35.2 BEHCET'S DISEASE (H): ICD-10-CM

## 2024-10-17 LAB
ALBUMIN MFR UR ELPH: 15 MG/DL
ALBUMIN SERPL BCG-MCNC: 3.9 G/DL (ref 3.5–5.2)
ALBUMIN UR-MCNC: NEGATIVE MG/DL
ANION GAP SERPL CALCULATED.3IONS-SCNC: 12 MMOL/L (ref 7–15)
APPEARANCE UR: CLEAR
BACTERIA #/AREA URNS HPF: ABNORMAL /HPF
BILIRUB UR QL STRIP: NEGATIVE
BUN SERPL-MCNC: 19.6 MG/DL (ref 8–23)
CALCIUM SERPL-MCNC: 9.3 MG/DL (ref 8.8–10.4)
CHLORIDE SERPL-SCNC: 103 MMOL/L (ref 98–107)
CHOLEST SERPL-MCNC: 138 MG/DL
COLOR UR AUTO: YELLOW
CREAT SERPL-MCNC: 1.27 MG/DL (ref 0.51–0.95)
CREAT UR-MCNC: 100 MG/DL
CREAT UR-MCNC: 98.3 MG/DL
CRP SERPL-MCNC: <3 MG/L
EGFRCR SERPLBLD CKD-EPI 2021: 47 ML/MIN/1.73M2
ERYTHROCYTE [DISTWIDTH] IN BLOOD BY AUTOMATED COUNT: 13.4 % (ref 10–15)
ERYTHROCYTE [SEDIMENTATION RATE] IN BLOOD BY WESTERGREN METHOD: 14 MM/HR (ref 0–30)
FASTING STATUS PATIENT QL REPORTED: YES
GLUCOSE SERPL-MCNC: 90 MG/DL (ref 70–99)
GLUCOSE UR STRIP-MCNC: NEGATIVE MG/DL
HCO3 SERPL-SCNC: 26 MMOL/L (ref 22–29)
HCT VFR BLD AUTO: 40.7 % (ref 35–47)
HDLC SERPL-MCNC: 50 MG/DL
HGB BLD-MCNC: 13.5 G/DL (ref 11.7–15.7)
HGB UR QL STRIP: NEGATIVE
HYALINE CASTS #/AREA URNS LPF: ABNORMAL /LPF
KETONES UR STRIP-MCNC: NEGATIVE MG/DL
LDLC SERPL CALC-MCNC: 61 MG/DL
LEUKOCYTE ESTERASE UR QL STRIP: ABNORMAL
MCH RBC QN AUTO: 30.6 PG (ref 26.5–33)
MCHC RBC AUTO-ENTMCNC: 33.2 G/DL (ref 31.5–36.5)
MCV RBC AUTO: 92 FL (ref 78–100)
MICROALBUMIN UR-MCNC: <12 MG/L
MICROALBUMIN/CREAT UR: NORMAL MG/G{CREAT}
MUCOUS THREADS #/AREA URNS LPF: PRESENT /LPF
NITRATE UR QL: NEGATIVE
NONHDLC SERPL-MCNC: 88 MG/DL
PH UR STRIP: 6.5 [PH] (ref 5–7)
PHOSPHATE SERPL-MCNC: 3.5 MG/DL (ref 2.5–4.5)
PLATELET # BLD AUTO: 347 10E3/UL (ref 150–450)
POTASSIUM SERPL-SCNC: 3.8 MMOL/L (ref 3.4–5.3)
PROT/CREAT 24H UR: 0.15 MG/MG CR (ref 0–0.2)
PTH-INTACT SERPL-MCNC: 101 PG/ML (ref 15–65)
RBC # BLD AUTO: 4.41 10E6/UL (ref 3.8–5.2)
RBC #/AREA URNS AUTO: ABNORMAL /HPF
SKIP: ABNORMAL
SODIUM SERPL-SCNC: 141 MMOL/L (ref 135–145)
SP GR UR STRIP: 1.01 (ref 1–1.03)
SQUAMOUS #/AREA URNS AUTO: ABNORMAL /LPF
TRANS CELLS #/AREA URNS HPF: ABNORMAL /HPF
TRIGL SERPL-MCNC: 136 MG/DL
UROBILINOGEN UR STRIP-MCNC: NORMAL MG/DL
VIT D+METAB SERPL-MCNC: 34 NG/ML (ref 20–50)
WBC # BLD AUTO: 8.8 10E3/UL (ref 4–11)
WBC #/AREA URNS AUTO: ABNORMAL /HPF

## 2024-10-17 PROCEDURE — 80069 RENAL FUNCTION PANEL: CPT

## 2024-10-17 PROCEDURE — 81001 URINALYSIS AUTO W/SCOPE: CPT

## 2024-10-17 PROCEDURE — 87086 URINE CULTURE/COLONY COUNT: CPT

## 2024-10-17 PROCEDURE — 83970 ASSAY OF PARATHORMONE: CPT

## 2024-10-17 PROCEDURE — 80061 LIPID PANEL: CPT

## 2024-10-17 PROCEDURE — 82570 ASSAY OF URINE CREATININE: CPT

## 2024-10-17 PROCEDURE — 82043 UR ALBUMIN QUANTITATIVE: CPT

## 2024-10-17 PROCEDURE — 82306 VITAMIN D 25 HYDROXY: CPT

## 2024-10-17 PROCEDURE — 86140 C-REACTIVE PROTEIN: CPT

## 2024-10-17 PROCEDURE — 85652 RBC SED RATE AUTOMATED: CPT

## 2024-10-17 PROCEDURE — 36415 COLL VENOUS BLD VENIPUNCTURE: CPT

## 2024-10-17 PROCEDURE — 84156 ASSAY OF PROTEIN URINE: CPT

## 2024-10-17 PROCEDURE — 82728 ASSAY OF FERRITIN: CPT

## 2024-10-17 PROCEDURE — 85027 COMPLETE CBC AUTOMATED: CPT

## 2024-10-17 RX ORDER — FAMOTIDINE 40 MG/1
40 TABLET, FILM COATED ORAL DAILY
Qty: 90 TABLET | Refills: 2 | Status: SHIPPED | OUTPATIENT
Start: 2024-10-17

## 2024-10-18 LAB — FERRITIN SERPL-MCNC: 118 NG/ML (ref 11–328)

## 2024-10-18 NOTE — TELEPHONE ENCOUNTER
Routing to provider as it seems this is an ongoing mychart conversation between you both.    Marie Laura CMA ([facility])

## 2024-10-19 LAB — BACTERIA UR CULT: NORMAL

## 2024-10-22 ENCOUNTER — TELEPHONE (OUTPATIENT)
Dept: NEPHROLOGY | Facility: CLINIC | Age: 64
End: 2024-10-22

## 2024-10-22 NOTE — TELEPHONE ENCOUNTER
[facility] Call Center    Phone Message    May a detailed message be left on voicemail: yes     Reason for Call: Other: Pt had labs drawn for Dr. Cunningham but never saw provider due to having COVID. Labs came back abnormal. First face to face appt with Dr. Cunningham is not until may. Pt is requesting to discuss labs with care team.      Action Taken: Other: neph    Travel Screening: Not Applicable     Date of Service:

## 2024-10-23 NOTE — TELEPHONE ENCOUNTER
Left message for patient.    Pt had cancelled 10/22/24 nephrology consult with Dr. Cunningham on 10/15 via [employer]. Lab appointment for pre-labs was rescheduled but pre-appointment labs still completed.     Labs for the following tests have resulted.   Vit D:WNL  PTH:slightly elevated however not for nephrology standards.  Urine Albumin: Normal  Urine Protein Cr/Ratio: Normal  Renal Panel: Kidney function stable from prior labs GFR 47, Cr 1.27.    Labs do not indicate urgency however pt to be rescheduled in the nephrology clinic. Scheduling options to review with patient.  - Video Visit with Parisa Mccauley in December  - In person visit with Dr. Cunningham at [facility] Jan 22 4 pm  - In person with Dr. Mccauley in February at [place]  - Pt could be added to wait list if she wishes.

## 2024-10-23 NOTE — TELEPHONE ENCOUNTER
[facility] Call Center    Phone Message    May a detailed message be left on voicemail: yes     Reason for Call: Other: Pt has a follow up questions regarding the labs. Would like to speak to nursing. She is not available until 2:30 due to another medical appt requesting a call after that time      Action Taken: Other: neph    Travel Screening: Not Applicable     Date of Service:

## 2024-10-23 NOTE — TELEPHONE ENCOUNTER
Contacted Jacquie. Reviewed lab results with her. She reports that she has a strong family history of kidney disease and is concerned of her results. Scheduled December virtual appointment with Dr. Mccauley in December and plan to put pt on wait list for all sites.     She mentioned that she was on Celebrex for years and is no longer taking it. She denies current NSAID use. Pt has met deducible for the year and would like work up complete prior to year end.    She will track BP at home prior to her visit with Dr. Mccauley.    Jacquie verbalized understanding to plan.

## 2024-10-24 ENCOUNTER — TELEPHONE (OUTPATIENT)
Dept: FAMILY MEDICINE | Facility: CLINIC | Age: 64
End: 2024-10-24
Payer: COMMERCIAL

## 2024-10-24 ENCOUNTER — TRANSFERRED RECORDS (OUTPATIENT)
Dept: HEALTH INFORMATION MANAGEMENT | Facility: CLINIC | Age: 64
End: 2024-10-24
Payer: COMMERCIAL

## 2024-10-24 NOTE — TELEPHONE ENCOUNTER
Patient Quality Outreach    Patient is due for the following:   Asthma  -  ACT needed and AAP  Colon Cancer Screening-cologuard order placed-does not look to be completed  Breast Cancer Screening - Mammogram-order placed  Physical Preventive Adult Physical      Topic Date Due    Zoster (Shingles) Vaccine (2 of 2) 01/21/2020    Pneumococcal Vaccine (3 of 3 - PCV) 02/25/2023    Flu Vaccine (1) 09/01/2024    COVID-19 Vaccine (7 - 2024-25 season) 09/01/2024     Chronic Opioid Use -  Treatment Agreement (CSA) and Urine Drug Screen    Next Steps:   Patient has upcoming appointment, these items will be addressed at that time.    Type of outreach:    Chart review performed, no outreach needed.      Questions for provider review:    AAP, updated CSA and urine drug screen           Marie Laura, CMA

## 2024-10-28 ENCOUNTER — MYC MEDICAL ADVICE (OUTPATIENT)
Dept: FAMILY MEDICINE | Facility: CLINIC | Age: 64
End: 2024-10-28

## 2024-10-28 ENCOUNTER — NURSE TRIAGE (OUTPATIENT)
Dept: FAMILY MEDICINE | Facility: CLINIC | Age: 64
End: 2024-10-28

## 2024-10-28 NOTE — LETTER
October 29, 2024      Jacquie Amador  [address]  [place] [address]        To Whom It May Concern,     Jacquie Amador has history of chronic migraine with light sensitivity.     She requires tinting of her car windows due to this condition.       Sincerely,        Liz Peterson MD

## 2024-10-28 NOTE — TELEPHONE ENCOUNTER
Nurse Triage SBAR    Is this a 2nd Level Triage? NO    Situation: Pt requesting appointment and/or prescription for window tint on her car related to migraines    Background: Pt states she got the windows and windshield of her car tinted because she gets migraines from the sun. Yesterday she was pulled over due to the tint, so she needs to get a prescription stating her need for the tint or she will have to remove it. Patient has checked with her eye doctor and they let her know they are not able to prescribe window tint.    Assessment:  RN let patient know PCP is not in today, but RN will discuss with PCP tomorrow on what kind of appt may be needed to address her needs. Patient was agreeable and understanding of the plan.    Protocol Recommended Disposition:   See in Office Within 2 Weeks    Recommendation: Is PCP able to prescribe window tint and would you like a visit? If so, would you like in-person or is virtual appropriate?     Routed to provider    Reason for Disposition   Headache is a chronic symptom (recurrent or ongoing AND lasting > 4 weeks)    Additional Information   Negative: Difficult to awaken or acting confused (e.g., disoriented, slurred speech)   Negative: Weakness of the face, arm or leg on one side of the body and new-onset   Negative: Numbness of the face, arm or leg on one side of the body and new-onset   Negative: Loss of speech or garbled speech and new-onset   Negative: Passed out (i.e., fainted, collapsed and was not responding)   Negative: Sounds like a life-threatening emergency to the triager   Negative: Followed a head injury within last 3 days   Negative: Traumatic Brain Injury (TBI) is suspected   Negative: Sinus pain of forehead and yellow or green nasal discharge   Negative: Pregnant   Negative: Unable to walk without falling   Negative: Stiff neck (can't touch chin to chest)   Negative: Possibility of carbon monoxide exposure   Negative: SEVERE headache, states 'worst headache'  of life   Negative: SEVERE headache, sudden-onset (i.e., reaching maximum intensity within seconds to 1 hour)   Negative: Severe pain in one eye   Negative: Loss of vision or double vision  (Exception: Same as prior migraines.)   Negative: Patient sounds very sick or weak to the triager   Negative: Fever > 103 F (39.4 C)   Negative: Fever > 100.0 F (37.8 C) and has diabetes mellitus or a weak immune system (e.g., HIV positive, cancer chemotherapy, organ transplant, splenectomy, chronic steroids)   Negative: SEVERE headache (e.g., excruciating) and has had severe headaches before   Negative: SEVERE headache and not relieved by pain meds   Negative: SEVERE headache and vomiting   Negative: SEVERE headache and fever   Negative: New headache and weak immune system (e.g., HIV positive, cancer chemo, splenectomy, organ transplant, chronic steroids)   Negative: Fever present > 3 days (72 hours)   Negative: Patient wants to be seen   Negative: Unexplained headache that is present > 24 hours   Negative: New headache and age > 50   Negative: Headache started during exertion (e.g., sex, strenuous exercise, heavy lifting)    Protocols used: Headache-A-OH

## 2024-10-29 ENCOUNTER — VIRTUAL VISIT (OUTPATIENT)
Dept: RHEUMATOLOGY | Facility: CLINIC | Age: 64
End: 2024-10-29
Attending: INTERNAL MEDICINE
Payer: COMMERCIAL

## 2024-10-29 VITALS — WEIGHT: 173 LBS | BODY MASS INDEX: 27.15 KG/M2 | HEIGHT: 67 IN

## 2024-10-29 DIAGNOSIS — M35.2 BEHCET'S DISEASE (H): ICD-10-CM

## 2024-10-29 DIAGNOSIS — Z79.60 LONG-TERM USE OF IMMUNOSUPPRESSANT MEDICATION: ICD-10-CM

## 2024-10-29 PROCEDURE — G2211 COMPLEX E/M VISIT ADD ON: HCPCS | Performed by: INTERNAL MEDICINE

## 2024-10-29 PROCEDURE — 99215 OFFICE O/P EST HI 40 MIN: CPT | Mod: 95 | Performed by: INTERNAL MEDICINE

## 2024-10-29 ASSESSMENT — PAIN SCALES - GENERAL: PAINLEVEL_OUTOF10: EXTREME PAIN (8)

## 2024-10-29 NOTE — LETTER
10/29/2024       RE: Jacquie Amador  [address]  [facility] [address]     Dear Colleague,    Thank you for referring your patient, Jacquie Amador, to the [facility] [place] [facility]. Please see a copy of my visit note below.    Virtual Visit Details    Type of service:  Video Visit   Video Start Time: 11:00 AM   Video End Time: 11:25 AM    Originating Location (pt. Location): Home    Distant Location (provider location):  Off-site  Platform used for Video Visit: [facility]        Jacquie Amador returns for evaluation of possible Behcet's disease. -ALEXA, -RF, -CCP. Previous non-diagnostic skin ulcer biopsy with perivascular mixed cell infiltrate, rich in neutrophils, indicative of excoriation.  Treatment with apremilast since 9-2023.    Generally Shirazs joints have been quiet, but she currently describes some right knee and distal leg swelling without pain, as well as some right foot pain. This has previously occurred and she feels that this is on its way to resolving. No other joint concerns today.     She does admit to some instability and balance problems, and has some concern about falling. She has discussed this with her PCP and neurology evaluation is being considered.     No skin rash at present but a lot of purple spots, poorly healing lesions, and bruises, with some brown discoloration. Not much problem with oral ulcers but no genital ulcerations    History of depression but no suicidal ideation. She reports problems with hallucinations on her medications.     Otezla 30 mg bid is well tolerated. She started by dermatology for treatment colchicine 0.6 mg BID for skin lesions and ulcers with good tolerance.    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, platelet granule disorder, GERD    Past Medical History:   Diagnosis Date     ASTHMA - MODERATE PERSISTENT 2005     Chronic pain      Coronary artery disease      CVA (cerebral infarction)      Depressive disorder, not elsewhere classified      Diabetes (H)      Elevated serum alkaline phosphatase level     Liver source     Fibromyalgia      HYPERLIPIDEMIA NEC/NOS 2006     Hypertriglyceridemia      OA (osteoarthritis)      Thyroid disease      Trochanteric bursitis      Unspecified essential hypertension      Surgical-  Past Surgical History:   Procedure Laterality Date     3 teeth pulled       INJECT EPIDURAL TRANSFORAMINAL  2014    [facility]     INJECT JOINT SACROILIAC  2014    [facility]     LAMINECTOMY LUMBAR ONE LEVEL Left 2014    [facility]  DELIVERY ONLY      , Low Cervical     Injuries-none    Active Problems:  Patient Active Problem List   Diagnosis     Moderate persistent asthma without complication     Allergic rhinitis due to other allergen     Esophageal reflux     Moderate recurrent major depression (H)     Multiple joint pain     HYPERLIPIDEMIA LDL GOAL <130     Hypertension goal BP (blood pressure) < 140/90     Generalized anxiety disorder     Myalgia     Chronic rhinitis     Constipation     Lumbar disc disease with radiculopathy     Iron deficiency anemia     Osteoarthritis of carpometacarpal joint of thumb - bilateral     Trochanteric bursitis     Right ankle instability     Primary focal hyperhidrosis     Trochanteric bursitis of both hips     Overweight     Iron deficiency     Scratching     Chronic, continuous use of opioids     Medical marijuana use     Primary osteoarthritis of both first carpometacarpal joints     Arthritis of metatarsophalangeal joint     Sinus tachycardia     Intractable chronic migraine without aura and without status migrainosus     Impaired  fasting glucose     Migraine without aura and without status migrainosus, not intractable     Skin ulcer, limited to breakdown of skin (H)     Morbid obesity (H)     Platelet granule defect (H)     Aphthous ulcer     Rash     Pruritus     Behcet's syndrome involving oral mucosa (H)     Encounter for long-term (current) use of high-risk medication     Dermatitis     Encounter for long-term current use of high risk medication     Acquired platelet disorder (H)     Atopic neurodermatitis     Facial eczema     Skin pain     Prurigo nodularis     Chronic kidney disease, stage 3a (H)     Anxiety     Allergies   Allergen Reactions     Cats      and rabbits/wheezing     Dogs      wheezing     Lyrica [Pregabalin] Other (See Comments)     Rash, mouth sores, itching and burning     Seasonal Allergies      rhinits     Social History:   with 2 children. Not working at present. Non-smoker. No EtOH.   Social History     Socioeconomic History     Marital status:      Spouse name: Not on file     Number of children: Not on file     Years of education: Not on file     Highest education level: Not on file   Occupational History     Not on file   Tobacco Use     Smoking status: Former     Current packs/day: 0.00     Average packs/day: 0.3 packs/day for 33.7 years (10.1 ttl pk-yrs)     Types: Cigarettes     Start date: 1980     Quit date: 2013     Years since quittin.1     Passive exposure: Past     Smokeless tobacco: Never     Tobacco comments:     since    Vaping Use     Vaping status: Never Used   Substance and Sexual Activity     Alcohol use: No     Drug use: Yes     Comment: medical marjuana      Sexual activity: Not Currently     Partners: Male     Birth control/protection: None   Other Topics Concern     Parent/sibling w/ CABG, MI or angioplasty before 65F 55M? No      Service No     Blood Transfusions No     Caffeine Concern No     Occupational Exposure Not Asked     Hobby Hazards Not Asked      Sleep Concern No     Stress Concern No     Weight Concern Yes     Special Diet No     Back Care Not Asked     Exercise No     Bike Helmet No     Comment: Doesn't ride a bike     Seat Belt Yes     Self-Exams Yes     Comment: she does self breast exams every other month   Social History Narrative     Not on file     Social Drivers of Health     Financial Resource Strain: Low Risk  (12/29/2023)    Financial Resource Strain      Within the past 12 months, have you or your family members you live with been unable to get utilities (heat, electricity) when it was really needed?: No   Food Insecurity: Low Risk  (12/29/2023)    Food Insecurity      Within the past 12 months, did you worry that your food would run out before you got money to buy more?: No      Within the past 12 months, did the food you bought just not last and you didn t have money to get more?: No   Transportation Needs: Low Risk  (12/29/2023)    Transportation Needs      Within the past 12 months, has lack of transportation kept you from medical appointments, getting your medicines, non-medical meetings or appointments, work, or from getting things that you need?: No   Physical Activity: Not on file   Stress: Not on file   Social Connections: Not on file   Interpersonal Safety: Low Risk  (5/29/2024)    Interpersonal Safety      Do you feel physically and emotionally safe where you currently live?: Yes      Within the past 12 months, have you been hit, slapped, kicked or otherwise physically hurt by someone?: No      Within the past 12 months, have you been humiliated or emotionally abused in other ways by your partner or ex-partner?: No   Housing Stability: Low Risk  (12/29/2023)    Housing Stability      Do you have housing? : Yes      Are you worried about losing your housing?: No     Family History:    Family History   Problem Relation Age of Onset     Thyroid Disease Mother      Arthritis Mother      Colon Cancer Mother      Myelodysplastic  "syndrome Mother      Hypertension Father      Diabetes Father      C.A.D. Father         MI age 75     Cardiovascular Father         heart attack     Cerebrovascular Disease Father      Cancer Father         renal cancer     Arthritis Father      Heart Disease Father         heart attack     Gastrointestinal Disease Father         liver     Genitourinary Problems Father         kidney     Thyroid Disease Sister      Arthritis Sister      Lipids Sister      Asthma Daughter      Gastrointestinal Disease Daughter      Multiple Sclerosis Maternal Aunt      Mastocytosis Nephew         \"System Mast Cell Disease and hyperesosinophilia\"     Unknown Other      Breast Cancer No family hx of      Cancer - colorectal No family hx of      Alzheimer Disease No family hx of      Blood Disease No family hx of      Circulatory No family hx of      Eye Disorder No family hx of      Musculoskeletal Disorder No family hx of      Neurologic Disorder No family hx of      Respiratory No family hx of      Melanoma No family hx of      Skin Cancer No family hx of      Review Of Systems:  +intentional weight loss  +chronic pain  +heartburn improved on famotidine  Remainder of the 14 point ROS obtained and found negative.    Physical Exam:  Constitutional: WD-WN-WG cooperative  Eyes: nl EOM, sclera  ENT: nl external ears, nose, hearing, lips  Neck: no visible thyroid enlargement  Resp: nl effort  MS: R>L 1st MCP swelling and Z-deformity of the thumb; +distal right thumb with slightly swollen and bruised appearance; All other neck, shoulder, elbow, wrist, hand joints were examined and otherwise found normal. No active synovitis. Full ROM. +heberden's nodes.  Skin: no alopecia; +discrete areas of crusting lesions on her face and extremities; dusky gray skin discoloration  Neuro: nl cranial nerves   Psych: nl judgement, affect    Laboratory:    Component      Latest Ref Rng 10/17/2024  11:27 AM   Color Urine      Colorless, Straw, Light Yellow, " Yellow  Yellow    Appearance Urine      Clear  Clear    Glucose Urine      Negative mg/dL Negative    Bilirubin Urine      Negative  Negative    Ketones Urine      Negative mg/dL Negative    Specific Gravity Urine      0.999 - 1.035  1.015    Blood Urine      Negative  Negative    pH Urine      5.0 - 7.0  6.5    Protein Albumin Urine      Negative mg/dL Negative    Urobilinogen mg/dL      Normal, 2.0 mg/dL Normal    Nitrite Urine      Negative  Negative    Leukocyte Esterase Urine      Negative  Moderate !    Sodium      135 - 145 mmol/L 141    Potassium      3.4 - 5.3 mmol/L 3.8    Chloride      98 - 107 mmol/L 103    Carbon Dioxide (CO2)      22 - 29 mmol/L 26    Anion Gap      7 - 15 mmol/L 12    Glucose      70 - 99 mg/dL 90    Urea Nitrogen      8.0 - 23.0 mg/dL 19.6    Creatinine      0.51 - 0.95 mg/dL 1.27 (H)    GFR Estimate      >60 mL/min/1.73m2 47 (L)    Calcium      8.8 - 10.4 mg/dL 9.3    Albumin      3.5 - 5.2 g/dL 3.9    Phosphorus      2.5 - 4.5 mg/dL 3.5    WBC      4.0 - 11.0 10e3/uL 8.8    RBC Count      3.80 - 5.20 10e6/uL 4.41    Hemoglobin      11.7 - 15.7 g/dL 13.5    Hematocrit      35.0 - 47.0 % 40.7    MCV      78 - 100 fL 92    MCH      26.5 - 33.0 pg 30.6    MCHC      31.5 - 36.5 g/dL 33.2    RDW      10.0 - 15.0 % 13.4    Platelet Count      150 - 450 10e3/uL 347    Bacteria Urine      None Seen /HPF Few !    RBC Urine      0-2 /HPF /HPF 0-2    WBC Urine      0-5 /HPF /HPF 10-25 !    Squamous Epithelial /LPF Urine      None Seen /LPF Many !    Transitional Epi      None Seen /HPF Few !    Mucus Urine      None Seen /LPF Present !    Hyaline Casts      None Seen /LPF 5-10 !    Cholesterol      <200 mg/dL 138    Triglycerides      <150 mg/dL 136    HDL Cholesterol      >=50 mg/dL 50    LDL Cholesterol Calculated      <100 mg/dL 61    Non HDL Cholesterol      <130 mg/dL 88    Patient Fasting? Yes    Total Protein Urine mg/dL        mg/dL 15.0    Total Protein Urine mg/mg Creat      0.00 -  0.20 mg/mg Cr 0.15    Creatinine Urine      mg/dL 100.0    Creatinine Urine      mg/dL 98.3    Albumin Urine mg/L      mg/L <12.0    Albumin Urine mg/g Cr --    CRP Inflammation      <5.00 mg/L <3.00    Sed Rate      0 - 30 mm/hr 14    Parathyroid Hormone Intact      15 - 65 pg/mL 101 (H)    Vitamin D, Total (25-Hydroxy)      20 - 50 ng/mL 34    Ferritin      11 - 328 ng/mL 118       Legend:  ! Abnormal  (H) High  (L) Low  Impression:    Multifactorial mucocutaneous lesions-with history of ulcers due to prurigo/neurodermatitis, platelet dysfunction and easy bruising/bleeding, and oral aphthous ulcers, with hyperpigmentation. Now treated with Otezla, and it is my impression that her mucocutaneous disease has seen some stabilization and improvement. Good tolerance of the medication and I recommend its continuation.     Hand osteoarthritis-by exam. Xrays are pending.    Bone health-DEXA scan is pending.    Long term management of immunosuppression-with stable toxicity screening. Avoid NSAIDs in view of her CKD. Consideration should be given to either a colchicine dose reduction or discontinuation in light of her CKD and minimal mucosal disease. She also has ill-defined unsteadiness that is concerning for peripheral neuropathy, and neurology evaluation should be considered.      Plan for follow up in one year.    A total of 53 minutes was spent in face to face patient interaction and chart review on the day of service.    The longitudinal plan of care for the diagnosis(es)/condition(s) as documented were addressed during this visit. Due to the added complexity in care, I will continue to support Jacquie in the subsequent management and with ongoing continuity of care.      Again, thank you for allowing me to participate in the care of your patient.      Sincerely,    Jay Hawkins MD

## 2024-10-29 NOTE — PROGRESS NOTES
Virtual Visit Details    Type of service:  Video Visit   Video Start Time: 11:00 AM   Video End Time: 11:25 AM    Originating Location (pt. 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 -ALEXA, -RF, -CCP. Previous non-diagnostic skin ulcer biopsy with perivascular mixed cell infiltrate, rich in neutrophils, indicative of excoriation.  Treatment with apremilast since 9-2023.    Generally Shirazs joints have been quiet, but she currently describes some right knee and distal leg swelling without pain, as well as some right foot pain. This has previously occurred and she feels that this is on its way to resolving. No other joint concerns today.     She does admit to some instability and balance problems, and has some concern about falling. She has discussed this with her PCP and neurology evaluation is being considered.     No skin rash at present but a lot of purple spots, poorly healing lesions, and bruises, with some brown discoloration. Not much problem with oral ulcers but no genital ulcerations    History of depression but no suicidal ideation. She reports problems with hallucinations on her medications.     Otezla 30 mg bid is well tolerated. She started by dermatology for treatment colchicine 0.6 mg BID for skin lesions and ulcers with good tolerance.    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, platelet granule disorder, GERD    Past Medical History:   Diagnosis Date    ASTHMA - MODERATE PERSISTENT 9/21/2005    Chronic pain     Coronary artery disease     CVA (cerebral infarction)     Depressive disorder, not elsewhere classified     Diabetes (H)     Elevated serum alkaline phosphatase  level     Liver source    Fibromyalgia     HYPERLIPIDEMIA NEC/NOS 2006    Hypertriglyceridemia     OA (osteoarthritis)     Thyroid disease     Trochanteric bursitis     Unspecified essential hypertension      Surgical-  Past Surgical History:   Procedure Laterality Date    3 teeth pulled      INJECT EPIDURAL TRANSFORAMINAL  2014    [facility]    INJECT JOINT SACROILIAC  2014    [facility]    LAMINECTOMY LUMBAR ONE LEVEL Left 2014    [facility]    Z  DELIVERY ONLY      , Low Cervical     Injuries-none    Active Problems:  Patient Active Problem List   Diagnosis    Moderate persistent asthma without complication    Allergic rhinitis due to other allergen    Esophageal reflux    Moderate recurrent major depression (H)    Multiple joint pain    HYPERLIPIDEMIA LDL GOAL <130    Hypertension goal BP (blood pressure) < 140/90    Generalized anxiety disorder    Myalgia    Chronic rhinitis    Constipation    Lumbar disc disease with radiculopathy    Iron deficiency anemia    Osteoarthritis of carpometacarpal joint of thumb - bilateral    Trochanteric bursitis    Right ankle instability    Primary focal hyperhidrosis    Trochanteric bursitis of both hips    Overweight    Iron deficiency    Scratching    Chronic, continuous use of opioids    Medical marijuana use    Primary osteoarthritis of both first carpometacarpal joints    Arthritis of metatarsophalangeal joint    Sinus tachycardia    Intractable chronic migraine without aura and without status migrainosus    Impaired fasting glucose    Migraine without aura and without status migrainosus, not intractable    Skin ulcer, limited to breakdown of skin (H)    Morbid obesity (H)    Platelet granule defect (H)    Aphthous ulcer    Rash    Pruritus    Behcet's syndrome involving oral mucosa (H)    Encounter for long-term (current) use of high-risk medication    Dermatitis    Encounter  for long-term current use of high risk medication    Acquired platelet disorder (H)    Atopic neurodermatitis    Facial eczema    Skin pain    Prurigo nodularis    Chronic kidney disease, stage 3a (H)    Anxiety     Allergies   Allergen Reactions    Cats      and rabbits/wheezing    Dogs      wheezing    Lyrica [Pregabalin] Other (See Comments)     Rash, mouth sores, itching and burning    Seasonal Allergies      rhinits     Social History:   with 2 children. Not working at present. Non-smoker. No EtOH.   Social History     Socioeconomic History    Marital status:      Spouse name: Not on file    Number of children: Not on file    Years of education: Not on file    Highest education level: Not on file   Occupational History    Not on file   Tobacco Use    Smoking status: Former     Current packs/day: 0.00     Average packs/day: 0.3 packs/day for 33.7 years (10.1 ttl pk-yrs)     Types: Cigarettes     Start date: 1980     Quit date: 2013     Years since quittin.1     Passive exposure: Past    Smokeless tobacco: Never    Tobacco comments:     since    Vaping Use    Vaping status: Never Used   Substance and Sexual Activity    Alcohol use: No    Drug use: Yes     Comment: medical marjuana     Sexual activity: Not Currently     Partners: Male     Birth control/protection: None   Other Topics Concern    Parent/sibling w/ CABG, MI or angioplasty before 65F 55M? No     Service No    Blood Transfusions No    Caffeine Concern No    Occupational Exposure Not Asked    Hobby Hazards Not Asked    Sleep Concern No    Stress Concern No    Weight Concern Yes    Special Diet No    Back Care Not Asked    Exercise No    Bike Helmet No     Comment: Doesn't ride a bike    Seat Belt Yes    Self-Exams Yes     Comment: she does self breast exams every other month   Social History Narrative    Not on file     Social Drivers of Health     Financial Resource Strain: Low Risk  (2023)    Financial  Resource Strain     Within the past 12 months, have you or your family members you live with been unable to get utilities (heat, electricity) when it was really needed?: No   Food Insecurity: Low Risk  (12/29/2023)    Food Insecurity     Within the past 12 months, did you worry that your food would run out before you got money to buy more?: No     Within the past 12 months, did the food you bought just not last and you didn t have money to get more?: No   Transportation Needs: Low Risk  (12/29/2023)    Transportation Needs     Within the past 12 months, has lack of transportation kept you from medical appointments, getting your medicines, non-medical meetings or appointments, work, or from getting things that you need?: No   Physical Activity: Not on file   Stress: Not on file   Social Connections: Not on file   Interpersonal Safety: Low Risk  (5/29/2024)    Interpersonal Safety     Do you feel physically and emotionally safe where you currently live?: Yes     Within the past 12 months, have you been hit, slapped, kicked or otherwise physically hurt by someone?: No     Within the past 12 months, have you been humiliated or emotionally abused in other ways by your partner or ex-partner?: No   Housing Stability: Low Risk  (12/29/2023)    Housing Stability     Do you have housing? : Yes     Are you worried about losing your housing?: No     Family History:    Family History   Problem Relation Age of Onset    Thyroid Disease Mother     Arthritis Mother     Colon Cancer Mother     Myelodysplastic syndrome Mother     Hypertension Father     Diabetes Father     C.A.D. Father         MI age 75    Cardiovascular Father         heart attack    Cerebrovascular Disease Father     Cancer Father         renal cancer    Arthritis Father     Heart Disease Father         heart attack    Gastrointestinal Disease Father         liver    Genitourinary Problems Father         kidney    Thyroid Disease Sister     Arthritis Sister      "Lipids Sister     Asthma Daughter     Gastrointestinal Disease Daughter     Multiple Sclerosis Maternal Aunt     Mastocytosis Nephew         \"System Mast Cell Disease and hyperesosinophilia\"    Unknown Other     Breast Cancer No family hx of     Cancer - colorectal No family hx of     Alzheimer Disease No family hx of     Blood Disease No family hx of     Circulatory No family hx of     Eye Disorder No family hx of     Musculoskeletal Disorder No family hx of     Neurologic Disorder No family hx of     Respiratory No family hx of     Melanoma No family hx of     Skin Cancer No family hx of      Review Of Systems:  +intentional weight loss  +chronic pain  +heartburn improved on famotidine  Remainder of the 14 point ROS obtained and found negative.    Physical Exam:  Constitutional: WD-WN-WG cooperative  Eyes: nl EOM, sclera  ENT: nl external ears, nose, hearing, lips  Neck: no visible thyroid enlargement  Resp: nl effort  MS: R>L 1st MCP swelling and Z-deformity of the thumb; +distal right thumb with slightly swollen and bruised appearance; All other neck, shoulder, elbow, wrist, hand joints were examined and otherwise found normal. No active synovitis. Full ROM. +heberden's nodes.  Skin: no alopecia; +discrete areas of crusting lesions on her face and extremities; dusky gray skin discoloration  Neuro: nl cranial nerves   Psych: nl judgement, affect    Laboratory:    Component      Latest Ref Rng 10/17/2024  11:27 AM   Color Urine      Colorless, Straw, Light Yellow, Yellow  Yellow    Appearance Urine      Clear  Clear    Glucose Urine      Negative mg/dL Negative    Bilirubin Urine      Negative  Negative    Ketones Urine      Negative mg/dL Negative    Specific Gravity Urine      0.999 - 1.035  1.015    Blood Urine      Negative  Negative    pH Urine      5.0 - 7.0  6.5    Protein Albumin Urine      Negative mg/dL Negative    Urobilinogen mg/dL      Normal, 2.0 mg/dL Normal    Nitrite Urine      Negative  Negative  "   Leukocyte Esterase Urine      Negative  Moderate !    Sodium      135 - 145 mmol/L 141    Potassium      3.4 - 5.3 mmol/L 3.8    Chloride      98 - 107 mmol/L 103    Carbon Dioxide (CO2)      22 - 29 mmol/L 26    Anion Gap      7 - 15 mmol/L 12    Glucose      70 - 99 mg/dL 90    Urea Nitrogen      8.0 - 23.0 mg/dL 19.6    Creatinine      0.51 - 0.95 mg/dL 1.27 (H)    GFR Estimate      >60 mL/min/1.73m2 47 (L)    Calcium      8.8 - 10.4 mg/dL 9.3    Albumin      3.5 - 5.2 g/dL 3.9    Phosphorus      2.5 - 4.5 mg/dL 3.5    WBC      4.0 - 11.0 10e3/uL 8.8    RBC Count      3.80 - 5.20 10e6/uL 4.41    Hemoglobin      11.7 - 15.7 g/dL 13.5    Hematocrit      35.0 - 47.0 % 40.7    MCV      78 - 100 fL 92    MCH      26.5 - 33.0 pg 30.6    MCHC      31.5 - 36.5 g/dL 33.2    RDW      10.0 - 15.0 % 13.4    Platelet Count      150 - 450 10e3/uL 347    Bacteria Urine      None Seen /HPF Few !    RBC Urine      0-2 /HPF /HPF 0-2    WBC Urine      0-5 /HPF /HPF 10-25 !    Squamous Epithelial /LPF Urine      None Seen /LPF Many !    Transitional Epi      None Seen /HPF Few !    Mucus Urine      None Seen /LPF Present !    Hyaline Casts      None Seen /LPF 5-10 !    Cholesterol      <200 mg/dL 138    Triglycerides      <150 mg/dL 136    HDL Cholesterol      >=50 mg/dL 50    LDL Cholesterol Calculated      <100 mg/dL 61    Non HDL Cholesterol      <130 mg/dL 88    Patient Fasting? Yes    Total Protein Urine mg/dL        mg/dL 15.0    Total Protein Urine mg/mg Creat      0.00 - 0.20 mg/mg Cr 0.15    Creatinine Urine      mg/dL 100.0    Creatinine Urine      mg/dL 98.3    Albumin Urine mg/L      mg/L <12.0    Albumin Urine mg/g Cr --    CRP Inflammation      <5.00 mg/L <3.00    Sed Rate      0 - 30 mm/hr 14    Parathyroid Hormone Intact      15 - 65 pg/mL 101 (H)    Vitamin D, Total (25-Hydroxy)      20 - 50 ng/mL 34    Ferritin      11 - 328 ng/mL 118       Legend:  ! Abnormal  (H) High  (L) Low  Impression:    Multifactorial  mucocutaneous lesions-with history of ulcers due to prurigo/neurodermatitis, platelet dysfunction and easy bruising/bleeding, and oral aphthous ulcers, with hyperpigmentation. Now treated with Otezla, and it is my impression that her mucocutaneous disease has seen some stabilization and improvement. Good tolerance of the medication and I recommend its continuation.     Hand osteoarthritis-by exam. Xrays are pending.    Bone health-DEXA scan is pending.    Long term management of immunosuppression-with stable toxicity screening. Avoid NSAIDs in view of her CKD. Consideration should be given to either a colchicine dose reduction or discontinuation in light of her CKD and minimal mucosal disease. She also has ill-defined unsteadiness that is concerning for peripheral neuropathy, and neurology evaluation should be considered.      Plan for follow up in one year.    A total of 53 minutes was spent in face to face patient interaction and chart review on the day of service.    The longitudinal plan of care for the diagnosis(es)/condition(s) as documented were addressed during this visit. Due to the added complexity in care, I will continue to support Jacquie in the subsequent management and with ongoing continuity of care.

## 2024-10-29 NOTE — NURSING NOTE
Current patient location: [address]  [place] [address]    Is the patient currently in the state of [place]? YES    Visit mode:VIDEO    If the visit is dropped, the patient can be reconnected by: VIDEO VISIT: Text to cell phone:   Telephone Information:   Mobile [phone]       Will anyone else be joining the visit? NO  (If patient encounters technical issues they should call [phone])    Are changes needed to the allergy or medication list? No    Patient denies any changes and states that all information remains accurate since last reviewed/verified.     Are refills needed on medications prescribed by this physician? NA    Rooming Documentation:  Questionnaire(s) completed    Pt states vitals have been good     Reason for visit: Consult    LORENA JuarezF

## 2024-10-29 NOTE — Clinical Note
Liz,  I think that Jacquie is seeing some benefit with the Otezla and I will continue this. I wonder if she still needs the colchicine if Otezla is working well? I worry that colchicine may be affecting her peripheral nerves and contributing to some of her unsteadiness and increased risk for falling. Neurology evaluation and or EMG/NCV may be useful to evaluate this further.  Rafael Hawkins

## 2024-10-29 NOTE — PATIENT INSTRUCTIONS
Continue the otezla  Discuss the continued use of colchicine with your primary care provider  Consider a neurology evaluation of your unsteadiness and fall risk  Get your DEXA scan and Xrays  Follow up with me in 1 year

## 2024-10-30 ENCOUNTER — MYC MEDICAL ADVICE (OUTPATIENT)
Dept: FAMILY MEDICINE | Facility: CLINIC | Age: 64
End: 2024-10-30

## 2024-11-05 ENCOUNTER — VIRTUAL VISIT (OUTPATIENT)
Dept: PHARMACY | Facility: CLINIC | Age: 64
End: 2024-11-05
Attending: INTERNAL MEDICINE
Payer: COMMERCIAL

## 2024-11-05 ENCOUNTER — MYC REFILL (OUTPATIENT)
Dept: DERMATOLOGY | Facility: CLINIC | Age: 64
End: 2024-11-05

## 2024-11-05 DIAGNOSIS — L20.81 ATOPIC NEURODERMATITIS: ICD-10-CM

## 2024-11-05 DIAGNOSIS — G25.81 RESTLESS LEG SYNDROME: Primary | ICD-10-CM

## 2024-11-05 NOTE — PROGRESS NOTES
Medication Therapy Management (MTM) Encounter    ASSESSMENT:                            Medication Adherence/Access: No issues identified.    Restless Leg Syndrome: Patient stopped gabapentin and rotigotine due to hallucinations and has been experiencing increased RLS symptoms. Discussed dosing of gabapentin with patient and she was not totally clear what she had tried. Thinks the 50 mg dose at midday was fine, but 75 mg and 100 mg doses tried at bedtime caused side effects. Unable to tolerate multiple dopamine agonists and should avoid medication class in future. Reviewed medication dosing with current kidney function and bupropion is above the recommended maximum dose. Bupropion can cause a number of adverse effects the patient is experiencing and it may be appropriate to reduce the dose. Discussed with provider who agreed with dose reduction to 300 mg daily. Provider has follow-up with patient 11/12/24 and will assess changes at that time.     PLAN:                            Reduce bupropion dose to 300 mg daily (stop taking the bupropion 150 mg tablet).     Follow-up: with Dr. Peterson on 11/12/24; with MTM pharmacist in about 3 weeks.     SUBJECTIVE/OBJECTIVE:                          Jacquie Amador is a 64 year old female seen for a follow-up visit.       Reason for visit: RLS follow-up.    Allergies/ADRs: Reviewed in chart  Past Medical History: Reviewed in chart  Tobacco: She reports that she quit smoking about 11 years ago. Her smoking use included cigarettes. She started smoking about 44 years ago. She has a 10.1 pack-year smoking history. She has been exposed to tobacco smoke. She has never used smokeless tobacco.  Alcohol: none    Medication Adherence/Access: no issues reported.    Restless Leg Syndrome:    Patient stopped gabapentin on 10/18 after experiencing hallucinations at night. Reported taking 50 mg midday and 100 mg at night. Tried to decrease to 75 mg at night and thinks she still had some  hallucinations. Midday dose worked well to control restless leg symptoms and did not result in hallucinations. Night dose resulted in hallucinations. Started rotigotine patch on 10/21 and experienced more severe hallucinations. Stopped using after 1 day and has not taken since. Has not been on anything for restless legs over the past 2 weeks and has been having more severe RLS symptoms. Reported having significant hallucinations a few nights ago. Fell and hurt back and is taking tramadol for the pain.     Today's Vitals: LMP 07/17/2009 (Exact Date)   ----------------    I spent 30 minutes with this patient today. A copy of the visit note was provided to the patient's provider(s).    A summary of these recommendations was sent via [place].    Golden Cadena PharmD  Medication Therapy Management Pharmacist  [facility]  Phone: [phone]     Telemedicine Visit Details  The patient's medications can be safely assessed via a telemedicine encounter.  Type of service:  Telephone visit  Originating Location (pt. Location): Home    Distant Location (provider location):  Off-site  Start Time:  12:00 PM  End Time:  12:30 PM     Medication Therapy Recommendations  Restless leg syndrome   1 Current Medication: buPROPion (WELLBUTRIN XL) 300 MG 24 hr tablet   Current Medication Sig: Take 1 tablet (300 mg) by mouth every morning To take with the 150 mg dose for a total of 450 mg daily.   Rationale: Dose too high - Dosage too high - Safety   Recommendation: Decrease Dose - Decreasing bupropion to 300 mg daily   Status: Accepted per Provider   Identified Date: 11/5/2024 Completed Date: 11/7/2024

## 2024-11-05 NOTE — TELEPHONE ENCOUNTER
mupirocin (BACTROBAN) 2 % external cream   Last Written Prescription Date:  5/30/2024  Last Fill Quantity: 30 g,   # refills: 3  Last Office Visit : 5/30/2024  [facility]  Future Office visit:  12/5/2024  Routing mupirocin (BACTROBAN) 2 % external cream refill request to provider for review/approval because: Medication not on the Dermatology refill protocol.

## 2024-11-07 ENCOUNTER — TELEPHONE (OUTPATIENT)
Dept: DERMATOLOGY | Facility: CLINIC | Age: 64
End: 2024-11-07
Payer: COMMERCIAL

## 2024-11-07 RX ORDER — MUPIROCIN CALCIUM 20 MG/G
CREAM TOPICAL 3 TIMES DAILY
Qty: 60 G | Refills: 5 | Status: SHIPPED | OUTPATIENT
Start: 2024-11-07

## 2024-11-07 NOTE — LETTER
November 12, 2024  Date: 11/12/24  ATTN: [employer]   Re: Prior Authorization Request    Plan: [facility]    Patient: Jacquie Amador   Member ID: [id number]  YOB: 1960                                                                                           To whom it may concern:     I am contacting you as a dermatologist caring for Jacquie Amador with regard to the patient's diagnosis of Atopic neurodermatitis [L20.81]. I am writing to formally appeal the denial of coverage for mupirocin 2% cream. My patient requires mupirocin in cream form rather than the ointment for the effective treatment of skin lesions & dermatitis located on the face. There are several clinically relevant reasons for preferring the mupirocin cream formulation over the ointment, which I will outline below:    Suitability for Facial Application: Creams are generally more suitable for application on the face as they avoid the greasiness associated with ointments. The patient experiences discomfort and social inconvenience due to the greasy residue left by ointments, which is particularly undesirable for facial applications.  Management of Skin Lesions: The patient has wet skin lesions, which are best treated with a cream rather than an ointment. Cream formulations allow for better absorption and provide a more suitable environment for healing in such cases.  Avoidance of Adverse Effects: Ointments, due to their occlusive nature, can exacerbate conditions like acne and lead to greasy skin, which is especially problematic on facial skin. My patient has experienced such adverse effects when using ointments, which further supports the need for a cream formulation.    Additionally, the two alternative treatments listed as preferred options, Gentamicin and Cortisporin, are inappropriate for this patient due to the following reasons:    Gentamicin: This medication is commonly available in an ointment formulation, which is not  suitable for the reasons previously mentioned. Thus, it does not present a feasible treatment option for this patient.  Cortisporin: This medication contains a topical corticosteroid, which should be avoided on sensitive facial skin to prevent potential side effects such as thinning of the skin or exacerbation of the existing condition.    Given these considerations, prescribing mupirocin 2% cream is the most appropriate and medically necessary option for my patient. On behalf of Jacquie Amador, I kindly request that you reconsider the decision and approve coverage for mupirocin cream to ensure the patient receives the most effective and suitable treatment.     Please feel free to contact me below for any additional information you may require. I look forward to receiving your response and approval of coverage for this medication.    Sincerely,    Jay Warren MD   [facility] [address] [address]  Phone: [phone]  Fax: [phone]

## 2024-11-07 NOTE — TELEPHONE ENCOUNTER
PA Initiation    Medication: MUPIROCIN CALCIUM 2 % EX CREA  Insurance Company: [employer] - Phone [phone] Fax [phone]  Pharmacy Filling the Rx: [id number] - [place], [place] - [address] [place] [address]  Filling Pharmacy Phone: [phone]  Filling Pharmacy Fax: [phone]  Start Date: 11/7/2024

## 2024-11-07 NOTE — PATIENT INSTRUCTIONS
"Recommendations from today's MTM visit:                                                       Reduce bupropion dose to 300 mg daily (stop taking the bupropion 150 mg tablet).     Follow-up: with Dr. Peterson on 11/12/24; with MTM pharmacist in about 3 weeks.     It was great speaking with you today.  I value your experience and would be very thankful for your time in providing feedback in our clinic survey. In the next few days, you may receive an email or text message from [facility] with a link to a survey related to your  clinical pharmacist.\"     To schedule another MTM appointment, please call the clinic directly or you may call the MTM scheduling line at [phone].    My Clinical Pharmacist's contact information:                                                      Please feel free to contact me with any questions or concerns you have.      Golden Cadena, PharmD  Medication Therapy Management Pharmacist  [facility]  Phone: [phone]    "

## 2024-11-07 NOTE — TELEPHONE ENCOUNTER
Prior Authorization Retail Medication Request    Medication/Dose: mupirocin (BACTROBAN) 2 % external cream   Diagnosis and ICD code (if different than what is on RX):    New/renewal/insurance change PA/secondary ins. PA:  Previously Tried and Failed:  see chart  Rationale:  see chart    Insurance   Primary: [facility]  Insurance ID:  [id number]     Clinic Information  Preferred routing pool for dept communication: [facility].

## 2024-11-08 ENCOUNTER — MYC MEDICAL ADVICE (OUTPATIENT)
Dept: FAMILY MEDICINE | Facility: CLINIC | Age: 64
End: 2024-11-08
Payer: COMMERCIAL

## 2024-11-08 DIAGNOSIS — F33.1 MODERATE EPISODE OF RECURRENT MAJOR DEPRESSIVE DISORDER (H): ICD-10-CM

## 2024-11-08 NOTE — TELEPHONE ENCOUNTER
SF patient. Her dermatology is Dr. Warren who prescribes the Bactroban cream and it looks like this is waiting for a prior auth. I am confused about her hallucination comments, we need more information. Further questions can wait for Dr. Peterson.    Philip Warren PA-C

## 2024-11-11 ENCOUNTER — MYC MEDICAL ADVICE (OUTPATIENT)
Dept: DERMATOLOGY | Facility: CLINIC | Age: 64
End: 2024-11-11
Payer: COMMERCIAL

## 2024-11-11 DIAGNOSIS — L20.81 ATOPIC NEURODERMATITIS: ICD-10-CM

## 2024-11-11 NOTE — TELEPHONE ENCOUNTER
PRIOR AUTHORIZATION DENIED    Medication: MUPIROCIN CALCIUM 2 % EX CREA  Insurance Company: [employer] - Phone [phone] Fax [phone]  Denial Date: 11/9/2024  Denial Reason(s): try and fail covered meds  Appeal Information: see attached  Patient Notified: no

## 2024-11-12 ENCOUNTER — TRANSFERRED RECORDS (OUTPATIENT)
Dept: HEALTH INFORMATION MANAGEMENT | Facility: CLINIC | Age: 64
End: 2024-11-12

## 2024-11-12 ENCOUNTER — OFFICE VISIT (OUTPATIENT)
Dept: FAMILY MEDICINE | Facility: CLINIC | Age: 64
End: 2024-11-12
Attending: FAMILY MEDICINE
Payer: COMMERCIAL

## 2024-11-12 VITALS
TEMPERATURE: 97.7 F | DIASTOLIC BLOOD PRESSURE: 72 MMHG | SYSTOLIC BLOOD PRESSURE: 122 MMHG | HEART RATE: 57 BPM | OXYGEN SATURATION: 98 % | RESPIRATION RATE: 16 BRPM | BODY MASS INDEX: 28.36 KG/M2 | WEIGHT: 180.7 LBS | HEIGHT: 67 IN

## 2024-11-12 DIAGNOSIS — F41.9 ANXIETY: ICD-10-CM

## 2024-11-12 DIAGNOSIS — R44.3 HALLUCINATIONS: Primary | ICD-10-CM

## 2024-11-12 DIAGNOSIS — L20.81 ATOPIC NEURODERMATITIS: ICD-10-CM

## 2024-11-12 DIAGNOSIS — N18.31 CHRONIC KIDNEY DISEASE, STAGE 3A (H): ICD-10-CM

## 2024-11-12 PROCEDURE — 99215 OFFICE O/P EST HI 40 MIN: CPT | Mod: 25 | Performed by: FAMILY MEDICINE

## 2024-11-12 PROCEDURE — 90472 IMMUNIZATION ADMIN EACH ADD: CPT | Performed by: FAMILY MEDICINE

## 2024-11-12 PROCEDURE — 90471 IMMUNIZATION ADMIN: CPT | Performed by: FAMILY MEDICINE

## 2024-11-12 PROCEDURE — 90677 PCV20 VACCINE IM: CPT | Performed by: FAMILY MEDICINE

## 2024-11-12 PROCEDURE — 90673 RIV3 VACCINE NO PRESERV IM: CPT | Performed by: FAMILY MEDICINE

## 2024-11-12 RX ORDER — MUPIROCIN 20 MG/G
OINTMENT TOPICAL 3 TIMES DAILY PRN
Qty: 30 G | Refills: 3 | Status: SHIPPED | OUTPATIENT
Start: 2024-11-12

## 2024-11-12 RX ORDER — HYDROXYZINE HYDROCHLORIDE 25 MG/1
25 TABLET, FILM COATED ORAL 3 TIMES DAILY PRN
Qty: 40 TABLET | Refills: 1 | Status: SHIPPED | OUTPATIENT
Start: 2024-11-12

## 2024-11-12 ASSESSMENT — PAIN SCALES - PAIN ENJOYMENT GENERAL ACTIVITY SCALE (PEG)
INTERFERED_GENERAL_ACTIVITY: 8
INTERFERED_ENJOYMENT_LIFE: 8
INTERFERED_ENJOYMENT_LIFE: 8
AVG_PAIN_PASTWEEK: 9
AVG_PAIN_PASTWEEK: 9
PEG_TOTALSCORE: 8.33
PEG_TOTALSCORE: 8.33
INTERFERED_GENERAL_ACTIVITY: 8

## 2024-11-12 ASSESSMENT — ANXIETY QUESTIONNAIRES
6. BECOMING EASILY ANNOYED OR IRRITABLE: SEVERAL DAYS
7. FEELING AFRAID AS IF SOMETHING AWFUL MIGHT HAPPEN: SEVERAL DAYS
GAD7 TOTAL SCORE: 6
GAD7 TOTAL SCORE: 6
2. NOT BEING ABLE TO STOP OR CONTROL WORRYING: SEVERAL DAYS
5. BEING SO RESTLESS THAT IT IS HARD TO SIT STILL: NOT AT ALL
7. FEELING AFRAID AS IF SOMETHING AWFUL MIGHT HAPPEN: SEVERAL DAYS
IF YOU CHECKED OFF ANY PROBLEMS ON THIS QUESTIONNAIRE, HOW DIFFICULT HAVE THESE PROBLEMS MADE IT FOR YOU TO DO YOUR WORK, TAKE CARE OF THINGS AT HOME, OR GET ALONG WITH OTHER PEOPLE: SOMEWHAT DIFFICULT
4. TROUBLE RELAXING: NOT AT ALL
1. FEELING NERVOUS, ANXIOUS, OR ON EDGE: MORE THAN HALF THE DAYS
3. WORRYING TOO MUCH ABOUT DIFFERENT THINGS: SEVERAL DAYS
GAD7 TOTAL SCORE: 6
8. IF YOU CHECKED OFF ANY PROBLEMS, HOW DIFFICULT HAVE THESE MADE IT FOR YOU TO DO YOUR WORK, TAKE CARE OF THINGS AT HOME, OR GET ALONG WITH OTHER PEOPLE?: SOMEWHAT DIFFICULT

## 2024-11-12 ASSESSMENT — ASTHMA QUESTIONNAIRES
QUESTION_1 LAST FOUR WEEKS HOW MUCH OF THE TIME DID YOUR ASTHMA KEEP YOU FROM GETTING AS MUCH DONE AT WORK, SCHOOL OR AT HOME: A LITTLE OF THE TIME
ACT_TOTALSCORE: 20
QUESTION_3 LAST FOUR WEEKS HOW OFTEN DID YOUR ASTHMA SYMPTOMS (WHEEZING, COUGHING, SHORTNESS OF BREATH, CHEST TIGHTNESS OR PAIN) WAKE YOU UP AT NIGHT OR EARLIER THAN USUAL IN THE MORNING: NOT AT ALL
QUESTION_4 LAST FOUR WEEKS HOW OFTEN HAVE YOU USED YOUR RESCUE INHALER OR NEBULIZER MEDICATION (SUCH AS ALBUTEROL): TWO OR THREE TIMES PER WEEK
QUESTION_2 LAST FOUR WEEKS HOW OFTEN HAVE YOU HAD SHORTNESS OF BREATH: ONCE OR TWICE A WEEK
QUESTION_5 LAST FOUR WEEKS HOW WOULD YOU RATE YOUR ASTHMA CONTROL: WELL CONTROLLED
ACT_TOTALSCORE: 20

## 2024-11-12 ASSESSMENT — PAIN SCALES - GENERAL: PAINLEVEL_OUTOF10: NO PAIN (0)

## 2024-11-12 NOTE — PROGRESS NOTES
Assessment & Plan     Hallucinations  Patient has been noticing hallucinations for some time now.  Have been working through [facility] to evaluate medications and adjust to see if these improve.  Initially saw improvement when she stopped ropinirole however this has returned.  She also has stopped gabapentin as she felt things worsen when she was on a higher dose of gabapentin.  In looking at her medications, there are multiple medications that could be causing hallucinations.  Currently she is on memantine and naltrexone which could be causing this issue.  She is going to work with pain management as these medications could also be causing some anxiety concerns.  She feels her anxiety is increased since being on these medications and certainly since increase of the naltrexone dose.  Will also stop the colchicine in case that is contributing and also due to her renal function.  Will have her follow-up in about 5 weeks for recheck or sooner if any problems or concerns.    Anxiety  Patient has noticed some increased anxiety recently.  This could be medication related.  It could also be related to her concerns about hallucinations.  I am hesitant to add further medications but do recommend a trial of hydroxyzine to use when needed.  She can take it at bedtime placed on the trazodone.  Hydroxyzine does show potential for hallucinations but patient was on this in the past and did not experience any hallucinations.  She will reach out as needed otherwise we will review at next visit  - hydrOXYzine HCl (ATARAX) 25 MG tablet; Take 1 tablet (25 mg) by mouth 3 times daily as needed for anxiety (and sleep).      Atopic neurodermatitis  Patient requests refill.  Sent  - mupirocin (BACTROBAN) 2 % external ointment; Apply topically 3 times daily as needed.    CKD Stage 3a  We will be stopping the colchicine.  Continue to avoid anti-inflammatories.  Scheduled with nephrology next month.    The longitudinal plan of care for the  "diagnosis(es)/condition(s) as documented were addressed during this visit. Due to the added complexity in care, I will continue to support Jacquie in the subsequent management and with ongoing continuity of care.      BMI  Estimated body mass index is 28.53 kg/m  as calculated from the following:    Height as of this encounter: 1.695 m (5' 6.73\").    Weight as of this encounter: 82 kg (180 lb 11.2 oz).   Weight management plan: continue current plan      42 minutes spent by me on the date of the encounter doing chart review, patient visit, and documentation       Subjective   Jacquie is a 64 year old, presenting for the following health issues:  Recheck Medication        11/12/2024     9:12 AM   Additional Questions   Roomed by Maryellen EMMANUEL   Accompanied by - Tono          11/12/2024     9:12 AM   Patient Reported Additional Medications   Patient reports taking the following new medications NA     Patient reports that she would like to discuss concerns with provider.     History of Present Illness       Reason for visit:  Follow up on medications and a few issues   She is taking medications regularly.       HALLUCINATIONS - seeing people or other things at times. Also having possible auditory hallucinations.  She has been working with MTM on medications. When  is in the same room or close by, she doesn't hear things.     Requip stopped and seemed better for a time. Consider Colchicine after discussion with derm, rheum and MTM.  Patch was used one night. Off gabapentin as well.     Patient has been feeling anxious. Sometimes not sleeping well.     Wellbutrin is being decreased currently.     ? Naltrexone or memantine causing anxiety?  She receives these from pain management.                  Objective    /72   Pulse 57   Temp 97.7  F (36.5  C)   Resp 16   Ht 1.695 m (5' 6.73\")   Wt 82 kg (180 lb 11.2 oz)   LMP 07/17/2009 (Exact Date)   SpO2 98%   BMI 28.53 kg/m    Body mass index is 28.53 " kg/m .  Physical Exam   GENERAL: alert and no distress  PSYCH: mentation appears normal, affect normal/bright, judgement and insight intact, and tearful at times.             Signed Electronically by: Liz Peterson MD

## 2024-11-12 NOTE — PATIENT INSTRUCTIONS
Stop colchicine    Stop trazodone    Start hydroxyzine 25 mg twice daily as needed.  For anxiety and sleep.     Talk to pain management about natrexone and memantine as maybe a cause of increased anxiety and hallucinations.     Continue wellbutrin  mg daily.     Recheck in 3-5 weeks.

## 2024-11-12 NOTE — LETTER
November 12, 2024      Jacquie Amador  [address]  [facility] [place] [facility] [address]           To Whom It May Concern,      Jacquie Amador has history of chronic migraine with light sensitivity.      She requires tinting of her car windows due to this condition. 50% for [facility].         Sincerely,           Liz Peterson MD

## 2024-11-12 NOTE — TELEPHONE ENCOUNTER
Routing appeal for Mupirocin cream to [facility] team. Please submit letter from 11/12/24.    Route updates on the outcome of appeal to: [facility]

## 2024-11-12 NOTE — TELEPHONE ENCOUNTER
Medication Appeal Initiation    Medication: MUPIROCIN CALCIUM 2 % EX CREA  Appeal Start Date:   11//12/2024  Insurance Company: [employer]  Insurance Phone: [phone]  Insurance Fax: [phone]  Comments:     Appeal letter faxed to [facility]

## 2024-11-13 NOTE — TELEPHONE ENCOUNTER
Called and LVM for [facility] about not filling the compounded prescription sent yesterday as patient wants this to go through [facility] pharmacy (fax: [phone])  Faxed to [phone])

## 2024-11-15 DIAGNOSIS — J20.9 ACUTE BRONCHITIS WITH SYMPTOMS > 10 DAYS: ICD-10-CM

## 2024-11-15 DIAGNOSIS — J45.40 MODERATE PERSISTENT ASTHMA WITHOUT COMPLICATION: ICD-10-CM

## 2024-11-15 DIAGNOSIS — B37.0 THRUSH: ICD-10-CM

## 2024-11-15 DIAGNOSIS — R00.0 SINUS TACHYCARDIA: ICD-10-CM

## 2024-11-15 DIAGNOSIS — I10 HYPERTENSION GOAL BP (BLOOD PRESSURE) < 140/90: ICD-10-CM

## 2024-11-15 DIAGNOSIS — D69.1 PLATELET GRANULE DEFECT (H): ICD-10-CM

## 2024-11-15 DIAGNOSIS — R00.2 PALPITATIONS: ICD-10-CM

## 2024-11-15 DIAGNOSIS — F41.1 GENERALIZED ANXIETY DISORDER: ICD-10-CM

## 2024-11-15 DIAGNOSIS — F33.1 MODERATE EPISODE OF RECURRENT MAJOR DEPRESSIVE DISORDER (H): ICD-10-CM

## 2024-11-15 DIAGNOSIS — F33.1 MODERATE RECURRENT MAJOR DEPRESSION (H): ICD-10-CM

## 2024-11-15 NOTE — TELEPHONE ENCOUNTER
Medication Question or Refill        What medication are you calling about (include dose and sig)?: multiple prescriptions, see pended list.     Would like them all for 3 months at a time    Preferred Pharmacy:    [id number] - [place] - [address]  [address] [place] [address]  Phone: [phone] Fax: [phone]    Controlled Substance Agreement on file:   CSA -- Patient Level:     [Media Unavailable] Controlled Substance Agreement - Opioid - Scan on 9/19/2023 12:32 PM   [Media Unavailable] Controlled Substance Agreement - Opioid - Scan on 9/14/2022 12:32 PM: OPIOD   [Media Unavailable] Controlled Substance Agreement - Opioid - Scan on 9/14/2022 12:30 PM: OPIOD       Who prescribed the medication?: Dr. Peterson    Do you need a refill? Yes    When did you use the medication last? today    Patient offered an appointment? No was in recently and forgot to request refills

## 2024-11-18 RX ORDER — ALBUTEROL SULFATE 0.83 MG/ML
2.5 SOLUTION RESPIRATORY (INHALATION) EVERY 6 HOURS PRN
Qty: 3 ML | Refills: 4 | Status: SHIPPED | OUTPATIENT
Start: 2024-11-18

## 2024-11-18 RX ORDER — NYSTATIN 100000 [USP'U]/ML
500000 SUSPENSION ORAL 4 TIMES DAILY
Qty: 280 ML | Refills: 2 | Status: SHIPPED | OUTPATIENT
Start: 2024-11-18

## 2024-11-18 RX ORDER — TRAZODONE HYDROCHLORIDE 50 MG/1
150 TABLET, FILM COATED ORAL AT BEDTIME
Qty: 270 TABLET | Refills: 2 | OUTPATIENT
Start: 2024-11-18

## 2024-11-18 RX ORDER — BUPROPION HYDROCHLORIDE 300 MG/1
300 TABLET ORAL EVERY MORNING
Qty: 90 TABLET | Refills: 1 | Status: SHIPPED | OUTPATIENT
Start: 2024-11-18

## 2024-11-18 RX ORDER — PREDNISONE 10 MG/1
10 TABLET ORAL DAILY
Qty: 90 TABLET | Refills: 3 | OUTPATIENT
Start: 2024-11-18

## 2024-11-18 RX ORDER — LOSARTAN POTASSIUM 100 MG/1
100 TABLET ORAL DAILY
Qty: 90 TABLET | Refills: 1 | Status: SHIPPED | OUTPATIENT
Start: 2024-11-18

## 2024-11-18 RX ORDER — METOPROLOL SUCCINATE 100 MG/1
100 TABLET, EXTENDED RELEASE ORAL 2 TIMES DAILY
Qty: 180 TABLET | Refills: 1 | Status: SHIPPED | OUTPATIENT
Start: 2024-11-18

## 2024-11-18 RX ORDER — METOPROLOL SUCCINATE 25 MG/1
25 TABLET, EXTENDED RELEASE ORAL 2 TIMES DAILY
Qty: 180 TABLET | Refills: 1 | Status: SHIPPED | OUTPATIENT
Start: 2024-11-18

## 2024-11-18 RX ORDER — CLOTRIMAZOLE 10 MG/1
10 LOZENGE ORAL
Qty: 70 LOZENGE | Refills: 1 | Status: SHIPPED | OUTPATIENT
Start: 2024-11-18

## 2024-11-19 RX ORDER — BUPROPION HYDROCHLORIDE 150 MG/1
150 TABLET ORAL EVERY MORNING
Qty: 90 TABLET | Refills: 1 | Status: SHIPPED | OUTPATIENT
Start: 2024-11-19

## 2024-11-20 ENCOUNTER — MYC REFILL (OUTPATIENT)
Dept: FAMILY MEDICINE | Facility: CLINIC | Age: 64
End: 2024-11-20

## 2024-11-20 ENCOUNTER — E-VISIT (OUTPATIENT)
Dept: FAMILY MEDICINE | Facility: CLINIC | Age: 64
End: 2024-11-20
Payer: COMMERCIAL

## 2024-11-20 DIAGNOSIS — R60.0 LOCALIZED EDEMA: Primary | ICD-10-CM

## 2024-11-20 DIAGNOSIS — B37.0 THRUSH: ICD-10-CM

## 2024-11-20 PROCEDURE — 99207 PR NON-BILLABLE SERV PER CHARTING: CPT | Performed by: FAMILY MEDICINE

## 2024-11-21 ENCOUNTER — MYC MEDICAL ADVICE (OUTPATIENT)
Dept: FAMILY MEDICINE | Facility: CLINIC | Age: 64
End: 2024-11-21
Payer: COMMERCIAL

## 2024-11-21 RX ORDER — NYSTATIN 100000 [USP'U]/ML
500000 SUSPENSION ORAL 4 TIMES DAILY
Qty: 280 ML | Refills: 2 | OUTPATIENT
Start: 2024-11-21

## 2024-11-21 NOTE — TELEPHONE ENCOUNTER
See triage 11/21 and noted they are in her file.    Liane Wharton RN  [facility] - Registered Nurse  Clinic Triage [place]   November 21, 2024

## 2024-11-25 ENCOUNTER — VIRTUAL VISIT (OUTPATIENT)
Dept: PHARMACY | Facility: CLINIC | Age: 64
End: 2024-11-25
Attending: STUDENT IN AN ORGANIZED HEALTH CARE EDUCATION/TRAINING PROGRAM
Payer: COMMERCIAL

## 2024-11-25 DIAGNOSIS — M35.2 BEHCET'S SYNDROME INVOLVING ORAL MUCOSA (H): Primary | ICD-10-CM

## 2024-11-25 DIAGNOSIS — G25.81 RESTLESS LEG SYNDROME: ICD-10-CM

## 2024-11-25 DIAGNOSIS — G47.9 SLEEP DISORDER: ICD-10-CM

## 2024-11-25 DIAGNOSIS — F41.1 GENERALIZED ANXIETY DISORDER: ICD-10-CM

## 2024-11-25 DIAGNOSIS — J45.40 MODERATE PERSISTENT ASTHMA WITHOUT COMPLICATION: ICD-10-CM

## 2024-11-25 NOTE — Clinical Note
DALILAI - I spoke with Jacquie last week and see you are scheduled to see her tomorrow. I told her to check with you on a possible gabapentin retrial before restarting it herself.

## 2024-11-25 NOTE — PROGRESS NOTES
Medication Therapy Management (MTM) Encounter    ASSESSMENT:                            Medication Adherence/Access: No issues identified.    ***:  ***      PLAN:                            Schedule rheumatology follow-up appointment.   Phone: [phone]      Follow-up: {followuptest2:830200}    SUBJECTIVE/OBJECTIVE:                          Jacquie Amador is a 64 year old female seen for {mtmvisit:849978}     Reason for visit: ***.    Allergies/ADRs: {1/2/3/4/5:559395}  Past Medical History: {1/2/3/4/5:861775}  Tobacco: She reports that she quit smoking about 11 years ago. Her smoking use included cigarettes. She started smoking about 44 years ago. She has a 10.1 pack-year smoking history. She has been exposed to tobacco smoke. She has never used smokeless tobacco.  Alcohol: {ALCOHOL CONSUMPTION HX:590758}  {Social and Goals:905763}    Medication Adherence/Access: {fumedadherence:066057}    ***:   ***    Otezla - thinks it is going okay - still has some sores on face, bruising, still has joint pain everywhere - had ablation and waiting for it to work better     Not taking gabapentin - RLS have been at night -     Going pretty well no hallucinations since we met last - not happened for a few weeks beginning of November     Had to go back to bupropion 450 mg dose     Stopped colchicine, montelukast     Trying not to take trazodone, but had to take last night - took 75 mg   Hydroxyzine seems to be helping   Took tramadol last night     Gets thrush contantly     Out of Lysteda -     Dulera as needed - few nights per week - Use daily   Uses Spiriva daily     Talk to Dr. Peterson about sumatriptan and rizatriptan     Was taking tranexamic acid daily     Valacyclovir - helps some but leaves spots with black and red color       Today's Vitals: LMP 07/17/2009 (Exact Date)   ----------------    I spent 50 minutes with this patient today. A copy of the visit note was provided to the patient's provider(s).    A summary of  these recommendations was sent via [employer].    Golden Cadena, PharmD  Medication Therapy Management Pharmacist  [facility]  Phone: [phone]     Telemedicine Visit Details  The patient's medications can be safely assessed via a telemedicine encounter.  Type of service:  Telephone visit  Originating Location (pt. Location): Home  {PROVIDER LOCATION On-site should be selected for visits conducted from your clinic location or adjoining [facility] hospital, academic office, or other nearby [facility] building. Off-site should be selected for all other provider locations, including home:[address]}  Distant Location (provider location):  Off-site  Start Time:  9:30 AM  End Time:  10:20 AM     Medication Therapy Recommendations  No medication therapy recommendations to display        today.     Today's Vitals: LMP 07/17/2009 (Exact Date)   ----------------    I spent 50 minutes with this patient today. A copy of the visit note was provided to the patient's provider(s).    A summary of these recommendations was sent via [employer].    Golden Cadena PharmD  Medication Therapy Management Pharmacist  [facility]  Phone: [phone]     Telemedicine Visit Details  The patient's medications can be safely assessed via a telemedicine encounter.  Type of service:  Telephone visit  Originating Location (pt. Location): Home    Distant Location (provider location):  Off-site  Start Time:  9:30 AM  End Time:  10:20 AM     Medication Therapy Recommendations  No medication therapy recommendations to display

## 2024-11-27 ENCOUNTER — MYC MEDICAL ADVICE (OUTPATIENT)
Dept: FAMILY MEDICINE | Facility: CLINIC | Age: 64
End: 2024-11-27

## 2024-11-27 DIAGNOSIS — L28.1 PRURIGO NODULARIS: ICD-10-CM

## 2024-11-27 NOTE — TELEPHONE ENCOUNTER
Last seen 11/20    MTM 11/25    Patient having RLS and back pain wanting to try neurontin again, needing help to get out of bed,     Rec triage    Liane Wharton RN  [facility] - Registered Nurse  Clinic Triage [place]   November 27, 2024

## 2024-11-27 NOTE — TELEPHONE ENCOUNTER
Defer pain mangement med changes to her pain clinic and PCP. She has appt with PCP next week. Renetta Manley PA-C

## 2024-11-27 NOTE — TELEPHONE ENCOUNTER
MEDICATION APPEAL DENIED    Medication: MUPIROCIN CALCIUM 2 % EX CREA  Insurance Company: [employer]  Denial Date:  11/13/2024  Denial Reason(s):   Second Level Appeal Information:   Patient Notified: no  [facility] Prior Authorization Team ONLY: Second level appeals will be managed by the clinic staff and provider. Please contact the [employer] Prior Authorization Team if additional information about the denial is needed.    Called [phone] to follow up on Appeal and on 11/13/2024 the appeal was denied by the insurance rev team and a letter was sent to providers office.

## 2024-11-27 NOTE — TELEPHONE ENCOUNTER
Pain clinic followed up.    Closing encounter    Liane Wharton RN  [facility] - Registered Nurse  Clinic Triage [place]   November 27, 2024

## 2024-12-02 DIAGNOSIS — G43.009 MIGRAINE WITHOUT AURA AND WITHOUT STATUS MIGRAINOSUS, NOT INTRACTABLE: ICD-10-CM

## 2024-12-02 DIAGNOSIS — J45.40 MODERATE PERSISTENT ASTHMA WITHOUT COMPLICATION: ICD-10-CM

## 2024-12-02 RX ORDER — ACETYLCYSTEINE 600 MG
CAPSULE ORAL
Qty: 180 CAPSULE | Refills: 0 | OUTPATIENT
Start: 2024-12-02

## 2024-12-02 NOTE — TELEPHONE ENCOUNTER
acetylcysteine  Discontinued 9/12/2023  Discontinued - acetylcysteine (N-ACETYL-L-CYSTEINE) 600 MG CAPS capsule  
no

## 2024-12-02 NOTE — TELEPHONE ENCOUNTER
Pt notified of PA denial. Pt states she will discuss with Dr. Warren at upcoming appointment on 12/5.   Katelynn Castro RN

## 2024-12-03 RX ORDER — ALBUTEROL SULFATE 90 UG/1
INHALANT RESPIRATORY (INHALATION)
Qty: 54 G | Refills: 1 | Status: SHIPPED | OUTPATIENT
Start: 2024-12-03

## 2024-12-03 RX ORDER — RIZATRIPTAN BENZOATE 10 MG/1
10 TABLET ORAL
Qty: 18 TABLET | Refills: 1 | Status: SHIPPED | OUTPATIENT
Start: 2024-12-03

## 2024-12-03 NOTE — PATIENT INSTRUCTIONS
"Recommendations from today's MTM visit:                                                       Continue Otezla 30 mg twice daily.     Discuss a retrial of gabapentin with Dr. Peterson.  If the medication is restarted, it would be best to start at a very low dose, such as 25-50 mg once daily.     Take the Dulera and Spiriva inhalers daily to prevent asthma symptoms. The albuterol should be used as needed for shortness of breath.   Rinse your mouth with water AND spit after using the Dulera inhaler. This helps prevent thrush.      Follow-up: with MTM pharmacist on 12/23/24.     It was great speaking with you today.  I value your experience and would be very thankful for your time in providing feedback in our clinic survey. In the next few days, you may receive an email or text message from [email or web address] with a link to a survey related to your  clinical pharmacist.\"     To schedule another MTM appointment, please call the clinic directly or you may call the MTM scheduling line at [phone].    My Clinical Pharmacist's contact information:                                                      Please feel free to contact me with any questions or concerns you have.      Golden Cadena, PharmD  Medication Therapy Management Pharmacist  [facility]  Phone: [phone]    "

## 2024-12-04 ENCOUNTER — TELEPHONE (OUTPATIENT)
Dept: FAMILY MEDICINE | Facility: CLINIC | Age: 64
End: 2024-12-04

## 2024-12-04 ENCOUNTER — VIRTUAL VISIT (OUTPATIENT)
Dept: FAMILY MEDICINE | Facility: CLINIC | Age: 64
End: 2024-12-04
Payer: COMMERCIAL

## 2024-12-04 DIAGNOSIS — F41.9 ANXIETY: ICD-10-CM

## 2024-12-04 DIAGNOSIS — Z12.11 SCREEN FOR COLON CANCER: ICD-10-CM

## 2024-12-04 DIAGNOSIS — G25.81 RESTLESS LEG SYNDROME: Primary | ICD-10-CM

## 2024-12-04 PROCEDURE — 99442 PR PHYSICIAN TELEPHONE EVALUATION 11-20 MIN: CPT | Mod: 93 | Performed by: FAMILY MEDICINE

## 2024-12-04 RX ORDER — GABAPENTIN 250 MG/5ML
25-50 SOLUTION ORAL DAILY
Qty: 50 ML | Refills: 1 | Status: SHIPPED | OUTPATIENT
Start: 2024-12-04

## 2024-12-04 RX ORDER — HYDROXYZINE HYDROCHLORIDE 25 MG/1
25 TABLET, FILM COATED ORAL 3 TIMES DAILY PRN
Qty: 90 TABLET | Refills: 1 | Status: SHIPPED | OUTPATIENT
Start: 2024-12-04

## 2024-12-04 NOTE — PROGRESS NOTES
Jacquie is a 64 year old who is being evaluated via a billable telephone visit.    What phone number would you like to be contacted at? [phone]  How would you like to obtain your AVS? Orion  Originating Location (pt. Location): Home    Distant Location (provider location):  On-site    Assessment & Plan     Restless leg syndrome  She would like to try the gabapentin again.   Can try 25-50 mg daily to start.   We can increase if tolerating and no side effects.   If any concerns, will stop and reach out.   Recheck in 2 weeks.   - gabapentin (NEURONTIN) 250 MG/5ML solution; Take 0.5-1 mLs (25-50 mg) by mouth daily. 50 mg q afternoon and 100 mg at bedtime    Anxiety  Doing well with the hydroxyzine.   No concerns at this time. Feels things are better.   - hydrOXYzine HCl (ATARAX) 25 MG tablet; Take 1 tablet (25 mg) by mouth 3 times daily as needed for anxiety (and sleep).    Screen for colon cancer  Due. Order placed. Will come to .   - Fecal colorectal cancer screen (FIT); Future      The longitudinal plan of care for the diagnosis(es)/condition(s) as documented were addressed during this visit. Due to the added complexity in care, I will continue to support Jacquie in the subsequent management and with ongoing continuity of care.            Subjective   Jacquie is a 64 year old, presenting for the following health issues:    - pt stated that provider is aware of her questions and concerns and prefers to speak directly with the provider    Anxiety        11/12/2024     9:12 AM   Additional Questions   Roomed by Maryellen EMMANUEL   Accompanied by - Tono     History of Present Illness       Reason for visit:  Provider is aware   She is taking medications regularly.     Couldn't get in to the clinic due to pain after her spinal ablation. Taking pain medication.     She is wondering about being on gabapentin liquid 50 mg in the morning for her restless legs. This seemed helpful but with higher doses caused some  hallucinations. She thinks at least. She did have some other medications changes since she was last on this.     Hydroxyzine is working with the anxiety. She is taking it up to 2 times per day.     Patient discussed with pain management about memantine and naltrexone. Reports pain management does not think it's an issue.     She denies any visual hallucinations at this time. She does hear 'bangs in the house'.  She doesn't think these are hallucinations.  She is not concerned at this time.                 Objective           Vitals:  No vitals were obtained today due to virtual visit.    Physical Exam   General: Alert and no distress //Respiratory: No audible wheeze, cough, or shortness of breath // Psychiatric:  Appropriate affect, tone, and pace of words            Phone call duration: 13 minutes  Signed Electronically by: Liz Peterson MD

## 2024-12-04 NOTE — TELEPHONE ENCOUNTER
Patient called stating she has a follow up appointment made for this afternoon at 3:30 PM. She states that her  is not home to drive her. She states due to the spine ablation she had 2 weeks ago, she is on oxycodone for pain management and doesn't feel comfortable driving to the clinic, and doesn't think she could even get in and out of the car. She declined needing triage regarding her pain, states she sees the pain clinic and they manage this for her.     She states her appointment is for medication follow up questions. She is wondering if she can do her appointment virtually instead of in person?     Renetta Arita, MARVAN, RN

## 2024-12-04 NOTE — TELEPHONE ENCOUNTER
Called and notified patient of provider response. Appointment for today changed to telephone appointment, per patient request.     MARVA MckenzieN, RN

## 2024-12-05 ENCOUNTER — VIRTUAL VISIT (OUTPATIENT)
Dept: DERMATOLOGY | Facility: CLINIC | Age: 64
End: 2024-12-05
Attending: DERMATOLOGY
Payer: COMMERCIAL

## 2024-12-05 DIAGNOSIS — L20.89 OTHER ATOPIC DERMATITIS: ICD-10-CM

## 2024-12-05 DIAGNOSIS — L20.81 ATOPIC NEURODERMATITIS: ICD-10-CM

## 2024-12-05 DIAGNOSIS — L30.9 FACIAL ECZEMA: ICD-10-CM

## 2024-12-05 DIAGNOSIS — L30.9 DERMATITIS: ICD-10-CM

## 2024-12-05 PROCEDURE — 99441 PR PHYSICIAN TELEPHONE EVALUATION 5-10 MIN: CPT | Mod: 93 | Performed by: DERMATOLOGY

## 2024-12-05 RX ORDER — TACROLIMUS 1 MG/G
OINTMENT TOPICAL 2 TIMES DAILY
Qty: 60 G | Refills: 1 | Status: SHIPPED | OUTPATIENT
Start: 2024-12-05

## 2024-12-05 RX ORDER — PIMECROLIMUS 10 MG/G
CREAM TOPICAL 2 TIMES DAILY
Qty: 60 G | Refills: 3 | Status: SHIPPED | OUTPATIENT
Start: 2024-12-05

## 2024-12-05 ASSESSMENT — PAIN SCALES - GENERAL: PAINLEVEL_OUTOF10: SEVERE PAIN (7)

## 2024-12-05 NOTE — NURSING NOTE
Dermatology Rooming Note    Jacquie Amador's goals for this visit include:   Chief Complaint   Patient presents with    Derm Problem     Atopic neurodermatits- not doing good     CHRISTEL Cleveland

## 2024-12-05 NOTE — LETTER
12/5/2024       RE: Jacquie Amador  [address]  [place] [address]     Dear Colleague,    Thank you for referring your patient, Jacquie Amador, to the [facility] [place] at [facility]. Please see a copy of my visit note below.    [facility] Dermatology Note  Encounter Date: Dec 5, 2024  [employer] and Telephone ([phone] ). Location of teledermatologist: [facility] [place].  Start time: 3:35 End time: 3:45.      Dermatology Problem List:  1. Multiple skin ulcers resembling prurigo/neurodermatitis  Facial erosions, healing -chronic active problem, uncontrolled but improving   - Current tx: Protopic, mupirocin, betamethasone ointment, desonide, compounded amytriptyline/ketamine cream, Dupixent 300mg q2 weeks, N-acetylcystiene 600 mg daily, apremilast 30mg BID   - follow up in 3-4 months for recheck      2. Recurrent aphthous ulcers  ____________________________________________     Assessment & Plan:  Multiple skin ulcers resembling prurigo/neurodermatitis  Facial erosions, chronic active problem, uncontrolled.  But slowly improving.  Patient with a history of recurring oral and genital sores, likely recurrent aphthous ulcers, as well as multiple diffuse discrete skin ulcers. Today, Jacquie notes ongoing skin sores on face and describes that her condition has had a large emotional impact. She does endorse that she will pick at the lesions when they become painful to try to express the contents and relieve the pain. She did not start the amitriptyline/ketamine cream after last visit. Discussed continue current regimen and restarting the compound cream. Reviewed dermatologic medications and discussed that they are unlikely to be contributing to elevated creatinine. Discussed contribution of anxiety to condition and patient expressed interest in psychiatry referral for further  evaluation.   - Gentle skin care regimen  - Protopic oint bid   - Betamethasone ointment and desonide cream as needed for persistent skin sores and pain  - Can continue amytriptyline/ketamine cream to apply twice daily to areas of sores as desired  - Dupixent 300mg q2 weeks and is tolerating this without problems   - N-acetylcystiene 600 mg bid  - Encouraged patient to followup with psych for anxiety/depression   - Encouraged Duoderm for nonhealing sores on right side of face     # Recurrent Apthous ulcers   Currently overall improved from prior; one ulcer present today but have otherwise been infrequent   - OK to discontinue colchicine     Procedures Performed:   None     Follow-up: 6 month(s) in-person, or earlier for new or changing lesions    Jay Warren MD  Dermatology Attending    ____________________________________________________    CC: Derm Problem (Jacquie is being seen today for a follow up for facial sores )     HPI:  Ms. Jacquie Amador is a(n) 64 year old female who presents today for followup on neurodermatitis and persistent facial sores.  Jacquie thinks some of the previously involved sores on the left side of the face are improving, but many are still open and only slowly healing on the left.     Patient is otherwise feeling well, without additional skin concerns.     Labs Reviewed:  N/A     Physical Exam:  SKIN: Teledermatology photos were reviewed; image quality and interpretability: acceptable. Image date: 12/5/24  - geographic shallow ulcers and 3mm papulonodules with central ulcer and hemorrhagic crusts on face, right greater than left  - No other lesions of concern on areas examined.          Again, thank you for allowing me to participate in the care of your patient.      Sincerely,    Jay Warren MD

## 2024-12-05 NOTE — PROGRESS NOTES
[facility] Dermatology Note  Encounter Date: Dec 5, 2024  Store-and[employer]Forward and Telephone ([phone] ). Location of teledermatologist: [facility] [place].  Start time: 3:35 End time: 3:45.      Dermatology Problem List:  1. Multiple skin ulcers resembling prurigo/neurodermatitis  Facial erosions, healing -chronic active problem, uncontrolled but improving   - Current tx: Protopic, mupirocin, betamethasone ointment, desonide, compounded amytriptyline/ketamine cream, Dupixent 300mg q2 weeks, N-acetylcystiene 600 mg daily, apremilast 30mg BID   - follow up in 3-4 months for recheck      2. Recurrent aphthous ulcers  ____________________________________________     Assessment & Plan:  Multiple skin ulcers resembling prurigo/neurodermatitis  Facial erosions, chronic active problem, uncontrolled.  But slowly improving.  Patient with a history of recurring oral sores, likely recurrent aphthous ulcers, as well as multiple diffuse discrete skin ulcers. Today, Jacquie notes ongoing skin sores on face and describes that her condition has had a large emotional impact. She does endorse that she will pick at the lesions when they become painful to try to express the contents and relieve the pain. She did not start the amitriptyline/ketamine cream after last visit. Discussed continue current regimen and restarting the compound cream. Reviewed dermatologic medications and discussed that they are unlikely to be contributing to elevated creatinine. Discussed contribution of anxiety to condition and patient expressed interest in psychiatry referral for further evaluation.   - Gentle skin care regimen  - Protopic oint bid   - Betamethasone ointment and desonide cream as needed for persistent skin sores and pain  - Can continue amytriptyline/ketamine cream to apply twice daily to areas of sores as desired  - Dupixent 300mg q2 weeks and is tolerating this without problems   -  N-acetylcystiene 600 mg bid  - Encouraged patient to followup with psych for anxiety/depression   - Encouraged Duoderm for nonhealing sores on right side of face     # Recurrent Apthous ulcers   Currently overall improved from prior; one ulcer present today but have otherwise been infrequent   - OK to discontinue colchicine     Procedures Performed:   None     Follow-up: 6 month(s) in-person, or earlier for new or changing lesions    Jay Warren MD  Dermatology Attending    ____________________________________________________    CC: Derm Problem (Jacquie is being seen today for a follow up for facial sores )     HPI:  Ms. Jacquie Amador is a(n) 64 year old female who presents today for followup on neurodermatitis and persistent facial sores.  Jacquie thinks some of the previously involved sores on the left side of the face are improving, but many are still open and only slowly healing on the left.     Patient is otherwise feeling well, without additional skin concerns.     Labs Reviewed:  N/A     Physical Exam:  SKIN: Teledermatology photos were reviewed; image quality and interpretability: acceptable. Image date: 12/5/24  - geographic shallow ulcers and 3mm papulonodules with central ulcer and hemorrhagic crusts on face, right greater than left  - No other lesions of concern on areas examined.

## 2024-12-09 ENCOUNTER — TELEPHONE (OUTPATIENT)
Dept: DERMATOLOGY | Facility: CLINIC | Age: 64
End: 2024-12-09
Payer: COMMERCIAL

## 2024-12-09 NOTE — TELEPHONE ENCOUNTER
[facility] Call Center    Phone Message    May a detailed message be left on voicemail: yes     Reason for Call: Medication Question or concern regarding medication   Prescription Clarification  Name of Medication: dupilumab (DUPIXENT) 200 MG/1.14ML injection pen    Prescribing Provider: Dr. Warren   Pharmacy: [facility] - [place], [place] - [address] (Pharmacy)     What on the order needs clarification? Pharmacy calling to verify that the dose decrease was intentional on patients medication. Patient normally gets 300mg and 200 mg was sent. Thanks.       Action Taken: te sent    Travel Screening: Not Applicable     Date of Service:

## 2024-12-10 ENCOUNTER — MYC MEDICAL ADVICE (OUTPATIENT)
Dept: HEMATOLOGY | Facility: CLINIC | Age: 64
End: 2024-12-10
Payer: COMMERCIAL

## 2024-12-10 NOTE — TELEPHONE ENCOUNTER
Per Anjana at  Pharmacy the Dupixent changed from 300-200 Mg was intentional and the patient was not aware of any changes.

## 2024-12-11 ENCOUNTER — VIRTUAL VISIT (OUTPATIENT)
Dept: HEMATOLOGY | Facility: CLINIC | Age: 64
End: 2024-12-11
Attending: INTERNAL MEDICINE
Payer: COMMERCIAL

## 2024-12-11 DIAGNOSIS — D69.1: Primary | ICD-10-CM

## 2024-12-11 NOTE — PROGRESS NOTES
[facility]  [address] [place] [id number]  Phone: [phone], Fax: [phone]    Outpatient Visit Note:    Patient: Jacquie Amador  MRN: [id number]  : 24    Reason for Initial Consultation:  Bruising    Assessment:  In summary, Jacquie Amador is a 63 year old woman presenting to clinic due to bruising/bleeding with abnormal platelet studies. 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 This has resolved after starting steroids--- negative testing x2 ( and ). Overall, her clinical picture (wounds, arthralgias, etc) is highly concerning for underlying autoimmune disorder.    1.  Behcet's disease:   2.  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  Easy bruising and prolonged healing secondary to #1 and #2  4.  Personal history of antiphospholipid antibody syndrome in pregnancy, no records  5.  Osteoarthritis requiring anti-inflammatory medications  6.  Depression requiring use of SSRI medication  7.  Acute on chronic migraines requiring use of triptans and aspirin  8. Acute on chronic renal insufficiency    Ms. Amador has been evaluated by Dermatology and Rheumatology. Skin biopsy with neutrophilic infiltrates that may be secondary to Behcets. Overall, clinical criteria is met and very consistent.  Working with Dr. Warren and Malu for skin and oral lesions. Will establish care with Dr. Hawkins next week.     For her bleeding/platelets, she has her prior anti-HLA and anti-GP1a antibodies were positive in  --- now negative on multiple tests --- will repeat given potential bleeding    Would not repeat platelet aggregometry. Does find some benefit from TXA use--- therefore will continue this to prevent bleeding and stablize wounds. Script for compounded topical TXA sent today    Would not taper/stop steroid at 10 mg daily at  this juncture---consider change to Hydrocortef--- will message PCP and Derm    For her kidney disease- we have previously check for monoclonal gammapathy in 2021 AND 2023. Cryoglobulins were also neagtiave. Repeat studies with stable kappa free light chain elevation in 2024. Nephrology referral not completed.      Plan:  1. Majority of today's visit was spent counseling the patient regarding diagnosis, natural history, management and treatment options   2. Continue prednisone 10 mg daily  3. Continue Tranexamic acid 650 mg PO morning and 1300 mg at night to assess if bleeding at night into wounds stops  4. Repeat platelet antibody testing  5. Topical 7% TXA cream BID   No orders of the defined types were placed in this encounter.      The patient is given our center's contact information and is instructed to call if she should have any further questions or concerns.  Follow up 6 months  with Dr. Grimes    Patient understands and agrees with the above plan and recommendation.    Emely Grimes MD/PhD   of Medicine  Division of Hematology, Oncology and Transplantation    Video time spent with patient: 29 min and 41 s  Total time:   I spent a total of 47 minutes on the day of the visit.  Time spent by me today doing chart review, history and exam, documentation and further activities per the note    The longitudinal plan of care for the diagnosis(es)/condition(s) as documented were addressed during this visit. Due to the added complexity in care, I will continue to support Jacquie in the subsequent management and with ongoing continuity of care.    ----------------------------------------------------------------------------------------------------------------------  Interval History:  Jacquie Amador is a 63 year old woman with PMHx of fibromyalgia, anxiety/depression, hypertension and hyperlipidemia. She presented to clinic on 9/15/21 for her first in person evaluation due to bleeding and  prolonged wound healing. Please refer to my consult note.     In a lot of pain. Arms have a lot of ulcers. Will get purple bubble things- they will open up. Can happen from purse or holding the kids.   Pain --- joints- feet are horrible.     Had lower lumbar ablation- had terrible pain after 2 weeks.     Jacquie reports that overall her health has been up and down.   Jacquie is currently on apremilast and dupilumab. Is still on Colcrys    Jacquie has lost ~25 lbs since her last visit. Did this due to diet and decreased appetite. Is still on prednisone.     No issues with nose bleeding or gum bleeding. Long time get face lesion to stop bleeding after putting on make up- has not happened for a while.     Mouth ulcer- not so much now.     Has chronic constipation.     Tranexamic acid- is taking 1 in morning and two at night. Not waking up with the blood anymore.       Past Medical History:  Past Medical History:   Diagnosis Date    ASTHMA - MODERATE PERSISTENT 9/21/2005    Chronic pain     Coronary artery disease     CVA (cerebral infarction)     Depressive disorder, not elsewhere classified     Diabetes (H)     Elevated serum alkaline phosphatase level     Liver source    Fibromyalgia     HYPERLIPIDEMIA NEC/NOS 12/29/2006    Hypertriglyceridemia     OA (osteoarthritis)     Thyroid disease     Trochanteric bursitis     Unspecified essential hypertension      Immunization  Immunization History   Administered Date(s) Administered    COVID-19 12+ (Pfizer) 03/05/2024    COVID-19 Bivalent 18+ (Moderna) 11/21/2022    COVID-19 Monovalent 18+ (Moderna) 03/03/2021, 04/01/2021, 11/15/2021    COVID-19 Monovalent Booster 18+ (Moderna) 05/10/2022    Influenza (IIV3) PF 11/09/2000, 12/08/2003, 12/06/2004, 12/08/2005, 11/09/2006, 11/01/2007, 12/04/2008, 09/24/2010, 10/26/2011, 10/26/2012    Influenza (prior to 2024) 09/22/2009    Influenza Vaccine 18-64 (Flublok) 09/30/2020, 11/21/2022, 09/19/2023    Influenza Vaccine >6 months,quad,  PF 2014, 2015, 2019, 11/15/2021    Influenza Vaccine Trivalent (FluBlok) 2024    Pneumococcal 20 valent Conjugate (Prevnar 20) 2024    Pneumococcal 23 valent 2000, 2021, 2022    RSV Vaccine (Abrysvo) 2024    TD,PF 7+ (Tenivac) 2000    TDAP (Adacel,Boostrix) 2021    TDAP Vaccine (Adacel) 2011    Zoster recombinant adjuvanted (SHINGRIX) 2019     Past Surgical History:  Past Surgical History:   Procedure Laterality Date    3 teeth pulled      INJECT EPIDURAL TRANSFORAMINAL  2014    [facility]    INJECT JOINT SACROILIAC  2014    [facility]    LAMINECTOMY LUMBAR ONE LEVEL Left 2014    [facility]  DELIVERY ONLY      , Low Cervical     Medications:  Current Outpatient Medications   Medication Sig Dispense Refill    acetaminophen (TYLENOL) 500 MG tablet Take 500-1,000 mg by mouth every 6 hours as needed for mild pain      acetylcysteine (N-ACETYL-L-CYSTEINE) 600 MG CAPS capsule Take 1 capsule (600 mg) by mouth 2 times daily 180 capsule 2    albuterol (PROVENTIL) (2.5 MG/3ML) 0.083% neb solution Take 1 vial (2.5 mg) by nebulization every 6 hours as needed for shortness of breath or wheezing. 3 mL 4    albuterol (VENTOLIN HFA) 108 (90 Base) MCG/ACT inhaler INHALE 2 PUFFS INTO THE LUNGS EVERY 6 HOURS AS NEEDED FOR SHORTNESS OF BREATH / DYSPNEA 54 g 1    apremilast (OTEZLA) 30 MG tablet Take 1 tablet (30 mg) by mouth 2 times daily. Hold for signs of infection, and seek medical attention. 60 tablet 5    atorvastatin (LIPITOR) 20 MG tablet TAKE ONE TABLET BY MOUTH ONE TIME DAILY 90 tablet 0    augmented betamethasone dipropionate (DIPROLENE AF) 0.05 % external cream Apply topically 2 times daily 50 g 3    benzonatate (TESSALON) 200 MG capsule Take 1 capsule (200 mg) by mouth 2 times daily as needed for cough 40 capsule 1    buPROPion (WELLBUTRIN XL) 150 MG 24 hr  tablet Take 1 tablet (150 mg) by mouth every morning. To take with the 300 mg dose for a total of 450 mg daily. 90 tablet 1    buPROPion (WELLBUTRIN XL) 300 MG 24 hr tablet Take 1 tablet (300 mg) by mouth every morning. To take with the 150 mg dose for a total of 450 mg daily. 90 tablet 1    chlorhexidine (PERIDEX) 0.12 % solution Swish and spit 15 mLs in mouth 2 times daily 1893 mL 4    clindamycin (CLINDAMAX) 1 % external gel Apply topically 2 times daily 30 g 4    clotrimazole (MYCELEX) 10 MG lozenge Place 1 lozenge (10 mg) inside cheek 5 times daily. 70 lozenge 1    COMPOUND CONTAINING CONTROLLED SUBSTANCE (CMPD RX) - PHARMACY TO MIX COMPOUNDED MEDICATION Compound amitriptyline 2% and ketamine 0.5% in 80g of lipoderm or Vanicream 80 g 0    desonide (DESOWEN) 0.05 % external cream Apply topically 2 times daily To sores on face 60 g 3    dextran 70-hypromellose (TEARS NATURALE FREE PF) 0.1-0.3 % ophthalmic solution Place 2 drops into both eyes daily as needed (for dry eyes) 35 each 3    doxycycline monohydrate (MONODOX) 100 MG capsule Take 1 capsule (100 mg) by mouth 2 times daily. After meals and with big glass of water 60 capsule 2    DULoxetine (CYMBALTA) 60 MG capsule Take 2 capsules (120 mg) by mouth daily 180 capsule 1    dupilumab (DUPIXENT) 300 MG/2ML prefilled pen Inject 2 mLs (300 mg) subcutaneously every 14 days. 4 mL 3    famotidine (PEPCID) 40 MG tablet Take 1 tablet (40 mg) by mouth daily 90 tablet 2    fluconazole (DIFLUCAN) 150 MG tablet Take 1 tablet (150 mg) by mouth every 3 days 6 tablet 0    fluocinonide (LIDEX) 0.05 % external gel Apply topically 2 times daily as needed 15 g 1    gabapentin (NEURONTIN) 250 MG/5ML solution Take 0.5-1 mLs (25-50 mg) by mouth daily. 50 mg q afternoon and 100 mg at bedtime 50 mL 1    hydrochlorothiazide (HYDRODIURIL) 25 MG tablet Take 1 tablet (25 mg) by mouth daily 90 tablet 1    hydrocortisone 2.5 % cream APPLY TOPICALLY 2 TIMES DAILY AS NEEDED 30 g 3     hydrOXYzine HCl (ATARAX) 25 MG tablet Take 1 tablet (25 mg) by mouth 3 times daily as needed for anxiety (and sleep). 90 tablet 1    lidocaine, viscous, (XYLOCAINE) 2 % solution Swish and spit 10ml every 3 hours as needed for oral pain; max 8 doses/24hrs.  Do not eat or chew gum for 60 minutes following use. 100 mL 1    losartan (COZAAR) 100 MG tablet Take 1 tablet (100 mg) by mouth daily. 90 tablet 1    memantine (NAMENDA) 10 MG tablet Take 1 tablet by mouth 2 times daily.      metoprolol succinate ER (TOPROL XL) 100 MG 24 hr tablet Take 1 tablet (100 mg) by mouth 2 times daily. 180 tablet 1    metoprolol succinate ER (TOPROL XL) 25 MG 24 hr tablet Take 1 tablet (25 mg) by mouth 2 times daily. 180 tablet 1    mometasone-formoterol (DULERA) 200-5 MCG/ACT inhaler Inhale 2 puffs into the lungs 2 times daily 39 g 1    montelukast (SINGULAIR) 10 MG tablet Take 1 tablet (10 mg) by mouth at bedtime 90 tablet 3    mupirocin (BACTROBAN) 2 % external cream Apply topically 3 times daily. to affected area as instructed. 60 g 5    mupirocin (BACTROBAN) 2 % external ointment Apply topically 3 times daily as needed. 30 g 3    Naltrexone HCl POWD       nystatin (MYCOSTATIN) 138334 UNIT/ML suspension Take 5 mLs (500,000 Units) by mouth 4 times daily. Has recurrent thrush. Uses for 2 weeks (280 ml)  at a time as needed. 280 mL 2    ondansetron (ZOFRAN ODT) 4 MG ODT tab Take 1 tablet (4 mg) by mouth every 8 hours as needed for nausea. 20 tablet 2    oxyBUTYnin ER (DITROPAN XL) 5 MG 24 hr tablet Take 2 tablets (10 mg) by mouth daily 180 tablet 0    pimecrolimus (ELIDEL) 1 % external cream Apply topically 2 times daily. 60 g 3    predniSONE (DELTASONE) 10 MG tablet Take 1 tablet (10 mg) by mouth daily 90 tablet 3    rizatriptan (MAXALT) 10 MG tablet Take 1 tablet (10 mg) by mouth at onset of headache for migraine. May repeat in 2 hours. Max 3 tablets/24 hours. 18 tablet 1    Rotigotine (NEUPRO) 1 MG/24HR 24 hr patch Place 1 patch over  24 hours onto the skin daily. 30 patch 1    SUMAtriptan (IMITREX) 100 MG tablet take 1 tablet at onset of headache may repeat in 2 hours max 2 tabs/day 18 tablet 1    tacrolimus (PROTOPIC) 0.1 % external ointment Apply topically 2 times daily. To areas of sores on face 60 g 1    tiotropium (SPIRIVA RESPIMAT) 2.5 MCG/ACT inhaler Inhale 2 puffs into the lungs daily. 12 g 2    traMADol (ULTRAM) 50 MG tablet Take 1 tablet (50 mg) by mouth every 12 hours as needed for moderate to severe pain 60 tablet 0    tranexamic acid (LYSTEDA) 650 MG tablet Please take 1 tablet (650 mg) in the morning and 2 tablets (1300 mg) in the evening as needed for episodes of bleeding. 270 tablet 0    traZODone (DESYREL) 50 MG tablet Take 3 tablets (150 mg) by mouth at bedtime 270 tablet 2    triamcinolone (ARISTOCORT HP) 0.5 % external cream Apply topically 2 times daily 60 g 0    valACYclovir (VALTREX) 500 MG tablet Take 1 tablet (500 mg) by mouth daily 90 tablet 1    XIIDRA 5 % opthalmic solution         Allergies:  Allergies   Allergen Reactions    Cats      and rabbits/wheezing    Dogs      wheezing    Lyrica [Pregabalin] Other (See Comments)     Rash, mouth sores, itching and burning    Seasonal Allergies      rhinits     ROS:  Remainder of a comprehensive 10 point ROS is negative unless noted above.    Social History:  Denies any tobacco use. No significant alcohol use. Denies any illicit drug use.     Family History:  Two daughter  29 Yo daughter  26 yo daughter  3 grandsons  1  grandaughter    1 sister- older- high blood glucose, arthritis  1 brother- older- no idea    Mother- - heart valve. Had MDS. Needed a shot every month  Father- - cancer    Objectives:  Reviewed photos in the chart  Video visit- however patient did NOT turn on screen  GENERAL: alert and no distress  RESP: No audible wheeze, cough   NEURO: Mentation and speech appropriate for age.  PSYCH: Appropriate affect, tone, and pace of  words      Labs: reviewed in EPIC    Ferritin   Date Value Ref Range Status   10/17/2024 118 11 - 328 ng/mL Final   04/16/2021 376 (H) 8 - 252 ng/mL Final     Iron   Date Value Ref Range Status   11/13/2023 99 37 - 145 ug/dL Final   01/20/2021 50 35 - 180 ug/dL Final     Iron Binding Cap   Date Value Ref Range Status   01/20/2021 350 240 - 430 ug/dL Final     Iron Binding Capacity   Date Value Ref Range Status   11/13/2023 272 240 - 430 ug/dL Final   09/15/2021 284 240 - 430 ug/dL Final     Recent Labs   Lab Test 06/27/24  1450 11/13/23  1021 09/22/22  1152 04/16/21  0940   MONOCLONAL PEAK 0.0 0.0 0.0 0.0   KAPPA FREE LT CHAIN 2.09* 2.96*  --   --    LAMBDA FREE LT CHAIN 2.42 2.13  --   --    KAPPA LAMBDA RATIO 0.86 1.39  --   --      ELP Interpretation:   Date Value Ref Range Status   04/16/2021   Final    Essentially normal electrophoretic pattern.  No obvious monoclonal proteins seen.    Pathologic significance requires clinical correlation.  FARRAH Levy M.D., Ph.D.,   Pathologist ().       ELP Interpretation   Date Value Ref Range Status   06/27/2024   Final    Essentially normal electrophoretic pattern. No obvious monoclonal proteins seen. Pathologic significance requires clinical correlation. Nadia Ellison M.D., Ph.D.   11/13/2023   Final    Essentially normal electrophoretic pattern. No obvious monoclonal proteins seen. Pathologic significance requires clinical correlation. FARRAH Levy M.D., Ph.D., Pathologist ().   09/22/2022   Final    Essentially normal electrophoretic pattern. No obvious monoclonal proteins seen. Pathologic significance requires clinical correlation. JOSE ALBERTO Blancas M.D., Ph.D., Pathologist.     Immunofixation ELP   Date Value Ref Range Status   06/27/2024   Final    No monoclonal protein seen on immunofixation. Pathologic significance requires clinical correlation.  Nadia Ellison M.D., Ph.D.   11/13/2023   Final    No monoclonal protein seen on immunofixation.  Pathologic significance requires clinical correlation.  FARRAH Levy M.D., Ph.D., Pathologist ()   12/08/2021   Final    No monoclonal protein seen on immunofixation. Pathological significance requires clinical correlation. FARRAH Levy M.D., Ph.D., Pathologist ([phone])   08/29/2014   Final    No monoclonal protein seen on immunofixation.  Pathological significance   requires clinical correlation.   Nadia Ellison M.D., PH.D.         Imaging:  Not applicable

## 2024-12-12 ENCOUNTER — TELEPHONE (OUTPATIENT)
Dept: RHEUMATOLOGY | Facility: CLINIC | Age: 64
End: 2024-12-12
Payer: COMMERCIAL

## 2024-12-12 NOTE — TELEPHONE ENCOUNTER
Patient Contacted for the patient to call back and schedule the following:    Appointment type: Return Rheumatology  Provider: Dr. Hawkins  Return date: October 2025  Specialty phone number: [phone]  Additional appointment(s) needed: DEXA and x-rays  Additonal Notes: Pt declined scheduling as unsatisfied with care and want someone more aggressive for care. Message sent to Lupe JEFF

## 2024-12-17 NOTE — TELEPHONE ENCOUNTER
"Patient stated that she would like to discuss a phone call and conversation that she had with Laisha. 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 She stated she sent a message to Dr. Morales and feels Laisha intercepted the message and she then sent it to Dr. Richardson and Liseth Caldera regarding how she had to wait a certain time between her Botox. She stated that she just got a message and then Laisha started sending her messages and told her that pt had initially called which she denies and then Laisha responded that she does not check messages all the time and pt denies sending messages directly to Laisha.     Then she stated Laisha got \"snippy\" with her and she was \"po'd\" with how she handled it. She then went onto [employer] and her phone call section says 0 which indicates that no one had called her and she had not called anyone. She stated that Laisha was \"weird and should not have been involved\" and she felt like Laisha was \"butting in\". She stated that she wants to know why her message to another provider was looked at by Laisha. She stated that was what she wanted to discuss.     She said it is not that big deal a deal anymore but she feels something needs to be said that she should not be snooping in her stuff. Pt stated that Laisha also indicated that we do not see patients with Botox from 2 different providers and pt was upset with that.     Patient stated she does not want to wait till next week till she sees provider. She stated it is not pressing but needs to be addressed. Stated will send her concern to the provider.     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 This is suggested by history, scans and testing today     It is important for Eri to have the best quality of life. At this point it appears that being home is where she wants to be and as long as she has appropriate care there is no reason she cannot go home.     Torrey is doing what he can to educate himself on how to be the best caregiver and I am recommending the Powerful Tools for Caregiving and the books : the 36 hour day, and Learning to speak Alzheimer's disease     We will contact Dr. Chaidez about lowering blood pressure further and we will get lab work to see if Eri would benefit from prednisone as well     Continue current medications as is and I completely agree with Dr. Chaidez that risperidone is ABSOLUTELY contraindicated in Eri's case.        depression room air

## 2024-12-18 ENCOUNTER — OFFICE VISIT (OUTPATIENT)
Dept: PEDIATRICS | Facility: CLINIC | Age: 64
End: 2024-12-18
Payer: COMMERCIAL

## 2024-12-18 ENCOUNTER — DOCUMENTATION ONLY (OUTPATIENT)
Dept: ONCOLOGY | Facility: CLINIC | Age: 64
End: 2024-12-18

## 2024-12-18 ENCOUNTER — MYC REFILL (OUTPATIENT)
Dept: FAMILY MEDICINE | Facility: CLINIC | Age: 64
End: 2024-12-18

## 2024-12-18 ENCOUNTER — VIRTUAL VISIT (OUTPATIENT)
Dept: FAMILY MEDICINE | Facility: CLINIC | Age: 64
End: 2024-12-18
Payer: COMMERCIAL

## 2024-12-18 ENCOUNTER — ANCILLARY PROCEDURE (OUTPATIENT)
Dept: ULTRASOUND IMAGING | Facility: CLINIC | Age: 64
End: 2024-12-18
Attending: NURSE PRACTITIONER
Payer: COMMERCIAL

## 2024-12-18 ENCOUNTER — HOSPITAL ENCOUNTER (INPATIENT)
Facility: CLINIC | Age: 64
End: 2024-12-18
Attending: STUDENT IN AN ORGANIZED HEALTH CARE EDUCATION/TRAINING PROGRAM | Admitting: HOSPITALIST
Payer: COMMERCIAL

## 2024-12-18 ENCOUNTER — MYC MEDICAL ADVICE (OUTPATIENT)
Dept: FAMILY MEDICINE | Facility: CLINIC | Age: 64
End: 2024-12-18

## 2024-12-18 VITALS
TEMPERATURE: 98.4 F | DIASTOLIC BLOOD PRESSURE: 67 MMHG | SYSTOLIC BLOOD PRESSURE: 117 MMHG | HEART RATE: 62 BPM | RESPIRATION RATE: 16 BRPM | OXYGEN SATURATION: 100 %

## 2024-12-18 DIAGNOSIS — M35.2 BEHCET'S SYNDROME (H): Primary | ICD-10-CM

## 2024-12-18 DIAGNOSIS — J45.40 MODERATE PERSISTENT ASTHMA WITHOUT COMPLICATION: ICD-10-CM

## 2024-12-18 DIAGNOSIS — E07.9 DISEASE OF THYROID GLAND: ICD-10-CM

## 2024-12-18 DIAGNOSIS — M79.89 SWELLING OF RIGHT LOWER EXTREMITY: Primary | ICD-10-CM

## 2024-12-18 DIAGNOSIS — M79.89 RIGHT LEG SWELLING: ICD-10-CM

## 2024-12-18 DIAGNOSIS — L30.9 DERMATITIS: ICD-10-CM

## 2024-12-18 DIAGNOSIS — E11.22 TYPE 2 DIABETES MELLITUS WITH DIABETIC CHRONIC KIDNEY DISEASE, UNSPECIFIED CKD STAGE, UNSPECIFIED WHETHER LONG TERM INSULIN USE (H): ICD-10-CM

## 2024-12-18 DIAGNOSIS — I12.9 MALIGNANT HYPERTENSIVE KIDNEY DISEASE WITH CHRONIC KIDNEY DISEASE STAGE I THROUGH STAGE IV, OR UNSPECIFIED(403.00): ICD-10-CM

## 2024-12-18 DIAGNOSIS — G25.81 RESTLESS LEGS SYNDROME (RLS): ICD-10-CM

## 2024-12-18 DIAGNOSIS — I82.411 DEEP VEIN THROMBOSIS (DVT) OF FEMORAL VEIN OF RIGHT LOWER EXTREMITY, UNSPECIFIED CHRONICITY (H): ICD-10-CM

## 2024-12-18 DIAGNOSIS — N18.9 CHRONIC KIDNEY DISEASE, UNSPECIFIED CKD STAGE: ICD-10-CM

## 2024-12-18 DIAGNOSIS — L20.81 ATOPIC NEURODERMATITIS: ICD-10-CM

## 2024-12-18 DIAGNOSIS — Z86.73 PERSONAL HISTORY OF TRANSIENT CEREBRAL ISCHEMIA: ICD-10-CM

## 2024-12-18 DIAGNOSIS — I82.411 ACUTE DEEP VEIN THROMBOSIS (DVT) OF FEMORAL VEIN OF RIGHT LOWER EXTREMITY (H): ICD-10-CM

## 2024-12-18 DIAGNOSIS — I82.411 ACUTE DEEP VEIN THROMBOSIS (DVT) OF RIGHT FEMORAL VEIN (H): ICD-10-CM

## 2024-12-18 DIAGNOSIS — M79.89 RIGHT LEG SWELLING: Primary | ICD-10-CM

## 2024-12-18 DIAGNOSIS — N18.31 STAGE 3A CHRONIC KIDNEY DISEASE (H): ICD-10-CM

## 2024-12-18 DIAGNOSIS — R05.9 COUGH, UNSPECIFIED TYPE: ICD-10-CM

## 2024-12-18 DIAGNOSIS — R73.01 ELEVATED FASTING GLUCOSE: ICD-10-CM

## 2024-12-18 DIAGNOSIS — E66.3 OVERWEIGHT WITH BODY MASS INDEX (BMI) OF 28 TO 28.9 IN ADULT: ICD-10-CM

## 2024-12-18 DIAGNOSIS — Z87.891 PERSONAL HISTORY OF TOBACCO USE, PRESENTING HAZARDS TO HEALTH: ICD-10-CM

## 2024-12-18 LAB
ANION GAP SERPL CALCULATED.3IONS-SCNC: 11 MMOL/L (ref 7–15)
BASOPHILS # BLD AUTO: 0.1 10E3/UL (ref 0–0.2)
BASOPHILS NFR BLD AUTO: 1 %
BUN SERPL-MCNC: 24.3 MG/DL (ref 8–23)
CALCIUM SERPL-MCNC: 9.8 MG/DL (ref 8.8–10.4)
CHLORIDE SERPL-SCNC: 100 MMOL/L (ref 98–107)
CREAT SERPL-MCNC: 1.31 MG/DL (ref 0.51–0.95)
EGFRCR SERPLBLD CKD-EPI 2021: 45 ML/MIN/1.73M2
EOSINOPHIL # BLD AUTO: 0.1 10E3/UL (ref 0–0.7)
EOSINOPHIL NFR BLD AUTO: 1 %
ERYTHROCYTE [DISTWIDTH] IN BLOOD BY AUTOMATED COUNT: 13.7 % (ref 10–15)
EST. AVERAGE GLUCOSE BLD GHB EST-MCNC: 131 MG/DL
GLUCOSE SERPL-MCNC: 104 MG/DL (ref 70–99)
HBA1C MFR BLD: 6.2 %
HCO3 SERPL-SCNC: 25 MMOL/L (ref 22–29)
HCT VFR BLD AUTO: 41.5 % (ref 35–47)
HGB BLD-MCNC: 13.9 G/DL (ref 11.7–15.7)
IMM GRANULOCYTES # BLD: 0.1 10E3/UL
IMM GRANULOCYTES NFR BLD: 1 %
INR PPP: 1.03 (ref 0.85–1.15)
LYMPHOCYTES # BLD AUTO: 3.3 10E3/UL (ref 0.8–5.3)
LYMPHOCYTES NFR BLD AUTO: 33 %
MCH RBC QN AUTO: 30.6 PG (ref 26.5–33)
MCHC RBC AUTO-ENTMCNC: 33.5 G/DL (ref 31.5–36.5)
MCV RBC AUTO: 91 FL (ref 78–100)
MONOCYTES # BLD AUTO: 0.7 10E3/UL (ref 0–1.3)
MONOCYTES NFR BLD AUTO: 7 %
NEUTROPHILS # BLD AUTO: 5.9 10E3/UL (ref 1.6–8.3)
NEUTROPHILS NFR BLD AUTO: 58 %
NRBC # BLD AUTO: 0 10E3/UL
NRBC BLD AUTO-RTO: 0 /100
PLATELET # BLD AUTO: 306 10E3/UL (ref 150–450)
POTASSIUM SERPL-SCNC: 4 MMOL/L (ref 3.4–5.3)
RBC # BLD AUTO: 4.54 10E6/UL (ref 3.8–5.2)
SODIUM SERPL-SCNC: 136 MMOL/L (ref 135–145)
WBC # BLD AUTO: 10.2 10E3/UL (ref 4–11)

## 2024-12-18 PROCEDURE — 85613 RUSSELL VIPER VENOM DILUTED: CPT | Performed by: STUDENT IN AN ORGANIZED HEALTH CARE EDUCATION/TRAINING PROGRAM

## 2024-12-18 PROCEDURE — 86146 BETA-2 GLYCOPROTEIN ANTIBODY: CPT | Performed by: STUDENT IN AN ORGANIZED HEALTH CARE EDUCATION/TRAINING PROGRAM

## 2024-12-18 PROCEDURE — 99285 EMERGENCY DEPT VISIT HI MDM: CPT | Performed by: STUDENT IN AN ORGANIZED HEALTH CARE EDUCATION/TRAINING PROGRAM

## 2024-12-18 PROCEDURE — 85610 PROTHROMBIN TIME: CPT | Performed by: STUDENT IN AN ORGANIZED HEALTH CARE EDUCATION/TRAINING PROGRAM

## 2024-12-18 PROCEDURE — 85730 THROMBOPLASTIN TIME PARTIAL: CPT | Performed by: STUDENT IN AN ORGANIZED HEALTH CARE EDUCATION/TRAINING PROGRAM

## 2024-12-18 PROCEDURE — 99207 REFERRAL TO ACUTE AND DIAGNOSTIC SERVICES: CPT | Mod: 95 | Performed by: FAMILY MEDICINE

## 2024-12-18 PROCEDURE — 120N000002 HC R&B MED SURG/OB UMMC

## 2024-12-18 PROCEDURE — 99207 PR APP CREDIT; MD BILLING SHARED VISIT: CPT | Mod: FS

## 2024-12-18 PROCEDURE — 86147 CARDIOLIPIN ANTIBODY EA IG: CPT | Performed by: STUDENT IN AN ORGANIZED HEALTH CARE EDUCATION/TRAINING PROGRAM

## 2024-12-18 PROCEDURE — 36415 COLL VENOUS BLD VENIPUNCTURE: CPT | Performed by: STUDENT IN AN ORGANIZED HEALTH CARE EDUCATION/TRAINING PROGRAM

## 2024-12-18 PROCEDURE — 82310 ASSAY OF CALCIUM: CPT | Performed by: STUDENT IN AN ORGANIZED HEALTH CARE EDUCATION/TRAINING PROGRAM

## 2024-12-18 PROCEDURE — 80048 BASIC METABOLIC PNL TOTAL CA: CPT | Performed by: STUDENT IN AN ORGANIZED HEALTH CARE EDUCATION/TRAINING PROGRAM

## 2024-12-18 PROCEDURE — 85390 FIBRINOLYSINS SCREEN I&R: CPT | Mod: 26 | Performed by: PATHOLOGY

## 2024-12-18 PROCEDURE — 99223 1ST HOSP IP/OBS HIGH 75: CPT | Mod: FS | Performed by: HOSPITALIST

## 2024-12-18 PROCEDURE — 93971 EXTREMITY STUDY: CPT | Mod: RT | Performed by: INTERNAL MEDICINE

## 2024-12-18 PROCEDURE — 99215 OFFICE O/P EST HI 40 MIN: CPT | Performed by: NURSE PRACTITIONER

## 2024-12-18 PROCEDURE — 250N000011 HC RX IP 250 OP 636: Performed by: STUDENT IN AN ORGANIZED HEALTH CARE EDUCATION/TRAINING PROGRAM

## 2024-12-18 PROCEDURE — 85025 COMPLETE CBC W/AUTO DIFF WBC: CPT | Performed by: STUDENT IN AN ORGANIZED HEALTH CARE EDUCATION/TRAINING PROGRAM

## 2024-12-18 PROCEDURE — 83036 HEMOGLOBIN GLYCOSYLATED A1C: CPT

## 2024-12-18 RX ORDER — TRANEXAMIC ACID 650 MG/1
1300 TABLET ORAL AT BEDTIME
Status: DISCONTINUED | OUTPATIENT
Start: 2024-12-19 | End: 2024-12-22 | Stop reason: HOSPADM

## 2024-12-18 RX ORDER — POLYETHYLENE GLYCOL 3350 17 G/17G
17 POWDER, FOR SOLUTION ORAL 2 TIMES DAILY PRN
Status: DISCONTINUED | OUTPATIENT
Start: 2024-12-18 | End: 2024-12-22 | Stop reason: HOSPADM

## 2024-12-18 RX ORDER — DOXYCYCLINE 50 MG/1
100 CAPSULE ORAL 2 TIMES DAILY
Status: DISCONTINUED | OUTPATIENT
Start: 2024-12-19 | End: 2024-12-22 | Stop reason: HOSPADM

## 2024-12-18 RX ORDER — BUPROPION HYDROCHLORIDE 300 MG/1
300 TABLET ORAL EVERY MORNING
Status: DISCONTINUED | OUTPATIENT
Start: 2024-12-19 | End: 2024-12-18

## 2024-12-18 RX ORDER — MEMANTINE HYDROCHLORIDE 10 MG/1
10 TABLET ORAL
Status: DISCONTINUED | OUTPATIENT
Start: 2024-12-19 | End: 2024-12-22 | Stop reason: HOSPADM

## 2024-12-18 RX ORDER — RIZATRIPTAN BENZOATE 10 MG/1
10 TABLET ORAL
Status: DISCONTINUED | OUTPATIENT
Start: 2024-12-18 | End: 2024-12-22 | Stop reason: HOSPADM

## 2024-12-18 RX ORDER — HYDROCORTISONE 25 MG/G
CREAM TOPICAL
Qty: 30 G | Refills: 3 | Status: CANCELLED | OUTPATIENT
Start: 2024-12-18

## 2024-12-18 RX ORDER — AMOXICILLIN 250 MG
2 CAPSULE ORAL 2 TIMES DAILY PRN
Status: DISCONTINUED | OUTPATIENT
Start: 2024-12-18 | End: 2024-12-22 | Stop reason: HOSPADM

## 2024-12-18 RX ORDER — CHLORHEXIDINE GLUCONATE ORAL RINSE 1.2 MG/ML
15 SOLUTION DENTAL 2 TIMES DAILY
Status: DISCONTINUED | OUTPATIENT
Start: 2024-12-19 | End: 2024-12-22 | Stop reason: HOSPADM

## 2024-12-18 RX ORDER — MUPIROCIN CALCIUM 20 MG/G
CREAM TOPICAL 3 TIMES DAILY
Status: DISCONTINUED | OUTPATIENT
Start: 2024-12-19 | End: 2024-12-22 | Stop reason: HOSPADM

## 2024-12-18 RX ORDER — FAMOTIDINE 20 MG/1
40 TABLET, FILM COATED ORAL DAILY
Status: DISCONTINUED | OUTPATIENT
Start: 2024-12-19 | End: 2024-12-22 | Stop reason: HOSPADM

## 2024-12-18 RX ORDER — MEMANTINE HYDROCHLORIDE 10 MG/1
10 TABLET ORAL
Status: CANCELLED | OUTPATIENT
Start: 2024-12-18

## 2024-12-18 RX ORDER — VALACYCLOVIR HYDROCHLORIDE 500 MG/1
500 TABLET, FILM COATED ORAL DAILY
Status: DISCONTINUED | OUTPATIENT
Start: 2024-12-19 | End: 2024-12-20

## 2024-12-18 RX ORDER — GABAPENTIN 250 MG/5ML
100 SOLUTION ORAL DAILY
Status: DISCONTINUED | OUTPATIENT
Start: 2024-12-19 | End: 2024-12-22 | Stop reason: HOSPADM

## 2024-12-18 RX ORDER — TRAZODONE HYDROCHLORIDE 150 MG/1
150 TABLET ORAL AT BEDTIME
Status: DISCONTINUED | OUTPATIENT
Start: 2024-12-19 | End: 2024-12-22 | Stop reason: HOSPADM

## 2024-12-18 RX ORDER — BUPROPION HYDROCHLORIDE 150 MG/1
150 TABLET ORAL EVERY MORNING
Status: DISCONTINUED | OUTPATIENT
Start: 2024-12-19 | End: 2024-12-18

## 2024-12-18 RX ORDER — ALBUTEROL SULFATE 0.83 MG/ML
2.5 SOLUTION RESPIRATORY (INHALATION) EVERY 6 HOURS PRN
Status: DISCONTINUED | OUTPATIENT
Start: 2024-12-18 | End: 2024-12-22 | Stop reason: HOSPADM

## 2024-12-18 RX ORDER — ACETAMINOPHEN 325 MG/1
650 TABLET ORAL EVERY 4 HOURS PRN
Status: DISCONTINUED | OUTPATIENT
Start: 2024-12-18 | End: 2024-12-22 | Stop reason: HOSPADM

## 2024-12-18 RX ORDER — ONDANSETRON 4 MG/1
4 TABLET, ORALLY DISINTEGRATING ORAL EVERY 8 HOURS PRN
Status: DISCONTINUED | OUTPATIENT
Start: 2024-12-18 | End: 2024-12-22 | Stop reason: HOSPADM

## 2024-12-18 RX ORDER — FLUOCINONIDE GEL 0.5 MG/G
GEL TOPICAL 2 TIMES DAILY PRN
Qty: 15 G | Refills: 1 | Status: CANCELLED | OUTPATIENT
Start: 2024-12-18

## 2024-12-18 RX ORDER — CARBOXYMETHYLCELLULOSE SODIUM 5 MG/ML
1 SOLUTION/ DROPS OPHTHALMIC DAILY PRN
Status: DISCONTINUED | OUTPATIENT
Start: 2024-12-18 | End: 2024-12-22 | Stop reason: HOSPADM

## 2024-12-18 RX ORDER — LOSARTAN POTASSIUM 100 MG/1
100 TABLET ORAL DAILY
Status: DISCONTINUED | OUTPATIENT
Start: 2024-12-19 | End: 2024-12-22 | Stop reason: HOSPADM

## 2024-12-18 RX ORDER — PREDNISONE 5 MG/1
10 TABLET ORAL DAILY
Status: DISCONTINUED | OUTPATIENT
Start: 2024-12-19 | End: 2024-12-22 | Stop reason: HOSPADM

## 2024-12-18 RX ORDER — DULOXETIN HYDROCHLORIDE 60 MG/1
120 CAPSULE, DELAYED RELEASE ORAL DAILY
Status: DISCONTINUED | OUTPATIENT
Start: 2024-12-19 | End: 2024-12-22 | Stop reason: HOSPADM

## 2024-12-18 RX ORDER — TRANEXAMIC ACID 650 MG/1
650 TABLET ORAL DAILY
Status: DISCONTINUED | OUTPATIENT
Start: 2024-12-19 | End: 2024-12-22 | Stop reason: HOSPADM

## 2024-12-18 RX ORDER — HEPARIN SODIUM 10000 [USP'U]/100ML
0-5000 INJECTION, SOLUTION INTRAVENOUS CONTINUOUS
Status: DISPENSED | OUTPATIENT
Start: 2024-12-18 | End: 2024-12-19

## 2024-12-18 RX ORDER — ATORVASTATIN CALCIUM 20 MG/1
20 TABLET, FILM COATED ORAL DAILY
Status: DISCONTINUED | OUTPATIENT
Start: 2024-12-19 | End: 2024-12-22 | Stop reason: HOSPADM

## 2024-12-18 RX ORDER — MONTELUKAST SODIUM 10 MG/1
10 TABLET ORAL AT BEDTIME
Status: DISCONTINUED | OUTPATIENT
Start: 2024-12-19 | End: 2024-12-19

## 2024-12-18 RX ORDER — METOPROLOL SUCCINATE 25 MG/1
25 TABLET, EXTENDED RELEASE ORAL 2 TIMES DAILY
Status: DISCONTINUED | OUTPATIENT
Start: 2024-12-18 | End: 2024-12-19

## 2024-12-18 RX ORDER — HYDROCHLOROTHIAZIDE 25 MG/1
25 TABLET ORAL DAILY
Status: DISCONTINUED | OUTPATIENT
Start: 2024-12-19 | End: 2024-12-22 | Stop reason: HOSPADM

## 2024-12-18 RX ORDER — PROCHLORPERAZINE MALEATE 10 MG
10 TABLET ORAL EVERY 6 HOURS PRN
Status: DISCONTINUED | OUTPATIENT
Start: 2024-12-18 | End: 2024-12-22 | Stop reason: HOSPADM

## 2024-12-18 RX ORDER — TRAMADOL HYDROCHLORIDE 50 MG/1
50 TABLET ORAL EVERY 12 HOURS PRN
Status: DISCONTINUED | OUTPATIENT
Start: 2024-12-18 | End: 2024-12-22 | Stop reason: HOSPADM

## 2024-12-18 RX ORDER — AMOXICILLIN 250 MG
1 CAPSULE ORAL 2 TIMES DAILY PRN
Status: DISCONTINUED | OUTPATIENT
Start: 2024-12-18 | End: 2024-12-22 | Stop reason: HOSPADM

## 2024-12-18 RX ORDER — LIDOCAINE 40 MG/G
CREAM TOPICAL
Status: DISCONTINUED | OUTPATIENT
Start: 2024-12-18 | End: 2024-12-22 | Stop reason: HOSPADM

## 2024-12-18 RX ORDER — CHLORHEXIDINE GLUCONATE ORAL RINSE 1.2 MG/ML
15 SOLUTION DENTAL 2 TIMES DAILY
Qty: 1893 ML | Refills: 4 | Status: CANCELLED | OUTPATIENT
Start: 2024-12-18

## 2024-12-18 RX ORDER — HYDROXYZINE HYDROCHLORIDE 25 MG/1
25 TABLET, FILM COATED ORAL 3 TIMES DAILY PRN
Status: DISCONTINUED | OUTPATIENT
Start: 2024-12-18 | End: 2024-12-22 | Stop reason: HOSPADM

## 2024-12-18 RX ORDER — FLUTICASONE FUROATE AND VILANTEROL 100; 25 UG/1; UG/1
1 POWDER RESPIRATORY (INHALATION) DAILY
Status: DISCONTINUED | OUTPATIENT
Start: 2024-12-19 | End: 2024-12-20

## 2024-12-18 RX ORDER — CALCIUM CARBONATE 500 MG/1
1000 TABLET, CHEWABLE ORAL 4 TIMES DAILY PRN
Status: DISCONTINUED | OUTPATIENT
Start: 2024-12-18 | End: 2024-12-22 | Stop reason: HOSPADM

## 2024-12-18 RX ORDER — OXYBUTYNIN CHLORIDE 10 MG/1
10 TABLET, EXTENDED RELEASE ORAL DAILY
Status: DISCONTINUED | OUTPATIENT
Start: 2024-12-19 | End: 2024-12-22 | Stop reason: HOSPADM

## 2024-12-18 RX ORDER — MUPIROCIN 20 MG/G
OINTMENT TOPICAL 3 TIMES DAILY PRN
Qty: 30 G | Refills: 3 | Status: SHIPPED | OUTPATIENT
Start: 2024-12-18

## 2024-12-18 RX ORDER — HYDROCORTISONE 25 MG/G
CREAM TOPICAL 2 TIMES DAILY
Status: DISCONTINUED | OUTPATIENT
Start: 2024-12-18 | End: 2024-12-20

## 2024-12-18 RX ORDER — OXYCODONE HYDROCHLORIDE 5 MG/1
5 TABLET ORAL EVERY 4 HOURS PRN
Status: DISCONTINUED | OUTPATIENT
Start: 2024-12-18 | End: 2024-12-19

## 2024-12-18 RX ADMIN — HEPARIN SODIUM 1500 UNITS/HR: 10000 INJECTION, SOLUTION INTRAVENOUS at 23:17

## 2024-12-18 ASSESSMENT — COLUMBIA-SUICIDE SEVERITY RATING SCALE - C-SSRS
2. HAVE YOU ACTUALLY HAD ANY THOUGHTS OF KILLING YOURSELF IN THE PAST MONTH?: NO
6. HAVE YOU EVER DONE ANYTHING, STARTED TO DO ANYTHING, OR PREPARED TO DO ANYTHING TO END YOUR LIFE?: NO
1. IN THE PAST MONTH, HAVE YOU WISHED YOU WERE DEAD OR WISHED YOU COULD GO TO SLEEP AND NOT WAKE UP?: NO

## 2024-12-18 ASSESSMENT — ACTIVITIES OF DAILY LIVING (ADL)
ADLS_ACUITY_SCORE: 41
ADLS_ACUITY_SCORE: 41

## 2024-12-18 ASSESSMENT — PATIENT HEALTH QUESTIONNAIRE - PHQ9: SUM OF ALL RESPONSES TO PHQ QUESTIONS 1-9: 9

## 2024-12-18 ASSESSMENT — ENCOUNTER SYMPTOMS: BACK PAIN: 1

## 2024-12-18 NOTE — TELEPHONE ENCOUNTER
Patient was seen via VV with Dr Lara today. Plan was for patient to go to [facility] for leg swelling.    Patient sent in [facility] stating she received an appointment time for 2:30 and would not get there in time.     Writer called patient. Patient wants to know if she goes to [place] they won't turn her away for being late. Patient states she could realistically get there at 3:15pm.    Writer called [facility] and spoke with staff. They checked and stated they could still patient if she were to arrive at 3:15pm.    Writer called patient and notified her she can still go to [facility]. Patient agrees with plan and plans to leave now. Patient provided with address of [facility]. Patient denied further questions at time of call.    Gabbie Russo RN on 12/18/2024 at 2:37 PM

## 2024-12-18 NOTE — PROGRESS NOTES
Jacquie is a 64 year old who is being evaluated via a billable video visit.    How would you like to obtain your AVS? [email or web address]  If the video visit is dropped, the invitation should be resent by: Text to cell phone: [phone]  Will anyone else be joining your video visit? No      Assessment & Plan       Right lower extremity swelling  Patient reports that she has had a few months of right lower extremity swelling.  She had called in previously and has been referred to urgent care but decided not to go.  She reports continued issues with the right lower extremity swelling up into the mid shin.  She is not having any chest pain or shortness of breath.  No history of DVT.  She reports not being very physically active for a time because of her back pain but has been noticing more with her being up on her feet as well.  However the left lower extremity has not had swelling.   Discussed with patient and reviewed pictures she sent by [facility].  She understands it is difficult to assess this issue by video visit and did refer her to [facility] for evaluation to rule out DVT.  Patient understands the importance of this evaluation and that this should be done today.  Discussed with the [facility] and they agree to take the patient and reach out to patient to have her come to the clinic.    Dermatitis  Patient is continue to see dermatology.  She did request potentially changing her mupirocin to cream from the ointment.  After discussion she would like to try the ointment for a time since she has it and if it is not working we will reach back out to get a PA started for the cream.    Overweight with body mass index (BMI) of 28 to 28.9 in adult  Patient would like to try the Wegovy again.  When she initially started it she had some nausea and vomiting but she also was sick at the time.  We discussed the potential side effects and that she may experience the same issue.  She would like to try it and a prescription has been sent.  She  understands that she is going to likely need to pay out-of-pocket for it.  - semaglutide-weight management (WEGOVY) 0.25 MG/0.5ML pen; Inject 0.5 mLs (0.25 mg) subcutaneously once a week.    Moderate persistent asthma without complication  Doing well. Well controlled. Tolerating medication.  No change in plan.    - mometasone-formoterol (DULERA) 200-5 MCG/ACT inhaler; Inhale 2 puffs into the lungs 2 times daily.    Atopic neurodermatitis  See above under dermatitis for this plan.  - mupirocin (BACTROBAN) 2 % external ointment; Apply topically 3 times daily as needed.    Restless legs syndrome (RLS)  Patient is noticing that this seems better since she started on the gabapentin liquid.  She is increased to 2 mL daily and this seems to be working well.  She is also sleeping better with the use of the hydroxyzine and is happy with that 2.  Will continue current plan.      The longitudinal plan of care for the diagnosis(es)/condition(s) as documented were addressed during this visit. Due to the added complexity in care, I will continue to support Jacquie in the subsequent management and with ongoing continuity of care.            Subjective   Jacquie is a 64 year old, presenting for the following health issues:  Back Pain, Refill Request, Edema, and Recheck Medication  - Concerned back pain may be from 2 things  - Swelling on R foot that travels up about skilled nursing up the shin and toes but mainly the big toe. Experiencing swelling when she is on her feet for extended periods of time   - Needs refills Today. Having insurance problem as she switched over to single insurance while  is on medicare. Insurance looks at creams and states that she has not tried other things but they only look at things so far back as she has tried other things. Wants to know what is denied and what is going to go through  - Needs prescriptions sent for 90 days  - Would like semiglutide and wants to try that again. Will pay for it.   - Wants  "to try the mupirocin cream again and may get rejected  - \"Part REF\" when getting partial of 90 days - wants to know what that means      12/18/2024     1:37 PM   Additional Questions   Roomed by AD   Accompanied by Self     Back Pain     History of Present Illness       Back Pain:  She presents for follow up of back pain. Patient's back pain is a recurring problem.  Location of back pain:  Right lower back and right side of waist  Description of back pain: dull ache and sharp  Back pain spreads: right buttocks    Since patient first noticed back pain, pain is: unchanged  Does back pain interfere with her job:  No       She eats 2-3 servings of fruits and vegetables daily.She consumes 1 sweetened beverage(s) daily.She exercises with enough effort to increase her heart rate 9 or less minutes per day.  She exercises with enough effort to increase her heart rate 3 or less days per week.   She is taking medications regularly.     Notice swelling on her ankles. Only on the right side. Notices it jail up her shin. Has not had blood clots. Has been going on for several weeks. Maybe months. No new shortness of breath or chest pain.     Gabapentin 2 mls each morning for pain and restless.  Seems to be helping the restless leg feeling. No hallucinations.  Off colchicine, singulair and requip.     Hydroxyzine at bedtime    Mupirocin - cream worked better.  She has not had good improvement with the ointment for the ulcers of the skin.     Seeing pain management. Prescribing mamentine and naltrexone.     Wants to try semaglutide again.  She was sick at the time of taking it and feels that is why it didn't go well.                      Objective           Vitals:  No vitals were obtained today due to virtual visit.    Physical Exam   GENERAL: alert and no distress  EYES: Eyes grossly normal to inspection.  No discharge or erythema, or obvious scleral/conjunctival abnormalities.  RESP: No audible wheeze, cough, or visible " cyanosis.    SKIN: Visible skin clear. No significant rash, abnormal pigmentation or lesions.  NEURO: Cranial nerves grossly intact.  Mentation and speech appropriate for age.  PSYCH: Appropriate affect, tone, and pace of words          Video-Visit Details    Type of service:  Video Visit   Originating Location (pt. Location): Home    Distant Location (provider location):  On-site  Platform used for Video Visit: William  Signed Electronically by: Liz Peterson MD        Referral to Acute and Diagnostic Services    [phone] ([place]) [place] [id number]    Transition to Acute & Diagnostic Services Clinic has been discussed with patient, and she agrees with next level of care.   Patient understands that evaluation/treatment at [facility] typically takes significantly longer than in clinic/urgent care (>2 hours).  [facility] has been contacted by provider/staff to confirm patient acceptance.         Special issues:                           The following provider has assessed this patient for intervention at [facility], and directed the patient for referral: Liz Peterson MD

## 2024-12-18 NOTE — PROGRESS NOTES
Acute and Diagnostic Services Clinic Visit    Assessment & Plan   Problem List Items Addressed This Visit    None  Visit Diagnoses       Right leg swelling    -  Primary    Relevant Orders    US Lower Extremity Venous Duplex Right (Completed)    Stage 3a chronic kidney disease (H)        Relevant Orders    Adult Nephrology  Referral    Elevated fasting glucose        Relevant Orders    Hemoglobin A1c    Deep vein thrombosis (DVT) of femoral vein of right lower extremity, unspecified chronicity (H)            Consult[place] Dr. Shin Hematologist [place] the [place], with recommendation to present to ED then admission for monitoring when starting heparin.  Patient will transfer via private vehicle and [facility] provider advised and report given.      > 70 minutes were spent doing chart review, history and exam, documentation and further activities per the note.          No follow-ups on file.      Veronica Dhillon is a 64 year old, presenting for the following health issues:  No chief complaint on file.        12/18/2024     1:37 PM   Additional Questions   Roomed by AD   Accompanied by Self     HPI     Evaluation for possible DVT  Onset/Duration: off and on for a few months increased pain and swelling when on feet mote often  Description:       Location: right ankle       Redness: no        Pain: moderate, severe       Warmth: YES- sometimes       Joint swelling YES  Progression of symptoms worse  Accompanying signs and symptoms:       Fevers: no        Numbness/tingling/weakness: YES- weakness       Chest pain/pleurisy: no        Shortness of breath: no   History        Trauma: YES- fallen a couple times in the last 12 months         Recent travel/when: no         Previous history of DVT: no         Family history of DVT: no         Recent surgery: no   Aggravating factors include: sitting, standing, walking, and overuse  Therapies tried and outcome: rest/inactivity, heat, ice, immobilization, acetaminophen,  Ibuprofen  Prior surgery on arteries of veins in this area: No          Review of Systems  Constitutional, HEENT, cardiovascular, pulmonary, GI, , musculoskeletal, neuro, skin, endocrine and psych systems are negative, except as otherwise noted.      Objective    /67 (BP Location: Right arm, Patient Position: Sitting, Cuff Size: Adult Regular)   Pulse 62   Temp 98.4  F (36.9  C) (Oral)   Resp 16   LMP 07/17/2009 (Exact Date)   SpO2 100%   There is no height or weight on file to calculate BMI.  Physical Exam   GENERAL: alert and no distress  RESP: respirations regular nonlabored   MS: trace swelling of right lower leg greater than left, pulses intact.   PSYCH: mentation appears normal, affect normal/bright    Results for orders placed or performed in visit on 12/18/24   US Lower Extremity Venous Duplex Right     Status: None    Narrative    EXAM: US LOWER EXTREMITY VENOUS DUPLEX RIGHT  LOCATION: [facility]  DATE: 12/18/2024    INDICATION:  Right leg swelling  COMPARISON: None.  TECHNIQUE: Venous Duplex ultrasound of the right lower extremity with and without compression, augmentation and duplex. Color flow and spectral Doppler with waveform analysis performed.    FINDINGS: Exam includes the common femoral, femoral, popliteal, and contralateral common femoral veins as well as segmentally visualized deep calf veins and greater saphenous vein.     RIGHT: Right femoral vein thrombosis in the lower thigh. Popliteal and visualized calf veins within normal limits. No superficial thrombophlebitis. Right and left common femoral venous waveforms are symmetric.      Impression    IMPRESSION:  1.  Lower right femoral vein deep venous thrombosis.      Provider was contacted by me on 12/18/2024 5:24 PM CST and verbalized understanding of the critical result.     No results found. However, due to the size of the patient record, not all encounters were searched. Please check Results Review for  a complete set of results.        Signed Electronically by: Chantal Avila NP

## 2024-12-19 LAB
ALBUMIN SERPL BCG-MCNC: 3.8 G/DL (ref 3.5–5.2)
ALP SERPL-CCNC: 103 U/L (ref 40–150)
ALT SERPL W P-5'-P-CCNC: 7 U/L (ref 0–50)
ANION GAP SERPL CALCULATED.3IONS-SCNC: 11 MMOL/L (ref 7–15)
AST SERPL W P-5'-P-CCNC: 17 U/L (ref 0–45)
B2 GLYCOPROT1 IGG SERPL IA-ACNC: 1 U/ML
BILIRUB SERPL-MCNC: 0.4 MG/DL
BUN SERPL-MCNC: 20.9 MG/DL (ref 8–23)
CALCIUM SERPL-MCNC: 9.4 MG/DL (ref 8.8–10.4)
CHLORIDE SERPL-SCNC: 105 MMOL/L (ref 98–107)
CREAT SERPL-MCNC: 1.18 MG/DL (ref 0.51–0.95)
DRVVT SCREEN RATIO: 0.94
EGFRCR SERPLBLD CKD-EPI 2021: 51 ML/MIN/1.73M2
ERYTHROCYTE [DISTWIDTH] IN BLOOD BY AUTOMATED COUNT: 13.9 % (ref 10–15)
GLUCOSE BLDC GLUCOMTR-MCNC: 163 MG/DL (ref 70–99)
GLUCOSE SERPL-MCNC: 81 MG/DL (ref 70–99)
HCO3 SERPL-SCNC: 24 MMOL/L (ref 22–29)
HCT VFR BLD AUTO: 39 % (ref 35–47)
HGB BLD-MCNC: 13.2 G/DL (ref 11.7–15.7)
HOLD SPECIMEN: NORMAL
LA PPP-IMP: NEGATIVE
LUPUS INTERPRETATION: ABNORMAL
Lab: 5608
MCH RBC QN AUTO: 30.8 PG (ref 26.5–33)
MCHC RBC AUTO-ENTMCNC: 33.8 G/DL (ref 31.5–36.5)
MCV RBC AUTO: 91 FL (ref 78–100)
PERFORMING LABORATORY: NORMAL
PLATELET # BLD AUTO: 259 10E3/UL (ref 150–450)
POTASSIUM SERPL-SCNC: 3.8 MMOL/L (ref 3.4–5.3)
PROT SERPL-MCNC: 6.2 G/DL (ref 6.4–8.3)
PTT RATIO: 1.23
RBC # BLD AUTO: 4.29 10E6/UL (ref 3.8–5.2)
SODIUM SERPL-SCNC: 140 MMOL/L (ref 135–145)
SPECIMEN STATUS: NORMAL
TEST NAME: NORMAL
THROMBIN TIME: 19.4 SECONDS (ref 13–19)
UFH PPP CHRO-ACNC: 0.77 IU/ML
UFH PPP CHRO-ACNC: >1.1 IU/ML
UFH PPP CHRO-ACNC: >1.1 IU/ML
WBC # BLD AUTO: 11.2 10E3/UL (ref 4–11)

## 2024-12-19 PROCEDURE — 36415 COLL VENOUS BLD VENIPUNCTURE: CPT | Performed by: INTERNAL MEDICINE

## 2024-12-19 PROCEDURE — 85520 HEPARIN ASSAY: CPT | Performed by: INTERNAL MEDICINE

## 2024-12-19 PROCEDURE — 99223 1ST HOSP IP/OBS HIGH 75: CPT | Mod: FS | Performed by: PHYSICIAN ASSISTANT

## 2024-12-19 PROCEDURE — 36415 COLL VENOUS BLD VENIPUNCTURE: CPT

## 2024-12-19 PROCEDURE — 120N000002 HC R&B MED SURG/OB UMMC

## 2024-12-19 PROCEDURE — 85520 HEPARIN ASSAY: CPT | Performed by: HOSPITALIST

## 2024-12-19 PROCEDURE — 250N000011 HC RX IP 250 OP 636: Performed by: STUDENT IN AN ORGANIZED HEALTH CARE EDUCATION/TRAINING PROGRAM

## 2024-12-19 PROCEDURE — 86022 PLATELET ANTIBODIES: CPT | Performed by: PHYSICIAN ASSISTANT

## 2024-12-19 PROCEDURE — 84295 ASSAY OF SERUM SODIUM: CPT

## 2024-12-19 PROCEDURE — 86147 CARDIOLIPIN ANTIBODY EA IG: CPT | Performed by: PHYSICIAN ASSISTANT

## 2024-12-19 PROCEDURE — 99233 SBSQ HOSP IP/OBS HIGH 50: CPT | Performed by: INTERNAL MEDICINE

## 2024-12-19 PROCEDURE — 82962 GLUCOSE BLOOD TEST: CPT

## 2024-12-19 PROCEDURE — 250N000012 HC RX MED GY IP 250 OP 636 PS 637

## 2024-12-19 PROCEDURE — 86146 BETA-2 GLYCOPROTEIN ANTIBODY: CPT | Performed by: PHYSICIAN ASSISTANT

## 2024-12-19 PROCEDURE — 36415 COLL VENOUS BLD VENIPUNCTURE: CPT | Performed by: PHYSICIAN ASSISTANT

## 2024-12-19 PROCEDURE — 250N000013 HC RX MED GY IP 250 OP 250 PS 637: Performed by: INTERNAL MEDICINE

## 2024-12-19 PROCEDURE — 250N000009 HC RX 250

## 2024-12-19 PROCEDURE — 250N000013 HC RX MED GY IP 250 OP 250 PS 637

## 2024-12-19 PROCEDURE — 99418 PROLNG IP/OBS E/M EA 15 MIN: CPT | Performed by: PHYSICIAN ASSISTANT

## 2024-12-19 PROCEDURE — 250N000013 HC RX MED GY IP 250 OP 250 PS 637: Performed by: HOSPITALIST

## 2024-12-19 PROCEDURE — 85014 HEMATOCRIT: CPT

## 2024-12-19 RX ORDER — NALOXONE HYDROCHLORIDE 0.4 MG/ML
0.4 INJECTION, SOLUTION INTRAMUSCULAR; INTRAVENOUS; SUBCUTANEOUS
Status: DISCONTINUED | OUTPATIENT
Start: 2024-12-19 | End: 2024-12-19

## 2024-12-19 RX ORDER — NALOXONE HYDROCHLORIDE 0.4 MG/ML
0.4 INJECTION, SOLUTION INTRAMUSCULAR; INTRAVENOUS; SUBCUTANEOUS
Status: DISCONTINUED | OUTPATIENT
Start: 2024-12-19 | End: 2024-12-22 | Stop reason: HOSPADM

## 2024-12-19 RX ORDER — NALOXONE HYDROCHLORIDE 0.4 MG/ML
0.2 INJECTION, SOLUTION INTRAMUSCULAR; INTRAVENOUS; SUBCUTANEOUS
Status: DISCONTINUED | OUTPATIENT
Start: 2024-12-19 | End: 2024-12-22 | Stop reason: HOSPADM

## 2024-12-19 RX ORDER — BENZONATATE 200 MG/1
200 CAPSULE ORAL 2 TIMES DAILY PRN
Qty: 40 CAPSULE | Refills: 1 | Status: SHIPPED | OUTPATIENT
Start: 2024-12-19

## 2024-12-19 RX ORDER — LIFITEGRAST 50 MG/ML
SOLUTION/ DROPS OPHTHALMIC
Qty: 60 EACH | OUTPATIENT
Start: 2024-12-19

## 2024-12-19 RX ORDER — TRANEXAMIC ACID 100 MG/ML
INJECTION, SOLUTION INTRAVENOUS 2 TIMES DAILY
Status: DISCONTINUED | OUTPATIENT
Start: 2024-12-19 | End: 2024-12-22 | Stop reason: HOSPADM

## 2024-12-19 RX ADMIN — PREDNISONE 10 MG: 5 TABLET ORAL at 08:17

## 2024-12-19 RX ADMIN — TRAMADOL HYDROCHLORIDE 50 MG: 50 TABLET, COATED ORAL at 12:37

## 2024-12-19 RX ADMIN — MUPIROCIN: 2 CREAM TOPICAL at 20:05

## 2024-12-19 RX ADMIN — ACETAMINOPHEN 650 MG: 325 TABLET, FILM COATED ORAL at 03:13

## 2024-12-19 RX ADMIN — OXYBUTYNIN CHLORIDE 10 MG: 10 TABLET, EXTENDED RELEASE ORAL at 08:21

## 2024-12-19 RX ADMIN — HEPARIN SODIUM 1200 UNITS/HR: 10000 INJECTION, SOLUTION INTRAVENOUS at 14:33

## 2024-12-19 RX ADMIN — LOSARTAN POTASSIUM 100 MG: 100 TABLET, FILM COATED ORAL at 08:18

## 2024-12-19 RX ADMIN — FAMOTIDINE 40 MG: 20 TABLET, FILM COATED ORAL at 08:15

## 2024-12-19 RX ADMIN — TRANEXAMIC ACID 1000 MG: 100 INJECTION, SOLUTION INTRAVENOUS at 08:31

## 2024-12-19 RX ADMIN — TRANEXAMIC ACID 1000 MG: 100 INJECTION, SOLUTION INTRAVENOUS at 20:02

## 2024-12-19 RX ADMIN — TRAZODONE HYDROCHLORIDE 150 MG: 100 TABLET ORAL at 22:04

## 2024-12-19 RX ADMIN — MUPIROCIN: 2 CREAM TOPICAL at 14:34

## 2024-12-19 RX ADMIN — HYDROCHLOROTHIAZIDE 25 MG: 25 TABLET ORAL at 08:18

## 2024-12-19 RX ADMIN — BUPROPION HYDROCHLORIDE 450 MG: 300 TABLET, EXTENDED RELEASE ORAL at 08:34

## 2024-12-19 RX ADMIN — APIXABAN 10 MG: 5 TABLET, FILM COATED ORAL at 18:57

## 2024-12-19 RX ADMIN — ATORVASTATIN CALCIUM 20 MG: 20 TABLET, FILM COATED ORAL at 08:15

## 2024-12-19 RX ADMIN — MUPIROCIN: 2 CREAM TOPICAL at 08:22

## 2024-12-19 RX ADMIN — MEMANTINE 10 MG: 10 TABLET ORAL at 16:07

## 2024-12-19 RX ADMIN — CHLORHEXIDINE GLUCONATE 0.12% ORAL RINSE 15 ML: 1.2 LIQUID ORAL at 20:00

## 2024-12-19 RX ADMIN — DULOXETINE HYDROCHLORIDE 120 MG: 30 CAPSULE, DELAYED RELEASE ORAL at 08:21

## 2024-12-19 RX ADMIN — DOXYCYCLINE 100 MG: 50 CAPSULE ORAL at 08:22

## 2024-12-19 RX ADMIN — HYDROXYZINE HYDROCHLORIDE 25 MG: 25 TABLET, FILM COATED ORAL at 22:04

## 2024-12-19 RX ADMIN — DOXYCYCLINE 100 MG: 50 CAPSULE ORAL at 20:00

## 2024-12-19 RX ADMIN — MEMANTINE 10 MG: 10 TABLET ORAL at 08:20

## 2024-12-19 ASSESSMENT — ACTIVITIES OF DAILY LIVING (ADL)
ADLS_ACUITY_SCORE: 54
ADLS_ACUITY_SCORE: 52
ADLS_ACUITY_SCORE: 54
ADLS_ACUITY_SCORE: 52
ADLS_ACUITY_SCORE: 56
ADLS_ACUITY_SCORE: 41
ADLS_ACUITY_SCORE: 54
ADLS_ACUITY_SCORE: 52
ADLS_ACUITY_SCORE: 41
ADLS_ACUITY_SCORE: 54
ADLS_ACUITY_SCORE: 56
ADLS_ACUITY_SCORE: 54
ADLS_ACUITY_SCORE: 54
ADLS_ACUITY_SCORE: 41

## 2024-12-19 NOTE — ED PROVIDER NOTES
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 who presents to the ED with right lower extremity swelling.    Patient was seen in clinic earlier today for a few months of right lower extremity swelling and was found to have a DVT of femoral vein of right lower extremity on US.    Patient reports intermittent right lower extremity swelling for the past 6 months that has gotten progressively worse over the past 2-3 days. She was seen in clinic, found to have DVT on US. Hematology was consulted and instructed the patient to be started on heparin and admitted due to platelet disorder. No other symptoms noted.     EXAM: US LOWER EXTREMITY VENOUS DUPLEX RIGHT  IMPRESSION:  1.  Lower right femoral vein deep venous thrombosis.    Past Medical History  Past Medical History:   Diagnosis Date    ASTHMA - MODERATE PERSISTENT 9/21/2005    Chronic pain     Coronary artery disease     CVA (cerebral infarction)     Depressive disorder, not elsewhere classified     Diabetes (H)     Elevated serum alkaline phosphatase level     Liver source    Fibromyalgia     HYPERLIPIDEMIA NEC/NOS 12/29/2006    Hypertriglyceridemia     OA (osteoarthritis)     Thyroid disease     Trochanteric bursitis     Unspecified essential hypertension      Past Surgical History:   Procedure Laterality Date    3 teeth pulled      INJECT EPIDURAL TRANSFORAMINAL  11/25/2014    [facility]    INJECT JOINT SACROILIAC  09/23/2014    [facility]    LAMINECTOMY LUMBAR ONE LEVEL Left 04/08/2014    [facility]     ZZC  DELIVERY ONLY      , Low Cervical     acetaminophen (TYLENOL) 500 MG tablet  acetylcysteine (N-ACETYL-L-CYSTEINE) 600 MG CAPS capsule  albuterol (PROVENTIL) (2.5 MG/3ML) 0.083% neb solution  albuterol (VENTOLIN HFA) 108 (90 Base) MCG/ACT inhaler  apremilast (OTEZLA) 30 MG tablet  atorvastatin (LIPITOR) 20 MG tablet  augmented betamethasone dipropionate (DIPROLENE AF) 0.05 % external cream  benzonatate (TESSALON) 200 MG capsule  buPROPion (WELLBUTRIN XL) 150 MG 24 hr tablet  buPROPion (WELLBUTRIN XL) 300 MG 24 hr tablet  chlorhexidine (PERIDEX) 0.12 % solution  clindamycin (CLINDAMAX) 1 % external gel  clotrimazole (MYCELEX) 10 MG lozenge  COMPOUND CONTAINING CONTROLLED SUBSTANCE (CMPD RX) - PHARMACY TO MIX COMPOUNDED MEDICATION  COMPOUNDED NON-CONTROLLED SUBSTANCE (CMPD RX) - PHARMACY TO MIX COMPOUNDED MEDICATION  desonide (DESOWEN) 0.05 % external cream  dextran 70-hypromellose (TEARS NATURALE FREE PF) 0.1-0.3 % ophthalmic solution  doxycycline monohydrate (MONODOX) 100 MG capsule  DULoxetine (CYMBALTA) 60 MG capsule  dupilumab (DUPIXENT) 300 MG/2ML prefilled pen  famotidine (PEPCID) 40 MG tablet  fluconazole (DIFLUCAN) 150 MG tablet  fluocinonide (LIDEX) 0.05 % external gel  gabapentin (NEURONTIN) 250 MG/5ML solution  hydrochlorothiazide (HYDRODIURIL) 25 MG tablet  hydrocortisone 2.5 % cream  hydrOXYzine HCl (ATARAX) 25 MG tablet  lidocaine, viscous, (XYLOCAINE) 2 % solution  losartan (COZAAR) 100 MG tablet  memantine (NAMENDA) 10 MG tablet  metoprolol succinate ER (TOPROL XL) 100 MG 24 hr tablet  metoprolol succinate ER (TOPROL XL) 25 MG 24 hr tablet  mometasone-formoterol (DULERA) 200-5 MCG/ACT inhaler  montelukast (SINGULAIR) 10 MG tablet  mupirocin (BACTROBAN) 2 % external cream  mupirocin (BACTROBAN) 2 % external ointment  Naltrexone HCl POWD  nystatin (MYCOSTATIN) 078533 UNIT/ML suspension  ondansetron (ZOFRAN ODT) 4 MG ODT tab  oxyBUTYnin ER (DITROPAN XL) 5 MG 24 hr  "tablet  pimecrolimus (ELIDEL) 1 % external cream  predniSONE (DELTASONE) 10 MG tablet  rizatriptan (MAXALT) 10 MG tablet  Rotigotine (NEUPRO) 1 MG/24HR 24 hr patch  semaglutide-weight management (WEGOVY) 0.25 MG/0.5ML pen  SUMAtriptan (IMITREX) 100 MG tablet  tacrolimus (PROTOPIC) 0.1 % external ointment  tiotropium (SPIRIVA RESPIMAT) 2.5 MCG/ACT inhaler  traMADol (ULTRAM) 50 MG tablet  tranexamic acid (LYSTEDA) 650 MG tablet  traZODone (DESYREL) 50 MG tablet  triamcinolone (ARISTOCORT HP) 0.5 % external cream  valACYclovir (VALTREX) 500 MG tablet  XIIDRA 5 % opthalmic solution      Allergies   Allergen Reactions    Cats      and rabbits/wheezing    Dogs      wheezing    Lyrica [Pregabalin] Other (See Comments)     Rash, mouth sores, itching and burning    Seasonal Allergies      rhinits     Family History  Family History   Problem Relation Age of Onset    Thyroid Disease Mother     Arthritis Mother     Colon Cancer Mother     Myelodysplastic syndrome Mother     Hypertension Father     Diabetes Father     C.A.D. Father         MI age 75    Cardiovascular Father         heart attack    Cerebrovascular Disease Father     Cancer Father         renal cancer    Arthritis Father     Heart Disease Father         heart attack    Gastrointestinal Disease Father         liver    Genitourinary Problems Father         kidney    Thyroid Disease Sister     Arthritis Sister     Lipids Sister     Asthma Daughter     Gastrointestinal Disease Daughter     Multiple Sclerosis Maternal Aunt     Mastocytosis Nephew         \"System Mast Cell Disease and hyperesosinophilia\"    Unknown Other     Breast Cancer No family hx of     Cancer - colorectal No family hx of     Alzheimer Disease No family hx of     Blood Disease No family hx of     Circulatory No family hx of     Eye Disorder No family hx of     Musculoskeletal Disorder No family hx of     Neurologic Disorder No family hx of     Respiratory No family hx of     Melanoma No family hx " of     Skin Cancer No family hx of      Social History   Social History     Tobacco Use    Smoking status: Former     Current packs/day: 0.00     Average packs/day: 0.3 packs/day for 33.7 years (10.1 ttl pk-yrs)     Types: Cigarettes     Start date: 1980     Quit date: 2013     Years since quittin.3     Passive exposure: Past    Smokeless tobacco: Never    Tobacco comments:     since    Vaping Use    Vaping status: Never Used   Substance Use Topics    Alcohol use: No    Drug use: Yes     Comment: medical [facility]       Past medical history, past surgical history, medications, allergies, family history, and social history were reviewed with the patient. No additional pertinent items.     A complete review of systems was performed with pertinent positives and negatives noted in the HPI, and all other systems negative.    Physical Exam   BP: 121/77  Pulse: 67  Temp: 98.1  F (36.7  C)  Resp: 16  SpO2: 98 %  Physical Exam  Vitals and nursing note reviewed.   Constitutional:       General: She is not in acute distress.     Appearance: Normal appearance. She is not diaphoretic.   HENT:      Head: Atraumatic.      Mouth/Throat:      Mouth: Mucous membranes are moist.   Eyes:      General: No scleral icterus.     Conjunctiva/sclera: Conjunctivae normal.   Cardiovascular:      Rate and Rhythm: Normal rate.      Heart sounds: Normal heart sounds.   Pulmonary:      Effort: No respiratory distress.      Breath sounds: Normal breath sounds.   Abdominal:      General: Abdomen is flat.   Musculoskeletal:      Cervical back: Neck supple.      Comments: BLLE with no swelling, mild right lower extremity tenderness to palpation, distally intact neurovascularly   Skin:     General: Skin is warm.      Findings: No rash.   Neurological:      Mental Status: She is alert.           ED Course, Procedures, & Data      Procedures                Results for orders placed or performed in visit on 24    Lower Extremity  Venous Duplex Right     Status: None    Narrative    EXAM: US LOWER EXTREMITY VENOUS DUPLEX RIGHT  LOCATION: [facility]  DATE: 12/18/2024    INDICATION:  Right leg swelling  COMPARISON: None.  TECHNIQUE: Venous Duplex ultrasound of the right lower extremity with and without compression, augmentation and duplex. Color flow and spectral Doppler with waveform analysis performed.    FINDINGS: Exam includes the common femoral, femoral, popliteal, and contralateral common femoral veins as well as segmentally visualized deep calf veins and greater saphenous vein.     RIGHT: Right femoral vein thrombosis in the lower thigh. Popliteal and visualized calf veins within normal limits. No superficial thrombophlebitis. Right and left common femoral venous waveforms are symmetric.      Impression    IMPRESSION:  1.  Lower right femoral vein deep venous thrombosis.      Provider was contacted by me on 12/18/2024 5:24 PM CST and verbalized understanding of the critical result.     Medications - No data to display  Labs Ordered and Resulted from Time of ED Arrival to Time of ED Departure - No data to display  No orders to display          Critical care was not performed.     Medical Decision Making  The patient's presentation was of high complexity (an acute health issue posing potential threat to life or bodily function).    The patient's evaluation involved:  review of external note(s) from 3+ sources (see separate area of note for details)  review of 3+ test result(s) ordered prior to this encounter (see separate area of note for details)  ordering and/or review of 3+ test(s) in this encounter (see separate area of note for details)  discussion of management or test interpretation with another health professional (hematology and internal medicine)    The patient's management necessitated high risk (a decision regarding hospitalization).    Assessment & Plan    Patient presented to the hospital to be  admitted to medicine for heparin treatment due to her platelet disorder and DVT  Discussed the case with hematology who agreed with the plan, but said admit to medicine because of benign hematology does not have a primary service  Discussed case with internal medicine physician after ordering labs who excepted patient for admission.  Started patient on heparin therapy for DVT    I have reviewed the nursing notes. I have reviewed the findings, diagnosis, plan and need for follow up with the patient.    New Prescriptions    No medications on file       Final diagnoses:   None   I, Mireya Pino, am serving as a trained medical scribe to document services personally performed by Catarino Mcintosh MD based on the provider's statements to me on December 18, 2024.  This document has been checked and approved by the attending provider.    I, Catarino Mcintosh MD, was physically present and have reviewed and verified the accuracy of this note documented by Mireya Pino, medical scribe.      Catarino Mcintosh MD   [facility] EMERGENCY DEPARTMENT  12/18/2024     Catarino Mcintosh MD  12/18/24 [id number]

## 2024-12-19 NOTE — MEDICATION SCRIBE - ADMISSION MEDICATION HISTORY
Medication Scribe Admission Medication History    Admission medication history is complete. The information provided in this note is only as accurate as the sources available at the time of the update.    Information Source(s): Patient via in-person    Pertinent Information: Pt reported taking medications on PTA medication list as directed, stated Gabapentin 250 mg/5ml solution dose was changed to 2 ml daily by provider (not on [facility] or CE), has not been using Xlldra 5% ophthalmic solution since surgery, but will resume soon per instruction.      Changes made to PTA medication list:  Added: None  Deleted: None  Changed: None    Allergies reviewed with patient and updates made in EHR: yes    Medication History Completed By: Melody Moulton 12/19/2024 11:09 AM    PTA Med List   Medication Sig Note Last Dose/Taking    acetaminophen (TYLENOL) 500 MG tablet Take 500-1,000 mg by mouth every 6 hours as needed for mild pain  12/18/2024 Evening    acetylcysteine (N-ACETYL-L-CYSTEINE) 600 MG CAPS capsule Take 1 capsule (600 mg) by mouth 2 times daily  12/17/2024 Bedtime    albuterol (PROVENTIL) (2.5 MG/3ML) 0.083% neb solution Take 1 vial (2.5 mg) by nebulization every 6 hours as needed for shortness of breath or wheezing.  More than a month    albuterol (VENTOLIN HFA) 108 (90 Base) MCG/ACT inhaler INHALE 2 PUFFS INTO THE LUNGS EVERY 6 HOURS AS NEEDED FOR SHORTNESS OF BREATH / DYSPNEA  12/17/2024    apremilast (OTEZLA) 30 MG tablet Take 1 tablet (30 mg) by mouth 2 times daily. Hold for signs of infection, and seek medical attention.  12/18/2024 Morning    atorvastatin (LIPITOR) 20 MG tablet TAKE ONE TABLET BY MOUTH ONE TIME DAILY  12/18/2024 Morning    augmented betamethasone dipropionate (DIPROLENE AF) 0.05 % external cream Apply topically 2 times daily 12/19/2024: Per pt PRN 12/16/2024    benzonatate (TESSALON) 200 MG capsule Take 1 capsule (200 mg) by mouth 2 times daily as needed for cough.  More than a month    buPROPion  (WELLBUTRIN XL) 150 MG 24 hr tablet Take 1 tablet (150 mg) by mouth every morning. To take with the 300 mg dose for a total of 450 mg daily.  12/18/2024 Morning    buPROPion (WELLBUTRIN XL) 300 MG 24 hr tablet Take 1 tablet (300 mg) by mouth every morning. To take with the 150 mg dose for a total of 450 mg daily.  12/18/2024 Morning    chlorhexidine (PERIDEX) 0.12 % solution Swish and spit 15 mLs in mouth 2 times daily 12/19/2024: Per pt PRN More than a month    clindamycin (CLINDAMAX) 1 % external gel Apply topically 2 times daily 12/19/2024: Per pt PRN More than a month    clotrimazole (MYCELEX) 10 MG lozenge Place 1 lozenge (10 mg) inside cheek 5 times daily. 12/19/2024: Per pt PRN  Past Week    COMPOUND CONTAINING CONTROLLED SUBSTANCE (CMPD RX) - PHARMACY TO MIX COMPOUNDED MEDICATION Compound amitriptyline 2% and ketamine 0.5% in 80g of lipoderm or Vanicream  12/16/2024    COMPOUNDED NON-CONTROLLED SUBSTANCE (CMPD RX) - PHARMACY TO MIX COMPOUNDED MEDICATION Apply directly to the affected area, with usage up to twice daily  12/16/2024    desonide (DESOWEN) 0.05 % external cream Apply topically 2 times daily To sores on face 12/19/2024: Per pt PRN  12/16/2024    dextran 70-hypromellose (TEARS NATURALE FREE PF) 0.1-0.3 % ophthalmic solution Place 2 drops into both eyes daily as needed (for dry eyes)  Past Week    doxycycline monohydrate (MONODOX) 100 MG capsule Take 1 capsule (100 mg) by mouth 2 times daily. After meals and with big glass of water  12/18/2024 Morning    DULoxetine (CYMBALTA) 60 MG capsule Take 2 capsules (120 mg) by mouth daily  12/18/2024 Morning    dupilumab (DUPIXENT) 300 MG/2ML prefilled pen Inject 2 mLs (300 mg) subcutaneously every 14 days.  12/1/2024    famotidine (PEPCID) 40 MG tablet Take 1 tablet (40 mg) by mouth daily  12/18/2024 Morning    fluocinonide (LIDEX) 0.05 % external gel Apply topically 2 times daily as needed  More than a month    gabapentin (NEURONTIN) 250 MG/5ML solution  Take 0.5-1 mLs (25-50 mg) by mouth daily. 50 mg q afternoon and 100 mg at bedtime 12/19/2024: Per pt changed to 2 ml daily 12/18/2024    hydrochlorothiazide (HYDRODIURIL) 25 MG tablet Take 1 tablet (25 mg) by mouth daily  12/18/2024 Morning    hydrocortisone 2.5 % cream APPLY TOPICALLY 2 TIMES DAILY AS NEEDED  Past Week    hydrOXYzine HCl (ATARAX) 25 MG tablet Take 1 tablet (25 mg) by mouth 3 times daily as needed for anxiety (and sleep).  12/18/2024 Evening    lidocaine, viscous, (XYLOCAINE) 2 % solution Swish and spit 10ml every 3 hours as needed for oral pain; max 8 doses/24hrs.  Do not eat or chew gum for 60 minutes following use.  Past Week    losartan (COZAAR) 100 MG tablet Take 1 tablet (100 mg) by mouth daily.  12/18/2024 Morning    memantine (NAMENDA) 10 MG tablet Take 1 tablet by mouth 2 times daily.  12/18/2024    metoprolol succinate ER (TOPROL XL) 100 MG 24 hr tablet Take 1 tablet (100 mg) by mouth 2 times daily.  12/18/2024 Evening    metoprolol succinate ER (TOPROL XL) 25 MG 24 hr tablet Take 1 tablet (25 mg) by mouth 2 times daily.  12/18/2024 Evening    mometasone-formoterol (DULERA) 200-5 MCG/ACT inhaler Inhale 2 puffs into the lungs 2 times daily.  12/17/2024 Bedtime    mupirocin (BACTROBAN) 2 % external cream Apply topically 3 times daily. to affected area as instructed.  12/17/2024    mupirocin (BACTROBAN) 2 % external ointment Apply topically 3 times daily as needed.  Unknown    Naltrexone HCl POWD  12/19/2024: COMPOUND  12/16/2024 Bedtime    nystatin (MYCOSTATIN) 680927 UNIT/ML suspension Take 5 mLs (500,000 Units) by mouth 4 times daily. Has recurrent thrush. Uses for 2 weeks (280 ml)  at a time as needed.  Past Week    ondansetron (ZOFRAN ODT) 4 MG ODT tab Take 1 tablet (4 mg) by mouth every 8 hours as needed for nausea.  More than a month    oxyBUTYnin ER (DITROPAN XL) 5 MG 24 hr tablet Take 2 tablets (10 mg) by mouth daily  12/18/2024 Morning    pimecrolimus (ELIDEL) 1 % external cream  Apply topically 2 times daily.  Past Week    predniSONE (DELTASONE) 10 MG tablet Take 1 tablet (10 mg) by mouth daily  12/18/2024 Morning    rizatriptan (MAXALT) 10 MG tablet Take 1 tablet (10 mg) by mouth at onset of headache for migraine. May repeat in 2 hours. Max 3 tablets/24 hours.  More than a month    SUMAtriptan (IMITREX) 100 MG tablet take 1 tablet at onset of headache may repeat in 2 hours max 2 tabs/day  More than a month    tacrolimus (PROTOPIC) 0.1 % external ointment Apply topically 2 times daily. To areas of sores on face  12/16/2024 Bedtime    tiotropium (SPIRIVA RESPIMAT) 2.5 MCG/ACT inhaler Inhale 2 puffs into the lungs daily.  12/18/2024 Morning    traMADol (ULTRAM) 50 MG tablet Take 1 tablet (50 mg) by mouth every 12 hours as needed for moderate to severe pain  12/17/2024 Bedtime    tranexamic acid (LYSTEDA) 650 MG tablet Please take 1 tablet (650 mg) in the morning and 2 tablets (1300 mg) in the evening as needed for episodes of bleeding.  12/18/2024 Morning    traZODone (DESYREL) 50 MG tablet Take 3 tablets (150 mg) by mouth at bedtime  12/18/2024 Bedtime    triamcinolone (ARISTOCORT HP) 0.5 % external cream Apply topically 2 times daily  12/16/2024 Bedtime    valACYclovir (VALTREX) 500 MG tablet Take 1 tablet (500 mg) by mouth daily 12/19/2024: Take when instructed by provider  Taking    XIIDRA 5 % opthalmic solution Place into both eyes daily as needed. 12/19/2024: Has not used since surgery  Taking As Needed

## 2024-12-19 NOTE — PROGRESS NOTES
Neuro: A&Ox4. Afebrile this shift. Denies dizziness.   Cardiac: SR, intermittent bradycardia otherwise VSS. Denies chest pain   Respiratory: Sating 97% on RA.  GI/: Adequate urine output. No bm this shift, endorses constipation. Denies nausea or abdominal discomfort.   Diet/appetite: Tolerating regular diet. Eating well.  Activity:  up independently to bathroom, standby assist in halls. Steady gait.   Pain: Chronic pain managed by PRN ultram 50mg x1. Relief reported.   Skin: Scattered scabs on face. Applying scheduled cream   LDA's: PIV infusing heparin, CDI.     Plan:   -Heparin paused at 1505, restart at 1605  and decrease by 100 unit/hr.   -Xa redraw scheduled 2205  -Stop heparin at 1800, begin Eliquis, see MAR  - at bedside, supportive.     Continue with POC. Notify primary team with changes.

## 2024-12-19 NOTE — PROGRESS NOTES
Notified about + DVT on US, done for leg swelling.  She has a complex history including chronic bleeding from wounds.  She will need to be started on anticoagulation but because of the wounds and bleeding she needs to be monitored as an inpatient.  Recommend sending to ED.  Check lupus anticoagulant, beta 2 glycoprotein, and anticardiolipin antibodies at admission and prior to starting anticoagulation.  Start heparin bolus and gtt and admit.  Consult hematology - I will see in AM if she is admitted at [facility] or [facility].     Thank you for allowing me to participate in the care of this patient. Please do not hesitate to contact me if there are any concerns or questions.     Siva Shin MD   of Medicine  Classical Hematology and Blood and Marrow Transplantation  Division of Hematology, Oncology, and Transplantation  [facility] [phone]

## 2024-12-19 NOTE — PLAN OF CARE
Goal Outcome Evaluation:         Shift:[id number]    V/S & Pain: VSS on RA. Pain managed with tylenol  Neuro: AOx4, calm and cooperative  Respiratory: Stable on RA, lung sounds clear bilaterally  Cardiac:tapan  Skin: bruises  GI/: Voids spontaneously, no BM on this shift  L/D:L PIV  Activity:Continue POC  Labs: monitoring, RN managed    Events this shift: no acute events this shift. Pt remains vitally stable on RA. Call light within reach, calls appropriately         /73 (BP Location: Left arm)   Pulse 54   Temp 97.8  F (36.6  C) (Oral)   Resp 16   LMP 07/17/2009 (Exact Date)   SpO2 96%

## 2024-12-19 NOTE — CONSULTS
Discharge Pharmacy Test Claim    Patient's commercial [employer] plan covers Eliquis with an expected monthly copay of $0.    Test Claim Copay   Eliquis 0.00     Copay is subject to change January 1, 2025.      Patito Gutierrez  [facility] Pharmacy Liaison  Phone: [phone] Fax: [phone]  Available on Teams & Vocera  Disclaimer: Pharmacy test claims are estimates and may not reflect final costs. Suggested alternatives aim to be cost-effective but may not be therapeutically equivalent as this consult is informational and does not constitute medical advice. Clinical decisions should be made by qualified healthcare providers.

## 2024-12-19 NOTE — ED TRIAGE NOTES
Patient presents to ED with CC of right leg blood clot. MD Siva Denise notified patient of +DVT on US  . Notified to be monitored as inpatient  because of wounds and bleeding.     Patient has no symptoms at this time.

## 2024-12-19 NOTE — CONSULTS
Hematology Consult Note   Date of Service: 12/19/2024    Patient: Jacquie Amador  MRN: [id number]  Admission Date: 12/18/2024  Hospital Day # Hospital Day: 2  Primary Outpatient Hematologist: Dr. 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 Due to pain is relatively sedentary at home, but had ablation to spine about a month ago and had severe pain after that and was more sedentary than normal. Denies recent hospital admission, hormone replacement therapy, long travel.  Had some bleeding from lesion on wrist this AM after starting heparin but AntiXa noted to be supratherapeutic (>1.1).  Will monitor for bleeding once therapeutic on heparin and plan to transition to apixaban once stable.    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     Recommendations:   -discontinue systemic tranexamic acid, continue topical tranexamic acid to wounds   -Check APS labs + platelet antibodies (ordered)  -Ensure age appropriate cancer screenings   -Transition from heparin gtt to eliquis 5 mg PO BID tonight (defer starting dose of 10 BID x 1 week  given bleeding)      Patient was seen and plan of care was discussed with attending physician Dr. Shin .    We will continue to follow this patient. Please don't hesitate to contact the Fellow On-Call with questions.    I spent 45 minutes in the care of this patient today, which included time necessary for preparation for the visit, obtaining history, ordering medications/tests/procedures as medically indicated, review of pertinent medical literature, counseling of the patient, communication of recommendations to the care team, and documentation time.     Emi Hassan PA-C  [facility] Hematology  [phone]    Physician Attestation     I, Siva Shin MD, saw and evaluated Jacquie Amador as part of a shared APRN/PA visit. I personally performed the substantive portion of the medical decision making for this visit - please see the BERRY s documentation for full details.    Key management decisions made by me and carried out under my direction include:   Acquired platelet disorder, and Behcet's with bleeding wounds, controlled with TXA  History of antiphospholipid antibodies in pregnancy without clotting  R femoral DVT, possibly provoked from TXA and recent immobility/sedentary    DIfficult situation with the bleeding nd now the clotting. Started heparin and outside of the initial bolus causing some bleeding she has tolerated it today. Can start apixaban tonight at 5mg BID and stop heparin at that time. If she is doing well tomorrow can discharge with follow up with Dr. Grimes, recheck US In 3 months.   APS labs are pending.      On the date of service, 12/19/2024, I spent 80 minutes on the patient unit personally reviewing medical records and medications, reviewing vital signs, labs, and imaging results as summarized above, discussing the patient's case on rounds with the fellow and BERRY, obtaining a history from the patient, performing a physical exam, counseling and educating the patient on the diagnosis and treatment,  evaluating a potentially life or organ threatening problem, intensively monitoring treatments with high risk of toxicity, coordinating care, and documenting in the electronic medical record.    Thank you for allowing me to participate in the care of this patient. Please do not hesitate to contact me if there are any concerns or questions.     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     Yesterday (12/18) she had visit with PCP where she reports RLE swelling and underwent RLE doppler US which showed lower right femoral DVT.  She was advised to come to the ED for heparin gtt and to monitor for bleeding from wounds.    She reports that wounds are primarily on her face and upper extremities.  They do not bleed every day.  She has easy bruising but otherwise denies any epistaxis, mouth bleeding, /GI bleeding.  This am after starting heparin she had some bleeding from a lesion on her right wrist that has since stopped.      She reports that she is pretty sedentary at home but about 1 month ago had a RFA to her back and after this her pain was increased and she was more sedentary than normal.  She denies recent long car or plane travel, hospital admissions, surgeries/procedures aside from RFA, hormone replacement therapy.  The swelling in her right leg has improved since admission.  She denies chest pain or SOB and has remained on RA since admission.    Hematologic History:  -APS in pregnancy   -Denies prior history of clotting   -Thinks  father may have had clots but unsure, otherwise denies family history of known clotting     Review of Systems: Pertinent positive and negative systems described in HPI; the remainder of the 14 systems are negative    Past Medical History:  Past Medical History:   Diagnosis Date    ASTHMA - MODERATE PERSISTENT 2005    Chronic pain     Coronary artery disease     CVA (cerebral infarction)     Depressive disorder, not elsewhere classified     Diabetes (H)     Elevated serum alkaline phosphatase level     Liver source    Fibromyalgia     HYPERLIPIDEMIA NEC/NOS 2006    Hypertriglyceridemia     OA (osteoarthritis)     Thyroid disease     Trochanteric bursitis     Unspecified essential hypertension        Past Surgical History:  Past Surgical History:   Procedure Laterality Date    3 teeth pulled      INJECT EPIDURAL TRANSFORAMINAL  2014    [facility]    INJECT JOINT SACROILIAC  2014    [facility]    LAMINECTOMY LUMBAR ONE LEVEL Left 2014    [facility]  DELIVERY ONLY      , Low Cervical       Social History:  Social History     Socioeconomic History    Marital status:      Spouse name: None    Number of children: None    Years of education: None    Highest education level: None   Tobacco Use    Smoking status: Former     Current packs/day: 0.00     Average packs/day: 0.3 packs/day for 33.7 years (10.1 ttl pk-yrs)     Types: Cigarettes     Start date: 1980     Quit date: 2013     Years since quittin.3     Passive exposure: Past    Smokeless tobacco: Never    Tobacco comments:     since    Vaping Use    Vaping status: Never Used   Substance and Sexual Activity    Alcohol use: No    Drug use: Yes     Comment: medical marjuana     Sexual activity: Not Currently     Partners: Male     Birth control/protection: None   Other Topics Concern    Parent/sibling w/ CABG, MI or angioplasty before 65F 55M? No      Service No    Blood Transfusions No    Caffeine Concern No    Sleep Concern No    Stress Concern No    Weight Concern Yes    Special Diet No    Exercise No    Bike Helmet No     Comment: Doesn't ride a bike    Seat Belt Yes    Self-Exams Yes     Comment: she does self breast exams every other month     Social Drivers of Health     Financial Resource Strain: Low Risk  (12/29/2023)    Financial Resource Strain     Within the past 12 months, have you or your family members you live with been unable to get utilities (heat, electricity) when it was really needed?: No   Food Insecurity: Low Risk  (12/29/2023)    Food Insecurity     Within the past 12 months, did you worry that your food would run out before you got money to buy more?: No     Within the past 12 months, did the food you bought just not last and you didn t have money to get more?: No   Transportation Needs: Low Risk  (12/29/2023)    Transportation Needs     Within the past 12 months, has lack of transportation kept you from medical appointments, getting your medicines, non-medical meetings or appointments, work, or from getting things that you need?: No   Interpersonal Safety: Low Risk  (5/29/2024)    Interpersonal Safety     Do you feel physically and emotionally safe where you currently live?: Yes     Within the past 12 months, have you been hit, slapped, kicked or otherwise physically hurt by someone?: No     Within the past 12 months, have you been humiliated or emotionally abused in other ways by your partner or ex-partner?: No   Housing Stability: Low Risk  (12/29/2023)    Housing Stability     Do you have housing? : Yes     Are you worried about losing your housing?: No        Family History  Family History   Problem Relation Age of Onset    Thyroid Disease Mother     Arthritis Mother     Colon Cancer Mother     Myelodysplastic syndrome Mother     Hypertension Father     Diabetes Father     C.A.D. Father         MI age 75    Cardiovascular  "Father         heart attack    Cerebrovascular Disease Father     Cancer Father         renal cancer    Arthritis Father     Heart Disease Father         heart attack    Gastrointestinal Disease Father         liver    Genitourinary Problems Father         kidney    Thyroid Disease Sister     Arthritis Sister     Lipids Sister     Asthma Daughter     Gastrointestinal Disease Daughter     Multiple Sclerosis Maternal Aunt     Mastocytosis Nephew         \"System Mast Cell Disease and hyperesosinophilia\"    Unknown Other     Breast Cancer No family hx of     Cancer - colorectal No family hx of     Alzheimer Disease No family hx of     Blood Disease No family hx of     Circulatory No family hx of     Eye Disorder No family hx of     Musculoskeletal Disorder No family hx of     Neurologic Disorder No family hx of     Respiratory No family hx of     Melanoma No family hx of     Skin Cancer No family hx of        Outpatient Medications:  Current Facility-Administered Medications   Medication Dose Route Frequency Provider Last Rate Last Admin    acetaminophen (TYLENOL) tablet 650 mg  650 mg Oral Q4H PRN Marcus Teixeira PA-C   650 mg at 12/19/24 0313    Or    acetaminophen (TYLENOL) Suppository 650 mg  650 mg Rectal Q4H PRN Marcus Teixeira PA-C        albuterol (PROVENTIL) neb solution 2.5 mg  2.5 mg Nebulization Q6H PRN Marcus Teixeira PA-C        apremilast (OTEZLA) tablet 30 mg  30 mg Oral BID Marcus Teixeira PA-C        atorvastatin (LIPITOR) tablet 20 mg  20 mg Oral Daily Marcus Teixeira PA-C   20 mg at 12/19/24 0815    buPROPion (WELLBUTRIN XL) 24 hr tablet 450 mg  450 mg Oral Daily Dwayne Hicks MD   450 mg at 12/19/24 0834    calcium carbonate (TUMS) chewable tablet 1,000 mg  1,000 mg Oral 4x Daily PRN Marcus Teixeira PA-C        carboxymethylcellulose PF (REFRESH PLUS) 0.5 % ophthalmic solution 1 drop  1 drop Both Eyes Daily PRN Dwayne Hicks MD        chlorhexidine (PERIDEX) 0.12 % solution 15 mL  15 mL " Swish & Spit BID Marcus Teixeira PA-C        doxycycline monohydrate (MONODOX) capsule 100 mg  100 mg Oral BID Marcus Teixeira PA-C   100 mg at 12/19/24 0822    DULoxetine (CYMBALTA) DR capsule 120 mg  120 mg Oral Daily Marcus Teixeira PA-C   120 mg at 12/19/24 0821    famotidine (PEPCID) tablet 40 mg  40 mg Oral Daily Marcus Teixeira PA-C   40 mg at 12/19/24 0815    fluticasone-vilanterol (BREO ELLIPTA) 100-25 MCG/ACT inhaler 1 puff  1 puff Inhalation Daily Marcus Teixeira PA-C        gabapentin (NEURONTIN) solution 25-50 mg  25-50 mg Oral Daily Marcus Teixeira PA-C        heparin 25,000 units in 0.45% NaCl 250 mL ANTICOAGULANT infusion  0-5,000 Units/hr Intravenous Continuous CousinsCatarino MD 12 mL/hr at 12/19/24 0840 1,200 Units/hr at 12/19/24 0840    hydrochlorothiazide (HYDRODIURIL) tablet 25 mg  25 mg Oral Daily Marcus Teixeira PA-C   25 mg at 12/19/24 0818    hydrocortisone 2.5 % cream   Topical BID Marcus Teixeira PA-C        hydrOXYzine HCl (ATARAX) tablet 25 mg  25 mg Oral TID PRN Marcus Teixeira PA-C        lidocaine (LMX4) cream   Topical Q1H PRN Marcus Teixeira PA-C        lidocaine 1 % 0.1-1 mL  0.1-1 mL Other Q1H PRN Marcus Teixeira PA-C        losartan (COZAAR) tablet 100 mg  100 mg Oral Daily Marcus Teixeira PA-C   100 mg at 12/19/24 0818    memantine (NAMENDA) tablet 10 mg  10 mg Oral BID Marcus Teixeira PA-C   10 mg at 12/19/24 0820    metoprolol succinate ER (TOPROL XL) 24 hr tablet 125 mg  125 mg Oral BID Dwayne Hicks MD        mupirocin (BACTROBAN) 2 % cream   Topical TID Marcus Teixeira PA-C   Given at 12/19/24 0822    naloxone (NARCAN) injection 0.2 mg  0.2 mg Intravenous Q2 Min PRN Dwayne Hicks MD        Or    naloxone (NARCAN) injection 0.4 mg  0.4 mg Intravenous Q2 Min PRN Dwayne Hicks MD        Or    naloxone (NARCAN) injection 0.2 mg  0.2 mg Intramuscular Q2 Min PRN Dwayne Hicks MD        Or    naloxone (NARCAN) injection 0.4 mg  0.4 mg Intramuscular Q2 Min PRN  Dwayne Hicks MD        ondansetron (ZOFRAN ODT) ODT tab 4 mg  4 mg Oral Q8H PRN Marcus Teixeira PA-C        oxyBUTYnin ER (DITROPAN XL) 24 hr tablet 10 mg  10 mg Oral Daily Marcus Teixeira PA-C   10 mg at 12/19/24 0821    Patient is already receiving mechanical prophylaxis   Does not apply Continuous PRN Marcus Teixeira PA-C        polyethylene glycol (MIRALAX) Packet 17 g  17 g Oral BID PRN Marcus Teixeira PA-C        predniSONE (DELTASONE) tablet 10 mg  10 mg Oral Daily Marcus Teixeira PA-C   10 mg at 12/19/24 0817    prochlorperazine (COMPAZINE) injection 10 mg  10 mg Intravenous Q6H PRN Marcus Teixeira PA-C        Or    prochlorperazine (COMPAZINE) tablet 10 mg  10 mg Oral Q6H PRN Marcus Teixeira PA-C        rizatriptan (MAXALT) tablet 10 mg  10 mg Oral at onset of headache Marcus Teixeira PA-C        senna-docusate (SENOKOT-S/PERICOLACE) 8.6-50 MG per tablet 1 tablet  1 tablet Oral BID PRN Marcus Teixeira PA-C        Or    senna-docusate (SENOKOT-S/PERICOLACE) 8.6-50 MG per tablet 2 tablet  2 tablet Oral BID PRN Marcus Teixeira PA-C        sodium chloride (PF) 0.9% PF flush 3 mL  3 mL Intracatheter Q8H Marcus Teixeira PA-C   3 mL at 12/19/24 0830    sodium chloride (PF) 0.9% PF flush 3 mL  3 mL Intracatheter q1 min prn Marcus Teixeira PA-C        tiotropium (SPIRIVA RESPIMAT) inhalation aerosol 2 puff  2 puff Inhalation Daily Marcus Teixeira PA-C        traMADol (ULTRAM) tablet 50 mg  50 mg Oral Q12H PRN Marcus Teixeira PA-C        tranexamic acid (CYKLOKAPRON) TOPICAL (injection used topically)   Topical BID Marcus Teixeira PA-C   1,000 mg at 12/19/24 0831    [Held by provider] tranexamic acid (LYSTEDA) tablet 650 mg  650 mg Oral Daily Marcus Teixeira PA-C        And    [Held by provider] tranexamic acid (LYSTEDA) tablet 1,300 mg  1,300 mg Oral At Bedtime Marcus Teixeira PA-C        traZODone (DESYREL) tablet 150 mg  150 mg Oral At Bedtime Marcus Teixeira PA-C        valACYclovir (VALTREX) tablet 500 mg  500 mg Oral Daily Marcus Teixeira PA-C          Current Outpatient Medications   Medication Sig Dispense Refill    acetaminophen (TYLENOL) 500 MG tablet Take 500-1,000 mg by mouth every 6 hours as needed for mild pain      acetylcysteine (N-ACETYL-L-CYSTEINE) 600 MG CAPS capsule Take 1 capsule (600 mg) by mouth 2 times daily 180 capsule 2    albuterol (PROVENTIL) (2.5 MG/3ML) 0.083% neb solution Take 1 vial (2.5 mg) by nebulization every 6 hours as needed for shortness of breath or wheezing. 3 mL 4    albuterol (VENTOLIN HFA) 108 (90 Base) MCG/ACT inhaler INHALE 2 PUFFS INTO THE LUNGS EVERY 6 HOURS AS NEEDED FOR SHORTNESS OF BREATH / DYSPNEA 54 g 1    apremilast (OTEZLA) 30 MG tablet Take 1 tablet (30 mg) by mouth 2 times daily. Hold for signs of infection, and seek medical attention. 60 tablet 5    atorvastatin (LIPITOR) 20 MG tablet TAKE ONE TABLET BY MOUTH ONE TIME DAILY 90 tablet 0    augmented betamethasone dipropionate (DIPROLENE AF) 0.05 % external cream Apply topically 2 times daily 50 g 3    benzonatate (TESSALON) 200 MG capsule Take 1 capsule (200 mg) by mouth 2 times daily as needed for cough. 40 capsule 1    buPROPion (WELLBUTRIN XL) 150 MG 24 hr tablet Take 1 tablet (150 mg) by mouth every morning. To take with the 300 mg dose for a total of 450 mg daily. 90 tablet 1    buPROPion (WELLBUTRIN XL) 300 MG 24 hr tablet Take 1 tablet (300 mg) by mouth every morning. To take with the 150 mg dose for a total of 450 mg daily. 90 tablet 1    chlorhexidine (PERIDEX) 0.12 % solution Swish and spit 15 mLs in mouth 2 times daily 1893 mL 4    clindamycin (CLINDAMAX) 1 % external gel Apply topically 2 times daily 30 g 4    clotrimazole (MYCELEX) 10 MG lozenge Place 1 lozenge (10 mg) inside cheek 5 times daily. 70 lozenge 1    COMPOUND CONTAINING CONTROLLED SUBSTANCE (CMPD RX) - PHARMACY TO MIX COMPOUNDED MEDICATION Compound amitriptyline 2% and ketamine 0.5% in 80g of lipoderm or Vanicream 80 g 0    COMPOUNDED NON-CONTROLLED SUBSTANCE (CMPD RX) - PHARMACY TO  MIX COMPOUNDED MEDICATION Apply directly to the affected area, with usage up to twice daily 1 each 3    desonide (DESOWEN) 0.05 % external cream Apply topically 2 times daily To sores on face 60 g 3    dextran 70-hypromellose (TEARS NATURALE FREE PF) 0.1-0.3 % ophthalmic solution Place 2 drops into both eyes daily as needed (for dry eyes) 35 each 3    doxycycline monohydrate (MONODOX) 100 MG capsule Take 1 capsule (100 mg) by mouth 2 times daily. After meals and with big glass of water 180 capsule 0    DULoxetine (CYMBALTA) 60 MG capsule Take 2 capsules (120 mg) by mouth daily 180 capsule 1    dupilumab (DUPIXENT) 300 MG/2ML prefilled pen Inject 2 mLs (300 mg) subcutaneously every 14 days. 4 mL 3    famotidine (PEPCID) 40 MG tablet Take 1 tablet (40 mg) by mouth daily 90 tablet 2    fluocinonide (LIDEX) 0.05 % external gel Apply topically 2 times daily as needed 15 g 1    gabapentin (NEURONTIN) 250 MG/5ML solution Take 0.5-1 mLs (25-50 mg) by mouth daily. 50 mg q afternoon and 100 mg at bedtime 50 mL 1    fluconazole (DIFLUCAN) 150 MG tablet Take 1 tablet (150 mg) by mouth every 3 days 6 tablet 0    hydrochlorothiazide (HYDRODIURIL) 25 MG tablet Take 1 tablet (25 mg) by mouth daily 90 tablet 1    hydrocortisone 2.5 % cream APPLY TOPICALLY 2 TIMES DAILY AS NEEDED 30 g 3    hydrOXYzine HCl (ATARAX) 25 MG tablet Take 1 tablet (25 mg) by mouth 3 times daily as needed for anxiety (and sleep). 90 tablet 1    lidocaine, viscous, (XYLOCAINE) 2 % solution Swish and spit 10ml every 3 hours as needed for oral pain; max 8 doses/24hrs.  Do not eat or chew gum for 60 minutes following use. 100 mL 1    losartan (COZAAR) 100 MG tablet Take 1 tablet (100 mg) by mouth daily. 90 tablet 1    memantine (NAMENDA) 10 MG tablet Take 1 tablet by mouth 2 times daily.      metoprolol succinate ER (TOPROL XL) 100 MG 24 hr tablet Take 1 tablet (100 mg) by mouth 2 times daily. 180 tablet 1    metoprolol succinate ER (TOPROL XL) 25 MG 24 hr  tablet Take 1 tablet (25 mg) by mouth 2 times daily. 180 tablet 1    mometasone-formoterol (DULERA) 200-5 MCG/ACT inhaler Inhale 2 puffs into the lungs 2 times daily. 39 g 1    montelukast (SINGULAIR) 10 MG tablet Take 1 tablet (10 mg) by mouth at bedtime 90 tablet 3    mupirocin (BACTROBAN) 2 % external cream Apply topically 3 times daily. to affected area as instructed. 60 g 5    mupirocin (BACTROBAN) 2 % external ointment Apply topically 3 times daily as needed. 30 g 3    Naltrexone HCl POWD       nystatin (MYCOSTATIN) 254294 UNIT/ML suspension Take 5 mLs (500,000 Units) by mouth 4 times daily. Has recurrent thrush. Uses for 2 weeks (280 ml)  at a time as needed. 280 mL 2    ondansetron (ZOFRAN ODT) 4 MG ODT tab Take 1 tablet (4 mg) by mouth every 8 hours as needed for nausea. 20 tablet 2    oxyBUTYnin ER (DITROPAN XL) 5 MG 24 hr tablet Take 2 tablets (10 mg) by mouth daily 180 tablet 0    pimecrolimus (ELIDEL) 1 % external cream Apply topically 2 times daily. 60 g 3    predniSONE (DELTASONE) 10 MG tablet Take 1 tablet (10 mg) by mouth daily 90 tablet 3    rizatriptan (MAXALT) 10 MG tablet Take 1 tablet (10 mg) by mouth at onset of headache for migraine. May repeat in 2 hours. Max 3 tablets/24 hours. 18 tablet 1    Rotigotine (NEUPRO) 1 MG/24HR 24 hr patch Place 1 patch over 24 hours onto the skin daily. 30 patch 1    semaglutide-weight management (WEGOVY) 0.25 MG/0.5ML pen Inject 0.5 mLs (0.25 mg) subcutaneously once a week. 2 mL 0    SUMAtriptan (IMITREX) 100 MG tablet take 1 tablet at onset of headache may repeat in 2 hours max 2 tabs/day 18 tablet 1    tacrolimus (PROTOPIC) 0.1 % external ointment Apply topically 2 times daily. To areas of sores on face 60 g 1    tiotropium (SPIRIVA RESPIMAT) 2.5 MCG/ACT inhaler Inhale 2 puffs into the lungs daily. 12 g 2    traMADol (ULTRAM) 50 MG tablet Take 1 tablet (50 mg) by mouth every 12 hours as needed for moderate to severe pain 60 tablet 0    tranexamic acid  (LYSTEDA) 650 MG tablet Please take 1 tablet (650 mg) in the morning and 2 tablets (1300 mg) in the evening as needed for episodes of bleeding. 270 tablet 0    traZODone (DESYREL) 50 MG tablet Take 3 tablets (150 mg) by mouth at bedtime 270 tablet 2    triamcinolone (ARISTOCORT HP) 0.5 % external cream Apply topically 2 times daily 60 g 0    valACYclovir (VALTREX) 500 MG tablet Take 1 tablet (500 mg) by mouth daily 90 tablet 1    XIIDRA 5 % opthalmic solution           Physical Exam:    /79 (BP Location: Right arm, Patient Position: Semi-Chase's)   Pulse 56   Temp 97.8  F (36.6  C) (Oral)   Resp 16   LMP 07/17/2009 (Exact Date)   SpO2 96%   Gen: Well appearing, in NAD,  at bedside   HEENT: EOMI, PERRL, mmm, oropharynx clear  CV: Normal rate, regular rhythm. No m/r/g  Pulm: CTAB, no wheezing, normal work of breathing  Abd: non distended   Ext: Warm and well perfused. No lower extremity edema  Skin: No rash, cyanosis or petechial lesion.  Several scabbed lesions about face without active bleeding or surrounding erythema.  Dime-sized scabbed lesion on right wrist without bleeding or erythema noted.   Neuro: Alert and answering questions appropriately.     Labs & Studies: I personally reviewed the following studies:  ROUTINE LABS (Last four results):  CMP  Recent Labs   Lab 12/19/24  0611 12/18/24  2202    136   POTASSIUM 3.8 4.0   CHLORIDE 105 100   CO2 24 25   ANIONGAP 11 11   GLC 81 104*   BUN 20.9 24.3*   CR 1.18* 1.31*   GFRESTIMATED 51* 45*   JAIRO 9.4 9.8   PROTTOTAL 6.2*  --    ALBUMIN 3.8  --    BILITOTAL 0.4  --    ALKPHOS 103  --    AST 17  --    ALT 7  --      CBC  Recent Labs   Lab 12/19/24  0611 12/18/24  2202   WBC 11.2* 10.2   RBC 4.29 4.54   HGB 13.2 13.9   HCT 39.0 41.5   MCV 91 91   MCH 30.8 30.6   MCHC 33.8 33.5   RDW 13.9 13.7    306     INR  Recent Labs   Lab 12/18/24  2202   INR 1.03

## 2024-12-19 NOTE — PROGRESS NOTES
[facility]    Medicine Progress Note - Hospitalist Service, GOLD TEAM 9    Date of Admission:  12/18/2024    Assessment & Plan      Jacquie Amador is a 64 year old female admitted on 12/18/2024. 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 who presented to the ED with right lower extremity swelling. She was admitted to internal medicine for high intensity heparin infusion - due to chronic bleeding wounds she has, needed to be monitored inpatient.     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 She has no hx of DVT but has been more sedentary recently. On arrival, US DVT demonstrated acute lower right femoral DVT. Does have bahcet's dosease and the above platelet disorder that cause chronic bleeding skin ulcers; outpatient hematology provider Dr. Shin was consulted and recommend that she go to the ED for admission and monitoring for bleeding on heparin gtt. She does not have any shortness of breath, dyspnea on exertion, chest pain, nor tachycardia that would suggest she has a PE. Follows with rheumatology provider Dr. Hawkins last seen 10/29/24; Follows with Dr. Grimes of hematology last seen 12/11/24.   - Consulted hematology   - Check lupus anticoagulant, beta 2 glycoprotein, and anticardiolipin antibodies at admission and prior to starting anticoagulation.  -  Continue heparin gtt, monitor for bleeding  - Continue pta valtrex, mouthwash daily, doxycycline   - Topical 7% TXA cream BID, hydrocortisone, mupirocin to wounds    - Continue prednisone 10 mg daily  - HOLD Tranexamic acid    # CKD stage 3A   Baseline Creatinine range (1.10 to 1.30)  - Follow lytes and creatinine  - Monitor I/O's, daily weights  - Avoid nephrotoxic medications/IV contrast, hypotension, dehydration  - Renally dose medications    # Prediabetes   Last A1C was 6.1 in 2022. Not on pta management   - A1C 6.2 12/18/24    # Depression   # Insomnia  - Continue pta duloxetine, wellbutrin, PRN hydroxyzine   - Continue pta trazadone     # Chronic pain syndrome   # Fibromyalgia   # Lumbosacral spondylosis   # Osteoarthritis   Follows with  Dr. Lay of [facility]. Receives injections outpatient for pain control.   - Continue pta otezla, PRN tylenol, gabapentin  - Continue pta tramadol PRN     # Asthma, moderate persistent   - PRN albuterol, spireva, breo-ellipta    # HLD   # HTN   # Sinus tachycardia  She has seen cardiology twice for tachycardia and they had no concerns.   - Continue metoprolol, statin, hydrochlorothiazide, losartan      # GERD   - Continue pta pepcid     # Migraines   - PRN rizatriptan           Diet: Combination Diet Regular Diet Adult    DVT Prophylaxis: On Heparin gtt  Flanagan Catheter: Not present  Lines: None     Cardiac Monitoring: None  Code Status: Full Code      Clinically Significant Risk Factors Present on Admission                   # Hypertension: Noted on problem list               # Asthma: noted on problem list        Social Drivers of Health    Depression: At risk (12/18/2024)    PHQ-2     PHQ-2 Score: 3   Tobacco Use: Medium Risk (12/18/2024)    Patient History     Smoking Tobacco Use: Former     Smokeless Tobacco Use: Never     Passive Exposure: Past          Disposition Plan     Medically Ready for Discharge: Anticipated Tomorrow             Jay Recinos  MD  Hospitalist Service, GOLD TEAM 9  [facility]  Securely message with [employer] (more info)  Text page via [employer] Paging/Directory   See signed in provider for up to date coverage information  ______________________________________________________________________    Interval History   No acute events overnight, no overt bleeding. She states she did not get a good night sleep, denies SOB    Physical Exam   Vital Signs: Temp: 97.8  F (36.6  C) Temp src: Oral BP: 124/79 Pulse: 56   Resp: 16 SpO2: 96 % O2 Device: None (Room air)    Weight: 0 lbs 0 oz    Constitutional: Awake, alert, cooperative, no apparent distress  Respiratory: Clear to auscultation bilaterally  Cardiovascular: Regular rate and rhythm  GI: Normal bowel sounds, soft, non-distended, non-tender  Skin/Integumen: Multiple wounds throughout       Medical Decision Making       45 MINUTES SPENT BY ME on the date of service doing chart review, history, exam, documentation & further activities per the note.      Data     I have personally reviewed the following data over the past 24 hrs:    11.2 (H)  \   13.2   / 259     140 105 20.9 /  81   3.8 24 1.18 (H) \     ALT: 7 AST: 17 AP: 103 TBILI: 0.4   ALB: 3.8 TOT PROTEIN: 6.2 (L) LIPASE: N/A     TSH: N/A T4: N/A A1C: 6.2 (H)     INR:  1.03 PTT:  N/A   D-dimer:  N/A Fibrinogen:  N/A

## 2024-12-19 NOTE — H&P
[facility]    History and Physical - Hospitalist Service, GOLD TEAM        Date of Admission:  12/18/2024    Assessment & Plan      Jacquie Amador is a 64 year old female admitted on 12/18/2024. 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 who presented to the ED with right lower extremity swelling. She was admitted to internal medicine for high intensity heparin infusion - due to chronic bleeding wounds she has, needed to be monitored inpatient.   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 She has no hx of DVT. On arrival, US DVT demonstrated acute lower right femoral DVT. Does have bahcet's dosease and the above platelet disorder that cause chronic bleeding skin ulcers; outpatient hematology provider Dr. Shin was consulted and recommend that she go to the ED for admission and monitoring for bleeding on heparin gtt. She does not have any shortness of breath, dyspnea on exertion, chest pain, nor tachycardia that would suggest she has a PE. Follows with rheumatology provider Dr. Hawkins last seen 10/29/24; Follows with Dr. Grimes of hematology last seen 12/11/24.   - Consulted hematology   - Check lupus anticoagulant, beta 2 glycoprotein, and anticardiolipin antibodies at admission and prior to starting anticoagulation.  - Start heparin bolus followed by gtt   - AM CBC, BMP   - Continue pta valtrex, mouthwash  daily, doxycycline   - Topical 7% TXA cream BID, hydrocortisone, mupirocin to wounds    - Continue prednisone 10 mg daily  - HOLD Tranexamic acid     # CKD stage 3A   Baseline Creatinine range (1.10 to 1.30) - difficult to assess given fluctuation. 1.31 on arrival.   - Follow lytes and creatinine  - Monitor I/O's, daily weights  - Avoid nephrotoxic medications/IV contrast, hypotension, dehydration  - Renally dose medications  - Outpatient establish care     # Prediabetes   Last A1C was 6.1 in 2022. Not on pta management   - A1C ordered   - Follow glucose level    # Depression   # Insomnia  - Continue pta duloxetine, wellbutrin, PRN hydroxyzine   - Continue pta trazadone     # Chronic pain syndrome   # Fibromyalgia   # Lumbosacral spondylosis   # Osteoarthritis   Follows with  Dr. Lay of [facility]. Receives injections outpatient for pain control.   - Continue pta otezla, PRN tylenol, gabapentin  - Continue pta tramadol PRN     # Asthma, moderate persistent   - PRN albuterol, spireva, breo-ellipta    # HLD   # HTN   # Sinus tachycardia  She has seen cardiology twice for tachycardia and they had no concerns.   - Continue metoprolol, statin, hydrochlorothiazide, losartan      # GERD   - Continue pta pepcid     # Migraines   - PRN rizatriptan           Diet:  Reg adult diet  DVT Prophylaxis: heparin infusion   Flanagan Catheter: Not present  Lines: None     Cardiac Monitoring: None  Code Status:  full code     Clinically Significant Risk Factors Present on Admission                   # Hypertension: Noted on problem list               # Asthma: noted on problem list        Disposition Plan     Medically Ready for Discharge: Anticipated in 2-4 Days         The patient's care was discussed with the Attending Physician, Dr. Hicks, Bedside Nurse, Patient, and Patient's Family.    Marcus Teixeira PA-C  Hospitalist Service, [facility]  Securely message with  [employer] (more info)  Text page via [facility] Paging/Directory   See signed in provider for up to date coverage information    ______________________________________________________________________    Chief Complaint   DVT     History is obtained from the patient    History of Present Illness   Jacquie presented to the ED from clinic with worsening right lower extremity swelling that has been ongoing for a few months. She has no hx of DVT. On arrival, US DVT demonstrated acute lower right femoral DVT. Does have bahcet's dosease and the above platelet disorder that cause chronic bleeding skin ulcers; outpatient hematology provider Dr. Shin was consulted and recommend that she go to the ED for admission and monitoring for bleeding on heparin gtt. She does not have any shortness of breath, dyspnea on exertion, chest pain, nor tachycardia that would suggest she has a PE.  She denies nausea, vomiting, diarrhea, chest pain, shortness of breath, fevers, chills.      Past Medical History    Past Medical History:   Diagnosis Date    ASTHMA - MODERATE PERSISTENT 2005    Chronic pain     Coronary artery disease     CVA (cerebral infarction)     Depressive disorder, not elsewhere classified     Diabetes (H)     Elevated serum alkaline phosphatase level     Liver source    Fibromyalgia     HYPERLIPIDEMIA NEC/NOS 2006    Hypertriglyceridemia     OA (osteoarthritis)     Thyroid disease     Trochanteric bursitis     Unspecified essential hypertension        Past Surgical History   Past Surgical History:   Procedure Laterality Date    3 teeth pulled      INJECT EPIDURAL TRANSFORAMINAL  2014    [facility]    INJECT JOINT SACROILIAC  2014    [facility]    LAMINECTOMY LUMBAR ONE LEVEL Left 2014    [facility]  DELIVERY ONLY      , Low Cervical       Prior to Admission Medications   Prior to Admission Medications   Prescriptions Last Dose  Informant Patient Reported? Taking?   COMPOUND CONTAINING CONTROLLED SUBSTANCE (CMPD RX) - PHARMACY TO MIX COMPOUNDED MEDICATION   No No   Sig: Compound amitriptyline 2% and ketamine 0.5% in 80g of lipoderm or Vanicream   COMPOUNDED NON-CONTROLLED SUBSTANCE (CMPD RX) - PHARMACY TO MIX COMPOUNDED MEDICATION   No No   Sig: Apply directly to the affected area, with usage up to twice daily   DULoxetine (CYMBALTA) 60 MG capsule   No No   Sig: Take 2 capsules (120 mg) by mouth daily   Naltrexone HCl POWD   Yes No   Rotigotine (NEUPRO) 1 MG/24HR 24 hr patch   No No   Sig: Place 1 patch over 24 hours onto the skin daily.   SUMAtriptan (IMITREX) 100 MG tablet   No No   Sig: take 1 tablet at onset of headache may repeat in 2 hours max 2 tabs/day   XIIDRA 5 % opthalmic solution   Yes No   acetaminophen (TYLENOL) 500 MG tablet   Yes No   Sig: Take 500-1,000 mg by mouth every 6 hours as needed for mild pain   acetylcysteine (N-ACETYL-L-CYSTEINE) 600 MG CAPS capsule   No No   Sig: Take 1 capsule (600 mg) by mouth 2 times daily   albuterol (PROVENTIL) (2.5 MG/3ML) 0.083% neb solution   No No   Sig: Take 1 vial (2.5 mg) by nebulization every 6 hours as needed for shortness of breath or wheezing.   albuterol (VENTOLIN HFA) 108 (90 Base) MCG/ACT inhaler   No No   Sig: INHALE 2 PUFFS INTO THE LUNGS EVERY 6 HOURS AS NEEDED FOR SHORTNESS OF BREATH / DYSPNEA   apremilast (OTEZLA) 30 MG tablet   No No   Sig: Take 1 tablet (30 mg) by mouth 2 times daily. Hold for signs of infection, and seek medical attention.   atorvastatin (LIPITOR) 20 MG tablet   No No   Sig: TAKE ONE TABLET BY MOUTH ONE TIME DAILY   augmented betamethasone dipropionate (DIPROLENE AF) 0.05 % external cream   No No   Sig: Apply topically 2 times daily   benzonatate (TESSALON) 200 MG capsule   No No   Sig: Take 1 capsule (200 mg) by mouth 2 times daily as needed for cough   buPROPion (WELLBUTRIN XL) 150 MG 24 hr tablet   No No   Sig: Take 1 tablet (150 mg) by mouth every  morning. To take with the 300 mg dose for a total of 450 mg daily.   buPROPion (WELLBUTRIN XL) 300 MG 24 hr tablet   No No   Sig: Take 1 tablet (300 mg) by mouth every morning. To take with the 150 mg dose for a total of 450 mg daily.   chlorhexidine (PERIDEX) 0.12 % solution   No No   Sig: Swish and spit 15 mLs in mouth 2 times daily   clindamycin (CLINDAMAX) 1 % external gel   No No   Sig: Apply topically 2 times daily   clotrimazole (MYCELEX) 10 MG lozenge   No No   Sig: Place 1 lozenge (10 mg) inside cheek 5 times daily.   desonide (DESOWEN) 0.05 % external cream   No No   Sig: Apply topically 2 times daily To sores on face   dextran 70-hypromellose (TEARS NATURALE FREE PF) 0.1-0.3 % ophthalmic solution   No No   Sig: Place 2 drops into both eyes daily as needed (for dry eyes)   doxycycline monohydrate (MONODOX) 100 MG capsule   No No   Sig: Take 1 capsule (100 mg) by mouth 2 times daily. After meals and with big glass of water   dupilumab (DUPIXENT) 300 MG/2ML prefilled pen   No No   Sig: Inject 2 mLs (300 mg) subcutaneously every 14 days.   famotidine (PEPCID) 40 MG tablet   No No   Sig: Take 1 tablet (40 mg) by mouth daily   fluconazole (DIFLUCAN) 150 MG tablet   No No   Sig: Take 1 tablet (150 mg) by mouth every 3 days   fluocinonide (LIDEX) 0.05 % external gel   No No   Sig: Apply topically 2 times daily as needed   gabapentin (NEURONTIN) 250 MG/5ML solution   No No   Sig: Take 0.5-1 mLs (25-50 mg) by mouth daily. 50 mg q afternoon and 100 mg at bedtime   hydrOXYzine HCl (ATARAX) 25 MG tablet   No No   Sig: Take 1 tablet (25 mg) by mouth 3 times daily as needed for anxiety (and sleep).   hydrochlorothiazide (HYDRODIURIL) 25 MG tablet   No No   Sig: Take 1 tablet (25 mg) by mouth daily   hydrocortisone 2.5 % cream   No No   Sig: APPLY TOPICALLY 2 TIMES DAILY AS NEEDED   lidocaine, viscous, (XYLOCAINE) 2 % solution   No No   Sig: Swish and spit 10ml every 3 hours as needed for oral pain; max 8 doses/24hrs.   Do not eat or chew gum for 60 minutes following use.   losartan (COZAAR) 100 MG tablet   No No   Sig: Take 1 tablet (100 mg) by mouth daily.   memantine (NAMENDA) 10 MG tablet   Yes No   Sig: Take 1 tablet by mouth 2 times daily.   metoprolol succinate ER (TOPROL XL) 100 MG 24 hr tablet   No No   Sig: Take 1 tablet (100 mg) by mouth 2 times daily.   metoprolol succinate ER (TOPROL XL) 25 MG 24 hr tablet   No No   Sig: Take 1 tablet (25 mg) by mouth 2 times daily.   mometasone-formoterol (DULERA) 200-5 MCG/ACT inhaler   No No   Sig: Inhale 2 puffs into the lungs 2 times daily.   montelukast (SINGULAIR) 10 MG tablet   No No   Sig: Take 1 tablet (10 mg) by mouth at bedtime   mupirocin (BACTROBAN) 2 % external cream   No No   Sig: Apply topically 3 times daily. to affected area as instructed.   mupirocin (BACTROBAN) 2 % external ointment   No No   Sig: Apply topically 3 times daily as needed.   nystatin (MYCOSTATIN) 399153 UNIT/ML suspension   No No   Sig: Take 5 mLs (500,000 Units) by mouth 4 times daily. Has recurrent thrush. Uses for 2 weeks (280 ml)  at a time as needed.   ondansetron (ZOFRAN ODT) 4 MG ODT tab   No No   Sig: Take 1 tablet (4 mg) by mouth every 8 hours as needed for nausea.   oxyBUTYnin ER (DITROPAN XL) 5 MG 24 hr tablet   No No   Sig: Take 2 tablets (10 mg) by mouth daily   pimecrolimus (ELIDEL) 1 % external cream   No No   Sig: Apply topically 2 times daily.   predniSONE (DELTASONE) 10 MG tablet   No No   Sig: Take 1 tablet (10 mg) by mouth daily   rizatriptan (MAXALT) 10 MG tablet   No No   Sig: Take 1 tablet (10 mg) by mouth at onset of headache for migraine. May repeat in 2 hours. Max 3 tablets/24 hours.   semaglutide-weight management (WEGOVY) 0.25 MG/0.5ML pen   No No   Sig: Inject 0.5 mLs (0.25 mg) subcutaneously once a week.   tacrolimus (PROTOPIC) 0.1 % external ointment   No No   Sig: Apply topically 2 times daily. To areas of sores on face   tiotropium (SPIRIVA RESPIMAT) 2.5 MCG/ACT  inhaler   No No   Sig: Inhale 2 puffs into the lungs daily.   traMADol (ULTRAM) 50 MG tablet   No No   Sig: Take 1 tablet (50 mg) by mouth every 12 hours as needed for moderate to severe pain   traZODone (DESYREL) 50 MG tablet   No No   Sig: Take 3 tablets (150 mg) by mouth at bedtime   tranexamic acid (LYSTEDA) 650 MG tablet   No No   Sig: Please take 1 tablet (650 mg) in the morning and 2 tablets (1300 mg) in the evening as needed for episodes of bleeding.   triamcinolone (ARISTOCORT HP) 0.5 % external cream   No No   Sig: Apply topically 2 times daily   valACYclovir (VALTREX) 500 MG tablet   No No   Sig: Take 1 tablet (500 mg) by mouth daily      Facility-Administered Medications: None           Physical Exam   Vital Signs: Temp: 98.1  F (36.7  C)   BP: 121/77 Pulse: 67   Resp: 16 SpO2: 98 %      Weight: 0 lbs 0 oz    Constitutional: awake, alert, cooperative, well appearing   Respiratory: No increased work of breathing, good air exchange, clear to auscultation bilaterally, no crackles or wheezing  Cardiovascular: Normal apical impulse, regular rate and rhythm, and no murmur noted  GI: normal bowel sounds, soft, non-distended, non-tender  Skin: no bruising or bleeding of visible skin, no jaundice   Musculoskeletal: There is no redness, warmth, or swelling of the visible joints, no calve swelling, BLLE with no swelling, mild right lower extremity tenderness to palpation, distally intact neurovascularly - Posterior tibial pulse palpable   Neurologic: Awake, alert, oriented to name, place and time.    Neuropsychiatric: General: normal, calm, and normal eye contact  Level of consciousness: alert / normal  Affect: normal        Medical Decision Making       80 MINUTES SPENT BY ME on the date of service doing chart review, history, exam, documentation & further activities per the note.      Data   Recent Labs   Lab 12/18/24  2202   WBC 10.2   HGB 13.9   MCV 91      INR 1.03      POTASSIUM 4.0   CHLORIDE  100   CO2 25   BUN 24.3*   CR 1.31*   ANIONGAP 11   JAIRO 9.8   *

## 2024-12-20 VITALS
HEIGHT: 67 IN | TEMPERATURE: 98.6 F | DIASTOLIC BLOOD PRESSURE: 66 MMHG | OXYGEN SATURATION: 99 % | WEIGHT: 180 LBS | RESPIRATION RATE: 16 BRPM | SYSTOLIC BLOOD PRESSURE: 111 MMHG | BODY MASS INDEX: 28.25 KG/M2 | HEART RATE: 69 BPM

## 2024-12-20 VITALS
SYSTOLIC BLOOD PRESSURE: 132 MMHG | WEIGHT: 180 LBS | DIASTOLIC BLOOD PRESSURE: 70 MMHG | RESPIRATION RATE: 16 BRPM | TEMPERATURE: 98 F | OXYGEN SATURATION: 97 % | HEIGHT: 67 IN | HEART RATE: 71 BPM | BODY MASS INDEX: 28.25 KG/M2

## 2024-12-20 LAB
CARDIOLIPIN IGA SER IA-ACNC: 1.2 APL-U/ML
CARDIOLIPIN IGA SER IA-ACNC: NEGATIVE
ERYTHROCYTE [DISTWIDTH] IN BLOOD BY AUTOMATED COUNT: 14.1 % (ref 10–15)
GLUCOSE BLDC GLUCOMTR-MCNC: 138 MG/DL (ref 70–99)
HCT VFR BLD AUTO: 41.9 % (ref 35–47)
HGB BLD-MCNC: 13.7 G/DL (ref 11.7–15.7)
MCH RBC QN AUTO: 30.2 PG (ref 26.5–33)
MCHC RBC AUTO-ENTMCNC: 32.7 G/DL (ref 31.5–36.5)
MCV RBC AUTO: 93 FL (ref 78–100)
PLATELET # BLD AUTO: 303 10E3/UL (ref 150–450)
RBC # BLD AUTO: 4.53 10E6/UL (ref 3.8–5.2)
WBC # BLD AUTO: 10.4 10E3/UL (ref 4–11)

## 2024-12-20 PROCEDURE — 82962 GLUCOSE BLOOD TEST: CPT

## 2024-12-20 PROCEDURE — 250N000009 HC RX 250

## 2024-12-20 PROCEDURE — 36415 COLL VENOUS BLD VENIPUNCTURE: CPT | Performed by: INTERNAL MEDICINE

## 2024-12-20 PROCEDURE — 85027 COMPLETE CBC AUTOMATED: CPT | Performed by: INTERNAL MEDICINE

## 2024-12-20 PROCEDURE — 250N000013 HC RX MED GY IP 250 OP 250 PS 637: Performed by: HOSPITALIST

## 2024-12-20 PROCEDURE — 250N000013 HC RX MED GY IP 250 OP 250 PS 637

## 2024-12-20 PROCEDURE — 250N000012 HC RX MED GY IP 250 OP 636 PS 637

## 2024-12-20 PROCEDURE — 99233 SBSQ HOSP IP/OBS HIGH 50: CPT | Performed by: INTERNAL MEDICINE

## 2024-12-20 PROCEDURE — 250N000013 HC RX MED GY IP 250 OP 250 PS 637: Performed by: INTERNAL MEDICINE

## 2024-12-20 PROCEDURE — 99239 HOSP IP/OBS DSCHRG MGMT >30: CPT | Performed by: INTERNAL MEDICINE

## 2024-12-20 RX ADMIN — GABAPENTIN 100 MG: 250 SOLUTION ORAL at 09:29

## 2024-12-20 RX ADMIN — OXYBUTYNIN CHLORIDE 10 MG: 10 TABLET, EXTENDED RELEASE ORAL at 09:25

## 2024-12-20 RX ADMIN — PREDNISONE 10 MG: 5 TABLET ORAL at 09:28

## 2024-12-20 RX ADMIN — MEMANTINE 10 MG: 10 TABLET ORAL at 09:28

## 2024-12-20 RX ADMIN — APIXABAN 10 MG: 5 TABLET, FILM COATED ORAL at 09:28

## 2024-12-20 RX ADMIN — FAMOTIDINE 40 MG: 20 TABLET, FILM COATED ORAL at 09:25

## 2024-12-20 RX ADMIN — MUPIROCIN: 2 CREAM TOPICAL at 09:25

## 2024-12-20 RX ADMIN — DOXYCYCLINE 100 MG: 50 CAPSULE ORAL at 09:28

## 2024-12-20 RX ADMIN — METOPROLOL SUCCINATE 125 MG: 25 TABLET, EXTENDED RELEASE ORAL at 09:27

## 2024-12-20 RX ADMIN — BUPROPION HYDROCHLORIDE 450 MG: 300 TABLET, EXTENDED RELEASE ORAL at 09:28

## 2024-12-20 RX ADMIN — TRAMADOL HYDROCHLORIDE 50 MG: 50 TABLET, COATED ORAL at 00:43

## 2024-12-20 RX ADMIN — HYDROCHLOROTHIAZIDE 25 MG: 25 TABLET ORAL at 09:28

## 2024-12-20 RX ADMIN — LOSARTAN POTASSIUM 100 MG: 100 TABLET, FILM COATED ORAL at 09:28

## 2024-12-20 RX ADMIN — TRANEXAMIC ACID 1000 MG: 100 INJECTION, SOLUTION INTRAVENOUS at 09:25

## 2024-12-20 RX ADMIN — DULOXETINE HYDROCHLORIDE 120 MG: 30 CAPSULE, DELAYED RELEASE ORAL at 09:28

## 2024-12-20 RX ADMIN — ATORVASTATIN CALCIUM 20 MG: 20 TABLET, FILM COATED ORAL at 09:28

## 2024-12-20 ASSESSMENT — ACTIVITIES OF DAILY LIVING (ADL)
ADLS_ACUITY_SCORE: 60
ADLS_ACUITY_SCORE: 56
ADLS_ACUITY_SCORE: 60

## 2024-12-20 NOTE — PLAN OF CARE
"Pt. discharged at 1315 to home, was accompanied by , and left with personal belongings. Pt. received complete discharge paperwork and medications as filled by pharmacy. Pt. was given times of last dose for all discharge medications in writing on discharge medication sheets. Discharge teaching included medication, pain management, and signs and symptoms of bleeding or infection. Pt. had no further questions at the time of discharge and no unmet needs were identified.    /70 (BP Location: Left arm, Cuff Size: Adult Regular)   Pulse 71   Temp 98  F (36.7  C) (Oral)   Resp 16   Ht 1.702 m (5' 7\")   Wt 81.6 kg (180 lb)   LMP 07/17/2009 (Exact Date)   SpO2 97%   BMI 28.19 kg/m    "

## 2024-12-20 NOTE — PROGRESS NOTES
"  Hematology Progress Note   Date of Service: 12/20/2024    Patient: Jacquie Amador  MRN: [id number]  Admission Date: 12/18/2024  Hospital Day # Hospital Day: 3  Primary Outpatient Hematologist: Dr. Emely Grimes     Reason for Consult: DVT with Behcet's disease     S. No bleeding or other side effects from apxaban overnight.  O. /70 (BP Location: Left arm, Cuff Size: Adult Regular)   Pulse 71   Temp 98  F (36.7  C) (Oral)   Resp 16   Ht 1.702 m (5' 7\")   Wt 81.6 kg (180 lb)   LMP 07/17/2009 (Exact Date)   SpO2 97%   BMI 28.19 kg/m     Leg swelling improving, minimal sock line. The bleeding on her face and hand look much much improved. No significant complaints today  Labs reviewed. Hgb nomral and stable.     Assessment & Plan:   Jacquie Amador is a 64 year old female with PMHx of Behcet's disease c/b bleeding wounds (primarily on face/arms) and mouth sores, acquired quantitative platelet disorder 2/2 anti-GP1 and HLA1 antibodies with easy bruising and bleeding from wounds, osteoarthritis, MDD, asthma, CKD3, migraines, who was admitted 12/18/24 with new right femoral DVT after reporting swelling in RLE x 4 days PTA, started on heparin gtt and being monitored for bleeding given hx of bleeding skin lesions     She denies prior history of clots, but does have reported hx of APS in prior pregnancy. Due to pain is relatively sedentary at home, but had ablation to spine about a month ago and had severe pain after that and was more sedentary than normal. Denies recent hospital admission, hormone replacement therapy, long travel.  Had some bleeding from lesion on wrist this AM after starting heparin but AntiXa noted to be supratherapeutic (>1.1).  Once heparin more therapeutic and now after starting apixaban, she has not had any worsening of her bleeding.  At the same time her leg swelling has improved.       Acquired platelet disorder, and Behcet's with bleeding wounds (face, arms), controlled with " TXA  History of antiphospholipid antibodies in pregnancy without clotting  R femoral DVT, possibly provoked from TXA and recent immobility/sedentary  (12/18/24)  -provoked in setting of systemic tranexamic acid and increased immobility after RFA to back ~1 month ago.     Recommendations:   -discontinued systemic tranexamic acid, continue topical tranexamic acid to wounds   -we discussed that this will be obtained through [facility] through the mail.   -Checked APS labs and platelet antibodies. Lupus anticoagulant is negative.   -Continue apixaban 5 mg BID for the next 3 months, with likely ultrasound at that time.  - She will follow up with Dr. Grimes    We will continue to follow this patient. Please don't hesitate to contact the Fellow On-Call with questions.    On the date of service, 12/20/2024, I spent 60 minutes on the patient unit personally reviewing medical records and medications, reviewing vital signs, labs, and imaging results as summarized above, discussing the patient's case on rounds with the fellow and BERRY, obtaining a history from the patient, performing a physical exam, counseling and educating the patient on the diagnosis and treatment, evaluating a potentially life or organ threatening problem, intensively monitoring treatments with high risk of toxicity, coordinating care, and documenting in the electronic medical record.    Thank you for allowing me to participate in the care of this patient. Please do not hesitate to contact me if there are any concerns or questions.     Siva Shin MD   of Medicine  Classical Hematology and Blood and Marrow Transplantation  Division of Hematology, Oncology, and Transplantation  [facility]

## 2024-12-20 NOTE — DISCHARGE SUMMARY
"[facility]  Hospitalist Discharge Summary      Date of Admission:  12/18/2024  Date of Discharge:  12/20/2024  Discharging Provider: Jay Recinos MD  Discharge Service: Hospitalist Service, GOLD TEAM 9    Discharge Diagnoses     Please see Hospital Course below    Clinically Significant Risk Factors     # Overweight: Estimated body mass index is 28.19 kg/m  as calculated from the following:    Height as of this encounter: 1.702 m (5' 7\").    Weight as of this encounter: 81.6 kg (180 lb).       Follow-ups Needed After Discharge   Follow-up Appointments       Adult [facility] Follow-up and recommended labs and tests      Follow up with primary care provider, Liz Peterson, within 7 days for hospital follow- up.  No follow up labs or test are needed.    Follow up with your hematologist in 2-4 weeks, you will get a call for follow up      Appointments on [place] and/or [facility] (with [facility] or [facility] provider or service). Call [phone] if you haven't heard regarding these appointments within 7 days of discharge.                Unresulted Labs Ordered in the Past 30 Days of this Admission       Date and Time Order Name Status Description    12/20/2024  1:17 PM Cardiolipin Arina IgG and IgM In process     12/20/2024  1:17 PM Beta 2 Glycoprotein 1 Antibody IgM In process     12/19/2024  4:54 PM Laboratory Miscellaneous Result In process         These results will be followed up by     Discharge Disposition   Discharged to home  Condition at discharge: Good    Hospital Course   Jacquie Amador is a 64 year old female admitted on 12/18/2024. 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 who presented to the ED with right lower extremity swelling. She was " admitted to internal medicine for high intensity heparin infusion - due to chronic bleeding wounds she has, needed to be monitored inpatient. 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 She has no hx of DVT but has been more sedentary recently. On arrival, US DVT demonstrated acute lower right femoral DVT. Does have bahcet's dosease and the above platelet disorder that cause chronic bleeding skin ulcers; outpatient hematology provider Dr. Shin was consulted and recommend that she go to the ED for admission and monitoring for bleeding on heparin gtt. She does not have any shortness of breath, dyspnea on exertion, chest pain, nor tachycardia that would suggest she has a PE. Follows with rheumatology provider Dr. Hawkins last seen 10/29/24; Follows with Dr. Grimes of hematology last seen 12/11/24.   - Consulted hematology   - Initiated on heparin gtt, no bleeding noted  - Started on apixaban (initially with loading dose, later switched to maintenance dose per Heme)  - Follow-up with Hematology (Dr. Grimes), likely will need 3-6 months of AC  - Continue pta valtrex, mouthwash daily, doxycycline   - Topical 7% TXA cream BID, hydrocortisone, mupirocin to wounds    - Continue prednisone 10 mg daily  - Discontinue systemic tranexamic acid    # CKD stage 3A   Baseline Creatinine range (1.10 to 1.30)  - Follow lytes and creatinine  - Monitor I/O's, daily weights  - Avoid nephrotoxic medications/IV contrast, hypotension, dehydration  - Renally dose medications    # Prediabetes   Last A1C was 6.1 in 2022. Not on pta management   - A1C 6.2 12/18/24    #  Depression   # Insomnia  - Continue pta duloxetine, wellbutrin, PRN hydroxyzine   - Continue pta trazadone     # Chronic pain syndrome   # Fibromyalgia   # Lumbosacral spondylosis   # Osteoarthritis   Follows with  Dr. Lay of [facility]. Receives injections outpatient for pain control.   - Continue pta otezla, PRN tylenol, gabapentin  - Continue pta tramadol PRN     # Asthma, moderate persistent   - PRN albuterol, spireva, breo-ellipta    # HLD   # HTN   # Sinus tachycardia  She has seen cardiology twice for tachycardia and they had no concerns.   - Continue metoprolol, statin, hydrochlorothiazide, losartan      # GERD   - Continue pta pepcid     # Migraines   - PRN rizatriptan     Consultations This Hospital Stay   PHARMACY IP CONSULT  HEMATOLOGY ADULT IP CONSULT  PHARMACY LIAISON FOR MEDICATION COVERAGE CONSULT  PHARMACY LIAISON FOR MEDICATION COVERAGE CONSULT  CARE MANAGEMENT / SOCIAL WORK IP CONSULT    Code Status   Full Code    Time Spent on this Encounter   I, Jay Recinos MD, personally saw the patient today and spent greater than 30 minutes discharging this patient.       Jay Recinos MD  [facility] EMERGENCY DEPARTMENT  [address] [address]  Phone: [phone]  ______________________________________________________________________    Physical Exam   Vital Signs: Temp: 98  F (36.7  C) Temp src: Oral BP: 132/70 Pulse: 71   Resp: 16 SpO2: 97 % O2 Device: None (Room air)    Weight: 180 lbs 0 oz    Constitutional: Awake, alert, cooperative, no apparent distress  Respiratory: Clear to auscultation bilaterally  Cardiovascular: Regular rate and rhythm  GI: Normal bowel sounds, soft, non-distended, non-tender  Skin/Integumen: Multiple wounds throughout           Primary Care Physician   Liz Peterson    Discharge Orders      Reason for your hospital stay    You were hospitalized for DVT, started on anticoagulation. You were monitored overnight for bleeding     Activity     Your activity upon discharge: activity as tolerated     Adult [facility] Follow-up and recommended labs and tests    Follow up with primary care provider, Liz Peterson, within 7 days for hospital follow- up.  No follow up labs or test are needed.    Follow up with your hematologist in 2-4 weeks, you will get a call for follow up      Appointments on [place] and/or [facility] (with [facility] or [facility] provider or service). Call [phone] if you haven't heard regarding these appointments within 7 days of discharge.     Diet    Follow this diet upon discharge: Current Diet:Orders Placed This Encounter      Combination Diet Regular Diet Adult       Significant Results and Procedures   Most Recent 3 CBC's:  Recent Labs   Lab Test 12/20/24  0932 12/19/24  0611 12/18/24  2202   WBC 10.4 11.2* 10.2   HGB 13.7 13.2 13.9   MCV 93 91 91    259 306     Most Recent 3 BMP's:  Recent Labs   Lab Test 12/20/24  0934 12/19/24  2336 12/19/24  0611 12/18/24  2202 10/17/24  1127   NA  --   --  140 136 141   POTASSIUM  --   --  3.8 4.0 3.8   CHLORIDE  --   --  105 100 103   CO2  --   --  24 25 26   BUN  --   --  20.9 24.3* 19.6   CR  --   --  1.18* 1.31* 1.27*   ANIONGAP  --   --  11 11 12   JAIRO  --   --  9.4 9.8 9.3   * 163* 81 104* 90       Discharge Medications   Current Discharge Medication List        START taking these medications    Details   apixaban ANTICOAGULANT (ELIQUIS) 5 MG tablet Take 1 tablet (5 mg) by mouth 2 times daily.  Qty: 60 tablet, Refills: 2    Associated Diagnoses: Acute deep vein thrombosis (DVT) of right femoral vein (H)           CONTINUE these medications which have NOT CHANGED    Details   acetaminophen (TYLENOL) 500 MG tablet Take 500-1,000 mg by mouth every 6 hours as needed for mild pain      acetylcysteine (N-ACETYL-L-CYSTEINE) 600 MG CAPS capsule Take 1 capsule (600 mg) by mouth 2 times daily  Qty: 180 capsule, Refills: 2    Associated Diagnoses: Prurigo nodularis      albuterol  (PROVENTIL) (2.5 MG/3ML) 0.083% neb solution Take 1 vial (2.5 mg) by nebulization every 6 hours as needed for shortness of breath or wheezing.  Qty: 3 mL, Refills: 4    Associated Diagnoses: Acute bronchitis with symptoms > 10 days      albuterol (VENTOLIN HFA) 108 (90 Base) MCG/ACT inhaler INHALE 2 PUFFS INTO THE LUNGS EVERY 6 HOURS AS NEEDED FOR SHORTNESS OF BREATH / DYSPNEA  Qty: 54 g, Refills: 1    Comments: Pharmacy may dispense brand covered by insurance (Proair, or proventil or ventolin or generic albuterol inhaler)  Associated Diagnoses: Moderate persistent asthma without complication      apremilast (OTEZLA) 30 MG tablet Take 1 tablet (30 mg) by mouth 2 times daily. Hold for signs of infection, and seek medical attention.  Qty: 60 tablet, Refills: 5    Associated Diagnoses: Behcet's disease (H); Long-term use of immunosuppressant medication      atorvastatin (LIPITOR) 20 MG tablet TAKE ONE TABLET BY MOUTH ONE TIME DAILY  Qty: 90 tablet, Refills: 0    Associated Diagnoses: Hyperlipidemia LDL goal <130      augmented betamethasone dipropionate (DIPROLENE AF) 0.05 % external cream Apply topically 2 times daily  Qty: 50 g, Refills: 3    Associated Diagnoses: Atopic neurodermatitis      benzonatate (TESSALON) 200 MG capsule Take 1 capsule (200 mg) by mouth 2 times daily as needed for cough.  Qty: 40 capsule, Refills: 1    Associated Diagnoses: Cough, unspecified type      !! buPROPion (WELLBUTRIN XL) 150 MG 24 hr tablet Take 1 tablet (150 mg) by mouth every morning. To take with the 300 mg dose for a total of 450 mg daily.  Qty: 90 tablet, Refills: 1    Associated Diagnoses: Moderate episode of recurrent major depressive disorder (H)      !! buPROPion (WELLBUTRIN XL) 300 MG 24 hr tablet Take 1 tablet (300 mg) by mouth every morning. To take with the 150 mg dose for a total of 450 mg daily.  Qty: 90 tablet, Refills: 1    Associated Diagnoses: Moderate episode of recurrent major depressive disorder (H)       chlorhexidine (PERIDEX) 0.12 % solution Swish and spit 15 mLs in mouth 2 times daily  Qty: 1893 mL, Refills: 4    Associated Diagnoses: Aphthae, oral      clindamycin (CLINDAMAX) 1 % external gel Apply topically 2 times daily  Qty: 30 g, Refills: 4    Associated Diagnoses: Dermatitis      clotrimazole (MYCELEX) 10 MG lozenge Place 1 lozenge (10 mg) inside cheek 5 times daily.  Qty: 70 lozenge, Refills: 1    Associated Diagnoses: Thrush      COMPOUND CONTAINING CONTROLLED SUBSTANCE (CMPD RX) - PHARMACY TO MIX COMPOUNDED MEDICATION Compound amitriptyline 2% and ketamine 0.5% in 80g of lipoderm or Vanicream  Qty: 80 g, Refills: 0    Associated Diagnoses: Atopic neurodermatitis      COMPOUNDED NON-CONTROLLED SUBSTANCE (CMPD RX) - PHARMACY TO MIX COMPOUNDED MEDICATION Apply directly to the affected area, with usage up to twice daily  Qty: 1 each, Refills: 3    Comments: 7% tranexamic acid cream  Associated Diagnoses: Acquired platelet disorder (H)      desonide (DESOWEN) 0.05 % external cream Apply topically 2 times daily To sores on face  Qty: 60 g, Refills: 3    Associated Diagnoses: Dermatitis      dextran 70-hypromellose (TEARS NATURALE FREE PF) 0.1-0.3 % ophthalmic solution Place 2 drops into both eyes daily as needed (for dry eyes)  Qty: 35 each, Refills: 3    Associated Diagnoses: Xerophthalmia      doxycycline monohydrate (MONODOX) 100 MG capsule Take 1 capsule (100 mg) by mouth 2 times daily. After meals and with big glass of water  Qty: 180 capsule, Refills: 0    Associated Diagnoses: Acne vulgaris      DULoxetine (CYMBALTA) 60 MG capsule Take 2 capsules (120 mg) by mouth daily  Qty: 180 capsule, Refills: 1    Associated Diagnoses: Multiple joint pain      dupilumab (DUPIXENT) 300 MG/2ML prefilled pen Inject 2 mLs (300 mg) subcutaneously every 14 days.  Qty: 4 mL, Refills: 3    Associated Diagnoses: Other atopic dermatitis      famotidine (PEPCID) 40 MG tablet Take 1 tablet (40 mg) by mouth daily  Qty: 90  tablet, Refills: 2    Associated Diagnoses: Gastroesophageal reflux disease, unspecified whether esophagitis present      fluocinonide (LIDEX) 0.05 % external gel Apply topically 2 times daily as needed  Qty: 15 g, Refills: 1    Associated Diagnoses: Dermatitis      gabapentin (NEURONTIN) 250 MG/5ML solution Take 0.5-1 mLs (25-50 mg) by mouth daily. 50 mg q afternoon and 100 mg at bedtime  Qty: 50 mL, Refills: 1    Associated Diagnoses: Restless leg syndrome      hydrochlorothiazide (HYDRODIURIL) 25 MG tablet Take 1 tablet (25 mg) by mouth daily  Qty: 90 tablet, Refills: 1    Associated Diagnoses: Hypertension goal BP (blood pressure) < 140/90      hydrocortisone 2.5 % cream APPLY TOPICALLY 2 TIMES DAILY AS NEEDED  Qty: 30 g, Refills: 3    Associated Diagnoses: Dermatitis      hydrOXYzine HCl (ATARAX) 25 MG tablet Take 1 tablet (25 mg) by mouth 3 times daily as needed for anxiety (and sleep).  Qty: 90 tablet, Refills: 1    Associated Diagnoses: Anxiety      lidocaine, viscous, (XYLOCAINE) 2 % solution Swish and spit 10ml every 3 hours as needed for oral pain; max 8 doses/24hrs.  Do not eat or chew gum for 60 minutes following use.  Qty: 100 mL, Refills: 1    Associated Diagnoses: Aphthae, oral      losartan (COZAAR) 100 MG tablet Take 1 tablet (100 mg) by mouth daily.  Qty: 90 tablet, Refills: 1    Associated Diagnoses: Hypertension goal BP (blood pressure) < 140/90      memantine (NAMENDA) 10 MG tablet Take 1 tablet by mouth 2 times daily.      !! metoprolol succinate ER (TOPROL XL) 100 MG 24 hr tablet Take 1 tablet (100 mg) by mouth 2 times daily.  Qty: 180 tablet, Refills: 1    Associated Diagnoses: Palpitations; Sinus tachycardia      !! metoprolol succinate ER (TOPROL XL) 25 MG 24 hr tablet Take 1 tablet (25 mg) by mouth 2 times daily.  Qty: 180 tablet, Refills: 1    Associated Diagnoses: Hypertension goal BP (blood pressure) < 140/90; Palpitations; Sinus tachycardia      mometasone-formoterol (DULERA) 200-5  MCG/ACT inhaler Inhale 2 puffs into the lungs 2 times daily.  Qty: 39 g, Refills: 1    Associated Diagnoses: Moderate persistent asthma without complication      mupirocin (BACTROBAN) 2 % external cream Apply topically 3 times daily. to affected area as instructed.  Qty: 60 g, Refills: 5    Associated Diagnoses: Atopic neurodermatitis      mupirocin (BACTROBAN) 2 % external ointment Apply topically 3 times daily as needed.  Qty: 30 g, Refills: 3    Associated Diagnoses: Atopic neurodermatitis      Naltrexone HCl POWD       nystatin (MYCOSTATIN) 438923 UNIT/ML suspension Take 5 mLs (500,000 Units) by mouth 4 times daily. Has recurrent thrush. Uses for 2 weeks (280 ml)  at a time as needed.  Qty: 280 mL, Refills: 2    Associated Diagnoses: Thrush      ondansetron (ZOFRAN ODT) 4 MG ODT tab Take 1 tablet (4 mg) by mouth every 8 hours as needed for nausea.  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  Qty: 60 g, Refills: 3    Associated Diagnoses: Atopic neurodermatitis      predniSONE (DELTASONE) 10 MG tablet Take 1 tablet (10 mg) by mouth daily  Qty: 90 tablet, Refills: 3    Associated Diagnoses: Platelet granule defect (H)      rizatriptan (MAXALT) 10 MG tablet Take 1 tablet (10 mg) by mouth at onset of headache for migraine. May repeat in 2 hours. Max 3 tablets/24 hours.  Qty: 18 tablet, Refills: 1    Associated Diagnoses: Migraine without aura and without status migrainosus, not intractable      SUMAtriptan (IMITREX) 100 MG tablet take 1 tablet at onset of headache may repeat in 2 hours max 2 tabs/day  Qty: 18 tablet, Refills: 1    Associated Diagnoses: Migraine without aura and without status migrainosus, not intractable      tacrolimus (PROTOPIC)  0.1 % external ointment Apply topically 2 times daily. To areas of sores on face  Qty: 60 g, Refills: 1    Associated Diagnoses: Facial eczema      tiotropium (SPIRIVA RESPIMAT) 2.5 MCG/ACT inhaler Inhale 2 puffs into the lungs daily.  Qty: 12 g, Refills: 2    Associated Diagnoses: Moderate persistent asthma without complication      traMADol (ULTRAM) 50 MG tablet Take 1 tablet (50 mg) by mouth every 12 hours as needed for moderate to severe pain  Qty: 60 tablet, Refills: 0    Associated Diagnoses: Chronic pain syndrome; Chronic, continuous use of opioids      traZODone (DESYREL) 50 MG tablet Take 3 tablets (150 mg) by mouth at bedtime  Qty: 270 tablet, Refills: 2    Associated Diagnoses: Generalized anxiety disorder; Moderate recurrent major depression (H)      triamcinolone (ARISTOCORT HP) 0.5 % external cream Apply topically 2 times daily  Qty: 60 g, Refills: 0    Associated Diagnoses: Dermatitis      valACYclovir (VALTREX) 500 MG tablet Take 1 tablet (500 mg) by mouth daily  Qty: 90 tablet, Refills: 1    Comments: ++Do not fill at this time. Adding additional refills++  Associated Diagnoses: Aphthae, oral      XIIDRA 5 % opthalmic solution Place into both eyes daily as needed.      Rotigotine (NEUPRO) 1 MG/24HR 24 hr patch Place 1 patch over 24 hours onto the skin daily.  Qty: 30 patch, Refills: 1    Associated Diagnoses: Restless legs syndrome (RLS)      semaglutide-weight management (WEGOVY) 0.25 MG/0.5ML pen Inject 0.5 mLs (0.25 mg) subcutaneously once a week.  Qty: 2 mL, Refills: 0    Associated Diagnoses: Overweight with body mass index (BMI) of 28 to 28.9 in adult       !! - Potential duplicate medications found. Please discuss with provider.        STOP taking these medications       fluconazole (DIFLUCAN) 150 MG tablet Comments:   Reason for Stopping:         montelukast (SINGULAIR) 10 MG tablet Comments:   Reason for Stopping:         tranexamic acid (LYSTEDA) 650 MG tablet Comments:   Reason for  Stopping:             Allergies   Allergies   Allergen Reactions    Cats      and rabbits/wheezing    Dogs      wheezing    Lyrica [Pregabalin] Other (See Comments)     Rash, mouth sores, itching and burning    Seasonal Allergies      rhinits

## 2024-12-20 NOTE — PLAN OF CARE
Goal Outcome Evaluation:    Temp: 98  F (36.7  C) Temp src: Oral BP: 127/59 Pulse: 67   Resp: 16 SpO2: 99 % O2 Device: None (Room air)    NEURO: A&Ox4, pleasant/cooperative, able to make needs known, intermittent n/t to toes now resolved per pt  RESPIRATORY: Stable on RA, denies SOB  CARDIAC: WNL, continuous tele, denies chest pain  GI/: Spont void, constipated, LBM 12/16, denies nausea & abd discomfort  SKIN: Lesions/scabs to face and hands, otherwise WNL  DIET: Regular  PAIN/NAUSEA: Denies [id number]  IV ACCESS: L-PIV SL  ACTIVITY: Up ad paresh  LAB: Reviewed  PLAN: Continue w/ POC, heparin gtt discontinued at 1800, plan to transfer to  when bed available

## 2024-12-20 NOTE — PROGRESS NOTES
Notified of critical heparin 10 a levels.  Attempted to call back RN without response.    On review of the MAR, patient was on a heparin drip until around 6 PM on 12/19.  She then received a dose of apixaban at 7 PM.  The have a was drawn at 10 PM.  It is possible that the level is falsely high because of the DOAC.  Given that patient is no longer on the heparin drip there is no intervention at this time.    Continue to monitor patient for any signs of bleeding or HD abnl

## 2024-12-21 ASSESSMENT — ACTIVITIES OF DAILY LIVING (ADL)
ADLS_ACUITY_SCORE: 60

## 2024-12-22 ASSESSMENT — ACTIVITIES OF DAILY LIVING (ADL)
ADLS_ACUITY_SCORE: 60

## 2024-12-23 ENCOUNTER — MYC MEDICAL ADVICE (OUTPATIENT)
Dept: FAMILY MEDICINE | Facility: CLINIC | Age: 64
End: 2024-12-23
Payer: COMMERCIAL

## 2024-12-23 ENCOUNTER — VIRTUAL VISIT (OUTPATIENT)
Dept: PHARMACY | Facility: CLINIC | Age: 64
End: 2024-12-23
Attending: INTERNAL MEDICINE
Payer: COMMERCIAL

## 2024-12-23 DIAGNOSIS — G25.81 RESTLESS LEG SYNDROME: ICD-10-CM

## 2024-12-23 DIAGNOSIS — I10 BENIGN ESSENTIAL HYPERTENSION: ICD-10-CM

## 2024-12-23 DIAGNOSIS — I82.411 ACUTE DEEP VEIN THROMBOSIS (DVT) OF FEMORAL VEIN OF RIGHT LOWER EXTREMITY (H): Primary | ICD-10-CM

## 2024-12-23 DIAGNOSIS — L30.9 DERMATITIS: ICD-10-CM

## 2024-12-23 LAB
B2 GLYCOPROT1 IGM SERPL IA-ACNC: <2.4 U/ML
CARDIOLIPIN IGG SER IA-ACNC: <2 GPL-U/ML
CARDIOLIPIN IGG SER IA-ACNC: NEGATIVE
CARDIOLIPIN IGM SER IA-ACNC: <2 MPL-U/ML
CARDIOLIPIN IGM SER IA-ACNC: NEGATIVE

## 2024-12-23 NOTE — TELEPHONE ENCOUNTER
Requesting Med reorder mouth sores    Recently in hospital for DVT    Metoprolol stopped in Hospital as HR below 50.    Now home, still has not had med HR 64  /71    Also takes cozaar and hydrochlorothiazide    Wants to know if she should restart?    Advise    Liane Wharton RN  [facility] - Registered Nurse  Clinic Triage [place]   December 23, 2024

## 2024-12-23 NOTE — PROGRESS NOTES
Medication Therapy Management (MTM) Encounter    ASSESSMENT:                            Medication Adherence/Access: No issues identified.    ***:  ***      PLAN:                            Continue your current medications.     Follow-up: with Dr. Peetrson on 12/31/24; with MTM pharmacist on 2/12/25.     SUBJECTIVE/OBJECTIVE:                          aJcquie Amador is a 64 year old female seen for a follow-up visit.       Reason for visit: general follow-up.    Allergies/ADRs: Reviewed in chart  Past Medical History: Reviewed in chart  Tobacco: She reports that she quit smoking about 11 years ago. Her smoking use included cigarettes. She started smoking about 45 years ago. She has a 10.1 pack-year smoking history. She has been exposed to tobacco smoke. She has never used smokeless tobacco.  Alcohol: none    Medication Adherence/Access: no issues reported.    ***:   ***          Had DVT - has had swelling for a while - recently started to     Back is still painful - was starting to feel better than hospital bed     Restarted gabapentin - 2 mL in AM going really well - no hallucinations at this time - helping with some pain     Was taking metoprolol 125 mg BID - now only taking daily   Start monitoring blood pressures daily - was low in hospitalized     Has lost 25 lbs     BP Readings from Last 3 Encounters:   12/20/24 132/70   12/18/24 117/67   11/12/24 122/72       Today's Vitals: LMP 07/17/2009 (Exact Date)   ----------------  Post Discharge Medication Reconciliation Status: discharge medications reconciled, continue medications without change.    I spent 20 minutes with this patient today.      A summary of these recommendations was sent via [employer].    Golden Cadena, PharmD  Medication Therapy Management Pharmacist  [facility]  Phone: [phone]     Telemedicine Visit Details  The patient's medications can be safely assessed via a telemedicine encounter.  Type of service:  Telephone  visit  Originating Location (pt. Location): Home  {PROVIDER LOCATION On-site should be selected for visits conducted from your clinic location or adjoining [facility] hospital, academic office, or other nearby [facility] building. Off-site should be selected for all other provider locations, including home:[id number]}  Distant Location (provider location):  Off-site  Start Time:  9:30 AM  End Time:  9:50 AM     Medication Therapy Recommendations  No medication therapy recommendations to display        Location): Home    Distant Location (provider location):  Off-site  Start Time:  9:30 AM  End Time:  9:50 AM     Medication Therapy Recommendations  Hypertension goal BP (blood pressure) < 140/90   1 Current Medication: metoprolol succinate ER (TOPROL XL) 100 MG 24 hr tablet   Current Medication Sig: Take 1 tablet (100 mg) by mouth 2 times daily.   Rationale: Dose too high - Dosage too high - Safety   Recommendation: Make Appt with PCP - Discuss dose with your provider   Status: Patient Agreed - Adherence/Education   Identified Date: 12/23/2024 Completed Date: 12/23/2024                 No

## 2024-12-24 RX ORDER — FLUOCINONIDE GEL 0.5 MG/G
GEL TOPICAL 2 TIMES DAILY PRN
Qty: 15 G | Refills: 1 | Status: SHIPPED | OUTPATIENT
Start: 2024-12-24

## 2024-12-27 DIAGNOSIS — L28.1 PRURIGO NODULARIS: ICD-10-CM

## 2024-12-30 LAB
SCANNED LAB RESULT: ABNORMAL
TEST NAME: ABNORMAL

## 2024-12-31 ENCOUNTER — NURSE TRIAGE (OUTPATIENT)
Dept: FAMILY MEDICINE | Facility: CLINIC | Age: 64
End: 2024-12-31

## 2024-12-31 ENCOUNTER — TELEPHONE (OUTPATIENT)
Dept: FAMILY MEDICINE | Facility: CLINIC | Age: 64
End: 2024-12-31

## 2024-12-31 ENCOUNTER — MYC MEDICAL ADVICE (OUTPATIENT)
Dept: DERMATOLOGY | Facility: CLINIC | Age: 64
End: 2024-12-31

## 2024-12-31 RX ORDER — ACETYLCYSTEINE 600 MG
CAPSULE ORAL
Qty: 180 CAPSULE | Refills: 0 | Status: SHIPPED | OUTPATIENT
Start: 2024-12-31

## 2024-12-31 NOTE — PATIENT INSTRUCTIONS
"Recommendations from today's MTM visit:                                                       Continue Eliquis 5 mg twice daily.  This medication helps resolve blood clots.     If your blood pressure is dropping too low after the evening metoprolol dose, you can just take the morning dose. It would be good to reach out to Dr. Peterson for additional guidance.     Start monitoring your blood pressures at home so you can bring the readings to your next appointment.  This will help your providers adjust your medications accordingly.      Follow-up: with Dr. Peterson on 12/31/24; with MTM pharmacist on 2/12/25.     It was great speaking with you today.  I value your experience and would be very thankful for your time in providing feedback in our clinic survey. In the next few days, you may receive an email or text message from [employer] with a link to a survey related to your  clinical pharmacist.\"     To schedule another MTM appointment, please call the clinic directly or you may call the MTM scheduling line at [phone].    My Clinical Pharmacist's contact information:                                                      Please feel free to contact me with any questions or concerns you have.      Golden Cadena, PharmD  Medication Therapy Management Pharmacist  [facility]  Phone: [phone]    "

## 2024-12-31 NOTE — TELEPHONE ENCOUNTER
Assessment & Plan: 12/05/2024  Multiple skin ulcers resembling prurigo/neurodermatitis  Facial erosions, chronic active problem, uncontrolled.  But slowly improving.  Patient with a history of recurring oral and genital sores, likely recurrent aphthous ulcers, as well as multiple diffuse discrete skin ulcers. Today, Jacquie notes ongoing skin sores on face and describes that her condition has had a large emotional impact. She does endorse that she will pick at the lesions when they become painful to try to express the contents and relieve the pain. She did not start the amitriptyline/ketamine cream after last visit. Discussed continue current regimen and restarting the compound cream. Reviewed dermatologic medications and discussed that they are unlikely to be contributing to elevated creatinine. Discussed contribution of anxiety to condition and patient expressed interest in psychiatry referral for further evaluation.   - Gentle skin care regimen  - Protopic oint bid   - Betamethasone ointment and desonide cream as needed for persistent skin sores and pain  - Can continue amytriptyline/ketamine cream to apply twice daily to areas of sores as desired  - Dupixent 300mg q2 weeks and is tolerating this without problems   - N-acetylcystiene 600 mg bid  - Encouraged patient to followup with psych for anxiety/depression   - Encouraged Duoderm for nonhealing sores on right side of face     # Recurrent Apthous ulcers   Currently overall improved from prior; one ulcer present today but have otherwise been infrequent   - OK to discontinue

## 2024-12-31 NOTE — TELEPHONE ENCOUNTER
Patient calling clinic. Asking to speak with a certain staff member Radha. Writer asked if they could help with request of call. Unsure of which staff member patient referring to as name did not match. Writer attempted to clarify name. Patient stated no, they only wanted to speak with certain staff member. Patient states she will send in a [employer] message.     Gabbie Russo RN on 12/31/2024 at 1:03 PM

## 2024-12-31 NOTE — TELEPHONE ENCOUNTER
Received refill request as the resident on call. After reviewing the medication list and assessment and plan from last visit, the refill request was accepted.     Sathish Coello DO, MS  PGY-3  Dermatology  [facility]  Dec 27, 2024 2:17 PM

## 2024-12-31 NOTE — TELEPHONE ENCOUNTER
Notified patient via [employer] that the medication was sent to her pharmacy    Mari Montano LPN

## 2024-12-31 NOTE — TELEPHONE ENCOUNTER
KATARINA sent to ED    Patient also relayed that she has been under increased stress the past few days with her and her  recently starting to separate. She has the 588 crisis number from daughter and contacted, resources given. Contacted Sujatha for counseling appt.    RN reviewed 1st to have RLE evaluation and we can further discuss other needs as above for mental health, patient agreed.     Nurse Triage SBAR    Is this a 2nd Level Triage? YES, LICENSED PRACTITIONER REVIEW IS REQUIRED    Situation: Patient having right top of foot pain, burning, moderate to sever and top of knee swelling.   Background: Recent hospitalization 12/11/24 for R. Femoral DVT and started on Elaquis    See triage note 12/27/24 right knee outer area swelling, recommended at that time to go to ED and made appt. For in office.   Cancelled today's appointment due to worsening symptoms.     Assessment: Pain over anterior foot increasing on right side to moderate/severe, burning and increasing swelling around and above right knee. Denies SOB,CP,HA,fever, redness, skin discoloration, unsure if temperature in right vs left foot differing. Denies missed doses of elaquis.   Protocol Recommended Disposition:   Go To ED/UCC Now (Or To Office With PCP Approval)    Recommendation: Referred patient to ED for evaluation of RLE. Patient will have  take her to the ED now. Reviewed if increasing symptoms or changing 911 should be called for assistance to ED. Patient verbalized understanding and all questions answered.       Routed to provider    Does the patient meet one of the following criteria for ADS visit consideration? 16+ years old, with an [facility] PCP     Reason for Disposition   Patient sounds very sick or weak to the triager    Additional Information   Negative: Followed a foot injury   Negative: Toe pain is main symptom   Negative: Ankle pain is main symptom   Negative: Entire foot is cool or blue in comparison to other foot   Negative: Purple  "or black skin on foot or toe   Negative: Red area or streak and fever   Negative: Swollen foot and fever    Answer Assessment - Initial Assessment Questions  1. ONSET: \"When did the pain start?\"       12/28  2. LOCATION: \"Where is the pain located?\"      Right top off foot  3. PAIN: \"How bad is the pain?\"    (Scale 1-10; or mild, moderate, severe)      Moderate to severe  4. WORK OR EXERCISE: \"Has there been any recent work or exercise that involved this part of the body?\"      no  5. CAUSE: \"What do you think is causing the foot pain?\"      unknown  6. OTHER SYMPTOMS: \"Do you have any other symptoms?\" (e.g., leg pain, rash, fever, numbness)     Right leg discomfort  7. PREGNANCY: \"Is there any chance you are pregnant?\" \"When was your last menstrual period?\"      N/a    Protocols used: Foot Pain-A-[place]    "

## 2025-01-05 RX ORDER — DESONIDE 0.5 MG/G
CREAM TOPICAL 2 TIMES DAILY
Qty: 60 G | Refills: 3 | Status: SHIPPED | OUTPATIENT
Start: 2025-01-05

## 2025-01-13 ENCOUNTER — MYC MEDICAL ADVICE (OUTPATIENT)
Dept: HEMATOLOGY | Facility: CLINIC | Age: 65
End: 2025-01-13
Payer: COMMERCIAL

## 2025-01-13 ENCOUNTER — TRANSFERRED RECORDS (OUTPATIENT)
Dept: HEALTH INFORMATION MANAGEMENT | Facility: CLINIC | Age: 65
End: 2025-01-13
Payer: COMMERCIAL

## 2025-01-14 ENCOUNTER — TRANSFERRED RECORDS (OUTPATIENT)
Dept: MULTI SPECIALTY CLINIC | Facility: CLINIC | Age: 65
End: 2025-01-14

## 2025-01-14 LAB — RETINOPATHY: NORMAL

## 2025-01-16 ENCOUNTER — TELEPHONE (OUTPATIENT)
Dept: FAMILY MEDICINE | Facility: CLINIC | Age: 65
End: 2025-01-16
Payer: COMMERCIAL

## 2025-01-16 NOTE — TELEPHONE ENCOUNTER
Pt's spouse Tono is wanting to speak directly to Dr. Peterson about concerns he has about pt's health. He said he cannot talk when she is around but will try his best to answer when he gets a callback. Tomorrow 1/17 around 12pm would be best if it is not today.     Tono [phone]

## 2025-01-17 NOTE — TELEPHONE ENCOUNTER
Please let Tono know that I am out unexpectedly today.  Can call on Monday if that would work. Let me know if there is a good time. I can try to accommodate.

## 2025-01-20 NOTE — TELEPHONE ENCOUNTER
Called Tono. He was unable to speak. He said he will put together something on paper and drop off at the clinic.

## 2025-01-22 ENCOUNTER — NURSE TRIAGE (OUTPATIENT)
Dept: FAMILY MEDICINE | Facility: CLINIC | Age: 65
End: 2025-01-22
Payer: COMMERCIAL

## 2025-01-23 NOTE — TELEPHONE ENCOUNTER
Nurse Triage SBAR    Is this a 2nd Level Triage? NO    Situation: 1/21/25 noted right outer ankle and side of foot mild swelling, worsens as day goes on and better in the morning after feet up or laying down.   Background:12/31/24 RLE DVT on Elaquis  Assessment: Patient noted mild swelling over outer right ankle, pictures in MyChart, ankle bone visible to side of foot. Denies pain, redness, ecchymosis, warmth, fever, chills, SOB, CP.  Missed one dose of Elaquis over weekend.   Protocol Recommended Disposition:   See within 3 days    Appointments in Next Year      Jan 24, 2025 11:00 AM  (Arrive by 10:40 AM)  Provider Visit with Earnest Noel MD  [facility] ([facility] [phone]     Recommendation: To be seen in clinic within 3 days and if symptoms increase may need to be seen  sooner. Patient verbalized understanding and all questions answered.     Does the patient meet one of the following criteria for ADS visit consideration? No    Liane Post RN  [facility] - Registered Nurse  Clinic Triage [place]   January 23, 2025      Reason for Disposition   MILD swelling of both ankles (e.g., ankle joints look swollen; or bilateral mild pedal edema) and new-onset or getting worse  (Exceptions: Caused by hot weather, already seen by doctor or NP/PA for this.)    Additional Information   Negative: Chest Pain   Negative: Followed an ankle injury   Negative: Followed a bee sting and has localized swelling (e.g., small area of puffy or swollen skin)   Negative: Followed an insect bite and has localized swelling (e.g., small area of puffy or swollen skin)   Negative: Ankle pain is main symptom   Negative: Swelling of calf or leg is main symptom   Negative: Leg swelling (e.g., ankle swelling extends up to shins or knees)   Negative: Difficulty breathing   Negative: Entire foot is cool or blue in comparison to other side   Negative: Ankle pain and fever   Negative:  "Ankle redness and fever   Negative: Patient sounds very sick or weak to the triager   Negative: SEVERE ankle pain (e.g., excruciating, unable to walk) and not improved after 2 hours of pain medicine   Negative: Redness and painful when touched and no fever   Negative: Red area or streak > 2 inches (or 5 cm)   Negative: Thigh or calf pain and only 1 side and present > 1 hour   Negative: Thigh, calf, or ankle swelling and bilateral and 1 side is more swollen   Negative: Thigh, calf, or ankle swelling and only 1 side  (Exceptions: Itchy, localized swelling; swelling is chronic.)   Negative: SEVERE ankle swelling (e.g., can't move swollen ankle at all)   Negative: Looks like a boil, infected sore, deep ulcer or other infected rash (spreading redness, pus)   Negative: Patient wants to be seen   Negative: MODERATE ankle swelling (e.g., can't move joint normally, interferes with normal activities) (Exceptions: Itchy, localized swelling; swelling is chronic.)    Answer Assessment - Initial Assessment Questions  1. LOCATION: \"Which ankle is swollen?\" \"Where is the swelling?\"      Right ankle outer ankle  2. ONSET: \"When did the swelling start?\"     1/21  3. SWELLING: \"How bad is the swelling?\" Or, \"How large is it?\" (e.g., mild, moderate, severe; size of localized swelling)       mild  4. PAIN: \"Is there any pain?\" If Yes, ask: \"How bad is it?\" (Scale 0-10; or none, mild, moderate, severe)      no  5. CAUSE: \"What do you think caused the ankle swelling?\"     unsure  6. OTHER SYMPTOMS: \"Do you have any other symptoms?\" (e.g., fever, chest pain, difficulty breathing, calf pain)      none  7. PREGNANCY: \"Is there any chance you are pregnant?\" \"When was your last menstrual period?\"      N/a    Protocols used: Ankle Swelling-A-OH    "

## 2025-01-30 ENCOUNTER — DOCUMENTATION ONLY (OUTPATIENT)
Dept: ANTICOAGULATION | Facility: CLINIC | Age: 65
End: 2025-01-30

## 2025-01-30 NOTE — PROGRESS NOTES
Anticoagulant Therapeutic Duplication    Duplicate orders identified: different medication of the same therapeutic class    The duplicate anticoagulant order(s) has been discontinued    Active anticoagulant: rivaroxaban (Xarelto)    Plan made per [facility] anticoagulation protocol.    Liseth Alfaro RN  1/30/2025

## 2025-02-12 ENCOUNTER — MYC MEDICAL ADVICE (OUTPATIENT)
Dept: DERMATOLOGY | Facility: CLINIC | Age: 65
End: 2025-02-12
Payer: COMMERCIAL

## 2025-02-12 ENCOUNTER — VIRTUAL VISIT (OUTPATIENT)
Dept: PHARMACY | Facility: CLINIC | Age: 65
End: 2025-02-12
Attending: INTERNAL MEDICINE
Payer: COMMERCIAL

## 2025-02-12 DIAGNOSIS — G25.81 RESTLESS LEG SYNDROME: ICD-10-CM

## 2025-02-12 DIAGNOSIS — M35.2 BEHCET'S SYNDROME INVOLVING ORAL MUCOSA (H): Primary | ICD-10-CM

## 2025-02-12 DIAGNOSIS — I82.411 ACUTE DEEP VEIN THROMBOSIS (DVT) OF FEMORAL VEIN OF RIGHT LOWER EXTREMITY (H): ICD-10-CM

## 2025-02-12 DIAGNOSIS — I10 BENIGN ESSENTIAL HYPERTENSION: ICD-10-CM

## 2025-02-12 NOTE — PATIENT INSTRUCTIONS
"Recommendations from today's MTM visit:                                                       Continue your current medications.    Schedule a rheumatology follow-up appointment with Dr. Hawkins.   Scheduling Phone: [phone].    Consider meeting with physical therapy to see if they can help with your dizziness and coordination.     Continue to monitor you blood pressures at home.  Discuss the values with Dr. Peterson when you meet with her next. If the values keep getting higher, reach out to the clinic.      Follow-up: with MTM pharmacist on 3/25/25.    It was great speaking with you today.  I value your experience and would be very thankful for your time in providing feedback in our clinic survey. In the next few days, you may receive an email or text message from [employer] with a link to a survey related to your  clinical pharmacist.\"     To schedule another MTM appointment, please call the clinic directly or you may call the MTM scheduling line at [phone].    My Clinical Pharmacist's contact information:                                                      Please feel free to contact me with any questions or concerns you have.      Golden Cadena, PharmD  Medication Therapy Management Pharmacist  [facility]  Phone: [phone]    "

## 2025-02-12 NOTE — PROGRESS NOTES
Medication Therapy Management (MTM) Encounter    ASSESSMENT:                            Medication Adherence/Access: No issues identified.    Behcet's Disease: Otezla is well tolerated and patient to continue therapy as prescribed. She is due for a rheumatology follow-up appointment to reassess the efficacy of treatment. Patient advised to schedule appointment.      DVT: Patient is back on Eliquis and would benefit from continuing therapy. Encouraged patient to follow-up with prescriber for ongoing management.     Restless Leg Syndrome: Patient stopped gabapentin again after experiencing adverse effects from therapy. Difficult to confirm dose patient was using and it would be best to avoid medication in the future. Discussed pramipexole and provided education on potential medication adverse effects. Patient will be starting medical marijuana which has the potential to help with RLS. Encouraged patient to reach out with questions or concerns.    Hypertension: Patient's blood pressure has increased since decreasing metoprolol to once daily. She has not experienced any improvement in her dizziness, which is likely not related to her blood pressure. If patient's blood pressures remain elevated, it may be appropriate to increase the metoprolol back to twice daily. Discussed physical therapy referral placed by provider for vestibular therapy and encouraged patient to consider scheduling appointment. Patient to continue monitoring her blood pressures at home and discuss with provider at follow-up.    PLAN:                            Continue your current medications.    Schedule a rheumatology follow-up appointment with Dr. Hawkins.   Scheduling Phone: [phone].    Consider meeting with physical therapy to see if they can help with your dizziness and coordination.     Continue to monitor you blood pressures at home.  Discuss the values with Dr. Peterson when you meet with her next. If the values keep getting higher,  reach out to the clinic.      Follow-up: with MT pharmacist on 3/25/25.    SUBJECTIVE/OBJECTIVE:                          Jacquie Amador is a 64 year old female seen for a follow-up visit.    Reason for visit: Medication follow-up.    Allergies/ADRs: Reviewed in chart  Past Medical History: Reviewed in chart  Tobacco: She reports that she quit smoking about 11 years ago. Her smoking use included cigarettes. She started smoking about 45 years ago. She has a 10.1 pack-year smoking history. She has been exposed to tobacco smoke. She has never used smokeless tobacco.  Alcohol: none    Medication Adherence/Access: no issues reported.    Behcet's Disease:  Otezla 30 mg twice daily     Patient reports Otezla continues to work okay. No issues with medication tolerability. Spots on her face are uncontrolled. Plans to follow-up with dermatology to discuss. Joint pain is still present, but possibly slightly improved.       Last lab monitoring completed: 1/24/25    DVT:   Eliquis 5 mg twice daily     Continues to take Eliquis as directed. Thought she was more fatigued since starting the medication and briefly switched to Xarelto. Reported Xarelto side effcts were worse and patient switched back. Reported Eliquis is tolerable. No issues with pain or swelling related to blood clot. No signs of bleeding.     Restless Leg Syndrome:   Pramipexole 0.125 mg daily - not taking  Hydroxyzine 25 mg three times daily as needed     Patient stopped gabapentin again due to changes to mood and memory. Reported being prescribed an alternative medication, but has not started. Currently restless leg symptoms are active. Will use biofreeze as needed, which sometimes helps. Plans to start medical marijuana next week.      Hypertension:   Hydrochlorothiazide 25 mg daily   Losartan 100 mg daily   Metoprolol 125 mg once daily     Patient reports her blood pressures have gone up since reducing metoprolol to once daily. Recent home readings were:  140/84 mmHg, 151/94 mmHg, and 122/89 mmHg. Mentioned dizziness has not improved with higher blood pressures and patient thinks it might have even gotten worse. Did not see physical therapy as recommended.     Today's Vitals: LMP 07/17/2009 (Exact Date)   ----------------    I spent 20 minutes with this patient today.      A summary of these recommendations was sent via [employer].    Golden Cadena, PharmD  Medication Therapy Management Pharmacist  [facility]  Phone: [phone]     Telemedicine Visit Details  The patient's medications can be safely assessed via a telemedicine encounter.  Type of service:  Telephone visit  Originating Location (pt. Location): Home    Distant Location (provider location):  Off-site  Start Time:  9:30 AM  End Time:  9:50 AM     Medication Therapy Recommendations  No medication therapy recommendations to display

## 2025-02-13 ENCOUNTER — TRANSFERRED RECORDS (OUTPATIENT)
Dept: HEALTH INFORMATION MANAGEMENT | Facility: CLINIC | Age: 65
End: 2025-02-13

## 2025-02-13 DIAGNOSIS — M25.50 MULTIPLE JOINT PAIN: ICD-10-CM

## 2025-02-13 RX ORDER — DULOXETIN HYDROCHLORIDE 60 MG/1
120 CAPSULE, DELAYED RELEASE ORAL DAILY
Qty: 180 CAPSULE | Refills: 0 | Status: SHIPPED | OUTPATIENT
Start: 2025-02-13

## 2025-02-19 ENCOUNTER — NURSE TRIAGE (OUTPATIENT)
Dept: FAMILY MEDICINE | Facility: CLINIC | Age: 65
End: 2025-02-19
Payer: COMMERCIAL

## 2025-02-20 NOTE — TELEPHONE ENCOUNTER
Agree with plan for visit on 3/18 with understanding that she reaches out sooner for worsening or concerns.

## 2025-02-20 NOTE — TELEPHONE ENCOUNTER
Nurse Triage SBAR    Is this a 2nd Level Triage? NO    Situation: BLE ankle mild swelling start 2/19  Background: DVT HX and on Elaquis  Assessment: See photo, mild ankle swelling, resolved in morning and worse as day goes on and if up more. Denies pain, redness, warmth, other swelling, SOB, CP.  Protocol Recommended Disposition:   See in Office Within 2 Weeks    Patient has appt. 3/18/25 ok to wait until then, no other available, prefers to only see PCP. She does understand if worsening symptoms will need to be evaluated sooner.  Recommendation: T o be seen in clinic and reviewed red flags to seek out emergent care. Patient verbalized understanding and all questions answered.     Routed to provider    Does the patient meet one of the following criteria for ADS visit consideration? 16+ years old, with an [facility] PCP   Liane Wharton RN  [facility] - Registered Nurse  Clinic Triage [place]   February 20, 2025  Reason for Disposition   MILD swelling of both ankles (i.e., pedal edema) is a chronic symptom (recurrent or ongoing AND present > 4 weeks)    Additional Information   Negative: Chest Pain   Negative: Followed an ankle injury   Negative: Followed a bee sting and has localized swelling (e.g., small area of puffy or swollen skin)   Negative: Followed an insect bite and has localized swelling (e.g., small area of puffy or swollen skin)   Negative: Ankle pain is main symptom   Negative: Swelling of calf or leg is main symptom   Negative: Leg swelling (e.g., ankle swelling extends up to shins or knees)   Negative: Difficulty breathing   Negative: Entire foot is cool or blue in comparison to other side   Negative: Ankle pain and fever   Negative: Ankle redness and fever   Negative: Patient sounds very sick or weak to the triager   Negative: SEVERE ankle pain (e.g., excruciating, unable to walk) and not improved after 2 hours of pain medicine   Negative: Redness and painful when touched and no fever   Negative: Red  "area or streak > 2 inches (or 5 cm)   Negative: Thigh or calf pain and only 1 side and present > 1 hour   Negative: Thigh, calf, or ankle swelling and bilateral and 1 side is more swollen   Negative: Thigh, calf, or ankle swelling and only 1 side  (Exceptions: Itchy, localized swelling; swelling is chronic.)   Negative: SEVERE ankle swelling (e.g., can't move swollen ankle at all)   Negative: Looks like a boil, infected sore, deep ulcer or other infected rash (spreading redness, pus)   Negative: Patient wants to be seen   Negative: MODERATE ankle swelling (e.g., can't move joint normally, interferes with normal activities) (Exceptions: Itchy, localized swelling; swelling is chronic.)   Negative: MILD swelling of both ankles (e.g., ankle joints look swollen; or bilateral mild pedal edema) and new-onset or getting worse  (Exceptions: Caused by hot weather, already seen by doctor or NP/PA for this.)   Negative: MILD swelling of both ankles (e.g., mild pedal edema) caused by hot weather   Negative: MILD swelling of both ankles (e.g., mild pedal edema) and has varicose veins    Answer Assessment - Initial Assessment Questions  1. LOCATION: \"Which ankle is swollen?\" \"Where is the swelling?\"      BLE ankles  2. ONSET: \"When did the swelling start?\"   2/19  3. SWELLING: \"How bad is the swelling?\" Or, \"How large is it?\" (e.g., mild, moderate, severe; size of localized swelling)      mild  4. PAIN: \"Is there any pain?\" If Yes, ask: \"How bad is it?\" (Scale 0-10; or none, mild, moderate, severe)  no  5. CAUSE: \"What do you think caused the ankle swelling?\"      unsure  6. OTHER SYMPTOMS: \"Do you have any other symptoms?\" (e.g., fever, chest pain, difficulty breathing, calf pain)  no  7. PREGNANCY: \"Is there any chance you are pregnant?\" \"When was your last menstrual period?\"  N/a    Protocols used: Ankle Swelling-A-OH    "

## 2025-02-20 NOTE — TELEPHONE ENCOUNTER
RN Triage    Patient Contact    Attempt # 1    Was call answered?  No.  Left message on voicemail with information to call me back.    Emi Gomez RN on 2/20/2025 at 4:10 PM

## 2025-02-20 NOTE — TELEPHONE ENCOUNTER
Writer spoke with patient and relayed providers update. Verbalized understanding. No further questions.     Ochoa Greco RN on 2/20/2025 at 4:56 PM

## 2025-03-14 ENCOUNTER — ANCILLARY PROCEDURE (OUTPATIENT)
Dept: ULTRASOUND IMAGING | Facility: OTHER | Age: 65
End: 2025-03-14
Attending: INTERNAL MEDICINE
Payer: COMMERCIAL

## 2025-03-14 DIAGNOSIS — I82.411 ACUTE DEEP VEIN THROMBOSIS (DVT) OF FEMORAL VEIN OF RIGHT LOWER EXTREMITY (H): ICD-10-CM

## 2025-03-14 PROCEDURE — 93971 EXTREMITY STUDY: CPT | Mod: TC | Performed by: RADIOLOGY

## 2025-03-17 ENCOUNTER — VIRTUAL VISIT (OUTPATIENT)
Dept: PHARMACY | Facility: CLINIC | Age: 65
End: 2025-03-17
Attending: INTERNAL MEDICINE
Payer: COMMERCIAL

## 2025-03-17 DIAGNOSIS — G25.81 RESTLESS LEG SYNDROME: ICD-10-CM

## 2025-03-17 DIAGNOSIS — I10 BENIGN ESSENTIAL HYPERTENSION: ICD-10-CM

## 2025-03-17 DIAGNOSIS — R61 GENERALIZED HYPERHIDROSIS: ICD-10-CM

## 2025-03-17 DIAGNOSIS — E78.5 HYPERLIPIDEMIA LDL GOAL <100: ICD-10-CM

## 2025-03-17 DIAGNOSIS — H04.123 DRY EYES: ICD-10-CM

## 2025-03-17 DIAGNOSIS — B37.0 THRUSH: ICD-10-CM

## 2025-03-17 DIAGNOSIS — F41.1 GENERALIZED ANXIETY DISORDER: ICD-10-CM

## 2025-03-17 DIAGNOSIS — M35.2 BEHCET'S SYNDROME INVOLVING ORAL MUCOSA (H): Primary | ICD-10-CM

## 2025-03-17 DIAGNOSIS — J45.40 MODERATE PERSISTENT ASTHMA WITHOUT COMPLICATION: ICD-10-CM

## 2025-03-17 DIAGNOSIS — I82.411 ACUTE DEEP VEIN THROMBOSIS (DVT) OF FEMORAL VEIN OF RIGHT LOWER EXTREMITY (H): ICD-10-CM

## 2025-03-17 DIAGNOSIS — G47.00 INSOMNIA: ICD-10-CM

## 2025-03-17 DIAGNOSIS — L30.9 DERMATITIS: ICD-10-CM

## 2025-03-17 DIAGNOSIS — K12.0 APHTHOUS ULCER: ICD-10-CM

## 2025-03-17 DIAGNOSIS — G43.009 MIGRAINE WITHOUT AURA AND WITHOUT STATUS MIGRAINOSUS, NOT INTRACTABLE: ICD-10-CM

## 2025-03-17 DIAGNOSIS — F33.9 MDD (MAJOR DEPRESSIVE DISORDER), RECURRENT EPISODE: ICD-10-CM

## 2025-03-17 DIAGNOSIS — D69.1: ICD-10-CM

## 2025-03-17 DIAGNOSIS — K21.9 GERD (GASTROESOPHAGEAL REFLUX DISEASE): ICD-10-CM

## 2025-03-17 DIAGNOSIS — G89.29 CHRONIC PAIN: ICD-10-CM

## 2025-03-17 DIAGNOSIS — R11.0 NAUSEA: ICD-10-CM

## 2025-03-17 DIAGNOSIS — L30.9 FACIAL ECZEMA: ICD-10-CM

## 2025-03-17 DIAGNOSIS — R05.9 COUGH: ICD-10-CM

## 2025-03-17 NOTE — PROGRESS NOTES
Medication Therapy Management (MTM) Encounter    ASSESSMENT:                            Medication Adherence/Access: No issues identified.    Behcet's Disease: Otezla appears to be well tolerated and patient would benefit from continuing therapy at this time. We reviewed the potential adverse effects of worsening depression and discussed possible options if mental health remains uncontrolled or worsens. Encouraged patient to schedule rheumatology follow-up to reassess safety and efficacy of therapy.    Hypertension: Stable.       Hyperlipidemia: Stable.      Acute DVT: Provided education on Eliquis including typical duration of therapy, potential adverse effects, and important considerations. Advised patient to follow-up with her provider to determine appropriate duration.     Acquired Platelet Disorder: Continue to manage with your prescriber.     Atopic Dermatitis/Acne Vulgaris: Stable. Continue to manage with dermatology.     Restless Leg Syndrome: Stable. Symptoms much improved with current treatment.     Asthma: Patient achieving symptom improvement and would benefit from continuing therapy. Reviewed Dulera dosing instructions and encouraged patient to discuss with provider at follow-up. Since symptoms are stable with infrequent Dulera use, it may be appropriate to consider stopping the medication.      GERD: Stable.      Headache - Migraine: Stable.      Mental Health - Anxiety, Depression, and Insomnia: Patient has noticed improvements in mood, agitation, and sleep since stopping memantine. Encouraged her to stay off medication and continue to monitor symptoms. If symptoms feel uncontrolled at follow-up, it may be appropriate to adjust one of her other medications that could be contributing to her symptoms, such as oxybutynin, Otezla, or naltrexone.     Chronic Pain: Patient noticing improved sleep and mood since stopping memantine and would benefit from staying off therapy. Reviewed acetaminophen dosing and  patient able to take up to 4000 mg daily. Encouraged patient to reschedule back injection that was missed last week. Advised patient to continue to work with pain clinic to manage.     Hyperhidrosis: Patient has achieved significant improvement with treatment and would benefit from continuing therapy. Discussed potential adverse effects of agitation, confusion, and hallucinations, and it may be appropriate to consider a dose decrease in the future if patient still noticing symptoms.      Nausea: Stable.     Cough: Stable.     Mouth Sores/Thrush: Stable. Symptoms are improving with treatment.     Dry Eyes: Stable.     PLAN:                            Discuss the following with Dr. Peterson at your appointment tomorrow:  How you are feeling since stopping memantine.   How you are using Dulera.      You can take up to 4000 mg of acetaminophen daily.     Reschedule your back injection that you were suppose to receive last week.     Discuss Eliquis with your provider and how long you should continue the medication.     Call to schedule your rheumatology follow-up appointment.   Phone: [phone]      Follow-up: with [facility] pharmacist on 4/14/25.     SUBJECTIVE/OBJECTIVE:                          Jacquie Amador is a 64 year old female seen for a follow-up visit.    Reason for visit: Medication side effect concerns.    Allergies/ADRs: Reviewed in chart  Past Medical History: Reviewed in chart  Tobacco: She reports that she quit smoking about 11 years ago. Her smoking use included cigarettes. She started smoking about 45 years ago. She has a 10.1 pack-year smoking history. She has been exposed to tobacco smoke. She has never used smokeless tobacco.  Alcohol: none    Medication Adherence/Access: no issues reported.    Behcet's Disease:  Otezla 30 mg twice daily     Patient thinks Otezla has helped some. No noticeable concerns with adverse effects. Spots on her face are much improved. Joint pain is still present, but possibly  slightly improved.    Hypertension   Hydrochlorothiazide 25 mg daily   Losartan 100 mg daily   Metoprolol 125 mg daily     Patient checks blood pressure occasionally at home. Reported readings have been much better. No longer noticing dizziness/lightheadedness.       Hyperlipidemia   Atorvastatin 20 mg daily    Patient reports no significant myalgias or other side effects.     Acute DVT  Eliquis 5 mg twice daily     Reports blood clot has resolved. Notices she bruises a little easier, but not excessive. Wondering how long she should remain on treatment.      Acquired Platelet Disorder  Compounded tranexamic acid cream up to twice daily   Prednisone 10 mg daily     Using tranexamic acid daily. Feels prednisone is helping. Working closely with provider and feels condition has improved.     Atopic Dermatitis/Acne Vulgaris  Dupixent 300 mg subcutaneous injections every 2 weeks   Doxycycline 100 mg twice daily   Augmented betamethasone dipropionate 0.05% cream twice daily as needed   Clindamycin 1% gel twice daily  Compounded amitriptyline and ketamine cream - not taking   Desonide 0.05% cream twice daily as needed   Fluocinonide 0.05% gel twice daily   Hydrocortisone 2.5% cream twice daily as needed   Mupirocin 2% ointment three times daily as needed    mg twice daily   Pimecrolimus 1% cream twice daily   Tacrolimus 0.1% ointment twice daily   Triamcinolone 0.5% cream twice daily     Reports her face and skin has cleared up significantly. Overall, doing well at this time. Occasionally uses as needed creams. Rotates which ones she uses. Prefers mupirocin cream over ointment. Ointment is thick and greasy. Ointment preferred by patient's insurance. Patient interested in attempting coverage for cream again in the future.       Restless Leg Syndrome  Hydroxyzine 25 mg three times daily as needed   Ferrous sulfate 325 mg daily     Experiencing symptoms 1-2 times per week. Symptoms improve with hydroxyzine dose. Feels  condition is very manageable at this time.     Asthma   Dulera 200-5 mcg/act twice daily   Spiriva 2.5 mcg/act daily  Albuterol 90 mcg/act every 6 hours as needed   Albuterol nebulizer every 6 hours as needed     Patient reports her breathing has been good. Using Spiriva daily. Dulera and albuterol as needed. Will use Dulera if symptoms are more severe. Albuterol if symptoms are minor. Using as needed medications maybe once a month.      GERD    Famotidine 40 mg once daily     Patient feels that current regimen is effective. The patient does notice symptoms if they miss a dose.     Headache   Migraine:   Acute medications  Rizatriptan 10 mg    Takes about once a month for migraines. Reports medication works well to resolve.      Mental Health   Anxiety, Depression, and Insomnia  Bupropion 450 mg once daily  Duloxetine 60 mg twice daily  Trazodone 150 mg at bedtime     Patient reports mental health has improved since stopping memantine last week. Only has had one major episode of agitation. Encouraged with initial benefit, but feels there is room for improvement. Reported she has slept very well the past few days.     Chronic Pain   Tramadol 50 mg twice daily   Memantine 10 mg twice daily - not taking    Naltrexone 6 mg daily   Acetaminophen 1000 mg every 6 hours as needed     Patient reports back pain continues to be uncontrolled. Taking tramadol 1-2 times daily, acetaminophen twice daily. Pain medications allow her to get through daily activities. Self-discontinued memantine late last week. Noticing improvements in sleep since stopping. Also less severe and less frequent mood swings. No change in pain control since stopping. Was supposed to have an injection in her back last week. Unable to get due to family issues.     Hyperhidrosis  Oxybutynin ER 10 mg daily     Reports medication works really well to prevent sweating. Has been on for multiple years and condition is much improved. Prefers to continue treatment  without changes.      Nausea  Ondansetron 4 mg ODT every 8 hours as needed    Experiencing occasional nausea. Medication works well to manage symptoms.     Cough  Benzonatate 200 mg twice daily as needed     Takes as needed for cough/bronchitis. Reported medication works well when used.    Mouth Sores/Thrush   Valacyclovir 500 mg daily   Clotrimazole 10 mg lozenge 5 times daily as needed   Nystatin 500,000 units four times daily for 2 weeks as needed   Chlorhexidine 0.12% solution twice daily as needed   Lidocaine 2% solution as needed     Currently using clotrimazole for thrush. Reported symptoms have improved since medication started. Mouth sores are much improved. Only has 1 small sore at this time.     Dry Eyes  Tears Naturale as needed     Using as needed a few times per day. Helps when used.      Today's Vitals: LMP 07/17/2009 (Exact Date)   ----------------    I spent 60 minutes with this patient today.      A summary of these recommendations was sent via [employer].    Nikki MeyerD  Medication Therapy Management Pharmacist  [facility]  Phone: [phone]     Telemedicine Visit Details  The patient's medications can be safely assessed via a telemedicine encounter.  Type of service:  Telephone visit  Originating Location (pt. Location): Home    Distant Location (provider location):  On-site  Start Time:  9:30 AM  End Time:  10:30 AM     Medication Therapy Recommendations  Chronic pain   1 Current Medication: memantine (NAMENDA) 10 MG tablet (Discontinued)   Current Medication Sig: Take 1 tablet by mouth 2 times daily.   Rationale: Undesirable effect - Adverse medication event - Safety   Recommendation: Make Appt with PCP   Status: Patient Agreed - Adherence/Education   Identified Date: 3/17/2025 Completed Date: 3/17/2025         Moderate persistent asthma without complication   1 Current Medication: mometasone-formoterol (DULERA) 200-5 MCG/ACT inhaler   Current Medication Sig:  Inhale 2 puffs into the lungs 2 times daily.   Rationale: Does not understand instructions - Adherence - Adherence   Recommendation: Provide Adherence Intervention   Status: Patient Agreed - Adherence/Education   Identified Date: 3/17/2025 Completed Date: 3/17/2025

## 2025-03-17 NOTE — Clinical Note
KATARINA Salvador Jacquie self-stopped memantine late last week and is noticing improvements to her sleep and mood/agitation!

## 2025-03-17 NOTE — PATIENT INSTRUCTIONS
"Recommendations from today's MTM visit:                                                       Discuss the following with Dr. Peterson at your appointment tomorrow:  How you are feeling since stopping memantine.   How you are using Dulera.      You can take up to 4000 mg of acetaminophen daily.     Reschedule your back injection that you were suppose to receive last week.     Discuss Eliquis with your provider and how long you should continue the medication.     Call to schedule your rheumatology follow-up appointment.   Phone: [phone]      Follow-up: with MTM pharmacist on 4/14/25.     It was great speaking with you today.  I value your experience and would be very thankful for your time in providing feedback in our clinic survey. In the next few days, you may receive an email or text message from [email or web address] with a link to a survey related to your  clinical pharmacist.\"     To schedule another MTM appointment, please call the clinic directly or you may call the MTM scheduling line at [phone].    My Clinical Pharmacist's contact information:                                                      Please feel free to contact me with any questions or concerns you have.      Golden Cadena, PharmD  Medication Therapy Management Pharmacist  [facility]  Phone: [phone]    "

## 2025-03-18 ENCOUNTER — VIRTUAL VISIT (OUTPATIENT)
Dept: FAMILY MEDICINE | Facility: CLINIC | Age: 65
End: 2025-03-18
Payer: COMMERCIAL

## 2025-03-18 ENCOUNTER — MYC MEDICAL ADVICE (OUTPATIENT)
Dept: FAMILY MEDICINE | Facility: CLINIC | Age: 65
End: 2025-03-18

## 2025-03-18 DIAGNOSIS — L98.499 NON-PRESSURE CHRONIC ULCER OF SKIN OF OTHER SITES WITH UNSPECIFIED SEVERITY (H): ICD-10-CM

## 2025-03-18 DIAGNOSIS — F33.1 MODERATE RECURRENT MAJOR DEPRESSION (H): ICD-10-CM

## 2025-03-18 DIAGNOSIS — N18.32 STAGE 3B CHRONIC KIDNEY DISEASE (H): ICD-10-CM

## 2025-03-18 DIAGNOSIS — R44.3 HALLUCINATIONS: ICD-10-CM

## 2025-03-18 DIAGNOSIS — R11.0 NAUSEA: ICD-10-CM

## 2025-03-18 DIAGNOSIS — J45.50 SEVERE PERSISTENT ASTHMA WITHOUT COMPLICATION (H): ICD-10-CM

## 2025-03-18 DIAGNOSIS — G47.9 SLEEP DISTURBANCE: Primary | ICD-10-CM

## 2025-03-18 DIAGNOSIS — E66.01 MORBID OBESITY (H): ICD-10-CM

## 2025-03-18 PROCEDURE — 98006 SYNCH AUDIO-VIDEO EST MOD 30: CPT | Performed by: FAMILY MEDICINE

## 2025-03-18 RX ORDER — ATORVASTATIN CALCIUM 20 MG/1
20 TABLET, FILM COATED ORAL DAILY
Qty: 90 TABLET | Refills: 0 | Status: CANCELLED | OUTPATIENT
Start: 2025-03-18

## 2025-03-18 RX ORDER — METOPROLOL SUCCINATE 100 MG/1
100 TABLET, EXTENDED RELEASE ORAL 2 TIMES DAILY
Qty: 180 TABLET | Refills: 1 | Status: CANCELLED | OUTPATIENT
Start: 2025-03-18

## 2025-03-18 RX ORDER — MUPIROCIN CALCIUM 20 MG/G
CREAM TOPICAL 3 TIMES DAILY
Qty: 60 G | Refills: 5 | Status: CANCELLED | OUTPATIENT
Start: 2025-03-18

## 2025-03-18 RX ORDER — HYDROCHLOROTHIAZIDE 25 MG/1
25 TABLET ORAL DAILY
Qty: 90 TABLET | Refills: 1 | Status: CANCELLED | OUTPATIENT
Start: 2025-03-18

## 2025-03-18 RX ORDER — METOPROLOL SUCCINATE 25 MG/1
25 TABLET, EXTENDED RELEASE ORAL 2 TIMES DAILY
Qty: 180 TABLET | Refills: 1 | Status: CANCELLED | OUTPATIENT
Start: 2025-03-18

## 2025-03-18 RX ORDER — LOSARTAN POTASSIUM 100 MG/1
100 TABLET ORAL DAILY
Qty: 90 TABLET | Refills: 1 | Status: CANCELLED | OUTPATIENT
Start: 2025-03-18

## 2025-03-18 RX ORDER — LIDOCAINE HYDROCHLORIDE 20 MG/ML
SOLUTION OROPHARYNGEAL
Qty: 100 ML | Refills: 1 | Status: CANCELLED | OUTPATIENT
Start: 2025-03-18

## 2025-03-18 RX ORDER — NYSTATIN 100000 [USP'U]/ML
500000 SUSPENSION ORAL 4 TIMES DAILY
Qty: 280 ML | Refills: 2 | Status: CANCELLED | OUTPATIENT
Start: 2025-03-18

## 2025-03-18 RX ORDER — ONDANSETRON 4 MG/1
4 TABLET, ORALLY DISINTEGRATING ORAL EVERY 8 HOURS PRN
Qty: 20 TABLET | Refills: 2 | Status: SHIPPED | OUTPATIENT
Start: 2025-03-18

## 2025-03-18 RX ORDER — BUPROPION HYDROCHLORIDE 150 MG/1
150 TABLET ORAL EVERY MORNING
Qty: 90 TABLET | Refills: 1 | Status: CANCELLED | OUTPATIENT
Start: 2025-03-18

## 2025-03-18 RX ORDER — BUPROPION HYDROCHLORIDE 300 MG/1
300 TABLET ORAL EVERY MORNING
Qty: 90 TABLET | Refills: 1 | Status: CANCELLED | OUTPATIENT
Start: 2025-03-18

## 2025-03-18 RX ORDER — FLUOCINONIDE GEL 0.5 MG/G
GEL TOPICAL 2 TIMES DAILY PRN
Qty: 15 G | Refills: 1 | Status: CANCELLED | OUTPATIENT
Start: 2025-03-18

## 2025-03-18 RX ORDER — DULOXETIN HYDROCHLORIDE 60 MG/1
120 CAPSULE, DELAYED RELEASE ORAL DAILY
Qty: 180 CAPSULE | Refills: 0 | Status: CANCELLED | OUTPATIENT
Start: 2025-03-18

## 2025-03-18 NOTE — TELEPHONE ENCOUNTER
"S-(situation): Patient calling to ask if ok to switch in person to virtual visit today.     B-(background): Appt is for medication check. RN attempted to inquire what medication if needing in person. Patient unable to answer and just proceeded to emphasize \"I have been on medication for a while, and recently talked with Pharmacist. I just check in every couple of months with Dr. Peterson\"     A-(assessment): She reports her pain and nausea are making her feel crummy. She has chronic pain, reports unable to get spinal injection last week making it worse. She states she is nauseous, which she think is from pain. Patient denies needing to be seen in person to address acute concerns as she states \"these are chronic things for me\"     R-(recommendations): Please advise if ok to convert in person to virtual appointment today. Patient would like phone callback with update.     Ochoa Greco RN on 3/18/2025 at 1:06 PM      "

## 2025-03-18 NOTE — PROGRESS NOTES
Jacquie is a 64 year old who is being evaluated via a billable video visit.    How would you like to obtain your AVS? MyChart  If the video visit is dropped, the invitation should be resent by: Text to cell phone: [phone]  Will anyone else be joining your video visit? No      Assessment & Plan     Sleep disturbance  Hallucinations  Patient has been experiencing sleep disturbance and hallucinations off and on for a few months. She has had agitation in the evenings/nights as well.  This has been concerning for her.   Last week, she stopped her memantine that has been prescribed through her pain management clinic.   She reports that she has been able to sleep since stopping that.   She feels that her mood is better and agitation is improved as well.    is present and states things seem some better.   She will continue to monitor for improvement and reach out for worsening  Follow scheduled with MTM and with me to recheck.       Severe persistent asthma without complication (H)  She has not been using her Dulera except on occasion. She understands it is meant to be used regularly.   She has been using her spiriva alone and feels she is well controlled.   Rare use of her albuterol.   Will have her continue with the spiriva alone.   Recheck as needed.     Morbid obesity (H)  She understands how this impacts her health and has worked to improve activity and nutrition.     Non-pressure chronic ulcer of skin of other sites with unspecified severity (H)  Remains under the care of dermatology  No changes today.     Moderate recurrent major depression (H)  Doing well. Well controlled. Tolerating medication.  No change in plan.      Stage 3b chronic kidney disease (H)  Has been stable. No need for recheck today.      Nausea  Uses zofran on occasion. Requests refill.   Refills given.   - ondansetron (ZOFRAN ODT) 4 MG ODT tab; Take 1 tablet (4 mg) by mouth every 8 hours as needed for nausea.    The longitudinal plan of  "care for the diagnosis(es)/condition(s) as documented were addressed during this visit. Due to the added complexity in care, I will continue to support Jacquie in the subsequent management and with ongoing continuity of care.            Subjective   Jacquie is a 64 year old, presenting for the following health issues:  Recheck Medication        3/18/2025     2:40 PM   Additional Questions   Roomed by Maryellen EMMANUEL   Accompanied by Nikole         3/18/2025     2:40 PM   Patient Reported Additional Medications   Patient reports taking the following new medications \"Memantine\" 5mg        Would like to discuss changing medication. Pt stated \"She knows, I talked to her\" and declined to elaborate.     History of Present Illness       Reason for visit:  Med check talk anout changes   She is taking medications regularly.      Stopped memantine last Thursday or Friday due to her issues with sleep. She hasn't been having hallucinations for a time.   She felt the memantine that caused her to have anger issues at night.   Since stopping the memantine, she has been sleeping better.     Using hydroxyzine at night for sleep as well as anxiety.     Persistent asthma- hasn't been taking the Dulera. She is using the Spiriva alone and has been controlled. She is using albuterol as needed but hasn't needed very often.     Needs zofran on occasion.  She needs refills.     Seeing hematology soon.  Has history of DVT. She is on Eliquis. She reports she is following up for DVT. She had recent follow up ultrasound which showed resolution of the DVT.     SI joint injection but concerned about needing to stop the Eliquis.                   Objective           Vitals:  No vitals were obtained today due to virtual visit.    Physical Exam   GENERAL: alert and no distress  EYES: Eyes grossly normal to inspection.  No discharge or erythema, or obvious scleral/conjunctival abnormalities.  RESP: No audible wheeze, cough, or visible cyanosis.    SKIN: " Visible skin clear. No significant rash, abnormal pigmentation or lesions.  NEURO: Cranial nerves grossly intact.  Mentation and speech appropriate for age.  PSYCH: Appropriate affect, tone, and pace of words          Video-Visit Details    Type of service:  Video Visit   Originating Location (pt. Location): Home    Distant Location (provider location):  On-site  Platform used for Video Visit: William  Signed Electronically by: Liz Peterson MD      26 minutes

## 2025-03-19 ENCOUNTER — TELEPHONE (OUTPATIENT)
Facility: CLINIC | Age: 65
End: 2025-03-19
Payer: COMMERCIAL

## 2025-03-19 ENCOUNTER — VIRTUAL VISIT (OUTPATIENT)
Dept: HEMATOLOGY | Facility: CLINIC | Age: 65
End: 2025-03-19
Attending: INTERNAL MEDICINE
Payer: COMMERCIAL

## 2025-03-19 VITALS — WEIGHT: 177 LBS | BODY MASS INDEX: 27.72 KG/M2

## 2025-03-19 DIAGNOSIS — I82.411 ACUTE DEEP VEIN THROMBOSIS (DVT) OF FEMORAL VEIN OF RIGHT LOWER EXTREMITY (H): Primary | ICD-10-CM

## 2025-03-19 DIAGNOSIS — D69.3 IDIOPATHIC THROMBOCYTOPENIC PURPURA (H): ICD-10-CM

## 2025-03-19 DIAGNOSIS — D69.1: ICD-10-CM

## 2025-03-19 NOTE — TELEPHONE ENCOUNTER
Good Afternoon,    We received a new script for Jacquie and need some clarification on it. It came over as 'Compound Non- Controlled Substance' but no indication as to what the medication is supposed to be. Can you please resend with the medication listed in the Notes from Prescriber section or call in as a verbal?     Thank you for your time.      Liseth Huang  [facility]    [facility]  [address] [place] [id number]  [email or web address]  [email or web address]  Phone: [phone]  Fax: [phone]

## 2025-03-19 NOTE — PROGRESS NOTES
[facility]  [address] [place] [address]  Phone: [phone], Fax: [phone]    Outpatient Visit Note:    Patient: Jacquie Amador  MRN: [id number]  DOS: 03/19/25      Reason for Initial Consultation:  Bruising    Assessment:  In summary, Jacquie Amador is a 64 year old woman presenting to clinic due to bruising/bleeding with abnormal platelet studies. 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 This has resolved after starting steroids--- negative testing x2 (2022 and 2023). Overall, her clinical picture (wounds, arthralgias, etc) is highly concerning for underlying autoimmune disorder.    Unprovoked R Lower extremity VTE. Ddx 12/18/24.   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 Amador has been on anticoagulation and has not experience significant clinically relevant major bleeding.Additionally, her non-major bleeding phenotype, which was present prior to her thrombosis, has also not gotten worse since starting anticoagulation. However, given her tendency to bleed, we may try to stop anticoagualtion. HERDOO2 score (Reymundo CARRILLO, et al. BMJ. 2017; 356; j1065), patient with hyperpigmentation, edema or redness (HER), D-dimer level >250 micrograms/L, obesity (BMI>30) or older age (>65) can be used to stratify female patients with history of unprovoked VTE. Therefore, will try D-dimer testing on anticoagulation and if <0.5 will stop and repeat  again. If D-dimer is elevated we will continue anticoagulation, but consider lower dose.     Given her thrombosis history, we can no continue with systemic TXA. She did have some benefit from topical--- so will continue with 7% compounded solution--- script sent.     She has been on 10 mg prednisone for some time. This was originally started for her anti-platelet antibodies, but has not been stopped as when we have tried tapering she has had flare in pain. Will consider changing or reducing as they are likely not helping her overall bone and skin health.     For her kidney disease- we have previously check for monoclonal gammapathy in 2021 AND 2023. Cryoglobulins were also neagtiave. Repeat studies with stable kappa free light chain elevation in 2024. Nephrology referral not completed.      Plan:  1. Majority of today's visit was spent counseling the patient regarding diagnosis, natural history, management and treatment options   2. Continue prednisone 10 mg daily  3. Continue apixaban 5 mg PO BID  4. D-dimer testing as above  5. Continue use of topical 7% TXA cream BID (script sent)  6. Will discuss tapering of steroid or changing to hydrocortisone with PCP to coordinate care efficiently.       The patient is given our center's contact information and is instructed to call if she should have any further questions or concerns.  Follow up 3 months  with Dr. Grimes    Patient understands and agrees with the above plan and recommendation.    Emely Grimes MD/PhD   of Medicine  Division of Hematology, Oncology and Transplantation    Video time spent with patient: 29 min and 41 s  Total time:   No LOS data to display  Time spent by me today doing chart review, history and exam, documentation and further activities per the note    The longitudinal plan of care for the diagnosis(es)/condition(s) as documented were addressed during this visit. Due to the added complexity in care, I will continue to  support Jacquie in the subsequent management and with ongoing continuity of care.    ----------------------------------------------------------------------------------------------------------------------  Interval History:  Jacquie Amador is a 63 year old woman with PMHx of fibromyalgia, anxiety/depression, hypertension and hyperlipidemia. She presented to clinic on 9/15/21 for her first in person evaluation due to bleeding and prolonged wound healing. Please refer to my consult note.     Stopped Mementidine.   Face a few things on her face.   The TXA cream works very well.  The arm is good now.  Was very bruised in the past and healing up. Legs were bad too. Right leg. Knee is always swollen on the right.     Right side has been more swollen. Feel like leg is darkers.     Did not go to [place] due to another grandchild. Granddaughter. The grandsons are very busy.     Pain has been ok. Knees hurt when she walks or sits.     Had some injections. Was supposed to have have S1 shot last week- was not able to make it.     Mouth sores- has one but it is not painful.     M       Past Medical History:  Past Medical History:   Diagnosis Date    ASTHMA - MODERATE PERSISTENT 9/21/2005    Chronic pain     Coronary artery disease     CVA (cerebral infarction)     Depressive disorder, not elsewhere classified     Diabetes (H)     Elevated serum alkaline phosphatase level     Liver source    Fibromyalgia     HYPERLIPIDEMIA NEC/NOS 12/29/2006    Hypertriglyceridemia     OA (osteoarthritis)     Thyroid disease     Trochanteric bursitis     Unspecified essential hypertension      Immunization  Immunization History   Administered Date(s) Administered    COVID-19 12+ (Pfizer) 03/05/2024, 01/10/2025    COVID-19 Bivalent 18+ (Moderna) 11/21/2022    COVID-19 Monovalent 18+ (Moderna) 03/03/2021, 04/01/2021, 11/15/2021    COVID-19 Monovalent Booster 18+ (Moderna) 05/10/2022    Influenza (IIV3) PF 11/09/2000, 12/08/2003, 12/06/2004,  2005, 2006, 2007, 2008, 2010, 10/26/2011, 10/26/2012    Influenza (prior to ) 2009    Influenza Vaccine 18-64 (Flublok) 2020, 2022, 2023    Influenza Vaccine >6 months,quad, PF 2014, 2015, 2019, 11/15/2021    Influenza Vaccine Trivalent (FluBlok) 2024    Pneumococcal 20 valent Conjugate (Prevnar 20) 2024    Pneumococcal 23 valent 2000, 2021, 2022    RSV (Abrysvo) 2024    TD,PF 7+ (Tenivac) 2000    TDAP (Adacel,Boostrix) 2021    TDAP Vaccine (Adacel) 2011    Zoster recombinant adjuvanted (Shingrix) 2019     Past Surgical History:  Past Surgical History:   Procedure Laterality Date    3 teeth pulled      INJECT EPIDURAL TRANSFORAMINAL  2014    [facility]    INJECT JOINT SACROILIAC  2014    [facility]    LAMINECTOMY LUMBAR ONE LEVEL Left 2014    [facility]  DELIVERY ONLY      , Low Cervical     Medications:  Current Outpatient Medications   Medication Sig Dispense Refill    acetaminophen (TYLENOL) 500 MG tablet Take 500-1,000 mg by mouth every 6 hours as needed for mild pain      albuterol (PROVENTIL) (2.5 MG/3ML) 0.083% neb solution Take 1 vial (2.5 mg) by nebulization every 6 hours as needed for shortness of breath or wheezing. 3 mL 4    albuterol (VENTOLIN HFA) 108 (90 Base) MCG/ACT inhaler INHALE 2 PUFFS INTO THE LUNGS EVERY 6 HOURS AS NEEDED FOR SHORTNESS OF BREATH / DYSPNEA 54 g 1    apixaban ANTICOAGULANT (ELIQUIS) 5 MG tablet Take 1 tablet (5 mg) by mouth 2 times daily. 60 tablet 1    apremilast (OTEZLA) 30 MG tablet Take 1 tablet (30 mg) by mouth 2 times daily. Hold for signs of infection, and seek medical attention. 60 tablet 5    atorvastatin (LIPITOR) 20 MG tablet TAKE ONE TABLET BY MOUTH ONE TIME DAILY 90 tablet 0    augmented betamethasone dipropionate (DIPROLENE AF) 0.05 %  external cream Apply topically 2 times daily 50 g 3    benzonatate (TESSALON) 200 MG capsule Take 1 capsule (200 mg) by mouth 2 times daily as needed for cough. 40 capsule 1    buPROPion (WELLBUTRIN XL) 150 MG 24 hr tablet Take 1 tablet (150 mg) by mouth every morning. To take with the 300 mg dose for a total of 450 mg daily. 90 tablet 1    buPROPion (WELLBUTRIN XL) 300 MG 24 hr tablet Take 1 tablet (300 mg) by mouth every morning. To take with the 150 mg dose for a total of 450 mg daily. 90 tablet 1    chlorhexidine (PERIDEX) 0.12 % solution Swish and spit 15 mLs in mouth 2 times daily 1893 mL 4    clindamycin (CLINDAMAX) 1 % external gel Apply topically 2 times daily 30 g 4    clotrimazole (MYCELEX) 10 MG lozenge Place 1 lozenge (10 mg) inside cheek 5 times daily. 70 lozenge 1    COMPOUND CONTAINING CONTROLLED SUBSTANCE (CMPD RX) - PHARMACY TO MIX COMPOUNDED MEDICATION Compound amitriptyline 2% and ketamine 0.5% in 80g of lipoderm or Vanicream 80 g 0    COMPOUNDED NON-CONTROLLED SUBSTANCE (CMPD RX) - PHARMACY TO MIX COMPOUNDED MEDICATION Apply directly to the affected area, with usage up to twice daily 1 each 3    desonide (DESOWEN) 0.05 % external cream Apply topically 2 times daily. To sores on face 60 g 3    dextran 70-hypromellose (TEARS NATURALE FREE PF) 0.1-0.3 % ophthalmic solution Place 2 drops into both eyes daily as needed (for dry eyes) 35 each 3    doxycycline monohydrate (MONODOX) 100 MG capsule Take 1 capsule (100 mg) by mouth 2 times daily. After meals and with big glass of water 180 capsule 0    DULoxetine (CYMBALTA) 60 MG capsule Take 2 capsules (120 mg) by mouth daily 180 capsule 0    dupilumab (DUPIXENT) 300 MG/2ML prefilled pen Inject 2 mLs (300 mg) subcutaneously every 14 days. 4 mL 3    famotidine (PEPCID) 40 MG tablet Take 1 tablet (40 mg) by mouth daily 90 tablet 2    ferrous sulfate (FEROSUL) 325 (65 Fe) MG tablet Take 1 tablet (325 mg) by mouth daily (with breakfast). 90 tablet 0     fluocinonide (LIDEX) 0.05 % external gel Apply topically 2 times daily as needed. 15 g 1    hydrochlorothiazide (HYDRODIURIL) 25 MG tablet Take 1 tablet (25 mg) by mouth daily 90 tablet 1    hydrocortisone 2.5 % cream APPLY TOPICALLY 2 TIMES DAILY AS NEEDED 30 g 3    hydrOXYzine HCl (ATARAX) 25 MG tablet Take 1 tablet (25 mg) by mouth 3 times daily as needed for anxiety (and sleep). 90 tablet 1    lidocaine, viscous, (XYLOCAINE) 2 % solution Swish and spit 10ml every 3 hours as needed for oral pain; max 8 doses/24hrs.  Do not eat or chew gum for 60 minutes following use. 100 mL 1    losartan (COZAAR) 100 MG tablet Take 1 tablet (100 mg) by mouth daily. 90 tablet 1    metoprolol succinate ER (TOPROL XL) 100 MG 24 hr tablet Take 1 tablet (100 mg) by mouth 2 times daily. 180 tablet 1    metoprolol succinate ER (TOPROL XL) 25 MG 24 hr tablet Take 1 tablet (25 mg) by mouth 2 times daily. 180 tablet 1    mupirocin (BACTROBAN) 2 % external cream Apply topically 3 times daily. to affected area as instructed. 60 g 5    mupirocin (BACTROBAN) 2 % external ointment Apply topically 3 times daily as needed. 30 g 3     MG CAPS capsule Take 1 capsule (600 mg) by mouth 2 times daily 180 capsule 0    Naltrexone HCl POWD       nystatin (MYCOSTATIN) 122985 UNIT/ML suspension Take 5 mLs (500,000 Units) by mouth 4 times daily. Has recurrent thrush. Uses for 2 weeks (280 ml)  at a time as needed. 280 mL 2    ondansetron (ZOFRAN ODT) 4 MG ODT tab Take 1 tablet (4 mg) by mouth every 8 hours as needed for nausea. 20 tablet 2    oxyBUTYnin ER (DITROPAN XL) 5 MG 24 hr tablet Take 2 tablets (10 mg) by mouth daily 180 tablet 0    pimecrolimus (ELIDEL) 1 % external cream Apply topically 2 times daily. 60 g 3    predniSONE (DELTASONE) 10 MG tablet Take 1 tablet (10 mg) by mouth daily 90 tablet 3    rizatriptan (MAXALT) 10 MG tablet Take 1 tablet (10 mg) by mouth at onset of headache for migraine. May repeat in 2 hours. Max 3 tablets/24  hours. 18 tablet 1    tacrolimus (PROTOPIC) 0.1 % external ointment Apply topically 2 times daily. To areas of sores on face 60 g 1    tiotropium (SPIRIVA RESPIMAT) 2.5 MCG/ACT inhaler Inhale 2 puffs into the lungs daily. 12 g 2    traMADol (ULTRAM) 50 MG tablet Take 1 tablet (50 mg) by mouth every 12 hours as needed for moderate to severe pain 60 tablet 0    traZODone (DESYREL) 50 MG tablet Take 3 tablets (150 mg) by mouth at bedtime 270 tablet 2    triamcinolone (ARISTOCORT HP) 0.5 % external cream Apply topically 2 times daily 60 g 0    valACYclovir (VALTREX) 500 MG tablet Take 1 tablet (500 mg) by mouth daily 90 tablet 1      Allergies:  Allergies   Allergen Reactions    Cats      and rabbits/wheezing    Dogs      wheezing    Lyrica [Pregabalin] Other (See Comments)     Rash, mouth sores, itching and burning    Seasonal Allergies      rhinits     ROS:  Remainder of a comprehensive 10 point ROS is negative unless noted above.    Social History:  Denies any tobacco use. No significant alcohol use. Denies any illicit drug use.     Family History:  Two daughter  29 Yo daughter  26 yo daughter  3 grandsons  1  grandaughter    1 sister- older- high blood glucose, arthritis  1 brother- older- no idea    Mother- - heart valve. Had MDS. Needed a shot every month  Father- - cancer    Objectives:  Reviewed photos in the chart  Video visit-   GENERAL: alert and no distress  RESP: No audible wheeze, cough   NEURO: Mentation and speech appropriate for age.  PSYCH: Appropriate affect, tone, and pace of words  SKIN: note images in media. Face with a few crusted lesions on sides.    Labs: reviewed in EPIC    Ferritin   Date Value Ref Range Status   10/17/2024 118 11 - 328 ng/mL Final   2021 376 (H) 8 - 252 ng/mL Final     Iron   Date Value Ref Range Status   2023 99 37 - 145 ug/dL Final   2021 50 35 - 180 ug/dL Final     Iron Binding Cap   Date Value Ref Range Status   2021 350 240  - 430 ug/dL Final     Iron Binding Capacity   Date Value Ref Range Status   11/13/2023 272 240 - 430 ug/dL Final   09/15/2021 284 240 - 430 ug/dL Final     Recent Labs   Lab Test 06/27/24  1450 11/13/23  1021 09/22/22  1152 04/16/21  0940   MONOCLONAL PEAK 0.0 0.0 0.0 0.0   KAPPA FREE LT CHAIN 2.09* 2.96*  --   --    LAMBDA FREE LT CHAIN 2.42 2.13  --   --    KAPPA LAMBDA RATIO 0.86 1.39  --   --      ELP Interpretation:   Date Value Ref Range Status   04/16/2021   Final    Essentially normal electrophoretic pattern.  No obvious monoclonal proteins seen.    Pathologic significance requires clinical correlation.  FARRAH Levy M.D., Ph.D.,   Pathologist ().       ELP Interpretation   Date Value Ref Range Status   06/27/2024   Final    Essentially normal electrophoretic pattern. No obvious monoclonal proteins seen. Pathologic significance requires clinical correlation. Nadia Ellison M.D., Ph.D.   11/13/2023   Final    Essentially normal electrophoretic pattern. No obvious monoclonal proteins seen. Pathologic significance requires clinical correlation. FARRAH Levy M.D., Ph.D., Pathologist ().   09/22/2022   Final    Essentially normal electrophoretic pattern. No obvious monoclonal proteins seen. Pathologic significance requires clinical correlation. JOSE ALBERTO Blancas M.D., Ph.D., Pathologist.     Immunofixation ELP   Date Value Ref Range Status   06/27/2024   Final    No monoclonal protein seen on immunofixation. Pathologic significance requires clinical correlation.  Nadia Ellison M.D., Ph.D.   11/13/2023   Final    No monoclonal protein seen on immunofixation. Pathologic significance requires clinical correlation.  FARRAH Levy M.D., Ph.D., Pathologist ()   12/08/2021   Final    No monoclonal protein seen on immunofixation. Pathological significance requires clinical correlation. FARRAH Levy M.D., Ph.D., Pathologist ([phone])   08/29/2014   Final    No monoclonal protein seen  on immunofixation.  Pathological significance   requires clinical correlation.   Nadia Ellison M.D., PH.D.         Imaging:  3/14/25  EXAM: ULTRASOUND LOWER EXTREMITY VENOUS DUPLEX RIGHT  LOCATION: [facility]  DATE: 03/14/2025     INDICATION: Acute deep vein thrombosis (DVT) of femoral vein of right lower extremity (H).  COMPARISON: 12/18/2024.  TECHNIQUE: Venous Duplex ultrasound of the right lower extremity with and without compression, augmentation and duplex. Color flow and spectral Doppler with waveform analysis performed.     FINDINGS: Exam includes the common femoral, femoral, popliteal, and contralateral common femoral veins as well as segmentally visualized deep calf veins and greater saphenous vein.      RIGHT: No deep vein thrombosis. Resolution of previously seen femoral vein DVT at the level of the distal thigh. No superficial thrombophlebitis. Fluid collection in the right popliteal fossa measuring 4.8 x 4.3 x 2.5 cm with no internal color flow,   likely popliteal fossa cyst.                                                                      IMPRESSION:  1.  No deep venous thrombosis in the right lower extremity. Resolution of DVT in the left femoral vein at the level of the distal thigh.  2.  Fluid collection in the right popliteal fossa, likely popliteal fossa cyst      12/18/24  EXAM: US LOWER EXTREMITY VENOUS DUPLEX RIGHT  LOCATION: [facility]  DATE: 12/18/2024     INDICATION:  Right leg swelling  COMPARISON: None.  TECHNIQUE: Venous Duplex ultrasound of the right lower extremity with and without compression, augmentation and duplex. Color flow and spectral Doppler with waveform analysis performed.     FINDINGS: Exam includes the common femoral, femoral, popliteal, and contralateral common femoral veins as well as segmentally visualized deep calf veins and greater saphenous vein.      RIGHT: Right femoral vein thrombosis in the lower thigh.  Popliteal and visualized calf veins within normal limits. No superficial thrombophlebitis. Right and left common femoral venous waveforms are symmetric.                                                                      IMPRESSION:  1.  Lower right femoral vein deep venous thrombosis.

## 2025-03-20 ENCOUNTER — MYC REFILL (OUTPATIENT)
Dept: DERMATOLOGY | Facility: CLINIC | Age: 65
End: 2025-03-20
Payer: COMMERCIAL

## 2025-03-20 ENCOUNTER — MYC REFILL (OUTPATIENT)
Dept: FAMILY MEDICINE | Facility: CLINIC | Age: 65
End: 2025-03-20
Payer: COMMERCIAL

## 2025-03-20 ENCOUNTER — MYC REFILL (OUTPATIENT)
Dept: FAMILY MEDICINE | Facility: OTHER | Age: 65
End: 2025-03-20
Payer: COMMERCIAL

## 2025-03-20 DIAGNOSIS — L30.9 DERMATITIS: ICD-10-CM

## 2025-03-20 DIAGNOSIS — L20.81 ATOPIC NEURODERMATITIS: ICD-10-CM

## 2025-03-20 DIAGNOSIS — K21.9 GASTROESOPHAGEAL REFLUX DISEASE, UNSPECIFIED WHETHER ESOPHAGITIS PRESENT: ICD-10-CM

## 2025-03-20 DIAGNOSIS — K12.0 APHTHAE, ORAL: ICD-10-CM

## 2025-03-20 DIAGNOSIS — F33.1 MODERATE EPISODE OF RECURRENT MAJOR DEPRESSIVE DISORDER (H): ICD-10-CM

## 2025-03-20 DIAGNOSIS — B37.0 THRUSH: ICD-10-CM

## 2025-03-20 DIAGNOSIS — F41.9 ANXIETY: ICD-10-CM

## 2025-03-20 DIAGNOSIS — I10 HYPERTENSION GOAL BP (BLOOD PRESSURE) < 140/90: ICD-10-CM

## 2025-03-20 RX ORDER — BUPROPION HYDROCHLORIDE 300 MG/1
300 TABLET ORAL EVERY MORNING
Qty: 90 TABLET | Refills: 1 | Status: SHIPPED | OUTPATIENT
Start: 2025-03-20

## 2025-03-20 RX ORDER — FAMOTIDINE 40 MG/1
40 TABLET, FILM COATED ORAL DAILY
Qty: 90 TABLET | Refills: 2 | OUTPATIENT
Start: 2025-03-20

## 2025-03-20 RX ORDER — CHLORHEXIDINE GLUCONATE ORAL RINSE 1.2 MG/ML
15 SOLUTION DENTAL 2 TIMES DAILY
Qty: 1893 ML | Refills: 4 | Status: SHIPPED | OUTPATIENT
Start: 2025-03-20

## 2025-03-20 RX ORDER — MUPIROCIN 20 MG/G
OINTMENT TOPICAL 3 TIMES DAILY PRN
Qty: 30 G | Refills: 3 | Status: SHIPPED | OUTPATIENT
Start: 2025-03-20

## 2025-03-20 RX ORDER — HYDROXYZINE HYDROCHLORIDE 25 MG/1
25 TABLET, FILM COATED ORAL 3 TIMES DAILY PRN
Qty: 90 TABLET | Refills: 1 | OUTPATIENT
Start: 2025-03-20

## 2025-03-20 RX ORDER — HYDROCORTISONE 25 MG/G
CREAM TOPICAL
Qty: 30 G | Refills: 3 | Status: SHIPPED | OUTPATIENT
Start: 2025-03-20

## 2025-03-20 RX ORDER — CLINDAMYCIN PHOSPHATE 10 MG/G
GEL TOPICAL 2 TIMES DAILY
Qty: 30 G | Refills: 4 | Status: SHIPPED | OUTPATIENT
Start: 2025-03-20

## 2025-03-20 RX ORDER — MUPIROCIN CALCIUM 20 MG/G
CREAM TOPICAL 3 TIMES DAILY
Qty: 60 G | Refills: 5 | Status: CANCELLED | OUTPATIENT
Start: 2025-03-20

## 2025-03-20 RX ORDER — LIDOCAINE HYDROCHLORIDE 20 MG/ML
SOLUTION OROPHARYNGEAL
Qty: 100 ML | Refills: 1 | Status: SHIPPED | OUTPATIENT
Start: 2025-03-20

## 2025-03-20 RX ORDER — NYSTATIN 100000 [USP'U]/ML
500000 SUSPENSION ORAL 4 TIMES DAILY
Qty: 280 ML | Refills: 2 | Status: SHIPPED | OUTPATIENT
Start: 2025-03-20

## 2025-03-20 RX ORDER — FLUOCINONIDE GEL 0.5 MG/G
GEL TOPICAL 2 TIMES DAILY PRN
Qty: 15 G | Refills: 1 | Status: SHIPPED | OUTPATIENT
Start: 2025-03-20

## 2025-03-20 RX ORDER — HYDROCHLOROTHIAZIDE 25 MG/1
25 TABLET ORAL DAILY
Qty: 90 TABLET | Refills: 1 | Status: SHIPPED | OUTPATIENT
Start: 2025-03-20

## 2025-03-20 RX ORDER — TRIAMCINOLONE ACETONIDE 5 MG/G
CREAM TOPICAL 2 TIMES DAILY
Qty: 60 G | Refills: 0 | Status: SHIPPED | OUTPATIENT
Start: 2025-03-20

## 2025-03-22 ENCOUNTER — HEALTH MAINTENANCE LETTER (OUTPATIENT)
Age: 65
End: 2025-03-22

## 2025-03-24 NOTE — TELEPHONE ENCOUNTER
mupirocin (BACTROBAN) 2 % external cream : refills on file  - Rx sent 11/7/2024 Disp:60 g R:5 Pharmacy: [id number] - [place]   - message sent to patient

## 2025-03-25 ENCOUNTER — MYC MEDICAL ADVICE (OUTPATIENT)
Dept: DERMATOLOGY | Facility: CLINIC | Age: 65
End: 2025-03-25
Payer: COMMERCIAL

## 2025-03-25 DIAGNOSIS — L20.81 ATOPIC NEURODERMATITIS: ICD-10-CM

## 2025-03-25 NOTE — TELEPHONE ENCOUNTER
Replied via mychart for patient to send more info on what she is requesting/needing.    Marie Laura CMA ([facility])

## 2025-03-25 NOTE — TELEPHONE ENCOUNTER
mupirocin (BACTROBAN) 2 % external ointment 30 g 3 3/20/2025 -- No       Ordered Status Priority Ordering User Department   03/20/25 Sent (none) Liz Peterson MD [facility]

## 2025-03-26 ENCOUNTER — TELEPHONE (OUTPATIENT)
Dept: DERMATOLOGY | Facility: CLINIC | Age: 65
End: 2025-03-26

## 2025-03-26 NOTE — TELEPHONE ENCOUNTER
PA Needed    Medication: Dupixent 300mg/2ml soaj  QTY/DS: 4 pens for 28ds  NEW INS: no  Insurance Company:  [employer]  Pharmacy Filling the Rx:  [facility]  PA :  25  Date of last fill: 25

## 2025-03-26 NOTE — TELEPHONE ENCOUNTER
PA Initiation    Medication: DUPIXENT 300 MG/2ML SC SOAJ  Insurance Company: [employer] - Phone [phone] Fax [phone]  Pharmacy Filling the Rx:    Filling Pharmacy Phone:    Filling Pharmacy Fax:    Start Date: 3/26/2025    Key:[id number]

## 2025-03-27 RX ORDER — MUPIROCIN CALCIUM 20 MG/G
CREAM TOPICAL 3 TIMES DAILY
Qty: 60 G | Refills: 5 | Status: SHIPPED | OUTPATIENT
Start: 2025-03-27

## 2025-03-27 NOTE — TELEPHONE ENCOUNTER
Patient states she would like the mupirocin cream not ointment sent. Routing to provider to review.    Marie Laura CMA ([facility])

## 2025-03-31 NOTE — TELEPHONE ENCOUNTER
Prior Authorization Approval    Medication: DUPIXENT 300 MG/2ML SC SOAJ  Authorization Effective Date: 3/26/2025  Authorization Expiration Date: 3/26/2026  Approved Dose/Quantity: 4ml/28 days   Reference #: [id number]   Insurance Company: [employer] - Phone [phone] Fax [phone]  Expected CoPay: $ 0  CoPay Card Available:      Financial Assistance Needed:   Which Pharmacy is filling the prescription: [facility] - [place], [place] - [address]  Pharmacy Notified: yes  Patient Notified:

## 2025-04-06 DIAGNOSIS — E78.5 HYPERLIPIDEMIA LDL GOAL <130: ICD-10-CM

## 2025-04-06 DIAGNOSIS — L74.511 PRIMARY FOCAL HYPERHIDROSIS OF FACE: ICD-10-CM

## 2025-04-07 ENCOUNTER — NURSE TRIAGE (OUTPATIENT)
Dept: FAMILY MEDICINE | Facility: CLINIC | Age: 65
End: 2025-04-07

## 2025-04-07 ENCOUNTER — ALLIED HEALTH/NURSE VISIT (OUTPATIENT)
Dept: FAMILY MEDICINE | Facility: CLINIC | Age: 65
End: 2025-04-07
Payer: COMMERCIAL

## 2025-04-07 DIAGNOSIS — Z71.9 COUNSELED BY NURSE: Primary | ICD-10-CM

## 2025-04-07 PROCEDURE — 99207 PR NO CHARGE NURSE ONLY: CPT

## 2025-04-07 RX ORDER — OXYBUTYNIN CHLORIDE 5 MG/1
10 TABLET, EXTENDED RELEASE ORAL DAILY
Qty: 180 TABLET | Refills: 0 | Status: ON HOLD | OUTPATIENT
Start: 2025-04-07

## 2025-04-07 RX ORDER — ATORVASTATIN CALCIUM 20 MG/1
20 TABLET, FILM COATED ORAL DAILY
Qty: 90 TABLET | Refills: 0 | Status: ON HOLD | OUTPATIENT
Start: 2025-04-07
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
## 2025-04-07 NOTE — TELEPHONE ENCOUNTER
"Nurse Triage SBAR    Is this a 2nd Level Triage? YES, LICENSED PRACTITIONER REVIEW IS REQUIRED    Situation: RN reached out to patient per PCP recommendation for missed lab appointment with specialty.     Background:  Extensive background, see problem list. Patient reports history of depression and anxiety, now exacerbated by learning spouse of 30 years has been unfaithful. Reports she has video/photographic evidence from [employer] system.     Assessment:     RN reached out to patient, patient tearful throughout discussion with RN. Was able to recall events leading to current emotional state. Patient reports that she is \"not doing well\", but denies any concerns for her safety. Reports that her neighbor is supportive of her and she spoke with a friend who is currently in [place], reports that she was supportive of her.     Reports that she started feeling this way 3 1/2 days ago when she discovered that spouse was cheating on her. Slept 2 hours last night, decreased appetite, but still able to eat some, but did have a large jug of water this morning and feels she is urinating adequately. Reports that spouse came home after wake/ of family member 2 days ago, but has not come home since Saturday, and that she doesn't know what she will do without spouse as he helped manage her medical care, follow up, and medication. Also reports that last night her legs were giving out on her, that this isn't something new for her, but that stress is contributing to her feeling weak and legs giving out. Believes that she has all of the symptoms of MDS and would like to be tested for this, has discussed with hematology/specialty. Reports that she has been able to continue normal medication regimen and has not missed any doses of prescribed medication or started any new medications. Discussion ended when patient reports spouse returned home.     Protocol Recommended Disposition:   See in Office Within 3 " "Days    Recommendation: Routing to PCP for review/recommendation. Patient states \"I will be okay, I'm okay\" when asked at different times about concerns for safety or well-being. RN advised multiple times that patient should be evaluated in ED for any Red flag symptoms, including dehydration, insomnia, no appetite, neurological changes like weakness in her legs, or worsening symptoms of anxiety/depression as she has sustained a stressful event; she verbalizes understanding. Declines ED evaluation at this time.     Did not schedule in clinic at time of call.     Routed to provider    Inessa Kelsey RN, BSN        Reason for Disposition   Depression is main problem or symptom (e.g., feelings of sadness or hopelessness)   Depression is getting worse (e.g.,sleeping poorly, less able to do activities of daily living)    Additional Information   Negative: Patient attempted suicide   Negative: Patient is threatening suicide now   Negative: Violent behavior, or threatening to physically hurt or kill someone   Negative: Patient is very confused (disoriented, slurred speech) and no other adult (e.g., friend or family member) available   Negative: Difficult to awaken or acting confused (disoriented, slurred speech) and new-onset   Negative: Sounds like a life-threatening emergency to the triager   Negative: Suicide thoughts, threats, attempts, or questions   Negative: Anxiety is main problem or symptom   Negative: Questions or concerns about substance use (drug use), unhealthy drug use, intoxication, or withdrawal   Negative: Questions or concerns about alcohol use, unhealthy alcohol use, binge drinking, intoxication, or withdrawal   Negative: Difficulty breathing   Negative: Depression and unable to do any of normal activities (e.g., self care, school, work; in comparison to baseline).   Negative: Very strange or confused behavior   Negative: Patient sounds very sick or weak to the triager    Answer Assessment - Initial " "Assessment Questions  1. CONCERN: \"What happened that made you call today?\"      RN team reached out to discuss concerns with patient as she canceled lab appointment with speciality for today.     2. DEPRESSION SYMPTOM SCREENING: \"How are you feeling overall?\" (e.g., decreased energy, increased sleeping or difficulty sleeping, difficulty concentrating, feelings of sadness, guilt, hopelessness, or worthlessness)      Overwhelmed, anxious, difficulty sleeping, sad, hopeless    3. RISK OF HARM - SUICIDAL IDEATION:  \"Do you ever have thoughts of hurting or killing yourself?\"  (e.g., yes, no, no but preoccupation with thoughts about death)      No    4. RISK OF HARM - HOMICIDAL IDEATION:  \"Do you ever have thoughts of hurting or killing someone else?\"  (e.g., yes, no, no but preoccupation with thoughts about death)      No    5. FUNCTIONAL IMPAIRMENT: \"How have things been going for you overall? Have you had more difficulty than usual doing your normal daily activities?\"  (e.g., better, same, worse; self-care, school, work, interactions)      Patient states that 3 1/2 days ago she discovered spouse had been unfaithful, that she has video/photographic proof from home security system    6. SUPPORT: \"Who is with you now?\" \"Who do you live with?\" \"Do you have family or friends who you can talk to?\"       Patient was alone at time of call, identifies her sister and old neighbor as sources of support; Hasn't spoken to daughters about it yet, unsure of what they know    7. THERAPIST: \"Do you have a counselor or therapist?\" If Yes, ask: \"What is their name?\"        8. STRESSORS: \"Has there been any new stress or recent changes in your life?\"      Found out spouse was cheating; major stressor    9. ALCOHOL USE OR SUBSTANCE USE (DRUG USE): \"Do you drink alcohol or use any illegal drugs?\"      No    10. OTHER: \"Do you have any other physical symptoms right now?\" (e.g., fever)        No    11. PREGNANCY: \"Is there any chance you are " "pregnant?\" \"When was your last menstrual period?\"        no    Protocols used: Insomnia-A-OH, Depression-A-OH    "

## 2025-04-07 NOTE — CONFIDENTIAL NOTE
"Patient's  is calling and is requesting direction on next steps for patient's mental health.  He stated in the past week patient's signs and symptoms of dementia/ alzheimer's has increased and it has become very concerning to himself and their family, feeling like an emergency situation.      Spouse stated he has previously dropped of a written letter regarding patient's mental health and the 10 signs of alzheimer's.  He stated he was present at patient's last office visit with PCP with discussions of patient's memory and hallucinations.     Spouse stated he has had to leave the home and stay in a hotel for the past day, and when patient is asking him to come home he is apprehensive as she will change her mind and start accusing him of stealing, cheating, and other negative actions she believes he has completed, ending up with patient asking him to leave the house again.       Spouse stated after multiple calls and texts to one of their daughters from patient, their daughter had called 911 hoping the police could help with her mother's mental health, maybe taking her mother into the hospital for an evaluation, but she was informed that the police can not help in that way.      Spouse stated patient can get through a simple \"office visit\" without showing many signs of her symptoms of confusion, hallucinations, hearing things that are not there, smelling things that are not there, etc. So he is asking for provider's recommendations on how patient could have a true evaluation for alzheimer's giving her the understanding that it is about evaluating her medications yet at a facility that would have the availability for mental health admission and evaluation if needed.  He stated patient has talked about hurting herself in the past and more recently.     Patient's spouse is concerned for patient's safety at this time with the past week having worsening symptoms and calling the police at least 2 times.     "   Patient's spouse stated he has had PCP try to call him, but has missed the call.  He stated with the living situation at this time, the best number to call is the [phone].      Will forward to provider for review, urgent, per patient's 's request.     Sariah Murrell RN

## 2025-04-07 NOTE — TELEPHONE ENCOUNTER
"Patient's  is calling and is requesting direction on next steps for patient's mental health.  He stated in the past week patient's signs and symptoms of dementia/ alzheimer's has increased and it has become very concerning to himself and their family, feeling like an emergency situation.     Spouse stated he has previously dropped of a written letter regarding patient's mental health and the 10 signs of alzheimer's.  He stated he was present at patient's last office visit with PCP with discussions of patient's memory and hallucinations.    Spouse stated he has had to leave the home and stay in a hotel for the past day, and when patient is asking him to come home he is apprehensive as she will change her mind and start accusing him of stealing, cheating, and other negative actions she believes he has completed, ending up with patient asking him to leave the house again.      Spouse stated after multiple calls and texts to one of their daughters from patient, their daughter had called 911 hoping the police could help with her mother's mental health, maybe taking her mother into the hospital for an evaluation, but she was informed that the police can not help in that way.     Spouse stated patient can get through a simple \"office visit\" without showing many signs of her symptoms of confusion, hallucinations, hearing things that are not there, smelling things that are not there, etc. So he is asking for provider's recommendations on how patient could have a true evaluation for alzheimer's giving her the understanding that it is about evaluating her medications yet at a facility that would have the availability for mental health admission and evaluation if needed.  He stated patient has talked about hurting herself in the past and more recently.    Patient's spouse is concerned for patient's safety at this time with the past week having worsening symptoms and calling the police at least 2 times.      Patient's " spouse stated he has had PCP try to call him, but has missed the call.  He stated with the living situation at this time, the best number to call is the [phone].     Will forward to provider for review, urgent, per patient's 's request.    Sariah Murrell RN
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
## 2025-04-07 NOTE — CONFIDENTIAL NOTE
Spoke with patient's . Reviewed the messages below.     He is worried that she isn't sleeping, eating or taking care of herself.     Reports she is angry at him often. Is concerned that she is hallucinating.     Sounds that she went back on the memantine. Thought she was somewhat better when she was off.     Have reached out to the police.  She has been aggressive per him. Reports that patient has called the police as well.     He doesn't want her to know that he has reached out. He doesn't want me to call her.      RN - I see that patient cancelled her lab appointment with specialty due to not feeling well.  Please reach out to her for this reason.  Do not pass on other information from today. I believe she may need to be in the ED and would encourage that. Ask about sleep/eating, etc.     Please reach out to me if questions.

## 2025-04-08 ENCOUNTER — APPOINTMENT (OUTPATIENT)
Dept: CT IMAGING | Facility: CLINIC | Age: 65
End: 2025-04-08
Attending: FAMILY MEDICINE
Payer: COMMERCIAL

## 2025-04-08 ENCOUNTER — MYC MEDICAL ADVICE (OUTPATIENT)
Dept: FAMILY MEDICINE | Facility: CLINIC | Age: 65
End: 2025-04-08

## 2025-04-08 ENCOUNTER — HOSPITAL ENCOUNTER (OUTPATIENT)
Facility: CLINIC | Age: 65
Setting detail: OBSERVATION
End: 2025-04-08
Attending: FAMILY MEDICINE | Admitting: INTERNAL MEDICINE
Payer: COMMERCIAL

## 2025-04-08 ENCOUNTER — TELEPHONE (OUTPATIENT)
Dept: FAMILY MEDICINE | Facility: CLINIC | Age: 65
End: 2025-04-08

## 2025-04-08 ENCOUNTER — TELEPHONE (OUTPATIENT)
Dept: BEHAVIORAL HEALTH | Facility: CLINIC | Age: 65
End: 2025-04-08

## 2025-04-08 DIAGNOSIS — R26.81 UNSTEADINESS: Primary | ICD-10-CM

## 2025-04-08 DIAGNOSIS — M51.16 LUMBAR DISC DISEASE WITH RADICULOPATHY: ICD-10-CM

## 2025-04-08 DIAGNOSIS — E87.6 HYPOKALEMIA: ICD-10-CM

## 2025-04-08 DIAGNOSIS — S34.109A INJURY OF LUMBAR SPINE, INITIAL ENCOUNTER (H): ICD-10-CM

## 2025-04-08 DIAGNOSIS — F32.3 CURRENT SEVERE EPISODE OF MAJOR DEPRESSIVE DISORDER WITH PSYCHOTIC FEATURES, UNSPECIFIED WHETHER RECURRENT (H): ICD-10-CM

## 2025-04-08 DIAGNOSIS — N18.9 ACUTE KIDNEY INJURY SUPERIMPOSED ON CKD: ICD-10-CM

## 2025-04-08 DIAGNOSIS — N17.9 ACUTE KIDNEY INJURY SUPERIMPOSED ON CKD: ICD-10-CM

## 2025-04-08 DIAGNOSIS — E87.20 METABOLIC ACIDOSIS: ICD-10-CM

## 2025-04-08 DIAGNOSIS — F29 PSYCHOSIS, UNSPECIFIED PSYCHOSIS TYPE (H): ICD-10-CM

## 2025-04-08 DIAGNOSIS — Z79.01 ANTICOAGULATED: ICD-10-CM

## 2025-04-08 DIAGNOSIS — S09.90XA INJURY OF HEAD, INITIAL ENCOUNTER: ICD-10-CM

## 2025-04-08 DIAGNOSIS — F32.89 OTHER DEPRESSION: ICD-10-CM

## 2025-04-08 DIAGNOSIS — E86.0 MILD DEHYDRATION: ICD-10-CM

## 2025-04-08 DIAGNOSIS — G89.29 OTHER CHRONIC PAIN: ICD-10-CM

## 2025-04-08 DIAGNOSIS — N18.31 CHRONIC KIDNEY DISEASE, STAGE 3A (H): ICD-10-CM

## 2025-04-08 DIAGNOSIS — M25.50 MULTIPLE JOINT PAIN: ICD-10-CM

## 2025-04-08 DIAGNOSIS — R44.3 HALLUCINATIONS: ICD-10-CM

## 2025-04-08 LAB
ALBUMIN SERPL BCG-MCNC: 3.8 G/DL (ref 3.5–5.2)
ALBUMIN UR-MCNC: 10 MG/DL
ALP SERPL-CCNC: 97 U/L (ref 40–150)
ALT SERPL W P-5'-P-CCNC: 12 U/L (ref 0–50)
AMPHETAMINES UR QL SCN: ABNORMAL
ANION GAP SERPL CALCULATED.3IONS-SCNC: 14 MMOL/L (ref 7–15)
APPEARANCE UR: ABNORMAL
AST SERPL W P-5'-P-CCNC: 37 U/L (ref 0–45)
BARBITURATES UR QL SCN: ABNORMAL
BASOPHILS # BLD AUTO: 0.1 10E3/UL (ref 0–0.2)
BASOPHILS NFR BLD AUTO: 1 %
BENZODIAZ UR QL SCN: ABNORMAL
BILIRUB SERPL-MCNC: 0.7 MG/DL
BILIRUB UR QL STRIP: NEGATIVE
BUN SERPL-MCNC: 36.8 MG/DL (ref 8–23)
BZE UR QL SCN: ABNORMAL
CALCIUM SERPL-MCNC: 9.2 MG/DL (ref 8.8–10.4)
CANNABINOIDS UR QL SCN: ABNORMAL
CHLORIDE SERPL-SCNC: 101 MMOL/L (ref 98–107)
COLOR UR AUTO: YELLOW
CREAT SERPL-MCNC: 2.27 MG/DL (ref 0.51–0.95)
EGFRCR SERPLBLD CKD-EPI 2021: 23 ML/MIN/1.73M2
EOSINOPHIL # BLD AUTO: 0 10E3/UL (ref 0–0.7)
EOSINOPHIL NFR BLD AUTO: 0 %
ERYTHROCYTE [DISTWIDTH] IN BLOOD BY AUTOMATED COUNT: 14 % (ref 10–15)
FENTANYL UR QL: ABNORMAL
GLUCOSE SERPL-MCNC: 175 MG/DL (ref 70–99)
GLUCOSE UR STRIP-MCNC: NEGATIVE MG/DL
HCO3 SERPL-SCNC: 20 MMOL/L (ref 22–29)
HCT VFR BLD AUTO: 36.4 % (ref 35–47)
HGB BLD-MCNC: 12.9 G/DL (ref 11.7–15.7)
HGB UR QL STRIP: NEGATIVE
HYALINE CASTS: 21 /LPF
IMM GRANULOCYTES # BLD: 0.1 10E3/UL
IMM GRANULOCYTES NFR BLD: 1 %
KETONES UR STRIP-MCNC: ABNORMAL MG/DL
LEUKOCYTE ESTERASE UR QL STRIP: NEGATIVE
LYMPHOCYTES # BLD AUTO: 1.4 10E3/UL (ref 0.8–5.3)
LYMPHOCYTES NFR BLD AUTO: 13 %
MAGNESIUM SERPL-MCNC: 1.7 MG/DL (ref 1.7–2.3)
MCH RBC QN AUTO: 30.1 PG (ref 26.5–33)
MCHC RBC AUTO-ENTMCNC: 35.4 G/DL (ref 31.5–36.5)
MCV RBC AUTO: 85 FL (ref 78–100)
MONOCYTES # BLD AUTO: 0.7 10E3/UL (ref 0–1.3)
MONOCYTES NFR BLD AUTO: 7 %
MUCOUS THREADS #/AREA URNS LPF: PRESENT /LPF
NEUTROPHILS # BLD AUTO: 8 10E3/UL (ref 1.6–8.3)
NEUTROPHILS NFR BLD AUTO: 78 %
NITRATE UR QL: NEGATIVE
NRBC # BLD AUTO: 0 10E3/UL
NRBC BLD AUTO-RTO: 0 /100
OPIATES UR QL SCN: ABNORMAL
PCP QUAL URINE (ROCHE): ABNORMAL
PH UR STRIP: 5.5 [PH] (ref 5–7)
PLATELET # BLD AUTO: 305 10E3/UL (ref 150–450)
POTASSIUM SERPL-SCNC: 3 MMOL/L (ref 3.4–5.3)
PROT SERPL-MCNC: 6.4 G/DL (ref 6.4–8.3)
RBC # BLD AUTO: 4.29 10E6/UL (ref 3.8–5.2)
RBC URINE: <1 /HPF
SODIUM SERPL-SCNC: 135 MMOL/L (ref 135–145)
SP GR UR STRIP: 1.02 (ref 1–1.03)
SQUAMOUS EPITHELIAL: 3 /HPF
TRANSITIONAL EPI: <1 /HPF
TSH SERPL DL<=0.005 MIU/L-ACNC: 0.62 UIU/ML (ref 0.3–4.2)
UROBILINOGEN UR STRIP-MCNC: NORMAL MG/DL
WBC # BLD AUTO: 10.3 10E3/UL (ref 4–11)
WBC URINE: 2 /HPF

## 2025-04-08 PROCEDURE — 258N000003 HC RX IP 258 OP 636: Performed by: FAMILY MEDICINE

## 2025-04-08 PROCEDURE — 250N000013 HC RX MED GY IP 250 OP 250 PS 637: Performed by: FAMILY MEDICINE

## 2025-04-08 PROCEDURE — 70450 CT HEAD/BRAIN W/O DYE: CPT

## 2025-04-08 PROCEDURE — 85025 COMPLETE CBC W/AUTO DIFF WBC: CPT | Performed by: FAMILY MEDICINE

## 2025-04-08 PROCEDURE — 80307 DRUG TEST PRSMV CHEM ANLYZR: CPT | Performed by: FAMILY MEDICINE

## 2025-04-08 PROCEDURE — 83735 ASSAY OF MAGNESIUM: CPT | Performed by: FAMILY MEDICINE

## 2025-04-08 PROCEDURE — 99285 EMERGENCY DEPT VISIT HI MDM: CPT | Mod: 25

## 2025-04-08 PROCEDURE — 81001 URINALYSIS AUTO W/SCOPE: CPT | Performed by: FAMILY MEDICINE

## 2025-04-08 PROCEDURE — 72131 CT LUMBAR SPINE W/O DYE: CPT

## 2025-04-08 PROCEDURE — 36415 COLL VENOUS BLD VENIPUNCTURE: CPT | Performed by: FAMILY MEDICINE

## 2025-04-08 PROCEDURE — 99285 EMERGENCY DEPT VISIT HI MDM: CPT | Performed by: FAMILY MEDICINE

## 2025-04-08 PROCEDURE — 84443 ASSAY THYROID STIM HORMONE: CPT | Performed by: FAMILY MEDICINE

## 2025-04-08 PROCEDURE — 82565 ASSAY OF CREATININE: CPT | Performed by: FAMILY MEDICINE

## 2025-04-08 PROCEDURE — 83036 HEMOGLOBIN GLYCOSYLATED A1C: CPT | Performed by: STUDENT IN AN ORGANIZED HEALTH CARE EDUCATION/TRAINING PROGRAM

## 2025-04-08 PROCEDURE — 96361 HYDRATE IV INFUSION ADD-ON: CPT

## 2025-04-08 PROCEDURE — 93005 ELECTROCARDIOGRAM TRACING: CPT

## 2025-04-08 RX ORDER — PREDNISONE 5 MG/1
10 TABLET ORAL DAILY
Status: DISCONTINUED | OUTPATIENT
Start: 2025-04-09 | End: 2025-04-11 | Stop reason: HOSPADM

## 2025-04-08 RX ORDER — ATORVASTATIN CALCIUM 20 MG/1
20 TABLET, FILM COATED ORAL DAILY
Status: DISCONTINUED | OUTPATIENT
Start: 2025-04-09 | End: 2025-04-11 | Stop reason: HOSPADM

## 2025-04-08 RX ORDER — BUPROPION HYDROCHLORIDE 300 MG/1
300 TABLET ORAL EVERY MORNING
Status: DISCONTINUED | OUTPATIENT
Start: 2025-04-09 | End: 2025-04-09

## 2025-04-08 RX ORDER — HYDROXYZINE HYDROCHLORIDE 25 MG/1
25 TABLET, FILM COATED ORAL 3 TIMES DAILY PRN
Status: DISCONTINUED | OUTPATIENT
Start: 2025-04-08 | End: 2025-04-09

## 2025-04-08 RX ORDER — ACETAMINOPHEN 500 MG
500-1000 TABLET ORAL EVERY 6 HOURS PRN
Status: DISCONTINUED | OUTPATIENT
Start: 2025-04-08 | End: 2025-04-11 | Stop reason: HOSPADM

## 2025-04-08 RX ORDER — ONDANSETRON 4 MG/1
4 TABLET, ORALLY DISINTEGRATING ORAL EVERY 8 HOURS PRN
Status: DISCONTINUED | OUTPATIENT
Start: 2025-04-08 | End: 2025-04-09

## 2025-04-08 RX ORDER — RIZATRIPTAN BENZOATE 10 MG/1
10 TABLET ORAL
Status: DISCONTINUED | OUTPATIENT
Start: 2025-04-08 | End: 2025-04-11 | Stop reason: HOSPADM

## 2025-04-08 RX ORDER — METOPROLOL SUCCINATE 100 MG/1
100 TABLET, EXTENDED RELEASE ORAL DAILY
Status: DISCONTINUED | OUTPATIENT
Start: 2025-04-09 | End: 2025-04-09

## 2025-04-08 RX ORDER — HYDROCHLOROTHIAZIDE 25 MG/1
25 TABLET ORAL DAILY
Status: DISCONTINUED | OUTPATIENT
Start: 2025-04-09 | End: 2025-04-11 | Stop reason: HOSPADM

## 2025-04-08 RX ORDER — MEMANTINE HYDROCHLORIDE 5 MG/1
1 TABLET ORAL
Status: ON HOLD | COMMUNITY
Start: 2025-02-21 | End: 2025-04-11

## 2025-04-08 RX ORDER — POTASSIUM CHLORIDE 1.5 G/1.58G
20 POWDER, FOR SOLUTION ORAL ONCE
Status: COMPLETED | OUTPATIENT
Start: 2025-04-08 | End: 2025-04-08

## 2025-04-08 RX ORDER — ALBUTEROL SULFATE 0.83 MG/ML
2.5 SOLUTION RESPIRATORY (INHALATION) EVERY 6 HOURS PRN
Status: DISCONTINUED | OUTPATIENT
Start: 2025-04-08 | End: 2025-04-09

## 2025-04-08 RX ORDER — FAMOTIDINE 20 MG/1
40 TABLET, FILM COATED ORAL DAILY
Status: DISCONTINUED | OUTPATIENT
Start: 2025-04-09 | End: 2025-04-09

## 2025-04-08 RX ORDER — OXYBUTYNIN CHLORIDE 10 MG/1
10 TABLET, EXTENDED RELEASE ORAL DAILY
Status: DISCONTINUED | OUTPATIENT
Start: 2025-04-09 | End: 2025-04-11 | Stop reason: HOSPADM

## 2025-04-08 RX ORDER — TRAZODONE HYDROCHLORIDE 50 MG/1
50-150 TABLET ORAL
Status: DISCONTINUED | OUTPATIENT
Start: 2025-04-08 | End: 2025-04-09

## 2025-04-08 RX ORDER — METOPROLOL SUCCINATE 25 MG/1
25 TABLET, EXTENDED RELEASE ORAL DAILY
Status: DISCONTINUED | OUTPATIENT
Start: 2025-04-09 | End: 2025-04-09

## 2025-04-08 RX ADMIN — SODIUM CHLORIDE 1000 ML: 9 INJECTION, SOLUTION INTRAVENOUS at 16:20

## 2025-04-08 RX ADMIN — POTASSIUM CHLORIDE 20 MEQ: 1.5 POWDER, FOR SOLUTION ORAL at 16:12

## 2025-04-08 ASSESSMENT — ACTIVITIES OF DAILY LIVING (ADL)
ADLS_ACUITY_SCORE: 58

## 2025-04-08 ASSESSMENT — ENCOUNTER SYMPTOMS: ALTERED MENTAL STATUS: 1

## 2025-04-08 NOTE — TELEPHONE ENCOUNTER
Sent to PCP for KATARINA Wharton RN  [facility] - Registered Nurse  Clinic Triage [place]   April 8, 2025
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
## 2025-04-08 NOTE — PLAN OF CARE
"Jacquie BURROWS Marjorie  April 8, 2025  Plan of Care Hand-off Note     Patient Recommended Care Path: inpatient mental health    Clinical Substantiation:  It is the recommendation of this writer that pt be admitted to an inpatient psychiatric unit on the basis of her hallucinations, paranoia, suicidal ideation, and inability to care for herself at home, including not eating, drinking water, or sleeping. Pt is declining inpatient admission and became escalated when discussing disposition, swearing at her  and demanding to know what he told staff to lead to an inpatient recommendation. Pt is on a 72 HH expiring on 4/11/25 @ 15:50. Pt would benefit from a neuropsychological evaluation to further clarify diagnosis.    Goals for crisis stabilization:  decrease psychosis and suicidal ideation    Next steps for Care Team:  continue to monitor suicidal ideation    Treatment Objectives Addressed:  processing feelings, assessing safety    Therapeutic Interventions:  Engaged in guided discovery, explored patient's perspectives and helped expand them through socratic dialogue.    Has a specific means been identified for suicidal.homicide actions: Yes  If yes, describe: Pt endorses a suicidal plan to overdose on medication but denies intent to act on this plan, telling this writer that she has \"adorable grandchildren\".  Explain action steps toward mitigation: Pt is being recommended for inpatient hospitalization.  Document completion of mitigation action: Pt is being recommended for inpatient hospitalization.  The follow up action still needed prior to discharge: Pt will need to engage in safety planning prior to discharge.    Patient coping skills attempted to reduce the crisis:  Pt has been reaching out to support from her .       Imminent risk of harm: Suicidal Behavior  Severe psychiatric, behavioral or other comorbid conditions are appropriate for management at inpatient mental health as indicated by at least one of " the following: Psychiatric Symptoms, Impaired impulse control, judgement, or insight, Symptoms of impact to function  Severe dysfunction in daily living is present as indicated by at least one of the following: Incapacitation because of grave disability, Extreme disruption in vegetative function, Complete inability to maintain any appropriate aspect of personal responsibility in any adult roles, Extreme deterioration in social interactions, Complete neglect of self care with associated impairment in physical status  Situation and expectations are appropriate for inpatient care: Around-the-clock medical and nursing care to address symptoms and initiate intervention is required, Voluntary treatment at lower level of care is not feasible, Patient is unwilling to participate in treatment voluntarily and requires treatment, Patient management/treatment at lower level of care is not feasible or is inappropriate  Inpatient mental health services are necessary to meet patient needs and at least one of the following: Specific condition related to admission diagnosis is present and judged likely to deteriorate in absence of treatment at proposed level of care      Collateral contact information:  Tono Mckeonleliabyron, , PH: [phone] (pt signed REED)    Legal Status: 72 Hour Hold                         72 Hour Hold - Date/Time Initiated: 4/8/25 @ 15:50                         72 Hour Hold - Date/Time Ends: 4/11/25 @ 15:50                                                                             Reviewed court records: yes     Psychiatry Consult: Patient has Psychiatry Consult Order    Lynn Bailon, OLISW

## 2025-04-08 NOTE — ED TRIAGE NOTES
Patient is here with spouse for multiple concerns.   The last year there has been issues but it has been worse over the last few weeks.   There is concern for dementia like illness but no diagnosis. The patient gets agitated at times, has hallucinations and acts on them as well. The spouse reports he has been having to sleep at a hotel intermittently due to the patient's behaviors. The patient was hallucinating that there was another women in the house that her  was cheating on her with. 911 has been called multiple times including yesterday.   Yesterday she reports her legs gave out and she rolled down the stairs. She doesn't think she hit her head. She is on a blood thinner. Reports after this fall she went into her bedroom and her legs gave out again. She doesn't think she hit her head. This was unwitnessed.   Patient has severe chronic pain and has been on memantine however a pharmacist thought that was contributing to her symptoms however when she discontinued it her symptoms were still there.   She also makes SI statements saying she would overdose on pills. She has never tried and said she never would.      Triage Assessment (Adult)       Row Name 04/08/25 0064          Triage Assessment    Airway WDL WDL        Respiratory WDL    Respiratory WDL WDL        Skin Circulation/Temperature WDL    Skin Circulation/Temperature WDL WDL        Cardiac WDL    Cardiac WDL WDL        Peripheral/Neurovascular WDL    Peripheral Neurovascular WDL WDL        Cognitive/Neuro/Behavioral WDL    Cognitive/Neuro/Behavioral WDL WDL

## 2025-04-08 NOTE — ED PROVIDER NOTES
"ED Provider Note  [facility]      History     Chief Complaint   Patient presents with    Fall    Hallucinations    Generalized Weakness    Altered Mental Status    Suicidal       Trauma  Mechanism of injury: Fall   Generalized Weakness  Altered Mental Status    Jacquie Amador is a 64 year old female who presents to the ED with exacerbation of ongoing problems with hallucinations, unsteadiness, and falls. She reports that last night she \"had an episode\" during which she hallucinated people in the yard at her house and called the . She also texted her spouse who has been staying in a hotel during this episode. After the  determined that no one was outside her house, she then had further hallucinations of people inside the house and called 911 again. The  brought paramedics who encouraged her to go to the hospital for evaluation, but she refused. At some point during these hallucinations, she fell down the stairs. She reports that she has had problems with unsteadiness since childhood, but that it has been worse recently. She states that the first time she had hallucinations like this was about a year ago. Her  reports that she frequently has hallucinations, almost exclusively at night. He expressed concern about her cognition and ability to care for herself. She endorses diffuse chronic pain, neuropathic pain in her toes, and chronic constipation. She denies fever, chills, current headache, urinary frequency, dysuria, nausea, vomiting, cough, congestion, or diarrhea.      Past Medical History  Past Medical History:   Diagnosis Date    ASTHMA - MODERATE PERSISTENT 9/21/2005    Chronic pain     Coronary artery disease     CVA (cerebral infarction)     Depressive disorder, not elsewhere classified     Diabetes (H)     Elevated serum alkaline phosphatase level     Liver source    Fibromyalgia     HYPERLIPIDEMIA NEC/NOS 12/29/2006    Hypertriglyceridemia     OA " (osteoarthritis)     Thyroid disease     Trochanteric bursitis     Unspecified essential hypertension      Past Surgical History:   Procedure Laterality Date    3 teeth pulled      INJECT EPIDURAL TRANSFORAMINAL  2014    Lumbosacral-Victor Spine Teaberry    INJECT JOINT SACROILIAC  2014    Victor Spine Teaberry    LAMINECTOMY LUMBAR ONE LEVEL Left 2014    [facility]  DELIVERY ONLY      , Low Cervical     apixaban ANTICOAGULANT (ELIQUIS) 5 MG tablet  hydrochlorothiazide (HYDRODIURIL) 25 MG tablet  losartan (COZAAR) 100 MG tablet  memantine (NAMENDA) 5 MG tablet  metoprolol succinate ER (TOPROL XL) 100 MG 24 hr tablet  traMADol (ULTRAM) 50 MG tablet  traZODone (DESYREL) 50 MG tablet  acetaminophen (TYLENOL) 500 MG tablet  albuterol (PROVENTIL) (2.5 MG/3ML) 0.083% neb solution  albuterol (VENTOLIN HFA) 108 (90 Base) MCG/ACT inhaler  apremilast (OTEZLA) 30 MG tablet  atorvastatin (LIPITOR) 20 MG tablet  augmented betamethasone dipropionate (DIPROLENE AF) 0.05 % external cream  benzonatate (TESSALON) 200 MG capsule  buPROPion (WELLBUTRIN XL) 150 MG 24 hr tablet  buPROPion (WELLBUTRIN XL) 300 MG 24 hr tablet  chlorhexidine (PERIDEX) 0.12 % solution  clindamycin (CLEOCIN-T) 1 % external gel  clotrimazole (MYCELEX) 10 MG lozenge  COMPOUND CONTAINING CONTROLLED SUBSTANCE (CMPD RX) - PHARMACY TO MIX COMPOUNDED MEDICATION  COMPOUNDED NON-CONTROLLED SUBSTANCE (CMPD RX) - PHARMACY TO MIX COMPOUNDED MEDICATION  desonide (DESOWEN) 0.05 % external cream  dextran 70-hypromellose (TEARS NATURALE FREE PF) 0.1-0.3 % ophthalmic solution  doxycycline monohydrate (MONODOX) 100 MG capsule  DULoxetine (CYMBALTA) 60 MG capsule  dupilumab (DUPIXENT) 300 MG/2ML prefilled pen  famotidine (PEPCID) 40 MG tablet  ferrous sulfate (FEROSUL) 325 (65 Fe) MG tablet  fluocinonide (LIDEX) 0.05 % external gel  hydrocortisone 2.5 % cream  hydrOXYzine HCl (ATARAX) 25 MG tablet  lidocaine, viscous,  "(XYLOCAINE) 2 % solution  metoprolol succinate ER (TOPROL XL) 25 MG 24 hr tablet  mupirocin (BACTROBAN) 2 % external cream  mupirocin (BACTROBAN) 2 % external ointment   MG CAPS capsule  Naltrexone HCl POWD  nystatin (MYCOSTATIN) 721090 UNIT/ML suspension  ondansetron (ZOFRAN ODT) 4 MG ODT tab  oxyBUTYnin ER (DITROPAN XL) 5 MG 24 hr tablet  pimecrolimus (ELIDEL) 1 % external cream  predniSONE (DELTASONE) 10 MG tablet  rizatriptan (MAXALT) 10 MG tablet  tacrolimus (PROTOPIC) 0.1 % external ointment  tiotropium (SPIRIVA RESPIMAT) 2.5 MCG/ACT inhaler  triamcinolone (ARISTOCORT HP) 0.5 % external cream  valACYclovir (VALTREX) 500 MG tablet      Allergies   Allergen Reactions    Cats      and rabbits/wheezing    Dogs      wheezing    Lyrica [Pregabalin] Other (See Comments)     Rash, mouth sores, itching and burning    Seasonal Allergies      rhinits     Family History  Family History   Problem Relation Age of Onset    Thyroid Disease Mother     Arthritis Mother     Colon Cancer Mother     Myelodysplastic syndrome Mother     Hypertension Father     Diabetes Father     C.A.D. Father         MI age 75    Cardiovascular Father         heart attack    Cerebrovascular Disease Father     Cancer Father         renal cancer    Arthritis Father     Heart Disease Father         heart attack    Gastrointestinal Disease Father         liver    Genitourinary Problems Father         kidney    Thyroid Disease Sister     Arthritis Sister     Lipids Sister     Asthma Daughter     Gastrointestinal Disease Daughter     Multiple Sclerosis Maternal Aunt     Mastocytosis Nephew         \"System Mast Cell Disease and hyperesosinophilia\"    Unknown Other     Breast Cancer No family hx of     Cancer - colorectal No family hx of     Alzheimer Disease No family hx of     Blood Disease No family hx of     Circulatory No family hx of     Eye Disorder No family hx of     Musculoskeletal Disorder No family hx of     Neurologic Disorder No family " hx of     Respiratory No family hx of     Melanoma No family hx of     Skin Cancer No family hx of      Social History   Social History     Tobacco Use    Smoking status: Former     Current packs/day: 0.00     Average packs/day: 0.3 packs/day for 33.7 years (10.1 ttl pk-yrs)     Types: Cigarettes     Start date: 1980     Quit date: 2013     Years since quittin.6     Passive exposure: Past    Smokeless tobacco: Never    Tobacco comments:     since    Vaping Use    Vaping status: Never Used   Substance Use Topics    Alcohol use: No    Drug use: Yes     [facility]       A medically appropriate review of systems was performed with pertinent positives and negatives noted in the HPI, and all other systems negative.    Physical Exam   BP: 104/74  Pulse: 94  Temp: 98.2  F (36.8  C)  Resp: 16  Weight: 79.8 kg (176 lb)  SpO2: 97 %  Physical Exam  Constitutional:       General: She is not in acute distress.     Appearance: She is well-developed. She is not toxic-appearing.   HENT:      Head: Normocephalic and atraumatic.      Mouth/Throat:      Mouth: Mucous membranes are moist.      Pharynx: Oropharynx is clear.   Eyes:      Extraocular Movements: Extraocular movements intact.   Cardiovascular:      Rate and Rhythm: Normal rate and regular rhythm.      Heart sounds: Normal heart sounds.   Pulmonary:      Effort: Pulmonary effort is normal. No respiratory distress.      Breath sounds: Normal breath sounds. No wheezing.   Abdominal:      General: Bowel sounds are normal.      Palpations: Abdomen is soft.      Tenderness: There is no abdominal tenderness. There is no guarding.   Musculoskeletal:         General: Normal range of motion.      Cervical back: Normal range of motion. No rigidity.   Skin:     General: Skin is warm and dry.      Coloration: Skin is mottled.      Findings: Abrasion and bruising present.             Comments: Abrasion over lateral elbow. Bruising on forearm.    Neurological:      General: No focal deficit present.      Mental Status: She is alert and oriented to person, place, and time.   Psychiatric:         Attention and Perception: Attention normal.         Mood and Affect: Mood is depressed. Affect is tearful.         Speech: Speech normal.         Behavior: Behavior normal. Behavior is cooperative.         Cognition and Memory: Memory normal.             ED Course, Procedures, & Data      Procedures                Results for orders placed or performed during the hospital encounter of 04/08/25   CT Head w/o Contrast     Status: None    Narrative    EXAM: CT HEAD W/O CONTRAST  LOCATION: [facility]  DATE: 4/8/2025    INDICATION: Head injury.  COMPARISON: None.  TECHNIQUE: Routine CT Head without IV contrast. Multiplanar reformats. Dose reduction techniques were used.    FINDINGS:  INTRACRANIAL CONTENTS: No intracranial hemorrhage, extraaxial collection, or mass effect.  No CT evidence of acute infarct. Mild presumed chronic small vessel ischemic changes. Mild to moderate generalized volume loss. No hydrocephalus.     VISUALIZED ORBITS/SINUSES/MASTOIDS: Prior bilateral cataract surgery. Visualized portions of the orbits are otherwise unremarkable. No paranasal sinus mucosal disease. No middle ear or mastoid effusion.    BONES/SOFT TISSUES: No acute abnormality.      Impression    IMPRESSION:  1.  No CT evidence for acute intracranial process.  2.  Brain atrophy and presumed chronic microvascular ischemic changes as above.   CT Lumbar Spine w/o Contrast     Status: None    Narrative    EXAM: CT LUMBAR SPINE W/O CONTRAST  LOCATION: [facility]  DATE: 4/8/2025    INDICATION: Trauma, midline bony tenderness  COMPARISON: None.  TECHNIQUE: Routine CT Lumbar Spine without IV contrast. Multiplanar reformats. Dose reduction techniques were used.     FINDINGS:  VERTEBRA: Diffuse  osteopenia. Lumbar spine lordosis is maintained. Lumbar vertebral body heights are maintained. Grade 1 retrolisthesis of L2 on L3. No acute lumbar spine fracture. Mild rightward convex curvature of the lumbar spine.     CANAL/FORAMINA: Advanced loss of disc space height at L2-L3 with sclerosis and irregularity of the apposing endplates. Advanced loss of disc space height at L5-S1 with circumferential disc interbody spurring and moderate to severe bilateral neural   foraminal stenosis. At L2-L3, there is a left paracentral caudally directed disc extrusion contributing to mild spinal canal stenosis and left lateral recess narrowing which may affect the descending/traversing left L3 nerve root.    PARASPINAL: Vascular calcifications involving the aorta and bilateral common iliac arteries.      Impression    IMPRESSION:  1.  No acute lumbar spine fracture.  2.  At L2-L3, there is a left paracentral caudally directed disc extrusion narrowing the left lateral recess which may affect the descending/traversing left L3 nerve root. Correlation for radiculopathy in this nerve root distribution is advised.  3.  At L5-S1, there is moderate to severe bilateral neural foraminal stenosis.   Comprehensive metabolic panel     Status: Abnormal   Result Value Ref Range    Sodium 135 135 - 145 mmol/L    Potassium 3.0 (L) 3.4 - 5.3 mmol/L    Carbon Dioxide (CO2) 20 (L) 22 - 29 mmol/L    Anion Gap 14 7 - 15 mmol/L    Urea Nitrogen 36.8 (H) 8.0 - 23.0 mg/dL    Creatinine 2.27 (H) 0.51 - 0.95 mg/dL    GFR Estimate 23 (L) >60 mL/min/1.73m2    Calcium 9.2 8.8 - 10.4 mg/dL    Chloride 101 98 - 107 mmol/L    Glucose 175 (H) 70 - 99 mg/dL    Alkaline Phosphatase 97 40 - 150 U/L    AST 37 0 - 45 U/L    ALT 12 0 - 50 U/L    Protein Total 6.4 6.4 - 8.3 g/dL    Albumin 3.8 3.5 - 5.2 g/dL    Bilirubin Total 0.7 <=1.2 mg/dL   UA with Microscopic reflex to Culture     Status: Abnormal    Specimen: Urine, Clean Catch   Result Value Ref Range    Color  Urine Yellow Colorless, Straw, Light Yellow, Yellow    Appearance Urine Slightly Cloudy (A) Clear    Glucose Urine Negative Negative mg/dL    Bilirubin Urine Negative Negative    Ketones Urine Trace (A) Negative mg/dL    Specific Gravity Urine 1.016 1.003 - 1.035    Blood Urine Negative Negative    pH Urine 5.5 5.0 - 7.0    Protein Albumin Urine 10 (A) Negative mg/dL    Urobilinogen Urine Normal Normal mg/dL    Nitrite Urine Negative Negative    Leukocyte Esterase Urine Negative Negative    Mucus Urine Present (A) None Seen /LPF    RBC Urine <1 <=2 /HPF    WBC Urine 2 <=5 /HPF    Squamous Epithelials Urine 3 (H) <=1 /HPF    Transitional Epithelials Urine <1 <=1 /HPF    Hyaline Casts Urine 21 (H) <=2 /LPF    Narrative    Urine Culture not indicated   TSH with free T4 reflex     Status: Normal   Result Value Ref Range    TSH 0.62 0.30 - 4.20 uIU/mL   CBC with platelets and differential     Status: None   Result Value Ref Range    WBC Count 10.3 4.0 - 11.0 10e3/uL    RBC Count 4.29 3.80 - 5.20 10e6/uL    Hemoglobin 12.9 11.7 - 15.7 g/dL    Hematocrit 36.4 35.0 - 47.0 %    MCV 85 78 - 100 fL    MCH 30.1 26.5 - 33.0 pg    MCHC 35.4 31.5 - 36.5 g/dL    RDW 14.0 10.0 - 15.0 %    Platelet Count 305 150 - 450 10e3/uL    % Neutrophils 78 %    % Lymphocytes 13 %    % Monocytes 7 %    % Eosinophils 0 %    % Basophils 1 %    % Immature Granulocytes 1 %    NRBCs per 100 WBC 0 <1 /100    Absolute Neutrophils 8.0 1.6 - 8.3 10e3/uL    Absolute Lymphocytes 1.4 0.8 - 5.3 10e3/uL    Absolute Monocytes 0.7 0.0 - 1.3 10e3/uL    Absolute Eosinophils 0.0 0.0 - 0.7 10e3/uL    Absolute Basophils 0.1 0.0 - 0.2 10e3/uL    Absolute Immature Granulocytes 0.1 <=0.4 10e3/uL    Absolute NRBCs 0.0 10e3/uL   Magnesium     Status: Normal   Result Value Ref Range    Magnesium 1.7 1.7 - 2.3 mg/dL   CBC with platelets differential     Status: None    Narrative    The following orders were created for panel order CBC with platelets  differential.  Procedure                               Abnormality         Status                     ---------                               -----------         ------                     CBC with platelets and ...[[id number]]                      Final result                 Please view results for these tests on the individual orders.     Medications   sodium chloride 0.9% BOLUS 1,000 mL (1,000 mLs Intravenous $New Bag 4/8/25 8146)   potassium chloride (KLOR-CON) Packet 20 mEq (20 mEq Oral $Given 4/8/25 3291)     Labs Ordered and Resulted from Time of ED Arrival to Time of ED Departure   COMPREHENSIVE METABOLIC PANEL - Abnormal       Result Value    Sodium 135      Potassium 3.0 (*)     Carbon Dioxide (CO2) 20 (*)     Anion Gap 14      Urea Nitrogen 36.8 (*)     Creatinine 2.27 (*)     GFR Estimate 23 (*)     Calcium 9.2      Chloride 101      Glucose 175 (*)     Alkaline Phosphatase 97      AST 37      ALT 12      Protein Total 6.4      Albumin 3.8      Bilirubin Total 0.7     ROUTINE UA WITH MICROSCOPIC REFLEX TO CULTURE - Abnormal    Color Urine Yellow      Appearance Urine Slightly Cloudy (*)     Glucose Urine Negative      Bilirubin Urine Negative      Ketones Urine Trace (*)     Specific Gravity Urine 1.016      Blood Urine Negative      pH Urine 5.5      Protein Albumin Urine 10 (*)     Urobilinogen Urine Normal      Nitrite Urine Negative      Leukocyte Esterase Urine Negative      Mucus Urine Present (*)     RBC Urine <1      WBC Urine 2      Squamous Epithelials Urine 3 (*)     Transitional Epithelials Urine <1      Hyaline Casts Urine 21 (*)    TSH WITH FREE T4 REFLEX - Normal    TSH 0.62     MAGNESIUM - Normal    Magnesium 1.7     CBC WITH PLATELETS AND DIFFERENTIAL    WBC Count 10.3      RBC Count 4.29      Hemoglobin 12.9      Hematocrit 36.4      MCV 85      MCH 30.1      MCHC 35.4      RDW 14.0      Platelet Count 305      % Neutrophils 78      % Lymphocytes 13      % Monocytes 7      %  Eosinophils 0      % Basophils 1      % Immature Granulocytes 1      NRBCs per 100 WBC 0      Absolute Neutrophils 8.0      Absolute Lymphocytes 1.4      Absolute Monocytes 0.7      Absolute Eosinophils 0.0      Absolute Basophils 0.1      Absolute Immature Granulocytes 0.1      Absolute NRBCs 0.0       CT Lumbar Spine w/o Contrast   Final Result   IMPRESSION:   1.  No acute lumbar spine fracture.   2.  At L2-L3, there is a left paracentral caudally directed disc extrusion narrowing the left lateral recess which may affect the descending/traversing left L3 nerve root. Correlation for radiculopathy in this nerve root distribution is advised.   3.  At L5-S1, there is moderate to severe bilateral neural foraminal stenosis.      CT Head w/o Contrast   Final Result   IMPRESSION:   1.  No CT evidence for acute intracranial process.   2.  Brain atrophy and presumed chronic microvascular ischemic changes as above.             Evaluation completed for head and L-spine injury due to fall, lab evaluation for infection or metabolic causes of impaired cognition. Mental health assessment for expressed SI. Labs show hypokalemia and acute kidney injury. Given 1L NS and Kcl. Head CT notable for atrophy, no acute processes. L-spine CT normal. Mental health assessment completed and recommendation made for psych hold for further evaluation and management. Pt not amenable to admission. Pt is not able to care for herself at home and has not been eating, drinking or sleeping. Per  eval, has been aggressive towards  and has had increasing paranoia.    Assessment & Plan    Assessment: Hallucinations, Acute Kidney Injury, Fall with high risk medication.    Plan: Admit to geriatric mental health services for further evaluation and management due to pt's inability to care for self at home.    I have reviewed the nursing notes. I have reviewed the findings, diagnosis, plan and need for follow up with the patient.    New Prescriptions     No medications on file       Final diagnoses:   Current severe episode of major depressive disorder with psychotic features, unspecified whether recurrent (H)   Acute kidney injury superimposed on CKD   Mild dehydration   Hallucinations     Merari Arcos, MS-3    This data collected with the medical student working in the Emergency Department.  I repeated the history and physical exam with the patient.     --    ED Attending Physician Attestation    I sIhmael Carranza MD, cared for this patient with the Medical Student. I performed, or re-performed, the physical exam and medical decision-making. I have verified the accuracy of all the medical student findings and documentation above, and have edited as necessary.    Summary of HPI, PE, ED Course   Patient is a 64 year old female evaluated in the emergency department for multiple concerns including severe depression and anxiety, paranoia and hallucinations at night which have resulted in calls to law enforcement multiple times, multiple falls including fall down the stairs last night, concern by family that patient is not caring for herself due to mental health symptoms, concerns regarding acute on chronic worsening issues with memory and cognition. Exam and ED course notable for patient's vital signs are unremarkable.  She is alert and awake at this time oriented x 3 cooperative with fluent speech and normal mental status.  He has become depressed and tearful easily when discussing her situation.  She does not respond to any internal stimuli and denies that she is actively suicidal but admits to passive suicidal thoughts.  She has evidence of multiple falls including an abrasion on her right arm tenderness of her lumbar spine but no other focal neurologic deficits or signs of significant trauma.  Her physical exam was otherwise unremarkable as documented above  The patient was also seen by the DEC , please refer to their extensive note/evaluation which  was reviewed with me and is documented in EPIC on 4/8/2025 for further details.  Recommendation for inpatient mental health admission, patient is not caring for herself, she lacks insight and judgment.  She has paranoid delusions, she has been unable to access much in the way of outpatient services, appears to represent a risk of harm to herself due to the severity of her symptoms.  Patient reluctantly initially agreed to admission, then changed her mind then again consented, at this time I do not think her insight and judgment is appropriate will place her on a 72-hour hold.  . After the completion of care in the emergency department, the patient was admitted to inpatient.        Medical Decision Making  The patient's presentation was of high complexity (an acute health issue posing potential threat to life or bodily function).    The patient's evaluation involved:  an assessment requiring an independent historian ( and family provides collateral information)  review of external note(s) from 2 sources (review of documentation from virtual visit on 3/19/2025, and virtual visit on 3/18/2025)  ordering and/or review of 3+ test(s) in this encounter (see separate area of note for details)  independent interpretation of testing performed by another health professional (CT head and CT L-spine images personally reviewed reviewed radiologist interpretation)  discussion of management or test interpretation with another health professional (DEC )    The patient's management necessitated moderate risk (prescription drug management including medications given in the ED) and high risk (a decision regarding hospitalization).      Ishmael Carranza MD  Emergency Medicine     Ishmael Carranza MD  [facility] EMERGENCY DEPARTMENT  4/8/2025     Ishmael Carranza MD  04/08/25 [id number]

## 2025-04-08 NOTE — TELEPHONE ENCOUNTER
Pt transferred via priority line from central scheduling due to reported fall.    on the line, with wife Jacquie present.   They note they spoke with a triage nurse earlier this morning, and were advised to go to the ED.   They wanted to notify the triage RN & PCP that they are heading to the ED now.   They wanted [place] team notified, routing to that pool.    Write advised caller that we would send FYI to PCP office.   All questions were answered.   At time of call ending, they were heading to local ED.     Shelia VELASQUEZ RN    [facility] Triage Team

## 2025-04-08 NOTE — ED NOTES
IP MH Referral Acuity Rating Score (RARS)    LMHP complete at referral to IP MH, with DEC; and, daily while awaiting IP MH placement. Call score to PPS.  CRITERIA SCORING   New 72 HH and Involuntary for IP MH (not adolescent) 3/3   Boarding over 24 hours 0/1   Vulnerable adult at least 55+ with multiple co morbidities; or, Patient age 11 or under 0/1   Suicide ideation without relief of precipitating factors 1/1   Current plan for suicide 1/1   Current plan for homicide 0/1   Imminent risk or actual attempt to seriously harm another without relief of factors precipitating the attempt 0/1   Severe dysfunction in daily living (ex: complete neglect for self care, extreme disruption in vegetative function, extreme deterioration in social interactions) 1/1   Recent (last 2 weeks) or current physical aggression in the ED 0/1   Restraints or seclusion episode in ED 0/1   Verbal aggression, agitation, yelling, etc., while in the ED 1/1   Active psychosis with psychomotor agitation or catatonia 1/1   Need for constant or near constant redirection (from leaving, from others, etc).  0/1   Intrusive or disruptive behaviors 0/1   TOTAL Acuity Total Score: 8     DARRELL Dent

## 2025-04-08 NOTE — TELEPHONE ENCOUNTER
is calling today.    Patient fell a couple of times yesterday. Patient called paramedic and she refused to go in.  she has a gash on her arm.    She is seeing and hearing things.     states patient said she would let him take her in today but he is unsure if patient will let her to the hospital or the clinic.    He might be taking her to the clinic to get reinforcement to get her to go to the hospital.    He wants clinic to be aware.    He states he needs to get her in for testing and is hoping for 72 hour hold.     advised to [facility] to [facility].    Emi Gomez RN on 4/8/2025 at 8:26 AM
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
## 2025-04-09 ENCOUNTER — TELEPHONE (OUTPATIENT)
Dept: BEHAVIORAL HEALTH | Facility: CLINIC | Age: 65
End: 2025-04-09

## 2025-04-09 PROBLEM — R44.3 HALLUCINATIONS: Status: ACTIVE | Noted: 2025-04-09

## 2025-04-09 PROBLEM — N17.9 ACUTE KIDNEY INJURY SUPERIMPOSED ON CKD: Status: ACTIVE | Noted: 2025-04-09

## 2025-04-09 PROBLEM — N18.9 ACUTE KIDNEY INJURY SUPERIMPOSED ON CKD: Status: ACTIVE | Noted: 2025-04-09

## 2025-04-09 PROBLEM — F32.3 CURRENT SEVERE EPISODE OF MAJOR DEPRESSIVE DISORDER WITH PSYCHOTIC FEATURES, UNSPECIFIED WHETHER RECURRENT (H): Status: ACTIVE | Noted: 2025-04-09

## 2025-04-09 PROBLEM — E86.0 MILD DEHYDRATION: Status: ACTIVE | Noted: 2025-04-09

## 2025-04-09 PROBLEM — E87.6 HYPOKALEMIA: Status: ACTIVE | Noted: 2025-04-09

## 2025-04-09 LAB
ANION GAP SERPL CALCULATED.3IONS-SCNC: 11 MMOL/L (ref 7–15)
ANION GAP SERPL CALCULATED.3IONS-SCNC: 16 MMOL/L (ref 7–15)
ATRIAL RATE - MUSE: 77 BPM
BUN SERPL-MCNC: 28.9 MG/DL (ref 8–23)
BUN SERPL-MCNC: 31.9 MG/DL (ref 8–23)
CALCIUM SERPL-MCNC: 7.5 MG/DL (ref 8.8–10.4)
CALCIUM SERPL-MCNC: 8.9 MG/DL (ref 8.8–10.4)
CHLORIDE SERPL-SCNC: 107 MMOL/L (ref 98–107)
CHLORIDE SERPL-SCNC: 111 MMOL/L (ref 98–107)
CREAT SERPL-MCNC: 1.45 MG/DL (ref 0.51–0.95)
CREAT SERPL-MCNC: 1.55 MG/DL (ref 0.51–0.95)
CRP SERPL-MCNC: 28.12 MG/L
DIASTOLIC BLOOD PRESSURE - MUSE: NORMAL MMHG
EGFRCR SERPLBLD CKD-EPI 2021: 37 ML/MIN/1.73M2
EGFRCR SERPLBLD CKD-EPI 2021: 40 ML/MIN/1.73M2
EST. AVERAGE GLUCOSE BLD GHB EST-MCNC: 131 MG/DL
GLUCOSE SERPL-MCNC: 145 MG/DL (ref 70–99)
GLUCOSE SERPL-MCNC: 75 MG/DL (ref 70–99)
HBA1C MFR BLD: 6.2 %
HCO3 SERPL-SCNC: 18 MMOL/L (ref 22–29)
HCO3 SERPL-SCNC: 19 MMOL/L (ref 22–29)
HIV 1+2 AB+HIV1 P24 AG SERPL QL IA: NONREACTIVE
INTERPRETATION ECG - MUSE: NORMAL
P AXIS - MUSE: 62 DEGREES
PHOSPHATE SERPL-MCNC: 3.6 MG/DL (ref 2.5–4.5)
POTASSIUM SERPL-SCNC: 2.5 MMOL/L (ref 3.4–5.3)
POTASSIUM SERPL-SCNC: 3.3 MMOL/L (ref 3.4–5.3)
POTASSIUM SERPL-SCNC: 3.4 MMOL/L (ref 3.4–5.3)
POTASSIUM SERPL-SCNC: 3.9 MMOL/L (ref 3.4–5.3)
PR INTERVAL - MUSE: 150 MS
QRS DURATION - MUSE: 96 MS
QT - MUSE: 400 MS
QTC - MUSE: 452 MS
R AXIS - MUSE: 0 DEGREES
SODIUM SERPL-SCNC: 140 MMOL/L (ref 135–145)
SODIUM SERPL-SCNC: 142 MMOL/L (ref 135–145)
SYSTOLIC BLOOD PRESSURE - MUSE: NORMAL MMHG
T AXIS - MUSE: 43 DEGREES
VENTRICULAR RATE- MUSE: 77 BPM
VIT B12 SERPL-MCNC: 415 PG/ML (ref 232–1245)

## 2025-04-09 PROCEDURE — 36415 COLL VENOUS BLD VENIPUNCTURE: CPT | Performed by: STUDENT IN AN ORGANIZED HEALTH CARE EDUCATION/TRAINING PROGRAM

## 2025-04-09 PROCEDURE — 82310 ASSAY OF CALCIUM: CPT | Performed by: STUDENT IN AN ORGANIZED HEALTH CARE EDUCATION/TRAINING PROGRAM

## 2025-04-09 PROCEDURE — G0378 HOSPITAL OBSERVATION PER HR: HCPCS

## 2025-04-09 PROCEDURE — 84393 TAU PHOSPHORYLATED EA: CPT

## 2025-04-09 PROCEDURE — 87389 HIV-1 AG W/HIV-1&-2 AB AG IA: CPT | Performed by: STUDENT IN AN ORGANIZED HEALTH CARE EDUCATION/TRAINING PROGRAM

## 2025-04-09 PROCEDURE — 36415 COLL VENOUS BLD VENIPUNCTURE: CPT

## 2025-04-09 PROCEDURE — 250N000013 HC RX MED GY IP 250 OP 250 PS 637: Performed by: STUDENT IN AN ORGANIZED HEALTH CARE EDUCATION/TRAINING PROGRAM

## 2025-04-09 PROCEDURE — 84132 ASSAY OF SERUM POTASSIUM: CPT | Performed by: STUDENT IN AN ORGANIZED HEALTH CARE EDUCATION/TRAINING PROGRAM

## 2025-04-09 PROCEDURE — 250N000013 HC RX MED GY IP 250 OP 250 PS 637: Performed by: EMERGENCY MEDICINE

## 2025-04-09 PROCEDURE — 99255 IP/OBS CONSLTJ NEW/EST HI 80: CPT | Mod: FS | Performed by: PSYCHIATRY & NEUROLOGY

## 2025-04-09 PROCEDURE — 250N000012 HC RX MED GY IP 250 OP 636 PS 637: Performed by: EMERGENCY MEDICINE

## 2025-04-09 PROCEDURE — 84100 ASSAY OF PHOSPHORUS: CPT | Performed by: STUDENT IN AN ORGANIZED HEALTH CARE EDUCATION/TRAINING PROGRAM

## 2025-04-09 PROCEDURE — 99418 PROLNG IP/OBS E/M EA 15 MIN: CPT | Mod: FS | Performed by: PSYCHIATRY & NEUROLOGY

## 2025-04-09 PROCEDURE — 82607 VITAMIN B-12: CPT | Performed by: STUDENT IN AN ORGANIZED HEALTH CARE EDUCATION/TRAINING PROGRAM

## 2025-04-09 PROCEDURE — 99207 PR NO CHARGE LOS: CPT | Performed by: CLINICAL NURSE SPECIALIST

## 2025-04-09 PROCEDURE — 86140 C-REACTIVE PROTEIN: CPT | Performed by: STUDENT IN AN ORGANIZED HEALTH CARE EDUCATION/TRAINING PROGRAM

## 2025-04-09 PROCEDURE — 99223 1ST HOSP IP/OBS HIGH 75: CPT | Performed by: STUDENT IN AN ORGANIZED HEALTH CARE EDUCATION/TRAINING PROGRAM

## 2025-04-09 PROCEDURE — 96365 THER/PROPH/DIAG IV INF INIT: CPT

## 2025-04-09 PROCEDURE — 250N000011 HC RX IP 250 OP 636: Performed by: STUDENT IN AN ORGANIZED HEALTH CARE EDUCATION/TRAINING PROGRAM

## 2025-04-09 PROCEDURE — 99255 IP/OBS CONSLTJ NEW/EST HI 80: CPT | Performed by: STUDENT IN AN ORGANIZED HEALTH CARE EDUCATION/TRAINING PROGRAM

## 2025-04-09 RX ORDER — NALOXONE HYDROCHLORIDE 0.4 MG/ML
0.2 INJECTION, SOLUTION INTRAMUSCULAR; INTRAVENOUS; SUBCUTANEOUS
Status: DISCONTINUED | OUTPATIENT
Start: 2025-04-09 | End: 2025-04-11 | Stop reason: HOSPADM

## 2025-04-09 RX ORDER — TRAZODONE HYDROCHLORIDE 50 MG/1
50 TABLET ORAL
Status: DISCONTINUED | OUTPATIENT
Start: 2025-04-09 | End: 2025-04-11 | Stop reason: HOSPADM

## 2025-04-09 RX ORDER — POTASSIUM CHLORIDE 750 MG/1
40 TABLET, EXTENDED RELEASE ORAL ONCE
Status: COMPLETED | OUTPATIENT
Start: 2025-04-09 | End: 2025-04-09

## 2025-04-09 RX ORDER — MEMANTINE HYDROCHLORIDE 5 MG/1
5 TABLET ORAL
Status: DISCONTINUED | OUTPATIENT
Start: 2025-04-09 | End: 2025-04-09

## 2025-04-09 RX ORDER — NALOXONE HYDROCHLORIDE 0.4 MG/ML
0.4 INJECTION, SOLUTION INTRAMUSCULAR; INTRAVENOUS; SUBCUTANEOUS
Status: DISCONTINUED | OUTPATIENT
Start: 2025-04-09 | End: 2025-04-11 | Stop reason: HOSPADM

## 2025-04-09 RX ORDER — QUETIAPINE FUMARATE 25 MG/1
25 TABLET, FILM COATED ORAL 3 TIMES DAILY PRN
Status: DISCONTINUED | OUTPATIENT
Start: 2025-04-09 | End: 2025-04-09

## 2025-04-09 RX ORDER — ONDANSETRON 2 MG/ML
4 INJECTION INTRAMUSCULAR; INTRAVENOUS EVERY 6 HOURS PRN
Status: DISCONTINUED | OUTPATIENT
Start: 2025-04-09 | End: 2025-04-11 | Stop reason: HOSPADM

## 2025-04-09 RX ORDER — LIDOCAINE 4 G/G
3 PATCH TOPICAL
Status: DISCONTINUED | OUTPATIENT
Start: 2025-04-09 | End: 2025-04-11 | Stop reason: HOSPADM

## 2025-04-09 RX ORDER — AMOXICILLIN 250 MG
2 CAPSULE ORAL 2 TIMES DAILY PRN
Status: DISCONTINUED | OUTPATIENT
Start: 2025-04-09 | End: 2025-04-11 | Stop reason: HOSPADM

## 2025-04-09 RX ORDER — RISPERIDONE 0.25 MG/1
0.25 TABLET ORAL 2 TIMES DAILY
Status: DISCONTINUED | OUTPATIENT
Start: 2025-04-09 | End: 2025-04-11 | Stop reason: HOSPADM

## 2025-04-09 RX ORDER — MAGNESIUM SULFATE HEPTAHYDRATE 40 MG/ML
2 INJECTION, SOLUTION INTRAVENOUS ONCE
Status: COMPLETED | OUTPATIENT
Start: 2025-04-09 | End: 2025-04-09

## 2025-04-09 RX ORDER — ALBUTEROL SULFATE 90 UG/1
2 INHALANT RESPIRATORY (INHALATION) EVERY 6 HOURS PRN
Status: DISCONTINUED | OUTPATIENT
Start: 2025-04-09 | End: 2025-04-11 | Stop reason: HOSPADM

## 2025-04-09 RX ORDER — ONDANSETRON 4 MG/1
4 TABLET, ORALLY DISINTEGRATING ORAL EVERY 6 HOURS PRN
Status: DISCONTINUED | OUTPATIENT
Start: 2025-04-09 | End: 2025-04-11 | Stop reason: HOSPADM

## 2025-04-09 RX ORDER — RISPERIDONE 0.5 MG/1
0.5 TABLET ORAL 2 TIMES DAILY
Status: DISCONTINUED | OUTPATIENT
Start: 2025-04-09 | End: 2025-04-09

## 2025-04-09 RX ORDER — PROCHLORPERAZINE MALEATE 5 MG/1
10 TABLET ORAL EVERY 6 HOURS PRN
Status: DISCONTINUED | OUTPATIENT
Start: 2025-04-09 | End: 2025-04-11 | Stop reason: HOSPADM

## 2025-04-09 RX ORDER — LOSARTAN POTASSIUM 100 MG/1
100 TABLET ORAL DAILY
Status: DISCONTINUED | OUTPATIENT
Start: 2025-04-09 | End: 2025-04-11 | Stop reason: HOSPADM

## 2025-04-09 RX ORDER — NYSTATIN 100000 [USP'U]/ML
1000000 SUSPENSION ORAL 4 TIMES DAILY
Status: DISCONTINUED | OUTPATIENT
Start: 2025-04-09 | End: 2025-04-11 | Stop reason: HOSPADM

## 2025-04-09 RX ORDER — DULOXETIN HYDROCHLORIDE 60 MG/1
120 CAPSULE, DELAYED RELEASE ORAL DAILY
Status: DISCONTINUED | OUTPATIENT
Start: 2025-04-09 | End: 2025-04-11 | Stop reason: HOSPADM

## 2025-04-09 RX ORDER — AMOXICILLIN 250 MG
1 CAPSULE ORAL 2 TIMES DAILY PRN
Status: DISCONTINUED | OUTPATIENT
Start: 2025-04-09 | End: 2025-04-11 | Stop reason: HOSPADM

## 2025-04-09 RX ORDER — FAMOTIDINE 20 MG/1
20 TABLET, FILM COATED ORAL DAILY
Status: DISCONTINUED | OUTPATIENT
Start: 2025-04-10 | End: 2025-04-11 | Stop reason: HOSPADM

## 2025-04-09 RX ORDER — FLUORIDE TOOTHPASTE
10 TOOTHPASTE DENTAL 2 TIMES DAILY
Status: DISCONTINUED | OUTPATIENT
Start: 2025-04-09 | End: 2025-04-11 | Stop reason: HOSPADM

## 2025-04-09 RX ADMIN — HYDROCHLOROTHIAZIDE 25 MG: 25 TABLET ORAL at 08:37

## 2025-04-09 RX ADMIN — LOSARTAN POTASSIUM 100 MG: 100 TABLET, FILM COATED ORAL at 13:30

## 2025-04-09 RX ADMIN — PREDNISONE 10 MG: 5 TABLET ORAL at 08:37

## 2025-04-09 RX ADMIN — TRAZODONE HYDROCHLORIDE 100 MG: 50 TABLET ORAL at 00:24

## 2025-04-09 RX ADMIN — NYSTATIN 1000000 UNITS: 100000 SUSPENSION ORAL at 10:20

## 2025-04-09 RX ADMIN — LIDOCAINE 4% 3 PATCH: 40 PATCH TOPICAL at 20:45

## 2025-04-09 RX ADMIN — ACETAMINOPHEN 1000 MG: 500 TABLET ORAL at 17:38

## 2025-04-09 RX ADMIN — ACETAMINOPHEN 1000 MG: 500 TABLET ORAL at 10:20

## 2025-04-09 RX ADMIN — UMECLIDINIUM 1 PUFF: 62.5 AEROSOL, POWDER ORAL at 08:40

## 2025-04-09 RX ADMIN — ACETAMINOPHEN 1000 MG: 500 TABLET ORAL at 00:24

## 2025-04-09 RX ADMIN — ATORVASTATIN CALCIUM 20 MG: 20 TABLET, FILM COATED ORAL at 08:37

## 2025-04-09 RX ADMIN — MAGNESIUM SULFATE HEPTAHYDRATE 2 G: 40 INJECTION, SOLUTION INTRAVENOUS at 12:07

## 2025-04-09 RX ADMIN — OXYBUTYNIN CHLORIDE 10 MG: 10 TABLET, EXTENDED RELEASE ORAL at 08:36

## 2025-04-09 RX ADMIN — RISPERIDONE 0.25 MG: 0.25 TABLET, FILM COATED ORAL at 20:52

## 2025-04-09 RX ADMIN — NYSTATIN 1000000 UNITS: 100000 SUSPENSION ORAL at 15:52

## 2025-04-09 RX ADMIN — METOPROLOL SUCCINATE 125 MG: 25 TABLET, FILM COATED, EXTENDED RELEASE ORAL at 08:49

## 2025-04-09 RX ADMIN — APIXABAN 5 MG: 5 TABLET, FILM COATED ORAL at 00:24

## 2025-04-09 RX ADMIN — BUPROPION HYDROCHLORIDE 450 MG: 300 TABLET, EXTENDED RELEASE ORAL at 08:39

## 2025-04-09 RX ADMIN — TRAZODONE HYDROCHLORIDE 50 MG: 50 TABLET ORAL at 20:58

## 2025-04-09 RX ADMIN — TRAMADOL HYDROCHLORIDE 25 MG: 50 TABLET, FILM COATED ORAL at 17:38

## 2025-04-09 RX ADMIN — APIXABAN 5 MG: 5 TABLET, FILM COATED ORAL at 08:36

## 2025-04-09 RX ADMIN — DULOXETINE HYDROCHLORIDE 120 MG: 60 CAPSULE, DELAYED RELEASE ORAL at 13:30

## 2025-04-09 RX ADMIN — NYSTATIN 1000000 UNITS: 100000 SUSPENSION ORAL at 12:07

## 2025-04-09 RX ADMIN — POTASSIUM CHLORIDE 40 MEQ: 750 TABLET, EXTENDED RELEASE ORAL at 12:07

## 2025-04-09 RX ADMIN — APIXABAN 5 MG: 5 TABLET, FILM COATED ORAL at 20:44

## 2025-04-09 RX ADMIN — FAMOTIDINE 40 MG: 20 TABLET, FILM COATED ORAL at 08:36

## 2025-04-09 ASSESSMENT — ACTIVITIES OF DAILY LIVING (ADL)
ADLS_ACUITY_SCORE: 58
ADLS_ACUITY_SCORE: 59
ADLS_ACUITY_SCORE: 58
ADLS_ACUITY_SCORE: 59
ADLS_ACUITY_SCORE: 58
ADLS_ACUITY_SCORE: 59
ADLS_ACUITY_SCORE: 58
ADLS_ACUITY_SCORE: 59
ADLS_ACUITY_SCORE: 59
ADLS_ACUITY_SCORE: 58
ADLS_ACUITY_SCORE: 59
ADLS_ACUITY_SCORE: 58
ADLS_ACUITY_SCORE: 59
ADLS_ACUITY_SCORE: 58
ADLS_ACUITY_SCORE: 58
ADLS_ACUITY_SCORE: 59
ADLS_ACUITY_SCORE: 58
ADLS_ACUITY_SCORE: 59

## 2025-04-09 NOTE — PROGRESS NOTES
Reviewed chart last night. Recommended neurology consult to rule out/in Lewy Body Dementia vs. Alzheimer's Dementia. Antipsychotics that can be used for agitation are quetiapine and pimavanserin. Patient is getting admitted medically. Will defer psych consult to Dr. Rivas.

## 2025-04-09 NOTE — PHARMACY-ADMISSION MEDICATION HISTORY
Pharmacist Admission Medication History    Admission medication history is complete. The information provided in this note is only as accurate as the sources available at the time of the update.    Information Source(s): Patient, Family member, Clinic records, and [facility] via in-person    Pertinent Information:   Spoke with both patient and patient's spouse, utilized surescripts fill history, and MTM note on 3/17/25 to update PTA medications list. Patient was knowledgeable about her medications. Reports that she took her medications this morning. States that she has multiple creams and ointments that she uses as needed.  Dupixent - patient reports that she is due this weekend.   Memantine - patient states that she hasn't taken this in a couple days. Was taking this consistently however thought this may contributing to some of the symptoms she has been experiencing and stopped taking it. Per MTM note on 3/17/25, pharmacist recommended to stay off the medication and monitor symptoms.   Metoprolol 125 mg - patient reports that she is only taking this once daily.  Compounded prescription with amitriptyline and ketamine - patient states that she is no longer using this, removed from PTA med list.   Chlorhexidine solution, lidocaine solution and clotrimazole lozenges - patient reports using as needed for mouth sore.   Valtrex - patient states only using this as needed for outbreaks.  Trazodone - patient states that she can take up to 3 tablets at bedtime, states that she normally takes 1.    Changes made to PTA medication list:  Added: None   Deleted:   Mupirocin 2% cream (patient reports not using three times daily)  Amitriptyline/ketamine compounded prescription   Changed:   Diprolene AF 0.05% cream BID -> BID PRN  Chlorhexidine solution BID -> BID PRN   Clotrimazole 10 mg lozenge five times daily -> 5 times daily as needed   Desonide cream 0.05% cream BID -> BID PRN   Naltrexone powder -> naltrexone  HCL PO, take 6 mg by mouth once daily  Elidel 1% cream BID -> BID PRN   Tacrolimus 0.1% ointment BID -> BID PRN  Triamcinolone 0.5% cream BID -> BID PRN  Trazodone 50 mg tablet, take 3 tablets by mouth at bedtime -> take 1-3 tablets by mouth at bedtime as needed    Allergies reviewed with patient and updates made in EHR: yes    Medication History Completed By: Garth Rivas PharmD 4/8/2025 7:11 PM    PTA Med List   Medication Sig Last Dose/Taking    acetaminophen (TYLENOL) 500 MG tablet Take 500-1,000 mg by mouth every 6 hours as needed for mild pain Past Month    albuterol (PROVENTIL) (2.5 MG/3ML) 0.083% neb solution Take 1 vial (2.5 mg) by nebulization every 6 hours as needed for shortness of breath or wheezing. Past Month    albuterol (VENTOLIN HFA) 108 (90 Base) MCG/ACT inhaler INHALE 2 PUFFS INTO THE LUNGS EVERY 6 HOURS AS NEEDED FOR SHORTNESS OF BREATH / DYSPNEA Past Month    apixaban ANTICOAGULANT (ELIQUIS) 5 MG tablet Take 1 tablet (5 mg) by mouth 2 times daily. 4/8/2025 Morning    apremilast (OTEZLA) 30 MG tablet Take 1 tablet (30 mg) by mouth 2 times daily. Hold for signs of infection, and seek medical attention. 4/8/2025 Morning    atorvastatin (LIPITOR) 20 MG tablet TAKE ONE TABLET BY MOUTH ONE TIME DAILY 4/8/2025 Morning    augmented betamethasone dipropionate (DIPROLENE AF) 0.05 % external cream Apply topically 2 times daily (Patient taking differently: Apply topically 2 times daily as needed.) Past Month    benzonatate (TESSALON) 200 MG capsule Take 1 capsule (200 mg) by mouth 2 times daily as needed for cough. Past Month    buPROPion (WELLBUTRIN XL) 150 MG 24 hr tablet Take 1 tablet (150 mg) by mouth every morning. To take with the 300 mg dose for a total of 450 mg daily. 4/8/2025 Morning    buPROPion (WELLBUTRIN XL) 300 MG 24 hr tablet Take 1 tablet (300 mg) by mouth every morning. To take with the 150 mg dose for a total of 450 mg daily. 4/8/2025 Morning    chlorhexidine (PERIDEX) 0.12 % solution  Swish and spit 15 mLs in mouth 2 times daily. (Patient taking differently: Swish and spit 15 mLs in mouth 2 times daily as needed.) Past Week    clindamycin (CLEOCIN-T) 1 % external gel Apply topically 2 times daily. (Patient taking differently: Apply topically 2 times daily as needed.) Past Week    clotrimazole (MYCELEX) 10 MG lozenge Place 1 lozenge (10 mg) inside cheek 5 times daily. (Patient taking differently: Place 10 mg inside cheek 5 x daily PRN.) Past Week    COMPOUNDED NON-CONTROLLED SUBSTANCE (CMPD RX) - PHARMACY TO MIX COMPOUNDED MEDICATION Tranexamic acid 7% cream. Apply directly to the affected area, with usage up to twice daily Past Week    desonide (DESOWEN) 0.05 % external cream Apply topically 2 times daily. To sores on face (Patient taking differently: Apply topically 2 times daily as needed. To sores on face) Past Week    dextran 70-hypromellose (TEARS NATURALE FREE PF) 0.1-0.3 % ophthalmic solution Place 2 drops into both eyes daily as needed (for dry eyes) Past Month    doxycycline monohydrate (MONODOX) 100 MG capsule Take 1 capsule (100 mg) by mouth 2 times daily. After meals and with big glass of water 4/8/2025 Morning    DULoxetine (CYMBALTA) 60 MG capsule Take 2 capsules (120 mg) by mouth daily 4/8/2025 Morning    dupilumab (DUPIXENT) 300 MG/2ML prefilled pen Inject 2 mLs (300 mg) subcutaneously every 14 days. Past Month    famotidine (PEPCID) 40 MG tablet Take 1 tablet (40 mg) by mouth daily 4/8/2025    ferrous sulfate (FEROSUL) 325 (65 Fe) MG tablet Take 1 tablet (325 mg) by mouth daily (with breakfast). 4/8/2025    fluocinonide (LIDEX) 0.05 % external gel Apply topically 2 times daily as needed. Past Month    hydrochlorothiazide (HYDRODIURIL) 25 MG tablet Take 1 tablet (25 mg) by mouth daily. 4/8/2025    hydrocortisone 2.5 % cream APPLY TOPICALLY 2 TIMES DAILY AS NEEDED Past Month    hydrOXYzine HCl (ATARAX) 25 MG tablet Take 1 tablet (25 mg) by mouth 3 times daily as needed for anxiety  (and sleep). Past Month    lidocaine, viscous, (XYLOCAINE) 2 % solution Swish and spit 10ml every 3 hours as needed for oral pain; max 8 doses/24hrs.  Do not eat or chew gum for 60 minutes following use. Past Month    losartan (COZAAR) 100 MG tablet Take 1 tablet (100 mg) by mouth daily. 4/8/2025    memantine (NAMENDA) 5 MG tablet Take 1 tablet by mouth 2 times daily. Past Week    metoprolol succinate ER (TOPROL XL) 100 MG 24 hr tablet Take 1 tablet (100 mg) by mouth 2 times daily. (Patient taking differently: Take 100 mg by mouth daily.) 4/8/2025    metoprolol succinate ER (TOPROL XL) 25 MG 24 hr tablet Take 1 tablet (25 mg) by mouth 2 times daily. (Patient taking differently: Take 25 mg by mouth daily.) 4/8/2025    mupirocin (BACTROBAN) 2 % external ointment Apply topically 3 times daily as needed. Past Month     MG CAPS capsule Take 1 capsule (600 mg) by mouth 2 times daily 4/8/2025    NALTREXONE HCL PO Take 6 mg by mouth daily. 4/8/2025 Morning    nystatin (MYCOSTATIN) 525404 UNIT/ML suspension Take 5 mLs (500,000 Units) by mouth 4 times daily. Has recurrent thrush. Uses for 2 weeks (280 ml)  at a time as needed. Past Month    ondansetron (ZOFRAN ODT) 4 MG ODT tab Take 1 tablet (4 mg) by mouth every 8 hours as needed for nausea. Past Month    oxyBUTYnin ER (DITROPAN XL) 5 MG 24 hr tablet Take 2 tablets (10 mg) by mouth daily 4/8/2025 Morning    pimecrolimus (ELIDEL) 1 % external cream Apply topically 2 times daily. (Patient taking differently: Apply topically 2 times daily as needed.) Past Month    predniSONE (DELTASONE) 10 MG tablet Take 1 tablet (10 mg) by mouth daily 4/8/2025 Morning    rizatriptan (MAXALT) 10 MG tablet Take 1 tablet (10 mg) by mouth at onset of headache for migraine. May repeat in 2 hours. Max 3 tablets/24 hours. Past Month    tacrolimus (PROTOPIC) 0.1 % external ointment Apply topically 2 times daily. To areas of sores on face (Patient taking differently: Apply topically 2 times  daily as needed. To areas of sores on face) Past Month    tiotropium (SPIRIVA RESPIMAT) 2.5 MCG/ACT inhaler Inhale 2 puffs into the lungs daily. 4/8/2025 Morning    traMADol (ULTRAM) 50 MG tablet Take 1 tablet (50 mg) by mouth every 12 hours as needed for moderate to severe pain 4/8/2025    traZODone (DESYREL) 50 MG tablet Take 3 tablets (150 mg) by mouth at bedtime (Patient taking differently: Take 1-3 tablets by mouth nightly as needed.) 4/7/2025    triamcinolone (ARISTOCORT HP) 0.5 % external cream Apply topically 2 times daily. (Patient taking differently: Apply topically 2 times daily as needed.) Past Month    valACYclovir (VALTREX) 500 MG tablet Take 1 tablet (500 mg) by mouth daily Past Month

## 2025-04-09 NOTE — TELEPHONE ENCOUNTER
9:33 Pm Intake called [facility]. Per Brittany, they can review the pt.     10:01 PM Intake called [facility] ED and requested EKG for outside referral.

## 2025-04-09 NOTE — TELEPHONE ENCOUNTER
R: 7:42 PM Per CRN on 3B pt needs a private due to psychosis presentation.  No appropriate bed on unit.  Pt remains on the worklist.

## 2025-04-09 NOTE — ED PROVIDER NOTES
Emergency Department I-PASS Sign-out      Illness Severity: Stable    Patient Summary:  64 year old female with pertinent PMH of CKD who presented with delusions, hallucinations, confusion    ED Course/treatment plan: medical workup negative. DEC recommended admission    Clinical Impression:  (F32.3) Current severe episode of major depressive disorder with psychotic features, unspecified whether recurrent (H)    (N17.9,  N18.9) Acute kidney injury superimposed on CKD    (E86.0) Mild dehydration    (R44.3) Hallucinations      Edited by: Thanh Angelo MD at 2025 0207    Action List:  Tests to Follow-up:  Psych recommended neuro consult re potential lewy body dementia, alzheimers which was ordered  Psych consult tomorrow    Medications Reconciled/Ordered:  Yes    ED Mental Health Boarding Order Set Used for Diet/PRNs/Other:  Yes    DEC, Extended Care, Psych Consult Orders:  Extended Care and Psychiatry consult ordered.    Situational Awareness & Contingency Plannin Hour Hold Status:  On 72 hour hold.  Active Orders  Legal  Emergency Hospitalization Hold (72 Hr Hold)  Frequency: Effective Now  Start Date/Time: 25 1655   Number of Occurrences: Until Specified    Disposition:  Admit/Transfer to Behavioral Health, medically clear for admit/transfer    Boarding subsequent shift/day updates:      Edited by: Thanh Angelo MD at 2025 0207    Synthesis & Events after sign-out:  On 72HH for MDD with psychotic features, had BJ with hypokalemia.    ---    Repeat BMP with improvement in Cr and persistent hypokalemia.  Given patient's age and comorbidities will admit to medical observation, accepted by Dr. Carpio.    Repeat labs did show improvement in K; however, still with metabolic acidosis that needs to be corrected before medically ready for IP psych.      Danielito Bates MD   Emergency Medicine       Danielito Bates MD  [phone]

## 2025-04-09 NOTE — PLAN OF CARE
Goal Outcome Evaluation:       6MS ADMISSION    D: Patient admitted/transferred from ED via stretcher for psychosis.     I: Upon arrival to the unit patient was oriented to room, unit, and call light. Patient s height, weight, and vital signs were obtained. Allergies reviewed and allergy band applied. Provider notified of patient s arrival on the unit. Adult AVS completed. Head to toe assessment completed. Education assessment completed. Care plan initiated.    A: Vital signs stable upon admission. Patient rates pain at 6/10. Two RN skin assessment completed yes. Second RN was Marlen. Significant Skin Findings include Scattered bruises and scabs. [facility] Nurse Consult Ordered no.     P: Continue to monitor patient s symptoms and intervene as needed. Continue with plan of care. Notify provider with any concerns or changes in patient status.

## 2025-04-09 NOTE — TELEPHONE ENCOUNTER
LATESHA Rogers is reviewing.           R: [place]  Access Inpatient Bed Call Log  4/8/2025 11:56 PM  Intake has called facilities that have not updated their bed status within the last 12 hours.    Adults:    *METRO:  [place] -- [facility]: @ CAPACITY.  [facility]-[id number]: @ CAPACITY. Reporting no reviews overnight. -12:06 AM Per Christy, they are capped.   [facility]- [phone]: @ CAPACITY. Low acuity   [place] -- [facility]- [id number]: @ CAPACITY. Low acuity only -12:06 AM Per Tono, they can review low acuity.  [place] -- [facility]- [phone]: @ CAPACITY.   [facility]- [phone]: @ CAPACITY.   [facility]- [phone]: @ CAPACITY. Ages 18-35, Voluntary only, NO aggression/physical/sexual assault, violence hx or drug abuse, or psychosis. Negative Covid. -11:58 PM Per Charline, YA: 1, Adol: can review, Child: 0.  [facility]- [phone]: @ CAPACITY.  [facility]- [phone]: @ CAPACITY.  [place] -- [facility]- [phone]: @ CAPACITY. Do not review overnight.     *STATEWIDE (by distance):  [facility]- [phone]: @ CAPACITY. Mixed unit. Ages 12 and up/Low acuity only.   [facility] - [phone]: @ CAPACITY. Low acuity, No aggression.   [facility] - [phone]: @ CAPACITY.   [facility] - [id number]: @ CAPACITY. Low acuity only. No current aggression.   [facility] - [id number]: @ CAPACITY. Negative Covid. Lower acuity only.   [facility] - [id number]: @ POSTING 1 BEDS. Low acuity only. Prefer med-adjustment placements.   [facility] - [phone]: @ CAPCITY. No aggression. - Only Low Acuity reviews.    [place] - [facility]- [phone] @ POSTING 1 BEDS. No aggressive behaviors. Do not review overnight.   [place] -- [facility]- [phone]: @ POSTING 4 BEDS. No hx of aggression. No sexual offenders. Voluntary patients only.   [place]  -- [facility]- [phone]: @ POSTING 2 BEDS. low acuity only. Must be able to do programming. No aggression/violent behavior in 2 years. No CD treatment.   [place] -- [facility]- [phone]: @ CAPACITY. Negative Covid test. Must be low acuity ONLY.   [place] -- [facility]- [id number]: @ POSTING 1 BEDS. Low acuity. Negative Covid. -12:07 AM Per Laura, they are able to place pt's on waitlist.   [place] -- [facility]: @ POSTING 5 BEDS.   [place] - [facility]- [phone]: @ POSTING 2 BEDS. No hx of aggression/assault. No lines, drains or tubes. Does not provide detox or CD treatment. Require a confirmed ride upon discharge.   [place] -- [facility]- [phone]: @ CAPACITY. Negative COVID. No medical devices.      Pt remains on waitlist pending appropriate placement availability.

## 2025-04-09 NOTE — PROGRESS NOTES
"Triage and Transition Services Extended Care Reassessment     Patient: Jacquie goes by \"Jacquie,\" uses she/her pronouns  Date of Service: April 9, 2025  Site of Service: [facility]                             ED09  Patient was seen yes  Mode of Assessment: Virtual: iPad     Reason for Reassessment: other (see comment), memory concerns (medical admission)    History of Patient's Original Emergency Room Encounter: Per collateral report, pt has a limited history of lifetime mental health concerns. She first experienced hallucinations approximately a year ago accompanied by noticeable decline in short-term memory. She has experienced a significant increase in psychosis over the last month, including auditory and visual hallucinations, paranoia that her  is cheating on her, and difficulty sleeping. She has been unable to care for herself when home alone. She has not been eating or drinking water over the last couple of days while her  was away. Per the attending provider, pt has an acute kidney injury today due to dehydration. Pt has no known history of NSSI, suicide attempts, or inpatient psychiatric hospitalizations.    Current Patient Presentation: Patient is alert and oriented but irritable. She has experienced a significant increase in psychosis over the past month, including auditory and visual hallucinations, paranoia that her  is cheating on her, and difficulty sleeping. She has been unable to care for herself when home alone. She has not been eating or drinking over the past few days while her  was away.  Per the attending provider, the patient has an acute kidney injury today due to dehydration. She is having difficulty understanding what is going on. Providers have expressed concern about possible dementia or Alzheimer s disease; Neurology will follow up. Her blood pressure is also unstable.  The patient will be medically admitted at this time due to " concerns related to a possible neurological disorder, blood pressure instability, and acute kidney injury.    Presentation Summary: Exchanged greetings with the patient and her , and introduced self and role. Introduction had to be repeated twice, as the patient repeatedly asked why she was meeting with the writer. Explained the role of the Extended Care team and the patient s current placement in the ED.  Discussed what brought the patient into the ED. She reported experiencing falls, daily pain, and hallucinations that began approximately a year ago while in [place], subsided upon returning home, and have since resumed and worsened. She expressed frustration that no one is helping her manage the hallucinations. She reported falling down the stairs and injuring her legs, and stated she is not receiving the help she needs.  The patient expressed frustration about not having seen the attending MD or psychiatric providers and feeling that she is not being prioritized. The writer attempted to explain the initial recommendations for inpatient mental health, which the patient declined. There was an escalation between the patient and her  during the discussion of disposition, which contributed to the initiation of a 72-hour hold (72HH).  The writer explained concerns regarding possible underlying medical issues and the plan for Neurology and Psychiatry to evaluate her further in order to rule out medical causes before proceeding with mental health interventions. The patient appeared not to understand the explanation and was argumentative, stating the system has failed her. She was also upset about missing her usual medications and that the MD had not met with her.  The writer again reviewed the plan for Neurology and Psychiatry to follow up, and attempted to provide reassurance regarding ongoing coordination of care.    Changes Observed Since Initial Assessment: provider request    Therapeutic Interventions  Provided: Engaged in cognitive restructuring/ reframing, looked at common cognitive distortions and challenged negative thoughts., Engaged in guided discovery, explored patient's perspectives and helped expand them through socratic dialogue., Reviewed healthy living that supports positive mental health, including looking at sleep hygiene, regular movement, nutrition, and regular socialization., Taught the link between thoughts, feelings, and behaviors.    Current Symptoms: anxious impaired decision making, irritable, difficulty concentrating anxious hyperverbal, agitation, inattentive, impulsive, distractability, displaces blame, visual hallucinations      Mental Status Exam   Affect: Labile  Appearance: Disheveled  Attention Span/Concentration: Other (please comment) (varied)  Eye Contact: Variable    Fund of Knowledge: Appropriate   Language /Speech Content: Fluent  Language /Speech Volume: Normal  Language /Speech Rate/Productions: Pressured, Hyperverbal  Recent Memory: Variable  Remote Memory: Intact  Mood: Anxious, Irritable  Orientation to Person: Yes   Orientation to Place: Yes  Orientation to Time of Day: Yes  Orientation to Date: Yes     Situation (Do they understand why they are here?): Yes  Psychomotor Behavior: Agitated  Thought Content: Hallucinations, Paranoia, Delusions  Thought Form: Obsessive/Perseverative    Treatment Objective(s) Addressed: rapport building, orienting the patient to therapy, assessing safety, identifying treatment goals, processing feelings, exploring obstacles to safety in the community, identifying additional supports    Patient Response to Interventions: unacceptance expressed, needs reinforcement, no evidence of understanding    Progress Towards Goals:  Patient Reports Symptoms Are: ongoing  Patient Progress Toward Goals: is not making progress  Comment: Patient was not able to engage in any meaningful assessment with writer.  Next Step to Work Toward Discharge: symptom  stabilization  Symptom Stabilization Comment: Patient is suspected to have medical concerns relating to dementia or Alzheimer's, patient will be medical admission at this time.    Case Management: Case Management Included: collaborating with patient's support system  Details on Collaborating with Patient's Support System: consulted with [facility] psych providers: Melyssa Kwong and Dr. Rivas  Summary of Interaction: Please see Melyssa and Dr. Rivas's consult note from 04/09/2025 for more information.    C-SSRS Since Last Contact:                    Plan: Final Disposition / Recommended Care Path: discharge  Plan for Care reviewed with assigned Medical Provider: yes  Plan for Care Team Review: provider  Comments: Dr. Bates, Melyssa Kwong psych provider  Patient and/or validated legal guardian concurs: no  Clinical Substantiation: Recommendation at this time is for discharge from mental health services and admission to medicine for further evaluation and stabilization.  Although the patient initially presented with psychiatric concerns, including hallucinations and distress related to pain and falls, her presentation now raises concern for possible underlying medical conditions contributing to her symptoms. She reports a history of recent falls, daily pain, and worsening hallucinations, which she associates with previous episodes while living in [place].  The patient has been unable to meaningfully engage in psychiatric care, has declined prior recommendations for inpatient mental health, and is currently fixated on physical concerns. Her lack of insight, repeated confusion about her care team and setting, and escalating frustration further support the need to medically stabilize and rule out organic causes prior to proceeding with mental health interventions.  She requires further evaluation by Neurology and Medicine to assess for contributing medical issues, including ruleout of  Dementia, Alzheimer's, or other  neurological disorders, before psychiatric treatment can be appropriately determined. Social work will follow patient until appropriate discharge. Psychiatry will follow as needed.    Legal Status: Legal Status: 72 Hour Hold  72 Hour Hold - Date/Time Initiated: 4/8/25 @ 16:55  72 Hour Hold - Date/Time Ends: 4/11/25 @ 16:55    Session Status: Time session started: 0920  Time session ended: 0946  Session Duration (minutes): 26 minutes  Session Number: 1  Anticipated number of sessions or this episode of care: 2    Session Start Time: 0920  Session Stop Time: 0946  CPT codes: 36572 - Family psychotherapy with patient present - 50 (26+) min  Time Spent: 26 minutes      CPT code(s) utilized: 85361 - Family psychotherapy with patient present - 50 (26+) min    Diagnosis:   Patient Active Problem List   Diagnosis Code    Major depression F32.9    Moderate persistent asthma without complication J45.40    Allergic rhinitis due to other allergen J30.89    Esophageal reflux K21.9    Moderate recurrent major depression (H) F33.1    Multiple joint pain M25.50    HYPERLIPIDEMIA LDL GOAL <130 E78.5    Hypertension goal BP (blood pressure) < 140/90 I10    Generalized anxiety disorder F41.1    Myalgia M79.10    Chronic rhinitis J31.0    Constipation K59.00    Lumbar disc disease with radiculopathy M51.16    Iron deficiency anemia D50.9    Osteoarthritis of carpometacarpal joint of thumb - bilateral M18.9    Trochanteric bursitis M70.60    Right ankle instability M25.371    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    Trochanteric bursitis of both hips M70.61, M70.62    Overweight E66.3    Iron deficiency E61.1    Scratching T14.8XXA    Chronic, continuous use of opioids F11.90    Medical marijuana use Z79.899    Primary osteoarthritis of both first carpometacarpal joints M18.0    Arthritis of metatarsophalangeal joint M19.079    Sinus tachycardia R00.0    Intractable chronic migraine without aura and without status migrainosus G43.719     Impaired fasting glucose R73.01    Migraine without aura and without status migrainosus, not intractable G43.009    Skin ulcer, limited to breakdown of skin (H) L98.491    Morbid obesity (H) E66.01    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    Aphthous ulcer K12.0    Rash R21    Pruritus L29.9    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    Encounter for long-term (current) use of high-risk medication Z79.899    Dermatitis L30.9    Encounter for long-term current use of high risk medication Z79.899    Acquired platelet disorder (H) D69.1    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    Facial eczema L30.9    Skin pain R20.8    Prurigo nodularis L28.1    Chronic kidney disease, stage 3a (H) N18.31    Anxiety F41.9    Acute deep vein thrombosis (DVT) of femoral vein of right lower extremity (H) I82.411    Severe persistent asthma without complication (H) J45.50    Stage 3b chronic kidney disease (H) N18.32    Psychosis (H) F29    Hallucinations R44.3    Hypokalemia E87.6    Mild dehydration E86.0    Acute kidney injury superimposed on CKD N17.9, N18.9    Current severe episode of major depressive disorder with psychotic features, unspecified whether recurrent (H) F32.3       Primary Problem This Admission: Active Hospital Problems    Hallucinations      Hypokalemia      Mild dehydration      Acute kidney injury superimposed on CKD      Current severe episode of major depressive disorder with psychotic features, unspecified whether recurrent (H)      Psychosis (H)      Major depression        LONA Mishra, [facility]   Licensed Mental Health Professional (LMHP), [facility]  [phone]
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
## 2025-04-09 NOTE — TELEPHONE ENCOUNTER
Patient is currently at ED waiting placement and sent in concerns of the facility via mychart to PCP. Routing mychart concerns to provider.    Marie Laura CMA ([facility])

## 2025-04-09 NOTE — TELEPHONE ENCOUNTER
R:     9:13 AM PER EC Karoline Chat- MRN: [id number], medical admit at this time, remove from inpatient mental health until medically cleared. Intake following & work list updated.

## 2025-04-09 NOTE — CONSULTS
[facility]  Neurology Consultation    Patient Name:  Jacquie Amador  MRN:  [id number]    :  1960  Date of Service:  2025  Primary care provider:  Liz Peterson      Neurology consultation service was asked to see Jacquie Amador by Dr. Hui to evaluate mental status changes, concern for dementia.    Chief Complaint: mental status changes, concern for dementia    History of Present Illness:   Jacquie Amador is a 64 year old female with history of HTN, HLD, DVT on Eliquis, Bechets, fatigue, depression, anxiety, ?CVA who presents with worsening of hallucinations, unsteadiness, and falls.    Patient and  both in room and helped provide history. Visual hallucinations beginning around 2024. They were on vacation and patient started to seeing animals that were not there. Since then, patient started to hallucinate people, more so occurring at night. These are not people she recognizes and they never say anything when she sees them. Patient has started to hear and smell things that are not there either. She will hear a creek in the house and think that people are sneaking in or hear voices. She states she will smell perfume. Hallucinations are non-threatening.  notes that over the last year, patient has been trying different medications for pain and when she starts on new one, hallucinations get worse. Hallucinations have worsened over the last month. Over the last couple of weeks patient has had increased anger and agitation towards , accusing him of cheating and stealing from her when she cannot find her items. Recently patient thought that  was sleeping with someone under the porch. Patient feels that they have always had their problems and that her  is a poor communicator and listener though she has gotten to the point where she needed to say something.  has gotten to the point where he has needed excuse  himself from the situation because it is too much for him too handle. Patient states that her hallucinations and paranoia are worse when he is not home.     Episode Monday overnight where she called 911 due to hallucinating people in her yard.  arrived and reassured no one was outside. She proceeded to call 911 again due to further hallucinations of people in her house. Patient agreed to be evaluated in the ED given her hallucinations.     concerned about dementia given her increased anger and agitation. Her memory is still intact though he does note she is forgetful at times but he can be too. She does drive but very rarely and only locally. There have been episodes where she will get lost to and from [place].  has noticed tremors of hands. Patient feels that she shakes more often with heightened emotions. Denies any acting out of dreams or wandering throughout night unless it were to go look for possible someone in house.    She endorses increased falls due to instability which she has had all her life. She will occasionally get lightheaded going from sitting to standing.     ROS  A comprehensive ROS was performed and pertinent findings were included in HPI.     PMH  Past Medical History:   Diagnosis Date    ASTHMA - MODERATE PERSISTENT 9/21/2005    Chronic pain     Coronary artery disease     CVA (cerebral infarction)     Depressive disorder, not elsewhere classified     Diabetes (H)     Elevated serum alkaline phosphatase level     Liver source    Fibromyalgia     HYPERLIPIDEMIA NEC/NOS 12/29/2006    Hypertriglyceridemia     OA (osteoarthritis)     Thyroid disease     Trochanteric bursitis     Unspecified essential hypertension      Past Surgical History:   Procedure Laterality Date    3 teeth pulled      INJECT EPIDURAL TRANSFORAMINAL  11/25/2014    [facility]umbos[facility]ral-[facility]    INJECT JOINT SACROILIAC  09/23/2014    [facility]    LAMINECTOMY LUMBAR ONE LEVEL Left  2014    [facility]  DELIVERY ONLY      , Low Cervical       Medications   I have personally reviewed the patient's medication list.     Allergies  I have personally reviewed the patient's allergy list.     Physical Examination   Vitals: /74 (BP Location: Left arm)   Pulse 94   Temp 98.2  F (36.8  C) (Oral)   Resp 16   Wt 79.8 kg (176 lb)   LMP 2009 (Exact Date)   SpO2 97%   BMI 27.57 kg/m    General: Lying in bed, NAD  Head: NC/AT  Eyes: no icterus, op pink and moist  Cardiac: Extremities warm, no edema.   Respiratory: non-labored on RA  Skin: No rash or lesion on exposed skin  Neuro:  Mental status: Awake, alert, attentive, oriented to self, time, place, and circumstance. Language is fluent and coherent with intact comprehension of complex commands, naming and repetition. Able to calculate quarters in $1.75. Can state days of week backwards. Difficulty with serial 7's. 4/5 on delayed recall.   Cranial nerves: VFF, PERRL, conjugate gaze, EOMI, facial sensation intact, face symmetric, shoulder shrug strong, tongue/uvula midline, no dysarthria.   Motor: Normal bulk and tone. Very slight left sided resting tremor. 5/5 strength bilaterally in deltoids, biceps, triceps, hand , hip flexors, knee flexion, knee extension, plantarflexion, dorsiflexion.   Reflexes: Normo-reflexic and symmetric biceps, brachioradialis, triceps, patellae, and achilles. No clonus, toes down-going.  Sensory: Intact to light touch in proximal and distal aspects of all 4 extremities   Coordination: FNF without ataxia or dysmetria. Rapid alternating movements intact.   Gait: Normal width, stride length, turn, and arm swing. Station normal. Unable to complete tandem walk due to imbalance.    Investigations   I have personally reviewed pertinent labs, tests, and radiological imaging. Discussion of notable findings is included under Impression.     CTH  IMPRESSION:  1.  No CT evidence  for acute intracranial process.  2.  Brain atrophy and presumed chronic microvascular ischemic changes as above.    Was patient transferred from outside hospital?   No    Impression  #Visual, auditory, and olfactory hallucinations with associated paranoia, concern for underlying neurocognitive disorder    Jacquie Amador is a 64 year old female with history of HTN, HLD, DVT on Eliquis, fatigue, depression, anxiety, ?CVA who presents with worsening of hallucinations, unsteadiness, and falls. Onset of visual hallucinations a year ago have since evolved into visual, auditory, and olfactory. It's difficult to say whether her hallucinations and paranoia are due to either a neurological process or psychiatric though there are details within her history that are concerning for underlying neurocognitive disorder. Her CTH is notable for mild to moderate degree of brain atrophy within frontal temporal region.     She doesn't have obvious parkinsonism features that lead me to believe this is more lewy-body, maybe subtle resting tremor and orthostatic hypotension though doesn't exclude diagnosis. 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 Will obtain MR Brain to further evaluation for lesions or further atrophy that could explain symptoms. Additionally will send P-tau 217 serum lab for Alzheimer's. Will hold off on initiating donepezil, therefore okay to start risperidone or seroquel for hallucinations per pysch recommendations. If her hallucinations and paranoia improve with the help of Risperidone, I have more suspicion that this is a primary psychiatric etiology. In the case that her hallucinations worsen despite being on Risperidone, would consider stopping and starting Donepezil instead. Overall, it will most helpful for patient to follow up outpatient with neurology and neuropsychology to continue to monitor patients symptoms in the case they do  worsen.    Recommendations  -MR Brain w/wo ordered for you  -Serum P-tau 217 ordered for you  -Okay to start Risperidone or Seroquel per psychiatry recommendations  -Hold off on Donepezil  -Consider decreasing oxybutynin. If sweaty becomes worse or intolerable to patient, okay to increase back  -Orthostatic vital signs  -Follow up with outpatient neurology and neuropsychology. Please place referral at time of discharge  -Will follow up MR Brain results      Thank you for involving Neurology in the care of Jacquie Amador.  Please do not hesitate to call with questions/concerns (consult pager [phone]).      Patient was seen and discussed with Dr. Vargas.    Anjelica Valentine PA-C    90+ MINUTES SPENT BY ME on the date of service doing chart review, history, exam, documentation & further activities per the note.

## 2025-04-09 NOTE — H&P
"[facility]    History and Physical - Hospitalist Service, GOLD TEAM 22       Date of Admission:  4/8/2025    Assessment & Plan      Jacquie Amador is a 64 year old woman with history including osteoarthritis, chronic pain, asthma, depression, thyroid disease, CKD, Behcet's disease, a platelet granule disorder who presented with hallucinations and falls and was admitted on 4/8/2025.      Possible acute psychosis vs delirium  Hallucination, visual and auditory, recurrent  Concern for neurocognitive disorder  One year prior had an episode involving hallucination, paranoia, memory deficits. Now ~one month of auditory and visual hallucinations, paranoia, and insomnia.  There may be a background of diminished social activity and time outdoors since moving to the current house 2 years ago, but otherwise no recent or sudden changes in personality or cognitive ability.  CT head shows some cortical atrophy and likely chronic microvascular ischemic changes.  High risk for polypharmacy.  Many \"centrally\" acting medications, including some known to cause hallucination, including montelukast, bupropion, oxybutynin, hydroxyzine, amitriptyline/ketamine cream.  Recently stopped ropinirole, gabapentin, and memantine with some improvement.  Drug metabolism could be adversely effective not only by age but also current acute renal injury.  Uremia itself could also contribute.  - psychiatry consulted, appreciate input  - Continues to have suicide precautions and sitter  - neurology consulted, appreciate input  - Recommending MRI brain with and without contrast, P-tau 217  - Orthostatic vital signs  - Lab workup including TSH (normal), vitamin B12, PETH, hepatic panel, HIV and RPR screening  - Hold anticholinergics oxybutynin and hydroxyzine; reduce dose trazodone and tramadol  - Will discuss other opportunities for deprescribing with pharmacy; she does see MTM once monthly whose notes are " very informative    Acute kidney injury on CKD stage 3A   Baseline Creatinine range (1.10 to 1.30). Reportedly poor oral intake with progression of psychosis symptoms which would comport with a prerenal injury.  - Follow lytes and creatinine  - Monitor I/O's, daily weights  - Avoid nephrotoxic medications/IV contrast, hypotension, dehydration as possible  - Renally dose medications    Suicidal ideation, possible  Depression   Insomnia  Reports that threats of self-harm or suicide were intended to rene attention from family. Denies suicidal or other self-harm intent.  - Continue pta duloxetine, bupropion  - hold PRN hydroxyzine given anticholinergic effects   - Continue pta trazadone at reduced dose 50 mg at bedtime as needed  - psychiatry consulted    Chronic pain syndrome   Fibromyalgia   Lumbosacral spondylosis   Osteoarthritis   Follows with  Dr. Lay of [facility]. Receives injections outpatient for pain control.   - Continue pta PRN acetaminophen  - Continue pta tramadol PRN at reduced dose (50%)  - lidocaine patch prn  - hot/cold to painful areas prn     Asthma, moderate persistent   - PRN albuterol, spireva, breo-/incruse-ellipta     Dyslipidemia, hypertension   Hx sinus tachycardia  She has seen cardiology for tachycardia and they had no concerns.   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  Followed by dermatology, rheumatology, and previously heme for the platelet d/o.  - PTA apremilast  - previously on prednisone 10mg daily, and oral and topical TXA  - current platelet count normal    Hyperhidrosis  - Holding oxybutynin for now as  noted    Weight loss  In context of improved diet, 30lb weight loss in one year.  - [facility] a noted AE of apremilast  - RD consultation    Prediabetes   Last A1C pending. Not on pta meds.      GERD   - Continue pta famotidine      Migraines   - PRN rizatriptan          Observation Goals: -diagnostic tests and consults completed and resulted, -vital signs normal or at patient baseline, -safe disposition plan has been identified, Nurse to notify provider when observation goals have been met and patient is ready for discharge.  Diet: Regular Diet Adult    DVT Prophylaxis: DOAC  Flanagan Catheter: Not present  Lines: None     Cardiac Monitoring: None  Code Status: Full Code    Clinically Significant Risk Factors Present on Admission        # Hypokalemia: Lowest K = 2.5 mmol/L in last 2 days, will replace as needed   # Hyperchloremia: Highest Cl = 111 mmol/L in last 2 days, will monitor as appropriate      # Hypocalcemia: Lowest Ca = 7.5 mg/dL in last 2 days, will monitor and replace as appropriate        # Drug Induced Coagulation Defect: home medication list includes an anticoagulant medication    # Hypertension: Noted on problem list               # Financial/Environmental Concerns: none  # Asthma: noted on problem list        Disposition Plan     Medically Ready for Discharge: Anticipated in 2-4 Days         Zachary Cervantes DO  Hospitalist Service, GOLD TEAM [address] [place] [address]  Securely message with [employer] (more info)  Text page via [facility] Paging/Directory   See signed in provider for up to date coverage information    ______________________________________________________________________    Chief Complaint   hallucination    History is obtained from the patient  Supplemented by  at bedside, at the patient's request    History of Present Illness   Jacquie Amador is a 64 year old woman who presents with 1 month of progressive daily hallucinations, along with paranoia and possibly  suicidal ideation.  There is a background of relationship turmoil with her  of late.  She had similar symptoms, she reports 1 year ago, but some notes suggest 6 to 8 months ago.  Prior to that she did not have any symptoms of psychosis that she can recall.  Content of the hallucinations includes primarily people, either visual hallucinations of silent people, or audio hallucinations of voices. Paranoia content is that  is cheating on her.     She feels her memory is relatively intact for her age, and does not notice cognitive deficits.  Her  does not notice changes in personality or cognition.  When asked he does note that she has had diminished activity and interaction outside the home since they moved from their house of 30 years into a new neighborhood about 2 years ago.  She endorses low back pain which is chronic and for which she has undergone multiple nonsurgical procedures.  She has not been eating or drinking well recently.  Separately she notes a 30 pound weight loss in the past 1 year, in the context of healthier eating choices and intent to lose weight.  Her activity is limited by the low back pain and she does not do the gardening that she previously did.  She otherwise did not have any physical complaint.    History giving is punctuated by details about marital arguments, accusations, shortcomings she sees in her  and so on. A portion of the last week has involved the  leaving the home despite hallucinations, patient calling 9-1-1 and police involvement.      Past Medical History    Past Medical History:   Diagnosis Date    ASTHMA - MODERATE PERSISTENT 9/21/2005    Chronic pain     Coronary artery disease     CVA (cerebral infarction)     Depressive disorder, not elsewhere classified     Diabetes (H)     Elevated serum alkaline phosphatase level     Liver source    Fibromyalgia     HYPERLIPIDEMIA NEC/NOS 12/29/2006    Hypertriglyceridemia     OA (osteoarthritis)      Thyroid disease     Trochanteric bursitis     Unspecified essential hypertension        Past Surgical History   Past Surgical History:   Procedure Laterality Date    3 teeth pulled      INJECT EPIDURAL TRANSFORAMINAL  2014    [facility]    INJECT JOINT SACROILIAC  2014    [facility]    LAMINECTOMY LUMBAR ONE LEVEL Left 2014    [facility]    [facility]  DELIVERY ONLY      , Low Cervical       Prior to Admission Medications   Prior to Admission Medications   Prescriptions Last Dose Informant Patient Reported? Taking?   COMPOUNDED NON-CONTROLLED SUBSTANCE (CMPD RX) - PHARMACY TO MIX COMPOUNDED MEDICATION Past Week  No Yes   Sig: Tranexamic acid 7% cream. Apply directly to the affected area, with usage up to twice daily   DULoxetine (CYMBALTA) 60 MG capsule 2025 Morning  No Yes   Sig: Take 2 capsules (120 mg) by mouth daily    MG CAPS capsule 2025  No Yes   Sig: Take 1 capsule (600 mg) by mouth 2 times daily   NALTREXONE HCL PO 2025 Morning  Yes Yes   Sig: Take 6 mg by mouth daily.   acetaminophen (TYLENOL) 500 MG tablet Past Month  Yes Yes   Sig: Take 500-1,000 mg by mouth every 6 hours as needed for mild pain   albuterol (PROVENTIL) (2.5 MG/3ML) 0.083% neb solution Past Month  No Yes   Sig: Take 1 vial (2.5 mg) by nebulization every 6 hours as needed for shortness of breath or wheezing.   albuterol (VENTOLIN HFA) 108 (90 Base) MCG/ACT inhaler Past Month  No Yes   Sig: INHALE 2 PUFFS INTO THE LUNGS EVERY 6 HOURS AS NEEDED FOR SHORTNESS OF BREATH / DYSPNEA   apixaban ANTICOAGULANT (ELIQUIS) 5 MG tablet 2025 Morning  No Yes   Sig: Take 1 tablet (5 mg) by mouth 2 times daily.   apremilast (OTEZLA) 30 MG tablet 2025 Morning  No Yes   Sig: Take 1 tablet (30 mg) by mouth 2 times daily. Hold for signs of infection, and seek medical attention.   atorvastatin (LIPITOR) 20 MG tablet 2025 Morning  No Yes   Sig: TAKE ONE  TABLET BY MOUTH ONE TIME DAILY   augmented betamethasone dipropionate (DIPROLENE AF) 0.05 % external cream Past Month  No Yes   Sig: Apply topically 2 times daily   Patient taking differently: Apply topically 2 times daily as needed.   benzonatate (TESSALON) 200 MG capsule Past Month  No Yes   Sig: Take 1 capsule (200 mg) by mouth 2 times daily as needed for cough.   buPROPion (WELLBUTRIN XL) 150 MG 24 hr tablet 4/8/2025 Morning  No Yes   Sig: Take 1 tablet (150 mg) by mouth every morning. To take with the 300 mg dose for a total of 450 mg daily.   buPROPion (WELLBUTRIN XL) 300 MG 24 hr tablet 4/8/2025 Morning  No Yes   Sig: Take 1 tablet (300 mg) by mouth every morning. To take with the 150 mg dose for a total of 450 mg daily.   chlorhexidine (PERIDEX) 0.12 % solution Past Week  No Yes   Sig: Swish and spit 15 mLs in mouth 2 times daily.   Patient taking differently: Swish and spit 15 mLs in mouth 2 times daily as needed.   clindamycin (CLEOCIN-T) 1 % external gel Past Week  No Yes   Sig: Apply topically 2 times daily.   Patient taking differently: Apply topically 2 times daily as needed.   clotrimazole (MYCELEX) 10 MG lozenge Past Week  No Yes   Sig: Place 1 lozenge (10 mg) inside cheek 5 times daily.   Patient taking differently: Place 10 mg inside cheek 5 x daily PRN.   desonide (DESOWEN) 0.05 % external cream Past Week  No Yes   Sig: Apply topically 2 times daily. To sores on face   Patient taking differently: Apply topically 2 times daily as needed. To sores on face   dextran 70-hypromellose (TEARS NATURALE FREE PF) 0.1-0.3 % ophthalmic solution Past Month  No Yes   Sig: Place 2 drops into both eyes daily as needed (for dry eyes)   doxycycline monohydrate (MONODOX) 100 MG capsule 4/8/2025 Morning  No Yes   Sig: Take 1 capsule (100 mg) by mouth 2 times daily. After meals and with big glass of water   dupilumab (DUPIXENT) 300 MG/2ML prefilled pen Past Month  No Yes   Sig: Inject 2 mLs (300 mg) subcutaneously  every 14 days.   famotidine (PEPCID) 40 MG tablet 4/8/2025  No Yes   Sig: Take 1 tablet (40 mg) by mouth daily   ferrous sulfate (FEROSUL) 325 (65 Fe) MG tablet 4/8/2025  No Yes   Sig: Take 1 tablet (325 mg) by mouth daily (with breakfast).   fluocinonide (LIDEX) 0.05 % external gel Past Month  No Yes   Sig: Apply topically 2 times daily as needed.   hydrOXYzine HCl (ATARAX) 25 MG tablet Past Month  No Yes   Sig: Take 1 tablet (25 mg) by mouth 3 times daily as needed for anxiety (and sleep).   hydrochlorothiazide (HYDRODIURIL) 25 MG tablet 4/8/2025  No Yes   Sig: Take 1 tablet (25 mg) by mouth daily.   hydrocortisone 2.5 % cream Past Month  No Yes   Sig: APPLY TOPICALLY 2 TIMES DAILY AS NEEDED   lidocaine, viscous, (XYLOCAINE) 2 % solution Past Month  No Yes   Sig: Swish and spit 10ml every 3 hours as needed for oral pain; max 8 doses/24hrs.  Do not eat or chew gum for 60 minutes following use.   losartan (COZAAR) 100 MG tablet 4/8/2025  No Yes   Sig: Take 1 tablet (100 mg) by mouth daily.   memantine (NAMENDA) 5 MG tablet Past Week  Yes Yes   Sig: Take 1 tablet by mouth 2 times daily.   metoprolol succinate ER (TOPROL XL) 100 MG 24 hr tablet 4/8/2025  No Yes   Sig: Take 1 tablet (100 mg) by mouth 2 times daily.   Patient taking differently: Take 100 mg by mouth daily.   metoprolol succinate ER (TOPROL XL) 25 MG 24 hr tablet 4/8/2025  No Yes   Sig: Take 1 tablet (25 mg) by mouth 2 times daily.   Patient taking differently: Take 25 mg by mouth daily.   mupirocin (BACTROBAN) 2 % external ointment Past Month  No Yes   Sig: Apply topically 3 times daily as needed.   nystatin (MYCOSTATIN) 568313 UNIT/ML suspension Past Month  No Yes   Sig: Take 5 mLs (500,000 Units) by mouth 4 times daily. Has recurrent thrush. Uses for 2 weeks (280 ml)  at a time as needed.   ondansetron (ZOFRAN ODT) 4 MG ODT tab Past Month  No Yes   Sig: Take 1 tablet (4 mg) by mouth every 8 hours as needed for nausea.   oxyBUTYnin ER (DITROPAN XL) 5  MG 24 hr tablet 4/8/2025 Morning  No Yes   Sig: Take 2 tablets (10 mg) by mouth daily   pimecrolimus (ELIDEL) 1 % external cream Past Month  No Yes   Sig: Apply topically 2 times daily.   Patient taking differently: Apply topically 2 times daily as needed.   predniSONE (DELTASONE) 10 MG tablet 4/8/2025 Morning  No Yes   Sig: Take 1 tablet (10 mg) by mouth daily   rizatriptan (MAXALT) 10 MG tablet Past Month  No Yes   Sig: Take 1 tablet (10 mg) by mouth at onset of headache for migraine. May repeat in 2 hours. Max 3 tablets/24 hours.   tacrolimus (PROTOPIC) 0.1 % external ointment Past Month  No Yes   Sig: Apply topically 2 times daily. To areas of sores on face   Patient taking differently: Apply topically 2 times daily as needed. To areas of sores on face   tiotropium (SPIRIVA RESPIMAT) 2.5 MCG/ACT inhaler 4/8/2025 Morning  No Yes   Sig: Inhale 2 puffs into the lungs daily.   traMADol (ULTRAM) 50 MG tablet 4/8/2025  No Yes   Sig: Take 1 tablet (50 mg) by mouth every 12 hours as needed for moderate to severe pain   traZODone (DESYREL) 50 MG tablet 4/7/2025  No Yes   Sig: Take 3 tablets (150 mg) by mouth at bedtime   Patient taking differently: Take 1-3 tablets by mouth nightly as needed.   triamcinolone (ARISTOCORT HP) 0.5 % external cream Past Month  No Yes   Sig: Apply topically 2 times daily.   Patient taking differently: Apply topically 2 times daily as needed.   valACYclovir (VALTREX) 500 MG tablet Past Month  No Yes   Sig: Take 1 tablet (500 mg) by mouth daily      Facility-Administered Medications: None      ------------------------------------------------------------------------     Physical Exam   Vital Signs: Temp: 97.8  F (36.6  C) Temp src: Oral BP: 125/82 Pulse: 92   Resp: 16 SpO2: 99 % O2 Device: None (Room air)    Weight: 176 lbs 0 oz    Gen: Awake and alert, appears comfortable, appears stated age.  HEENT: Multiple facial ulcerations up to 8mm at various stages of healing. No oral thrush.  CV:  Regular rhythm, normal rate, S1/S2, extremities warm and well perfused, no lower extremity edema.  Pulm: Normal work of breathing, lungs clear to auscultation, no crackles or wheezing.  GI: Nontender, nondistended, soft. +bowel sounds.  Skin: Warm, dry, no jaundice.  Neuro: AO, speech normal, moves all extremities symmetrically.  Psych: Mood is low, affect is congruent and not labile. Denies current hallucination. Denies SI.      Medical Decision Making       80 MINUTES SPENT BY ME on the date of service doing chart review, history, exam, documentation & further activities per the note.      Data   ------------------------- PAST 24 HR DATA REVIEWED -----------------------------------------------    I have personally reviewed the following data over the past 24 hrs:    N/A  \   N/A   / N/A     142 107 31.9 (H) /  145 (H)   3.9 19 (L) 1.55 (H) \     Procal: N/A CRP: 28.12 (H) Lactic Acid: N/A         Imaging results reviewed over the past 24 hrs:   No results found for this or any previous visit (from the past 24 hours).

## 2025-04-09 NOTE — PLAN OF CARE
VS: Vital signs:  Temp: 97.8  F (36.6  C) Temp src: Oral BP: 125/82 Pulse: 92   Resp: 16 SpO2: 99 % O2 Device: None (Room air)     Weight: 79.8 kg (176 lb)   O2: RA   Output: Voids spontaneously without difficulty   Last BM: Pt unsure of last bowel   Activity: Independent   Up for meals? Yes   Skin: Scattered bruises and scabs   Pain: Pt complained of pain r/t fall -- prn administered see MAR   CMS: Denies numbness/tingling   Dressing: None   Diet: Regular   LDA: L PIV SL   Equipment: None   Plan: TBD   Additional Info:    [id number]  Problem: Pain Acute  Goal: Optimal Pain Control and Function  Outcome: Progressing  Intervention: Optimize Psychosocial Wellbeing  Flowsheets (Taken 4/9/2025 1853)  Spiritual Activities Assistance: affirmation provided  Supportive Measures:   active listening utilized   self-reflection promoted   verbalization of feelings encouraged   self-responsibility promoted   self-care encouraged  Diversional Activities:   smartphone   television  Intervention: Develop Pain Management Plan  Flowsheets (Taken 4/9/2025 1853)  Pain Management Interventions:   medication (see MAR)   care clustered   environmental changes   emotional support   quiet environment facilitated   rest  Intervention: Prevent or Manage Pain  Flowsheets (Taken 4/9/2025 1853)  Sensory Stimulation Regulation:   auditory stimulation minimized   care clustered   quiet environment promoted   visual stimulation minimized  Sleep/Rest Enhancement:   awakenings minimized   room darkened   regular sleep/rest pattern promoted   noise level reduced   natural light exposure provided   medication  Bowel Elimination Promotion:   adequate fluid intake promoted   ambulation promoted   privacy promoted  Medication Review/Management: medications reviewed     Problem: Psychotic Signs/Symptoms  Goal: Improved Behavioral Control (Psychotic Signs/Symptoms)  Outcome: Progressing  Flowsheets (Taken 4/9/2025 1853)  Mutually Determined Action Steps  (Improved Behavioral Control): verbalizes personal treatment goal  Goal: Optimal Cognitive Function (Psychotic Signs/Symptoms)  Outcome: Progressing  Flowsheets (Taken 4/9/2025 1853)  Mutually Determined Action Steps (Optimal Cognitive Function): follows step-by-step instructions  Goal: Increased Participation and Engagement (Psychotic Signs/Symptoms)  Outcome: Progressing  Intervention: Facilitate Participation and Engagement  Flowsheets (Taken 4/9/2025 1853)  Supportive Measures:   active listening utilized   self-reflection promoted   verbalization of feelings encouraged   self-responsibility promoted   self-care encouraged  Diversional Activity: television  Goal: Improved Mood Symptoms (Psychotic Signs/Symptoms)  Outcome: Progressing  Flowsheets (Taken 4/9/2025 1853)  Mutually Determined Action Steps (Improved Mood Symptoms): engages in physical activity  Intervention: Optimize Emotion and Mood  Flowsheets (Taken 4/9/2025 1853)  Supportive Measures:   active listening utilized   self-reflection promoted   verbalization of feelings encouraged   self-responsibility promoted   self-care encouraged  Diversional Activity: television  Goal: Improved Psychomotor Symptoms (Psychotic Signs/Symptoms)  Outcome: Progressing  Flowsheets (Taken 4/9/2025 1853)  Mutually Determined Action Steps (Improved Psychomotor Symptoms): adheres to medication regimen  Intervention: Manage Psychomotor Movement  Flowsheets (Taken 4/9/2025 1853)  Diversional Activity: television  Activity (Behavioral Health): activity adjusted per tolerance  Goal: Decreased Sensory Symptoms (Psychotic Signs/Symptoms)  Outcome: Progressing  Flowsheets (Taken 4/9/2025 1853)  Mutually Determined Action Steps (Decreased Sensory Symptoms):   adheres to medication regimen   shares insight re: need for meds  Intervention: Minimize and Manage Sensory Impairment  Flowsheets (Taken 4/9/2025 1853)  Sensory Stimulation Regulation:   auditory stimulation minimized   care  clustered   quiet environment promoted   visual stimulation minimized  Goal: Improved Sleep (Psychotic Signs/Symptoms)  Outcome: Progressing  Flowsheets (Taken 4/9/2025 1853)  Mutually Determined Action Steps (Improved Sleep): remains out of bed during day  Goal: Enhanced Social, Occupational or Functional Skills (Psychotic Signs/Symptoms)  Outcome: Progressing  Intervention: Promote Social, Occupational and Functional Ability  Flowsheets (Taken 4/9/2025 1853)  Social Functional Ability Promotion: autonomy promoted  Trust Relationship/Rapport:   care explained   reassurance provided   questions encouraged   thoughts/feelings acknowledged   questions answered   empathic listening provided   Goal Outcome Evaluation:

## 2025-04-10 ENCOUNTER — APPOINTMENT (OUTPATIENT)
Dept: MRI IMAGING | Facility: CLINIC | Age: 65
End: 2025-04-10
Payer: COMMERCIAL

## 2025-04-10 ENCOUNTER — APPOINTMENT (OUTPATIENT)
Dept: PHYSICAL THERAPY | Facility: CLINIC | Age: 65
End: 2025-04-10
Attending: STUDENT IN AN ORGANIZED HEALTH CARE EDUCATION/TRAINING PROGRAM
Payer: COMMERCIAL

## 2025-04-10 VITALS
TEMPERATURE: 98.3 F | SYSTOLIC BLOOD PRESSURE: 124 MMHG | BODY MASS INDEX: 28 KG/M2 | RESPIRATION RATE: 18 BRPM | HEART RATE: 76 BPM | DIASTOLIC BLOOD PRESSURE: 54 MMHG | WEIGHT: 178.8 LBS | OXYGEN SATURATION: 99 %

## 2025-04-10 LAB
ALBUMIN SERPL BCG-MCNC: 3.5 G/DL (ref 3.5–5.2)
ALP SERPL-CCNC: 95 U/L (ref 40–150)
ALT SERPL W P-5'-P-CCNC: 14 U/L (ref 0–50)
AMMONIA PLAS-SCNC: 10 UMOL/L (ref 11–51)
ANION GAP SERPL CALCULATED.3IONS-SCNC: 11 MMOL/L (ref 7–15)
AST SERPL W P-5'-P-CCNC: 29 U/L (ref 0–45)
BASOPHILS # BLD AUTO: 0.1 10E3/UL (ref 0–0.2)
BASOPHILS NFR BLD AUTO: 1 %
BILIRUB SERPL-MCNC: 0.5 MG/DL
BUN SERPL-MCNC: 30.6 MG/DL (ref 8–23)
CALCIUM SERPL-MCNC: 9.4 MG/DL (ref 8.8–10.4)
CHLORIDE SERPL-SCNC: 109 MMOL/L (ref 98–107)
CREAT SERPL-MCNC: 1.58 MG/DL (ref 0.51–0.95)
EGFRCR SERPLBLD CKD-EPI 2021: 36 ML/MIN/1.73M2
EOSINOPHIL # BLD AUTO: 0.3 10E3/UL (ref 0–0.7)
EOSINOPHIL NFR BLD AUTO: 4 %
ERYTHROCYTE [DISTWIDTH] IN BLOOD BY AUTOMATED COUNT: 14.4 % (ref 10–15)
ERYTHROCYTE [SEDIMENTATION RATE] IN BLOOD BY WESTERGREN METHOD: 20 MM/HR (ref 0–30)
GLUCOSE SERPL-MCNC: 103 MG/DL (ref 70–99)
HCO3 SERPL-SCNC: 22 MMOL/L (ref 22–29)
HCT VFR BLD AUTO: 35.3 % (ref 35–47)
HCT VFR BLD AUTO: 35.3 % (ref 35–47)
HGB BLD-MCNC: 12.1 G/DL (ref 11.7–15.7)
IMM GRANULOCYTES # BLD: 0 10E3/UL
IMM GRANULOCYTES NFR BLD: 1 %
LACTATE SERPL-SCNC: 0.7 MMOL/L (ref 0.7–2)
LYMPHOCYTES # BLD AUTO: 3.2 10E3/UL (ref 0.8–5.3)
LYMPHOCYTES NFR BLD AUTO: 44 %
MAGNESIUM SERPL-MCNC: 2.2 MG/DL (ref 1.7–2.3)
MCH RBC QN AUTO: 30 PG (ref 26.5–33)
MCHC RBC AUTO-ENTMCNC: 34.3 G/DL (ref 31.5–36.5)
MCV RBC AUTO: 88 FL (ref 78–100)
MONOCYTES # BLD AUTO: 0.8 10E3/UL (ref 0–1.3)
MONOCYTES NFR BLD AUTO: 10 %
NEUTROPHILS # BLD AUTO: 3 10E3/UL (ref 1.6–8.3)
NEUTROPHILS NFR BLD AUTO: 41 %
NRBC # BLD AUTO: 0 10E3/UL
NRBC BLD AUTO-RTO: 0 /100
PLATELET # BLD AUTO: 266 10E3/UL (ref 150–450)
POTASSIUM SERPL-SCNC: 3.4 MMOL/L (ref 3.4–5.3)
POTASSIUM SERPL-SCNC: 4.4 MMOL/L (ref 3.4–5.3)
PROT SERPL-MCNC: 6.1 G/DL (ref 6.4–8.3)
RBC # BLD AUTO: 4.03 10E6/UL (ref 3.8–5.2)
SODIUM SERPL-SCNC: 142 MMOL/L (ref 135–145)
T PALLIDUM AB SER QL: NONREACTIVE
WBC # BLD AUTO: 7.4 10E3/UL (ref 4–11)

## 2025-04-10 PROCEDURE — 97530 THERAPEUTIC ACTIVITIES: CPT | Mod: GP

## 2025-04-10 PROCEDURE — A9585 GADOBUTROL INJECTION: HCPCS

## 2025-04-10 PROCEDURE — 36415 COLL VENOUS BLD VENIPUNCTURE: CPT | Performed by: STUDENT IN AN ORGANIZED HEALTH CARE EDUCATION/TRAINING PROGRAM

## 2025-04-10 PROCEDURE — 99233 SBSQ HOSP IP/OBS HIGH 50: CPT | Performed by: STUDENT IN AN ORGANIZED HEALTH CARE EDUCATION/TRAINING PROGRAM

## 2025-04-10 PROCEDURE — 255N000002 HC RX 255 OP 636

## 2025-04-10 PROCEDURE — 86780 TREPONEMA PALLIDUM: CPT | Performed by: STUDENT IN AN ORGANIZED HEALTH CARE EDUCATION/TRAINING PROGRAM

## 2025-04-10 PROCEDURE — 84132 ASSAY OF SERUM POTASSIUM: CPT | Performed by: STUDENT IN AN ORGANIZED HEALTH CARE EDUCATION/TRAINING PROGRAM

## 2025-04-10 PROCEDURE — 258N000003 HC RX IP 258 OP 636: Performed by: STUDENT IN AN ORGANIZED HEALTH CARE EDUCATION/TRAINING PROGRAM

## 2025-04-10 PROCEDURE — G0378 HOSPITAL OBSERVATION PER HR: HCPCS

## 2025-04-10 PROCEDURE — 82140 ASSAY OF AMMONIA: CPT | Performed by: STUDENT IN AN ORGANIZED HEALTH CARE EDUCATION/TRAINING PROGRAM

## 2025-04-10 PROCEDURE — 70553 MRI BRAIN STEM W/O & W/DYE: CPT

## 2025-04-10 PROCEDURE — 250N000013 HC RX MED GY IP 250 OP 250 PS 637: Performed by: STUDENT IN AN ORGANIZED HEALTH CARE EDUCATION/TRAINING PROGRAM

## 2025-04-10 PROCEDURE — 85652 RBC SED RATE AUTOMATED: CPT | Performed by: STUDENT IN AN ORGANIZED HEALTH CARE EDUCATION/TRAINING PROGRAM

## 2025-04-10 PROCEDURE — 70553 MRI BRAIN STEM W/O & W/DYE: CPT | Mod: 26 | Performed by: RADIOLOGY

## 2025-04-10 PROCEDURE — 83735 ASSAY OF MAGNESIUM: CPT | Performed by: STUDENT IN AN ORGANIZED HEALTH CARE EDUCATION/TRAINING PROGRAM

## 2025-04-10 PROCEDURE — 250N000013 HC RX MED GY IP 250 OP 250 PS 637

## 2025-04-10 PROCEDURE — 83605 ASSAY OF LACTIC ACID: CPT | Performed by: STUDENT IN AN ORGANIZED HEALTH CARE EDUCATION/TRAINING PROGRAM

## 2025-04-10 PROCEDURE — 96375 TX/PRO/DX INJ NEW DRUG ADDON: CPT

## 2025-04-10 PROCEDURE — 80321 ALCOHOLS BIOMARKERS 1OR 2: CPT | Performed by: STUDENT IN AN ORGANIZED HEALTH CARE EDUCATION/TRAINING PROGRAM

## 2025-04-10 PROCEDURE — 250N000013 HC RX MED GY IP 250 OP 250 PS 637: Performed by: EMERGENCY MEDICINE

## 2025-04-10 PROCEDURE — 85025 COMPLETE CBC W/AUTO DIFF WBC: CPT | Performed by: STUDENT IN AN ORGANIZED HEALTH CARE EDUCATION/TRAINING PROGRAM

## 2025-04-10 PROCEDURE — 250N000011 HC RX IP 250 OP 636

## 2025-04-10 PROCEDURE — 250N000012 HC RX MED GY IP 250 OP 636 PS 637: Performed by: EMERGENCY MEDICINE

## 2025-04-10 PROCEDURE — 80053 COMPREHEN METABOLIC PANEL: CPT | Performed by: STUDENT IN AN ORGANIZED HEALTH CARE EDUCATION/TRAINING PROGRAM

## 2025-04-10 PROCEDURE — 82747 ASSAY OF FOLIC ACID RBC: CPT | Performed by: STUDENT IN AN ORGANIZED HEALTH CARE EDUCATION/TRAINING PROGRAM

## 2025-04-10 PROCEDURE — 97161 PT EVAL LOW COMPLEX 20 MIN: CPT | Mod: GP

## 2025-04-10 RX ORDER — GADOBUTROL 604.72 MG/ML
8.1 INJECTION INTRAVENOUS ONCE
Status: COMPLETED | OUTPATIENT
Start: 2025-04-10 | End: 2025-04-10

## 2025-04-10 RX ORDER — SODIUM CHLORIDE, SODIUM LACTATE, POTASSIUM CHLORIDE, CALCIUM CHLORIDE 600; 310; 30; 20 MG/100ML; MG/100ML; MG/100ML; MG/100ML
INJECTION, SOLUTION INTRAVENOUS CONTINUOUS
Status: DISCONTINUED | OUTPATIENT
Start: 2025-04-10 | End: 2025-04-11

## 2025-04-10 RX ORDER — LORAZEPAM 2 MG/ML
0.5 INJECTION INTRAMUSCULAR ONCE
Status: COMPLETED | OUTPATIENT
Start: 2025-04-10 | End: 2025-04-10

## 2025-04-10 RX ORDER — POTASSIUM CHLORIDE 1500 MG/1
40 TABLET, EXTENDED RELEASE ORAL ONCE
Status: COMPLETED | OUTPATIENT
Start: 2025-04-10 | End: 2025-04-10

## 2025-04-10 RX ADMIN — SODIUM CHLORIDE, POTASSIUM CHLORIDE, SODIUM LACTATE AND CALCIUM CHLORIDE: 600; 310; 30; 20 INJECTION, SOLUTION INTRAVENOUS at 23:53

## 2025-04-10 RX ADMIN — RISPERIDONE 0.25 MG: 0.25 TABLET, FILM COATED ORAL at 20:18

## 2025-04-10 RX ADMIN — RISPERIDONE 0.25 MG: 0.25 TABLET, FILM COATED ORAL at 08:46

## 2025-04-10 RX ADMIN — APIXABAN 5 MG: 5 TABLET, FILM COATED ORAL at 20:19

## 2025-04-10 RX ADMIN — GADOBUTROL 8.1 ML: 604.72 INJECTION INTRAVENOUS at 18:11

## 2025-04-10 RX ADMIN — SODIUM CHLORIDE, POTASSIUM CHLORIDE, SODIUM LACTATE AND CALCIUM CHLORIDE: 600; 310; 30; 20 INJECTION, SOLUTION INTRAVENOUS at 15:59

## 2025-04-10 RX ADMIN — Medication 5 MG: at 20:18

## 2025-04-10 RX ADMIN — METOPROLOL SUCCINATE 125 MG: 25 TABLET, FILM COATED, EXTENDED RELEASE ORAL at 08:44

## 2025-04-10 RX ADMIN — TRAMADOL HYDROCHLORIDE 25 MG: 50 TABLET, FILM COATED ORAL at 12:52

## 2025-04-10 RX ADMIN — TRAMADOL HYDROCHLORIDE 25 MG: 50 TABLET, FILM COATED ORAL at 01:38

## 2025-04-10 RX ADMIN — POTASSIUM CHLORIDE 40 MEQ: 1500 TABLET, EXTENDED RELEASE ORAL at 11:51

## 2025-04-10 RX ADMIN — UMECLIDINIUM 1 PUFF: 62.5 AEROSOL, POWDER ORAL at 08:46

## 2025-04-10 RX ADMIN — APIXABAN 5 MG: 5 TABLET, FILM COATED ORAL at 08:45

## 2025-04-10 RX ADMIN — LOSARTAN POTASSIUM 100 MG: 100 TABLET, FILM COATED ORAL at 08:45

## 2025-04-10 RX ADMIN — BUPROPION HYDROCHLORIDE 450 MG: 300 TABLET, EXTENDED RELEASE ORAL at 08:45

## 2025-04-10 RX ADMIN — Medication 5 MG: at 01:38

## 2025-04-10 RX ADMIN — LORAZEPAM 0.5 MG: 2 INJECTION INTRAMUSCULAR; INTRAVENOUS at 17:34

## 2025-04-10 RX ADMIN — DULOXETINE HYDROCHLORIDE 120 MG: 60 CAPSULE, DELAYED RELEASE ORAL at 08:45

## 2025-04-10 RX ADMIN — PREDNISONE 10 MG: 5 TABLET ORAL at 08:45

## 2025-04-10 RX ADMIN — HYDROCHLOROTHIAZIDE 25 MG: 25 TABLET ORAL at 08:45

## 2025-04-10 RX ADMIN — Medication 10 ML: at 10:06

## 2025-04-10 RX ADMIN — LIDOCAINE 4% 3 PATCH: 40 PATCH TOPICAL at 20:19

## 2025-04-10 RX ADMIN — ACETAMINOPHEN 1000 MG: 500 TABLET ORAL at 01:55

## 2025-04-10 RX ADMIN — ATORVASTATIN CALCIUM 20 MG: 20 TABLET, FILM COATED ORAL at 08:45

## 2025-04-10 RX ADMIN — FAMOTIDINE 20 MG: 20 TABLET, FILM COATED ORAL at 08:45

## 2025-04-10 ASSESSMENT — ACTIVITIES OF DAILY LIVING (ADL)
ADLS_ACUITY_SCORE: 54

## 2025-04-10 NOTE — PLAN OF CARE
Goal Outcome Evaluation:      Plan of Care Reviewed With: patient    Overall Patient Progress: improvingOverall Patient Progress: improving     Shift: 0700 - 1530     VS: Blood pressure 110/90, pulse 88, temperature 98.7  F (37.1  C), temperature source Oral, resp. rate 18, weight 81.1 kg (178 lb 12.8 oz), last menstrual period 07/17/2009, SpO2 99%, not currently breastfeeding.     Pain:  Pt rates their pain a 6/10.  Neuro: Alert and oriented x4.   Resp: WDL  Diet: Regular   Skin: Scattered bruises and scabs  LDA: L-PIV,SL  Activity: IND  Output: Continent of bowel and bladder.   Additional Info/shift updates: MRI checklist done this morning called down to MRI and they said they would call back if they had an opening. MRI did not call back during my shift. Replaced potassium during my shift. No acute events this shift.   Call light within reach     Plan of care ongoing        Please review. Patient requesting 90 day supply.    Thank you

## 2025-04-10 NOTE — CONSULTS
"losing your housing?: No   Tobacco Use: Medium Risk (4/8/2025)    Patient History     Smoking Tobacco Use: Former     Smokeless Tobacco Use: Never     Passive Exposure: Past   Financial Resource Strain: Low Risk  (4/9/2025)    Financial Resource Strain     Within the past 12 months, have you or your family members you live with been unable to get utilities (heat, electricity) when it was really needed?: No   Alcohol Use: Not on file   Transportation Needs: Low Risk  (4/9/2025)    Transportation Needs     Within the past 12 months, has lack of transportation kept you from medical appointments, getting your medicines, non-medical meetings or appointments, work, or from getting things that you need?: No   Physical Activity: Not on file   Interpersonal Safety: Low Risk  (1/24/2025)    Interpersonal Safety     Do you feel physically and emotionally safe where you currently live?: Yes     Within the past 12 months, have you been hit, slapped, kicked or otherwise physically hurt by someone?: No     Within the past 12 months, have you been humiliated or emotionally abused in other ways by your partner or ex-partner?: No   Stress: Not on file   Social Connections: Not on file   Health Literacy: Not on file       Functional Status:  Prior to admission patient needed assistance:   Dependent ADLs:: Independent  Dependent IADLs:: Transportation, Money Management  Assesssment of Functional Status: At functional baseline    Mental Health Status:  Mental Health Status: Current Concern  Mental Health Management: Medication    Chemical Dependency Status:  Chemical Dependency Status: No Current Concerns             Values/Beliefs:  Spiritual, Cultural Beliefs, Christian Practices, Values that affect care: no               Discussed  Partnership in Safe Discharge Planning  document with patient/family: No    Additional Information:  Per H&P \"Jacquie Amador is a 64 year old woman with history including osteoarthritis, chronic pain, " "asthma, depression, thyroid disease, CKD, Behcet's disease, a platelet granule disorder who presented with hallucinations and falls and was admitted on 4/8/2025.\"    Writer met with pt and spouse at bedside to introduce role and assess for discharge needs r/t elevated readmission risk score. Pt/spouse reported they live in a home with 3 JOHNY, then living on one floor. At baseline, pt is independent with I/ADLs but has recently been declining d/t mental health. Address and PCP verified.     Pt/spouse denied financial or CD concerns. MH concerns r/t AH and VH that are being addressed inpatient with outpatient follow up. Pt does not have a HCD and was interested in completing one. [employer] educational brochure and forms provided.     Pt was awaiting MRI and results. Anticipate discharge home with family today. Care Management will sign off. Please re-consult should new needs arise.     Next Steps: DUSTY Velasquez, RN   Nurse Coordinator    6 Med/Surg  Phone: [phone]    Social Work and Care Management Department     SEARCHABLE in [facility] - search CARE COORDINATOR   [facility] ([phone]) Saturday & Sunday; ([phone])  Recognized Holidays   Units: 6 Med Surg Vocera & 8 Med Surg Vocera Pager [phone]            "

## 2025-04-10 NOTE — PROGRESS NOTES
Assumed patient care around [id number].    Patient alert and oriented x4. Able to make needs known, call light within reach. Patient denies SOB, chest pain, pain, and nausea. VSS on RA . Patient is on tele. Patient independent in room, significant other at bedside. Left PIV SL. Incision on right upper arm covered with primapore dressing with moderate dry drainage. Patient resting comfortably in bed.    Continue with plan of care.

## 2025-04-10 NOTE — PROGRESS NOTES
"[facility]                                                                                   OUTPATIENT PHYSICAL THERAPY    PLAN OF TREATMENT FOR OUTPATIENT REHABILITATION   Patient's Last Name, First Name, Jacquie Ahmadi YOB: 1960   Provider's Name   [facility]   Medical Record No.  [id number]     Onset Date: 04/08/25 Start of Care Date: 04/10/25     Medical Diagnosis:  hallucination and falls               PT Diagnosis:  impaired functional mobility Certification Dates:  From: 04/10/25  To: 05/10/25       See note for plan of treatment, functional goals, and certification details.    I CERTIFY THE NEED FOR THESE SERVICES FURNISHED UNDER        THIS PLAN OF TREATMENT AND WHILE UNDER MY CARE (Physician co-signature of this document indicates review and certification of the therapy plan).               04/10/25 0928   Appointment Info   Signing Clinician's Name / Credentials (PT) Tamiko Massey DPT   Quick Adds   Quick Adds Certification   Living Environment   People in Home spouse   Current Living Arrangements house   Home Accessibility stairs to enter home;stairs within home   Number of Stairs, Main Entrance 3   Stair Railings, Main Entrance railings on both sides of stairs   Number of Stairs, Within Home, Primary greater than 10 stairs  (to basement; pt does not need to navigate)   Stair Railings, Within Home, Primary railings safe and in good condition   Transportation Anticipated family or friend will provide   Living Environment Comments Walk in shower   Self-Care   Usual Activity Tolerance moderate   Current Activity Tolerance moderate   Equipment Currently Used at Home none   Fall history within last six months yes   Number of times patient has fallen within last six months 3   Activity/Exercise/Self-Care Comment Pt endorses chronic back pain that limits her overall tolerance for activity. States recently \"got an " "epidural\" and thinks that is has been helping with sxs management. States that shes \"very independent\"; no assist with dressing/bathing. Reports no AD use but states she owns walking poles. Reports that her legs \"give out sometimes\" and relates this to cause for her falls.   General Information   Onset of Illness/Injury or Date of Surgery 04/08/25   Referring Physician Zachary Cervantes,    Pertinent History of Current Problem (include personal factors and/or comorbidities that impact the POC) per chart; Jacquie Amador is a 64 year old woman with history including osteoarthritis, chronic pain, asthma, depression, thyroid disease, CKD, Behcet's disease, a platelet granule disorder who presented with hallucinations and falls and was admitted on 4/8/2025.   Existing Precautions/Restrictions fall   Weight-Bearing Status - LUE full weight-bearing   Weight-Bearing Status - RUE full weight-bearing   Weight-Bearing Status - LLE full weight-bearing   Weight-Bearing Status - RLE full weight-bearing   General Observations Greeted in recliner;  present in room   Cognition   Affect/Mental Status (Cognition) WFL   Orientation Status (Cognition) oriented x 4   Follows Commands (Cognition) WFL   Cognitive Status Comments Reports feeling \"forgetful\" - but endorses \"I mean I am 65, I am going to forget stuff\"   Pain Assessment   Patient Currently in Pain No   Integumentary/Edema   Integumentary/Edema Comments scattered scabs on face and UEs bilat   Posture    Posture Protracted shoulders   Range of Motion (ROM)   Range of Motion ROM is WFL   Strength (Manual Muscle Testing)   Strength (Manual Muscle Testing) strength is WFL   Bed Mobility   Comment, (Bed Mobility) NT - anticipate IND per clinical reasoning   Transfers   Comment, (Transfers) SBA from recliner without AD   Gait/Stairs (Locomotion)   Comment, (Gait/Stairs) SBA without AD; mild R path deviation   Balance   Balance Comments mildly unsteady; path deviations with gait "   Clinical Impression   Criteria for Skilled Therapeutic Intervention Yes, treatment indicated   PT Diagnosis (PT) impaired functional mobility   Influenced by the following impairments hx of falls, decreased activity tolerance, impaired balance, deconditioning, hx of LBP   Functional limitations due to impairments transfers, gait, stairs, community integration   Clinical Presentation (PT Evaluation Complexity) stable   Clinical Presentation Rationale per clinical reasoning   Clinical Decision Making (Complexity) low complexity   Planned Therapy Interventions (PT) balance training;bed mobility training;gait training;home exercise program;neuromuscular re-education;patient/family education;ROM (range of motion);stair training;strengthening;transfer training;progressive activity/exercise;risk factor education;home program guidelines   Risk & Benefits of therapy have been explained evaluation/treatment results reviewed;care plan/treatment goals reviewed;risks/benefits reviewed;current/potential barriers reviewed;participants voiced agreement with care plan;participants included;patient;spouse/significant other   PT Total Evaluation Time   PT Eval, Low Complexity Minutes (31846) 9   Therapy Certification   Start of care date 04/10/25   Certification date from 04/10/25   Certification date to 05/10/25   Medical Diagnosis hallucination and falls   Physical Therapy Goals   PT Frequency One time eval and treatment only   PT Predicted Duration/Target Date for Goal Attainment 04/12/25   PT Goals Transfers;Gait;Stairs   PT: Transfers Independent;Sit to/from stand;Completed   PT: Gait Independent;100 feet;Completed   PT: Stairs Modified independent;3 stairs;Rail on both sides;Goal Met   Interventions   Interventions Quick Adds Physical Perf Test/Measures;Therapeutic Activity   Therapeutic Activity   Therapeutic Activities: dynamic activities to improve functional performance Minutes (48034) 14   Symptoms Noted During/After  "Treatment Fatigue   Treatment Detail/Skilled Intervention treatment indicated; education on role of IP PT to assist with d/c planning. Pt adament that shes IND; with ongiong education pt agreeable to assessment. Pt ambulated ~250ft without AD, mild instability noted, path deviations and inconsistent step length. Pt states shes \"always weaving\" and \"I tend to run into my  on the R side\". Pt completed x6 stairs with unilateral rail and reciprocal pattern, reduced speed and SBA. Step to pattern with descending d/t hx of knee buckling. Education on assessment of further balance/falls risk; completed x5 STS - see note. Increased education at end of session on no further acute/IP needs. Recommend OP PT. Pt stating \"but they can't do anything to my back\" - education on OP PT focus on entire body and ability to strengthen other areas to compensate or assist with LBP management. Further education on protective spinal precs; avoiding excessive lifting and proper lifting mechanics to assist with aggrevation of sxs. Education on further benefits of OP PT to assist with strength and higher level balance to reduce falls risk. Pt and  agreeable with plan.   Physical Performance Test or Measurement (Report Req)    Physical Performance Test/Measurement Minutes, w/report (78296) 2   Symptoms Noted During/After Treatment fatigue   Treatment Detail/Skilled Intervention Completed 5xSTS; pt completed with UEs with score of 15.39, repeated assessment without use of UEs and pt scoring 16.35 sec. Education on increased indication of falls risk. See note for details.   PT Discharge Planning   PT Plan d/c from IP PT   PT Discharge Recommendation (DC Rec) home with assist;home with outpatient physical therapy   PT Rationale for DC Rec Pt appears near her self reported baseline; mildly unsteady. Has assist from her  at home. Recommend OP PT to assist with LBP management, strengthening and higher level balance to prevent " falls/readmission.   PT Brief overview of current status SBA   PT Total Distance Amb During Session (feet) 300   Physical Therapy Time and Intention   Timed Code Treatment Minutes 16   Total Session Time (sum of timed and untimed services) 25

## 2025-04-10 NOTE — CONSULTS
Initial Psychiatric Consult   Consult date: April 9, 2025         Reason for Consult, requesting source:    Reason for Consult: Mental decline, hallucinations   Requesting source: Zachary Cervantes    Labs and imaging reviewed.     Interim History   Jacquie Amador is a 64 year old woman with past psychiatric history of depression and anxiety (psych visits starting 2023) and medical history of osteoarthritis, chronic pain, asthma, thyroid disease, CKD, Behcet's disease, a platelet granule disorder  who was admitted to [facility] 4/9/25 with worsened hallucinations, mental decline over the last few weeks. Psychiatry consulted for above. Risperidone started yesterday (4/7) for hallucinations, agitation. Exam revealed no motor changes (no rigidity, bradykinesia, tremor), patient reports no symptom change. Will continue risperidone. MRI pending.          Medications:     Current Facility-Administered Medications   Medication Dose Route Frequency Provider Last Rate Last Admin    apixaban ANTICOAGULANT (ELIQUIS) tablet 5 mg  5 mg Oral BID Thanh Angelo MD   5 mg at 04/10/25 0845    apremilast (OTEZLA) tablet 30 mg  30 mg Oral BID Thanh Angelo MD        artificial saliva (BIOTENE DRY MOUTHWASH) liquid 10 mL  10 mL Swish & Spit BID Marcus Teixeira PA-C   10 mL at 04/10/25 1006    atorvastatin (LIPITOR) tablet 20 mg  20 mg Oral Daily Thanh Angelo MD   20 mg at 04/10/25 0845    buPROPion (WELLBUTRIN XL) 24 hr tablet 450 mg  450 mg Oral QAM Thanh Angelo MD   450 mg at 04/10/25 0845    DULoxetine (CYMBALTA) DR capsule 120 mg  120 mg Oral Daily Zachary Cervantes DO   120 mg at 04/10/25 0845    famotidine (PEPCID) tablet 20 mg  20 mg Oral Daily Zachary Cervantes DO   20 mg at 04/10/25 0845    hydrochlorothiazide (HYDRODIURIL) tablet 25 mg  25 mg Oral Daily Thanh Angelo MD   25 mg at 04/10/25 0845    Lidocaine (LIDOCARE) 4 % Patch 3 patch  3 patch Transdermal Q24h Zachary Cervantes DO   3 patch at 04/09/25 2045    losartan  "(COZAAR) tablet 100 mg  100 mg Oral Daily Beacom, Zachary, DO   100 mg at 04/10/25 0845    metoprolol succinate ER (TOPROL XL) 24 hr tablet 125 mg  125 mg Oral Daily Beacom, Zachary, DO   125 mg at 04/10/25 0844    [Held by provider] nystatin (MYCOSTATIN) suspension 1,000,000 Units  1,000,000 Units Mouth/Throat 4x Daily Danielito Bates MD   1,000,000 Units at 04/09/25 1552    [Held by provider] oxyBUTYnin ER (DITROPAN XL) 24 hr tablet 10 mg  10 mg Oral Daily Thanh Angelo MD   10 mg at 04/09/25 0836    predniSONE (DELTASONE) tablet 10 mg  10 mg Oral Daily Thanh Angelo MD   10 mg at 04/10/25 0845    risperiDONE (risperDAL) tablet 0.25 mg  0.25 mg Oral BID Ochoa Rivas MD   0.25 mg at 04/10/25 0846    umeclidinium (INCRUSE ELLIPTA) 62.5 MCG/ACT inhaler 1 puff  1 puff Inhalation Daily Thanh Angelo MD   1 puff at 04/10/25 0846            Physical and Psychiatric Examination:     /90 (BP Location: Left arm)   Pulse 88   Temp 98.7  F (37.1  C) (Oral)   Resp 18   Wt 81.1 kg (178 lb 12.8 oz)   LMP 07/17/2009 (Exact Date)   SpO2 99%   BMI 28.00 kg/m    Weight is 178 lbs 12.8 oz  Body mass index is 28 kg/m .    Physical Exam:  I have reviewed the physical exam as documented by by the medical team and agree with findings and assessment and have no additional findings to add at this time.         MSE:   Appearance: awake, alert and dressed in hospital scrubs, numerous skin lesions   Attitude:  cooperative  Eye Contact:  good  Mood:  \"OK\"  Affect:  mood congruent and intensity is normal  Speech:  clear, coherent  Language: Intact   Psychomotor Behavior:  no evidence of tardive dyskinesia, dystonia, or tics. No cogwheeling or tremor   Muscle strength and tone: Normal   Thought Process:  logical, linear, and goal oriented  Associations:  no loose associations  Thought Content:  No SI or HI. Reports recent AH and VH at her house. Denies any in the hospital.   Insight:  Has insight into the fact her " hallucinations and paranoia are not real   Judgement:  fair  Oriented to:  time, person, and place  Attention Span and Concentration:  When asked to do month of the years backwards initially made multiple mistakes, then completed it on a second try with one mistake   Recent and Remote Memory:  0/3 short term recall     Mini-Co/5, 1 for clock contour, hands wrong, 0/3 short term recall           DSM-5 Diagnosis:     Unspecified cognitive impairment  Unspecified psychotic disorder  MDD          Assessment:     Jacquie Amador is a 64 year old woman with past psychiatric history of depression and anxiety (psych visits starting ) and medical history of osteoarthritis, chronic pain, asthma, thyroid disease, CKD, Behcet's disease, a platelet granule disorder  who was admitted to [facility] 25 with worsened hallucinations, mental decline over the last few weeks. Psychiatry consulted for above.     Patient presents with both cognitive deficits and psychotic symptoms (AH, VH, paranoia). On our exam she was alert, oriented, fluent, though on mini-cog scored 1/5 and had deficits in short term recall, executive function, and attention. Interestingly with neurology team had better short term recall. Her predominant psychotic symptoms are unexpected for a primary dementia, though possible. She has Behcet's syndrome which can produce neuropsychiatric symptoms. Primary psychiatric, neurodegenerative process, Behcet's neuropsychiatric symptoms are on the differential. Low dose Risperidone treatment started yesterday (4/10) for psychosis, will continue and be monitored outpatient.     Pending MRI read patient is safe to leave from our standpoint. We will arrange OP follow up.           Summary of Recommendations:   Legal: Voluntary  Safety: No 1:1 needed  Labs/Studies: Agree with MRI with contrast  Medications: Added risperidone 0.25mg BID  Follow-Up: Pending MRI read patient is safe to leave from our standpoint. We will  arrange OP follow up.       Ruma Alvarez, MS3    Ochoa Rivas MD    Department of Psychiatry  Please Vocera if questions    Total time spent in chart review, patient interview and coordination of care; 51 minutes - all time was spent on the date of the encounter that I saw patient

## 2025-04-10 NOTE — PLAN OF CARE
Physical Therapy Discharge Summary    Reason for therapy discharge:    All goals and outcomes met, no further needs identified.    Progress towards therapy goal(s). See goals on Care Plan in [facility] electronic health record for goal details.  Goals met    Therapy recommendation(s):    Continued therapy is recommended.  Rationale/Recommendations:  Recommend OP PT to assist with LBP management, strengthening and higher level balance to prevent falls/readmission..

## 2025-04-10 NOTE — PLAN OF CARE
Patient initially admitted for evaluation of hallucinations. Remains alert and oriented during this shift with no acute distress noted. Seen and evaluated by the medical team earlier in the day. Significant other present at bedside providing support. Denied SOB or chest pain. PRN medications including Tramadol, Melatonin and Tylenol were administered for comfort and sleep support. Patient requested a higher dose of Trazodone stating that she is typically on  a higher dose. Current shift orders allowed for 50 mg only. Provider notified and confirmed that this was intentionally adjusted by the care team earlier in the day and should remain as ordered until further evaluation in the morning. Patient expressed frustration and continued to request additional Trazodone but was informed of the plan of reassess with the care team. Non-pharmacologic comfort measures including lavender essential oil were offered to promote relaxation. Patient accepted and eventually settled. Patient assisted to bed and slept for the remainder of the shift without signs of acute distress. No care concerns noted at this time safety measures remains in place and call light within reach. Continue with plan of care.

## 2025-04-10 NOTE — DISCHARGE INSTRUCTIONS
ADDITIONAL SUPPORTS:  Call or text 829 - National Suicide & Crisis Lifeline - if you feel like you may be in crisis or having thoughts of suicide.    National [place] [employer] [facility] [place] ([place]) provides support groups and educational programs. Visit www.namimn.org Helpline at 1-488.880.3145 or 021-489-3001 for further information.   [facility] ([place] on Mental Illness) improves the lives of children and adults with mental illnesses and their families by providing free classes on mental illnesses andsupport groups for adults with mental illnesses, parents and family members.   For more information:  Phone: [phone]  Toll free: 4-255-HPJI-HELPS  Website: www.namihelps.org      [facility] [place] [facility] provides a vmsl-og-yfpl approach to mental health recovery, support and wellness. Calls are answered by our team of professionally trained Certified Peer Specialists, who have first hand experience living with a mental health condition.   Open Monday-Saturday, 5pm to 10pm. Call [phone].       You may contact the [facility] for brief, short-term solution focused therapy support with your mental health goals. Call [phone] for more information or to schedule. (Virtual Appointments)          [facility]: Thank you for your interest in [facility]. Currently, patients are experiencing long waits for intake when referred within [facility]. Please know that you may contact your insurance carrier member services to learn more about scheduling in network therapy. Your insurance company will have lists of in network therapists that are not within [facility] and may have more immediate availability.       Get care started with an ongoing therapist by calling to schedule your intake at one of the following clinics:    [facility]: [phone]  [facility]  [phone]  [facility] [phone]  [facility] [phone]  [facility] [phone]  [facility] system  [phone]   [facility] in [facility] [phone]  [facility] [phone]    Call an [facility] Behavioral Coordinator at [phone] for assistance in scheduling mental health appointments (Psychiatry/medication management, therapy, support groups, neuropsych testing, intake for programmatic care such as IOP/PHP program, etc.)

## 2025-04-10 NOTE — PHARMACY-CONSULT NOTE
Pharmacy Delirium Chart Review    Upon chart review, the following medications may contribute to possible patient delirium:     -Apremilast - May cause neuropsychiatric side effects, though typically presents as changes in mood. Did not see any reports of hallucinations.   -Bupropion - CNS stimulation resulting in hallucinations is possible, though patient appears to have been on this medication and dose for several years, making this less likely.   -Duloxetine - CNS side effects, such as agitation, confusion possible, but did not see reports of hallucinations. Renally cleared, so may be more likely w/ BJ on admission, but note hallucinations have been ongoing for ~1 year.   -Famotidine - Agitation, confusion, and delrium possible, especially in setting of BJ. Dose has been adjusted for current renal function.   -Oxybutynin - anticholinergic effects may result in confusion, delirium, hallucinations. Holding for now.   -Prednisone - well known neuropsychiatric effects. Patient has been on 10mg dose since 2022 per chart review.     Please consult unit pharmacist with further questions.    Farshad Culver, NikkiD

## 2025-04-10 NOTE — PROGRESS NOTES
"[facility]    Medicine Progress Note - Hospitalist Service, GOLD TEAM 22    Date of Admission:  4/8/2025    Assessment & Plan      Jacquie Amador is a 64 year old woman with history including osteoarthritis, chronic pain, asthma, depression, thyroid disease, CKD, Behcet's disease, a platelet granule disorder who presented with hallucinations and falls and was admitted on 4/8/2025.      Possible acute psychosis vs delirium  Hallucination, visual and auditory, recurrent  Concern for neurocognitive disorder  One year prior had an episode involving hallucination, paranoia, memory deficits. Now ~one month of auditory and visual hallucinations, paranoia, and insomnia.  There may be a background of diminished social activity and time outdoors since moving to the current house 2 years ago, but otherwise no recent or sudden changes in personality or cognitive ability.  CT head shows some cortical atrophy and likely chronic microvascular ischemic changes.  High risk for polypharmacy.  Many \"centrally\" acting medications, including some known to cause hallucination, including montelukast, bupropion, oxybutynin, hydroxyzine, amitriptyline/ketamine cream, prednisone.  Recently stopped ropinirole, gabapentin, and memantine with some improvement.  Drug metabolism could be adversely effective not only by age but also current acute renal injury.  Uremia itself could also contribute.  - psychiatry consulted, appreciate input  - neurology consulted, appreciate input  - MRI brain with and without contrast; P-tau 217 in process  - Orthostatic vital signs  - Lab workup including TSH, vitamin B12, ammonia, hepatic panel, HIV and RPR screening all normal; PETH pending  - Hold anticholinergics oxybutynin and hydroxyzine; reduced dose trazodone and tramadol  - Will discuss other opportunities for deprescribing with pharmacy; she does see MTM once monthly whose notes are very " informative    Acute kidney injury on CKD stage 3A   Baseline Creatinine range (1.10 to 1.30). Reportedly poor oral intake with progression of psychosis symptoms which would comport with a prerenal injury.  - LR at 125ml/hr overnight  - repeat BMP in the morning  - Monitor I/O's, daily weights  - Avoid nephrotoxic medications/IV contrast, hypotension, dehydration as possible  - Renally dose medications    Suicidal ideation, possible  Depression   Insomnia  Reports that threats of self-harm or suicide were intended to rene attention from family. Denies suicidal or other self-harm intent.  - Continue pta duloxetine, bupropion  - hold PRN hydroxyzine given anticholinergic effects   - Continue pta trazadone at reduced dose 50 mg at bedtime as needed  - psychiatry consulted    Chronic pain syndrome   Fibromyalgia   Lumbosacral spondylosis   Osteoarthritis   Follows with  Dr. Lay of [facility]. Receives injections outpatient for pain control.   - Continue pta PRN acetaminophen  - Continue pta tramadol PRN at reduced dose (50%)  - lidocaine patch prn  - hot/cold to painful areas prn     Asthma, moderate persistent   - PRN albuterol, spireva, breo-/incruse-ellipta     Dyslipidemia, hypertension   Hx sinus tachycardia  She has seen cardiology for tachycardia and they had no concerns.   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  Followed by dermatology, rheumatology, and previously heme for the platelet d/o.  - PTA apremilast  - PTA prednisone 10mg daily  - previously on oral and topical TXA  - current platelet count normal    Hyperhidrosis  - Holding oxybutynin  for now as noted    Weight loss  In context of improved diet, 30lb weight loss in one year.  - [facility] a noted AE of apremilast  - RD consultation    Prediabetes   Last A1C pending. Not on pta meds.      GERD   - Continue pta famotidine      Migraines   - PRN rizatriptan          Observation Goals: -diagnostic tests and consults completed and resulted, -vital signs normal or at patient baseline, -safe disposition plan has been identified, Nurse to notify provider when observation goals have been met and patient is ready for discharge.  Diet: Regular Diet Adult    DVT Prophylaxis: DOAC  Flanagan Catheter: Not present  Lines: None     Cardiac Monitoring: None  Code Status: Full Code      Clinically Significant Risk Factors Present on Admission        # Hypokalemia: Lowest K = 2.5 mmol/L in last 2 days, will replace as needed   # Hyperchloremia: Highest Cl = 111 mmol/L in last 2 days, will monitor as appropriate      # Hypocalcemia: Lowest Ca = 7.5 mg/dL in last 2 days, will monitor and replace as appropriate      # Drug Induced Coagulation Defect: home medication list includes an anticoagulant medication    # Hypertension: Noted on problem list               # Financial/Environmental Concerns: none  # Asthma: noted on problem list        Social Drivers of Health    Depression: At risk (3/18/2025)    PHQ-2     PHQ-2 Score: 3   Tobacco Use: Medium Risk (4/8/2025)    Patient History     Smoking Tobacco Use: Former     Smokeless Tobacco Use: Never     Passive Exposure: Past          Disposition Plan     Medically Ready for Discharge: Anticipated in 2-4 Days             Zachary Cervantes DO  Hospitalist Service, GOLD TEAM 22  [facility]  Securely message with [employer] (more info)  Text page via [facility] Paging/Directory   See signed in provider for up to date coverage information  ______________________________________________________________________    Interval History   No acute events  "overnight. She was upset about the change to trazodone to just the lower end of her ordered range (50mg). I discussed the rationale for limiting CNS-acting meds. She denies hallucinations since moving to this unit. However she asks whether, if she photographs her hallucinations, they will show up in the photographs, or if appearance in photos demonstrates objects or people are in fact present.    I said photos should reflect only what is truly there. She showed me several photos on her phone which she took of a woman, or of multiple people, on her deck furniture. I saw only covered furniture but she saw people in the photos. \"See, right there? That's her coat, and there's her hat.\" She remained convinced of their presence in photos despite my contrary claims.    Physical Exam   Vital Signs: Temp: 98.7  F (37.1  C) Temp src: Oral BP: 110/90 Pulse: 88   Resp: 18 SpO2: 99 % O2 Device: None (Room air)    Weight: 178 lbs 12.8 oz    Gen: Awake and alert, appears comfortable, appears stated age.  HEENT: Multiple facial ulcerations up to 8mm at various stages of healing. No oral thrush.  CV: Regular rhythm, normal rate, S1/S2, extremities warm and well perfused, no lower extremity edema.  Pulm: Normal work of breathing, lungs clear to auscultation, no crackles or wheezing.  GI: Nontender, nondistended, soft. +bowel sounds.  Skin: Warm, dry, no jaundice.  Neuro: AO, speech normal, moves all extremities symmetrically.  Psych: Mood is low, affect is congruent and not labile. Denies current hallucination but shows me photos and reports people in the photos, were I see none.    Medical Decision Making       55 MINUTES SPENT BY ME on the date of service doing chart review, history, exam, documentation & further activities per the note.      Data   ------------------------- PAST 24 HR DATA REVIEWED -----------------------------------------------    I have personally reviewed the following data over the past 24 hrs:    7.4  \   " 12.1   / 266     142 109 (H) 30.6 (H) /  103 (H)   3.4 22 1.58 (H) \     ALT: 14 AST: 29 AP: 95 TBILI: 0.5   ALB: 3.5 TOT PROTEIN: 6.1 (L) LIPASE: N/A     TSH: N/A T4: N/A A1C: N/A     Procal: N/A CRP: N/A Lactic Acid: 0.7         Imaging results reviewed over the past 24 hrs:   No results found for this or any previous visit (from the past 24 hours).

## 2025-04-10 NOTE — PROGRESS NOTES
Five Times Sit to Stand Test: (FTSTST): The FTSTST measures functional LE strength and provides information about postural control during transitions to/from standing.     Patient Score: 15.39 seconds (with UE use) / 16.26 seconds (without UE use)    Performance is considered within normal limits for times:  <10 seconds for people aged < 60 years with balance and vestibular disorders  <12 seconds community-dwelling adults, and chronic stroke  <14.2 seconds for people aged > 60 years with balance and vestibular disorders  >15 seconds for community-dwelling elderly individuals in their 80s has been correlated with an increased likelihood of a prior history of falls.   <16 seconds for individuals with Parkinson's disease    Minimal Detectable Change = 2.5 sec  Minimal Detectable Change = 8.4 sec (sit <> stand x 10 rep) (hemodialysis)   according to Daja Love & Joie 2009    Assessment (rationale for performing, application to patient s function & care plan): Pt with hx of falls; pt demo gait instability without use of AD. Pt score indicates increased risk for readmission d/t elevated falls risk. Recommend OP PT follow up for higher level balance/strengthening to reduce falls risk.     (Minutes billed as physical performance test:) 2 minutes

## 2025-04-11 VITALS
HEART RATE: 79 BPM | BODY MASS INDEX: 28.21 KG/M2 | SYSTOLIC BLOOD PRESSURE: 101 MMHG | TEMPERATURE: 97.4 F | DIASTOLIC BLOOD PRESSURE: 65 MMHG | RESPIRATION RATE: 18 BRPM | WEIGHT: 180.12 LBS | OXYGEN SATURATION: 98 %

## 2025-04-11 LAB
ANION GAP SERPL CALCULATED.3IONS-SCNC: 11 MMOL/L (ref 7–15)
BUN SERPL-MCNC: 27.7 MG/DL (ref 8–23)
CALCIUM SERPL-MCNC: 9 MG/DL (ref 8.8–10.4)
CHLORIDE SERPL-SCNC: 109 MMOL/L (ref 98–107)
CREAT SERPL-MCNC: 1.26 MG/DL (ref 0.51–0.95)
EGFRCR SERPLBLD CKD-EPI 2021: 47 ML/MIN/1.73M2
FOLATE RBC-MCNC: 362 NG/ML
GLUCOSE SERPL-MCNC: 100 MG/DL (ref 70–99)
HCO3 SERPL-SCNC: 20 MMOL/L (ref 22–29)
HOLD SPECIMEN: NORMAL
IMMUNOLOGIST REVIEW: ABNORMAL
MAGNESIUM SERPL-MCNC: 2 MG/DL (ref 1.7–2.3)
P-TAU217 SERPL IA-MCNC: 0.28 PG/ML
POTASSIUM SERPL-SCNC: 3.7 MMOL/L (ref 3.4–5.3)
SODIUM SERPL-SCNC: 140 MMOL/L (ref 135–145)

## 2025-04-11 PROCEDURE — 99239 HOSP IP/OBS DSCHRG MGMT >30: CPT | Performed by: STUDENT IN AN ORGANIZED HEALTH CARE EDUCATION/TRAINING PROGRAM

## 2025-04-11 PROCEDURE — 36415 COLL VENOUS BLD VENIPUNCTURE: CPT | Performed by: STUDENT IN AN ORGANIZED HEALTH CARE EDUCATION/TRAINING PROGRAM

## 2025-04-11 PROCEDURE — 250N000012 HC RX MED GY IP 250 OP 636 PS 637: Performed by: EMERGENCY MEDICINE

## 2025-04-11 PROCEDURE — 250N000013 HC RX MED GY IP 250 OP 250 PS 637: Performed by: STUDENT IN AN ORGANIZED HEALTH CARE EDUCATION/TRAINING PROGRAM

## 2025-04-11 PROCEDURE — 99233 SBSQ HOSP IP/OBS HIGH 50: CPT

## 2025-04-11 PROCEDURE — G0378 HOSPITAL OBSERVATION PER HR: HCPCS

## 2025-04-11 PROCEDURE — 83735 ASSAY OF MAGNESIUM: CPT | Performed by: STUDENT IN AN ORGANIZED HEALTH CARE EDUCATION/TRAINING PROGRAM

## 2025-04-11 PROCEDURE — 82310 ASSAY OF CALCIUM: CPT | Performed by: STUDENT IN AN ORGANIZED HEALTH CARE EDUCATION/TRAINING PROGRAM

## 2025-04-11 PROCEDURE — 250N000013 HC RX MED GY IP 250 OP 250 PS 637: Performed by: EMERGENCY MEDICINE

## 2025-04-11 RX ORDER — POTASSIUM CHLORIDE 1500 MG/1
20 TABLET, EXTENDED RELEASE ORAL ONCE
Status: COMPLETED | OUTPATIENT
Start: 2025-04-11 | End: 2025-04-11

## 2025-04-11 RX ORDER — TRAMADOL HYDROCHLORIDE 50 MG/1
50 TABLET ORAL 2 TIMES DAILY PRN
Status: DISCONTINUED | OUTPATIENT
Start: 2025-04-11 | End: 2025-04-11 | Stop reason: HOSPADM

## 2025-04-11 RX ORDER — MAGNESIUM OXIDE 400 MG/1
400 TABLET ORAL EVERY 4 HOURS
Status: DISCONTINUED | OUTPATIENT
Start: 2025-04-11 | End: 2025-04-11 | Stop reason: HOSPADM

## 2025-04-11 RX ORDER — RISPERIDONE 0.25 MG/1
0.25 TABLET ORAL 2 TIMES DAILY
Qty: 60 TABLET | Refills: 0 | Status: SHIPPED | OUTPATIENT
Start: 2025-04-11 | End: 2025-04-15

## 2025-04-11 RX ADMIN — BUPROPION HYDROCHLORIDE 450 MG: 300 TABLET, EXTENDED RELEASE ORAL at 10:06

## 2025-04-11 RX ADMIN — APIXABAN 5 MG: 5 TABLET, FILM COATED ORAL at 08:46

## 2025-04-11 RX ADMIN — TRAMADOL HYDROCHLORIDE 25 MG: 50 TABLET, FILM COATED ORAL at 02:50

## 2025-04-11 RX ADMIN — POTASSIUM CHLORIDE 20 MEQ: 1500 TABLET, EXTENDED RELEASE ORAL at 11:58

## 2025-04-11 RX ADMIN — UMECLIDINIUM 1 PUFF: 62.5 AEROSOL, POWDER ORAL at 08:54

## 2025-04-11 RX ADMIN — HYDROCHLOROTHIAZIDE 25 MG: 25 TABLET ORAL at 08:44

## 2025-04-11 RX ADMIN — MAGNESIUM OXIDE TAB 400 MG (241.3 MG ELEMENTAL MG) 400 MG: 400 (241.3 MG) TAB at 11:58

## 2025-04-11 RX ADMIN — Medication 10 ML: at 08:55

## 2025-04-11 RX ADMIN — LOSARTAN POTASSIUM 100 MG: 100 TABLET, FILM COATED ORAL at 08:44

## 2025-04-11 RX ADMIN — PREDNISONE 10 MG: 5 TABLET ORAL at 08:46

## 2025-04-11 RX ADMIN — METOPROLOL SUCCINATE 125 MG: 25 TABLET, FILM COATED, EXTENDED RELEASE ORAL at 08:44

## 2025-04-11 RX ADMIN — ACETAMINOPHEN 1000 MG: 500 TABLET ORAL at 02:50

## 2025-04-11 RX ADMIN — DULOXETINE HYDROCHLORIDE 120 MG: 60 CAPSULE, DELAYED RELEASE ORAL at 08:46

## 2025-04-11 RX ADMIN — ACETAMINOPHEN 500 MG: 500 TABLET ORAL at 09:45

## 2025-04-11 RX ADMIN — ATORVASTATIN CALCIUM 20 MG: 20 TABLET, FILM COATED ORAL at 08:46

## 2025-04-11 RX ADMIN — RISPERIDONE 0.25 MG: 0.25 TABLET, FILM COATED ORAL at 08:46

## 2025-04-11 RX ADMIN — FAMOTIDINE 20 MG: 20 TABLET, FILM COATED ORAL at 08:46

## 2025-04-11 ASSESSMENT — ACTIVITIES OF DAILY LIVING (ADL)
ADLS_ACUITY_SCORE: 54
DEPENDENT_IADLS:: TRANSPORTATION;MONEY MANAGEMENT
ADLS_ACUITY_SCORE: 54

## 2025-04-11 NOTE — PLAN OF CARE
[id number]    VS: Blood pressure 124/54, pulse 76, temperature 98.3  F (36.8  C), temperature source Oral, resp. rate 18, weight 81.1 kg (178 lb 12.8 oz), last menstrual period 07/17/2009, SpO2 99%, not currently breastfeeding.   O2: Pt stable on RA  Output:continent bowel and bladder  Diet: regular  LDA:L PIV running with LR 125ml/hr  LBM: 4/10  Activity: Independent in the room  Neuro: Pt alert and oriented times 4 and forgetful  Skin:scattered bruises and scabs   Pain: PRN available per MAR  Equipment: IV pole personal belongings  Plan: call light with in reach, family at bed side and continue with POC      Goal Outcome Evaluation:      Plan of Care Reviewed With: patient    Overall Patient Progress: improvingOverall Patient Progress: improving

## 2025-04-11 NOTE — PROGRESS NOTES
[id number]    VS:  O2: Pt stable on RA  Output:continent bowel and bladder  Diet: regular  LDA:L PIV running with LR 125ml/hr  LBM: 4/10  Activity: Independent in the room  Neuro: Pt alert and oriented times 4 and forgetful  Skin:scattered bruises and scabs   Pain: PRN available per MAR  Equipment: IV pole personal belongings  Plan: call light with in reach, family at bed side and continue with POC

## 2025-04-11 NOTE — PROGRESS NOTES
[facility]  Neurology Consultation - Progress Note    Patient Name:  Jacquie Amador  Date of Service:  April 11, 2025    Subjective:    NAEO. MR completed yesterday evening, no acute findings or lesions to explain symptoms. She has not had any visual, auditory, or olfactory hallucinations since she arrived to hospital. She is not having increased agitation or paranoia at this time. Her and  feel safe going home today.    Objective:    Vitals: /67 (BP Location: Right arm)   Pulse 75   Temp 97.6  F (36.4  C) (Oral)   Resp 18   Wt 81.7 kg (180 lb 1.9 oz)   LMP 07/17/2009 (Exact Date)   SpO2 99%   BMI 28.21 kg/m    General: Lying in bed, NAD  Head: Atraumatic, normocephalic   Cardiac: no lower extremity edema  Neurologic:  Mental status: Awake, alert, attentive, oriented to self, time, place, and circumstance. Language is fluent and coherent with intact comprehension of complex commands, naming and repetition. Able to calculate quarters in $1.75. Can state days of week backwards. Difficulty with serial 7's. 4/5 on delayed recall on initial exam.   Cranial nerves: VFF, PERRL, conjugate gaze, EOMI, facial sensation intact, face symmetric, shoulder shrug strong, tongue/uvula midline, no dysarthria.   Motor: Normal bulk and tone. Mild bradykinesia. 5/5 strength bilaterally in deltoids, biceps, triceps, hand , hip flexors, knee flexion, knee extension, plantarflexion, dorsiflexion.   Reflexes: Normo-reflexic and symmetric biceps, brachioradialis, triceps, patellae, and achilles. No clonus, toes down-going.  Sensory: Intact to light touch in proximal and distal aspects of all 4 extremities   Coordination: FNF without ataxia or dysmetria. Rapid alternating movements intact.   Gait: Normal width, stride length, turn, and arm swing. Station normal. Unable to complete tandem walk due to imbalance.    Pertinent Investigations:    I have personally reviewed most recent  and pertinent labs, tests, and radiological images.     MR Brain 4/10  IMPRESSION:  1. No MRI evidence of acute intracranial pathology.  2. Mild leukoaraiosis and parenchymal loss.   3. Images are limited due to susceptibility artifact from metallic  dental hardware.    Assessment  #Visual, auditory, and olfactory hallucinations with associated paranoia, concern for underlying neurocognitive vs psychiatry disorder     Jacquie Amador is a 64 year old female with history of HTN, HLD, DVT on Eliquis, fatigue, Behcet's, depression, anxiety, who presents with worsening of hallucinations, unsteadiness, and falls. Onset of visual hallucinations a year ago have since evolved into visual, auditory, and olfactory. It's difficult to say whether her hallucinations and paranoia are due to either a neurological or psychiatric process though there are details within her history that are concerning for underlying neurocognitive disorder. Her CTH is notable for mild to moderate degree of brain atrophy within frontal temporal region.      She doesn't have obvious parkinsonism features that lead me to believe this is more lewy-body, some mild bradykinesia. She has a previous diagnosis of Behcet's disease which is known to cause neuropsychiatric symptoms such as cognitive dysfunction. Her MR Brain was without any inflammatory lesions which makes this as an etiology less likely. Her P-tau 217 was elevated to 0.284 though taking into account her current BJ, I am less suspicious this a true positive. Will hold off on initiating donepezil. Okay to continue risperidone or for hallucinations per pysch recommendations. If her hallucinations and paranoia improve with the help of Risperidone, I have more suspicion that this is a primary psychiatric etiology. In the case that her hallucinations worsen despite being on Risperidone, would consider stopping and starting Donepezil instead. Overall, it will most helpful for patient to follow up  outpatient with neurology and neuropsychology to continue to monitor patients symptoms in the case they do worsen.    Today, she denies any auditory, visual, or olfactory hallucinations since arriving to hospital. Her agitation and paranoia have also improved per patient. From neurology perspective, okay to discharge home. I did discuss with patient and  to seek emergency care if they are safety concerns relating to her hallucinations.     Recommendations:   -Okay to continue Risperidone per psychiatry recommendations  -Hold off on Donepezil  -Orthostatic vital signs  -Follow up with outpatient neurology and neuropsychology. Please place referral at time of discharge  -No further workup from inpatient neurology standpoint, will sign off.    Thank you for involving Neurology in the care of Jacquie Amador.  Please do not hesitate to call with questions/concerns (consult pager [phone]).      Patient was discussed with Dr. Vargas.    Anjelica Valentine PA-C    60 MINUTES SPENT BY ME on the date of service doing chart review, history, exam, documentation & further activities per the note.

## 2025-04-11 NOTE — PLAN OF CARE
"Goal Outcome Evaluation:      Shift [id number]       VS: /54 (BP Location: Right arm)   Pulse 76   Temp 98.3  F (36.8  C) (Oral)   Resp 18   Wt 81.1 kg (178 lb 12.8 oz)   LMP 07/17/2009 (Exact Date)   SpO2 99%   BMI 28.00 kg/m       O2: On RA, denies chest pain and SOB   Output: Continent- voids in bathroom    Last BM: 4/10 per pt . BS active x4, passing flatus    Activity: Independent    Skin: Scattered bruises and scabs    Pain: Managed with PRN tylenol and tramadol   CMS: A&OX4, forgetful   Sleepy    Dressing: None    Diet: Regular    LDA: L PIV infusing LR @125 ml/hr   Equipment: IV pole    Plan: Continue POC   Additional Info: MRI done previous shift    On RN Mag and K+ protocol . Redraw in AM    Slept most of shift                                  Problem: Adult Inpatient Plan of Care  Goal: Plan of Care Review  Description: The Plan of Care Review/Shift note should be completed every shift.  The Outcome Evaluation is a brief statement about your assessment that the patient is improving, declining, or no change.  This information will be displayed automatically on your shiftnote.  Outcome: Progressing  Goal: Patient-Specific Goal (Individualized)  Description: You can add care plan individualizations to a care plan. Examples of Individualization might be:  \"Parent requests to be called daily at 9am for status\", \"I have a hard time hearing out of my right ear\", or \"Do not touch me to wake me up as it startlesme\".  Outcome: Progressing  Goal: Absence of Hospital-Acquired Illness or Injury  Outcome: Progressing  Intervention: Identify and Manage Fall Risk  Recent Flowsheet Documentation  Taken 4/11/2025 0022 by Yves Carlos RN  Safety Promotion/Fall Prevention: activity supervised  Intervention: Prevent and Manage VTE (Venous Thromboembolism) Risk  Recent Flowsheet Documentation  Taken 4/11/2025 0022 by Yves Carlos RN  VTE Prevention/Management: SCDs off (sequential compression " devices)  Intervention: Prevent Infection  Recent Flowsheet Documentation  Taken 4/11/2025 0022 by Yves Carlos RN  Infection Prevention: hand hygiene promoted  Goal: Optimal Comfort and Wellbeing  Outcome: Progressing  Intervention: Provide Person-Centered Care  Recent Flowsheet Documentation  Taken 4/11/2025 0022 by Yves Carlos RN  Trust Relationship/Rapport: care explained  Goal: Readiness for Transition of Care  Outcome: Progressing     Problem: Pain Acute  Goal: Optimal Pain Control and Function  Outcome: Progressing  Intervention: Optimize Psychosocial Wellbeing  Recent Flowsheet Documentation  Taken 4/11/2025 0022 by Yves Carlos RN  Diversional Activities: television     Problem: Psychotic Signs/Symptoms  Goal: Improved Behavioral Control (Psychotic Signs/Symptoms)  Outcome: Progressing  Goal: Optimal Cognitive Function (Psychotic Signs/Symptoms)  Outcome: Progressing  Goal: Increased Participation and Engagement (Psychotic Signs/Symptoms)  Outcome: Progressing  Goal: Improved Mood Symptoms (Psychotic Signs/Symptoms)  Outcome: Progressing  Goal: Improved Psychomotor Symptoms (Psychotic Signs/Symptoms)  Outcome: Progressing  Goal: Decreased Sensory Symptoms (Psychotic Signs/Symptoms)  Outcome: Progressing  Goal: Improved Sleep (Psychotic Signs/Symptoms)  Outcome: Progressing  Goal: Enhanced Social, Occupational or Functional Skills (Psychotic Signs/Symptoms)  Outcome: Progressing  Intervention: Promote Social, Occupational and Functional Ability  Recent Flowsheet Documentation  Taken 4/11/2025 0022 by Yves Carlos RN  Trust Relationship/Rapport: care explained

## 2025-04-11 NOTE — DISCHARGE SUMMARY
Pt. discharged at 1400 via CAR to home. Pt. was accompanied by , and left with personal belongings.  Prior to discharge, PIV was removed.  Pt. received complete discharge paperwork and medications.  Pt. was given times of last dose for all discharge medications in writing on discharge medication sheets.  Pt. to follow up with PCP in 7 days.  Pt. had no further questions at the time of discharge and no unmet needs were identified.     /65 (BP Location: Left arm)   Pulse 79   Temp 97.4  F (36.3  C) (Oral)   Resp 18   Wt 81.7 kg (180 lb 1.9 oz)   LMP 07/17/2009 (Exact Date)   SpO2 98%   BMI 28.21 kg/m          VS: /65 (BP Location: Left arm)   Pulse 79   Temp 97.4  F (36.3  C) (Oral)   Resp 18   Wt 81.7 kg (180 lb 1.9 oz)   LMP 07/17/2009 (Exact Date)   SpO2 98%   BMI 28.21 kg/m      O2: Sating >95% on RA, denies SOB/Chest pain   Output: Voids spontaneously in bathroom   Last BM: bowel sounds normoactive x4   Activity: Up independently in room   Up for meals? Yes   Skin: All visible skin intact X scattered bruises   Pain: Pain was managed with PRN tramadol and tylenol   CMS: AO x4, Denies N/V   Dressing: None   Diet: Regular diet   LDA: PIV removed   Equipment:  IV pole, personal belongings   Plan: Call light within reach, bed in a low position. Able to make needs known. Continue to monitor with POC.   Additional Info:

## 2025-04-11 NOTE — DISCHARGE SUMMARY
[facility]  Hospitalist Discharge Summary      Date of Admission:  4/8/2025  Date of Discharge:  4/11/2025  Discharging Provider: Zachary Cervantes DO  Discharge Service: Hospitalist Service, GOLD TEAM 22    Discharge Diagnoses   Please see hospital course below      Clinically Significant Risk Factors          Follow-ups Needed After Discharge   Follow-up Appointments       Hospital Follow-up with Existing Primary Care Provider (PCP)          Schedule Primary Care visit within: 30 Days             Continue efforts with PCP and MTM at deprescribing given age, current presentation, and reduced renal function.  Repeat BMP next week to ensure stable renal function.  Referrals to psychiatry, neurology, neuropsychology.      Unresulted Labs Ordered in the Past 30 Days of this Admission       Date and Time Order Name Status Description    4/10/2025 12:01 AM Phosphatidylethanol (PEth), Whole Blood In process         These results will be followed up by hospitalist pool.    Discharge Disposition   Discharged to home  Condition at discharge: Good    Hospital Course   Jacquie Amador is a 64 year old woman with history including osteoarthritis, chronic pain, asthma, depression, thyroid disease, CKD, Behcet's disease, a platelet granule disorder who presented with hallucinations and falls and was admitted on 4/8/2025.      Possible acute psychosis vs delirium  Hallucination, visual and auditory, recurrent  Concern for neurocognitive disorder  One year prior had an episode involving hallucination, paranoia, memory deficits. Now ~one month of auditory and visual hallucinations, paranoia, and insomnia.  There may be a background of diminished social activity and time outdoors since moving to the current house 2 years ago, but otherwise no recent or sudden changes in personality or cognitive ability.  CT head shows some cortical atrophy and likely chronic microvascular ischemic changes.   "High risk for polypharmacy.  Many \"centrally\" acting medications, including some known to cause hallucination, including montelukast, bupropion, oxybutynin, hydroxyzine, amitriptyline/ketamine cream, prednisone.  Recently stopped ropinirole, gabapentin, and memantine with some improvement.  Drug metabolism could be adversely effective not only by age but also current acute renal injury.  Uremia itself could also contribute. Lab workup including TSH, vitamin B12, ammonia, hepatic panel, HIV and RPR screening all normal; PETH pending.    - psychiatry consulted, referred for close outpatient follow up  - newly started on risperidone 0.25mg BID    - neurology consulted, appreciate input  - MRI brain with and without contrast without acute findings; P-tau 217 intermediate, attributed to reduced creatinine clearance  - referred for neurology follow up  - referred for neuropsychology follow up    - ongoing discussion of opportunities for deprescribing with pharmacy; she does see MTM once monthly who has been helping with this   - consider avoiding anticholinergics including antihistamines   - pain clinic to continue with minimum necessary dosing of narcotics   - renally adjust medication dosing    Acute kidney injury on CKD stage 3A, acute aspect resolved  Baseline Creatinine range (1.10 to 1.30). Reportedly poor oral intake with progression of psychosis symptoms which would comport with a prerenal injury.  - repeat BMP next week  - Monitor I/O's, daily weights  - Avoid nephrotoxic medications/IV contrast, hypotension, dehydration as possible  - Renally dose medications    Suicidal ideation, ruled out  Depression   Insomnia  Reports that threats of self-harm or suicide were intended to rene attention from family. Denies suicidal or other self-harm intent.  - Continue pta duloxetine, bupropion  - hold PRN hydroxyzine given anticholinergic effects   - Continue pta trazadone; encourage patient to use at reduced dose 50 mg, " at bedtime as needed    Chronic pain syndrome   Fibromyalgia   Lumbosacral spondylosis   Osteoarthritis   Follows with  Dr. Lay of [facility]. Receives injections outpatient for pain control.   - Continue pta PRN acetaminophen  - Continue pta tramadol PRN at reduced dose (50%)  - lidocaine patch prn  - hot/cold to painful areas prn     Asthma, moderate persistent   - PRN albuterol, spireva, breo-/incruse-ellipta     Dyslipidemia, hypertension   Hx sinus tachycardia  She has seen cardiology for tachycardia and they had no concerns.   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  Followed by dermatology, rheumatology, and previously heme for the platelet d/o.  - PTA apremilast  - PTA prednisone 10mg daily  - previously on oral and topical TXA  - current platelet count normal    Hyperhidrosis  - Holding oxybutynin for now as noted    Weight loss  In context of improved diet, 30lb weight loss in one year.  - [facility] a noted AE of apremilast  - RD consultation    Prediabetes   Last A1C pending. Not on pta meds.      GERD   - Continue pta famotidine      Migraines   - PRN rizatriptan       Consultations This Hospital Stay   DIAGNOSTIC EVALUATION CENTER (DEC) ASSESSMENT ORDER  PSYCHIATRY IP CONSULT  NEUROLOGY GENERAL ADULT IP CONSULT  PHYSICAL THERAPY ADULT IP CONSULT  PHARMACY IP CONSULT  CARE MANAGEMENT / SOCIAL WORK IP CONSULT    Code Status   Full Code    Time Spent on this Encounter   IZachary DO, personally saw the patient today and spent greater than 30 minutes discharging this patient.       Zachary Cervantes DO  [facility]  [address]  [facility] [address]  Phone: [phone]  Fax: [phone]  ______________________________________________________________________    Physical Exam   Vital Signs: Temp: 97.4  F (36.3  C) Temp src: Oral BP: 101/65 Pulse: 79   Resp: 18 SpO2: 98 % O2 Device: None (Room air)    Weight: 180 lbs 1.85 oz  Gen: Awake and alert, appears comfortable, appears stated age.  HEENT: Multiple facial ulcerations up to 8mm at various stages of healing. No oral thrush.  CV: Regular rhythm, normal rate, extremities warm and well perfused, no lower extremity edema.  Pulm: Normal work of breathing, lungs clear to auscultation, no crackles or wheezing.  GI: Nontender, nondistended, soft. +bowel sounds.  Skin: Warm, dry, no jaundice.  Neuro: AO, speech normal, moves all extremities symmetrically.  Psych: Mood is lowfair affect is congruent and not labile. Denies current hallucination.          Primary Care Physician   Liz Peterson    Discharge Orders      Basic metabolic panel     Physical Therapy  Referral      Adult Mental Health  Referral      Adult Neurology  Referral      Adult Neuropsychology  Referral      Adult Mental Health  Referral      Reason for your hospital stay    You were hospitalized with cognitive changes.     Activity    Your activity upon discharge: activity as tolerated     Diet    Follow this diet upon discharge: Current Diet:Orders Placed This Encounter      Regular Diet Adult     Hospital Follow-up with Existing Primary Care Provider (PCP)            Significant Results and Procedures   Most Recent 3 CBC's:  Recent Labs   Lab Test 04/10/25  0738 04/08/25  1451 01/24/25  1127   WBC 7.4 10.3 12.0*   HGB 12.1 12.9 14.0   MCV 88 85 90    305 311     Most Recent 3 BMP's:  Recent Labs   Lab Test 04/11/25  0705 04/10/25  1538 04/10/25  0738 04/09/25  1600 04/09/25  0936     --  142  --  142   POTASSIUM 3.7 4.4 3.4   < > 3.4  3.3*   CHLORIDE  109*  --  109*  --  107   CO2 20*  --  22  --  19*   BUN 27.7*  --  30.6*  --  31.9*   CR 1.26*  --  1.58*  --  1.55*   ANIONGAP 11  --  11  --  16*   JAIRO 9.0  --  9.4  --  8.9   *  --  103*  --  145*    < > = values in this interval not displayed.   ,   Results for orders placed or performed during the hospital encounter of 04/08/25   CT Head w/o Contrast    Narrative    EXAM: CT HEAD W/O CONTRAST  LOCATION: [facility]  DATE: 4/8/2025    INDICATION: Head injury.  COMPARISON: None.  TECHNIQUE: Routine CT Head without IV contrast. Multiplanar reformats. Dose reduction techniques were used.    FINDINGS:  INTRACRANIAL CONTENTS: No intracranial hemorrhage, extraaxial collection, or mass effect.  No CT evidence of acute infarct. Mild presumed chronic small vessel ischemic changes. Mild to moderate generalized volume loss. No hydrocephalus.     VISUALIZED ORBITS/SINUSES/MASTOIDS: Prior bilateral cataract surgery. Visualized portions of the orbits are otherwise unremarkable. No paranasal sinus mucosal disease. No middle ear or mastoid effusion.    BONES/SOFT TISSUES: No acute abnormality.      Impression    IMPRESSION:  1.  No CT evidence for acute intracranial process.  2.  Brain atrophy and presumed chronic microvascular ischemic changes as above.   CT Lumbar Spine w/o Contrast    Narrative    EXAM: CT LUMBAR SPINE W/O CONTRAST  LOCATION: [facility]  DATE: 4/8/2025    INDICATION: Trauma, midline bony tenderness  COMPARISON: None.  TECHNIQUE: Routine CT Lumbar Spine without IV contrast. Multiplanar reformats. Dose reduction techniques were used.     FINDINGS:  VERTEBRA: Diffuse osteopenia. Lumbar spine lordosis is maintained. Lumbar vertebral body heights are maintained. Grade 1 retrolisthesis of L2 on L3. No acute lumbar spine fracture. Mild rightward convex curvature of the lumbar spine.     CANAL/FORAMINA: Advanced loss  of disc space height at L2-L3 with sclerosis and irregularity of the apposing endplates. Advanced loss of disc space height at L5-S1 with circumferential disc interbody spurring and moderate to severe bilateral neural   foraminal stenosis. At L2-L3, there is a left paracentral caudally directed disc extrusion contributing to mild spinal canal stenosis and left lateral recess narrowing which may affect the descending/traversing left L3 nerve root.    PARASPINAL: Vascular calcifications involving the aorta and bilateral common iliac arteries.      Impression    IMPRESSION:  1.  No acute lumbar spine fracture.  2.  At L2-L3, there is a left paracentral caudally directed disc extrusion narrowing the left lateral recess which may affect the descending/traversing left L3 nerve root. Correlation for radiculopathy in this nerve root distribution is advised.  3.  At L5-S1, there is moderate to severe bilateral neural foraminal stenosis.   MR Brain w/o & w Contrast    Narrative    EXAM: MR BRAIN W/O & W CONTRAST  4/10/2025 6:25 PM     HISTORY: ongoing hallucinations with associated agitation and  paranoia, concern for dementia       COMPARISON: CT 4/8/2025    TECHNIQUE: Sagittal T1-weighted, coronal T2-weighted, axial T2 FLAIR,  axial susceptibility weighted, and axial diffusion-weighted with ADC  map images of the brain were obtained without intravenous contrast.  After the administration of intravenous contrast axial and coronal  fat-suppressed T1-weighted weighted images were obtained    CONTRAST: 8.1 mL Gadavist.    FINDINGS:  Susceptibility artifact originating from metallic dental hardware  degrading SWI and DWI images with no focal abnormalities in the  visualized portions. There is no mass effect, midline shift, or  intracranial hemorrhage. The ventricles are proportionate to the  cerebral sulci. Scattered punctate foci of subcortical,  periventricular white matter T2 hyperintensities. Mild generalized  parenchymal  volume loss. Normal major intracranial vascular flow  voids. Artifact degrading images of the sophie and cerebellum.    No suspicious abnormality of the skull marrow signal. Clear paranasal  sinuses. Mastoid air cells are clear Bilateral pseudophakia.      Impression    IMPRESSION:  1. No MRI evidence of acute intracranial pathology.  2. Mild leukoaraiosis and parenchymal loss.   3. Images are limited due to susceptibility artifact from metallic  dental hardware.    I have personally reviewed the examination and initial interpretation  and I agree with the findings.    ANNETTA DODSON MD         SYSTEM ID:  [id number]     *Note: Due to a large number of results and/or encounters for the requested time period, some results have not been displayed. A complete set of results can be found in Results Review.       Discharge Medications   Current Discharge Medication List        START taking these medications    Details   risperiDONE (RISPERDAL) 0.25 MG tablet Take 1 tablet (0.25 mg) by mouth 2 times daily.  Qty: 60 tablet, Refills: 0    Associated Diagnoses: Psychosis, unspecified psychosis type (H)           CONTINUE these medications which have NOT CHANGED    Details   acetaminophen (TYLENOL) 500 MG tablet Take 500-1,000 mg by mouth every 6 hours as needed for mild pain      albuterol (PROVENTIL) (2.5 MG/3ML) 0.083% neb solution Take 1 vial (2.5 mg) by nebulization every 6 hours as needed for shortness of breath or wheezing.  Qty: 3 mL, Refills: 4    Associated Diagnoses: Acute bronchitis with symptoms > 10 days      albuterol (VENTOLIN HFA) 108 (90 Base) MCG/ACT inhaler INHALE 2 PUFFS INTO THE LUNGS EVERY 6 HOURS AS NEEDED FOR SHORTNESS OF BREATH / DYSPNEA  Qty: 54 g, Refills: 1    Comments: Pharmacy may dispense brand covered by insurance (Proair, or proventil or ventolin or generic albuterol inhaler)  Associated Diagnoses: Moderate persistent asthma without complication      apixaban ANTICOAGULANT (ELIQUIS) 5 MG tablet  Take 1 tablet (5 mg) by mouth 2 times daily.  Qty: 60 tablet, Refills: 1    Associated Diagnoses: Acute deep vein thrombosis (DVT) of femoral vein of right lower extremity (H)      apremilast (OTEZLA) 30 MG tablet Take 1 tablet (30 mg) by mouth 2 times daily. Hold for signs of infection, and seek medical attention.  Qty: 60 tablet, Refills: 5    Associated Diagnoses: Behcet's disease (H); Long-term use of immunosuppressant medication      atorvastatin (LIPITOR) 20 MG tablet TAKE ONE TABLET BY MOUTH ONE TIME DAILY  Qty: 90 tablet, Refills: 0    Associated Diagnoses: Hyperlipidemia LDL goal <130      augmented betamethasone dipropionate (DIPROLENE AF) 0.05 % external cream Apply topically 2 times daily  Qty: 50 g, Refills: 3    Associated Diagnoses: Atopic neurodermatitis      benzonatate (TESSALON) 200 MG capsule Take 1 capsule (200 mg) by mouth 2 times daily as needed for cough.  Qty: 40 capsule, Refills: 1    Associated Diagnoses: Cough, unspecified type      !! buPROPion (WELLBUTRIN XL) 150 MG 24 hr tablet Take 1 tablet (150 mg) by mouth every morning. To take with the 300 mg dose for a total of 450 mg daily.  Qty: 90 tablet, Refills: 1    Associated Diagnoses: Moderate episode of recurrent major depressive disorder (H)      !! buPROPion (WELLBUTRIN XL) 300 MG 24 hr tablet Take 1 tablet (300 mg) by mouth every morning. To take with the 150 mg dose for a total of 450 mg daily.  Qty: 90 tablet, Refills: 1    Associated Diagnoses: Moderate episode of recurrent major depressive disorder (H)      chlorhexidine (PERIDEX) 0.12 % solution Swish and spit 15 mLs in mouth 2 times daily.  Qty: 1893 mL, Refills: 4    Associated Diagnoses: Aphthae, oral      clindamycin (CLEOCIN-T) 1 % external gel Apply topically 2 times daily.  Qty: 30 g, Refills: 4    Associated Diagnoses: Dermatitis      clotrimazole (MYCELEX) 10 MG lozenge Place 1 lozenge (10 mg) inside cheek 5 times daily.  Qty: 70 lozenge, Refills: 1    Associated  Diagnoses: Thrush      COMPOUNDED NON-CONTROLLED SUBSTANCE (CMPD RX) - PHARMACY TO MIX COMPOUNDED MEDICATION Tranexamic acid 7% cream. Apply directly to the affected area, with usage up to twice daily  Qty: 1 each, Refills: 3    Associated Diagnoses: Acquired platelet disorder (H)      desonide (DESOWEN) 0.05 % external cream Apply topically 2 times daily. To sores on face  Qty: 60 g, Refills: 3    Associated Diagnoses: Dermatitis      dextran 70-hypromellose (TEARS NATURALE FREE PF) 0.1-0.3 % ophthalmic solution Place 2 drops into both eyes daily as needed (for dry eyes)  Qty: 35 each, Refills: 3    Associated Diagnoses: Xerophthalmia      doxycycline monohydrate (MONODOX) 100 MG capsule Take 1 capsule (100 mg) by mouth 2 times daily. After meals and with big glass of water  Qty: 180 capsule, Refills: 0    Associated Diagnoses: Acne vulgaris      DULoxetine (CYMBALTA) 60 MG capsule Take 2 capsules (120 mg) by mouth daily  Qty: 180 capsule, Refills: 0    Associated Diagnoses: Multiple joint pain      dupilumab (DUPIXENT) 300 MG/2ML prefilled pen Inject 2 mLs (300 mg) subcutaneously every 14 days.  Qty: 4 mL, Refills: 3    Associated Diagnoses: Other atopic dermatitis      famotidine (PEPCID) 40 MG tablet Take 1 tablet (40 mg) by mouth daily  Qty: 90 tablet, Refills: 2    Associated Diagnoses: Gastroesophageal reflux disease, unspecified whether esophagitis present      ferrous sulfate (FEROSUL) 325 (65 Fe) MG tablet Take 1 tablet (325 mg) by mouth daily (with breakfast).  Qty: 90 tablet, Refills: 0    Associated Diagnoses: Restless legs syndrome (RLS)      fluocinonide (LIDEX) 0.05 % external gel Apply topically 2 times daily as needed.  Qty: 15 g, Refills: 1    Associated Diagnoses: Dermatitis      hydrochlorothiazide (HYDRODIURIL) 25 MG tablet Take 1 tablet (25 mg) by mouth daily.  Qty: 90 tablet, Refills: 1    Associated Diagnoses: Hypertension goal BP (blood pressure) < 140/90      hydrocortisone 2.5 % cream  APPLY TOPICALLY 2 TIMES DAILY AS NEEDED  Qty: 30 g, Refills: 3    Associated Diagnoses: Dermatitis      lidocaine, viscous, (XYLOCAINE) 2 % solution Swish and spit 10ml every 3 hours as needed for oral pain; max 8 doses/24hrs.  Do not eat or chew gum for 60 minutes following use.  Qty: 100 mL, Refills: 1    Associated Diagnoses: Aphthae, oral      losartan (COZAAR) 100 MG tablet Take 1 tablet (100 mg) by mouth daily.  Qty: 90 tablet, Refills: 1    Associated Diagnoses: Hypertension goal BP (blood pressure) < 140/90      !! metoprolol succinate ER (TOPROL XL) 100 MG 24 hr tablet Take 1 tablet (100 mg) by mouth 2 times daily.  Qty: 180 tablet, Refills: 1    Associated Diagnoses: Palpitations; Sinus tachycardia      !! metoprolol succinate ER (TOPROL XL) 25 MG 24 hr tablet Take 1 tablet (25 mg) by mouth 2 times daily.  Qty: 180 tablet, Refills: 1    Associated Diagnoses: Hypertension goal BP (blood pressure) < 140/90; Palpitations; Sinus tachycardia      mupirocin (BACTROBAN) 2 % external ointment Apply topically 3 times daily as needed.  Qty: 30 g, Refills: 3    Associated Diagnoses: Atopic neurodermatitis       MG CAPS capsule Take 1 capsule (600 mg) by mouth 2 times daily  Qty: 180 capsule, Refills: 0    Associated Diagnoses: Prurigo nodularis      NALTREXONE HCL PO Take 6 mg by mouth daily.      nystatin (MYCOSTATIN) 883977 UNIT/ML suspension Take 5 mLs (500,000 Units) by mouth 4 times daily. Has recurrent thrush. Uses for 2 weeks (280 ml)  at a time as needed.  Qty: 280 mL, Refills: 2    Associated Diagnoses: Thrush      ondansetron (ZOFRAN ODT) 4 MG ODT tab Take 1 tablet (4 mg) by mouth every 8 hours as needed for nausea.  Qty: 20 tablet, Refills: 2    Associated Diagnoses: Nausea      oxyBUTYnin ER (DITROPAN XL) 5 MG 24 hr tablet Take 2 tablets (10 mg) by mouth daily  Qty: 180 tablet, Refills: 0    Associated Diagnoses: Primary focal hyperhidrosis of face      pimecrolimus (ELIDEL) 1 % external cream  Apply topically 2 times daily.  Qty: 60 g, Refills: 3    Associated Diagnoses: Atopic neurodermatitis      predniSONE (DELTASONE) 10 MG tablet Take 1 tablet (10 mg) by mouth daily  Qty: 90 tablet, Refills: 3    Associated Diagnoses: Platelet granule defect (H)      rizatriptan (MAXALT) 10 MG tablet Take 1 tablet (10 mg) by mouth at onset of headache for migraine. May repeat in 2 hours. Max 3 tablets/24 hours.  Qty: 18 tablet, Refills: 1    Associated Diagnoses: Migraine without aura and without status migrainosus, not intractable      tacrolimus (PROTOPIC) 0.1 % external ointment Apply topically 2 times daily. To areas of sores on face  Qty: 60 g, Refills: 1    Associated Diagnoses: Facial eczema      tiotropium (SPIRIVA RESPIMAT) 2.5 MCG/ACT inhaler Inhale 2 puffs into the lungs daily.  Qty: 12 g, Refills: 2    Associated Diagnoses: Moderate persistent asthma without complication      traMADol (ULTRAM) 50 MG tablet Take 1 tablet (50 mg) by mouth every 12 hours as needed for moderate to severe pain  Qty: 60 tablet, Refills: 0    Associated Diagnoses: Chronic pain syndrome; Chronic, continuous use of opioids      traZODone (DESYREL) 50 MG tablet Take 3 tablets (150 mg) by mouth at bedtime  Qty: 270 tablet, Refills: 2    Associated Diagnoses: Generalized anxiety disorder; Moderate recurrent major depression (H)      triamcinolone (ARISTOCORT HP) 0.5 % external cream Apply topically 2 times daily.  Qty: 60 g, Refills: 0    Associated Diagnoses: Dermatitis      valACYclovir (VALTREX) 500 MG tablet Take 1 tablet (500 mg) by mouth daily  Qty: 90 tablet, Refills: 1    Comments: ++Do not fill at this time. Adding additional refills++  Associated Diagnoses: Aphthae, oral       !! - Potential duplicate medications found. Please discuss with provider.        STOP taking these medications       hydrOXYzine HCl (ATARAX) 25 MG tablet Comments:   Reason for Stopping:         memantine (NAMENDA) 5 MG tablet Comments:   Reason for  Stopping:             Allergies   Allergies   Allergen Reactions    Cats      and rabbits/wheezing    Dogs      wheezing    Lyrica [Pregabalin] Other (See Comments)     Rash, mouth sores, itching and burning    Seasonal Allergies      rhinits

## 2025-04-12 LAB
LABORATORY REPORT: NORMAL
PETH INTERPRETATION: NORMAL
PLPETH BLD-MCNC: <10 NG/ML
POPETH BLD-MCNC: <10 NG/ML

## 2025-04-14 ENCOUNTER — TRANSFERRED RECORDS (OUTPATIENT)
Dept: HEALTH INFORMATION MANAGEMENT | Facility: CLINIC | Age: 65
End: 2025-04-14
Payer: COMMERCIAL

## 2025-04-14 ENCOUNTER — VIRTUAL VISIT (OUTPATIENT)
Dept: PHARMACY | Facility: CLINIC | Age: 65
End: 2025-04-14
Attending: INTERNAL MEDICINE
Payer: COMMERCIAL

## 2025-04-14 ENCOUNTER — TELEPHONE (OUTPATIENT)
Dept: FAMILY MEDICINE | Facility: CLINIC | Age: 65
End: 2025-04-14
Payer: COMMERCIAL

## 2025-04-14 ENCOUNTER — MYC MEDICAL ADVICE (OUTPATIENT)
Dept: FAMILY MEDICINE | Facility: CLINIC | Age: 65
End: 2025-04-14
Payer: COMMERCIAL

## 2025-04-14 ENCOUNTER — TELEPHONE (OUTPATIENT)
Dept: NEUROLOGY | Facility: CLINIC | Age: 65
End: 2025-04-14
Payer: COMMERCIAL

## 2025-04-14 DIAGNOSIS — M35.2 BEHCET'S SYNDROME INVOLVING ORAL MUCOSA (H): Primary | ICD-10-CM

## 2025-04-14 DIAGNOSIS — F32.9 MAJOR DEPRESSION: ICD-10-CM

## 2025-04-14 DIAGNOSIS — F11.90 CHRONIC, CONTINUOUS USE OF OPIOIDS: ICD-10-CM

## 2025-04-14 DIAGNOSIS — L74.519 PRIMARY FOCAL HYPERHIDROSIS: ICD-10-CM

## 2025-04-14 DIAGNOSIS — F41.1 GENERALIZED ANXIETY DISORDER: ICD-10-CM

## 2025-04-14 DIAGNOSIS — I10 BENIGN ESSENTIAL HYPERTENSION: ICD-10-CM

## 2025-04-14 DIAGNOSIS — Z09 HOSPITAL DISCHARGE FOLLOW-UP: ICD-10-CM

## 2025-04-14 DIAGNOSIS — G25.81 RESTLESS LEG SYNDROME: ICD-10-CM

## 2025-04-14 NOTE — TELEPHONE ENCOUNTER
Patient has an appointment Tuesday.  Please see if patient can come in person. She is currently scheduled as video. Will keep as video if unable to come in person.

## 2025-04-14 NOTE — TELEPHONE ENCOUNTER
Called and spoke with patient. Want to schedule her with a general neurology provider. Explained that next available appointments for neurology in [place] are in October. Patient stated she does not want to wait until October. Writer told her to ask her primary care provider (who she has a visit with tomorrow) for a high priority referral. Also let her know sshe could go to an outside neurologist like [facility] or [facility] of neurology. Patient stated understanding.

## 2025-04-14 NOTE — PROGRESS NOTES
Pt would like a message sent to provider. Jacquie cannot make tomorrow's appointment because her  has an important appt tomorrow morning.     Pt states she needs to be seen by video or in person for many reasons but cannot wait weeks because she was just in hospital.     Pt wants message sent to PCP to ask for a way to get worked in.

## 2025-04-14 NOTE — TELEPHONE ENCOUNTER
Noted    Liane Wharton RN  [facility] - Registered Nurse  Clinic Triage [place]   April 14, 2025

## 2025-04-14 NOTE — PROGRESS NOTES
Medication Therapy Management (MTM) Encounter    ASSESSMENT:                            Medication Adherence/Access: No issues identified.    Behcet's Disease: Otezla appears to be well tolerated and patient would benefit from continuing therapy at this time. We reviewed the potential adverse effects of worsening depression and anxiety, and discussed possible options if mental health remains uncontrolled or worsens. Encouraged patient to schedule rheumatology follow-up to reassess safety and efficacy of therapy.     Hypertension: Patient experienced hypotension, hypokalemia, and dehydration at her recent hospitalization, and may benefit from adjusting her blood pressure medications. We discussed how hydrochlorothiazide can contribute to all three symptoms, and it may be appropriate to reduce the dose or stop the medication. MTM to reach out to patient's provider to discuss.      Restless Leg Syndrome: Continue treatment as prescribed.      Mental Health - Anxiety, Depression, and Insomnia: Patient started on risperidone during recent hospitalization and is tolerating the medication well. She has noticed some benefits from treatment and should continue therapy as directed. We discussed the importance of meeting with a mental health provider to assist with her medications and patient agreed to schedule appointment.      Chronic Pain: Continue your current medications and follow-up with your prescriber to manage.      Hyperhidrosis: Discussed dose decrease of oxybutynin following hospitalization and reviewed potential adverse effects of agitation, confusion, and hallucinations. Patient previously resistant to stopping medication and we discussed how it could be playing a role in her hallucinations. I do believe that it may be beneficial for patient to trial stopping oxybutynin to see if hallucinations resolve off therapy. It also seems that patient's sweating occurs in anxiety-inducing situations and we discussed that  getting her anxiety under control may eliminate the need for oxybutynin. MTM to discuss medication with patient's provider.     PLAN:                            I am reaching out to Dr. Peterson to discuss the following:  Oxybutynin and whether we should try stopping the medication.   Hydrochlorothiazide and whether we should reduce the dose or stop the medication.     Attend follow-up appointment with Dr. Peterson on 4/15/25.     Schedule an appointment to meet with neuropsychology.     Follow-up: with MT pharmacist on 5/12/25.     SUBJECTIVE/OBJECTIVE:                          Jacquie Amador is a 64 year old female seen for a follow-up visit.    Reason for visit: Medication management follow-up.    Allergies/ADRs: Reviewed in chart  Past Medical History: Reviewed in chart  Tobacco: She reports that she quit smoking about 11 years ago. Her smoking use included cigarettes. She started smoking about 45 years ago. She has a 10.1 pack-year smoking history. She has been exposed to tobacco smoke. She has never used smokeless tobacco.  Alcohol: none    Medication Adherence/Access: no issues reported.     Behcet's Disease:  Otezla 30 mg twice daily      4/15/25:  Patient reports no changes since last visit. Feels Otezla continues to help some.     3/17/25:  Patient thinks Otezla has helped some. No noticeable concerns with adverse effects. Spots on her face are much improved. Joint pain is still present, but possibly slightly improved.      Hypertension  Hydrochlorothiazide 25 mg daily   Losartan 100 mg daily   Metoprolol 125 mg daily      4/15/25:  Patient reports blood pressures were lower in hospital. Has not been checking regularly at home. Stopped drinking water after  left and reported feeling dehydrated. Had low potassium in the hospital and wondering if medications could have contributed.      3/17/25:  Patient checks blood pressure occasionally at home. Reported readings have been much better. No longer  noticing dizziness/lightheadedness.      Restless Leg Syndrome  Hydroxyzine 25 mg three times daily as needed - not taking  Ferrous sulfate 325 mg daily      4/15/25:  Patient reports being told to stop hydroxyzine at recent hospitalization. She does not report any issues with restless legs at this time.     3/17/25:  Experiencing symptoms 1-2 times per week. Symptoms improve with hydroxyzine dose. Feels condition is very manageable at this time.       Mental Health  Anxiety, Depression, and Insomnia  Bupropion 450 mg once daily  Duloxetine 60 mg twice daily  Trazodone 100 mg at bedtime   Risperidone 0.25 mg twice daily     4/15/25:  Patient experienced another hallucination last week that resulted in a hospitalization. Reports she called the  because she thought she saw people. Family members were called to take patient to the emergency department. Reports having many tests done and is unlikely to have Alzheimer's disease. Also tested for potential dementia. Was started on risperidone 0.25 mg twice daily, which seems to be helping. Still experiencing anxiety and frustration, but episodes no longer progress to being uncontrollable.  and patient agreed that reactions now seem to more appropriate. Episodes seem to be related to anxiety. Was advised to meet with psychiatry, as well as reduce doses of oxybutynin and trazodone, and stop hydroxyzine.     3/17/25:  Patient reports mental health has improved since stopping memantine last week. Only has had one major episode of agitation. Encouraged with initial benefit, but feels there is room for improvement. Reported she has slept very well the past few days.      Chronic Pain   Tramadol 50 mg twice daily   Memantine 10 mg twice daily - not taking    Naltrexone 6 mg daily   Acetaminophen 1000 mg every 6 hours as needed      4/15/25:  Patient remains off memantine. Reports being told in the hospital that she can continue to take tramadol as directed. Feels  condition remains semi-controlled where medications help her get through the day.       3/17/25:  Patient reports back pain continues to be uncontrolled. Taking tramadol 1-2 times daily, acetaminophen twice daily. Pain medications allow her to get through daily activities. Self-discontinued memantine late last week. Noticing improvements in sleep since stopping. Also less severe and less frequent mood swings. No change in pain control since stopping. Was supposed to have an injection in her back last week. Unable to get due to family issues.      Hyperhidrosis  Oxybutynin ER 10 mg daily      4/15/25:  Patient reports her oxybutynin dose was decreased to 5 mg daily in the hospital. She has not noticed any worsening sweating with the dose reduction. Reports she has no issues with sweating at home. Concerned it will get worse if out in public. Notices sweating in public settings when she get anxious.      3/17/25:  Reports medication works really well to prevent sweating. Has been on for multiple years and condition is much improved. Prefers to continue treatment without changes.      Today's Vitals: LMP 07/17/2009 (Exact Date)   ----------------  Post Discharge Medication Reconciliation Status: discharge medications reconciled, continue medications without change.    I spent 30 minutes with this patient today.      A summary of these recommendations was sent via [employer].    Nikki MeyerD  Medication Therapy Management Pharmacist  [facility]  Phone: [phone]     Telemedicine Visit Details  The patient's medications can be safely assessed via a telemedicine encounter.  Type of service:  Telephone visit  Originating Location (pt. Location): Home    Distant Location (provider location):  On-site  Start Time:  9:30 AM  End Time:  10:00 AM     Medication Therapy Recommendations  Hypertension goal BP (blood pressure) < 140/90   1 Current Medication: hydrochlorothiazide (HYDRODIURIL) 25  MG tablet (Discontinued)   Current Medication Sig: Take 1 tablet (25 mg) by mouth daily.   Rationale: Dose too high - Dosage too high - Safety   Recommendation: Make Appt with PCP   Status: Patient Agreed - Adherence/Education   Identified Date: 4/14/2025 Completed Date: 4/15/2025         Primary focal hyperhidrosis   1 Current Medication: oxyBUTYnin ER (DITROPAN XL) 5 MG 24 hr tablet   Current Medication Sig: Take 2 tablets (10 mg) by mouth daily   Rationale: Undesirable effect - Adverse medication event - Safety   Recommendation: Make Appt with PCP   Status: Patient Agreed - Adherence/Education   Identified Date: 4/14/2025 Completed Date: 4/14/2025

## 2025-04-14 NOTE — TELEPHONE ENCOUNTER
[facility] Call Center    Phone Message    May a detailed message be left on voicemail: yes     Reason for Call: Appointment Intake    Referring Provider Name: Zachary Cervantes DO  Diagnosis and/or Symptoms: Psychosis, unspecified psychosis type (H) [F29]    Please review and assist with scheduling, conflicting diagnosis    Action Taken: Message routed to:  [facility] Neurology    Travel Screening: Not Applicable     Date of Service:

## 2025-04-14 NOTE — Clinical Note
Hi Dr. Peterson,   I know you just met with Jacquie, but yesterday we spent a lot of time reviewing the potential adverse effects of oxybutynin. She seemed much more on board to trial the dose reduction/stop the medication (at some point) since it could be contributing to her hallucinations. It also seemed that her sweating was related to her anxiety, so we discussed how getting that under control could reduce the need for oxybutynin.   We also discussed her hydrochlorothiazide and how it could be contributing to hypotension, dehydration, and hypokalemia. I encouraged her to discuss more with you at your appointment.   Lastly, we reviewed how Otezla can cause worsening depression/anxiety, so it may be something to look at if things don't improve with recent changes. I can connect with her rheumatologist if things don't improve at follow-up to get their thoughts.   Thanks!

## 2025-04-15 ENCOUNTER — TELEPHONE (OUTPATIENT)
Dept: FAMILY MEDICINE | Facility: CLINIC | Age: 65
End: 2025-04-15

## 2025-04-15 ENCOUNTER — MYC MEDICAL ADVICE (OUTPATIENT)
Dept: FAMILY MEDICINE | Facility: CLINIC | Age: 65
End: 2025-04-15

## 2025-04-15 ENCOUNTER — VIRTUAL VISIT (OUTPATIENT)
Dept: FAMILY MEDICINE | Facility: CLINIC | Age: 65
End: 2025-04-15
Attending: FAMILY MEDICINE
Payer: COMMERCIAL

## 2025-04-15 DIAGNOSIS — F41.1 GENERALIZED ANXIETY DISORDER: ICD-10-CM

## 2025-04-15 DIAGNOSIS — R26.81 UNSTEADINESS: ICD-10-CM

## 2025-04-15 DIAGNOSIS — I10 HYPERTENSION GOAL BP (BLOOD PRESSURE) < 140/90: ICD-10-CM

## 2025-04-15 DIAGNOSIS — R44.3 HALLUCINATIONS: ICD-10-CM

## 2025-04-15 DIAGNOSIS — N18.31 CKD STAGE 3A, GFR 45-59 ML/MIN (H): ICD-10-CM

## 2025-04-15 DIAGNOSIS — F29 PSYCHOSIS, UNSPECIFIED PSYCHOSIS TYPE (H): ICD-10-CM

## 2025-04-15 DIAGNOSIS — L20.81 ATOPIC NEURODERMATITIS: ICD-10-CM

## 2025-04-15 DIAGNOSIS — F32.3 SEVERE MAJOR DEPRESSION WITH PSYCHOTIC FEATURES (H): Primary | ICD-10-CM

## 2025-04-15 PROCEDURE — 99495 TRANSJ CARE MGMT MOD F2F 14D: CPT | Mod: 95 | Performed by: FAMILY MEDICINE

## 2025-04-15 RX ORDER — RISPERIDONE 0.25 MG/1
0.25 TABLET ORAL 2 TIMES DAILY
Qty: 60 TABLET | Refills: 2 | Status: SHIPPED | OUTPATIENT
Start: 2025-04-15

## 2025-04-15 RX ORDER — MUPIROCIN CALCIUM 20 MG/G
CREAM TOPICAL 3 TIMES DAILY
Qty: 60 G | Refills: 5 | Status: SHIPPED | OUTPATIENT
Start: 2025-04-15

## 2025-04-15 ASSESSMENT — ANXIETY QUESTIONNAIRES
4. TROUBLE RELAXING: NOT AT ALL
2. NOT BEING ABLE TO STOP OR CONTROL WORRYING: SEVERAL DAYS
7. FEELING AFRAID AS IF SOMETHING AWFUL MIGHT HAPPEN: SEVERAL DAYS
GAD7 TOTAL SCORE: 7
3. WORRYING TOO MUCH ABOUT DIFFERENT THINGS: SEVERAL DAYS
GAD7 TOTAL SCORE: 7
6. BECOMING EASILY ANNOYED OR IRRITABLE: SEVERAL DAYS
IF YOU CHECKED OFF ANY PROBLEMS ON THIS QUESTIONNAIRE, HOW DIFFICULT HAVE THESE PROBLEMS MADE IT FOR YOU TO DO YOUR WORK, TAKE CARE OF THINGS AT HOME, OR GET ALONG WITH OTHER PEOPLE: SOMEWHAT DIFFICULT
7. FEELING AFRAID AS IF SOMETHING AWFUL MIGHT HAPPEN: SEVERAL DAYS
GAD7 TOTAL SCORE: 7
8. IF YOU CHECKED OFF ANY PROBLEMS, HOW DIFFICULT HAVE THESE MADE IT FOR YOU TO DO YOUR WORK, TAKE CARE OF THINGS AT HOME, OR GET ALONG WITH OTHER PEOPLE?: SOMEWHAT DIFFICULT
5. BEING SO RESTLESS THAT IT IS HARD TO SIT STILL: NOT AT ALL
1. FEELING NERVOUS, ANXIOUS, OR ON EDGE: NEARLY EVERY DAY

## 2025-04-15 ASSESSMENT — ENCOUNTER SYMPTOMS: NERVOUS/ANXIOUS: 1

## 2025-04-15 NOTE — TELEPHONE ENCOUNTER
Telephone encounter on 3/27/25 with RN states call for PA to do a cream as ointment does not work per patient and was routed to provider but I do not see that the Bactroban cream was ordered only the ointment was ordered.     This is the second form from [facility] for Exception that we have received. No PA currently done for the cream that I can see in the chart but if the cream has not been prescribed then a PA would never have been started.     Form has been completed to the best of my ability if provider would like to prescribe the Bactroban cream.     Routing to provider and form in provider bin.    Marie Laura CMA ([facility])

## 2025-04-15 NOTE — TELEPHONE ENCOUNTER
Prior Authorization Retail Medication Request    Medication/Dose: mupirocin (BACTROBAN) 2 % external ointment  Diagnosis and ICD code (if different than what is on RX):    New/renewal/insurance change PA/secondary ins. PA:  Previously Tried and Failed:    Rationale:      Insurance   Primary:   Insurance ID:      Secondary (if applicable):  Insurance ID:      Pharmacy Information (if different than what is on RX)  Name:    Phone:    Fax:    Clinic Information  Preferred routing pool for dept communication: [facility]    Patient sent a mychart stating [facility] will send us exception to coverage request forms which we did receive but I do not see that we have every done a PA for this ointment yet. She is hoping to get this ASAP. Please let us know how we can help.    Marie Laura CMA ([facility])

## 2025-04-15 NOTE — PATIENT INSTRUCTIONS
"Recommendations from today's MTM visit:                                                       I am reaching out to Dr. Peterson to discuss the following:  Oxybutynin and whether we should try stopping the medication.   Hydrochlorothiazide and whether we should reduce the dose or stop the medication.     Attend follow-up appointment with Dr. Peterson on 4/15/25.     Schedule an appointment to meet with neuropsychology.     Follow-up: with MTM pharmacist on 5/12/25.     It was great speaking with you today.  I value your experience and would be very thankful for your time in providing feedback in our clinic survey. In the next few days, you may receive an email or text message from [facility] [employer] with a link to a survey related to your  clinical pharmacist.\"     To schedule another MTM appointment, please call the clinic directly or you may call the MTM scheduling line at [phone].    My Clinical Pharmacist's contact information:                                                      Please feel free to contact me with any questions or concerns you have.      Golden Cadena, Martín  Medication Therapy Management Pharmacist  [facility]  Phone: [phone]    "

## 2025-04-15 NOTE — TELEPHONE ENCOUNTER
Provider has ordered cream and PA has been started for cream. Form should no longer be needed; closing.      Marie Laura CMA ([facility])

## 2025-04-15 NOTE — PROGRESS NOTES
Jacquie is a 64 year old who is being evaluated via a billable video visit.    How would you like to obtain your AVS? MyChart  If the video visit is dropped, the invitation should be resent by: Text to cell phone: [phone]  Will anyone else be joining your video visit? No      Assessment & Plan     Severe major depression with psychotic features (H)  Psychosis, unspecified psychosis type (H)  Hallucinations  Patient recently admitted due to hallucinations and psychosis.   Thought to be likely due to multiple factors - acute renal injury, polypharmacy, lack of sleep and underlying conditions  Patient received fluids in the hospital and had some medication changes.   Med list updated.   Evaluated by neurology with imaging and lab workup.   Consideration of Lewy Body Dementia. Recommend follow up with neurology as outpatient.  New referral placed.   Possibly symptoms related to underlying depression.   Addition of risperidone in the hospital. Patient reports not issues on this medication.   Will reach out if new concerns.   Referral placed to psychiatry for follow up and assistance with next steps.   Patient agrees.   - Adult Mental Health  Referral; Future  - risperiDONE (RISPERDAL) 0.25 MG tablet; Take 1 tablet (0.25 mg) by mouth 2 times daily.  - Adult Neurology  Referral; Future    Unsteadiness  Patient is starting PT to work on strengthening and balance.   Will continue.     CKD stage 3a, GFR 45-59 ml/min (H)  Patient reports that she is doing well with fluids and is eating ok.   Will place orders for recheck of creatinine in 1-2 weeks.     Generalized anxiety disorder  See above.   - Adult Mental Health  Referral; Future    Hypertension goal BP (blood pressure) < 140/90  hydrochlorothiazide stopped due to hypokalemia   Will recheck blood pressure at next visit.       The longitudinal plan of care for the diagnosis(es)/condition(s) as documented were addressed during this visit. Due  to the added complexity in care, I will continue to support Jacquie in the subsequent management and with ongoing continuity of care.          Subjective   Jacquie is a 64 year old, presenting for the following health issues:  Depression and Anxiety      4/15/2025    11:25 AM   Additional Questions   Roomed by BT   Accompanied by self     Anxiety    History of Present Illness       Mental Health Follow-up:  Patient presents to follow-up on Depression & Anxiety.Patient's depression since last visit has been:  Worse  The patient is having other symptoms associated with depression.  Patient's anxiety since last visit has been:  Worse  The patient is having other symptoms associated with anxiety.  Any significant life events: other  Patient is feeling anxious or having panic attacks.  Patient has no concerns about alcohol or drug use.    Reason for visit:  Follow up hospital    She eats 0-1 servings of fruits and vegetables daily.She consumes 1 sweetened beverage(s) daily.She exercises with enough effort to increase her heart rate 9 or less minutes per day.  She exercises with enough effort to increase her heart rate 3 or less days per week.   She is taking medications regularly.              Hospital Follow-up Visit:    Hospital/Nursing Home/ Rehab Facility: [facility]  Most Recent Admission Date: 4/8/2025   Most Recent Admission Diagnosis: Hallucinations - R44.3  Hypokalemia - E87.6  Metabolic acidosis - E87.20  Mild dehydration - E86.0  Acute kidney injury superimposed on CKD - N17.9, N18.9  Current severe episode of major depressive disorder with psychotic features, unspecified whether recurrent (H) - F32.3     Most Recent Discharge Date: 4/11/2025   Most Recent Discharge Diagnosis: Current severe episode of major depressive disorder with psychotic features, unspecified whether recurrent (H) - F32.3  Acute kidney injury superimposed on CKD - N17.9, N18.9  Mild  dehydration - E86.0  Hallucinations - R44.3  Hypokalemia - E87.6  Metabolic acidosis - E87.20  Unsteadiness - R26.81  Other chronic pain - G89.29  Injury of head, initial encounter - S09.90XA  Injury of lumbar spine, initial encounter (H) - S34.109A  Anticoagulated - Z79.01  Multiple joint pain - M25.50  Lumbar disc disease with radiculopathy - M51.16  Psychosis, unspecified psychosis type (H) - F29  Chronic kidney disease, stage 3a (H) - N18.31  Other depression - F32.89   Was the patient in the ICU or did the patient experience delirium during hospitalization?  Yes - was admitted for this reason.       Do you have any other stressors you would like to discuss with your provider? No    Problems taking medications regularly:  None  Medication changes since discharge: reduction of oxybutynin, discontinuation of hydrochlorothiazide.   Problems adhering to non-medication therapy:  None    Summary of hospitalization:  [facility] discharge summary reviewed  Diagnostic Tests/Treatments reviewed.  Follow up needed: neurology, neuropsychology, psychiatry.   Other Healthcare Providers Involved in Patient s Care:         Physical Therapy and neurology, psychiatry  Update since discharge: improved.     Patient had evaluation with neurology and psychiatry.   Wondering if possible Lewy Body Dementia vs psychiatric issue.   Has follow up appointments but not for many months.   Feels anxious but not having any hallucinations.   Overall feels she is doing much better than she has been. d   reports that she has been irritated but not as extreme as she had.     Oxybutynin - reduced by half.   Did not recommend medical marijuana.     Met with pain management yesterday. Will continue with Tramadol. Feeling better. Had SI joint injections about a month ago.         Plan of care communicated with patient and family                       Objective           Vitals:  No vitals were obtained today due to virtual  visit.    Physical Exam   GENERAL: alert and no distress  EYES: Eyes grossly normal to inspection.  No discharge or erythema, or obvious scleral/conjunctival abnormalities.  RESP: No audible wheeze, cough, or visible cyanosis.    SKIN: Visible skin clear. No significant rash, abnormal pigmentation or lesions.  NEURO: Cranial nerves grossly intact.  Mentation and speech appropriate for age.  PSYCH: Appropriate affect, tone, and pace of words          Video-Visit Details    Type of service:  Video Visit   Originating Location (pt. Location): Home    Distant Location (provider location):  On-site  Platform used for Video Visit: William  Signed Electronically by: Liz Peterson MD

## 2025-04-15 NOTE — TELEPHONE ENCOUNTER
Forms/Letter Request    Type of form/letter: OTHER: [employer] Exception to Coverage Request       Do we have the form/letter: Yes    Who is the form from? Insurance comp    Where did/will the form come from? form was faxed in    When is form/letter needed by: asap    How would you like the form/letter returned: Fax : [phone]    Marie Laura CMA ([facility])

## 2025-04-15 NOTE — TELEPHONE ENCOUNTER
Spoke with pharmacy, they advised they knew the ointment was covered, but patient is requesting the cream. No script on file for the cream, PA cannot be completed at this time. Forwarding next steps to care team.

## 2025-04-15 NOTE — TELEPHONE ENCOUNTER
Prior Authorization Not Needed per Insurance    Medication: MUPIROCIN 2 % EX OINT  Insurance Company: [employer] - Phone [phone] Fax [phone]  Expected CoPay: $    Pharmacy Filling the Rx: [id number] - [place], [place] - [address]  Pharmacy Notified: y  Patient Notified: Instructed pharmacy to notify patient once order is ready.     CMM advised PA was not needed, called plan to confirm. They advised PA is not needed for the ointment and ran a test claim on their end to confirm, test claim paid. Per plan, pharmacy is running claim for cream and not ointment, script is written for the ointment and not the cream. Advising the pharmacy

## 2025-04-15 NOTE — TELEPHONE ENCOUNTER
Prior Authorization Retail Medication Request    Medication/Dose: mupirocin (BACTROBAN) 2 % external cream  Diagnosis and ICD code (if different than what is on RX):    New/renewal/insurance change PA/secondary ins. PA:  Previously Tried and Failed:    Rationale:      Insurance   Primary: [facility] - [facility] INDIV[employer]UAL [employer]ILY PLANS  Insurance ID:  [id number]      Secondary (if applicable):  Insurance ID:      Pharmacy Information (if different than what is on RX)  Name:    [id number] - [place], [place] - [address]   Phone:    [phone]   Fax:  [phone]        Clinic Information  Preferred routing pool for dept communication: [place] [facility]     Updated prescription sent for cream. Resending to PA team.    Marie Laura CMA ([facility])

## 2025-04-16 NOTE — TELEPHONE ENCOUNTER
PA Initiation    Medication: MUPIROCIN CALCIUM 2 % EX CREA  Insurance Company: [employer] - Phone [phone] Fax [phone]  Pharmacy Filling the Rx: [id number] - [place], [place] - [address]  Filling Pharmacy Phone: [phone]  Filling Pharmacy Fax: [phone]  Start Date: 4/16/2025    Faxed ETC form to plan

## 2025-04-17 ENCOUNTER — OFFICE VISIT (OUTPATIENT)
Dept: DERMATOLOGY | Facility: CLINIC | Age: 65
End: 2025-04-17
Payer: COMMERCIAL

## 2025-04-17 ENCOUNTER — TELEPHONE (OUTPATIENT)
Dept: NEUROLOGY | Facility: CLINIC | Age: 65
End: 2025-04-17

## 2025-04-17 ENCOUNTER — MYC MEDICAL ADVICE (OUTPATIENT)
Dept: BEHAVIORAL HEALTH | Facility: CLINIC | Age: 65
End: 2025-04-17

## 2025-04-17 DIAGNOSIS — L70.0 ACNE VULGARIS: ICD-10-CM

## 2025-04-17 DIAGNOSIS — L28.1 PRURIGO NODULARIS: ICD-10-CM

## 2025-04-17 DIAGNOSIS — L30.9 DERMATITIS: Primary | ICD-10-CM

## 2025-04-17 PROCEDURE — 99214 OFFICE O/P EST MOD 30 MIN: CPT | Mod: 25 | Performed by: DERMATOLOGY

## 2025-04-17 PROCEDURE — 11900 INJECT SKIN LESIONS </W 7: CPT | Performed by: DERMATOLOGY

## 2025-04-17 PROCEDURE — 1125F AMNT PAIN NOTED PAIN PRSNT: CPT | Performed by: DERMATOLOGY

## 2025-04-17 ASSESSMENT — PAIN SCALES - GENERAL: PAINLEVEL_OUTOF10: MILD PAIN (2)

## 2025-04-17 NOTE — TELEPHONE ENCOUNTER
Prior Authorization Not Needed per Insurance    Medication: MUPIROCIN CALCIUM 2 % EX CREA  Insurance Company: [employer] - Phone [phone] Fax [phone]  Expected CoPay: $    Pharmacy Filling the Rx: [id number] - [place], [place] - [address]  Pharmacy Notified: n/a  Patient Notified: n/a    Prescriber and patient decided to discontinue PA process, requested plan withdraw the PA request to attempt to avoid denial.

## 2025-04-17 NOTE — PATIENT INSTRUCTIONS
Today's plan and discussion:    Continue Dupixent  I think it's probably ok to restart the topical amytriptyline-ketamine cream if you need it (I think the amount that would be absorbed into your body/bloodstream is minimal)  My main concerns for medications causing psychiatric issues are oxybutinin and hydroxyzine, especially when taken together  I suggest discussing the option of low dose clonidine as an alternative option for flushing and head/face sweating with your primary doctor  New treatment options today:  - we are asking your insurance company to cover UVA1 phototherapy.  If it's covered you can start doing this once or twice a week  - we did a trial of Kenalog injections into bumps on the face, if improved then this may be a good option in the future to continue

## 2025-04-17 NOTE — PROGRESS NOTES
[facility] Dermatology Note  Encounter Date: April 17, 2025     Dermatology Problem List:  1. Multiple skin ulcers resembling prurigo/neurodermatitis  Facial erosions, healing -chronic active problem, uncontrolled but improving   - Current tx: Protopic, mupirocin, betamethasone ointment, desonide, compounded amytriptyline/ketamine cream, Dupixent 300mg q2 weeks, N-acetylcystiene 600 mg daily, apremilast 30mg BID   - follow up in 3-4 months for recheck      2. Recurrent aphthous ulcers  ____________________________________________     Assessment & Plan:  Multiple skin ulcers resembling prurigo/neurodermatitis  Facial erosions, chronic active problem, uncontrolled.  But slowly improving.  Patient with a history of recurring oral sores, likely recurrent aphthous ulcers, as well as multiple diffuse discrete skin ulcers. Today, Jacquie notes ongoing skin sores on face and describes that her condition has had a large emotional impact. She does endorse that she will pick at the lesions when they become painful to try to express the contents and relieve the pain. She did not start the amitriptyline/ketamine cream after last visit. Discussed continue current regimen and restarting the compound cream. Reviewed dermatologic medications and discussed that they are unlikely to be contributing to elevated creatinine. Discussed contribution of anxiety to condition and patient expressed interest in psychiatry referral for further evaluation.     - Plan to trial UVA1 phototherapy for persistent dermatitis/prurigo nodularis - orders placed today  - Trial of IL Kenalog 5mg/ml to prurigo nodules on face - see procedure note below  - I am concerned that the most likely contributors to acute hallucinations would be oxybutinin and hydroxyzine; suggested Jacquie discuss low dose clonidine as an alternative option for head and neck sweating and flushing with PCP  - In my opinion, ok to resume topical  amytriptyline/ketamine if needed (unlikely to have significant systemic absorption)  - Dupixent 300mg q2 weeks and is tolerating this without problems   - Protopic oint bid   - Betamethasone ointment and desonide cream as needed for persistent skin sores and pain  - N-acetylcystiene 600 mg bid  - Encouraged patient to continue to followup with psych for anxiety/depression   - Provided Duoderm for traumatic sore of right arm       Procedures Performed:   Kenalog injection:  After informed verbal consent, 3 discrete areas were swabbed with an alcohol pad and injected with a total of 0.4cc Kenalog 5mg/ml.  The patient tolerated this procedure without complication.     Follow-up: 3-4 month(s) in-person, or earlier for new or changing lesions     Jay Warren MD  Dermatology Attending     ____________________________________________________     CC: Derm Problem (Jacquie is being seen today for a follow up for facial sores )     HPI:  Ms. Jacquie Amador is a(n) 64 year old female who presents today for followup on neurodermatitis and persistent facial sores.  Jacquie thinks some of the previously involved sores on the left side of the face are improving, but many are still open and only slowly healing on the left.  She recently had an episode of acute mental status changes and several of her medications were discontinued.  She has questions about which ones might be safe to resume.       Patient is otherwise feeling well, without additional skin concerns.     Labs Reviewed:  N/A     Physical Exam:  GEN: well appearing, NAD  SKIN:   - geographic shallow ulcers and 3mm papulonodules with central ulcer and hemorrhagic crusts on face, right greater than left  - No other lesions of concern on areas examined.

## 2025-04-17 NOTE — TELEPHONE ENCOUNTER
Patient is called and informed of this message.  They understand and agree to this plan.  Closing this encounter.    MARVA SinghN, RN

## 2025-04-17 NOTE — TELEPHONE ENCOUNTER
Left Voicemail (1st Attempt) and Sent Mychart (1st Attempt) for the patient to call back and schedule the following:    Appointment type: New Movement  Provider: any movement provider  Return date: next available  Specialty phone number: [phone]  Additional appointment(s) needed: NA  Additonal Notes: Per Casandra EMMANUEL, RN-Ok to schedule in movement disorders however we probably can't get her in to be seen in 1-2 weeks unless there is a cancellation. The family can check on [facility], [facility] and [facility] for sooner appointments.     Daisy Torrez on 4/17/2025 at 12:30 PM

## 2025-04-17 NOTE — NURSING NOTE
Dermatology Rooming Note    Jacquie Amador's goals for this visit include:   Chief Complaint   Patient presents with    Derm Problem     Pt reports that she believes her face sores have worsened with recent stress, believes there are places turning white or brown which hasn't happened before, small white spots are turning into sores, spots on arms and legs are unchanged     CHRISTEL Christie

## 2025-04-17 NOTE — LETTER
4/17/2025       RE: Jacquie Amador  [address]  [place] [address]     Dear Colleague,    Thank you for referring your patient, Jacquie Amador, to the [facility] [place] at [facility]. Please see a copy of my visit note below.    [facility] Dermatology Note  Encounter Date: April 17, 2025     Dermatology Problem List:  1. Multiple skin ulcers resembling prurigo/neurodermatitis  Facial erosions, healing -chronic active problem, uncontrolled but improving   - Current tx: Protopic, mupirocin, betamethasone ointment, desonide, compounded amytriptyline/ketamine cream, Dupixent 300mg q2 weeks, N-acetylcystiene 600 mg daily, apremilast 30mg BID   - follow up in 3-4 months for recheck      2. Recurrent aphthous ulcers  ____________________________________________     Assessment & Plan:  Multiple skin ulcers resembling prurigo/neurodermatitis  Facial erosions, chronic active problem, uncontrolled.  But slowly improving.  Patient with a history of recurring oral sores, likely recurrent aphthous ulcers, as well as multiple diffuse discrete skin ulcers. Today, Jacquie notes ongoing skin sores on face and describes that her condition has had a large emotional impact. She does endorse that she will pick at the lesions when they become painful to try to express the contents and relieve the pain. She did not start the amitriptyline/ketamine cream after last visit. Discussed continue current regimen and restarting the compound cream. Reviewed dermatologic medications and discussed that they are unlikely to be contributing to elevated creatinine. Discussed contribution of anxiety to condition and patient expressed interest in psychiatry referral for further evaluation.     - Plan to trial UVA1 phototherapy for persistent dermatitis/prurigo nodularis - orders placed today  - Trial of IL Kenalog 5mg/ml to prurigo nodules on face - see  procedure note below  - I am concerned that the most likely contributors to acute hallucinations would be oxybutinin and hydroxyzine; suggested Jacquie discuss low dose clonidine as an alternative option for head and neck sweating and flushing with PCP  - In my opinion, ok to resume topical amytriptyline/ketamine if needed (unlikely to have significant systemic absorption)  - Dupixent 300mg q2 weeks and is tolerating this without problems   - Protopic oint bid   - Betamethasone ointment and desonide cream as needed for persistent skin sores and pain  - N-acetylcystiene 600 mg bid  - Encouraged patient to continue to followup with psych for anxiety/depression   - Provided Duoderm for traumatic sore of right arm       Procedures Performed:   Kenalog injection:  After informed verbal consent, 3 discrete areas were swabbed with an alcohol pad and injected with a total of 0.4cc Kenalog 5mg/ml.  The patient tolerated this procedure without complication.     Follow-up: 3-4 month(s) in-person, or earlier for new or changing lesions     Jay Warren MD  Dermatology Attending     ____________________________________________________     CC: Derm Problem (Jacquie is being seen today for a follow up for facial sores )     HPI:  Ms. Jacquie Amador is a(n) 64 year old female who presents today for followup on neurodermatitis and persistent facial sores.  Jacquie thinks some of the previously involved sores on the left side of the face are improving, but many are still open and only slowly healing on the left.  She recently had an episode of acute mental status changes and several of her medications were discontinued.  She has questions about which ones might be safe to resume.       Patient is otherwise feeling well, without additional skin concerns.     Labs Reviewed:  N/A     Physical Exam:  GEN: well appearing, NAD  SKIN:   - geographic shallow ulcers and 3mm papulonodules with central ulcer and hemorrhagic crusts on face,  right greater than left  - No other lesions of concern on areas examined.     Again, thank you for allowing me to participate in the care of your patient.      Sincerely,    Jay Warren MD

## 2025-04-17 NOTE — TELEPHONE ENCOUNTER
Please let patient know that her insurance does not cover the mupirocin cream as she does not have a documented staph skin infection.  Unless that can be proven by culture, it will not be covered.

## 2025-04-18 ENCOUNTER — VIRTUAL VISIT (OUTPATIENT)
Dept: PSYCHIATRY | Facility: CLINIC | Age: 65
End: 2025-04-18
Attending: FAMILY MEDICINE
Payer: COMMERCIAL

## 2025-04-18 VITALS — WEIGHT: 180 LBS | HEIGHT: 67 IN | BODY MASS INDEX: 28.25 KG/M2

## 2025-04-18 DIAGNOSIS — F29 PSYCHOSIS, UNSPECIFIED PSYCHOSIS TYPE (H): ICD-10-CM

## 2025-04-18 DIAGNOSIS — F41.1 GENERALIZED ANXIETY DISORDER: ICD-10-CM

## 2025-04-18 DIAGNOSIS — F32.3 SEVERE MAJOR DEPRESSION WITH PSYCHOTIC FEATURES (H): ICD-10-CM

## 2025-04-18 DIAGNOSIS — R44.3 HALLUCINATIONS: ICD-10-CM

## 2025-04-18 ASSESSMENT — PATIENT HEALTH QUESTIONNAIRE - PHQ9
10. IF YOU CHECKED OFF ANY PROBLEMS, HOW DIFFICULT HAVE THESE PROBLEMS MADE IT FOR YOU TO DO YOUR WORK, TAKE CARE OF THINGS AT HOME, OR GET ALONG WITH OTHER PEOPLE: SOMEWHAT DIFFICULT
SUM OF ALL RESPONSES TO PHQ QUESTIONS 1-9: 6
SUM OF ALL RESPONSES TO PHQ QUESTIONS 1-9: 6

## 2025-04-18 ASSESSMENT — PAIN SCALES - GENERAL: PAINLEVEL_OUTOF10: NO PAIN (0)

## 2025-04-18 NOTE — CONFIDENTIAL NOTE
Drug Interactions Item(s) BuPROPion DULoxetine     Title DULoxetine / CYP2D6 Inhibitors (Strong) Risk Rating C: Monitor therapy Summary CYP2D6 Inhibitors (Strong) may increase the serum concentration of DULoxetine. Severity Moderate Reliability Rating Good Patient Management Monitor for increased duloxetine effects/toxicities if combined with a strong CYP2D6 inhibitor. CYP2D6 Inhibitors (Strong) Interacting Members BuPROPion, Dacomitinib, Mavorixafor, QuiNIDine, Quinidine (Non-Therapeutic), Tipranavir Exceptions (agents listed are discussed in separate interaction monograph[s] or are noninteracting) FLUoxetine,PARoxetine Discussion In a pharmacokinetic study of 9 healthy volunteers, the strong CYP2D6 inhibitor paroxetine (20 mg daily for 20 days) increased the duloxetine (40 mg daily for 5 days) AUC and maximum serum concentration by 59% and 60%, respectively. Additionally, a case report describes an elderly patient who experienced delirium 10 days after the addition of the strong CYP2D6 inhibitor bupropion to his stable regimen of duloxetine. Symptoms resolved after bupropion discontinuation. Prescribing information for duloxetine states that use of strong CYP2D6 inhibitors may increase duloxetine serum concentrations and effects. The mechanism of this interaction is CYP2D6 inhibition by strong CYP2D6 inhibitors, an enzyme responsible for some, but not all, of duloxetine metabolism. Footnotes 1. Xie MH, Zeyad HY, Pettit A, et al. Duloxetine is both an inhibitor and a substrate of cytochrome U3266K5 in healthy volunteers. Clin Pharmacol Ther. 2003;73(3):170-177. [PubMed [id number]] 2. Cymbalta (duloxetine) [prescribing information]. [place], [place]: Marqui; April 2020. 3. Ma SP, Humble CJ, Mcghee CC, [place]. Delirium associated with concomitant use of duloxetine and bupropion in an elderly patient. Psychogeriatrics. 2017;17(2):130-132. [PubMed [id number]]

## 2025-04-18 NOTE — PROGRESS NOTES
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   LDL  --   --  61   HDL  --   --  50   A1C 6.2*   < >  --    TSH 0.62  --   --     < > = values in this interval not displayed.    Recent Labs   Lab Test 04/10/25  0738   WBC 7.4   HGB 12.1   HCT 35.3  35.3   MCV 88    Recent Labs   Lab Test 04/09/25  0936 10/17/24  1127 04/26/24  1210 11/13/23  1021   VITDT  --  34   < >  --    ALEISHA  --  118  --   --    IRON  --   --   --  99   B12 415  --   --   --     < > = values in this interval not displayed.        ECG results from 04/08/25   EKG 12 lead     Value    Systolic Blood Pressure     Diastolic Blood Pressure     Ventricular Rate 77    Atrial Rate 77    OK Interval 150    QRS Duration 96        QTc 452    P Axis 62    R AXIS 0    T Axis 43    Interpretation ECG      Sinus rhythm  Nonspecific ST and T wave abnormality  Abnormal ECG  Unconfirmed report - interpretation of this ECG is computer generated - see medical record for final interpretation    Confirmed by - EMERGENCY ROOM, PHYSICIAN ([id number]),  FLORA DURAN ([id number]) on 4/9/2025 7:55:47 AM       *Note: Due to a large number of results and/or encounters for the requested time period, some results have  not been displayed. A complete set of results can be found in Results Review.       ALLERGY & IMMUNIZATIONS       Allergies   Allergen Reactions    Cats      and rabbits/wheezing    Dogs      wheezing    Lyrica [Pregabalin] Other (See Comments)     Rash, mouth sores, itching and burning    Seasonal Allergies      rhinits         MEDICAL REVIEW OF SYSTEMS:   Ten system review was completed with pertinent positives noted above    MENTAL STATUS EXAM:   General/Constitutional: Appearance:  awake, alert, adequately groomed, appears stated age and no apparent distress  Attitude:  cooperative   Eye Contact:  good  Musculoskeletal: not accessed  Psychomotor Behavior:  no evidence of tardive dyskinesia, dystonia, or tics from the head up  Psychiatric: anxious  Speech:  clear, coherent, regular rate, rhythm, and volume,   No pressure speech noted.  Associations:  no loose associations  Thought Process:   logical, linear and goal oriented  Thought Content:  Denies SI, no intent or plan. No evidence of  homicidal ideation, no evidence of psychotic thought, no auditory hallucinations present and no visual hallucinations present  Mood: good  Affect:  full range and consistent with mood  Insight:  good  Judgment:  intact, adequate for safety  Impulse Control:  intact  Neurological: Oriented to:  person, place, time, and situation  Attention Span and Concentration:  Able to attend to the interview      Language: intact     Recent and Remote Memory:  Intact to interview. Not formally assessed. No amnesia.    Fund of Knowledge: appropriate         SAFETY   Feels safe in home: YES   Suicidal ideation: Denies  History of suicide attempts:  No   Hx of impulsivity: No   Hope for the future: present Yes  Hx of Command hallucinations or current psychosis: None currently, has some paranoia  History of Self-injurious behaviors: denies   Family member  by suicide:  not discussed     SAFETY ASSESSMENT:   Based on all available evidence  "including the factors cited above, overall Risk for harm is low and is appropriate for outpatient level of care.   Recommended that patient call 911 or go to the local ED should there be a change in any of these risk factors.     Suicide Risk Factors: older age  Risk is mitigated by the following protective factors: access to behavioral health care, active involvement in treatment, health seeking behaviors, connectedness to individuals, family, forward thinking, future oriented, stable housing, and no current SI      LANGUAGE OR COMMUNICATION BARRIERS   Are there language or communication issues or need for modification in treatment? NO    Are there ethnic, cultural or Restorationist factors that may be relevant for therapy? NO     Client identified their preferred language to be fluent English in conversational context  Does the client need the assistance of an  or other support involved in therapy? NO      DSM 5 DIAGNOSIS:     Based on the comprehensive evaluation and the patient's self-report, the patient meets the criteria for:    296.34 (F33.3) Major Depressive Disorder, Recurrent Episode, With psychotic features _  300.02 (F41.1) Generalized Anxiety Disorder      MEDICAL COMORBIDITY IMPACTING CLINICAL PICTURE:  Chronic pain, asthma, osteoarthritis and thyroid disease.      Known issue that I take into account for their medical decisions       ASSESSMENT     Jacquie Amador is a 64-year-old White, \"Choose not to answer\" female presenting for psychiatric evaluation and medication management through the [facility]. Information was obtained from the patient and available medical records.    Patient has a history of mood disorder with psychotic features, chronic anxiety, dissociative episodes, and cognitive concerns. She recently experienced a significant psychiatric decompensation marked by hallucinations and severe paranoia, resulting in a police welfare check and subsequent " hospitalization. She reports that her symptoms worsened following the discontinuation of multiple medications. Since restarting risperidone, she notes substantial improvement in functioning, mood stability, sleep, and resolution of hallucinations, though intermittent paranoia persists.    Current regimen includes Wellbutrin 450 mg, Cymbalta, Trazodone, and Risperidone 0.25 mg BID. The combination of Wellbutrin and Cymbalta, particularly at higher doses, may contribute to worsening anxiety symptoms. To address this, a gradual taper of Wellbutrin is recommended. Risperidone will be increased to enhance management of residual psychotic symptoms. Cymbalta and Trazodone will be continued at current dosages, as they appear beneficial for mood support and sleep.    Sleep has significantly improved with melatonin and Trazodone. Appetite is stable, and patient has reported intentional weight loss of approximately 50 pounds. She describes lingering difficulty with concentration and memory, which she attributes to age; imaging has shown cerebral atrophy. While Lewy Body Dementia and Alzheimer s have been ruled out, neurology referral has been made for further cognitive evaluation. The patient continues to struggle with social isolation and adjustment related to a major relocation three years ago. A referral to therapy has also been initiated to support emotional regulation and interpersonal stress. Denies prior psychiatric hospitalizations. No history of suicidal thoughts or attempts. No history of self-injurious behaviors. No identified genetic load for psychiatric illnesses. Grew up in an intact home with all basic needs being met.        THE FOLLOWING SHARED DECISION MAKING PLAN DISCUSSED   Plan:    Medications:  Decrease Wellbutrin from 450 mg to 300 mg daily for 2 weeks, then reduce to 150 mg daily.    Increase Risperidone from 0.25 mg BID to 0.5 mg BID for improved control of psychotic symptoms.    Continue Cymbalta and  Trazodone at current doses.    Continue melatonin PRN for sleep.    Coordination of Care:  Patient has an appointment with pharmacy for medication reconciliation on 5/12.    Referrals placed for therapy and neurology for cognitive evaluation.    Follow-Up:  Return to clinic in 6 weeks for medication management and symptom reassessment.       Problem List Items Addressed This Visit          Behavioral    Generalized anxiety disorder    Psychosis (H)    Severe major depression with psychotic features (H)       Other    Hallucinations          CONSULTS/REFERRALS:   Continue therapy  Referral Neurology  Coordinate care with therapist as needed      MEDICAL:   None at this time  Coordinate care with PCP (Liz Peterson) as needed  Follow up with primary care provider as planned or for acute medical concerns.    PSYCHOEDUCATION:  Medication side effects and alternatives reviewed. Health promotion activities recommended and reviewed today. All questions addressed. Education and counseling completed regarding risks and benefits of medications and psychotherapy options.  Consent provided by patient/guardian  Call the psychiatric nurse line with medication questions or concerns at [phone].  Povio may be used to communicate with your provider, but this is not intended to be used for emergencies.  Medlineplus.gov is information for patients.  It is run by the [employer] and it contains information about all disorders, diseases and all medications.      COMMUNITY RESOURCES:    CRISIS NUMBERS: Provided in AVS 4/18/2025  National Suicide Prevention Lifeline: 1-800-273-talk (945.979.1236)  Oscar/resources for a list of additional resources (SOS)            [facility] - [phone]   Urgent Care Adult Mental Xlobzs-[phone] mobile unit/ 24/7 crisis line  [facility] -[phone]   COPE 24/7 [place] Mobile Team -[phone] (adults)/ [phone]  (child)  Poison Control Center - 1-820-717-6504    OR  go to nearest ER  Crisis Text Line for any crisis 24/7 send this-   To: 274427   [facility] ([facility]) [facility] ER  [phone]  National Suicide Prevention Lifeline: 438.746.1469 (TTY: 849.600.8811). Call anytime for help.  (www.suicidepreventionlifeline.org)  National [place] [employer] Mental Illness (www.richy.org): 662.894.6339 or 441-052-2512.   Mental Health Association (www.mentalhealth.org): 120.588.1203 or 207-009-2745.  [place] Crisis Text Line: Text [place] to 621773  Suicide LifeLine Chat: suicidepushd.org/chat    ADMINISTRATIVE BILLING:   Level of Medical Decision Making:   - At least 1 chronic problem that is not stable  - Engaged in prescription drug management during visit (discussed any medication benefits, side effects, alternatives, etc.)           The longitudinal plan of care for the diagnosis(es)/condition(s) as documented were addressed during this visit. Due to the added complexity in care, I will continue to support Jacquie in the subsequent management and with ongoing continuity of care.    Patient Status:  Our psychiatry providers act as a specialty service for Primary Care Providers in the [facility] that seek to optimize medications for unstable patients.  Once medications have been optimized, our providers discharge the patient back to the referring Primary Care Provider for ongoing medication management.  This type of system allows our providers to serve a high volume of patients. At this time  Patient will continue to be seen for ongoing consultation and stabilization.    Signed: Purnima Ybarra DNP student    I was present with student, who participated in the service and in the documentation of the note. I have verified the history and personally performed the psychiatric exam and medical decision-making. I agree with the assessment and plan of care as documented in the note.    Fatmata Moulton, MAINE, APRN,  [facility] ([facility])   Chart documentation done in part with Dragon Voice Recognition software.  Although reviewed after completion, some word and grammatical errors may remain.  In addition, a note taking program tool in the creation of this note.

## 2025-04-18 NOTE — NURSING NOTE
Current patient location: [address] [place] [id number]    Is the patient currently in the state of [place]? YES    Visit mode: VIDEO    If the visit is dropped, the patient can be reconnected by:VIDEO VISIT: Send to e-mail at: [email or web address]    Will anyone else be joining the visit? YES daughter with patient  (If patient encounters technical issues they should call [phone])    Are changes needed to the allergy or medication list? No    Are refills needed on medications prescribed by this physician? NO    Rooming Documentation:  Questionnaire(s) completed Attendance guidelines (MH&A only)    Reason for visit: Consult    Nehal PEOPLES

## 2025-04-19 ENCOUNTER — NURSE TRIAGE (OUTPATIENT)
Dept: FAMILY MEDICINE | Facility: CLINIC | Age: 65
End: 2025-04-19
Payer: COMMERCIAL

## 2025-04-19 ENCOUNTER — MYC MEDICAL ADVICE (OUTPATIENT)
Dept: PSYCHIATRY | Facility: CLINIC | Age: 65
End: 2025-04-19
Payer: COMMERCIAL

## 2025-04-20 ENCOUNTER — TELEPHONE (OUTPATIENT)
Dept: FAMILY MEDICINE | Facility: CLINIC | Age: 65
End: 2025-04-20
Payer: COMMERCIAL

## 2025-04-20 NOTE — TELEPHONE ENCOUNTER
Patient Returning Call    Reason for call:its something about a hospital report     Information relayed to patient:yes someone from the hospital     Patient has additional questions:  Yes    What are your questions/concerns: she have questions about the report and need to figure out what's going on     Who does the patient want to speak with:  Provider    Is an  needed?:  No      Could we send this information to you in [facility] [employer] or would you prefer to receive a phone call?:   Patient would prefer a phone call   Okay to leave a detailed message?: Yes at Cell number on file:    Telephone Information:   Mobile [phone]

## 2025-04-21 ENCOUNTER — TELEPHONE (OUTPATIENT)
Dept: FAMILY MEDICINE | Facility: CLINIC | Age: 65
End: 2025-04-21
Payer: COMMERCIAL

## 2025-04-21 NOTE — TELEPHONE ENCOUNTER
"RN called patient back to assess status and needs. Next visit with provider is on 6/6/25.    Patient reports the following:    She has been taken off about 10 of her medications after a 4 day psychiatric hold/hospitalization she was on as they were concerned some meds/doses were contributing to her poor mental health state.  She states her time on a mental health hold was not easy because the room was dirty and she could not leave. She reports she felt \"so wronged\".  She states she was having anger outbursts and VH/AH hallucinations. She thought her  was having sex with girls under the deck.   He  had left her and because of that she did not eat or drink for 3 days and became dehydrated which also contributed to the 72 hour hold she was placed on when she went to the hospital.  She states she is trying to figure out what is wrong with her. She does not think it is dementia, \"I'm totally with it\". She feels Lewy Body was ruled out. She states she did have an aunt with schizophrenia but she does not feel she is as bad as her aunt was. She just want some answers and to know she is on the right medications as the anxiety has been so difficult.  She states her  has returned home and she has not had an angry outburst or AH/VH for 2 days.  RN let patient know its ok to tell nurse/clinic she is having them to which she states, 'you wont try to lock me up'.   She does state she is better now than when she wrote the My[facility] messages.  \"Going ok\".   She is just hopefull that someone can tell her exactly what is going on with her \"or close\" to what it is so she can't get the right help.  She states her mental health has caused problem between not only her , but also her 2 daughters who are her breast friends. Patient was tearful talking about this.   She is trying to make all the appointments to follow-up on things but it is hard to get in touch with people. She is concerned that she will no tbe able to " sit through the neuro psych testing they want her to do.     Patient would like help from Dr. Moulton sorting through the possibilities, the labs and getting on the right medication.     For now patient states she will dig out her wreath craft work to try to cope. Her  is on a walk. She hopes to hear back from the provider and is aware she is out of the office.     Routing to Dr. Moulton for review upon return to office.    Karla Osman RN on 4/21/2025 at 5:16 PM

## 2025-04-21 NOTE — TELEPHONE ENCOUNTER
Called patient and patient has an appointment tomorrow will discuss her concerns. Patient verbalized understanding and all questions answered.     Lucas Hurtado RN  [facility] Triage [place]  April 21, 2025

## 2025-04-21 NOTE — TELEPHONE ENCOUNTER
Please put patient on my schedule for 4 pm tomorrow. Video visit.  If she responds that she will want an in person, then please change to that.

## 2025-04-21 NOTE — TELEPHONE ENCOUNTER
RECORDS RECEIVED FROM: internal   REASON FOR VISIT: Hallucinations [R44.3]  Referral comments: recent hospitalization due to hallucinations, depression with psychotic features, recommended evaluation for Lewy Body Dementia    PROVIDER: Dr. Ruelas   DATE OF APPT: 7/25/25   NOTES (FOR ALL VISITS) STATUS DETAILS   OFFICE NOTE from referring provider Internal Dr Liz Peterson @ [facility]:  4/15/25   DISCHARGE SUMMARY from hospital [facility]:  4/8/25-4/11/25   MEDICATION LIST Internal    IMAGING  (FOR ALL VISITS)     MRI (HEAD, NECK, SPINE) [facility]:  MRI Brain 4/10/25   CT (HEAD, NECK, SPINE) [facility]:  CT Lumbar Spine 4/8/25  CT Head 4/8/25

## 2025-04-21 NOTE — TELEPHONE ENCOUNTER
Reason for Call:  Appointment Request    Patient requesting this type of appt:  patient will not tell me what the reason for this appt is.    Patient would like to schedule an appt with only Dr Lara at [facility]     This week or asap.    Please contact patient.  Thank you.    Requested provider: Liz Peterson    Reason patient unable to be scheduled: Needs to be scheduled by clinic    When does patient want to be seen/preferred time: 1-2 days    Comments: na    Could we send this information to you in [employer] or would you prefer to receive a phone call?:   Patient would prefer a phone call   Okay to leave a detailed message?: Yes at Cell number on file:    Telephone Information:   Mobile [phone]       Call taken on 4/21/2025 at 8:54 AM by Leslie Motley

## 2025-04-22 ENCOUNTER — VIRTUAL VISIT (OUTPATIENT)
Dept: FAMILY MEDICINE | Facility: CLINIC | Age: 65
End: 2025-04-22
Payer: COMMERCIAL

## 2025-04-22 ENCOUNTER — TELEPHONE (OUTPATIENT)
Dept: DERMATOLOGY | Facility: CLINIC | Age: 65
End: 2025-04-22

## 2025-04-22 DIAGNOSIS — F32.3 SEVERE MAJOR DEPRESSION WITH PSYCHOTIC FEATURES (H): Primary | ICD-10-CM

## 2025-04-22 DIAGNOSIS — R44.3 HALLUCINATIONS: ICD-10-CM

## 2025-04-22 DIAGNOSIS — F41.1 GENERALIZED ANXIETY DISORDER: ICD-10-CM

## 2025-04-22 DIAGNOSIS — L70.0 ACNE VULGARIS: ICD-10-CM

## 2025-04-22 PROCEDURE — 98004 SYNCH AUDIO-VIDEO EST SF 10: CPT | Performed by: FAMILY MEDICINE

## 2025-04-22 ASSESSMENT — ENCOUNTER SYMPTOMS: NEUROLOGIC COMPLAINT: 1

## 2025-04-22 NOTE — PATIENT INSTRUCTIONS
Based on our discussion, I have outlined the following instructions for you:      - Take risperidone at a dose of 0.5 mg two times a day.  - Reduce your Wellbutrin to 300 mg once a day for two weeks, then lower it to 150 mg once a day.  - Consider starting individual therapy and couples counseling at [facility] to help with mental health and relationship concerns.  - Look into other neurology clinics, like the [facility] or [facility], to get an earlier appointment for a neurological check-up.    Next appointment(s): - Follow-up appointment in 4 to 6 weeks to reassess medication adjustments and anxiety levels.    Thank you again for your visit, and we look forward to supporting you in your journey to better health.       **For crisis resources, please see the information at the end of this document**     Thank you for coming to the [facility].    -PSYCHOEDUCATION: The American Geriatric Society has updated the Beers Criteria list based on evidence-based recommendations. The updated report was published in the Journal of the American Geriatric Society.  Risks of antipsychotic medications:  metabolic disorder and movement disorder, and the need for blood monitoring of sugar and lipids/cholesterol while taking the medication.        LABS/PROCEDURES:    Please call your [facility] clinic and ask for a lab only appointment at your earliest convenience.  If your results are reassuring or normal they will be mailed to you or sent through [place] within 7 days. If the lab tests need quick action we will call you with the results. The phone number we will call with results is # [phone].      FOLLOW UP: Schedule an appointment with me in six weeks  or sooner as needed.  The intake team should be calling you to schedule.  If you dont hear from them, or they were unable to reach you, please call [phone] to schedule.  Follow up with primary  care provider as planned or for acute medical concerns.  Call the psychiatric nurse line with medication questions or concerns at [phone].      Medication Refills:  If you need any refills please call your pharmacy and they will contact us. Our fax number for refills is [phone]. Please allow three business for refill processing. If you need to  your refill at a new pharmacy, please contact the new pharmacy directly. The new pharmacy will help you get your medications transferred.     [facility] Assistance [phone]  Financial Assistance [phone]  [facility] Billing [phone]  Central Billing Office, [facility]: [phone]  [facility] Billing [phone]  Medical Records [phone]  [facility] Patient Bill of Rights [email or web address]       MENTAL HEALTH CRISIS RESOURCES:  For a emergency help, please call 911 or go to the nearest Emergency Department.     Emergency Walk-In Options:   EmPATH Unit @ [facility] ([place]): [phone] - Specialized mental health emergency area designed to be [facility] ([place]): [phone]  [facility] ([place]): [phone]  [facility]): [phone]    National Crisis Information: Call 988 Suicide and Crisis Lifeline  Crisis Text Line (free 24/7):  call **CRISIS (**079366) Crisis or use the texting option by texting 441932.   National Suicide Prevention Lifeline: Call 988  Poison Control Center: 4-144-765-9861  Trans Lifeline: 1-187.169.7062 - Hotline for transgender people of all ages  The [employer] Project: 9-066-280-7019 - Hotline for LGBT youth   List of all [facility] resources:   https://[place].gov/dhs/people-we-serve/adults/health-care/mental-health/resources/crisis-contacts.jsp    For Non-Emergency Support:   Fast Tracker: Mental Health & Substance Use Disorder Resources -   https://www.[facility]ermn.org/    "    Again thank you for choosing [facility] MENTAL HE[facility] ADDICTION [facility] and please let us know how we can best partner with you to improve you and your family's health.    You may be receiving a survey regarding this appointment. We would love to have your feedback, both positive and negative. The survey is done by an external company, so your answers are anonymous.    Patient Education   Collaborative Care Psychiatry Service  What to Expect  Here's what to expect from your Collaborative Care Psychiatry Service (CCPS).   About CCPS  CCPS means 2 people work together to help you get better. You'll meet with a behavioral health clinician and a psychiatric doctor. A behavioral health clinician helps people with mental health problems by talking with them. A psychiatric doctor helps people by giving them medicine.  How it works  At every visit, you'll see the behavioral health clinician (BHC) first. They'll talk with you about how you're doing and teach you how to feel better.   Then you'll see the psychiatric doctor. This doctor can help you deal with troubling thoughts and feelings by giving you medicine. They'll make sure you know the plan for your care.   CCPS usually takes 3 to 6 visits. If you need more visits, we may have you start seeing a different psychiatric doctor for ongoing care.  If you have any questions or concerns, we'll be glad to talk with you.  About visits  Be open  At your visits, please talk openly about your problems. It may feel hard, but it's the best way for us to help you.  Cancelling visits  If you can't come to your visit, please call us right away at [phone]. If you don't cancel at least 24 hours (1 full day) before your visit, that's \"late cancellation.\"  Being late to visits  Being very late is the same as not showing up. You will be a \"no show\" if:  Your appointment starts with a BHC, and you're more than 15 minutes late for a 30-minute (half hour) visit. This " will also cancel your appointment with the psychiatric doctor.  Your appointment is with a psychiatric doctor only, and you're more than 15 minutes late for a 30-minute (half hour) visit.  Your appointment is with a psychiatric doctor only, and you're more than 30 minutes late for a 60-minute (full hour) visit.  If you cancel late or don't show up 2 times within 6 months, we may end your care.   Getting help between visits  If you need help between visits, you can call us Monday to Friday from 8 a.m. to 4:30 p.m. at [phone].  Emergency care  Call 911 or go to the nearest emergency department if your life or someone else's life is in danger.  Call 008 anytime to reach the national Suicide and Crisis hotline.  Medicine refills  To refill your medicine, call your pharmacy. You can also call [facility]'s Behavioral Access at [phone], Monday to Friday, 8 a.m. to 4:30 p.m. It can take 1 to 3 business days to get a refill.   Forms, letters, and tests  You may have papers to fill out, like FMLA, short-term disability, and workability. We can help you with these forms at your visits, but you must have an appointment. You may need more than 1 visit for this, to be in an intensive therapy program, or both.  Before we can give you medicine for ADHD, we may refer you to get tested for it or confirm it another way.  We may not be able to give you an emotional support animal letter.  We don't do mental health checks ordered by the court.   We don't do mental health testing, but we can refer you to get tested.   Thank you for choosing us for your care.  For informational purposes only. Not to replace the advice of your health care provider. Copyright   2022 [facility]. All rights reserved. [employer] 007219 - Rev 11/24.

## 2025-04-22 NOTE — TELEPHONE ENCOUNTER
"DOS: 2025  Type of Procedure: PHOTOTHERAPY UVA1   CPT Codes: 09838  ICD10 Codes: Dermatitis [L30.9]  - Primary  Provider: [id number]  Jay Warren  Facility: [facility]  Pre-cert/Authorization completed:  no PA required, based on medical necessity. No predetermination available.  Cosmetic services are not covered    Medical Policy:None  Link:None  Payer: Ten   Spoke to [facility] PA list   Ref. # / Auth #   Valid Dates:     Please advise the patient to contact their insurance for coverage and benefits. Prior authorization or verified \"no prior authorization required\" is not a guarantee of payment or coverage. Payment is based on active status of the plan at the time of service, the patient's benefit plan documents, and the adherance to said documents at the time of service.      For an estimate:   patients can contact the Cost of Care department at:  Phone: [phone] or online: [email or web address].     For [facility] billing questions:   patients should contact the number listed on their bill or contact [phone].       "

## 2025-04-22 NOTE — PROGRESS NOTES
Jacquie is a 64 year old who is being evaluated via a billable video visit.    How would you like to obtain your AVS? MyChart  If the video visit is dropped, the invitation should be resent by: Text to cell phone: [phone]  Will anyone else be joining your video visit? No      Assessment & Plan     Severe major depression with psychotic features (H)  Reviewed the discharge summary with patient today.  Discussed results of the CT and MRI of the brain that were done in the hospital.  These imaging test did show some atrophy and some mild changes but no specific findings were called out by neurology consultation.  The working diagnosis at that time of discharge was that more likely this was psychiatric concern than a neurologic one, though recommendation was to follow-up still with neurology and obtain neuropsych evaluation as well.  Patient is plugged in with a psychiatric provider to assist with medications.  Patient is noticing some improvement though she feels it fluctuates.  We did discuss that this is likely how it would be as medications changed and are increased that she will have some ups and downs but should generally trend towards better over time.  She is starting counseling.  Encouraged her to continue with these resources.  We discussed potentially involving care coordination and what that might look like but patient declines at this time.  We have a scheduled follow-up from previous.  She will reach out sooner problems or concerns.    Hallucinations  The seem to be much better and she has not been experiencing them recently.  She will watch out for any concerns and reach out as needed.    Generalized anxiety disorder  Patient reports that her anxiety has improved from her last visit.  She feels that the medications seem to be helping though she has some ups and downs as noted above.  She will continue with counseling and meeting with her psychiatric provider.  She will reach out here as  needed.      The longitudinal plan of care for the diagnosis(es)/condition(s) as documented were addressed during this visit. Due to the added complexity in care, I will continue to support Jacquie in the subsequent management and with ongoing continuity of care.        Subjective   Jacquie is a 64 year old, presenting for the following health issues:  Neurologic Problem        4/22/2025     4:03 PM   Additional Questions   Roomed by Maryellen EMMANUEL   Accompanied by None         4/22/2025     4:03 PM   Patient Reported Additional Medications   Patient reports taking the following new medications NA     Patient wants to review labs and CT from hospital stay    Neurologic Problem    History of Present Illness       Mental Health Follow-up:  Patient presents to follow-up on Depression & Anxiety.Patient's depression since last visit has been:  Worse  The patient is having other symptoms associated with depression.  Patient's anxiety since last visit has been:  Worse  The patient is having other symptoms associated with anxiety.  Any significant life events: other  Patient is feeling anxious or having panic attacks.  Patient has no concerns about alcohol or drug use.    Reason for visit:  Follow up hospital    She eats 0-1 servings of fruits and vegetables daily.She consumes 1 sweetened beverage(s) daily.She exercises with enough effort to increase her heart rate 9 or less minutes per day.  She exercises with enough effort to increase her heart rate 3 or less days per week.   She is taking medications regularly.        Patient had been on the schedule as in person but was late from another visit so is on video.     She just had a visit with psychiatry.  Patient is wondering what her diagnosis is at this time.  She is wondering if dementia has been ruled out.  She reports anxiety about the unknown at this point.    She reports that her anxiety feels somewhat better today.  She just met with the psychiatric provider and has felt  the current dose of the risperidone is showing some benefits already.  She is having some difficulty with sleeping but overall feels mood is better.    She feels that things have been up-and-down at home.                 Objective           Vitals:  No vitals were obtained today due to virtual visit.    Physical Exam   GENERAL: alert and no distress  EYES: Eyes grossly normal to inspection.  No discharge or erythema, or obvious scleral/conjunctival abnormalities.  RESP: No audible wheeze, cough, or visible cyanosis.    SKIN: Visible skin clear. No significant rash, abnormal pigmentation or lesions.  NEURO: Cranial nerves grossly intact.  Mentation and speech appropriate for age.  PSYCH: Appropriate affect, tone, and pace of words          Video-Visit Details    Type of service:  Video Visit   Originating Location (pt. Location): Home    Distant Location (provider location):  On-site  Platform used for Video Visit: William  Signed Electronically by: Liz Peterson MD

## 2025-04-22 NOTE — TELEPHONE ENCOUNTER
Call to patient today with     Patient  reports experiencing significant emotional distress and anxiety, which she attributes to recent changes in her medication regimen. She mentions that after increasing her dose of risperidone to 0.5 mg twice a day, she felt awful for the first few days, with persistent crying and heightened anxiety. She describes her anxiety as causing issues with fitting in and feeling isolated. Despite these challenges, she has decided to maintain the current dosage, as she had a relatively good day recently.    She has also been in the process of decreasing her Wellbutrin dosage from 300 mg to 150 mg, as there was a concern that the combination with Cymbalta might be increasing her anxiety. She has already started this reduction. She expresses feeling alone in her struggles, noting that her family, including her daughters, do not fully understand what she is going through. Her  has been supportive, but she still feels a need for his constant presence, which has caused tension when he needs to attend his own appointments.    Her  shares that she had a particularly difficult time on Friday and Saturday, coinciding with an Easter celebration she did not attend initially. During this period, she experienced increased crying and anger, particularly directed at him, and expressed suspicion towards him. She did not want him to attend his doctor's appointments, indicating a need for his presence at all times. Fatmata acknowledges the accumulation of stress and information has been overwhelming for her, and she feels she is not handling it well.    She also mentions concerns about her cognitive health, as there have been discussions about potential Alzheimer's disease. She is awaiting results from tests related to dementia and Alzheimer's, and she expresses a strong desire to understand the outcomes for both her and her family's peace of mind. She has not yet received any solid  information regarding these tests, which adds to her anxiety and stress.    - Continue plan to decrease Wellbutrin dosage to 150 to potentially reduce anxiety symptoms.  -continue remaining medications at current dosages   - Recommend involving a care coordinator to assist in managing medical appointments and navigating healthcare needs.  - Plan to communicate with primary care physician Wendy Peterson to facilitate the involvement of a care coordinator from her clinic.    Fatmata Moulton, MAINE, APRN, FMHNP-BC,

## 2025-04-23 RX ORDER — DOXYCYCLINE 100 MG/1
100 CAPSULE ORAL 2 TIMES DAILY
Qty: 180 CAPSULE | Refills: 0 | OUTPATIENT
Start: 2025-04-23

## 2025-04-23 RX ORDER — DOXYCYCLINE 100 MG/1
100 CAPSULE ORAL 2 TIMES DAILY
Qty: 180 CAPSULE | Refills: 1 | Status: SHIPPED | OUTPATIENT
Start: 2025-04-23

## 2025-04-23 NOTE — TELEPHONE ENCOUNTER
Signed Today (4/23/2025):   doxycycline monohydrate (MONODOX) 100 MG capsule   Sig: Take 1 capsule (100 mg) by mouth 2 times daily. After meals and with big glass of water   Disp: 180 capsule    Refills: 1   Signed by: Jay Warren MD

## 2025-04-24 ENCOUNTER — TELEPHONE (OUTPATIENT)
Dept: PSYCHIATRY | Facility: CLINIC | Age: 65
End: 2025-04-24
Payer: COMMERCIAL

## 2025-04-24 DIAGNOSIS — F29 PSYCHOSIS, UNSPECIFIED PSYCHOSIS TYPE (H): Primary | ICD-10-CM

## 2025-04-24 NOTE — Clinical Note
KATARINA for a challenging presentation of a share patient   Fatmata Moulton, MAINE, APRN, FMHNP-BC,

## 2025-04-24 NOTE — TELEPHONE ENCOUNTER
Reason for call:  Other   Patient called regarding (reason for call): returning call  Additional comments: trying to reach Fatmata Moulton- pt did not want the crisis phone number listed in previous note    Phone number to reach patient:  Home number on file [phone] (home)    Best Time:  asap    Can we leave a detailed message on this number?  YES    Travel screening: Not Applicable

## 2025-04-24 NOTE — TELEPHONE ENCOUNTER
Routing to provider as patient was recently seen and concerns discuss but patient has more concerns.    Marie Laura CMA ([facility])

## 2025-04-24 NOTE — TELEPHONE ENCOUNTER
"Nurse Triage SBAR    Is this a 2nd Level Triage? YES, LICENSED PRACTITIONER REVIEW IS REQUIRED    Situation: Patient returning call.     Background: She is having depression crisis.     Assessment: Patient states she has been very angry with her . She is taking medication and is aware it may take time to stabilize. Her  and her have gotten into fights, and  threaten to \"leave until medications are figured out\". So he did. Came back this evening and fighting with patient. Patient has tried to reach out to psychiatry, but did not receive callback. She denies thoughts of self harm/suicide. She is tearful. She says she has the numbers for all the hotlines and says \"I need to speak with Dr. Peterson, she knows me\".     Protocol Recommended Disposition:   Go To ED/UCC Now (Or To Office With PCP Approval)    Recommendation: Patient not wanting to listen to any resources citing \"I've been in hospital recently and no one knows me\".  Writer did say uncertain if Dr. Peterson would be available to callback. Did emphasize patient to call crisis line. RN will call patient back at end of clinic day to close loop communication and again advise to call crisis line.     Routed to provider    Ochoa Greco RN on 4/24/2025 at 5:16 PM      Does the patient meet one of the following criteria for ADS visit consideration? 16+ years old, with an [facility] PCP     TIP  Providers, please consider if this condition is appropriate for management at one of our Acute and Diagnostic Services sites.     If patient is a good candidate, please use dotphrase <dot>triageresponse and select Refer to ADS to document.      Reason for Disposition   Patient sounds very sick or weak to the triager    Protocols used: Depression-A-[place]    "

## 2025-04-24 NOTE — TELEPHONE ENCOUNTER
"Due to patient daughter in with initial appointment and gave permission to discuss verbally, the following text message was sent to Deya via HIPAA compliant texting with [employer]    \"Deya Wagoner.  This is Fatmata Moulton.  I am in the clinic today and will try to call later, between patients. However, this sounds more acute than can be managed with a telephone call. [facility] has a Crisis Line, [phone], for immediate crisis assistance, which is available 24/7.  I would call them to start with.  I am so sorry this is returning to previous presentation.  \"    Fatmata Moulton, DNP, APRN, FMHNP-BC,   "

## 2025-04-24 NOTE — TELEPHONE ENCOUNTER
RN Triage    Patient Contact    Attempt # 1    Was call answered?  No.  Left message on voicemail with information to call me back and sent MCM    Gabbie Russo RN on 4/24/2025 at 4:30 PM

## 2025-04-24 NOTE — TELEPHONE ENCOUNTER
1) Per Chart Review, no valid REED on file for any family members - Consent to Communicate document and Current REED's do not specify Mental Health & Addiction Services.     2) Routing to provider as FYI and to advise if would like Nursing to call patient for check-in.     Paulina Corona RN on 4/24/2025 at 12:54 PM

## 2025-04-24 NOTE — TELEPHONE ENCOUNTER
Daughter called in today stating her mother is not doing well asked if they could contact Tono Amador at [phone] (Has REED on file).

## 2025-04-24 NOTE — TELEPHONE ENCOUNTER
"Pt's daughter, Deya Rodarte, called stating her mother is \"not doing well\".  Reports her medications do not seem to be working and she is \"back to how she was before\".  Family does not know what to do and would like to speak with Kenney about next steps.  Please return call to [phone]  "

## 2025-04-24 NOTE — TELEPHONE ENCOUNTER
Patient notes she is having a difficult time but unclear as to why. Please reach out to patient. She had a recent visit.
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
## 2025-04-24 NOTE — TELEPHONE ENCOUNTER
"Due to patient daughter in with initial appointment and gave permission to discuss verbally, the following text message was sent to Deya via HIPAA compliant texting with [employer]     \"Deya Wagoner.  This is Fatmata Moulton.  I am in the clinic today and will try to call later, between patients. However, this sounds more acute than can be managed with a telephone call. [facility] has a Crisis Line, [phone], for immediate crisis assistance, which is available 24/7.  I would call them to start with.  I am so sorry this is returning to previous presentation.  \"     Fatmata Moulton, DNP, APRN, FMHNP-BC,   "

## 2025-04-24 NOTE — TELEPHONE ENCOUNTER
"Rn called patient back to inform her that Dr. Peterson can not be found in clinic.     RN strongly advised to contact hotline or go to ED. Patient verbalized things have calmed down since last discussion. Patient states she does not want to go to ED because \"I've done that and got locked up\". RN explained to patient that being locked is not an appropriate way to describe. That it is an environment through studies to be beneficial to get patient what she needs to heal. Patient verbalized \"I just need medications\" RN advised we have medications on board, we need time and that is why the hotline and ED are further tools to aid medications all in patient best interest.     Patient is hoping PCP may be able to have virtual visit work in tomorrow. RN emphasized again to utilize crisis hotline or ED if things worsen overnight. Patient became tearful and verbalized understanding.     Ochoa Greco RN on 4/24/2025 at 5:53 PM    "

## 2025-04-24 NOTE — TELEPHONE ENCOUNTER
Reason for Disposition    Patient sounds very sick or weak to the triager    Additional Information    Negative: Very strange or confused behavior    Negative: Suicide thoughts, threats, attempts, or questions    Negative: Questions or concerns about alcohol use, unhealthy alcohol use, binge drinking, intoxication, or withdrawal    Negative: Questions or concerns about substance use (drug use), unhealthy drug use, intoxication, or withdrawal    Negative: Anxiety is main problem or symptom    Negative: Depression and unable to do any of normal activities (e.g., self care, school, work; in comparison to baseline).    Negative: Patient attempted suicide    Negative: Patient is threatening suicide now    Negative: Violent behavior, or threatening to physically hurt or kill someone    Negative: Patient is very confused (disoriented, slurred speech) and no other adult (e.g., friend or family member) available    Negative: Difficult to awaken or acting confused (disoriented, slurred speech) and new-onset    Negative: Sounds like a life-threatening emergency to the triager    Protocols used: Depression-A-OH

## 2025-04-24 NOTE — TELEPHONE ENCOUNTER
Please assist patient with C2C that she would like to change.     She is also asking how to lock her MyChart or to change her password.

## 2025-04-25 ENCOUNTER — TELEPHONE (OUTPATIENT)
Dept: PSYCHIATRY | Facility: CLINIC | Age: 65
End: 2025-04-25

## 2025-04-25 NOTE — TELEPHONE ENCOUNTER
Prior Authorization Retail Medication Request    Medication/Dose: brexpiprazole (REXULTI) 1 MG tablet  Diagnosis and ICD code (if different than what is on RX):  NA  New/renewal/insurance change PA/secondary ins. PA:  Previously Tried and Failed:  NA  Rationale:  NA    Insurance   Primary: [employer]  Insurance ID:  [id number]     Secondary (if applicable):[facility]  Insurance ID:  DUSTY    Pharmacy Information (if different than what is on RX)  [facility]:  [facility]  Phone:  [facility]  Fax:NA    Clinic Information  Preferred routing pool for dept communication: [facility]PS RN POOL.

## 2025-04-28 ENCOUNTER — TELEPHONE (OUTPATIENT)
Dept: FAMILY MEDICINE | Facility: CLINIC | Age: 65
End: 2025-04-28

## 2025-04-28 ENCOUNTER — MYC MEDICAL ADVICE (OUTPATIENT)
Dept: FAMILY MEDICINE | Facility: CLINIC | Age: 65
End: 2025-04-28

## 2025-04-28 ENCOUNTER — MYC MEDICAL ADVICE (OUTPATIENT)
Dept: PSYCHIATRY | Facility: CLINIC | Age: 65
End: 2025-04-28

## 2025-04-28 ENCOUNTER — E-VISIT (OUTPATIENT)
Dept: FAMILY MEDICINE | Facility: CLINIC | Age: 65
End: 2025-04-28
Payer: COMMERCIAL

## 2025-04-28 DIAGNOSIS — I82.411 ACUTE DEEP VEIN THROMBOSIS (DVT) OF FEMORAL VEIN OF RIGHT LOWER EXTREMITY (H): ICD-10-CM

## 2025-04-28 DIAGNOSIS — F32.3 SEVERE MAJOR DEPRESSION WITH PSYCHOTIC FEATURES (H): Primary | ICD-10-CM

## 2025-04-28 DIAGNOSIS — I10 HYPERTENSION GOAL BP (BLOOD PRESSURE) < 140/90: Primary | ICD-10-CM

## 2025-04-28 DIAGNOSIS — M25.50 MULTIPLE JOINT PAIN: ICD-10-CM

## 2025-04-28 RX ORDER — DULOXETIN HYDROCHLORIDE 60 MG/1
120 CAPSULE, DELAYED RELEASE ORAL DAILY
Qty: 180 CAPSULE | Refills: 1 | Status: SHIPPED | OUTPATIENT
Start: 2025-04-28

## 2025-04-28 NOTE — TELEPHONE ENCOUNTER
Patient only has 1.5 days left of the risperidone. We haven't heard anything regarding the rexulti PA. She is wondering if she should  the risperidone?      Archana Schumacher RN on 4/28/2025 at 12:59 PM

## 2025-04-28 NOTE — TELEPHONE ENCOUNTER
RN Triage    Patient Contact    Attempt # 1    Was call answered?  No.  Left message on voicemail with information to call me back.    Upon call back see how patient is doing and schedule ED follow up    Liane Wharton RN  [facility] - Registered Nurse  Clinic Triage [place]   April 28, 2025

## 2025-04-28 NOTE — TELEPHONE ENCOUNTER
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     They were last evaluated by our team on 3/19/25 with the plan to remain on apixaban 5 mg PO BID and see Dr. Grimes back in 3 months.    Prescription updated and refills given for 90 days.     This message will be routed to  for scheduling.    Quyen Foster RN, BSN, PCCN  Nurse Clinician    [facility]  [address] [place] [address], [address], [place] [address]   Office, direct: [phone]  Main office number: [phone]  Pronouns: She, her, hers

## 2025-04-28 NOTE — TELEPHONE ENCOUNTER
Patient called back stating that she is doing good , but concerned about BP being high.    Wondering next steps with BP.    Recent ER visit.     Patient refused same day provider and wanted message sent to provider.     Lucas Hurtado RN  [facility] Triage [place]  April 28, 2025

## 2025-04-28 NOTE — TELEPHONE ENCOUNTER
See my chart encounter from 4/28/2025.    Lucas Hurtado RN  [facility] Triage [place]  April 28, 2025

## 2025-04-29 ENCOUNTER — PATIENT OUTREACH (OUTPATIENT)
Dept: CARE COORDINATION | Facility: CLINIC | Age: 65
End: 2025-04-29
Payer: COMMERCIAL

## 2025-04-29 NOTE — PROGRESS NOTES
Clinic Care Coordination Contact  Community Health Worker Initial Outreach    CHW Initial Information Gathering:  Referral Source: PCP  Preferred Hospital: [facility], [place]  [phone]  Preferred Urgent Care: Other ([facility] - [place])  No PCP office visit in Past Year: No       Patient accepts CC: No, due to the patient stating that she was not needing any support from CCC at this time. Patient will be sent Care Coordination introduction letter for future reference.     LOLI Barragan  [phone]  Connected Care Resource [facility]

## 2025-04-29 NOTE — PATIENT INSTRUCTIONS
Thank you for checking in.     What I have in  your chart currently for medication for blood pressure is losartan 100 mg daily,  metoprolol 125 mg daily.     I would recommend increasing your metoprolol to 150 mg daily. Continue with losartan 100 mg daily.     Please schedule a 'float visit' with my staff to check blood pressure and pulse in 1-2 weeks.     Please let me know what questions you have.    Liz Peterson MD

## 2025-04-29 NOTE — TELEPHONE ENCOUNTER
PA Initiation    Medication: REXULTI 1 MG PO TABS  Insurance Company: [employer] - Phone [phone] Fax [phone]  Pharmacy Filling the Rx: [id number] - [place] - [address]  Filling Pharmacy Phone: [phone]  Filling Pharmacy Fax: [phone]  Start Date: 4/29/2025

## 2025-04-29 NOTE — LETTER
[facility] CARE COORDINATION  [address]  [place] [address]    April 29, 2025    Jacquie Amador  [address]  [place] [address]      Dear Jacquie,    I am a clinic community health worker who works with Liz Peterson MD with the [facility]. I wanted to thank you for spending the time to talk with me.  Below is a description of clinic care coordination and how we can further assist you.       The clinic care coordination team is made up of a registered nurse, , financial resource worker and community health worker who understand the health care system. The goal of clinic care coordination is to help you manage your health and improve access to the health care system. Our team works alongside your provider to assist you in determining your health and social needs. We can help you obtain health care and community resources, providing you with necessary information and education. We can work with you through any barriers and develop a care plan that helps coordinate and strengthen the communication between you and your care team.  Our services are voluntary and are offered without charge to you personally.    Please feel free to contact me with any questions or concerns regarding care coordination and what we can offer.      We are focused on providing you with the highest-quality healthcare experience possible.    Sincerely,     LOLI Barragan  [phone]  [facility] Care Resource [facility]

## 2025-04-30 RX ORDER — RISPERIDONE 0.25 MG/1
0.25 TABLET ORAL 2 TIMES DAILY
Qty: 60 TABLET | Refills: 0 | Status: SHIPPED | OUTPATIENT
Start: 2025-04-30

## 2025-04-30 NOTE — TELEPHONE ENCOUNTER
Called the pharmacy to double check on the need for a prior auth for Rexulti. Since provider is showing in cover my meds that it does not need a PA.   Pharmacy said that it will not let her run it until a PA has been approved.     Will have patient reach out to her insurance for more clarification.     Archana Schumacher RN on 4/30/2025 at 9:25 AM

## 2025-05-01 NOTE — TELEPHONE ENCOUNTER
PRIOR AUTHORIZATION DENIED    Medication: REXULTI 1 MG PO TABS    Insurance Company: [employer] - Phone [phone] Fax [phone]    Denial Date: 5/1/2025    Denial Reason(s): Patient needs to try and fail ALL preferred medications: aripiprazole, as well as one more other antipsychotic agent (olanzapine, ziprasidone, lurasidone, etc    Appeal Information:

## 2025-05-02 ENCOUNTER — LAB (OUTPATIENT)
Dept: LAB | Facility: OTHER | Age: 65
End: 2025-05-02
Payer: COMMERCIAL

## 2025-05-02 DIAGNOSIS — N17.9 ACUTE KIDNEY INJURY SUPERIMPOSED ON CKD: Primary | ICD-10-CM

## 2025-05-02 DIAGNOSIS — N18.9 ACUTE KIDNEY INJURY SUPERIMPOSED ON CKD: Primary | ICD-10-CM

## 2025-05-05 ENCOUNTER — TRANSFERRED RECORDS (OUTPATIENT)
Dept: HEALTH INFORMATION MANAGEMENT | Facility: CLINIC | Age: 65
End: 2025-05-05
Payer: COMMERCIAL

## 2025-05-06 DIAGNOSIS — F32.3 SEVERE MAJOR DEPRESSION WITH PSYCHOTIC FEATURES (H): ICD-10-CM

## 2025-05-06 RX ORDER — RISPERIDONE 0.25 MG/1
0.25 TABLET ORAL 2 TIMES DAILY
Qty: 60 TABLET | Refills: 0 | Status: CANCELLED | OUTPATIENT
Start: 2025-05-06

## 2025-05-06 NOTE — TELEPHONE ENCOUNTER
Date of Last Office Visit: 4/18/2025  Date of Next Office Visit:  6/6/2025  No shows since last visit: No  More than one patient-initiated cancellation (with reschedule) since last seen in clinic? No    []Medication refilled per  Medication Refill in Ambulatory Care  policy.  [x]Scope of Practice: refill request processed by LPN/MA  []Medication unable to be refilled by RN due to criteria not met as indicated below:    []Eligibility: has not had a provider visit within last 6 months   []Supervision: no future appointment; < 7 days before next appointment   []Compliance: no shows; cancellations; lapse in therapy   [x]Verification: order discrepancy; may need modification...   [] > 30-day supply request   []Advanced refill request: > 7 days before refill date   []Controlled medication   []Medication not included in policy   []Review: new med; med adjusted <= 30 days; safety alert; requires lab monitoring...   [x]Other: Pharmacy comment: Pt said she should be on 0.5 mg . Need new Rx.  Unclear of the appeal status on Rexulti        Thank you,  Jacquie Amador    Medication(s) requested:     -  risperiDONE (RISPERDAL) 0.25 MG tablet   Date last ordered: 4/30/2025  Qty: 60  Refills: 0      Disp Refills Start End ASHLEIGH   risperiDONE (RISPERDAL) 0.25 MG tablet 60 tablet 0 4/30/2025 -- No   Sig - Route: Take 1 tablet (0.25 mg) by mouth 2 times daily. - Oral   Sent to pharmacy as: risperiDONE 0.25 MG Oral Tablet (risperDAL)   Class: E-Prescribe       Any Controlled Substance(s)? No      Requested medication(s) verified as identical to current order? No:      Any lapse in adherence to medication(s) greater than 5 days? Appears NO    Additional action taken? routed encounter to provider for review.      Last visit treatment plan:   Medications:  Decrease Wellbutrin from 450 mg to 300 mg daily for 2 weeks, then reduce to 150 mg daily.    Increase Risperidone from 0.25 mg BID to 0.5 mg BID for improved control of psychotic  symptoms.    Continue Cymbalta and Trazodone at current doses.    Continue melatonin PRN for sleep.    Coordination of Care:  Patient has an appointment with pharmacy for medication reconciliation on 5/12.    Referrals placed for therapy and neurology for cognitive evaluation.    Follow-Up:  Return to clinic in 6 weeks for medication management and symptom reassessment.     Any medication(s) require lab monitoring? Yes   GENERAL ANTIPSYCHOTIC   Last Hemoglobin A1c:   Hemoglobin A1C   Date Value Ref Range Status   04/08/2025 6.2 (H) <5.7 % Final     Comment:     Normal <5.7%   Prediabetes 5.7-6.4%    Diabetes 6.5% or higher     Note: Adopted from ADA consensus guidelines.     Last Lipid Panel (fasting):   Cholesterol   Date Value Ref Range Status   10/17/2024 138 <200 mg/dL Final     Triglycerides   Date Value Ref Range Status   10/17/2024 136 <150 mg/dL Final     Direct Measure HDL   Date Value Ref Range Status   10/17/2024 50 >=50 mg/dL Final     LDL Cholesterol Calculated   Date Value Ref Range Status   10/17/2024 61 <100 mg/dL Final     Non HDL Cholesterol   Date Value Ref Range Status   10/17/2024 88 <130 mg/dL Final     Patient Fasting > 8hrs?   Date Value Ref Range Status   10/17/2024 Yes  Final        Ayana Nelson LPN on 5/6/2025 at 10:22 AM

## 2025-05-07 ENCOUNTER — E-VISIT (OUTPATIENT)
Dept: FAMILY MEDICINE | Facility: CLINIC | Age: 65
End: 2025-05-07
Payer: COMMERCIAL

## 2025-05-07 ENCOUNTER — NURSE TRIAGE (OUTPATIENT)
Dept: FAMILY MEDICINE | Facility: CLINIC | Age: 65
End: 2025-05-07

## 2025-05-07 ENCOUNTER — MYC MEDICAL ADVICE (OUTPATIENT)
Dept: PSYCHIATRY | Facility: CLINIC | Age: 65
End: 2025-05-07
Payer: COMMERCIAL

## 2025-05-07 DIAGNOSIS — R00.2 PALPITATIONS: ICD-10-CM

## 2025-05-07 DIAGNOSIS — B96.89 ACUTE BACTERIAL SINUSITIS: Primary | ICD-10-CM

## 2025-05-07 DIAGNOSIS — J01.90 ACUTE BACTERIAL SINUSITIS: Primary | ICD-10-CM

## 2025-05-07 DIAGNOSIS — I10 HYPERTENSION GOAL BP (BLOOD PRESSURE) < 140/90: ICD-10-CM

## 2025-05-07 DIAGNOSIS — R00.0 SINUS TACHYCARDIA: ICD-10-CM

## 2025-05-07 NOTE — TELEPHONE ENCOUNTER
Insurance Exception form completed for the Rexulti and faxed to the approprate insurance number and medical records at [facility]       Will wait for reply.      Fatmata Moulton, MAINE, APRN, FMHNP-BC,

## 2025-05-07 NOTE — TELEPHONE ENCOUNTER
E visit 4/28/2025    What I have in  your chart currently for medication for blood pressure is losartan 100 mg daily,  metoprolol 125 mg daily.      I would recommend increasing your metoprolol to 150 mg daily. Continue with losartan 100 mg daily.       When look in medications tab it says.    metoprolol succinate ER (TOPROL XL) 100 MG 24 hr tablet Take 1 tablet (100 mg) by mouth 2 times daily.   Take 1 tablet (100 mg) by mouth 2 times daily.     Prescribed: 100 mg, Oral, 2 TIMES DAILY  Patient taking differently: 100 mg Oral DAILY,         metoprolol succinate ER (TOPROL XL) 25 MG 24 hr tablet   Take 1 tablet (25 mg) by mouth 2 times daily.     Prescribed: 25 mg, Oral, 2 TIMES DAILY  Patient taking differently: 25 mg Oral DAILY,     Lucas Hurtado RN  [facility] Triage [place]  May 7, 2025

## 2025-05-07 NOTE — PATIENT INSTRUCTIONS
Dear Jacquie Amador    After reviewing your responses, I've been able to diagnose you with sinusitis.      Based on your responses and diagnosis, I have prescribed Augmentin to treat your symptoms. I have sent this to your pharmacy.?     It is also important to stay well hydrated, get lots of rest and take over-the-counter decongestants,?tylenol?or ibuprofen if you?are able to?take those medications per your primary care provider to help relieve discomfort.?     It is important that you take?all of?your prescribed medication even if your symptoms are improving after a few doses.? Taking?all of?your medicine helps prevent the symptoms from returning.?     If your symptoms worsen, you develop severe headache, vomiting, high fever (>102), or are not improving in 7 days, please contact your primary care provider for an appointment or visit any of our convenient Walk-in Care or Urgent Care Centers to be seen which can be found on our website?here.?     Thanks again for choosing?us?as your health care partner,?   ?  Liz Peterson MD?

## 2025-05-08 ENCOUNTER — MYC MEDICAL ADVICE (OUTPATIENT)
Dept: FAMILY MEDICINE | Facility: CLINIC | Age: 65
End: 2025-05-08

## 2025-05-08 RX ORDER — METOPROLOL SUCCINATE 25 MG/1
25 TABLET, EXTENDED RELEASE ORAL 2 TIMES DAILY
Qty: 180 TABLET | Refills: 1 | Status: SHIPPED | OUTPATIENT
Start: 2025-05-08

## 2025-05-08 RX ORDER — METOPROLOL SUCCINATE 100 MG/1
100 TABLET, EXTENDED RELEASE ORAL 2 TIMES DAILY
Qty: 180 TABLET | Refills: 1 | Status: SHIPPED | OUTPATIENT
Start: 2025-05-08

## 2025-05-08 NOTE — TELEPHONE ENCOUNTER
Clarify Toprol, XL dose and times per day.   ? VV or MA BP    Nurse Triage SBAR    Is this a 2nd Level Triage? NO    Situation: Patient having increased frontal lobes HA since dose change x1 month ago after leaving hospital. Changed Toprol XL 125mg 2 times daily to 150mg daily. BP average in evening with 's/90's    Background: Unable to find where noted increased to 150mg daily    Assessment: 5/7/25 evening /93. Denies vision changes, numbness tingling, CP, SOB, swelling, tinnitus, N&V, dizziness.      Protocol Recommended Disposition:   See Within 2 Weeks In Office    Would like PCP opinion for refill.    Recommendation: Reviewed red flags symptoms and when to be seen in ED. Recommended appointment and declined. Patient  verbalized understanding and all questions answered.     Routed to provider    Does the patient meet one of the following criteria for ADS visit consideration? 16+ years old, with an [facility] PCP     Liane Wharton RN  [facility] - Registered Nurse  Clinic Triage [place]   May 8, 2025      Reason for Disposition   Systolic BP >= 130 OR Diastolic >= 80, and is taking BP medications    Additional Information   Negative: Sounds like a life-threatening emergency to the triager   Negative: Pregnant > 20 weeks or postpartum (< 6 weeks after delivery) and new hand or face swelling   Negative: Pregnant > 20 weeks and BP > 140/90   Negative: Systolic BP >= 160 OR Diastolic >= 100, and any cardiac or neurologic symptoms (e.g., chest pain, difficulty breathing, unsteady gait, blurred vision)   Negative: Patient sounds very sick or weak to the triager   Negative: BP Systolic BP >= 140 OR Diastolic >= 90 and postpartum (from 0 to 6 weeks after delivery)   Negative: Systolic BP >= 180 OR Diastolic >= 110, and missed most recent dose of blood pressure medication   Negative: Systolic BP >= 180 OR Diastolic >= 110   Negative: Patient wants to be seen   Negative: Ran out of BP medications   Negative:  "Taking BP medications and feels is having side effects (e.g., impotence, cough, dizziness)   Negative: Systolic BP >= 160 OR Diastolic >= 100   Negative: Systolic BP >= 130 OR Diastolic >= 80, and pregnant    Answer Assessment - Initial Assessment Questions  1. BLOOD PRESSURE: \"What is the blood pressure?\" \"Did you take at least two measurements 5 minutes apart?\"      153/93  evening ranging 150's/90's  2. ONSET: \"When did you take your blood pressure?\"   evening  3. HOW: \"How did you obtain the blood pressure?\" (e.g., visiting nurse, automatic home BP monitor)      Home bp  4. HISTORY: \"Do you have a history of high blood pressure?\"      yes  5. MEDICATIONS: \"Are you taking any medications for blood pressure?\" \"Have you missed any doses recently?\"      Metoprolol only taking once daily 150mg  6. OTHER SYMPTOMS: \"Do you have any symptoms?\" (e.g., headache, chest pain, blurred vision, difficulty breathing, weakness)      headache  7. PREGNANCY: \"Is there any chance you are pregnant?\" \"When was your last menstrual period?\"      N/a    Protocols used: High Blood Pressure-A-OH    "

## 2025-05-08 NOTE — TELEPHONE ENCOUNTER
RN Triage    Patient Contact    Attempt # 1    Was call answered?  No.  Left message on voicemail with information to call me back.  Sent Galeno Plushart    Upon call back review below message from PCP.     Liane Wharton RN  [facility] - Registered Nurse  Clinic Triage [place]   May 8, 2025

## 2025-05-08 NOTE — TELEPHONE ENCOUNTER
In review of the discharge summary for her recent hospitalization, she is to be taking her metoprolol  mg twice daily.      I recommend returning to that.     Continue to monitor blood pressure daily and as needed for concerns.

## 2025-05-11 ENCOUNTER — MYC MEDICAL ADVICE (OUTPATIENT)
Dept: PSYCHIATRY | Facility: CLINIC | Age: 65
End: 2025-05-11
Payer: COMMERCIAL

## 2025-05-11 DIAGNOSIS — F41.1 GENERALIZED ANXIETY DISORDER: Primary | ICD-10-CM

## 2025-05-11 DIAGNOSIS — F29 PSYCHOSIS, UNSPECIFIED PSYCHOSIS TYPE (H): ICD-10-CM

## 2025-05-12 ENCOUNTER — APPOINTMENT (OUTPATIENT)
Dept: PHARMACY | Facility: CLINIC | Age: 65
End: 2025-05-12
Attending: INTERNAL MEDICINE
Payer: COMMERCIAL

## 2025-05-12 NOTE — TELEPHONE ENCOUNTER
1) Per patient's MyChart message:    I spoke with Golden the pharmacist at [facility] this morning . We do a monthly meet to go over medications. He told me Rexalti was approved last Friday. It should have been called in by Fatmata I believe to [facility] . I am hoping this will be a great help after it kicks in. The anxiousness and mood are what has been wearing on my . Things will be ok with time.   I am assuming I can start the   Rexalti tonight in place of Risperidone . If it is filled.   I will keep you posted how it goes. I also have had bad bad itching and acne/sores on face and back that won t heal. I sure hope the Rexalti doesn t do this.      Thanks for responding quickly. If you know anything different with Texalti please let me know   Thank you.      2) per 4/24/25 telephone encounter on 4/24/25 at 6:48pm Fatmata Moulton advised:

## 2025-05-12 NOTE — PROGRESS NOTES
Medication Therapy Management (MTM) Encounter    ASSESSMENT:                            Medication Adherence/Access: {adherencechoices:942583}    ***:  ***    PLAN:                            {MTM Rheum Plan:095336}  Wants to back off Wellbutrin at some point       Follow-up: {followuptest2:742825} Follow-up at beginning of July!!     SUBJECTIVE/OBJECTIVE:                          Jacquie Amador is a 64 year old female seen for {mtmvisit:706234}     Reason for visit: ***.    Allergies/ADRs: {1/2/3/4/5:835017}  Past Medical History: {1/2/3/4/5:616141}  Tobacco: She reports that she quit smoking about 11 years ago. Her smoking use included cigarettes. She started smoking about 45 years ago. She has a 10.1 pack-year smoking history. She has been exposed to tobacco smoke. She has never used smokeless tobacco.  Alcohol: {ALCOHOL CONSUMPTION HX:250416}  {Social and Goals:944529}    Medication Adherence/Access: {fumedadherence:890792}    {MTMRheumsub:142887}  *** (started ***, last dose ***, next dose ***)    Patient reports ***    Previous therapies: ***    Last lab monitoring completed: ***    ***  ***    Risperidone not helping   Itching bad, sores getting worse     Rexulti denied - anxiety is through the roof   Rexulti APPROVED!!!     Other issue is switching to Medicare in July 1st [employer]    Bad headaches recently - started about 2-3 weeks ago nothing seems to make better or worse - every other day - bad this morning now doing better     No hallucinations since hospitalization - mood and anxiety fluctuate   Off oxybutynin - haven't noticed any changes - was able to be in public with no sweating     Metoprolol - 125 BID - Blood pressure has been more stable   Noticing more leg swelling with right leg swelling - move around swelling is worse, today is okay     Currently on 300 mg of Wellbutrin - wasn't able to decrease further     Today's Vitals: LMP 07/17/2009 (Exact Date)   ----------------  Post Discharge Medication  "Reconciliation Status: discharge medications reconciled, continue medications without change.    I spent {mtm total time 3:261206} with this patient today. { :421062}.     A summary of these recommendations {GIVEN/NOT GIVEN:191821}.    ***    Telemedicine Visit Details  The patient's medications can be safely assessed via a telemedicine encounter.  Type of service:  {telemedvisitmtm:022052::\"Telephone visit\"}  Originating Location (pt. Location): {video visit patient location:443714::\"Home\"}  {PROVIDER LOCATION On-site should be selected for visits conducted from your clinic location or adjoining [facility] hospital, academic office, or other nearby [facility] building. Off-site should be selected for all other provider locations, including home:719489}  Distant Location (provider location):  {virtual location provider:352543}  Start Time: {video/phone visit start time:152948}  End Time: {video/phone visit end time:152948}     Medication Therapy Recommendations  No medication therapy recommendations to display      "

## 2025-05-14 ENCOUNTER — E-VISIT (OUTPATIENT)
Dept: FAMILY MEDICINE | Facility: CLINIC | Age: 65
End: 2025-05-14
Payer: COMMERCIAL

## 2025-05-14 ENCOUNTER — NURSE TRIAGE (OUTPATIENT)
Dept: FAMILY MEDICINE | Facility: CLINIC | Age: 65
End: 2025-05-14

## 2025-05-14 DIAGNOSIS — R06.2 WHEEZING: Primary | ICD-10-CM

## 2025-05-14 PROCEDURE — 99207 PR NON-BILLABLE SERV PER CHARTING: CPT | Performed by: FAMILY MEDICINE

## 2025-05-14 NOTE — TELEPHONE ENCOUNTER
Called patient and patient refused to be triage by the nurse. Patient stated that she doesn't not want another appointment. Told patient if she gets worse than go to  or give us a call back. Patient states already getting worse. Tried offer triage and patient refused again.     Told patient if she needs anything else to give a call back.     Lucas Hurtado RN  [facility] T[facility]  May 14, 2025

## 2025-05-14 NOTE — PATIENT INSTRUCTIONS
Dear Jacquie,    Sorry you are still not feeling well.     I think that you may need an in person appointment to help decide next steps.     I am out of the office for the next week. I will have a nurse reach out to you and help you get an appointment.     You will not be charged for this eVisit.     Please let me know what questions you have.    Liz Peterson MD

## 2025-05-15 ENCOUNTER — MYC MEDICAL ADVICE (OUTPATIENT)
Dept: FAMILY MEDICINE | Facility: CLINIC | Age: 65
End: 2025-05-15
Payer: COMMERCIAL

## 2025-05-15 NOTE — TELEPHONE ENCOUNTER
Please call patient and let them know Rexulti is approved and they should be able to  at the pharmacy.  Thank you!     Fatmata Moulton, MAINE, APRN, FMHNP-BC,

## 2025-05-19 RX ORDER — DIAZEPAM 5 MG/1
5 TABLET ORAL 2 TIMES DAILY PRN
Qty: 30 TABLET | Refills: 0 | Status: SHIPPED | OUTPATIENT
Start: 2025-05-19 | End: 2025-05-22

## 2025-05-19 NOTE — TELEPHONE ENCOUNTER
Contacted patient via telephone as I am covering for Fatmata Moulton.  Patient notes that she has been experiencing increased anxiety since switching from risperidone to Rexulti providing specific examples of anxiety building up and getting annoyed with  which is causing relationship concerns. We decided it would be beneficial to start Valium 5 mg twice daily as needed for increased anxiety and agitation I also did increase Rexulti to 2 mg patient is aware that prioritization will most likely be needed as we did increase this dose and she did require prior authorization for the 1 mg I also did instruct her to stop using the hydroxyzine if we are going to use the valium.  Risk and benefits associated with both Rexulti and Valium were discussed.

## 2025-05-19 NOTE — TELEPHONE ENCOUNTER
RN received instructions from Fatmata Moulton DNP for nursing to please call the patient and inform her that her Rexulti is approved.       RN phoned the patient and LVM instructing the patient that her Rexulti is approved and to please contact her Pharmacy regarding . RN instructed the patient to call [phone] with any questions or concerns.      Obey Carter RN on 5/19/2025 at 1:33 PM

## 2025-05-20 NOTE — TELEPHONE ENCOUNTER
Call to pharmacist.  Rexulti 1 mg was picked up on 5/13/2025.      Call to patient and she has picked up the diazepam 5 mg and has not increased the Rexulti.  Tolerating the Rexulti 1 mg for the last week.  Taking in the AM and feels it is making her tired.  She will try changing it to bedtime dosing.  She can increase to 2 mg today by taking #2 of the 1 mg tabs.  Discussed plan to meet 6/6/2025 with this provider for follow-up and she states she is fine continuing with the plan to see me.      Fatmata Moulton, DNP, APRN, FMHNP-BC,

## 2025-05-21 ENCOUNTER — MYC MEDICAL ADVICE (OUTPATIENT)
Dept: FAMILY MEDICINE | Facility: CLINIC | Age: 65
End: 2025-05-21
Payer: COMMERCIAL

## 2025-05-21 PROBLEM — L70.0 ACNE VULGARIS: Status: ACTIVE | Noted: 2025-05-21

## 2025-05-22 RX ORDER — DIAZEPAM 5 MG/1
TABLET ORAL
Qty: 15 TABLET | Refills: 0 | Status: SHIPPED | OUTPATIENT
Start: 2025-05-22

## 2025-05-26 DIAGNOSIS — K21.9 GASTROESOPHAGEAL REFLUX DISEASE, UNSPECIFIED WHETHER ESOPHAGITIS PRESENT: ICD-10-CM

## 2025-05-27 RX ORDER — FAMOTIDINE 40 MG/1
40 TABLET, FILM COATED ORAL DAILY
Qty: 90 TABLET | Refills: 0 | Status: SHIPPED | OUTPATIENT
Start: 2025-05-27

## 2025-06-02 ENCOUNTER — NURSE TRIAGE (OUTPATIENT)
Dept: FAMILY MEDICINE | Facility: CLINIC | Age: 65
End: 2025-06-02

## 2025-06-02 ENCOUNTER — MYC MEDICAL ADVICE (OUTPATIENT)
Dept: PSYCHIATRY | Facility: CLINIC | Age: 65
End: 2025-06-02

## 2025-06-02 NOTE — TELEPHONE ENCOUNTER
Nurse Triage SBAR    Is this a 2nd Level Triage? NO    Situation: patient is calling in regarding her anxiety. Patient states she has had much history of anxiety. She said she has been on and off medications trying to help.  She is working with a Fatmata Moulton DNP as well.  She did start on Hydroxyzine 25mg twice a day. She has been on this before and it has been helpful, she reports she used to be on it three times a day.  Patient states it is helpful when she takes it but does not feel twice a day is enough.  Patient states she takes it once in the AM, then once late afternoon/evening. She said she really feels she needs it once in the AM, once in the afternoon and once in the evening.  Patient states she is quite emotional most of the day, her  is not supportive and she feels anxious. Patient denies any headache, thoughts of harming self or others.      Patient is asking to please have an increase to her Hydroxyzine to 25mg three times a day .     Background: anxiety and depression. Has been ongoing.     Assessment: advised patient to have an appointment within 3 days per protocol.      Protocol Recommended Disposition:   See in Office Within 3 Days    Recommendation: patient verbalized understanding.  She states has an appointment coming up with Dr. Peterson. She just wants the medication increased. Will route message to Fatmata Moulton DNP with Psychiatry.  RN reviewed red flag symptoms with patient and when to seek emergency care.       Routed to provider    Does the patient meet one of the following criteria for ADS visit consideration? 16+ years old, with an [facility] PCP     TIP  Providers, please consider if this condition is appropriate for management at one of our Acute and Diagnostic Services sites.     If patient is a good candidate, please use dotphrase <dot>triageresponse and select Refer to ADS to document.      Reason for Disposition   Started on anti-anxiety medication and no relief    MODERATE anxiety (e.g., persistent or frequent anxiety symptoms; interferes with sleep, school, or work)    Additional Information   Negative: SEVERE difficulty breathing (e.g., struggling for each breath, speaks in single words)   Negative: Bluish (or gray) lips or face   Negative: Difficult to awaken or acting confused (e.g., disoriented, slurred speech)   Negative: Violent behavior, or threatening to physically hurt or kill someone   Negative: Sounds like a life-threatening emergency to the triager   Negative: Chest Pain   Negative: Palpitations, skipped heartbeat, or rapid heartbeat is main symptom   Negative: Suicide thoughts, threats, attempts, or questions   Negative: Depression is main problem or symptom (e.g., feelings of sadness or hopelessness)   Negative: Difficulty breathing and persists > 10 minutes and not relieved by reassurance provided by triager   Negative: Feeling weak or lightheaded (e.g., woozy, feeling like they might faint), and lasts > 10 minutes and not relieved by reassurance provided by triager   Negative: SEVERE anxiety (e.g., extremely anxious with intense emotional symptoms such as feeling of unreality, urge to flee, unable to calm down; unable to cope or function), which is not better after 10 minutes of reassurance and Care Advice   Negative: Panic attack symptoms (e.g., sudden onset of intense fear and symptoms such as dizziness, feeling of impending doom or fear of dying, hyperventilation, tingling, sweating, trembling), and has not been evaluated for this by doctor (or NP/PA)   Negative: Panic attack symptoms (diagnosed in the past) that is not better with usual treatment, reassurance, or Care Advice   Negative: Alcohol or drug use, known or suspected, and feeling very shaky (i.e., visible tremors of hands)   Negative: Patient sounds very sick or weak to the triager   Negative: Patient sounds very upset or troubled to the triager    Protocols used: Anxiety and Panic  Attack-A-OH

## 2025-06-02 NOTE — TELEPHONE ENCOUNTER
FYI    Nurse Triage SBAR    Is this a 2nd Level Triage? YES, LICENSED PRACTITIONER REVIEW IS REQUIRED    Situation: right knee swelling moderate  anterior and posterior x4 days, able to move and walk , but painful 3/10.     Background: No injury to area.    Assessment: Denies severe pain, fever, chills, warmth to site, N&V, CP, SOB, wheezing, dizziness, redness.     Protocol Recommended Disposition:   See in Office Today  Declined and will be seen tomorrow, per request.     Recommendation: To be seen today, declined. Reviewed if new or worsening symptoms to be seen more urgently in UC/ED.      Routed to provider    Does the patient meet one of the following criteria for ADS visit consideration? 16+ years old, with an [facility] PCP   Liane Wharton [facility] - Registered Nurse  Clinic Triage [place]   June 2, 2025      Reason for Disposition   Redness of the skin and no fever    Additional Information   Negative: Followed a knee injury   Negative: Followed a bee sting and has localized swelling (e.g., small area of puffy or swollen skin)   Negative: Followed an insect bite and has localized swelling (e.g., small area of puffy or swollen skin)   Negative: Knee pain is main symptom   Negative: Swelling of calf or leg is main symptom   Negative: Knee pain and fever   Negative: Knee redness and fever   Negative: Patient sounds very sick or weak to the triager   Negative: SEVERE pain (e.g., excruciating, unable to walk) and not improved after 2 hours of pain medicine   Negative: Redness and painful when touched and no fever   Negative: Red area or streak > 2 inches (or 5 cm)   Negative: Thigh or calf pain and only 1 side and present > 1 hour   Negative: Thigh or calf swelling and only 1 side   Negative: SEVERE swelling (e.g., can't move swollen knee at all)   Negative: Looks like a boil, infected sore, deep ulcer or other infected rash (spreading redness, pus)    Protocols used: Knee Swelling-A-OH

## 2025-06-02 NOTE — TELEPHONE ENCOUNTER
See nurse triage encounter from today, 6/2/25. Patient was requesting to take hydroxyzine 3 times daily instead of 2 times.     Also requesting Valium.     Would like a phone call.     Archana Schumacher RN on 6/2/2025 at 2:54 PM

## 2025-06-03 ENCOUNTER — VIRTUAL VISIT (OUTPATIENT)
Dept: PHARMACY | Facility: CLINIC | Age: 65
End: 2025-06-03
Payer: COMMERCIAL

## 2025-06-03 ENCOUNTER — TELEPHONE (OUTPATIENT)
Dept: BEHAVIORAL HEALTH | Facility: CLINIC | Age: 65
End: 2025-06-03

## 2025-06-03 DIAGNOSIS — F41.1 GENERALIZED ANXIETY DISORDER: ICD-10-CM

## 2025-06-03 DIAGNOSIS — F33.3 SEVERE EPISODE OF RECURRENT MAJOR DEPRESSIVE DISORDER, WITH PSYCHOTIC FEATURES (H): ICD-10-CM

## 2025-06-03 DIAGNOSIS — F41.1 GENERALIZED ANXIETY DISORDER: Primary | ICD-10-CM

## 2025-06-03 DIAGNOSIS — F32.9 MAJOR DEPRESSION: Primary | ICD-10-CM

## 2025-06-03 PROCEDURE — 99605 MTMS BY PHARM NP 15 MIN: CPT | Mod: 93 | Performed by: PHARMACIST

## 2025-06-03 RX ORDER — HYDROXYZINE HYDROCHLORIDE 25 MG/1
25 TABLET, FILM COATED ORAL 3 TIMES DAILY PRN
Qty: 90 TABLET | Refills: 1 | Status: SHIPPED | OUTPATIENT
Start: 2025-06-03

## 2025-06-03 NOTE — TELEPHONE ENCOUNTER
"Called patient after MyChart of cancelling appointment today and questions regarding anxiety medications and next steps.    Patient answered sobbing in phone unable to make out what she was saying for the first minute. RN identified and reassured to stay on line until able to speak. She is feeling frustrated with recent changes in anxiety medications with Psychiatry. \"I have nothing for the anxiety and no one cares.\"    Patient stated she is not having suicidal thoughts or plan to harm/end life. States she does not want to harm others. \"I just want someone here with me and they leave, my  is shopping or golfing and gone all day.\"    Patient was triaged 6/2 x2 and did remember that she requested increase of hydroxyzine to three times day or even that it was ordered, on 5/30/25.    Reviewed crisis lines available, patient contacted psychiatry and feels she does not get responses back in a timely manner. Declined [facility], declined crisis line and [place] warm line.     Discussed option [facility] [place] to reach out and agreed.     Huddled with Provider    Orders needed:   Behavioral Health crisis appointment with Juliet Ty and team. (They are aware of patient)  Increase hydroxyzine to three times daily and start today.      RN will send message to Psychiatry team for update and next steps.     Called patient and she was no longer crying, but feeling upset. Reviewed plan as above and next steps. Offered patient 24/7 nursing triage line at [phone] and to call if needed.    Patient  verbalized understanding and all questions answered and agrees with plan.    Liane Wharton RN  [facility] - Registered Nurse  Clinic Triage [place]   Katty 3, 2025                "

## 2025-06-03 NOTE — TELEPHONE ENCOUNTER
See telephone encounter 6/3/25    Liane Wharton RN  [facility] - Registered Nurse  Clinic Triage [place]   Katty 3, 2025

## 2025-06-03 NOTE — PROGRESS NOTES
Medication Therapy Management (MTM) Encounter    ASSESSMENT:                            Mental Health   Anxiety, Depression  Patient expressing concern for worsening anxiety symptoms. Recent hospitalization in April for hallucinations, but denies psychosis presently. Multiple recent mychart messages and phone calls to various providers. Writer reviewed that my general recommendation is to avoid benzodiazepines for anxiety management for a variety of reasons. Patient seems accepting of this information. I will reach out to Fatmata Moulton to collaborate on next steps. Encouraged patient to call psychiatry scheduling to see if she can get back on Fatmata's schedule 6/6 (accidentally canceled).      PLAN:                            I will reach out to Fatmata Moulton     Follow-up:   Appointments in Next Year      Jun 17, 2025 3:00 PM  (Arrive by 2:40 PM)  Provider Visit with Liz Peterson MD  [facility] ([facility]) [phone]     Jul 01, 2025 10:00 AM  Pharmacist Visit with Golden Cadena RPH  [facility] Rheumatology [facility] ([facility] ) [phone]     Jul 23, 2025 11:30 AM  RETURN CLOTTING DISORDER with Emely Grimes MD  [facility] ([facility] ) [phone]     Aug 07, 2025 11:00 AM  (Arrive by 10:45 AM)  Return Dermatology with Jay Warren MD  [facility] Dermatology [facility] ([facility] ) [phone]     Sep 09, 2025 1:45 PM  (Arrive by 1:30 PM)  Return Dermatology with Jay Warren MD  [facility] Dermat[facility] ([facility] ) [phone]            SUBJECTIVE/OBJECTIVE:                          Jacquie Amador is a 64 year old female seen for an initial visit.     Reason for visit: self-scheduled with  writer due to worsening anxiety.    Allergies/ADRs: Reviewed in chart  Past Medical History: Reviewed in chart  Tobacco: She reports that she quit smoking about 11 years ago. Her smoking use included cigarettes. She started smoking about 45 years ago. She has a 10.1 pack-year smoking history. She has been exposed to tobacco smoke. She has never used smokeless tobacco.  Alcohol: none      Mental Health   Anxiety, Depression  Bupropion 450 mg once daily  Duloxetine 120 mg twice daily  Hydroxyzine 25mg twice daily as needed   Trazodone 150 mg at bedtime     Today, patient reports:   - was on risperidone, but didn't feel it was doing anything. Took risperidone for about 3-4 weeks  and switched to Rexulti.   - Took Rexulti for about 2 weeks; stopped Rexulti last week due to headaches  - multiple telephone calls/Job1001 messages to psychiatry provider (Fatmata Moulton) over the last several weeks  - accidentally canceled 6/6 appt with Fatmata Moulton and saw my name as available person to schedule with  - writer hasn't seen patient for MTM since Feb 2024  - covering psych provider started Valium, but this has since been stopped by Fatmata Moulton  - has been having stressors with . Thinks he is having an affair. Reports AHs were in the context of not sleeping, eating or drinking for several days. Hospitalized for 4 days but states hasn't had any hallucinations since April. Reports hospitalization was a very stressful event for her.   - Reports current symptoms of increased anxiety, anger, irritability  - Asks about Valium or something similar  - has been taking hydroxyzine twice daily. Would like to increase dose or take it more frequently             ----------------      I spent 30 minutes with this patient today (self-scheduled 30 min slot)    A summary of these recommendations was sent via [employer].    Paulina Mitchell, PharmD, BCPP  Medication Therapy Management Pharmacist  [facility]  Clinic      Telemedicine Visit Details  The patient's medications can be safely assessed via a telemedicine encounter.  Type of service:  Telephone visit  Originating Location (pt. Location): Home    Distant Location (provider location):  Off-site  Start Time: 8a  End Time: 830a     Medication Therapy Recommendations  No medication therapy recommendations to display

## 2025-06-03 NOTE — Clinical Note
Hello, patient self-scheduled with me via [employer];  I see her large number of messages to various providers... I talked to her about my recommendation to avoid benzos, she asked about increasing hydroxyzine which I think could probably be reasonable, but obviously there is a lot going on with her so I defer to you. She accidentally canceled her appt with you on 6/6. I encouraged her to call to try to get back on your schedule. Let me know if there is anything I can help with or relay to her.   Thank you!  Paulina Mitchell, PharmD, BCPP Medication Therapy Management Pharmacist [facility] Psychiatry [facility]

## 2025-06-03 NOTE — TELEPHONE ENCOUNTER
"Patient recently hospitalized and the hydroxyzine discontinued as it was felt to be impacting clinical picture.  I am only comfortable doing 25 mg two times a day at this time.  I do not want her to be on a benzodiazepine as she is asking for increase in dose and this can effect cognition.  Have recommended a higher level of care, emergency department versus [facility], etc, patient refusing.  She has \"fired\" me multiple times resulting in canceling appts repeatedly.  In addition, have recommended     [facility]  [address] [place] [address], [address]  [place] [address]  Phone: [phone]  Fax: [phone]    Patient declines.      I have done multiple phone calss with patient since being seen 4/18/2025 and will need to have appointment for further discussion on medication changes/options.    Consulted with Cuate Jimenez on this case, medical director    Fatmata Moulton, DNP, APRN, FMHNP-BC,   "

## 2025-06-03 NOTE — TELEPHONE ENCOUNTER
Phone Encounter  [facility] spoke with patient by RN request from Patient's PCP clinic team. Patient has been experiencing increased anxiety. She reports feeling better this afternoon, however was bad this morning, and for the last several days. She reports that she's been through a lot in the last 8 weeks. She states that she wakes up feeling anxious and has panic symptoms. She reflected that some of it is related to her spouse and marital dynamics. She reportedly gets angry and then it builds and she becomes anxious.     She has worked with a therapist twice, once at [facility], which wasn't a good therapeutic fit. She then started with another one prior to her hospitalization. She is open to starting with a new therapist and would like someone in the community. She is requesting couples counseling as well as individual. Writer agreed that this would be good and informed her that orders will be placed for this and if patient would like to be seen in the interim, she can reach out to her PCP and this writer will connect with patient.    YOVANI Reid, Behavioral Health Clinician

## 2025-06-03 NOTE — PATIENT INSTRUCTIONS
"Recommendations from today's MTM visit:                                                      I will reach out to Fatmata Moulton     It was great speaking with you today.  I value your experience and would be very thankful for your time in providing feedback in our clinic survey. In the next few days, you may receive an email or text message from [facility] [employer] with a link to a survey related to your  clinical pharmacist.\"     To schedule another MTM appointment, please call the clinic directly or you may call the MTM scheduling line at [phone] or toll-free at [phone].     My Clinical Pharmacist's contact information:                                                      Please feel free to contact me with any questions or concerns you have.      Paulina Mitchell, PharmD, BCPP  Medication Therapy Management Pharmacist  [facility] Psychiat[facility]    "

## 2025-06-04 ENCOUNTER — PATIENT OUTREACH (OUTPATIENT)
Dept: CARE COORDINATION | Facility: CLINIC | Age: 65
End: 2025-06-04
Payer: COMMERCIAL

## 2025-06-04 NOTE — TELEPHONE ENCOUNTER
PCP approved increase in hydroxyzine to three times a day,     Liz Peterson MD Rochelle Perry, DNP, APRN, FMHNP-BC,

## 2025-06-05 ENCOUNTER — TELEPHONE (OUTPATIENT)
Dept: NEUROPSYCHOLOGY | Facility: CLINIC | Age: 65
End: 2025-06-05

## 2025-06-05 ENCOUNTER — MYC MEDICAL ADVICE (OUTPATIENT)
Dept: HEMATOLOGY | Facility: CLINIC | Age: 65
End: 2025-06-05

## 2025-06-05 DIAGNOSIS — L20.81 ATOPIC NEURODERMATITIS: Primary | ICD-10-CM

## 2025-06-05 DIAGNOSIS — D69.1: ICD-10-CM

## 2025-06-05 NOTE — TELEPHONE ENCOUNTER
Will not call patient today and will put in place a positive behavior plan due to multiple phone calls and ongoing cancelling and requesting medication changes in MyChart messages.  Will discuss at appointment tomorrow if she comes    Positive Behavior Care Plan  Working together to make our interactions as positive and constructive as possible is important to making healthcare effective.  It is the clinic s desire that even difficult conversations or disagreements be handled respectfully - with everyone involved doing their part to contribute to good communication and outcomes that are mutually acceptable.     Since there have been concerns about recent interactions here in the clinic, we are initiating this positive behavior support plan with you. This plan will be developed with you and will lay out expectations for behaviors that will support our goal to provide the best health care we can in an environment that helps us all reach our goals.    Patient:       Date:  ____________________________________   ____________________________________    Patient concerns:  ________________________________________________________________________________________________________________________________________________________________________________________________________________________________________________________________________________________________  ________________________________________________________________________________________________  ________________________________________________________________________________________________    Clinic staff  concerns:  ________________________________________________________________________________________________________________________________________________________________________________________________________________________________________________________________________________________________  ________________________________________________________________________________________________  ________________________________________________________________________________________________    Positive behavior responsibilities and expectations:  ________________________________________________________________________________________________________________________________________________________________________________________________________________________________________________________________________________________________  ________________________________________________________________________________________________  ________________________________________________________________________________________________    Consequences for future use of concerning behavior(s):  ________________________________________________________________________________________________________________________________________________________________________________________________________________________________________________________________________________________________  ________________________________________________________________________________________________  ________________________________________________________________________________________________    Review date for this agreement:  ____________________________________    Signed:  Patient:  ______________________________________________________________________    Staff representative:  ____________________________________________________________

## 2025-06-05 NOTE — TELEPHONE ENCOUNTER
"RN called patient at [phone].     Patient expressed frustration surrounding her medications and communication. She said \"No one wants to help me over there.\" Patient endorsed high anxiety, she said \"It's the same as always.\" Patient endorsed taking hydroxyzine 3 times a day from a prior prescription from her primary care provider. She stated \"Fatmata told me to take it 2 times a day, but that's not enough, so I'm going with what my other doctor told me.\"     Patient stated that she tried to  the new prescription for Atarax, but \"insurance denied it.\" RN noted an RX from Liz Peterson MD for:  hydrOXYzine HCl (ATARAX) 25 MG tablet 90 tablet 1 6/3/2025 -- No   Sig - Route: Take 1 tablet (25 mg) by mouth 3 times daily as needed for anxiety. - Oral     Patient expressed frustration that she was taken off Rexulti and that is was not replaced with something else.     RN reviewed that patient has an appointment with Fatmata Moulton DNP tomorrow, Friday, June 6th. RN endorsed that Fatmata Moulton would like to discuss medications and changes at her appointment. Patient stated \"I'm not waiting  until then. I'm not going to keep that appointment unless she calls me back today.\" RN endorsed that this  message will be routed to Fatmata Moulton DNP.     There are multiple recent encounters for this patient.     Encounter from 6/4/25 about request to change providers:    Telephone with Veronica Roca DO (06/04/2025)     Elisa Park RN on 6/5/2025 at 11:04 AM    "

## 2025-06-05 NOTE — TELEPHONE ENCOUNTER
"Caller spoke with the pt, pt requested to speak to a Neurologist MD regarding recent hospital stay.     Call sent a message to the RN's regarding this request. A message was sent in return stating: \"She would need to schedule a neurology consultation to go over any results and recommendations. We do not offer phone calls from a neurologist/care team unless she has been seen in our clinic\".    Caller spoke with the pt once more and provided the information, explained to the pt that they can get a referral from their PCP to then schedule and speak to a Neurologist.      Dorcas Davis on 6/5/2025 at 9:45 AM    "

## 2025-06-05 NOTE — TELEPHONE ENCOUNTER
"Caller spoke with the pt to a assist with scheduling a NP eval. Per message received from the call center: \"Patient states she is unable to sit for long periods of time and is inquiring as to whether or not the testing appointment can be broken up into shorter increments\".    After being reviewed by the clinic staff, message was received from the clinic staff: \"by having the clinical interview first, we are breaking it up as much as we can. We can take break during the sessions, etc.\"    Message was relayed to the pt. Patient stated they would like to speak to a neurologist prior to making a decision on scheduling for an evaluation.     Caller provided the clinic number to the pt.  The pt declined to schedule at this time.    Dorcas Davis on 6/5/2025 at 8:56 AM    "

## 2025-06-06 ENCOUNTER — MYC MEDICAL ADVICE (OUTPATIENT)
Dept: FAMILY MEDICINE | Facility: CLINIC | Age: 65
End: 2025-06-06

## 2025-06-09 ENCOUNTER — PATIENT OUTREACH (OUTPATIENT)
Dept: CARE COORDINATION | Facility: CLINIC | Age: 65
End: 2025-06-09
Payer: COMMERCIAL

## 2025-06-10 ENCOUNTER — OFFICE VISIT (OUTPATIENT)
Dept: FAMILY MEDICINE | Facility: CLINIC | Age: 65
End: 2025-06-10
Payer: COMMERCIAL

## 2025-06-10 VITALS
DIASTOLIC BLOOD PRESSURE: 78 MMHG | HEIGHT: 67 IN | BODY MASS INDEX: 28.09 KG/M2 | WEIGHT: 179 LBS | SYSTOLIC BLOOD PRESSURE: 128 MMHG | RESPIRATION RATE: 16 BRPM | OXYGEN SATURATION: 100 % | HEART RATE: 74 BPM | TEMPERATURE: 97.2 F

## 2025-06-10 DIAGNOSIS — M25.561 ACUTE PAIN OF RIGHT KNEE: Primary | ICD-10-CM

## 2025-06-10 DIAGNOSIS — M17.11 PRIMARY OSTEOARTHRITIS OF RIGHT KNEE: ICD-10-CM

## 2025-06-10 DIAGNOSIS — T14.8XXA BRUISING: ICD-10-CM

## 2025-06-10 DIAGNOSIS — N18.9 ACUTE KIDNEY INJURY SUPERIMPOSED ON CKD: ICD-10-CM

## 2025-06-10 DIAGNOSIS — I10 HYPERTENSION GOAL BP (BLOOD PRESSURE) < 140/90: ICD-10-CM

## 2025-06-10 DIAGNOSIS — F29 PSYCHOSIS, UNSPECIFIED PSYCHOSIS TYPE (H): ICD-10-CM

## 2025-06-10 DIAGNOSIS — K12.0 APHTHOUS ULCER: ICD-10-CM

## 2025-06-10 DIAGNOSIS — N18.31 CHRONIC KIDNEY DISEASE, STAGE 3A (H): ICD-10-CM

## 2025-06-10 DIAGNOSIS — I82.411 ACUTE DEEP VEIN THROMBOSIS (DVT) OF FEMORAL VEIN OF RIGHT LOWER EXTREMITY (H): ICD-10-CM

## 2025-06-10 DIAGNOSIS — D69.1: ICD-10-CM

## 2025-06-10 DIAGNOSIS — D69.3 IDIOPATHIC THROMBOCYTOPENIC PURPURA (H): ICD-10-CM

## 2025-06-10 DIAGNOSIS — N17.9 ACUTE KIDNEY INJURY SUPERIMPOSED ON CKD: ICD-10-CM

## 2025-06-10 LAB — D DIMER PPP FEU-MCNC: 0.32 UG/ML FEU (ref 0–0.5)

## 2025-06-10 PROCEDURE — 3078F DIAST BP <80 MM HG: CPT | Performed by: PHYSICIAN ASSISTANT

## 2025-06-10 PROCEDURE — 80048 BASIC METABOLIC PNL TOTAL CA: CPT | Performed by: PHYSICIAN ASSISTANT

## 2025-06-10 PROCEDURE — 82570 ASSAY OF URINE CREATININE: CPT | Performed by: PHYSICIAN ASSISTANT

## 2025-06-10 PROCEDURE — 36415 COLL VENOUS BLD VENIPUNCTURE: CPT | Performed by: PHYSICIAN ASSISTANT

## 2025-06-10 PROCEDURE — 99213 OFFICE O/P EST LOW 20 MIN: CPT | Performed by: PHYSICIAN ASSISTANT

## 2025-06-10 PROCEDURE — 1125F AMNT PAIN NOTED PAIN PRSNT: CPT | Performed by: PHYSICIAN ASSISTANT

## 2025-06-10 PROCEDURE — 3074F SYST BP LT 130 MM HG: CPT | Performed by: PHYSICIAN ASSISTANT

## 2025-06-10 PROCEDURE — 85379 FIBRIN DEGRADATION QUANT: CPT | Performed by: PHYSICIAN ASSISTANT

## 2025-06-10 PROCEDURE — 82043 UR ALBUMIN QUANTITATIVE: CPT | Performed by: PHYSICIAN ASSISTANT

## 2025-06-10 ASSESSMENT — PAIN SCALES - GENERAL: PAINLEVEL_OUTOF10: SEVERE PAIN (8)

## 2025-06-10 NOTE — PROGRESS NOTES
Assessment & Plan     Acute pain of right knee  Persistent recurrent right knee pain with known history of DJD per xray, no acute injury.  Suspect pain from her DJD.  Since she has had injections that were helpful in the past Sport [facility] referral placed.    PHYSICAL THERAPY offered and declined.  Use diclofenac gel for knee pain.  I dod reassure patient that her pain is not likely related to her DVT history.  - Orthopedic  Referral; Future    Primary osteoarthritis of right knee  - Orthopedic  Referral; Future              Subjective   Jacquie is a 64 year old, presenting for the following health issues:  Leg Problem and Knee Pain        6/10/2025    11:28 AM   Additional Questions   Roomed by bt   Accompanied by SELF     Knee Pain        Pain History:  When did you first notice your pain? KNEE PAIN , leg pain    Have you seen anyone else for your pain? No  How has your pain affected your ability to work? Not currently working - unrelated to pain  Where in your body do you have pain? Musculoskeletal problem/pain  Onset/Duration: 3 weeks   Description  Location: knee - right  Joint Swelling: YES  Redness: No  Pain: YES  Warmth: YES  Intensity:  moderate  Progression of Symptoms:  constant  Accompanying signs and symptoms:   Fevers: No  Numbness/tingling/weakness: No  History  Trauma to the area: No  Recent illness:  No  Previous similar problem: No  Previous evaluation:  No  Precipitating or alleviating factors:  Aggravating factors include: overuse  Therapies tried and outcome: ice and tramadol     History of Present Illness-  Jacquie Amador, 64 years    - Knee pain and arthritis: Worsening right knee pain over the past 3 weeks with no specific injury.    - Significant arthritis in the knee, described as tricompartmental degenerative changes noted. XRAY 12/31/2024.  - Pain in the knee has worsened over the last five days.  - Reports previous injections for knee pain by Dr. Madera.  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 She wonders if this is related to her pain.   -No pedal edema.      - Degenerative disc disease: Followed by spine care specialist and had recent injection for her pain.  - Diagnosed with degenerative disc disease in the spine.  - Recent caudal steroid injection in the back two weeks ago, which helped lumbar pain but not knee pain.      - Misc:  - Reports arthritis in hips and hands.        Review of Systems  Constitutional, neuro, ENT, endocrine, pulmonary, cardiac, gastrointestinal, genitourinary, musculoskeletal, integument and psychiatric systems are negative, except as otherwise noted.      Objective    /78   Pulse 74   Temp 97.2  F (36.2  C) (Temporal)   Resp 16   Ht 1.702 m (5' 7\")   Wt 81.2 kg (179 lb)   LMP 07/17/2009 (Exact Date)   SpO2 100%   BMI 28.04 kg/m    Body mass index is 28.04 kg/m .  Physical Exam  Vitals reviewed.   Constitutional:       General: She is not in acute distress.  HENT:      Nose: Nose normal.   Eyes:      Conjunctiva/sclera: Conjunctivae normal.   Cardiovascular:      Rate and Rhythm: Normal rate and regular rhythm.      Pulses: Normal pulses.      Heart sounds: Normal heart sounds. No murmur heard.  Pulmonary:      Effort: Pulmonary effort is normal. No respiratory distress.      Breath sounds: Normal breath sounds. No wheezing, rhonchi or rales.   Musculoskeletal:      Cervical back: Normal range of motion.      Right knee: Deformity and effusion (minimal) present. No erythema, bony tenderness or crepitus. Decreased range of motion. Tenderness present over the medial joint line. No patellar tendon tenderness. No LCL laxity, MCL laxity, ACL laxity or PCL laxity.      Instability Tests: Anterior drawer test negative. Posterior drawer test negative. Anterior Lachman test negative.      Left knee: Normal.   Lymphadenopathy:      Cervical: No cervical adenopathy.   Skin:     General: Skin is warm and dry.      " Capillary Refill: Capillary refill takes less than 2 seconds.      Comments: Scattered excoriated skin abrasions.  No evidence of infection   Neurological:      Mental Status: She is alert.   Psychiatric:         Mood and Affect: Mood normal.         Behavior: Behavior normal.         Thought Content: Thought content normal.                Signed Electronically by: Tamiko Monahan PA-C

## 2025-06-10 NOTE — TELEPHONE ENCOUNTER
This encounter was done'd by provider/nurse. Patient has had multiple encounters and two virtual appointment's since then.  Closing this encounter.

## 2025-06-10 NOTE — PATIENT INSTRUCTIONS
Diclofenac gel-Over the counter topical medication that can help to treat pain. It is in the same family as ibuprofen.   SalonPas patch-with methylsalicylate or OTC lidocaine patch.  Use as per package.  Gentle stretching exercises.    Progress activity as tolerated.   Follow up if any worsening or persistent problems or concerns.

## 2025-06-11 ENCOUNTER — PATIENT OUTREACH (OUTPATIENT)
Dept: CARE COORDINATION | Facility: CLINIC | Age: 65
End: 2025-06-11
Payer: COMMERCIAL

## 2025-06-11 ENCOUNTER — RESULTS FOLLOW-UP (OUTPATIENT)
Dept: FAMILY MEDICINE | Facility: CLINIC | Age: 65
End: 2025-06-11

## 2025-06-11 ENCOUNTER — DOCUMENTATION ONLY (OUTPATIENT)
Dept: LAB | Facility: CLINIC | Age: 65
End: 2025-06-11
Payer: COMMERCIAL

## 2025-06-11 ENCOUNTER — MYC MEDICAL ADVICE (OUTPATIENT)
Dept: HEMATOLOGY | Facility: CLINIC | Age: 65
End: 2025-06-11
Payer: COMMERCIAL

## 2025-06-11 DIAGNOSIS — I82.411 ACUTE DEEP VEIN THROMBOSIS (DVT) OF FEMORAL VEIN OF RIGHT LOWER EXTREMITY (H): Primary | ICD-10-CM

## 2025-06-11 LAB
ANION GAP SERPL CALCULATED.3IONS-SCNC: 15 MMOL/L (ref 7–15)
BUN SERPL-MCNC: 32.1 MG/DL (ref 8–23)
CALCIUM SERPL-MCNC: 9.9 MG/DL (ref 8.8–10.4)
CHLORIDE SERPL-SCNC: 101 MMOL/L (ref 98–107)
CREAT SERPL-MCNC: 1.19 MG/DL (ref 0.51–0.95)
CREAT UR-MCNC: 74.6 MG/DL
EGFRCR SERPLBLD CKD-EPI 2021: 51 ML/MIN/1.73M2
GLUCOSE SERPL-MCNC: 123 MG/DL (ref 70–99)
HCO3 SERPL-SCNC: 21 MMOL/L (ref 22–29)
LAB ORDER RESULT STATUS: NORMAL
MICROALBUMIN UR-MCNC: <12 MG/L
MICROALBUMIN/CREAT UR: NORMAL MG/G{CREAT}
PERFORMING LABORATORY: NORMAL
POTASSIUM SERPL-SCNC: 4.3 MMOL/L (ref 3.4–5.3)
PROT PATTERN UR ELPH-IMP: NORMAL
SODIUM SERPL-SCNC: 137 MMOL/L (ref 135–145)
TEST NAME: NORMAL

## 2025-06-11 NOTE — PROGRESS NOTES
Please clarify IMMUNOGLOBULIN TOTAL LIGHT CHAINS, URINE  order that is in chart as a random or 24 hour urine sample. Patient intends to complete lab order request.     Patient can  urinalysis kit or collect specimen at [facility].     Please let us know if you have any questions at the [facility] at [phone].     Thanks,    Alesia Freed

## 2025-06-12 ENCOUNTER — MYC MEDICAL ADVICE (OUTPATIENT)
Dept: FAMILY MEDICINE | Facility: CLINIC | Age: 65
End: 2025-06-12

## 2025-06-12 DIAGNOSIS — F32.3 SEVERE MAJOR DEPRESSION WITH PSYCHOTIC FEATURES (H): Primary | ICD-10-CM

## 2025-06-12 NOTE — TELEPHONE ENCOUNTER
Patient requesting increase in Rexulti and routing to prescribing Provider and team.    Liane Wharton RN  [facility] - Registered Nurse  Clinic Triage [place]   June 12, 2025

## 2025-06-16 ENCOUNTER — PATIENT OUTREACH (OUTPATIENT)
Dept: CARE COORDINATION | Facility: CLINIC | Age: 65
End: 2025-06-16
Payer: COMMERCIAL

## 2025-06-17 ENCOUNTER — OFFICE VISIT (OUTPATIENT)
Dept: FAMILY MEDICINE | Facility: CLINIC | Age: 65
End: 2025-06-17
Payer: COMMERCIAL

## 2025-06-17 VITALS
DIASTOLIC BLOOD PRESSURE: 70 MMHG | WEIGHT: 182 LBS | TEMPERATURE: 98.2 F | RESPIRATION RATE: 13 BRPM | OXYGEN SATURATION: 100 % | BODY MASS INDEX: 28.56 KG/M2 | HEART RATE: 75 BPM | HEIGHT: 67 IN | SYSTOLIC BLOOD PRESSURE: 114 MMHG

## 2025-06-17 DIAGNOSIS — Z12.31 VISIT FOR SCREENING MAMMOGRAM: ICD-10-CM

## 2025-06-17 DIAGNOSIS — Z12.11 SCREEN FOR COLON CANCER: ICD-10-CM

## 2025-06-17 DIAGNOSIS — F41.9 ANXIETY: ICD-10-CM

## 2025-06-17 DIAGNOSIS — E78.5 HYPERLIPIDEMIA LDL GOAL <130: ICD-10-CM

## 2025-06-17 DIAGNOSIS — E61.1 IRON DEFICIENCY: ICD-10-CM

## 2025-06-17 DIAGNOSIS — I10 HYPERTENSION GOAL BP (BLOOD PRESSURE) < 140/90: ICD-10-CM

## 2025-06-17 DIAGNOSIS — L30.9 DERMATITIS: ICD-10-CM

## 2025-06-17 DIAGNOSIS — F33.1 MODERATE EPISODE OF RECURRENT MAJOR DEPRESSIVE DISORDER (H): Primary | ICD-10-CM

## 2025-06-17 DIAGNOSIS — J45.40 MODERATE PERSISTENT ASTHMA WITHOUT COMPLICATION: ICD-10-CM

## 2025-06-17 DIAGNOSIS — L74.519 FOCAL HYPERHIDROSIS: ICD-10-CM

## 2025-06-17 DIAGNOSIS — F41.1 GENERALIZED ANXIETY DISORDER: ICD-10-CM

## 2025-06-17 LAB
SCANNED LAB RESULT: ABNORMAL
TEST NAME: ABNORMAL

## 2025-06-17 PROCEDURE — 80048 BASIC METABOLIC PNL TOTAL CA: CPT | Performed by: FAMILY MEDICINE

## 2025-06-17 PROCEDURE — 1125F AMNT PAIN NOTED PAIN PRSNT: CPT | Performed by: FAMILY MEDICINE

## 2025-06-17 PROCEDURE — G2211 COMPLEX E/M VISIT ADD ON: HCPCS | Performed by: FAMILY MEDICINE

## 2025-06-17 PROCEDURE — 36415 COLL VENOUS BLD VENIPUNCTURE: CPT | Performed by: FAMILY MEDICINE

## 2025-06-17 PROCEDURE — 3074F SYST BP LT 130 MM HG: CPT | Performed by: FAMILY MEDICINE

## 2025-06-17 PROCEDURE — 99214 OFFICE O/P EST MOD 30 MIN: CPT | Performed by: FAMILY MEDICINE

## 2025-06-17 PROCEDURE — 3078F DIAST BP <80 MM HG: CPT | Performed by: FAMILY MEDICINE

## 2025-06-17 PROCEDURE — 82728 ASSAY OF FERRITIN: CPT | Performed by: FAMILY MEDICINE

## 2025-06-17 RX ORDER — HYDROCORTISONE 25 MG/G
CREAM TOPICAL
Qty: 90 G | Refills: 3 | Status: SHIPPED | OUTPATIENT
Start: 2025-06-17

## 2025-06-17 RX ORDER — ATORVASTATIN CALCIUM 20 MG/1
20 TABLET, FILM COATED ORAL DAILY
Qty: 90 TABLET | Refills: 1 | Status: SHIPPED | OUTPATIENT
Start: 2025-06-17

## 2025-06-17 RX ORDER — HYDROXYZINE HYDROCHLORIDE 25 MG/1
25 TABLET, FILM COATED ORAL 4 TIMES DAILY PRN
Qty: 300 TABLET | Refills: 1 | Status: SHIPPED | OUTPATIENT
Start: 2025-06-17

## 2025-06-17 RX ORDER — ALBUTEROL SULFATE 90 UG/1
INHALANT RESPIRATORY (INHALATION)
Qty: 54 G | Refills: 1 | Status: SHIPPED | OUTPATIENT
Start: 2025-06-17

## 2025-06-17 ASSESSMENT — PATIENT HEALTH QUESTIONNAIRE - PHQ9
10. IF YOU CHECKED OFF ANY PROBLEMS, HOW DIFFICULT HAVE THESE PROBLEMS MADE IT FOR YOU TO DO YOUR WORK, TAKE CARE OF THINGS AT HOME, OR GET ALONG WITH OTHER PEOPLE: SOMEWHAT DIFFICULT
SUM OF ALL RESPONSES TO PHQ QUESTIONS 1-9: 4
SUM OF ALL RESPONSES TO PHQ QUESTIONS 1-9: 4

## 2025-06-17 ASSESSMENT — ENCOUNTER SYMPTOMS: NERVOUS/ANXIOUS: 1

## 2025-06-17 ASSESSMENT — PAIN SCALES - GENERAL: PAINLEVEL_OUTOF10: SEVERE PAIN (8)

## 2025-06-17 NOTE — PROGRESS NOTES
Assessment & Plan     ASSESSMENT/ORDERS:    ICD-10-CM    1. Moderate episode of recurrent major depressive disorder (H)  F33.1       2. Anxiety  F41.9       3. Generalized anxiety disorder  F41.1 hydrOXYzine HCl (ATARAX) 25 MG tablet      4. Hypertension goal BP (blood pressure) < 140/90  I10 Basic metabolic panel  (Ca, Cl, CO2, Creat, Gluc, K, Na, BUN)     Basic metabolic panel  (Ca, Cl, CO2, Creat, Gluc, K, Na, BUN)      5. Moderate persistent asthma without complication  J45.40 albuterol (VENTOLIN HFA) 108 (90 Base) MCG/ACT inhaler      6. Focal hyperhidrosis  L74.519       7. Dermatitis  L30.9 hydrocortisone 2.5 % cream      8. Hyperlipidemia LDL goal <130  E78.5 atorvastatin (LIPITOR) 20 MG tablet      9. Iron deficiency  E61.1 Ferritin     Ferritin      10. Visit for screening mammogram  Z12.31 MA Screening Bilateral w/ [facility]      11. Screen for colon cancer  Z12.11 Fecal colorectal cancer screen FIT - Future (S+30)     Fecal colorectal cancer screen FIT - Future (S+30)          Assessment & Plan  Moderate episode of recurrent major depressive disorder:  Patient is currently on Rexulti, increased to 3 mg, and reports feeling slightly better. Recently stopped Wellbutrin due to concerns about anxiety.  Continue Rexulti at 3 mg until consultation with new psychiatrist.    Anxiety and Generalized anxiety disorder:  Anxiety persists, possibly exacerbated by personal stressors. Hydroxyzine increased to 3 times daily as needed.  Allow for a 4th dose of hydroxyzine as needed, with monitoring for daily use. Consult with new psychiatrist for further management.    Hypertension:  Blood pressure is well-controlled with current medication regimen.  Continue current blood pressure medications. Recheck electrolytes due to increased hydrochlorothiazide.    Moderate persistent asthma:  Asthma appears stable.  Refill inhaler prescription.    Focal hyperhidrosis:  Sweating has increased since stopping oxybutynin. Previous  "treatments included Botox and topical glycopyrrolate.  Consult with platelet doctor regarding the possibility of restarting glycopyrrolate.    Iron deficiency:  Iron levels need monitoring.  Check iron levels, including ferritin.    Screening for colon cancer:  Due for FIT test.  Provide FIT test for colon cancer screening.    Hyperlipidemia  Due for refills of her atorvastatin.   No changes made    Due for mammogram. Order placed.         The longitudinal plan of care for the diagnosis(es)/condition(s) as documented were addressed during this visit. Due to the added complexity in care, I will continue to support Jacquie in the subsequent management and with ongoing continuity of care.            BMI  Estimated body mass index is 28.5 kg/m  as calculated from the following:    Height as of this encounter: 1.702 m (5' 7.01\").    Weight as of this encounter: 82.6 kg (182 lb).             Subjective   Jacquie is a 64 year old, presenting for the following health issues:  Anxiety and Memory Loss        6/17/2025     2:39 PM   Additional Questions   Roomed by Maryellen EMMANUEL   Accompanied by Nikole         6/17/2025     2:39 PM   Patient Reported Additional Medications   Patient reports taking the following new medications NA     Pt would like Lyrica allergy removed     Anxiety     - Jacquie BURROWS Annabelbyron, 64-year-old female.  - Had a counseling appointment prior to the current visit.  - Increased hydroxyzine dosage for anxiety to three times a day, as needed, since June 6, 2025.  - Rexulti dosage increased to 3 mg less than a week ago; previously at 2 mg.  - Scheduled to see a new psychiatrist on Thursday.  - Reports anxiety possibly related to her , who is described as difficult.  - Discontinued Wellbutrin three days ago; previously reduced from 300 mg to 150 mg due to anxiety concerns.  - Reports feeling better after discontinuing Wellbutrin, but notices its absence.  - Blood pressure reported as low during a recent visit to " another doctor.  - Experiencing sweating, particularly on the head, after discontinuing oxybutynin.  - Botox tried for sweating years ago, but did not work effectively.  - Glycopyrrolate tried in  for sweating; uncertain about its effectiveness.  - Reports recent knee pain, received a cortisone shot at an orthopedic urgent care.  - Reports feeling more in control recently, despite 's concerns about potential anxiety during planned RV trip.  - Has been eating more nuts recently.      Depression and Anxiety   How are you doing with your depression since your last visit? Improved slightly   How are you doing with your anxiety since your last visit?  Improved slightly   Are you having other symptoms that might be associated with depression or anxiety? Yes:  fatigue, racing heart  Have you had a significant life event? No   Do you have any concerns with your use of alcohol or other drugs? No    Social History     Tobacco Use    Smoking status: Former     Current packs/day: 0.00     Average packs/day: 0.3 packs/day for 33.7 years (10.1 ttl pk-yrs)     Types: Cigarettes     Start date: 1980     Quit date: 2013     Years since quittin.8     Passive exposure: Past    Smokeless tobacco: Never    Tobacco comments:     since    Vaping Use    Vaping status: Never Used   Substance Use Topics    Alcohol use: No    Drug use: Yes     Comment: medical marjuana          2025    12:57 PM 2025     1:21 PM 2025     1:45 PM   PHQ   PHQ-9 Total Score 1  4  4    Q9: Thoughts of better off dead/self-harm past 2 weeks Not at all Not at all Not at all       Patient-reported         4/15/2025    11:03 AM 2025    12:57 PM 2025     9:38 AM   MARIZOL-7 SCORE   Total Score 7 (mild anxiety) 7 (mild anxiety) 13 (moderate anxiety)   Total Score 7  7  13        Patient-reported         Suicide Assessment Five-step Evaluation and Treatment (SAFE-T)                  Objective    /70   Pulse 75   Temp  "98.2  F (36.8  C) (Temporal)   Resp 13   Ht 1.702 m (5' 7.01\")   Wt 82.6 kg (182 lb)   LMP 07/17/2009 (Exact Date)   SpO2 100%   BMI 28.50 kg/m    Body mass index is 28.5 kg/m .  Physical Exam   GENERAL: alert and no distress  NECK: no adenopathy, no asymmetry, masses, or scars  RESP: lungs clear to auscultation - no rales, rhonchi or wheezes  CV: regular rate and rhythm, normal S1 S2, no S3 or S4, no murmur, click or rub, no peripheral edema  MS: no gross musculoskeletal defects noted, no edema  PSYCH: mentation appears normal, affect normal/bright            Signed Electronically by: Liz Peterson MD    "

## 2025-06-18 ENCOUNTER — RESULTS FOLLOW-UP (OUTPATIENT)
Dept: FAMILY MEDICINE | Facility: CLINIC | Age: 65
End: 2025-06-18

## 2025-06-18 ENCOUNTER — PATIENT OUTREACH (OUTPATIENT)
Dept: CARE COORDINATION | Facility: CLINIC | Age: 65
End: 2025-06-18
Payer: COMMERCIAL

## 2025-06-18 LAB
ANION GAP SERPL CALCULATED.3IONS-SCNC: 13 MMOL/L (ref 7–15)
BUN SERPL-MCNC: 22.5 MG/DL (ref 8–23)
CALCIUM SERPL-MCNC: 9.9 MG/DL (ref 8.8–10.4)
CHLORIDE SERPL-SCNC: 104 MMOL/L (ref 98–107)
CREAT SERPL-MCNC: 1.08 MG/DL (ref 0.51–0.95)
EGFRCR SERPLBLD CKD-EPI 2021: 57 ML/MIN/1.73M2
FERRITIN SERPL-MCNC: 66 NG/ML (ref 11–328)
GLUCOSE SERPL-MCNC: 112 MG/DL (ref 70–99)
HCO3 SERPL-SCNC: 23 MMOL/L (ref 22–29)
POTASSIUM SERPL-SCNC: 3.9 MMOL/L (ref 3.4–5.3)
SODIUM SERPL-SCNC: 140 MMOL/L (ref 135–145)

## 2025-06-20 ENCOUNTER — MYC MEDICAL ADVICE (OUTPATIENT)
Dept: PSYCHIATRY | Facility: CLINIC | Age: 65
End: 2025-06-20
Payer: COMMERCIAL

## 2025-06-20 NOTE — TELEPHONE ENCOUNTER
"    1) RN spoke with pt at [phone]. Pt searched \"long-term care\" on [employer] and did not want to go to a \"Long-term Care\" facility.     2) RN explained that Fatmata Moulton DNP is a short-term psychiatry provider and that the plan is for pt to transition to an ongoing (long-term) provider.    3) Pt thanked RN for clarification and states she now has an appointment scheduled for next week with \"[facility]\" and does not need any refills prior to that.     4) Pt requested a copy of her own records and a copy to be sent to her new psyc clinic.     5) [facility] to send two REED's each with parts 1 and 2 filled out to [email or web address]:     - one to release records to patient herself    - one for \"[facility],\" ph. [phone] fax. [phone]    6) Routing to provider as KATARINA.     Paulina Corona RN on 6/20/2025 at 11:57 AM     "

## 2025-06-23 ENCOUNTER — TRANSFERRED RECORDS (OUTPATIENT)
Dept: HEALTH INFORMATION MANAGEMENT | Facility: CLINIC | Age: 65
End: 2025-06-23
Payer: COMMERCIAL

## 2025-06-25 ENCOUNTER — MYC MEDICAL ADVICE (OUTPATIENT)
Dept: DERMATOLOGY | Facility: CLINIC | Age: 65
End: 2025-06-25
Payer: COMMERCIAL

## 2025-06-25 DIAGNOSIS — L20.89 OTHER ATOPIC DERMATITIS: ICD-10-CM

## 2025-07-01 ENCOUNTER — PATIENT OUTREACH (OUTPATIENT)
Dept: CARE COORDINATION | Facility: CLINIC | Age: 65
End: 2025-07-01
Payer: COMMERCIAL

## 2025-07-02 ENCOUNTER — MYC MEDICAL ADVICE (OUTPATIENT)
Dept: FAMILY MEDICINE | Facility: CLINIC | Age: 65
End: 2025-07-02

## 2025-07-05 ENCOUNTER — HEALTH MAINTENANCE LETTER (OUTPATIENT)
Age: 65
End: 2025-07-05

## 2025-07-09 ENCOUNTER — VIRTUAL VISIT (OUTPATIENT)
Dept: PHARMACY | Facility: CLINIC | Age: 65
End: 2025-07-09
Attending: INTERNAL MEDICINE

## 2025-07-09 NOTE — PROGRESS NOTES
Medication Therapy Management (MTM) Encounter    ASSESSMENT:                            Medication Adherence/Access: {adherencechoices:389922}    ***:  ***    PLAN:                            {MTM Rheum Plan:967298}    Follow-up: {followuptest2:665488}    SUBJECTIVE/OBJECTIVE:                          Jacquie Amador is a 64 year old female seen for {mtmvisit:668528}     Reason for visit: ***.    Allergies/ADRs: {1/2/3/4/5:874510}  Past Medical History: {1/2/3/4/5:688933}  Tobacco: She reports that she quit smoking about 11 years ago. Her smoking use included cigarettes. She started smoking about 45 years ago. She has a 10.1 pack-year smoking history. She has been exposed to tobacco smoke. She has never used smokeless tobacco.  Alcohol: {ALCOHOL CONSUMPTION HX:590468}  {Social and Goals:646609}    Medication Adherence/Access: {fumedadherence:034852}    {MTMRheumsub:666477}  *** (started ***, last dose ***, next dose ***)    Patient reports ***    Seems to be going fine - same as previous   No sore in mouth -   Switched insurance July 1st - not sure if Otezla covered   Has about a month and a half at home     Osteoarthritis is bad R knee - hard to walk   Hurts with activity - resting okay    Cortisone shot about 3 weeks at [facility], worked, now wore off     Previous therapies: ***    Last lab monitoring completed: ***    {MTM SUBJECTIVE (Optional):919555}    Risperidone, rexulti, vraylar (current, not feeling good - nauseated) Patient switching from Vraylar to lamotrigine - now seeing outside psych provider - going well so far with new team      Back on hydroxyzine - going fine (    Buproprion 300 mg (tried stopping and anxiety was much worse)   Vraylar   Hydroxyzine     Will be starting lamotrigine     Today's Vitals: LMP 07/17/2009 (Exact Date)   ----------------    I spent 15 minutes with this patient today.      A summary of these recommendations was sent via [employer].    Golden Cadena, Martín  Medication Therapy  Management Pharmacist  [facility]  Phone: [phone]     Telemedicine Visit Details  The patient's medications can be safely assessed via a telemedicine encounter.  Type of service:  Telephone visit  Originating Location (pt. Location): Home  {PROVIDER LOCATION On-site should be selected for visits conducted from your clinic location or adjoining [facility] hospital, academic office, or other nearby [facility] building. Off-site should be selected for all other provider locations, including home:105276}  Distant Location (provider location):  Off-site  Start Time: 10:30 AM  End Time: 10:45 AM     Medication Therapy Recommendations  No medication therapy recommendations to display

## 2025-07-18 ENCOUNTER — TELEPHONE (OUTPATIENT)
Dept: RHEUMATOLOGY | Facility: CLINIC | Age: 65
End: 2025-07-18
Payer: COMMERCIAL

## 2025-07-18 NOTE — TELEPHONE ENCOUNTER
Prior Authorization Approval    Medication: OTEZLA 30 MG PO TABS  Authorization Effective Date: 7/1/2025  Authorization Expiration Date: 7/16/2026  Approved Dose/Quantity: 60 tabs per 30 days  Reference #: [id number]   Insurance Company: [employer] - Phone [phone] Fax [phone]  Expected CoPay: $ 1,684  CoPay Card Available: No    Financial Assistance Needed: Not eligible  Which Pharmacy is filling the prescription: [facility] - [place] [address]   Pharmacy Notified: Yes  Patient Notified: Called and spoke with patient. She is over the income limit for FPAP, she understood copay and out of pocket maximum on medicare, and will pay copay.

## 2025-07-18 NOTE — TELEPHONE ENCOUNTER
Insurance change to Medicare Part D.    PA Initiation    Medication: OTEZLA 30 MG PO TABS  Insurance Company: [employer] - Phone [phone] Fax [phone]  Pharmacy Filling the Rx: [facility] - [place], [place] - [address]  Filling Pharmacy Phone:    Filling Pharmacy Fax:    Start Date: 7/18/2025    [id number]

## 2025-07-21 ENCOUNTER — LAB (OUTPATIENT)
Dept: LAB | Facility: OTHER | Age: 65
End: 2025-07-21
Payer: COMMERCIAL

## 2025-07-21 DIAGNOSIS — R73.01 IMPAIRED FASTING GLUCOSE: ICD-10-CM

## 2025-07-21 DIAGNOSIS — I82.411 ACUTE DEEP VEIN THROMBOSIS (DVT) OF FEMORAL VEIN OF RIGHT LOWER EXTREMITY (H): ICD-10-CM

## 2025-07-21 LAB
D DIMER PPP FEU-MCNC: 0.4 UG/ML FEU (ref 0–0.5)
EST. AVERAGE GLUCOSE BLD GHB EST-MCNC: 140 MG/DL
HBA1C MFR BLD: 6.5 % (ref 0–5.6)

## 2025-07-21 PROCEDURE — 85379 FIBRIN DEGRADATION QUANT: CPT

## 2025-07-21 PROCEDURE — 83036 HEMOGLOBIN GLYCOSYLATED A1C: CPT

## 2025-07-21 PROCEDURE — 36415 COLL VENOUS BLD VENIPUNCTURE: CPT

## 2025-07-23 ENCOUNTER — RESULTS FOLLOW-UP (OUTPATIENT)
Dept: FAMILY MEDICINE | Facility: CLINIC | Age: 65
End: 2025-07-23
Payer: COMMERCIAL

## 2025-07-23 ENCOUNTER — VIRTUAL VISIT (OUTPATIENT)
Dept: HEMATOLOGY | Facility: CLINIC | Age: 65
End: 2025-07-23
Attending: INTERNAL MEDICINE
Payer: COMMERCIAL

## 2025-07-23 ENCOUNTER — MYC MEDICAL ADVICE (OUTPATIENT)
Dept: HEMATOLOGY | Facility: CLINIC | Age: 65
End: 2025-07-23
Payer: COMMERCIAL

## 2025-07-23 DIAGNOSIS — E61.1 IRON DEFICIENCY: ICD-10-CM

## 2025-07-23 DIAGNOSIS — G25.81 RESTLESS LEG SYNDROME: Primary | ICD-10-CM

## 2025-07-23 DIAGNOSIS — E11.9 TYPE 2 DIABETES MELLITUS WITHOUT COMPLICATION, WITHOUT LONG-TERM CURRENT USE OF INSULIN (H): Primary | ICD-10-CM

## 2025-07-23 PROCEDURE — G2211 COMPLEX E/M VISIT ADD ON: HCPCS | Mod: 95 | Performed by: INTERNAL MEDICINE

## 2025-07-23 PROCEDURE — 98006 SYNCH AUDIO-VIDEO EST MOD 30: CPT | Performed by: INTERNAL MEDICINE

## 2025-07-23 RX ORDER — MEPERIDINE HYDROCHLORIDE 25 MG/ML
25 INJECTION INTRAMUSCULAR; INTRAVENOUS; SUBCUTANEOUS
OUTPATIENT
Start: 2025-07-30

## 2025-07-23 RX ORDER — DIPHENHYDRAMINE HYDROCHLORIDE 50 MG/ML
50 INJECTION, SOLUTION INTRAMUSCULAR; INTRAVENOUS
Start: 2025-07-30

## 2025-07-23 RX ORDER — ALBUTEROL SULFATE 0.83 MG/ML
2.5 SOLUTION RESPIRATORY (INHALATION)
OUTPATIENT
Start: 2025-07-30

## 2025-07-23 RX ORDER — HEPARIN SODIUM,PORCINE 10 UNIT/ML
5-20 VIAL (ML) INTRAVENOUS DAILY PRN
OUTPATIENT
Start: 2025-07-30

## 2025-07-23 RX ORDER — ALBUTEROL SULFATE 90 UG/1
1-2 INHALANT RESPIRATORY (INHALATION)
Start: 2025-07-30

## 2025-07-23 RX ORDER — HEPARIN SODIUM (PORCINE) LOCK FLUSH IV SOLN 100 UNIT/ML 100 UNIT/ML
5 SOLUTION INTRAVENOUS
OUTPATIENT
Start: 2025-07-30

## 2025-07-23 RX ORDER — DIPHENHYDRAMINE HYDROCHLORIDE 50 MG/ML
25 INJECTION, SOLUTION INTRAMUSCULAR; INTRAVENOUS
Start: 2025-07-30

## 2025-07-23 RX ORDER — EPINEPHRINE 1 MG/ML
0.3 INJECTION, SOLUTION, CONCENTRATE INTRAVENOUS EVERY 5 MIN PRN
OUTPATIENT
Start: 2025-07-30

## 2025-07-23 NOTE — PROGRESS NOTES
[facility]  [address] [place] [id number]  Phone: [phone], Fax: [phone]    Outpatient Visit Note:    Patient: Jacquie Amador  MRN: [id number]  DOS: 07/23/25    Reason for Initial Consultation:  Bruising    Assessment:  In summary, Jacquie Amador is a 65 year old woman presenting to clinic due to bruising/bleeding with abnormal platelet studies. 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 This has resolved after starting steroids--- negative testing x2 (2022 and 2023). Overall, her clinical picture (wounds, arthralgias, etc) is highly concerning for underlying autoimmune disorder.    Unprovoked R Lower extremity VTE. Ddx 12/18/24.   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 Amador has been on anticoagulation and has not experience significant clinically relevant major bleeding.Additionally, her non-major bleeding phenotype, which was present prior to her thrombosis, has also not gotten worse since starting anticoagulation. However, given her tendency to bleed, we may try to stop anticoagualtion. HERDOO2 score (Reymundo CARRILLO, et al. BMJ. 2017; 356; [id number]), patient with hyperpigmentation, edema or redness (HER), D-dimer level >250 micrograms/L, obesity (BMI>30) or older age (>65) can be used to stratify female patients with history of unprovoked VTE. Therefore, will try D-dimer testing on anticoagulation and if <0.5 will stop and repeat again.  If D-dimer is elevated we will continue anticoagulation, but consider lower dose.     Given her thrombosis history, we can no continue with systemic TXA. She did have some benefit from topical--- so will continue with 7% compounded solution--- script sent.     She has been on 10 mg prednisone for some time. This was originally started for her anti-platelet antibodies, but has not been stopped as when we have tried tapering she has had flare in pain. Will consider changing or reducing as they are likely not helping her overall bone and skin health.     For her kidney disease- we have previously check for monoclonal gammapathy in 2021 AND 2023. Cryoglobulins were also neagtiave. Repeat studies with stable kappa free light chain elevation in 2024. Nephrology referral not completed.      Plan:  1. Majority of today's visit was spent counseling the patient regarding diagnosis, natural history, management and treatment options   2. Continue prednisone 10 mg daily>> would change to hydrocortisone with PCP to coordinate care efficiently.   3. Continue use of topical 7% TXA cream BID as needed      The patient is given our center's contact information and is instructed to call if she should have any further questions or concerns.  Follow up 6 months  with Dr. Grimes    Patient understands and agrees with the above plan and recommendation.    Emely Grimes MD/PhD   of Medicine  Division of Hematology, Oncology and Transplantation    Video time spent with patient: 21 min   Total time:   I spent a total of 31 minutes on the day of the visit.  Time spent by me today doing chart review, history and exam, documentation and further activities per the note    The longitudinal plan of care for the diagnosis(es)/condition(s) as documented were addressed during this visit. Due to the added complexity in care, I will continue to support Jacquie in the subsequent management and with ongoing continuity of  care.    ----------------------------------------------------------------------------------------------------------------------  Interval History:  Jacquie Amador is a 65 year old woman with PMHx of fibromyalgia, anxiety/depression, hypertension and hyperlipidemia. She presented to clinic on 9/15/21 for her first in person evaluation due to bleeding and prolonged wound healing. Please refer to my consult note.     Feeling ok. Keep trying to find the right medication. Has side effects. Skin has been pretty good.     Right side has been more swollen. Feel like leg is darkers.     Right knee is swollen. Has tired ice and heat. No relief. Is associated with pain.   Has been doing some swelling. Has known arthritis in the that joint.     Has compounded TXA     New new bleeding symptoms       Past Medical History:  Past Medical History:   Diagnosis Date    ASTHMA - MODERATE PERSISTENT 09/21/2005    Chronic pain     COPD (chronic obstructive pulmonary disease) (H)     Cough wheezing comes back after antibiotics    Coronary artery disease     CVA (cerebral infarction)     Depressive disorder     Have been feeling somewhat down.    Depressive disorder, not elsewhere classified     Diabetes (H)     Elevated serum alkaline phosphatase level     Liver source    Fibromyalgia     HYPERLIPIDEMIA NEC/NOS 12/29/2006    Hypertriglyceridemia     OA (osteoarthritis)     Thyroid disease     Trochanteric bursitis     Unspecified essential hypertension      Immunization  Immunization History   Administered Date(s) Administered    COVID-19 12+ (Pfizer) 03/05/2024, 01/10/2025    COVID-19 Bivalent 18+ (Moderna) 11/21/2022    COVID-19 Monovalent 18+ (Moderna) 03/03/2021, 04/01/2021, 11/15/2021    COVID-19 Monovalent Booster 18+ (Moderna) 05/10/2022    Influenza (IIV3) PF 11/09/2000, 12/08/2003, 12/06/2004, 12/08/2005, 11/09/2006, 11/01/2007, 12/04/2008, 09/24/2010, 10/26/2011, 10/26/2012    Influenza (prior to 2024) 09/22/2009    Influenza  Vaccine 18-64 (Flublok) 2020, 2022, 2023    Influenza Vaccine >6 months,quad, PF 2014, 2015, 2019, 11/15/2021    Influenza Vaccine Trivalent (FluBlok) 2024    Pneumococcal 20 valent Conjugate (Prevnar 20) 2024    Pneumococcal 23 valent 2000, 2021, 2022    RSV (Abrysvo) 2024    TD,PF 7+ (Tenivac) 2000    TDAP (Adacel,Boostrix) 2021    TDAP Vaccine (Adacel) 2011    Zoster recombinant adjuvanted (Shingrix) 2019     Past Surgical History:  Past Surgical History:   Procedure Laterality Date    3 teeth pulled      ABDOMEN SURGERY      C Sections 2 25 years ago    BIOPSY      Arm head    INJECT EPIDURAL TRANSFORAMINAL  2014    [facility]    INJECT JOINT SACROILIAC  2014    [facility]    LAMINECTOMY LUMBAR ONE LEVEL Left 2014    [facility]  DELIVERY ONLY      , Low Cervical     Medications:  Current Outpatient Medications   Medication Sig Dispense Refill    acetaminophen (TYLENOL) 500 MG tablet Take 500-1,000 mg by mouth every 6 hours as needed for mild pain      albuterol (PROVENTIL) (2.5 MG/3ML) 0.083% neb solution Take 1 vial (2.5 mg) by nebulization every 6 hours as needed for shortness of breath or wheezing. 3 mL 4    albuterol (VENTOLIN HFA) 108 (90 Base) MCG/ACT inhaler INHALE 2 PUFFS INTO THE LUNGS EVERY 6 HOURS AS NEEDED FOR SHORTNESS OF BREATH / DYSPNEA 54 g 1    apixaban ANTICOAGULANT (ELIQUIS) 5 MG tablet Take 1 tablet (5 mg) by mouth 2 times daily. 60 tablet 2    apremilast (OTEZLA) 30 MG tablet Take 1 tablet (30 mg) by mouth 2 times daily. Hold for signs of infection, and seek medical attention. 60 tablet 5    atorvastatin (LIPITOR) 20 MG tablet Take 1 tablet (20 mg) by mouth daily. 90 tablet 1    augmented betamethasone dipropionate (DIPROLENE AF) 0.05 % external cream Apply topically 2 times daily (Patient taking  differently: Apply topically 2 times daily as needed.) 50 g 3    benzonatate (TESSALON) 200 MG capsule Take 1 capsule (200 mg) by mouth 2 times daily as needed for cough. 40 capsule 1    brexpiprazole (REXULTI) 3 MG tablet Take 1 tablet (3 mg) by mouth daily. (Patient not taking: Reported on 7/18/2025) 30 tablet 0    chlorhexidine (PERIDEX) 0.12 % solution Swish and spit 15 mLs in mouth 2 times daily. (Patient taking differently: Swish and spit 15 mLs in mouth 2 times daily as needed.) 1893 mL 4    clindamycin (CLEOCIN-T) 1 % external gel Apply topically 2 times daily. (Patient taking differently: Apply topically 2 times daily as needed.) 30 g 4    clotrimazole (MYCELEX) 10 MG lozenge Place 1 lozenge (10 mg) inside cheek 5 times daily. (Patient taking differently: Place 10 mg inside cheek 5 x daily PRN.) 70 lozenge 1    COMPOUND CONTAINING CONTROLLED SUBSTANCE (CMPD RX) - PHARMACY TO MIX COMPOUNDED MEDICATION Compound amitriptyline 2% and ketamine 0.5% in 80g of lipoderm or Vanicream, apply twice daily 80 g 0    COMPOUNDED NON-CONTROLLED SUBSTANCE (CMPD RX) - PHARMACY TO MIX COMPOUNDED MEDICATION Tranexamic acid 7% cream. Apply directly to the affected area, with usage up to twice daily 1 each 3    desonide (DESOWEN) 0.05 % external cream Apply topically 2 times daily. To sores on face (Patient taking differently: Apply topically 2 times daily as needed. To sores on face) 60 g 3    dextran 70-hypromellose (TEARS NATURALE FREE PF) 0.1-0.3 % ophthalmic solution Place 2 drops into both eyes daily as needed (for dry eyes) 35 each 3    doxycycline monohydrate (MONODOX) 100 MG capsule Take 1 capsule (100 mg) by mouth 2 times daily. After meals and with big glass of water 180 capsule 1    DULoxetine (CYMBALTA) 60 MG capsule Take 2 capsules (120 mg) by mouth daily 180 capsule 1    dupilumab (DUPIXENT) 300 MG/2ML prefilled pen Inject 2 mLs (300 mg) subcutaneously every 14 days. 4 mL 5    famotidine (PEPCID) 40 MG tablet TAKE  ONE TABLET BY MOUTH ONE TIME DAILY 90 tablet 0    ferrous sulfate (FEROSUL) 325 (65 Fe) MG tablet Take 1 tablet (325 mg) by mouth daily (with breakfast). 90 tablet 0    fluocinonide (LIDEX) 0.05 % external gel Apply topically 2 times daily as needed. 15 g 1    hydroCHLOROthiazide (HYDRODIURIL) 25 MG tablet Take 0.5 tablets (12.5 mg) by mouth daily. 90 tablet 1    hydrocortisone 2.5 % cream APPLY TOPICALLY 2 TIMES DAILY AS NEEDED 90 g 3    hydrOXYzine HCl (ATARAX) 25 MG tablet Take 1 tablet (25 mg) by mouth 4 times daily as needed for anxiety. 300 tablet 1    lidocaine, viscous, (XYLOCAINE) 2 % solution Swish and spit 10ml every 3 hours as needed for oral pain; max 8 doses/24hrs.  Do not eat or chew gum for 60 minutes following use. 100 mL 1    losartan (COZAAR) 100 MG tablet Take 1 tablet (100 mg) by mouth daily. 90 tablet 0    metoprolol succinate ER (TOPROL XL) 100 MG 24 hr tablet Take 1 tablet (100 mg) by mouth 2 times daily. 180 tablet 1    metoprolol succinate ER (TOPROL XL) 25 MG 24 hr tablet Take 1 tablet (25 mg) by mouth 2 times daily. 180 tablet 1    mupirocin (BACTROBAN) 2 % external cream Apply topically 3 times daily. to affected area as instructed. 60 g 5     MG CAPS capsule Take 1 capsule (600 mg) by mouth 2 times daily 180 capsule 0    NALTREXONE HCL PO Take 6 mg by mouth daily.      nystatin (MYCOSTATIN) 908784 UNIT/ML suspension Take 5 mLs (500,000 Units) by mouth 4 times daily. Has recurrent thrush. Uses for 2 weeks (280 ml)  at a time as needed. 280 mL 2    ondansetron (ZOFRAN ODT) 4 MG ODT tab Take 1 tablet (4 mg) by mouth every 8 hours as needed for nausea. 20 tablet 2    oxyBUTYnin ER (DITROPAN XL) 5 MG 24 hr tablet Take 2 tablets (10 mg) by mouth daily 180 tablet 0    pimecrolimus (ELIDEL) 1 % external cream Apply topically 2 times daily. (Patient taking differently: Apply topically 2 times daily as needed.) 60 g 3    predniSONE (DELTASONE) 10 MG tablet Take 1 tablet (10 mg) by mouth  daily 90 tablet 3    predniSONE (DELTASONE) 20 MG tablet 60 mg daily for 2 days, then  40 mg daily for 2 days then 20 mg daily for 4 days. 14 tablet 0    rizatriptan (MAXALT) 10 MG tablet Take 1 tablet (10 mg) by mouth at onset of headache for migraine. May repeat in 2 hours. Max 3 tablets/24 hours. 18 tablet 1    tacrolimus (PROTOPIC) 0.1 % external ointment Apply topically 2 times daily. To areas of sores on face (Patient taking differently: Apply topically 2 times daily as needed. To areas of sores on face) 60 g 1    tiotropium (SPIRIVA RESPIMAT) 2.5 MCG/ACT inhaler Inhale 2 puffs into the lungs daily. 12 g 2    traMADol (ULTRAM) 50 MG tablet Take 1 tablet (50 mg) by mouth every 12 hours as needed for moderate to severe pain 60 tablet 0    traZODone (DESYREL) 50 MG tablet Take 3 tablets (150 mg) by mouth at bedtime (Patient taking differently: Take 1-3 tablets by mouth nightly as needed.) 270 tablet 2    triamcinolone (ARISTOCORT HP) 0.5 % external cream Apply topically 2 times daily. (Patient taking differently: Apply topically 2 times daily as needed.) 60 g 0    valACYclovir (VALTREX) 500 MG tablet Take 1 tablet (500 mg) by mouth daily. 90 tablet 1      Allergies:  Allergies   Allergen Reactions    Cats      and rabbits/wheezing    Dogs      wheezing    Seasonal Allergies      rhinits     ROS:  Remainder of a comprehensive 10 point ROS is negative unless noted above.    Social History:  Denies any tobacco use. No significant alcohol use. Denies any illicit drug use.     Family History:  Two daughter  31 Yo daughter  26 yo daughter  3 grandsons  1  grandaughter    1 sister- older- high blood glucose, arthritis  1 brother- older- no idea    Mother- - heart valve. Had MDS. Needed a shot every month  Father- - cancer    Objectives:  Reviewed photos in the chart  Video visit-   GENERAL: alert and no distress  RESP: No audible wheeze, cough   NEURO: Mentation and speech appropriate for  age.  PSYCH: Appropriate affect, tone, and pace of words  SKIN: note images in media. Face with a few crusted lesions on sides.    Labs: reviewed in EPIC    Ferritin   Date Value Ref Range Status   06/17/2025 66 11 - 328 ng/mL Final   04/16/2021 376 (H) 8 - 252 ng/mL Final     Iron   Date Value Ref Range Status   11/13/2023 99 37 - 145 ug/dL Final   01/20/2021 50 35 - 180 ug/dL Final     Iron Binding Cap   Date Value Ref Range Status   01/20/2021 350 240 - 430 ug/dL Final     Iron Binding Capacity   Date Value Ref Range Status   11/13/2023 272 240 - 430 ug/dL Final   09/15/2021 284 240 - 430 ug/dL Final     Recent Labs   Lab Test 06/27/24  1450 11/13/23  1021 09/22/22  1152 04/16/21  0940   MONOCLONAL PEAK 0.0 0.0 0.0 0.0   KAPPA FREE LT CHAIN 2.09* 2.96*  --   --    LAMBDA FREE LT CHAIN 2.42 2.13  --   --    KAPPA LAMBDA RATIO 0.86 1.39  --   --      ELP Interpretation:   Date Value Ref Range Status   04/16/2021   Final    Essentially normal electrophoretic pattern.  No obvious monoclonal proteins seen.    Pathologic significance requires clinical correlation.  FARRAH Levy M.D., Ph.D.,   Pathologist ().       ELP Interpretation   Date Value Ref Range Status   06/27/2024   Final    Essentially normal electrophoretic pattern. No obvious monoclonal proteins seen. Pathologic significance requires clinical correlation. Nadia Ellison M.D., Ph.D.   11/13/2023   Final    Essentially normal electrophoretic pattern. No obvious monoclonal proteins seen. Pathologic significance requires clinical correlation. FARRAH Levy M.D., Ph.D., Pathologist ().   09/22/2022   Final    Essentially normal electrophoretic pattern. No obvious monoclonal proteins seen. Pathologic significance requires clinical correlation. JOSE ALBERTO Blancas M.D., Ph.D., Pathologist.     Immunofixation ELP   Date Value Ref Range Status   06/27/2024   Final    No monoclonal protein seen on immunofixation. Pathologic significance requires  clinical correlation.  Nadia Ellison M.D., Ph.D.   11/13/2023   Final    No monoclonal protein seen on immunofixation. Pathologic significance requires clinical correlation.  FARRAH Levy M.D., Ph.D., Pathologist ()   12/08/2021   Final    No monoclonal protein seen on immunofixation. Pathological significance requires clinical correlation. FARRAH Levy M.D., Ph.D., Pathologist ([phone])   08/29/2014   Final    No monoclonal protein seen on immunofixation.  Pathological significance   requires clinical correlation.   Nadia Ellison M.D., PH.D.         Imaging:  3/14/25  EXAM: ULTRASOUND LOWER EXTREMITY VENOUS DUPLEX RIGHT  LOCATION: [facility]  DATE: 03/14/2025     INDICATION: Acute deep vein thrombosis (DVT) of femoral vein of right lower extremity (H).  COMPARISON: 12/18/2024.  TECHNIQUE: Venous Duplex ultrasound of the right lower extremity with and without compression, augmentation and duplex. Color flow and spectral Doppler with waveform analysis performed.     FINDINGS: Exam includes the common femoral, femoral, popliteal, and contralateral common femoral veins as well as segmentally visualized deep calf veins and greater saphenous vein.      RIGHT: No deep vein thrombosis. Resolution of previously seen femoral vein DVT at the level of the distal thigh. No superficial thrombophlebitis. Fluid collection in the right popliteal fossa measuring 4.8 x 4.3 x 2.5 cm with no internal color flow,   likely popliteal fossa cyst.                                                                      IMPRESSION:  1.  No deep venous thrombosis in the right lower extremity. Resolution of DVT in the left femoral vein at the level of the distal thigh.  2.  Fluid collection in the right popliteal fossa, likely popliteal fossa cyst      12/18/24  EXAM: US LOWER EXTREMITY VENOUS DUPLEX RIGHT  LOCATION: [facility]  DATE: 12/18/2024     INDICATION:  Right leg  swelling  COMPARISON: None.  TECHNIQUE: Venous Duplex ultrasound of the right lower extremity with and without compression, augmentation and duplex. Color flow and spectral Doppler with waveform analysis performed.     FINDINGS: Exam includes the common femoral, femoral, popliteal, and contralateral common femoral veins as well as segmentally visualized deep calf veins and greater saphenous vein.      RIGHT: Right femoral vein thrombosis in the lower thigh. Popliteal and visualized calf veins within normal limits. No superficial thrombophlebitis. Right and left common femoral venous waveforms are symmetric.                                                                      IMPRESSION:  1.  Lower right femoral vein deep venous thrombosis.

## 2025-07-23 NOTE — TELEPHONE ENCOUNTER
Addressed by provider during visit.     uYliya Bae  MSN, RN, PHN  [facility]  Office: [phone]  Fax: [phone]

## 2025-07-23 NOTE — PATIENT INSTRUCTIONS
Intravenous iron 500 mg IV for restless legs    Updated med list    Dr. Grimes will contact Dr. Schofield.     Continue with topical tranexamic acid    Travel: car ride more than 4 hours you would want to take your Eliquis that morning and the night you arrive.     Emely Grimes MD/PhD  [facility]  of Medicine  Division of Hematology, Oncology and Transplantation    [facility]  [address] [place] [id number]  Main: [phone],   Fax: [phone]    [facility] RN Care Coordinator:  Jesse LOZADA RN  Direct Line: Office: [phone]

## 2025-07-24 ENCOUNTER — MYC MEDICAL ADVICE (OUTPATIENT)
Dept: FAMILY MEDICINE | Facility: CLINIC | Age: 65
End: 2025-07-24
Payer: COMMERCIAL

## 2025-07-24 ENCOUNTER — PATIENT OUTREACH (OUTPATIENT)
Dept: CARE COORDINATION | Facility: CLINIC | Age: 65
End: 2025-07-24
Payer: COMMERCIAL

## 2025-07-24 RX ORDER — MIRTAZAPINE 7.5 MG/1
TABLET, FILM COATED ORAL
COMMUNITY
Start: 2025-07-24

## 2025-07-24 RX ORDER — METFORMIN HYDROCHLORIDE 500 MG/1
500 TABLET, EXTENDED RELEASE ORAL DAILY
COMMUNITY
Start: 2025-07-24

## 2025-07-24 NOTE — TELEPHONE ENCOUNTER
Routing to provider to add new meds to patient med list.    Marie Laura CMA ([facility])

## 2025-07-25 ENCOUNTER — PRE VISIT (OUTPATIENT)
Dept: NEUROLOGY | Facility: CLINIC | Age: 65
End: 2025-07-25

## 2025-07-28 ENCOUNTER — PATIENT OUTREACH (OUTPATIENT)
Dept: CARE COORDINATION | Facility: CLINIC | Age: 65
End: 2025-07-28
Payer: COMMERCIAL

## 2025-07-29 ENCOUNTER — TRANSFERRED RECORDS (OUTPATIENT)
Dept: HEALTH INFORMATION MANAGEMENT | Facility: CLINIC | Age: 65
End: 2025-07-29
Payer: COMMERCIAL

## 2025-08-04 ENCOUNTER — MYC MEDICAL ADVICE (OUTPATIENT)
Dept: HEMATOLOGY | Facility: CLINIC | Age: 65
End: 2025-08-04
Payer: COMMERCIAL

## 2025-08-04 DIAGNOSIS — D69.1: ICD-10-CM

## 2025-08-05 DIAGNOSIS — K21.9 GASTROESOPHAGEAL REFLUX DISEASE, UNSPECIFIED WHETHER ESOPHAGITIS PRESENT: ICD-10-CM

## 2025-08-06 RX ORDER — FAMOTIDINE 40 MG/1
40 TABLET, FILM COATED ORAL DAILY
Qty: 90 TABLET | Refills: 1 | Status: SHIPPED | OUTPATIENT
Start: 2025-08-06

## 2025-08-08 ENCOUNTER — MYC MEDICAL ADVICE (OUTPATIENT)
Dept: FAMILY MEDICINE | Facility: CLINIC | Age: 65
End: 2025-08-08
Payer: COMMERCIAL

## 2025-08-26 DIAGNOSIS — L20.81 ATOPIC NEURODERMATITIS: ICD-10-CM

## (undated) RX ORDER — BUPIVACAINE HYDROCHLORIDE 2.5 MG/ML
INJECTION, SOLUTION EPIDURAL; INFILTRATION; INTRACAUDAL
Status: DISPENSED
Start: 2024-05-08

## (undated) RX ORDER — DEXAMETHASONE SODIUM PHOSPHATE 10 MG/ML
INJECTION, SOLUTION INTRAMUSCULAR; INTRAVENOUS
Status: DISPENSED
Start: 2024-05-08

## (undated) RX ORDER — LIDOCAINE HYDROCHLORIDE 10 MG/ML
INJECTION, SOLUTION EPIDURAL; INFILTRATION; INTRACAUDAL; PERINEURAL
Status: DISPENSED
Start: 2024-05-08